# Patient Record
Sex: FEMALE | Race: WHITE | NOT HISPANIC OR LATINO | Employment: OTHER | URBAN - METROPOLITAN AREA
[De-identification: names, ages, dates, MRNs, and addresses within clinical notes are randomized per-mention and may not be internally consistent; named-entity substitution may affect disease eponyms.]

---

## 2017-01-19 ENCOUNTER — HOSPITAL ENCOUNTER (OUTPATIENT)
Dept: RADIOLOGY | Facility: HOSPITAL | Age: 67
Discharge: HOME | End: 2017-01-19
Attending: NURSE PRACTITIONER
Payer: COMMERCIAL

## 2017-01-19 ENCOUNTER — OFFICE VISIT (OUTPATIENT)
Dept: PRIMARY CARE CLINIC | Facility: CLINIC | Age: 67
End: 2017-01-19
Payer: COMMERCIAL

## 2017-01-19 VITALS
WEIGHT: 214.5 LBS | OXYGEN SATURATION: 96 % | TEMPERATURE: 98 F | BODY MASS INDEX: 36.62 KG/M2 | SYSTOLIC BLOOD PRESSURE: 146 MMHG | DIASTOLIC BLOOD PRESSURE: 78 MMHG | HEIGHT: 64 IN | HEART RATE: 47 BPM

## 2017-01-19 DIAGNOSIS — R19.7 DIARRHEA, UNSPECIFIED TYPE: ICD-10-CM

## 2017-01-19 DIAGNOSIS — R06.02 SHORTNESS OF BREATH: ICD-10-CM

## 2017-01-19 DIAGNOSIS — R53.83 FATIGUE, UNSPECIFIED TYPE: ICD-10-CM

## 2017-01-19 DIAGNOSIS — R06.02 SHORTNESS OF BREATH: Primary | ICD-10-CM

## 2017-01-19 PROCEDURE — 99999 PR PBB SHADOW E&M-EST. PATIENT-LVL V: CPT | Mod: PBBFAC,,, | Performed by: NURSE PRACTITIONER

## 2017-01-19 PROCEDURE — 71020 XR CHEST PA AND LATERAL: CPT | Mod: 26,,, | Performed by: RADIOLOGY

## 2017-01-19 PROCEDURE — 99214 OFFICE O/P EST MOD 30 MIN: CPT | Mod: S$GLB,,, | Performed by: NURSE PRACTITIONER

## 2017-01-19 PROCEDURE — 93005 ELECTROCARDIOGRAM TRACING: CPT | Mod: S$GLB,,, | Performed by: NURSE PRACTITIONER

## 2017-01-19 PROCEDURE — 3077F SYST BP >= 140 MM HG: CPT | Mod: S$GLB,,, | Performed by: NURSE PRACTITIONER

## 2017-01-19 PROCEDURE — 93010 ELECTROCARDIOGRAM REPORT: CPT | Mod: S$GLB,,, | Performed by: INTERNAL MEDICINE

## 2017-01-19 PROCEDURE — 3078F DIAST BP <80 MM HG: CPT | Mod: S$GLB,,, | Performed by: NURSE PRACTITIONER

## 2017-01-19 PROCEDURE — 1159F MED LIST DOCD IN RCRD: CPT | Mod: S$GLB,,, | Performed by: NURSE PRACTITIONER

## 2017-01-19 PROCEDURE — 71020 XR CHEST PA AND LATERAL: CPT | Mod: TC,PO

## 2017-01-19 RX ORDER — DIPHENOXYLATE HYDROCHLORIDE AND ATROPINE SULFATE 2.5; .025 MG/1; MG/1
1 TABLET ORAL
Qty: 30 TABLET | Refills: 0 | Status: SHIPPED | OUTPATIENT
Start: 2017-01-19 | End: 2017-01-25

## 2017-01-19 RX ORDER — DIPHENOXYLATE HYDROCHLORIDE AND ATROPINE SULFATE 2.5; .025 MG/1; MG/1
1 TABLET ORAL
Qty: 30 TABLET | Refills: 0 | Status: SHIPPED | OUTPATIENT
Start: 2017-01-19 | End: 2017-01-19 | Stop reason: SDUPTHER

## 2017-01-19 NOTE — PROGRESS NOTES
"Cornelia Delatorre is a 66 y.o. female patient of Armond Cortez MD who presents to the clinic today for   Chief Complaint   Patient presents with    Diarrhea     past 2 weeks    Chest Congestion    Nasal Congestion   .    HPI    Pt, who is known to me, reports with a new problem to me, as follows:  Diarrhea for about 1 mo off and on. Burping a lot.  For about 1 or 2 days per month for 30 yrs she gets this.  Usually has a Rx for lomotil, takes 1 or 2 tabs and then it resolves.  This is the first time she has had it lasting so long.  6-7 stools yesterday. "Pudding like".  But no BM today for the first time.  No cramping, no blood or mucus in it.  Burnign a lot since the diarrhea started.  Did have gastric bypass Dec 2011.  Had a lot of burping after her surgery but then it eased up.  No foul taste to it.  Shortness of breath, coughing, spitting up "stuff", constantly blowing her nose, not feeling well, feeling like she's "a little off" but not really dizzy for about 2 weeks.  Coricidin and Mucinex helping some.  Tried to quit the Mucinex and her sxs worsened.    Onset of dizziness and then, for a week, she had that.  Then the buzzing started.  Right before [place] went to ENT for buzzing in the ear.  Wondering  More about that.    These symptoms began 1 mo ago and status is unchanged.     Pt denies the following symptoms:  Fever, chills, vomiting, sinus pain, ear pain.    Aggravating factors include nothing (no large or fatty meals, no change with activity or lying down .    Relieving factors include Mucinex and Coricidin .    OTC Medications tried are Coricidin and Mucines..    Prescription medications taken for symptoms are Lomotil--down to 2 pills, needs RF..    Pertinent history:  H/o diarrhea 1-2x per month for 30 yrs.    ROS    Constitutional: No fever, + fatigue, decrease change in appetite. Sleeping well, she states, and feels rested in the morning.  But at night when she lies down she does worry about " stopping breathing in her sleep.  Has to take several deep breaths until she feels like she can get a good one.  Sleeping more than usual.    GI:  No stomach ache, no nausea, no vomiting, + diarrhea, + bloating, no constipation, no heartburn with the burping.    Urinary:  No dysuria, no frequency, no urgency, no flank pain.  Has been having trouble starting the urine stream.    HEENT: See above  Heart/Lung:  + difficulty breathing but no CP  MS/Skin:  No symptoms or no changes.      ALLERGIES AND MEDICATIONS: updated and reviewed.  Review of patient's allergies indicates:   Allergen Reactions    Sulfa (sulfonamide antibiotics) Hives    Naproxen Other (See Comments)     Other reaction(s): RT sided numbness       Current Outpatient Prescriptions   Medication Sig Dispense Refill    azelastine (ASTELIN) 137 mcg (0.1 %) nasal spray 1 spray (137 mcg total) by Nasal route 2 (two) times daily. 30 mL 5    b complex vitamins capsule Take 1 capsule by mouth once daily.      benzoyl peroxide-erythromycin (BENZAMYCIN) gel Thin film to chin once daily, increase to bid dosing for spot treatment. (Patient taking differently: as needed. Thin film to chin once daily, increase to bid dosing for spot treatment.) 50 g 3    BIOTIN ORAL Take 10,000 mcg by mouth once daily.       calcium citrate-vitamin D3 (UPCAL D) 500-250 mg-unit/2.5gram Powd Take by mouth 3 (three) times daily.        cranberry 500 mg Cap Take 1 capsule by mouth every evening.      cyanocobalamin (VITAMIN B-12) 500 MCG tablet Take 500 mcg by mouth once daily.        diphenoxylate-atropine 2.5-0.025 mg (LOMOTIL) 2.5-0.025 mg per tablet as needed.       esomeprazole (NEXIUM) 40 MG capsule Take 1 capsule (40 mg total) by mouth before breakfast. 90 capsule 3    fish oil-omega-3 fatty acids 300-1,000 mg capsule Take 2 g by mouth once daily.      fluticasone (FLONASE) 50 mcg/actuation nasal spray 2 sprays by Each Nare route once daily. 16 g 0    gabapentin  "(NEURONTIN) 600 MG tablet Take 1 tablet (600 mg total) by mouth 3 (three) times daily. 270 tablet 3    hydrochlorothiazide (HYDRODIURIL) 25 MG tablet Take 1 tablet (25 mg total) by mouth once daily. 90 tablet 3    hydrocortisone 2.5 % cream aaa twice daily prn 30 g 2    irbesartan (AVAPRO) 75 MG tablet Take 1 tablet (75 mg total) by mouth every evening. 90 tablet 3    levothyroxine (SYNTHROID) 88 MCG tablet Take 1 tablet (88 mcg total) by mouth once daily. 90 tablet 3    metoprolol tartrate (LOPRESSOR) 50 MG tablet Take 1 tablet (50 mg total) by mouth 2 (two) times daily. 180 tablet 3    multivitamin capsule Take 1 capsule by mouth. Take one tablets daily      mupirocin (BACTROBAN) 2 % ointment AAA bid (Patient taking differently: as needed. AAA bid) 30 g 2    pravastatin (PRAVACHOL) 20 MG tablet Take 1 tablet (20 mg total) by mouth once daily. 90 tablet 3    SIMETHICONE (GAS-X ORAL) Take 1 capsule by mouth as needed.      terbinafine HCl (LAMISIL) 250 mg tablet Take 1 tablet (250 mg total) by mouth once daily. 90 tablet 0    diclofenac sodium 1 % Gel Apply 2 g topically 4 (four) times daily. Left  ankle (Patient taking differently: Apply 2 g topically as needed. Left  ankle) 1 Tube 1     No current facility-administered medications for this visit.          PHYSICAL EXAM    Alert, coop 66 y.o. female patient in no acute distress, is not ill-appearing.    Vitals:    01/19/17 1718   BP: (!) 146/78   BP Location: Right arm   Patient Position: Sitting   BP Method: Manual   Pulse: (!) 47   Temp: 98.1 °F (36.7 °C)   TempSrc: Oral   SpO2: 96%   Weight: 97.3 kg (214 lb 8.1 oz)   Height: 5' 4" (1.626 m)    Body mass index is 36.82 kg/(m^2).  VS reviewed.  VS bradycardic pulse.  CC, nursing note, medications, PMH, PSH, FH and Soc hx all reviewed today.    Head:  Normocephalic, atraumatic.    EENT:  Ext nose/ears normal.               Eye lids normal, no discharge present.                       Resp:  Respirations " even, unlabored, sighs intermittently              Lungs CTA bilat.    Heart:  Heart rate normal.  RRR, no MRG on ausculation.    ABD:  Soft, round, NT to palp.  Normal BS in all 4 quadrants.  No rebound or organomegaly.              No peritoneal signs.  No CVAT    MS:  Ambulates normally.      NEURO:  Alert and oriented x 4.  Responds appropriately during interaction.    Skin:  Warm, dry, color good..    Psych:  Responds appropriately throughout the visit.               Relaxed.  Well-groomed.    Shortness of breath  -     EKG 12-lead  -     X-Ray Chest PA And Lateral; Future; Expected date: 1/19/17  -     CBC auto differential; Future; Expected date: 1/19/17  -     Comprehensive metabolic panel; Future; Expected date: 1/19/17    Fatigue, unspecified type  -     EKG 12-lead  -     X-Ray Chest PA And Lateral; Future; Expected date: 1/19/17  -     CBC auto differential; Future; Expected date: 1/19/17  -     Comprehensive metabolic panel; Future; Expected date: 1/19/17    Diarrhea, unspecified type  -     Comprehensive metabolic panel; Future; Expected date: 1/19/17  -     diphenoxylate-atropine 2.5-0.025 mg (LOMOTIL) 2.5-0.025 mg per tablet; Take 1 tablet by mouth as needed.  Dispense: 30 tablet; Refill: 0      Pt today presents with several complaints:  Hard to get a good breath, at night this worries her, nasal drip and cough, feeling a little off balance, unusually persistent bout of diarrhea.    This is a new problem to me and the following work up is planned.    Lab & Radiological Tests Ordered: CXR, CBC, CMP.  The results are pending.  The xray was read by radiology..      Pulmonary or Cardiac Testing ordered: EKG.  The results are new left BBB from 2011 EKG (last one available), no ST-T wave elevations or depressions of concern. (Lot of interference in the baseline).  The EKG was read by me but will be read by cardiology as well..    I have reviewed the following additional tests today: past CMP, Vit D, B  12.      Pt advised to perform comfort measures recommended on patient instruction sheet .    If worse or concerns, the patient is advised to call us or [facility]..  Explained exam findings, diagnosis and treatment plan to patient and her , with her during the visit.  Questions answered and patient states understanding.

## 2017-01-19 NOTE — PATIENT INSTRUCTIONS
We'll start with blood test, EKG and Chest xray.    Lomotil for the diarrhea.    We'll call with the results and next steps.    If you have any questions, please call.  You can reach us at [phone] Monday through Friday (except holidays) 12 nooon to 6 p.m.    Thank you for using the [facility]!    RODOLFO Alvarez, CNP, FNP-BC  [facility]  [facility]

## 2017-01-19 NOTE — Clinical Note
Armond Cortez MD,  I saw your NEW patient today in the [facility].  If you have any questions, please do not hesitate to contact me or Dr. Aburto.  Thank you!  FELIX Alvarez

## 2017-01-23 DIAGNOSIS — R06.00 DYSPNEA, UNSPECIFIED TYPE: Primary | ICD-10-CM

## 2017-01-23 DIAGNOSIS — R94.31 ABNORMAL EKG: ICD-10-CM

## 2017-01-24 ENCOUNTER — OFFICE VISIT (OUTPATIENT)
Dept: CARDIOLOGY | Facility: CLINIC | Age: 67
End: 2017-01-24
Payer: COMMERCIAL

## 2017-01-24 VITALS
DIASTOLIC BLOOD PRESSURE: 66 MMHG | SYSTOLIC BLOOD PRESSURE: 145 MMHG | BODY MASS INDEX: 36.47 KG/M2 | HEART RATE: 46 BPM | WEIGHT: 213.63 LBS | HEIGHT: 64 IN

## 2017-01-24 DIAGNOSIS — E66.3 OVERWEIGHT (BMI 25.0-29.9): ICD-10-CM

## 2017-01-24 DIAGNOSIS — E78.5 HYPERLIPIDEMIA, UNSPECIFIED HYPERLIPIDEMIA TYPE: ICD-10-CM

## 2017-01-24 DIAGNOSIS — R42 DIZZY: ICD-10-CM

## 2017-01-24 DIAGNOSIS — I10 ESSENTIAL HYPERTENSION: Primary | ICD-10-CM

## 2017-01-24 DIAGNOSIS — R06.09 DOE (DYSPNEA ON EXERTION): ICD-10-CM

## 2017-01-24 DIAGNOSIS — R53.82 CHRONIC FATIGUE: ICD-10-CM

## 2017-01-24 PROCEDURE — 99999 PR PBB SHADOW E&M-EST. PATIENT-LVL II: CPT | Mod: PBBFAC,,, | Performed by: INTERNAL MEDICINE

## 2017-01-24 PROCEDURE — 3077F SYST BP >= 140 MM HG: CPT | Mod: S$GLB,,, | Performed by: INTERNAL MEDICINE

## 2017-01-24 PROCEDURE — 3078F DIAST BP <80 MM HG: CPT | Mod: S$GLB,,, | Performed by: INTERNAL MEDICINE

## 2017-01-24 PROCEDURE — 99204 OFFICE O/P NEW MOD 45 MIN: CPT | Mod: S$GLB,,, | Performed by: INTERNAL MEDICINE

## 2017-01-24 PROCEDURE — 1159F MED LIST DOCD IN RCRD: CPT | Mod: S$GLB,,, | Performed by: INTERNAL MEDICINE

## 2017-01-24 RX ORDER — METOPROLOL TARTRATE 50 MG/1
50 TABLET ORAL DAILY
Qty: 180 TABLET | Refills: 3 | Status: SHIPPED | OUTPATIENT
Start: 2017-01-24 | End: 2017-04-17 | Stop reason: SDUPTHER

## 2017-01-24 NOTE — LETTER
January 24, 2017      Yuridia Wilson, VASYL  [address] [address]           [facility]  [address] [facility] [address] [place] [address]  Phone: [phone]          Patient: Cornelia Delatorre   MR Number: [id number]   YOB: 1950   Date of Visit: 1/24/2017       Dear Yuridia Wilson:    Thank you for referring Cornelia Delatorre to me for evaluation. Attached you will find relevant portions of my assessment and plan of care.    If you have questions, please do not hesitate to call me. I look forward to following Cornelia Delatorre along with you.    Sincerely,    Juan Higgins Jr., MD    Enclosure  CC:  No Recipients    If you would like to receive this communication electronically, please contact [email or web address] or [phone] to request more information on [facility] Link access.    For providers and/or their staff who would like to refer a patient to [facility], please contact us through our one-stop-shop provider referral line, Justyna Nevarez, at [phone].    If you feel you have received this communication in error or would no longer like to receive these types of communications, please e-mail [email or web address]

## 2017-01-24 NOTE — PROGRESS NOTES
Subjective:    Patient ID:  Cornelia Delatorre is a 66 y.o. female who presents for evaluation of Shortness of Breath (new pt-ref from Dr. fernandes) and Fatigue      HPI66 yo WF with no known heart disease had a dizzy spell last week and after that for several days felt fatigued and SOB . Also had a funny sensation in her chest with exertion but states seems to be feeling better. No palpitations or swelling.    Review of Systems   Constitution: Negative for decreased appetite, fever, weakness, malaise/fatigue, weight gain and weight loss.   HENT: Negative for headaches, hearing loss and nosebleeds.    Eyes: Negative for visual disturbance.   Cardiovascular: Positive for chest pain and dyspnea on exertion. Negative for claudication, cyanosis, irregular heartbeat, leg swelling, near-syncope, orthopnea, palpitations, paroxysmal nocturnal dyspnea and syncope.   Respiratory: Positive for shortness of breath. Negative for cough, hemoptysis, sleep disturbances due to breathing, snoring and wheezing.    Endocrine: Negative for cold intolerance, heat intolerance, polydipsia and polyuria.   Hematologic/Lymphatic: Negative for adenopathy and bleeding problem. Does not bruise/bleed easily.   Skin: Negative for color change, itching, poor wound healing, rash and suspicious lesions.   Musculoskeletal: Negative for arthritis, back pain, falls, joint pain, joint swelling, muscle cramps, muscle weakness and myalgias.   Gastrointestinal: Negative for bloating, abdominal pain, change in bowel habit, constipation, flatus, heartburn, hematemesis, hematochezia, hemorrhoids, jaundice, melena, nausea and vomiting.   Genitourinary: Negative for bladder incontinence, decreased libido, frequency, hematuria, hesitancy and urgency.   Neurological: Positive for dizziness. Negative for brief paralysis, difficulty with concentration, excessive daytime sleepiness, focal weakness, light-headedness, loss of balance, numbness and vertigo.  "  Psychiatric/Behavioral: Negative for altered mental status, depression and memory loss. The patient does not have insomnia and is not nervous/anxious.    Allergic/Immunologic: Negative for environmental allergies, hives and persistent infections.        Objective:    Physical Exam   Constitutional: She is oriented to person, place, and time. She appears well-developed and well-nourished.   BP (!) 145/66  Pulse (!) 46  Ht 5' 4" (1.626 m)  Wt 96.9 kg (213 lb 10 oz)  BMI 36.67 kg/m2     HENT:   Head: Normocephalic and atraumatic.   Right Ear: External ear normal.   Left Ear: External ear normal.   Nose: Nose normal.   Mouth/Throat: Oropharynx is clear and moist.   Eyes: Conjunctivae, EOM and lids are normal. Pupils are equal, round, and reactive to light. Right eye exhibits no discharge. Left eye exhibits no discharge. Right conjunctiva has no hemorrhage. No scleral icterus.   Neck: Normal range of motion. Neck supple. No JVD present. No tracheal deviation present. No thyromegaly present.   Cardiovascular: Normal rate, regular rhythm, normal heart sounds and intact distal pulses.  Exam reveals no gallop and no friction rub.    No murmur heard.  Pulmonary/Chest: Effort normal and breath sounds normal. No respiratory distress. She has no wheezes. She has no rales. She exhibits no tenderness. Breasts are symmetrical.   Abdominal: Soft. Bowel sounds are normal. She exhibits no distension and no mass. There is no hepatosplenomegaly or hepatomegaly. There is no tenderness. There is no rebound and no guarding.   Musculoskeletal: Normal range of motion. She exhibits no edema or tenderness.   Lymphadenopathy:     She has no cervical adenopathy.   Neurological: She is alert and oriented to person, place, and time. She displays normal reflexes. No cranial nerve deficit. Coordination normal.   Skin: Skin is warm and dry. No rash noted. No erythema. No pallor.   Psychiatric: She has a normal mood and affect. Her behavior is " normal. Judgment and thought content normal.   Nursing note and vitals reviewed.        Assessment:       1. Essential hypertension    2. Hyperlipidemia, unspecified hyperlipidemia type    3. Overweight (BMI 25.0-29.9)    4. KLINE (dyspnea on exertion)    5. Chronic fatigue    6. Dizzy         Plan:     Patient bradycardic. Will decrease metoprolol to once a day. Continue other meds      Orders Placed This Encounter   Procedures    Exercise stress echo     Return if symptoms worsen or fail to improve.

## 2017-01-25 ENCOUNTER — HOSPITAL ENCOUNTER (OUTPATIENT)
Dept: RADIOLOGY | Facility: HOSPITAL | Age: 67
Discharge: HOME | End: 2017-01-25
Attending: NURSE PRACTITIONER
Payer: COMMERCIAL

## 2017-01-25 ENCOUNTER — OFFICE VISIT (OUTPATIENT)
Dept: PRIMARY CARE CLINIC | Facility: CLINIC | Age: 67
End: 2017-01-25
Payer: COMMERCIAL

## 2017-01-25 VITALS
OXYGEN SATURATION: 98 % | DIASTOLIC BLOOD PRESSURE: 78 MMHG | HEART RATE: 78 BPM | BODY MASS INDEX: 36.7 KG/M2 | WEIGHT: 214.94 LBS | TEMPERATURE: 98 F | SYSTOLIC BLOOD PRESSURE: 158 MMHG | HEIGHT: 64 IN

## 2017-01-25 DIAGNOSIS — R05.9 COUGH: ICD-10-CM

## 2017-01-25 DIAGNOSIS — R05.9 COUGH: Primary | ICD-10-CM

## 2017-01-25 PROCEDURE — 71020 XR CHEST PA AND LATERAL: CPT | Mod: TC,PO

## 2017-01-25 PROCEDURE — 99999 PR PBB SHADOW E&M-EST. PATIENT-LVL V: CPT | Mod: PBBFAC,,, | Performed by: NURSE PRACTITIONER

## 2017-01-25 PROCEDURE — 3078F DIAST BP <80 MM HG: CPT | Mod: S$GLB,,, | Performed by: NURSE PRACTITIONER

## 2017-01-25 PROCEDURE — 1159F MED LIST DOCD IN RCRD: CPT | Mod: S$GLB,,, | Performed by: NURSE PRACTITIONER

## 2017-01-25 PROCEDURE — 71020 XR CHEST PA AND LATERAL: CPT | Mod: 26,,, | Performed by: RADIOLOGY

## 2017-01-25 PROCEDURE — 3077F SYST BP >= 140 MM HG: CPT | Mod: S$GLB,,, | Performed by: NURSE PRACTITIONER

## 2017-01-25 PROCEDURE — 99214 OFFICE O/P EST MOD 30 MIN: CPT | Mod: S$GLB,,, | Performed by: NURSE PRACTITIONER

## 2017-01-25 RX ORDER — BENZONATATE 200 MG/1
200 CAPSULE ORAL 3 TIMES DAILY PRN
Qty: 30 CAPSULE | Refills: 0 | Status: SHIPPED | OUTPATIENT
Start: 2017-01-25 | End: 2017-02-04

## 2017-01-25 NOTE — PROGRESS NOTES
Cornelia Delatorre is a 66 y.o. female patient of Armond Cortez MD who presents to the clinic today for   Chief Complaint   Patient presents with    Cough     started last night    Chest Congestion    Nausea    Headache    Nasal Congestion   .    HPI    Pt, who is known to me, reports a new problem to me, as follows:  Coughing.  Started after eating some popcorn.  Thought it was r/t that.  But coughed all night.  Clear nasal d/c, clear sputum.  No known exposure to illness.  Decrease in appetite.  Has bi-temporal headache.  Mild nausea, burning in the chest.    These symptoms began 1 day ago and status is worsening.     Symptoms are + acute.      Pt denies the following symptoms:  RN, ST, earache.      Aggravating factors include nothing .    Relieving factors include benzonatate, robitussin DM .    OTC Medications tried are see above.    Prescription medications taken for symptoms are benzonatate.    Pertinent medical history:  No h/o lung problems    Smoking status:  nonsmoker    ROS    Constitutional:   No  fever, + fatigue, decrease in appetite.      Head:    + bi-temporal headache    EENT:  Ears:    No pain  Eyes:    No discharge, no irritation, no change in vision  Nose:    No sinus pain, no congestion, + clear runny nose.  Quit nose sprays for a while because she didn't think she needed it.  Started back last night.    Throat:  No ST pain.               Heart:  No palpitations, chest pain.  Saw Dr. Higgins yesterday.  He cut her metoprolol in half    Lungs:  Still has occ difficulty breathing--needing to take a deep breath, ++ coughing, occ clear sputum production, + wheezing this morning.              Symptoms are community acquired.    GI/:  Continues with diarrhea.  Called Dr. Duarte and he is working on this.    MS:  No new bone, joint or muscle problems.    Skin:  No rashes, itching.    ALLERGIES AND MEDICATIONS: updated and reviewed.  Review of patient's allergies indicates:   Allergen Reactions     Sulfa (sulfonamide antibiotics) Hives    Naproxen Other (See Comments)     Other reaction(s): RT sided numbness       Current Outpatient Prescriptions   Medication Sig Dispense Refill    azelastine (ASTELIN) 137 mcg (0.1 %) nasal spray 1 spray (137 mcg total) by Nasal route 2 (two) times daily. 30 mL 5    b complex vitamins capsule Take 1 capsule by mouth once daily.      BIOTIN ORAL Take 10,000 mcg by mouth once daily.       calcium citrate-vitamin D3 (UPCAL D) 500-250 mg-unit/2.5gram Powd Take by mouth 3 (three) times daily.        CHOLECALCIFEROL, VITAMIN D3, (CHOLECALCIFEROL, VIT D3,,BULK, MISC) by Misc.(Non-Drug; Combo Route) route.      cranberry 500 mg Cap Take 1 capsule by mouth every evening.      cyanocobalamin (VITAMIN B-12) 500 MCG tablet Take 500 mcg by mouth once daily.        esomeprazole (NEXIUM) 40 MG capsule Take 1 capsule (40 mg total) by mouth before breakfast. 90 capsule 3    fish oil-omega-3 fatty acids 300-1,000 mg capsule Take 2 g by mouth once daily.      fluticasone (FLONASE) 50 mcg/actuation nasal spray 2 sprays by Each Nare route once daily. 16 g 0    gabapentin (NEURONTIN) 600 MG tablet Take 1 tablet (600 mg total) by mouth 3 (three) times daily. 270 tablet 3    hydrochlorothiazide (HYDRODIURIL) 25 MG tablet Take 1 tablet (25 mg total) by mouth once daily. 90 tablet 3    hydrocortisone 2.5 % cream aaa twice daily prn 30 g 2    irbesartan (AVAPRO) 75 MG tablet Take 1 tablet (75 mg total) by mouth every evening. 90 tablet 3    levothyroxine (SYNTHROID) 88 MCG tablet Take 1 tablet (88 mcg total) by mouth once daily. 90 tablet 3    metoprolol tartrate (LOPRESSOR) 50 MG tablet Take 1 tablet (50 mg total) by mouth once daily. 180 tablet 3    multivitamin capsule Take 1 capsule by mouth. Take one tablets daily      mupirocin (BACTROBAN) 2 % ointment AAA bid (Patient taking differently: as needed. AAA bid) 30 g 2    pravastatin (PRAVACHOL) 20 MG tablet Take 1 tablet (20  "mg total) by mouth once daily. 90 tablet 3    SIMETHICONE (GAS-X ORAL) Take 1 capsule by mouth as needed.       No current facility-administered medications for this visit.        PHYSICAL EXAM    Alert, coop 66 y.o. female patient in no acute distress.    Vitals:    01/25/17 1551   BP: (!) 158/78   BP Location: Right arm   Patient Position: Sitting   BP Method: Manual   Pulse: 78   Temp: 98.4 °F (36.9 °C)   TempSrc: Oral   SpO2: 98%   Weight: 97.5 kg (214 lb 15.2 oz)   Height: 5' 4" (1.626 m)     VS reviewed.  VS pulse inc to 78 (up from 47 five days ago), BP mildly elevated.  CC, nursing note, medications, PMH, PSH, FH and Soc Hx all reviewed today.    Head:  Normocephalic, atraumatic.    EENT:  EACs patent.  TMs no erythema, dull LR, no effusions, no TM perforation.               Eye lids normal, no discharge present.       No Sinus tenderness to palp.               Nares--minimal edema, no d/c present.    Pharynx not injected.                Tonsils not erythematous , not enlarged, no exudate present.    No anterior, no posterior cervical lymph nodes palpable.    No submental, submandibular or supraclavicular lymph nodes palp.             Resp:  Respirations even, unlabored.  Very deep cough heard.   Lungs CTA bilat.  No wheezing.  No crackles.  Moves air to bases bilat.    Heart:  RRR, no MRG.    MS:  Ambulates normally.             NEURO:  Alert and oriented x 4.  Responds appropriately during interaction.    Skin:  Warm, dry, color good.    Psych:  Responds appropriately throughout the visit.               Relaxed.  Well-groomed.    Cough  -     X-Ray Chest PA And Lateral; Future; Expected date: 1/25/17  -     benzonatate (TESSALON) 200 MG capsule; Take 1 capsule (200 mg total) by mouth 3 (three) times daily as needed.  Dispense: 30 capsule; Refill: 0      Pt today presents with sudden onset of cough last night with little nasal congestion, no other cold sxs.  Started after eating popcorn..    This is a new " problem to me and the following work up is planned.    Lab & Radiological Tests Ordered: repeat CXR.  The results are pending.  The xray read by radiology..      Pt advised to perform comfort measures recommended on patient instruction sheet .    Next step will be outlined depending on CXR results.  Explained exam findings, diagnosis and treatment plan to patient and her , with her during the visit.  Questions answered and patient states understanding.      Addendum:  CXR still neg.  Continue with benzonatate, comfort measures.   Because of it sudden onset, this may be reflux.  Pt advised to call if not better in 5 days, worse or concerns.

## 2017-01-25 NOTE — PATIENT INSTRUCTIONS
We'll get the chest xray and determine what's needed next.    Refilled benzonatate.  Use the TU[place]in DM in the daytime, benzonatate at night, unless the cough gets bad; then use the benzonatate.  Continue the nose sprays.    If you have any questions, please call.  You can reach us at [phone] Monday through Friday (except holidays) 12 nooon to 6 p.m.    Thank you for using the [facility]!    RODOLFO Alvarez, CNP, FNP-BC  [facility]  [facility]

## 2017-01-25 NOTE — Clinical Note
Armond Cortez MD,  I saw your patient today in the [facility].  If you have any questions, please do not hesitate to contact me.  Thank you!  FELIX Alvarez Dr.:  Mrs. Delatorre has a bad cough now.  She has her stress test set for Tuesday.  She's wondering about the stress test--if she can do it.  Please ask your staff to call with your recommendation. Thank you! Lenka WASHINGTON

## 2017-01-25 NOTE — MR AVS SNAPSHOT
[facility]  [address]  [facility] [address]  Phone: [phone]                  Cornelia Delatorre   2017 4:00 PM   Office Visit    Description:  Female : 1950   Provider:  Yuridia Wilson NP   Department:  [facility]           Reason for Visit     Cough     Chest Congestion     Nausea     Headache     Nasal Congestion           Diagnoses this Visit        Comments    Cough    -  Primary            To Do List           Future Appointments        Provider Department Dept Phone    2017 4:45 PM [facility] XRFL1 [facility] [phone]    2017 10:00 AM ECHO, [facility] [phone]    2017 10:15 AM Kailyn Fletcher MD [facility] [phone]    2017 11:00 AM Farzad Willams DPCELESTE [facility] Pod[facility] [phone]    2/15/2017 3:10 PM Armond Cortez MD [facility] [phone]      Goals (5 Years of Data)     None       These Medications        Disp Refills Start End    benzonatate (TESSALON) 200 MG capsule 30 capsule 0 2017    Take 1 capsule (200 mg total) by mouth 3 (three) times daily as needed. - Oral    Pharmacy: [facility] [place] [address] [address] Ph #: [phone]         [facility]     [facility] -  Assistance  Registered nurses in the [facility] provide clinical advisement, health education, appointment booking, and other advisory services.  Call for this free service at [phone].             Medications           Message regarding Medications     Verify the changes and/or additions to your medication regime listed below are the same as discussed with your clinician today.  If any of these changes or additions are incorrect, please notify your healthcare provider.        START taking these NEW medications        Refills    benzonatate (TESSALON) 200 MG capsule 0    Sig: Take 1 capsule  (200 mg total) by mouth 3 (three) times daily as needed.    Class: Normal    Route: Oral      STOP taking these medications     benzoyl peroxide-erythromycin (BENZAMYCIN) gel Thin film to chin once daily, increase to bid dosing for spot treatment.    diclofenac sodium 1 % Gel Apply 2 g topically 4 (four) times daily. Left  ankle    diphenoxylate-atropine 2.5-0.025 mg (LOMOTIL) 2.5-0.025 mg per tablet Take 1 tablet by mouth as needed.           Verify that the below list of medications is an accurate representation of the medications you are currently taking.  If none reported, the list may be blank. If incorrect, please contact your healthcare provider. Carry this list with you in case of emergency.           Current Medications     azelastine (ASTELIN) 137 mcg (0.1 %) nasal spray 1 spray (137 mcg total) by Nasal route 2 (two) times daily.    b complex vitamins capsule Take 1 capsule by mouth once daily.    BIOTIN ORAL Take 10,000 mcg by mouth once daily.     calcium citrate-vitamin D3 (UPCAL D) 500-250 mg-unit/2.5gram Powd Take by mouth 3 (three) times daily.      CHOLECALCIFEROL, VITAMIN D3, (CHOLECALCIFEROL, VIT D3,,BULK, MISC) by Misc.(Non-Drug; Combo Route) route.    cranberry 500 mg Cap Take 1 capsule by mouth every evening.    cyanocobalamin (VITAMIN B-12) 500 MCG tablet Take 500 mcg by mouth once daily.      esomeprazole (NEXIUM) 40 MG capsule Take 1 capsule (40 mg total) by mouth before breakfast.    fish oil-omega-3 fatty acids 300-1,000 mg capsule Take 2 g by mouth once daily.    fluticasone (FLONASE) 50 mcg/actuation nasal spray 2 sprays by Each Nare route once daily.    gabapentin (NEURONTIN) 600 MG tablet Take 1 tablet (600 mg total) by mouth 3 (three) times daily.    hydrochlorothiazide (HYDRODIURIL) 25 MG tablet Take 1 tablet (25 mg total) by mouth once daily.    hydrocortisone 2.5 % cream aaa twice daily prn    irbesartan (AVAPRO) 75 MG tablet Take 1 tablet (75 mg total) by mouth every evening.     "levothyroxine (SYNTHROID) 88 MCG tablet Take 1 tablet (88 mcg total) by mouth once daily.    metoprolol tartrate (LOPRESSOR) 50 MG tablet Take 1 tablet (50 mg total) by mouth once daily.    multivitamin capsule Take 1 capsule by mouth. Take one tablets daily    mupirocin (BACTROBAN) 2 % ointment AAA bid    pravastatin (PRAVACHOL) 20 MG tablet Take 1 tablet (20 mg total) by mouth once daily.    SIMETHICONE (GAS-X ORAL) Take 1 capsule by mouth as needed.    benzonatate (TESSALON) 200 MG capsule Take 1 capsule (200 mg total) by mouth 3 (three) times daily as needed.           Clinical Reference Information           Vital Signs - Last Recorded  Most recent update: 1/25/2017  3:52 PM by Padmini Thomas LPN    BP Pulse Temp Ht Wt SpO2    (!) 158/78 (BP Location: Right arm, Patient Position: Sitting, BP Method: Manual) 78 98.4 °F (36.9 °C) (Oral) 5' 4" (1.626 m) 97.5 kg (214 lb 15.2 oz) 98%    BMI                36.9 kg/m2          Blood Pressure          Most Recent Value    BP  (!)  158/78      Allergies as of 1/25/2017     Sulfa (Sulfonamide Antibiotics)    Naproxen      Immunizations Administered on Date of Encounter - 1/25/2017     None      Orders Placed During Today's Visit     Future Labs/Procedures Expected by Expires    X-Ray Chest PA And Lateral  1/25/2017 1/25/2018      Instructions    We'll get the chest xray and determine what's needed next.    Refilled benzonatate.  Use the [place] DM in the daytime, benzonatate at night, unless the cough gets bad; then use the benzonatate.  Continue the nose sprays.    If you have any questions, please call.  You can reach us at [phone] Monday through Friday (except holidays) 12 nooon to 6 p.m.    Thank you for using the [facility]!    Lenka Wilson, APRN, CNP, FNP-BC  [facility]  [facility]           "

## 2017-01-25 NOTE — Clinical Note
Armond Cortez MD,  I saw your patient today in the [facility].  If you have any questions, please do not hesitate to contact me.  Thank you!  FELIX Alvarez

## 2017-01-26 ENCOUNTER — TELEPHONE (OUTPATIENT)
Dept: PRIMARY CARE CLINIC | Facility: CLINIC | Age: 67
End: 2017-01-26

## 2017-01-26 DIAGNOSIS — R09.89 CHEST CONGESTION: ICD-10-CM

## 2017-01-26 DIAGNOSIS — R50.9 FEVER, UNSPECIFIED FEVER CAUSE: ICD-10-CM

## 2017-01-26 DIAGNOSIS — R05.9 COUGHING: Primary | ICD-10-CM

## 2017-01-27 RX ORDER — CEFUROXIME AXETIL 500 MG/1
500 TABLET ORAL 2 TIMES DAILY
Qty: 14 TABLET | Refills: 0 | Status: SHIPPED | OUTPATIENT
Start: 2017-01-27 | End: 2017-02-03

## 2017-01-27 NOTE — TELEPHONE ENCOUNTER
I will start her on an antibiotic.  If she's not better in the next 3-4 days or is worse, ask her to call.  She probably should let Dr. Higgins's nurse know she's sick and ask about the stress test on Tuesday.

## 2017-01-27 NOTE — TELEPHONE ENCOUNTER
Returned patient's call. Patient states that she still is not feeling well. She still has nasal congestion and head stuffiness. Patient states that she had a fever last night over 101 took tylenol and it went down to 100. Please advise.

## 2017-01-27 NOTE — TELEPHONE ENCOUNTER
Called and spoke with patient and informed her that and antibiotic had been called into the pharmacy on file. Patient informed to call in the next 3-4 days if not feeling better and to contact Dr. Higgins's office in regard to stress test on Tuesday. Patient verbalized understanding.

## 2017-02-06 ENCOUNTER — OFFICE VISIT (OUTPATIENT)
Dept: FAMILY MEDICINE | Facility: CLINIC | Age: 67
End: 2017-02-06
Payer: COMMERCIAL

## 2017-02-06 VITALS
HEART RATE: 54 BPM | WEIGHT: 209 LBS | BODY MASS INDEX: 35.68 KG/M2 | DIASTOLIC BLOOD PRESSURE: 74 MMHG | OXYGEN SATURATION: 98 % | TEMPERATURE: 98 F | HEIGHT: 64 IN | SYSTOLIC BLOOD PRESSURE: 126 MMHG

## 2017-02-06 DIAGNOSIS — R53.83 FATIGUE, UNSPECIFIED TYPE: ICD-10-CM

## 2017-02-06 DIAGNOSIS — R06.89 ABNORMAL BREATH SOUNDS: ICD-10-CM

## 2017-02-06 DIAGNOSIS — R05.8 RECURRENT COUGH: ICD-10-CM

## 2017-02-06 DIAGNOSIS — R06.02 SHORTNESS OF BREATH AT REST: Primary | ICD-10-CM

## 2017-02-06 DIAGNOSIS — F41.9 ANXIETY: ICD-10-CM

## 2017-02-06 PROCEDURE — 3078F DIAST BP <80 MM HG: CPT | Mod: S$GLB,,, | Performed by: NURSE PRACTITIONER

## 2017-02-06 PROCEDURE — 99214 OFFICE O/P EST MOD 30 MIN: CPT | Mod: S$GLB,,, | Performed by: NURSE PRACTITIONER

## 2017-02-06 PROCEDURE — 99999 PR PBB SHADOW E&M-EST. PATIENT-LVL V: CPT | Mod: PBBFAC,,, | Performed by: NURSE PRACTITIONER

## 2017-02-06 PROCEDURE — 3074F SYST BP LT 130 MM HG: CPT | Mod: S$GLB,,, | Performed by: NURSE PRACTITIONER

## 2017-02-06 RX ORDER — BENZONATATE 100 MG/1
100 CAPSULE ORAL 3 TIMES DAILY PRN
Qty: 30 CAPSULE | Refills: 0 | Status: SHIPPED | OUTPATIENT
Start: 2017-02-06 | End: 2017-02-15

## 2017-02-06 RX ORDER — HYDROXYZINE PAMOATE 50 MG/1
50 CAPSULE ORAL NIGHTLY PRN
Qty: 30 CAPSULE | Refills: 0 | Status: SHIPPED | OUTPATIENT
Start: 2017-02-06 | End: 2017-08-14

## 2017-02-06 NOTE — MR AVS SNAPSHOT
[facility]  [address]  [facility] [address]  Phone: [phone]  Fax: [phone]                  Cornelia Delatorre   2017 1:40 PM   Office Visit    Description:  Female : 1950   Provider:  Kira Peter NP   Department:  [facility]           Reason for Visit     Cough     Shortness of Breath           Diagnoses this Visit        Comments    Shortness of breath at rest    -  Primary     Abnormal breath sounds         Recurrent cough         Anxiety                To Do List           Future Appointments        Provider Department Dept Phone    2017 8:15 AM [facility] CT1 [address] [facility] [phone]    2017 10:15 AM Kailyn Fletcher MD [facility] Dermat[facility] [phone]    2017 11:00 AM Farzad Willams DPM [facility] Podiatry [phone]    2/15/2017 3:10 PM Armond Cortez MD [facility] [phone]    2017 10:00 AM LOVE, [facility] Card[facility] [phone]      Goals (5 Years of Data)     None       These Medications        Disp Refills Start End    benzonatate (TESSALON) 100 MG capsule 30 capsule 0 2017    Take 1 capsule (100 mg total) by mouth 3 (three) times daily as needed. - Oral    [facility] [address] Ph #: [phone]       hydrOXYzine pamoate (VISTARIL) 50 MG Cap 30 capsule 0 2017     Take 1 capsule (50 mg total) by mouth nightly as needed. Take for anxiety, Can cause drowsiness - Oral    Pharmacy: [facility] [place] - [address] Ph #: [phone]         [facility]     [facility] -  Assistance  Registered nurses in the [facility] provide clinical advisement, health education, appointment booking, and other advisory services.  Call for this free service at [phone].             Medications           Message regarding Medications      Verify the changes and/or additions to your medication regime listed below are the same as discussed with your clinician today.  If any of these changes or additions are incorrect, please notify your healthcare provider.        START taking these NEW medications        Refills    benzonatate (TESSALON) 100 MG capsule 0    Sig: Take 1 capsule (100 mg total) by mouth 3 (three) times daily as needed.    Class: Normal    Route: Oral    hydrOXYzine pamoate (VISTARIL) 50 MG Cap 0    Sig: Take 1 capsule (50 mg total) by mouth nightly as needed. Take for anxiety, Can cause drowsiness    Class: Normal    Route: Oral           Verify that the below list of medications is an accurate representation of the medications you are currently taking.  If none reported, the list may be blank. If incorrect, please contact your healthcare provider. Carry this list with you in case of emergency.           Current Medications     azelastine (ASTELIN) 137 mcg (0.1 %) nasal spray 1 spray (137 mcg total) by Nasal route 2 (two) times daily.    b complex vitamins capsule Take 1 capsule by mouth once daily.    benzonatate (TESSALON) 100 MG capsule Take 1 capsule (100 mg total) by mouth 3 (three) times daily as needed.    BIOTIN ORAL Take 10,000 mcg by mouth once daily.     calcium citrate-vitamin D3 (UPCAL D) 500-250 mg-unit/2.5gram Powd Take by mouth 3 (three) times daily.      CHOLECALCIFEROL, VITAMIN D3, (CHOLECALCIFEROL, VIT D3,,BULK, MISC) by Misc.(Non-Drug; Combo Route) route.    cranberry 500 mg Cap Take 1 capsule by mouth every evening.    cyanocobalamin (VITAMIN B-12) 500 MCG tablet Take 500 mcg by mouth once daily.      esomeprazole (NEXIUM) 40 MG capsule Take 1 capsule (40 mg total) by mouth before breakfast.    fish oil-omega-3 fatty acids 300-1,000 mg capsule Take 2 g by mouth once daily.    fluticasone (FLONASE) 50 mcg/actuation nasal spray 2 sprays by Each Nare route once daily.    hydrochlorothiazide (HYDRODIURIL) 25 MG tablet  "Take 1 tablet (25 mg total) by mouth once daily.    hydrocortisone 2.5 % cream aaa twice daily prn    hydrOXYzine pamoate (VISTARIL) 50 MG Cap Take 1 capsule (50 mg total) by mouth nightly as needed. Take for anxiety, Can cause drowsiness    irbesartan (AVAPRO) 75 MG tablet Take 1 tablet (75 mg total) by mouth every evening.    levothyroxine (SYNTHROID) 88 MCG tablet Take 1 tablet (88 mcg total) by mouth once daily.    metoprolol tartrate (LOPRESSOR) 50 MG tablet Take 1 tablet (50 mg total) by mouth once daily.    multivitamin capsule Take 1 capsule by mouth. Take one tablets daily    mupirocin (BACTROBAN) 2 % ointment AAA bid    pravastatin (PRAVACHOL) 20 MG tablet Take 1 tablet (20 mg total) by mouth once daily.    SIMETHICONE (GAS-X ORAL) Take 1 capsule by mouth as needed.    TESTOSTERONE AND ESTRADIOL CYP (ESTRADIOL-TESTOSTERONE IM) Apply topically.           Clinical Reference Information           Your Vitals Were     BP Pulse Temp Height Weight SpO2    126/74 54 98 °F (36.7 °C) (Oral) 5' 4" (1.626 m) 94.8 kg (208 lb 15.9 oz) 98%    BMI                35.87 kg/m2          Blood Pressure          Most Recent Value    BP  126/74      Allergies as of 2/6/2017     Sulfa (Sulfonamide Antibiotics)    Naproxen      Immunizations Administered on Date of Encounter - 2/6/2017     None      Orders Placed During Today's Visit     Future Labs/Procedures Expected by Expires    CT Chest Without Contrast  2/6/2017 2/6/2018      Language Assistance Services     ATTENTION: Language assistance services are available, free of charge. Please call [phone].      ATENCIÓN: Si habla reina, tiene a aleman disposición servicios gratuitos de asistencia lingüística. Llame al [phone].     JANNETH Ý: N?u b?n nói Ti?ng Vi?t, có các d?ch v? h? tr? ngôn ng? mi?n phí dành cho b?n. G?i s? [phone].         [facility] complies with applicable Federal civil rights laws and does not discriminate on the basis of race, " color, national origin, age, disability, or sex.

## 2017-02-06 NOTE — PROGRESS NOTES
"Subjective:       Patient ID: Cornelia Delatorre is a 66 y.o. female.    Chief Complaint: Cough (Has had cough since mid January) and Shortness of Breath  She was last seen by her PCP, ROHAN Alcaraz MD on 12/01/2016. She saw CHELSEY Wilson on 01/25/2017 for the cough. This is her first time seeing me in the clinic.  Cough   The current episode started more than 1 month ago. The problem has been unchanged. The cough is productive of sputum. Associated symptoms include shortness of breath. Pertinent negatives include no fever. Associated symptoms comments: dizziness. The symptoms are aggravated by lying down.   Shortness of Breath   Pertinent negatives include no fever.   States she "gets nervous when feel like can't breath and insides feels like shaking"  She is being worked up by her gastroenterologist and cardiologist  She is scheduled for a stress test but states she "does not feel like she can have it because she is "so short of breath".    Vitals:    02/06/17 1332   BP: 126/74   Pulse: (!) 54   Temp: 98 °F (36.7 °C)   SPO2@98%    Review of Systems   Constitutional: Positive for fatigue. Negative for fever.   Respiratory: Positive for cough and shortness of breath.      Denies Smoking history, denies history of asthma or other pulmonary diagnosis like COPD  Objective:      Physical Exam   Constitutional: She is oriented to person, place, and time. She appears well-developed and well-nourished.   HENT:   Head: Normocephalic and atraumatic.   Right Ear: External ear normal.   Nose: Nose normal.   Mouth/Throat: Oropharynx is clear and moist.   Neck: Normal range of motion. Neck supple.   Cardiovascular: Normal rate and regular rhythm.    Pulmonary/Chest:   Very coarse/harsh rubbing like breath sounds in inspiratory and expiratory phase to left chest wall. The sounds appear to clear after resting for a while.   Musculoskeletal: Normal range of motion.   Lymphadenopathy:        Head (right side): No submental, no " submandibular, no tonsillar, no preauricular, no posterior auricular and no occipital adenopathy present.        Head (left side): No submental, no submandibular, no tonsillar, no preauricular, no posterior auricular and no occipital adenopathy present.   Neurological: She is alert and oriented to person, place, and time.   Skin: Skin is warm, dry and intact.   Psychiatric: She has a normal mood and affect. Her speech is normal and behavior is normal. Judgment and thought content normal. Cognition and memory are normal.   Nursing note and vitals reviewed.      Assessment & Plan:       Shortness of breath at rest  -     CT Chest Without Contrast; Future; Expected date: 2/6/17    Abnormal breath sounds  -     CT Chest Without Contrast; Future; Expected date: 2/6/17    Recurrent cough  -     benzonatate (TESSALON) 100 MG capsule; Take 1 capsule (100 mg total) by mouth 3 (three) times daily as needed.  Dispense: 30 capsule; Refill: 0  -     CT Chest Without Contrast; Future; Expected date: 2/6/17    Anxiety  -     hydrOXYzine pamoate (VISTARIL) 50 MG Cap; Take 1 capsule (50 mg total) by mouth nightly as needed. Take for anxiety, Can cause drowsiness  Dispense: 30 capsule; Refill: 0    Fatigue, unspecified type    Will call her with results of CT and she will also need to follow up with her PCP      No Follow-up on file.

## 2017-02-07 ENCOUNTER — TELEPHONE (OUTPATIENT)
Dept: FAMILY MEDICINE | Facility: CLINIC | Age: 67
End: 2017-02-07

## 2017-02-07 ENCOUNTER — HOSPITAL ENCOUNTER (OUTPATIENT)
Dept: RADIOLOGY | Facility: HOSPITAL | Age: 67
Discharge: HOME | End: 2017-02-07
Attending: NURSE PRACTITIONER
Payer: COMMERCIAL

## 2017-02-07 DIAGNOSIS — R05.3 CHRONIC COUGH: ICD-10-CM

## 2017-02-07 DIAGNOSIS — R06.89 ABNORMAL BREATH SOUNDS: ICD-10-CM

## 2017-02-07 DIAGNOSIS — R06.02 SHORTNESS OF BREATH: Primary | ICD-10-CM

## 2017-02-07 DIAGNOSIS — R91.8 MULTIPLE PULMONARY NODULES: ICD-10-CM

## 2017-02-07 DIAGNOSIS — R05.8 RECURRENT COUGH: ICD-10-CM

## 2017-02-07 DIAGNOSIS — R06.02 SHORTNESS OF BREATH AT REST: ICD-10-CM

## 2017-02-07 DIAGNOSIS — R93.89 ABNORMAL CT OF THE CHEST: ICD-10-CM

## 2017-02-07 PROCEDURE — 71250 CT THORAX DX C-: CPT | Mod: TC,PO

## 2017-02-07 PROCEDURE — 71250 CT THORAX DX C-: CPT | Mod: 26,,, | Performed by: RADIOLOGY

## 2017-02-09 ENCOUNTER — OFFICE VISIT (OUTPATIENT)
Dept: PODIATRY | Facility: CLINIC | Age: 67
End: 2017-02-09
Payer: COMMERCIAL

## 2017-02-09 ENCOUNTER — OFFICE VISIT (OUTPATIENT)
Dept: DERMATOLOGY | Facility: CLINIC | Age: 67
End: 2017-02-09
Payer: COMMERCIAL

## 2017-02-09 ENCOUNTER — TELEPHONE (OUTPATIENT)
Dept: PULMONOLOGY | Facility: CLINIC | Age: 67
End: 2017-02-09

## 2017-02-09 VITALS
WEIGHT: 208 LBS | WEIGHT: 210.56 LBS | HEIGHT: 64 IN | HEIGHT: 64 IN | BODY MASS INDEX: 35.95 KG/M2 | BODY MASS INDEX: 35.51 KG/M2

## 2017-02-09 DIAGNOSIS — B35.1 ONYCHOMYCOSIS DUE TO DERMATOPHYTE: ICD-10-CM

## 2017-02-09 DIAGNOSIS — Z12.83 SKIN CANCER SCREENING: ICD-10-CM

## 2017-02-09 DIAGNOSIS — L81.4 LENTIGINES: ICD-10-CM

## 2017-02-09 DIAGNOSIS — E11.42 DIABETIC POLYNEUROPATHY ASSOCIATED WITH TYPE 2 DIABETES MELLITUS: Primary | ICD-10-CM

## 2017-02-09 DIAGNOSIS — Z85.828 PERSONAL HISTORY OF OTHER MALIGNANT NEOPLASM OF SKIN: ICD-10-CM

## 2017-02-09 DIAGNOSIS — M77.41 METATARSALGIA OF RIGHT FOOT: ICD-10-CM

## 2017-02-09 DIAGNOSIS — M71.9 BURSITIS: ICD-10-CM

## 2017-02-09 DIAGNOSIS — L57.3 POIKILODERMA OF CIVATTE: Primary | ICD-10-CM

## 2017-02-09 PROCEDURE — 99213 OFFICE O/P EST LOW 20 MIN: CPT | Mod: S$GLB,,, | Performed by: DERMATOLOGY

## 2017-02-09 PROCEDURE — 2022F DILAT RTA XM EVC RTNOPTHY: CPT | Mod: S$GLB,,,

## 2017-02-09 PROCEDURE — 4010F ACE/ARB THERAPY RXD/TAKEN: CPT | Mod: S$GLB,,,

## 2017-02-09 PROCEDURE — 99213 OFFICE O/P EST LOW 20 MIN: CPT | Mod: S$GLB,,,

## 2017-02-09 PROCEDURE — 3044F HG A1C LEVEL LT 7.0%: CPT | Mod: S$GLB,,,

## 2017-02-09 PROCEDURE — 99999 PR PBB SHADOW E&M-EST. PATIENT-LVL II: CPT | Mod: PBBFAC,,,

## 2017-02-09 PROCEDURE — 99999 PR PBB SHADOW E&M-EST. PATIENT-LVL II: CPT | Mod: PBBFAC,,, | Performed by: DERMATOLOGY

## 2017-02-09 RX ORDER — CLOTRIMAZOLE 1 G/ML
SOLUTION TOPICAL 2 TIMES DAILY
Qty: 30 ML | Refills: 11 | Status: SHIPPED | OUTPATIENT
Start: 2017-02-09 | End: 2017-05-30 | Stop reason: ALTCHOICE

## 2017-02-09 NOTE — PROGRESS NOTES
Subjective:      Patient ID: Cornelia Delatorre is a 66 y.o. female.    Chief Complaint: Diabetic Foot Exam (HgbA1c: 6.5 11/23/16 PCP: Diego Patiño) 2/6/17)    Patient returns for diabetic foot care.  Her bursitis is doing better, does not want surgery.  She finished 3 months of oral lamisil and noticing improvement.     ROS  ROS:  Constitution: Negative for chills, fever, weakness and malaise/fatigue.   HEENT: Negative for headaches.   Cardiovascular: Negative for chest pain and claudication.   Respiratory: Negative for cough and shortness of breath.   Musculoskeletal: Positive for foot pain.  Negative for muscle cramps and muscle weakness.   Gastrointestinal: Negative for nausea and vomiting.   Neurological: + for numbness and paresthesias.   Dermatological: Negative for wound.          Objective:      Physical Exam  Constitutional:  Patient is oriented to person, place, and time. Vital signs are normal.  Appears well-developed and well-nourished.     Vascular:  Dorsalis pedis pulses are 2+ on the right side, and 2+ on the left side.   Posterior tibial pulses are 2+ on the right side, and 2+ on the left side.   + digital hair growth, capillary fill time to all toes <3 seconds, no swelling    Skin/Dermatological:  Skin is warm and intact.  No cyanosis or clubbing.  No rashes noted.  No open wounds.  All ten toenails yellow discolored, thickened 2-4 mm to base with subungual debris.    Musculoskeletal:      Full unrestricted range of motion of midtarsal, subtalar joints.  Forefoot to rearfoot well aligned.  No restriction of ankle joint dorsiflexion or plantarflexion.  No crepitus. No pain along the right hallux interphalangeal joint.  Right second third toes are slightly dorsiflexed with limited range of motion at the MPJ. She has no tenderness along the neck of the right fifth metatarsal northe plantar aspect of the head of the fifth metatarsal.         Neurological:  + patchy  deficits to sharp/dull, light  touch or vibratory sensation.   Muscle strength to tibialis anterior, extensor hallucis longus, extensor digitorum longus, peroneal muscles, flexor hallucis/digotorum longus, posterior tibial and gastrosoleal complex is 5/5, normal tone without assymmetry   Patellar reflexes are 2+ on the right side and 2+ on the left side.  Achilles reflexes are 2+ on the right side and 2+ on the left side.        Assessment:         Diabetic polyneuropathy associated with type 2 diabetes mellitus    Bursitis    Onychomycosis due to dermatophyte    Metatarsalgia of right foot      Plan:       Cornelia was seen today for diabetic foot exam.    Diagnoses and all orders for this visit:    Diabetic polyneuropathy associated with type 2 diabetes mellitus    Bursitis    Onychomycosis due to dermatophyte    Metatarsalgia of right foot    Other orders  -     clotrimazole (LOTRIMIN) 1 % Soln; Apply topically 2 (two) times daily.    I counseled the patient on her conditions, their implications and medical management.      Recommend a right fifth metatarsal head resection if her bursitis returns. She will consider this. For now she will wear the orthotics with the fifth met head cut out and a metatarsal pad. Rx for clotrimazole to toenails 2-3 times per week. Keep feet clean and dry.  Avoid cotton socks.  Antifungal sprays to the feet.  Lysol to shoes qod.  RTC 4 months

## 2017-02-09 NOTE — PROGRESS NOTES
"  Subjective:       Patient ID:  Cornelia Delatorre is a 66 y.o. female who presents for   Chief Complaint   Patient presents with    Skin Check    Lesion     HPI Comments: Here for skin check. Last seen on 11-3-2016  Lesion on right hand for a few months, asymptomatic, not treated  Lesion on chest, tender to touch, not treated      Recent Pathology - 8-  FINAL PATHOLOGIC DIAGNOSIS    1. Skin, left neck, shave biopsy:    - BENIGN LICHENOID KERATOSIS.    MICROSCOPIC DESCRIPTION: The sections show epidermal hyperkeratosis, acanthosis, and a marked interface    lymphoid inflammatory infiltrate. In addition, there are numerous cytoid bodies and dyskeratotic cells at the    dermal-epidermal junction and upper papillary dermis. Mart 1 immunohistochemical stain fails to reveal a    melanocytic proliferation. Appropriately reactive controls were reviewed.    Diagnosed by: Anahi Streeter M.D.    (Electronically Signed: 2016-08-22 13:01:10)    Gross Description    Number of containers: 1    Container label: ID/AP number: [id number], and labeled "left neck."    Received in formalin and consists of a 0.6 x 0.5 cm white-brown, ovoid, flat skin shave biopsy. The specimen is    bisected and submitted entirely in cassette [id number].    Samara Gardenia      [place] II  No family history of skin cancer.  Phx Acneiform eruption - using Benzamycin- improved  Phx SCC L hand -mohs Dr Jones    History of actinic keratoses on nasal Dorsum in the past hx of efudex use  Uses CeraVe AM cream daily                    Review of Systems   Skin: Negative for itching, rash and tendency to form keloidal scars.        Objective:    Physical Exam   Constitutional: She appears well-developed and well-nourished. No distress.   HENT:   Head:       Mouth/Throat: Lips normal.    Eyes: Lids are normal.  No conjunctival no injection.   Cardiovascular: There is no local extremity swelling and no dependent edema.     Neurological: She is alert and " oriented to person, place, and time. She is not disoriented.   Psychiatric: She has a normal mood and affect.   Skin:   Areas Examined (abnormalities noted in diagram):   Head / Face Inspection Performed  Neck Inspection Performed  Chest / Axilla Inspection Performed  Abdomen Inspection Performed  Back Inspection Performed  RUE Inspected  LUE Inspection Performed  RLE Inspected  LLE Inspection Performed              Diagram Legend     Erythematous scaling macule/papule c/w actinic keratosis       Vascular papule c/w angioma      Pigmented verrucoid papule/plaque c/w seborrheic keratosis      Yellow umbilicated papule c/w sebaceous hyperplasia      Irregularly shaped tan macule c/w lentigo     1-2 mm smooth white papules consistent with Milia      Movable subcutaneous cyst with punctum c/w epidermal inclusion cyst      Subcutaneous movable cyst c/w pilar cyst      Firm pink to brown papule c/w dermatofibroma      Pedunculated fleshy papule(s) c/w skin tag(s)      Evenly pigmented macule c/w junctional nevus     Mildly variegated pigmented, slightly irregular-bordered macule c/w mildly atypical nevus      Flesh colored to evenly pigmented papule c/w intradermal nevus       Pink pearly papule/plaque c/w basal cell carcinoma      Erythematous hyperkeratotic cursted plaque c/w SCC      Surgical scar with no sign of skin cancer recurrence      Open and closed comedones      Inflammatory papules and pustules      Verrucoid papule consistent consistent with wart     Erythematous eczematous patches and plaques     Dystrophic onycholytic nail with subungual debris c/w onychomycosis     Umbilicated papule    Erythematous-base heme-crusted tan verrucoid plaque consistent with inflamed seborrheic keratosis     Erythematous Silvery Scaling Plaque c/w Psoriasis     See annotation      Assessment / Plan:        Poikiloderma of [place], chest  Reassurance  No suspicious lesions on chest  Discussed secondary to chronic actinic  changes.       Lentigines, trunk and extremities  This is a benign hyperpigmented sun induced lesion. Daily sun protection will reduce the number of new lesions. Treatment of these benign lesions are considered cosmetic.      Skin cancer screening  Area(s) of previous NMSC evaluated with no signs of recurrence.    Upper body skin examination performed today including at least 6 points as noted in physical examination. No lesions suspicious for malignancy noted.      Personal history of other malignant neoplasm of skin  Area(s) of previous NMSC evaluated with no signs of recurrence.    Upper body skin examination performed today including at least 6 points as noted in physical examination. No lesions suspicious for malignancy noted.             Return in about 6 months (around 8/9/2017).

## 2017-02-09 NOTE — TELEPHONE ENCOUNTER
Spoke to patient moved up from 4/19 to 03/08/2017 at 1100 am patient stated that she understands and agrees will mail appointment letter

## 2017-02-15 ENCOUNTER — OFFICE VISIT (OUTPATIENT)
Dept: FAMILY MEDICINE | Facility: CLINIC | Age: 67
End: 2017-02-15
Payer: COMMERCIAL

## 2017-02-15 VITALS
DIASTOLIC BLOOD PRESSURE: 78 MMHG | BODY MASS INDEX: 35.9 KG/M2 | RESPIRATION RATE: 18 BRPM | HEIGHT: 64 IN | TEMPERATURE: 98 F | HEART RATE: 54 BPM | OXYGEN SATURATION: 96 % | SYSTOLIC BLOOD PRESSURE: 134 MMHG | WEIGHT: 210.31 LBS

## 2017-02-15 DIAGNOSIS — I10 ESSENTIAL HYPERTENSION: Primary | ICD-10-CM

## 2017-02-15 DIAGNOSIS — R06.02 SOB (SHORTNESS OF BREATH): ICD-10-CM

## 2017-02-15 DIAGNOSIS — R53.1 WEAKNESS: ICD-10-CM

## 2017-02-15 PROCEDURE — 3075F SYST BP GE 130 - 139MM HG: CPT | Mod: S$GLB,,, | Performed by: INTERNAL MEDICINE

## 2017-02-15 PROCEDURE — 3078F DIAST BP <80 MM HG: CPT | Mod: S$GLB,,, | Performed by: INTERNAL MEDICINE

## 2017-02-15 PROCEDURE — 99214 OFFICE O/P EST MOD 30 MIN: CPT | Mod: S$GLB,,, | Performed by: INTERNAL MEDICINE

## 2017-02-15 PROCEDURE — 99999 PR PBB SHADOW E&M-EST. PATIENT-LVL III: CPT | Mod: PBBFAC,,, | Performed by: INTERNAL MEDICINE

## 2017-02-15 RX ORDER — GABAPENTIN 600 MG/1
600 TABLET ORAL 3 TIMES DAILY
COMMUNITY
End: 2017-07-19 | Stop reason: SDUPTHER

## 2017-02-15 RX ORDER — ASPIRIN 81 MG/1
81 TABLET ORAL DAILY
COMMUNITY
End: 2020-01-08

## 2017-02-15 NOTE — MR AVS SNAPSHOT
[facility]  [address]  [place] [address]  Phone: [phone]  Fax: [phone]                  Cornelia Delatorre   2/15/2017 3:10 PM   Office Visit    Description:  Female : 1950   Provider:  Armond Cortez MD   Department:  [facility]           Reason for Visit     Establish Care     Chest Congestion                To Do List           Future Appointments        Provider Department Dept Phone    2017 10:00 AM ECHO, [facility] Cardiology [phone]    3/1/2017 10:00 AM Lawrence García Jr., MD [facility] Neurology [phone]    3/8/2017 10:40 AM Jonathan Nicole MD [facility] - Pulmonary [phone]    2017 8:00 AM LAB, [place] [facility] [phone]    2017 11:10 AM Armond Cortez MD [facility] [phone]      Goals (5 Years of Data)     None      Follow-Up and Disposition     Return in about 8 weeks (around 2017).      [facility]     [facility] -  Assistance  Registered nurses in the [facility] provide clinical advisement, health education, appointment booking, and other advisory services.  Call for this free service at [phone].             Medications           Message regarding Medications     Verify the changes and/or additions to your medication regime listed below are the same as discussed with your clinician today.  If any of these changes or additions are incorrect, please notify your healthcare provider.        STOP taking these medications     benzonatate (TESSALON) 100 MG capsule Take 1 capsule (100 mg total) by mouth 3 (three) times daily as needed.    hydrocortisone 2.5 % cream aaa twice daily prn    mupirocin (BACTROBAN) 2 % ointment AAA bid           Verify that the below list of medications is an accurate representation of the medications you are currently taking.  If none reported, the list may be blank. If  incorrect, please contact your healthcare provider. Carry this list with you in case of emergency.           Current Medications     aspirin (ECOTRIN) 81 MG EC tablet Take 81 mg by mouth once daily.    azelastine (ASTELIN) 137 mcg (0.1 %) nasal spray 1 spray (137 mcg total) by Nasal route 2 (two) times daily.    b complex vitamins capsule Take 1 capsule by mouth once daily.    BIFIDOBACTERIUM INFANTIS (ALIGN ORAL) Take 1 capsule by mouth once daily.    BIOTIN ORAL Take 10,000 mcg by mouth once daily.     calcium citrate-vitamin D3 (UPCAL D) 500-250 mg-unit/2.5gram Powd Take by mouth 3 (three) times daily.      CHOLECALCIFEROL, VITAMIN D3, (CHOLECALCIFEROL, VIT D3,,BULK, MISC) by Misc.(Non-Drug; Combo Route) route.    clotrimazole (LOTRIMIN) 1 % Soln Apply topically 2 (two) times daily.    cranberry 500 mg Cap Take 1 capsule by mouth every evening.    cyanocobalamin (VITAMIN B-12) 500 MCG tablet Take 500 mcg by mouth once daily.      esomeprazole (NEXIUM) 40 MG capsule Take 1 capsule (40 mg total) by mouth before breakfast.    fish oil-omega-3 fatty acids 300-1,000 mg capsule Take 2 g by mouth once daily.    fluticasone (FLONASE) 50 mcg/actuation nasal spray 2 sprays by Each Nare route once daily.    gabapentin (NEURONTIN) 600 MG tablet Take 600 mg by mouth 3 (three) times daily.    hydrochlorothiazide (HYDRODIURIL) 25 MG tablet Take 1 tablet (25 mg total) by mouth once daily.    hydrOXYzine pamoate (VISTARIL) 50 MG Cap Take 1 capsule (50 mg total) by mouth nightly as needed. Take for anxiety, Can cause drowsiness    irbesartan (AVAPRO) 75 MG tablet Take 1 tablet (75 mg total) by mouth every evening.    levothyroxine (SYNTHROID) 88 MCG tablet Take 1 tablet (88 mcg total) by mouth once daily.    methylcellulose oral powder Take 3.4 g by mouth once daily.    metoprolol tartrate (LOPRESSOR) 50 MG tablet Take 1 tablet (50 mg total) by mouth once daily.    multivitamin capsule Take 1 capsule by mouth. Take one tablets  "daily    pravastatin (PRAVACHOL) 20 MG tablet Take 1 tablet (20 mg total) by mouth once daily.    SIMETHICONE (GAS-X ORAL) Take 1 capsule by mouth as needed.    TESTOSTERONE AND ESTRADIOL CYP (ESTRADIOL-TESTOSTERONE IM) Apply topically.           Clinical Reference Information           Your Vitals Were     BP Pulse Temp Resp Height Weight    134/78 (BP Location: Right arm, Patient Position: Sitting, BP Method: Manual) 54 98.2 °F (36.8 °C) (Oral) 18 5' 4" (1.626 m) 95.4 kg (210 lb 5.1 oz)    SpO2 BMI             96% 36.1 kg/m2         Blood Pressure          Most Recent Value    BP  134/78      Allergies as of 2/15/2017     Sulfa (Sulfonamide Antibiotics)    Naproxen      Immunizations Administered on Date of Encounter - 2/15/2017     None      Language Assistance Services     ATTENTION: Language assistance services are available, free of charge. Please call [phone].      ATENCIÓN: Si habla reina, tiene a aleman disposición servicios gratuitos de asistencia lingüística. Llame al [phone].     JANNETH Ý: N?u b?n nói Ti?ng Vi?t, có các d?ch v? h? tr? ngôn ng? mi?n phí shefalih cho b?n. G?i s? [phone].         [facility] complies with applicable Federal civil rights laws and does not discriminate on the basis of race, color, national origin, age, disability, or sex.        "

## 2017-02-15 NOTE — PROGRESS NOTES
Subjective:       Patient ID: Cornelia Delatorre is a 66 y.o. female.    Chief Complaint: Establish Care and Chest Congestion    HPI Comments: Here to establish care with multiple complaints:     Overall she does not feel good - low energy.     Early January, developed dizziness, which has resolved.    She also developed intermittent diarrhea.  She saw Dr Duarte who ran extensive tests (stool and blood)  including lactose intolerance - all negative.  Her symptoms improved after a NP called in Ceftin for an URI, but it has not resolved.  He has since put her on Probiotic and fiber.       SOB with heaviness in her chest and cough.  She has canceled a stress test ordered bc of her symptoms.  She then had a CT of her chest that showed pulmonary nodules vs mucous plugging.  She saw a pulmonologist, Dr Guzmán, and did not feel he explained things well with the CT as well did not address her SOB and cough.  She also has an apt with Dr Jonathan Marroquin in [place], pulmonary.  Dr SMITH stated felt the results were related to asthma or old pneumonia.  His plan based on the size of the nodules that a CT in 1 year would be appropriate.    A stress test was ordered by cardiology, Dr Higgins.  He decreased her metoprolol 2nd to bradycardia.  It is now running in the 50s.  She does not feel she could do a treadmill test 2nd to her SOB and feels a NM stress test would be better.    Her cough is productive of a thick, clear mucous.   SOB is described as not being able to catch her breath occurring a lot at rest - sitting in her chair, etc.  She now has KLINE at about 2 blocks, which she feels is new.  SOB is not related to position and occurs intermittently in all positions.     DM - controlled; sees podiatry for peripheral neuropathy.   HTN - controlled  HLD - controlled      Review of Systems   Constitutional: Positive for unexpected weight change. Negative for activity change, appetite change and fever.   HENT: Negative for hearing  loss, nosebleeds, rhinorrhea and trouble swallowing.    Eyes: Negative for discharge and visual disturbance.   Respiratory: Positive for cough, chest tightness and shortness of breath. Negative for choking and wheezing.    Cardiovascular: Negative for chest pain and palpitations.   Gastrointestinal: Positive for diarrhea. Negative for abdominal pain, blood in stool, constipation, nausea and vomiting.   Endocrine: Negative for polydipsia and polyuria.   Genitourinary: Negative for difficulty urinating, dysuria, hematuria and menstrual problem.   Musculoskeletal: Negative for arthralgias and joint swelling.   Skin: Negative for rash and wound.   Neurological: Negative for dizziness, syncope, weakness and headaches.   Psychiatric/Behavioral: Negative for confusion and dysphoric mood.       Objective:      Vitals:    02/15/17 1444   BP: 134/78   Pulse: (!) 54   Resp: 18   Temp: 98.2 °F (36.8 °C)     Physical Exam   Constitutional: She appears well-nourished.   Eyes: Conjunctivae and EOM are normal.   Neck: Normal range of motion.   Cardiovascular: Normal rate and regular rhythm.    No edema    Pulmonary/Chest: Effort normal and breath sounds normal.   Musculoskeletal:   Normal ROM bilateral    Neurological: No cranial nerve deficit (grossly intact).   Skin: Skin is warm and dry.   Psychiatric: She has a normal mood and affect.   Alert and orientated   Vitals reviewed.        Assessment:       1. Essential hypertension    2. SOB (shortness of breath)    3. Weakness        Plan:       Essential hypertension    SOB (shortness of breath)    Weakness    Continue current plan of care  Defer to specialist;   Advised pt to call cardiology and change to NM stress test as she has put it off for fear of unable to complete treadmill  She already has an apt with a second pulmonary Dr - advise to write questions down to get answers related to CT, etc  rtc    Total time spent with patient was more than 25 minutes with more than half  "the time spent in direct consultation with the patient in regards to our treatment/plan.          Counseled on regular exercise, maintenance of a healthy weight, balanced diet rich in fruits/vegetables and lean protein, and avoidance of unhealthy habits like smoking and excessive alcohol intake.   Also, counseled on importance of being compliant with medication, health appointments, diet and exercise.     Return in about 8 weeks (around 4/12/2017).    "This note will not be shared with the patient."  "

## 2017-02-16 ENCOUNTER — TELEPHONE (OUTPATIENT)
Dept: FAMILY MEDICINE | Facility: CLINIC | Age: 67
End: 2017-02-16

## 2017-02-16 ENCOUNTER — TELEPHONE (OUTPATIENT)
Dept: CARDIOLOGY | Facility: CLINIC | Age: 67
End: 2017-02-16

## 2017-02-16 NOTE — TELEPHONE ENCOUNTER
----- Message from Grabiel Weiner sent at 2/16/2017 11:16 AM CST -----  Contact: pt  Pt is requesting a callback,   Call Back#[phone]  Thanks

## 2017-02-16 NOTE — TELEPHONE ENCOUNTER
----- Message from Juan Higgins Jr., MD sent at 2/16/2017 10:40 AM CST -----  Contact: self  No because the insurance will probably deny it. Dr Cortez can change it if he would like.    * Called pt back to let her know what Dr. Higgins said, and she said she would call Dr. Cortez to see if he can write the order for the stress test to be nuclear, and let us know.   ----- Message -----     From: Yumiko Castillo MA     Sent: 2/16/2017  10:34 AM       To: Juan Higgins Jr., MD    Mrs. Delatorre, is scheduled for a exercise stress test end of Feburary, however, she seen Dr. Armond Cortez yesterday and he agreed that she needs it done but wanted to know if she can get the nuclear stress done instead? If this is okay, can you place new order in computer, so I can schedule it.

## 2017-02-17 NOTE — TELEPHONE ENCOUNTER
Original exercise stress test ordered by Dr Higgins at their apt.  She needs to contact cardiology or see cardiology for change from exercise stress to NM stress test.

## 2017-02-22 ENCOUNTER — TELEPHONE (OUTPATIENT)
Dept: FAMILY MEDICINE | Facility: CLINIC | Age: 67
End: 2017-02-22

## 2017-02-22 NOTE — TELEPHONE ENCOUNTER
----- Message from Yamel Barclay RT sent at 2/22/2017 11:25 AM CST -----  Contact: [place], [phone] EXT [address] [facility], Dr. Gary Doe, [phone] EXT [address] [facility], Dr. Castorena, requesting the pt labs, clinical notes, and any current medications, and x-rays, please call to confirm, thanks.

## 2017-03-08 ENCOUNTER — OFFICE VISIT (OUTPATIENT)
Dept: PULMONOLOGY | Facility: CLINIC | Age: 67
End: 2017-03-08
Payer: COMMERCIAL

## 2017-03-08 VITALS
SYSTOLIC BLOOD PRESSURE: 119 MMHG | WEIGHT: 203.25 LBS | DIASTOLIC BLOOD PRESSURE: 73 MMHG | HEART RATE: 57 BPM | OXYGEN SATURATION: 98 % | HEIGHT: 64 IN | BODY MASS INDEX: 34.7 KG/M2

## 2017-03-08 DIAGNOSIS — R91.8 ABNORMAL CT SCAN, LUNG: ICD-10-CM

## 2017-03-08 DIAGNOSIS — J20.9 ACUTE BRONCHITIS, UNSPECIFIED ORGANISM: Primary | ICD-10-CM

## 2017-03-08 DIAGNOSIS — R06.02 SHORTNESS OF BREATH: ICD-10-CM

## 2017-03-08 PROCEDURE — 1160F RVW MEDS BY RX/DR IN RCRD: CPT | Mod: S$GLB,,, | Performed by: INTERNAL MEDICINE

## 2017-03-08 PROCEDURE — 3074F SYST BP LT 130 MM HG: CPT | Mod: S$GLB,,, | Performed by: INTERNAL MEDICINE

## 2017-03-08 PROCEDURE — 99999 PR PBB SHADOW E&M-EST. PATIENT-LVL IV: CPT | Mod: PBBFAC,,, | Performed by: INTERNAL MEDICINE

## 2017-03-08 PROCEDURE — 3078F DIAST BP <80 MM HG: CPT | Mod: S$GLB,,, | Performed by: INTERNAL MEDICINE

## 2017-03-08 PROCEDURE — 99205 OFFICE O/P NEW HI 60 MIN: CPT | Mod: S$GLB,,, | Performed by: INTERNAL MEDICINE

## 2017-03-08 RX ORDER — ALBUTEROL SULFATE 90 UG/1
AEROSOL, METERED RESPIRATORY (INHALATION)
Qty: 1 INHALER | Refills: 11 | Status: SHIPPED | OUTPATIENT
Start: 2017-03-08 | End: 2017-10-19 | Stop reason: SDUPTHER

## 2017-03-08 RX ORDER — CODEINE PHOSPHATE AND GUAIFENESIN 10; 100 MG/5ML; MG/5ML
5 SOLUTION ORAL EVERY 4 HOURS PRN
Qty: 473 ML | Refills: 0 | Status: SHIPPED | OUTPATIENT
Start: 2017-03-08 | End: 2018-01-15 | Stop reason: SDUPTHER

## 2017-03-08 RX ORDER — PREDNISONE 20 MG/1
TABLET ORAL
Qty: 12 TABLET | Refills: 0 | Status: SHIPPED | OUTPATIENT
Start: 2017-03-08 | End: 2017-03-16 | Stop reason: SDUPTHER

## 2017-03-08 RX ORDER — BUDESONIDE AND FORMOTEROL FUMARATE DIHYDRATE 160; 4.5 UG/1; UG/1
2 AEROSOL RESPIRATORY (INHALATION) EVERY 12 HOURS
COMMUNITY
End: 2017-03-08

## 2017-03-08 RX ORDER — PREDNISONE 20 MG/1
TABLET ORAL
Qty: 12 TABLET | Refills: 0 | Status: SHIPPED | OUTPATIENT
Start: 2017-03-08 | End: 2017-04-24

## 2017-03-08 RX ORDER — BUDESONIDE AND FORMOTEROL FUMARATE DIHYDRATE 160; 4.5 UG/1; UG/1
2 AEROSOL RESPIRATORY (INHALATION) 2 TIMES DAILY
Qty: 1 INHALER | Refills: 5 | Status: SHIPPED | OUTPATIENT
Start: 2017-03-08 | End: 2017-03-08

## 2017-03-08 NOTE — LETTER
March 8, 2017      Kira Peter NP  [address] [facility] [address] [place] [address]           [place] [facility]  [address]  [place] [address]  Phone: [phone]          Patient: Cornelia Delatorre   MR Number: [id number]   YOB: 1950   Date of Visit: 3/8/2017       Dear Kira Peter:    Thank you for referring Cornelia Delatorre to me for evaluation. Attached you will find relevant portions of my assessment and plan of care.    If you have questions, please do not hesitate to call me. I look forward to following Cornelia Delatorre along with you.    Sincerely,    Jonathan Nicole MD    Enclosure  CC:  No Recipients    If you would like to receive this communication electronically, please contact [email or web address] or [phone] to request more information on [facility] access.    For providers and/or their staff who would like to refer a patient to [facility], please contact us through our one-stop-shop provider referral line, Justyna Nevarez, at [phone].    If you feel you have received this communication in error or would no longer like to receive these types of communications, please e-mail [email or web address]

## 2017-03-08 NOTE — PATIENT INSTRUCTIONS
Nodules not a concern, would recheck scan in a year, perhaps 6 months if symptoms dictate.    Bronchial problems could be post virus or asthma or doubt bronchiectasis.      qvar (or symbicort) to prevent 2 twice daily.    Use albuterol for quick relief as needed.    Prednisone if still lingering symptoms for 3 days.    Breathing test and blood screen for blood clot if still short of breath next week.    May use codeine cough medication if needed.

## 2017-03-08 NOTE — MR AVS SNAPSHOT
[facility]  [address]  [facility] [address]  Phone: [phone]                  Cornelia Delatorre   2017 5:20 PM   Office Visit    Description:  Female : 1950   Provider:  Yuridia Wilson NP   Department:  [facility]           Reason for Visit     Diarrhea     Chest Congestion     Nasal Congestion           Diagnoses this Visit        Comments    Shortness of breath    -  Primary     Fatigue, unspecified type         Diarrhea, unspecified type                To Do List           Future Appointments        Provider Department Dept Phone    2017 10:15 AM Kailyn Fletcher MD [facility] Dermatology [phone]    2017 11:00 AM Farzad Willams DPM [facility] Podiatry [phone]    3/1/2017 10:00 AM Lawrence García Jr., MD [facility] Neuorology [phone]    2017 8:00 AM LAB, [place] [facility] [phone]    2017 11:30 AM Armond Cortez MD [facility] Family Medicine [phone]      Goals (5 Years of Data)     None       These Medications        Disp Refills Start End    diphenoxylate-atropine 2.5-0.025 mg (LOMOTIL) 2.5-0.025 mg per tablet 30 tablet 0 2017     Take 1 tablet by mouth as needed. - Oral    Pharmacy: [facility] [place] [address] Ph #: [phone]         [facility] On Call     [facility] Care [facility] -  Assistance  Registered nurses in the [facility] provide clinical advisement, health education, appointment booking, and other advisory services.  Call for this free service at [phone].             Medications           Message regarding Medications     Verify the changes and/or additions to your medication regime listed below are the same as discussed with your clinician today.  If any of these changes or additions are incorrect, please notify your healthcare provider.        CHANGE how you are taking these medications     Start  Spoke with Susana Hernandez at [facility]. Possible drug interaction between Plavix and omeprazole. Ok to change to Protonix? If so please advise of dose and instructions. Taking Instead of    diphenoxylate-atropine 2.5-0.025 mg (LOMOTIL) 2.5-0.025 mg per tablet diphenoxylate-atropine 2.5-0.025 mg (LOMOTIL) 2.5-0.025 mg per tablet    Dosage:  Take 1 tablet by mouth as needed. Dosage:  as needed.     Reason for Change:  Reorder       STOP taking these medications     efinaconazole (JUBLIA) 10 % Primitivo Apply 2 drops topically once daily.           Verify that the below list of medications is an accurate representation of the medications you are currently taking.  If none reported, the list may be blank. If incorrect, please contact your healthcare provider. Carry this list with you in case of emergency.           Current Medications     azelastine (ASTELIN) 137 mcg (0.1 %) nasal spray 1 spray (137 mcg total) by Nasal route 2 (two) times daily.    b complex vitamins capsule Take 1 capsule by mouth once daily.    benzoyl peroxide-erythromycin (BENZAMYCIN) gel Thin film to chin once daily, increase to bid dosing for spot treatment.    BIOTIN ORAL Take 10,000 mcg by mouth once daily.     calcium citrate-vitamin D3 (UPCAL D) 500-250 mg-unit/2.5gram Powd Take by mouth 3 (three) times daily.      cranberry 500 mg Cap Take 1 capsule by mouth every evening.    cyanocobalamin (VITAMIN B-12) 500 MCG tablet Take 500 mcg by mouth once daily.      diphenoxylate-atropine 2.5-0.025 mg (LOMOTIL) 2.5-0.025 mg per tablet Take 1 tablet by mouth as needed.    esomeprazole (NEXIUM) 40 MG capsule Take 1 capsule (40 mg total) by mouth before breakfast.    fish oil-omega-3 fatty acids 300-1,000 mg capsule Take 2 g by mouth once daily.    fluticasone (FLONASE) 50 mcg/actuation nasal spray 2 sprays by Each Nare route once daily.    gabapentin (NEURONTIN) 600 MG tablet Take 1 tablet (600 mg total) by mouth 3 (three) times daily.    hydrochlorothiazide (HYDRODIURIL) 25 MG tablet Take 1 tablet (25 mg total) by mouth once daily.    hydrocortisone 2.5 % cream aaa twice daily prn    irbesartan (AVAPRO) 75 MG tablet Take  "1 tablet (75 mg total) by mouth every evening.    levothyroxine (SYNTHROID) 88 MCG tablet Take 1 tablet (88 mcg total) by mouth once daily.    metoprolol tartrate (LOPRESSOR) 50 MG tablet Take 1 tablet (50 mg total) by mouth 2 (two) times daily.    multivitamin capsule Take 1 capsule by mouth. Take one tablets daily    mupirocin (BACTROBAN) 2 % ointment AAA bid    pravastatin (PRAVACHOL) 20 MG tablet Take 1 tablet (20 mg total) by mouth once daily.    SIMETHICONE (GAS-X ORAL) Take 1 capsule by mouth as needed.    terbinafine HCl (LAMISIL) 250 mg tablet Take 1 tablet (250 mg total) by mouth once daily.    diclofenac sodium 1 % Gel Apply 2 g topically 4 (four) times daily. Left  ankle           Clinical Reference Information           Vital Signs - Last Recorded  Most recent update: 1/19/2017  5:21 PM by Padmini Thomas LPN    BP Pulse Temp Ht Wt SpO2    (!) 146/78 (BP Location: Right arm, Patient Position: Sitting, BP Method: Manual) (!) 47 98.1 °F (36.7 °C) (Oral) 5' 4" (1.626 m) 97.3 kg (214 lb 8.1 oz) 96%    BMI                36.82 kg/m2          Blood Pressure          Most Recent Value    BP  (!)  146/78      Allergies as of 1/19/2017     Sulfa (Sulfonamide Antibiotics)    Naproxen      Immunizations Administered on Date of Encounter - 1/19/2017     None      Orders Placed During Today's Visit      Normal Orders This Visit    EKG 12-lead     Future Labs/Procedures Expected by Expires    CBC auto differential  1/19/2017 1/19/2018    Comprehensive metabolic panel  1/19/2017 1/19/2018    X-Ray Chest PA And Lateral  1/19/2017 1/19/2018      Instructions      We'll start with blood test, EKG and Chest xray.    Lomotil for the diarrhea.    We'll call with the results and next steps.    If you have any questions, please call.  You can reach us at [phone] Monday through Friday (except holidays) 12 nooon to 6 p.m.    Thank you for using the [facility]!    Lenka Wilson, APRN, CNP, FNP-BC  [facility]

## 2017-03-08 NOTE — MR AVS SNAPSHOT
[facility] - Pulmonary  [address]  [place] [address]  Phone: [phone]                  Cornelia Delatorre   3/8/2017 10:40 AM   Office Visit    Description:  Female : 1950   Provider:  Jonathan Nicole MD   Department:  [place] [facility] - Pul[facility]           Reason for Visit     Cough     Shortness of Breath     Abnormal Ct Scan     Sputum Production     Apnea           Diagnoses this Visit        Comments    Acute bronchitis, unspecified organism    -  Primary     Abnormal CT scan, lung         Shortness of breath                To Do List           Future Appointments        Provider Department Dept Phone    2017 8:00 AM LAB, [place] [facility] [phone]    2017 11:10 AM Armond Cortez MD [facility] Family Medicine [phone]    2017 2:00 PM Lawrence García Jr., MD [facility] Neurology [phone]      Goals (5 Years of Data)     None      Follow-Up and Disposition     Return in about 1 year (around 3/8/2018), or if symptoms worsen or fail to improve.    Follow-up and Disposition History       These Medications        Disp Refills Start End    predniSONE (DELTASONE) 20 MG tablet 12 tablet 0 3/8/2017     One daily for 3 days and repeat for flare of lung symptoms as intructed    Pharmacy: [facility] [address] Ph #: [phone]       predniSONE (DELTASONE) 20 MG tablet 12 tablet 0 3/8/2017     Take one daily for 3 days and repeat for asthma.    Pharmacy: [facility] [address] Ph #: [phone]       albuterol 90 mcg/actuation inhaler 1 Inhaler 11 3/8/2017     2 puffs every 4 hours as needed for cough, wheeze, or shortness of breath    Pharmacy: [facility] [address] Ph #: [phone]       guaifenesin-codeine 100-10 mg/5 ml (TUSSI-ORGANIDIN NR)  mg/5 mL syrup 473 mL 0 3/8/2017 3/18/2017    Take 5 mLs by mouth every 4 (four) hours as needed for  Cough. - Oral    Pharmacy: [facility]  Ph #: [phone]       beclomethasone (QVAR) 80 mcg/actuation Aero 1 each 11 3/8/2017 3/8/2018    Inhale 2 puffs into the lungs 2 (two) times daily. Controller - Inhalation    Pharmacy: [facility]  Ph #: [phone]         [facility] On Call     [facility] On Call Nurse [facility] Line -  Assistance  Registered nurses in the [facility] provide clinical advisement, health education, appointment booking, and other advisory services.  Call for this free service at [phone].             Medications           Message regarding Medications     Verify the changes and/or additions to your medication regime listed below are the same as discussed with your clinician today.  If any of these changes or additions are incorrect, please notify your healthcare provider.        START taking these NEW medications        Refills    predniSONE (DELTASONE) 20 MG tablet 0    Sig: One daily for 3 days and repeat for flare of lung symptoms as intructed    Class: Normal    predniSONE (DELTASONE) 20 MG tablet 0    Sig: Take one daily for 3 days and repeat for asthma.    Class: Normal    albuterol 90 mcg/actuation inhaler 11    Si puffs every 4 hours as needed for cough, wheeze, or shortness of breath    Class: Normal    guaifenesin-codeine 100-10 mg/5 ml (TUSSI-ORGANIDIN NR)  mg/5 mL syrup 0    Sig: Take 5 mLs by mouth every 4 (four) hours as needed for Cough.    Class: Normal    Route: Oral    beclomethasone (QVAR) 80 mcg/actuation Aero 11    Sig: Inhale 2 puffs into the lungs 2 (two) times daily. Controller    Class: Normal    Route: Inhalation      STOP taking these medications     budesonide-formoterol 160-4.5 mcg (SYMBICORT) 160-4.5 mcg/actuation HFAA Inhale 2 puffs into the lungs every 12 (twelve) hours. Controller           Verify that the below list of medications is an accurate representation of  the medications you are currently taking.  If none reported, the list may be blank. If incorrect, please contact your healthcare provider. Carry this list with you in case of emergency.           Current Medications     aspirin (ECOTRIN) 81 MG EC tablet Take 81 mg by mouth once daily.    azelastine (ASTELIN) 137 mcg (0.1 %) nasal spray 1 spray (137 mcg total) by Nasal route 2 (two) times daily.    b complex vitamins capsule Take 1 capsule by mouth once daily.    BIFIDOBACTERIUM INFANTIS (ALIGN ORAL) Take 1 capsule by mouth once daily.    BIOTIN ORAL Take 10,000 mcg by mouth once daily.     calcium citrate-vitamin D3 (UPCAL D) 500-250 mg-unit/2.5gram Powd Take by mouth 3 (three) times daily.      CHOLECALCIFEROL, VITAMIN D3, (CHOLECALCIFEROL, VIT D3,,BULK, MISC) by Misc.(Non-Drug; Combo Route) route.    clotrimazole (LOTRIMIN) 1 % Soln Apply topically 2 (two) times daily.    cranberry 500 mg Cap Take 1 capsule by mouth every evening.    cyanocobalamin (VITAMIN B-12) 500 MCG tablet Take 500 mcg by mouth once daily.      esomeprazole (NEXIUM) 40 MG capsule Take 1 capsule (40 mg total) by mouth before breakfast.    fish oil-omega-3 fatty acids 300-1,000 mg capsule Take 2 g by mouth once daily.    fluticasone (FLONASE) 50 mcg/actuation nasal spray 2 sprays by Each Nare route once daily.    gabapentin (NEURONTIN) 600 MG tablet Take 600 mg by mouth 3 (three) times daily.    hydrochlorothiazide (HYDRODIURIL) 25 MG tablet Take 1 tablet (25 mg total) by mouth once daily.    hydrOXYzine pamoate (VISTARIL) 50 MG Cap Take 1 capsule (50 mg total) by mouth nightly as needed. Take for anxiety, Can cause drowsiness    irbesartan (AVAPRO) 75 MG tablet Take 1 tablet (75 mg total) by mouth every evening.    levothyroxine (SYNTHROID) 88 MCG tablet Take 1 tablet (88 mcg total) by mouth once daily.    metoprolol tartrate (LOPRESSOR) 50 MG tablet Take 1 tablet (50 mg total) by mouth once daily.    multivitamin capsule Take 1 capsule by  "mouth. Take one tablets daily    pravastatin (PRAVACHOL) 20 MG tablet Take 1 tablet (20 mg total) by mouth once daily.    TESTOSTERONE AND ESTRADIOL CYP (ESTRADIOL-TESTOSTERONE IM) Apply topically.    albuterol 90 mcg/actuation inhaler 2 puffs every 4 hours as needed for cough, wheeze, or shortness of breath    beclomethasone (QVAR) 80 mcg/actuation Aero Inhale 2 puffs into the lungs 2 (two) times daily. Controller    guaifenesin-codeine 100-10 mg/5 ml (TUSSI-ORGANIDIN NR)  mg/5 mL syrup Take 5 mLs by mouth every 4 (four) hours as needed for Cough.    methylcellulose oral powder Take 3.4 g by mouth once daily.    predniSONE (DELTASONE) 20 MG tablet One daily for 3 days and repeat for flare of lung symptoms as intructed    predniSONE (DELTASONE) 20 MG tablet Take one daily for 3 days and repeat for asthma.    SIMETHICONE (GAS-X ORAL) Take 1 capsule by mouth as needed.           Clinical Reference Information           Your Vitals Were     BP Pulse Height Weight SpO2 BMI    119/73 (BP Location: Left arm, Patient Position: Sitting, BP Method: Automatic) 57 5' 4" (1.626 m) 92.2 kg (203 lb 4.2 oz) 98% 34.89 kg/m2      Blood Pressure          Most Recent Value    BP  119/73      Allergies as of 3/8/2017     Sulfa (Sulfonamide Antibiotics)    Naproxen      Immunizations Administered on Date of Encounter - 3/8/2017     None      Orders Placed During Today's Visit     Future Labs/Procedures Expected by Expires    D-DIMER, QUANTITATIVE  3/8/2017 5/7/2018    Complete PFT with bronchodilator  As directed 3/8/2018    CT Chest Without Contrast  As directed 3/8/2018      Instructions      Nodules not a concern, would recheck scan in a year, perhaps 6 months if symptoms dictate.    Bronchial problems could be post virus or asthma or doubt bronchiectasis.      qvar (or symbicort) to prevent 2 twice daily.    Use albuterol for quick relief as needed.    Prednisone if still lingering symptoms for 3 days.    Breathing test and " blood screen for blood clot if still short of breath next week.    May use codeine cough medication if needed.       Language Assistance Services     ATTENTION: Language assistance services are available, free of charge. Please call [phone].      ATENCIÓN: Si marko huang, tiene a aleman disposición servicios gratuitos de asistencia lingüística. Llame al [phone].     CHÚ Ý: N?u b?n nói Ti?ng Vi?t, có các d?ch v? h? tr? ngôn ng? mi?n phí dành cho b?n. G?i s? [phone].         [facility] - Pulmonary complies with applicable Federal civil rights laws and does not discriminate on the basis of race, color, national origin, age, disability, or sex.

## 2017-03-08 NOTE — PROGRESS NOTES
3/8/2017    Cornelia Delatorre  New Patient Consult    Chief Complaint   Patient presents with    Cough    Shortness of Breath    Abnormal Ct Scan    Sputum Production     no color    Apnea       HPI: had onset cough Hernan illness, got dizzy few days with diarrhea and cough. Cough clear sm amt mucous. Did have 101 temp one day, fatigue, no muscle aches.  Appetite decreased.  Illnesses lingered 2 weeks but cough never remitted- has improved with inhahler use last 15 days.      Had bypass with with continued diarrhea intially resolved. No regurg or reflux or swallow problems.     symbicort nearly  Resolved.      Sinuses ok.  No pets.  Never smoker.      Appetite good, feels well now.      headcolds to chest since childhood, nocturnal ??not recalled.    Had cats in past but got rid in 2003 or so.     The chief compliant  problem is new to me   PFSH:  Past Medical History:   Diagnosis Date    Allergy     Arthritis     Cataract     Chronic fatigue 1/24/2017    Diabetes mellitus     resolved with gastric bypass    Diabetes mellitus, type 2     GERD (gastroesophageal reflux disease)     Headache(784.0)     Hyperlipidemia 7/15/2015    Hypertension     Hypothyroidism     Neuropathy     Obesity     SOHA on CPAP     Postmenopausal HRT (hormone replacement therapy)     Rash     Rosacea     Snoring          Past Surgical History:   Procedure Laterality Date    ADENOIDECTOMY      APPENDECTOMY      BUNIONECTOMY  10/17/14    right, still has discomfort    CATARACT EXTRACTION W/  INTRAOCULAR LENS IMPLANT Bilateral     COLONOSCOPY      ESOPHAGOGASTRODUODENOSCOPY      dilated esophagus    GASTRIC BYPASS      2011    HYSTERECTOMY      interstim bladder  2009    10/14/14 new battery    TONSILLECTOMY      WISDOM TOOTH EXTRACTION       Social History   Substance Use Topics    Smoking status: Never Smoker    Smokeless tobacco: Never Used    Alcohol use No     Family History   Problem Relation Age of  "Onset    Diabetes      Hypertension      Hypothyroidism       Review of patient's allergies indicates:   Allergen Reactions    Sulfa (sulfonamide antibiotics) Hives    Naproxen Other (See Comments)     Other reaction(s): RT sided numbness         Performance Status:The patient's activity level is functions out of house.      Review of Systems:  a review of eleven systems covering constitutional, Eye, HEENT, Psych, Respiratory, Cardiac, GI, , Musculoskeletal, Endocrine, Dermatologic was negative except for pertinent findings as listed ABOVE and below: all good,  Had dm that remitted with bypass 2011.      Exam:Comprehensive exam done. /73 (BP Location: Left arm, Patient Position: Sitting, BP Method: Automatic)  Pulse (!) 57  Ht 5' 4" (1.626 m)  Wt 92.2 kg (203 lb 4.2 oz)  SpO2 98%  BMI 34.89 kg/m2  Exam included Vitals as listed, and patient's appearance and affect and alertness and mood, oral exam for yeast and hygiene and pharynx lesions and Mallapatti (M) score, neck with inspection for jvd and masses and thyroid abnormalities and lymph nodes (supraclavicular and infraclavicular nodes and axillary also examined and noted if abn), chest exam included symmetry and effort and fremitus and percussion and auscultation, cardiac exam included rhythm and gallops and murmur and rubs and jvd and edema, abdominal exam for mass and hepatosplenomegaly and tenderness and hernias and bowel sounds, Musculoskeletal exam with muscle tone and posture and mobility/gait and  strength, and skin for rashes and cyanosis and pallor and turgor, extremity for clubbing.  Findings were normal except for pertinent findings listed below:  M3, good bs, rest good.    Radiographs (ct chest and cxr) reviewed: view by direct vision  i do not see clear bronchiectasis,nodules are noted.      Labs reviewed    PFT will be done and results to be reviewed if not better.    Plan:  Clinical impression is resonably certain and repeated " evaluation prn +/- follow up will be needed as below.    Cornelia was seen today for cough, shortness of breath, abnormal ct scan, sputum production and apnea.    Diagnoses and all orders for this visit:    Acute bronchitis, unspecified organism  -     predniSONE (DELTASONE) 20 MG tablet; One daily for 3 days and repeat for flare of lung symptoms as intructed  -     Discontinue: budesonide-formoterol 160-4.5 mcg (SYMBICORT) 160-4.5 mcg/actuation HFAA; Inhale 2 puffs into the lungs 2 (two) times daily.  -     predniSONE (DELTASONE) 20 MG tablet; Take one daily for 3 days and repeat for asthma.  -     albuterol 90 mcg/actuation inhaler; 2 puffs every 4 hours as needed for cough, wheeze, or shortness of breath  -     Complete PFT with bronchodilator; Future  -     guaifenesin-codeine 100-10 mg/5 ml (TUSSI-ORGANIDIN NR)  mg/5 mL syrup; Take 5 mLs by mouth every 4 (four) hours as needed for Cough.  -     beclomethasone (QVAR) 80 mcg/actuation Aero; Inhale 2 puffs into the lungs 2 (two) times daily. Controller    Abnormal CT scan, lung  -     CT Chest Without Contrast; Future    Shortness of breath  -     D-DIMER, QUANTITATIVE; Future        Return in about 1 year (around 3/8/2018), or if symptoms worsen or fail to improve.    Discussed with patient above for education the following:    Nodules not a concern, would recheck scan in a year, perhaps 6 months if symptoms dictate.    Bronchial problems could be post virus or asthma or doubt bronchiectasis.      qvar (or symbicort) to prevent 2 twice daily.    Use albuterol for quick relief as needed.    Prednisone if still lingering symptoms for 3 days.    Breathing test and blood screen for blood clot if still short of breath next week.    May use codeine cough medication if needed.

## 2017-03-09 ENCOUNTER — LAB VISIT (OUTPATIENT)
Dept: LAB | Facility: HOSPITAL | Age: 67
End: 2017-03-09
Attending: INTERNAL MEDICINE
Payer: COMMERCIAL

## 2017-03-09 DIAGNOSIS — M25.472 LEFT ANKLE SWELLING: ICD-10-CM

## 2017-03-09 DIAGNOSIS — R06.09 DOE (DYSPNEA ON EXERTION): Primary | ICD-10-CM

## 2017-03-09 DIAGNOSIS — R06.02 SHORTNESS OF BREATH: ICD-10-CM

## 2017-03-09 LAB — D DIMER PPP IA.FEU-MCNC: 0.77 MG/L FEU

## 2017-03-09 PROCEDURE — 36415 COLL VENOUS BLD VENIPUNCTURE: CPT | Mod: PO

## 2017-03-09 PROCEDURE — 85379 FIBRIN DEGRADATION QUANT: CPT

## 2017-03-10 ENCOUNTER — TELEPHONE (OUTPATIENT)
Dept: PULMONOLOGY | Facility: CLINIC | Age: 67
End: 2017-03-10

## 2017-03-10 NOTE — PROGRESS NOTES
Notify blood test for clot is up. Given picture would do bmp and cta chest for pulm emboli, orders placed.

## 2017-03-10 NOTE — TELEPHONE ENCOUNTER
----- Message from Jonathan Nicole MD sent at 3/9/2017  6:50 PM CST -----  Notify blood test for clot is up. Given picture would do bmp and cta chest for pulm emboli, orders placed.

## 2017-03-13 ENCOUNTER — TELEPHONE (OUTPATIENT)
Dept: PULMONOLOGY | Facility: CLINIC | Age: 67
End: 2017-03-13

## 2017-03-14 ENCOUNTER — HOSPITAL ENCOUNTER (OUTPATIENT)
Dept: RADIOLOGY | Facility: HOSPITAL | Age: 67
Discharge: HOME | End: 2017-03-14
Attending: INTERNAL MEDICINE
Payer: COMMERCIAL

## 2017-03-14 DIAGNOSIS — R06.09 DOE (DYSPNEA ON EXERTION): ICD-10-CM

## 2017-03-14 DIAGNOSIS — J47.9 BRONCHIECTASIS WITHOUT COMPLICATION: Primary | ICD-10-CM

## 2017-03-14 DIAGNOSIS — M25.472 LEFT ANKLE SWELLING: ICD-10-CM

## 2017-03-14 PROCEDURE — 71275 CT ANGIOGRAPHY CHEST: CPT | Mod: TC,PO

## 2017-03-14 PROCEDURE — 25500020 PHARM REV CODE 255: Mod: PO | Performed by: INTERNAL MEDICINE

## 2017-03-14 PROCEDURE — 71275 CT ANGIOGRAPHY CHEST: CPT | Mod: 26,,, | Performed by: RADIOLOGY

## 2017-03-14 RX ADMIN — IOHEXOL 100 ML: 350 INJECTION, SOLUTION INTRAVENOUS at 10:03

## 2017-03-14 NOTE — PROGRESS NOTES
Ct veiwed and area suggesting bronchectasis seen, area of persistent abn right upper lung, need office fu. Needs immune test ordered

## 2017-03-15 ENCOUNTER — TELEPHONE (OUTPATIENT)
Dept: PULMONOLOGY | Facility: CLINIC | Age: 67
End: 2017-03-15

## 2017-03-15 ENCOUNTER — LAB VISIT (OUTPATIENT)
Dept: LAB | Facility: HOSPITAL | Age: 67
End: 2017-03-15
Attending: INTERNAL MEDICINE
Payer: COMMERCIAL

## 2017-03-15 DIAGNOSIS — J47.9 BRONCHIECTASIS WITHOUT COMPLICATION: ICD-10-CM

## 2017-03-15 LAB
IGA SERPL-MCNC: 191 MG/DL
IGE SERPL-ACNC: <35 IU/ML
IGG SERPL-MCNC: 693 MG/DL
IGM SERPL-MCNC: 22 MG/DL

## 2017-03-15 PROCEDURE — 82784 ASSAY IGA/IGD/IGG/IGM EACH: CPT | Mod: 59

## 2017-03-15 PROCEDURE — 36415 COLL VENOUS BLD VENIPUNCTURE: CPT | Mod: PO

## 2017-03-15 PROCEDURE — 82785 ASSAY OF IGE: CPT

## 2017-03-15 PROCEDURE — 82784 ASSAY IGA/IGD/IGG/IGM EACH: CPT

## 2017-03-15 NOTE — TELEPHONE ENCOUNTER
Spoke with pt and informed he of Bronchiectasis seen on CT.  Let her know there are orders in for additional labs and scheduled a f/u with dr vences on 3/16/17 @1:20pm

## 2017-03-16 ENCOUNTER — LAB VISIT (OUTPATIENT)
Dept: LAB | Facility: HOSPITAL | Age: 67
End: 2017-03-16
Attending: INTERNAL MEDICINE
Payer: COMMERCIAL

## 2017-03-16 ENCOUNTER — OFFICE VISIT (OUTPATIENT)
Dept: PULMONOLOGY | Facility: CLINIC | Age: 67
End: 2017-03-16
Payer: COMMERCIAL

## 2017-03-16 VITALS
DIASTOLIC BLOOD PRESSURE: 68 MMHG | OXYGEN SATURATION: 97 % | WEIGHT: 206.81 LBS | HEART RATE: 65 BPM | HEIGHT: 64 IN | BODY MASS INDEX: 35.31 KG/M2 | SYSTOLIC BLOOD PRESSURE: 115 MMHG

## 2017-03-16 DIAGNOSIS — J47.9 BRONCHIECTASIS WITHOUT COMPLICATION: ICD-10-CM

## 2017-03-16 DIAGNOSIS — R91.1 LUNG NODULE: ICD-10-CM

## 2017-03-16 DIAGNOSIS — R06.09 DOE (DYSPNEA ON EXERTION): ICD-10-CM

## 2017-03-16 DIAGNOSIS — R06.02 SHORTNESS OF BREATH: Primary | ICD-10-CM

## 2017-03-16 DIAGNOSIS — D84.9 IMMUNE DEFICIENCY DISORDER: Primary | ICD-10-CM

## 2017-03-16 PROCEDURE — 87070 CULTURE OTHR SPECIMN AEROBIC: CPT

## 2017-03-16 PROCEDURE — 87116 MYCOBACTERIA CULTURE: CPT | Mod: 59

## 2017-03-16 PROCEDURE — 86480 TB TEST CELL IMMUN MEASURE: CPT

## 2017-03-16 PROCEDURE — 87205 SMEAR GRAM STAIN: CPT

## 2017-03-16 PROCEDURE — 99999 PR PBB SHADOW E&M-EST. PATIENT-LVL V: CPT | Mod: PBBFAC,,, | Performed by: INTERNAL MEDICINE

## 2017-03-16 PROCEDURE — 3074F SYST BP LT 130 MM HG: CPT | Mod: S$GLB,,, | Performed by: INTERNAL MEDICINE

## 2017-03-16 PROCEDURE — 87015 SPECIMEN INFECT AGNT CONCNTJ: CPT

## 2017-03-16 PROCEDURE — 3078F DIAST BP <80 MM HG: CPT | Mod: S$GLB,,, | Performed by: INTERNAL MEDICINE

## 2017-03-16 PROCEDURE — 1160F RVW MEDS BY RX/DR IN RCRD: CPT | Mod: S$GLB,,, | Performed by: INTERNAL MEDICINE

## 2017-03-16 PROCEDURE — 99214 OFFICE O/P EST MOD 30 MIN: CPT | Mod: S$GLB,,, | Performed by: INTERNAL MEDICINE

## 2017-03-16 RX ORDER — PREDNISONE 20 MG/1
TABLET ORAL
Qty: 36 TABLET | Refills: 0 | Status: SHIPPED | OUTPATIENT
Start: 2017-03-16 | End: 2017-05-30 | Stop reason: ALTCHOICE

## 2017-03-16 RX ORDER — LEVOFLOXACIN 500 MG/1
500 TABLET, FILM COATED ORAL DAILY
Qty: 10 TABLET | Refills: 4 | Status: SHIPPED | OUTPATIENT
Start: 2017-03-16 | End: 2017-03-26

## 2017-03-16 NOTE — PATIENT INSTRUCTIONS
March 2016 you got pneumonia vaccine--if titers are low - stronger vaccine and check response.  Check igg subclasses.  Immunology doctor would be recommended.    Bronchiectasis suggested- levaquin reasonable abx if very sick and do culture if able.    Bronchial plug or abnormality could be checked with scope.  May not diagnose with scope?    Would recommend scope, if immune weakness likely yield would be lower.    May have tracheobronchomalacia.    Need follow up ct chest in 6 months sept.

## 2017-03-16 NOTE — PROGRESS NOTES
3/16/2017    Cornelia Delatorre  New Patient Consult    Chief Complaint   Patient presents with    Follow-up     labs    Bronchitis    Apnea     March 16, cough better, but comes and goes,  No great help with steroids.  Submitted 3 sputums.  Had pneumovax march last yr.  Breathing better. Got symbicort.         3/8/2017 HPI: had onset cough Hernan illness, got dizzy few days with diarrhea and cough. Cough clear sm amt mucous. Did have 101 temp one day, fatigue, no muscle aches.  Appetite decreased.  Illnesses lingered 2 weeks but cough never remitted- has improved with inhahler use last 15 days.      Had bypass with with continued diarrhea intially resolved. No regurg or reflux or swallow problems.     symbicort nearly  Resolved.      Sinuses ok.  No pets.  Never smoker.      Appetite good, feels well now.      headcolds to chest since childhood, nocturnal ??not recalled.    Had cats in past but got rid in 2003 or so.     The chief compliant  problem is new to me   PFSH:  Past Medical History:   Diagnosis Date    Allergy     Arthritis     Cataract     Chronic fatigue 1/24/2017    Diabetes mellitus     resolved with gastric bypass    Diabetes mellitus, type 2     GERD (gastroesophageal reflux disease)     Headache(784.0)     Hyperlipidemia 7/15/2015    Hypertension     Hypothyroidism     Neuropathy     Obesity     SOHA on CPAP     Postmenopausal HRT (hormone replacement therapy)     Rash     Rosacea     Snoring          Past Surgical History:   Procedure Laterality Date    ADENOIDECTOMY      APPENDECTOMY      BUNIONECTOMY  10/17/14    right, still has discomfort    CATARACT EXTRACTION W/  INTRAOCULAR LENS IMPLANT Bilateral     COLONOSCOPY      ESOPHAGOGASTRODUODENOSCOPY      dilated esophagus    GASTRIC BYPASS      2011    HYSTERECTOMY      interstim bladder  2009    10/14/14 new battery    TONSILLECTOMY      WISDOM TOOTH EXTRACTION       Social History   Substance Use Topics    Smoking  "status: Never Smoker    Smokeless tobacco: Never Used    Alcohol use No     Family History   Problem Relation Age of Onset    Diabetes      Hypertension      Hypothyroidism       Review of patient's allergies indicates:   Allergen Reactions    Sulfa (sulfonamide antibiotics) Hives    Naproxen Other (See Comments)     Other reaction(s): RT sided numbness         Performance Status:The patient's activity level is functions out of house.      Review of Systems:  a review of eleven systems covering constitutional, Eye, HEENT, Psych, Respiratory, Cardiac, GI, , Musculoskeletal, Endocrine, Dermatologic was negative except for pertinent findings as listed ABOVE and below: all good,  Had dm that remitted with bypass 2011.      Exam:Comprehensive exam done. /68 (BP Location: Right arm, Patient Position: Sitting, BP Method: Automatic)  Pulse 65  Ht 5' 4" (1.626 m)  Wt 93.8 kg (206 lb 12.7 oz)  SpO2 97%  BMI 35.5 kg/m2  Exam included Vitals as listed, and patient's appearance and affect and alertness and mood, oral exam for yeast and hygiene and pharynx lesions and Mallapatti (M) score, neck with inspection for jvd and masses and thyroid abnormalities and lymph nodes (supraclavicular and infraclavicular nodes and axillary also examined and noted if abn), chest exam included symmetry and effort and fremitus and percussion and auscultation, cardiac exam included rhythm and gallops and murmur and rubs and jvd and edema, abdominal exam for mass and hepatosplenomegaly and tenderness and hernias and bowel sounds, Musculoskeletal exam with muscle tone and posture and mobility/gait and  strength, and skin for rashes and cyanosis and pallor and turgor, extremity for clubbing.  Findings were normal except for pertinent findings listed below:  M3, good bs, rest good.    Radiographs (ct chest and cxr) reviewed: view by direct vision  i do see clear bronchiectasis,nodules are noted.  Mucoid type impaction suggested " in rul ant segment.    Labs reviewed igm low, igg lowish, humerol titers na    PFT will be done      Plan:  Clinical impression is resonably certain and repeated evaluation prn +/- follow up will be needed as below.    Cornelia was seen today for follow-up, bronchitis and apnea.    Diagnoses and all orders for this visit:    Immune deficiency disorder  -     levoFLOXacin (LEVAQUIN) 500 MG tablet; Take 1 tablet (500 mg total) by mouth once daily.  -     Culture, Respiratory with Gram Stain; Future    Bronchiectasis without complication  -     levoFLOXacin (LEVAQUIN) 500 MG tablet; Take 1 tablet (500 mg total) by mouth once daily.  -     Culture, Respiratory with Gram Stain; Future  -     predniSONE (DELTASONE) 20 MG tablet; 3 for 3 days then 2 for 3 days then one for 3 days and repeat for breathing problems    Lung nodule  -     Culture, Respiratory with Gram Stain; Future    KLINE (dyspnea on exertion)      Return in about 6 weeks (around 4/27/2017), or if symptoms worsen or fail to improve.    Discussed with patient above for education the following:       March 2016 you got pneumonia vaccine--if titers are low - stronger vaccine and check response.  Check igg subclasses.  Immunology doctor would be recommended.    Bronchiectasis suggested- levaquin reasonable abx if very sick and do culture if able.    Bronchial plug or abnormality could be checked with scope.  May not diagnose with scope?    Would recommend scope, if immune weakness likely yield would be lower.    May have tracheobronchomalacia.    Need follow up ct chest in 6 months sept.

## 2017-03-16 NOTE — MR AVS SNAPSHOT
[facility]  [address]  [place] [address]  Phone: [phone]                  Cornelia Delatorre   3/16/2017 1:20 PM   Office Visit    Description:  Female : 1950   Provider:  Jonathan Nicole MD   Department:  [facility]           Reason for Visit     Follow-up     Bronchitis     Apnea           Diagnoses this Visit        Comments    Immune deficiency disorder    -  Primary     Bronchiectasis without complication         Lung nodule         KLINE (dyspnea on exertion)                To Do List           Future Appointments        Provider Department Dept Phone    3/16/2017 3:05 PM LAB, [place] [facility] [phone]    2017 8:00 AM LAB, [place] [facility] [phone]    2017 11:10 AM Armond Cortez MD [facility] Family Medicine [phone]    2017 11:00 AM MD [facility] [phone]    2017 2:00 PM Lawrence García Jr., MD [facility] Neurology [phone]      Goals (5 Years of Data)     None      Follow-Up and Disposition     Return in about 6 weeks (around 2017), or if symptoms worsen or fail to improve.    Follow-up and Disposition History       These Medications        Disp Refills Start End    levoFLOXacin (LEVAQUIN) 500 MG tablet 10 tablet 4 3/16/2017 3/26/2017    Take 1 tablet (500 mg total) by mouth once daily. - Oral    [facility] [place] [place] [facility]  Ph #: [phone]       predniSONE (DELTASONE) 20 MG tablet 36 tablet 0 3/16/2017     3 for 3 days then 2 for 3 days then one for 3 days and repeat for breathing problems    Pharm[facility]: [facility] [place] [place] [facility]  Ph #: [phone]         [facility] On Call     [facility] On Call Nurse Care Line -  Assistance  Registered nurses in the [facility] On Call Center provide clinical advisement, health education, appointment booking, and other advisory  services.  Call for this free service at [phone].             Medications           Message regarding Medications     Verify the changes and/or additions to your medication regime listed below are the same as discussed with your clinician today.  If any of these changes or additions are incorrect, please notify your healthcare provider.        START taking these NEW medications        Refills    levoFLOXacin (LEVAQUIN) 500 MG tablet 4    Sig: Take 1 tablet (500 mg total) by mouth once daily.    Class: Normal    Route: Oral    predniSONE (DELTASONE) 20 MG tablet 0    Sig: 3 for 3 days then 2 for 3 days then one for 3 days and repeat for breathing problems    Class: Normal      STOP taking these medications     methylcellulose oral powder Take 3.4 g by mouth once daily.           Verify that the below list of medications is an accurate representation of the medications you are currently taking.  If none reported, the list may be blank. If incorrect, please contact your healthcare provider. Carry this list with you in case of emergency.           Current Medications     aspirin (ECOTRIN) 81 MG EC tablet Take 81 mg by mouth once daily.    azelastine (ASTELIN) 137 mcg (0.1 %) nasal spray 1 spray (137 mcg total) by Nasal route 2 (two) times daily.    b complex vitamins capsule Take 1 capsule by mouth once daily.    BIFIDOBACTERIUM INFANTIS (ALIGN ORAL) Take 1 capsule by mouth once daily.    BIOTIN ORAL Take 10,000 mcg by mouth once daily.     calcium citrate-vitamin D3 (UPCAL D) 500-250 mg-unit/2.5gram Powd Take by mouth 3 (three) times daily.      CHOLECALCIFEROL, VITAMIN D3, (CHOLECALCIFEROL, VIT D3,,BULK, MISC) by Misc.(Non-Drug; Combo Route) route.    clotrimazole (LOTRIMIN) 1 % Soln Apply topically 2 (two) times daily.    cranberry 500 mg Cap Take 1 capsule by mouth every evening.    cyanocobalamin (VITAMIN B-12) 500 MCG tablet Take 500 mcg by mouth once daily.      esomeprazole (NEXIUM) 40 MG capsule Take 1  capsule (40 mg total) by mouth before breakfast.    fish oil-omega-3 fatty acids 300-1,000 mg capsule Take 2 g by mouth once daily.    fluticasone (FLONASE) 50 mcg/actuation nasal spray 2 sprays by Each Nare route once daily.    gabapentin (NEURONTIN) 600 MG tablet Take 600 mg by mouth 3 (three) times daily.    hydrochlorothiazide (HYDRODIURIL) 25 MG tablet Take 1 tablet (25 mg total) by mouth once daily.    irbesartan (AVAPRO) 75 MG tablet Take 1 tablet (75 mg total) by mouth every evening.    levothyroxine (SYNTHROID) 88 MCG tablet Take 1 tablet (88 mcg total) by mouth once daily.    metoprolol tartrate (LOPRESSOR) 50 MG tablet Take 1 tablet (50 mg total) by mouth once daily.    multivitamin capsule Take 1 capsule by mouth. Take one tablets daily    pravastatin (PRAVACHOL) 20 MG tablet Take 1 tablet (20 mg total) by mouth once daily.    TESTOSTERONE AND ESTRADIOL CYP (ESTRADIOL-TESTOSTERONE IM) Apply topically.    albuterol 90 mcg/actuation inhaler 2 puffs every 4 hours as needed for cough, wheeze, or shortness of breath    beclomethasone (QVAR) 80 mcg/actuation Aero Inhale 2 puffs into the lungs 2 (two) times daily. Controller    guaifenesin-codeine 100-10 mg/5 ml (TUSSI-ORGANIDIN NR)  mg/5 mL syrup Take 5 mLs by mouth every 4 (four) hours as needed for Cough.    hydrOXYzine pamoate (VISTARIL) 50 MG Cap Take 1 capsule (50 mg total) by mouth nightly as needed. Take for anxiety, Can cause drowsiness    levoFLOXacin (LEVAQUIN) 500 MG tablet Take 1 tablet (500 mg total) by mouth once daily.    predniSONE (DELTASONE) 20 MG tablet Take one daily for 3 days and repeat for asthma.    predniSONE (DELTASONE) 20 MG tablet 3 for 3 days then 2 for 3 days then one for 3 days and repeat for breathing problems    SIMETHICONE (GAS-X ORAL) Take 1 capsule by mouth as needed.           Clinical Reference Information           Your Vitals Were     BP Pulse Height Weight SpO2 BMI    115/68 (BP Location: Right arm, Patient  "Position: Sitting, BP Method: Automatic) 65 5' 4" (1.626 m) 93.8 kg (206 lb 12.7 oz) 97% 35.5 kg/m2      Blood Pressure          Most Recent Value    BP  115/68      Allergies as of 3/16/2017     Sulfa (Sulfonamide Antibiotics)    Naproxen      Immunizations Administered on Date of Encounter - 3/16/2017     None      Orders Placed During Today's Visit     Future Labs/Procedures Expected by Expires    Culture, Respiratory with Gram Stain  3/16/2017 5/15/2018    CT Chest Without Contrast  9/16/2017 3/16/2018      Instructions    March 2016 you got pneumonia vaccine--if titers are low - stronger vaccine and check response.  Check igg subclasses.  Immunology doctor would be recommended.    Bronchiectasis suggested- levaquin reasonable abx if very sick and do culture if able.    Bronchial plug or abnormality could be checked with scope.  May not diagnose with scope?    Would recommend scope, if immune weakness likely yield would be lower.    May have tracheobronchomalacia.    Need follow up ct chest in 6 months sept.       Language Assistance Services     ATTENTION: Language assistance services are available, free of charge. Please call [phone].      ATENCIÓN: Si habla español, tiene a aleman disposición servicios gratuitos de asistencia lingüística. Llame al [phone].     JANNETH Ý: N?u b?n nói Ti?ng Vi?t, có các d?ch v? h? tr? ngôn ng? mi?n phí dành cho b?n. G?i s? [phone].         [facility] complies with applicable Federal civil rights laws and does not discriminate on the basis of race, color, national origin, age, disability, or sex.        "

## 2017-03-18 LAB
BACTERIA SPEC AEROBE CULT: NORMAL
GRAM STN SPEC: NORMAL

## 2017-03-21 ENCOUNTER — TELEPHONE (OUTPATIENT)
Dept: FAMILY MEDICINE | Facility: CLINIC | Age: 67
End: 2017-03-21

## 2017-03-21 ENCOUNTER — TELEPHONE (OUTPATIENT)
Dept: PULMONOLOGY | Facility: CLINIC | Age: 67
End: 2017-03-21

## 2017-03-21 DIAGNOSIS — Z71.85 VACCINE COUNSELING: Primary | ICD-10-CM

## 2017-03-21 DIAGNOSIS — D84.9 IMMUNE DEFICIENCY DISORDER: Primary | ICD-10-CM

## 2017-03-21 LAB
MITOGEN NIL: >10 IU/ML
NIL: 0.03 IU/ML
TB ANTIGEN NIL: 0.36 IU/ML
TB ANTIGEN: 0.39 IU/ML
TB GOLD: POSITIVE

## 2017-03-21 NOTE — TELEPHONE ENCOUNTER
----- Message from Jonathan Nicole MD sent at 3/21/2017 10:35 AM CDT -----  Immune system is about 1/2 effective.  Need prevnar 13 and diptheria booster.  Need to recheck immune system after 4-6 weeks.  Suggest follow up with ct chest over 3-4 months.

## 2017-03-21 NOTE — TELEPHONE ENCOUNTER
----- Message from Lorrie Paul sent at 3/21/2017  1:29 PM CDT -----  Contact: self  Patient called regarding seeing Dr. Nicole, the Pulmonologist, stating had labs ordered by him and advised she need a prevnar 13 and a booster. Need to know if she can walk in to the pharmacy anytime, does she need an order to do so. Or if she would need Dr. Cortez to do so at the office. Please contact [phone]

## 2017-03-21 NOTE — TELEPHONE ENCOUNTER
Informed pt immune system is about 1/2 effective, needs prevnar 13 and diptheria booster. Pt stated she will call PCP for vaccines and get repeat bloodwork in 4-6 weeks.

## 2017-03-21 NOTE — TELEPHONE ENCOUNTER
----- Message from Lorrie Paul sent at 3/21/2017  1:29 PM CDT -----  Contact: self  Patient called regarding seeing Dr. Nicole, the Pulmonologist, stating had labs ordered by him and advised she need a prevnar 13 and a booster. Need to know if she can walk in to the pharmacy anytime, does she need an order to do so. Or if she would need Dr. Cortez to do so at the office. Please contact [phone]

## 2017-03-21 NOTE — TELEPHONE ENCOUNTER
Called patient to let her know we will call after the shot is signed , Dr Nicole said she needs a booster of pneumonia shot  due her medical issues and a diptheria. Please advise Ty

## 2017-03-23 ENCOUNTER — TELEPHONE (OUTPATIENT)
Dept: PULMONOLOGY | Facility: CLINIC | Age: 67
End: 2017-03-23

## 2017-03-23 NOTE — TELEPHONE ENCOUNTER
----- Message from Lynn Lara sent at 3/23/2017 10:26 AM CDT -----  Contact: Patient [phone] cell   Cornelia, patient [phone] cell,  Patient is calling about recommendation on shots needed. Patient's Primary Care Dr Armond Cortez is advising the patient has had the DPT shot 2013. And they are needing clarification on request. Please advise. Thanks.

## 2017-03-24 ENCOUNTER — LAB VISIT (OUTPATIENT)
Dept: LAB | Facility: HOSPITAL | Age: 67
End: 2017-03-24
Attending: INTERNAL MEDICINE
Payer: COMMERCIAL

## 2017-03-24 DIAGNOSIS — R06.02 SHORTNESS OF BREATH: Primary | ICD-10-CM

## 2017-03-24 LAB
BNP SERPL-MCNC: 27 PG/ML
D DIMER PPP IA.FEU-MCNC: 0.63 MG/L FEU

## 2017-03-24 PROCEDURE — 85379 FIBRIN DEGRADATION QUANT: CPT

## 2017-03-24 PROCEDURE — 83880 ASSAY OF NATRIURETIC PEPTIDE: CPT

## 2017-03-24 PROCEDURE — 36415 COLL VENOUS BLD VENIPUNCTURE: CPT | Mod: PO

## 2017-03-27 ENCOUNTER — TELEPHONE (OUTPATIENT)
Dept: PULMONOLOGY | Facility: CLINIC | Age: 67
End: 2017-03-27

## 2017-04-03 ENCOUNTER — PATIENT OUTREACH (OUTPATIENT)
Dept: ADMINISTRATIVE | Facility: HOSPITAL | Age: 67
End: 2017-04-03

## 2017-04-11 ENCOUNTER — LAB VISIT (OUTPATIENT)
Dept: LAB | Facility: HOSPITAL | Age: 67
End: 2017-04-11
Attending: FAMILY MEDICINE
Payer: COMMERCIAL

## 2017-04-11 DIAGNOSIS — I10 ESSENTIAL HYPERTENSION: ICD-10-CM

## 2017-04-11 DIAGNOSIS — E11.8 CONTROLLED TYPE 2 DIABETES MELLITUS WITH COMPLICATION, WITHOUT LONG-TERM CURRENT USE OF INSULIN: ICD-10-CM

## 2017-04-11 DIAGNOSIS — E78.5 HYPERLIPIDEMIA, UNSPECIFIED HYPERLIPIDEMIA TYPE: ICD-10-CM

## 2017-04-11 LAB
ALBUMIN SERPL BCP-MCNC: 3.1 G/DL
ALP SERPL-CCNC: 114 U/L
ALT SERPL W/O P-5'-P-CCNC: 21 U/L
ANION GAP SERPL CALC-SCNC: 8 MMOL/L
AST SERPL-CCNC: 17 U/L
BILIRUB SERPL-MCNC: 0.5 MG/DL
BUN SERPL-MCNC: 12 MG/DL
CALCIUM SERPL-MCNC: 9 MG/DL
CHLORIDE SERPL-SCNC: 103 MMOL/L
CHOLEST/HDLC SERPL: 3.3 {RATIO}
CO2 SERPL-SCNC: 30 MMOL/L
CREAT SERPL-MCNC: 0.9 MG/DL
EST. GFR  (AFRICAN AMERICAN): >60 ML/MIN/1.73 M^2
EST. GFR  (NON AFRICAN AMERICAN): >60 ML/MIN/1.73 M^2
GLUCOSE SERPL-MCNC: 143 MG/DL
HDL/CHOLESTEROL RATIO: 30.2 %
HDLC SERPL-MCNC: 182 MG/DL
HDLC SERPL-MCNC: 55 MG/DL
LDLC SERPL CALC-MCNC: 102.4 MG/DL
NONHDLC SERPL-MCNC: 127 MG/DL
POTASSIUM SERPL-SCNC: 4 MMOL/L
PROT SERPL-MCNC: 6.2 G/DL
SODIUM SERPL-SCNC: 141 MMOL/L
TRIGL SERPL-MCNC: 123 MG/DL

## 2017-04-11 PROCEDURE — 36415 COLL VENOUS BLD VENIPUNCTURE: CPT | Mod: PO

## 2017-04-11 PROCEDURE — 83036 HEMOGLOBIN GLYCOSYLATED A1C: CPT

## 2017-04-11 PROCEDURE — 80053 COMPREHEN METABOLIC PANEL: CPT

## 2017-04-11 PROCEDURE — 80061 LIPID PANEL: CPT

## 2017-04-12 LAB
ESTIMATED AVG GLUCOSE: 166 MG/DL
HBA1C MFR BLD HPLC: 7.4 %

## 2017-04-13 ENCOUNTER — OFFICE VISIT (OUTPATIENT)
Dept: PODIATRY | Facility: CLINIC | Age: 67
End: 2017-04-13
Payer: COMMERCIAL

## 2017-04-13 ENCOUNTER — HOSPITAL ENCOUNTER (OUTPATIENT)
Dept: RADIOLOGY | Facility: HOSPITAL | Age: 67
Discharge: HOME | End: 2017-04-13
Payer: COMMERCIAL

## 2017-04-13 VITALS — BODY MASS INDEX: 35.26 KG/M2 | HEIGHT: 64 IN | WEIGHT: 206.56 LBS

## 2017-04-13 DIAGNOSIS — E11.42 DIABETIC POLYNEUROPATHY ASSOCIATED WITH TYPE 2 DIABETES MELLITUS: Primary | ICD-10-CM

## 2017-04-13 DIAGNOSIS — M79.671 RIGHT FOOT PAIN: Primary | ICD-10-CM

## 2017-04-13 DIAGNOSIS — L97.409: ICD-10-CM

## 2017-04-13 DIAGNOSIS — M77.41 METATARSALGIA OF RIGHT FOOT: ICD-10-CM

## 2017-04-13 DIAGNOSIS — E13.621: ICD-10-CM

## 2017-04-13 DIAGNOSIS — S90.111A CONTUSION OF RIGHT GREAT TOE WITHOUT DAMAGE TO NAIL, INITIAL ENCOUNTER: ICD-10-CM

## 2017-04-13 DIAGNOSIS — B35.1 ONYCHOMYCOSIS DUE TO DERMATOPHYTE: ICD-10-CM

## 2017-04-13 DIAGNOSIS — M79.671 RIGHT FOOT PAIN: ICD-10-CM

## 2017-04-13 PROCEDURE — 3045F PR MOST RECENT HEMOGLOBIN A1C LEVEL 7.0-9.0%: CPT | Mod: S$GLB,,,

## 2017-04-13 PROCEDURE — 1160F RVW MEDS BY RX/DR IN RCRD: CPT | Mod: S$GLB,,,

## 2017-04-13 PROCEDURE — 11056 PARNG/CUTG B9 HYPRKR LES 2-4: CPT | Mod: Q9,S$GLB,,

## 2017-04-13 PROCEDURE — 11042 DBRDMT SUBQ TIS 1ST 20SQCM/<: CPT | Mod: 59,S$GLB,,

## 2017-04-13 PROCEDURE — 73630 X-RAY EXAM OF FOOT: CPT | Mod: TC,PO,RT

## 2017-04-13 PROCEDURE — 99999 PR PBB SHADOW E&M-EST. PATIENT-LVL II: CPT | Mod: PBBFAC,,,

## 2017-04-13 PROCEDURE — 99213 OFFICE O/P EST LOW 20 MIN: CPT | Mod: 25,S$GLB,,

## 2017-04-13 PROCEDURE — 3060F POS MICROALBUMINURIA REV: CPT | Mod: 8P,S$GLB,,

## 2017-04-13 PROCEDURE — 11721 DEBRIDE NAIL 6 OR MORE: CPT | Mod: 59,Q9,S$GLB,

## 2017-04-13 PROCEDURE — 73630 X-RAY EXAM OF FOOT: CPT | Mod: 26,RT,, | Performed by: RADIOLOGY

## 2017-04-13 NOTE — PROGRESS NOTES
Subjective:      Patient ID: Cornelia Delatorre is a 66 y.o. female.    Chief Complaint: Foot Injury (dropped pipe toe )    Patient returns for diabetic foot care.  Her bursitis is doing better, does not want surgery.  She finished 3 months of oral lamisil and noticing improvement.     ROS  ROS:  Constitution: Negative for chills, fever, weakness and malaise/fatigue.   HEENT: Negative for headaches.   Cardiovascular: Negative for chest pain and claudication.   Respiratory: Negative for cough and shortness of breath.   Musculoskeletal: Positive for foot pain.  Negative for muscle cramps and muscle weakness.   Gastrointestinal: Negative for nausea and vomiting.   Neurological: + for numbness and paresthesias.   Dermatological: Negative for wound.          Objective:      Physical Exam  Constitutional:  Patient is oriented to person, place, and time. Vital signs are normal.  Appears well-developed and well-nourished.     Vascular:  Dorsalis pedis pulses are 2+ on the right side, and 2+ on the left side.   Posterior tibial pulses are 2+ on the right side, and 2+ on the left side.   + digital hair growth, capillary fill time to all toes <3 seconds, no swelling    Skin/Dermatological:  Skin is warm and intact.  No cyanosis or clubbing.  No rashes noted.  No open wounds.  All ten toenails yellow discolored, thickened 2-4 mm to base with subungual debris.    Musculoskeletal:      Full unrestricted range of motion of midtarsal, subtalar joints.  Forefoot to rearfoot well aligned.  No restriction of ankle joint dorsiflexion or plantarflexion.  No crepitus. No pain along the right hallux interphalangeal joint.  Right second third toes are slightly dorsiflexed with limited range of motion at the MPJ. She has no tenderness along the neck of the right fifth metatarsal northe plantar aspect of the head of the fifth metatarsal.         Neurological:  + patchy  deficits to sharp/dull, light touch or vibratory sensation.   Muscle  strength to tibialis anterior, extensor hallucis longus, extensor digitorum longus, peroneal muscles, flexor hallucis/digotorum longus, posterior tibial and gastrosoleal complex is 5/5, normal tone without assymmetry   Patellar reflexes are 2+ on the right side and 2+ on the left side.  Achilles reflexes are 2+ on the right side and 2+ on the left side.        Assessment:         Diabetic polyneuropathy associated with type 2 diabetes mellitus    Bursitis    Onychomycosis due to dermatophyte    Metatarsalgia of right foot      Plan:       Cornelia was seen today for diabetic foot exam.    Diagnoses and all orders for this visit:    Diabetic polyneuropathy associated with type 2 diabetes mellitus    Bursitis    Onychomycosis due to dermatophyte    Metatarsalgia of right foot    Other orders  -     clotrimazole (LOTRIMIN) 1 % Soln; Apply topically 2 (two) times daily.    I counseled the patient on her conditions, their implications and medical management.      Recommend a right fifth metatarsal head resection if her bursitis returns. She will consider this. For now she will wear the orthotics with the fifth met head cut out and a metatarsal pad. Rx for clotrimazole to toenails 2-3 times per week. Keep feet clean and dry.  Avoid cotton socks.  Antifungal sprays to the feet.  Lysol to shoes qod.  RTC 4 months

## 2017-04-13 NOTE — MR AVS SNAPSHOT
[facility]  [address] [facility] [address]  [facility] [address]  Phone: [phone]                  Cornelia Delatorre   2017 11:00 AM   Office Visit    Description:  Female : 1950   Provider:  Farzad Willams DPM   Department:  [facility]           Reason for Visit     Foot Injury                To Do List           Future Appointments        Provider Department Dept Phone    2017 11:10 AM Armond Cortez MD [facility] Family Medicine [phone]    2017 11:00 AM Jonathan Nicole MD [facility] Pulmonary [phone]    2017 2:00 PM Lawrence García Jr., MD [facility] Neurology [phone]    2017 11:15 AM Farzad Willams DPM [facility] [phone]      Goals (5 Years of Data)     None      [facility] On Call     [facility] Nurse Care Line -  Assistance  Unless otherwise directed by your provider, please contact [facility], our nurse care line that is available for  assistance.     Registered nurses in the [facility] Center provide: appointment scheduling, clinical advisement, health education, and other advisory services.  Call: [phone] (toll free)               Medications           Message regarding Medications     Verify the changes and/or additions to your medication regime listed below are the same as discussed with your clinician today.  If any of these changes or additions are incorrect, please notify your healthcare provider.             Verify that the below list of medications is an accurate representation of the medications you are currently taking.  If none reported, the list may be blank. If incorrect, please contact your healthcare provider. Carry this list with you in case of emergency.           Current Medications     albuterol 90 mcg/actuation inhaler 2 puffs every 4 hours as needed for cough, wheeze, or shortness of breath    aspirin (ECOTRIN) 81 MG EC tablet Take 81 mg by mouth once daily.     azelastine (ASTELIN) 137 mcg (0.1 %) nasal spray 1 spray (137 mcg total) by Nasal route 2 (two) times daily.    b complex vitamins capsule Take 1 capsule by mouth once daily.    beclomethasone (QVAR) 80 mcg/actuation Aero Inhale 2 puffs into the lungs 2 (two) times daily. Controller    BIFIDOBACTERIUM INFANTIS (ALIGN ORAL) Take 1 capsule by mouth once daily.    BIOTIN ORAL Take 10,000 mcg by mouth once daily.     calcium citrate-vitamin D3 (UPCAL D) 500-250 mg-unit/2.5gram Powd Take by mouth 3 (three) times daily.      CHOLECALCIFEROL, VITAMIN D3, (CHOLECALCIFEROL, VIT D3,,BULK, MISC) by Misc.(Non-Drug; Combo Route) route.    clotrimazole (LOTRIMIN) 1 % Soln Apply topically 2 (two) times daily.    cranberry 500 mg Cap Take 1 capsule by mouth every evening.    cyanocobalamin (VITAMIN B-12) 500 MCG tablet Take 500 mcg by mouth once daily.      esomeprazole (NEXIUM) 40 MG capsule Take 1 capsule (40 mg total) by mouth before breakfast.    fish oil-omega-3 fatty acids 300-1,000 mg capsule Take 2 g by mouth once daily.    fluticasone (FLONASE) 50 mcg/actuation nasal spray 2 sprays by Each Nare route once daily.    gabapentin (NEURONTIN) 600 MG tablet Take 600 mg by mouth 3 (three) times daily.    hydrOXYzine pamoate (VISTARIL) 50 MG Cap Take 1 capsule (50 mg total) by mouth nightly as needed. Take for anxiety, Can cause drowsiness    levothyroxine (SYNTHROID) 88 MCG tablet Take 1 tablet (88 mcg total) by mouth once daily.    metoprolol tartrate (LOPRESSOR) 50 MG tablet Take 1 tablet (50 mg total) by mouth once daily.    multivitamin capsule Take 1 capsule by mouth. Take one tablets daily    predniSONE (DELTASONE) 20 MG tablet Take one daily for 3 days and repeat for asthma.    predniSONE (DELTASONE) 20 MG tablet 3 for 3 days then 2 for 3 days then one for 3 days and repeat for breathing problems    SIMETHICONE (GAS-X ORAL) Take 1 capsule by mouth as needed.    TESTOSTERONE AND ESTRADIOL CYP (ESTRADIOL-TESTOSTERONE IM)  "Apply topically.    hydrochlorothiazide (HYDRODIURIL) 25 MG tablet Take 1 tablet (25 mg total) by mouth once daily.    irbesartan (AVAPRO) 75 MG tablet Take 1 tablet (75 mg total) by mouth every evening.    pravastatin (PRAVACHOL) 20 MG tablet Take 1 tablet (20 mg total) by mouth once daily.           Clinical Reference Information           Your Vitals Were     Height Weight BMI          5' 4" (1.626 m) 93.7 kg (206 lb 9.1 oz) 35.46 kg/m2        Allergies as of 4/13/2017     Sulfa (Sulfonamide Antibiotics)    Naproxen      Immunizations Administered on Date of Encounter - 4/13/2017     None      Language Assistance Services     ATTENTION: Language assistance services are available, free of charge. Please call [phone].      ATENCIÓN: Si marko huang, tiene a aleman disposición servicios gratuitos de asistencia lingüística. Llame al [phone].     CHÚ Ý: N?u b?n nói Ti?ng Vi?t, có các d?ch v? h? tr? ngôn ng? mi?n phí dành cho b?n. G?i s? [phone].         [facility] complies with applicable Federal civil rights laws and does not discriminate on the basis of race, color, national origin, age, disability, or sex.        "

## 2017-04-13 NOTE — PROGRESS NOTES
Subjective:       Patient ID: Cornelia Delatorre is a 66 y.o. female.    Chief Complaint: Foot Injury (dropped pipe toe )    HPI  Cornelia is a 66 y.o. female who presents to the clinic for evaluation and treatment of high risk feet. Cornelia has a past medical history of Allergy; Arthritis; Cataract; Chronic fatigue (1/24/2017); Diabetes mellitus; Diabetes mellitus, type 2; GERD (gastroesophageal reflux disease); Headache; Hyperlipidemia (7/15/2015); Hypertension; Hypothyroidism; Neuropathy; Obesity; SOHA on CPAP; Postmenopausal HRT (hormone replacement therapy); Rash; Rosacea; and Snoring. The patient's chief complaint is long, thick toenails and that she dropped a pipe on her right greatoe, nail not injured, x-rays negative for fractures.  She alos has a lesion left lateral heel. This patient has documented high risk feet requiring routine maintenance secondary to peripheral neuropathy.    PCP: Armond Cortez MD    Date Last Seen by PCP: 2/15/17     Current shoe gear:  Affected Foot: Rx diabetic extra depth shoes and custom accommodative insoles     Unaffected Foot: Rx diabetic extra depth shoes and custom accommodative insoles    Hemoglobin A1C   Date Value Ref Range Status   04/11/2017 7.4 (H) 4.5 - 6.2 % Final     Comment:     According to ADA guidelines, hemoglobin A1C <7.0% represents  optimal control in non-pregnant diabetic patients.  Different  metrics may apply to specific populations.   Standards of Medical Care in Diabetes - 2016.  For the purpose of screening for the presence of diabetes:  <5.7%     Consistent with the absence of diabetes  5.7-6.4%  Consistent with increasing risk for diabetes   (prediabetes)  >or=6.5%  Consistent with diabetes  Currently no consensus exists for use of hemoglobin A1C  for diagnosis of diabetes for children.     11/23/2016 6.5 (H) 4.5 - 6.2 % Final     Comment:     According to ADA guidelines, hemoglobin A1C <7.0% represents  optimal control in non-pregnant  diabetic patients.  Different  metrics may apply to specific populations.   Standards of Medical Care in Diabetes - 2016.  For the purpose of screening for the presence of diabetes:  <5.7%     Consistent with the absence of diabetes  5.7-6.4%  Consistent with increasing risk for diabetes   (prediabetes)  >or=6.5%  Consistent with diabetes  Currently no consensus exists for use of hemoglobin A1C  for diagnosis of diabetes for children.     08/25/2016 6.5 (H) 4.5 - 6.2 % Final     Comment:     According to ADA guidelines, hemoglobin A1C <7.0% represents  optimal control in non-pregnant diabetic patients.  Different  metrics may apply to specific populations.   Standards of Medical Care in Diabetes - 2016.  For the purpose of screening for the presence of diabetes:  <5.7%     Consistent with the absence of diabetes  5.7-6.4%  Consistent with increasing risk for diabetes   (prediabetes)  >or=6.5%  Consistent with diabetes  Currently no consensus exists for use of hemoglobin A1C  for diagnosis of diabetes for children.       Review of Systems  ROS:  Constitution: Negative for chills, fever, weakness and malaise/fatigue.   HEENT: Negative for headaches.   Cardiovascular: Negative for chest pain and claudication.   Respiratory: Negative for cough and shortness of breath.   Musculoskeletal: Positive for foot pain.  Negative for muscle cramps and muscle weakness.   Gastrointestinal: Negative for nausea and vomiting.   Neurological: Positive for numbness and paresthesias.   Dermatological: Positive for wound.        Objective:      Physical Exam  Constitutional:   Patient is oriented to person, place, and time. Vital signs are normal. Appears well-developed and well-nourished.     Vascular:   Dorsalis pedis pulses are 2+ on the right side, and 2+ on the left side.   Posterior tibial pulses are 2+ on the right side, and 2+ on the left side.   - digital hair growth, capillary fill time to all toes <3 seconds, toes are cool to  touch  + swelling feet and ankles    Skin/Dermatological:   Skin is thin, warm, shiny and atrophic. No cyanosis or clubbing. No rashes noted. No open wounds.   All ten toenails yellow discolored, thickened 3 mm, elongated 3 mm with subungual debris and tenderness.  Porokeratosis x2  1x 0.5 cm wound lateral left heel, granular, no drainage or erythema    Musculoskeletal:   Mild bunions, hammertoes and bunionettes observed.  Decreased range of motion of bilateral midtarsal, subtalar joints, ankle joint dorsiflexion is restricted bilaterally. Muscle strength to tibialis anterior, extensor hallucis longus, extensor digitorum longus, peroneal muscles, flexor hallucis/digotorum longus, posterior tibial and gastrosoleal complex is 5/5.    Neurological:   Positive deficits to sharp/dull, light touch or vibratory sensation bilateral feet       X-ray weightbearing right feet dated today:no fracturs or dislocation, implant in place on right 2nd digit      Assessment:       1. Diabetic polyneuropathy associated with type 2 diabetes mellitus    2. Onychomycosis due to dermatophyte    3. Metatarsalgia of right foot    4. Contusion of right great toe without damage to nail, initial encounter    5. Heel ulcer due to secondary DM        Plan:       Diabetic polyneuropathy associated with type 2 diabetes mellitus    Onychomycosis due to dermatophyte    Metatarsalgia of right foot    Contusion of right great toe without damage to nail, initial encounter    Heel ulcer due to secondary DM          Shoe inspection. Diabetic Foot Education. Patient reminded of the importance of good nutrition and blood sugar control to help prevent podiatric complications of diabetes. Patient instructed on proper foot hygeine. We discussed wearing proper shoe gear, daily foot inspections, never walking without protective shoe gear, never putting sharp instruments to feet.  We also discussed padding and shoes with high toe boxes for foot deformities.    -  With patient's permission, all ten toenails were aggressively reduced and debrided  to their soft tissue attachment mechanically with nail nipper, removing all offending nail and debris. The porokeratotic tissue was pared x 2 with a 15 blade.  Patient relates relief following the procedure. Patient will continue to monitor the areas daily, inspect feet, wear protective shoe gear when ambulatory, moisturizer to maintain skin integrity and follow in this office in approximately 3 months, sooner p.r.n.    Procedure: Excisional wound debridement    Indications: Diabetic heel ulcer    Consent was signed and placed in the chart. I reviewed the indications, risk of the procedure which include infection, bleeding, recurrence, pain, swelling, delayed healing and additional surgery alternatives have been explained to the patient, a time out was performed with laterality designated.    Anesthesia: none    Procedure:A sterile #15 blade and curette were used to remove all the nonviable wound elements from the wound periphery and base.  I redefined  the wound borders in the process.  Debridement was carried into and did include the subq layer.  All the nonviable wound elements were discarded and red-bagged according to the policy.  Capillary ooze at the wound periphery and base were easily controlled with direct pressure manually and the wound today was dressed with silvadibe, donut football compression dressing and patient is to minimize ambulation in surgical shoes.  Pre and  postoperative wound dimensions are contained in the attached progress note and the patient was discharged today under self care and will follow up here in 3 months or soonerr p.r.n.    There were no complications. Estimated blood loss was less than 0.    Patient given instructions to ice, elevate extremity.  Patient instructed to change dressing daily in same fashion as clinic. Report any signs or symptoms of complications to [facility] or the emergency room.

## 2017-04-17 ENCOUNTER — OFFICE VISIT (OUTPATIENT)
Dept: FAMILY MEDICINE | Facility: CLINIC | Age: 67
End: 2017-04-17
Payer: COMMERCIAL

## 2017-04-17 VITALS
SYSTOLIC BLOOD PRESSURE: 110 MMHG | HEIGHT: 64 IN | WEIGHT: 212.94 LBS | HEART RATE: 55 BPM | BODY MASS INDEX: 36.35 KG/M2 | DIASTOLIC BLOOD PRESSURE: 60 MMHG | TEMPERATURE: 98 F

## 2017-04-17 DIAGNOSIS — E78.5 DYSLIPIDEMIA: ICD-10-CM

## 2017-04-17 DIAGNOSIS — J32.9 SINUSITIS, UNSPECIFIED CHRONICITY, UNSPECIFIED LOCATION: ICD-10-CM

## 2017-04-17 DIAGNOSIS — I10 ESSENTIAL HYPERTENSION: ICD-10-CM

## 2017-04-17 DIAGNOSIS — E03.4 HYPOTHYROIDISM DUE TO ACQUIRED ATROPHY OF THYROID: ICD-10-CM

## 2017-04-17 DIAGNOSIS — Z00.00 ROUTINE PHYSICAL EXAMINATION: Primary | ICD-10-CM

## 2017-04-17 PROCEDURE — 99396 PREV VISIT EST AGE 40-64: CPT | Mod: S$GLB,,, | Performed by: INTERNAL MEDICINE

## 2017-04-17 PROCEDURE — 99999 PR PBB SHADOW E&M-EST. PATIENT-LVL III: CPT | Mod: PBBFAC,,, | Performed by: INTERNAL MEDICINE

## 2017-04-17 PROCEDURE — 3078F DIAST BP <80 MM HG: CPT | Mod: S$GLB,,, | Performed by: INTERNAL MEDICINE

## 2017-04-17 PROCEDURE — 3074F SYST BP LT 130 MM HG: CPT | Mod: S$GLB,,, | Performed by: INTERNAL MEDICINE

## 2017-04-17 RX ORDER — METFORMIN HYDROCHLORIDE 500 MG/1
500 TABLET, EXTENDED RELEASE ORAL
Qty: 30 TABLET | Refills: 1 | Status: SHIPPED | OUTPATIENT
Start: 2017-04-17 | End: 2017-04-17 | Stop reason: SDUPTHER

## 2017-04-17 RX ORDER — METFORMIN HYDROCHLORIDE 500 MG/1
TABLET, EXTENDED RELEASE ORAL
Qty: 360 TABLET | Refills: 3 | Status: SHIPPED | OUTPATIENT
Start: 2017-04-17 | End: 2017-07-19 | Stop reason: SDUPTHER

## 2017-04-17 RX ORDER — IRBESARTAN 75 MG/1
75 TABLET ORAL NIGHTLY
Qty: 90 TABLET | Refills: 3 | Status: SHIPPED | OUTPATIENT
Start: 2017-04-17 | End: 2017-07-19 | Stop reason: SDUPTHER

## 2017-04-17 RX ORDER — PNEUMOCOCCAL 13-VALENT CONJUGATE VACCINE 2.2; 2.2; 2.2; 2.2; 2.2; 4.4; 2.2; 2.2; 2.2; 2.2; 2.2; 2.2; 2.2 UG/.5ML; UG/.5ML; UG/.5ML; UG/.5ML; UG/.5ML; UG/.5ML; UG/.5ML; UG/.5ML; UG/.5ML; UG/.5ML; UG/.5ML; UG/.5ML; UG/.5ML
INJECTION, SUSPENSION INTRAMUSCULAR
COMMUNITY
Start: 2017-03-22 | End: 2017-06-07

## 2017-04-17 RX ORDER — METOPROLOL TARTRATE 50 MG/1
50 TABLET ORAL DAILY
Qty: 180 TABLET | Refills: 3 | Status: SHIPPED | OUTPATIENT
Start: 2017-04-17 | End: 2017-04-25 | Stop reason: SDUPTHER

## 2017-04-17 RX ORDER — BUDESONIDE AND FORMOTEROL FUMARATE DIHYDRATE 160; 4.5 UG/1; UG/1
AEROSOL RESPIRATORY (INHALATION)
COMMUNITY
Start: 2017-04-07 | End: 2017-06-21 | Stop reason: SDUPTHER

## 2017-04-17 RX ORDER — HYDROCHLOROTHIAZIDE 25 MG/1
25 TABLET ORAL DAILY
Qty: 90 TABLET | Refills: 3 | Status: SHIPPED | OUTPATIENT
Start: 2017-04-17 | End: 2017-07-19 | Stop reason: SDUPTHER

## 2017-04-17 RX ORDER — LEVOTHYROXINE SODIUM 88 UG/1
88 TABLET ORAL DAILY
Qty: 90 TABLET | Refills: 3 | Status: SHIPPED | OUTPATIENT
Start: 2017-04-17 | End: 2017-07-19 | Stop reason: SDUPTHER

## 2017-04-17 RX ORDER — PRAVASTATIN SODIUM 40 MG/1
40 TABLET ORAL DAILY
Qty: 90 TABLET | Refills: 3 | Status: SHIPPED | OUTPATIENT
Start: 2017-04-17 | End: 2017-07-19 | Stop reason: SDUPTHER

## 2017-04-17 RX ORDER — AZELASTINE 1 MG/ML
1 SPRAY, METERED NASAL 2 TIMES DAILY
Qty: 90 ML | Refills: 3 | Status: SHIPPED | OUTPATIENT
Start: 2017-04-17 | End: 2017-07-19 | Stop reason: SDUPTHER

## 2017-04-17 NOTE — PROGRESS NOTES
Subjective:       Patient ID: Cornelia Delatorre is a 66 y.o. female.    Chief Complaint: Annual Exam    HPI Comments: Here for routine health maintenance.      SOB/KLINE- Saw Dr Nicole in [place].  Feels SOB mostly related to asthma.  Her symptoms have improved with asthma tx - Symbicort.  Resolved SOB, cough and chest pain/ heaviness.  Occasional mild dry cough.    Pulmonary nodules - Dr Nicole will repeat CT at 6 mo rahul.     Chest heaviness - patient felt she could not complete an exercise stress test 2nd to KLINE and chest tightness.  When contacting Dr Higgins about this, Dr Higgins refused, so the patient found a different cardiologist, Dr Holder, who agreed she would be better served by a NM stress test.  Per patient, her stress test was a little abnormal and told her one side of her heart was not pumping to full capacity.  He recommended an angiogram in the near future.      HTN - controlled  DM - uncontrolled; was off all meds (5) and insulin after gastric bypass 2009.  Has been worsening.  Sees podiatry for peripheral neuropathy.  HLD - uncontrolled for goal < 100/70      Hypertension   Pertinent negatives include no chest pain, palpitations or shortness of breath.     Review of Systems   Constitutional: Negative for appetite change and fever.   HENT: Negative for nosebleeds and trouble swallowing.    Eyes: Negative for discharge and visual disturbance.   Respiratory: Negative for choking and shortness of breath.    Cardiovascular: Negative for chest pain and palpitations.   Gastrointestinal: Negative for abdominal pain, nausea and vomiting.   Musculoskeletal: Negative for arthralgias and joint swelling.   Skin: Negative for rash and wound.   Neurological: Negative for dizziness and syncope.   Psychiatric/Behavioral: Negative for confusion and dysphoric mood.       Objective:      Vitals:    04/17/17 1039   BP: 110/60   Pulse: (!) 55   Temp: 97.5 °F (36.4 °C)     Physical Exam   Constitutional: She appears  well-nourished.   Eyes: Conjunctivae and EOM are normal.   Neck: Trachea normal and normal range of motion. No thyromegaly present.   Cardiovascular: Normal heart sounds.    Edema negative   Pulmonary/Chest: Effort normal and breath sounds normal.   Abdominal: Soft. There is no hepatomegaly.   Neurological: No cranial nerve deficit.   DTR decreased bilateral   Skin: Skin is warm, dry and intact.   Psychiatric: She has a normal mood and affect.   Alert and Oriented    Vitals reviewed.        Assessment:       1. Routine physical examination    2. Uncontrolled type 2 diabetes mellitus without complication, without long-term current use of insulin    3. Dyslipidemia    4. Essential hypertension    5. Hypothyroidism due to acquired atrophy of thyroid    6. Sinusitis, unspecified chronicity, unspecified location        Plan:       Routine physical examination    Uncontrolled type 2 diabetes mellitus without complication, without long-term current use of insulin  -     Discontinue: metformin (GLUCOPHAGE-XR) 500 MG 24 hr tablet; Take 1 tablet (500 mg total) by mouth daily with breakfast.  Dispense: 30 tablet; Refill: 1  -     metformin (GLUCOPHAGE-XR) 500 MG 24 hr tablet; Take 1 po qhs x 2 weeks, then take 1 bid for 2 weeks, then 2 qhs and 1 po qam x 2 weeks, then 2 bid  Dispense: 360 tablet; Refill: 3  -     Hemoglobin A1c; Future; Expected date: 7/16/17  -     Microalbumin/creatinine urine ratio; Future; Expected date: 7/16/17    Dyslipidemia  -     pravastatin (PRAVACHOL) 40 MG tablet; Take 1 tablet (40 mg total) by mouth once daily.  Dispense: 90 tablet; Refill: 3  -     Lipid panel; Future; Expected date: 7/16/17    Essential hypertension  -     hydrochlorothiazide (HYDRODIURIL) 25 MG tablet; Take 1 tablet (25 mg total) by mouth once daily.  Dispense: 90 tablet; Refill: 3  -     irbesartan (AVAPRO) 75 MG tablet; Take 1 tablet (75 mg total) by mouth every evening.  Dispense: 90 tablet; Refill: 3  -     metoprolol  "tartrate (LOPRESSOR) 50 MG tablet; Take 1 tablet (50 mg total) by mouth once daily.  Dispense: 180 tablet; Refill: 3  -     Comprehensive metabolic panel; Future; Expected date: 7/16/17    Hypothyroidism due to acquired atrophy of thyroid  -     levothyroxine (SYNTHROID) 88 MCG tablet; Take 1 tablet (88 mcg total) by mouth once daily.  Dispense: 90 tablet; Refill: 3  -     TSH; Future; Expected date: 7/16/17    Sinusitis, unspecified chronicity, unspecified location  -     azelastine (ASTELIN) 137 mcg (0.1 %) nasal spray; 1 spray (137 mcg total) by Nasal route 2 (two) times daily.  Dispense: 90 mL; Refill: 3    ok for [facility].         Counseled on regular exercise, maintenance of a healthy weight, balanced diet rich in fruits/vegetables and lean protein, and avoidance of unhealthy habits like smoking and excessive alcohol intake.   Also, counseled on importance of being compliant with medication, health appointments, diet and exercise.     Return in about 3 months (around 7/17/2017). dm, hld, htn    "This note will not be shared with the patient."  "

## 2017-04-17 NOTE — MR AVS SNAPSHOT
[facility]  [address] [facility] [address]  [facility] [address]  Phone: [phone]  Fax: [phone]                  Cornelia Delatorre   2017 11:10 AM   Office Visit    Description:  Female : 1950   Provider:  Armond Cortez MD   Department:  [facility]           Reason for Visit     Annual Exam           Diagnoses this Visit        Comments    Routine physical examination    -  Primary     Uncontrolled type 2 diabetes mellitus without complication, without long-term current use of insulin         Dyslipidemia         Essential hypertension         Hypothyroidism due to acquired atrophy of thyroid         Sinusitis, unspecified chronicity, unspecified location                To Do List           Future Appointments        Provider Department Dept Phone    2017 11:00 AM Jonathan Nicole MD [facility] - Pulmonary [phone]    2017 2:00 PM Lawrence García Jr., MD [facility] Neurology [phone]    2017 10:00 AM LAB, [place] [facility] [phone]    2017 11:15 AM Farzad Willams DPM [facility] Podiatry [phone]    2017 11:10 AM Armond Cortez MD [facility] [phone]      Goals (5 Years of Data)     None      Follow-Up and Disposition     Return in about 3 months (around 2017).    Follow-up and Disposition History       These Medications        Disp Refills Start End    metformin (GLUCOPHAGE-XR) 500 MG 24 hr tablet 360 tablet 3 2017     Take 1 po qhs x 2 weeks, then take 1 bid for 2 weeks, then 2 qhs and 1 po qam x 2 weeks, then 2 bid    Pharmacy: [facility] - [address] Ph #: [phone]       azelastine (ASTELIN) 137 mcg (0.1 %) nasal spray 90 mL 3 2017    1 spray (137 mcg total) by Nasal route 2 (two) times daily. - Nasal    Pharmacy: [facility] - [address] Ph #:  [phone]       hydrochlorothiazide (HYDRODIURIL) 25 MG tablet 90 tablet 3 4/17/2017 4/17/2018    Take 1 tablet (25 mg total) by mouth once daily. - Oral    Pharmacy: [facility] - [address] Ph #: [phone]       irbesartan (AVAPRO) 75 MG tablet 90 tablet 3 4/17/2017 4/17/2018    Take 1 tablet (75 mg total) by mouth every evening. - Oral    Pharmacy: [facility] - [address] Ph #: [phone]       levothyroxine (SYNTHROID) 88 MCG tablet 90 tablet 3 4/17/2017     Take 1 tablet (88 mcg total) by mouth once daily. - Oral    Pharmacy: [facility] - [address] Ph #: [phone]       metoprolol tartrate (LOPRESSOR) 50 MG tablet 180 tablet 3 4/17/2017     Take 1 tablet (50 mg total) by mouth once daily. - Oral    Pharmacy: [facility] - [address] Ph #: [phone]       pravastatin (PRAVACHOL) 40 MG tablet 90 tablet 3 4/17/2017 4/17/2018    Take 1 tablet (40 mg total) by mouth once daily. - Oral    Pharmacy: [facility] - [address] Ph #: [phone]         [facility]     [facility] - 24/7 Assistance  Unless otherwise directed by your provider, please contact [facility], our nurse care line that is available for 24/7 assistance.     Registered nurses in the [facility] provide: appointment scheduling, clinical advisement, health education, and other advisory services.  Call: [phone] (toll free)               Medications           Message regarding Medications     Verify the changes and/or additions to your medication regime listed below are the same as discussed with your clinician today.  If any of these changes or additions are incorrect, please notify your healthcare provider.        START taking these NEW medications        Refills     metformin (GLUCOPHAGE-XR) 500 MG 24 hr tablet 3    Sig: Take 1 po qhs x 2 weeks, then take 1 bid for 2 weeks, then 2 qhs and 1 po qam x 2 weeks, then 2 bid    Class: Normal      CHANGE how you are taking these medications     Start Taking Instead of    pravastatin (PRAVACHOL) 40 MG tablet pravastatin (PRAVACHOL) 20 MG tablet    Dosage:  Take 1 tablet (40 mg total) by mouth once daily. Dosage:  Take 1 tablet (20 mg total) by mouth once daily.    Reason for Change:  Reorder       STOP taking these medications     beclomethasone (QVAR) 80 mcg/actuation Aero Inhale 2 puffs into the lungs 2 (two) times daily. Controller           Verify that the below list of medications is an accurate representation of the medications you are currently taking.  If none reported, the list may be blank. If incorrect, please contact your healthcare provider. Carry this list with you in case of emergency.           Current Medications     albuterol 90 mcg/actuation inhaler 2 puffs every 4 hours as needed for cough, wheeze, or shortness of breath    aspirin (ECOTRIN) 81 MG EC tablet Take 81 mg by mouth once daily.    azelastine (ASTELIN) 137 mcg (0.1 %) nasal spray 1 spray (137 mcg total) by Nasal route 2 (two) times daily.    b complex vitamins capsule Take 1 capsule by mouth once daily.    BIFIDOBACTERIUM INFANTIS (ALIGN ORAL) Take 1 capsule by mouth once daily.    BIOTIN ORAL Take 10,000 mcg by mouth once daily.     calcium citrate-vitamin D3 (UPCAL D) 500-250 mg-unit/2.5gram Powd Take by mouth 3 (three) times daily.      CHOLECALCIFEROL, VITAMIN D3, (CHOLECALCIFEROL, VIT D3,,BULK, MISC) by Misc.(Non-Drug; Combo Route) route.    clotrimazole (LOTRIMIN) 1 % Soln Apply topically 2 (two) times daily.    cranberry 500 mg Cap Take 1 capsule by mouth every evening.    cyanocobalamin (VITAMIN B-12) 500 MCG tablet Take 500 mcg by mouth once daily.      esomeprazole (NEXIUM) 40 MG capsule Take 1 capsule (40 mg total) by mouth before breakfast.     "fish oil-omega-3 fatty acids 300-1,000 mg capsule Take 2 g by mouth once daily.    fluticasone (FLONASE) 50 mcg/actuation nasal spray 2 sprays by Each Nare route once daily.    gabapentin (NEURONTIN) 600 MG tablet Take 600 mg by mouth 3 (three) times daily.    levothyroxine (SYNTHROID) 88 MCG tablet Take 1 tablet (88 mcg total) by mouth once daily.    metoprolol tartrate (LOPRESSOR) 50 MG tablet Take 1 tablet (50 mg total) by mouth once daily.    multivitamin capsule Take 1 capsule by mouth. Take one tablets daily    predniSONE (DELTASONE) 20 MG tablet 3 for 3 days then 2 for 3 days then one for 3 days and repeat for breathing problems    PREVNAR 13, PF, 0.5 mL Syrg     SIMETHICONE (GAS-X ORAL) Take 1 capsule by mouth as needed.    SYMBICORT 160-4.5 mcg/actuation HFAA     TESTOSTERONE AND ESTRADIOL CYP (ESTRADIOL-TESTOSTERONE IM) Apply topically.    hydrochlorothiazide (HYDRODIURIL) 25 MG tablet Take 1 tablet (25 mg total) by mouth once daily.    hydrOXYzine pamoate (VISTARIL) 50 MG Cap Take 1 capsule (50 mg total) by mouth nightly as needed. Take for anxiety, Can cause drowsiness    irbesartan (AVAPRO) 75 MG tablet Take 1 tablet (75 mg total) by mouth every evening.    metformin (GLUCOPHAGE-XR) 500 MG 24 hr tablet Take 1 po qhs x 2 weeks, then take 1 bid for 2 weeks, then 2 qhs and 1 po qam x 2 weeks, then 2 bid    pravastatin (PRAVACHOL) 40 MG tablet Take 1 tablet (40 mg total) by mouth once daily.    predniSONE (DELTASONE) 20 MG tablet Take one daily for 3 days and repeat for asthma.           Clinical Reference Information           Your Vitals Were     BP Pulse Temp Height Weight BMI    110/60 55 97.5 °F (36.4 °C) (Oral) 5' 4" (1.626 m) 96.6 kg (212 lb 15.4 oz) 36.56 kg/m2      Blood Pressure          Most Recent Value    BP  110/60      Allergies as of 4/17/2017     Sulfa (Sulfonamide Antibiotics)    Naproxen      Immunizations Administered on Date of Encounter - 4/17/2017     None      Orders Placed During " Today's Visit     Future Labs/Procedures Expected by Expires    Comprehensive metabolic panel  7/16/2017 4/17/2018    Hemoglobin A1c  7/16/2017 4/17/2018    Lipid panel  7/16/2017 4/17/2018    Microalbumin/creatinine urine ratio  7/16/2017 4/17/2018    TSH  7/16/2017 4/17/2018      Language Assistance Services     ATTENTION: Language assistance services are available, free of charge. Please call [phone].      ATENCIÓN: Si habla español, tiene a aleman disposición servicios gratuitos de asistencia lingüística. Llame al [phone].     CHÚ Ý: N?u b?n nói Ti?ng Vi?t, có các d?ch v? h? tr? ngôn ng? mi?n phí dành cho b?n. G?i s? [phone].         [facility] complies with applicable Federal civil rights laws and does not discriminate on the basis of race, color, national origin, age, disability, or sex.

## 2017-04-24 ENCOUNTER — OFFICE VISIT (OUTPATIENT)
Dept: PULMONOLOGY | Facility: CLINIC | Age: 67
End: 2017-04-24
Payer: COMMERCIAL

## 2017-04-24 VITALS
DIASTOLIC BLOOD PRESSURE: 68 MMHG | HEART RATE: 52 BPM | WEIGHT: 216.25 LBS | HEIGHT: 64 IN | BODY MASS INDEX: 36.92 KG/M2 | SYSTOLIC BLOOD PRESSURE: 121 MMHG | OXYGEN SATURATION: 95 %

## 2017-04-24 DIAGNOSIS — D84.9 IMMUNE DEFICIENCY DISORDER: ICD-10-CM

## 2017-04-24 DIAGNOSIS — J47.9 BRONCHIECTASIS WITHOUT COMPLICATION: ICD-10-CM

## 2017-04-24 DIAGNOSIS — J20.9 ACUTE BRONCHITIS, UNSPECIFIED ORGANISM: ICD-10-CM

## 2017-04-24 DIAGNOSIS — R91.1 LUNG NODULE: Primary | ICD-10-CM

## 2017-04-24 DIAGNOSIS — I10 ESSENTIAL HYPERTENSION: ICD-10-CM

## 2017-04-24 PROCEDURE — 3074F SYST BP LT 130 MM HG: CPT | Mod: S$GLB,,, | Performed by: INTERNAL MEDICINE

## 2017-04-24 PROCEDURE — 99999 PR PBB SHADOW E&M-EST. PATIENT-LVL III: CPT | Mod: PBBFAC,,, | Performed by: INTERNAL MEDICINE

## 2017-04-24 PROCEDURE — 3078F DIAST BP <80 MM HG: CPT | Mod: S$GLB,,, | Performed by: INTERNAL MEDICINE

## 2017-04-24 PROCEDURE — 99214 OFFICE O/P EST MOD 30 MIN: CPT | Mod: S$GLB,,, | Performed by: INTERNAL MEDICINE

## 2017-04-24 PROCEDURE — 1160F RVW MEDS BY RX/DR IN RCRD: CPT | Mod: S$GLB,,, | Performed by: INTERNAL MEDICINE

## 2017-04-24 NOTE — TELEPHONE ENCOUNTER
----- Message from Lorrie Paul sent at 4/24/2017 10:59 AM CDT -----  Contact: [facility] called regarding clarification of directions on a medication received. Please contact [facility] [phone] ref # [id number]    metoprolol tartrate 50 MG tablet

## 2017-04-24 NOTE — PATIENT INSTRUCTIONS
Lungs aren't bad-- could skip symbicort post trip?    Use albuterol as needed.    If lungs act up - prednisone and antibiotic good.  symbicort prevents recurrance.    Recheck pneumonia vaccine response.    If immune weak, and no problems- ? Observe? Immune doc?    Lung pass normal on breathing test.    Follow up ct chest in March 2018

## 2017-04-24 NOTE — TELEPHONE ENCOUNTER
[facility]  There is a discrepancy between quantity and directions for metroprolol tartrate 50mg.   Prescribed QD (patient previously filled BID)  Quantity #180 or #90. Please advise.

## 2017-04-24 NOTE — TELEPHONE ENCOUNTER
Metoprolol tartate should be twice daily.  Please correct with both pharmacy and pt rx in computer.

## 2017-04-24 NOTE — MR AVS SNAPSHOT
[facility]  [address]  [place] [address]  Phone: [phone]                  Cornelia Delatorre   2017 11:00 AM   Office Visit    Description:  Female : 1950   Provider:  Jonathan Nicole MD   Department:  [facility]           Reason for Visit     Follow-up     Bronchitis     Apnea           Diagnoses this Visit        Comments    Lung nodule    -  Primary     Bronchiectasis without complication         Acute bronchitis, unspecified organism         Immune deficiency disorder                To Do List           Future Appointments        Provider Department Dept Phone    2017 2:00 PM Lawrence García Jr., MD [facility] Neurology [phone]    2017 10:00 AM LAB, [place] [facility] [phone]    2017 10:00 AM URINE [facility] [phone]    2017 11:15 AM Farzad Willams DPM [facility] Podiatry [phone]    2017 1:30 PM Armond Cortez MD [facility] Family Medicine [phone]      Goals (5 Years of Data)     None      Follow-Up and Disposition     Return in about 1 year (around 2018).    Follow-up and Disposition History      [facility]     [facility] Care [facility] -  Assistance  Unless otherwise directed by your provider, please contact [facility], our nurse care line that is available for  assistance.     Registered nurses in the [facility] provide: appointment scheduling, clinical advisement, health education, and other advisory services.  Call: [phone] (toll free)               Medications           Message regarding Medications     Verify the changes and/or additions to your medication regime listed below are the same as discussed with your clinician today.  If any of these changes or additions are incorrect, please notify your healthcare provider.             Verify that the below list of medications is an accurate  representation of the medications you are currently taking.  If none reported, the list may be blank. If incorrect, please contact your healthcare provider. Carry this list with you in case of emergency.           Current Medications     aspirin (ECOTRIN) 81 MG EC tablet Take 81 mg by mouth once daily.    azelastine (ASTELIN) 137 mcg (0.1 %) nasal spray 1 spray (137 mcg total) by Nasal route 2 (two) times daily.    b complex vitamins capsule Take 1 capsule by mouth once daily.    BIFIDOBACTERIUM INFANTIS (ALIGN ORAL) Take 1 capsule by mouth once daily.    BIOTIN ORAL Take 10,000 mcg by mouth once daily.     calcium citrate-vitamin D3 (UPCAL D) 500-250 mg-unit/2.5gram Powd Take by mouth 3 (three) times daily.      CHOLECALCIFEROL, VITAMIN D3, (CHOLECALCIFEROL, VIT D3,,BULK, MISC) by Misc.(Non-Drug; Combo Route) route.    clotrimazole (LOTRIMIN) 1 % Soln Apply topically 2 (two) times daily.    cranberry 500 mg Cap Take 1 capsule by mouth every evening.    cyanocobalamin (VITAMIN B-12) 500 MCG tablet Take 500 mcg by mouth once daily.      esomeprazole (NEXIUM) 40 MG capsule Take 1 capsule (40 mg total) by mouth before breakfast.    fish oil-omega-3 fatty acids 300-1,000 mg capsule Take 2 g by mouth once daily.    fluticasone (FLONASE) 50 mcg/actuation nasal spray 2 sprays by Each Nare route once daily.    gabapentin (NEURONTIN) 600 MG tablet Take 600 mg by mouth 3 (three) times daily.    hydrochlorothiazide (HYDRODIURIL) 25 MG tablet Take 1 tablet (25 mg total) by mouth once daily.    irbesartan (AVAPRO) 75 MG tablet Take 1 tablet (75 mg total) by mouth every evening.    levothyroxine (SYNTHROID) 88 MCG tablet Take 1 tablet (88 mcg total) by mouth once daily.    metformin (GLUCOPHAGE-XR) 500 MG 24 hr tablet Take 1 po qhs x 2 weeks, then take 1 bid for 2 weeks, then 2 qhs and 1 po qam x 2 weeks, then 2 bid    metoprolol tartrate (LOPRESSOR) 50 MG tablet Take 1 tablet (50 mg total) by mouth once daily.    multivitamin  "capsule Take 1 capsule by mouth. Take one tablets daily    pravastatin (PRAVACHOL) 40 MG tablet Take 1 tablet (40 mg total) by mouth once daily.    PREVNAR 13, PF, 0.5 mL Syrg     SIMETHICONE (GAS-X ORAL) Take 1 capsule by mouth as needed.    SYMBICORT 160-4.5 mcg/actuation HFAA     TESTOSTERONE AND ESTRADIOL CYP (ESTRADIOL-TESTOSTERONE IM) Apply topically.    albuterol 90 mcg/actuation inhaler 2 puffs every 4 hours as needed for cough, wheeze, or shortness of breath    hydrOXYzine pamoate (VISTARIL) 50 MG Cap Take 1 capsule (50 mg total) by mouth nightly as needed. Take for anxiety, Can cause drowsiness    predniSONE (DELTASONE) 20 MG tablet 3 for 3 days then 2 for 3 days then one for 3 days and repeat for breathing problems           Clinical Reference Information           Your Vitals Were     BP Pulse Height Weight SpO2 BMI    121/68 (BP Location: Left arm, Patient Position: Sitting, BP Method: Automatic) 52 5' 4" (1.626 m) 98.1 kg (216 lb 4.3 oz) 95% 37.12 kg/m2      Blood Pressure          Most Recent Value    BP  121/68      Allergies as of 4/24/2017     Sulfa (Sulfonamide Antibiotics)    Naproxen      Immunizations Administered on Date of Encounter - 4/24/2017     None      Orders Placed During Today's Visit     Future Labs/Procedures Expected by Expires    Humoral Immune Eval (Pneumo 14) with H. flu  7/13/2017 6/23/2018    IgG  7/13/2017 6/23/2018    IgM  7/13/2017 6/23/2018    CT Chest Without Contrast  3/24/2018 4/24/2018      Instructions    Lungs aren't bad-- could skip symbicort post trip?    Use albuterol as needed.    If lungs act up - prednisone and antibiotic good.  symbicort prevents recurrance.    Recheck pneumonia vaccine response.    If immune weak, and no problems- ? Observe? Immune doc?    Lung pass normal on breathing test.    Follow up ct chest in March 2018       Language Assistance Services     ATTENTION: Language assistance services are available, free of charge. Please call " [phone].      ATENCIÓN: Si habla español, tiene a aleman disposición servicios gratuitos de asistencia lingüística. Llame al [phone].     CHÚ Ý: N?u b?n nói Ti?ng Vi?t, có các d?ch v? h? tr? ngôn ng? mi?n phí dành cho b?n. G?i s? [phone].         [facility] complies with applicable Federal civil rights laws and does not discriminate on the basis of race, color, national origin, age, disability, or sex.

## 2017-04-24 NOTE — PROGRESS NOTES
4/24/2017    Cornelia Delatorre  New Patient Consult    Chief Complaint   Patient presents with    Follow-up     4 wks    Bronchitis    Apnea     April 24, feels a lot better with min cough, vague sob going left side down at night.  Going to [place] 2 weeks.  Uses symbicort and good results.      March 16, cough better, but comes and goes,  No great help with steroids.  Submitted 3 sputums.  Had pneumovax march last yr.  Breathing better. Got symbicort.     Had pneumonia vaccine last yr    3/8/2017 HPI: had onset cough Hernan illness, got dizzy few days with diarrhea and cough. Cough clear sm amt mucous. Did have 101 temp one day, fatigue, no muscle aches.  Appetite decreased.  Illnesses lingered 2 weeks but cough never remitted- has improved with inhahler use last 15 days.      Had bypass with with continued diarrhea intially resolved. No regurg or reflux or swallow problems.     symbicort nearly  Resolved.      Sinuses ok.  No pets.  Never smoker.      Appetite good, feels well now.      headcolds to chest since childhood, nocturnal ??not recalled.    Had cats in past but got rid in 2003 or so.     The chief compliant  problem is new to me   PFSH:  Past Medical History:   Diagnosis Date    Allergy     Arthritis     Cataract     Chronic fatigue 1/24/2017    Diabetes mellitus     resolved with gastric bypass    Diabetes mellitus, type 2     GERD (gastroesophageal reflux disease)     Headache     Hyperlipidemia 7/15/2015    Hypertension     Hypothyroidism     Neuropathy     Obesity     SOHA on CPAP     Postmenopausal HRT (hormone replacement therapy)     Rash     Rosacea     Snoring          Past Surgical History:   Procedure Laterality Date    ADENOIDECTOMY      APPENDECTOMY      BUNIONECTOMY  10/17/14    right, still has discomfort    CATARACT EXTRACTION W/  INTRAOCULAR LENS IMPLANT Bilateral     COLONOSCOPY      ESOPHAGOGASTRODUODENOSCOPY      dilated esophagus    GASTRIC BYPASS      2011  "   HYSTERECTOMY      interstim bladder  2009    10/14/14 new battery    TONSILLECTOMY      WISDOM TOOTH EXTRACTION       Social History   Substance Use Topics    Smoking status: Never Smoker    Smokeless tobacco: Never Used    Alcohol use No     Family History   Problem Relation Age of Onset    Diabetes      Hypertension      Hypothyroidism       Review of patient's allergies indicates:   Allergen Reactions    Sulfa (sulfonamide antibiotics) Hives    Naproxen Other (See Comments)     Other reaction(s): RT sided numbness         Performance Status:The patient's activity level is functions out of house.      Review of Systems:  a review of eleven systems covering constitutional, Eye, HEENT, Psych, Respiratory, Cardiac, GI, , Musculoskeletal, Endocrine, Dermatologic was negative except for pertinent findings as listed ABOVE and below: all good,  Had dm that remitted with bypass 2011.      Exam:Comprehensive exam done. /68 (BP Location: Left arm, Patient Position: Sitting, BP Method: Automatic)  Pulse (!) 52  Ht 5' 4" (1.626 m)  Wt 98.1 kg (216 lb 4.3 oz)  SpO2 95%  BMI 37.12 kg/m2  Exam included Vitals as listed, and patient's appearance and affect and alertness and mood, oral exam for yeast and hygiene and pharynx lesions and Mallapatti (M) score, neck with inspection for jvd and masses and thyroid abnormalities and lymph nodes (supraclavicular and infraclavicular nodes and axillary also examined and noted if abn), chest exam included symmetry and effort and fremitus and percussion and auscultation, cardiac exam included rhythm and gallops and murmur and rubs and jvd and edema, abdominal exam for mass and hepatosplenomegaly and tenderness and hernias and bowel sounds, Musculoskeletal exam with muscle tone and posture and mobility/gait and  strength, and skin for rashes and cyanosis and pallor and turgor, extremity for clubbing.  Findings were normal except for pertinent findings listed " below:  M3, good bs, rest good.    Radiographs (ct chest and cxr) reviewed: view by direct vision  i do see clear bronchiectasis,nodules are noted.  Mucoid type impaction suggested in rul ant segment.    Labs reviewed igm low, igg lowish, humerol titers na    Results for CORNELIA STEVENS (MRN [id number]) as of 4/24/2017 11:30   Ref. Range 3/15/2017 11:53   IgG - Serum Latest Ref Range: 650 - 1600 mg/dL 693   IgM Latest Ref Range: 50 - 300 mg/dL 22 (L)   IgA Latest Ref Range: 40 - 350 mg/dL 191   IgE Latest Ref Range: 0 - 100 IU/mL <35   Results for CORNELIA STEVENS (MRN [id number]) as of 4/24/2017 11:30   Ref. Range 3/16/2017 08:33   Diphtheria Toxoid Ab Latest Ref Range: >0.099 IU/mL 0.077 (A)   Tetanus Ab Latest Ref Range: >0.150 IU/mL 3.504   S.pneumoniae Type 1 Latest Units: mcg/mL 27.6   S.pneumoniae Type 3 Latest Units: mcg/mL 0.8   Strep pneumo Type 4 Latest Units: mcg/mL <0.3   S.pneumoniae Type 5 Latest Units: mcg/mL 13.1   S.pneumoniae Type 6B Latest Units: mcg/mL 0.9   S.pneumoniae Type 7F Latest Units: mcg/mL 2.2   S.pneumoniae Type 8 Latest Units: mcg/mL 3.7   S.pneumoniae Type 9N Latest Units: mcg/mL 11.2   S.pneumoniae Type 9V Abs Latest Units: mcg/mL 1.1   S.pneumoniae Type 12F Latest Units: mcg/mL 1.1   Strep pneumo Type 14 Latest Units: mcg/mL 0.7   S.pneumoniae Type 18C Latest Units: mcg/mL 1.6   S.pneumoniae Type 19F Latest Units: mcg/mL 6.5   S.pneumoniae Type 23F Latest Units: mcg/mL 3.1     PFT    Done st tamTroyy with 10% response, otherwise nl    Plan:  Clinical impression is resonably certain and repeated evaluation prn +/- follow up will be needed as below.    Cornelia was seen today for follow-up, bronchitis and apnea.    Diagnoses and all orders for this visit:    Lung nodule  -     CT Chest Without Contrast; Future    Bronchiectasis without complication    Acute bronchitis, unspecified organism    Immune deficiency disorder  -     Humoral Immune Eval (Pneumo 14) with H. flu;  Future  -     IgG; Future  -     IgM; Future      Return in about 1 year (around 4/24/2018).    Discussed with patient above for education the following:     Lungs aren't bad-- could skip symbicort post trip?    Use albuterol as needed.    If lungs act up - prednisone and antibiotic good.  symbicort prevents recurrance.    Recheck pneumonia vaccine response.    If immune weak, and no problems- ? Observe? Immune doc?    Lung pass normal on breathing test.    Follow up ct chest in March 2018

## 2017-04-25 ENCOUNTER — PATIENT MESSAGE (OUTPATIENT)
Dept: FAMILY MEDICINE | Facility: CLINIC | Age: 67
End: 2017-04-25

## 2017-04-25 RX ORDER — METOPROLOL TARTRATE 50 MG/1
50 TABLET ORAL 2 TIMES DAILY
Qty: 180 TABLET | Refills: 3 | Status: SHIPPED | OUTPATIENT
Start: 2017-04-25 | End: 2017-07-19 | Stop reason: SDUPTHER

## 2017-05-18 LAB
ACID FAST MOD KINY STN SPEC: NORMAL
ACID FAST MOD KINY STN SPEC: NORMAL
MYCOBACTERIUM SPEC QL CULT: NORMAL
MYCOBACTERIUM SPEC QL CULT: NORMAL

## 2017-05-30 ENCOUNTER — OFFICE VISIT (OUTPATIENT)
Dept: DERMATOLOGY | Facility: CLINIC | Age: 67
End: 2017-05-30
Payer: COMMERCIAL

## 2017-05-30 VITALS — WEIGHT: 216 LBS | HEIGHT: 64 IN | BODY MASS INDEX: 36.88 KG/M2

## 2017-05-30 DIAGNOSIS — L68.0 HIRSUTISM: ICD-10-CM

## 2017-05-30 DIAGNOSIS — Z85.828 PERSONAL HISTORY OF OTHER MALIGNANT NEOPLASM OF SKIN: Primary | ICD-10-CM

## 2017-05-30 DIAGNOSIS — L70.8 ACNEIFORM DERMATITIS: ICD-10-CM

## 2017-05-30 DIAGNOSIS — L57.0 ACTINIC KERATOSIS: ICD-10-CM

## 2017-05-30 DIAGNOSIS — Z12.83 SKIN CANCER SCREENING: ICD-10-CM

## 2017-05-30 PROCEDURE — 99214 OFFICE O/P EST MOD 30 MIN: CPT | Mod: 25,S$GLB,, | Performed by: DERMATOLOGY

## 2017-05-30 PROCEDURE — 99999 PR PBB SHADOW E&M-EST. PATIENT-LVL II: CPT | Mod: PBBFAC,,, | Performed by: DERMATOLOGY

## 2017-05-30 PROCEDURE — 17000 DESTRUCT PREMALG LESION: CPT | Mod: S$GLB,,, | Performed by: DERMATOLOGY

## 2017-05-30 RX ORDER — ERYTHROMYCIN AND BENZOYL PEROXIDE 30; 50 MG/G; MG/G
GEL TOPICAL
Qty: 50 G | Refills: 3 | Status: SHIPPED | OUTPATIENT
Start: 2017-05-30 | End: 2017-06-07

## 2017-05-30 RX ORDER — LEVOFLOXACIN 500 MG/1
TABLET, FILM COATED ORAL
COMMUNITY
Start: 2017-05-06 | End: 2017-06-07

## 2017-05-30 NOTE — PROGRESS NOTES
Subjective:       Patient ID:  Cornelia Delatorre is a 66 y.o. female who presents for No chief complaint on file.    High risk patient here for skin check, Last seen February 2017. Hx NMSC here for evaluation of new lesions.   Chencho II  No family history of skin cancer.    Phx Acneiform eruption - using Benzamycin- improved. Still with some bumps along chin, associated with ingrown hairs. White in color. Has not tx with benzamycin yet.     Phx SCC L hand -mohs Dr Jones    History of actinic keratoses on nasal Dorsum in the past hx of efudex use  Uses CeraVe AM cream dailyfor routine skin check.     Since last visit has noticed on right hand an area of redness with a white dot in middle. Has scratched the area and not as noticeable but is now tender.     Also has a spot on left chin that gets red and irritated when plucks hairs and then develops an acne like lesion.  No treatment.        Review of Systems   Constitutional: Negative for weight loss, weight gain and fatigue.   Skin: Positive for daily sunscreen use and activity-related sunscreen use. Negative for wears hat.   Hematologic/Lymphatic: Bruises/bleeds easily.        Objective:    Physical Exam   Constitutional: She appears well-developed and well-nourished. No distress.   HENT:   Mouth/Throat: Lips normal.    Eyes: Lids are normal.  No conjunctival no injection.   Neurological: She is alert and oriented to person, place, and time. She is not disoriented.   Psychiatric: She has a normal mood and affect.   Skin:   Areas Examined (abnormalities noted in diagram):   Head / Face Inspection Performed  Neck Inspection Performed  Chest / Axilla Inspection Performed  Abdomen Inspection Performed  Back Inspection Performed  RUE Inspected  LUE Inspection Performed                       Diagram Legend     Erythematous scaling macule/papule c/w actinic keratosis       Vascular papule c/w angioma      Pigmented verrucoid papule/plaque c/w seborrheic keratosis       Yellow umbilicated papule c/w sebaceous hyperplasia      Irregularly shaped tan macule c/w lentigo     1-2 mm smooth white papules consistent with Milia      Movable subcutaneous cyst with punctum c/w epidermal inclusion cyst      Subcutaneous movable cyst c/w pilar cyst      Firm pink to brown papule c/w dermatofibroma      Pedunculated fleshy papule(s) c/w skin tag(s)      Evenly pigmented macule c/w junctional nevus     Mildly variegated pigmented, slightly irregular-bordered macule c/w mildly atypical nevus      Flesh colored to evenly pigmented papule c/w intradermal nevus       Pink pearly papule/plaque c/w basal cell carcinoma      Erythematous hyperkeratotic cursted plaque c/w SCC      Surgical scar with no sign of skin cancer recurrence      Open and closed comedones      Inflammatory papules and pustules      Verrucoid papule consistent consistent with wart     Erythematous eczematous patches and plaques     Dystrophic onycholytic nail with subungual debris c/w onychomycosis     Umbilicated papule    Erythematous-base heme-crusted tan verrucoid plaque consistent with inflamed seborrheic keratosis     Erythematous Silvery Scaling Plaque c/w Psoriasis     See annotation      Assessment / Plan:        Personal history of other malignant neoplasm of skin  Area(s) of previous NMSC evaluated with no signs of recurrence.    Upper body skin examination performed today including at least 6 points as noted in physical examination. No lesions suspicious for malignancy noted.      Skin cancer screening  Area(s) of previous NMSC evaluated with no signs of recurrence.    Upper body skin examination performed today including at least 6 points as noted in physical examination. No lesions suspicious for malignancy noted.      Acneiform dermatitis  -     benzoyl peroxide-erythromycin (BENZAMYCIN) gel; AAA face bid as needed for pimples  Dispense: 50 g; Refill: 3    Hirsutism, chin  Not a candidate laser due to white color  of hairs  Can try vaniqa, declines  Ok to treat chin bumps with benzamycin prn     Actinic Keratosis, right hand  Cryosurgery Procedure Note    Verbal consent from the patient is obtained and the patient is aware of the precancerous quality and need for treatment of these lesions. Liquid nitrogen cryosurgery is applied to the 1 actinic keratoses, as detailed in the physical exam, to produce a freeze injury. The patient is aware that blisters may form and is instructed on wound care with gentle cleansing and use of vaseline ointment to keep moist until healed. The patient is supplied a handout on cryosurgery and is instructed to call if lesions do not completely resolve. Discussed risk postinflammatory pigmentary changes.              Return in about 6 months (around 11/30/2017).

## 2017-06-07 ENCOUNTER — HOSPITAL ENCOUNTER (OUTPATIENT)
Dept: RADIOLOGY | Facility: HOSPITAL | Age: 67
Discharge: HOME | End: 2017-06-07
Attending: PSYCHIATRY & NEUROLOGY
Payer: COMMERCIAL

## 2017-06-07 ENCOUNTER — TELEPHONE (OUTPATIENT)
Dept: NEUROLOGY | Facility: CLINIC | Age: 67
End: 2017-06-07

## 2017-06-07 ENCOUNTER — OFFICE VISIT (OUTPATIENT)
Dept: NEUROLOGY | Facility: CLINIC | Age: 67
End: 2017-06-07
Payer: COMMERCIAL

## 2017-06-07 VITALS
HEART RATE: 63 BPM | RESPIRATION RATE: 18 BRPM | BODY MASS INDEX: 36.13 KG/M2 | DIASTOLIC BLOOD PRESSURE: 63 MMHG | SYSTOLIC BLOOD PRESSURE: 103 MMHG | HEIGHT: 64 IN | WEIGHT: 211.63 LBS

## 2017-06-07 DIAGNOSIS — M47.812 OSTEOARTHRITIS OF CERVICAL SPINE, UNSPECIFIED SPINAL OSTEOARTHRITIS COMPLICATION STATUS: ICD-10-CM

## 2017-06-07 DIAGNOSIS — G56.03 BILATERAL CARPAL TUNNEL SYNDROME: Primary | ICD-10-CM

## 2017-06-07 PROCEDURE — 72125 CT NECK SPINE W/O DYE: CPT | Mod: TC,PO

## 2017-06-07 PROCEDURE — 99999 PR PBB SHADOW E&M-EST. PATIENT-LVL III: CPT | Mod: PBBFAC,,, | Performed by: PSYCHIATRY & NEUROLOGY

## 2017-06-07 PROCEDURE — 99204 OFFICE O/P NEW MOD 45 MIN: CPT | Mod: S$GLB,,, | Performed by: PSYCHIATRY & NEUROLOGY

## 2017-06-07 PROCEDURE — 72125 CT NECK SPINE W/O DYE: CPT | Mod: 26,,, | Performed by: RADIOLOGY

## 2017-06-07 RX ORDER — CHOLECALCIFEROL (VITAMIN D3) 25 MCG
5000 TABLET ORAL DAILY
COMMUNITY
End: 2017-07-31

## 2017-06-07 NOTE — LETTER
June 12, 2017      Tommy Alcaraz MD  [address] [facility] [address] [place] [address]           [facility]  [address] [facility] [address] [place] [address]  Phone: [phone]  Fax: [phone]          Patient: Cornelia Delatorre   MR Number: [id number]   YOB: 1950   Date of Visit: 6/7/2017       Dear Dr. Tommy Alcaraz:    Thank you for referring Cornelia Delatorre to me for evaluation. Attached you will find relevant portions of my assessment and plan of care.    If you have questions, please do not hesitate to call me. I look forward to following Cornelia Delatorre along with you.    Sincerely,    Lawrence García Jr., MD    Enclosure  CC:  No Recipients    If you would like to receive this communication electronically, please contact [email or web address] or [phone] to request more information on [facility] Link access.    For providers and/or their staff who would like to refer a patient to [facility], please contact us through our one-stop-shop provider referral line, [facility], at [phone].    If you feel you have received this communication in error or would no longer like to receive these types of communications, please e-mail [email or web address]

## 2017-06-07 NOTE — PROGRESS NOTES
Date of service:  6/7/2017    Chief complaint:  Paresthesias    History of present illness:  The patient is a 66 y.o. female referred for evaluation of paresthesia. This began about a year ago. The sensation is located in all 5 fingers of both hands.  She does have toe numbness, but this is not new and has not changed significantly recently.  The feeling is characterized as tingling and is moderate in intensity.  She reports no clear exacerbating or relieving factors.  She also noticed associated dropping of objects.  The sensation is present for a few minutes at a time.   The patient has this symptom daily, worst upon awakening.    Past Medical History:   Diagnosis Date    Allergy     Arthritis     Cataract     Chronic fatigue 1/24/2017    Diabetes mellitus     resolved with gastric bypass    Diabetes mellitus, type 2     GERD (gastroesophageal reflux disease)     Headache     Hyperlipidemia 7/15/2015    Hypertension     Hypothyroidism     Neuropathy     Obesity     SOHA on CPAP     Postmenopausal HRT (hormone replacement therapy)     Rash     Rosacea     Snoring        Past Surgical History:   Procedure Laterality Date    ADENOIDECTOMY      APPENDECTOMY      BUNIONECTOMY  10/17/14    right, still has discomfort    CATARACT EXTRACTION W/  INTRAOCULAR LENS IMPLANT Bilateral     COLONOSCOPY      ESOPHAGOGASTRODUODENOSCOPY      dilated esophagus    GASTRIC BYPASS      2011    HYSTERECTOMY      interstim bladder  2009    10/14/14 new battery    SKIN CANCER EXCISION      left hand    TONSILLECTOMY      WISDOM TOOTH EXTRACTION         Family History   Problem Relation Age of Onset    Diabetes      Hypertension      Hypothyroidism         Social History     Social History    Marital status:      Spouse name: N/A    Number of children: N/A    Years of education: N/A     Occupational History    Not on file.     Social History Main Topics    Smoking status: Never Smoker     "Smokeless tobacco: Never Used    Alcohol use No    Drug use: No    Sexual activity: Not Currently     Other Topics Concern    Not on file     Social History Narrative    No narrative on file       Review of patient's allergies indicates:   Allergen Reactions    Sulfa (sulfonamide antibiotics) Hives    Naproxen Other (See Comments)     Other reaction(s): RT sided numbness          Review of Systems   General/Constitutional:  No unintentional weight loss, No change in appetite  Eyes/Vision:  No change in vision, No double vision  ENT:  No frequent nose bleeds, + ringing in the ears  Respiratory:  No cough, No wheezing  Cardiovascular:  No chest pain, No palpitations  Gastrointestinal:  No jaundice, + nausea/vomiting  Genitourinary:  + incontinence, No burning with urination  Hematologic/Lymphatic:  No easy bruising/bleeding, No night sweats  Neurological:  + numbness, No weakness  Endocrine:  No fatigue, No heat/cold intolerance  Allergy/Immunologic:  No fevers, No chills  Musculoskeletal:  No muscle pain, No joint pain   Psychiatric:  No thoughts of harming self/others, No depression  Integumentary:  No rashes, No sores that do not heal    Physical exam:  /63   Pulse 63   Resp 18   Ht 5' 4" (1.626 m)   Wt 96 kg (211 lb 10.3 oz)   BMI 36.33 kg/m²   General: Well developed, well nourished.  No acute distress.  HEENT: Atraumatic, normocephalic.  Neck: Supple, trachea midline.  Cardiovascular: Regular rate and rhythm.  Pulmonary: No increased work of breathing.  Abdomen/GI: No guarding.  Musculoskeletal: No obvious joint deformities, moves all extremities well.    Neurological exam:  Mental status: Awake and alert.  Oriented x4.  Speech fluent and appropriate.  Recent and remote memory appear to be intact.  Fund of knowledge normal.  Cranial nerves: Pupils equal round and reactive to light, extraocular movements intact, facial strength and sensation intact bilaterally, palate and tongue midline, hearing " grossly intact bilaterally.  Motor: 5 out of 5 strength throughout the upper and lower extremities bilaterally. Normal bulk and tone.  Sensation: Intact to light touch and temperature bilaterally. + Tinel's bilaterally.  DTR: 1+ at the knees and biceps bilaterally.  Coordination: Finger-nose-finger testing intact bilaterally.  Gait: Nonfocal gait.      Data base:  Notes of the referring physician were reviewed.  Briefly summarized, these addressed multiple issues including HTN, DM, dyslipidemia, and CTS.    Labs:  The patient's most recent labs from 4/17 included a CMP with a low albumin    MRI C-spine:  NA    EMG:  NA    Assessment and plan:  The patient is a 66 y.o. female referred for evaluation of paresthesia. I suspect that this issue is related to bilateral CTS.  She does, though, have some complaints of neck pain as well. I think a reasonable workup at this point would include CT C-spine (cannot have MRI due to bladder stimulator) and EMG.  We will plan on seeing the patient back in a few weeks.

## 2017-06-09 ENCOUNTER — TELEPHONE (OUTPATIENT)
Dept: NEUROLOGY | Facility: CLINIC | Age: 67
End: 2017-06-09

## 2017-06-09 NOTE — TELEPHONE ENCOUNTER
Spoke with the pt, informed her date of EMG/NCS. Linked orders for Nerve conduction study to the EMG appt as required.

## 2017-06-09 NOTE — TELEPHONE ENCOUNTER
----- Message from Mey Suarez sent at 6/9/2017  4:28 PM CDT -----  Contact: Self  Patient states she is calling  Back to nurse KODI CRUZ. Please call back [phone]. thanks

## 2017-06-12 ENCOUNTER — TELEPHONE (OUTPATIENT)
Dept: NEUROLOGY | Facility: CLINIC | Age: 67
End: 2017-06-12

## 2017-06-12 NOTE — TELEPHONE ENCOUNTER
----- Message from Grabiel Weiner sent at 6/12/2017 11:16 AM CDT -----  Contact: pt  Pt is returning call, call placed to pod no answer  Call Back#[phone]  Thanks

## 2017-06-12 NOTE — TELEPHONE ENCOUNTER
----- Message from Padma Monteiro sent at 6/9/2017  3:00 PM CDT -----  Contact: self [phone]  Call placed to pod. Patient returned your call, please call back.  Thank you!

## 2017-06-12 NOTE — TELEPHONE ENCOUNTER
Spoke with patient. She stated that she wasn't sure if it was our office or Leesa Stone's office that called. Informed her that I did not see any documentation from our office trying to call her. Pt verbalized an understanding.

## 2017-06-13 ENCOUNTER — LAB VISIT (OUTPATIENT)
Dept: LAB | Facility: HOSPITAL | Age: 67
End: 2017-06-13
Attending: NURSE PRACTITIONER
Payer: COMMERCIAL

## 2017-06-13 DIAGNOSIS — R10.13 DYSPEPSIA AND OTHER SPECIFIED DISORDERS OF FUNCTION OF STOMACH: ICD-10-CM

## 2017-06-13 DIAGNOSIS — R12 HEARTBURN: ICD-10-CM

## 2017-06-13 DIAGNOSIS — K22.2 STRICTURE AND STENOSIS OF ESOPHAGUS: ICD-10-CM

## 2017-06-13 DIAGNOSIS — K21.9 ESOPHAGEAL REFLUX: Primary | ICD-10-CM

## 2017-06-13 DIAGNOSIS — K31.89 DYSPEPSIA AND OTHER SPECIFIED DISORDERS OF FUNCTION OF STOMACH: ICD-10-CM

## 2017-06-13 LAB
ALBUMIN SERPL BCP-MCNC: 3.4 G/DL
ALP SERPL-CCNC: 117 U/L
ALT SERPL W/O P-5'-P-CCNC: 13 U/L
AMYLASE SERPL-CCNC: 43 U/L
ANION GAP SERPL CALC-SCNC: 9 MMOL/L
AST SERPL-CCNC: 17 U/L
BASOPHILS # BLD AUTO: 0.01 K/UL
BASOPHILS NFR BLD: 0.2 %
BILIRUB SERPL-MCNC: 0.6 MG/DL
BUN SERPL-MCNC: 13 MG/DL
CALCIUM SERPL-MCNC: 9 MG/DL
CHLORIDE SERPL-SCNC: 102 MMOL/L
CO2 SERPL-SCNC: 31 MMOL/L
CREAT SERPL-MCNC: 0.8 MG/DL
DIFFERENTIAL METHOD: NORMAL
EOSINOPHIL # BLD AUTO: 0.2 K/UL
EOSINOPHIL NFR BLD: 3.8 %
ERYTHROCYTE [DISTWIDTH] IN BLOOD BY AUTOMATED COUNT: 13.4 %
EST. GFR  (AFRICAN AMERICAN): >60 ML/MIN/1.73 M^2
EST. GFR  (NON AFRICAN AMERICAN): >60 ML/MIN/1.73 M^2
GLUCOSE SERPL-MCNC: 108 MG/DL
HCT VFR BLD AUTO: 39.2 %
HGB BLD-MCNC: 12.9 G/DL
LIPASE SERPL-CCNC: 32 U/L
LYMPHOCYTES # BLD AUTO: 1.3 K/UL
LYMPHOCYTES NFR BLD: 28.2 %
MCH RBC QN AUTO: 28.9 PG
MCHC RBC AUTO-ENTMCNC: 32.9 %
MCV RBC AUTO: 88 FL
MONOCYTES # BLD AUTO: 0.3 K/UL
MONOCYTES NFR BLD: 7.2 %
NEUTROPHILS # BLD AUTO: 2.7 K/UL
NEUTROPHILS NFR BLD: 60.6 %
PLATELET # BLD AUTO: 161 K/UL
PMV BLD AUTO: 10.5 FL
POTASSIUM SERPL-SCNC: 4.4 MMOL/L
PROT SERPL-MCNC: 6.4 G/DL
RBC # BLD AUTO: 4.47 M/UL
SODIUM SERPL-SCNC: 142 MMOL/L
WBC # BLD AUTO: 4.44 K/UL

## 2017-06-13 PROCEDURE — 85025 COMPLETE CBC W/AUTO DIFF WBC: CPT

## 2017-06-13 PROCEDURE — 36415 COLL VENOUS BLD VENIPUNCTURE: CPT | Mod: PO

## 2017-06-13 PROCEDURE — 80053 COMPREHEN METABOLIC PANEL: CPT

## 2017-06-13 PROCEDURE — 83690 ASSAY OF LIPASE: CPT

## 2017-06-13 PROCEDURE — 82150 ASSAY OF AMYLASE: CPT

## 2017-06-20 ENCOUNTER — TELEPHONE (OUTPATIENT)
Dept: PULMONOLOGY | Facility: CLINIC | Age: 67
End: 2017-06-20

## 2017-06-20 NOTE — TELEPHONE ENCOUNTER
----- Message from Geeta Esquivel sent at 6/20/2017 11:20 AM CDT -----  Contact: self  Patient would like to speak to a nurse regarding surgery clearance for gall bladder and a refill on Symbicort t 4.5 mg    Please call her at  to advise.     Thanks

## 2017-06-21 ENCOUNTER — OFFICE VISIT (OUTPATIENT)
Dept: PULMONOLOGY | Facility: CLINIC | Age: 67
End: 2017-06-21
Payer: COMMERCIAL

## 2017-06-21 VITALS
HEIGHT: 64 IN | BODY MASS INDEX: 36.2 KG/M2 | HEART RATE: 73 BPM | SYSTOLIC BLOOD PRESSURE: 125 MMHG | OXYGEN SATURATION: 98 % | WEIGHT: 212.06 LBS | DIASTOLIC BLOOD PRESSURE: 63 MMHG

## 2017-06-21 DIAGNOSIS — J47.9 BRONCHIECTASIS WITHOUT COMPLICATION: Primary | ICD-10-CM

## 2017-06-21 DIAGNOSIS — D84.9 IMMUNE DEFICIENCY DISORDER: ICD-10-CM

## 2017-06-21 DIAGNOSIS — R91.1 LUNG NODULE: ICD-10-CM

## 2017-06-21 DIAGNOSIS — J45.20 INTERMITTENT ASTHMA, UNCOMPLICATED: ICD-10-CM

## 2017-06-21 PROCEDURE — 94620 PR PULMONARY STRESS TESTING,SIMPLE: CPT | Mod: S$GLB,,, | Performed by: INTERNAL MEDICINE

## 2017-06-21 PROCEDURE — 99214 OFFICE O/P EST MOD 30 MIN: CPT | Mod: 25,S$GLB,, | Performed by: INTERNAL MEDICINE

## 2017-06-21 PROCEDURE — 99999 PR PBB SHADOW E&M-EST. PATIENT-LVL V: CPT | Mod: PBBFAC,,, | Performed by: INTERNAL MEDICINE

## 2017-06-21 RX ORDER — AZITHROMYCIN 500 MG/1
TABLET, FILM COATED ORAL
Qty: 3 TABLET | Refills: 3 | Status: SHIPPED | OUTPATIENT
Start: 2017-06-21 | End: 2017-07-07 | Stop reason: ALTCHOICE

## 2017-06-21 RX ORDER — BUDESONIDE AND FORMOTEROL FUMARATE DIHYDRATE 160; 4.5 UG/1; UG/1
2 AEROSOL RESPIRATORY (INHALATION) EVERY 12 HOURS
Qty: 1 INHALER | Refills: 11 | Status: SHIPPED | OUTPATIENT
Start: 2017-06-21 | End: 2017-10-19 | Stop reason: SDUPTHER

## 2017-06-21 NOTE — PATIENT INSTRUCTIONS
Immune system was a little weak with 6 of 14 pneumococcal germs not protected, also lungs looked a little sickly with mucous plug ? Right upper and bronchial tubes thickened/somewhat enlarged.    Likely intermittent asthma with mucous plugging.      Need to renew symbicort for next yr.  Do ct chest in october rather than next April.    Check immune response to vaccine in July.    Your are better now than April, and cleared for surgery.

## 2017-06-21 NOTE — PROGRESS NOTES
7/19/2017    Cornelia Delatorre  New Patient Consult    Chief Complaint   Patient presents with    Pulmonary Nodules    Apnea     June 21, 2017- had good [place] trip, tapered symbicort with some increase sensation of lung problems so maintained full dose. No infections.  No chest symptoms on symbicort.         April 24, feels a lot better with min cough, vague sob going left side down at night.  Going to [place] 2 weeks.  Uses symbicort and good results.      March 16, cough better, but comes and goes,  No great help with steroids.  Submitted 3 sputums.  Had pneumovax march last yr.  Breathing better. Got symbicort.     Had pneumonia vaccine last yr    3/8/2017 HPI: had onset cough Hernan illness, got dizzy few days with diarrhea and cough. Cough clear sm amt mucous. Did have 101 temp one day, fatigue, no muscle aches.  Appetite decreased.  Illnesses lingered 2 weeks but cough never remitted- has improved with inhahler use last 15 days.      Had gastric 2011 bypass with with continued diarrhea intially resolved. No regurg or reflux or swallow problems.     symbicort nearly  Resolved.      Sinuses ok.  No pets.  Never smoker.      Appetite good, feels well now.      headcolds to chest since childhood, nocturnal ??not recalled.    Had cats in past but got rid in 2003 or so.     The chief compliant  problem is new to me   PFSH:  Past Medical History:   Diagnosis Date    Allergy     Arthritis     Cataract     Chronic fatigue 1/24/2017    Diabetes mellitus     resolved with gastric bypass    Diabetes mellitus, type 2     GERD (gastroesophageal reflux disease)     Headache(784.0)     Hyperlipidemia 7/15/2015    Hypertension     Hypothyroidism     Neuropathy     Obesity     SOHA on CPAP     Postmenopausal HRT (hormone replacement therapy)     Rash     Rosacea     Snoring          Past Surgical History:   Procedure Laterality Date    ADENOIDECTOMY      APPENDECTOMY      BUNIONECTOMY  10/17/14    right,  "still has discomfort    CATARACT EXTRACTION W/  INTRAOCULAR LENS IMPLANT Bilateral     COLONOSCOPY      ESOPHAGOGASTRODUODENOSCOPY      dilated esophagus    GASTRIC BYPASS      2011    HYSTERECTOMY      interstim bladder  2009    10/14/14 new battery    SKIN CANCER EXCISION      left hand    TONSILLECTOMY      WISDOM TOOTH EXTRACTION       Social History   Substance Use Topics    Smoking status: Never Smoker    Smokeless tobacco: Never Used    Alcohol use No     Family History   Problem Relation Age of Onset    Diabetes      Hypertension      Hypothyroidism       Review of patient's allergies indicates:   Allergen Reactions    Sulfa (sulfonamide antibiotics) Hives    Naproxen Other (See Comments)     Other reaction(s): RT sided numbness         Performance Status:The patient's activity level is functions out of house.      Review of Systems:  a review of eleven systems covering constitutional, Eye, HEENT, Psych, Respiratory, Cardiac, GI, , Musculoskeletal, Endocrine, Dermatologic was negative except for pertinent findings as listed ABOVE and below: all good,  Had dm that remitted with bypass 2011.      Exam:Comprehensive exam done. /63 (BP Location: Right arm, Patient Position: Sitting, BP Method: Automatic)   Pulse 73   Ht 5' 4" (1.626 m)   Wt 96.2 kg (212 lb 1.3 oz)   SpO2 98%   BMI 36.40 kg/m²   Exam included Vitals as listed, and patient's appearance and affect and alertness and mood, oral exam for yeast and hygiene and pharynx lesions and Mallapatti (M) score, neck with inspection for jvd and masses and thyroid abnormalities and lymph nodes (supraclavicular and infraclavicular nodes and axillary also examined and noted if abn), chest exam included symmetry and effort and fremitus and percussion and auscultation, cardiac exam included rhythm and gallops and murmur and rubs and jvd and edema, abdominal exam for mass and hepatosplenomegaly and tenderness and hernias and bowel sounds, " Musculoskeletal exam with muscle tone and posture and mobility/gait and  strength, and skin for rashes and cyanosis and pallor and turgor, extremity for clubbing.  Findings were normal except for pertinent findings listed below:  M3, good bs, rest good.    Radiographs (ct chest and cxr) reviewed: view by direct vision  i do see clear bronchiectasis,nodules are noted.  Mucoid type impaction suggested in rul ant segment.      1. Region of endobronchial plugging unchanged since 2/7/17 of uncertain etiology. This could relate to a process such as allergic bronchopulmonary aspergillosis, a solid mass nodule, mucous plugging, residual from aspiration, endobronchial spread of disease. Further followup or evaluation is necessary    2. No evidence of pulmonary embolism    3. Multiple smaller pulmonary nodules are noted without detrimental changes since prior, the largest is 6 mm. Followup for size stability is suggested    4. Incidental findings as described above including cholelithiasis and gastric bypass        Electronically signed by: Raffi Gottlieb MD  Date: 03/14/17  Time: 10:41         Labs reviewed igm low, igg lowish, humerol titers na    Results for NEIL STEVENS (MRN [id number]) as of 4/24/2017 11:30   Ref. Range 3/15/2017 11:53   IgG - Serum Latest Ref Range: 650 - 1600 mg/dL 693   IgM Latest Ref Range: 50 - 300 mg/dL 22 (L)   IgA Latest Ref Range: 40 - 350 mg/dL 191   IgE Latest Ref Range: 0 - 100 IU/mL <35   Results for NEIL STEVENS (MRN [id number]) as of 4/24/2017 11:30   Ref. Range 3/16/2017 08:33   Diphtheria Toxoid Ab Latest Ref Range: >0.099 IU/mL 0.077 (A)   Tetanus Ab Latest Ref Range: >0.150 IU/mL 3.504   S.pneumoniae Type 1 Latest Units: mcg/mL 27.6   S.pneumoniae Type 3 Latest Units: mcg/mL 0.8   Strep pneumo Type 4 Latest Units: mcg/mL <0.3   S.pneumoniae Type 5 Latest Units: mcg/mL 13.1   S.pneumoniae Type 6B Latest Units: mcg/mL 0.9   S.pneumoniae Type 7F Latest Units: mcg/mL 2.2    S.pneumoniae Type 8 Latest Units: mcg/mL 3.7   S.pneumoniae Type 9N Latest Units: mcg/mL 11.2   S.pneumoniae Type 9V Abs Latest Units: mcg/mL 1.1   S.pneumoniae Type 12F Latest Units: mcg/mL 1.1   Strep pneumo Type 14 Latest Units: mcg/mL 0.7   S.pneumoniae Type 18C Latest Units: mcg/mL 1.6   S.pneumoniae Type 19F Latest Units: mcg/mL 6.5   S.pneumoniae Type 23F Latest Units: mcg/mL 3.1     PFT    Done [facility] with 10% response, otherwise nl    Plan:  Clinical impression is resonably certain and repeated evaluation prn +/- follow up will be needed as below.    Cornelia was seen today for pulmonary nodules and apnea.    Diagnoses and all orders for this visit:    Bronchiectasis without complication    Lung nodule    Intermittent asthma, uncomplicated    Immune deficiency disorder    Other orders  -     SYMBICORT 160-4.5 mcg/actuation HFAA; Inhale 2 puffs into the lungs every 12 (twelve) hours.  -     Discontinue: azithromycin (ZITHROMAX) 500 MG tablet; One daily for yellow mucous, repeat if needed        Return in about 4 months (around 10/13/2017), or if symptoms worsen or fail to improve.    Discussed with patient above for education the following:      Immune system was a little weak with 6 of 14 pneumococcal germs not protected, also lungs looked a little sickly with mucous plug ? Right upper and bronchial tubes thickened/somewhat enlarged.    Likely intermittent asthma with mucous plugging.      Need to renew symbicort for next yr.  Do ct chest in october rather than next April.    Check immune response to vaccine in July.    Your are better now than April, and cleared for surgery.

## 2017-06-21 NOTE — LETTER
[facility] - Pulmonary  [address]  [place] [address]  Phone: [phone]         2017      Dr. Christopher Jimenez,    Re: Cornelia Delatorre,  [birth year]      Cornelia has had intermittent bronchial problems with symptoms compatible with mild intermittent asthma.  She had 3 months of symptoms with coughing 1st of this year.  pft was normal with  10% bd response (12% needed for certain dx of asthma).  Ct chest suggest chronic bronchial disease.    Pt has been asymptomatic with recent normal pfts.  She is in stable and optimal shape for any abd surgery.  Her diagnosis is intermittent mild asthma in remission, and mucous plugging of airways seen on ct in March.    She is cleared for abd surgery.              Thank You,             Jonathan Nicole MD    Pulmonary Diseases

## 2017-07-07 ENCOUNTER — OFFICE VISIT (OUTPATIENT)
Dept: PRIMARY CARE CLINIC | Facility: CLINIC | Age: 67
End: 2017-07-07
Payer: COMMERCIAL

## 2017-07-07 VITALS
BODY MASS INDEX: 36.1 KG/M2 | DIASTOLIC BLOOD PRESSURE: 78 MMHG | HEART RATE: 60 BPM | HEIGHT: 64 IN | TEMPERATURE: 98 F | WEIGHT: 211.44 LBS | OXYGEN SATURATION: 98 % | SYSTOLIC BLOOD PRESSURE: 110 MMHG

## 2017-07-07 DIAGNOSIS — J06.9 URI, ACUTE: Primary | ICD-10-CM

## 2017-07-07 PROCEDURE — 99214 OFFICE O/P EST MOD 30 MIN: CPT | Mod: S$GLB,,, | Performed by: NURSE PRACTITIONER

## 2017-07-07 PROCEDURE — 99999 PR PBB SHADOW E&M-EST. PATIENT-LVL V: CPT | Mod: PBBFAC,,, | Performed by: NURSE PRACTITIONER

## 2017-07-07 RX ORDER — AZITHROMYCIN 250 MG/1
TABLET, FILM COATED ORAL
Qty: 6 TABLET | Refills: 0 | Status: SHIPPED | OUTPATIENT
Start: 2017-07-07 | End: 2017-07-19 | Stop reason: ALTCHOICE

## 2017-07-07 NOTE — Clinical Note
Armond Cortez MD,  I saw your patient today in the [facility].  If you have any questions, please do not hesitate to contact me.  Thank you!  FELIX Alvarez

## 2017-07-07 NOTE — PROGRESS NOTES
"Cornelia Delatorre is a 66 y.o. female patient of Armond Cortez MD who presents to the clinic today for   Chief Complaint   Patient presents with    Otalgia     started this morning.    Headache    Cough    Nasal Congestion    Adenopathy   .    HPI    Pt, who is known to me, reports a new problem to me:  This morning slight headache and earache, coughing up "stuff".  Still feels like a "ball" behind her nose.  Small gland on the right swollen.  Had an angiogram today in prep for gall bladder surgery .    These symptoms began today and status is slightly better than this morning.     Symptoms are + acute.    Pt denies the following symptoms:  fever    Aggravating factors include nothing .    Relieving factors include nothing .    OTC Medications tried are none.    Prescription medications taken for symptoms are none.    Pertinent medical history:  H/o tonsillectomy..    Smoking status:  nonsmoker    ROS    Constitutional:   No  fever, no change in fatigue, no change in appetite.    Head:   Awoke with headache--still present  Ears:   Right ear pain.  Eyes:  No sxs  Nose:   No sinus pain, + congestion--feels "lump behind my nose", no runny nose, + post nasal drip.  Throat:  + ST pain that's better now.    Heart:  No palpitations, chest pain.    Lungs:  No difficulty breathing, not coughing.  Did cough up mucus this morning.    GI/:  No sxs    MS:  No new bone, joint or muscle problems.    Skin:  No rashes, itching.      PAST MEDICAL HISTORY:  Past Medical History:   Diagnosis Date    Allergy     Arthritis     Cataract     Chronic fatigue 1/24/2017    Diabetes mellitus     resolved with gastric bypass    Diabetes mellitus, type 2     GERD (gastroesophageal reflux disease)     Headache     Hyperlipidemia 7/15/2015    Hypertension     Hypothyroidism     Neuropathy     Obesity     SOHA on CPAP     Postmenopausal HRT (hormone replacement therapy)     Rash     Rosacea     Snoring        PAST " SURGICAL HISTORY:  Past Surgical History:   Procedure Laterality Date    ADENOIDECTOMY      APPENDECTOMY      BUNIONECTOMY  10/17/14    right, still has discomfort    CATARACT EXTRACTION W/  INTRAOCULAR LENS IMPLANT Bilateral     COLONOSCOPY      ESOPHAGOGASTRODUODENOSCOPY      dilated esophagus    GASTRIC BYPASS      2011    HYSTERECTOMY      interstim bladder  2009    10/14/14 new battery    SKIN CANCER EXCISION      left hand    TONSILLECTOMY      WISDOM TOOTH EXTRACTION         SOCIAL HISTORY:  Social History     Social History    Marital status:      Spouse name: N/A    Number of children: N/A    Years of education: N/A     Occupational History    Not on file.     Social History Main Topics    Smoking status: Never Smoker    Smokeless tobacco: Never Used    Alcohol use No    Drug use: No    Sexual activity: Not Currently     Other Topics Concern    Not on file     Social History Narrative    No narrative on file       FAMILY HISTORY:  Family History   Problem Relation Age of Onset    Diabetes      Hypertension      Hypothyroidism         ALLERGIES AND MEDICATIONS: updated and reviewed.  Review of patient's allergies indicates:   Allergen Reactions    Sulfa (sulfonamide antibiotics) Hives    Naproxen Other (See Comments)     Other reaction(s): RT sided numbness       Current Outpatient Prescriptions   Medication Sig Dispense Refill    albuterol 90 mcg/actuation inhaler 2 puffs every 4 hours as needed for cough, wheeze, or shortness of breath 1 Inhaler 11    aspirin (ECOTRIN) 81 MG EC tablet Take 81 mg by mouth once daily.      azelastine (ASTELIN) 137 mcg (0.1 %) nasal spray 1 spray (137 mcg total) by Nasal route 2 (two) times daily. 90 mL 3    b complex vitamins capsule Take 1 capsule by mouth once daily.      BIFIDOBACTERIUM INFANTIS (ALIGN ORAL) Take 1 capsule by mouth once daily.      BIOTIN ORAL Take 10,000 mcg by mouth once daily.       calcium citrate-vitamin D3  (UPCAL D) 500-250 mg-unit/2.5gram Powd Take by mouth once daily at 6am.       cranberry 500 mg Cap Take 1 capsule by mouth every evening.      cyanocobalamin (VITAMIN B-12) 500 MCG tablet Take 500 mcg by mouth once daily.        esomeprazole (NEXIUM) 40 MG capsule Take 1 capsule (40 mg total) by mouth before breakfast. 90 capsule 3    fish oil-omega-3 fatty acids 300-1,000 mg capsule Take 2 g by mouth once daily.      fluticasone (FLONASE) 50 mcg/actuation nasal spray 2 sprays by Each Nare route once daily. 16 g 0    gabapentin (NEURONTIN) 600 MG tablet Take 600 mg by mouth 3 (three) times daily.      hydrochlorothiazide (HYDRODIURIL) 25 MG tablet Take 1 tablet (25 mg total) by mouth once daily. 90 tablet 3    hydrOXYzine pamoate (VISTARIL) 50 MG Cap Take 1 capsule (50 mg total) by mouth nightly as needed. Take for anxiety, Can cause drowsiness 30 capsule 0    irbesartan (AVAPRO) 75 MG tablet Take 1 tablet (75 mg total) by mouth every evening. 90 tablet 3    levothyroxine (SYNTHROID) 88 MCG tablet Take 1 tablet (88 mcg total) by mouth once daily. 90 tablet 3    metformin (GLUCOPHAGE-XR) 500 MG 24 hr tablet Take 1 po qhs x 2 weeks, then take 1 bid for 2 weeks, then 2 qhs and 1 po qam x 2 weeks, then 2 bid 360 tablet 3    methylcellulose, laxative, (CITRUCEL) 500 mg Tab Take 1 tablet by mouth every morning.      metoprolol tartrate (LOPRESSOR) 50 MG tablet Take 1 tablet (50 mg total) by mouth 2 (two) times daily. (Patient taking differently: Take 25 mg by mouth once daily at 6am. ) 180 tablet 3    multivitamin capsule Take 1 capsule by mouth. Take one tablets daily      pravastatin (PRAVACHOL) 40 MG tablet Take 1 tablet (40 mg total) by mouth once daily. 90 tablet 3    SIMETHICONE (GAS-X ORAL) Take 1 capsule by mouth as needed.      SYMBICORT 160-4.5 mcg/actuation HFAA Inhale 2 puffs into the lungs every 12 (twelve) hours. 1 Inhaler 11    TESTOSTERONE AND ESTRADIOL CYP (ESTRADIOL-TESTOSTERONE IM)  "Apply topically 3 (three) times a week.       vitamin D 1000 units Tab Take 5,000 Units by mouth once daily.        No current facility-administered medications for this visit.        PHYSICAL EXAM    Alert, coop 66 y.o. female patient in no acute distress.    Vitals:    07/07/17 1515   BP: 110/78   Pulse: 60   Temp: 97.7 °F (36.5 °C)     VS reviewed.  VS stable.  CC, nursing note, medications & PMH all reviewed today.    Head:  Normocephalic, atraumatic.    EENT:  EACs patent.  TMs no erythema, dull LR, no effusions, no TM perforation.                              Eye lids normal, no discharge present.       Sinus tenderness to palp--none.               Nares--left nare with edema, small amount d/c present.    Pharynx not injected.                Tonsils + absent.    No anterior, no posterior cervical lymph nodes palpable.    Bilat submandibular nodes palp but no submental or supraclavicular lymph nodes palp.             Resp:  Respirations even, unlabored   Lungs CTA bilat.  No wheezing.  No crackles.  Moves air to bases bilat.    Heart:  RRR, no MRG.    MS:  Ambulates normally.             NEURO:  Alert and oriented x 4.  Responds appropriately during interaction.    Skin:  Warm, dry, color good.    URI, acute  -     azithromycin (Z-JEANETTE) 250 MG tablet; Take 2 tablets by mouth on day 1; Take 1 tablet by mouth on days 2-5  Dispense: 6 tablet; Refill: 0      Pt today presents with hours of nasal congestion, mucus, ST and headache.  No sick contacts.  Had angiogram done yesterday for cardiac clearance so she can schedule surgery for her "hot gall bladder".  Just want to try to prevent her illness from getting worse.  I did explain this is likely viral but am also understanding about her concern.    This is a new problem to me.  No work up is planned.        Pt advised to perform comfort measures recommended on patient instruction sheet .    If worse or concerns, the patient is advised to call us.  Explained exam " findings, diagnosis and treatment plan to patient and her , with her during the visit.  Questions answered and patient states understanding.

## 2017-07-07 NOTE — PATIENT INSTRUCTIONS
"Your symptoms today are consistent with upper respiratory infection.  This is likely a viral illness that needs to run its course.    Because of your upcoming surgery, Azithromycin antibiotic prescribed for the infection.  Start it today.  If you get stomach upset from antibiotics, try taking probiotic or eating a yogurt with active culture to prevent stomach upset while on the antibiotic.    Comfort measures:  Increase fluids  Get plenty of rest  Vaporizer or frequent showers may be helpful.  Take tylenol of ibuprofen as needed for pain or fever  For cough and thick mucus secretions, you can take over the counter guaifenesin and dextromethorphan (mucinex DM), drink plenty of water while taking this to help loosen mucus.  The following is also prescribed for the cough Robitussin Dm or Mucinex DM  If you have high blood pressure, avoid any over the counter medications with "D" or decongestant.    Salt water gargles.  Saline and/or flonase nasal spray  Wash cloth with warm water placed over the sinus area can lessen discomfort and pressure.  Sleep with head of bed elevated to decrease drainage, cough and congestion.    I recommend frequent handwashing to limit spread of infection to others     If worse or you have concerns or questions, please do not hesitate to call.  You can reach us at [phone] Monday through Friday (except holidays) 12 nooon to 8 p.m.  Or you can follow up with your primary care provider/NP.    Thank you for using the [facility]!    RODOLFO Alvarez, CNP, FNP-BC  [facility]  [facility]    "

## 2017-07-13 ENCOUNTER — LAB VISIT (OUTPATIENT)
Dept: LAB | Facility: HOSPITAL | Age: 67
End: 2017-07-13
Attending: INTERNAL MEDICINE
Payer: COMMERCIAL

## 2017-07-13 DIAGNOSIS — D84.9 IMMUNE DEFICIENCY DISORDER: ICD-10-CM

## 2017-07-13 DIAGNOSIS — E03.4 HYPOTHYROIDISM DUE TO ACQUIRED ATROPHY OF THYROID: ICD-10-CM

## 2017-07-13 DIAGNOSIS — E78.5 DYSLIPIDEMIA: ICD-10-CM

## 2017-07-13 DIAGNOSIS — I10 ESSENTIAL HYPERTENSION: ICD-10-CM

## 2017-07-13 LAB
ALBUMIN SERPL BCP-MCNC: 3.5 G/DL
ALP SERPL-CCNC: 132 U/L
ALT SERPL W/O P-5'-P-CCNC: 14 U/L
ANION GAP SERPL CALC-SCNC: 12 MMOL/L
AST SERPL-CCNC: 16 U/L
BILIRUB SERPL-MCNC: 0.7 MG/DL
BUN SERPL-MCNC: 14 MG/DL
CALCIUM SERPL-MCNC: 9.1 MG/DL
CHLORIDE SERPL-SCNC: 102 MMOL/L
CHOLEST/HDLC SERPL: 3 {RATIO}
CO2 SERPL-SCNC: 29 MMOL/L
CREAT SERPL-MCNC: 0.9 MG/DL
EST. GFR  (AFRICAN AMERICAN): >60 ML/MIN/1.73 M^2
EST. GFR  (NON AFRICAN AMERICAN): >60 ML/MIN/1.73 M^2
ESTIMATED AVG GLUCOSE: 128 MG/DL
GLUCOSE SERPL-MCNC: 104 MG/DL
HBA1C MFR BLD HPLC: 6.1 %
HDL/CHOLESTEROL RATIO: 33.3 %
HDLC SERPL-MCNC: 156 MG/DL
HDLC SERPL-MCNC: 52 MG/DL
LDLC SERPL CALC-MCNC: 76.8 MG/DL
NONHDLC SERPL-MCNC: 104 MG/DL
POTASSIUM SERPL-SCNC: 3.6 MMOL/L
PROT SERPL-MCNC: 7.2 G/DL
SODIUM SERPL-SCNC: 143 MMOL/L
T4 FREE SERPL-MCNC: 1.15 NG/DL
TRIGL SERPL-MCNC: 136 MG/DL
TSH SERPL DL<=0.005 MIU/L-ACNC: 0.06 UIU/ML

## 2017-07-13 PROCEDURE — 86317 IMMUNOASSAY INFECTIOUS AGENT: CPT | Mod: 59

## 2017-07-13 PROCEDURE — 83036 HEMOGLOBIN GLYCOSYLATED A1C: CPT

## 2017-07-13 PROCEDURE — 84439 ASSAY OF FREE THYROXINE: CPT

## 2017-07-13 PROCEDURE — 36415 COLL VENOUS BLD VENIPUNCTURE: CPT | Mod: PO

## 2017-07-13 PROCEDURE — 80061 LIPID PANEL: CPT

## 2017-07-13 PROCEDURE — 84443 ASSAY THYROID STIM HORMONE: CPT

## 2017-07-13 PROCEDURE — 80053 COMPREHEN METABOLIC PANEL: CPT

## 2017-07-19 ENCOUNTER — OFFICE VISIT (OUTPATIENT)
Dept: FAMILY MEDICINE | Facility: CLINIC | Age: 67
End: 2017-07-19
Payer: COMMERCIAL

## 2017-07-19 VITALS
HEIGHT: 64 IN | RESPIRATION RATE: 18 BRPM | DIASTOLIC BLOOD PRESSURE: 82 MMHG | WEIGHT: 210.75 LBS | OXYGEN SATURATION: 97 % | HEART RATE: 63 BPM | BODY MASS INDEX: 35.98 KG/M2 | SYSTOLIC BLOOD PRESSURE: 130 MMHG

## 2017-07-19 DIAGNOSIS — E11.9 CONTROLLED TYPE 2 DIABETES MELLITUS WITHOUT COMPLICATION, WITHOUT LONG-TERM CURRENT USE OF INSULIN: Primary | ICD-10-CM

## 2017-07-19 DIAGNOSIS — F41.1 GAD (GENERALIZED ANXIETY DISORDER): ICD-10-CM

## 2017-07-19 DIAGNOSIS — E78.5 DYSLIPIDEMIA: ICD-10-CM

## 2017-07-19 DIAGNOSIS — J32.9 SINUSITIS, UNSPECIFIED CHRONICITY, UNSPECIFIED LOCATION: ICD-10-CM

## 2017-07-19 DIAGNOSIS — E03.4 HYPOTHYROIDISM DUE TO ACQUIRED ATROPHY OF THYROID: ICD-10-CM

## 2017-07-19 DIAGNOSIS — R11.0 NAUSEA: ICD-10-CM

## 2017-07-19 DIAGNOSIS — I10 ESSENTIAL HYPERTENSION: ICD-10-CM

## 2017-07-19 DIAGNOSIS — J47.9 BRONCHIECTASIS WITHOUT COMPLICATION: ICD-10-CM

## 2017-07-19 LAB
C DIPHTHERIAE AB SER IA-ACNC: 0.19 IU/ML
C TETANI AB SER-ACNC: 3.34 IU/ML
DEPRECATED S PNEUM 1 IGG SER-MCNC: 18 MCG/ML
DEPRECATED S PNEUM12 IGG SER-MCNC: 1 MCG/ML
DEPRECATED S PNEUM14 IGG SER-MCNC: 0.5 MCG/ML
DEPRECATED S PNEUM19 IGG SER-MCNC: 1.8 MCG/ML
DEPRECATED S PNEUM23 IGG SER-MCNC: 3.1 MCG/ML
DEPRECATED S PNEUM3 IGG SER-MCNC: 0.6 MCG/ML
DEPRECATED S PNEUM4 IGG SER-MCNC: <0.3 MCG/ML
DEPRECATED S PNEUM5 IGG SER-MCNC: 8.5 MCG/ML
DEPRECATED S PNEUM8 IGG SER-MCNC: 2.9 MCG/ML
DEPRECATED S PNEUM9 IGG SER-MCNC: 10.9 MCG/ML
HAEM INFLU B IGG SER-MCNC: 0.35 MG/L
S PNEUM DA 18C IGG SER-MCNC: 1.4 MCG/ML
S PNEUM DA 6B IGG SER-MCNC: 0.5 MCG/ML
S PNEUM DA 7F IGG SER-MCNC: 2.6 MCG/ML
S PNEUM DA 9V IGG SER-MCNC: 1.1 MCG/ML

## 2017-07-19 PROCEDURE — 4010F ACE/ARB THERAPY RXD/TAKEN: CPT | Mod: S$GLB,,, | Performed by: INTERNAL MEDICINE

## 2017-07-19 PROCEDURE — 3044F HG A1C LEVEL LT 7.0%: CPT | Mod: S$GLB,,, | Performed by: INTERNAL MEDICINE

## 2017-07-19 PROCEDURE — 99999 PR PBB SHADOW E&M-EST. PATIENT-LVL III: CPT | Mod: PBBFAC,,, | Performed by: INTERNAL MEDICINE

## 2017-07-19 PROCEDURE — 99214 OFFICE O/P EST MOD 30 MIN: CPT | Mod: S$GLB,,, | Performed by: INTERNAL MEDICINE

## 2017-07-19 RX ORDER — PROMETHAZINE HYDROCHLORIDE 25 MG/1
25 TABLET ORAL EVERY 4 HOURS PRN
Qty: 30 TABLET | Refills: 1 | Status: SHIPPED | OUTPATIENT
Start: 2017-07-19 | End: 2017-08-25

## 2017-07-19 RX ORDER — PRAVASTATIN SODIUM 40 MG/1
40 TABLET ORAL DAILY
Qty: 90 TABLET | Refills: 3 | Status: SHIPPED | OUTPATIENT
Start: 2017-07-19 | End: 2018-02-14 | Stop reason: SDUPTHER

## 2017-07-19 RX ORDER — IRBESARTAN 75 MG/1
75 TABLET ORAL NIGHTLY
Qty: 90 TABLET | Refills: 3 | Status: SHIPPED | OUTPATIENT
Start: 2017-07-19 | End: 2018-02-14 | Stop reason: SDUPTHER

## 2017-07-19 RX ORDER — HYDROXYZINE HYDROCHLORIDE 10 MG/1
25 TABLET, FILM COATED ORAL 3 TIMES DAILY PRN
Qty: 90 TABLET | Refills: 6 | Status: SHIPPED | OUTPATIENT
Start: 2017-07-19 | End: 2018-10-04

## 2017-07-19 RX ORDER — METOPROLOL TARTRATE 50 MG/1
25 TABLET ORAL DAILY
Qty: 90 TABLET | Refills: 3 | Status: SHIPPED | OUTPATIENT
Start: 2017-07-19 | End: 2018-02-14 | Stop reason: SDUPTHER

## 2017-07-19 RX ORDER — HYDROCHLOROTHIAZIDE 25 MG/1
25 TABLET ORAL DAILY
Qty: 90 TABLET | Refills: 2 | Status: SHIPPED | OUTPATIENT
Start: 2017-07-19 | End: 2018-02-14 | Stop reason: SDUPTHER

## 2017-07-19 RX ORDER — LEVOTHYROXINE SODIUM 75 UG/1
75 TABLET ORAL DAILY
Qty: 90 TABLET | Refills: 1 | Status: SHIPPED | OUTPATIENT
Start: 2017-07-19 | End: 2017-10-27 | Stop reason: SDUPTHER

## 2017-07-19 RX ORDER — GABAPENTIN 600 MG/1
600 TABLET ORAL 3 TIMES DAILY
Qty: 270 TABLET | Refills: 3 | Status: SHIPPED | OUTPATIENT
Start: 2017-07-19 | End: 2020-03-11 | Stop reason: SDUPTHER

## 2017-07-19 RX ORDER — AZELASTINE 1 MG/ML
1 SPRAY, METERED NASAL DAILY
Qty: 90 ML | Refills: 3 | Status: SHIPPED | OUTPATIENT
Start: 2017-07-19 | End: 2017-10-19 | Stop reason: SDUPTHER

## 2017-07-19 RX ORDER — METFORMIN HYDROCHLORIDE 500 MG/1
1000 TABLET, EXTENDED RELEASE ORAL 2 TIMES DAILY WITH MEALS
Qty: 360 TABLET | Refills: 2 | Status: SHIPPED | OUTPATIENT
Start: 2017-07-19 | End: 2017-08-22 | Stop reason: SDUPTHER

## 2017-07-19 NOTE — PROGRESS NOTES
Immune system is still weak, discuss at f/u, could refer to immunlogy but if doing well may not change treatment.

## 2017-07-19 NOTE — PROGRESS NOTES
Subjective:       Patient ID: Cornelia Delatorre is a 66 y.o. female.    Chief Complaint: Diabetes (f/u) and Medication Refill    Having cholecystectomy on 8/2/17 with Dr Jimenez.  Having nausea from this currently.    SOB/KLINE- Saw Dr Nicole in [place].  Feels SOB mostly related to bronchiectasis.  Her symptoms have improved with asthma tx - Symbicort.  Resolved SOB, cough and chest pain/ heaviness.    Pulmonary nodules - Dr Nicole will repeat CT at 6 mo rahul.  pertussis booster confirmed with 2013 tdap     Recent angiogram was normal - done by Dr Vega; sent to scan    Hypothyroid - supra-theraputic   HTN - controlled  DM - controlled;  Sees podiatry for peripheral neuropathy.  HLD - mildly uncontrolled for goal 70    Occasional anxiety relieved with prn hydroxyzine.   Has EGD scheduled for 7/21 for dysphagia with Dr Krishnan         Review of Systems   Constitutional: Negative for activity change, appetite change, fever and unexpected weight change.   HENT: Negative for hearing loss, nosebleeds, rhinorrhea and trouble swallowing.    Eyes: Negative for discharge and visual disturbance.   Respiratory: Negative for choking, chest tightness, shortness of breath and wheezing.    Cardiovascular: Negative for chest pain and palpitations.   Gastrointestinal: Negative for abdominal pain, blood in stool, constipation, diarrhea, nausea and vomiting.   Endocrine: Negative for polydipsia and polyuria.   Genitourinary: Negative for difficulty urinating, dysuria, hematuria and menstrual problem.   Musculoskeletal: Negative for arthralgias, joint swelling and neck pain.   Skin: Negative for rash and wound.   Neurological: Negative for dizziness, syncope, weakness and headaches.   Psychiatric/Behavioral: Negative for confusion and dysphoric mood.       Objective:      Vitals:    07/19/17 1333   BP: 130/82   Pulse: 63   Resp: 18     Physical Exam   Constitutional: She appears well-nourished.   Eyes: Conjunctivae and EOM are normal.    Neck: Normal range of motion.   Cardiovascular: Normal rate and regular rhythm.    Pulmonary/Chest: Effort normal and breath sounds normal.   Musculoskeletal:   Normal ROM bilateral    Neurological: No cranial nerve deficit (grossly intact).   Skin: Skin is warm and dry.   Psychiatric: She has a normal mood and affect.   Alert and orientated   Vitals reviewed.        Assessment:       1. Controlled type 2 diabetes mellitus without complication, without long-term current use of insulin    2. Hypothyroidism due to acquired atrophy of thyroid    3. Dyslipidemia    4. LORI (generalized anxiety disorder)    5. Nausea    6. Bronchiectasis without complication    7. Essential hypertension    8. Uncontrolled type 2 diabetes mellitus without complication, without long-term current use of insulin    9. Sinusitis, unspecified chronicity, unspecified location        Plan:       Controlled type 2 diabetes mellitus without complication, without long-term current use of insulin  -     Comprehensive metabolic panel; Future; Expected date: 10/17/2017  -     Hemoglobin A1c; Future; Expected date: 10/17/2017    Hypothyroidism due to acquired atrophy of thyroid  -     TSH; Future; Expected date: 10/17/2017  -     levothyroxine (SYNTHROID) 75 MCG tablet; Take 1 tablet (75 mcg total) by mouth once daily.  Dispense: 90 tablet; Refill: 1    Dyslipidemia  -     Lipid panel; Future; Expected date: 10/17/2017  -     Comprehensive metabolic panel; Future; Expected date: 10/17/2017  -     pravastatin (PRAVACHOL) 40 MG tablet; Take 1 tablet (40 mg total) by mouth once daily.  Dispense: 90 tablet; Refill: 3    LORI (generalized anxiety disorder)    Nausea    Bronchiectasis without complication    Essential hypertension  -     hydrochlorothiazide (HYDRODIURIL) 25 MG tablet; Take 1 tablet (25 mg total) by mouth once daily.  Dispense: 90 tablet; Refill: 2  -     metoprolol tartrate (LOPRESSOR) 50 MG tablet; Take 0.5 tablets (25 mg total) by mouth  once daily at 6am.  Dispense: 90 tablet; Refill: 3  -     irbesartan (AVAPRO) 75 MG tablet; Take 1 tablet (75 mg total) by mouth every evening.  Dispense: 90 tablet; Refill: 3    Uncontrolled type 2 diabetes mellitus without complication, without long-term current use of insulin  -     metformin (GLUCOPHAGE-XR) 500 MG 24 hr tablet; Take 2 tablets (1,000 mg total) by mouth 2 (two) times daily with meals. Take 1 po qhs x 2 weeks, then take 1 bid for 2 weeks, then 2 qhs and 1 po qam x 2 weeks, then 2 bid  Dispense: 360 tablet; Refill: 2    Sinusitis, unspecified chronicity, unspecified location  -     azelastine (ASTELIN) 137 mcg (0.1 %) nasal spray; 1 spray (137 mcg total) by Nasal route once daily at 6am.  Dispense: 90 mL; Refill: 3    Other orders  -     promethazine (PHENERGAN) 25 MG tablet; Take 1 tablet (25 mg total) by mouth every 4 (four) hours as needed for Nausea.  Dispense: 30 tablet; Refill: 1  -     hydrOXYzine HCl (ATARAX) 10 MG Tab; Take 3 tablets (30 mg total) by mouth 3 (three) times daily as needed (anxiety).  Dispense: 90 tablet; Refill: 6  -     gabapentin (NEURONTIN) 600 MG tablet; Take 1 tablet (600 mg total) by mouth 3 (three) times daily.  Dispense: 270 tablet; Refill: 3        Medication List with Changes/Refills   New Medications    HYDROXYZINE HCL (ATARAX) 10 MG TAB    Take 3 tablets (30 mg total) by mouth 3 (three) times daily as needed (anxiety).    PROMETHAZINE (PHENERGAN) 25 MG TABLET    Take 1 tablet (25 mg total) by mouth every 4 (four) hours as needed for Nausea.   Current Medications    ALBUTEROL 90 MCG/ACTUATION INHALER    2 puffs every 4 hours as needed for cough, wheeze, or shortness of breath    ASPIRIN (ECOTRIN) 81 MG EC TABLET    Take 81 mg by mouth once daily.    B COMPLEX VITAMINS CAPSULE    Take 1 capsule by mouth once daily.    BIFIDOBACTERIUM INFANTIS (ALIGN ORAL)    Take 1 capsule by mouth once daily.    BIOTIN ORAL    Take 10,000 mcg by mouth once daily.     CALCIUM  CITRATE-VITAMIN D3 (UPCAL D) 500-250 MG-UNIT/2.5GRAM POWD    Take by mouth once daily at 6am.     CRANBERRY 500 MG CAP    Take 1 capsule by mouth every evening.    CYANOCOBALAMIN (VITAMIN B-12) 500 MCG TABLET    Take 500 mcg by mouth once daily.      ESOMEPRAZOLE (NEXIUM) 40 MG CAPSULE    Take 1 capsule (40 mg total) by mouth before breakfast.    FISH OIL-OMEGA-3 FATTY ACIDS 300-1,000 MG CAPSULE    Take 2 g by mouth once daily.    FLUTICASONE (FLONASE) 50 MCG/ACTUATION NASAL SPRAY    2 sprays by Each Nare route once daily.    HYDROXYZINE PAMOATE (VISTARIL) 50 MG CAP    Take 1 capsule (50 mg total) by mouth nightly as needed. Take for anxiety, Can cause drowsiness    METHYLCELLULOSE, LAXATIVE, (CITRUCEL) 500 MG TAB    Take 1 tablet by mouth every morning.    MULTIVITAMIN CAPSULE    Take 1 capsule by mouth. Take one tablets daily    SIMETHICONE (GAS-X ORAL)    Take 1 capsule by mouth as needed.    SYMBICORT 160-4.5 MCG/ACTUATION HFAA    Inhale 2 puffs into the lungs every 12 (twelve) hours.    TESTOSTERONE AND ESTRADIOL CYP (ESTRADIOL-TESTOSTERONE IM)    Apply topically 3 (three) times a week.     VITAMIN D 1000 UNITS TAB    Take 5,000 Units by mouth once daily.    Changed and/or Refilled Medications    Modified Medication Previous Medication    AZELASTINE (ASTELIN) 137 MCG (0.1 %) NASAL SPRAY azelastine (ASTELIN) 137 mcg (0.1 %) nasal spray       1 spray (137 mcg total) by Nasal route once daily at 6am.    1 spray (137 mcg total) by Nasal route 2 (two) times daily.    GABAPENTIN (NEURONTIN) 600 MG TABLET gabapentin (NEURONTIN) 600 MG tablet       Take 1 tablet (600 mg total) by mouth 3 (three) times daily.    Take 600 mg by mouth 3 (three) times daily.    HYDROCHLOROTHIAZIDE (HYDRODIURIL) 25 MG TABLET hydrochlorothiazide (HYDRODIURIL) 25 MG tablet       Take 1 tablet (25 mg total) by mouth once daily.    Take 1 tablet (25 mg total) by mouth once daily.    IRBESARTAN (AVAPRO) 75 MG TABLET irbesartan (AVAPRO) 75 MG  tablet       Take 1 tablet (75 mg total) by mouth every evening.    Take 1 tablet (75 mg total) by mouth every evening.    LEVOTHYROXINE (SYNTHROID) 75 MCG TABLET levothyroxine (SYNTHROID) 88 MCG tablet       Take 1 tablet (75 mcg total) by mouth once daily.    Take 1 tablet (88 mcg total) by mouth once daily.    METFORMIN (GLUCOPHAGE-XR) 500 MG 24 HR TABLET metformin (GLUCOPHAGE-XR) 500 MG 24 hr tablet       Take 2 tablets (1,000 mg total) by mouth 2 (two) times daily with meals. Take 1 po qhs x 2 weeks, then take 1 bid for 2 weeks, then 2 qhs and 1 po qam x 2 weeks, then 2 bid    Take 1 po qhs x 2 weeks, then take 1 bid for 2 weeks, then 2 qhs and 1 po qam x 2 weeks, then 2 bid    METOPROLOL TARTRATE (LOPRESSOR) 50 MG TABLET metoprolol tartrate (LOPRESSOR) 50 MG tablet       Take 0.5 tablets (25 mg total) by mouth once daily at 6am.    Take 1 tablet (50 mg total) by mouth 2 (two) times daily.    PRAVASTATIN (PRAVACHOL) 40 MG TABLET pravastatin (PRAVACHOL) 40 MG tablet       Take 1 tablet (40 mg total) by mouth once daily.    Take 1 tablet (40 mg total) by mouth once daily.   Discontinued Medications    AZITHROMYCIN (Z-JEANETTE) 250 MG TABLET    Take 2 tablets by mouth on day 1; Take 1 tablet by mouth on days 2-5    GABAPENTIN (NEURONTIN) 300 MG CAPSULE    Take 1 capsule (300 mg total) by mouth 3 (three) times daily.    GABAPENTIN (NEURONTIN) 600 MG TABLET    Take 1 tablet (600 mg total) by mouth 3 (three) times daily.           Patient cleared for surgery from a medical standpoint - will defer clearance on lung and heart to cardiology and pulmonology who already sent Dr Jimenez a clearance letter, which I have not seen.            Counseled on regular exercise, maintenance of a healthy weight, balanced diet rich in fruits/vegetables and lean protein, and avoidance of unhealthy habits like smoking and excessive alcohol intake.   Also, counseled on importance of being compliant with medication, health appointments, diet  "and exercise.     Return in about 3 months (around 10/27/2017).    "This note will not be shared with the patient."  "

## 2017-07-21 ENCOUNTER — OFFICE VISIT (OUTPATIENT)
Dept: UROLOGY | Facility: CLINIC | Age: 67
End: 2017-07-21
Payer: COMMERCIAL

## 2017-07-21 VITALS
SYSTOLIC BLOOD PRESSURE: 102 MMHG | HEIGHT: 64 IN | DIASTOLIC BLOOD PRESSURE: 56 MMHG | HEART RATE: 61 BPM | WEIGHT: 212.94 LBS | BODY MASS INDEX: 36.35 KG/M2

## 2017-07-21 DIAGNOSIS — N39.41 URGE INCONTINENCE: ICD-10-CM

## 2017-07-21 DIAGNOSIS — R39.15 URINARY URGENCY: Primary | ICD-10-CM

## 2017-07-21 PROCEDURE — 95971 ALYS SMPL SP/PN NPGT W/PRGRM: CPT | Mod: S$GLB,,, | Performed by: UROLOGY

## 2017-07-21 PROCEDURE — 99999 PR PBB SHADOW E&M-EST. PATIENT-LVL III: CPT | Mod: PBBFAC,,, | Performed by: UROLOGY

## 2017-07-21 PROCEDURE — 99213 OFFICE O/P EST LOW 20 MIN: CPT | Mod: S$GLB,,, | Performed by: UROLOGY

## 2017-07-21 NOTE — PROGRESS NOTES
CHIEF COMPLAINT:    Mrs. Delatorre is a 66 y.o. female presenting for a follow up on InterStim, placed for refractory urgency, frequency symptoms.  .    PRESENTING ILLNESS:    Cornelia Delatorre is a 66 y.o. female who has a history of urgency, frequency, status post InterStim, the IPG was replaced in 2014.  She states she feels like it is not working optimally because she occasionally has nocturnal enuresis or urge incontinence at night.  She has no other symptoms.  Denies any dysuria, malodorous urine.  She states that she was recently diagnosed with asthma and has gotten regulated on her medications.      REVIEW OF SYSTEMS:    Review of Systems   Constitutional: Negative.    HENT: Negative.    Eyes: Negative.    Respiratory: Negative.    Cardiovascular: Negative.    Gastrointestinal: Negative.    Genitourinary: Positive for frequency and urgency.   Musculoskeletal: Negative.    Skin: Negative.    Neurological: Negative.    Endo/Heme/Allergies: Negative.    Psychiatric/Behavioral: Negative.      PATIENT HISTORY:    Past Medical History:   Diagnosis Date    Allergy     Arthritis     Cataract     Chronic fatigue 1/24/2017    Diabetes mellitus     resolved with gastric bypass    Diabetes mellitus, type 2     GERD (gastroesophageal reflux disease)     Headache(784.0)     Hyperlipidemia 7/15/2015    Hypertension     Hypothyroidism     Neuropathy     Obesity     SOHA on CPAP     Postmenopausal HRT (hormone replacement therapy)     Rash     Rosacea     Snoring        Past Surgical History:   Procedure Laterality Date    ADENOIDECTOMY      APPENDECTOMY      BUNIONECTOMY  10/17/14    right, still has discomfort    CATARACT EXTRACTION W/  INTRAOCULAR LENS IMPLANT Bilateral     COLONOSCOPY      ESOPHAGOGASTRODUODENOSCOPY      dilated esophagus    GASTRIC BYPASS      2011    HYSTERECTOMY      interstim bladder  2009    10/14/14 new battery    SKIN CANCER EXCISION      left hand    TONSILLECTOMY       WISDOM TOOTH EXTRACTION         Family History   Problem Relation Age of Onset    Diabetes      Hypertension      Hypothyroidism       Social History    Marital status:      Social History Main Topics    Smoking status: Never Smoker    Smokeless tobacco: Never Used    Alcohol use No    Drug use: No    Sexual activity: Not Currently       Allergies:  Sulfa (sulfonamide antibiotics) and Naproxen    Medications:  Outpatient Encounter Prescriptions as of 7/21/2017   Medication Sig Dispense Refill    albuterol 90 mcg/actuation inhaler 2 puffs every 4 hours as needed for cough, wheeze, or shortness of breath 1 Inhaler 11    aspirin (ECOTRIN) 81 MG EC tablet Take 81 mg by mouth once daily.      azelastine (ASTELIN) 137 mcg (0.1 %) nasal spray 1 spray (137 mcg total) by Nasal route once daily at 6am. 90 mL 3    b complex vitamins capsule Take 1 capsule by mouth once daily.      BIFIDOBACTERIUM INFANTIS (ALIGN ORAL) Take 1 capsule by mouth once daily.      BIOTIN ORAL Take 10,000 mcg by mouth once daily.       calcium citrate-vitamin D3 (UPCAL D) 500-250 mg-unit/2.5gram Powd Take by mouth once daily at 6am.       cranberry 500 mg Cap Take 1 capsule by mouth every evening.      cyanocobalamin (VITAMIN B-12) 500 MCG tablet Take 500 mcg by mouth once daily.        esomeprazole (NEXIUM) 40 MG capsule Take 1 capsule (40 mg total) by mouth before breakfast. 90 capsule 3    fish oil-omega-3 fatty acids 300-1,000 mg capsule Take 2 g by mouth once daily.      fluticasone (FLONASE) 50 mcg/actuation nasal spray 2 sprays by Each Nare route once daily. 16 g 0    gabapentin (NEURONTIN) 600 MG tablet Take 1 tablet (600 mg total) by mouth 3 (three) times daily. 270 tablet 3    hydrochlorothiazide (HYDRODIURIL) 25 MG tablet Take 1 tablet (25 mg total) by mouth once daily. 90 tablet 2    hydrOXYzine HCl (ATARAX) 10 MG Tab Take 3 tablets (30 mg total) by mouth 3 (three) times daily as needed (anxiety). 90  tablet 6    hydrOXYzine pamoate (VISTARIL) 50 MG Cap Take 1 capsule (50 mg total) by mouth nightly as needed. Take for anxiety, Can cause drowsiness 30 capsule 0    irbesartan (AVAPRO) 75 MG tablet Take 1 tablet (75 mg total) by mouth every evening. 90 tablet 3    levothyroxine (SYNTHROID) 75 MCG tablet Take 1 tablet (75 mcg total) by mouth once daily. 90 tablet 1    metformin (GLUCOPHAGE-XR) 500 MG 24 hr tablet Take 2 tablets (1,000 mg total) by mouth 2 (two) times daily with meals. Take 1 po qhs x 2 weeks, then take 1 bid for 2 weeks, then 2 qhs and 1 po qam x 2 weeks, then 2 bid 360 tablet 2    methylcellulose, laxative, (CITRUCEL) 500 mg Tab Take 1 tablet by mouth every morning.      metoprolol tartrate (LOPRESSOR) 50 MG tablet Take 0.5 tablets (25 mg total) by mouth once daily at 6am. 90 tablet 3    multivitamin capsule Take 1 capsule by mouth. Take one tablets daily      pravastatin (PRAVACHOL) 40 MG tablet Take 1 tablet (40 mg total) by mouth once daily. 90 tablet 3    promethazine (PHENERGAN) 25 MG tablet Take 1 tablet (25 mg total) by mouth every 4 (four) hours as needed for Nausea. 30 tablet 1    SIMETHICONE (GAS-X ORAL) Take 1 capsule by mouth as needed.      SYMBICORT 160-4.5 mcg/actuation HFAA Inhale 2 puffs into the lungs every 12 (twelve) hours. 1 Inhaler 11    TESTOSTERONE AND ESTRADIOL CYP (ESTRADIOL-TESTOSTERONE IM) Apply topically 3 (three) times a week.       vitamin D 1000 units Tab Take 5,000 Units by mouth once daily.        No facility-administered encounter medications on file as of 7/21/2017.          PHYSICAL EXAMINATION:    The patient generally appears in good health, is appropriately interactive, and is in no apparent distress.    Skin: No lesions.    Mental: Cooperative with normal affect.    Neuro: Grossly intact.    HEENT: Normal. No evidence of lymphadenopathy.    Chest:  normal inspiratory effort.    Abdomen:  Soft, non-tender. No masses or organomegaly. Bladder is not  palpable. No evidence of flank discomfort. No evidence of inguinal hernia.    Extremities: No clubbing, cyanosis, or edema      LABS:    Lab Results   Component Value Date    BUN 14 2017    CREATININE 0.9 2017       IMPRESSION:    Encounter Diagnoses   Name Primary?    Urinary urgency Yes    Urge incontinence        PLAN:    1.   Interrogation:  Last reprogrammin2016  Hours in use:  4854  Percent use:  89%    All lead pairs were checked at 1.0 A, 210 pulse width and ranged from 499-1415 ohms, < 15 mA    Program % Used Leads      Energy PW Hz    Sensation   Changed  Program 1 1%  0+, 1+, 3-  1.2 A 210 14 tailbone  Program 2   3+, 1-       2.0 A 210 14 tailbone  Program 3   2+, 3+, 0-  2.8 A 240 14 Vaginal       Increased to 3.2 A, increased pulse width to 300   Program 4 99%  C+, 0-       1.4 A 300 14 Vaginal       Increased to 1.9 A, iincreased the pulse width to 360    Battery Life:  Programs checked:  1049 ohms, < 15 mA  % Battery life:  32-58%  Months remainin-35 months    iCon was reprogrammed.  Reviewed on how to use the iCon.    2. Follow up in 6 months.

## 2017-07-24 ENCOUNTER — TELEPHONE (OUTPATIENT)
Dept: PULMONOLOGY | Facility: CLINIC | Age: 67
End: 2017-07-24

## 2017-07-24 NOTE — TELEPHONE ENCOUNTER
----- Message from Lorrie Paul sent at 7/24/2017  8:19 AM CDT -----  Contact: self  Patient called regarding labs were completed. Wanted to know what to do from here. Please contact [phone] ([phone])

## 2017-08-02 PROBLEM — K80.10 CHOLELITHIASIS WITH CHRONIC CHOLECYSTITIS: Status: ACTIVE | Noted: 2017-08-02

## 2017-08-10 DIAGNOSIS — N39.0 URINARY TRACT INFECTION WITHOUT HEMATURIA, SITE UNSPECIFIED: Primary | ICD-10-CM

## 2017-08-11 ENCOUNTER — LAB VISIT (OUTPATIENT)
Dept: LAB | Facility: HOSPITAL | Age: 67
End: 2017-08-11
Attending: UROLOGY
Payer: COMMERCIAL

## 2017-08-11 DIAGNOSIS — N39.0 URINARY TRACT INFECTION WITHOUT HEMATURIA, SITE UNSPECIFIED: ICD-10-CM

## 2017-08-11 PROCEDURE — 87086 URINE CULTURE/COLONY COUNT: CPT

## 2017-08-12 LAB — BACTERIA UR CULT: NO GROWTH

## 2017-08-17 ENCOUNTER — PROCEDURE VISIT (OUTPATIENT)
Dept: PHYSICAL MEDICINE AND REHAB | Facility: CLINIC | Age: 67
End: 2017-08-17
Payer: COMMERCIAL

## 2017-08-17 DIAGNOSIS — G56.03 BILATERAL CARPAL TUNNEL SYNDROME: ICD-10-CM

## 2017-08-17 DIAGNOSIS — G56.03 CARPAL TUNNEL SYNDROME ON BOTH SIDES: Primary | ICD-10-CM

## 2017-08-17 PROCEDURE — 95911 NRV CNDJ TEST 9-10 STUDIES: CPT | Mod: 26,,, | Performed by: PHYSICAL MEDICINE & REHABILITATION

## 2017-08-17 PROCEDURE — 95886 MUSC TEST DONE W/N TEST COMP: CPT | Mod: 26,,, | Performed by: PHYSICAL MEDICINE & REHABILITATION

## 2017-08-17 NOTE — PROCEDURES
[facility]  [address] [facility] [address] [place] [address]             Full Name: NEIL STEVENS Gender: Female  Patient ID: [id number] YOB: 1950  History: Pt reports bilateral hand paresthesias for the last 1 year.      Visit Date: 8/17/2017 08:51  Age: 66 Years 9 Months Old  Examining Physician: MICHELLE  Referring Physician: MARLENE      Sensory NCS      Nerve / Sites Rec. Site Onset Lat Peak Lat NP Amp PP Amp Segments Distance Velocity     ms ms µV µV  cm m/s   L Median - Digit III (Antidromic)      Wrist Dig III 4.38 6.46 6.6 15.3 Wrist - Dig III 14 32      Mid palm Dig III 1.46 2.19 13.6 38.9 Mid palm - Dig III 7 48   R Median - Digit III (Antidromic)      Wrist Dig III 3.70 4.58 25.6 37.0 Wrist - Dig III 14 38      Mid palm Dig III 1.30 2.03 21.2 94.6 Mid palm - Dig III 7 54   L Ulnar - Digit V (Antidromic)      Wrist Dig V 2.50 3.23 27.3 96.1 Wrist - Dig V 14 56   R Ulnar - Digit V (Antidromic)      Wrist Dig V 2.50 3.28 31.1 122.5 Wrist - Dig V 14 56   R Radial - Anatomical snuff box (Forearm)      Forearm Wrist 1.77 2.50 60.0 31.1 Forearm - Wrist 10 56       Motor NCS      Nerve / Sites Muscle Latency Amplitude Amp % Duration Segments Distance Lat Diff Velocity     ms mV % ms  cm ms m/s   L Median - APB      Wrist APB 6.41 7.2 100 7.76 Wrist - APB 8        Elbow APB 10.68 6.4 88.3 8.80 Elbow - Wrist 22 4.27 52   R Median - APB      Wrist APB 5.83 9.9 100 7.86 Wrist - APB 8        Elbow APB 10.16 8.8 88.7 7.92 Elbow - Wrist 21 4.32 49   L Ulnar - ADM      Wrist ADM 2.71 12.3 100 6.61 Wrist - ADM 8        B.Elbow ADM 5.89 10.4 84.2 6.51 B.Elbow - Wrist 16 3.18 50      A.Elbow ADM 7.24 10.7 86.7 6.88 A.Elbow - B.Elbow 10 1.35 74   R Ulnar - ADM      Wrist ADM 2.92 13.8 100 5.68 Wrist - ADM 8        B.Elbow ADM 6.09 11.9 86.4 5.89 B.Elbow - Wrist 17 3.18 54      A.Elbow ADM 8.02 11.7 84.4 5.89 A.Elbow - B.Elbow 10 1.93 52       EMG         EMG Summary Table     Spontaneous MUAP Recruitment    Muscle IA Fib PSW Fasc H.F. Amp Dur. PPP Pattern   R. Deltoid N None None None None N N N N   L. Deltoid N None None None None N N N N   L. Biceps brachii N None None None None N N N N   L. Triceps brachii N None None None None N N N N   L. Pronator teres N None None None None N N N N   L. First dorsal interosseous N None None None None N N N N   L. Abductor pollicis brevis N 1+ 2+ None None 1+ N N N   L. Cervical paraspinals N None None None None       R. Biceps brachii N None None None None N N N N   R. Triceps brachii N None None None None N N N N   R. Pronator teres N None None None None N N N N   R. First dorsal interosseous N None None None None N N N N   R. Abductor pollicis brevis N None None None None N N N N   R. Cervical paraspinals N None 1+ None None           Summary    The motor conduction test was performed on 4 nerve(s). The results were normal in 2 nerve(s): L Ulnar - ADM, R Ulnar - ADM. Results outside the specified normal range were found in 2 nerve(s), as follows:   In the L Median - APB study  o the take off latency result was increased for Wrist stimulation   In the R Median - APB study  o the take off latency result was increased for Wrist stimulation  o the take off velocity result was reduced for Elbow - Wrist segment    The sensory conduction test was performed on 5 nerve(s). The results were normal in 3 nerve(s): R Radial - Anatomical snuff box (Forearm), L Ulnar - Digit V (Antidromic), R Ulnar - Digit V (Antidromic). Results outside the specified normal range were found in 2 nerve(s), as follows:   In the L Median - Digit III (Antidromic) study  o the peak latency result was increased for Wrist stimulation  o the peak amplitude result was reduced for Wrist stimulation   In the R Median - Digit III (Antidromic) study  o the peak latency result was increased for Wrist stimulation    The needle EMG examination was performed in 14 muscles. It was normal in 12 muscle(s): R. Deltoid, L.  Deltoid, L. Biceps brachii, L. Triceps brachii, L. Pronator teres, L. First dorsal interosseous, L. Cervical paraspinals, R. Biceps brachii, R. Triceps brachii, R. Pronator teres, R. First dorsal interosseous, R. Abductor pollicis brevis. The study was abnormal in 2 muscle(s), with the following distribution:   Abnormal spontaneous/insertional activity was found in L. Abductor pollicis brevis, R. Cervical paraspinals.   The MUP waveform abnormality was found in L. Abductor pollicis brevis.      Electrodiagnostic Impression:  1. 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  Findings were moderate to severe on the left, and moderate on the right.  Needle EMG findings of abnormal spontaneous activity and abnormal motor unit potential waveforms in the median nerve innervated abductor pollicis brevis muscle on the left hand indicate active and chronic axonal denervation on the left.  2. Needle EMG findings of abnormal spontaneous activity seen in the right cervical paraspinal musculature were noted, however are insufficient for diagnosis of cervical radiculopathy.  This may indicate cervical radiculitis, history of cervical radiculopathy, or early cervical radiculopathy.  Clinical correlation recommended.  3. There was no electrodiagnostic evidence of peripheral polyneuropathy, myopathy, or brachial plexopathy of the bilateral upper extremitys.    Summary:  1. Chronic moderate to severe left carpal tunnel syndrome, with signs of active axonal denervation.  2. Moderate right carpal tunnel syndrome.  3. Right cervical radiculitis, history of cervical radiculopathy, and or early cervical radiculopathy (needle EMG findings isolated to the paraspinal musculature).    Plan:  With findings of moderate to severe left carpal tunnel syndrome and signs of active axonal denervation on the left, we will refer to orthopedic surgery for consideration of left carpal tunnel release.   Conservative treatment of her right carpal tunnel could be considered initially.  She does have the findings isolated to the right cervical paraspinals along with degenerative disc disease and cervical spondylosis seen on CT.  Consideration of cervical spine interventions could be considered pending clinical correlation.  Todays test results will be sent to her referring physician, Dr. García for further review.    Thank you very much for the referral. Please call if you have any questions regarding this study or the report.       -------------------------------  Jatin Guevara M.D.

## 2017-08-22 NOTE — TELEPHONE ENCOUNTER
----- Message from Tereza Peter sent at 8/22/2017  9:28 AM CDT -----  Contact: self  [phone]  Patient needs a new rx faxed to [facility] for rx metformin 500 mg for 90 day supply.Thanks!

## 2017-08-23 ENCOUNTER — OFFICE VISIT (OUTPATIENT)
Dept: ORTHOPEDICS | Facility: CLINIC | Age: 67
End: 2017-08-23
Payer: COMMERCIAL

## 2017-08-23 ENCOUNTER — LAB VISIT (OUTPATIENT)
Dept: LAB | Facility: HOSPITAL | Age: 67
End: 2017-08-23
Attending: INTERNAL MEDICINE
Payer: COMMERCIAL

## 2017-08-23 VITALS
DIASTOLIC BLOOD PRESSURE: 66 MMHG | BODY MASS INDEX: 36.19 KG/M2 | WEIGHT: 212 LBS | HEART RATE: 59 BPM | SYSTOLIC BLOOD PRESSURE: 116 MMHG | HEIGHT: 64 IN

## 2017-08-23 DIAGNOSIS — Z01.818 PRE-OP TESTING: ICD-10-CM

## 2017-08-23 DIAGNOSIS — Z01.818 PRE-OP TESTING: Primary | ICD-10-CM

## 2017-08-23 DIAGNOSIS — G56.02 CARPAL TUNNEL SYNDROME ON LEFT: Primary | ICD-10-CM

## 2017-08-23 LAB
ANION GAP SERPL CALC-SCNC: 9 MMOL/L
BASOPHILS # BLD AUTO: 0.01 K/UL
BASOPHILS NFR BLD: 0.2 %
BUN SERPL-MCNC: 8 MG/DL
CALCIUM SERPL-MCNC: 9.4 MG/DL
CHLORIDE SERPL-SCNC: 103 MMOL/L
CO2 SERPL-SCNC: 30 MMOL/L
CREAT SERPL-MCNC: 0.9 MG/DL
DIFFERENTIAL METHOD: NORMAL
EOSINOPHIL # BLD AUTO: 0.4 K/UL
EOSINOPHIL NFR BLD: 7.8 %
ERYTHROCYTE [DISTWIDTH] IN BLOOD BY AUTOMATED COUNT: 14.2 %
EST. GFR  (AFRICAN AMERICAN): >60 ML/MIN/1.73 M^2
EST. GFR  (NON AFRICAN AMERICAN): >60 ML/MIN/1.73 M^2
GLUCOSE SERPL-MCNC: 143 MG/DL
HCT VFR BLD AUTO: 38 %
HGB BLD-MCNC: 12.3 G/DL
LYMPHOCYTES # BLD AUTO: 1.6 K/UL
LYMPHOCYTES NFR BLD: 29.7 %
MCH RBC QN AUTO: 28.3 PG
MCHC RBC AUTO-ENTMCNC: 32.4 G/DL
MCV RBC AUTO: 88 FL
MONOCYTES # BLD AUTO: 0.4 K/UL
MONOCYTES NFR BLD: 6.5 %
NEUTROPHILS # BLD AUTO: 3.1 K/UL
NEUTROPHILS NFR BLD: 55.4 %
PLATELET # BLD AUTO: 177 K/UL
PMV BLD AUTO: 10.3 FL
POTASSIUM SERPL-SCNC: 4.9 MMOL/L
RBC # BLD AUTO: 4.34 M/UL
SODIUM SERPL-SCNC: 142 MMOL/L
WBC # BLD AUTO: 5.52 K/UL

## 2017-08-23 PROCEDURE — 36415 COLL VENOUS BLD VENIPUNCTURE: CPT | Mod: PO

## 2017-08-23 PROCEDURE — 85025 COMPLETE CBC W/AUTO DIFF WBC: CPT

## 2017-08-23 PROCEDURE — 99244 OFF/OP CNSLTJ NEW/EST MOD 40: CPT | Mod: 57,S$GLB,, | Performed by: ORTHOPAEDIC SURGERY

## 2017-08-23 PROCEDURE — 80048 BASIC METABOLIC PNL TOTAL CA: CPT

## 2017-08-23 PROCEDURE — 99999 PR PBB SHADOW E&M-EST. PATIENT-LVL III: CPT | Mod: PBBFAC,,, | Performed by: ORTHOPAEDIC SURGERY

## 2017-08-23 NOTE — LETTER
August 23, 2017      Jatin Guevara MD  [address] [facility] [address] [place] [address]           [facility]  [address] [facility] [address] [place] [address]  Phone: [phone]          Patient: Cornelia Delatorre   MR Number: [id number]   YOB: 1950   Date of Visit: 8/23/2017       Dear Dr. Jatin Guevara:    Thank you for referring Cornelia Delatorre to me for evaluation. Attached you will find relevant portions of my assessment and plan of care.    If you have questions, please do not hesitate to call me. I look forward to following Cornelia Delatorre along with you.    Sincerely,    Robbin Edmond MD    Enclosure  CC:  No Recipients    If you would like to receive this communication electronically, please contact [email or web address] or [phone] to request more information on [facility] access.    For providers and/or their staff who would like to refer a patient to [facility], please contact us through our one-stop-shop provider referral line, [facility], at [phone].    If you feel you have received this communication in error or would no longer like to receive these types of communications, please e-mail [email or web address]

## 2017-08-23 NOTE — PROGRESS NOTES
Past Medical History:   Diagnosis Date    Allergy     Arthritis     Cataract     Chronic fatigue 1/24/2017    Diabetes mellitus     resolved with gastric bypass    Diabetes mellitus, type 2     GERD (gastroesophageal reflux disease)     Headache(784.0)     History of lumpectomy of both breasts     1992 negative    Hyperlipidemia 7/15/2015    Hypertension     Hypothyroidism     Neuropathy     Obesity     SOHA on CPAP     Postmenopausal HRT (hormone replacement therapy)     Rash     Rosacea     Snoring        Past Surgical History:   Procedure Laterality Date    ADENOIDECTOMY      APPENDECTOMY      BLADDER SUSPENSION      BUNIONECTOMY  10/17/14    right, still has discomfort    CATARACT EXTRACTION W/  INTRAOCULAR LENS IMPLANT Bilateral     CHOLECYSTECTOMY  08/02/2017    COLONOSCOPY      ESOPHAGOGASTRODUODENOSCOPY      dilated esophagus    GASTRIC BYPASS      2011    HYSTERECTOMY      interstim bladder  2009    10/14/14 new battery    SKIN CANCER EXCISION      left hand    TONSILLECTOMY      WISDOM TOOTH EXTRACTION         Current Outpatient Prescriptions   Medication Sig    albuterol 90 mcg/actuation inhaler 2 puffs every 4 hours as needed for cough, wheeze, or shortness of breath    aspirin (ECOTRIN) 81 MG EC tablet Take 81 mg by mouth once daily.    azelastine (ASTELIN) 137 mcg (0.1 %) nasal spray 1 spray (137 mcg total) by Nasal route once daily at 6am.    BIFIDOBACTERIUM INFANTIS (ALIGN ORAL) Take 1 capsule by mouth once daily.    BIOTIN ORAL Take 10,000 mcg by mouth once daily.     cranberry 500 mg Cap Take 1 capsule by mouth every evening.    esomeprazole (NEXIUM) 40 MG capsule Take 1 capsule (40 mg total) by mouth before breakfast.    fish oil-omega-3 fatty acids 300-1,000 mg capsule Take 2 g by mouth once daily.    fluticasone (FLONASE) 50 mcg/actuation nasal spray 2 sprays by Each Nare route once daily.    gabapentin (NEURONTIN) 600 MG tablet Take 1 tablet (600 mg  total) by mouth 3 (three) times daily.    hydrochlorothiazide (HYDRODIURIL) 25 MG tablet Take 1 tablet (25 mg total) by mouth once daily.    hydrocodone-acetaminophen 5-325mg (NORCO) 5-325 mg per tablet Take 1 tablet by mouth every 6 (six) hours as needed for Pain.    hydrOXYzine HCl (ATARAX) 10 MG Tab Take 3 tablets (30 mg total) by mouth 3 (three) times daily as needed (anxiety).    irbesartan (AVAPRO) 75 MG tablet Take 1 tablet (75 mg total) by mouth every evening.    levothyroxine (SYNTHROID) 75 MCG tablet Take 1 tablet (75 mcg total) by mouth once daily.    metformin (GLUCOPHAGE-XR) 500 MG 24 hr tablet Take 2 tablets (1,000 mg total) by mouth 2 (two) times daily with meals. Take 1 po qhs x 2 weeks, then take 1 bid for 2 weeks, then 2 qhs and 1 po qam x 2 weeks, then 2 bid    methylcellulose, laxative, (CITRUCEL) 500 mg Tab Take 1 tablet by mouth every morning.    metoprolol tartrate (LOPRESSOR) 50 MG tablet Take 0.5 tablets (25 mg total) by mouth once daily at 6am.    multivitamin capsule Take 1 capsule by mouth. Take one tablets daily    pravastatin (PRAVACHOL) 40 MG tablet Take 1 tablet (40 mg total) by mouth once daily. (Patient taking differently: Take 40 mg by mouth nightly. )    promethazine (PHENERGAN) 25 MG tablet Take 1 tablet (25 mg total) by mouth every 4 (four) hours as needed for Nausea.    SIMETHICONE (GAS-X ORAL) Take 1 capsule by mouth as needed.    SYMBICORT 160-4.5 mcg/actuation HFAA Inhale 2 puffs into the lungs every 12 (twelve) hours.    TESTOSTERONE AND ESTRADIOL CYP (ESTRADIOL-TESTOSTERONE IM) Apply topically 3 (three) times a week.      No current facility-administered medications for this visit.        Review of patient's allergies indicates:   Allergen Reactions    Sulfa (sulfonamide antibiotics) Hives    Naproxen Other (See Comments)     Other reaction(s): RT sided numbness         Family History   Problem Relation Age of Onset    Diabetes      Hypertension       Hypothyroidism         Social History     Social History    Marital status:      Spouse name: N/A    Number of children: N/A    Years of education: N/A     Occupational History    Not on file.     Social History Main Topics    Smoking status: Never Smoker    Smokeless tobacco: Never Used    Alcohol use No    Drug use: No    Sexual activity: Not Currently     Other Topics Concern    Not on file     Social History Narrative    No narrative on file       Chief Complaint:   Chief Complaint   Patient presents with    Wrist Pain     bilateral carpal tunnel syndrome       History of present illness: This is a 66-year-old right-hand-dominant female seen in consultation for Dr. Guevara.  Patient was diagnosed with bilateral carpal tunnel syndrome.  The nerve conduction velocity showed moderate to severe findings on the left and moderate findings on the right.  Pain is been present since October.  Numbness and tingling have worsened.  She has tried braces without much help.  Feels weak.  Trouble driving.  Pain is up to a 6 out of 10 at times.    Answers for HPI/ROS submitted by the patient on 8/21/2017   Hand injury  unexpected weight change: No  appetite change : No  sleep disturbance: No  IMMUNOCOMPROMISED: No  nervous/ anxious: No  dysphoric mood: No  rash: No  visual disturbance: No  eye redness: No  eye pain: No  ear pain: No  tinnitus: Yes  hearing loss: No  sinus pressure : No  nosebleeds: No  enviro allergies: Yes  food allergies: No  cough: No  shortness of breath: Yes  sweating: No  dysuria: No  frequency: No  difficulty urinating: No  hematuria: No  painful intercourse: No  chest pain: No  palpitations: No  nausea: No  vomiting: No  diarrhea: No  blood in stool: No  constipation: No  headaches: No  dizziness: No  numbness: Yes  seizures: No  joint swelling: No  myalgia: No  weakness: No  back pain: No  Pain Chronicity: chronic  History of trauma: No  Onset: more than 1 year ago  Frequency:  daily  Progression since onset: unchanged  injury location: at home  pain- numeric: 7/10  pain location: left hand, right hand  pain quality: numbing, tingling, numb  Radiating Pain: No  If your pain is radiating, to what part of the body?: left hand, right hand  Aggravating factors: lying down  fever: No  inability to bear weight: No  itching: No  joint locking: No  limited range of motion: No  stiffness: No  tingling: Yes  Treatments tried: brace/corset  physical therapy: not tried  Improvement on treatment: no relief      Physical Examination:    Vital Signs:    Vitals:    08/23/17 1334   BP: 116/66   Pulse: (!) 59       Body mass index is 36.39 kg/m².    This a well-developed, well nourished patient in no acute distress.  They are alert and oriented and cooperative to examination.  Pt. walks without an antalgic gait.      Examination of bilateral hands and wrist shows no signs of rashes or erythema. Patient has no masses ecchymosis or effusions. Patient has full range of motion of the wrist in flexion and extension as well as ulnar and radial deviation. The patient also has full range of motion of all joints in the hand. There are 2+ radial pulse and intact light touch sensation in all 5 digits. Nontender over the anatomic snuffbox.  Positive median Tinel's. Negative Finkelstein's test.     Heart is regular rate and rhythm without abnormal murmurs rubs or gallops  Lungs are clear to auscultation bilaterally  Abdomen is soft nontender nondistended      X-rays: None     Assessment:: Left carpal tunnel syndrome    Plan:  I reviewed the EMG findings with her today.  We are long discussion about carpal tunnel syndrome.  We discussed carpal tunnel release versus injections.  She would like to go ahead and have the left done.  She might follow closely behind with the right if she does well.Risks, benefits, and alternatives to the procedure were explained to the patient including but not limited to damage to nerves,  arteries, blood vessels, bones, tendons, ligaments, stiffness, instability, infection, DVT, PE, as well as general anesthetic complications including seizure, stroke, heart attack and even death. The patient understood these risks and wished to proceed and signed the informed consent.       This note was created using Dragon voice recognition software that occasionally misinterpreted phrases or words.    Consult note is delivered via Epic messaging service.

## 2017-08-24 RX ORDER — METFORMIN HYDROCHLORIDE 500 MG/1
1000 TABLET, EXTENDED RELEASE ORAL 2 TIMES DAILY WITH MEALS
Qty: 160 TABLET | Refills: 1 | Status: SHIPPED | OUTPATIENT
Start: 2017-08-24 | End: 2017-10-27 | Stop reason: CLARIF

## 2017-08-25 ENCOUNTER — TELEPHONE (OUTPATIENT)
Dept: ORTHOPEDICS | Facility: CLINIC | Age: 67
End: 2017-08-25

## 2017-08-25 ENCOUNTER — TELEPHONE (OUTPATIENT)
Dept: FAMILY MEDICINE | Facility: CLINIC | Age: 67
End: 2017-08-25

## 2017-08-25 NOTE — TELEPHONE ENCOUNTER
----- Message from Mercy Wilson sent at 8/25/2017 10:34 AM CDT -----  Contact: [phone]  Patient requesting a refill on metformin 500 4x daily, 90 day supply.  Running low on medication.    Patient will be using [facility].    Please call patient at [phone]. Thanks!

## 2017-08-25 NOTE — TELEPHONE ENCOUNTER
----- Message from Melanie Juarez sent at 8/25/2017 10:25 AM CDT -----  Contact: Self  Patient left a voice mail message this morning at 9:30 wanting to know if she needs to remove her gel nail polish for surgery next Tuesday.  Please advise.

## 2017-08-25 NOTE — TELEPHONE ENCOUNTER
Called pt and advised that we do need her to remove her nail polish for her surgery on Tuesday. Pt verbalized understanding.

## 2017-08-28 ENCOUNTER — ANESTHESIA EVENT (OUTPATIENT)
Dept: SURGERY | Facility: HOSPITAL | Age: 67
End: 2017-08-28
Payer: COMMERCIAL

## 2017-08-29 ENCOUNTER — HOSPITAL ENCOUNTER (OUTPATIENT)
Facility: HOSPITAL | Age: 67
Discharge: HOME | End: 2017-08-29
Attending: ORTHOPAEDIC SURGERY | Admitting: ORTHOPAEDIC SURGERY
Payer: COMMERCIAL

## 2017-08-29 ENCOUNTER — ANESTHESIA (OUTPATIENT)
Dept: SURGERY | Facility: HOSPITAL | Age: 67
End: 2017-08-29
Payer: COMMERCIAL

## 2017-08-29 ENCOUNTER — SURGERY (OUTPATIENT)
Age: 67
End: 2017-08-29

## 2017-08-29 DIAGNOSIS — G56.02 CARPAL TUNNEL SYNDROME ON LEFT: ICD-10-CM

## 2017-08-29 PROCEDURE — 36000707: Performed by: ORTHOPAEDIC SURGERY

## 2017-08-29 PROCEDURE — 37000009 HC ANESTHESIA EA ADD 15 MINS: Performed by: ORTHOPAEDIC SURGERY

## 2017-08-29 PROCEDURE — 36000706: Performed by: ORTHOPAEDIC SURGERY

## 2017-08-29 PROCEDURE — 64721 CARPAL TUNNEL SURGERY: CPT | Mod: LT,,, | Performed by: ORTHOPAEDIC SURGERY

## 2017-08-29 PROCEDURE — 99900104 DSU ONLY-NO CHARGE-EA ADD'L HR (STAT): Performed by: ORTHOPAEDIC SURGERY

## 2017-08-29 PROCEDURE — 63600175 PHARM REV CODE 636 W HCPCS: Performed by: NURSE ANESTHETIST, CERTIFIED REGISTERED

## 2017-08-29 PROCEDURE — 25000003 PHARM REV CODE 250: Performed by: ANESTHESIOLOGY

## 2017-08-29 PROCEDURE — S0020 INJECTION, BUPIVICAINE HYDRO: HCPCS | Performed by: ORTHOPAEDIC SURGERY

## 2017-08-29 PROCEDURE — D9220A PRA ANESTHESIA: Mod: CRNA,,, | Performed by: NURSE ANESTHETIST, CERTIFIED REGISTERED

## 2017-08-29 PROCEDURE — 71000039 HC RECOVERY, EACH ADD'L HOUR: Performed by: ORTHOPAEDIC SURGERY

## 2017-08-29 PROCEDURE — 27200651 HC AIRWAY, LMA: Performed by: NURSE ANESTHETIST, CERTIFIED REGISTERED

## 2017-08-29 PROCEDURE — 37000008 HC ANESTHESIA 1ST 15 MINUTES: Performed by: ORTHOPAEDIC SURGERY

## 2017-08-29 PROCEDURE — 71000016 HC POSTOP RECOV ADDL HR: Performed by: ORTHOPAEDIC SURGERY

## 2017-08-29 PROCEDURE — 71000015 HC POSTOP RECOV 1ST HR: Performed by: ORTHOPAEDIC SURGERY

## 2017-08-29 PROCEDURE — 63600175 PHARM REV CODE 636 W HCPCS: Performed by: ORTHOPAEDIC SURGERY

## 2017-08-29 PROCEDURE — 25000003 PHARM REV CODE 250: Performed by: NURSE ANESTHETIST, CERTIFIED REGISTERED

## 2017-08-29 PROCEDURE — 71000033 HC RECOVERY, INTIAL HOUR: Performed by: ORTHOPAEDIC SURGERY

## 2017-08-29 PROCEDURE — 99900103 DSU ONLY-NO CHARGE-INITIAL HR (STAT): Performed by: ORTHOPAEDIC SURGERY

## 2017-08-29 PROCEDURE — 25000003 PHARM REV CODE 250: Performed by: ORTHOPAEDIC SURGERY

## 2017-08-29 PROCEDURE — D9220A PRA ANESTHESIA: Mod: ANES,,, | Performed by: ANESTHESIOLOGY

## 2017-08-29 RX ORDER — LIDOCAINE HCL/PF 100 MG/5ML
SYRINGE (ML) INTRAVENOUS
Status: DISCONTINUED | OUTPATIENT
Start: 2017-08-29 | End: 2017-08-29

## 2017-08-29 RX ORDER — BUPIVACAINE HYDROCHLORIDE 5 MG/ML
INJECTION, SOLUTION EPIDURAL; INTRACAUDAL
Status: DISCONTINUED | OUTPATIENT
Start: 2017-08-29 | End: 2017-08-29 | Stop reason: HOSPADM

## 2017-08-29 RX ORDER — MIDAZOLAM HYDROCHLORIDE 1 MG/ML
INJECTION, SOLUTION INTRAMUSCULAR; INTRAVENOUS
Status: DISCONTINUED | OUTPATIENT
Start: 2017-08-29 | End: 2017-08-29

## 2017-08-29 RX ORDER — DEXAMETHASONE SODIUM PHOSPHATE 4 MG/ML
INJECTION, SOLUTION INTRA-ARTICULAR; INTRALESIONAL; INTRAMUSCULAR; INTRAVENOUS; SOFT TISSUE
Status: DISCONTINUED | OUTPATIENT
Start: 2017-08-29 | End: 2017-08-29

## 2017-08-29 RX ORDER — OXYCODONE HYDROCHLORIDE 5 MG/1
5 TABLET ORAL ONCE AS NEEDED
Status: COMPLETED | OUTPATIENT
Start: 2017-08-29 | End: 2017-08-29

## 2017-08-29 RX ORDER — FENTANYL CITRATE 50 UG/ML
25 INJECTION, SOLUTION INTRAMUSCULAR; INTRAVENOUS EVERY 5 MIN PRN
Status: DISCONTINUED | OUTPATIENT
Start: 2017-08-29 | End: 2017-08-29 | Stop reason: HOSPADM

## 2017-08-29 RX ORDER — PROPOFOL 10 MG/ML
VIAL (ML) INTRAVENOUS
Status: DISCONTINUED | OUTPATIENT
Start: 2017-08-29 | End: 2017-08-29

## 2017-08-29 RX ORDER — ONDANSETRON 2 MG/ML
4 INJECTION INTRAMUSCULAR; INTRAVENOUS DAILY PRN
Status: DISCONTINUED | OUTPATIENT
Start: 2017-08-29 | End: 2017-08-29 | Stop reason: HOSPADM

## 2017-08-29 RX ORDER — LIDOCAINE HYDROCHLORIDE 10 MG/ML
1 INJECTION, SOLUTION EPIDURAL; INFILTRATION; INTRACAUDAL; PERINEURAL ONCE
Status: COMPLETED | OUTPATIENT
Start: 2017-08-29 | End: 2017-08-29

## 2017-08-29 RX ORDER — CEFAZOLIN SODIUM 2 G/50ML
2 SOLUTION INTRAVENOUS
Status: COMPLETED | OUTPATIENT
Start: 2017-08-29 | End: 2017-08-29

## 2017-08-29 RX ORDER — SODIUM CHLORIDE 0.9 % (FLUSH) 0.9 %
3 SYRINGE (ML) INJECTION
Status: DISCONTINUED | OUTPATIENT
Start: 2017-08-29 | End: 2017-08-29 | Stop reason: HOSPADM

## 2017-08-29 RX ORDER — SODIUM CHLORIDE, SODIUM LACTATE, POTASSIUM CHLORIDE, CALCIUM CHLORIDE 600; 310; 30; 20 MG/100ML; MG/100ML; MG/100ML; MG/100ML
125 INJECTION, SOLUTION INTRAVENOUS CONTINUOUS
Status: DISCONTINUED | OUTPATIENT
Start: 2017-08-29 | End: 2017-08-29 | Stop reason: HOSPADM

## 2017-08-29 RX ORDER — TRAMADOL HYDROCHLORIDE 50 MG/1
50 TABLET ORAL EVERY 6 HOURS PRN
Qty: 30 TABLET | Refills: 0 | Status: SHIPPED | OUTPATIENT
Start: 2017-08-29 | End: 2017-09-08

## 2017-08-29 RX ORDER — FENTANYL CITRATE 50 UG/ML
INJECTION, SOLUTION INTRAMUSCULAR; INTRAVENOUS
Status: DISCONTINUED | OUTPATIENT
Start: 2017-08-29 | End: 2017-08-29

## 2017-08-29 RX ORDER — ONDANSETRON 2 MG/ML
INJECTION INTRAMUSCULAR; INTRAVENOUS
Status: DISCONTINUED | OUTPATIENT
Start: 2017-08-29 | End: 2017-08-29

## 2017-08-29 RX ORDER — ACETAMINOPHEN 10 MG/ML
INJECTION, SOLUTION INTRAVENOUS
Status: DISCONTINUED | OUTPATIENT
Start: 2017-08-29 | End: 2017-08-29

## 2017-08-29 RX ORDER — SODIUM CHLORIDE, SODIUM LACTATE, POTASSIUM CHLORIDE, CALCIUM CHLORIDE 600; 310; 30; 20 MG/100ML; MG/100ML; MG/100ML; MG/100ML
INJECTION, SOLUTION INTRAVENOUS CONTINUOUS
Status: DISCONTINUED | OUTPATIENT
Start: 2017-08-29 | End: 2017-08-29 | Stop reason: HOSPADM

## 2017-08-29 RX ORDER — OXYCODONE AND ACETAMINOPHEN 5; 325 MG/1; MG/1
1 TABLET ORAL
Status: DISCONTINUED | OUTPATIENT
Start: 2017-08-29 | End: 2017-08-29 | Stop reason: HOSPADM

## 2017-08-29 RX ORDER — GLYCOPYRROLATE 0.2 MG/ML
INJECTION INTRAMUSCULAR; INTRAVENOUS
Status: DISCONTINUED | OUTPATIENT
Start: 2017-08-29 | End: 2017-08-29

## 2017-08-29 RX ADMIN — MIDAZOLAM 2 MG: 1 INJECTION INTRAMUSCULAR; INTRAVENOUS at 08:08

## 2017-08-29 RX ADMIN — FENTANYL CITRATE 25 MCG: 50 INJECTION INTRAMUSCULAR; INTRAVENOUS at 09:08

## 2017-08-29 RX ADMIN — LIDOCAINE HYDROCHLORIDE 10 MG: 10 INJECTION, SOLUTION EPIDURAL; INFILTRATION; INTRACAUDAL; PERINEURAL at 07:08

## 2017-08-29 RX ADMIN — BUPIVACAINE HYDROCHLORIDE 30 ML: 5 INJECTION, SOLUTION EPIDURAL; INTRACAUDAL; PERINEURAL at 09:08

## 2017-08-29 RX ADMIN — DEXAMETHASONE SODIUM PHOSPHATE 8 MG: 4 INJECTION, SOLUTION INTRAMUSCULAR; INTRAVENOUS at 09:08

## 2017-08-29 RX ADMIN — GLYCOPYRROLATE 0.2 MG: 0.2 INJECTION, SOLUTION INTRAMUSCULAR; INTRAVENOUS at 09:08

## 2017-08-29 RX ADMIN — ONDANSETRON 4 MG: 2 INJECTION, SOLUTION INTRAMUSCULAR; INTRAVENOUS at 09:08

## 2017-08-29 RX ADMIN — FENTANYL CITRATE 50 MCG: 50 INJECTION INTRAMUSCULAR; INTRAVENOUS at 09:08

## 2017-08-29 RX ADMIN — LIDOCAINE HYDROCHLORIDE 100 MG: 20 INJECTION, SOLUTION INTRAVENOUS at 09:08

## 2017-08-29 RX ADMIN — SODIUM CHLORIDE, SODIUM LACTATE, POTASSIUM CHLORIDE, AND CALCIUM CHLORIDE: .6; .31; .03; .02 INJECTION, SOLUTION INTRAVENOUS at 07:08

## 2017-08-29 RX ADMIN — CEFAZOLIN SODIUM 2 G: 2 SOLUTION INTRAVENOUS at 08:08

## 2017-08-29 RX ADMIN — PROPOFOL 150 MG: 10 INJECTION, EMULSION INTRAVENOUS at 09:08

## 2017-08-29 RX ADMIN — OXYCODONE HYDROCHLORIDE 5 MG: 5 TABLET ORAL at 09:08

## 2017-08-29 RX ADMIN — GLYCOPYRROLATE 0.1 MG: 0.2 INJECTION, SOLUTION INTRAMUSCULAR; INTRAVENOUS at 09:08

## 2017-08-29 RX ADMIN — ACETAMINOPHEN 1000 MG: 10 INJECTION, SOLUTION INTRAVENOUS at 09:08

## 2017-08-29 NOTE — ANESTHESIA PREPROCEDURE EVALUATION
08/29/2017  Cornelia Delatorre is a 66 y.o., female.    Pre-op Assessment    I have reviewed the Patient Summary Reports.     I have reviewed the Nursing Notes.   I have reviewed the Medications.     Review of Systems  Anesthesia Hx:  No problems with previous Anesthesia  Personal Hx of Anesthesia complications, Post-Operative Nausea/Vomiting   Social:  Non-Smoker, No Alcohol Use    Hematology/Oncology:  Hematology Normal        Cardiovascular:   Hypertension Denies CAD.    Denies CABG/stent.   Denies Angina. hyperlipidemia ECG has been reviewed.    Pulmonary:   Asthma asymptomatic and mild Sleep Apnea, CPAP    Renal/:  Renal/ Normal     Hepatic/GI:   GERD Hx gastric bypass   Musculoskeletal:   Arthritis     Neurological:   Headaches   Chronic Pain Syndrome  Peripheral Neuropathy    Endocrine:   Diabetes, well controlled, type 2 Hypothyroidism        Physical Exam  General:  Obesity    Airway/Jaw/Neck:  Airway Findings: Mouth Opening: Normal General Airway Assessment: Adult  Mallampati: III  Improves to II with phonation.  TM Distance: 4 - 6 cm  Jaw/Neck Findings:  Neck ROM: Extension Decreased, Mild       Chest/Lungs:  Chest/Lungs Findings: Clear to auscultation, Normal Respiratory Rate     Heart/Vascular:  Heart Findings: Rate: Normal  Rhythm: Regular Rhythm  Sounds: Normal  Heart murmur: negative Vascular Findings: Normal (No carotid bruits.)       Mental Status:  Mental Status Findings:  Cooperative, Alert and Oriented         Anesthesia Plan  Type of Anesthesia, risks & benefits discussed:  Anesthesia Type:  general  Patient's Preference:   Intra-op Monitoring Plan: standard ASA monitors  Intra-op Monitoring Plan Comments:   Post Op Pain Control Plan: multimodal analgesia  Post Op Pain Control Plan Comments:   Induction:   IV  Beta Blocker:         Informed Consent: Patient understands risks and  agrees with Anesthesia plan.  Questions answered. Anesthesia consent signed with patient.  ASA Score: 3     Day of Surgery Review of History & Physical: I have interviewed and examined the patient. I have reviewed the patient's H&P dated:  There are no significant changes.  H&P update referred to the surgeon.     Anesthesia Plan Notes: gen venegas lma        Ready For Surgery From Anesthesia Perspective.

## 2017-08-29 NOTE — TRANSFER OF CARE
"Anesthesia Transfer of Care Note    Patient: Cornelia Delatorre    Procedure(s) Performed: Procedure(s) (LRB):  RELEASE-CARPAL TUNNEL (Left)    Patient location: PACU    Anesthesia Type: general    Transport from OR: Transported from OR on 2-3 L/min O2 by NC with adequate spontaneous ventilation    Post pain: adequate analgesia    Post assessment: no apparent anesthetic complications    Post vital signs: stable    Level of consciousness: sedated    Complications: none    Transfer of care protocol was followed      Last vitals:   Visit Vitals  /60 (BP Location: Right arm, Patient Position: Lying)   Pulse 62   Temp 36.7 °C (98.1 °F) (Temporal)   Resp 14   Ht 5' 4" (1.626 m)   Wt 96.2 kg (212 lb)   Breastfeeding? No   BMI 36.39 kg/m²     "

## 2017-08-29 NOTE — ANESTHESIA POSTPROCEDURE EVALUATION
"Anesthesia Post Evaluation    Patient: Cornelia Delatorre    Procedure(s) Performed: Procedure(s) (LRB):  RELEASE-CARPAL TUNNEL (Left)    Final Anesthesia Type: general  Patient location during evaluation: PACU  Patient participation: Yes- Able to Participate  Level of consciousness: awake and alert and oriented  Post-procedure vital signs: reviewed and stable  Pain management: adequate  Airway patency: patent  PONV status at discharge: No PONV  Anesthetic complications: no      Cardiovascular status: hemodynamically stable  Respiratory status: unassisted, spontaneous ventilation and room air  Hydration status: euvolemic  Follow-up not needed.        Visit Vitals  /65 (BP Location: Right arm, Patient Position: Lying)   Pulse 66   Temp 36.6 °C (97.9 °F) (Temporal)   Resp 18   Ht 5' 4" (1.626 m)   Wt 96.2 kg (212 lb)   SpO2 (!) 94%   Breastfeeding? No   BMI 36.39 kg/m²       Pain/Arlet Score: Pain Assessment Performed: Yes (8/29/2017  9:35 AM)  Presence of Pain: complains of pain/discomfort (8/29/2017 10:53 AM)  Pain Rating Prior to Med Admin: 4 (8/29/2017  9:43 AM)  Arlet Score: 10 (8/29/2017 10:30 AM)      "

## 2017-08-29 NOTE — DISCHARGE SUMMARY
[facility]  Discharge Note  Short Stay    Admit Date: 8/29/2017    Discharge Date and Time: 8/29/2017    Attending Physician: Robbin Edmond MD     Discharge Provider: Robbin Edmond    Diagnoses:  Active Hospital Problems    Diagnosis  POA    *Carpal tunnel syndrome on left [G56.02]  Yes      Resolved Hospital Problems    Diagnosis Date Resolved POA   No resolved problems to display.       Discharged Condition: good    Hospital Course: Patient was admitted for an outpatient procedure and tolerated the procedure well with no complications.    Final Diagnoses: Same as principal problem.    Disposition: Home or Self Care    Follow up/Patient Instructions:    Medications:  Reconciled Home Medications:   Current Discharge Medication List      START taking these medications    Details   tramadol (ULTRAM) 50 mg tablet Take 1 tablet (50 mg total) by mouth every 6 (six) hours as needed for Pain.  Qty: 30 tablet, Refills: 0         CONTINUE these medications which have NOT CHANGED    Details   albuterol 90 mcg/actuation inhaler 2 puffs every 4 hours as needed for cough, wheeze, or shortness of breath  Qty: 1 Inhaler, Refills: 11    Associated Diagnoses: Acute bronchitis, unspecified organism      aspirin (ECOTRIN) 81 MG EC tablet Take 81 mg by mouth once daily.      azelastine (ASTELIN) 137 mcg (0.1 %) nasal spray 1 spray (137 mcg total) by Nasal route once daily at 6am.  Qty: 90 mL, Refills: 3    Associated Diagnoses: Sinusitis, unspecified chronicity, unspecified location      BIFIDOBACTERIUM INFANTIS (ALIGN ORAL) Take 1 capsule by mouth once daily.      BIOTIN ORAL Take 10,000 mcg by mouth once daily.       cranberry 500 mg Cap Take 1 capsule by mouth every evening.      esomeprazole (NEXIUM) 40 MG capsule Take 1 capsule (40 mg total) by mouth before breakfast.  Qty: 90 capsule, Refills: 3      fish oil-omega-3 fatty acids 300-1,000 mg capsule Take 2 g by mouth once daily.      fluticasone  (FLONASE) 50 mcg/actuation nasal spray 2 sprays by Each Nare route once daily.  Qty: 16 g, Refills: 0    Associated Diagnoses: Other sinusitis, unspecified chronicity      gabapentin (NEURONTIN) 600 MG tablet Take 1 tablet (600 mg total) by mouth 3 (three) times daily.  Qty: 270 tablet, Refills: 3      hydrochlorothiazide (HYDRODIURIL) 25 MG tablet Take 1 tablet (25 mg total) by mouth once daily.  Qty: 90 tablet, Refills: 2    Associated Diagnoses: Essential hypertension      irbesartan (AVAPRO) 75 MG tablet Take 1 tablet (75 mg total) by mouth every evening.  Qty: 90 tablet, Refills: 3    Associated Diagnoses: Essential hypertension      levothyroxine (SYNTHROID) 75 MCG tablet Take 1 tablet (75 mcg total) by mouth once daily.  Qty: 90 tablet, Refills: 1    Associated Diagnoses: Hypothyroidism due to acquired atrophy of thyroid      metformin (GLUCOPHAGE-XR) 500 MG 24 hr tablet Take 2 tablets (1,000 mg total) by mouth 2 (two) times daily with meals.  Qty: 160 tablet, Refills: 1    Associated Diagnoses: Uncontrolled type 2 diabetes mellitus without complication, without long-term current use of insulin      methylcellulose, laxative, (CITRUCEL) 500 mg Tab Take 1 tablet by mouth every morning.      metoprolol tartrate (LOPRESSOR) 50 MG tablet Take 0.5 tablets (25 mg total) by mouth once daily at 6am.  Qty: 90 tablet, Refills: 3    Associated Diagnoses: Essential hypertension      multivitamin capsule Take 1 capsule by mouth. Take one tablets daily      pravastatin (PRAVACHOL) 40 MG tablet Take 1 tablet (40 mg total) by mouth once daily.  Qty: 90 tablet, Refills: 3    Associated Diagnoses: Dyslipidemia      SYMBICORT 160-4.5 mcg/actuation HFAA Inhale 2 puffs into the lungs every 12 (twelve) hours.  Qty: 1 Inhaler, Refills: 11      TESTOSTERONE AND ESTRADIOL CYP (ESTRADIOL-TESTOSTERONE IM) Apply topically 3 (three) times a week.       hydrocodone-acetaminophen 5-325mg (NORCO) 5-325 mg per tablet Take 1 tablet by mouth  every 6 (six) hours as needed for Pain.  Qty: 20 tablet, Refills: 0      hydrOXYzine HCl (ATARAX) 10 MG Tab Take 3 tablets (30 mg total) by mouth 3 (three) times daily as needed (anxiety).  Qty: 90 tablet, Refills: 6      SIMETHICONE (GAS-X ORAL) Take 1 capsule by mouth as needed.             Discharge Procedure Orders  Diet general     Keep surgical extremity elevated     Ice to affected area     Lifting restrictions     Call MD for:  temperature >100.4     Call MD for:  severe uncontrolled pain     Call MD for:  redness, tenderness, or signs of infection (pain, swelling, redness, odor or green/yellow discharge around incision site)     Remove dressing in 48 hours       Follow-up Information     Robbin Edmond MD In 2 weeks.    Specialties:  Sports Medicine, Orthopedic Surgery  Contact information:  [address] [place] [address] DR Bertrand W[place] [id number] [phone]                   Discharge Procedure Orders (must include Diet, Follow-up, Activity):    Discharge Procedure Orders (must include Diet, Follow-up, Activity)  Diet general     Keep surgical extremity elevated     Ice to affected area     Lifting restrictions     Call MD for:  temperature >100.4     Call MD for:  severe uncontrolled pain     Call MD for:  redness, tenderness, or signs of infection (pain, swelling, redness, odor or green/yellow discharge around incision site)     Remove dressing in 48 hours

## 2017-08-29 NOTE — OP NOTE
[facility]  Orthopedic Surgery  Operative Note    SUMMARY     Date of Procedure: 8/29/2017     Procedure: Procedure(s) (LRB):  RELEASE-CARPAL TUNNEL (Left)       Surgeon(s) and Role:     * Robbin Edmond MD - Primary    Assistant: James Pearson    Pre-Operative Diagnosis: Carpal tunnel syndrome on left [G56.02]    Post-Operative Diagnosis: Post-Op Diagnosis Codes:     * Carpal tunnel syndrome on left [G56.02]    Anesthesia: General    Complications: No    Estimated Blood Loss (EBL): 6mL    Tourniquet time: 2min at 250mmHg           Implants: * No implants in log *    Specimens:   Specimen (12h ago through future)    None                  Condition: Good    Disposition: PACU - hemodynamically stable.    Attestation: I was present and scrubbed for the entire procedure.    Indications for the procedure: This is a 66 -year-old female with a history of carpal tunnel syndrome.  Patient had an EMG and nerve conduction velocity that confirmed the diagnosis.  Patient has failed conservative measures and wished to proceed with surgery.    Procedure IN DETAIL: The risks, benefits and alternatives of the procedure were   explained to the patient including, but not limited to damage to nerves, arteries   and blood vessels. I also explained risk of infection, pillar pain, possible   continued pain due to permanent injury to the nerve as well as recurrence and   general anesthetic complications including seizure, stroke, heart attack and   death. Pt understood this and signed informed consent. The patient's  Left  Hand was marked prior to coming to the operating room. Once there, a formal   time out was done in which the correct patient, procedure, and op site were all   correctly identified and confirmed by the entire operating team,2g of   Ancef was given prior to surgical incision. General anesthesia was induced. The Left  upper extremity was prepped and draped in normal sterile fashion. Arm was  exsanguinated and tourniquet inflated up to 250 mmHg.   Standard carpal tunnel incision was made just ulnar to the central palmar   crease. This was taken down through skin and soft tissue until the transverse   carpal ligament was identified. Under loupe magnification the transverse carpal   ligament was incised proximally, once a little gap was made in the carpal   tunnel, a hemostat was used to slip underneath the ligament and under direct   visualization, the ligament was sharply incised directly over the median nerve.   Blunt dissection was then used to release the tissue distally. Once it was   completely free we then dorsiflexed the wrist and under direct visualization   released the rest of the proximal portion of the transverse carpal ligament.   The nerve was then inspected and seemed to be intact. There were no masses in the carpal tunnel under the nerve or flexor tendons. Hemostat was used to   spread both proximally and distally and there were no residual adhesions. We   then proceeded with closing. The wound was copiously irrigated using normal   saline with  irrigant. Tourniquet was released. Meticulous hemostasis was   obtained with the bipolar cautery. We then closed the skin using a 3-0 nylon in   a simple interrupted fashion. A sterile dressing was applied including   Adaptic, 4 x 4s, Kerlex as well as an Coban. The patient was   awakened, transferred from the operating room to the recovery room in stable   condition. Postop course will be for a carpal tunnel release.

## 2017-08-29 NOTE — DISCHARGE INSTRUCTIONS
Discharge Instructions for Carpal Tunnel Release  You had a carpal tunnel release procedure to help relieve the symptoms of carpal tunnel syndrome. In carpal tunnel syndrome, a nerve in the wrist is compressed and irritated. This causes numbness and pain in the fingers and hand. Carpal tunnel release relieves the compression of the nerve. Here are instructions that will help you care for your arm and wrist when you are at home.  Home care  · Avoid gripping objects tightly or lifting with your affected arm.  · Wear your bandage, splint, or cast as directed by your doctor.  · Always keep the dressing, splint, or cast dry and clean.  · When showering, cover your hand and  wrist with plastic and use tape or rubberbands to keep the dressing, splint, or cast dry. Shower as necessary.    · Use an ice pack or bag of frozen peas -- or something similar -- wrapped in a thin towel on your wrist to reduce swelling for the first 48 hours. Leave the ice pack on for 20 minutes; then take it off for 20 minutes. Repeat as needed.  · Keep your arm elevated above your heart for 24 to 48 hours after surgery.  · Do the exercises you learned in the hospital, or as instructed by your doctor.  · Take pain medicine as directed.  · Dont drive until your doctor says its OK. Never drive while you are taking opioid pain medicine.  · Ask your doctor when you can return to work. If your job requires heavy lifting, you may not be able to begin working again for several weeks.  Follow-up care  Make a follow-up appointment as directed by your doctor.     When to seek medical care  Call 911 right away if you have any of the following:  · Chest pain  · Shortness of breath  Otherwise, call your doctor immediately if you have any of the following:  · A splint, cast, or dressing that has gotten wet  · Increased bleeding or drainage from the incision (cut)  · Opening of the incision  · Fever above 100.4°F (38.9°C) taken by mouth, or shaking  "chills  · Any new numbness in the fingers or thumb  · Blue hand or fingers  · Increased pain with or without activity  · Increased redness, tenderness, or swelling of the incision   Date Last Reviewed: 11/15/2015  © 5325-8709 [employer]. [address] [place] [address]. All rights reserved. This information is not intended as a substitute for professional medical care. Always follow your healthcare professional's instructions.      Discharge Instructions: After Your Surgery/Procedure  Youve just had surgery. During surgery you were given medicine called anesthesia to keep you relaxed and free of pain. After surgery you may have some pain or nausea. This is common. Here are some tips for feeling better and getting well after surgery.     Stay on schedule with your medication.   Going home  Your doctor or nurse will show you how to take care of yourself when you go home. He or she will also answer your questions. Have an adult family member or friend drive you home.      For your safety we recommend these precaution for the first 24 hours after your procedure:  · Do not drive or use heavy equipment.  · Do not make important decisions or sign legal papers.  · Do not drink alcohol.  · Have someone stay with you, if needed. He or she can watch for problems and help keep you safe.  · Your concentration, balance, coordination, and judgement may be impaired for many hours after anesthesia.  Use caution when ambulating or standing up.     · You may feel weak and "washed out" after anesthesia and surgery.      Subtle residual effects of general anesthesia or sedation with regional / local anesthesia can last more than 24 hours.  Rest for the remainder of the day or longer if your Doctor/Surgeon has advised you to do so.  Although you may feel normal within the first 24 hours, your reflexes and mental ability may be impaired without you realizing it.  You may feel dizzy, lightheaded or sleepy for 24 " hours or longer.      Be sure to go to all follow-up visits with your doctor. And rest after your surgery for as long as your doctor tells you to.  Coping with pain  If you have pain after surgery, pain medicine will help you feel better. Take it as told, before pain becomes severe. Also, ask your doctor or pharmacist about other ways to control pain. This might be with heat, ice, or relaxation. And follow any other instructions your surgeon or nurse gives you.  Tips for taking pain medicine  To get the best relief possible, remember these points:  · Pain medicines can upset your stomach. Taking them with a little food may help.  · Most pain relievers taken by mouth need at least 20 to 30 minutes to start to work.  · Taking medicine on a schedule can help you remember to take it. Try to time your medicine so that you can take it before starting an activity. This might be before you get dressed, go for a walk, or sit down for dinner.  · Constipation is a common side effect of pain medicines. Call your doctor before taking any medicines such as laxatives or stool softeners to help ease constipation. Also ask if you should skip any foods. Drinking lots of fluids and eating foods such as fruits and vegetables that are high in fiber can also help. Remember, do not take laxatives unless your surgeon has prescribed them.  · Drinking alcohol and taking pain medicine can cause dizziness and slow your breathing. It can even be deadly. Do not drink alcohol while taking pain medicine.  · Pain medicine can make you react more slowly to things. Do not drive or run machinery while taking pain medicine.  Your health care provider may tell you to take acetaminophen to help ease your pain. Ask him or her how much you are supposed to take each day. Acetaminophen or other pain relievers may interact with your prescription medicines or other over-the-counter (OTC) drugs. Some prescription medicines have acetaminophen and other  ingredients. Using both prescription and OTC acetaminophen for pain can cause you to overdose. Read the labels on your OTC medicines with care. This will help you to clearly know the list of ingredients, how much to take, and any warnings. It may also help you not take too much acetaminophen. If you have questions or do not understand the information, ask your pharmacist or health care provider to explain it to you before you take the OTC medicine.  Managing nausea  Some people have an upset stomach after surgery. This is often because of anesthesia, pain, or pain medicine, or the stress of surgery. These tips will help you handle nausea and eat healthy foods as you get better. If you were on a special food plan before surgery, ask your doctor if you should follow it while you get better. These tips may help:  · Do not push yourself to eat. Your body will tell you when to eat and how much.  · Start off with clear liquids and soup. They are easier to digest.  · Next try semi-solid foods, such as mashed potatoes, applesauce, and gelatin, as you feel ready.  · Slowly move to solid foods. Dont eat fatty, rich, or spicy foods at first.  · Do not force yourself to have 3 large meals a day. Instead eat smaller amounts more often.  · Take pain medicines with a small amount of solid food, such as crackers or toast, to avoid nausea.     Call your surgeon if  · You still have pain an hour after taking medicine. The medicine may not be strong enough.  · You feel too sleepy, dizzy, or groggy. The medicine may be too strong.  · You have side effects like nausea, vomiting, or skin changes, such as rash, itching, or hives.       If you have obstructive sleep apnea  You were given anesthesia medicine during surgery to keep you comfortable and free of pain. After surgery, you may have more apnea spells because of this medicine and other medicines you were given. The spells may last longer than usual.   At home:  · Keep using the  continuous positive airway pressure (CPAP) device when you sleep. Unless your health care provider tells you not to, use it when you sleep, day or night. CPAP is a common device used to treat obstructive sleep apnea.  · Talk with your provider before taking any pain medicine, muscle relaxants, or sedatives. Your provider will tell you about the possible dangers of taking these medicines.  © 6381-9061 [employer]. [address] [place] [address], [place] [address]. All rights reserved. This information is not intended as a substitute for professional medical care. Always follow your healthcare professional's instructions.    General Information:    1.  Do not drink alcoholic beverages including beer for 24 hours or as long as you are on pain medication..  2.  Do not drive a motor vehicle, operate machinery or power tools, or signs legal papers for 24 hours or as long as you are on pain medication.   3.  You may experience light-headedness, dizziness, and sleepiness following surgery. Please do not stay alone. A responsible adult should be with you for this 24 hour period.  4.  Go home and rest.    5. Progress slowly to a normal diet unless instructed.  Otherwise, begin with liquids such as soft drinks, then soup and crackers working up to solid foods. Drink plenty of nonalcoholic fluids.  6.  Certain anesthetics and pain medications produce nausea and vomiting in certain       individuals. If nausea becomes a problem at home, call you doctor.    7. A nurse will be calling you sometime after surgery. Do not be alarmed. This is our way of finding out how you are doing.    8. Several times every hour while you are awake, take 2-3 deep breaths and cough. If you had stomach surgery hold a pillow or rolled towel firmly against your stomach before you cough. This will help with any pain the cough might cause.  9. Several times every hour while you are awake, pump and flex your feet 5-6 times and do foot circles. This  will help prevent blood clots.    10.Call your doctor for severe pain, bleeding, fever, or signs or symptoms of infection (pain, swelling, redness, foul odor, drainage).    Using Opioids for Pain Management     Your doctor has given instructions for you to take an opioid.  This is a drug for bad pain.  It helps control pain without causing bleeding and kidney problems.  Common opioid names are morphine, hydromorphone, oxycodone, and methadone. These drugs are called narcotics.    There are several safety concerns you need to know.     · It is against the law to give or sell this drug to another person.  You must keep this medicine safely locked.    · You may have side effects from taking this medication.  These include nausea, itching, sweating, sleepiness, a change in your ability to breathe, and depression.  · Do not take alcohol or sleeping pills opioids.    · Long-term opoid use may no longer giver you relief from pain.  It can cause you stomach pain, mental anxiety, and headaches.  Long-term opoid use can potentially lead to unlawful street drug abuse and reduce your ability to stay employed.    · Your body may become opioid tolerant if you need to take more to get relief.    · You must stop taking opioids if you begin having more pain as a result of the medicine.    · Opioid withdrawal occurs when you have to stop taking the drug.  It can cause you to have nausea, vomiting, diarrhea, stomach pain, anxiety, and dilated pupils in your eyes. This condition means you are opioid dependent.    · Addiction is a drug induced brain disease. It means there are changes in how your brain is working.  Children, teens, and young adults under 25 years old are more likely to get addicted to opioids.      · Addiction can happen with repeated opioid use.  It does not happen with short-term use of two weeks or less.       For more information, please speak with your doctor or pharmacist.  Using Opioids for Pain Management      Your doctor has given instructions for you to take an opioid.  This is a drug for bad pain.  It helps control pain without causing bleeding and kidney problems.  Common opioid names are morphine, hydromorphone, oxycodone, and methadone. These drugs are called narcotics.    There are several safety concerns you need to know.     · It is against the law to give or sell this drug to another person.  You must keep this medicine safely locked.    · You may have side effects from taking this medication.  These include nausea, itching, sweating, sleepiness, a change in your ability to breathe, and depression.  · Do not take alcohol or sleeping pills opioids.    · Long-term opoid use may no longer giver you relief from pain.  It can cause you stomach pain, mental anxiety, and headaches.  Long-term opoid use can potentially lead to unlawful street drug abuse and reduce your ability to stay employed.    · Your body may become opioid tolerant if you need to take more to get relief.    · You must stop taking opioids if you begin having more pain as a result of the medicine.    · Opioid withdrawal occurs when you have to stop taking the drug.  It can cause you to have nausea, vomiting, diarrhea, stomach pain, anxiety, and dilated pupils in your eyes. This condition means you are opioid dependent.    · Addiction is a drug induced brain disease. It means there are changes in how your brain is working.  Children, teens, and young adults under 25 years old are more likely to get addicted to opioids.      · Addiction can happen with repeated opioid use.  It does not happen with short-term use of two weeks or less.       For more information, please speak with your doctor or pharmacist.    We hope your stay was comfortable as you heal now, mend and rest.    For we have enjoyed taking care of you by giving your our best.    And as you get better, by regaining your health and strength;   We count it as a privilege to have served you  and hope your time at [facility] was well spent.      Thank  You!!!        POST-OPERATIVE DISCHARGE INSTRUCTIONS  CARPAL TUNNEL RELEASE  Overview:  Although night time symptoms are often relieved immediately, other symptoms, such as constant numbness, weakness or clumsiness are due to nerve damage and may not be completely relieved. These resolve very gradually, and recovery may be incomplete. Maximum improvement may take 6 to 12 months. Occasionally, the numbness may be more obvious after surgery because the pain and tingling are improved.   Gentle exercise and light use of the hand are encouraged beginning the day after surgery. Tenderness around the scar usually lasts for 8-12 weeks after surgery. This may take 6 months to resolve completely.  You may not be able to return to all activities at home or at work immediately after surgery and activity modification and work modification may be necessary. A cast or splint may be helpful if strenuous activity is unavoidable.   Range of motion of the fingers is important after surgery and encouraged. It helps the nerve to glide so adhesions dont form. However, simultaneous wrist and finger flexion should be avoided for at least 3 weeks after surgery.       As the wound heals the scar tissue shrinks and matures. This results in adhesions that pull on the median nerve and often result in brief shooting or electrical pains with motion. This commonly happens when you stretch your hand out to reach an item at arm's length. Sudden shooting or electrical shock pains may also occur spontaneously while you are doing nothing. Both of these are normal and improve with time.   Scar formation results in a lump at the base of the palm. This is noticeable when you lean on the hand or push off. This is normal and improves with time and massage. Edema (swelling) control, scar massage and desensitization are initiated when the incision is healed. Scar massage can be started approximately 2  weeks after surgery if the wound has healed. Use a non-perfumed lotion (Vitamin E lotion is OK). Gentle but firm pressure is applied to the wound and massaged in a circular fashion. This can be performed for a few minutes 5 times a day at first and over time less frequently as the tenderness improves.  strength is usually weak for 2 to 3 months following surgery. Full recovery is expected but may take 6 months.   You can resume light activities within your own tolerance, including driving, as soon as you feel comfortable enough to do so. Please use common sense and avoid activities that hurt. Sutures are removed approximately 2 weeks after surgery.

## 2017-08-30 ENCOUNTER — TELEPHONE (OUTPATIENT)
Dept: ORTHOPEDICS | Facility: CLINIC | Age: 67
End: 2017-08-30

## 2017-08-30 VITALS
DIASTOLIC BLOOD PRESSURE: 65 MMHG | BODY MASS INDEX: 36.19 KG/M2 | OXYGEN SATURATION: 94 % | TEMPERATURE: 98 F | WEIGHT: 212 LBS | SYSTOLIC BLOOD PRESSURE: 133 MMHG | HEIGHT: 64 IN | RESPIRATION RATE: 18 BRPM | HEART RATE: 66 BPM

## 2017-08-30 NOTE — TELEPHONE ENCOUNTER
----- Message from Melanie Juarez sent at 8/30/2017  2:13 PM CDT -----  Contact: Self  Patient left a voice mail message today at 12:30 for the nurse to call.  Had surgery with Dr. Edmond yesterday.

## 2017-09-11 ENCOUNTER — TELEPHONE (OUTPATIENT)
Dept: FAMILY MEDICINE | Facility: CLINIC | Age: 67
End: 2017-09-11

## 2017-09-11 NOTE — TELEPHONE ENCOUNTER
----- Message from Nava Hastings sent at 9/11/2017  9:48 AM CDT -----  Contact: self  Patient [phone] (please call this cell phone number) to discuss a prescription

## 2017-09-13 ENCOUNTER — OFFICE VISIT (OUTPATIENT)
Dept: ORTHOPEDICS | Facility: CLINIC | Age: 67
End: 2017-09-13
Payer: COMMERCIAL

## 2017-09-13 VITALS
WEIGHT: 212 LBS | HEART RATE: 63 BPM | DIASTOLIC BLOOD PRESSURE: 60 MMHG | HEIGHT: 64 IN | SYSTOLIC BLOOD PRESSURE: 112 MMHG | BODY MASS INDEX: 36.19 KG/M2

## 2017-09-13 DIAGNOSIS — G56.02 CARPAL TUNNEL SYNDROME ON LEFT: Primary | ICD-10-CM

## 2017-09-13 PROCEDURE — 99999 PR PBB SHADOW E&M-EST. PATIENT-LVL III: CPT | Mod: PBBFAC,,, | Performed by: ORTHOPAEDIC SURGERY

## 2017-09-13 PROCEDURE — 99024 POSTOP FOLLOW-UP VISIT: CPT | Mod: S$GLB,,, | Performed by: ORTHOPAEDIC SURGERY

## 2017-09-13 NOTE — PROGRESS NOTES
This note was created using Dragon dictation software.  It occasionally misinterpreted phrases or words.      Date of surgery: August 29, 2017    Chief complaint: Left hand pain    History of present illness: 66-year-old female underwent left carpal tunnel release about 2 weeks ago.  Patient is doing pretty well.  Has a little gapping of her wound.  Middle finger and index finger already improved.  Still having some numbness in the thumb.    Physical exam: Examination left hand shows a slight gapping in the wound.  No redness or drainage.  Neurovascularly intact.    X-rays: None    Assessment: Status post left carpal tunnel release    Plan: I reviewed the findings with her today.  Removed the stitches and placed some Steri-Strips over the wound.  Placed her in a Gelfoam brace.  Follow-up in 2 weeks for wound check.  Talked about possibly injecting her right carpal tunnel at that visit as well.

## 2017-09-14 ENCOUNTER — OFFICE VISIT (OUTPATIENT)
Dept: NEUROLOGY | Facility: CLINIC | Age: 67
End: 2017-09-14
Payer: COMMERCIAL

## 2017-09-14 ENCOUNTER — OFFICE VISIT (OUTPATIENT)
Dept: ORTHOPEDICS | Facility: CLINIC | Age: 67
End: 2017-09-14
Payer: COMMERCIAL

## 2017-09-14 VITALS
WEIGHT: 209.75 LBS | SYSTOLIC BLOOD PRESSURE: 105 MMHG | BODY MASS INDEX: 35.81 KG/M2 | HEIGHT: 64 IN | DIASTOLIC BLOOD PRESSURE: 62 MMHG | HEART RATE: 64 BPM

## 2017-09-14 VITALS — HEIGHT: 64 IN | WEIGHT: 212 LBS | BODY MASS INDEX: 36.19 KG/M2

## 2017-09-14 DIAGNOSIS — G56.03 BILATERAL CARPAL TUNNEL SYNDROME: Primary | ICD-10-CM

## 2017-09-14 DIAGNOSIS — G56.02 CARPAL TUNNEL SYNDROME ON LEFT: Primary | ICD-10-CM

## 2017-09-14 PROCEDURE — 99024 POSTOP FOLLOW-UP VISIT: CPT | Mod: S$GLB,,, | Performed by: ORTHOPAEDIC SURGERY

## 2017-09-14 PROCEDURE — 99999 PR PBB SHADOW E&M-EST. PATIENT-LVL II: CPT | Mod: PBBFAC,,, | Performed by: ORTHOPAEDIC SURGERY

## 2017-09-14 PROCEDURE — 3074F SYST BP LT 130 MM HG: CPT | Mod: S$GLB,,, | Performed by: PSYCHIATRY & NEUROLOGY

## 2017-09-14 PROCEDURE — 99999 PR PBB SHADOW E&M-EST. PATIENT-LVL II: CPT | Mod: PBBFAC,,, | Performed by: PSYCHIATRY & NEUROLOGY

## 2017-09-14 PROCEDURE — 3078F DIAST BP <80 MM HG: CPT | Mod: S$GLB,,, | Performed by: PSYCHIATRY & NEUROLOGY

## 2017-09-14 PROCEDURE — 3008F BODY MASS INDEX DOCD: CPT | Mod: S$GLB,,, | Performed by: PSYCHIATRY & NEUROLOGY

## 2017-09-14 PROCEDURE — 99213 OFFICE O/P EST LOW 20 MIN: CPT | Mod: S$GLB,,, | Performed by: PSYCHIATRY & NEUROLOGY

## 2017-09-14 NOTE — PROGRESS NOTES
Date of service:  9/14/2017    Chief complaint:  Paresthesias    Interval history:  The patient is a 66 y.o. female seen previously for paresthesias.  Since she was last here, she reports that she has now had surgery on her left wrist.  This was done about 2 weeks ago.  She is considering having the right wrist done.  She is presently using a wrist splint at night for the right hand.    History of present illness:  The patient is a 66 y.o. female referred for evaluation of paresthesia. This began about a year ago. The sensation is located in all 5 fingers of both hands.  She does have toe numbness, but this is not new and has not changed significantly recently.  The feeling is characterized as tingling and is moderate in intensity.  She reports no clear exacerbating or relieving factors.  She also noticed associated dropping of objects.  The sensation is present for a few minutes at a time.   The patient has this symptom daily, worst upon awakening.    Past Medical History:   Diagnosis Date    Allergy     Arthritis     Asthma     Cataract     Chronic fatigue 1/24/2017    Diabetes mellitus     resolved with gastric bypass    Diabetes mellitus, type 2     Encounter for blood transfusion     GERD (gastroesophageal reflux disease)     Headache(784.0)     History of lumpectomy of both breasts     1992 negative    Hyperlipidemia 7/15/2015    Hypertension     Hypothyroidism     Neuropathy     Obesity     SOHA on CPAP     Postmenopausal HRT (hormone replacement therapy)     Rash     Rosacea     Snoring     Wears glasses        Past Surgical History:   Procedure Laterality Date    ADENOIDECTOMY      APPENDECTOMY      BLADDER SUSPENSION      BUNIONECTOMY  10/17/14    right, still has discomfort    CATARACT EXTRACTION W/  INTRAOCULAR LENS IMPLANT Bilateral     CHOLECYSTECTOMY  08/02/2017    COLONOSCOPY      ESOPHAGOGASTRODUODENOSCOPY      dilated esophagus    GASTRIC BYPASS      2011     "HYSTERECTOMY      interstim bladder  2009    10/14/14 new battery    SKIN CANCER EXCISION      left hand    TONSILLECTOMY      WISDOM TOOTH EXTRACTION         Family History   Problem Relation Age of Onset    Diabetes      Hypertension      Hypothyroidism         Social History     Social History    Marital status:      Spouse name: N/A    Number of children: N/A    Years of education: N/A     Occupational History    Not on file.     Social History Main Topics    Smoking status: Never Smoker    Smokeless tobacco: Never Used    Alcohol use No    Drug use: No    Sexual activity: Not Currently     Other Topics Concern    Not on file     Social History Narrative    No narrative on file       Review of patient's allergies indicates:   Allergen Reactions    Sulfa (sulfonamide antibiotics) Hives    Naproxen Other (See Comments)     Other reaction(s): RT sided numbness          Review of Systems   General/Constitutional:  No unintentional weight loss, No change in appetite  Eyes/Vision:  No change in vision, No double vision  ENT:  No frequent nose bleeds, + ringing in the ears  Respiratory:  No cough, No wheezing  Cardiovascular:  No chest pain, No palpitations  Gastrointestinal:  No jaundice, + nausea/vomiting  Genitourinary:  + incontinence, No burning with urination  Hematologic/Lymphatic:  No easy bruising/bleeding, No night sweats  Neurological:  + numbness, No weakness  Endocrine:  No fatigue, No heat/cold intolerance  Allergy/Immunologic:  No fevers, No chills  Musculoskeletal:  No muscle pain, No joint pain   Psychiatric:  No thoughts of harming self/others, No depression  Integumentary:  No rashes, No sores that do not heal    Physical exam:  /62 (BP Location: Left arm, Patient Position: Sitting, BP Method: Medium (Automatic))   Pulse 64   Ht 5' 4" (1.626 m)   Wt 95.1 kg (209 lb 12.3 oz)   BMI 36.01 kg/m²   General: Well developed, well nourished.  No acute distress.  HEENT: " "Atraumatic, normocephalic.  Neck: Supple, trachea midline.  Cardiovascular: Regular rate and rhythm.  Pulmonary: No increased work of breathing.  Abdomen/GI: No guarding.  Musculoskeletal: No obvious joint deformities, moves all extremities well.    Neurological exam:  Mental status: Awake and alert.  Oriented x4.  Speech fluent and appropriate.  Recent and remote memory appear to be intact.  Fund of knowledge normal.  Cranial nerves: Pupils equal round and reactive to light, extraocular movements intact, facial strength and sensation intact bilaterally, palate and tongue midline, hearing grossly intact bilaterally.  Motor: 5 out of 5 strength throughout the upper and lower extremities bilaterally. Normal bulk and tone.  Sensation: Intact to light touch and temperature bilaterally. + Tinel's bilaterally.  DTR: 1+ at the knees and biceps bilaterally.  Coordination: Finger-nose-finger testing intact bilaterally.  Gait: Nonfocal gait.      Data base:  Notes of the referring physician were reviewed.  Briefly summarized, these addressed multiple issues including HTN, DM, dyslipidemia, and CTS.    Labs:  The patient's most recent labs from 8/17 included a BMP with a mild hyperglycemia and CBC that was unremarkable.    CT C-spine (6/17):  "1.  C2-3 and C3-4 mild disc bulges.  2.  C4-5 broad-based disc extrusion with spinal cord impingement and moderate bilateral foraminal stenosis.  3.  C5-6 and C6-7 minimal disc bulges.  4.  C4-5 right degenerative facet arthrosis."  I independently visualized and interpreted this study.     EMG (8/17):  "1. Chronic moderate to severe left carpal tunnel syndrome, with signs of active axonal denervation.  2. Moderate right carpal tunnel syndrome.  3. Right cervical radiculitis, history of cervical radiculopathy, and or early cervical radiculopathy (needle EMG findings isolated to the paraspinal musculature)."    Assessment and plan:  The patient is a 66 y.o. female with bilateral CTS.  She " is working with orthopedics on this.  We will see her back on a prn basis.

## 2017-09-18 ENCOUNTER — OFFICE VISIT (OUTPATIENT)
Dept: ORTHOPEDICS | Facility: CLINIC | Age: 67
End: 2017-09-18
Payer: COMMERCIAL

## 2017-09-18 ENCOUNTER — TELEPHONE (OUTPATIENT)
Dept: ORTHOPEDICS | Facility: CLINIC | Age: 67
End: 2017-09-18

## 2017-09-18 VITALS
HEIGHT: 64 IN | HEART RATE: 56 BPM | DIASTOLIC BLOOD PRESSURE: 62 MMHG | SYSTOLIC BLOOD PRESSURE: 143 MMHG | BODY MASS INDEX: 35.68 KG/M2 | WEIGHT: 209 LBS

## 2017-09-18 DIAGNOSIS — G56.02 CARPAL TUNNEL SYNDROME ON LEFT: Primary | ICD-10-CM

## 2017-09-18 PROCEDURE — 99999 PR PBB SHADOW E&M-EST. PATIENT-LVL III: CPT | Mod: PBBFAC,,, | Performed by: ORTHOPAEDIC SURGERY

## 2017-09-18 PROCEDURE — 99024 POSTOP FOLLOW-UP VISIT: CPT | Mod: S$GLB,,, | Performed by: ORTHOPAEDIC SURGERY

## 2017-09-18 NOTE — TELEPHONE ENCOUNTER
----- Message from Kayla Garcia sent at 9/18/2017  8:32 AM CDT -----  Contact: call  [phone]    Calling  To  Get  Fitted in  Today   [facility]   Coming apart  On   Surgery  site

## 2017-09-18 NOTE — PROGRESS NOTES
This note was created using Dragon dictation software.  It occasionally misinterpreted phrases or words.      Date of surgery: August 29, 2017    Chief complaint: Left hand pain    History of present illness: 66-year-old female underwent left carpal tunnel release about 2 weeks ago. We Dermabonded her wound last week but it reopened.    Physical exam: Examination left hand shows a moderate amount of gapping in the wound.  No redness or drainage.  Neurovascularly intact.    X-rays: None    Assessment: Status post left carpal tunnel release    Plan: I went ahead stitched her wound back up.  Used 3-0 nylon. Patient will follow-up in 2 weeks for wound check.

## 2017-09-27 ENCOUNTER — OFFICE VISIT (OUTPATIENT)
Dept: ORTHOPEDICS | Facility: CLINIC | Age: 67
End: 2017-09-27
Payer: COMMERCIAL

## 2017-09-27 VITALS
WEIGHT: 209 LBS | DIASTOLIC BLOOD PRESSURE: 64 MMHG | SYSTOLIC BLOOD PRESSURE: 110 MMHG | HEART RATE: 62 BPM | HEIGHT: 64 IN | BODY MASS INDEX: 35.68 KG/M2

## 2017-09-27 DIAGNOSIS — G56.02 CARPAL TUNNEL SYNDROME ON LEFT: Primary | ICD-10-CM

## 2017-09-27 DIAGNOSIS — G56.01 CARPAL TUNNEL SYNDROME OF RIGHT WRIST: ICD-10-CM

## 2017-09-27 PROCEDURE — 99999 PR PBB SHADOW E&M-EST. PATIENT-LVL III: CPT | Mod: PBBFAC,,, | Performed by: ORTHOPAEDIC SURGERY

## 2017-09-27 PROCEDURE — 20526 THER INJECTION CARP TUNNEL: CPT | Mod: 79,RT,S$GLB, | Performed by: ORTHOPAEDIC SURGERY

## 2017-09-27 PROCEDURE — 99024 POSTOP FOLLOW-UP VISIT: CPT | Mod: S$GLB,,, | Performed by: ORTHOPAEDIC SURGERY

## 2017-09-27 RX ORDER — TRIAMCINOLONE ACETONIDE 40 MG/ML
40 INJECTION, SUSPENSION INTRA-ARTICULAR; INTRAMUSCULAR
Status: DISCONTINUED | OUTPATIENT
Start: 2017-09-27 | End: 2017-09-27 | Stop reason: HOSPADM

## 2017-09-27 RX ADMIN — TRIAMCINOLONE ACETONIDE 40 MG: 40 INJECTION, SUSPENSION INTRA-ARTICULAR; INTRAMUSCULAR at 11:09

## 2017-09-27 NOTE — PROCEDURES
Carpal Tunnel  Date/Time: 9/27/2017 11:07 AM  Performed by: BROOKS RIBEIRO  Authorized by: BROOKS RIBEIRO     Consent Done?: Yes (Verbal)  Indications: Pain  Site marked: The procedure site was marked    Timeout: Prior to procedure the correct patient, procedure, and site was verified      Location: R carpal tunnel.  Prep: Patient was prepped and draped in usual sterile fashion    Needle size:  21 G  Approach:  Volar  Medications:  40 mg triamcinolone acetonide 40 mg/mL  Patient tolerance:  Patient tolerated the procedure well with no immediate complications

## 2017-09-27 NOTE — PROGRESS NOTES
This note was created using Dragon dictation software.  It occasionally misinterpreted phrases or words.      Date of surgery: August 29, 2017    Chief complaint: Left hand pain    History of present illness: 66-year-old female underwent left carpal tunnel release about 3 weeks ago.  Wound seems to be healing well.  Having little more pain from right carpal tunnel syndrome.    Physical exam: Examination left hand shows a minimal amount of gapping in the wound.  No redness or drainage.  Neurovascularly intact.  Healing well    X-rays: None    Assessment: Status post left carpal tunnel release    Plan: Wound is healing well.  I will see her back in 2 weeks.  I did agreed to inject her right carpal tunnel today.

## 2017-10-02 ENCOUNTER — IMMUNIZATION (OUTPATIENT)
Dept: FAMILY MEDICINE | Facility: CLINIC | Age: 67
End: 2017-10-02
Payer: COMMERCIAL

## 2017-10-02 ENCOUNTER — HOSPITAL ENCOUNTER (OUTPATIENT)
Dept: RADIOLOGY | Facility: HOSPITAL | Age: 67
Discharge: HOME | End: 2017-10-02
Attending: INTERNAL MEDICINE
Payer: COMMERCIAL

## 2017-10-02 ENCOUNTER — TELEPHONE (OUTPATIENT)
Dept: FAMILY MEDICINE | Facility: CLINIC | Age: 67
End: 2017-10-02

## 2017-10-02 DIAGNOSIS — R91.1 LUNG NODULE: ICD-10-CM

## 2017-10-02 DIAGNOSIS — I10 ESSENTIAL HYPERTENSION: ICD-10-CM

## 2017-10-02 PROCEDURE — 71250 CT THORAX DX C-: CPT | Mod: TC,PO

## 2017-10-02 PROCEDURE — 71250 CT THORAX DX C-: CPT | Mod: 26,,, | Performed by: RADIOLOGY

## 2017-10-02 PROCEDURE — 90471 IMMUNIZATION ADMIN: CPT | Mod: S$GLB,,, | Performed by: INTERNAL MEDICINE

## 2017-10-02 PROCEDURE — 90686 IIV4 VACC NO PRSV 0.5 ML IM: CPT | Mod: S$GLB,,, | Performed by: INTERNAL MEDICINE

## 2017-10-02 NOTE — PROGRESS NOTES
The area of abnormality In airway is still present.  Should likely have scope test to inspect??  Discuss at follow up.

## 2017-10-04 ENCOUNTER — OFFICE VISIT (OUTPATIENT)
Dept: DERMATOLOGY | Facility: CLINIC | Age: 67
End: 2017-10-04
Payer: COMMERCIAL

## 2017-10-04 VITALS — RESPIRATION RATE: 16 BRPM | BODY MASS INDEX: 35.68 KG/M2 | HEIGHT: 64 IN | WEIGHT: 209 LBS

## 2017-10-04 DIAGNOSIS — Z85.828 PERSONAL HISTORY OF MALIGNANT NEOPLASM OF SKIN: ICD-10-CM

## 2017-10-04 DIAGNOSIS — B07.8 OTHER VIRAL WARTS: ICD-10-CM

## 2017-10-04 DIAGNOSIS — L82.1 SEBORRHEIC KERATOSIS: ICD-10-CM

## 2017-10-04 DIAGNOSIS — D17.22 LIPOMA OF LEFT UPPER EXTREMITY: ICD-10-CM

## 2017-10-04 DIAGNOSIS — Z12.83 SKIN CANCER SCREENING: ICD-10-CM

## 2017-10-04 DIAGNOSIS — L57.0 ACTINIC KERATOSES: Primary | ICD-10-CM

## 2017-10-04 PROCEDURE — 17000 DESTRUCT PREMALG LESION: CPT | Mod: 59,S$GLB,, | Performed by: DERMATOLOGY

## 2017-10-04 PROCEDURE — 99999 PR PBB SHADOW E&M-EST. PATIENT-LVL II: CPT | Mod: PBBFAC,,, | Performed by: DERMATOLOGY

## 2017-10-04 PROCEDURE — 17110 DESTRUCTION B9 LES UP TO 14: CPT | Mod: S$GLB,,, | Performed by: DERMATOLOGY

## 2017-10-04 PROCEDURE — 99214 OFFICE O/P EST MOD 30 MIN: CPT | Mod: 25,S$GLB,, | Performed by: DERMATOLOGY

## 2017-10-04 PROCEDURE — 17003 DESTRUCT PREMALG LES 2-14: CPT | Mod: S$GLB,,, | Performed by: DERMATOLOGY

## 2017-10-04 RX ORDER — NITROFURANTOIN 25; 75 MG/1; MG/1
CAPSULE ORAL
COMMUNITY
Start: 2017-09-14 | End: 2017-10-19

## 2017-10-04 RX ORDER — AZITHROMYCIN 500 MG/1
TABLET, FILM COATED ORAL
COMMUNITY
Start: 2017-10-01 | End: 2017-10-19 | Stop reason: SDUPTHER

## 2017-10-04 RX ORDER — AZELASTINE HYDROCHLORIDE 0.5 MG/ML
1 SOLUTION/ DROPS OPHTHALMIC DAILY PRN
COMMUNITY
Start: 2017-09-08 | End: 2020-12-29

## 2017-10-04 NOTE — PROGRESS NOTES
Subjective:       Patient ID:  Cornelia Delatorre is a 66 y.o. female who presents for   Chief Complaint   Patient presents with    Follow-up     Last seen 5-30-17   Lesion L forearm - few slightly raised & pink - never treated  Lesion L thumb - few months - raised - picks at & lesion returns   Lesion L lower lip - scales at times - pick at - never treated  Bump L upper fore arm noticed months ago - never treated  Lesion L calf - few months -dark in color - never treated  Lesion R upper arm - dry , scaly patch - never treated       Acneiform dermatitis-   benzoyl peroxide-erythromycin (BENZAMYCIN) gel; AAA face bid as needed for pimples   Hirsutism, chin treating bumps with benzamycin prn      S/P Actinic Keratosis, right hand- Cryosurgery [place]  Phx SPECIMEN  1) Lesion right nasal septum.-Right nasal septum :PEDUNCULATED SURFACE WITH SQUAMOUS LINING WITH HYPERKERATOSIS AND HYPERGRANULOSIS -Dr Loja excised   Phx skin ca R hand - Dr Parra - 2014   Phx SCC L hand -mohs Dr Jones  -2015   History of actinic keratoses on nasal Dorsum in the past hx of efudex use  Uses CeraVe AM cream daily for routine         Review of Systems   Skin: Positive for daily sunscreen use and activity-related sunscreen use.   Hematologic/Lymphatic: Bruises/bleeds easily.        Objective:    Physical Exam   Constitutional: She appears well-developed and well-nourished. No distress.   HENT:   Mouth/Throat: Lips normal.    Eyes: Lids are normal.  No conjunctival no injection.   Cardiovascular: There is no dependent edema.     Neurological: She is alert and oriented to person, place, and time. She is not disoriented.   Psychiatric: She has a normal mood and affect.   Skin:   Areas Examined (abnormalities noted in diagram):   Head / Face Inspection Performed  Neck Inspection Performed  Chest / Axilla Inspection Performed  Abdomen Inspection Performed  Back Inspection Performed  RUE Inspected  LUE Inspection Performed  RLE  Inspected  LLE Inspection Performed                       Diagram Legend     Erythematous scaling macule/papule c/w actinic keratosis       Vascular papule c/w angioma      Pigmented verrucoid papule/plaque c/w seborrheic keratosis      Yellow umbilicated papule c/w sebaceous hyperplasia      Irregularly shaped tan macule c/w lentigo     1-2 mm smooth white papules consistent with Milia      Movable subcutaneous cyst with punctum c/w epidermal inclusion cyst      Subcutaneous movable cyst c/w pilar cyst      Firm pink to brown papule c/w dermatofibroma      Pedunculated fleshy papule(s) c/w skin tag(s)      Evenly pigmented macule c/w junctional nevus     Mildly variegated pigmented, slightly irregular-bordered macule c/w mildly atypical nevus      Flesh colored to evenly pigmented papule c/w intradermal nevus       Pink pearly papule/plaque c/w basal cell carcinoma      Erythematous hyperkeratotic cursted plaque c/w SCC      Surgical scar with no sign of skin cancer recurrence      Open and closed comedones      Inflammatory papules and pustules      Verrucoid papule consistent consistent with wart     Erythematous eczematous patches and plaques     Dystrophic onycholytic nail with subungual debris c/w onychomycosis     Umbilicated papule    Erythematous-base heme-crusted tan verrucoid plaque consistent with inflamed seborrheic keratosis     Erythematous Silvery Scaling Plaque c/w Psoriasis     See annotation      Assessment / Plan:        Actinic keratoses  Cryosurgery Procedure Note    Verbal consent from the patient is obtained and the patient is aware of the precancerous quality and need for treatment of these lesions. Liquid nitrogen cryosurgery is applied to the 2 actinic keratoses, as detailed in the physical exam, to produce a freeze injury. The patient is aware that blisters may form and is instructed on wound care with gentle cleansing and use of vaseline ointment to keep moist until healed. The patient  is supplied a handout on cryosurgery and is instructed to call if lesions do not completely resolve. Discussed risk postinflammatory pigmentary changes.       Other viral warts, thumb  Cryosurgery procedure note:    Verbal consent from the patient is obtained. Liquid nitrogen cryosurgery is applied to 1 lesions to produce a freeze injury. The patient is aware that blisters may form and is instructed on wound care with gentle cleansing and use of vaseline ointment to keep moist until healed. The patient is supplied a handout on cryosurgery and is instructed to call if lesions do not completely resolve. Risk of dyspigmentation discussed.       Personal history of malignant neoplasm of skin  Area(s) of previous NMSC evaluated with no signs of recurrence.    Upper body skin examination performed today including at least 6 points as noted in physical examination. No lesions suspicious for malignancy noted.      Skin cancer screening  Upper body skin examination performed today including at least 6 points as noted in physical examination. No lesions suspicious for malignancy noted.        Lipoma of left upper extremity, left arm  Reassurance given to patient. No treatment is necessary.     Seborrheic keratosis, left arm  These are benign inherited growths without a malignant potential. Reassurance given to patient. No treatment is necessary.                Return in about 6 months (around 4/4/2018).

## 2017-10-11 ENCOUNTER — OFFICE VISIT (OUTPATIENT)
Dept: ORTHOPEDICS | Facility: CLINIC | Age: 67
End: 2017-10-11
Payer: COMMERCIAL

## 2017-10-11 VITALS
HEART RATE: 56 BPM | DIASTOLIC BLOOD PRESSURE: 66 MMHG | BODY MASS INDEX: 35.68 KG/M2 | WEIGHT: 209 LBS | SYSTOLIC BLOOD PRESSURE: 108 MMHG | HEIGHT: 64 IN

## 2017-10-11 DIAGNOSIS — G56.02 CARPAL TUNNEL SYNDROME ON LEFT: Primary | ICD-10-CM

## 2017-10-11 PROCEDURE — 99999 PR PBB SHADOW E&M-EST. PATIENT-LVL III: CPT | Mod: PBBFAC,,, | Performed by: ORTHOPAEDIC SURGERY

## 2017-10-11 PROCEDURE — 99024 POSTOP FOLLOW-UP VISIT: CPT | Mod: S$GLB,,, | Performed by: ORTHOPAEDIC SURGERY

## 2017-10-11 NOTE — PROGRESS NOTES
This note was created using Dragon dictation software.  It occasionally misinterpreted phrases or words.      Date of surgery: August 29, 2017    Chief complaint: Left hand pain    History of present illness: 66-year-old female underwent left carpal tunnel release about 6 weeks ago.  Wound seems to be healing well.  Carpal Tunnel injection on the right hand has helped a lot.    Physical exam: Examination left hand shows a minimal amount of gapping in the wound.  No redness or drainage.  Neurovascularly intact.  Healing well    X-rays: None    Assessment: Status post left carpal tunnel release    Plan: Wound is healing well.  She will follow-up as needed.  Talked about periodically injecting her carpal tunnels as needed.

## 2017-10-13 ENCOUNTER — PATIENT OUTREACH (OUTPATIENT)
Dept: ADMINISTRATIVE | Facility: HOSPITAL | Age: 67
End: 2017-10-13

## 2017-10-19 ENCOUNTER — OFFICE VISIT (OUTPATIENT)
Dept: PULMONOLOGY | Facility: CLINIC | Age: 67
End: 2017-10-19
Payer: COMMERCIAL

## 2017-10-19 VITALS
OXYGEN SATURATION: 97 % | DIASTOLIC BLOOD PRESSURE: 65 MMHG | HEART RATE: 52 BPM | BODY MASS INDEX: 35.23 KG/M2 | SYSTOLIC BLOOD PRESSURE: 140 MMHG | HEIGHT: 64 IN | WEIGHT: 206.38 LBS

## 2017-10-19 DIAGNOSIS — D84.9 IMMUNE DEFICIENCY DISORDER: ICD-10-CM

## 2017-10-19 DIAGNOSIS — J45.20 MILD INTERMITTENT ASTHMA WITHOUT COMPLICATION: ICD-10-CM

## 2017-10-19 DIAGNOSIS — R91.1 LUNG NODULE: ICD-10-CM

## 2017-10-19 DIAGNOSIS — R91.8 ABNORMAL CT SCAN, LUNG: Primary | ICD-10-CM

## 2017-10-19 DIAGNOSIS — J20.9 ACUTE BRONCHITIS, UNSPECIFIED ORGANISM: ICD-10-CM

## 2017-10-19 DIAGNOSIS — J47.9 BRONCHIECTASIS WITHOUT COMPLICATION: ICD-10-CM

## 2017-10-19 DIAGNOSIS — J32.9 OTHER SINUSITIS, UNSPECIFIED CHRONICITY: ICD-10-CM

## 2017-10-19 DIAGNOSIS — J32.9 SINUSITIS, UNSPECIFIED CHRONICITY, UNSPECIFIED LOCATION: ICD-10-CM

## 2017-10-19 DIAGNOSIS — Z20.1 EXPOSURE TO TB: ICD-10-CM

## 2017-10-19 PROCEDURE — 99214 OFFICE O/P EST MOD 30 MIN: CPT | Mod: S$GLB,,, | Performed by: INTERNAL MEDICINE

## 2017-10-19 PROCEDURE — 99999 PR PBB SHADOW E&M-EST. PATIENT-LVL IV: CPT | Mod: PBBFAC,,, | Performed by: INTERNAL MEDICINE

## 2017-10-19 RX ORDER — AZITHROMYCIN 500 MG/1
500 TABLET, FILM COATED ORAL DAILY
Qty: 3 TABLET | Refills: 3 | Status: SHIPPED | OUTPATIENT
Start: 2017-10-19 | End: 2017-10-27 | Stop reason: ALTCHOICE

## 2017-10-19 RX ORDER — MINERAL OIL
180 ENEMA (ML) RECTAL DAILY
COMMUNITY

## 2017-10-19 RX ORDER — FLUTICASONE PROPIONATE 50 MCG
2 SPRAY, SUSPENSION (ML) NASAL DAILY
Qty: 3 BOTTLE | Refills: 3 | Status: SHIPPED | OUTPATIENT
Start: 2017-10-19 | End: 2018-05-14 | Stop reason: SDUPTHER

## 2017-10-19 RX ORDER — BUDESONIDE AND FORMOTEROL FUMARATE DIHYDRATE 160; 4.5 UG/1; UG/1
2 AEROSOL RESPIRATORY (INHALATION) EVERY 12 HOURS
Qty: 3 INHALER | Refills: 3 | Status: SHIPPED | OUTPATIENT
Start: 2017-10-19 | End: 2017-11-10 | Stop reason: SDUPTHER

## 2017-10-19 RX ORDER — MONTELUKAST SODIUM 10 MG/1
10 TABLET ORAL NIGHTLY
Qty: 90 TABLET | Refills: 3 | Status: SHIPPED | OUTPATIENT
Start: 2017-10-19 | End: 2018-02-14 | Stop reason: SDUPTHER

## 2017-10-19 RX ORDER — BUDESONIDE AND FORMOTEROL FUMARATE DIHYDRATE 160; 4.5 UG/1; UG/1
2 AEROSOL RESPIRATORY (INHALATION) EVERY 12 HOURS
Qty: 1 INHALER | Refills: 11 | Status: SHIPPED | OUTPATIENT
Start: 2017-10-19 | End: 2017-10-19 | Stop reason: SDUPTHER

## 2017-10-19 RX ORDER — ALBUTEROL SULFATE 90 UG/1
2 AEROSOL, METERED RESPIRATORY (INHALATION) EVERY 4 HOURS PRN
Qty: 3 INHALER | Refills: 3 | Status: SHIPPED | OUTPATIENT
Start: 2017-10-19 | End: 2018-05-14 | Stop reason: SDUPTHER

## 2017-10-19 RX ORDER — ALBUTEROL SULFATE 90 UG/1
2 AEROSOL, METERED RESPIRATORY (INHALATION) EVERY 4 HOURS PRN
Qty: 1 INHALER | Refills: 11 | Status: SHIPPED | OUTPATIENT
Start: 2017-10-19 | End: 2017-10-19 | Stop reason: SDUPTHER

## 2017-10-19 RX ORDER — MONTELUKAST SODIUM 10 MG/1
10 TABLET ORAL NIGHTLY
Qty: 30 TABLET | Refills: 11 | Status: SHIPPED | OUTPATIENT
Start: 2017-10-19 | End: 2017-10-19 | Stop reason: SDUPTHER

## 2017-10-19 RX ORDER — CICLOPIROX 80 MG/ML
SOLUTION TOPICAL
COMMUNITY
Start: 2017-10-05 | End: 2019-06-25

## 2017-10-19 RX ORDER — AZELASTINE 1 MG/ML
1 SPRAY, METERED NASAL 2 TIMES DAILY
Qty: 90 ML | Refills: 3 | Status: SHIPPED | OUTPATIENT
Start: 2017-10-19 | End: 2018-05-14 | Stop reason: SDUPTHER

## 2017-10-19 NOTE — PROGRESS NOTES
10/19/2017    Cornelia Delatorre  Office f/u    Chief Complaint   Patient presents with    Follow-up     4 month     Pulmonary Nodules    Bronchiectasis    Cough   oct 19, 2017- has scratchy throat, some sinus drip, has nightly sensation throat issues, post beryl and carpel tunnel surg.  No sinus lung infections.  Breathing and cough good.  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 Had pos tb gold.      June 21, 2017- had good [place] trip, tapered symbicort with some increase sensation of lung problems so maintained full dose. No infections.  No chest symptoms on symbicort.         April 24, feels a lot better with min cough, vague sob going left side down at night.  Going to [place] 2 weeks.  Uses symbicort and good results.      March 16, cough better, but comes and goes,  No great help with steroids.  Submitted 3 sputums.  Had pneumovax march last yr.  Breathing better. Got symbicort.     Had pneumonia vaccine last yr    3/8/2017 HPI: had onset cough Hernan illness, got dizzy few days with diarrhea and cough. Cough clear sm amt mucous. Did have 101 temp one day, fatigue, no muscle aches.  Appetite decreased.  Illnesses lingered 2 weeks but cough never remitted- has improved with inhahler use last 15 days.      Had gastric 2011 bypass with with continued diarrhea intially resolved. No regurg or reflux or swallow problems.     symbicort nearly  Resolved.      Sinuses ok.  No pets.  Never smoker.      Appetite good, feels well now.      headcolds to chest since childhood, nocturnal ??not recalled.    Had cats in past but got rid in 2003 or so.     The chief compliant  problem is new to me   PFS:  Past Medical History:   Diagnosis Date    Allergy     Arthritis     Asthma     Cataract     Chronic fatigue 1/24/2017    Diabetes mellitus     resolved with gastric bypass    Diabetes mellitus, type 2     Encounter for blood transfusion     GERD (gastroesophageal reflux  "disease)     Headache(784.0)     History of lumpectomy of both breasts     1992 negative    Hyperlipidemia 7/15/2015    Hypertension     Hypothyroidism     Neuropathy     Obesity     SOHA on CPAP     Postmenopausal HRT (hormone replacement therapy)     Rash     Rosacea     Snoring     Wears glasses          Past Surgical History:   Procedure Laterality Date    ADENOIDECTOMY      APPENDECTOMY      BLADDER SUSPENSION      BUNIONECTOMY  10/17/14    right, still has discomfort    CATARACT EXTRACTION W/  INTRAOCULAR LENS IMPLANT Bilateral     CHOLECYSTECTOMY  08/02/2017    COLONOSCOPY      ESOPHAGOGASTRODUODENOSCOPY      dilated esophagus    GASTRIC BYPASS      2011    HYSTERECTOMY      interstim bladder  2009    10/14/14 new battery    SKIN CANCER EXCISION      left hand    TONSILLECTOMY      WISDOM TOOTH EXTRACTION       Social History   Substance Use Topics    Smoking status: Never Smoker    Smokeless tobacco: Never Used    Alcohol use No     Family History   Problem Relation Age of Onset    Diabetes      Hypertension      Hypothyroidism       Review of patient's allergies indicates:   Allergen Reactions    Sulfa (sulfonamide antibiotics) Hives    Naproxen Other (See Comments)     Other reaction(s): RT sided numbness         Performance Status:The patient's activity level is functions out of house.      Review of Systems:  a review of eleven systems covering constitutional, Eye, HEENT, Psych, Respiratory, Cardiac, GI, , Musculoskeletal, Endocrine, Dermatologic was negative except for pertinent findings as listed ABOVE and below: all good,  Had dm that remitted with bypass 2011.      Exam:Comprehensive exam done. BP (!) 140/65 (BP Location: Right arm, Patient Position: Sitting)   Pulse (!) 52   Ht 5' 4" (1.626 m)   Wt 93.6 kg (206 lb 5.6 oz)   SpO2 97%   BMI 35.42 kg/m²   Exam included Vitals as listed, and patient's appearance and affect and alertness and mood, oral exam for " yeast and hygiene and pharynx lesions and Mallapatti (M) score, neck with inspection for jvd and masses and thyroid abnormalities and lymph nodes (supraclavicular and infraclavicular nodes and axillary also examined and noted if abn), chest exam included symmetry and effort and fremitus and percussion and auscultation, cardiac exam included rhythm and gallops and murmur and rubs and jvd and edema, abdominal exam for mass and hepatosplenomegaly and tenderness and hernias and bowel sounds, Musculoskeletal exam with muscle tone and posture and mobility/gait and  strength, and skin for rashes and cyanosis and pallor and turgor, extremity for clubbing.  Findings were normal except for pertinent findings listed below:  M3, good bs, rest good.    Radiographs (ct chest and cxr) reviewed: view by direct vision  i do see clear bronchiectasis,nodules are noted.  Mucoid type impaction suggested in rul ant segment.      1. Region of endobronchial plugging unchanged since 2/7/17 of uncertain etiology. This could relate to a process such as allergic bronchopulmonary aspergillosis, a solid mass nodule, mucous plugging, residual from aspiration, endobronchial spread of disease. Further followup or evaluation is necessary    2. No evidence of pulmonary embolism    3. Multiple smaller pulmonary nodules are noted without detrimental changes since prior, the largest is 6 mm. Followup for size stability is suggested    4. Incidental findings as described above including cholelithiasis and gastric bypass        Electronically signed by: Raffi Gottlieb MD  Date: 03/14/17  Time: 10:41         Labs reviewed igm low, igg lowish, humerol titers na    Results for NEIL STEVENS (MRN [id number]) as of 4/24/2017 11:30   Ref. Range 3/15/2017 11:53   IgG - Serum Latest Ref Range: 650 - 1600 mg/dL 693   IgM Latest Ref Range: 50 - 300 mg/dL 22 (L)   IgA Latest Ref Range: 40 - 350 mg/dL 191   IgE Latest Ref Range: 0 - 100 IU/mL <35   Results for  CORNELIA STEVENS (MRN [id number]) as of 4/24/2017 11:30   Ref. Range 3/16/2017 08:33   Diphtheria Toxoid Ab Latest Ref Range: >0.099 IU/mL 0.077 (A)   Tetanus Ab Latest Ref Range: >0.150 IU/mL 3.504   S.pneumoniae Type 1 Latest Units: mcg/mL 27.6   S.pneumoniae Type 3 Latest Units: mcg/mL 0.8   Strep pneumo Type 4 Latest Units: mcg/mL <0.3   S.pneumoniae Type 5 Latest Units: mcg/mL 13.1   S.pneumoniae Type 6B Latest Units: mcg/mL 0.9   S.pneumoniae Type 7F Latest Units: mcg/mL 2.2   S.pneumoniae Type 8 Latest Units: mcg/mL 3.7   S.pneumoniae Type 9N Latest Units: mcg/mL 11.2   S.pneumoniae Type 9V Abs Latest Units: mcg/mL 1.1   S.pneumoniae Type 12F Latest Units: mcg/mL 1.1   Strep pneumo Type 14 Latest Units: mcg/mL 0.7   S.pneumoniae Type 18C Latest Units: mcg/mL 1.6   S.pneumoniae Type 19F Latest Units: mcg/mL 6.5   S.pneumoniae Type 23F Latest Units: mcg/mL 3.1     PFT    Done [facility] with 10% response, otherwise nl    Plan:  Clinical impression is resonably certain and repeated evaluation prn +/- follow up will be needed as below.    Cornelia was seen today for follow-up, pulmonary nodules, bronchiectasis and cough.    Diagnoses and all orders for this visit:    Abnormal CT scan, lung    Acute bronchitis, unspecified organism    Mild intermittent asthma without complication  -     Discontinue: SYMBICORT 160-4.5 mcg/actuation HFAA; Inhale 2 puffs into the lungs every 12 (twelve) hours.  -     Discontinue: albuterol 90 mcg/actuation inhaler; Inhale 2 puffs into the lungs every 4 (four) hours as needed. 2 puffs every 4 hours as needed for cough, wheeze, or shortness of breath  -     Discontinue: montelukast (SINGULAIR) 10 mg tablet; Take 1 tablet (10 mg total) by mouth every evening.  -     SYMBICORT 160-4.5 mcg/actuation HFAA; Inhale 2 puffs into the lungs every 12 (twelve) hours.  -     montelukast (SINGULAIR) 10 mg tablet; Take 1 tablet (10 mg total) by mouth every evening.  -     albuterol 90  mcg/actuation inhaler; Inhale 2 puffs into the lungs every 4 (four) hours as needed. 2 puffs every 4 hours as needed for cough, wheeze, or shortness of breath    Lung nodule  -     AFB Culture & Smear; Future  -     AFB Culture & Smear; Future  -     AFB Culture & Smear; Future  -     CT Chest Without Contrast; Future    Bronchiectasis without complication    Immune deficiency disorder  -     azithromycin (ZITHROMAX) 500 MG tablet; Take 1 tablet (500 mg total) by mouth once daily.    Exposure to TB  -     AFB Culture & Smear; Future  -     AFB Culture & Smear; Future  -     AFB Culture & Smear; Future    Other sinusitis, unspecified chronicity  -     fluticasone (FLONASE) 50 mcg/actuation nasal spray; 2 sprays by Each Nare route once daily.    Sinusitis, unspecified chronicity, unspecified location  -     azelastine (ASTELIN) 137 mcg (0.1 %) nasal spray; 1 spray (137 mcg total) by Nasal route 2 (two) times daily.        No Follow-up on file.    Discussed with patient above for education the following:      your response to vaccine was no significant, immune weakness but nothing else to do as long as all well.  Take azithromycin for infection upper respiratory.    Continue symbicort and as needed albuterol.    Tb exposed, collect sputums for afb x3, consider preventive therapy.    Abnormal airway in right upper lung - 3% cancer risk by Leroy model?, consider bronch, navigation bronch, or xray f/u.      Ct follow up June 2018- would scope if needed for problem.    Try singulair for sinus/throat.

## 2017-10-19 NOTE — PATIENT INSTRUCTIONS
your response to vaccine was no significant, immune weakness but nothing else to do as long as all well.  Take azithromycin for infection upper respiratory.    Continue symbicort and as needed albuterol.    Tb exposed, collect sputums for afb x3, consider preventive therapy.    Abnormal airway in right upper lung - 3% cancer risk by Leroy model?, consider bronch, navigation bronch, or xray f/u.      Ct follow up June 2018- would scope if needed for problem.    Try singulair for sinus/throat.

## 2017-10-20 ENCOUNTER — LAB VISIT (OUTPATIENT)
Dept: LAB | Facility: HOSPITAL | Age: 67
End: 2017-10-20
Attending: INTERNAL MEDICINE
Payer: COMMERCIAL

## 2017-10-20 DIAGNOSIS — Z20.1 EXPOSURE TO TB: ICD-10-CM

## 2017-10-20 DIAGNOSIS — R91.1 LUNG NODULE: ICD-10-CM

## 2017-10-20 PROCEDURE — 87015 SPECIMEN INFECT AGNT CONCNTJ: CPT

## 2017-10-20 PROCEDURE — 87116 MYCOBACTERIA CULTURE: CPT

## 2017-10-23 ENCOUNTER — LAB VISIT (OUTPATIENT)
Dept: LAB | Facility: HOSPITAL | Age: 67
End: 2017-10-23
Attending: INTERNAL MEDICINE
Payer: COMMERCIAL

## 2017-10-23 DIAGNOSIS — E03.4 HYPOTHYROIDISM DUE TO ACQUIRED ATROPHY OF THYROID: ICD-10-CM

## 2017-10-23 DIAGNOSIS — E11.9 CONTROLLED TYPE 2 DIABETES MELLITUS WITHOUT COMPLICATION, WITHOUT LONG-TERM CURRENT USE OF INSULIN: ICD-10-CM

## 2017-10-23 DIAGNOSIS — R91.1 LUNG NODULE: ICD-10-CM

## 2017-10-23 DIAGNOSIS — Z20.1 EXPOSURE TO TB: ICD-10-CM

## 2017-10-23 DIAGNOSIS — E78.5 DYSLIPIDEMIA: ICD-10-CM

## 2017-10-23 LAB
ALBUMIN SERPL BCP-MCNC: 3.3 G/DL
ALP SERPL-CCNC: 131 U/L
ALT SERPL W/O P-5'-P-CCNC: 18 U/L
ANION GAP SERPL CALC-SCNC: 10 MMOL/L
AST SERPL-CCNC: 20 U/L
BILIRUB SERPL-MCNC: 0.7 MG/DL
BUN SERPL-MCNC: 10 MG/DL
CALCIUM SERPL-MCNC: 9 MG/DL
CHLORIDE SERPL-SCNC: 105 MMOL/L
CHOLEST SERPL-MCNC: 126 MG/DL
CHOLEST/HDLC SERPL: 2.4 {RATIO}
CO2 SERPL-SCNC: 28 MMOL/L
CREAT SERPL-MCNC: 0.9 MG/DL
EST. GFR  (AFRICAN AMERICAN): >60 ML/MIN/1.73 M^2
EST. GFR  (NON AFRICAN AMERICAN): >60 ML/MIN/1.73 M^2
GLUCOSE SERPL-MCNC: 102 MG/DL
HDLC SERPL-MCNC: 52 MG/DL
HDLC SERPL: 41.3 %
LDLC SERPL CALC-MCNC: 55.6 MG/DL
NONHDLC SERPL-MCNC: 74 MG/DL
POTASSIUM SERPL-SCNC: 4.8 MMOL/L
PROT SERPL-MCNC: 6.5 G/DL
SODIUM SERPL-SCNC: 143 MMOL/L
T4 FREE SERPL-MCNC: 0.97 NG/DL
TRIGL SERPL-MCNC: 92 MG/DL
TSH SERPL DL<=0.005 MIU/L-ACNC: 0.36 UIU/ML

## 2017-10-23 PROCEDURE — 84443 ASSAY THYROID STIM HORMONE: CPT

## 2017-10-23 PROCEDURE — 80061 LIPID PANEL: CPT

## 2017-10-23 PROCEDURE — 84439 ASSAY OF FREE THYROXINE: CPT

## 2017-10-23 PROCEDURE — 87015 SPECIMEN INFECT AGNT CONCNTJ: CPT

## 2017-10-23 PROCEDURE — 87116 MYCOBACTERIA CULTURE: CPT

## 2017-10-23 PROCEDURE — 80053 COMPREHEN METABOLIC PANEL: CPT

## 2017-10-23 PROCEDURE — 83036 HEMOGLOBIN GLYCOSYLATED A1C: CPT

## 2017-10-23 PROCEDURE — 36415 COLL VENOUS BLD VENIPUNCTURE: CPT | Mod: PO

## 2017-10-24 LAB
ESTIMATED AVG GLUCOSE: 117 MG/DL
HBA1C MFR BLD HPLC: 5.7 %

## 2017-10-27 ENCOUNTER — OFFICE VISIT (OUTPATIENT)
Dept: FAMILY MEDICINE | Facility: CLINIC | Age: 67
End: 2017-10-27
Payer: COMMERCIAL

## 2017-10-27 ENCOUNTER — LAB VISIT (OUTPATIENT)
Dept: LAB | Facility: HOSPITAL | Age: 67
End: 2017-10-27
Attending: INTERNAL MEDICINE
Payer: COMMERCIAL

## 2017-10-27 VITALS
HEIGHT: 64 IN | WEIGHT: 205.69 LBS | OXYGEN SATURATION: 98 % | HEART RATE: 62 BPM | BODY MASS INDEX: 35.12 KG/M2 | RESPIRATION RATE: 18 BRPM | SYSTOLIC BLOOD PRESSURE: 124 MMHG | DIASTOLIC BLOOD PRESSURE: 78 MMHG

## 2017-10-27 DIAGNOSIS — Z20.1 EXPOSURE TO TB: ICD-10-CM

## 2017-10-27 DIAGNOSIS — R25.2 CRAMP IN MUSCLE: ICD-10-CM

## 2017-10-27 DIAGNOSIS — R41.3 MEMORY CHANGE: ICD-10-CM

## 2017-10-27 DIAGNOSIS — I10 ESSENTIAL HYPERTENSION: ICD-10-CM

## 2017-10-27 DIAGNOSIS — E11.9 CONTROLLED TYPE 2 DIABETES MELLITUS WITHOUT COMPLICATION, WITHOUT LONG-TERM CURRENT USE OF INSULIN: ICD-10-CM

## 2017-10-27 DIAGNOSIS — E03.4 HYPOTHYROIDISM DUE TO ACQUIRED ATROPHY OF THYROID: ICD-10-CM

## 2017-10-27 DIAGNOSIS — J47.9 BRONCHIECTASIS WITHOUT COMPLICATION: ICD-10-CM

## 2017-10-27 DIAGNOSIS — E78.5 DYSLIPIDEMIA: ICD-10-CM

## 2017-10-27 DIAGNOSIS — R91.1 LUNG NODULE: ICD-10-CM

## 2017-10-27 DIAGNOSIS — Z00.00 ROUTINE PHYSICAL EXAMINATION: Primary | ICD-10-CM

## 2017-10-27 DIAGNOSIS — D84.9 IMMUNE DEFICIENCY DISORDER: ICD-10-CM

## 2017-10-27 PROCEDURE — 99999 PR PBB SHADOW E&M-EST. PATIENT-LVL III: CPT | Mod: PBBFAC,,, | Performed by: INTERNAL MEDICINE

## 2017-10-27 PROCEDURE — 87116 MYCOBACTERIA CULTURE: CPT

## 2017-10-27 PROCEDURE — 99396 PREV VISIT EST AGE 40-64: CPT | Mod: S$GLB,,, | Performed by: INTERNAL MEDICINE

## 2017-10-27 PROCEDURE — 87205 SMEAR GRAM STAIN: CPT

## 2017-10-27 PROCEDURE — 87015 SPECIMEN INFECT AGNT CONCNTJ: CPT

## 2017-10-27 PROCEDURE — 87070 CULTURE OTHR SPECIMN AEROBIC: CPT

## 2017-10-27 RX ORDER — METFORMIN HYDROCHLORIDE 500 MG/1
1000 TABLET ORAL 2 TIMES DAILY WITH MEALS
COMMUNITY
End: 2018-02-14 | Stop reason: SDUPTHER

## 2017-10-27 RX ORDER — LEVOTHYROXINE SODIUM 50 UG/1
50 TABLET ORAL DAILY
Qty: 30 TABLET | Refills: 3 | Status: SHIPPED | OUTPATIENT
Start: 2017-10-27 | End: 2018-02-14 | Stop reason: SDUPTHER

## 2017-10-27 RX ORDER — LEVOTHYROXINE SODIUM 75 UG/1
75 TABLET ORAL DAILY
Qty: 90 TABLET | Refills: 3 | Status: SHIPPED | OUTPATIENT
Start: 2017-10-27 | End: 2018-02-14 | Stop reason: SDUPTHER

## 2017-10-27 RX ORDER — POTASSIUM CHLORIDE 20 MEQ/1
20 TABLET, EXTENDED RELEASE ORAL DAILY
Qty: 90 TABLET | Refills: 2 | Status: SHIPPED | OUTPATIENT
Start: 2017-10-27 | End: 2018-02-14 | Stop reason: SDUPTHER

## 2017-10-27 NOTE — PROGRESS NOTES
Subjective:       Patient ID: Cornelia Delatorre is a 66 y.o. female.    Chief Complaint: Annual Exam    Here for routine health maintenance.      Complains of muscle/leg cramps when sleeping.    Feels like short term memory is getting worse.     SOB/KLINE- Saw Dr Nicole in [place].  Feels SOB mostly related to bronchiectasis.  Her symptoms have improved with asthma tx - Symbicort.  Resolved SOB, cough and chest pain/ heaviness.    Pulmonary nodules - Dr Nicole will repeat CT at 6 mo rahul.    Recent angiogram was normal - done by Dr Vega; sent to scan    Hypothyroid - supra-theraputic   HTN - controlled  DM - controlled;  Sees podiatry for peripheral neuropathy.  HLD - controlled for goal 70    Occasional anxiety relieved with prn hydroxyzine.   Has EGD scheduled for 7/21 for dysphagia with Dr Krishnan       Diabetes   She has type 2 diabetes mellitus. No MedicAlert identification noted. The initial diagnosis of diabetes was made 10 years ago. Pertinent negatives for hypoglycemia include no confusion, dizziness, headaches, hunger, mood changes, nervousness/anxiousness, pallor, seizures, sleepiness, speech difficulty, sweats or tremors. Pertinent negatives for diabetes include no blurred vision, no foot paresthesias and no polyphagia. Pertinent negatives for hypoglycemia complications include no blackouts, no hospitalization, no nocturnal hypoglycemia, no required assistance and no required glucagon injection. Diabetic complications include peripheral neuropathy. Pertinent negatives for diabetic complications include no autonomic neuropathy, CVA, heart disease, impotence, nephropathy, PVD or retinopathy. There are no known risk factors for coronary artery disease. Current diabetic treatment includes oral agent (monotherapy). Her weight is stable. She is following a generally healthy diet. When asked about meal planning, she reported none. She has not had a previous visit with a dietitian. She rarely participates in  exercise. She monitors blood glucose at home 1-2 x per week. Blood glucose monitoring compliance is fair. There is no change in her home blood glucose trend. She sees a podiatrist.Eye exam is current.     Review of Systems   Eyes: Negative for blurred vision.   Endocrine: Negative for polyphagia.   Genitourinary: Negative for impotence.   Skin: Negative for pallor.   Neurological: Negative for dizziness, tremors, seizures, speech difficulty and headaches.   Psychiatric/Behavioral: Negative for confusion. The patient is not nervous/anxious.        Objective:      Vitals:    10/27/17 0751   BP: 124/78   Pulse: 62   Resp: 18     Physical Exam   Constitutional: She appears well-nourished.   Eyes: Conjunctivae and EOM are normal.   Neck: Trachea normal and normal range of motion. No thyromegaly present.   Cardiovascular: Normal heart sounds.    Edema negative   Pulmonary/Chest: Effort normal and breath sounds normal.   Abdominal: Soft. There is no hepatomegaly.   Neurological: No cranial nerve deficit.   DTR decreased bilateral   Skin: Skin is warm, dry and intact.   Psychiatric: She has a normal mood and affect.   Alert and Oriented    Vitals reviewed.        Assessment:       1. Routine physical examination    2. Hypothyroidism due to acquired atrophy of thyroid    3. Controlled type 2 diabetes mellitus without complication, without long-term current use of insulin    4. Essential hypertension    5. Dyslipidemia    6. Memory change    7. Cramp in muscle        Plan:       Routine physical examination    Hypothyroidism due to acquired atrophy of thyroid  -     levothyroxine (SYNTHROID) 75 MCG tablet; Take 1 tablet (75 mcg total) by mouth once daily. On T, W, Th, Sat, Sun  Dispense: 90 tablet; Refill: 3  -     levothyroxine (SYNTHROID) 50 MCG tablet; Take 1 tablet (50 mcg total) by mouth once daily. On M and F only  Dispense: 30 tablet; Refill: 3  -     TSH; Future; Expected date: 04/25/2018    Controlled type 2 diabetes  mellitus without complication, without long-term current use of insulin  -     Hemoglobin A1c; Future; Expected date: 04/25/2018  -     Microalbumin/creatinine urine ratio; Future; Expected date: 04/25/2018    Essential hypertension  -     Comprehensive metabolic panel; Future; Expected date: 04/25/2018    Dyslipidemia  -     Lipid panel; Future; Expected date: 04/25/2018    Memory change  -     Magnesium; Future; Expected date: 04/25/2018  -     Neuropsychological testing; Future    Cramp in muscle  -     potassium chloride SA (K-DUR,KLOR-CON) 20 MEQ tablet; Take 1 tablet (20 mEq total) by mouth once daily.  Dispense: 90 tablet; Refill: 2    wellness reviewed    Medication List with Changes/Refills   New Medications    LEVOTHYROXINE (SYNTHROID) 50 MCG TABLET    Take 1 tablet (50 mcg total) by mouth once daily. On M and F only    POTASSIUM CHLORIDE SA (K-DUR,KLOR-CON) 20 MEQ TABLET    Take 1 tablet (20 mEq total) by mouth once daily.   Current Medications    ALBUTEROL 90 MCG/ACTUATION INHALER    Inhale 2 puffs into the lungs every 4 (four) hours as needed. 2 puffs every 4 hours as needed for cough, wheeze, or shortness of breath    ASPIRIN (ECOTRIN) 81 MG EC TABLET    Take 81 mg by mouth once daily.    AZELASTINE (ASTELIN) 137 MCG (0.1 %) NASAL SPRAY    1 spray (137 mcg total) by Nasal route 2 (two) times daily.    AZELASTINE (OPTIVAR) 0.05 % OPHTHALMIC SOLUTION    Place 1 drop into both eyes daily as needed.     BIFIDOBACTERIUM INFANTIS (ALIGN ORAL)    Take 1 capsule by mouth once daily.    BIOTIN ORAL    Take 10,000 mcg by mouth once daily.     CICLOPIROX (PENLAC) 8 % SOLN        CRANBERRY 500 MG CAP    Take 1 capsule by mouth every evening.    ESOMEPRAZOLE (NEXIUM) 40 MG CAPSULE    Take 1 capsule (40 mg total) by mouth before breakfast.    FEXOFENADINE (ALLEGRA) 60 MG TABLET    Take 60 mg by mouth once daily.    FISH OIL-OMEGA-3 FATTY ACIDS 300-1,000 MG CAPSULE    Take 2 g by mouth once daily.    FLUTICASONE  (FLONASE) 50 MCG/ACTUATION NASAL SPRAY    2 sprays by Each Nare route once daily.    GABAPENTIN (NEURONTIN) 600 MG TABLET    Take 1 tablet (600 mg total) by mouth 3 (three) times daily.    HYDROCHLOROTHIAZIDE (HYDRODIURIL) 25 MG TABLET    Take 1 tablet (25 mg total) by mouth once daily.    HYDROXYZINE HCL (ATARAX) 10 MG TAB    Take 3 tablets (30 mg total) by mouth 3 (three) times daily as needed (anxiety).    IRBESARTAN (AVAPRO) 75 MG TABLET    Take 1 tablet (75 mg total) by mouth every evening.    METFORMIN (GLUCOPHAGE) 500 MG TABLET    Take 1,000 mg by mouth 2 (two) times daily with meals.    METHYLCELLULOSE, LAXATIVE, (CITRUCEL) 500 MG TAB    Take 1 tablet by mouth every morning.    METOPROLOL TARTRATE (LOPRESSOR) 50 MG TABLET    Take 0.5 tablets (25 mg total) by mouth once daily at 6am.    MONTELUKAST (SINGULAIR) 10 MG TABLET    Take 1 tablet (10 mg total) by mouth every evening.    MULTIVITAMIN CAPSULE    Take 1 capsule by mouth. Take one tablets daily    PRAVASTATIN (PRAVACHOL) 40 MG TABLET    Take 1 tablet (40 mg total) by mouth once daily.    SIMETHICONE (GAS-X ORAL)    Take 1 capsule by mouth as needed.    SYMBICORT 160-4.5 MCG/ACTUATION HFAA    Inhale 2 puffs into the lungs every 12 (twelve) hours.    TESTOSTERONE AND ESTRADIOL CYP (ESTRADIOL-TESTOSTERONE IM)    Apply topically 3 (three) times a week.    Changed and/or Refilled Medications    Modified Medication Previous Medication    LEVOTHYROXINE (SYNTHROID) 75 MCG TABLET levothyroxine (SYNTHROID) 75 MCG tablet       Take 1 tablet (75 mcg total) by mouth once daily. On T, W, Th, Sat, Sun    Take 1 tablet (75 mcg total) by mouth once daily.   Discontinued Medications    AZITHROMYCIN (ZITHROMAX) 500 MG TABLET    Take 1 tablet (500 mg total) by mouth once daily.    HYDROCODONE-ACETAMINOPHEN 5-325MG (NORCO) 5-325 MG PER TABLET    Take 1 tablet by mouth every 6 (six) hours as needed for Pain.    METFORMIN (GLUCOPHAGE-XR) 500 MG 24 HR TABLET    Take 2 tablets  "(1,000 mg total) by mouth 2 (two) times daily with meals.             Counseled on regular exercise, maintenance of a healthy weight, balanced diet rich in fruits/vegetables and lean protein, and avoidance of unhealthy habits like smoking and excessive alcohol intake.   Also, counseled on importance of being compliant with medication, health appointments, diet and exercise.     Return in about 6 months (around 4/27/2018).    "This note will not be shared with the patient."  "

## 2017-10-30 ENCOUNTER — TELEPHONE (OUTPATIENT)
Dept: ADMINISTRATIVE | Facility: HOSPITAL | Age: 67
End: 2017-10-30

## 2017-10-30 LAB
BACTERIA SPEC AEROBE CULT: NORMAL
GRAM STN SPEC: NORMAL

## 2017-10-30 NOTE — LETTER
October 30, 2017    Genaro Sanderson MD              [facility]  [address]  [facility] [address]  Phone: [phone] October 30, 2017     Patient: Cornelia Delatorre    YOB: 1950   Date of Visit: 10/30/2017       To Whom It May Concern:      [facility]    We are seeing Cornelia Delatorre in the clinic today at [facility].  Armond Cortez MD is their PCP.  She/He has an outstanding lab/procedure at this time when reviewing their chart.  To help with our Health Maintenance records will you please supply the following:                                                      [x]  Eye Exam                                                Please Fax to [facility] at [phone]    Thank you for your help, Minnie Harrison LPN-[facility].  If I can be of any assistance you can call at [phone]        If you have any questions or concerns, please don't hesitate to call.    Sincerely,    Armond Cortez MD

## 2017-11-01 ENCOUNTER — TELEPHONE (OUTPATIENT)
Dept: PULMONOLOGY | Facility: CLINIC | Age: 67
End: 2017-11-01

## 2017-11-01 NOTE — TELEPHONE ENCOUNTER
Spoke to pt and discussed different medication options. Pt stated she is out of town currently and will call back on Friday to let us know what she decides.     ----- Message from Jossie Noguera sent at 10/31/2017  3:28 PM CDT -----  Contact: self  Patient is going on medicare has of tomorrow and is no longer eligible for the program with SYMBICORT 160-4.5 mcg. Patient will need something that is less expensive. Please call patient at [phone]. Thanks!

## 2017-11-10 DIAGNOSIS — J45.20 MILD INTERMITTENT ASTHMA WITHOUT COMPLICATION: ICD-10-CM

## 2017-11-10 RX ORDER — BUDESONIDE AND FORMOTEROL FUMARATE DIHYDRATE 160; 4.5 UG/1; UG/1
2 AEROSOL RESPIRATORY (INHALATION) EVERY 12 HOURS
Qty: 3 INHALER | Refills: 3 | Status: SHIPPED | OUTPATIENT
Start: 2017-11-10 | End: 2018-02-14 | Stop reason: SDUPTHER

## 2017-11-10 NOTE — TELEPHONE ENCOUNTER
----- Message from Irma Laguerre sent at 11/10/2017 10:23 AM CST -----  Contact: Patient  Patient would like to have Kiersten with doctors office give her a call.  Patient spoke with you last week and patient had to check with insurance company about some medication and you told her to give you a call back regarding it.  Call Back#[phone]  Thanks

## 2017-12-22 LAB
ACID FAST MOD KINY STN SPEC: NORMAL
MYCOBACTERIUM SPEC QL CULT: NORMAL

## 2017-12-25 LAB
ACID FAST MOD KINY STN SPEC: NORMAL
MYCOBACTERIUM SPEC QL CULT: NORMAL

## 2017-12-29 LAB
ACID FAST MOD KINY STN SPEC: NORMAL
MYCOBACTERIUM SPEC QL CULT: NORMAL

## 2018-01-05 ENCOUNTER — PES CALL (OUTPATIENT)
Dept: ADMINISTRATIVE | Facility: CLINIC | Age: 68
End: 2018-01-05

## 2018-01-08 ENCOUNTER — TELEPHONE (OUTPATIENT)
Dept: PULMONOLOGY | Facility: CLINIC | Age: 68
End: 2018-01-08

## 2018-01-08 RX ORDER — PREDNISONE 20 MG/1
TABLET ORAL
Qty: 12 TABLET | Refills: 0 | Status: SHIPPED | OUTPATIENT
Start: 2018-01-08 | End: 2018-01-15 | Stop reason: SDUPTHER

## 2018-01-08 NOTE — TELEPHONE ENCOUNTER
Pt advised to use her albuterol and prednisone to control her cough. Pt verbalized understanding.     ----- Message from Carie De Anda sent at 1/8/2018 10:07 AM CST -----  Contact: self  Patient states she started taking Azithromycin 500 Mg and cough syrup last night.  Patient has questions about which or her other medication should she stop taking or what she needs to do.  Patient is asking should she take any over the counter medication to ease  symptoms?  Please call at [phone].  Thanks!

## 2018-01-15 ENCOUNTER — OFFICE VISIT (OUTPATIENT)
Dept: PULMONOLOGY | Facility: CLINIC | Age: 68
End: 2018-01-15
Payer: MEDICARE

## 2018-01-15 ENCOUNTER — TELEPHONE (OUTPATIENT)
Dept: PULMONOLOGY | Facility: CLINIC | Age: 68
End: 2018-01-15

## 2018-01-15 VITALS
HEART RATE: 62 BPM | HEIGHT: 64 IN | BODY MASS INDEX: 33.38 KG/M2 | DIASTOLIC BLOOD PRESSURE: 66 MMHG | WEIGHT: 195.56 LBS | SYSTOLIC BLOOD PRESSURE: 116 MMHG | OXYGEN SATURATION: 94 %

## 2018-01-15 DIAGNOSIS — J45.41 MODERATE PERSISTENT ASTHMA WITH EXACERBATION: ICD-10-CM

## 2018-01-15 PROCEDURE — 96372 THER/PROPH/DIAG INJ SC/IM: CPT | Mod: S$GLB,,, | Performed by: INTERNAL MEDICINE

## 2018-01-15 PROCEDURE — 99214 OFFICE O/P EST MOD 30 MIN: CPT | Mod: 25,S$GLB,, | Performed by: INTERNAL MEDICINE

## 2018-01-15 PROCEDURE — 99999 PR PBB SHADOW E&M-EST. PATIENT-LVL III: CPT | Mod: PBBFAC,,, | Performed by: INTERNAL MEDICINE

## 2018-01-15 RX ORDER — BETAMETHASONE SODIUM PHOSPHATE AND BETAMETHASONE ACETATE 3; 3 MG/ML; MG/ML
12 INJECTION, SUSPENSION INTRA-ARTICULAR; INTRALESIONAL; INTRAMUSCULAR; SOFT TISSUE ONCE
Status: COMPLETED | OUTPATIENT
Start: 2018-01-15 | End: 2018-01-15

## 2018-01-15 RX ORDER — CODEINE PHOSPHATE AND GUAIFENESIN 10; 100 MG/5ML; MG/5ML
5 SOLUTION ORAL EVERY 4 HOURS PRN
Qty: 473 ML | Refills: 0 | Status: SHIPPED | OUTPATIENT
Start: 2018-01-15 | End: 2018-01-25

## 2018-01-15 RX ORDER — OSELTAMIVIR PHOSPHATE 75 MG/1
75 CAPSULE ORAL 2 TIMES DAILY
Qty: 10 CAPSULE | Refills: 0 | Status: SHIPPED | OUTPATIENT
Start: 2018-01-15 | End: 2018-01-20

## 2018-01-15 RX ORDER — AZITHROMYCIN 500 MG/1
TABLET, FILM COATED ORAL
COMMUNITY
Start: 2018-01-10 | End: 2018-01-22 | Stop reason: ALTCHOICE

## 2018-01-15 RX ORDER — PREDNISONE 20 MG/1
TABLET ORAL
Qty: 36 TABLET | Refills: 0 | Status: SHIPPED | OUTPATIENT
Start: 2018-01-15 | End: 2018-01-22 | Stop reason: ALTCHOICE

## 2018-01-15 RX ADMIN — BETAMETHASONE SODIUM PHOSPHATE AND BETAMETHASONE ACETATE 12 MG: 3; 3 INJECTION, SUSPENSION INTRA-ARTICULAR; INTRALESIONAL; INTRAMUSCULAR; SOFT TISSUE at 03:01

## 2018-01-15 NOTE — PATIENT INSTRUCTIONS
tamiflu if flu.    Need to use albuterol for any lung symptoms.  Should get cough  Better controlled.       Prednisone 20 mg 3 for 3 , taper may be needed.  Give shot today, 1 daily for 3 may be enough with albuterol.    You had high eosinophils-- if asthma becomes more active - special therapy may help??      prevnar 13 would be good - should be off prednisone.  Immune system is sl weak  Protection only to 7.14 pneumonia germs.

## 2018-01-15 NOTE — PROGRESS NOTES
1/15/2018    Cornelia Delatorre  Office f/u    Chief Complaint   Patient presents with    Shortness of Breath    Cough    Sputum Production     light yellow    Wheezing    Bronchiectasis   darlin 15, 2018--1 st illness in yr or so with cough and rattles, no flu.  Appetite ok.  Ill x wk, cough and wheeze and sob and noct worse, resp rx helps a bit.  Hasn't been using  Albuterol     oct 19, 2017- has scratchy throat, some sinus drip, has nightly sensation throat issues, post beryl and carpel tunnel surg.  No sinus lung infections.  Breathing and cough good.  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 Had pos tb gold.      June 21, 2017- had good [place] trip, tapered symbicort with some increase sensation of lung problems so maintained full dose. No infections.  No chest symptoms on symbicort.         April 24, feels a lot better with min cough, vague sob going left side down at night.  Going to [place] 2 weeks.  Uses symbicort and good results.      March 16, cough better, but comes and goes,  No great help with steroids.  Submitted 3 sputums.  Had pneumovax march last yr.  Breathing better. Got symbicort.     Had pneumonia vaccine last yr    3/8/2017 HPI: had onset cough Jan illness, got dizzy few days with diarrhea and cough. Cough clear sm amt mucous. Did have 101 temp one day, fatigue, no muscle aches.  Appetite decreased.  Illnesses lingered 2 weeks but cough never remitted- has improved with inhahler use last 15 days.      Had gastric 2011 bypass with with continued diarrhea intially resolved. No regurg or reflux or swallow problems.     symbicort nearly  Resolved.      Sinuses ok.  No pets.  Never smoker.      Appetite good, feels well now.      headcolds to chest since childhood, nocturnal ??not recalled.    Had cats in past but got rid in 2003 or so.     The chief compliant  problem is new to me   PFS:  Past Medical History:   Diagnosis Date    Allergy     Arthritis      Asthma     Cataract     Chronic fatigue 1/24/2017    Diabetes mellitus     resolved with gastric bypass    Diabetes mellitus, type 2     Encounter for blood transfusion     GERD (gastroesophageal reflux disease)     Headache(784.0)     History of lumpectomy of both breasts     1992 negative    Hyperlipidemia 7/15/2015    Hypertension     Hypothyroidism     Neuropathy     Obesity     SOHA on CPAP     Postmenopausal HRT (hormone replacement therapy)     Rash     Rosacea     Snoring     Wears glasses          Past Surgical History:   Procedure Laterality Date    ADENOIDECTOMY      APPENDECTOMY      BLADDER SUSPENSION      BUNIONECTOMY  10/17/14    right, still has discomfort    CATARACT EXTRACTION W/  INTRAOCULAR LENS IMPLANT Bilateral     CHOLECYSTECTOMY  08/02/2017    COLONOSCOPY      ESOPHAGOGASTRODUODENOSCOPY      dilated esophagus    GASTRIC BYPASS      2011    HYSTERECTOMY      interstim bladder  2009    10/14/14 new battery    SKIN CANCER EXCISION      left hand    TONSILLECTOMY      WISDOM TOOTH EXTRACTION       Social History   Substance Use Topics    Smoking status: Never Smoker    Smokeless tobacco: Never Used    Alcohol use No     Family History   Problem Relation Age of Onset    Diabetes      Hypertension      Hypothyroidism       Review of patient's allergies indicates:   Allergen Reactions    Sulfa (sulfonamide antibiotics) Hives    Naproxen Other (See Comments)     Other reaction(s): RT sided numbness         Performance Status:The patient's activity level is functions out of house.      Review of Systems:  a review of eleven systems covering constitutional, Eye, HEENT, Psych, Respiratory, Cardiac, GI, , Musculoskeletal, Endocrine, Dermatologic was negative except for pertinent findings as listed ABOVE and below: all good,  Had dm that remitted with bypass 2011.      Exam:Comprehensive exam done. /66 (BP Location: Right arm, Patient Position: Sitting)   " Pulse 62   Ht 5' 4" (1.626 m)   Wt 88.7 kg (195 lb 8.8 oz)   SpO2 (!) 94%   BMI 33.57 kg/m²   Exam included Vitals as listed, and patient's appearance and affect and alertness and mood, oral exam for yeast and hygiene and pharynx lesions and Mallapatti (M) score, neck with inspection for jvd and masses and thyroid abnormalities and lymph nodes (supraclavicular and infraclavicular nodes and axillary also examined and noted if abn), chest exam included symmetry and effort and fremitus and percussion and auscultation, cardiac exam included rhythm and gallops and murmur and rubs and jvd and edema, abdominal exam for mass and hepatosplenomegaly and tenderness and hernias and bowel sounds, Musculoskeletal exam with muscle tone and posture and mobility/gait and  strength, and skin for rashes and cyanosis and pallor and turgor, extremity for clubbing.  Findings were normal except for pertinent findings listed below:  M3, good bs, rest good.    Radiographs (ct chest and cxr) reviewed: view by direct vision  i do see clear bronchiectasis,nodules are noted.  Mucoid type impaction suggested in rul ant segment.      1. Region of endobronchial plugging unchanged since 2/7/17 of uncertain etiology. This could relate to a process such as allergic bronchopulmonary aspergillosis, a solid mass nodule, mucous plugging, residual from aspiration, endobronchial spread of disease. Further followup or evaluation is necessary    2. No evidence of pulmonary embolism    3. Multiple smaller pulmonary nodules are noted without detrimental changes since prior, the largest is 6 mm. Followup for size stability is suggested    4. Incidental findings as described above including cholelithiasis and gastric bypass        Electronically signed by: Raffi Gottlieb MD  Date: 03/14/17  Time: 10:41         Labs reviewed igm low, igg lowish, humerol titers na    Results for JOAQUIN STEVENSEB GUY (MRN [id number]) as of 4/24/2017 11:30   Ref. Range " 3/15/2017 11:53   IgG - Serum Latest Ref Range: 650 - 1600 mg/dL 693   IgM Latest Ref Range: 50 - 300 mg/dL 22 (L)   IgA Latest Ref Range: 40 - 350 mg/dL 191   IgE Latest Ref Range: 0 - 100 IU/mL <35   Results for CORNELIA STEVENS (MRN [id number]) as of 4/24/2017 11:30   Ref. Range 3/16/2017 08:33   Diphtheria Toxoid Ab Latest Ref Range: >0.099 IU/mL 0.077 (A)   Tetanus Ab Latest Ref Range: >0.150 IU/mL 3.504   S.pneumoniae Type 1 Latest Units: mcg/mL 27.6   S.pneumoniae Type 3 Latest Units: mcg/mL 0.8   Strep pneumo Type 4 Latest Units: mcg/mL <0.3   S.pneumoniae Type 5 Latest Units: mcg/mL 13.1   S.pneumoniae Type 6B Latest Units: mcg/mL 0.9   S.pneumoniae Type 7F Latest Units: mcg/mL 2.2   S.pneumoniae Type 8 Latest Units: mcg/mL 3.7   S.pneumoniae Type 9N Latest Units: mcg/mL 11.2   S.pneumoniae Type 9V Abs Latest Units: mcg/mL 1.1   S.pneumoniae Type 12F Latest Units: mcg/mL 1.1   Strep pneumo Type 14 Latest Units: mcg/mL 0.7   S.pneumoniae Type 18C Latest Units: mcg/mL 1.6   S.pneumoniae Type 19F Latest Units: mcg/mL 6.5   S.pneumoniae Type 23F Latest Units: mcg/mL 3.1     PFT    Done [facility] with 10% response, otherwise nl    Plan:  Clinical impression is resonably certain and repeated evaluation prn +/- follow up will be needed as below.    Cornelia was seen today for shortness of breath, cough, sputum production, wheezing and bronchiectasis.    Diagnoses and all orders for this visit:    Moderate persistent asthma with exacerbation  -     guaifenesin-codeine 100-10 mg/5 ml (TUSSI-ORGANIDIN NR)  mg/5 mL syrup; Take 5 mLs by mouth every 4 (four) hours as needed for Cough.  -     oseltamivir (TAMIFLU) 75 MG capsule; Take 1 capsule (75 mg total) by mouth 2 (two) times daily.  -     predniSONE (DELTASONE) 20 MG tablet; Take 3 daily for 3 days, then 2 daily for 3 days, then One daily for 3 days and repeat for flare of lung symptoms as intructed  -     betamethasone acetate-betamethasone sodium  phosphate injection 12 mg; Inject 2 mLs (12 mg total) into the muscle once.        Follow-up in about 6 months (around 7/15/2018).    Discussed with patient above for education the following:       tamiflu if flu.    Need to use albuterol for any lung symptoms.  Should get cough  Better controlled.       Prednisone 20 mg 3 for 3 , taper may be needed.  Give shot today, 1 daily for 3 may be enough with albuterol.    You had high eosinophils-- if asthma becomes more active - special therapy may help??      prevnar 13 would be good - should be off prednisone.  Immune system is sl weak  Protection only to 7.14 pneumonia germs.

## 2018-01-15 NOTE — TELEPHONE ENCOUNTER
Scheduled pt for same day access    ----- Message from Rubina Duarte sent at 1/15/2018  9:37 AM CST -----  Contact: patient  Patient Gilberto Delatorre [phone] is calling .  Patient was prescribed something last week, but it is not helping - still coughing, spitting up mucus, heavy chest.  Patient asked if Kiersten would call her back to discuss.  Please advise.  Thanks!

## 2018-01-22 ENCOUNTER — TELEPHONE (OUTPATIENT)
Dept: FAMILY MEDICINE | Facility: CLINIC | Age: 68
End: 2018-01-22

## 2018-01-22 ENCOUNTER — OFFICE VISIT (OUTPATIENT)
Dept: FAMILY MEDICINE | Facility: CLINIC | Age: 68
End: 2018-01-22
Payer: MEDICARE

## 2018-01-22 VITALS
HEART RATE: 75 BPM | BODY MASS INDEX: 33.57 KG/M2 | HEIGHT: 64 IN | SYSTOLIC BLOOD PRESSURE: 122 MMHG | WEIGHT: 196.63 LBS | DIASTOLIC BLOOD PRESSURE: 67 MMHG

## 2018-01-22 DIAGNOSIS — E03.9 ACQUIRED HYPOTHYROIDISM: ICD-10-CM

## 2018-01-22 DIAGNOSIS — G60.9 IDIOPATHIC PERIPHERAL NEUROPATHY: ICD-10-CM

## 2018-01-22 DIAGNOSIS — E78.5 HYPERLIPIDEMIA, UNSPECIFIED HYPERLIPIDEMIA TYPE: ICD-10-CM

## 2018-01-22 DIAGNOSIS — R39.15 URINARY URGENCY: ICD-10-CM

## 2018-01-22 DIAGNOSIS — E04.1 THYROID CYST: ICD-10-CM

## 2018-01-22 DIAGNOSIS — I77.1 TORTUOUS AORTA: ICD-10-CM

## 2018-01-22 DIAGNOSIS — E11.42 DIABETIC POLYNEUROPATHY ASSOCIATED WITH TYPE 2 DIABETES MELLITUS: ICD-10-CM

## 2018-01-22 DIAGNOSIS — I10 ESSENTIAL HYPERTENSION: ICD-10-CM

## 2018-01-22 DIAGNOSIS — E11.9 DIABETES MELLITUS, STABLE: ICD-10-CM

## 2018-01-22 DIAGNOSIS — Z00.00 ENCOUNTER FOR PREVENTIVE HEALTH EXAMINATION: Primary | ICD-10-CM

## 2018-01-22 DIAGNOSIS — J47.9 BRONCHIECTASIS WITHOUT COMPLICATION: ICD-10-CM

## 2018-01-22 DIAGNOSIS — R91.8 ABNORMAL CT SCAN, LUNG: ICD-10-CM

## 2018-01-22 DIAGNOSIS — B35.1 ONYCHOMYCOSIS DUE TO DERMATOPHYTE: ICD-10-CM

## 2018-01-22 DIAGNOSIS — R91.1 LUNG NODULE: ICD-10-CM

## 2018-01-22 DIAGNOSIS — J45.20 MILD INTERMITTENT ASTHMA WITHOUT COMPLICATION: ICD-10-CM

## 2018-01-22 DIAGNOSIS — I70.0 ATHEROSCLEROSIS OF ABDOMINAL AORTA: ICD-10-CM

## 2018-01-22 DIAGNOSIS — E11.8 CONTROLLED TYPE 2 DIABETES MELLITUS WITH COMPLICATION, WITHOUT LONG-TERM CURRENT USE OF INSULIN: ICD-10-CM

## 2018-01-22 DIAGNOSIS — G47.33 OSA (OBSTRUCTIVE SLEEP APNEA): ICD-10-CM

## 2018-01-22 DIAGNOSIS — D84.9 IMMUNE DEFICIENCY DISORDER: ICD-10-CM

## 2018-01-22 PROBLEM — G56.02 CARPAL TUNNEL SYNDROME ON LEFT: Status: RESOLVED | Noted: 2017-08-23 | Resolved: 2018-01-22

## 2018-01-22 PROBLEM — J20.9 ACUTE BRONCHITIS: Status: RESOLVED | Noted: 2017-03-08 | Resolved: 2018-01-22

## 2018-01-22 PROBLEM — R06.02 SHORTNESS OF BREATH: Status: RESOLVED | Noted: 2017-03-08 | Resolved: 2018-01-22

## 2018-01-22 PROBLEM — R53.82 CHRONIC FATIGUE: Status: RESOLVED | Noted: 2017-01-24 | Resolved: 2018-01-22

## 2018-01-22 PROBLEM — R42 DIZZY: Status: RESOLVED | Noted: 2017-01-24 | Resolved: 2018-01-22

## 2018-01-22 PROBLEM — K80.10 CHOLELITHIASIS WITH CHRONIC CHOLECYSTITIS: Status: RESOLVED | Noted: 2017-08-02 | Resolved: 2018-01-22

## 2018-01-22 PROCEDURE — G0402 INITIAL PREVENTIVE EXAM: HCPCS | Mod: S$GLB,,, | Performed by: NURSE PRACTITIONER

## 2018-01-22 PROCEDURE — 99999 PR PBB SHADOW E&M-EST. PATIENT-LVL V: CPT | Mod: PBBFAC,,, | Performed by: NURSE PRACTITIONER

## 2018-01-22 NOTE — PATIENT INSTRUCTIONS
Counseling and Referral of Other Preventative  (Italic type indicates deductible and co-insurance are waived)    Patient Name: Cornelia Delatorre  Today's Date: 1/22/2018    Health Maintenance       Date Due Completion Date    DEXA SCAN 11/03/1992 ---    Hemoglobin A1c 04/23/2018 10/23/2017    Foot Exam 07/19/2018 7/19/2017    Override on 9/1/2016: Done (date approximately, seeing Dr. Willams)    Override on 1/4/2016: Done (with Dr. Eng)    Override on 10/19/2015: Done (Dr. Eng)    Override on 10/22/2014: Done (Dr Eng)    Eye Exam 08/31/2018 8/31/2017 (Done)    Override on 8/31/2017: Done (Dr. Genaro Sanderson)    Override on 8/29/2016: Done (Genaro Sanderson)    Override on 8/20/2015: Done    Override on 8/19/2014: Done (Dr Sanderson)    Lipid Panel 10/23/2018 10/23/2017    Mammogram 11/15/2018 11/15/2016    Override on 10/9/2014: Done (by gyn)    Pneumococcal (65+) (2 of 2 - PPSV23) 03/16/2021 3/23/2017    TETANUS VACCINE 11/08/2023 11/8/2013    Colonoscopy 12/17/2024 12/17/2014 (Done)    Override on 12/17/2014: Done (Dr Duarte)        No orders of the defined types were placed in this encounter.    The following information is provided to all patients.  This information is to help you find resources for any of the problems found today that may be affecting your health:                Living healthy guide: www.[facility].[place].gov      Understanding Diabetes: www.diabetes.org      Eating healthy: www.cdc.gov/healthyweight      CDC home safety checklist: www.cdc.gov/steadi/patient.html      Agency on Aging: www.goea.[place].gov      Alcoholics anonymous (AA): www.aa.org      Physical Activity: www.richard.nih.gov/te6kgrv      Tobacco use: www.quitwithusla.org

## 2018-01-22 NOTE — PROGRESS NOTES
"Cornelia Delatorre presented for a  Medicare AWV and comprehensive Health Risk Assessment today. The following components were reviewed and updated:    · Medical history  · Family History  · Social history  · Allergies and Current Medications  · Health Risk Assessment  · Health Maintenance  · Care Team     Review of Systems   Constitutional: Negative for chills, fever, malaise/fatigue and weight loss.   Respiratory: Positive for cough and shortness of breath. Negative for wheezing.         Followed by pulmonary- Dr. Nicole   Cardiovascular: Negative for chest pain.   Gastrointestinal: Negative for abdominal pain, blood in stool, constipation, diarrhea, nausea and vomiting.   Skin: Negative for rash.   Neurological: Negative for dizziness, weakness and headaches.     ** See Completed Assessments for Annual Wellness Visit within the encounter summary.**     The following assessments were completed:  · Living Situation  · CAGE  · Depression Screening  · Timed Get Up and Go  · Whisper Test  · Cognitive Function Screening      · Nutrition Screening  · ADL Screening  · PAQ Screening    Vitals:    01/22/18 0847   BP: 122/67   BP Location: Left arm   Patient Position: Sitting   BP Method: Medium (Automatic)   Pulse: 75   Weight: 89.2 kg (196 lb 10.4 oz)   Height: 5' 4" (1.626 m)     Body mass index is 33.76 kg/m².  Physical Exam   Constitutional: She is oriented to person, place, and time. She appears well-nourished.   Cardiovascular: Normal rate, regular rhythm, normal heart sounds and intact distal pulses.    Pulmonary/Chest: Effort normal and breath sounds normal. She has no wheezes. She has no rales.   Neurological: She is alert and oriented to person, place, and time.   Skin: Skin is warm and dry. No rash noted.   Vitals reviewed.        Diagnoses and health risks identified today and associated recommendations/orders:    1. Encounter for preventive health examination  Reviewed and discussed health maintenance.    DEXA " done at [facility] (2016???)    2. Mild intermittent asthma without complication  Stable- continue current treatment and follow up routinely with PCP and pulmonary (Dr. Nicole)    3. Lung nodule  Stable- continue current treatment and follow up routinely with PCP and pulmonary (Dr. Nicole)  Ct chest 10/2017    4. Bronchiectasis without complication  Stable- continue current treatment and follow up routinely with PCP and pulmonary (Dr. Nicole)  Ct chest 10/2017    5. Abnormal CT scan, lung  Stable- continue current treatment and follow up routinely with PCP and pulmonary (Dr. Nicole)  Ct chest 10/2017    6. Hyperlipidemia, unspecified hyperlipidemia type  Stable- continue current treatment and follow up routinely with PCP     7. Essential hypertension  Stable- continue current treatment and follow up routinely with PCP     8. Immune deficiency disorder  Stable- continue current treatment and follow up routinely with PCP and pulmonary (Dr. Nicole)    9. Urinary urgency  Stable- continue current treatment and follow up routinely with PCP and urology ()    10. Thyroid cyst  Stable- continue current treatment and follow up routinely with PCP     11. Controlled type 2 diabetes mellitus with complication, without long-term current use of insulin  Stable- continue current treatment and follow up routinely with PCP   Last A1c=5.7    12. Acquired hypothyroidism  Continue current treatment and routine follow ups with PCP    13. Idiopathic peripheral neuropathy  Stable- continue current treatment and follow up routinely with PCP and podiatry (Dr. Eng)    14. Diabetic polyneuropathy associated with type 2 diabetes mellitus  Stable- continue current treatment and follow up routinely with PCP and podiatry (Dr. Eng)    15. Diabetes mellitus, stable  Stable- continue current treatment and follow up routinely with PCP     16. Onychomycosis due to dermatophyte  Stable- continue current treatment and follow up routinely with PCP and  podiatry (Dr. Eng)    17. BMI 33.0-33.9,adult  Encouraged healthy eating, weight loss and routine exercise     18. SOHA (obstructive sleep apnea), Auto BIPAP Mod OSAS since Dec 2013, therapeutic and compliant 100%, ESS 6/24 Feb 2014  Wears CPAP nightly an benefits from use    19. Atherosclerosis of abdominal aorta  Stable- continue current treatment and follow up routinely with PCP   Ultrasound 9/2015    20. Tortuous aorta  Stable- continue current treatment and follow up routinely with PCP   Ultrasound 9/2015    I offered to discuss end of life issues, including information on how to make advance directives that the patient could use to name someone who would make medical decisions on their behalf if they became too ill to make themselves.    _X_Patient declined. Done and copy on chart  ___Patient is interested, I provided paper work and offered to discuss.    Provided Cornelia with a 5-10 year written screening schedule and personal prevention plan. Recommendations were developed using the USPSTF age appropriate recommendations. Education, counseling, and referrals were provided as needed. After Visit Summary printed and given to patient which includes a list of additional screenings\tests needed.    Sera Monroy NP

## 2018-01-22 NOTE — TELEPHONE ENCOUNTER
Placed call to pt to notify of number to call and schedule neuropsychological testing.  No answer on mobile number. Left voicemail to check her [employer] messages or call clinic with call back number provided.  Spoke with spouse at home number asked him to tell pt to call clinic.

## 2018-01-25 ENCOUNTER — TELEPHONE (OUTPATIENT)
Dept: FAMILY MEDICINE | Facility: CLINIC | Age: 68
End: 2018-01-25

## 2018-01-25 NOTE — TELEPHONE ENCOUNTER
----- Message from Marylin Girard sent at 1/25/2018  8:30 AM CST -----  Contact: self  Patient needs to speak to Mary about an e-mail she is having trouble with regarding some information about alzheimer's  Please call [phone]

## 2018-01-29 ENCOUNTER — OFFICE VISIT (OUTPATIENT)
Dept: UROLOGY | Facility: CLINIC | Age: 68
End: 2018-01-29
Payer: MEDICARE

## 2018-01-29 VITALS
BODY MASS INDEX: 33.88 KG/M2 | WEIGHT: 198.44 LBS | SYSTOLIC BLOOD PRESSURE: 113 MMHG | HEIGHT: 64 IN | HEART RATE: 67 BPM | DIASTOLIC BLOOD PRESSURE: 64 MMHG

## 2018-01-29 DIAGNOSIS — R39.15 URINARY URGENCY: Primary | ICD-10-CM

## 2018-01-29 DIAGNOSIS — R35.0 INCREASED FREQUENCY OF URINATION: ICD-10-CM

## 2018-01-29 PROCEDURE — 99999 PR PBB SHADOW E&M-EST. PATIENT-LVL IV: CPT | Mod: PBBFAC,,, | Performed by: UROLOGY

## 2018-01-29 PROCEDURE — 95971 ALYS SMPL SP/PN NPGT W/PRGRM: CPT | Mod: S$GLB,,, | Performed by: UROLOGY

## 2018-01-29 PROCEDURE — 99213 OFFICE O/P EST LOW 20 MIN: CPT | Mod: S$GLB,,, | Performed by: UROLOGY

## 2018-01-29 RX ORDER — PREDNISONE 20 MG/1
20 TABLET ORAL DAILY
COMMUNITY
End: 2018-04-04 | Stop reason: ALTCHOICE

## 2018-01-29 RX ORDER — CODEINE PHOSPHATE AND GUAIFENESIN 10; 100 MG/5ML; MG/5ML
5 SOLUTION ORAL 3 TIMES DAILY PRN
COMMUNITY
End: 2018-04-27 | Stop reason: ALTCHOICE

## 2018-02-12 ENCOUNTER — TELEPHONE (OUTPATIENT)
Dept: NEUROLOGY | Facility: CLINIC | Age: 68
End: 2018-02-12

## 2018-02-12 NOTE — TELEPHONE ENCOUNTER
Spoke to Pt she verbalzied her new appt date and time.       ---- Message from Taylor Mosley sent at 2/9/2018  4:09 PM CST -----  Contact: Self @ [phone]      ----- Message -----  From: Yoel Mathis  Sent: 2/9/2018   8:38 AM  To: Brenda Limon Staff    Pt is asking to reschedule appt on 04/10 to a different day. Pls call.

## 2018-02-14 DIAGNOSIS — J45.20 MILD INTERMITTENT ASTHMA WITHOUT COMPLICATION: ICD-10-CM

## 2018-02-14 DIAGNOSIS — R25.2 CRAMP IN MUSCLE: ICD-10-CM

## 2018-02-14 DIAGNOSIS — E03.4 HYPOTHYROIDISM DUE TO ACQUIRED ATROPHY OF THYROID: ICD-10-CM

## 2018-02-14 DIAGNOSIS — I10 ESSENTIAL HYPERTENSION: ICD-10-CM

## 2018-02-14 DIAGNOSIS — E78.5 DYSLIPIDEMIA: ICD-10-CM

## 2018-02-14 RX ORDER — BUDESONIDE AND FORMOTEROL FUMARATE DIHYDRATE 160; 4.5 UG/1; UG/1
2 AEROSOL RESPIRATORY (INHALATION) EVERY 12 HOURS
Qty: 6 INHALER | Refills: 1 | Status: SHIPPED | OUTPATIENT
Start: 2018-02-14 | End: 2018-04-17 | Stop reason: SDUPTHER

## 2018-02-14 RX ORDER — MONTELUKAST SODIUM 10 MG/1
10 TABLET ORAL NIGHTLY
Qty: 90 TABLET | Refills: 1 | Status: SHIPPED | OUTPATIENT
Start: 2018-02-14 | End: 2018-04-17 | Stop reason: SDUPTHER

## 2018-02-14 RX ORDER — POTASSIUM CHLORIDE 20 MEQ/1
20 TABLET, EXTENDED RELEASE ORAL DAILY
Qty: 90 TABLET | Refills: 1 | Status: SHIPPED | OUTPATIENT
Start: 2018-02-14 | End: 2018-04-27 | Stop reason: SDUPTHER

## 2018-02-14 RX ORDER — PRAVASTATIN SODIUM 40 MG/1
40 TABLET ORAL DAILY
Qty: 90 TABLET | Refills: 1 | Status: SHIPPED | OUTPATIENT
Start: 2018-02-14 | End: 2018-04-27 | Stop reason: SDUPTHER

## 2018-02-14 RX ORDER — IRBESARTAN 75 MG/1
75 TABLET ORAL NIGHTLY
Qty: 90 TABLET | Refills: 1 | Status: SHIPPED | OUTPATIENT
Start: 2018-02-14 | End: 2019-04-09 | Stop reason: ALTCHOICE

## 2018-02-14 RX ORDER — LEVOTHYROXINE SODIUM 50 UG/1
50 TABLET ORAL DAILY
Qty: 30 TABLET | Refills: 1 | Status: SHIPPED | OUTPATIENT
Start: 2018-02-14 | End: 2018-04-27 | Stop reason: SDUPTHER

## 2018-02-14 RX ORDER — METFORMIN HYDROCHLORIDE 500 MG/1
1000 TABLET ORAL 2 TIMES DAILY WITH MEALS
Qty: 360 TABLET | Refills: 1 | Status: SHIPPED | OUTPATIENT
Start: 2018-02-14 | End: 2018-04-27 | Stop reason: SDUPTHER

## 2018-02-14 RX ORDER — LEVOTHYROXINE SODIUM 75 UG/1
75 TABLET ORAL DAILY
Qty: 90 TABLET | Refills: 1 | Status: SHIPPED | OUTPATIENT
Start: 2018-02-14 | End: 2018-04-27 | Stop reason: SDUPTHER

## 2018-02-14 RX ORDER — HYDROCHLOROTHIAZIDE 25 MG/1
25 TABLET ORAL DAILY
Qty: 90 TABLET | Refills: 1 | Status: SHIPPED | OUTPATIENT
Start: 2018-02-14 | End: 2019-06-25

## 2018-02-14 RX ORDER — METOPROLOL TARTRATE 50 MG/1
25 TABLET ORAL DAILY
Qty: 90 TABLET | Refills: 1 | Status: SHIPPED | OUTPATIENT
Start: 2018-02-14 | End: 2018-04-27 | Stop reason: RX

## 2018-02-14 NOTE — TELEPHONE ENCOUNTER
----- Message from Christina Lopez sent at 2/14/2018  9:30 AM CST -----  Contact: Patient  Cornelia, patient [phone] or [phone] needs the following prescriptions sent into [facility] as a 90 day supply:    SYMBICORT 160-4.5 mcg/actuation HFAA  levothyroxine (SYNTHROID) 50 MCG tablet  levothyroxine (SYNTHROID) 75 MCG tablet  potassium chloride SA (K-DUR,KLOR-CON) 20 MEQ tablet  montelukast (SINGULAIR) 10 mg tablet  irbesartan (AVAPRO) 75 MG tablet  hydrochlorothiazide (HYDRODIURIL) 25 MG tablet  pravastatin (PRAVACHOL) 40 MG tablet  metFORMIN (GLUCOPHAGE) 500 MG tablet  metoprolol tartrate (LOPRESSOR) 25 MG tablet    [facility]  phone [phone]

## 2018-04-02 ENCOUNTER — INITIAL CONSULT (OUTPATIENT)
Dept: NEUROLOGY | Facility: CLINIC | Age: 68
End: 2018-04-02
Payer: MEDICARE

## 2018-04-02 DIAGNOSIS — G31.84 MILD COGNITIVE IMPAIRMENT: ICD-10-CM

## 2018-04-02 PROCEDURE — 99499 UNLISTED E&M SERVICE: CPT | Mod: S$GLB,,, | Performed by: PSYCHIATRY & NEUROLOGY

## 2018-04-02 PROCEDURE — 90791 PSYCH DIAGNOSTIC EVALUATION: CPT | Mod: S$GLB,,, | Performed by: PSYCHIATRY & NEUROLOGY

## 2018-04-02 PROCEDURE — 96118 PR NEUROPSYCH TESTING BY PSYCH/PHYS: CPT | Mod: S$GLB,,, | Performed by: PSYCHIATRY & NEUROLOGY

## 2018-04-03 NOTE — PROGRESS NOTES
NEUROPSYCHOLOGICAL EVALUATION - CONFIDENTIAL    REFERRAL SOURCE: Armond Cortez MD  MEDICAL NECESSITY:  Evaluate cognitive functioning in the setting of self-reported cognitive decline.   DATE CONDUCTED: 4/2/2018    SOURCES OF INFORMATION:  The following was gathered from a clinical interview with Ms. Cornelia Delatorre and review of the available medical records. Ms. Delatorre expressed an understanding of the purpose of the evaluation and consented to all procedures. Total licensed billing psychologists professional time including clinical interview, test administration and interpretation of tests administered by the billing psychologist, integration of test results and other clinical data, preparing the final report, and personally reporting results to the patient   15881 - 1 hour  12701 - 3 hours    HISTORY OF PRESENT ILLNESS: Ms. Cornelia Delatorre is a 67-year-old, right-handed,  female with 12 years of education who was referred for a neuropsychological evaluation in the setting of self-reported short-term memory and word finding complaints over the course of the past several months. Her complaints are not functionally limiting, but they are frustrating. She also wants to ensure that she is proactive if there are early signs of a dementia process.    Ms. Delatorre is especially frustrated by word finding difficulties. Speech is fluent, but there are instances when she cant think of a word or term. She provided the recent example of being unable to recall womans [employer]. She also described mild short-term memory loss. For example, it can take her 10 minutes to remember 1 of the 2 items she was going to  from the grocery store. She now tries to keep notes/lists. She denied changes in motor or sensory abilities.      IADLS/DAILY FUNCTIONING: Living with an intimate partner for the past 4 years. Remains functionally independent and involved in several social clubs.   Medication  Compliance: She independently fills a pillbox with no reported problems. She demonstrated a fair knowledge of her medication regimen during the interview. She reported strong medication compliance.   Appointment Management: Uses a calendar, keeps it updated. Puts them in her phone and on a paper calendar. No problems with missing appointments.   Financial Management: Independent. Sometimes gets close to payment due dates, but she pays them on time. She pays all the bills on one day per month on her computer.   Cooking: No problems with cooking.   Driving:  She denied problems with driving. Infrequently night drives.     MEDICAL HISTORY: DMII resolved following gastric bypass surgery in 2012, discontinued treatment following surgery. Asthma flare with extended prednisone treatment in 2017 that resulted in 30 pound weight gain and elevated blood sugar, warranting re-initiating of metformin. History also remarkable for SOHA, HTN, HLD, bilateral cataract surgery, tinnitus. Occasional difficulties with sleep initiation due to feeling winded at night secondary to asthma. She takes 5mg hydroxyzine several times per week when she is feeling wound up prior to bedtime. No problems with sleep maintenance. No naps during the day. 9 hours per night. Strong CPAP compliance. Limited physical activity. Denied a history of tobacco use. Very infrequently drinks alcohol with no history of substance abuse.     No neuroimaging on file.     CURRENT MEDICATIONS:     albuterol 90 mcg/actuation inhaler     aspirin (ECOTRIN) 81 MG EC tablet    azelastine (ASTELIN) 137 mcg (0.1 %) nasal spray    azelastine (OPTIVAR) 0.05 % ophthalmic solution    BIFIDOBACTERIUM INFANTIS (ALIGN ORAL)    ciclopirox (PENLAC) 8 % Soln    cranberry 500 mg Cap    esomeprazole (NEXIUM) 40 MG capsule    fexofenadine (ALLEGRA) 60 MG tablet    fish oil-omega-3 fatty acids 300-1,000 mg capsule    fluticasone (FLONASE) 50 mcg/actuation nasal spray    gabapentin  (NEURONTIN) 600 MG tablet    guaifenesin-codeine 100-10 mg/5 ml (GUAIFENESIN AC)  mg/5 mL syrup    hydroCHLOROthiazide (HYDRODIURIL) 25 MG tablet    hydrOXYzine HCl (ATARAX) 10 MG Tab    irbesartan (AVAPRO) 75 MG tablet    levothyroxine (SYNTHROID) 50 MCG tablet    levothyroxine (SYNTHROID) 75 MCG tablet    metFORMIN (GLUCOPHAGE) 500 MG tablet    methylcellulose, laxative, (CITRUCEL) 500 mg Tab    metoprolol tartrate (LOPRESSOR) 50 MG tablet    montelukast (SINGULAIR) 10 mg tablet    multivitamin capsule    potassium chloride SA (K-DUR,KLOR-CON) 20 MEQ tablet    pravastatin (PRAVACHOL) 40 MG tablet    SIMETHICONE (GAS-X ORAL)    SYMBICORT 160-4.5 mcg/actuation HFAA     PSYCHIATRIC HISTORY: Unremarkable pre-morbid history. Anxiety when her  was ill with cancer many years ago. She feels that anxiety has improved since that time. No history of depression. She denied active suicidal ideation, plan, or intent. Denied hallucinations. She believes that she was briefly prescribed a psychiatric medication following her s death. Otherwise, she has not received any form of mental health treatment. She denied current symptoms of depression, with occasional instances of feeling wound up.     FAMILY HISTORY: No known family history of dementia. Father  at 80 years old, possibly some cognitive/behavioral changes prior to his death. Brother with epilepsy.     PSYCHOSOCIAL HISTORY: Never a great test taker, but described herself as a fair, B/C student. High school graduate.  right out of high school. Cared for mother and raised children when she was younger. 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 A few part-time jobs since then, until  became ill in . Cared for him for 2 years prior to his death. In an intimate relationship since . 3 children.     BEHAVIORAL OBSERVATIONS: Ms. Delatorre arrived on time for the evaluation and was unaccompanied.  She was appropriately dressed and groomed. She wore glasses to correct for vision. No motor or sensory problems were noted. Speech was of normal rate, volume, and prosody. Expressive and receptive language were grossly intact. Mood was euthymic. She appeared to be a reliable historian with intact episodic memory for recent events. She had no difficulty understanding or retaining test instructions.     TEST RESULTS: The test scores are also included in an appendix to this report.      Mental Status: Ms. Delatorre was fully oriented to time and place. She scored 25/30 on the MoCA. She encoded 5/5 words after 2 trials, freely recalling 3/5 following a brief delay. She recalled 1/2 words with categorical cueing and she correctly identified the remaining word with multiple choice cueing. She accurately jason the face of a clock and correctly set the clock hands at the designated time, although the clock hands were joined slightly higher than the center. She provided 5/5 consecutive correct responses on a serial 7 subtraction task. Letter verbal fluency was mildly below expectation.     Pre-morbid/Baseline: Estimated to be in the average range.     Language: Speech was fluent. Expressive and receptive language were grossly intact. Confrontation naming was high average. Semantic verbal fluency was low average. Performance on a letter fluency test was extremely low.     Visuospatial: Ms. Rivas initial copy of a complex figure was below expectation, seemingly secondary to poor organization/planning that resulted in the misplacement of several details. She was asked to draw the figure again at the end of the evaluation and was provided an organization cue. Her second copy was WNL, further evidence that her initial copy was due to poor planning/organization rather than visuospatial dysfunction.     Learning/Memory: Ms. Rivas overall encoding of 2 short stories was high average/superior. Following a long delay, she  demonstrated perfect retention of the first story and lost a few details from the second story. Regardless, her overall recall was average/high average. Her responses to yes/no questions pertaining to the stories was also above average. Ms. Rivas overall encoding of a supraspan word list was average. Learning was inconsistent as she did not seem to employ a specific encoding strategy, resulting in mild across trial inconsistency. She then encoded 6/16 words from a second, distracter list (average). Retention was below expectation following exposure to the distracter list as she freely recalled 7/16 words (low average). Recall significantly improved with categorical cueing (13/16 words, average). Free recall following a long delay was also low average (8/16 words). Her performance again improved with categorical cueing (12/16 words, average). Recognition was average.     Executive Functioning: Working memory was low average/average. Processing speed was average. Performance on a test of divided attention/set shifting was high average. Qualitatively, Ms. Delatorre approached most tasks in a disorganized/inefficient fashion with limited planning.      Motor: Fine motor abilities were low average for both her dominant right hand and non-dominant left hand.     Mood: Ms. Rivas responses on a self-report measure was not suggestive of clinically significant depression. Her responses on another self-report inventory were suggestive of a mild degree of anxiety.     SUMMARY AND IMPRESSION: Ms. Cornelia Delatorre is a 67-year-old female who was referred for a neuropsychological evaluation in the setting of self-reported short-term memory and word finding complaints over the course of the past several months. She remains functionally independent with no reported problems.     Ms. Rivas test results revealed reduced verbal fluency and mild difficulties with cognitive organization/retrieval, all of which was consistent  with her self-report. She demonstrated intact functioning in all other areas of cognition, speech is fluent, and she remains independent in complex activities of daily living with no reported problems. She appears to be the diagnostic threshold between normal aging and non-amnestic mild cognitive impairment (MCI). The pattern of her symptoms and performance are not clearly indicative of a neurodegenerative condition at the present time. Rather, a history of vascular risk factors in addition to polypharmacy are the suspected primary contributing factors. She has not undergone a neurological work-up or neuroimaging, so it is difficult to rule out other causes of cognitive decline. Interval assessment is warranted.     DIAGNOSIS:  1. Normal aging vs. MCI    RECOMMENDATIONS:    1. Optimize Brain Health - Prioritize physical and social activity. Maintain a heart healthy diet and 100% treatment compliance.     2. Compensatory Mechanisms - Ms. Delatorre will be provided a book chapter on memory strategies. It will also be important to continue writing down important information, keeping an updated calendar, and utilizing lists/notes.     3. Referral to Neurology - For neurological work-up, including imaging and labs.     4.  Neuropsychology Follow-up - 12-18 months to assess for interval change.      I will provide Ms. Delatorre the results of the evaluation on 4/16/18.     Thank you for allowing me to participate in Ms. Rivas care.  If you have any questions, please contact me at [phone].    Braxton Gutierrez Psy.D., ABPP  Board Certified in Clinical Neuropsychology  [facility] - Department of Neurology    APPENDIX  TESTS ADMINISTERED:  Clinical Interview and Review of Records, [place] Cognitive Assessment (MoCA), Test of Premorbid Functioning (TOPF), selected subtests from the Wechsler Adult Intelligence Scale - 4th Edition (WAIS-IV), Logical Memory subtest form the Wechsler Memory Scale - 4th Edition  (WMS-IV), [place] Verbal Learning Test - Second edition (CVLT-II), [place] Complex Figure (copy trial only), Trailmaking Test Part A and B, Controlled Oral Word Association Test (COWAT), Semantic Verbal Fluency (Animals), Naming subtest from the NAB, Grooved Pegboard Test, Cerna Anxiety Inventory, and the Cerna Depression Inventory - second edition (BDI-2).     TOPF    Standard Score  (+ simple demographics) 92 (predicted FSIQ = 97)     MoCA    25/30 (24+1)    WAIS-IV   Index Percentile  Working Memory Index 95    37%  Processing Speed Index 105    63%    Individual subtests  Scaled Score   Blocks    9  Digit Span   11   LDF   7(raw)   LDB   5(raw)   LDS   6(raw)  RDS   9  Arithmetic   7  Symbol Search  12   Coding    10    WMS-IV   Index Score  Auditory Memory Index 105  Auditory Immediate  111  Auditory Delayed  100      Scaled Score  Logical Memory I  14  Logical Memory II  12  CVLT II (t1-5)   10  CVLT II (LD)   8  Logical Memory II Recog. 22/23 (raw) >75% (base rate)    CVLT-II   Z/T scores  T1-5     52  T1     0.5  T2     0  T3     0.5  T4    -0.5  T5    -0.5  List B     0  SDFR    -1.0  SDCR     0.5  LDFR    -1.0  LDCR     0  Total Recog. Disc  -1.0 (14/16 hits, 0 false positives)  FC     16/16    Eitan Complex Figure  Percentile  Copy    2-5% (28/36)  Time    >16% (334 seconds)                             [facility] Norms   T-Score  Trails A   48  Trails B   59 (0 errors)  FAS    28  Animal fluency   39  Pegboard D   41  Pegboard ND   42    NAB    T-Score  Naming   58 (31/31)    BDI-2    2  CHARLY    10

## 2018-04-04 ENCOUNTER — OFFICE VISIT (OUTPATIENT)
Dept: DERMATOLOGY | Facility: CLINIC | Age: 68
End: 2018-04-04
Payer: MEDICARE

## 2018-04-04 VITALS — WEIGHT: 198 LBS | HEIGHT: 64 IN | BODY MASS INDEX: 33.8 KG/M2

## 2018-04-04 DIAGNOSIS — Z12.83 SKIN CANCER SCREENING: ICD-10-CM

## 2018-04-04 DIAGNOSIS — D48.5 NEOPLASM OF UNCERTAIN BEHAVIOR OF SKIN: ICD-10-CM

## 2018-04-04 DIAGNOSIS — Z85.828 PERSONAL HISTORY OF OTHER MALIGNANT NEOPLASM OF SKIN: Primary | ICD-10-CM

## 2018-04-04 DIAGNOSIS — L85.3 XEROSIS CUTIS: ICD-10-CM

## 2018-04-04 PROCEDURE — 99214 OFFICE O/P EST MOD 30 MIN: CPT | Mod: 25,S$GLB,, | Performed by: DERMATOLOGY

## 2018-04-04 PROCEDURE — 88305 TISSUE EXAM BY PATHOLOGIST: CPT | Performed by: PATHOLOGY

## 2018-04-04 PROCEDURE — 11100 PR BIOPSY OF SKIN LESION: CPT | Mod: S$GLB,,, | Performed by: DERMATOLOGY

## 2018-04-04 PROCEDURE — 99999 PR PBB SHADOW E&M-EST. PATIENT-LVL III: CPT | Mod: PBBFAC,,, | Performed by: DERMATOLOGY

## 2018-04-04 NOTE — PROGRESS NOTES
Subjective:       Patient ID:  Cornelia Delatorre is a 67 y.o. female who presents for   Chief Complaint   Patient presents with    Skin Check     6 mo follow up     High risk patient returns today for 6 month follow.  Has few spots on both arms that have become itchy.  Has been using Amlactin on arms and legs. Suggested by Podiatrist for feet and elected to try on arms.  Feels helps.  Also gets red bumps and dryness around mouth from time to time. History cryo to right arm in past. Pink, asx. Unsure if healed.     Acneiform dermatitis-   benzoyl peroxide-erythromycin (BENZAMYCIN) gel; AAA face bid as needed for pimples   Hirsutism, chin treating bumps with benzamycin prn   Phx skin ca R hand - Dr Parra - 2014   Phx SCC L hand -mohs Dr Jones  -2015   History of actinic keratoses on nasal Dorsum in the past hx of efudex use  Uses CeraVe AM cream daily for routine         Review of Systems   Constitutional: Negative for chills, weight loss, weight gain and fatigue.   Skin: Positive for daily sunscreen use and activity-related sunscreen use. Negative for wears hat.   Hematologic/Lymphatic: Does not bruise/bleed easily.        Objective:    Physical Exam   Constitutional: She appears well-developed and well-nourished. No distress.   HENT:   Mouth/Throat: Lips normal.    Eyes: Lids are normal.  No conjunctival no injection.   Cardiovascular: There is no local extremity swelling and no dependent edema.     Neurological: She is alert and oriented to person, place, and time. She is not disoriented.   Psychiatric: She has a normal mood and affect.   Skin:   Areas Examined (abnormalities noted in diagram):   Head / Face Inspection Performed  Neck Inspection Performed  Chest / Axilla Inspection Performed  Abdomen Inspection Performed  Back Inspection Performed  RUE Inspected  LUE Inspection Performed  RLE Inspected  LLE Inspection Performed              Diagram Legend     Erythematous scaling macule/papule c/w  actinic keratosis       Vascular papule c/w angioma      Pigmented verrucoid papule/plaque c/w seborrheic keratosis      Yellow umbilicated papule c/w sebaceous hyperplasia      Irregularly shaped tan macule c/w lentigo     1-2 mm smooth white papules consistent with Milia      Movable subcutaneous cyst with punctum c/w epidermal inclusion cyst      Subcutaneous movable cyst c/w pilar cyst      Firm pink to brown papule c/w dermatofibroma      Pedunculated fleshy papule(s) c/w skin tag(s)      Evenly pigmented macule c/w junctional nevus     Mildly variegated pigmented, slightly irregular-bordered macule c/w mildly atypical nevus      Flesh colored to evenly pigmented papule c/w intradermal nevus       Pink pearly papule/plaque c/w basal cell carcinoma      Erythematous hyperkeratotic cursted plaque c/w SCC      Surgical scar with no sign of skin cancer recurrence      Open and closed comedones      Inflammatory papules and pustules      Verrucoid papule consistent consistent with wart     Erythematous eczematous patches and plaques     Dystrophic onycholytic nail with subungual debris c/w onychomycosis     Umbilicated papule    Erythematous-base heme-crusted tan verrucoid plaque consistent with inflamed seborrheic keratosis     Erythematous Silvery Scaling Plaque c/w Psoriasis     See annotation      Assessment / Plan:      Pathology Orders:     Normal Orders This Visit    Tissue Specimen To Pathology, Dermatology     Questions:    Directional Terms:  Other(comment)    Clinical information:  scar vs bcc vs other    Specific Site:  right arm        Personal history of other malignant neoplasm of skin  Area(s) of previous NMSC evaluated with no signs of recurrence.    Upper body skin examination performed today including at least 6 points as noted in physical examination. Suspicious lesions noted.      Neoplasm of uncertain behavior of skin  -     Tissue Specimen To Pathology, Dermatology    Shave biopsy procedure  note:    Shave biopsy performed after verbal consent including risk of infection, scar, recurrence, need for additional treatment of site. Area prepped with alcohol, anesthetized with approximately 1.0cc of 1% lidocaine with epinephrine. Lesional tissue shaved with razor blade. Hemostasis achieved with application of aluminum chloride followed by hyfrecation. No complications. Dressing applied. Wound care explained.        Xerosis cutis  Apply Am Lactin lotion or cream to arms and hands nightly. Available over-the counter.      Skin cancer screening  Area(s) of previous NMSC evaluated with no signs of recurrence.    Upper body skin examination performed today including at least 6 points as noted in physical examination. Suspicious lesions noted.               Follow-up in about 6 months (around 10/4/2018).

## 2018-04-12 ENCOUNTER — TELEPHONE (OUTPATIENT)
Dept: PULMONOLOGY | Facility: CLINIC | Age: 68
End: 2018-04-12

## 2018-04-12 NOTE — TELEPHONE ENCOUNTER
Advised the patient that her medications were sent to [facility] on 2/14/2018 and that she needed to call them for her refills.    ----- Message from Mehreen Valdivia sent at 4/12/2018 11:57 AM CDT -----  Contact: Patient  Patient called with questions regarding medications/test call back to advise at [phone]  SYMBICORT 160-4.5 mcg/actuation HFAA,and montelukast (SINGULAIR) 10 mg tablet

## 2018-04-16 ENCOUNTER — OFFICE VISIT (OUTPATIENT)
Dept: NEUROLOGY | Facility: CLINIC | Age: 68
End: 2018-04-16
Payer: MEDICARE

## 2018-04-16 DIAGNOSIS — G31.84 MILD COGNITIVE IMPAIRMENT: Primary | ICD-10-CM

## 2018-04-16 PROCEDURE — 99499 UNLISTED E&M SERVICE: CPT | Mod: S$GLB,,, | Performed by: PSYCHIATRY & NEUROLOGY

## 2018-04-16 NOTE — PROGRESS NOTES
NEUROPSYCHOLOGICAL EVALUATION - CONFIDENTIAL  FEEDBACK NOTE    On 4/15/18, I provided Ms. Cornelia Delatorre and her  the results of her neuropsychological evaluation. Please see her full report for a comprehensive overview of the findings. She was provided a copy of the report and invited to call with additional questions.      Braxton Gutierrez Psy.D., ABPP  Board Certified in Clinical Neuropsychology  [facility] - Department of Neurology

## 2018-04-17 DIAGNOSIS — J45.20 MILD INTERMITTENT ASTHMA WITHOUT COMPLICATION: ICD-10-CM

## 2018-04-17 RX ORDER — MONTELUKAST SODIUM 10 MG/1
10 TABLET ORAL NIGHTLY
Qty: 90 TABLET | Refills: 1 | Status: SHIPPED | OUTPATIENT
Start: 2018-04-17 | End: 2018-05-14 | Stop reason: SDUPTHER

## 2018-04-17 RX ORDER — BUDESONIDE AND FORMOTEROL FUMARATE DIHYDRATE 160; 4.5 UG/1; UG/1
2 AEROSOL RESPIRATORY (INHALATION) EVERY 12 HOURS
Qty: 3 INHALER | Refills: 3 | Status: SHIPPED | OUTPATIENT
Start: 2018-04-17 | End: 2018-05-14 | Stop reason: SDUPTHER

## 2018-04-17 NOTE — TELEPHONE ENCOUNTER
----- Message from Marylin Girard sent at 4/17/2018 12:11 PM CDT -----  Contact: Self  Patient needs to speak to the nurse about her medication     montelukast (SINGULAIR) 10 mg tablet    SYMBICORT 160-4.5 mcg/actuation HFAA    Patient changed insurance and she needs a 90 day supply of medication     Please call back [phone]

## 2018-04-19 ENCOUNTER — HOSPITAL ENCOUNTER (OUTPATIENT)
Dept: RADIOLOGY | Facility: HOSPITAL | Age: 68
Discharge: HOME | End: 2018-04-19
Attending: INTERNAL MEDICINE
Payer: MEDICARE

## 2018-04-19 DIAGNOSIS — R91.1 LUNG NODULE: ICD-10-CM

## 2018-04-19 PROCEDURE — 71250 CT THORAX DX C-: CPT | Mod: 26,,, | Performed by: RADIOLOGY

## 2018-04-19 PROCEDURE — 71250 CT THORAX DX C-: CPT | Mod: TC,PO

## 2018-04-27 ENCOUNTER — HOSPITAL ENCOUNTER (OUTPATIENT)
Dept: RADIOLOGY | Facility: HOSPITAL | Age: 68
Discharge: HOME | End: 2018-04-27
Attending: INTERNAL MEDICINE
Payer: MEDICARE

## 2018-04-27 ENCOUNTER — OFFICE VISIT (OUTPATIENT)
Dept: FAMILY MEDICINE | Facility: CLINIC | Age: 68
End: 2018-04-27
Payer: MEDICARE

## 2018-04-27 VITALS
RESPIRATION RATE: 18 BRPM | HEIGHT: 64 IN | BODY MASS INDEX: 33.31 KG/M2 | HEART RATE: 72 BPM | DIASTOLIC BLOOD PRESSURE: 72 MMHG | SYSTOLIC BLOOD PRESSURE: 118 MMHG | WEIGHT: 195.13 LBS | OXYGEN SATURATION: 97 %

## 2018-04-27 DIAGNOSIS — Z78.0 POST-MENOPAUSAL: ICD-10-CM

## 2018-04-27 DIAGNOSIS — R25.2 CRAMP IN MUSCLE: ICD-10-CM

## 2018-04-27 DIAGNOSIS — I10 ESSENTIAL HYPERTENSION: Primary | ICD-10-CM

## 2018-04-27 DIAGNOSIS — E11.9 CONTROLLED TYPE 2 DIABETES MELLITUS WITHOUT COMPLICATION, WITHOUT LONG-TERM CURRENT USE OF INSULIN: ICD-10-CM

## 2018-04-27 DIAGNOSIS — E78.5 DYSLIPIDEMIA: ICD-10-CM

## 2018-04-27 DIAGNOSIS — E03.4 HYPOTHYROIDISM DUE TO ACQUIRED ATROPHY OF THYROID: ICD-10-CM

## 2018-04-27 PROCEDURE — 77080 DXA BONE DENSITY AXIAL: CPT | Mod: 26,,, | Performed by: RADIOLOGY

## 2018-04-27 PROCEDURE — 77080 DXA BONE DENSITY AXIAL: CPT | Mod: TC,PO

## 2018-04-27 PROCEDURE — 99999 PR PBB SHADOW E&M-EST. PATIENT-LVL III: CPT | Mod: PBBFAC,,, | Performed by: INTERNAL MEDICINE

## 2018-04-27 PROCEDURE — 3044F HG A1C LEVEL LT 7.0%: CPT | Mod: CPTII,S$GLB,, | Performed by: INTERNAL MEDICINE

## 2018-04-27 PROCEDURE — 99214 OFFICE O/P EST MOD 30 MIN: CPT | Mod: S$GLB,,, | Performed by: INTERNAL MEDICINE

## 2018-04-27 PROCEDURE — 3078F DIAST BP <80 MM HG: CPT | Mod: CPTII,S$GLB,, | Performed by: INTERNAL MEDICINE

## 2018-04-27 PROCEDURE — 3074F SYST BP LT 130 MM HG: CPT | Mod: CPTII,S$GLB,, | Performed by: INTERNAL MEDICINE

## 2018-04-27 RX ORDER — LEVOTHYROXINE SODIUM 50 UG/1
TABLET ORAL
Qty: 45 TABLET | Refills: 3 | Status: SHIPPED | OUTPATIENT
Start: 2018-04-27 | End: 2018-07-02

## 2018-04-27 RX ORDER — METOPROLOL TARTRATE 25 MG/1
1 TABLET, FILM COATED ORAL DAILY
COMMUNITY
End: 2018-11-02 | Stop reason: ALTCHOICE

## 2018-04-27 RX ORDER — POTASSIUM CHLORIDE 20 MEQ/1
20 TABLET, EXTENDED RELEASE ORAL DAILY
Qty: 90 TABLET | Refills: 3 | Status: SHIPPED | OUTPATIENT
Start: 2018-04-27 | End: 2019-08-12 | Stop reason: SDUPTHER

## 2018-04-27 RX ORDER — PRAVASTATIN SODIUM 40 MG/1
40 TABLET ORAL DAILY
Qty: 90 TABLET | Refills: 1 | Status: SHIPPED | OUTPATIENT
Start: 2018-04-27 | End: 2018-11-02 | Stop reason: SDUPTHER

## 2018-04-27 RX ORDER — METFORMIN HYDROCHLORIDE 500 MG/1
1000 TABLET ORAL 2 TIMES DAILY WITH MEALS
Qty: 360 TABLET | Refills: 3 | Status: SHIPPED | OUTPATIENT
Start: 2018-04-27 | End: 2018-11-02

## 2018-04-27 RX ORDER — LEVOTHYROXINE SODIUM 75 UG/1
TABLET ORAL
Qty: 45 TABLET | Refills: 3 | Status: SHIPPED | OUTPATIENT
Start: 2018-04-27 | End: 2018-07-02

## 2018-04-27 NOTE — PROGRESS NOTES
Subjective:       Patient ID: Cornelia Delatorre is a 67 y.o. female.    Chief Complaint: Diabetes; Voice change; Medication Management; Immunization discussion; and leg pain (burning)    Complains of muscle/leg cramps when sleeping + a burning pain.  Kcl helped at first.  Mg is low-normal    Dx w [facility] likely related to some meds per testing neurology    SOB/KLINE- Saw Dr Nicole in [place].  Feels SOB mostly related to bronchiectasis.  Her symptoms have improved with asthma tx - Symbicort.  Resolved SOB, cough and chest pain/ heaviness.    Pulmonary nodules - Dr Nicole will repeat CT at 6 mo rahul.  Incomplete response to pneumovax - recommends repeat Prevnar 13.  Eosinophilia and pn 7.14 level at 50%    Recent angiogram was normal - done by Dr Vega; sent to scan    Hypothyroid - supra-theraputic   HTN - controlled  DM - controlled;  Sees podiatry for peripheral neuropathy.  HLD - controlled for goal 70    Occasional anxiety relieved with prn hydroxyzine.   Has EGD scheduled for 7/21 for dysphagia with Dr Krishnan       Diabetes   She has type 2 diabetes mellitus. No MedicAlert identification noted. The initial diagnosis of diabetes was made 10 years ago. Pertinent negatives for hypoglycemia include no confusion, dizziness, headaches, hunger, mood changes, nervousness/anxiousness, pallor, seizures, sleepiness, speech difficulty, sweats or tremors. Pertinent negatives for diabetes include no blurred vision, no chest pain, no foot paresthesias and no polyphagia. Pertinent negatives for hypoglycemia complications include no blackouts, no hospitalization, no nocturnal hypoglycemia, no required assistance and no required glucagon injection. Diabetic complications include peripheral neuropathy. Pertinent negatives for diabetic complications include no autonomic neuropathy, CVA, heart disease, impotence, nephropathy, PVD or retinopathy. There are no known risk factors for coronary artery disease. Current diabetic treatment  includes oral agent (monotherapy). Her weight is stable. She is following a generally healthy diet. When asked about meal planning, she reported none. She has not had a previous visit with a dietitian. She rarely participates in exercise. She monitors blood glucose at home 1-2 x per week. Blood glucose monitoring compliance is fair. There is no change in her home blood glucose trend. She sees a podiatrist.Eye exam is current.     Review of Systems   Constitutional: Negative for appetite change and fever.   HENT: Positive for postnasal drip. Negative for nosebleeds and trouble swallowing.    Eyes: Negative for blurred vision, discharge and visual disturbance.   Respiratory: Negative for choking and shortness of breath.    Cardiovascular: Negative for chest pain and palpitations.   Gastrointestinal: Negative for abdominal pain, nausea and vomiting.   Endocrine: Negative for polyphagia.   Genitourinary: Negative for impotence.   Musculoskeletal: Positive for myalgias. Negative for arthralgias and joint swelling.   Skin: Negative for pallor, rash and wound.   Neurological: Negative for dizziness, tremors, seizures, syncope, speech difficulty and headaches.   Psychiatric/Behavioral: Negative for confusion and dysphoric mood. The patient is not nervous/anxious.        Objective:      Vitals:    04/27/18 1058   BP: 118/72   Pulse: 72   Resp: 18     Physical Exam   Constitutional: She appears well-nourished.   Eyes: Conjunctivae and EOM are normal.   Neck: Trachea normal and normal range of motion. No thyromegaly present.   Cardiovascular: Normal heart sounds.    Edema negative   Pulmonary/Chest: Effort normal and breath sounds normal.   Abdominal: Soft. There is no hepatomegaly.   Neurological: No cranial nerve deficit.   DTR decreased bilateral   Skin: Skin is warm, dry and intact.   Psychiatric: She has a normal mood and affect.   Alert and Oriented    Vitals reviewed.        Assessment:       1. Essential hypertension     2. Dyslipidemia    3. Controlled type 2 diabetes mellitus without complication, without long-term current use of insulin    4. Hypothyroidism due to acquired atrophy of thyroid    5. Post-menopausal    6. Cramp in muscle        Plan:       Essential hypertension  -     Comprehensive metabolic panel; Future; Expected date: 10/24/2018    Dyslipidemia  -     pravastatin (PRAVACHOL) 40 MG tablet; Take 1 tablet (40 mg total) by mouth once daily.  Dispense: 90 tablet; Refill: 1    Controlled type 2 diabetes mellitus without complication, without long-term current use of insulin  -     Hemoglobin A1c; Future; Expected date: 10/24/2018    Hypothyroidism due to acquired atrophy of thyroid  -     TSH; Future; Expected date: 06/26/2018  -     TSH; Future; Expected date: 10/24/2018  -     levothyroxine (SYNTHROID) 75 MCG tablet; Every other day opposite 50 mcg dose  Dispense: 45 tablet; Refill: 3  -     levothyroxine (SYNTHROID) 50 MCG tablet; Every other day opposite 75 mcg dose  Dispense: 45 tablet; Refill: 3    Post-menopausal  -     DXA Bone Density Spine And Hip; Future; Expected date: 04/27/2018    Cramp in muscle  -     potassium chloride SA (K-DUR,KLOR-CON) 20 MEQ tablet; Take 1 tablet (20 mEq total) by mouth once daily.  Dispense: 90 tablet; Refill: 3    Other orders  -     metFORMIN (GLUCOPHAGE) 500 MG tablet; Take 2 tablets (1,000 mg total) by mouth 2 (two) times daily with meals.  Dispense: 360 tablet; Refill: 3            Medication List with Changes/Refills   Current Medications    ALBUTEROL 90 MCG/ACTUATION INHALER    Inhale 2 puffs into the lungs every 4 (four) hours as needed. 2 puffs every 4 hours as needed for cough, wheeze, or shortness of breath    ASPIRIN (ECOTRIN) 81 MG EC TABLET    Take 81 mg by mouth once daily.    AZELASTINE (ASTELIN) 137 MCG (0.1 %) NASAL SPRAY    1 spray (137 mcg total) by Nasal route 2 (two) times daily.    AZELASTINE (OPTIVAR) 0.05 % OPHTHALMIC SOLUTION    Place 1 drop into both  eyes daily as needed.     BIFIDOBACTERIUM INFANTIS (ALIGN ORAL)    Take by mouth once daily.    CICLOPIROX (PENLAC) 8 % SOLN        CRANBERRY 500 MG CAP    Take 1 capsule by mouth every evening.    ESOMEPRAZOLE (NEXIUM) 40 MG CAPSULE    Take 1 capsule (40 mg total) by mouth before breakfast.    FEXOFENADINE (ALLEGRA) 60 MG TABLET    Take 60 mg by mouth once daily.    FISH OIL-OMEGA-3 FATTY ACIDS 300-1,000 MG CAPSULE    Take 2 g by mouth once daily.    FLUTICASONE (FLONASE) 50 MCG/ACTUATION NASAL SPRAY    2 sprays by Each Nare route once daily.    GABAPENTIN (NEURONTIN) 600 MG TABLET    Take 1 tablet (600 mg total) by mouth 3 (three) times daily.    HYDROCHLOROTHIAZIDE (HYDRODIURIL) 25 MG TABLET    Take 1 tablet (25 mg total) by mouth once daily.    HYDROXYZINE HCL (ATARAX) 10 MG TAB    Take 3 tablets (30 mg total) by mouth 3 (three) times daily as needed (anxiety).    IRBESARTAN (AVAPRO) 75 MG TABLET    Take 1 tablet (75 mg total) by mouth every evening.    METHYLCELLULOSE, LAXATIVE, (CITRUCEL) 500 MG TAB    Take 1 tablet by mouth every morning.    METOPROLOL TARTRATE (LOPRESSOR) 25 MG TABLET    Take 1 tablet by mouth once daily.    MONTELUKAST (SINGULAIR) 10 MG TABLET    Take 1 tablet (10 mg total) by mouth every evening.    MULTIVITAMIN CAPSULE    Take 1 capsule by mouth. Take one tablets daily    SIMETHICONE (GAS-X ORAL)    Take 1 capsule by mouth as needed.    SYMBICORT 160-4.5 MCG/ACTUATION HFAA    Inhale 2 puffs into the lungs every 12 (twelve) hours.   Changed and/or Refilled Medications    Modified Medication Previous Medication    LEVOTHYROXINE (SYNTHROID) 50 MCG TABLET levothyroxine (SYNTHROID) 50 MCG tablet       Every other day opposite 75 mcg dose    Take 1 tablet (50 mcg total) by mouth once daily. On M and F only    LEVOTHYROXINE (SYNTHROID) 75 MCG TABLET levothyroxine (SYNTHROID) 75 MCG tablet       Every other day opposite 50 mcg dose    Take 1 tablet (75 mcg total) by mouth once daily. On T, W, Th,  "Sat, Sun    METFORMIN (GLUCOPHAGE) 500 MG TABLET metFORMIN (GLUCOPHAGE) 500 MG tablet       Take 2 tablets (1,000 mg total) by mouth 2 (two) times daily with meals.    Take 2 tablets (1,000 mg total) by mouth 2 (two) times daily with meals.    POTASSIUM CHLORIDE SA (K-DUR,KLOR-CON) 20 MEQ TABLET potassium chloride SA (K-DUR,KLOR-CON) 20 MEQ tablet       Take 1 tablet (20 mEq total) by mouth once daily.    Take 1 tablet (20 mEq total) by mouth once daily.    PRAVASTATIN (PRAVACHOL) 40 MG TABLET pravastatin (PRAVACHOL) 40 MG tablet       Take 1 tablet (40 mg total) by mouth once daily.    Take 1 tablet (40 mg total) by mouth once daily.   Discontinued Medications    GUAIFENESIN-CODEINE 100-10 MG/5 ML (GUAIFENESIN AC)  MG/5 ML SYRUP    Take 5 mLs by mouth 3 (three) times daily as needed for Cough.    METOPROLOL TARTRATE (LOPRESSOR) 50 MG TABLET    Take 0.5 tablets (25 mg total) by mouth once daily.       Continue current management    Counseled on regular exercise, maintenance of a healthy weight, balanced diet rich in fruits/vegetables and lean protein, and avoidance of unhealthy habits like smoking and excessive alcohol intake.   Also, counseled on importance of being compliant with medication, health appointments, diet and exercise.     Follow-up in about 6 months (around 10/27/2018).    "This note will not be shared with the patient."  "

## 2018-04-30 ENCOUNTER — TELEPHONE (OUTPATIENT)
Dept: UROLOGY | Facility: CLINIC | Age: 68
End: 2018-04-30

## 2018-04-30 ENCOUNTER — LAB VISIT (OUTPATIENT)
Dept: LAB | Facility: HOSPITAL | Age: 68
End: 2018-04-30
Attending: UROLOGY
Payer: MEDICARE

## 2018-04-30 DIAGNOSIS — N39.0 URINARY TRACT INFECTION WITHOUT HEMATURIA, SITE UNSPECIFIED: ICD-10-CM

## 2018-04-30 DIAGNOSIS — N39.0 URINARY TRACT INFECTION WITHOUT HEMATURIA, SITE UNSPECIFIED: Primary | ICD-10-CM

## 2018-04-30 PROCEDURE — 87077 CULTURE AEROBIC IDENTIFY: CPT

## 2018-04-30 PROCEDURE — 87088 URINE BACTERIA CULTURE: CPT

## 2018-04-30 PROCEDURE — 87186 SC STD MICRODIL/AGAR DIL: CPT

## 2018-04-30 PROCEDURE — 87086 URINE CULTURE/COLONY COUNT: CPT

## 2018-04-30 NOTE — TELEPHONE ENCOUNTER
----- Message from Erika Ny MA sent at 4/30/2018  8:20 AM CDT -----  Contact: self  [phone]  States she is calling for an urine order due to an uti.  States she lives in [place] and just wants an order to do an urine.    Call when done.

## 2018-05-02 ENCOUNTER — PATIENT MESSAGE (OUTPATIENT)
Dept: UROLOGY | Facility: CLINIC | Age: 68
End: 2018-05-02

## 2018-05-02 ENCOUNTER — TELEPHONE (OUTPATIENT)
Dept: PULMONOLOGY | Facility: CLINIC | Age: 68
End: 2018-05-02

## 2018-05-02 NOTE — TELEPHONE ENCOUNTER
----- Message from Jonathan Nicole MD sent at 4/20/2018 10:53 AM CDT -----  Notify ct stable/good    Pt notified

## 2018-05-02 NOTE — TELEPHONE ENCOUNTER
----- Message from Sabiha Valerio MA sent at 5/2/2018 10:35 AM CDT -----  Contact: please reply via my chart  Test Results    Who Called: Self  Name of Test (Lab/Mammo/Etc): Urine Culture  Date of Test: 4/30/18  Ordering Provider: CHOCO Jarvis  Where the test was performed: [facility]  Communication Preference (MyChart response to Pt. (or) Call Back # and timeframe):  My chart  Additional Information: pt would like to know if medication is being called in based on her results.

## 2018-05-03 LAB — BACTERIA UR CULT: NORMAL

## 2018-05-04 ENCOUNTER — PATIENT MESSAGE (OUTPATIENT)
Dept: UROLOGY | Facility: CLINIC | Age: 68
End: 2018-05-04

## 2018-05-04 ENCOUNTER — TELEPHONE (OUTPATIENT)
Dept: UROLOGY | Facility: CLINIC | Age: 68
End: 2018-05-04

## 2018-05-04 DIAGNOSIS — R82.71 BACTERIA IN URINE: Primary | ICD-10-CM

## 2018-05-04 RX ORDER — CEPHALEXIN 500 MG/1
500 CAPSULE ORAL EVERY 12 HOURS
Qty: 14 CAPSULE | Refills: 0 | Status: SHIPPED | OUTPATIENT
Start: 2018-05-04 | End: 2018-05-11

## 2018-05-04 NOTE — TELEPHONE ENCOUNTER
----- Message from Maria Elena Palmer LPN sent at 5/4/2018  1:07 PM CDT -----  Contact: Donny holland is +  ----- Message -----  From: Ree Fletcher  Sent: 5/4/2018   1:06 PM  To: [facility] Staff    Pt is calling for lab results and can be reached at [phone].    Thank you

## 2018-05-04 NOTE — TELEPHONE ENCOUNTER
----- Message from Kiara Sanabria NP sent at 5/4/2018  1:27 PM CDT -----  Please let her know I sent antibiotic to [place] [facility].  Thank you.

## 2018-05-04 NOTE — TELEPHONE ENCOUNTER
Patient called reporting LUTS.  Urine Culture, Routine PANTOEA AGGLOMERANS GROUP   >100,000 cfu/ml         Pan sensitive  Rx keflex sent to pharmacy;

## 2018-05-07 ENCOUNTER — TELEPHONE (OUTPATIENT)
Dept: FAMILY MEDICINE | Facility: CLINIC | Age: 68
End: 2018-05-07

## 2018-05-07 NOTE — TELEPHONE ENCOUNTER
Spoke to [facility] gave directions & dosage on Synthroid 50 & 75 MCG

## 2018-05-07 NOTE — TELEPHONE ENCOUNTER
----- Message from Marcela Zapien sent at 5/7/2018  8:04 AM CDT -----  Contact: Self  Patient states that the medicine that he ordered levothyroxine (SYNTHROID) 50 MCG tablet but the description how she is to take the medicine each day is not clear so they will not fill it until you send it over and make clear.  She takes a 75 mg one day and then the next day she takes the 50mg.  They have filled the 75 mg but needs a clearer dosage description for the 50 mg.  Call back at [phone] (home) or [phone] if any questions.    [facility] - [place], [place] - [address] [facility]  [address]  [facility] [address]  Phone: [phone] Fax: [phone]

## 2018-05-14 ENCOUNTER — OFFICE VISIT (OUTPATIENT)
Dept: PULMONOLOGY | Facility: CLINIC | Age: 68
End: 2018-05-14
Payer: MEDICARE

## 2018-05-14 VITALS
HEIGHT: 64 IN | DIASTOLIC BLOOD PRESSURE: 65 MMHG | BODY MASS INDEX: 33.2 KG/M2 | WEIGHT: 194.44 LBS | OXYGEN SATURATION: 97 % | HEART RATE: 71 BPM | SYSTOLIC BLOOD PRESSURE: 129 MMHG

## 2018-05-14 DIAGNOSIS — J32.9 OTHER SINUSITIS, UNSPECIFIED CHRONICITY: ICD-10-CM

## 2018-05-14 DIAGNOSIS — R91.1 LUNG NODULE: ICD-10-CM

## 2018-05-14 DIAGNOSIS — J45.20 MILD INTERMITTENT ASTHMA WITHOUT COMPLICATION: ICD-10-CM

## 2018-05-14 DIAGNOSIS — R91.8 ABNORMAL CT SCAN, LUNG: ICD-10-CM

## 2018-05-14 DIAGNOSIS — J32.9 SINUSITIS, UNSPECIFIED CHRONICITY, UNSPECIFIED LOCATION: ICD-10-CM

## 2018-05-14 DIAGNOSIS — D84.9 IMMUNE DEFICIENCY DISORDER: ICD-10-CM

## 2018-05-14 DIAGNOSIS — G47.33 OSA (OBSTRUCTIVE SLEEP APNEA): Primary | ICD-10-CM

## 2018-05-14 PROCEDURE — 99214 OFFICE O/P EST MOD 30 MIN: CPT | Mod: S$GLB,,, | Performed by: INTERNAL MEDICINE

## 2018-05-14 PROCEDURE — 3008F BODY MASS INDEX DOCD: CPT | Mod: CPTII,S$GLB,, | Performed by: INTERNAL MEDICINE

## 2018-05-14 PROCEDURE — 3074F SYST BP LT 130 MM HG: CPT | Mod: CPTII,S$GLB,, | Performed by: INTERNAL MEDICINE

## 2018-05-14 PROCEDURE — 3078F DIAST BP <80 MM HG: CPT | Mod: CPTII,S$GLB,, | Performed by: INTERNAL MEDICINE

## 2018-05-14 PROCEDURE — 99999 PR PBB SHADOW E&M-EST. PATIENT-LVL V: CPT | Mod: PBBFAC,,, | Performed by: INTERNAL MEDICINE

## 2018-05-14 RX ORDER — ALBUTEROL SULFATE 90 UG/1
2 AEROSOL, METERED RESPIRATORY (INHALATION) EVERY 4 HOURS PRN
Qty: 3 INHALER | Refills: 3 | Status: SHIPPED | OUTPATIENT
Start: 2018-05-14 | End: 2019-11-04 | Stop reason: SDUPTHER

## 2018-05-14 RX ORDER — PNEUMOCOCCAL VACCINE POLYVALENT 25; 25; 25; 25; 25; 25; 25; 25; 25; 25; 25; 25; 25; 25; 25; 25; 25; 25; 25; 25; 25; 25; 25 UG/.5ML; UG/.5ML; UG/.5ML; UG/.5ML; UG/.5ML; UG/.5ML; UG/.5ML; UG/.5ML; UG/.5ML; UG/.5ML; UG/.5ML; UG/.5ML; UG/.5ML; UG/.5ML; UG/.5ML; UG/.5ML; UG/.5ML; UG/.5ML; UG/.5ML; UG/.5ML; UG/.5ML; UG/.5ML; UG/.5ML
INJECTION, SOLUTION INTRAMUSCULAR; SUBCUTANEOUS
COMMUNITY
Start: 2018-04-27 | End: 2018-11-02 | Stop reason: ALTCHOICE

## 2018-05-14 RX ORDER — AZELASTINE 1 MG/ML
1 SPRAY, METERED NASAL 2 TIMES DAILY
Qty: 90 ML | Refills: 3 | Status: SHIPPED | OUTPATIENT
Start: 2018-05-14 | End: 2020-01-03 | Stop reason: SDUPTHER

## 2018-05-14 RX ORDER — FLUTICASONE PROPIONATE 50 MCG
2 SPRAY, SUSPENSION (ML) NASAL DAILY
Qty: 3 BOTTLE | Refills: 3 | Status: SHIPPED | OUTPATIENT
Start: 2018-05-14 | End: 2020-05-04 | Stop reason: SDUPTHER

## 2018-05-14 RX ORDER — MONTELUKAST SODIUM 10 MG/1
10 TABLET ORAL NIGHTLY
Qty: 90 TABLET | Refills: 3 | Status: SHIPPED | OUTPATIENT
Start: 2018-05-14 | End: 2019-05-06 | Stop reason: SDUPTHER

## 2018-05-14 RX ORDER — AZELASTINE 1 MG/ML
1 SPRAY, METERED NASAL 2 TIMES DAILY
Qty: 90 ML | Refills: 3 | Status: SHIPPED | OUTPATIENT
Start: 2018-05-14 | End: 2018-05-14 | Stop reason: SDUPTHER

## 2018-05-14 RX ORDER — BUDESONIDE AND FORMOTEROL FUMARATE DIHYDRATE 160; 4.5 UG/1; UG/1
2 AEROSOL RESPIRATORY (INHALATION) EVERY 12 HOURS
Qty: 3 INHALER | Refills: 3 | Status: SHIPPED | OUTPATIENT
Start: 2018-05-14 | End: 2019-05-06 | Stop reason: SDUPTHER

## 2018-05-14 NOTE — PROGRESS NOTES
5/14/2018    Cornelia Delatorre  Office f/u    Chief Complaint   Patient presents with    Follow-up     May 14, 2018- had spell /exacerbation with one round prednisone. Breathing good.  darlin 15, 2018--1 st illness in yr or so with cough and rattles, no flu.  Appetite ok.  Ill x wk, cough and wheeze and sob and noct worse, resp rx helps a bit.  Hasn't been using  Albuterol     oct 19, 2017- has scratchy throat, some sinus drip, has nightly sensation throat issues, post beryl and carpel tunnel surg.  No sinus lung infections.  Breathing and cough good.  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 Had pos tb gold.      June 21, 2017- had good [place] trip, tapered symbicort with some increase sensation of lung problems so maintained full dose. No infections.  No chest symptoms on symbicort.         April 24, feels a lot better with min cough, vague sob going left side down at night.  Going to [place] 2 weeks.  Uses symbicort and good results.      March 16, cough better, but comes and goes,  No great help with steroids.  Submitted 3 sputums.  Had pneumovax march last yr.  Breathing better. Got symbicort.     Had pneumonia vaccine last yr    3/8/2017 HPI: had onset cough Jan illness, got dizzy few days with diarrhea and cough. Cough clear sm amt mucous. Did have 101 temp one day, fatigue, no muscle aches.  Appetite decreased.  Illnesses lingered 2 weeks but cough never remitted- has improved with inhahler use last 15 days.      Had gastric 2011 bypass with with continued diarrhea intially resolved. No regurg or reflux or swallow problems.     symbicort nearly  Resolved.      Sinuses ok.  No pets.  Never smoker.      Appetite good, feels well now.      headcolds to chest since childhood, nocturnal ??not recalled.    Had cats in past but got rid in 2003 or so.     The chief compliant  problem is new to me   PFSH:  Past Medical History:   Diagnosis Date    Allergy     Arthritis      Asthma     Cataract     Chronic fatigue 1/24/2017    Diabetes mellitus     resolved with gastric bypass    Diabetes mellitus, type 2     Encounter for blood transfusion     GERD (gastroesophageal reflux disease)     Headache(784.0)     History of lumpectomy of both breasts     1992 negative    Hyperlipidemia 7/15/2015    Hypertension     Hypothyroidism     Neuropathy     Obesity     SOHA on CPAP     Postmenopausal HRT (hormone replacement therapy)     Rash     Rosacea     Snoring     Wears glasses          Past Surgical History:   Procedure Laterality Date    ADENOIDECTOMY      APPENDECTOMY      BLADDER SUSPENSION      BREAST BIOPSY Bilateral 1992    bilateral benign excisional biopsies    BUNIONECTOMY  10/17/14    right, still has discomfort    CATARACT EXTRACTION W/  INTRAOCULAR LENS IMPLANT Bilateral     CHOLECYSTECTOMY  08/02/2017    COLONOSCOPY      ESOPHAGOGASTRODUODENOSCOPY      dilated esophagus    GASTRIC BYPASS      2011    HYSTERECTOMY  1980    interstim bladder  2009    10/14/14 new battery    OOPHORECTOMY  1980    SKIN CANCER EXCISION      left hand    TONSILLECTOMY      WISDOM TOOTH EXTRACTION       Social History   Substance Use Topics    Smoking status: Never Smoker    Smokeless tobacco: Never Used    Alcohol use No     Family History   Problem Relation Age of Onset    Breast cancer Mother 50    Diabetes Unknown     Hypertension Unknown     Hypothyroidism Unknown     Breast cancer Paternal Aunt         40's     Review of patient's allergies indicates:   Allergen Reactions    Sulfa (sulfonamide antibiotics) Hives    Naproxen Other (See Comments)     Other reaction(s): RT sided numbness         Performance Status:The patient's activity level is functions out of house.      Review of Systems:  a review of eleven systems covering constitutional, Eye, HEENT, Psych, Respiratory, Cardiac, GI, , Musculoskeletal, Endocrine, Dermatologic was negative except for  "pertinent findings as listed ABOVE and below: all good,  Had dm that remitted with bypass 2011.      Exam:Comprehensive exam done. /65 (BP Location: Right arm, Patient Position: Sitting)   Pulse 71   Ht 5' 4" (1.626 m)   Wt 88.2 kg (194 lb 7.1 oz)   SpO2 97%   BMI 33.38 kg/m²   Exam included Vitals as listed, and patient's appearance and affect and alertness and mood, oral exam for yeast and hygiene and pharynx lesions and Mallapatti (M) score, neck with inspection for jvd and masses and thyroid abnormalities and lymph nodes (supraclavicular and infraclavicular nodes and axillary also examined and noted if abn), chest exam included symmetry and effort and fremitus and percussion and auscultation, cardiac exam included rhythm and gallops and murmur and rubs and jvd and edema, abdominal exam for mass and hepatosplenomegaly and tenderness and hernias and bowel sounds, Musculoskeletal exam with muscle tone and posture and mobility/gait and  strength, and skin for rashes and cyanosis and pallor and turgor, extremity for clubbing.  Findings were normal except for pertinent findings listed below:  M3, good bs, rest good.    Radiographs (ct chest and cxr) reviewed: view by direct vision  i do see clear bronchiectasis,nodules are noted.  Mucoid type impaction suggested in rul ant segment.      1. Region of endobronchial plugging unchanged since 2/7/17 of uncertain etiology. This could relate to a process such as allergic bronchopulmonary aspergillosis, a solid mass nodule, mucous plugging, residual from aspiration, endobronchial spread of disease. Further followup or evaluation is necessary    2. No evidence of pulmonary embolism    3. Multiple smaller pulmonary nodules are noted without detrimental changes since prior, the largest is 6 mm. Followup for size stability is suggested    4. Incidental findings as described above including cholelithiasis and gastric bypass        Electronically signed by: Raffi " Bull ESTES  Date: 03/14/17  Time: 10:41         Labs reviewed igm low, igg lowish, humerol titers na    Results for CORNELIA STEVENS (MRN [id number]) as of 4/24/2017 11:30   Ref. Range 3/15/2017 11:53   IgG - Serum Latest Ref Range: 650 - 1600 mg/dL 693   IgM Latest Ref Range: 50 - 300 mg/dL 22 (L)   IgA Latest Ref Range: 40 - 350 mg/dL 191   IgE Latest Ref Range: 0 - 100 IU/mL <35   Results for CORNELIA STEVENS (MRN [id number]) as of 4/24/2017 11:30   Ref. Range 3/16/2017 08:33   Diphtheria Toxoid Ab Latest Ref Range: >0.099 IU/mL 0.077 (A)   Tetanus Ab Latest Ref Range: >0.150 IU/mL 3.504   S.pneumoniae Type 1 Latest Units: mcg/mL 27.6   S.pneumoniae Type 3 Latest Units: mcg/mL 0.8   Strep pneumo Type 4 Latest Units: mcg/mL <0.3   S.pneumoniae Type 5 Latest Units: mcg/mL 13.1   S.pneumoniae Type 6B Latest Units: mcg/mL 0.9   S.pneumoniae Type 7F Latest Units: mcg/mL 2.2   S.pneumoniae Type 8 Latest Units: mcg/mL 3.7   S.pneumoniae Type 9N Latest Units: mcg/mL 11.2   S.pneumoniae Type 9V Abs Latest Units: mcg/mL 1.1   S.pneumoniae Type 12F Latest Units: mcg/mL 1.1   Strep pneumo Type 14 Latest Units: mcg/mL 0.7   S.pneumoniae Type 18C Latest Units: mcg/mL 1.6   S.pneumoniae Type 19F Latest Units: mcg/mL 6.5   S.pneumoniae Type 23F Latest Units: mcg/mL 3.1     PFT    Done [facility] with 10% response, otherwise nl    Plan:  Clinical impression is resonably certain and repeated evaluation prn +/- follow up will be needed as below.    Cornelia was seen today for follow-up.    Diagnoses and all orders for this visit:    SOHA (obstructive sleep apnea), Auto BIPAP Mod OSAS since Dec 2013, therapeutic and compliant 100%, ESS 6/24 Feb 2014    Lung nodule  -     CT Chest Without Contrast; Future    Abnormal CT scan, lung  -     CT Chest Without Contrast; Future    Mild intermittent asthma without complication  -     albuterol 90 mcg/actuation inhaler; Inhale 2 puffs into the lungs every 4 (four) hours as needed. 2 puffs  every 4 hours as needed for cough, wheeze, or shortness of breath  -     montelukast (SINGULAIR) 10 mg tablet; Take 1 tablet (10 mg total) by mouth every evening.  -     SYMBICORT 160-4.5 mcg/actuation HFAA; Inhale 2 puffs into the lungs every 12 (twelve) hours.  -     CBC auto differential; Future    Immune deficiency disorder  -     Humoral Immune Eval (Pneumo 14) with H. flu; Future  -     CBC auto differential; Future    Sinusitis, unspecified chronicity, unspecified location  -     azelastine (ASTELIN) 137 mcg (0.1 %) nasal spray; 1 spray (137 mcg total) by Nasal route 2 (two) times daily.  -     montelukast (SINGULAIR) 10 mg tablet; Take 1 tablet (10 mg total) by mouth every evening.  -     fluticasone (FLONASE) 50 mcg/actuation nasal spray; 2 sprays (100 mcg total) by Each Nare route once daily.  -     CBC auto differential; Future    Other sinusitis, unspecified chronicity  -     fluticasone (FLONASE) 50 mcg/actuation nasal spray; 2 sprays (100 mcg total) by Each Nare route once daily.  -     CBC auto differential; Future        Follow-up in about 1 year (around 5/14/2019), or if symptoms worsen or fail to improve.    Discussed with patient above for education the following:      Check blood count and pneumonia vaccine response after last vaccine 4/27/2018    Use azithromycin for any lung/sinus infection- immune weakness likely    Asthma stable- use prednisone and singulair.    Use allergra and singulair and astelin/flonase    Lung nodule in lung still - Navigational bronchoscopy might find?  - will at least re check in a year.    Call if needed, re check next yr.

## 2018-05-14 NOTE — PATIENT INSTRUCTIONS
Check blood count and pneumonia vaccine response after last vaccine 4/27/2018    Use azithromycin for any lung/sinus infection- immune weakness likely    Asthma stable- use prednisone and singulair.    Use allergra and singulair and astelin/flonase    Lung nodule in lung still - Navigational bronchoscopy might find?  - will at least re check in a year.    Call if needed, re check next yr.

## 2018-06-06 ENCOUNTER — TELEPHONE (OUTPATIENT)
Dept: DERMATOLOGY | Facility: CLINIC | Age: 68
End: 2018-06-06

## 2018-06-06 NOTE — TELEPHONE ENCOUNTER
Pt contacted ,  booked w/ 1st available & placed on cancellation list .     ----- Message from Remy Bishop sent at 6/6/2018 12:27 PM CDT -----  Contact: Patient  Type:  Sooner Apoointment Request    Caller is requesting a sooner appointment.  Caller declined first available appointment listed below.  Caller will not accept being placed on the waitlist and is requesting a message be sent to doctor.    Name of Caller:  Cornelia  When is the first available appointment?  8/28  Symptoms:  blister like bump on right wrist and sore to the touch  Best Call Back Number:  [phone]  Additional Information:  Had skin cancer before so she just wanted to have this checked to be safe.

## 2018-06-28 ENCOUNTER — LAB VISIT (OUTPATIENT)
Dept: LAB | Facility: HOSPITAL | Age: 68
End: 2018-06-28
Attending: INTERNAL MEDICINE
Payer: MEDICARE

## 2018-06-28 DIAGNOSIS — D84.9 IMMUNE DEFICIENCY DISORDER: ICD-10-CM

## 2018-06-28 DIAGNOSIS — J45.20 MILD INTERMITTENT ASTHMA WITHOUT COMPLICATION: ICD-10-CM

## 2018-06-28 DIAGNOSIS — J32.9 SINUSITIS, UNSPECIFIED CHRONICITY, UNSPECIFIED LOCATION: ICD-10-CM

## 2018-06-28 DIAGNOSIS — J32.9 OTHER SINUSITIS, UNSPECIFIED CHRONICITY: ICD-10-CM

## 2018-06-28 DIAGNOSIS — E03.4 HYPOTHYROIDISM DUE TO ACQUIRED ATROPHY OF THYROID: ICD-10-CM

## 2018-06-28 LAB
BASOPHILS # BLD AUTO: 0.02 K/UL
BASOPHILS NFR BLD: 0.4 %
DIFFERENTIAL METHOD: ABNORMAL
EOSINOPHIL # BLD AUTO: 0.1 K/UL
EOSINOPHIL NFR BLD: 2.2 %
ERYTHROCYTE [DISTWIDTH] IN BLOOD BY AUTOMATED COUNT: 13.8 %
HCT VFR BLD AUTO: 41.8 %
HGB BLD-MCNC: 13.2 G/DL
IMM GRANULOCYTES # BLD AUTO: 0.01 K/UL
IMM GRANULOCYTES NFR BLD AUTO: 0.2 %
LYMPHOCYTES # BLD AUTO: 1.7 K/UL
LYMPHOCYTES NFR BLD: 33 %
MCH RBC QN AUTO: 28.2 PG
MCHC RBC AUTO-ENTMCNC: 31.6 G/DL
MCV RBC AUTO: 89 FL
MONOCYTES # BLD AUTO: 0.3 K/UL
MONOCYTES NFR BLD: 6.7 %
NEUTROPHILS # BLD AUTO: 2.9 K/UL
NEUTROPHILS NFR BLD: 57.5 %
NRBC BLD-RTO: 0 /100 WBC
PLATELET # BLD AUTO: 215 K/UL
PMV BLD AUTO: 10.6 FL
RBC # BLD AUTO: 4.68 M/UL
T4 FREE SERPL-MCNC: 1.08 NG/DL
TSH SERPL DL<=0.005 MIU/L-ACNC: 0.27 UIU/ML
WBC # BLD AUTO: 5.06 K/UL

## 2018-06-28 PROCEDURE — 85025 COMPLETE CBC W/AUTO DIFF WBC: CPT

## 2018-06-28 PROCEDURE — 84439 ASSAY OF FREE THYROXINE: CPT

## 2018-06-28 PROCEDURE — 86774 TETANUS ANTIBODY: CPT

## 2018-06-28 PROCEDURE — 84443 ASSAY THYROID STIM HORMONE: CPT

## 2018-06-28 PROCEDURE — 36415 COLL VENOUS BLD VENIPUNCTURE: CPT | Mod: PO

## 2018-07-02 RX ORDER — LEVOTHYROXINE SODIUM 50 UG/1
50 TABLET ORAL DAILY
Qty: 30 TABLET | Refills: 2 | Status: SHIPPED | OUTPATIENT
Start: 2018-07-02 | End: 2018-07-03 | Stop reason: SDUPTHER

## 2018-07-03 RX ORDER — LEVOTHYROXINE SODIUM 50 UG/1
50 TABLET ORAL DAILY
Qty: 90 TABLET | Refills: 0 | Status: SHIPPED | OUTPATIENT
Start: 2018-07-03 | End: 2019-01-15

## 2018-07-03 NOTE — TELEPHONE ENCOUNTER
[facility] Since dosage was changed to 50 daily.    Spoke w/ pt. Informed pt about results and recommendations per provider. pt verbalized understanding.

## 2018-07-03 NOTE — TELEPHONE ENCOUNTER
Please notify patient that this is from Dr. Austin who is on-call for [facility]. Please notify patient that based on her recent thyroid function test, she needs to reduce her levothyroxine to 50 µg daily instead of alternating 50 µg with 75 µg daily.  50 µg tablets of levothyroxine been sent in to her pharmacist.  She is to follow-up with Dr. Cortez in 2 months with labs done a few days prior for assessment of the thyroid function tests; labs have been ordered as thyroid function test for [facility] labs to be performed a few days before her follow-up visit with Dr. Cortez

## 2018-07-05 LAB
C DIPHTHERIAE AB SER IA-ACNC: 0.07 IU/ML
C TETANI AB SER-ACNC: 3.62 IU/ML
DEPRECATED S PNEUM 1 IGG SER-MCNC: 12.7 MCG/ML
DEPRECATED S PNEUM12 IGG SER-MCNC: 0.9 MCG/ML
DEPRECATED S PNEUM14 IGG SER-MCNC: 0.7 MCG/ML
DEPRECATED S PNEUM19 IGG SER-MCNC: 2.3 MCG/ML
DEPRECATED S PNEUM23 IGG SER-MCNC: 2.5 MCG/ML
DEPRECATED S PNEUM3 IGG SER-MCNC: 0.7 MCG/ML
DEPRECATED S PNEUM4 IGG SER-MCNC: <0.3 MCG/ML
DEPRECATED S PNEUM5 IGG SER-MCNC: 12.8 MCG/ML
DEPRECATED S PNEUM8 IGG SER-MCNC: 2.7 MCG/ML
DEPRECATED S PNEUM9 IGG SER-MCNC: 10.5 MCG/ML
HAEM INFLU B IGG SER-MCNC: 0.44 MG/L
S PNEUM DA 18C IGG SER-MCNC: 1.5 MCG/ML
S PNEUM DA 6B IGG SER-MCNC: 0.5 MCG/ML
S PNEUM DA 7F IGG SER-MCNC: 2.5 MCG/ML
S PNEUM DA 9V IGG SER-MCNC: 0.8 MCG/ML

## 2018-07-30 ENCOUNTER — OFFICE VISIT (OUTPATIENT)
Dept: UROLOGY | Facility: CLINIC | Age: 68
End: 2018-07-30
Payer: MEDICARE

## 2018-07-30 VITALS
BODY MASS INDEX: 31.69 KG/M2 | WEIGHT: 185.63 LBS | HEIGHT: 64 IN | HEART RATE: 76 BPM | DIASTOLIC BLOOD PRESSURE: 56 MMHG | SYSTOLIC BLOOD PRESSURE: 125 MMHG

## 2018-07-30 DIAGNOSIS — R35.0 FREQUENCY OF MICTURITION: ICD-10-CM

## 2018-07-30 DIAGNOSIS — R39.15 URINARY URGENCY: Primary | ICD-10-CM

## 2018-07-30 PROCEDURE — 99999 PR PBB SHADOW E&M-EST. PATIENT-LVL IV: CPT | Mod: PBBFAC,,, | Performed by: UROLOGY

## 2018-07-30 PROCEDURE — 99213 OFFICE O/P EST LOW 20 MIN: CPT | Mod: 25,S$GLB,, | Performed by: UROLOGY

## 2018-07-30 PROCEDURE — 3078F DIAST BP <80 MM HG: CPT | Mod: CPTII,S$GLB,, | Performed by: UROLOGY

## 2018-07-30 PROCEDURE — 3074F SYST BP LT 130 MM HG: CPT | Mod: CPTII,S$GLB,, | Performed by: UROLOGY

## 2018-07-30 PROCEDURE — 95971 ALYS SMPL SP/PN NPGT W/PRGRM: CPT | Mod: S$GLB,,, | Performed by: UROLOGY

## 2018-07-30 PROCEDURE — 3008F BODY MASS INDEX DOCD: CPT | Mod: CPTII,S$GLB,, | Performed by: UROLOGY

## 2018-07-30 NOTE — PROGRESS NOTES
CHIEF COMPLAINT:    Mrs. Delatorre is a 67 y.o. female presenting for a follow up for an InterStim placed for refractory frequency/urgency    PRESENTING ILLNESS:    Cornelia Delatorre is a 67 y.o. female who returns today stating that she stgetting agettgerted getting a sharp pain particularly in the rectum and she tried to adjust the InterStim and finally, turned off the unit in May.  At her previous appointment she had discomfort and the pulse width was increased to decrease the sharp sensation.      REVIEW OF SYSTEMS:     Review of Systems   Constitutional: Negative.    HENT: Negative.    Eyes: Negative.    Respiratory: Negative.    Cardiovascular: Negative.    Genitourinary: Positive for frequency and urgency.   Musculoskeletal: Positive for back pain and joint pain.   Skin: Negative.    Neurological: Negative.    Endo/Heme/Allergies: Negative.    Psychiatric/Behavioral: Negative.      PATIENT HISTORY:    Past Medical History:   Diagnosis Date    Allergy     Arthritis     Asthma     Cataract     Chronic fatigue 1/24/2017    Diabetes mellitus     resolved with gastric bypass    Diabetes mellitus, type 2     Encounter for blood transfusion     GERD (gastroesophageal reflux disease)     Headache(784.0)     History of lumpectomy of both breasts     1992 negative    Hyperlipidemia 7/15/2015    Hypertension     Hypothyroidism     Neuropathy     Obesity     SOHA on CPAP     Postmenopausal HRT (hormone replacement therapy)     Rash     Rosacea     Snoring     Wears glasses        Past Surgical History:   Procedure Laterality Date    ADENOIDECTOMY      APPENDECTOMY      BLADDER SUSPENSION      BREAST BIOPSY Bilateral 1992    bilateral benign excisional biopsies    BUNIONECTOMY  10/17/14    right, still has discomfort    CATARACT EXTRACTION W/  INTRAOCULAR LENS IMPLANT Bilateral     CHOLECYSTECTOMY  08/02/2017    COLONOSCOPY      ESOPHAGOGASTRODUODENOSCOPY      dilated esophagus    GASTRIC  BYPASS      2011    HYSTERECTOMY  1980    interstim bladder  2009    10/14/14 new battery    OOPHORECTOMY  1980    SKIN CANCER EXCISION      left hand    TONSILLECTOMY      WISDOM TOOTH EXTRACTION         Family History   Problem Relation Age of Onset    Breast cancer Mother 50    Diabetes Unknown     Hypertension Unknown     Hypothyroidism Unknown     Breast cancer Paternal Aunt         40's     Social History    Marital status:      Social History Main Topics    Smoking status: Never Smoker    Smokeless tobacco: Never Used    Alcohol use No    Drug use: No    Sexual activity: Not Currently       Allergies:  Sulfa (sulfonamide antibiotics) and Naproxen    Medications:  Outpatient Encounter Prescriptions as of 7/30/2018   Medication Sig Dispense Refill    albuterol 90 mcg/actuation inhaler Inhale 2 puffs into the lungs every 4 (four) hours as needed. 2 puffs every 4 hours as needed for cough, wheeze, or shortness of breath 3 Inhaler 3    aspirin (ECOTRIN) 81 MG EC tablet Take 81 mg by mouth once daily.      azelastine (ASTELIN) 137 mcg (0.1 %) nasal spray 1 spray (137 mcg total) by Nasal route 2 (two) times daily. 90 mL 3    azelastine (OPTIVAR) 0.05 % ophthalmic solution Place 1 drop into both eyes daily as needed.       BIFIDOBACTERIUM INFANTIS (ALIGN ORAL) Take by mouth once daily.      ciclopirox (PENLAC) 8 % Soln       cranberry 500 mg Cap Take 1 capsule by mouth every evening.      esomeprazole (NEXIUM) 40 MG capsule Take 1 capsule (40 mg total) by mouth before breakfast. 90 capsule 3    fexofenadine (ALLEGRA) 60 MG tablet Take 60 mg by mouth once daily.      fish oil-omega-3 fatty acids 300-1,000 mg capsule Take 2 g by mouth once daily.      fluticasone (FLONASE) 50 mcg/actuation nasal spray 2 sprays (100 mcg total) by Each Nare route once daily. 3 Bottle 3    gabapentin (NEURONTIN) 600 MG tablet Take 1 tablet (600 mg total) by mouth 3 (three) times daily. 270 tablet 3     hydroCHLOROthiazide (HYDRODIURIL) 25 MG tablet Take 1 tablet (25 mg total) by mouth once daily. 90 tablet 1    hydrOXYzine HCl (ATARAX) 10 MG Tab Take 3 tablets (30 mg total) by mouth 3 (three) times daily as needed (anxiety). 90 tablet 6    irbesartan (AVAPRO) 75 MG tablet Take 1 tablet (75 mg total) by mouth every evening. 90 tablet 1    levothyroxine (SYNTHROID) 50 MCG tablet Take 1 tablet (50 mcg total) by mouth once daily. 90 tablet 0    metFORMIN (GLUCOPHAGE) 500 MG tablet Take 2 tablets (1,000 mg total) by mouth 2 (two) times daily with meals. 360 tablet 3    methylcellulose, laxative, (CITRUCEL) 500 mg Tab Take 1 tablet by mouth every morning.      metoprolol tartrate (LOPRESSOR) 25 MG tablet Take 1 tablet by mouth once daily.      montelukast (SINGULAIR) 10 mg tablet Take 1 tablet (10 mg total) by mouth every evening. 90 tablet 3    multivitamin capsule Take 1 capsule by mouth. Take one tablets daily      PNEUMOVAX 23 25 mcg/0.5 mL       potassium chloride SA (K-DUR,KLOR-CON) 20 MEQ tablet Take 1 tablet (20 mEq total) by mouth once daily. 90 tablet 3    pravastatin (PRAVACHOL) 40 MG tablet Take 1 tablet (40 mg total) by mouth once daily. 90 tablet 1    SIMETHICONE (GAS-X ORAL) Take 1 capsule by mouth as needed.      SYMBICORT 160-4.5 mcg/actuation HFAA Inhale 2 puffs into the lungs every 12 (twelve) hours. 3 Inhaler 3     No facility-administered encounter medications on file as of 7/30/2018.          PHYSICAL EXAMINATION:    The patient generally appears in good health, is appropriately interactive, and is in no apparent distress.    Skin: No lesions.    Mental: Cooperative with normal affect.    Neuro: Grossly intact.    HEENT: Normal. No evidence of lymphadenopathy.    Chest:  normal inspiratory effort.    Abdomen:  Soft, non-tender. No masses or organomegaly. Bladder is not palpable. No evidence of flank discomfort. No evidence of inguinal hernia.    Extremities: No clubbing, cyanosis, or  edema      LABS:    Lab Results   Component Value Date    BUN 12 2018    CREATININE 0.8 2018     UA 1.015, pH 5, 250 glucose, otherwise, negative.      IMPRESSION:    Encounter Diagnoses   Name Primary?    Urinary urgency Yes    Frequency of micturition        PLAN:    1. Interrogation:  Last reprogrammin2018  Hours in use: 2720  Percent use:   62%  Was turned off in May  Program in use was 4     All lead pairs were checked at 1.0 A, 210 pulse width and ranged from 499-1710 ohms, < 15 mA     Program         % Used           Leads       Energy       PW      Hz    Sensation         Changed  Program 1                               0+, 1+, 3-   0.8 A          300      14        above the anus           Changed to 1+, 0-, located perineal closer to the front.   Program 2                               3+, 1-         1.3 A          300      14        Perineal close to the anus     Changed to 0+, 1-, at 1.4 perineal closer to the back.    Program 3                               2+, 3+, 0-   2.3 A          300      14        Perineal            1-, 2-, 0+, perineal, 2.0  Program 4       100%               C+, 0-        1.9 A          360      14        Rectal            Pair turned off     Battery Life:  Programs checked:  1049 ohms, < 15 mS  % Battery life:  28-47 %  Months remainin-52 months     iCon was reprogrammed.  Reviewed on how to use the iCon.  Handout was given to the patient.      2. Follow up in 3 months.

## 2018-08-03 ENCOUNTER — TELEPHONE (OUTPATIENT)
Dept: FAMILY MEDICINE | Facility: CLINIC | Age: 68
End: 2018-08-03

## 2018-08-03 NOTE — TELEPHONE ENCOUNTER
Please notify patient that she is past due for follow-up appointment with me addendum please obtain her labs a few days before the appointment please schedule appointment if 1 has not been scheduled yet

## 2018-08-07 ENCOUNTER — TELEPHONE (OUTPATIENT)
Dept: PULMONOLOGY | Facility: CLINIC | Age: 68
End: 2018-08-07

## 2018-08-07 DIAGNOSIS — J47.9 BRONCHIECTASIS WITHOUT COMPLICATION: ICD-10-CM

## 2018-08-07 DIAGNOSIS — D84.9 IMMUNE DEFICIENCY DISORDER: Primary | ICD-10-CM

## 2018-08-07 NOTE — TELEPHONE ENCOUNTER
Pt states she started azithromycin 8/1/18, and mucous turned clear, but amount was still the same. Pt reports still coughing.  Advised pt to use action plan of prednisone 20mg/day x3  And call us back if she does not improve.     ----- Message from Kayla Garcia sent at 8/7/2018 11:23 AM CDT -----    Patient  Is  Calling  To  Speak to the   Nurse about  Her asthma ,  Has  Questions  About  meds // please call [phone]

## 2018-08-08 RX ORDER — PREDNISONE 20 MG/1
20 TABLET ORAL DAILY
Qty: 10 TABLET | Refills: 0 | Status: SHIPPED | OUTPATIENT
Start: 2018-08-08 | End: 2018-08-18

## 2018-08-08 RX ORDER — AZITHROMYCIN 500 MG/1
500 TABLET, FILM COATED ORAL DAILY
Qty: 3 TABLET | Refills: 1 | Status: SHIPPED | OUTPATIENT
Start: 2018-08-08 | End: 2018-11-02 | Stop reason: ALTCHOICE

## 2018-08-28 ENCOUNTER — OFFICE VISIT (OUTPATIENT)
Dept: DERMATOLOGY | Facility: CLINIC | Age: 68
End: 2018-08-28
Payer: MEDICARE

## 2018-08-28 DIAGNOSIS — L57.0 MULTIPLE ACTINIC KERATOSES: Primary | ICD-10-CM

## 2018-08-28 DIAGNOSIS — L82.1 SEBORRHEIC KERATOSIS: ICD-10-CM

## 2018-08-28 DIAGNOSIS — M79.641 RIGHT HAND PAIN: Primary | ICD-10-CM

## 2018-08-28 DIAGNOSIS — Z12.83 SKIN CANCER SCREENING: ICD-10-CM

## 2018-08-28 PROCEDURE — 99214 OFFICE O/P EST MOD 30 MIN: CPT | Mod: S$GLB,,, | Performed by: DERMATOLOGY

## 2018-08-28 PROCEDURE — 99999 PR PBB SHADOW E&M-EST. PATIENT-LVL II: CPT | Mod: PBBFAC,,, | Performed by: DERMATOLOGY

## 2018-08-28 NOTE — PROGRESS NOTES
Subjective:       Patient ID:  Cornelia Delatorre is a 67 y.o. female who presents for   Chief Complaint   Patient presents with    Skin Check    Lesion     Had pimple like lesion appear on right wrist around May. Had white head to it and scaly. Treated with Mupirocin ointment. Seems to have resolved  Hard lesion that is dry and scaly to left eye lid. Sensitive to touch. Has been present for about 6 months. Not currently treating.   Hard raised lesion to left forearm that gets scaly. Has been present for about 6 months.  Full body skin check.     Phx skin ca R hand and nose - Dr Parra - 2014   Phx SCC L hand -mohs Dr Jones  -2015   History of actinic keratoses on nasal Dorsum in the past hx of efudex use  Uses CeraVe AM cream daily for routine         Review of Systems   Skin: Positive for rash, dry skin and daily sunscreen use. Negative for itching.        Objective:    Physical Exam   Constitutional: She appears well-developed and well-nourished. No distress.   HENT:   Mouth/Throat: Lips normal.    Eyes: Lids are normal.  No conjunctival no injection.   Cardiovascular: There is no local extremity swelling and no dependent edema.     Neurological: She is alert and oriented to person, place, and time. She is not disoriented.   Psychiatric: She has a normal mood and affect.   Skin:   Areas Examined (abnormalities noted in diagram):   Head / Face Inspection Performed  Neck Inspection Performed  Chest / Axilla Inspection Performed  Abdomen Inspection Performed  Back Inspection Performed  RUE Inspected  LUE Inspection Performed                   Diagram Legend     Erythematous scaling macule/papule c/w actinic keratosis       Vascular papule c/w angioma      Pigmented verrucoid papule/plaque c/w seborrheic keratosis      Yellow umbilicated papule c/w sebaceous hyperplasia      Irregularly shaped tan macule c/w lentigo     1-2 mm smooth white papules consistent with Milia      Movable subcutaneous cyst  with punctum c/w epidermal inclusion cyst      Subcutaneous movable cyst c/w pilar cyst      Firm pink to brown papule c/w dermatofibroma      Pedunculated fleshy papule(s) c/w skin tag(s)      Evenly pigmented macule c/w junctional nevus     Mildly variegated pigmented, slightly irregular-bordered macule c/w mildly atypical nevus      Flesh colored to evenly pigmented papule c/w intradermal nevus       Pink pearly papule/plaque c/w basal cell carcinoma      Erythematous hyperkeratotic cursted plaque c/w SCC      Surgical scar with no sign of skin cancer recurrence      Open and closed comedones      Inflammatory papules and pustules      Verrucoid papule consistent consistent with wart     Erythematous eczematous patches and plaques     Dystrophic onycholytic nail with subungual debris c/w onychomycosis     Umbilicated papule    Erythematous-base heme-crusted tan verrucoid plaque consistent with inflamed seborrheic keratosis     Erythematous Silvery Scaling Plaque c/w Psoriasis     See annotation      Assessment / Plan:        Multiple actinic keratoses  Declines cryo today  Has efudex at home, recommend bid use to areas- treat one area at a time. X 2 weeks. Stop if irritation severe.     Skin cancer screening  Upper body skin examination performed today including at least 6 points as noted in physical examination. No lesions suspicious for malignancy noted.        Seborrheic keratosis, left arm  These are benign inherited growths without a malignant potential. Reassurance given to patient. No treatment is necessary.                Follow-up in about 3 months (around 11/28/2018).

## 2018-08-29 ENCOUNTER — HOSPITAL ENCOUNTER (OUTPATIENT)
Dept: RADIOLOGY | Facility: HOSPITAL | Age: 68
Discharge: HOME | End: 2018-08-29
Attending: ORTHOPAEDIC SURGERY
Payer: MEDICARE

## 2018-08-29 ENCOUNTER — OFFICE VISIT (OUTPATIENT)
Dept: ORTHOPEDICS | Facility: CLINIC | Age: 68
End: 2018-08-29
Payer: MEDICARE

## 2018-08-29 VITALS
HEIGHT: 64 IN | DIASTOLIC BLOOD PRESSURE: 61 MMHG | HEART RATE: 65 BPM | BODY MASS INDEX: 31.58 KG/M2 | WEIGHT: 185 LBS | SYSTOLIC BLOOD PRESSURE: 133 MMHG

## 2018-08-29 DIAGNOSIS — M79.641 RIGHT HAND PAIN: ICD-10-CM

## 2018-08-29 DIAGNOSIS — M19.049 HAND ARTHRITIS: Primary | ICD-10-CM

## 2018-08-29 PROCEDURE — 73130 X-RAY EXAM OF HAND: CPT | Mod: 26,RT,, | Performed by: RADIOLOGY

## 2018-08-29 PROCEDURE — 73130 X-RAY EXAM OF HAND: CPT | Mod: TC,PO,RT

## 2018-08-29 PROCEDURE — 99213 OFFICE O/P EST LOW 20 MIN: CPT | Mod: S$GLB,,, | Performed by: ORTHOPAEDIC SURGERY

## 2018-08-29 PROCEDURE — 3008F BODY MASS INDEX DOCD: CPT | Mod: CPTII,S$GLB,, | Performed by: ORTHOPAEDIC SURGERY

## 2018-08-29 PROCEDURE — 3075F SYST BP GE 130 - 139MM HG: CPT | Mod: CPTII,S$GLB,, | Performed by: ORTHOPAEDIC SURGERY

## 2018-08-29 PROCEDURE — 99999 PR PBB SHADOW E&M-EST. PATIENT-LVL III: CPT | Mod: PBBFAC,,, | Performed by: ORTHOPAEDIC SURGERY

## 2018-08-29 PROCEDURE — 3078F DIAST BP <80 MM HG: CPT | Mod: CPTII,S$GLB,, | Performed by: ORTHOPAEDIC SURGERY

## 2018-08-29 NOTE — PROGRESS NOTES
Past Medical History:   Diagnosis Date    Allergy     Arthritis     Asthma     Cataract     Chronic fatigue 1/24/2017    Diabetes mellitus     resolved with gastric bypass    Diabetes mellitus, type 2     Encounter for blood transfusion     GERD (gastroesophageal reflux disease)     Headache(784.0)     History of lumpectomy of both breasts     1992 negative    Hyperlipidemia 7/15/2015    Hypertension     Hypothyroidism     Neuropathy     Obesity     SOHA on CPAP     Postmenopausal HRT (hormone replacement therapy)     Rash     Rosacea     Snoring     Wears glasses        Past Surgical History:   Procedure Laterality Date    ADENOIDECTOMY      APPENDECTOMY      BLADDER SUSPENSION      BREAST BIOPSY Bilateral 1992    bilateral benign excisional biopsies    BUNIONECTOMY  10/17/14    right, still has discomfort    CATARACT EXTRACTION W/  INTRAOCULAR LENS IMPLANT Bilateral     CHOLECYSTECTOMY  08/02/2017    COLONOSCOPY      ESOPHAGOGASTRODUODENOSCOPY      dilated esophagus    GASTRIC BYPASS      2011    HYSTERECTOMY  1980    interstim bladder  2009    10/14/14 new battery    OOPHORECTOMY  1980    SKIN CANCER EXCISION      left hand    TONSILLECTOMY      WISDOM TOOTH EXTRACTION         Current Outpatient Medications   Medication Sig    albuterol 90 mcg/actuation inhaler Inhale 2 puffs into the lungs every 4 (four) hours as needed. 2 puffs every 4 hours as needed for cough, wheeze, or shortness of breath    aspirin (ECOTRIN) 81 MG EC tablet Take 81 mg by mouth once daily.    azelastine (ASTELIN) 137 mcg (0.1 %) nasal spray 1 spray (137 mcg total) by Nasal route 2 (two) times daily.    azelastine (OPTIVAR) 0.05 % ophthalmic solution Place 1 drop into both eyes daily as needed.     azithromycin (ZITHROMAX) 500 MG tablet Take 1 tablet (500 mg total) by mouth once daily.    BIFIDOBACTERIUM INFANTIS (ALIGN ORAL) Take by mouth once daily.    ciclopirox (PENLAC) 8 % Soln     cranberry  500 mg Cap Take 1 capsule by mouth every evening.    esomeprazole (NEXIUM) 40 MG capsule Take 1 capsule (40 mg total) by mouth before breakfast.    fexofenadine (ALLEGRA) 60 MG tablet Take 60 mg by mouth once daily.    fish oil-omega-3 fatty acids 300-1,000 mg capsule Take 2 g by mouth once daily.    fluticasone (FLONASE) 50 mcg/actuation nasal spray 2 sprays (100 mcg total) by Each Nare route once daily.    gabapentin (NEURONTIN) 600 MG tablet Take 1 tablet (600 mg total) by mouth 3 (three) times daily.    hydroCHLOROthiazide (HYDRODIURIL) 25 MG tablet Take 1 tablet (25 mg total) by mouth once daily.    hydrOXYzine HCl (ATARAX) 10 MG Tab Take 3 tablets (30 mg total) by mouth 3 (three) times daily as needed (anxiety).    irbesartan (AVAPRO) 75 MG tablet Take 1 tablet (75 mg total) by mouth every evening.    levothyroxine (SYNTHROID) 50 MCG tablet Take 1 tablet (50 mcg total) by mouth once daily.    metFORMIN (GLUCOPHAGE) 500 MG tablet Take 2 tablets (1,000 mg total) by mouth 2 (two) times daily with meals.    methylcellulose, laxative, (CITRUCEL) 500 mg Tab Take 1 tablet by mouth every morning.    metoprolol tartrate (LOPRESSOR) 25 MG tablet Take 1 tablet by mouth once daily.    montelukast (SINGULAIR) 10 mg tablet Take 1 tablet (10 mg total) by mouth every evening.    multivitamin capsule Take 1 capsule by mouth. Take one tablets daily    PNEUMOVAX 23 25 mcg/0.5 mL     potassium chloride SA (K-DUR,KLOR-CON) 20 MEQ tablet Take 1 tablet (20 mEq total) by mouth once daily.    pravastatin (PRAVACHOL) 40 MG tablet Take 1 tablet (40 mg total) by mouth once daily.    SIMETHICONE (GAS-X ORAL) Take 1 capsule by mouth as needed.    SYMBICORT 160-4.5 mcg/actuation HFAA Inhale 2 puffs into the lungs every 12 (twelve) hours.     No current facility-administered medications for this visit.        Review of patient's allergies indicates:   Allergen Reactions    Sulfa (sulfonamide antibiotics) Hives     Naproxen Other (See Comments)     Other reaction(s): RT sided numbness         Family History   Problem Relation Age of Onset    Breast cancer Mother 50    Diabetes Unknown     Hypertension Unknown     Hypothyroidism Unknown     Breast cancer Paternal Aunt         40's       Social History     Socioeconomic History    Marital status:      Spouse name: Not on file    Number of children: Not on file    Years of education: Not on file    Highest education level: Not on file   Social Needs    Financial resource strain: Not on file    Food insecurity - worry: Not on file    Food insecurity - inability: Not on file    Transportation needs - medical: Not on file    Transportation needs - non-medical: Not on file   Occupational History    Not on file   Tobacco Use    Smoking status: Never Smoker    Smokeless tobacco: Never Used   Substance and Sexual Activity    Alcohol use: No    Drug use: No    Sexual activity: Not Currently   Other Topics Concern    Are you pregnant or think you may be? Not Asked    Breast-feeding Not Asked   Social History Narrative    Not on file       Chief Complaint:   Chief Complaint   Patient presents with    Right Hand - Pain, Swelling       History of present illness:  67-year-old female comes in with a new complaint of some right hand pain and swelling. Always has a little swelling over the 3rd MCP joint dorsally but it got worse the other day.  Had trouble holding anything or making a fist.  It has gotten better.  Pain is down to 3/10.  Has been using some topicals and taking Tylenol.    Answers for HPI/ROS submitted by the patient on 8/25/2018   Hand injury  activity change: No  unexpected weight change: No  neck pain: No  hearing loss: No  rhinorrhea: No  trouble swallowing: No  eye discharge: No  visual disturbance: No  chest tightness: No  wheezing: No  chest pain: No  palpitations: No  blood in stool: No  constipation: No  vomiting: No  diarrhea:  No  polydipsia: No  polyuria: No  difficulty urinating: No  hematuria: No  menstrual problem: No  dysuria: No  joint swelling: Yes  arthralgias: Yes  headaches: No  weakness: No  confusion: No  dysphoric mood: No  appetite change : No  sleep disturbance: No  IMMUNOCOMPROMISED: No  nervous/ anxious: No  rash: No  eye redness: No  eye pain: No  ear pain: No  tinnitus: Yes  sinus pressure : No  nosebleeds: No  enviro allergies: No  food allergies: No  cough: No  shortness of breath: No  sweating: No  frequency: No  painful intercourse: No  nausea: No  dizziness: No  numbness: No  seizures: No  myalgia: Yes  back pain: No  Pain Chronicity: new  History of trauma: No  Onset: in the past 7 days  Frequency: constantly  Progression since onset: unchanged  Injury mechanism: lifting  injury location: at home  pain- numeric: 7/10  pain location: right hand  pain quality: aching  Radiating Pain: No  Aggravating factors: touching  fever: No  inability to bear weight: Yes  itching: No  joint locking: No  limited range of motion: Yes  stiffness: Yes  tingling: No  Treatments tried: OTC ointments  physical therapy: not tried  Improvement on treatment: mild      Physical Examination:    Vital Signs:    Vitals:    08/29/18 1015   BP: 133/61   Pulse: 65       Body mass index is 31.76 kg/m².    This a well-developed, well nourished patient in no acute distress.  They are alert and oriented and cooperative to examination.  Pt. walks without an antalgic gait.      Examination of the right hand and wrist shows no signs of rashes or erythema. Patient has some swelling near the index and middle MCP joints dorsally. Patient has full range of motion of the wrist in flexion and extension as well as ulnar and radial deviation. The patient also has full range of motion of all joints in the hand. There are 2+ radial pulse and intact light touch sensation in all 5 digits.       X-rays:  Three views of the right hand are ordered and reviewed which  show some mild arthritic changes of the IP joints     Assessment::  Right hand arthritis with swelling    Plan:  I reviewed the findings with her today.  It seems like it is getting better.  Continue her current regimen.  Follow-up in 1 week for cortisone injection of the 3rd MCP joint if still fairly symptomatic.    This note was created using Come[employer] voice recognition software that occasionally misinterpreted phrases or words.    Consult note is delivered via Epic messaging service.

## 2018-09-17 ENCOUNTER — IMMUNIZATION (OUTPATIENT)
Dept: FAMILY MEDICINE | Facility: CLINIC | Age: 68
End: 2018-09-17
Payer: MEDICARE

## 2018-09-17 PROCEDURE — 90662 IIV NO PRSV INCREASED AG IM: CPT | Mod: PBBFAC,PO

## 2018-10-01 ENCOUNTER — TELEPHONE (OUTPATIENT)
Dept: PULMONOLOGY | Facility: CLINIC | Age: 68
End: 2018-10-01

## 2018-10-01 NOTE — TELEPHONE ENCOUNTER
Pt wanted to advise us that she is starting her action plan, using prednisone 20mg x3.  Pt states that if she is not better by this Thursday, she will call back to be seen by the NP.   ----- Message from Lorrie Paul sent at 10/1/2018  8:47 AM CDT -----  Type: Needs Medical Advice    Who Called:  Patient    Best Call Back Number:[phone] (home)     Additional Information: Has some questions regarding her medications, would like to speak to Kiersten regarding

## 2018-10-04 ENCOUNTER — TELEPHONE (OUTPATIENT)
Dept: PULMONOLOGY | Facility: CLINIC | Age: 68
End: 2018-10-04

## 2018-10-04 ENCOUNTER — OFFICE VISIT (OUTPATIENT)
Dept: PULMONOLOGY | Facility: CLINIC | Age: 68
End: 2018-10-04
Payer: MEDICARE

## 2018-10-04 VITALS
WEIGHT: 179 LBS | BODY MASS INDEX: 30.56 KG/M2 | TEMPERATURE: 98 F | DIASTOLIC BLOOD PRESSURE: 64 MMHG | SYSTOLIC BLOOD PRESSURE: 107 MMHG | HEART RATE: 68 BPM | HEIGHT: 64 IN | OXYGEN SATURATION: 95 %

## 2018-10-04 DIAGNOSIS — E11.8 CONTROLLED TYPE 2 DIABETES MELLITUS WITH COMPLICATION, WITHOUT LONG-TERM CURRENT USE OF INSULIN: ICD-10-CM

## 2018-10-04 DIAGNOSIS — J45.21 MILD INTERMITTENT ASTHMA WITH ACUTE EXACERBATION: Primary | ICD-10-CM

## 2018-10-04 PROCEDURE — 3044F HG A1C LEVEL LT 7.0%: CPT | Mod: CPTII,,, | Performed by: NURSE PRACTITIONER

## 2018-10-04 PROCEDURE — 99213 OFFICE O/P EST LOW 20 MIN: CPT | Mod: S$PBB,,, | Performed by: NURSE PRACTITIONER

## 2018-10-04 PROCEDURE — 3008F BODY MASS INDEX DOCD: CPT | Mod: CPTII,,, | Performed by: NURSE PRACTITIONER

## 2018-10-04 PROCEDURE — 3074F SYST BP LT 130 MM HG: CPT | Mod: CPTII,,, | Performed by: NURSE PRACTITIONER

## 2018-10-04 PROCEDURE — 96372 THER/PROPH/DIAG INJ SC/IM: CPT | Mod: PBBFAC,PO

## 2018-10-04 PROCEDURE — 99999 PR PBB SHADOW E&M-EST. PATIENT-LVL III: CPT | Mod: PBBFAC,,, | Performed by: NURSE PRACTITIONER

## 2018-10-04 PROCEDURE — 1101F PT FALLS ASSESS-DOCD LE1/YR: CPT | Mod: CPTII,,, | Performed by: NURSE PRACTITIONER

## 2018-10-04 PROCEDURE — 99213 OFFICE O/P EST LOW 20 MIN: CPT | Mod: PBBFAC,PO,25 | Performed by: NURSE PRACTITIONER

## 2018-10-04 PROCEDURE — 3078F DIAST BP <80 MM HG: CPT | Mod: CPTII,,, | Performed by: NURSE PRACTITIONER

## 2018-10-04 RX ORDER — ALBUTEROL SULFATE 1.25 MG/3ML
2.5 SOLUTION RESPIRATORY (INHALATION) EVERY 6 HOURS PRN
Qty: 1 BOX | Refills: 3 | Status: SHIPPED | OUTPATIENT
Start: 2018-10-04 | End: 2018-10-12 | Stop reason: ALTCHOICE

## 2018-10-04 RX ORDER — BETAMETHASONE SODIUM PHOSPHATE AND BETAMETHASONE ACETATE 3; 3 MG/ML; MG/ML
12 INJECTION, SUSPENSION INTRA-ARTICULAR; INTRALESIONAL; INTRAMUSCULAR; SOFT TISSUE ONCE
Status: COMPLETED | OUTPATIENT
Start: 2018-10-04 | End: 2018-10-04

## 2018-10-04 RX ADMIN — BETAMETHASONE SODIUM PHOSPHATE AND BETAMETHASONE ACETATE 12 MG: 3; 3 INJECTION, SUSPENSION INTRA-ARTICULAR; INTRALESIONAL; INTRAMUSCULAR at 11:10

## 2018-10-04 NOTE — TELEPHONE ENCOUNTER
The patient's al[facility]erol was cancelled [facility]    ----- Message from Leah Doll sent at 10/4/2018  3:19 PM CDT -----  Contact: [facility] - [place]   States she's calling rg pt's albutterall 1.25mg needs to check the instructions and can be reached at [phone]//thanks/dbw

## 2018-10-04 NOTE — PATIENT INSTRUCTIONS
Asthma Exacerbation  Clinic Celestone injection  Prednisone 2 20 mg pills for three days, then 1 pill for three days    Diabetes:   Check blood sugar levels while on prednisone    Hydration, Rest is best treatment for virus infections    Albuterol nebulizer machine,   Use every 6 hours as needed for SOB and wheezing.    Use peak flow meter, you want the blood movable tab to be above the yellow line and even better if above green line.  Contact clinic if below red line. Always do peak flow meter twice for best number.    Use azithromycin as previously prescribed at 1 500 mg tablet for three days

## 2018-10-04 NOTE — PROGRESS NOTES
10/4/2018    Cornelia Delatorre  Office Note    Chief Complaint   Patient presents with    Cough    Fever    Chest Congestion       HPI: Started feeling bad 4 days ago, ears felt muffled, body aches, no fever, SOB, cough. Started Prednisone 20 mg for three days. States Fever of 101 F in am, improved with tylenol  States feels something rolling around in upper chest. Cough productive occasionally yellow to white sputum, worse at night, using rescue inhaler 1-2 times a day for 4 days a week.      The chief compliant  problem is new to me  PFSH:  Past Medical History:   Diagnosis Date    Allergy     Arthritis     Asthma     Cataract     Chronic fatigue 1/24/2017    Diabetes mellitus     resolved with gastric bypass    Diabetes mellitus, type 2     Encounter for blood transfusion     GERD (gastroesophageal reflux disease)     Headache(784.0)     History of lumpectomy of both breasts     1992 negative    Hyperlipidemia 7/15/2015    Hypertension     Hypothyroidism     Neuropathy     Obesity     SOHA on CPAP     Postmenopausal HRT (hormone replacement therapy)     Rash     Rosacea     Snoring     Wears glasses          Past Surgical History:   Procedure Laterality Date    ADENOIDECTOMY      APPENDECTOMY      BIOPSY-LESION nasal septum - INTRANASAL MUCOSAL LESION Right 12/1/2014    [facility]    BLADDER SUSPENSION      BREAST BIOPSY Bilateral 1992    bilateral benign excisional biopsies    BUNIONECTOMY  10/17/14    right, still has discomfort    BUNIONECTOMY-TAILOR Right 10/17/2014    [facility]zad [facility] at [facility] OR    CATARACT EXTRACTION W/  INTRAOCULAR LENS IMPLANT Bilateral     CHOLECYSTECTOMY  08/02/2017    CHOLECYSTECTOMY-LAPAROSCOPIC N/A 8/2/2017    [facility] OR    COLONOSCOPY      ESOPHAGOGASTRODUODENOSCOPY      dilated esophagus    GASTRIC BYPASS      2011    HYSTERECTOMY  1980    interstim bladder   2009    10/14/14 new battery    OOPHORECTOMY  1980    RELEASE-CARPAL TUNNEL Left 8/29/2017    [facility] OR    REPAIR-HAMMER TOE- 2nd toe 2nd Procedure: HTC 3rd toe with T&C- 70820. Hammer toe correction, tendon release to 4th right toe. Right 10/17/2014    [facility] OR    LBVYXTJA-AXWPXOLUP-MKTWCUW IPG OF INTERSTIM Right 10/14/2014    Performed [facility] at [facility] OR 2ND FLR    SKIN CANCER EXCISION      left hand    TONSILLECTOMY      WISDOM TOOTH EXTRACTION       Social History     Tobacco Use    Smoking status: Never Smoker    Smokeless tobacco: Never Used   Substance Use Topics    Alcohol use: No    Drug use: No     Family History   Problem Relation Age of Onset    Breast cancer Mother 50    Diabetes Unknown     Hypertension Unknown     Hypothyroidism Unknown     Breast cancer Paternal Aunt         40's     Review of patient's allergies indicates:   Allergen Reactions    Sulfa (sulfonamide antibiotics) Hives    Naproxen Other (See Comments)     Other reaction(s): RT sided numbness       I have reviewed past medical, family, and social history. I have reviewed previous nurse notes.      Performance Status:The patient's activity level is functions out of house.      Review of Systems   Constitutional: Negative for activity change, appetite change and unexpected weight change. Positive chills, diaphoresis, fatigue, fever  HENT: Negative for dental problem,  rhinorrhea, sinus pressure, sinus pain, sneezing, trouble swallowing and voice change.  Positive forpostnasal drip, sore throat  Respiratory: Negative for apnea, chest tightness, and stridor.  Positive Cough, productive with yellow to clear sputum, shortness of breath, wheezing   Cardiovascular: Negative for chest pain, palpitations and leg swelling.   Gastrointestinal: Negative for abdominal distention, abdominal pain, constipation and nausea.   Musculoskeletal:  "Negative for gait problem, myalgias and neck pain. Positive for bilateral myalgias  Skin: Negative for color change and pallor.   Allergic/Immunologic: Negative for environmental allergies and food allergies.   Neurological: Negative for dizziness, speech difficulty, weakness, numbness. Positive for Headache, light-headedness  Hematological: Negative for adenopathy. Does not bruise/bleed easily.   Psychiatric/Behavioral: Negative for dysphoric mood and sleep disturbance. The patient is not nervous/anxious.           Exam:Comprehensive exam done. /64 (BP Location: Left arm, Patient Position: Sitting)   Pulse 68   Temp 98 °F (36.7 °C)   Ht 5' 4" (1.626 m)   Wt 81.2 kg (179 lb 0.2 oz)   SpO2 95% Comment: on room air  BMI 30.73 kg/m²   Exam included Vitals as listed  Constitutional: He is oriented to person, place, and time. He appears well-developed. No distress.   Nose: Nose normal.   Mouth/Throat: Uvula is midline, oropharynx is clear and moist and mucous membranes are normal. No dental caries. No oropharyngeal exudate, posterior oropharyngeal edema, posterior oropharyngeal erythema or tonsillar abscesses.  Mallapatti (M) score II  Eyes: Pupils are equal, round, and reactive to light.   Neck: No JVD present. No thyromegaly present.   Cardiovascular: Normal rate, regular rhythm and normal heart sounds. Exam reveals no gallop and no friction rub.   No murmur heard.  Pulmonary/Chest: Effort normal and breath sounds abnormal Bilateral Wheezes. No accessory muscle usage or stridor. No apnea and no tachypnea. No respiratory distress, decreased breath sounds, rales, or tenderness.   Abdominal: Soft. He exhibits no mass. There is no tenderness. No hepatosplenomegaly, hernias and normoactive bowel sounds  Musculoskeletal: Normal range of motion. exhibits no edema.   Lymphadenopathy:      has tender cervical adenopathy.      has no axillary adenopathy.   Neurological:  alert and oriented to person, place, and time. " not disoriented.   Skin: Skin is warm and dry. Capillary refill takes less 2 sec. No cyanosis or erythema. No pallor. Nails show no clubbing.   Psychiatric: normal mood and affect. behavior is normal. Judgment and thought content normal.     Peak flow meter twice results 190 LPM  Radiographs (ct chest and cxr) reviewed: was not done not available      Labs reviewed   Lab Results   Component Value Date    WBC 5.06 06/28/2018    RBC 4.68 06/28/2018    HGB 13.2 06/28/2018    HCT 41.8 06/28/2018    MCV 89 06/28/2018    MCH 28.2 06/28/2018    MCHC 31.6 (L) 06/28/2018    RDW 13.8 06/28/2018     06/28/2018    MPV 10.6 06/28/2018    GRAN 2.9 06/28/2018    GRAN 57.5 06/28/2018    LYMPH 1.7 06/28/2018    LYMPH 33.0 06/28/2018    MONO 0.3 06/28/2018    MONO 6.7 06/28/2018    EOS 0.1 06/28/2018    BASO 0.02 06/28/2018    EOSINOPHIL 2.2 06/28/2018    BASOPHIL 0.4 06/28/2018       PFT results reviewed  Pulmonary Functions Testing Results:    PULMONARY FUNCTION TEST REPORT         DATE OF PROCEDURE:  03/24/2017     1.  Spirometry reveals an FVC of 3.1 L, which is 107% predicted.  FEV1 is 2.3 L,   which is 100% predicted.  The ratio, there is preserved.  There is a positive,   but not significant bronchodilator response to both the FVC and FEV1.  Loop   contours appear with adequate effort as well.  2.  Lung volumes.  The total lung capacity is 4.4 L, which is 92% predicted.    There are no signs of gas trapping.  3.  Diffusing capacity is mild-to-moderate reduced at 68%, does improve to 96%   with alveolar volumes.     OVERALL IMPRESSION:  A normal spirometry with a robust yet statistically   insignificant bronchodilator response.  Normal lung volumes and a moderate   reduction in diffusion capacity.        CV/HN  dd: 03/25/2017 16:57:01 (CDT)  td: 03/25/2017 21:53:20 (CDT)  Doc ID   #[id number]  Job ID #[id number]     CC: Jonathan Nicole M.D.               Plan:  Clinical impression is apparently straight forward and impression with  management as below.    Cornelia was seen today for cough, fever and chest congestion.    Diagnoses and all orders for this visit:    Mild intermittent asthma with acute exacerbation  -     betamethasone acetate-betamethasone sodium phosphate injection 12 mg; Inject 2 mLs (12 mg total) into the muscle once.  -     NEBULIZER FOR HOME USE  -     albuterol (ACCUNEB) 1.25 mg/3 mL Nebu; Take 6 mLs (2.5 mg total) by nebulization every 6 (six) hours as needed. Rescue  - Azithromycin 500 mg for three days.  - Prednisone taper 40 mg for three days, then 20 mg for three days.  - Continue current long term Asthma medications    Controlled type 2 diabetes mellitus with complication, without long-term current use of insulin     -Monitor Blood sugar levels while on prednisone    Follow-up in about 4 weeks (around 11/1/2018), or if symptoms worsen or fail to improve.    Discussed with patient above for education the following:      Patient Instructions   Asthma Exacerbation  Clinic Celestone injection  Prednisone 2 20 mg pills for three days, then 1 pill for three days    Diabetes:   Check blood sugar levels while on prednisone    Hydration, Rest is best treatment for virus infections    Albuterol nebulizer machine,   Use every 6 hours as needed for SOB and wheezing.    Use peak flow meter, you want the blood movable tab to be above the yellow line and even better if above green line.  Contact clinic if below red line. Always do peak flow meter twice for best number.    Use azithromycin as previously prescribed at 1 500 mg tablet for three days

## 2018-10-07 ENCOUNTER — HOSPITAL ENCOUNTER (EMERGENCY)
Facility: HOSPITAL | Age: 68
Discharge: HOME | End: 2018-10-07
Attending: EMERGENCY MEDICINE
Payer: MEDICARE

## 2018-10-07 VITALS
OXYGEN SATURATION: 94 % | HEIGHT: 64 IN | RESPIRATION RATE: 16 BRPM | DIASTOLIC BLOOD PRESSURE: 70 MMHG | WEIGHT: 175 LBS | SYSTOLIC BLOOD PRESSURE: 133 MMHG | TEMPERATURE: 98 F | HEART RATE: 59 BPM | BODY MASS INDEX: 29.88 KG/M2

## 2018-10-07 DIAGNOSIS — R06.02 SHORTNESS OF BREATH: Primary | ICD-10-CM

## 2018-10-07 LAB
ALBUMIN SERPL BCP-MCNC: 3.6 G/DL
ALP SERPL-CCNC: 94 U/L
ALT SERPL W/O P-5'-P-CCNC: 23 U/L
ANION GAP SERPL CALC-SCNC: 15 MMOL/L
AST SERPL-CCNC: 18 U/L
BASOPHILS # BLD AUTO: 0 K/UL
BASOPHILS NFR BLD: 0.1 %
BILIRUB SERPL-MCNC: 0.6 MG/DL
BNP SERPL-MCNC: 114 PG/ML
BUN SERPL-MCNC: 13 MG/DL
CALCIUM SERPL-MCNC: 9.4 MG/DL
CHLORIDE SERPL-SCNC: 93 MMOL/L
CO2 SERPL-SCNC: 24 MMOL/L
CREAT SERPL-MCNC: 0.9 MG/DL
DIFFERENTIAL METHOD: ABNORMAL
EOSINOPHIL # BLD AUTO: 0 K/UL
EOSINOPHIL NFR BLD: 0.1 %
ERYTHROCYTE [DISTWIDTH] IN BLOOD BY AUTOMATED COUNT: 14.8 %
EST. GFR  (AFRICAN AMERICAN): >60 ML/MIN/1.73 M^2
EST. GFR  (NON AFRICAN AMERICAN): >60 ML/MIN/1.73 M^2
FLUAV AG SPEC QL IA: NEGATIVE
FLUBV AG SPEC QL IA: NEGATIVE
GLUCOSE SERPL-MCNC: 178 MG/DL
HCT VFR BLD AUTO: 37.4 %
HGB BLD-MCNC: 12.6 G/DL
INR PPP: 1
LYMPHOCYTES # BLD AUTO: 0.8 K/UL
LYMPHOCYTES NFR BLD: 13.8 %
MCH RBC QN AUTO: 28.7 PG
MCHC RBC AUTO-ENTMCNC: 33.6 G/DL
MCV RBC AUTO: 86 FL
MONOCYTES # BLD AUTO: 0.1 K/UL
MONOCYTES NFR BLD: 2.2 %
NEUTROPHILS # BLD AUTO: 4.7 K/UL
NEUTROPHILS NFR BLD: 83.8 %
PLATELET # BLD AUTO: 204 K/UL
PMV BLD AUTO: 8.6 FL
POTASSIUM SERPL-SCNC: 4.2 MMOL/L
PROT SERPL-MCNC: 6.8 G/DL
PROTHROMBIN TIME: 10.5 SEC
RBC # BLD AUTO: 4.37 M/UL
SODIUM SERPL-SCNC: 132 MMOL/L
SPECIMEN SOURCE: NORMAL
TROPONIN I SERPL DL<=0.01 NG/ML-MCNC: 0.01 NG/ML
WBC # BLD AUTO: 5.6 K/UL

## 2018-10-07 PROCEDURE — 84484 ASSAY OF TROPONIN QUANT: CPT

## 2018-10-07 PROCEDURE — 36415 COLL VENOUS BLD VENIPUNCTURE: CPT

## 2018-10-07 PROCEDURE — 99284 EMERGENCY DEPT VISIT MOD MDM: CPT

## 2018-10-07 PROCEDURE — 93005 ELECTROCARDIOGRAM TRACING: CPT

## 2018-10-07 PROCEDURE — 85025 COMPLETE CBC W/AUTO DIFF WBC: CPT

## 2018-10-07 PROCEDURE — 85610 PROTHROMBIN TIME: CPT

## 2018-10-07 PROCEDURE — 80053 COMPREHEN METABOLIC PANEL: CPT

## 2018-10-07 PROCEDURE — 83880 ASSAY OF NATRIURETIC PEPTIDE: CPT

## 2018-10-07 PROCEDURE — 87400 INFLUENZA A/B EACH AG IA: CPT | Mod: 59

## 2018-10-07 PROCEDURE — 93010 ELECTROCARDIOGRAM REPORT: CPT | Mod: ,,, | Performed by: INTERNAL MEDICINE

## 2018-10-07 NOTE — ED NOTES
Pt awake alert in no acute distress, pt reports coughing for the past week, reports seen by NP and given antibiotics and steroids with breathing treatments on Thursday, pt family at bedside

## 2018-10-07 NOTE — ED PROVIDER NOTES
"Encounter Date: 10/7/2018    SCRIBE #1 NOTE: I, Tiffany Caldera, am scribing for, and in the presence of, Dr. Naresh Jones.       History     Chief Complaint   Patient presents with    Shortness of Breath     started steroids / antibiotics / Dr Nicole     Wheezing     " coughed all night "       Time seen by provider: 3:18 PM on 10/07/2018    Cornelia Delatorre is a 67 y.o. female with HTN, DM type II, HLD, hypothyroidism, and GERD who presents to the ED with an onset of shortness of breath that began 7 days ago and worsened 4 days ago. She also presented with a 101F fever 4 days ago, it has resolved. She went to her PCP, Dr. Armond Cortez, whom thought he was having an asthma flare up. Dr cortez prescribe Prednizone, Azithromycin, a nebulizer, and a steroid shot. Her symptoms have not gotten better since taking the prescriptions. Pt endorses nausea, diarrhea, chest tightness, chest pain, coughing, fatigue, and dark stools with an "interesting" odor. The patient denies vomiting or any other symptoms at this time. No pertinent SHx noted. Pt is allergic to Sulfa and Naproxen.       The history is provided by the patient.     Review of patient's allergies indicates:   Allergen Reactions    Sulfa (sulfonamide antibiotics) Hives    Naproxen Other (See Comments)     Other reaction(s): RT sided numbness       Past Medical History:   Diagnosis Date    Allergy     Arthritis     Asthma     Cataract     Chronic fatigue 1/24/2017    Diabetes mellitus     resolved with gastric bypass    Diabetes mellitus, type 2     Encounter for blood transfusion     GERD (gastroesophageal reflux disease)     Headache(784.0)     History of lumpectomy of both breasts     1992 negative    Hyperlipidemia 7/15/2015    Hypertension     Hypothyroidism     Neuropathy     Obesity     SOHA on CPAP     Postmenopausal HRT (hormone replacement therapy)     Rash     Rosacea     Snoring     Wears glasses      Past Surgical History: "   Procedure Laterality Date    ADENOIDECTOMY      APPENDECTOMY      BIOPSY-LESION nasal septum - INTRANASAL MUCOSAL LESION Right 12/1/2014    [facility]    BLADDER SUSPENSION      BREAST BIOPSY Bilateral 1992    bilateral benign excisional biopsies    BUNIONECTOMY  10/17/14    right, still has discomfort    BUNIONECTOMY-TAILOR Right 10/17/2014    [facility]ad Eng [facility]    CATARACT EXTRACTION W/  INTRAOCULAR LENS IMPLANT Bilateral     CHOLECYSTECTOMY  08/02/2017    CHOLECYSTECTOMY-LAPAROSCOPIC N/A 8/2/2017    [facility] OR    COLONOSCOPY      ESOPHAGOGASTRODUODENOSCOPY      dilated esophagus    GASTRIC BYPASS      2011    HYSTERECTOMY  1980    interstim bladder  2009    10/14/14 new battery    OOPHORECTOMY  1980    RELEASE-CARPAL TUNNEL Left 8/29/2017    [facility]    REPAIR-HAMMER TOE- 2nd toe 2nd Procedure: HTC 3rd toe with T&C- 73855. Hammer toe correction, tendon release to 4th right toe. Right 10/17/2014    [facility] at [facility] [place]    OEOSCSAP-[id number] IPG OF INTERSTIM Right 10/14/2014    Per[facility] at [facility] OR 2ND FLR    SKIN CANCER EXCISION      left hand    TONSILLECTOMY      WISDOM TOOTH EXTRACTION       Family History   Problem Relation Age of Onset    Breast cancer Mother 50    Diabetes Unknown     Hypertension Unknown     Hypothyroidism Unknown     Breast cancer Paternal Aunt         40's     Social History     Tobacco Use    Smoking status: Never Smoker    Smokeless tobacco: Never Used   Substance Use Topics    Alcohol use: No    Drug use: No     Review of Systems   Constitutional: Positive for fatigue and fever (101F Resolved).   HENT: Negative for sore throat.    Respiratory: Positive for cough, chest tightness and shortness of breath.    Cardiovascular: Positive for chest pain.   Gastrointestinal:  "Positive for diarrhea and nausea. Negative for vomiting.        Positive for dark stools with an "interesting" odor.   Genitourinary: Negative for dysuria.   Musculoskeletal: Negative for back pain.   Skin: Negative for rash.   Neurological: Negative for weakness.   Hematological: Does not bruise/bleed easily.       Physical Exam     Initial Vitals [10/07/18 1457]   BP Pulse Resp Temp SpO2   (!) 180/72 63 16 98.1 °F (36.7 °C) 98 %      MAP       --         Physical Exam    Nursing note and vitals reviewed.  Constitutional: She appears well-developed and well-nourished. She is not diaphoretic. No distress.   HENT:   Head: Normocephalic and atraumatic.   Mild flushed cheeks.   Eyes: EOM are normal. Pupils are equal, round, and reactive to light.   Neck: Normal range of motion. Neck supple.   Cardiovascular: Normal rate, regular rhythm, normal heart sounds and intact distal pulses. Exam reveals no gallop and no friction rub.    No murmur heard.  No leg swelling.   Pulmonary/Chest: Breath sounds normal. No respiratory distress. She has no wheezes. She has no rhonchi. She has no rales.   Abdominal: Soft. Bowel sounds are normal. There is no tenderness. There is no rebound and no guarding.   Musculoskeletal: Normal range of motion.   Neurological: She is alert and oriented to person, place, and time.   Skin: Skin is warm and dry.   Psychiatric: She has a normal mood and affect. Her behavior is normal. Judgment and thought content normal.         ED Course   Procedures  Labs Reviewed   CBC W/ AUTO DIFFERENTIAL - Abnormal; Notable for the following components:       Result Value    RDW 14.8 (*)     MPV 8.6 (*)     Lymph # 0.8 (*)     Mono # 0.1 (*)     Gran% 83.8 (*)     Lymph% 13.8 (*)     Mono% 2.2 (*)     All other components within normal limits   COMPREHENSIVE METABOLIC PANEL - Abnormal; Notable for the following components:    Sodium 132 (*)     Chloride 93 (*)     Glucose 178 (*)     All other components within " normal limits   B-TYPE NATRIURETIC PEPTIDE - Abnormal; Notable for the following components:     (*)     All other components within normal limits   INFLUENZA A AND B ANTIGEN   TROPONIN I   PROTIME-INR     EKG Readings: (Independently Interpreted)   Initial Reading: No STEMI.   Sinus bradycardia. Rate of 57 bpm. Normal axis. No acute ST segment changes. Right bundle branch block.        Imaging Results          X-Ray Chest AP Portable (Final result)  Result time 10/07/18 16:34:43    Final result by Artie Regan MD (10/07/18 16:34:43)                 Narrative:    EXAMINATION:  XR CHEST AP PORTABLE    CLINICAL HISTORY:  CHF;    TECHNIQUE:  Single frontal view of the chest was performed.    COMPARISON:  None    FINDINGS:  Lungs are clear.Normal cardiomediastinal silhouette.Normal pulmonary vascular distribution.No pleural effusion or pneumothorax.No acute osseous abnormality.      Electronically signed by: Artie Regan  Date:    10/07/2018  Time:    16:34                               Medical Decision Making:   History:   Old Medical Records: I decided to obtain old medical records.  Initial Assessment:   67-year-old female presents with a chief complaint of shortness of breath.  Differential Diagnosis:   My differential diagnosis includes atypical MI, PNA, heart failure, and viral illness.  Clinical Tests:   Lab Tests: Ordered and Reviewed  Radiological Study: Ordered and Reviewed  Medical Tests: Ordered and Reviewed  ED Management:  The patient was emergently evaluated in the emergency department, her evaluation was significant for an elderly female with a normal lung exam.  The patient's EKG showed no acute abnormalities per my independent interpretation.  The patient's x-ray shows no acute abnormalities per my independent interpretation.  The patient's labs showed no acute processes, including a negative troponin and negative BNP.  The patient likely has a viral illness causing her symptoms.  She  is stable for discharge to.  She is to continue her home medications as needed.  She is to follow up with her pulmonary doctor for further care.            Scribe Attestation:   Scribe #1: I performed the above scribed service and the documentation accurately describes the services I performed. I attest to the accuracy of the note.    I, Dr. Naresh Jones, personally performed the services described in this documentation. All medical record entries made by the scribe were at my direction and in my presence.  I have reviewed the chart and agree that the record reflects my personal performance and is accurate and complete. Naresh Jones MD.  5:35 PM 10/07/2018             Clinical Impression:   The encounter diagnosis was Shortness of breath.      Disposition:   Disposition: Discharged  Condition: Stable                        Naresh Jones MD  10/07/18 1585

## 2018-10-08 ENCOUNTER — TELEPHONE (OUTPATIENT)
Dept: PULMONOLOGY | Facility: CLINIC | Age: 68
End: 2018-10-08

## 2018-10-08 NOTE — TELEPHONE ENCOUNTER
Rescheduled the patient for 10/30/2018 at 11:00am with Daysi Llanos.       PATIENT WILL FINISH HER PREDNISONE IN 3 DAYS, NEBULIZER MEDICATION MADE HER ITCH. SHE WILL TRY IT AGAIN.      ----- Message from Che Powell sent at 10/8/2018 10:11 AM CDT -----  Contact: pt  Pt calling states that she would like to get a message to nurse Cline regarding been in the Dr care all week,went to the ER yesterday and the ER Dr felt the medication the pt is on is not doing any good but he wanted to speak to the office before taking the medication. Wants to know what she needs to do,was supposed to take medication this morning the prednisone..[phone] or [phone]

## 2018-10-12 ENCOUNTER — OFFICE VISIT (OUTPATIENT)
Dept: PULMONOLOGY | Facility: CLINIC | Age: 68
End: 2018-10-12
Payer: MEDICARE

## 2018-10-12 ENCOUNTER — TELEPHONE (OUTPATIENT)
Dept: PULMONOLOGY | Facility: CLINIC | Age: 68
End: 2018-10-12

## 2018-10-12 VITALS
HEART RATE: 61 BPM | WEIGHT: 173.31 LBS | SYSTOLIC BLOOD PRESSURE: 119 MMHG | DIASTOLIC BLOOD PRESSURE: 59 MMHG | HEIGHT: 64 IN | BODY MASS INDEX: 29.59 KG/M2 | OXYGEN SATURATION: 97 %

## 2018-10-12 DIAGNOSIS — E11.9 TYPE 2 DIABETES MELLITUS WITHOUT COMPLICATION, UNSPECIFIED WHETHER LONG TERM INSULIN USE: ICD-10-CM

## 2018-10-12 DIAGNOSIS — J47.1 BRONCHIECTASIS WITH ACUTE EXACERBATION: Primary | ICD-10-CM

## 2018-10-12 DIAGNOSIS — B37.0 CANDIDA INFECTION OF MOUTH: ICD-10-CM

## 2018-10-12 DIAGNOSIS — R05.9 COUGH: ICD-10-CM

## 2018-10-12 PROCEDURE — 3074F SYST BP LT 130 MM HG: CPT | Mod: CPTII,,, | Performed by: NURSE PRACTITIONER

## 2018-10-12 PROCEDURE — 1101F PT FALLS ASSESS-DOCD LE1/YR: CPT | Mod: CPTII,,, | Performed by: NURSE PRACTITIONER

## 2018-10-12 PROCEDURE — 3078F DIAST BP <80 MM HG: CPT | Mod: CPTII,,, | Performed by: NURSE PRACTITIONER

## 2018-10-12 PROCEDURE — 99213 OFFICE O/P EST LOW 20 MIN: CPT | Mod: S$PBB,,, | Performed by: NURSE PRACTITIONER

## 2018-10-12 PROCEDURE — 99999 PR PBB SHADOW E&M-EST. PATIENT-LVL III: CPT | Mod: PBBFAC,,, | Performed by: NURSE PRACTITIONER

## 2018-10-12 PROCEDURE — 99213 OFFICE O/P EST LOW 20 MIN: CPT | Mod: PBBFAC,PO | Performed by: NURSE PRACTITIONER

## 2018-10-12 PROCEDURE — 3008F BODY MASS INDEX DOCD: CPT | Mod: CPTII,,, | Performed by: NURSE PRACTITIONER

## 2018-10-12 RX ORDER — AMOXICILLIN AND CLAVULANATE POTASSIUM 875; 125 MG/1; MG/1
1 TABLET, FILM COATED ORAL 2 TIMES DAILY
Qty: 10 TABLET | Refills: 0 | Status: SHIPPED | OUTPATIENT
Start: 2018-10-12 | End: 2018-10-17

## 2018-10-12 RX ORDER — GUAIFENESIN 600 MG/1
1200 TABLET, EXTENDED RELEASE ORAL 2 TIMES DAILY
COMMUNITY
Start: 2018-10-12 | End: 2021-06-11 | Stop reason: CLARIF

## 2018-10-12 RX ORDER — LEVALBUTEROL INHALATION SOLUTION 0.63 MG/3ML
1 SOLUTION RESPIRATORY (INHALATION) EVERY 4 HOURS PRN
Qty: 1 BOX | Refills: 0 | Status: SHIPPED | OUTPATIENT
Start: 2018-10-12 | End: 2018-10-12 | Stop reason: SDUPTHER

## 2018-10-12 RX ORDER — LEVALBUTEROL INHALATION SOLUTION 1.25 MG/3ML
1 SOLUTION RESPIRATORY (INHALATION) EVERY 4 HOURS PRN
Qty: 1 BOX | Refills: 11 | Status: SHIPPED | OUTPATIENT
Start: 2018-10-12 | End: 2020-01-03 | Stop reason: SDUPTHER

## 2018-10-12 RX ORDER — NYSTATIN 100000 [USP'U]/ML
6 SUSPENSION ORAL 4 TIMES DAILY
Qty: 240 ML | Refills: 0 | Status: SHIPPED | OUTPATIENT
Start: 2018-10-12 | End: 2018-10-22

## 2018-10-12 NOTE — TELEPHONE ENCOUNTER
--Faxed the rx to [facility].      -- Message from Melchor Sánchez sent at 10/12/2018  4:14 PM CDT -----  Contact: Daysi Pearson want to inform office they can't refill xopenex any questions please call back at [phone]

## 2018-10-12 NOTE — TELEPHONE ENCOUNTER
Spoke to pt on phone states feels worse, stopped using albuterol nebulizer due to itching and shakes.  Will bring in today for appointment.    ----- Message from Elke Crain sent at 10/12/2018  9:26 AM CDT -----  Contact: Patient  Type: Needs Medical Advice    Who Called:  Patient  Best Call Back Number: [phone]     Additional Information: Patient is calling to speak with Marlyn. She states that she is still sick and would like to discuss next steps. Please call to advise. Thank you!

## 2018-10-12 NOTE — PATIENT INSTRUCTIONS
We discussed Pulmonary Toilet exercises  Lay on bed with someone in house to assist sitting up  Head must be lower than chest with trash can under head  Allow gravity to pull mucous from lungs down mouth and nose into trash can.    Use Xopenex nebulizer medication instead of Albuterol due to heart palpitations    Mucinex to thin secretions and allow easier to clear  Augmentin 875 mg take twice a day for 5 days.    Continue Current respiratory medications    Nystatin mouth rinse for thrush for 10 days    We discussed vest therapy.     Codeine-Guaifen cough syrup for cough-refill is needed. Dr. Nicole will prescribe.

## 2018-10-12 NOTE — PROGRESS NOTES
10/12/2018    Cornelia Delatorre  Office Note    Chief Complaint   Patient presents with    Wheezing    Sputum Production     sometimes clear and sometimes yellow       HPI: 10/12/18- pt went to ER for cough, SOB, weakness, discharged, chest x-ray clear. Pt states only slightly feeling better, head feels cloudy, albuterol nebulizer caused itching, palpitations. Stopped. Proair does not cause similar symptoms. SOB and cough continuous, cough relieved by codeine syrup, SOB relieved with rest but worsened with activity.    10/4/18 - HPI: Started feeling bad 4 days ago, ears felt muffled, body aches, no fever, SOB, cough. Started Prednisone 20 mg for three days. States Fever of 101 F in am, improved with tylenol  States feels something rolling around in upper chest. Cough productive occasionally yellow to white sputum, worse at night, using rescue inhaler 1-2 times a day for 4 days a week.       The chief compliant  problem is varies with instablilty at time  PFSH:  Past Medical History:   Diagnosis Date    Allergy     Arthritis     Asthma     Cataract     Chronic fatigue 1/24/2017    Diabetes mellitus     resolved with gastric bypass    Diabetes mellitus, type 2     Encounter for blood transfusion     GERD (gastroesophageal reflux disease)     Headache(784.0)     History of lumpectomy of both breasts     1992 negative    Hyperlipidemia 7/15/2015    Hypertension     Hypothyroidism     Neuropathy     Obesity     SOHA on CPAP     Postmenopausal HRT (hormone replacement therapy)     Rash     Rosacea     Snoring     Wears glasses          Past Surgical History:   Procedure Laterality Date    ADENOIDECTOMY      APPENDECTOMY      BIOPSY-LESION nasal septum - INTRANASAL MUCOSAL LESION Right 12/1/2014    [facility] OR    BLADDER SUSPENSION      BREAST BIOPSY Bilateral 1992    bilateral benign excisional biopsies    BUNIONECTOMY  10/17/14    right, still has discomfort     BUNIONECTOMY-TAILOR Right 10/17/2014    [facility] OR    CATARACT EXTRACTION W/  INTRAOCULAR LENS IMPLANT Bilateral     CHOLECYSTECTOMY  08/02/2017    CHOLECYSTECTOMY-LAPAROSCOPIC N/A 8/2/2017    [facility]    COLONOSCOPY      ESOPHAGOGASTRODUODENOSCOPY      dilated esophagus    GASTRIC BYPASS      2011    HYSTERECTOMY  1980    interstim bladder  2009    10/14/14 new battery    OOPHORECTOMY  1980    RELEASE-CARPAL TUNNEL Left 8/29/2017    [facility] OR    REPAIR-HAMMER TOE- 2nd toe 2nd Procedure: HTC 3rd toe with T&C- 12866. Hammer toe correction, tendon release to 4th right toe. Right 10/17/2014    Per[facility] by [facility] Eng D[facility] at [facility] OR    DZFSOFXO-[id number]-[id number] IPG OF INTERSTIM Right 10/14/2014    Performed by [facility] at [facility] OR 2ND FLR    SKIN CANCER EXCISION      left hand    TONSILLECTOMY      WISDOM TOOTH EXTRACTION       Social History     Tobacco Use    Smoking status: Never Smoker    Smokeless tobacco: Never Used   Substance Use Topics    Alcohol use: No    Drug use: No     Family History   Problem Relation Age of Onset    Breast cancer Mother 50    Diabetes Unknown     Hypertension Unknown     Hypothyroidism Unknown     Breast cancer Paternal Aunt         40's     Review of patient's allergies indicates:   Allergen Reactions    Sulfa (sulfonamide antibiotics) Hives    Naproxen Other (See Comments)     Other reaction(s): RT sided numbness       I have reviewed past medical, family, and social history. I have reviewed previous nurse notes.    Performance Status:The patient's activity level is functions out of house.       Review of Systems   Constitutional: Negative for activity change, appetite change, chills, diaphoresis, fatigue, fever and unexpected weight change.   HENT: Negative for dental problem, postnasal drip, rhinorrhea, sinus pressure, sinus pain,  "sneezing, sore throat, trouble swallowing and voice change.    Respiratory: Negative for apnea, chest tightness, wheezing and stridor.  Positive for cough, SOB, wheezes  Cardiovascular: Negative for chest pain and leg swelling. Positive for palpitations after using Albuterol nebulizer  Gastrointestinal: Negative for abdominal distention, abdominal pain, constipation and nausea.   Musculoskeletal: Negative for gait problem, myalgias and neck pain.   Skin: Negative for color change and pallor.   Allergic/Immunologic: Negative for environmental allergies and food allergies.   Neurological: Negative for dizziness, speech difficulty, weakness, light-headedness, numbness. Positive for headache  Hematological: Negative for adenopathy. Does not bruise/bleed easily.   Psychiatric/Behavioral: Negative for dysphoric mood and sleep disturbance. The patient is not nervous/anxious.           Exam:Comprehensive exam done. BP (!) 119/59 (BP Location: Left arm, Patient Position: Sitting)   Pulse 61   Ht 5' 4" (1.626 m)   Wt 78.6 kg (173 lb 4.5 oz)   SpO2 97% Comment: on room air  BMI 29.74 kg/m²   Exam included Vitals as listed  Constitutional: He is oriented to person, place, and time. He appears well-developed. No distress.   Nose: Nose normal.   Mouth/Throat: Uvula is midline, oropharynx is clear and moist and mucous membranes are normal. No dental caries. Oral Candida present. No oropharyngeal exudate, posterior oropharyngeal edema, posterior oropharyngeal erythema or tonsillar abscesses.  Mallapatti (M) score II  Eyes: Pupils are equal, round, and reactive to light.   Neck: No JVD present. No thyromegaly present.   Cardiovascular: Normal rate, regular rhythm and normal heart sounds. Exam reveals no gallop and no friction rub.   No murmur heard.  Pulmonary/Chest: Effort normal and breath sounds abnormal: Wheeze right lower lung field. All other clear.  No accessory muscle usage or stridor. No apnea and no tachypnea. No " respiratory distress, decreased breath sounds, rhonchi, rales, or tenderness.   Abdominal: Soft. exhibits no mass. There is no tenderness. No hepatosplenomegaly, hernias and normoactive bowel sounds  Musculoskeletal: Normal range of motion. exhibits no edema.   Lymphadenopathy:     He has no cervical adenopathy.     He has no axillary adenopathy.   Neurological:  alert and oriented to person, place, and time. not disoriented.   Skin: Skin is warm and dry. Capillary refill takes less 2 sec. No cyanosis or erythema. No pallor. Nails show no clubbing.   Psychiatric: normal mood and affect. behavior is normal. Judgment and thought content normal.       Radiographs (ct chest and cxr) reviewed: results reviewed   EXAMINATION:  XR CHEST AP PORTABLE  cLINICAL HISTORY:  CHF;  TECHNIQUE:  Single frontal view of the chest was performed.  COMPARISON:  None  FINDINGS:  Lungs are clear.Normal cardiomediastinal silhouette.Normal pulmonary vascular distribution.No pleural effusion or pneumothorax.No acute osseous abnormality.  Electronically signed by: Artie Regan  Date: 10/07/2018  Time: 16:34    Labs reviewed    Lab Results   Component Value Date    WBC 5.60 10/07/2018    RBC 4.37 10/07/2018    HGB 12.6 10/07/2018    HCT 37.4 10/07/2018    MCV 86 10/07/2018    MCH 28.7 10/07/2018    MCHC 33.6 10/07/2018    RDW 14.8 (H) 10/07/2018     10/07/2018    MPV 8.6 (L) 10/07/2018    GRAN 4.7 10/07/2018    GRAN 83.8 (H) 10/07/2018    LYMPH 0.8 (L) 10/07/2018    LYMPH 13.8 (L) 10/07/2018    MONO 0.1 (L) 10/07/2018    MONO 2.2 (L) 10/07/2018    EOS 0.0 10/07/2018    BASO 0.00 10/07/2018    EOSINOPHIL 0.1 10/07/2018    BASOPHIL 0.1 10/07/2018       PFT results reviewed  Pulmonary Functions Testing Results:        Plan:  Clinical impression is apparently straight forward and impression with management as below.    Cornelia was seen today for wheezing and sputum production.    Diagnoses and all orders for this  visit:    Bronchiectasis with acute exacerbation  -     guaiFENesin (MUCINEX) 600 mg 12 hr tablet; Take 2 tablets (1,200 mg total) by mouth 2 (two) times daily.  -     Culture, Respiratory with Gram Stain  -     amoxicillin-clavulanate 875-125mg (AUGMENTIN) 875-125 mg per tablet; Take 1 tablet by mouth 2 (two) times daily. for 5 days  -     levalbuterol (XOPENEX) 1.25 mg/3 mL nebulizer solution; Take 3 mLs (1.25 mg total) by nebulization every 4 (four) hours as needed for Wheezing or Shortness of Breath. Rescue    Cough  -     guaiFENesin (MUCINEX) 600 mg 12 hr tablet; Take 2 tablets (1,200 mg total) by mouth 2 (two) times daily.  -     Culture, Respiratory with Gram Stain  - Codeine syrup,     Candida infection of mouth  -     nystatin (MYCOSTATIN) 100,000 unit/mL suspension; Take 6 mLs (600,000 Units total) by mouth 4 (four) times daily. for 10 days        Follow-up if symptoms worsen or fail to improve.    Discussed with patient above for education the following:      Patient Instructions   We discussed Pulmonary Toilet exercises  Lay on bed with someone in house to assist sitting up  Head must be lower than chest with trash can under head  Allow gravity to pull mucous from lungs down mouth and nose into trash can.    Use Xopenex nebulizer medication instead of Albuterol due to heart palpitations    Mucinex to thin secretions and allow easier to clear  Augmentin 875 mg take twice a day for 5 days.    Continue Current respiratory medications    Nystatin mouth rinse for thrush for 10 days    We discussed vest therapy.     Codeine-Guaifen cough syrup for cough-refill is needed. Dr. Nicole will prescribe.

## 2018-10-13 ENCOUNTER — LAB VISIT (OUTPATIENT)
Dept: LAB | Facility: HOSPITAL | Age: 68
End: 2018-10-13
Attending: NURSE PRACTITIONER
Payer: MEDICARE

## 2018-10-13 DIAGNOSIS — R05.9 COUGH: ICD-10-CM

## 2018-10-13 DIAGNOSIS — J47.1 BRONCHIECTASIS WITH ACUTE EXACERBATION: ICD-10-CM

## 2018-10-13 PROCEDURE — 87070 CULTURE OTHR SPECIMN AEROBIC: CPT

## 2018-10-13 PROCEDURE — 87205 SMEAR GRAM STAIN: CPT

## 2018-10-15 ENCOUNTER — TELEPHONE (OUTPATIENT)
Dept: PULMONOLOGY | Facility: CLINIC | Age: 68
End: 2018-10-15

## 2018-10-15 LAB
BACTERIA SPEC AEROBE CULT: NORMAL
GRAM STN SPEC: NORMAL

## 2018-10-15 NOTE — TELEPHONE ENCOUNTER
Spoke to pt and advised that [facility] could not fill xopenex.  Rx sent to [facility]     ----- Message from Kayla Kapadia sent at 10/15/2018 11:33 AM CDT -----  Contact: Self  She was in on Friday and you were supposed to order a new nebulizer with [facility]. Please check on this and advise the pt.

## 2018-10-15 NOTE — TELEPHONE ENCOUNTER
Clarified rx with pharmacy and notified pt     ----- Message from Che Powell sent at 10/15/2018 11:59 AM CDT -----  Contact: pt  Pt calling would like Nurse Cline she called the pharmacy and they had a problem filling cause it came back with 2 strength so they need the office to call them for clarification. Got 2 orders unsure which one to fill...[phone]        .  [facility] [address] [address] [facility] [address]  Phone: [phone] Fax: [phone]        [facility] - [place], [place] - [address]  [address]  [facility] [address]  Phone: [phone] Fax: [phone]

## 2018-10-16 RX ORDER — CODEINE PHOSPHATE AND GUAIFENESIN 10; 100 MG/5ML; MG/5ML
5 SOLUTION ORAL 3 TIMES DAILY PRN
Qty: 473 ML | Refills: 0 | Status: SHIPPED | OUTPATIENT
Start: 2018-10-16 | End: 2019-03-07 | Stop reason: SDUPTHER

## 2018-10-19 ENCOUNTER — PATIENT OUTREACH (OUTPATIENT)
Dept: ADMINISTRATIVE | Facility: HOSPITAL | Age: 68
End: 2018-10-19

## 2018-10-23 ENCOUNTER — TELEPHONE (OUTPATIENT)
Dept: ADMINISTRATIVE | Facility: HOSPITAL | Age: 68
End: 2018-10-23

## 2018-10-25 ENCOUNTER — LAB VISIT (OUTPATIENT)
Dept: LAB | Facility: HOSPITAL | Age: 68
End: 2018-10-25
Attending: INTERNAL MEDICINE
Payer: MEDICARE

## 2018-10-25 DIAGNOSIS — E03.4 HYPOTHYROIDISM DUE TO ACQUIRED ATROPHY OF THYROID: ICD-10-CM

## 2018-10-25 DIAGNOSIS — I10 ESSENTIAL HYPERTENSION: ICD-10-CM

## 2018-10-25 DIAGNOSIS — E11.9 CONTROLLED TYPE 2 DIABETES MELLITUS WITHOUT COMPLICATION, WITHOUT LONG-TERM CURRENT USE OF INSULIN: ICD-10-CM

## 2018-10-25 LAB
ALBUMIN SERPL BCP-MCNC: 3.4 G/DL
ALP SERPL-CCNC: 80 U/L
ALT SERPL W/O P-5'-P-CCNC: 16 U/L
ANION GAP SERPL CALC-SCNC: 8 MMOL/L
AST SERPL-CCNC: 16 U/L
BILIRUB SERPL-MCNC: 0.5 MG/DL
BUN SERPL-MCNC: 11 MG/DL
CALCIUM SERPL-MCNC: 9 MG/DL
CHLORIDE SERPL-SCNC: 99 MMOL/L
CO2 SERPL-SCNC: 30 MMOL/L
CREAT SERPL-MCNC: 0.7 MG/DL
EST. GFR  (AFRICAN AMERICAN): >60 ML/MIN/1.73 M^2
EST. GFR  (NON AFRICAN AMERICAN): >60 ML/MIN/1.73 M^2
ESTIMATED AVG GLUCOSE: 111 MG/DL
GLUCOSE SERPL-MCNC: 100 MG/DL
HBA1C MFR BLD HPLC: 5.5 %
POTASSIUM SERPL-SCNC: 4.1 MMOL/L
PROT SERPL-MCNC: 6.3 G/DL
SODIUM SERPL-SCNC: 137 MMOL/L
T4 FREE SERPL-MCNC: 0.94 NG/DL
TSH SERPL DL<=0.005 MIU/L-ACNC: 0.21 UIU/ML

## 2018-10-25 PROCEDURE — 36415 COLL VENOUS BLD VENIPUNCTURE: CPT | Mod: PO

## 2018-10-25 PROCEDURE — 83036 HEMOGLOBIN GLYCOSYLATED A1C: CPT

## 2018-10-25 PROCEDURE — 80053 COMPREHEN METABOLIC PANEL: CPT

## 2018-10-25 PROCEDURE — 84439 ASSAY OF FREE THYROXINE: CPT

## 2018-10-25 PROCEDURE — 84443 ASSAY THYROID STIM HORMONE: CPT

## 2018-10-30 ENCOUNTER — OFFICE VISIT (OUTPATIENT)
Dept: PULMONOLOGY | Facility: CLINIC | Age: 68
End: 2018-10-30
Payer: MEDICARE

## 2018-10-30 VITALS
WEIGHT: 177.94 LBS | DIASTOLIC BLOOD PRESSURE: 64 MMHG | BODY MASS INDEX: 30.38 KG/M2 | SYSTOLIC BLOOD PRESSURE: 120 MMHG | HEART RATE: 59 BPM | OXYGEN SATURATION: 100 % | HEIGHT: 64 IN

## 2018-10-30 DIAGNOSIS — R91.8 ABNORMAL CT SCAN, LUNG: ICD-10-CM

## 2018-10-30 DIAGNOSIS — J47.1 BRONCHIECTASIS WITH ACUTE EXACERBATION: Primary | ICD-10-CM

## 2018-10-30 DIAGNOSIS — R00.2 PALPITATION: ICD-10-CM

## 2018-10-30 DIAGNOSIS — J45.20 MILD INTERMITTENT ASTHMA WITHOUT COMPLICATION: ICD-10-CM

## 2018-10-30 DIAGNOSIS — G47.33 OSA (OBSTRUCTIVE SLEEP APNEA): ICD-10-CM

## 2018-10-30 DIAGNOSIS — D84.9 IMMUNE DEFICIENCY DISORDER: ICD-10-CM

## 2018-10-30 PROCEDURE — 3078F DIAST BP <80 MM HG: CPT | Mod: CPTII,,, | Performed by: NURSE PRACTITIONER

## 2018-10-30 PROCEDURE — 3074F SYST BP LT 130 MM HG: CPT | Mod: CPTII,,, | Performed by: NURSE PRACTITIONER

## 2018-10-30 PROCEDURE — 99214 OFFICE O/P EST MOD 30 MIN: CPT | Mod: S$PBB,,, | Performed by: NURSE PRACTITIONER

## 2018-10-30 PROCEDURE — 99213 OFFICE O/P EST LOW 20 MIN: CPT | Mod: PBBFAC,PO | Performed by: NURSE PRACTITIONER

## 2018-10-30 PROCEDURE — 99999 PR PBB SHADOW E&M-EST. PATIENT-LVL III: CPT | Mod: PBBFAC,,, | Performed by: NURSE PRACTITIONER

## 2018-10-30 PROCEDURE — 1101F PT FALLS ASSESS-DOCD LE1/YR: CPT | Mod: CPTII,,, | Performed by: NURSE PRACTITIONER

## 2018-10-30 RX ORDER — PREDNISONE 20 MG/1
TABLET ORAL
Qty: 36 TABLET | Refills: 0 | Status: SHIPPED | OUTPATIENT
Start: 2018-10-30 | End: 2019-05-06 | Stop reason: SDUPTHER

## 2018-10-30 RX ORDER — METOPROLOL SUCCINATE 25 MG/1
25 TABLET, EXTENDED RELEASE ORAL DAILY
Qty: 90 TABLET | Refills: 3 | Status: SHIPPED | OUTPATIENT
Start: 2018-10-30 | End: 2019-06-25

## 2018-10-30 RX ORDER — AMOXICILLIN AND CLAVULANATE POTASSIUM 875; 125 MG/1; MG/1
1 TABLET, FILM COATED ORAL 2 TIMES DAILY
Qty: 28 TABLET | Refills: 2 | Status: SHIPPED | OUTPATIENT
Start: 2018-10-30 | End: 2019-01-15

## 2018-10-30 NOTE — PATIENT INSTRUCTIONS
Nodule needs recheck  April 2019    Lungs flared with  Some suggestion of  Infection- pneumonia typical for weakness/wt loss but cxr was good.    Continue symbicort    Use augmentin if  yellow or fever    Use prednisone starting at 60 (3) - taper as quick as able.    Use toprol daily in place of lopressor    Continue sleep apnea rx.

## 2018-11-02 ENCOUNTER — OFFICE VISIT (OUTPATIENT)
Dept: FAMILY MEDICINE | Facility: CLINIC | Age: 68
End: 2018-11-02
Payer: MEDICARE

## 2018-11-02 ENCOUNTER — CLINICAL SUPPORT (OUTPATIENT)
Dept: FAMILY MEDICINE | Facility: CLINIC | Age: 68
End: 2018-11-02
Attending: INTERNAL MEDICINE
Payer: MEDICARE

## 2018-11-02 VITALS
RESPIRATION RATE: 18 BRPM | SYSTOLIC BLOOD PRESSURE: 128 MMHG | WEIGHT: 175.94 LBS | OXYGEN SATURATION: 97 % | DIASTOLIC BLOOD PRESSURE: 78 MMHG | HEIGHT: 64 IN | HEART RATE: 75 BPM | BODY MASS INDEX: 30.04 KG/M2

## 2018-11-02 DIAGNOSIS — E11.9 TYPE 2 DIABETES MELLITUS WITHOUT COMPLICATION, UNSPECIFIED WHETHER LONG TERM INSULIN USE: ICD-10-CM

## 2018-11-02 DIAGNOSIS — E78.5 DYSLIPIDEMIA: ICD-10-CM

## 2018-11-02 DIAGNOSIS — Z00.00 ROUTINE PHYSICAL EXAMINATION: Primary | ICD-10-CM

## 2018-11-02 DIAGNOSIS — E03.4 HYPOTHYROIDISM DUE TO ACQUIRED ATROPHY OF THYROID: ICD-10-CM

## 2018-11-02 DIAGNOSIS — I10 ESSENTIAL HYPERTENSION: ICD-10-CM

## 2018-11-02 DIAGNOSIS — E11.9 CONTROLLED TYPE 2 DIABETES MELLITUS WITHOUT COMPLICATION, WITHOUT LONG-TERM CURRENT USE OF INSULIN: ICD-10-CM

## 2018-11-02 PROCEDURE — 99999 PR PBB SHADOW E&M-EST. PATIENT-LVL III: CPT | Mod: PBBFAC,,, | Performed by: INTERNAL MEDICINE

## 2018-11-02 PROCEDURE — 3074F SYST BP LT 130 MM HG: CPT | Mod: CPTII,S$GLB,, | Performed by: INTERNAL MEDICINE

## 2018-11-02 PROCEDURE — 99397 PER PM REEVAL EST PAT 65+ YR: CPT | Mod: S$GLB,,, | Performed by: INTERNAL MEDICINE

## 2018-11-02 PROCEDURE — 99213 OFFICE O/P EST LOW 20 MIN: CPT | Mod: PBBFAC,PO | Performed by: INTERNAL MEDICINE

## 2018-11-02 PROCEDURE — 3044F HG A1C LEVEL LT 7.0%: CPT | Mod: CPTII,S$GLB,, | Performed by: INTERNAL MEDICINE

## 2018-11-02 PROCEDURE — 99999 PR PBB SHADOW E&M-EST. PATIENT-LVL I: CPT | Mod: PBBFAC,HCWC,,

## 2018-11-02 PROCEDURE — 3078F DIAST BP <80 MM HG: CPT | Mod: CPTII,S$GLB,, | Performed by: INTERNAL MEDICINE

## 2018-11-02 RX ORDER — METFORMIN HYDROCHLORIDE 500 MG/1
1000 TABLET, EXTENDED RELEASE ORAL
Qty: 180 TABLET | Refills: 3 | Status: SHIPPED | OUTPATIENT
Start: 2018-11-02 | End: 2020-01-02 | Stop reason: SDUPTHER

## 2018-11-02 RX ORDER — PRAVASTATIN SODIUM 40 MG/1
40 TABLET ORAL DAILY
Qty: 90 TABLET | Refills: 1 | Status: SHIPPED | OUTPATIENT
Start: 2018-11-02 | End: 2019-08-12 | Stop reason: SDUPTHER

## 2018-11-02 NOTE — PROGRESS NOTES
Subjective:       Patient ID: Cornelia Delatorre is a 67 y.o. female.    Chief Complaint: Annual Exam    Here for routine health maintenance.      Dx w [facility] likely related to some meds per testing neurology    SOB/KLINE- Saw Dr Nicole in [place].  Feels SOB mostly related to bronchiectasis.  Her symptoms have improved with asthma tx - Symbicort.  Resolved SOB, cough and chest pain/ heaviness.    Pulmonary nodules - Dr Nicole will repeat CT at 6 mo rahul?.  Incomplete response to pneumovax - recommends repeat Prevnar 13.  Eosinophilia and pn 7.14 level at 50%    Recent angiogram was normal - done by Dr Vega; sent to scan    Hypothyroid - supra-theraputic on 50 mcg qd for 3mo   HTN - controlled  DM - controlled;  Sees podiatry for peripheral neuropathy.  HLD - controlled for goal 70    Occasional anxiety relieved with prn hydroxyzine.   Had EGD- dysphagia with Dr Krishnan       Diabetes   She has type 2 diabetes mellitus. No MedicAlert identification noted. The initial diagnosis of diabetes was made 10 years ago. Pertinent negatives for hypoglycemia include no confusion, dizziness, headaches, hunger, mood changes, nervousness/anxiousness, pallor, seizures, sleepiness, speech difficulty, sweats or tremors. Pertinent negatives for diabetes include no blurred vision, no chest pain, no foot paresthesias, no polydipsia, no polyphagia, no polyuria and no weakness. Pertinent negatives for hypoglycemia complications include no blackouts, no hospitalization, no nocturnal hypoglycemia, no required assistance and no required glucagon injection. Diabetic complications include peripheral neuropathy. Pertinent negatives for diabetic complications include no autonomic neuropathy, CVA, heart disease, impotence, nephropathy, PVD or retinopathy. There are no known risk factors for coronary artery disease. Current diabetic treatment includes oral agent (monotherapy). Her weight is stable. She is following a generally healthy diet. When asked  about meal planning, she reported none. She has not had a previous visit with a dietitian. She rarely participates in exercise. She monitors blood glucose at home 1-2 x per week. Blood glucose monitoring compliance is fair. There is no change in her home blood glucose trend. She sees a podiatrist.Eye exam is current.     Review of Systems   Constitutional: Negative for activity change, appetite change, fever and unexpected weight change.   HENT: Negative for hearing loss, nosebleeds, rhinorrhea and trouble swallowing.    Eyes: Negative for blurred vision, discharge and visual disturbance.   Respiratory: Negative for choking, chest tightness, shortness of breath and wheezing.    Cardiovascular: Negative for chest pain and palpitations.   Gastrointestinal: Negative for abdominal pain, blood in stool, constipation, diarrhea, nausea and vomiting.   Endocrine: Negative for polydipsia, polyphagia and polyuria.   Genitourinary: Negative for difficulty urinating, dysuria, hematuria, impotence and menstrual problem.   Musculoskeletal: Negative for arthralgias, joint swelling and neck pain.   Skin: Negative for pallor, rash and wound.   Neurological: Negative for dizziness, tremors, seizures, syncope, speech difficulty, weakness and headaches.   Psychiatric/Behavioral: Negative for confusion and dysphoric mood. The patient is not nervous/anxious.        Objective:      Vitals:    11/02/18 0826   BP: 128/78   Pulse: 75   Resp: 18     Physical Exam   Constitutional: She appears well-nourished.   Eyes: Conjunctivae and EOM are normal.   Neck: Trachea normal and normal range of motion. No thyromegaly present.   Cardiovascular: Normal heart sounds.   Edema negative   Pulmonary/Chest: Effort normal and breath sounds normal.   Abdominal: Soft. There is no hepatomegaly.   Neurological: No cranial nerve deficit.   DTR decreased bilateral   Skin: Skin is warm, dry and intact.   Psychiatric: She has a normal mood and affect.   Alert and  Oriented    Vitals reviewed.        Assessment:       1. Routine physical examination    2. Dyslipidemia    3. Essential hypertension    4. Controlled type 2 diabetes mellitus without complication, without long-term current use of insulin    5. Hypothyroidism due to acquired atrophy of thyroid        Plan:       Routine physical examination    Dyslipidemia  -     pravastatin (PRAVACHOL) 40 MG tablet; Take 1 tablet (40 mg total) by mouth once daily.  Dispense: 90 tablet; Refill: 1  -     Lipid panel; Future; Expected date: 05/01/2019    Essential hypertension  -     Comprehensive metabolic panel; Future; Expected date: 05/01/2019    Controlled type 2 diabetes mellitus without complication, without long-term current use of insulin  -     metFORMIN (GLUCOPHAGE-XR) 500 MG 24 hr tablet; Take 2 tablets (1,000 mg total) by mouth daily with breakfast.  Dispense: 180 tablet; Refill: 3  -     Diabetic Eye Screening Photo; Future  -     Hemoglobin A1c; Future; Expected date: 05/01/2019  -     Microalbumin/creatinine urine ratio; Future; Expected date: 05/01/2019    Hypothyroidism due to acquired atrophy of thyroid  -     TSH; Future; Expected date: 12/16/2018  -     TSH; Future; Expected date: 05/01/2019               Medication List           Accurate as of 11/2/18  8:49 AM. If you have any questions, ask your nurse or doctor.               START taking these medications    metFORMIN 500 MG 24 hr tablet  Commonly known as:  GLUCOPHAGE-XR  Take 2 tablets (1,000 mg total) by mouth daily with breakfast.  Replaces:  metFORMIN 500 MG tablet  Started by:  Armond Cortez MD        CONTINUE taking these medications    albuterol 90 mcg/actuation inhaler  Commonly known as:  PROVENTIL/VENTOLIN HFA  Inhale 2 puffs into the lungs every 4 (four) hours as needed. 2 puffs every 4 hours as needed for cough, wheeze, or shortness of breath     ALIGN ORAL     amoxicillin-clavulanate 875-125mg 875-125 mg per tablet  Commonly known as:   AUGMENTIN  Take 1 tablet by mouth 2 (two) times daily.     aspirin 81 MG EC tablet  Commonly known as:  ECOTRIN     * azelastine 0.05 % ophthalmic solution  Commonly known as:  OPTIVAR     * azelastine 137 mcg (0.1 %) nasal spray  Commonly known as:  ASTELIN  1 spray (137 mcg total) by Nasal route 2 (two) times daily.     ciclopirox 8 % Soln  Commonly known as:  PENLAC     CITRUCEL 500 mg Tab  Generic drug:  methylcellulose (laxative)     cranberry 500 mg Cap     esomeprazole 40 MG capsule  Commonly known as:  NexIUM  Take 1 capsule (40 mg total) by mouth before breakfast.     fexofenadine 60 MG tablet  Commonly known as:  ALLEGRA     fish oil-omega-3 fatty acids 300-1,000 mg capsule     fluticasone 50 mcg/actuation nasal spray  Commonly known as:  FLONASE  2 sprays (100 mcg total) by Each Nare route once daily.     gabapentin 600 MG tablet  Commonly known as:  NEURONTIN  Take 1 tablet (600 mg total) by mouth 3 (three) times daily.     GAS-X ORAL     guaiFENesin 600 mg 12 hr tablet  Commonly known as:  MUCINEX  Take 2 tablets (1,200 mg total) by mouth 2 (two) times daily.     guaifenesin-codeine 100-10 mg/5 ml  mg/5 mL syrup  Commonly known as:  guaiFENesin AC  Take 5 mLs by mouth 3 (three) times daily as needed for Cough.     hydroCHLOROthiazide 25 MG tablet  Commonly known as:  HYDRODIURIL  Take 1 tablet (25 mg total) by mouth once daily.     irbesartan 75 MG tablet  Commonly known as:  AVAPRO  Take 1 tablet (75 mg total) by mouth every evening.     levalbuterol 1.25 mg/3 mL nebulizer solution  Commonly known as:  XOPENEX  Take 3 mLs (1.25 mg total) by nebulization every 4 (four) hours as needed for Wheezing or Shortness of Breath. Rescue     levothyroxine 50 MCG tablet  Commonly known as:  SYNTHROID  Take 1 tablet (50 mcg total) by mouth once daily.     metoprolol succinate 25 MG 24 hr tablet  Commonly known as:  TOPROL-XL  Take 1 tablet (25 mg total) by mouth once daily.     montelukast 10 mg  tablet  Commonly known as:  SINGULAIR  Take 1 tablet (10 mg total) by mouth every evening.     multivitamin capsule     potassium chloride SA 20 MEQ tablet  Commonly known as:  K-DUR,KLOR-CON  Take 1 tablet (20 mEq total) by mouth once daily.     pravastatin 40 MG tablet  Commonly known as:  PRAVACHOL  Take 1 tablet (40 mg total) by mouth once daily.     predniSONE 20 MG tablet  Commonly known as:  DELTASONE  3 for 3 days then 2 for 3 days then one for 3 days and repeat for breathing problems     SYMBICORT 160-4.5 mcg/actuation Hfaa  Generic drug:  budesonide-formoterol 160-4.5 mcg  Inhale 2 puffs into the lungs every 12 (twelve) hours.     varicella-zoster gE-AS01B (PF) 50 mcg/0.5 mL injection  Commonly known as:  SHINGRIX (PF)  Inject 0.5 mLs into the muscle.         * This list has 2 medication(s) that are the same as other medications prescribed for you. Read the directions carefully, and ask your doctor or other care provider to review them with you.            STOP taking these medications    azithromycin 500 MG tablet  Commonly known as:  ZITHROMAX  Stopped by:  Armond Cortez MD     metFORMIN 500 MG tablet  Commonly known as:  GLUCOPHAGE  Replaced by:  metFORMIN 500 MG 24 hr tablet  Stopped by:  Armond Cortez MD     metoprolol tartrate 25 MG tablet  Commonly known as:  LOPRESSOR  Stopped by:  Armond Cortez MD     PNEUMOVAX 23 25 mcg/0.5 mL  Generic drug:  pneumococcal 23-demetra ps vaccine  Stopped by:  Armond Cortez MD           Where to Get Your Medications      These medications were sent to [facility] - [facility] [address] [facility]  [address], [facility] [address]    Phone:  [phone]   · metFORMIN 500 MG 24 hr tablet  · pravastatin 40 MG tablet       Wellness reviewed  Hold synthroid for 6 weeks and recheck; if wnl, can stay off, if needs some, will try 25 mcg qd  Continue current management    Counseled on regular exercise, maintenance of a healthy weight, balanced diet  "rich in fruits/vegetables and lean protein, and avoidance of unhealthy habits like smoking and excessive alcohol intake.   Also, counseled on importance of being compliant with medication, health appointments, diet and exercise.     Follow-up in about 6 months (around 5/2/2019).    "This note will not be shared with the patient."  "

## 2018-11-21 ENCOUNTER — LAB VISIT (OUTPATIENT)
Dept: LAB | Facility: HOSPITAL | Age: 68
End: 2018-11-21
Attending: ALLERGY & IMMUNOLOGY
Payer: MEDICARE

## 2018-11-21 DIAGNOSIS — D84.89 PRIMARY IMMUNE DEFICIENCY DISORDER: Primary | ICD-10-CM

## 2018-11-21 LAB
IGA SERPL-MCNC: 132 MG/DL
IGE SERPL-ACNC: <35 IU/ML
IGG SERPL-MCNC: 516 MG/DL
IGM SERPL-MCNC: 36 MG/DL

## 2018-11-21 PROCEDURE — 82787 IGG 1 2 3 OR 4 EACH: CPT | Mod: 59,HCWC

## 2018-11-21 PROCEDURE — 82785 ASSAY OF IGE: CPT | Mod: HCWC

## 2018-11-21 PROCEDURE — 36415 COLL VENOUS BLD VENIPUNCTURE: CPT | Mod: HCWC,PO

## 2018-11-21 PROCEDURE — 82784 ASSAY IGA/IGD/IGG/IGM EACH: CPT | Mod: 59,HCWC

## 2018-11-24 LAB
IGG1 SER-MCNC: 289 MG/DL
IGG2 SER-MCNC: 234 MG/DL
IGG3 SER-MCNC: 20 MG/DL
IGG4 SER-MCNC: <1 MG/DL

## 2018-12-04 ENCOUNTER — OFFICE VISIT (OUTPATIENT)
Dept: DERMATOLOGY | Facility: CLINIC | Age: 68
End: 2018-12-04
Payer: MEDICARE

## 2018-12-04 DIAGNOSIS — L57.0 MULTIPLE ACTINIC KERATOSES: ICD-10-CM

## 2018-12-04 DIAGNOSIS — D48.5 NEOPLASM OF UNCERTAIN BEHAVIOR OF SKIN: Primary | ICD-10-CM

## 2018-12-04 DIAGNOSIS — L81.4 LENTIGINES: ICD-10-CM

## 2018-12-04 DIAGNOSIS — Z12.83 SKIN CANCER SCREENING: ICD-10-CM

## 2018-12-04 DIAGNOSIS — T14.8XXA OPEN WOUND: ICD-10-CM

## 2018-12-04 PROCEDURE — 99999 PR PBB SHADOW E&M-EST. PATIENT-LVL II: CPT | Mod: PBBFAC,HCWC,, | Performed by: DERMATOLOGY

## 2018-12-04 PROCEDURE — 1101F PT FALLS ASSESS-DOCD LE1/YR: CPT | Mod: CPTII,HCWC,S$GLB, | Performed by: DERMATOLOGY

## 2018-12-04 PROCEDURE — 88305 TISSUE EXAM BY PATHOLOGIST: CPT | Mod: HCWC | Performed by: PATHOLOGY

## 2018-12-04 PROCEDURE — 99214 OFFICE O/P EST MOD 30 MIN: CPT | Mod: 25,HCWC,S$GLB, | Performed by: DERMATOLOGY

## 2018-12-04 PROCEDURE — 17000 DESTRUCT PREMALG LESION: CPT | Mod: HCWC,S$GLB,, | Performed by: DERMATOLOGY

## 2018-12-04 PROCEDURE — 17003 DESTRUCT PREMALG LES 2-14: CPT | Mod: HCWC,S$GLB,, | Performed by: DERMATOLOGY

## 2018-12-04 PROCEDURE — 11100 PR BIOPSY OF SKIN LESION: CPT | Mod: 59,HCWC,S$GLB, | Performed by: DERMATOLOGY

## 2018-12-04 RX ORDER — MUPIROCIN 20 MG/G
OINTMENT TOPICAL
Qty: 30 G | Refills: 2 | Status: SHIPPED | OUTPATIENT
Start: 2018-12-04 | End: 2021-06-11 | Stop reason: CLARIF

## 2018-12-04 NOTE — PROGRESS NOTES
Subjective:       Patient ID:  Cornelia Delatorre is a 68 y.o. female who presents for   Chief Complaint   Patient presents with    Skin Check    Lesion     Last seen on 8/28/2018- multiple Aks on face treated with cryo - resolved  Patient here today for skin check. She c/o a lesion on right ring finger, for several months, painful and forms a scab that peels, not treated  Lesion on right posterior leg few months, rough, not treated  Lesion on right arm, rough, few months, not treated  Lesion left eyelid, few months, rough, not treated  Lesion right cheek, few months, rough, not treated  Lesion left bottom lip,few months, rough, not treated  Lesion left cheek few months, rough, not treated  Requests prescription for mupirocin ointment to use on open wounds when they occur     Phx skin ca R hand and nose - Dr Parra - 2014   Phx SCC L hand -mohs Dr Jones  -2015   History of actinic keratoses on nasal Dorsum in the past hx of efudex use  Uses CeraVe AM cream daily for routine            Review of Systems   Skin: Positive for dry skin and daily sunscreen use. Negative for itching and rash.   Hematologic/Lymphatic: Bruises/bleeds easily.        Objective:    Physical Exam   Constitutional: She appears well-developed and well-nourished. No distress.   HENT:   Mouth/Throat: Lips normal.    Eyes: Lids are normal.  No conjunctival no injection.   Cardiovascular: There is no local extremity swelling and no dependent edema.     Neurological: She is alert and oriented to person, place, and time. She is not disoriented.   Psychiatric: She has a normal mood and affect.   Skin:   Areas Examined (abnormalities noted in diagram):   Head / Face Inspection Performed  Neck Inspection Performed  Chest / Axilla Inspection Performed  Abdomen Inspection Performed  Back Inspection Performed  RUE Inspected  LUE Inspection Performed                       Diagram Legend     Erythematous scaling macule/papule c/w actinic  keratosis       Vascular papule c/w angioma      Pigmented verrucoid papule/plaque c/w seborrheic keratosis      Yellow umbilicated papule c/w sebaceous hyperplasia      Irregularly shaped tan macule c/w lentigo     1-2 mm smooth white papules consistent with Milia      Movable subcutaneous cyst with punctum c/w epidermal inclusion cyst      Subcutaneous movable cyst c/w pilar cyst      Firm pink to brown papule c/w dermatofibroma      Pedunculated fleshy papule(s) c/w skin tag(s)      Evenly pigmented macule c/w junctional nevus     Mildly variegated pigmented, slightly irregular-bordered macule c/w mildly atypical nevus      Flesh colored to evenly pigmented papule c/w intradermal nevus       Pink pearly papule/plaque c/w basal cell carcinoma      Erythematous hyperkeratotic cursted plaque c/w SCC      Surgical scar with no sign of skin cancer recurrence      Open and closed comedones      Inflammatory papules and pustules      Verrucoid papule consistent consistent with wart     Erythematous eczematous patches and plaques     Dystrophic onycholytic nail with subungual debris c/w onychomycosis     Umbilicated papule    Erythematous-base heme-crusted tan verrucoid plaque consistent with inflamed seborrheic keratosis     Erythematous Silvery Scaling Plaque c/w Psoriasis     See annotation          Assessment / Plan:      Pathology Orders:     Normal Orders This Visit    Tissue Specimen To Pathology, Dermatology     Questions:    Directional Terms:  Other(comment)    Clinical information:  verruca vs other    Specific Site:  right 4th digit        Neoplasm of uncertain behavior of skin  -     Tissue Specimen To Pathology, Dermatology    Shave biopsy procedure note:    Shave biopsy performed after verbal consent including risk of infection, scar, recurrence, need for additional treatment of site. Area prepped with alcohol, anesthetized with approximately 1.0cc of 1% lidocaine with epinephrine. Lesional tissue shaved  with razor blade. Hemostasis achieved with application of aluminum chloride followed by hyfrecation. No complications. Dressing applied. Wound care explained.        Multiple actinic keratoses  Cryosurgery Procedure Note    Verbal consent from the patient is obtained and the patient is aware of the precancerous quality and need for treatment of these lesions. Liquid nitrogen cryosurgery is applied to the 4 actinic keratoses, as detailed in the physical exam, to produce a freeze injury. The patient is aware that blisters may form and is instructed on wound care with gentle cleansing and use of vaseline ointment to keep moist until healed. The patient is supplied a handout on cryosurgery and is instructed to call if lesions do not completely resolve. Discussed risk postinflammatory pigmentary changes.     Pt has efudex at home. Recommend aaa bid x 2 weeks to areas that did not resolve. Recommend treat one area at a time.     Skin cancer screening  Area(s) of previous NMSC evaluated with no signs of recurrence.    Upper body skin examination performed today including at least 6 points as noted in physical examination. No lesions suspicious for malignancy noted.      Lentigines  trunk and extremities  This is a benign hyperpigmented sun induced lesion. Daily sun protection will reduce the number of new lesions. Treatment of these benign lesions are considered cosmetic.    Open wound  No open wounds today. History daily asa use and thinning skin. Use only prn wounds   -     mupirocin (BACTROBAN) 2 % ointment; AAA bid  Dispense: 30 g; Refill: 2             Follow-up in about 4 months (around 4/4/2019).

## 2018-12-14 ENCOUNTER — LAB VISIT (OUTPATIENT)
Dept: LAB | Facility: HOSPITAL | Age: 68
End: 2018-12-14
Attending: INTERNAL MEDICINE
Payer: MEDICARE

## 2018-12-14 DIAGNOSIS — E03.4 HYPOTHYROIDISM DUE TO ACQUIRED ATROPHY OF THYROID: ICD-10-CM

## 2018-12-14 LAB — TSH SERPL DL<=0.005 MIU/L-ACNC: 0.72 UIU/ML

## 2018-12-14 PROCEDURE — 36415 COLL VENOUS BLD VENIPUNCTURE: CPT | Mod: PO

## 2018-12-14 PROCEDURE — 84443 ASSAY THYROID STIM HORMONE: CPT

## 2018-12-20 ENCOUNTER — TELEPHONE (OUTPATIENT)
Dept: FAMILY MEDICINE | Facility: CLINIC | Age: 68
End: 2018-12-20

## 2018-12-21 ENCOUNTER — TELEPHONE (OUTPATIENT)
Dept: UROLOGY | Facility: CLINIC | Age: 68
End: 2018-12-21

## 2018-12-21 DIAGNOSIS — R30.0 DYSURIA: Primary | ICD-10-CM

## 2018-12-21 NOTE — TELEPHONE ENCOUNTER
----- Message from Josee Cantu sent at 12/21/2018  9:16 AM CST -----  Contact: pt#[phone]  Needs Advice    Reason for call:pt states that she's getting a uti due to frequent urination,pressure and also cloudy urine. Pt wants a Rx for it   Communication Preference:call    Additional Information:[facility] [phone]

## 2018-12-22 ENCOUNTER — LAB VISIT (OUTPATIENT)
Dept: LAB | Facility: HOSPITAL | Age: 68
End: 2018-12-22
Attending: UROLOGY
Payer: MEDICARE

## 2018-12-22 DIAGNOSIS — N30.90 BLADDER INFECTION: Primary | ICD-10-CM

## 2018-12-22 DIAGNOSIS — R30.0 DYSURIA: ICD-10-CM

## 2018-12-22 PROCEDURE — 87086 URINE CULTURE/COLONY COUNT: CPT

## 2018-12-22 PROCEDURE — 87186 SC STD MICRODIL/AGAR DIL: CPT

## 2018-12-22 PROCEDURE — 87088 URINE BACTERIA CULTURE: CPT

## 2018-12-22 PROCEDURE — 87077 CULTURE AEROBIC IDENTIFY: CPT

## 2018-12-22 NOTE — Clinical Note
Please let the patient know that I sent in a prescription for doxycycline to [facility] they are open until 2 pm today

## 2018-12-24 ENCOUNTER — TELEPHONE (OUTPATIENT)
Dept: UROLOGY | Facility: CLINIC | Age: 68
End: 2018-12-24

## 2018-12-24 ENCOUNTER — TELEPHONE (OUTPATIENT)
Dept: FAMILY MEDICINE | Facility: CLINIC | Age: 68
End: 2018-12-24

## 2018-12-24 LAB — BACTERIA UR CULT: NORMAL

## 2018-12-24 RX ORDER — DOXYCYCLINE 100 MG/1
100 CAPSULE ORAL 2 TIMES DAILY
Qty: 14 CAPSULE | Refills: 0 | Status: SHIPPED | OUTPATIENT
Start: 2018-12-24 | End: 2018-12-31

## 2018-12-24 NOTE — TELEPHONE ENCOUNTER
----- Message from Mary Jane Jarvis MD sent at 12/24/2018 11:19 AM CST -----  Please let the patient know that I sent in a prescription for doxycycline to [facility] they are open until 2 pm today

## 2018-12-24 NOTE — TELEPHONE ENCOUNTER
Spoke to the patient. Per Dr. Jarvis she called in a Rx  For Doxcyline  to [facility]. Patient verbally understood.    Coral

## 2018-12-24 NOTE — TELEPHONE ENCOUNTER
Ms. Delatorre returned my call from earlier in the week~ I attempted to call her back ~ no answer~ left msg to contact me at .    Reason: AWV scheduling.

## 2019-01-04 ENCOUNTER — TELEPHONE (OUTPATIENT)
Dept: PULMONOLOGY | Facility: CLINIC | Age: 69
End: 2019-01-04

## 2019-01-04 NOTE — TELEPHONE ENCOUNTER
Advised pt to try using only albuterol treatments today, and is she was still feeling poorly in the morning, to start with a low dose round of prednisone.   ----- Message from Gladys Grullon sent at 1/4/2019 11:30 AM CST -----  Contact: Pt   Pt is requesting a call back from Kiersten in regards to heaviness in her chest. Pt would like to know what rx she should be taking.       Pt can be contacted at [phone]

## 2019-01-15 ENCOUNTER — OFFICE VISIT (OUTPATIENT)
Dept: FAMILY MEDICINE | Facility: CLINIC | Age: 69
End: 2019-01-15
Payer: MEDICARE

## 2019-01-15 VITALS
SYSTOLIC BLOOD PRESSURE: 135 MMHG | HEART RATE: 54 BPM | BODY MASS INDEX: 30.71 KG/M2 | WEIGHT: 179.88 LBS | HEIGHT: 64 IN | OXYGEN SATURATION: 98 % | DIASTOLIC BLOOD PRESSURE: 67 MMHG

## 2019-01-15 DIAGNOSIS — I77.1 TORTUOUS AORTA: ICD-10-CM

## 2019-01-15 DIAGNOSIS — E78.5 HYPERLIPIDEMIA, UNSPECIFIED HYPERLIPIDEMIA TYPE: ICD-10-CM

## 2019-01-15 DIAGNOSIS — I70.0 ATHEROSCLEROSIS OF ABDOMINAL AORTA: ICD-10-CM

## 2019-01-15 DIAGNOSIS — E11.42 DIABETIC POLYNEUROPATHY ASSOCIATED WITH TYPE 2 DIABETES MELLITUS: ICD-10-CM

## 2019-01-15 DIAGNOSIS — R91.1 LUNG NODULE: ICD-10-CM

## 2019-01-15 DIAGNOSIS — I10 ESSENTIAL HYPERTENSION: ICD-10-CM

## 2019-01-15 DIAGNOSIS — E11.8 CONTROLLED TYPE 2 DIABETES MELLITUS WITH COMPLICATION, WITHOUT LONG-TERM CURRENT USE OF INSULIN: ICD-10-CM

## 2019-01-15 DIAGNOSIS — J47.9 BRONCHIECTASIS WITHOUT COMPLICATION: ICD-10-CM

## 2019-01-15 DIAGNOSIS — J45.20 MILD INTERMITTENT ASTHMA WITHOUT COMPLICATION: ICD-10-CM

## 2019-01-15 DIAGNOSIS — D84.9 IMMUNE DEFICIENCY DISORDER: ICD-10-CM

## 2019-01-15 DIAGNOSIS — Z00.00 ENCOUNTER FOR PREVENTIVE HEALTH EXAMINATION: Primary | ICD-10-CM

## 2019-01-15 PROCEDURE — 3075F PR MOST RECENT SYSTOLIC BLOOD PRESS GE 130-139MM HG: ICD-10-PCS | Mod: CPTII,S$GLB,, | Performed by: NURSE PRACTITIONER

## 2019-01-15 PROCEDURE — G0439 PR MEDICARE ANNUAL WELLNESS SUBSEQUENT VISIT: ICD-10-PCS | Mod: S$GLB,,, | Performed by: NURSE PRACTITIONER

## 2019-01-15 PROCEDURE — G0439 PPPS, SUBSEQ VISIT: HCPCS | Mod: S$GLB,,, | Performed by: NURSE PRACTITIONER

## 2019-01-15 PROCEDURE — 3078F PR MOST RECENT DIASTOLIC BLOOD PRESSURE < 80 MM HG: ICD-10-PCS | Mod: CPTII,S$GLB,, | Performed by: NURSE PRACTITIONER

## 2019-01-15 PROCEDURE — 3044F PR MOST RECENT HEMOGLOBIN A1C LEVEL <7.0%: ICD-10-PCS | Mod: CPTII,S$GLB,, | Performed by: NURSE PRACTITIONER

## 2019-01-15 PROCEDURE — 3075F SYST BP GE 130 - 139MM HG: CPT | Mod: CPTII,S$GLB,, | Performed by: NURSE PRACTITIONER

## 2019-01-15 PROCEDURE — 99999 PR PBB SHADOW E&M-EST. PATIENT-LVL IV: ICD-10-PCS | Mod: PBBFAC,,, | Performed by: NURSE PRACTITIONER

## 2019-01-15 PROCEDURE — 3078F DIAST BP <80 MM HG: CPT | Mod: CPTII,S$GLB,, | Performed by: NURSE PRACTITIONER

## 2019-01-15 PROCEDURE — 3044F HG A1C LEVEL LT 7.0%: CPT | Mod: CPTII,S$GLB,, | Performed by: NURSE PRACTITIONER

## 2019-01-15 PROCEDURE — 99999 PR PBB SHADOW E&M-EST. PATIENT-LVL IV: CPT | Mod: PBBFAC,,, | Performed by: NURSE PRACTITIONER

## 2019-01-15 NOTE — PATIENT INSTRUCTIONS
Counseling and Referral of Other Preventative  (Italic type indicates deductible and co-insurance are waived)    Patient Name: Cornelia Delatorre  Today's Date: 1/15/2019    Health Maintenance       Date Due Completion Date    Lipid Panel 04/19/2019 4/19/2018    Hemoglobin A1c 04/25/2019 10/25/2018    Foot Exam 04/27/2019 4/27/2018 (Done)    Override on 4/27/2018: Done (Diego)    Override on 9/1/2016: Done (date approximately, seeing Dr. Willams)    Override on 1/4/2016: Done (with Dr. Eng)    Override on 10/19/2015: Done (Dr. Eng)    Override on 10/22/2014: Done (Dr Eng)    Eye Exam 09/05/2019 9/5/2018    Override on 8/31/2017: Done (Dr. Genaro Sanderson)    Override on 8/29/2016: Done (Genaro Sanderson)    Override on 8/20/2015: Done    Override on 8/19/2014: Done (Dr Sanderson)    High Dose Statin 11/02/2019 11/2/2018    Mammogram 02/09/2020 2/9/2018    DEXA SCAN 04/27/2021 4/27/2018    TETANUS VACCINE 11/08/2023 11/8/2013    Colonoscopy 12/17/2024 12/17/2014 (Done)    Override on 12/17/2014: Done (Dr Duarte)        No orders of the defined types were placed in this encounter.    The following information is provided to all patients.  This information is to help you find resources for any of the problems found today that may be affecting your health:                Living healthy guide: www.[facility].[place].gov      Understanding Diabetes: www.diabetes.org      Eating healthy: www.cdc.gov/healthyweight      CDC home safety checklist: www.cdc.gov/steadi/patient.html      Agency on Aging: www.goea.[place].gov      Alcoholics anonymous (AA): www.aa.org      Physical Activity: www.richard.nih.gov/du8nduf      Tobacco use: www.quitwithusla.org

## 2019-01-15 NOTE — PROGRESS NOTES
"Cornelia Delatorre presented for a  Medicare AWV and comprehensive Health Risk Assessment today. The following components were reviewed and updated:    · Medical history  · Family History  · Social history  · Allergies and Current Medications  · Health Risk Assessment  · Health Maintenance  · Care Team     ** See Completed Assessments for Annual Wellness Visit within the encounter summary.**     The following assessments were completed:  · Living Situation  · CAGE  · Depression Screening  · Timed Get Up and Go  · Whisper Test  · Cognitive Function Screening      · Nutrition Screening  · ADL Screening  · PAQ Screening    Vitals:    01/15/19 0810   BP: 135/67   BP Location: Left arm   Patient Position: Sitting   BP Method: Medium (Automatic)   Pulse: (!) 54   SpO2: 98%   Weight: 81.6 kg (179 lb 14.3 oz)   Height: 5' 4" (1.626 m)     Body mass index is 30.88 kg/m².  Physical Exam   Constitutional: She is oriented to person, place, and time. She appears well-nourished.   Cardiovascular: Normal rate, regular rhythm, normal heart sounds and intact distal pulses.   Pulmonary/Chest: Effort normal and breath sounds normal.   Neurological: She is oriented to person, place, and time.   Skin: Skin is warm and dry.   Vitals reviewed.        Diagnoses and health risks identified today and associated recommendations/orders:    1. Encounter for preventive health examination  Reviewed and discussed health maintenance.      2. Diabetic polyneuropathy associated with type 2 diabetes mellitus  Stable- continue current treatment and follow up routinely with PCP     3. Lung nodule  Stable- continue current treatment and follow up routinely with PCP and pulmonary ()    4. Mild intermittent asthma without complication  Stable- continue current treatment and follow up routinely with PCP and pulmonary ()    5. Hyperlipidemia, unspecified hyperlipidemia type  Stable- continue current treatment and follow up routinely with PCP "     6. Tortuous aorta  Stable- continue current treatment and follow up routinely with PCP     7. Atherosclerosis of abdominal aorta  Stable- continue current treatment and follow up routinely with PCP     8. Essential hypertension  Stable- continue current treatment and follow up routinely with PCP     9. Controlled type 2 diabetes mellitus with complication, without long-term current use of insulin  Stable- continue current treatment and follow up routinely with PCP     10. Immune deficiency disorder  Stable- continue current treatment and follow up routinely with PCP and immunology ()    11. Bronchiectasis without complication  Stable- continue current treatment and follow up routinely with PCP and pulmonary ()    Provided Cornelia with a 5-10 year written screening schedule and personal prevention plan. Recommendations were developed using the USPSTF age appropriate recommendations. Education, counseling, and referrals were provided as needed. After Visit Summary printed and given to patient which includes a list of additional screenings\tests needed.    Sera Monroy NP

## 2019-01-16 ENCOUNTER — TELEPHONE (OUTPATIENT)
Dept: PULMONOLOGY | Facility: CLINIC | Age: 69
End: 2019-01-16

## 2019-01-16 NOTE — TELEPHONE ENCOUNTER
Pt states she took prednisone 20mg prednisone x3 and mucinex, now has yellow mucous production. Advised to start augmentin and use nebulizer Q4 prn. Pt verbalized understanding.

## 2019-01-16 NOTE — TELEPHONE ENCOUNTER
No answer/lvm to return my call     ----- Message from Jossie Noguera sent at 1/16/2019  8:02 AM CST -----  Contact: self  Type: Needs Medical Advice    Who Called:  self  Symptoms (please be specific):  Chest congestion  How long has patient had these symptoms:  Olayinka night  Best Call Back Number: [phone]  Additional Information: Patient would like to go over the medication with Kiersten that she is taking. Please call patient. Thanks!

## 2019-01-28 ENCOUNTER — HOSPITAL ENCOUNTER (OUTPATIENT)
Dept: RADIOLOGY | Facility: HOSPITAL | Age: 69
Discharge: HOME | End: 2019-01-28
Attending: NURSE PRACTITIONER
Payer: MEDICARE

## 2019-01-28 ENCOUNTER — OFFICE VISIT (OUTPATIENT)
Dept: PULMONOLOGY | Facility: CLINIC | Age: 69
End: 2019-01-28
Payer: MEDICARE

## 2019-01-28 VITALS
BODY MASS INDEX: 30.04 KG/M2 | DIASTOLIC BLOOD PRESSURE: 69 MMHG | HEART RATE: 57 BPM | OXYGEN SATURATION: 97 % | HEIGHT: 64 IN | WEIGHT: 175.94 LBS | SYSTOLIC BLOOD PRESSURE: 109 MMHG

## 2019-01-28 DIAGNOSIS — R91.8 ABNORMAL CT SCAN, LUNG: ICD-10-CM

## 2019-01-28 DIAGNOSIS — J47.0 BRONCHIECTASIS WITH ACUTE LOWER RESPIRATORY INFECTION: ICD-10-CM

## 2019-01-28 DIAGNOSIS — J47.0 BRONCHIECTASIS WITH ACUTE LOWER RESPIRATORY INFECTION: Primary | ICD-10-CM

## 2019-01-28 DIAGNOSIS — B37.0 ORAL CANDIDA: ICD-10-CM

## 2019-01-28 PROCEDURE — 71046 X-RAY EXAM CHEST 2 VIEWS: CPT | Mod: TC,HCNC,FY,PO

## 2019-01-28 PROCEDURE — 99214 OFFICE O/P EST MOD 30 MIN: CPT | Mod: HCNC,S$GLB,, | Performed by: NURSE PRACTITIONER

## 2019-01-28 PROCEDURE — 71046 X-RAY EXAM CHEST 2 VIEWS: CPT | Mod: 26,HCNC,, | Performed by: RADIOLOGY

## 2019-01-28 PROCEDURE — 3078F PR MOST RECENT DIASTOLIC BLOOD PRESSURE < 80 MM HG: ICD-10-PCS | Mod: HCNC,CPTII,S$GLB, | Performed by: NURSE PRACTITIONER

## 2019-01-28 PROCEDURE — 99214 PR OFFICE/OUTPT VISIT, EST, LEVL IV, 30-39 MIN: ICD-10-PCS | Mod: HCNC,S$GLB,, | Performed by: NURSE PRACTITIONER

## 2019-01-28 PROCEDURE — 1101F PR PT FALLS ASSESS DOC 0-1 FALLS W/OUT INJ PAST YR: ICD-10-PCS | Mod: HCNC,CPTII,S$GLB, | Performed by: NURSE PRACTITIONER

## 2019-01-28 PROCEDURE — 99999 PR PBB SHADOW E&M-EST. PATIENT-LVL III: CPT | Mod: PBBFAC,HCNC,, | Performed by: NURSE PRACTITIONER

## 2019-01-28 PROCEDURE — 3078F DIAST BP <80 MM HG: CPT | Mod: HCNC,CPTII,S$GLB, | Performed by: NURSE PRACTITIONER

## 2019-01-28 PROCEDURE — 1101F PT FALLS ASSESS-DOCD LE1/YR: CPT | Mod: HCNC,CPTII,S$GLB, | Performed by: NURSE PRACTITIONER

## 2019-01-28 PROCEDURE — 99999 PR PBB SHADOW E&M-EST. PATIENT-LVL III: ICD-10-PCS | Mod: PBBFAC,HCNC,, | Performed by: NURSE PRACTITIONER

## 2019-01-28 PROCEDURE — 71046 XR CHEST PA AND LATERAL: ICD-10-PCS | Mod: 26,HCNC,, | Performed by: RADIOLOGY

## 2019-01-28 PROCEDURE — 3074F SYST BP LT 130 MM HG: CPT | Mod: HCNC,CPTII,S$GLB, | Performed by: NURSE PRACTITIONER

## 2019-01-28 PROCEDURE — 3074F PR MOST RECENT SYSTOLIC BLOOD PRESSURE < 130 MM HG: ICD-10-PCS | Mod: HCNC,CPTII,S$GLB, | Performed by: NURSE PRACTITIONER

## 2019-01-28 RX ORDER — FLUCONAZOLE 150 MG/1
150 TABLET ORAL DAILY
Qty: 7 TABLET | Refills: 0 | Status: SHIPPED | OUTPATIENT
Start: 2019-01-28 | End: 2019-03-07 | Stop reason: SDUPTHER

## 2019-01-28 NOTE — PATIENT INSTRUCTIONS
CT chest in April to monitor lung nodules and assess bronchiectasis, need diagnosis for insurance to cover vest therapy  Have  continue to percuss back with hand.     Recommend purchasing Acepella device to help clear lungs of excess mucous, attaches to nebulizer device  Continue Nebulizer treatments 4x daily as needed.    Chest x-ray now to evaluate for pneumonia    Blood work at hospital     Will yeison to see results of sputum culture and x-ray before repeating antibiotic  Need to return sputum to clinic with in 4 hours of collecting    Fluconazole pills for oral thrush, this is a recurrent problem related to your weak immune system and use of inhaled steroids. Continue to rinse mouth out after using symbicort but problem will improve after starting immune replacement therapy.

## 2019-01-28 NOTE — PROGRESS NOTES
1/28/2019    Cornelia Delatorre  Office f/u    Chief Complaint   Patient presents with    Follow-up     3 months, fighting a nasty crud. has been through round of antibiotics and two rounds of steroids    Sputum Production     differs in color, most recently brownish    Wheezing       Jan 28/2019- Feeling bad onset 2 weeks, took prednisone taper x 6 days and antibiotic Augmentin week prior, States no improvement. Cough- worse at night, no nocturnal arousals, takes cough suppressant syrup before bed. Productive yellow/brown color.   Nebulized albuterol 4x daily. States no fever.  Has started allergy injections waiting for insurance clearance for IGG therapy.       oct 30,   2018-- had one day fever followed by cough/wheeze illness that lingered a wk. Pt seen by NP   [place] 2x, [place].  [place] like dying- only one day fever.  Violent cough.  Nebulizer ppt itch and palpitations- ok  On xopenex now.  Prednisone 40x3, 20 x 3 ,  augmentin cleared.  Now back to normal.      May 14, 2018- had spell /exacerbation with one round prednisone. Breathing good.  darlin 15, 2018--1 st illness in yr or so with cough and rattles, no flu.  Appetite ok.  Ill x wk, cough and wheeze and sob and noct worse, resp rx helps a bit.  Hasn't been using  Albuterol     oct 19, 2017- has scratchy throat, some sinus drip, has nightly sensation throat issues, post beryl and carpel tunnel surg.  No sinus lung infections.  Breathing and cough good.  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 Had pos tb gold.      June 21, 2017- had good [place] trip, tapered symbicort with some increase sensation of lung problems so maintained full dose. No infections.  No chest symptoms on symbicort.     April 24, feels a lot better with min cough, vague sob going left side down at night.  Going to [place] 2 weeks.  Uses symbicort and good results.      March 16, cough better, but comes and goes,  No great help with steroids.   Submitted 3 sputums.  Had pneumovax march last yr.  Breathing better. Got symbicort.     Had pneumonia vaccine last yr    3/8/2017 HPI: had onset cough Hernan illness, got dizzy few days with diarrhea and cough. Cough clear sm amt mucous. Did have 101 temp one day, fatigue, no muscle aches.  Appetite decreased.  Illnesses lingered 2 weeks but cough never remitted- has improved with inhahler use last 15 days.      Had gastric 2011 bypass with with continued diarrhea intially resolved. No regurg or reflux or swallow problems.     symbicort nearly  Resolved.      Sinuses ok.  No pets.  Never smoker.      Appetite good, feels well now.      headcolds to chest since childhood, nocturnal ??not recalled.    Had cats in past but got rid in 2003 or so.     The chief compliant  problem varies with instablilty at time    PFSH:  Past Medical History:   Diagnosis Date    Allergy     Arthritis     Asthma     Cataract     Chronic fatigue 1/24/2017    Diabetes mellitus     resolved with gastric bypass    Diabetes mellitus, type 2     Encounter for blood transfusion     GERD (gastroesophageal reflux disease)     Headache(784.0)     History of lumpectomy of both breasts     1992 negative    Hyperlipidemia 7/15/2015    Hypertension     Hypothyroidism     Neuropathy     Obesity     SOHA on CPAP     Postmenopausal HRT (hormone replacement therapy)     Rash     Rosacea     Snoring     Wears glasses          Past Surgical History:   Procedure Laterality Date    ADENOIDECTOMY      APPENDECTOMY      BIOPSY-LESION nasal septum - INTRANASAL MUCOSAL LESION Right 12/1/2014    [facility]formed [facility] at [facility] OR    BLADDER SUSPENSION      BREAST BIOPSY Bilateral 1992    bilateral benign excisional biopsies    BUNIONECTOMY  10/17/14    right, still has discomfort    BUNIONECTOMY-TAILOR Right 10/17/2014    Performed by Farzad Eng DPM at [facility] OR    CATARACT EXTRACTION W/  INTRAOCULAR LENS IMPLANT Bilateral   "   CHOLECYSTECTOMY  08/02/2017    CHOLECYSTECTOMY-LAPAROSCOPIC N/A 8/2/2017    [facility]    COLONOSCOPY      ESOPHAGOGASTRODUODENOSCOPY      dilated esophagus    GASTRIC BYPASS      2011    HYSTERECTOMY  1980    interstim bladder  2009    10/14/14 new battery    OOPHORECTOMY  1980    RELEASE-CARPAL TUNNEL Left 8/29/2017    [facility] OR    REPAIR-HAMMER TOE- 2nd toe 2nd Procedure: HTC 3rd toe with T&C- 25812. Hammer toe correction, tendon release to 4th right toe. Right 10/17/2014    [facility] at [facility] OR    [id number] IPG OF INTERSTIM Right 10/14/2014    Performed by [facility] Jane [facility]vis [facility] at [facility] OR 2ND FLR    SKIN CANCER EXCISION      left hand    TONSILLECTOMY      WISDOM TOOTH EXTRACTION       Social History     Tobacco Use    Smoking status: Never Smoker    Smokeless tobacco: Never Used   Substance Use Topics    Alcohol use: No    Drug use: No     Family History   Problem Relation Age of Onset    Breast cancer Mother 50    Diabetes Unknown     Hypertension Unknown     Hypothyroidism Unknown     Breast cancer Paternal Aunt         40's     Review of patient's allergies indicates:   Allergen Reactions    Sulfa (sulfonamide antibiotics) Hives    Naproxen Other (See Comments)     Other reaction(s): RT sided numbness         Performance Status:The patient's activity level is functions out of house.      Review of Systems:  a review of eleven systems covering constitutional, Eye, HEENT, Psych, Respiratory, Cardiac, GI, , Musculoskeletal, Endocrine, Dermatologic was negative except for pertinent findings as listed ABOVE and below: all good,  Had dm that remitted with bypass 2011.  Positive for SOB, cough severe.    Exam:Comprehensive exam done. /69 (BP Location: Left arm, Patient Position: Sitting)   Pulse (!) 57   Ht 5' 4" (1.626 m)   Wt 79.8 kg (175 lb 14.8 oz)   SpO2 97% " Comment: on room air  BMI 30.20 kg/m²   Exam included Vitals as listed, and patient's appearance and affect and alertness and mood, oral exam for yeast and hygiene and pharynx lesions and Mallapatti (M) score, neck with inspection for jvd and masses and thyroid abnormalities and lymph nodes (supraclavicular and infraclavicular nodes and axillary also examined and noted if abn), chest exam included symmetry and effort and fremitus and percussion and auscultation, cardiac exam included rhythm and gallops and murmur and rubs and jvd and edema, abdominal exam for mass and hepatosplenomegaly and tenderness and hernias and bowel sounds, Musculoskeletal exam with muscle tone and posture and mobility/gait and  strength, and skin for rashes and cyanosis and pallor and turgor, extremity for clubbing.  Findings were normal except for pertinent findings listed below:  M3, good bs, rest good. Lungs clear    Radiographs (ct chest and cxr) reviewed: view by direct vision  i do see clear bronchiectasis,nodules are noted.  Mucoid type impaction suggested in rul ant segment.  CT Chest:  1. Region of endobronchial plugging unchanged since 2/7/17 of uncertain etiology. This could relate to a process such as allergic bronchopulmonary aspergillosis, a solid mass nodule, mucous plugging, residual from aspiration, endobronchial spread of disease. Further followup or evaluation is necessary  Electronically signed by: Raffi Gottlieb MD  Date: 03/14/17    10/17/19 Chest X-ray clear      Labs reviewed igm low, igg lowish, humerol titers na    CBC reviewed October 10/17/18    PFT  Done [facility] with 10% response, otherwise nl  PULMONARY FUNCTION TEST REPORT      DATE OF PROCEDURE:  03/24/2017     1.  Spirometry reveals an FVC of 3.1 L, which is 107% predicted.  FEV1 is 2.3 L,   which is 100% predicted.  The ratio, there is preserved.  There is a positive,   but not significant bronchodilator response to both the FVC and FEV1.  Loop    contours appear with adequate effort as well.  2.  Lung volumes.  The total lung capacity is 4.4 L, which is 92% predicted.    There are no signs of gas trapping.  3.  Diffusing capacity is mild-to-moderate reduced at 68%, does improve to 96%   with alveolar volumes.     OVERALL IMPRESSION:  A normal spirometry with a robust yet statistically   insignificant bronchodilator response.  Normal lung volumes and a moderate   reduction in diffusion capacity.    CC: Jonathan Nicole M.D.            Plan:  Clinical impression is resonably certain and repeated evaluation prn +/- follow up will be needed as below.    Cornelia was seen today for follow-up, sputum production and wheezing.    Diagnoses and all orders for this visit:    Bronchiectasis with acute lower respiratory infection  -     Culture, Respiratory with Gram Stain; Future  -     CBC auto differential; Future  -     Procalcitonin; Future  -     mucus clearing device Cecy; 1 Device by Misc.(Non-Drug; Combo Route) route 2 (two) times daily.  -     X-Ray Chest PA And Lateral; Future    Oral candida  -     fluconazole (DIFLUCAN) 150 MG Tab; Take 1 tablet (150 mg total) by mouth once daily. for 7 days  -     Culture, Respiratory with Gram Stain; Future    Abnormal CT scan, lung        Follow-up in about 3 months (around 4/28/2019), or if symptoms worsen or fail to improve.    Discussed with patient above for education the following:      CT chest for February to monitor and assess for bronchiectasis, need diagnosis for insurance to cover vest therapy  Have  continue to percuss back with hand.     Recommend purchasing Acepella device to help clear lungs of excess mucous, attaches to nebulizer device  Continue Nebulizer treatments 4x daily as needed.    Chest x-ray now to evaluate for pneumonia    Blood work at hospital     Will yeison to see results of sputum culture and x-ray before repeating antibiotic  Need to return sputum to clinic with in 4 hours of  collecting    Fluconazole pills for oral thrush, this is a recurrent problem related to your weak immune system and use of inhaled steroids. Continue to rinse mouth out after using symbicort but problem will improve after starting immune replacement therapy.

## 2019-01-29 ENCOUNTER — TELEPHONE (OUTPATIENT)
Dept: PULMONOLOGY | Facility: CLINIC | Age: 69
End: 2019-01-29

## 2019-01-29 NOTE — TELEPHONE ENCOUNTER
Patient notified.      ----- Message from Daysi Llanos NP sent at 1/28/2019  4:32 PM CST -----  Chest x-ray clear.

## 2019-01-30 ENCOUNTER — TELEPHONE (OUTPATIENT)
Dept: PULMONOLOGY | Facility: CLINIC | Age: 69
End: 2019-01-30

## 2019-01-30 NOTE — TELEPHONE ENCOUNTER
Notified patient, verbalized understanding. Asked if sputum results would take longer to be finished, I told her that's correct. No further action is needed at this time.----- Message from Daysi Llanos NP sent at 1/29/2019  4:54 PM CST -----  Blood work came back good. No sign of current infection.

## 2019-02-01 ENCOUNTER — TELEPHONE (OUTPATIENT)
Dept: PULMONOLOGY | Facility: CLINIC | Age: 69
End: 2019-02-01

## 2019-02-01 NOTE — TELEPHONE ENCOUNTER
Per Daysi Llanos NP patient was notified.      ----- Message from Daysi Llanos NP sent at 1/31/2019  4:13 PM CST -----  Sputum culture does not show any bad bacteria.

## 2019-02-04 ENCOUNTER — TELEPHONE (OUTPATIENT)
Dept: PULMONOLOGY | Facility: CLINIC | Age: 69
End: 2019-02-04

## 2019-02-04 DIAGNOSIS — J47.0 BRONCHIECTASIS WITH ACUTE LOWER RESPIRATORY INFECTION: Primary | ICD-10-CM

## 2019-02-04 NOTE — TELEPHONE ENCOUNTER
Notified pt I faxed over orders to [facility]  ----- Message from Marina Khalil sent at 2/4/2019  9:25 AM CST -----  Contact: self   Patient need to speak with a nurse regarding a order she needs sent to [facility] for a Acapella Duet Nebulizer attachment. Please call back at [phone]

## 2019-03-06 ENCOUNTER — HOSPITAL ENCOUNTER (OUTPATIENT)
Dept: RADIOLOGY | Facility: HOSPITAL | Age: 69
Discharge: HOME | End: 2019-03-06
Attending: INTERNAL MEDICINE
Payer: MEDICARE

## 2019-03-06 ENCOUNTER — TELEPHONE (OUTPATIENT)
Dept: PULMONOLOGY | Facility: CLINIC | Age: 69
End: 2019-03-06

## 2019-03-06 DIAGNOSIS — J45.20 MILD INTERMITTENT ASTHMA WITHOUT COMPLICATION: ICD-10-CM

## 2019-03-06 DIAGNOSIS — J45.20 MILD INTERMITTENT ASTHMA WITHOUT COMPLICATION: Primary | ICD-10-CM

## 2019-03-06 PROCEDURE — 71046 X-RAY EXAM CHEST 2 VIEWS: CPT | Mod: TC,HCNC,FY,PO

## 2019-03-06 PROCEDURE — 71046 X-RAY EXAM CHEST 2 VIEWS: CPT | Mod: 26,HCNC,, | Performed by: RADIOLOGY

## 2019-03-06 PROCEDURE — 71046 XR CHEST PA AND LATERAL: ICD-10-PCS | Mod: 26,HCNC,, | Performed by: RADIOLOGY

## 2019-03-06 NOTE — TELEPHONE ENCOUNTER
-Gave patient appointment for 3/7/2019 at 8:00 am with a cxr.      ---- Message from Irma Laguerre sent at 3/6/2019 11:54 AM CST -----  Contact: Patient  Patient has been sick for the past few days and she has taken a round of Prednisone and is still not feeling better and is wondering what she should do next.  She is also on the antibiotics.  Call Back#[phone]  Thanks

## 2019-03-07 ENCOUNTER — OFFICE VISIT (OUTPATIENT)
Dept: PULMONOLOGY | Facility: CLINIC | Age: 69
End: 2019-03-07
Payer: MEDICARE

## 2019-03-07 VITALS
DIASTOLIC BLOOD PRESSURE: 60 MMHG | HEIGHT: 64 IN | SYSTOLIC BLOOD PRESSURE: 119 MMHG | BODY MASS INDEX: 31.24 KG/M2 | HEART RATE: 66 BPM | WEIGHT: 183 LBS | OXYGEN SATURATION: 97 %

## 2019-03-07 DIAGNOSIS — R05.9 COUGH: ICD-10-CM

## 2019-03-07 DIAGNOSIS — D83.9 CVID (COMMON VARIABLE IMMUNODEFICIENCY): ICD-10-CM

## 2019-03-07 DIAGNOSIS — J45.20 MILD INTERMITTENT ASTHMA WITHOUT COMPLICATION: ICD-10-CM

## 2019-03-07 DIAGNOSIS — B37.9 YEAST INFECTION: ICD-10-CM

## 2019-03-07 DIAGNOSIS — J47.1 BRONCHIECTASIS WITH ACUTE EXACERBATION: ICD-10-CM

## 2019-03-07 DIAGNOSIS — R91.8 ABNORMAL CT SCAN, LUNG: Primary | ICD-10-CM

## 2019-03-07 DIAGNOSIS — J47.0 BRONCHIECTASIS WITH ACUTE LOWER RESPIRATORY INFECTION: ICD-10-CM

## 2019-03-07 DIAGNOSIS — R91.1 LUNG NODULE: ICD-10-CM

## 2019-03-07 DIAGNOSIS — B37.0 ORAL CANDIDA: ICD-10-CM

## 2019-03-07 DIAGNOSIS — J39.8 TRACHEOBRONCHOMALACIA: ICD-10-CM

## 2019-03-07 PROCEDURE — 3078F DIAST BP <80 MM HG: CPT | Mod: HCNC,CPTII,S$GLB, | Performed by: INTERNAL MEDICINE

## 2019-03-07 PROCEDURE — 99999 PR PBB SHADOW E&M-EST. PATIENT-LVL III: CPT | Mod: PBBFAC,HCNC,, | Performed by: INTERNAL MEDICINE

## 2019-03-07 PROCEDURE — 99214 OFFICE O/P EST MOD 30 MIN: CPT | Mod: HCNC,S$GLB,, | Performed by: INTERNAL MEDICINE

## 2019-03-07 PROCEDURE — 99999 PR PBB SHADOW E&M-EST. PATIENT-LVL III: ICD-10-PCS | Mod: PBBFAC,HCNC,, | Performed by: INTERNAL MEDICINE

## 2019-03-07 PROCEDURE — 1101F PT FALLS ASSESS-DOCD LE1/YR: CPT | Mod: HCNC,CPTII,S$GLB, | Performed by: INTERNAL MEDICINE

## 2019-03-07 PROCEDURE — 3074F SYST BP LT 130 MM HG: CPT | Mod: HCNC,CPTII,S$GLB, | Performed by: INTERNAL MEDICINE

## 2019-03-07 PROCEDURE — 99214 PR OFFICE/OUTPT VISIT, EST, LEVL IV, 30-39 MIN: ICD-10-PCS | Mod: HCNC,S$GLB,, | Performed by: INTERNAL MEDICINE

## 2019-03-07 PROCEDURE — 3074F PR MOST RECENT SYSTOLIC BLOOD PRESSURE < 130 MM HG: ICD-10-PCS | Mod: HCNC,CPTII,S$GLB, | Performed by: INTERNAL MEDICINE

## 2019-03-07 PROCEDURE — 1101F PR PT FALLS ASSESS DOC 0-1 FALLS W/OUT INJ PAST YR: ICD-10-PCS | Mod: HCNC,CPTII,S$GLB, | Performed by: INTERNAL MEDICINE

## 2019-03-07 PROCEDURE — 3078F PR MOST RECENT DIASTOLIC BLOOD PRESSURE < 80 MM HG: ICD-10-PCS | Mod: HCNC,CPTII,S$GLB, | Performed by: INTERNAL MEDICINE

## 2019-03-07 RX ORDER — AZITHROMYCIN 500 MG/1
TABLET, FILM COATED ORAL
Qty: 30 TABLET | Refills: 3 | Status: SHIPPED | OUTPATIENT
Start: 2019-03-07 | End: 2019-07-22

## 2019-03-07 RX ORDER — FLUCONAZOLE 200 MG/1
200 TABLET ORAL DAILY
Qty: 7 TABLET | Refills: 0 | Status: SHIPPED | OUTPATIENT
Start: 2019-03-07 | End: 2019-03-14

## 2019-03-07 RX ORDER — AMOXICILLIN AND CLAVULANATE POTASSIUM 875; 125 MG/1; MG/1
1 TABLET, FILM COATED ORAL 2 TIMES DAILY
COMMUNITY
End: 2019-03-07 | Stop reason: SDUPTHER

## 2019-03-07 RX ORDER — CODEINE PHOSPHATE AND GUAIFENESIN 10; 100 MG/5ML; MG/5ML
5 SOLUTION ORAL 3 TIMES DAILY PRN
Qty: 473 ML | Refills: 2 | Status: SHIPPED | OUTPATIENT
Start: 2019-03-07

## 2019-03-07 RX ORDER — AMOXICILLIN AND CLAVULANATE POTASSIUM 875; 125 MG/1; MG/1
1 TABLET, FILM COATED ORAL 2 TIMES DAILY
Qty: 28 TABLET | Refills: 5 | Status: SHIPPED | OUTPATIENT
Start: 2019-03-07 | End: 2019-03-11

## 2019-03-07 NOTE — PATIENT INSTRUCTIONS
Gastric by pass may cause malabsorption, protein levels have been too low and may affect ig levels-- somewhat.   Need to get follow up with dietician to assure adequate protein intake/levels.    Antibiotic may stabilize infections with common variable immune deficiency- azithromycin daily once cultures submitted.   Use augmentin if infection worsens.    Acapella/chest clapping help clear mucous, vest likewise if bronchiectasis.  Will not treat cause.    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  Use codeine to suppress mild coughing.    Need good immune system and no airway/asthma problems and good hygiene of bronchial tubes (no chronic infections) to prevent illness.        Lungs measured near normal with good response to bronchial medications 2017    Need ct to follow up and cultures to assure infection controlled.

## 2019-03-07 NOTE — PROGRESS NOTES
3/7/2019    Cornelia Delatorre  Office f/u    Chief Complaint   Patient presents with    Wheezing    Sputum Production     sometimes yellow to grey    Shortness of Breath    Cough       March 7, 19-exacerbated in late Jan- cleared in feb (vague).  Now ill again yellow and gray mucous (culture last Jan was nl yvonne).  Sl intermittent wheezes since last wk.  Sees Dr Herrera in [place]- gets allergy rx but declined to get now.  Pt has cvid by  igg and igm levels low and pneumococcal antibodies to 8/14 pneumococcal titers. Uses symbicort and singulair routinely.  Took prednisone completed 4 days ago- uses prednisone monthly- was able to skip December.      Jan 28/2019- Feeling bad onset 2 weeks, took prednisone taper x 6 days and antibiotic Augmentin week prior, States no improvement. Cough- worse at night, no nocturnal arousals, takes cough suppressant syrup before bed. Productive yellow/brown color.   Nebulized albuterol 4x daily. States no fever.  Has started allergy injections waiting for insurance clearance for IGG therapy.   Discussed with patient above for education the following:    CT chest for February to monitor and assess for bronchiectasis, need diagnosis for insurance to cover vest therapy  Have  continue to percuss back with hand.   Recommend purchasing Acepella device to help clear lungs of excess mucous, attaches to nebulizer device  Continue Nebulizer treatments 4x daily as needed.  Chest x-ray now to evaluate for pneumonia  Blood work at hospital   Will yeison to see results of sputum culture and x-ray before repeating antibiotic  Need to return sputum to clinic with in 4 hours of collecting  Fluconazole pills for oral thrush, this is a recurrent problem related to your weak immune system and use of inhaled steroids. Continue to rinse mouth out after using symbicort but problem will improve after starting immune replacement therapy.    oct 30,   2018-- had one day fever followed by  cough/wheeze illness that lingered a wk. Pt seen by NP   [place] 2x, [place]  [place] like dying- only one day fever.  Violent cough.  Nebulizer ppt itch and palpitations- ok  On xopenex now.  Prednisone 40x3, 20 x 3 ,  augmentin cleared.  Now back to normal.  May 14, 2018- had spell /exacerbation with one round prednisone. Breathing good.  Follow-up in about 1 year (around 5/14/2019), or if symptoms worsen or fail to improve.   Discussed with patient above for education the following:     Check blood count and pneumonia vaccine response after last vaccine 4/27/2018   Use azithromycin for any lung/sinus infection- immune weakness likely   Asthma stable- use prednisone and singulair.   Use allergra and singulair and astelin/flonase   Lung nodule in lung still - Navigational bronchoscopy might find?  - will at least re check in a year.     Call if needed, re check next yr.  darlin 15, 2018--1 st illness in yr or so with cough and rattles, no flu.  Appetite ok.  Ill x wk, cough and wheeze and sob and noct worse, resp rx helps a bit.  Hasn't been using  Albuterol   Follow-up in about 6 months (around 7/15/2018).   Discussed with patient above for education the following:      tamiflu if flu.   Need to use albuterol for any lung symptoms.  Should get cough  Better controlled.      Prednisone 20 mg 3 for 3 , taper may be needed.  Give shot today, 1 daily for 3 may be enough with albuterol.   You had high eosinophils-- if asthma becomes more active - special therapy may help??        prevnar 13 would be good - should be off prednisone.  Immune system is sl weak  Protection only to 7.14 pneumonia germs.  oct 19, 2017- has scratchy throat, some sinus drip, has nightly sensation throat issues, post beryl and carpel tunnel surg.  No sinus lung infections.  Breathing and cough good.  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 Had pos tb gold.  June 21, 2017- had good [place] trip, tapered  symbicort with some increase sensation of lung problems so maintained full dose. No infections.  No chest symptoms on symbicort.   April 24, feels a lot better with min cough, vague sob going left side down at night.  Going to [place] 2 weeks.  Uses symbicort and good results.  March 16, cough better, but comes and goes,  No great help with steroids.  Submitted 3 sputums.  Had pneumovax march last yr.  Breathing better. Got symbicort.   Had pneumonia vaccine last yr  3/8/2017 HPI: had onset cough Hernan illness, got dizzy few days with diarrhea and cough. Cough clear sm amt mucous. Did have 101 temp one day, fatigue, no muscle aches.  Appetite decreased.  Illnesses lingered 2 weeks but cough never remitted- has improved with inhahler use last 15 days.    Had gastric 2011 bypass with with continued diarrhea intially resolved. No regurg or reflux or swallow problems.   symbicort nearly  Resolved.    Sinuses ok.  No pets.  Never smoker.    Appetite good, feels well now.    headcolds to chest since childhood, nocturnal ??not recalled.  Had cats in past but got rid in 2003 or so.   The chief compliant  problem varies with instablilty at time    PFSH:  Past Medical History:   Diagnosis Date    Allergy     Arthritis     Asthma     Cataract     Chronic fatigue 1/24/2017    Diabetes mellitus     resolved with gastric bypass    Diabetes mellitus, type 2     Encounter for blood transfusion     GERD (gastroesophageal reflux disease)     Headache(784.0)     History of lumpectomy of both breasts     1992 negative    Hyperlipidemia 7/15/2015    Hypertension     Hypothyroidism     Neuropathy     Obesity     SOHA on CPAP     Postmenopausal HRT (hormone replacement therapy)     Rash     Rosacea     Snoring     Wears glasses          Past Surgical History:   Procedure Laterality Date    ADENOIDECTOMY      APPENDECTOMY      BIOPSY-LESION nasal septum - INTRANASAL MUCOSAL LESION Right 12/1/2014    Performed by Lizzie  MAYLIN Loja [facility] at [facility] OR    BLADDER SUSPENSION      BREAST BIOPSY Bilateral 1992    bilateral benign excisional biopsies    BUNIONECTOMY  10/17/14    right, still has discomfort    BUNIONECTOMY-TAILOR Right 10/17/2014    Performed by Farzad Eng DPM at [facility] OR    CATARACT EXTRACTION W/  INTRAOCULAR LENS IMPLANT Bilateral     CHOLECYSTECTOMY  08/02/2017    CHOLECYSTECTOMY-LAPAROSCOPIC N/A 8/2/2017    [facility]    COLONOSCOPY      ESOPHAGOGASTRODUODENOSCOPY      dilated esophagus    GASTRIC BYPASS      2011    HYSTERECTOMY  1980    interstim bladder  2009    10/14/14 new battery    OOPHORECTOMY  1980    RELEASE-CARPAL TUNNEL Left 8/29/2017    [facility] at [facility] OR    REPAIR-HAMMER TOE- 2nd toe 2nd Procedure: HTC 3rd toe with T&C- 02804. Hammer toe correction, tendon release to 4th right toe. Right 10/17/2014    Performed by Farzad Eng DPM at [facility] OR    AYXONDYR-TKTLTCPLL-HIQGLOL IPG OF INTERSTIM Right 10/14/2014    Performed by [facility] Jane Jarvis [facility] at [facility] OR 2ND FLR    SKIN CANCER EXCISION      left hand    TONSILLECTOMY      WISDOM TOOTH EXTRACTION       Social History     Tobacco Use    Smoking status: Never Smoker    Smokeless tobacco: Never Used   Substance Use Topics    Alcohol use: No    Drug use: No     Family History   Problem Relation Age of Onset    Breast cancer Mother 50    Diabetes Unknown     Hypertension Unknown     Hypothyroidism Unknown     Breast cancer Paternal Aunt         40's     Review of patient's allergies indicates:   Allergen Reactions    Sulfa (sulfonamide antibiotics) Hives    Naproxen Other (See Comments)     Other reaction(s): RT sided numbness         Performance Status:The patient's activity level is functions out of house.      Review of Systems:  a review of eleven systems covering constitutional, Eye, HEENT, Psych, Respiratory, Cardiac, GI, , Musculoskeletal, Endocrine, Dermatologic  "was negative except for pertinent findings as listed ABOVE and below: all good,  Had dm that remitted with bypass 2011.  Positive for SOB, cough severe.    Exam:Comprehensive exam done. /60 (BP Location: Right arm, Patient Position: Sitting)   Pulse 66   Ht 5' 4" (1.626 m)   Wt 83 kg (182 lb 15.7 oz)   SpO2 97% Comment: on room air  BMI 31.41 kg/m²   Exam included Vitals as listed, and patient's appearance and affect and alertness and mood, oral exam for yeast and hygiene and pharynx lesions and Mallapatti (M) score, neck with inspection for jvd and masses and thyroid abnormalities and lymph nodes (supraclavicular and infraclavicular nodes and axillary also examined and noted if abn), chest exam included symmetry and effort and fremitus and percussion and auscultation, cardiac exam included rhythm and gallops and murmur and rubs and jvd and edema, abdominal exam for mass and hepatosplenomegaly and tenderness and hernias and bowel sounds, Musculoskeletal exam with muscle tone and posture and mobility/gait and  strength, and skin for rashes and cyanosis and pallor and turgor, extremity for clubbing.  Findings were normal except for pertinent findings listed below:  M3, good bs, rest good. Lungs clear- wheezes and coughs with laughing    Radiographs (ct chest and cxr) reviewed: view by direct vision  i do see clear bronchiectasis,nodules are noted.  Mucoid type impaction suggested in rul ant segment.      April 19, 2018 ct suggest tracheobronchomalacia on high res spot films- seen 3/7/19  CT Chest:  1. Region of endobronchial plugging unchanged since 2/7/17 of uncertain etiology. This could relate to a process such as allergic bronchopulmonary aspergillosis, a solid mass nodule, mucous plugging, residual from aspiration, endobronchial spread of disease. Further followup or evaluation is necessary  Electronically signed by: Raffi Gottlieb MD  Date: 03/14/17    10/17/19 Chest X-ray clear      Labs reviewed " igm low, igg lowish, humerol titers na    CBC reviewed October 10/17/18    PFT  Done  [facility] with 10% response, otherwise nl  PULMONARY FUNCTION TEST REPORT      DATE OF PROCEDURE:  03/24/2017     1.  Spirometry reveals an FVC of 3.1 L, which is 107% predicted.  FEV1 is 2.3 L,   which is 100% predicted.  The ratio, there is preserved.  There is a positive,   but not significant bronchodilator response to both the FVC and FEV1.  Loop   contours appear with adequate effort as well.  2.  Lung volumes.  The total lung capacity is 4.4 L, which is 92% predicted.    There are no signs of gas trapping.  3.  Diffusing capacity is mild-to-moderate reduced at 68%, does improve to 96%   with alveolar volumes.     OVERALL IMPRESSION:  A normal spirometry with a robust yet statistically   insignificant bronchodilator response.  Normal lung volumes and a moderate   reduction in diffusion capacity.    CC: Jonathan Nicole M.D.            Plan:  Clinical impression is resonably certain and repeated evaluation prn +/- follow up will be needed as below.    Cornelia was seen today for wheezing, sputum production, shortness of breath and cough.    Diagnoses and all orders for this visit:    Abnormal CT scan, lung  -     AFB Culture & Smear; Standing    Bronchiectasis with acute lower respiratory infection  -     amoxicillin-clavulanate 875-125mg (AUGMENTIN) 875-125 mg per tablet; Take 1 tablet by mouth 2 (two) times daily.  -     azithromycin (ZITHROMAX) 500 MG tablet; One daily for yellow mucous, repeat if needed  -     Culture, Respiratory with Gram Stain; Future    Lung nodule  -     AFB Culture & Smear; Standing    Mild intermittent asthma without complication    CVID (common variable immunodeficiency)  -     AFB Culture & Smear; Standing  -     azithromycin (ZITHROMAX) 500 MG tablet; One daily for yellow mucous, repeat if needed  -     Culture, Respiratory with Gram Stain; Future    Tracheobronchomalacia  -      amoxicillin-clavulanate 875-125mg (AUGMENTIN) 875-125 mg per tablet; Take 1 tablet by mouth 2 (two) times daily.  -     guaifenesin-codeine 100-10 mg/5 ml (GUAIFENESIN AC)  mg/5 mL syrup; Take 5 mLs by mouth 3 (three) times daily as needed for Cough.    Bronchiectasis with acute exacerbation  -     AFB Culture & Smear; Standing  -     guaifenesin-codeine 100-10 mg/5 ml (GUAIFENESIN AC)  mg/5 mL syrup; Take 5 mLs by mouth 3 (three) times daily as needed for Cough.  -     Culture, Respiratory with Gram Stain; Future    Cough  -     AFB Culture & Smear; Standing  -     amoxicillin-clavulanate 875-125mg (AUGMENTIN) 875-125 mg per tablet; Take 1 tablet by mouth 2 (two) times daily.  -     guaifenesin-codeine 100-10 mg/5 ml (GUAIFENESIN AC)  mg/5 mL syrup; Take 5 mLs by mouth 3 (three) times daily as needed for Cough.    Oral candida  -     fluconazole (DIFLUCAN) 200 MG Tab; Take 1 tablet (200 mg total) by mouth once daily. for 7 days    Yeast infection  -     fluconazole (DIFLUCAN) 200 MG Tab; Take 1 tablet (200 mg total) by mouth once daily. for 7 days        Follow-up in about 8 weeks (around 5/2/2019).    Discussed with patient above for education the following:       Gastric by pass may cause malabsorption, protein levels have been too low and may affect ig levels-- somewhat.   Need to get follow up with dietician to assure adequate protein intake/levels.    Antibiotic may stabilize infections with common variable immune deficiency- azithromycin daily once cultures submitted.   Use augmentin if infection worsens.    Acapella/chest clapping help clear mucous, vest likewise if bronchiectasis.  Will not treat cause.    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  Use codeine to suppress mild coughing.    Need good immune system and no airway/asthma problems and good hygiene of bronchial tubes (no chronic infections) to prevent  illness.        Lungs measured near normal with good response to bronchial medications 2017    Need ct to follow up and cultures to assure infection controlled.

## 2019-03-08 ENCOUNTER — APPOINTMENT (OUTPATIENT)
Dept: LAB | Facility: HOSPITAL | Age: 69
End: 2019-03-08
Attending: INTERNAL MEDICINE
Payer: MEDICARE

## 2019-03-09 ENCOUNTER — LAB VISIT (OUTPATIENT)
Dept: LAB | Facility: HOSPITAL | Age: 69
End: 2019-03-09
Attending: INTERNAL MEDICINE
Payer: MEDICARE

## 2019-03-09 DIAGNOSIS — R91.8 ABNORMAL CT SCAN, LUNG: ICD-10-CM

## 2019-03-09 DIAGNOSIS — J47.1 BRONCHIECTASIS WITH ACUTE EXACERBATION: ICD-10-CM

## 2019-03-09 DIAGNOSIS — R05.9 COUGH: ICD-10-CM

## 2019-03-09 DIAGNOSIS — D83.9 CVID (COMMON VARIABLE IMMUNODEFICIENCY): ICD-10-CM

## 2019-03-09 DIAGNOSIS — R91.1 LUNG NODULE: ICD-10-CM

## 2019-03-09 PROCEDURE — 87116 MYCOBACTERIA CULTURE: CPT | Mod: HCNC

## 2019-03-09 PROCEDURE — 87206 SMEAR FLUORESCENT/ACID STAI: CPT | Mod: HCNC

## 2019-03-09 PROCEDURE — 87015 SPECIMEN INFECT AGNT CONCNTJ: CPT | Mod: HCNC

## 2019-03-11 ENCOUNTER — OFFICE VISIT (OUTPATIENT)
Dept: UROLOGY | Facility: CLINIC | Age: 69
End: 2019-03-11
Payer: MEDICARE

## 2019-03-11 VITALS
DIASTOLIC BLOOD PRESSURE: 66 MMHG | HEART RATE: 61 BPM | BODY MASS INDEX: 31.62 KG/M2 | HEIGHT: 64 IN | SYSTOLIC BLOOD PRESSURE: 105 MMHG | WEIGHT: 185.19 LBS

## 2019-03-11 DIAGNOSIS — R35.0 INCREASED FREQUENCY OF URINATION: ICD-10-CM

## 2019-03-11 DIAGNOSIS — R39.15 URINARY URGENCY: Primary | ICD-10-CM

## 2019-03-11 PROCEDURE — 99999 PR PBB SHADOW E&M-EST. PATIENT-LVL IV: ICD-10-PCS | Mod: PBBFAC,HCNC,, | Performed by: UROLOGY

## 2019-03-11 PROCEDURE — 1101F PT FALLS ASSESS-DOCD LE1/YR: CPT | Mod: HCNC,CPTII,S$GLB, | Performed by: UROLOGY

## 2019-03-11 PROCEDURE — 3074F SYST BP LT 130 MM HG: CPT | Mod: HCNC,CPTII,S$GLB, | Performed by: UROLOGY

## 2019-03-11 PROCEDURE — 99213 PR OFFICE/OUTPT VISIT, EST, LEVL III, 20-29 MIN: ICD-10-PCS | Mod: HCNC,25,S$GLB, | Performed by: UROLOGY

## 2019-03-11 PROCEDURE — 3078F DIAST BP <80 MM HG: CPT | Mod: HCNC,CPTII,S$GLB, | Performed by: UROLOGY

## 2019-03-11 PROCEDURE — 95971 ALYS SMPL SP/PN NPGT W/PRGRM: CPT | Mod: HCNC,59,S$GLB, | Performed by: UROLOGY

## 2019-03-11 PROCEDURE — 95971 PR ANALYZE NEUROSTIM,SIMPLE/PROG: ICD-10-PCS | Mod: HCNC,59,S$GLB, | Performed by: UROLOGY

## 2019-03-11 PROCEDURE — 99213 OFFICE O/P EST LOW 20 MIN: CPT | Mod: HCNC,25,S$GLB, | Performed by: UROLOGY

## 2019-03-11 PROCEDURE — 3074F PR MOST RECENT SYSTOLIC BLOOD PRESSURE < 130 MM HG: ICD-10-PCS | Mod: HCNC,CPTII,S$GLB, | Performed by: UROLOGY

## 2019-03-11 PROCEDURE — 99999 PR PBB SHADOW E&M-EST. PATIENT-LVL IV: CPT | Mod: PBBFAC,HCNC,, | Performed by: UROLOGY

## 2019-03-11 PROCEDURE — 3078F PR MOST RECENT DIASTOLIC BLOOD PRESSURE < 80 MM HG: ICD-10-PCS | Mod: HCNC,CPTII,S$GLB, | Performed by: UROLOGY

## 2019-03-11 PROCEDURE — 1101F PR PT FALLS ASSESS DOC 0-1 FALLS W/OUT INJ PAST YR: ICD-10-PCS | Mod: HCNC,CPTII,S$GLB, | Performed by: UROLOGY

## 2019-03-11 RX ORDER — EPINEPHRINE 0.3 MG/.3ML
1 INJECTION SUBCUTANEOUS
Refills: 3 | COMMUNITY
Start: 2019-03-07

## 2019-04-09 ENCOUNTER — IMMUNIZATION (OUTPATIENT)
Dept: PHARMACY | Facility: CLINIC | Age: 69
End: 2019-04-09
Payer: MEDICARE

## 2019-04-09 ENCOUNTER — OFFICE VISIT (OUTPATIENT)
Dept: DERMATOLOGY | Facility: CLINIC | Age: 69
End: 2019-04-09
Payer: MEDICARE

## 2019-04-09 DIAGNOSIS — Z85.828 PERSONAL HISTORY OF OTHER MALIGNANT NEOPLASM OF SKIN: Primary | ICD-10-CM

## 2019-04-09 DIAGNOSIS — L57.0 ACTINIC KERATOSES: ICD-10-CM

## 2019-04-09 DIAGNOSIS — Z12.83 SKIN CANCER SCREENING: ICD-10-CM

## 2019-04-09 PROCEDURE — 17000 DESTRUCT PREMALG LESION: CPT | Mod: HCNC,S$GLB,, | Performed by: DERMATOLOGY

## 2019-04-09 PROCEDURE — 17000 PR DESTRUCTION(LASER SURGERY,CRYOSURGERY,CHEMOSURGERY),PREMALIGNANT LESIONS,FIRST LESION: ICD-10-PCS | Mod: HCNC,S$GLB,, | Performed by: DERMATOLOGY

## 2019-04-09 PROCEDURE — 99213 OFFICE O/P EST LOW 20 MIN: CPT | Mod: 25,HCNC,S$GLB, | Performed by: DERMATOLOGY

## 2019-04-09 PROCEDURE — 17003 DESTRUCT PREMALG LES 2-14: CPT | Mod: HCNC,S$GLB,, | Performed by: DERMATOLOGY

## 2019-04-09 PROCEDURE — 1101F PR PT FALLS ASSESS DOC 0-1 FALLS W/OUT INJ PAST YR: ICD-10-PCS | Mod: HCNC,CPTII,S$GLB, | Performed by: DERMATOLOGY

## 2019-04-09 PROCEDURE — 99999 PR PBB SHADOW E&M-EST. PATIENT-LVL I: CPT | Mod: PBBFAC,HCNC,, | Performed by: DERMATOLOGY

## 2019-04-09 PROCEDURE — 1101F PT FALLS ASSESS-DOCD LE1/YR: CPT | Mod: HCNC,CPTII,S$GLB, | Performed by: DERMATOLOGY

## 2019-04-09 PROCEDURE — 17003 DESTRUCTION, PREMALIGNANT LESIONS; SECOND THROUGH 14 LESIONS: ICD-10-PCS | Mod: HCNC,S$GLB,, | Performed by: DERMATOLOGY

## 2019-04-09 PROCEDURE — 99213 PR OFFICE/OUTPT VISIT, EST, LEVL III, 20-29 MIN: ICD-10-PCS | Mod: 25,HCNC,S$GLB, | Performed by: DERMATOLOGY

## 2019-04-09 PROCEDURE — 99999 PR PBB SHADOW E&M-EST. PATIENT-LVL I: ICD-10-PCS | Mod: PBBFAC,HCNC,, | Performed by: DERMATOLOGY

## 2019-04-09 RX ORDER — LISINOPRIL 10 MG/1
10 TABLET ORAL NIGHTLY
COMMUNITY
Start: 2019-03-26 | End: 2019-06-10 | Stop reason: ALTCHOICE

## 2019-04-09 NOTE — PROGRESS NOTES
Subjective:       Patient ID:  Cornelia Delatorre is a 68 y.o. female who presents for   Chief Complaint   Patient presents with    Skin Check     Last seen on 8/28/2018- multiple Aks on face treated with cryo - resolved  Patient here today for skin check last seen , the patient denies any lesions at present that are changing in color, increasing in size, painful, itching, or bleeding.     Phx skin ca R hand and nose - Dr Parra - 2014   Phx SCC L hand -mohs Dr Jones  -2015   History of actinic keratoses on nasal Dorsum in the past hx of efudex use  Uses CeraVe AM cream daily for routine      history bronchial asthma and bypass associated immunodeficiency , potentially will be starting IVIg    Review of Systems   Skin: Positive for daily sunscreen use. Negative for itching, rash and dry skin.        Objective:    Physical Exam   Constitutional: She appears well-developed and well-nourished. No distress.   HENT:   Mouth/Throat: Lips normal.    Eyes: Lids are normal.  No conjunctival no injection.   Cardiovascular: There is no local extremity swelling and no dependent edema.     Neurological: She is alert and oriented to person, place, and time. She is not disoriented.   Psychiatric: She has a normal mood and affect.   Skin:   Areas Examined (abnormalities noted in diagram):   Head / Face Inspection Performed  Neck Inspection Performed  Chest / Axilla Inspection Performed  Abdomen Inspection Performed  Back Inspection Performed  RUE Inspected  LUE Inspection Performed                   Diagram Legend     Erythematous scaling macule/papule c/w actinic keratosis       Vascular papule c/w angioma      Pigmented verrucoid papule/plaque c/w seborrheic keratosis      Yellow umbilicated papule c/w sebaceous hyperplasia      Irregularly shaped tan macule c/w lentigo     1-2 mm smooth white papules consistent with Milia      Movable subcutaneous cyst with punctum c/w epidermal inclusion cyst       Subcutaneous movable cyst c/w pilar cyst      Firm pink to brown papule c/w dermatofibroma      Pedunculated fleshy papule(s) c/w skin tag(s)      Evenly pigmented macule c/w junctional nevus     Mildly variegated pigmented, slightly irregular-bordered macule c/w mildly atypical nevus      Flesh colored to evenly pigmented papule c/w intradermal nevus       Pink pearly papule/plaque c/w basal cell carcinoma      Erythematous hyperkeratotic cursted plaque c/w SCC      Surgical scar with no sign of skin cancer recurrence      Open and closed comedones      Inflammatory papules and pustules      Verrucoid papule consistent consistent with wart     Erythematous eczematous patches and plaques     Dystrophic onycholytic nail with subungual debris c/w onychomycosis     Umbilicated papule    Erythematous-base heme-crusted tan verrucoid plaque consistent with inflamed seborrheic keratosis     Erythematous Silvery Scaling Plaque c/w Psoriasis     See annotation      Assessment / Plan:        Personal history of other malignant neoplasm of skin  Area(s) of previous NMSC evaluated with no signs of recurrence.    Upper body skin examination performed today including at least 6 points as noted in physical examination. No lesions suspicious for malignancy noted.      Skin cancer screening  Area(s) of previous NMSC evaluated with no signs of recurrence.    Upper body skin examination performed today including at least 6 points as noted in physical examination. No lesions suspicious for malignancy noted.    Actinic keratoses  Cryosurgery Procedure Note    Verbal consent from the patient is obtained and the patient is aware of the precancerous quality and need for treatment of these lesions. Liquid nitrogen cryosurgery is applied to the 4 actinic keratoses, as detailed in the physical exam, to produce a freeze injury. The patient is aware that blisters may form and is instructed on wound care with gentle cleansing and use of vaseline  ointment to keep moist until healed. The patient is supplied a handout on cryosurgery and is instructed to call if lesions do not completely resolve. Discussed risk postinflammatory pigmentary changes.     Efudex bid to anything not resolved with cryo         Follow up in about 6 months (around 10/9/2019).

## 2019-04-11 ENCOUNTER — OFFICE VISIT (OUTPATIENT)
Dept: ALLERGY | Facility: CLINIC | Age: 69
End: 2019-04-11
Payer: MEDICARE

## 2019-04-11 VITALS
SYSTOLIC BLOOD PRESSURE: 160 MMHG | OXYGEN SATURATION: 100 % | HEIGHT: 64 IN | HEART RATE: 67 BPM | DIASTOLIC BLOOD PRESSURE: 90 MMHG | WEIGHT: 189.63 LBS | BODY MASS INDEX: 32.37 KG/M2

## 2019-04-11 DIAGNOSIS — J31.0 CHRONIC RHINITIS: ICD-10-CM

## 2019-04-11 DIAGNOSIS — D83.9 CVID (COMMON VARIABLE IMMUNODEFICIENCY): Primary | ICD-10-CM

## 2019-04-11 DIAGNOSIS — J47.0 BRONCHIECTASIS WITH ACUTE LOWER RESPIRATORY INFECTION: ICD-10-CM

## 2019-04-11 DIAGNOSIS — J45.20 MILD INTERMITTENT ASTHMA WITHOUT COMPLICATION: ICD-10-CM

## 2019-04-11 PROCEDURE — 3077F SYST BP >= 140 MM HG: CPT | Mod: HCNC,CPTII,S$GLB, | Performed by: ALLERGY & IMMUNOLOGY

## 2019-04-11 PROCEDURE — 99204 OFFICE O/P NEW MOD 45 MIN: CPT | Mod: HCNC,S$GLB,, | Performed by: ALLERGY & IMMUNOLOGY

## 2019-04-11 PROCEDURE — 1101F PT FALLS ASSESS-DOCD LE1/YR: CPT | Mod: HCNC,CPTII,S$GLB, | Performed by: ALLERGY & IMMUNOLOGY

## 2019-04-11 PROCEDURE — 99999 PR PBB SHADOW E&M-EST. PATIENT-LVL III: ICD-10-PCS | Mod: PBBFAC,HCNC,, | Performed by: ALLERGY & IMMUNOLOGY

## 2019-04-11 PROCEDURE — 3080F PR MOST RECENT DIASTOLIC BLOOD PRESSURE >= 90 MM HG: ICD-10-PCS | Mod: HCNC,CPTII,S$GLB, | Performed by: ALLERGY & IMMUNOLOGY

## 2019-04-11 PROCEDURE — 3080F DIAST BP >= 90 MM HG: CPT | Mod: HCNC,CPTII,S$GLB, | Performed by: ALLERGY & IMMUNOLOGY

## 2019-04-11 PROCEDURE — 3077F PR MOST RECENT SYSTOLIC BLOOD PRESSURE >= 140 MM HG: ICD-10-PCS | Mod: HCNC,CPTII,S$GLB, | Performed by: ALLERGY & IMMUNOLOGY

## 2019-04-11 PROCEDURE — 99204 PR OFFICE/OUTPT VISIT, NEW, LEVL IV, 45-59 MIN: ICD-10-PCS | Mod: HCNC,S$GLB,, | Performed by: ALLERGY & IMMUNOLOGY

## 2019-04-11 PROCEDURE — 1101F PR PT FALLS ASSESS DOC 0-1 FALLS W/OUT INJ PAST YR: ICD-10-PCS | Mod: HCNC,CPTII,S$GLB, | Performed by: ALLERGY & IMMUNOLOGY

## 2019-04-11 PROCEDURE — 99999 PR PBB SHADOW E&M-EST. PATIENT-LVL III: CPT | Mod: PBBFAC,HCNC,, | Performed by: ALLERGY & IMMUNOLOGY

## 2019-04-11 NOTE — LETTER
April 11, 2019      Jonathan Nicole MD  [address]  [address]  [place] [address]           [place] [facility]  [address]  [place] [address]  Phone: [phone]          Patient: Cornelia Delatorre   MR Number: [id number]   YOB: 1950   Date of Visit: 4/11/2019       Dear Dr. Jonathan Nicole:    Thank you for referring Cornelia Delatorre to me for evaluation. Attached you will find relevant portions of my assessment and plan of care.    If you have questions, please do not hesitate to call me. I look forward to following Cornelia Delatorre along with you.    Sincerely,    Jessica Mcmullen MD    Enclosure  CC:  No Recipients    If you would like to receive this communication electronically, please contact [email or web address] or [phone] to request more information on [employer] Link access.    For providers and/or their staff who would like to refer a patient to [facility], please contact us through our one-stop-shop provider referral line, [facility], at [phone].    If you feel you have received this communication in error or would no longer like to receive these types of communications, please e-mail [email or web address]

## 2019-04-11 NOTE — PROGRESS NOTES
Subjective:       Patient ID: Cornelia Delatorre is a 68 y.o. female.    Chief Complaint:  Other (asthma, IgG def, allergies)      67 yo woman presents for consult from Dr Jonathan Nicole for CVID. She was diagnosed with asthma 2 years ago. She has had up and down with lots of mucus causing cough. She has SOB can only walk short amount before KLINE. She is currently on chronic Zithromax. She does have bronchiectasis. She hash ad pneumonia 2 times in past but none in last 5 years. She does not get frequent sinus infections more chest. She was ion hospital for exacerbation in 10/2018. She has some sneeze and runny nose but not much. She saw Dr Herrera and is on allergy shots since January but off last month due to asthma. She was prescribed Hizentra but cost was high for her. She is friends with a pt here who has same insurance and gets Hizentra from [facility] and pays nothing so she would like to transfer. She has labs 3/2017 with IgG 693, IgA 191, IgM 22 and IgE <35 with humoral panel protected to 8/14 strep titers. She had pneumovax 3/2016 and Prevnar 3/2017 prior to these labs. She had repeat humoral panel 7/2017 and protected to 8/14. She had another pneumovax 4/27/18 and labs 6/28 still only protected to 8/14. Labs 11/2018 now shoe IgG low at 516 all 4 subclasses low, IgA 132, IgM low at 36. She has normal PFT but CT shows bronchiectasis. She has no eczema. No known food, insect or latex allergy. She has HTN and DM. She did have gastric bypass in past. She never smoked.      Environmental History: see history section for home environment  Review of Systems   Constitutional: Negative for appetite change, chills, fatigue and fever.   HENT: Positive for rhinorrhea and sneezing. Negative for congestion, ear discharge, ear pain, facial swelling, nosebleeds, postnasal drip, sinus pressure, sore throat, trouble swallowing and voice change.    Eyes: Negative for discharge, redness, itching and visual disturbance.   Respiratory:  Positive for cough, choking, chest tightness, shortness of breath and wheezing.    Cardiovascular: Negative for chest pain, palpitations and leg swelling.   Gastrointestinal: Negative for abdominal distention, abdominal pain, constipation, diarrhea, nausea and vomiting.   Genitourinary: Negative for difficulty urinating.   Musculoskeletal: Positive for arthralgias. Negative for gait problem, joint swelling and myalgias.   Skin: Negative for color change and rash.   Neurological: Negative for dizziness, syncope, weakness, light-headedness and headaches.   Hematological: Negative for adenopathy. Does not bruise/bleed easily.   Psychiatric/Behavioral: Negative for agitation, behavioral problems, confusion and sleep disturbance. The patient is not nervous/anxious.         Objective:      Physical Exam   Constitutional: She is oriented to person, place, and time. She appears well-developed and well-nourished. No distress.   HENT:   Head: Normocephalic and atraumatic.   Right Ear: Hearing, tympanic membrane, external ear and ear canal normal.   Left Ear: Hearing, tympanic membrane, external ear and ear canal normal.   Nose: No mucosal edema, rhinorrhea, sinus tenderness or septal deviation. No epistaxis. Right sinus exhibits no maxillary sinus tenderness and no frontal sinus tenderness. Left sinus exhibits no maxillary sinus tenderness and no frontal sinus tenderness.   Mouth/Throat: Uvula is midline, oropharynx is clear and moist and mucous membranes are normal. No uvula swelling.   Eyes: Conjunctivae are normal. Right eye exhibits no discharge. Left eye exhibits no discharge.   Neck: Normal range of motion. No thyromegaly present.   Cardiovascular: Normal rate, regular rhythm and normal heart sounds.   No murmur heard.  Pulmonary/Chest: Effort normal and breath sounds normal. No respiratory distress. She has no wheezes.   Abdominal: Soft. She exhibits no distension. There is no tenderness.   Musculoskeletal: Normal  range of motion. She exhibits no edema or tenderness.   Lymphadenopathy:     She has no cervical adenopathy.   Neurological: She is alert and oriented to person, place, and time.   Skin: Skin is warm and dry. No rash noted. No erythema.   Psychiatric: She has a normal mood and affect. Her behavior is normal. Judgment and thought content normal.   Nursing note and vitals reviewed.      Laboratory:   none performed   Assessment:       1. CVID (common variable immunodeficiency)    2. Chronic rhinitis    3. Bronchiectasis with acute lower respiratory infection    4. Mild intermittent asthma without complication         Plan:       1. Pt with low IgG, IgM and no response to pneumococcal vaccines so has CVID. Needs to start IgG replacement - Hizentra 11g weekly  2. Once starts repeat trough labs 3 months in and f/u  3. continue current meds per Dr Nicole

## 2019-04-12 ENCOUNTER — TELEPHONE (OUTPATIENT)
Dept: ALLERGY | Facility: CLINIC | Age: 69
End: 2019-04-12

## 2019-04-12 NOTE — TELEPHONE ENCOUNTER
Spoke to pt, explained that [facility] and that her co pay is 20%. WE have sent in a request for sample program and will keep her informed along the process.

## 2019-04-15 ENCOUNTER — TELEPHONE (OUTPATIENT)
Dept: PULMONOLOGY | Facility: CLINIC | Age: 69
End: 2019-04-15

## 2019-04-15 NOTE — TELEPHONE ENCOUNTER
Patient states the her mucus production is less now and clear in color advised the patient to stop her antibiotic for now.    ----- Message from Tracy Cooney sent at 4/15/2019 11:09 AM CDT -----  Contact: Nessa  Type: Needs Medical Advice    Who Called:  patient  Best Call Back Number: [phone]  Additional Information: would like to speak with Kiersten regarding antibiotics she is currently taking--patient would like to know if she needs to be on medication until her next appt or if it is okay for her to discontinue as the mucus is now clear--please advise--thank you

## 2019-04-22 ENCOUNTER — TELEPHONE (OUTPATIENT)
Dept: ALLERGY | Facility: CLINIC | Age: 69
End: 2019-04-22

## 2019-04-22 ENCOUNTER — HOSPITAL ENCOUNTER (OUTPATIENT)
Dept: RADIOLOGY | Facility: HOSPITAL | Age: 69
Discharge: HOME | End: 2019-04-22
Attending: INTERNAL MEDICINE
Payer: MEDICARE

## 2019-04-22 DIAGNOSIS — R91.8 ABNORMAL CT SCAN, LUNG: ICD-10-CM

## 2019-04-22 DIAGNOSIS — J47.1 BRONCHIECTASIS WITH ACUTE EXACERBATION: ICD-10-CM

## 2019-04-22 PROCEDURE — 71250 CT THORAX DX C-: CPT | Mod: TC,HCNC,PO

## 2019-04-22 PROCEDURE — 71250 CT THORAX DX C-: CPT | Mod: 26,HCNC,, | Performed by: RADIOLOGY

## 2019-04-22 PROCEDURE — 71250 CT CHEST WITHOUT CONTRAST: ICD-10-PCS | Mod: 26,HCNC,, | Performed by: RADIOLOGY

## 2019-04-22 NOTE — TELEPHONE ENCOUNTER
Spoke to Latisha at [facility], gave her a fax number to send forms that need signature,     Called pt back to tell her where we are in the process.       ----- Message from Megan Sánchez sent at 4/22/2019  3:38 PM CDT -----  Contact: [facility] resource center Days[facility] [phone]  Daysi is calling in regards to missing infomation for sample request form.    Please advise, thanks

## 2019-04-23 ENCOUNTER — TELEPHONE (OUTPATIENT)
Dept: PULMONOLOGY | Facility: CLINIC | Age: 69
End: 2019-04-23

## 2019-04-23 NOTE — TELEPHONE ENCOUNTER
Pt notified  ----- Message from Jonathan Nicole MD sent at 4/22/2019  3:46 PM CDT -----  Notify CT chest reports no change in nodule since 2017, good report.  No change in plan

## 2019-04-24 ENCOUNTER — TELEPHONE (OUTPATIENT)
Dept: ALLERGY | Facility: CLINIC | Age: 69
End: 2019-04-24

## 2019-04-24 NOTE — TELEPHONE ENCOUNTER
----- Message from Miranda Linares LPN sent at 4/23/2019  4:57 PM CDT -----  Contact: [phone] or [phone]      ----- Message -----  From: Anca Grullon LPN  Sent: 4/23/2019   4:46 PM  To: Miranda Linares LPN    Hi,  Didn't know if you had started working on this patient or not.  Thanks,  Anca    ----- Message -----  From: Megan Sánchez  Sent: 4/23/2019   3:44 PM  To: [facility] Staff    Type: Rx Prior Authorization    Medication:immun glob G,IgG,-pro-IgA 0-50 (HIZENTRA) 1 gram/5 mL (20 %) Soln    Pharmacy number:[facility] - [facility] [address]   [phone] (Phone)  [phone] (Fax)    Comments:please advise, thanks

## 2019-04-26 ENCOUNTER — PATIENT MESSAGE (OUTPATIENT)
Dept: ALLERGY | Facility: CLINIC | Age: 69
End: 2019-04-26

## 2019-04-29 ENCOUNTER — PATIENT OUTREACH (OUTPATIENT)
Dept: ADMINISTRATIVE | Facility: HOSPITAL | Age: 69
End: 2019-04-29

## 2019-04-29 NOTE — PROGRESS NOTES
Health Maintenance Due   Topic Date Due    Lipid Panel  04/19/2019    Hemoglobin A1c  04/25/2019     [facility] Outreach.

## 2019-05-01 ENCOUNTER — LAB VISIT (OUTPATIENT)
Dept: LAB | Facility: HOSPITAL | Age: 69
End: 2019-05-01
Attending: INTERNAL MEDICINE
Payer: MEDICARE

## 2019-05-01 DIAGNOSIS — I10 ESSENTIAL HYPERTENSION: ICD-10-CM

## 2019-05-01 DIAGNOSIS — E78.5 DYSLIPIDEMIA: ICD-10-CM

## 2019-05-01 DIAGNOSIS — E03.4 HYPOTHYROIDISM DUE TO ACQUIRED ATROPHY OF THYROID: ICD-10-CM

## 2019-05-01 DIAGNOSIS — E11.9 CONTROLLED TYPE 2 DIABETES MELLITUS WITHOUT COMPLICATION, WITHOUT LONG-TERM CURRENT USE OF INSULIN: ICD-10-CM

## 2019-05-01 LAB
ALBUMIN SERPL BCP-MCNC: 3.5 G/DL (ref 3.5–5.2)
ALP SERPL-CCNC: 110 U/L (ref 55–135)
ALT SERPL W/O P-5'-P-CCNC: 37 U/L (ref 10–44)
ANION GAP SERPL CALC-SCNC: 7 MMOL/L (ref 8–16)
AST SERPL-CCNC: 30 U/L (ref 10–40)
BILIRUB SERPL-MCNC: 0.7 MG/DL (ref 0.1–1)
BUN SERPL-MCNC: 12 MG/DL (ref 8–23)
CALCIUM SERPL-MCNC: 9.6 MG/DL (ref 8.7–10.5)
CHLORIDE SERPL-SCNC: 100 MMOL/L (ref 95–110)
CHOLEST SERPL-MCNC: 162 MG/DL (ref 120–199)
CHOLEST/HDLC SERPL: 2.6 {RATIO} (ref 2–5)
CO2 SERPL-SCNC: 31 MMOL/L (ref 23–29)
CREAT SERPL-MCNC: 0.8 MG/DL (ref 0.5–1.4)
EST. GFR  (AFRICAN AMERICAN): >60 ML/MIN/1.73 M^2
EST. GFR  (NON AFRICAN AMERICAN): >60 ML/MIN/1.73 M^2
ESTIMATED AVG GLUCOSE: 114 MG/DL (ref 68–131)
GLUCOSE SERPL-MCNC: 92 MG/DL (ref 70–110)
HBA1C MFR BLD HPLC: 5.6 % (ref 4–5.6)
HDLC SERPL-MCNC: 62 MG/DL (ref 40–75)
HDLC SERPL: 38.3 % (ref 20–50)
LDLC SERPL CALC-MCNC: 78 MG/DL (ref 63–159)
NONHDLC SERPL-MCNC: 100 MG/DL
POTASSIUM SERPL-SCNC: 4.5 MMOL/L (ref 3.5–5.1)
PROT SERPL-MCNC: 6.7 G/DL (ref 6–8.4)
SODIUM SERPL-SCNC: 138 MMOL/L (ref 136–145)
T4 FREE SERPL-MCNC: 0.96 NG/DL (ref 0.71–1.51)
TRIGL SERPL-MCNC: 110 MG/DL (ref 30–150)
TSH SERPL DL<=0.005 MIU/L-ACNC: 0.36 UIU/ML (ref 0.4–4)

## 2019-05-01 PROCEDURE — 80053 COMPREHEN METABOLIC PANEL: CPT | Mod: HCNC

## 2019-05-01 PROCEDURE — 84439 ASSAY OF FREE THYROXINE: CPT | Mod: HCNC

## 2019-05-01 PROCEDURE — 83036 HEMOGLOBIN GLYCOSYLATED A1C: CPT | Mod: HCNC

## 2019-05-01 PROCEDURE — 84443 ASSAY THYROID STIM HORMONE: CPT | Mod: HCNC

## 2019-05-01 PROCEDURE — 36415 COLL VENOUS BLD VENIPUNCTURE: CPT | Mod: HCNC,PO

## 2019-05-01 PROCEDURE — 80061 LIPID PANEL: CPT | Mod: HCNC

## 2019-05-02 NOTE — LETTER
[place] [facility]  [address]  [place] [address]  Phone: [phone]  Fax: [phone]       CHRISTOPHER Shanks  [facility]      Re: Cornelia Delatorre   1950    To whom it may concern,    Mrs. Delatorre is currently under the care of a Pulmonologist for Asthma Exacerbation. She is currently taking several high risk medications and is not permitted to travel until medications are completed and fully recovered.         Sincerely,           Daysi Llanos, NP    Consent: Written consent obtained, risks reviewed including but not limited to crusting, scabbing, blistering, scarring, darker or lighter pigmentary change, bruising, and/or incomplete response.

## 2019-05-06 ENCOUNTER — OFFICE VISIT (OUTPATIENT)
Dept: PULMONOLOGY | Facility: CLINIC | Age: 69
End: 2019-05-06
Payer: MEDICARE

## 2019-05-06 VITALS
HEIGHT: 64 IN | BODY MASS INDEX: 32.48 KG/M2 | DIASTOLIC BLOOD PRESSURE: 75 MMHG | WEIGHT: 190.25 LBS | HEART RATE: 56 BPM | SYSTOLIC BLOOD PRESSURE: 165 MMHG | OXYGEN SATURATION: 99 %

## 2019-05-06 DIAGNOSIS — J45.20 MILD INTERMITTENT ASTHMA WITHOUT COMPLICATION: ICD-10-CM

## 2019-05-06 DIAGNOSIS — J32.9 SINUSITIS, UNSPECIFIED CHRONICITY, UNSPECIFIED LOCATION: ICD-10-CM

## 2019-05-06 DIAGNOSIS — D83.9 CVID (COMMON VARIABLE IMMUNODEFICIENCY): Primary | ICD-10-CM

## 2019-05-06 PROCEDURE — 99999 PR PBB SHADOW E&M-EST. PATIENT-LVL V: ICD-10-PCS | Mod: PBBFAC,HCNC,, | Performed by: INTERNAL MEDICINE

## 2019-05-06 PROCEDURE — 99214 OFFICE O/P EST MOD 30 MIN: CPT | Mod: HCNC,S$GLB,, | Performed by: INTERNAL MEDICINE

## 2019-05-06 PROCEDURE — 99999 PR PBB SHADOW E&M-EST. PATIENT-LVL V: CPT | Mod: PBBFAC,HCNC,, | Performed by: INTERNAL MEDICINE

## 2019-05-06 PROCEDURE — 3078F DIAST BP <80 MM HG: CPT | Mod: HCNC,CPTII,S$GLB, | Performed by: INTERNAL MEDICINE

## 2019-05-06 PROCEDURE — 99214 PR OFFICE/OUTPT VISIT, EST, LEVL IV, 30-39 MIN: ICD-10-PCS | Mod: HCNC,S$GLB,, | Performed by: INTERNAL MEDICINE

## 2019-05-06 PROCEDURE — 3077F PR MOST RECENT SYSTOLIC BLOOD PRESSURE >= 140 MM HG: ICD-10-PCS | Mod: HCNC,CPTII,S$GLB, | Performed by: INTERNAL MEDICINE

## 2019-05-06 PROCEDURE — 3077F SYST BP >= 140 MM HG: CPT | Mod: HCNC,CPTII,S$GLB, | Performed by: INTERNAL MEDICINE

## 2019-05-06 PROCEDURE — 1101F PR PT FALLS ASSESS DOC 0-1 FALLS W/OUT INJ PAST YR: ICD-10-PCS | Mod: HCNC,CPTII,S$GLB, | Performed by: INTERNAL MEDICINE

## 2019-05-06 PROCEDURE — 3078F PR MOST RECENT DIASTOLIC BLOOD PRESSURE < 80 MM HG: ICD-10-PCS | Mod: HCNC,CPTII,S$GLB, | Performed by: INTERNAL MEDICINE

## 2019-05-06 PROCEDURE — 1101F PT FALLS ASSESS-DOCD LE1/YR: CPT | Mod: HCNC,CPTII,S$GLB, | Performed by: INTERNAL MEDICINE

## 2019-05-06 RX ORDER — PREDNISONE 20 MG/1
TABLET ORAL
Qty: 36 TABLET | Refills: 0 | Status: SHIPPED | OUTPATIENT
Start: 2019-05-06 | End: 2020-05-04 | Stop reason: SDUPTHER

## 2019-05-06 RX ORDER — PREDNISONE 20 MG/1
TABLET ORAL
Qty: 36 TABLET | Refills: 0 | Status: SHIPPED | OUTPATIENT
Start: 2019-05-06 | End: 2019-05-06 | Stop reason: SDUPTHER

## 2019-05-06 RX ORDER — BUDESONIDE AND FORMOTEROL FUMARATE DIHYDRATE 160; 4.5 UG/1; UG/1
2 AEROSOL RESPIRATORY (INHALATION) EVERY 12 HOURS
Qty: 3 INHALER | Refills: 3 | Status: SHIPPED | OUTPATIENT
Start: 2019-05-06 | End: 2020-05-04 | Stop reason: SDUPTHER

## 2019-05-06 RX ORDER — MONTELUKAST SODIUM 10 MG/1
10 TABLET ORAL NIGHTLY
Qty: 90 TABLET | Refills: 3 | Status: SHIPPED | OUTPATIENT
Start: 2019-05-06 | End: 2020-05-04 | Stop reason: SDUPTHER

## 2019-05-06 NOTE — PATIENT INSTRUCTIONS
Use antibiotic daily til immune therapy done a wk - then as needed like every one else    Immune therapy may keep you stable - better than antibiotics.     Use symbicort regular.  Lungs may stabilize.    Use prednisone if needed.      Lung  Nodules are stable for 2 yrs and no follow up needed.    Call if problems- hope all will be better yet.

## 2019-05-06 NOTE — PROGRESS NOTES
5/6/2019    Cornelia Delatorre  Office f/u    Chief Complaint   Patient presents with    Follow-up     6 week    Hoarse   May 6,2019-due to get immune infusion next 2 days.  No prednisone, needs monlukast. abx stopped wks ago- mucous cleared.      March 7, 19-exacerbated in late Jan- cleared in feb (vague).  Now ill again yellow and gray mucous (culture last Jan was nl yvonne).  Sl intermittent wheezes since last wk.  Sees Dr Herrera in [place]- gets allergy rx but declined to get now.  Pt has cvid by  igg and igm levels low and pneumococcal antibodies to 8/14 pneumococcal titers. Uses symbicort and singulair routinely.  Took prednisone completed 4 days ago- uses prednisone monthly- was able to skip December  .  Discussed with patient above for education the following:      Gastric by pass may cause malabsorption, protein levels have been too low and may affect ig levels-- somewhat.   Need to get follow up with dietician to assure adequate protein intake/levels.   Antibiotic may stabilize infections with common variable immune deficiency- azithromycin daily once cultures submitted.   Use augmentin if infection worsens.   Acapella/chest clapping help clear mucous, vest likewise if bronchiectasis.  Will not treat cause.   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  Use codeine to suppress mild coughing.   Need good immune system and no airway/asthma problems and good hygiene of bronchial tubes (no chronic infections) to prevent illness.     Lungs measured near normal with good response to bronchial medications 2017   Need ct to follow up and cultures to assure infection controlled.      Jan 28/2019- Feeling bad onset 2 weeks, took prednisone taper x 6 days and antibiotic Augmentin week prior, States no improvement. Cough- worse at night, no nocturnal arousals, takes cough suppressant syrup before bed. Productive yellow/brown color.   Nebulized  albuterol 4x daily. States no fever.  Has started allergy injections waiting for insurance clearance for IGG therapy.   Discussed with patient above for education the following:    CT chest for February to monitor and assess for bronchiectasis, need diagnosis for insurance to cover vest therapy  Have  continue to percuss back with hand.   Recommend purchasing Acepella device to help clear lungs of excess mucous, attaches to nebulizer device  Continue Nebulizer treatments 4x daily as needed.  Chest x-ray now to evaluate for pneumonia  Blood work at hospital   Will yeison to see results of sputum culture and x-ray before repeating antibiotic  Need to return sputum to clinic with in 4 hours of collecting  Fluconazole pills for oral thrush, this is a recurrent problem related to your weak immune system and use of inhaled steroids. Continue to rinse mouth out after using symbicort but problem will improve after starting immune replacement therapy.    oct 30,   2018-- had one day fever followed by cough/wheeze illness that lingered a wk. Pt seen by NP   [place] 2x, [place].  [place] like dying- only one day fever.  Violent cough.  Nebulizer ppt itch and palpitations- ok  On xopenex now.  Prednisone 40x3, 20 x 3 ,  augmentin cleared.  Now back to normal.  May 14, 2018- had spell /exacerbation with one round prednisone. Breathing good.  Follow-up in about 1 year (around 5/14/2019), or if symptoms worsen or fail to improve.   Discussed with patient above for education the following:     Check blood count and pneumonia vaccine response after last vaccine 4/27/2018   Use azithromycin for any lung/sinus infection- immune weakness likely   Asthma stable- use prednisone and singulair.   Use allergra and singulair and astelin/flonase   Lung nodule in lung still - Navigational bronchoscopy might find?  - will at least re check in a year.     Call if needed, re check next yr.  darlin 15, 2018--1 st illness in yr or so with  cough and rattles, no flu.  Appetite ok.  Ill x wk, cough and wheeze and sob and noct worse, resp rx helps a bit.  Hasn't been using  Albuterol   Follow-up in about 6 months (around 7/15/2018).   Discussed with patient above for education the following:      tamiflu if flu.   Need to use albuterol for any lung symptoms.  Should get cough  Better controlled.      Prednisone 20 mg 3 for 3 , taper may be needed.  Give shot today, 1 daily for 3 may be enough with albuterol.   You had high eosinophils-- if asthma becomes more active - special therapy may help??        prevnar 13 would be good - should be off prednisone.  Immune system is sl weak  Protection only to 7.14 pneumonia germs.  oct 19, 2017- has scratchy throat, some sinus drip, has nightly sensation throat issues, post beryl and carpel tunnel surg.  No sinus lung infections.  Breathing and cough good.  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 Had pos tb gold.  June 21, 2017- had good [place] trip, tapered symbicort with some increase sensation of lung problems so maintained full dose. No infections.  No chest symptoms on symbicort.   April 24, feels a lot better with min cough, vague sob going left side down at night.  Going to [place] 2 weeks.  Uses symbicort and good results.  March 16, cough better, but comes and goes,  No great help with steroids.  Submitted 3 sputums.  Had pneumovax march last yr.  Breathing better. Got symbicort.   Had pneumonia vaccine last yr  3/8/2017 HPI: had onset cough Hernan illness, got dizzy few days with diarrhea and cough. Cough clear sm amt mucous. Did have 101 temp one day, fatigue, no muscle aches.  Appetite decreased.  Illnesses lingered 2 weeks but cough never remitted- has improved with inhahler use last 15 days.    Had gastric 2011 bypass with with continued diarrhea intially resolved. No regurg or reflux or swallow problems.   symbicort nearly  Resolved.    Sinuses ok.  No pets.  Never  smoker.    Appetite good, feels well now.    headcolds to chest since childhood, nocturnal ??not recalled.  Had cats in past but got rid in 2003 or so.   The chief compliant  problem varies with instablilty at time    PFSH:  Past Medical History:   Diagnosis Date    Allergy     Arthritis     Asthma     Cataract     Chronic fatigue 1/24/2017    Diabetes mellitus     resolved with gastric bypass    Diabetes mellitus, type 2     Encounter for blood transfusion     GERD (gastroesophageal reflux disease)     Headache(784.0)     History of lumpectomy of both breasts     1992 negative    Hyperlipidemia 7/15/2015    Hypertension     Hypothyroidism     Neuropathy     Obesity     SOHA on CPAP     Postmenopausal HRT (hormone replacement therapy)     Rash     Rosacea     Snoring     Wears glasses          Past Surgical History:   Procedure Laterality Date    ADENOIDECTOMY      APPENDECTOMY      BIOPSY-LESION nasal septum - INTRANASAL MUCOSAL LESION Right 12/1/2014    [facility] OR    BLADDER SUSPENSION      BREAST BIOPSY Bilateral 1992    bilateral benign excisional biopsies    BUNIONECTOMY  10/17/14    right, still has discomfort    BUNIONECTOMY-TAILOR Right 10/17/2014    [facility] by [facility]zad Eng D[facility] at [facility] OR    CATARACT EXTRACTION W/  INTRAOCULAR LENS IMPLANT Bilateral     CHOLECYSTECTOMY  08/02/2017    CHOLECYSTECTOMY-LAPAROSCOPIC N/A 8/2/2017    [facility]    COLONOSCOPY      ESOPHAGOGASTRODUODENOSCOPY      dilated esophagus    GASTRIC BYPASS      2011    HYSTERECTOMY  1980    interstim bladder  2009    10/14/14 new battery    OOPHORECTOMY  1980    RELEASE-CARPAL TUNNEL Left 8/29/2017    [facility]mond [facility] OR    REPAIR-HAMMER TOE- 2nd toe 2nd Procedure: [employer] 3rd toe with T&C- 47999. Hammer toe correction, tendon release to 4th right toe. Right 10/17/2014    Performed by Farzad Eng DPM at  "[facility] OR    FAZMGTZE-YQUAHMTGB-BEKXMWW IPG OF INTERSTIM Right 10/14/2014    [facility] at [facility] OR 2ND FLR    SKIN CANCER EXCISION      left hand    TONSILLECTOMY      WISDOM TOOTH EXTRACTION       Social History     Tobacco Use    Smoking status: Never Smoker    Smokeless tobacco: Never Used   Substance Use Topics    Alcohol use: No     Frequency: Never     Binge frequency: Never    Drug use: No     Family History   Problem Relation Age of Onset    Breast cancer Mother 50    Diabetes Unknown     Hypertension Unknown     Hypothyroidism Unknown     Breast cancer Paternal Aunt         40's    Allergic rhinitis Neg Hx     Allergies Neg Hx     Angioedema Neg Hx     Asthma Neg Hx     Atopy Neg Hx     Eczema Neg Hx     Immunodeficiency Neg Hx     Rhinitis Neg Hx     Urticaria Neg Hx      Review of patient's allergies indicates:   Allergen Reactions    Sulfa (sulfonamide antibiotics) Hives    Naproxen Other (See Comments)     Other reaction(s): RT sided numbness         Performance Status:The patient's activity level is functions out of house.      Review of Systems:  a review of eleven systems covering constitutional, Eye, HEENT, Psych, Respiratory, Cardiac, GI, , Musculoskeletal, Endocrine, Dermatologic was negative except for pertinent findings as listed ABOVE and below: all good,  Had dm that remitted with bypass 2011.  Positive for SOB, cough severe.    Exam:Comprehensive exam done. BP (!) 165/75 (BP Location: Left arm, Patient Position: Sitting)   Pulse (!) 56   Ht 5' 4" (1.626 m)   Wt 86.3 kg (190 lb 4.1 oz)   SpO2 99% Comment: on room air  BMI 32.66 kg/m²   Exam included Vitals as listed, and patient's appearance and affect and alertness and mood, oral exam for yeast and hygiene and pharynx lesions and Mallapatti (M) score, neck with inspection for jvd and masses and thyroid abnormalities and lymph nodes (supraclavicular and infraclavicular nodes and axillary also " examined and noted if abn), chest exam included symmetry and effort and fremitus and percussion and auscultation, cardiac exam included rhythm and gallops and murmur and rubs and jvd and edema, abdominal exam for mass and hepatosplenomegaly and tenderness and hernias and bowel sounds, Musculoskeletal exam with muscle tone and posture and mobility/gait and  strength, and skin for rashes and cyanosis and pallor and turgor, extremity for clubbing.  Findings were normal except for pertinent findings listed below:  M3, good bs, rest good. Lungs clear- wheezes and coughs with laughing    Radiographs (ct chest and cxr) reviewed: view by direct vision  i do see clear bronchiectasis,nodules are noted.  Mucoid type impaction suggested in rul ant segment.      April 19, 2018 ct suggest tracheobronchomalacia on high res spot films- seen 3/7/19  CT Chest:  1. Region of endobronchial plugging unchanged since 2/7/17 of uncertain etiology. This could relate to a process such as allergic bronchopulmonary aspergillosis, a solid mass nodule, mucous plugging, residual from aspiration, endobronchial spread of disease. Further followup or evaluation is necessary  Electronically signed by: Raffi Gottlieb MD  Date: 03/14/17    10/17/19 Chest X-ray clear      Labs reviewed igm low, igg lowish, humerol titers na    CBC reviewed October 10/17/18    PFT  Done [facility] with 10% response, otherwise nl  PULMONARY FUNCTION TEST REPORT      DATE OF PROCEDURE:  03/24/2017     1.  Spirometry reveals an FVC of 3.1 L, which is 107% predicted.  FEV1 is 2.3 L,   which is 100% predicted.  The ratio, there is preserved.  There is a positive,   but not significant bronchodilator response to both the FVC and FEV1.  Loop   contours appear with adequate effort as well.  2.  Lung volumes.  The total lung capacity is 4.4 L, which is 92% predicted.    There are no signs of gas trapping.  3.  Diffusing capacity is mild-to-moderate reduced at 68%, does improve  to 96%   with alveolar volumes.     OVERALL IMPRESSION:  A normal spirometry with a robust yet statistically   insignificant bronchodilator response.  Normal lung volumes and a moderate   reduction in diffusion capacity.    CC: Jonathan Nicole M.D.            Plan:  Clinical impression is resonably certain and repeated evaluation prn +/- follow up will be needed as below.    Cornelia was seen today for follow-up and hoarse.    Diagnoses and all orders for this visit:    CVID (common variable immunodeficiency)    Mild intermittent asthma without complication  -     SYMBICORT 160-4.5 mcg/actuation HFAA; Inhale 2 puffs into the lungs every 12 (twelve) hours.  -     predniSONE (DELTASONE) 20 MG tablet; 3 for 3 days then 2 for 3 days then one for 3 days and repeat for breathing problems  -     montelukast (SINGULAIR) 10 mg tablet; Take 1 tablet (10 mg total) by mouth every evening.    Sinusitis, unspecified chronicity, unspecified location  -     montelukast (SINGULAIR) 10 mg tablet; Take 1 tablet (10 mg total) by mouth every evening.        Follow up in about 6 months (around 11/6/2019), or if symptoms worsen or fail to improve.          Discussed with patient above for education the following:      Patient Instructions   Use antibiotic daily til immune therapy done a wk - then as needed like every one else    Immune therapy may keep you stable - better than antibiotics.     Use symbicort regular.  Lungs may stabilize.    Use prednisone if needed.      Lung  Nodules are stable for 2 yrs and no follow up needed.    Call if problems- hope all will be better yet.

## 2019-05-10 ENCOUNTER — HOSPITAL ENCOUNTER (OUTPATIENT)
Dept: RADIOLOGY | Facility: HOSPITAL | Age: 69
Discharge: HOME | End: 2019-05-10
Attending: INTERNAL MEDICINE
Payer: MEDICARE

## 2019-05-10 ENCOUNTER — OFFICE VISIT (OUTPATIENT)
Dept: FAMILY MEDICINE | Facility: CLINIC | Age: 69
End: 2019-05-10
Payer: MEDICARE

## 2019-05-10 VITALS
OXYGEN SATURATION: 98 % | BODY MASS INDEX: 32.67 KG/M2 | HEIGHT: 64 IN | DIASTOLIC BLOOD PRESSURE: 80 MMHG | SYSTOLIC BLOOD PRESSURE: 139 MMHG | WEIGHT: 191.38 LBS | HEART RATE: 67 BPM

## 2019-05-10 DIAGNOSIS — E05.90 HYPERTHYROIDISM: ICD-10-CM

## 2019-05-10 DIAGNOSIS — J47.9 BRONCHIECTASIS WITHOUT COMPLICATION: ICD-10-CM

## 2019-05-10 DIAGNOSIS — L97.409: ICD-10-CM

## 2019-05-10 DIAGNOSIS — M62.9 HAMSTRING TIGHTNESS OF BOTH LOWER EXTREMITIES: ICD-10-CM

## 2019-05-10 DIAGNOSIS — E11.9 CONTROLLED TYPE 2 DIABETES MELLITUS WITHOUT COMPLICATION, WITHOUT LONG-TERM CURRENT USE OF INSULIN: Primary | ICD-10-CM

## 2019-05-10 DIAGNOSIS — E13.621: ICD-10-CM

## 2019-05-10 DIAGNOSIS — I10 ESSENTIAL HYPERTENSION: ICD-10-CM

## 2019-05-10 DIAGNOSIS — D83.9 CVID (COMMON VARIABLE IMMUNODEFICIENCY): ICD-10-CM

## 2019-05-10 DIAGNOSIS — E78.5 DYSLIPIDEMIA: ICD-10-CM

## 2019-05-10 PROCEDURE — 3075F SYST BP GE 130 - 139MM HG: CPT | Mod: HCNC,CPTII,S$GLB, | Performed by: INTERNAL MEDICINE

## 2019-05-10 PROCEDURE — 3075F PR MOST RECENT SYSTOLIC BLOOD PRESS GE 130-139MM HG: ICD-10-PCS | Mod: HCNC,CPTII,S$GLB, | Performed by: INTERNAL MEDICINE

## 2019-05-10 PROCEDURE — 3079F DIAST BP 80-89 MM HG: CPT | Mod: HCNC,CPTII,S$GLB, | Performed by: INTERNAL MEDICINE

## 2019-05-10 PROCEDURE — 99215 PR OFFICE/OUTPT VISIT, EST, LEVL V, 40-54 MIN: ICD-10-PCS | Mod: HCNC,S$GLB,, | Performed by: INTERNAL MEDICINE

## 2019-05-10 PROCEDURE — 76536 US EXAM OF HEAD AND NECK: CPT | Mod: TC,HCNC,PO

## 2019-05-10 PROCEDURE — 76536 US SOFT TISSUE HEAD NECK THYROID: ICD-10-PCS | Mod: 26,HCNC,, | Performed by: RADIOLOGY

## 2019-05-10 PROCEDURE — 3044F HG A1C LEVEL LT 7.0%: CPT | Mod: HCNC,CPTII,S$GLB, | Performed by: INTERNAL MEDICINE

## 2019-05-10 PROCEDURE — 3044F PR MOST RECENT HEMOGLOBIN A1C LEVEL <7.0%: ICD-10-PCS | Mod: HCNC,CPTII,S$GLB, | Performed by: INTERNAL MEDICINE

## 2019-05-10 PROCEDURE — 1101F PT FALLS ASSESS-DOCD LE1/YR: CPT | Mod: HCNC,CPTII,S$GLB, | Performed by: INTERNAL MEDICINE

## 2019-05-10 PROCEDURE — 76536 US EXAM OF HEAD AND NECK: CPT | Mod: 26,HCNC,, | Performed by: RADIOLOGY

## 2019-05-10 PROCEDURE — 99215 OFFICE O/P EST HI 40 MIN: CPT | Mod: HCNC,S$GLB,, | Performed by: INTERNAL MEDICINE

## 2019-05-10 PROCEDURE — 1101F PR PT FALLS ASSESS DOC 0-1 FALLS W/OUT INJ PAST YR: ICD-10-PCS | Mod: HCNC,CPTII,S$GLB, | Performed by: INTERNAL MEDICINE

## 2019-05-10 PROCEDURE — 99999 PR PBB SHADOW E&M-EST. PATIENT-LVL IV: ICD-10-PCS | Mod: PBBFAC,HCNC,, | Performed by: INTERNAL MEDICINE

## 2019-05-10 PROCEDURE — 3079F PR MOST RECENT DIASTOLIC BLOOD PRESSURE 80-89 MM HG: ICD-10-PCS | Mod: HCNC,CPTII,S$GLB, | Performed by: INTERNAL MEDICINE

## 2019-05-10 PROCEDURE — 99999 PR PBB SHADOW E&M-EST. PATIENT-LVL IV: CPT | Mod: PBBFAC,HCNC,, | Performed by: INTERNAL MEDICINE

## 2019-05-10 RX ORDER — TIZANIDINE 2 MG/1
TABLET ORAL
Qty: 60 TABLET | Refills: 1 | Status: SHIPPED | OUTPATIENT
Start: 2019-05-10 | End: 2019-05-10 | Stop reason: SDUPTHER

## 2019-05-10 RX ORDER — TIZANIDINE 2 MG/1
TABLET ORAL
Qty: 60 TABLET | Refills: 1 | Status: SHIPPED | OUTPATIENT
Start: 2019-05-10 | End: 2019-06-25

## 2019-05-10 NOTE — PROGRESS NOTES
Subjective:       Patient ID: Cornelia Delatorre is a 68 y.o. female.    Chief Complaint: Hypertension    Patient here today with a lot of issues:     Complains of severe b/l leg pain behind her thighs for 3 weeks.  Worse when 1st gets up and stays as walks (not worse).  Massage and tylenol helps.  Had mild low back pain before this started.  No new meds.    Has metal implant for bladder.    SOB/KLINE- KLINE < 1 block.  Limiting exercise.  On daily azithromycin until IV Ig established.   Dr Nicole in [place].  Feels SOB mostly related to bronchiectasis.  Her symptoms have improved with asthma tx - Symbicort.    Pulmonary nodules - Dr Nicole evaluating.  Incomplete response to pneumovax - recommends repeat Prevnar 13.  Eosinophilia and pn 7.14 level at 50%  Recent angiogram was normal - done by Dr Vega; sent to scan    Dx w CVID - just started IV Ig 1 wk ago with allergy.      Hyperthyroid - supra-theraputic now off Synthroid for 6 mo.  Was hypothyroid all her life.   HTN - controlled but varies.  11/55 - 165 sbp lasting couple hours.   DM - controlled;  Sees podiatry for peripheral neuropathy.  HLD - controlled for goal 70    Occasional anxiety relieved with prn hydroxyzine.   Had EGD- dysphagia with Dr Krishnan     Dx w MCI likely related to some meds per testing neurology; stable     Diabetes   She has type 2 diabetes mellitus. No MedicAlert identification noted. The initial diagnosis of diabetes was made 10 years ago. Pertinent negatives for hypoglycemia include no confusion, dizziness, headaches, hunger, mood changes, nervousness/anxiousness, pallor, seizures, sleepiness, speech difficulty, sweats or tremors. Pertinent negatives for diabetes include no blurred vision, no chest pain, no foot paresthesias, no polydipsia, no polyphagia, no polyuria and no weakness. Pertinent negatives for hypoglycemia complications include no blackouts, no hospitalization, no nocturnal hypoglycemia, no required assistance and no required  glucagon injection. Diabetic complications include peripheral neuropathy. Pertinent negatives for diabetic complications include no autonomic neuropathy, CVA, heart disease, impotence, nephropathy, PVD or retinopathy. There are no known risk factors for coronary artery disease. Current diabetic treatment includes oral agent (monotherapy). Her weight is stable. She is following a generally healthy diet. When asked about meal planning, she reported none. She has not had a previous visit with a dietitian. She rarely participates in exercise. She monitors blood glucose at home 1-2 x per week. Blood glucose monitoring compliance is fair. There is no change in her home blood glucose trend. She sees a podiatrist.Eye exam is current.     Review of Systems   Constitutional: Negative for activity change, appetite change, fever and unexpected weight change.   HENT: Negative for hearing loss, nosebleeds, rhinorrhea and trouble swallowing.    Eyes: Negative for blurred vision, discharge and visual disturbance.   Respiratory: Positive for shortness of breath. Negative for choking, chest tightness and wheezing.    Cardiovascular: Negative for chest pain and palpitations.   Gastrointestinal: Negative for abdominal pain, blood in stool, constipation, diarrhea, nausea and vomiting.   Endocrine: Negative for polydipsia, polyphagia and polyuria.   Genitourinary: Negative for difficulty urinating, dysuria, hematuria, impotence and menstrual problem.   Musculoskeletal: Positive for myalgias. Negative for arthralgias, joint swelling and neck pain.   Skin: Negative for pallor, rash and wound.   Neurological: Negative for dizziness, tremors, seizures, syncope, speech difficulty, weakness and headaches.   Psychiatric/Behavioral: Negative for confusion and dysphoric mood. The patient is not nervous/anxious.        Objective:      Vitals:    05/10/19 0824   BP: 139/80   Pulse: 67     Physical Exam   Constitutional: She appears well-nourished.    Eyes: Conjunctivae and EOM are normal.   Neck: Normal range of motion.   Cardiovascular: Normal rate and regular rhythm.   Pulmonary/Chest: Effort normal and breath sounds normal.   Musculoskeletal:   Normal ROM bilateral    Neurological: No cranial nerve deficit (grossly intact).   Skin: Skin is warm and dry.   Psychiatric: She has a normal mood and affect.   Alert and orientated   Vitals reviewed.        Assessment:       1. Controlled type 2 diabetes mellitus without complication, without long-term current use of insulin    2. Essential hypertension    3. Dyslipidemia    4. Hamstring tightness of both lower extremities    5. Bronchiectasis without complication    6. Hyperthyroidism    7. CVID (common variable immunodeficiency)    8. Heel ulcer due to secondary DM        Plan:       Controlled type 2 diabetes mellitus without complication, without long-term current use of insulin  -     Hemoglobin A1c; Future; Expected date: 09/07/2019    Essential hypertension  -     Comprehensive metabolic panel; Future; Expected date: 09/07/2019  -     Hypertension Digital Medicine ([facility]) Enrollment Order  -     Hypertension Digital Medicine ([facility]): Assign Onboarding Questionnaires    Dyslipidemia  -     Comprehensive metabolic panel; Future; Expected date: 09/07/2019    Hamstring tightness of both lower extremities  -     tiZANidine (ZANAFLEX) 2 MG tablet; 1 po tid prn leg muscle tension  Dispense: 60 tablet; Refill: 1  -     Ambulatory consult to Physical Therapy    Bronchiectasis without complication    Hyperthyroidism  -     US Soft Tissue Head Neck Thyroid; Future; Expected date: 05/10/2019  -     Ambulatory consult to Endocrinology    CVID (common variable immunodeficiency)    Heel ulcer due to secondary DM            Medication List with Changes/Refills   New Medications    TIZANIDINE (ZANAFLEX) 2 MG TABLET    1 po tid prn leg muscle tension   Current Medications    ALBUTEROL 90 MCG/ACTUATION INHALER    Inhale 2 puffs  into the lungs every 4 (four) hours as needed. 2 puffs every 4 hours as needed for cough, wheeze, or shortness of breath    ASPIRIN (ECOTRIN) 81 MG EC TABLET    Take 81 mg by mouth once daily.    AZELASTINE (ASTELIN) 137 MCG (0.1 %) NASAL SPRAY    1 spray (137 mcg total) by Nasal route 2 (two) times daily.    AZELASTINE (OPTIVAR) 0.05 % OPHTHALMIC SOLUTION    Place 1 drop into both eyes daily as needed.     AZITHROMYCIN (ZITHROMAX) 500 MG TABLET    One daily for yellow mucous, repeat if needed    BIFIDOBACTERIUM INFANTIS (ALIGN ORAL)    Take by mouth once daily.    CALCIUM 600 WITH VITAMIN D3 600 MG(1,500MG) -400 UNIT CAP        CICLOPIROX (PENLAC) 8 % SOLN        CRANBERRY 500 MG CAP    Take 1 capsule by mouth every evening.    EPINEPHRINE (EPIPEN) 0.3 MG/0.3 ML ATIN        ESOMEPRAZOLE (NEXIUM) 40 MG CAPSULE    Take 1 capsule (40 mg total) by mouth before breakfast.    FEXOFENADINE (ALLEGRA) 60 MG TABLET    Take 60 mg by mouth once daily.    FISH OIL-OMEGA-3 FATTY ACIDS 300-1,000 MG CAPSULE    Take 2 g by mouth once daily.    FLUTICASONE (FLONASE) 50 MCG/ACTUATION NASAL SPRAY    2 sprays (100 mcg total) by Each Nare route once daily.    GABAPENTIN (NEURONTIN) 600 MG TABLET    Take 1 tablet (600 mg total) by mouth 3 (three) times daily.    GUAIFENESIN (MUCINEX) 600 MG 12 HR TABLET    Take 2 tablets (1,200 mg total) by mouth 2 (two) times daily.    GUAIFENESIN-CODEINE 100-10 MG/5 ML (GUAIFENESIN AC)  MG/5 ML SYRUP    Take 5 mLs by mouth 3 (three) times daily as needed for Cough.    HYDROCHLOROTHIAZIDE (HYDRODIURIL) 25 MG TABLET    Take 1 tablet (25 mg total) by mouth once daily.    IMMUN GLOB G,IGG,-PRO-IGA 0-50 (HIZENTRA) 1 GRAM/5 ML (20 %) SOLN    Inject 11 g into the skin once a week.    LEVALBUTEROL (XOPENEX) 1.25 MG/3 ML NEBULIZER SOLUTION    Take 3 mLs (1.25 mg total) by nebulization every 4 (four) hours as needed for Wheezing or Shortness of Breath. Rescue    LISINOPRIL 10 MG TABLET    Take 10 mg by  mouth every evening.    METFORMIN (GLUCOPHAGE-XR) 500 MG 24 HR TABLET    Take 2 tablets (1,000 mg total) by mouth daily with breakfast.    METHYLCELLULOSE, LAXATIVE, (CITRUCEL) 500 MG TAB    Take 1 tablet by mouth every morning.    METOPROLOL SUCCINATE (TOPROL-XL) 25 MG 24 HR TABLET    Take 1 tablet (25 mg total) by mouth once daily.    MONTELUKAST (SINGULAIR) 10 MG TABLET    Take 1 tablet (10 mg total) by mouth every evening.    MUCUS CLEARING DEVICE KAREN    1 Device by Misc.(Non-Drug; Combo Route) route 2 (two) times daily.    MULTIVITAMIN CAPSULE    Take 1 capsule by mouth. Take one tablets daily    MUPIROCIN (BACTROBAN) 2 % OINTMENT    AAA bid    POTASSIUM CHLORIDE SA (K-DUR,KLOR-CON) 20 MEQ TABLET    Take 1 tablet (20 mEq total) by mouth once daily.    PRAVASTATIN (PRAVACHOL) 40 MG TABLET    Take 1 tablet (40 mg total) by mouth once daily.    PREDNISONE (DELTASONE) 20 MG TABLET    3 for 3 days then 2 for 3 days then one for 3 days and repeat for breathing problems    SIMETHICONE (GAS-X ORAL)    Take 1 capsule by mouth as needed.    SYMBICORT 160-4.5 MCG/ACTUATION HFAA    Inhale 2 puffs into the lungs every 12 (twelve) hours.    VARICELLA-ZOSTER GE-AS01B, PF, (SHINGRIX, PF,) 50 MCG/0.5 ML INJECTION    Inject 0.5 mLs into the muscle.     PT for hamstring.  If still severe and PT nw, consider xray and CT lumbar spine (MRI CI with implant); may send message.   Evaluation and form filled for handicap tag.   Continue current management and monitor.    Counseled on regular exercise, maintenance of a healthy weight, balanced diet rich in fruits/vegetables and lean protein, and avoidance of unhealthy habits like smoking and excessive alcohol intake.   Also, counseled on importance of being compliant with medication, health appointments, diet and exercise.     Total time spent with patient was more than 45 minutes with more than half the time spent in direct consultation with the patient in regards to our  treatment/plan.    Follow up in about 4 months (around 9/10/2019).

## 2019-05-11 LAB
ACID FAST MOD KINY STN SPEC: NORMAL
MYCOBACTERIUM SPEC QL CULT: NORMAL

## 2019-05-16 ENCOUNTER — CLINICAL SUPPORT (OUTPATIENT)
Dept: REHABILITATION | Facility: HOSPITAL | Age: 69
End: 2019-05-16
Attending: INTERNAL MEDICINE
Payer: MEDICARE

## 2019-05-16 DIAGNOSIS — R26.9 GAIT ABNORMALITY: ICD-10-CM

## 2019-05-16 DIAGNOSIS — M79.606 LOWER EXTREMITY PAIN, POSTERIOR, UNSPECIFIED LATERALITY: ICD-10-CM

## 2019-05-16 PROCEDURE — 97162 PT EVAL MOD COMPLEX 30 MIN: CPT | Mod: HCNC,PO

## 2019-05-16 PROCEDURE — 97110 THERAPEUTIC EXERCISES: CPT | Mod: HCNC,PO

## 2019-05-16 NOTE — PLAN OF CARE
"[facility]  Physical Therapy Initial Evaluation    Name: Cornelia Delatorre  Clinic Number: [id number]    Therapy Diagnosis:   Encounter Diagnoses   Name Primary?    Lower extremity pain, posterior, unspecified laterality     Gait abnormality      Physician: Armond Cortez MD    Physician Orders: PT Eval and Treat   Medical Diagnosis from Referral: Hamstring tightness of both lower extremities   Evaluation Date: 5/16/2019  Authorization Period Expiration: 12/31/2019   Plan of Care Expiration: 07/05/2019  Visit # / Visits authorized: 1/ 20    Time In: 1101  Time Out: 1200  Total Billable Time: 59 minutes    Precautions: Standard    Subjective   Date of onset: 3 to 4 weeks ago   History of current condition - Cornelia reports: She reports she began having pain in both hamstrings about 3 weeks ago. She describes the pain as a burning/grabbing sensation, which is worst when she changes positions. She reports the pain is worse in her left hamstring when compared to her (R). She reports no history of falls or traumas. "When I get up at night to go to the bathroom, I have to walked hunched over because of the pain." Cornelia reports she has been taking tylenol every morning to help with pain. She reports she stepped on a step stool to clean her mirror and had some minor back pain about 6 weeks ago. She states her pain runs from just below her glutes to just above the knee.      Medical History:   Past Medical History:   Diagnosis Date    Allergy     Arthritis     Asthma     Cataract     Chronic fatigue 1/24/2017    Diabetes mellitus     resolved with gastric bypass    Diabetes mellitus, type 2     Encounter for blood transfusion     GERD (gastroesophageal reflux disease)     Headache(784.0)     History of lumpectomy of both breasts     1992 negative    Hyperlipidemia 7/15/2015    Hypertension     Hypothyroidism     Neuropathy     Obesity     SOHA on CPAP     " Postmenopausal HRT (hormone replacement therapy)     Rash     Rosacea     Snoring     Wears glasses        Surgical History:   Cornelia Delatorre  has a past surgical history that includes Gastric bypass; Tonsillectomy; Adenoidectomy; Appendectomy; Colonoscopy; interstim bladder (2009); Esophagogastroduodenoscopy; Bunionectomy (10/17/14); Cataract extraction w/  intraocular lens implant (Bilateral); [place] tooth extraction; Skin cancer excision; Bladder suspension; Cholecystectomy (08/02/2017); Hysterectomy (1980); Oophorectomy (1980); and Breast biopsy (Bilateral, 1992).    Medications:   Cornelia has a current medication list which includes the following prescription(s): albuterol, aspirin, azelastine, azithromycin, bifidobacterium infantis, calcium 600 with vitamin d3, ciclopirox, cranberry, epinephrine, esomeprazole, fexofenadine, fish oil-omega-3 fatty acids, fluticasone propionate, gabapentin, guaifenesin, guaifenesin-codeine 100-10 mg/5 ml, hydrochlorothiazide, immun glob g(igg)-pro-iga 0-50, levalbuterol, lisinopril, metformin, methylcellulose (laxative), metoprolol succinate, montelukast, mucus clearing device, multivitamin, mupirocin, potassium chloride sa, pravastatin, prednisone, simethicone, symbicort, tizanidine, and varicella-zoster ge-as01b (pf).    Allergies:   Review of patient's allergies indicates:   Allergen Reactions    Sulfa (sulfonamide antibiotics) Hives    Naproxen Other (See Comments)     Other reaction(s): RT sided numbness      Albuterol Itching and Palpitations     Nebulizer only. Can use inhaler        Imaging, none    Prior Therapy: None  Social History: Lives with significant other  Occupation: Retired  Prior Level of Function: Independent with all ADLs and hobbies  Current Level of Function: Pain with all walking    Pain:  Current 2/10, worst 10/10, best 0/10   Location: bilateral hamstrings   Description: Burning and Grabbing  Aggravating Factors: Standing,  Coughing/Sneezing and Getting out of bed/chair  Easing Factors: pain medication and rest    Pts goals: To stop hurting    Red Flag Screening:   Cough  Sneeze  Strain: (+)  Bladder/ bowel: (--)  Falls: (--)  Night pain: (--)  Unexplained weight loss: (--)  General health: Asthma, diabetes, HTN, hyperlipidemia    Objective     Observation: Patient in no observable distress, takes increased time for transfers      Posture:  Slumped posture, rounded shoulders, increased thoracic kyphosis    Lumbar Range of Motion:    % Limitation Pain   Flexion 10%   Mild hamstring stretch        Extension 10%   No pain        Left rotation   10% No pain         Right Rotation   10% No pain           Knee ROM: WFL grossly (B)     Lower Extremity Strength  Right LE  Left LE    Knee extension: 5/5 Knee extension: 5/5   Knee flexion: 4+/5 Knee flexion: 4/5   Hip flexion: 4+/5 Hip flexion: 4/5   Hip extension:  4/5 Hip extension: 4/5   Hip abduction: 4+/5 Hip abduction: 4/5   Hip adduction: 4/5 Hip adduction 4/5   Ankle dorsiflexion: 5/5 Ankle dorsiflexion: 5/5   Ankle plantarflexion: 5/5 Ankle plantarflexion: 5/5     *Pain not reproduced with resisted hamstring testing     Special Tests:  - CHANTE: Negative  - FADIR: Negative  - SCOUR: Negative    - SLS (R): 2s  - SLS (L): 1s     DTR:   Right Left Comment   Patellar (L3-4) 1+ 1+    Achilles (S1) 1+ 1+        Neuro Dynamic Testing:    Sciatic nerve:      SLR: R = + for pain in leg      L = + for pain in leg      Slump: R = Negative     L = Negative        Femoral Nerve:    Femoral nerve test: Negative     Joint Mobility: Mildly hypomobile at lumbar spine     Palpation: Increased guarding noted at (B) hamstrings L > R, Mod TTP at L glute med, Mod TTP at L4 - L5, S1-S2, (L) PSIS      Sensation: Diminished in (B) feet, intact to LT at L2 - S2 dermatomes     Flexibility: Limited at hips grossly   Ely's test: R = Moderately limited ; L = Moderately limited   Edward's test: R = Mildly limited, no  pain ; L = Mildly limited no pain   Hamstring: R = Moderately limited; L = Moderately limited       CMS Impairment/Limitation/Restriction for FOTO Upper Leg Survey    Therapist reviewed FOTO scores for Cornelia Delatorre on 5/16/2019.   FOTO documents entered into EPIC - see Media section.    Limitation Score: 57%  Category: Mobility         TREATMENT   Treatment Time In: 1140  Treatment Time Out: 1200  Total Treatment time separate from Evaluation: 20 minutes    Cornelia received therapeutic exercises to develop strength and ROM for 20 minutes including:  Straight leg raises x 10 each  Bridges x 10   Hamstring stretch supine performed by PT x 2 min each  Standing gastroc stretch x 3 minutes   Seated sciatic glides with LAQ and Ankle Pump x 10 each    Home Exercises and Patient Education Provided    Education provided:   - Role of PT, PT POC, HEP    Written Home Exercises Provided: yes.  Exercises were reviewed and Cornelia was able to demonstrate them prior to the end of the session.  Cornelia demonstrated good  understanding of the education provided.     See EMR under Media for exercises provided 5/16/2019.    Assessment   Cornelia is a 68 y.o. female referred to outpatient Physical Therapy with a medical diagnosis of Hamstring tightness of both lower extremities. Physical exam is consistent medical diagnosis with neural tension and hamstring muscle guarding causing pain with positional changes. Only able to reproduce Cornelia's hamstring pain with transfers and change in position. Testing and mechanism of injury indicates the pain is not consistent with hamstring strain. Primary impairments include postural dysfunction, decreased core and LE strength, decreased lumbar and LE flexibility and mobility, impaired neuromuscular control of core and hip musculature, impaired balance and pain with functional activities. This pt is an good candidate for skilled PT tx and stands to benefit from a combination  of manual therapy including joint mobilizations with trigger point/myofacscial release, therapeutic exercise to establish core/joint stability, neuromuscular re-education, and modalities Prn. The pt has been educated on their dx/POC and consents to further PT tx.     Pt prognosis is Good.   Pt will benefit from skilled outpatient Physical Therapy to address the deficits stated above and in the chart below, provide pt/family education, and to maximize pt's level of independence.     Plan of care discussed with patient: Yes  Pt's spiritual, cultural and educational needs considered and patient is agreeable to the plan of care and goals as stated below:     Anticipated Barriers for therapy: None    Medical Necessity is demonstrated by the following  History  Co-morbidities and personal factors that may impact the plan of care Co-morbidities:   diabetes, HTN and Asthma, hyperlipidemia, peripheral neuropathy, urinary urgency    Personal Factors:   lifestyle     moderate   Examination  Body Structures and Functions, activity limitations and participation restrictions that may impact the plan of care Body Regions:   back  lower extremities    Body Systems:    ROM  strength  balance  gait  transfers    Participation Restrictions:   Difficulty with walking, standing up straight, attending social functions with her friends     Activity limitations:   Learning and applying knowledge  no deficits    General Tasks and Commands  no deficits    Communication  no deficits    Mobility  walking    Self care  dressing    Domestic Life  cooking  doing house work (cleaning house, washing dishes, laundry)    Interactions/Relationships  no deficits    Life Areas  no deficits    Community and Social Life  recreation and leisure         moderate   Clinical Presentation evolving clinical presentation with changing clinical characteristics moderate   Decision Making/ Complexity Score: moderate     Goals:   Short Term Goals (3 Weeks):   1. Pt  to report compliance with HEP 3x/week and demonstrate proper exercise technique to PT to show competence with self management of condition.   2. Pt to perform transfers from sit to stand and ambulate community distances with pain < 5/10 in (B) hamstrings.     Long Term Goals (6 Weeks):   1. Pt to achieve <45% limitation as measured by the FOTO to demonstrate decreased disability with her hamstrings.  2. Pt to increase strength to at least 4+/5 of muscles test to allow for improvement in functional activities such as performing chores and ADLs.  3. Pt to perform transfers from sit to stand and ambulate community distances with pain < 2/10 in (B) hamstrings.    4. Pt to report compliance with HEP 3x/week and demonstrate proper exercise technique to PT to show independence with self management of condition.      Plan   Plan of care Certification: 5/16/2019 to  07/05/2019.    Outpatient Physical Therapy 2 times weekly for 6 weeks to include the following interventions: Dry needling, Electrical Stimulation prn, Gait Training, Manual Therapy, Moist Heat/ Ice, Neuromuscular Re-ed, Patient Education and Therapeutic Exercise.     Porfirio Shahid, PT

## 2019-05-20 ENCOUNTER — CLINICAL SUPPORT (OUTPATIENT)
Dept: REHABILITATION | Facility: HOSPITAL | Age: 69
End: 2019-05-20
Attending: INTERNAL MEDICINE
Payer: MEDICARE

## 2019-05-20 DIAGNOSIS — M79.606 LOWER EXTREMITY PAIN, POSTERIOR, UNSPECIFIED LATERALITY: ICD-10-CM

## 2019-05-20 DIAGNOSIS — R26.9 GAIT ABNORMALITY: ICD-10-CM

## 2019-05-20 PROCEDURE — 97110 THERAPEUTIC EXERCISES: CPT | Mod: HCNC,PO

## 2019-05-20 PROCEDURE — 97140 MANUAL THERAPY 1/> REGIONS: CPT | Mod: HCNC,PO

## 2019-05-20 NOTE — PROGRESS NOTES
Physical Therapy Daily Treatment Note     Name: Cornelia Delatorre  Clinic Number: [id number]    Therapy Diagnosis:   Encounter Diagnoses   Name Primary?    Lower extremity pain, posterior, unspecified laterality     Gait abnormality      Physician: Armond Cortez MD    Visit Date: 5/20/2019  Physician Orders: PT Eval and Treat   Medical Diagnosis from Referral: Hamstring tightness of both lower extremities   Evaluation Date: 5/16/2019  Authorization Period Expiration: 12/31/2019   Plan of Care Expiration: 07/05/2019  Visit # / Visits authorized: 2/ 20    Time In: 1702  Time Out: 1743  Total Billable Time: 30 minutes    Precautions: Standard    Subjective     Pt reports: She has been doing her exercises everyday and her hamstring symptoms have not changed.   She was compliant with home exercise program.  Response to previous treatment: No change  Functional change: Too soon to well    Pain: 2/10  Location: bilateral hamstring       Objective     Cornelia received therapeutic exercises to develop strength, posture and core stabilization for 20 minutes including:  Recumbent bike x 5 minutes   Straight leg raises x 10 each -NP   Bridges x 10 - NP  Hamstring stretch at stairs 2 x 1 min each  Standing gastroc stretch x 3 minutes   Seated sciatic glides with LAQ and Ankle Pump 2 x 10 each  HS curls GTB 2 x 10  Clamshells GTB 2 x 10       Cornelia received the following manual therapy techniques: Soft tissue Mobilization were applied to the: B hamstrings for 15 minutes, including:  STM to (B) hamstrings    Home Exercises Provided and Patient Education Provided     Education provided:   - Continuing HEP    Written Home Exercises Provided: yes.  Exercises were reviewed and Cornelia was able to demonstrate them prior to the end of the session.  Cornelia demonstrated good  understanding of the education provided.     See EMR under Media for exercises provided 5/20/2019.    Assessment     Cornelia tolerated  treatment very well. Increased guarding noted at (B) lateral hamstrings, mildly improved after manual therapy. Will continue to progress her exercises as tolerated.     Cornelia is progressing well towards her goals.   Pt prognosis is Good.     Pt will continue to benefit from skilled outpatient physical therapy to address the deficits listed in the problem list box on initial evaluation, provide pt/family education and to maximize pt's level of independence in the home and community environment.     Pt's spiritual, cultural and educational needs considered and pt agreeable to plan of care and goals.    Anticipated barriers to physical therapy: None    Goals:   Short Term Goals (3 Weeks):   1. Pt to report compliance with HEP 3x/week and demonstrate proper exercise technique to PT to show competence with self management of condition.   2. Pt to perform transfers from sit to stand and ambulate community distances with pain < 5/10 in (B) hamstrings.      Long Term Goals (6 Weeks):   1. Pt to achieve <45% limitation as measured by the FOTO to demonstrate decreased disability with her hamstrings.  2. Pt to increase strength to at least 4+/5 of muscles test to allow for improvement in functional activities such as performing chores and ADLs.  3. Pt to perform transfers from sit to stand and ambulate community distances with pain < 2/10 in (B) hamstrings.    4. Pt to report compliance with HEP 3x/week and demonstrate proper exercise technique to PT to show independence with self management of condition.    Plan     Continue POC     Porfirio Shahid, PT

## 2019-05-21 NOTE — PROGRESS NOTES
Physical Therapy Daily Treatment Note     Name: Cornelia Delatorre  Clinic Number: [id number]    Therapy Diagnosis:   No diagnosis found.  Physician: Armond Cortez MD    Visit Date: 5/24/2019  Physician Orders: PT Eval and Treat   Medical Diagnosis from Referral: Hamstring tightness of both lower extremities   Evaluation Date: 5/16/2019  Authorization Period Expiration: 12/31/2019   Plan of Care Expiration: 07/05/2019  Visit # / Visits authorized: 3/ 20    Time In: ***  Time Out: ***  Total Billable Time: 30 minutes    Precautions: Standard    Subjective     Pt reports: ***  She was compliant with home exercise program.  Response to previous treatment: No change  Functional change: Too soon to well    Pain: 2/10  Location: bilateral hamstring       Objective     Cornelia received therapeutic exercises to develop strength, posture and core stabilization for 20 minutes including:  Recumbent bike x 5 minutes   Straight leg raises x 10 each -NP   Bridges x 10 - NP  Hamstring stretch at stairs 2 x 1 min each  Standing gastroc stretch x 3 minutes   Seated sciatic glides with LAQ and Ankle Pump 2 x 10 each  HS curls GTB 2 x 10  Clamshells GTB 2 x 10       Cornelia received the following manual therapy techniques: Soft tissue Mobilization were applied to the: B hamstrings for 15 minutes, including:  STM to (B) hamstrings    Home Exercises Provided and Patient Education Provided     Education provided:   - Continuing HEP    Written Home Exercises Provided: yes.  Exercises were reviewed and Cornelia was able to demonstrate them prior to the end of the session.  Cornelia demonstrated good  understanding of the education provided.     See EMR under Media for exercises provided 5/20/2019.    Assessment     Cornelia tolerated treatment very well. Increased guarding noted at (B) lateral hamstrings, mildly improved after manual therapy. Will continue to progress her exercises as tolerated.     Cornelia is progressing  well towards her goals.   Pt prognosis is Good.     Pt will continue to benefit from skilled outpatient physical therapy to address the deficits listed in the problem list box on initial evaluation, provide pt/family education and to maximize pt's level of independence in the home and community environment.     Pt's spiritual, cultural and educational needs considered and pt agreeable to plan of care and goals.    Anticipated barriers to physical therapy: None    Goals:   Short Term Goals (3 Weeks):   1. Pt to report compliance with HEP 3x/week and demonstrate proper exercise technique to PT to show competence with self management of condition.   2. Pt to perform transfers from sit to stand and ambulate community distances with pain < 5/10 in (B) hamstrings.      Long Term Goals (6 Weeks):   1. Pt to achieve <45% limitation as measured by the FOTO to demonstrate decreased disability with her hamstrings.  2. Pt to increase strength to at least 4+/5 of muscles test to allow for improvement in functional activities such as performing chores and ADLs.  3. Pt to perform transfers from sit to stand and ambulate community distances with pain < 2/10 in (B) hamstrings.    4. Pt to report compliance with HEP 3x/week and demonstrate proper exercise technique to PT to show independence with self management of condition.    Plan     Continue POC     Porfirio Shahid, PT

## 2019-05-24 ENCOUNTER — CLINICAL SUPPORT (OUTPATIENT)
Dept: REHABILITATION | Facility: HOSPITAL | Age: 69
End: 2019-05-24
Attending: INTERNAL MEDICINE
Payer: MEDICARE

## 2019-05-24 ENCOUNTER — PATIENT MESSAGE (OUTPATIENT)
Dept: ADMINISTRATIVE | Facility: OTHER | Age: 69
End: 2019-05-24

## 2019-05-24 DIAGNOSIS — M79.606 LOWER EXTREMITY PAIN, POSTERIOR, UNSPECIFIED LATERALITY: ICD-10-CM

## 2019-05-24 DIAGNOSIS — R26.9 GAIT ABNORMALITY: ICD-10-CM

## 2019-05-24 PROCEDURE — 97110 THERAPEUTIC EXERCISES: CPT | Mod: HCNC,PO | Performed by: PHYSICAL THERAPIST

## 2019-05-24 PROCEDURE — 97140 MANUAL THERAPY 1/> REGIONS: CPT | Mod: HCNC,PO | Performed by: PHYSICAL THERAPIST

## 2019-05-24 NOTE — PROGRESS NOTES
Physical Therapy Daily Treatment Note     [facility]  [facility] Number: [id number]    Therapy Diagnosis:   Encounter Diagnoses   Name Primary?    Lower extremity pain, posterior, unspecified laterality     Gait abnormality      Physician: Armond Cortez MD    Visit Date: 5/24/2019  Physician Orders: PT Eval and Treat   Medical Diagnosis from Referral: Hamstring tightness of both lower extremities   Evaluation Date: 5/16/2019  Authorization Period Expiration: 12/31/2019   Plan of Care Expiration: 07/05/2019  Visit # / Visits authorized: 3/ 20    Time In: 900  Time Out: 1002  Total Billable Time: 45 minutes    Precautions: Standard    Subjective     Pt reports: her HS pain is better but not near resolution. Pain continues with positional changes.  She was compliant with home exercise program.  Response to previous treatment: No change  Functional change: Too soon to tell    Pain: 2/10 pre and post tx 3/10  Location: bilateral hamstring       Objective     Cornelia received therapeutic exercises to develop strength, posture and core stabilization for 25 minutes including:  Recumbent bike x 5 minutes   Straight leg raises 2 x 10 each    Bridges x 10   Hamstring stretch at stairs 2 x 1 min each  Standing gastroc stretch x 3 minutes   Seated sciatic glides with LAQ and Ankle Pump 2 x 10 each  HS curls GTB 2 x 10  Clamshells GTB 2 x 10  -np    Cornelia received the following manual therapy techniques: Soft tissue Mobilization were applied to the: B hamstrings for 20 minutes, including:  STM to (B) hamstrings, m. Bending  ttp sustained pressure release B HS    Home Exercises Provided and Patient Education Provided     Education provided:   - Continuing HEP  - Possibly try dry needling if manual therapy does not relieve HS pain with positional t/fs  - expected post tx soreness    Written Home Exercises Provided: Patient instructed to cont prior HEP.  Exercises were reviewed and Cornelia was able to  "demonstrate them prior to the end of the session.  Cornelia demonstrated good  understanding of the education provided.     See EMR under Media for exercises provided 5/20/2019.    Assessment     Pt reported feeling "good" during manual therapy. Increased guarding noted at (B) lateral hamstrings, mildly improved after manual therapy. She was able to tolerate increased sets of SLR. No complaints with neural flossing supine with knee ext/ankle DF. Discussed she is possibly a dry needling candidate if current therapy interventions are not successful at eliminating HS pain. Will continue to progress her exercises as tolerated.     Cornelia is progressing well towards her goals.   Pt prognosis is Good.     Pt will continue to benefit from skilled outpatient physical therapy to address the deficits listed in the problem list box on initial evaluation, provide pt/family education and to maximize pt's level of independence in the home and community environment.     Pt's spiritual, cultural and educational needs considered and pt agreeable to plan of care and goals.    Anticipated barriers to physical therapy: None    Goals:   Short Term Goals (3 Weeks): progressing to all  1. Pt to report compliance with HEP 3x/week and demonstrate proper exercise technique to PT to show competence with self management of condition.   2. Pt to perform transfers from sit to stand and ambulate community distances with pain < 5/10 in (B) hamstrings.      Long Term Goals (6 Weeks):  Progressing to all  1. Pt to achieve <45% limitation as measured by the FOTO to demonstrate decreased disability with her hamstrings.  2. Pt to increase strength to at least 4+/5 of muscles test to allow for improvement in functional activities such as performing chores and ADLs.  3. Pt to perform transfers from sit to stand and ambulate community distances with pain < 2/10 in (B) hamstrings.    4. Pt to report compliance with HEP 3x/week and demonstrate proper " exercise technique to PT to show independence with self management of condition.    Plan     Continue POC     Gloria Way, PT

## 2019-05-24 NOTE — PROGRESS NOTES
"   Physical Therapy Daily Treatment Note     Name: Cornelia Delatorre  Clinic Number: [id number]    Therapy Diagnosis:   Encounter Diagnoses   Name Primary?    Lower extremity pain, posterior, unspecified laterality     Gait abnormality      Physician: Armond Cortez MD    Visit Date: 5/27/2019  Physician Orders: PT Eval and Treat   Medical Diagnosis from Referral: Hamstring tightness of both lower extremities   Evaluation Date: 5/16/2019  Authorization Period Expiration: 12/31/2019   Plan of Care Expiration: 07/05/2019  Visit # / Visits authorized: 4/ 20    Time In: 1102  Time Out: 1200  Total Billable Time: 55 minutes    Precautions: Standard    Subjective     Pt reports: "My pain was so bad on Saturday morning that I could hardly walk. I have to walk bent over and holding my hamstrings."   She was compliant with home exercise program.  Response to previous treatment: Increased pain  Functional change: Too soon to tell    Pain: 5/10  Location: bilateral hamstring       Objective     Cornelia received therapeutic exercises to develop strength, posture and core stabilization for 30 minutes including:  Recumbent bike x 8 minutes   Straight leg raises 2 x 10 each    Bridges 2 x 10   Hamstring stretch at stairs 2 x 1 min each  Standing gastroc stretch x 3 minutes   Seated sciatic glides with LAQ and Ankle Pump 2 x 10 each - NP  Double knee to chest on orange swiss ball x 20 (5 sec hold)   Piriformis stretch x 1 min each   HS curls GTB 2 x 10  Clamshells GTB 2 x 10     Cornelia received the following manual therapy techniques: Soft tissue Mobilization were applied to the: B hamstrings for 25 minutes, including:  STM to (B) hamstrings with focus on lateral hamstrings     Home Exercises Provided and Patient Education Provided     Education provided:   - Continuing HEP  - Possibly try dry needling if manual therapy does not relieve HS pain with positional t/fs  - expected post tx soreness    Written Home Exercises " Provided: Patient instructed to cont prior HEP.  Exercises were reviewed and Cornelia was able to demonstrate them prior to the end of the session.  Cornelia demonstrated good  understanding of the education provided.     See EMR under Media for exercises provided 5/20/2019.    Assessment     Cornelia had increased soreness following manual therapy ast treatment session, which limited her functionally the following day. She responded well to treatment today with reported resolution of her hamstring pain after riding the recumbent bicycle. Increased muscle guarding noted at lateral hamstrings (B) today, which improved after STM. Will focus on lumbar flexion exercises and hamstring mobilization / strengthening to see if that helps improve her pain/function.     Cornelia is progressing well towards her goals.   Pt prognosis is Good.     Pt will continue to benefit from skilled outpatient physical therapy to address the deficits listed in the problem list box on initial evaluation, provide pt/family education and to maximize pt's level of independence in the home and community environment.     Pt's spiritual, cultural and educational needs considered and pt agreeable to plan of care and goals.    Anticipated barriers to physical therapy: None    Goals:   Short Term Goals (3 Weeks): progressing to all  1. Pt to report compliance with HEP 3x/week and demonstrate proper exercise technique to PT to show competence with self management of condition.   2. Pt to perform transfers from sit to stand and ambulate community distances with pain < 5/10 in (B) hamstrings.      Long Term Goals (6 Weeks):  Progressing to all  1. Pt to achieve <45% limitation as measured by the FOTO to demonstrate decreased disability with her hamstrings.  2. Pt to increase strength to at least 4+/5 of muscles test to allow for improvement in functional activities such as performing chores and ADLs.  3. Pt to perform transfers from sit to stand  and ambulate community distances with pain < 2/10 in (B) hamstrings.    4. Pt to report compliance with HEP 3x/week and demonstrate proper exercise technique to PT to show independence with self management of condition.    Plan     Add lumbar flexion exercises and focus on decreasing hamstring muscle guarding    Porfirio Shahid, PT

## 2019-05-25 ENCOUNTER — PATIENT MESSAGE (OUTPATIENT)
Dept: ADMINISTRATIVE | Facility: OTHER | Age: 69
End: 2019-05-25

## 2019-05-26 ENCOUNTER — PATIENT MESSAGE (OUTPATIENT)
Dept: ADMINISTRATIVE | Facility: OTHER | Age: 69
End: 2019-05-26

## 2019-05-27 ENCOUNTER — OFFICE VISIT (OUTPATIENT)
Dept: ENDOCRINOLOGY | Facility: CLINIC | Age: 69
End: 2019-05-27
Payer: MEDICARE

## 2019-05-27 ENCOUNTER — CLINICAL SUPPORT (OUTPATIENT)
Dept: REHABILITATION | Facility: HOSPITAL | Age: 69
End: 2019-05-27
Attending: INTERNAL MEDICINE
Payer: MEDICARE

## 2019-05-27 VITALS
HEART RATE: 80 BPM | BODY MASS INDEX: 33.09 KG/M2 | SYSTOLIC BLOOD PRESSURE: 132 MMHG | HEIGHT: 64 IN | DIASTOLIC BLOOD PRESSURE: 84 MMHG | WEIGHT: 193.81 LBS | RESPIRATION RATE: 18 BRPM

## 2019-05-27 DIAGNOSIS — E04.2 MULTINODULAR GOITER: ICD-10-CM

## 2019-05-27 DIAGNOSIS — E05.90 SUBCLINICAL HYPERTHYROIDISM: Primary | ICD-10-CM

## 2019-05-27 DIAGNOSIS — M79.606 LOWER EXTREMITY PAIN, POSTERIOR, UNSPECIFIED LATERALITY: ICD-10-CM

## 2019-05-27 DIAGNOSIS — R26.9 GAIT ABNORMALITY: ICD-10-CM

## 2019-05-27 PROCEDURE — 97140 MANUAL THERAPY 1/> REGIONS: CPT | Mod: HCNC,PO

## 2019-05-27 PROCEDURE — 99204 PR OFFICE/OUTPT VISIT, NEW, LEVL IV, 45-59 MIN: ICD-10-PCS | Mod: HCNC,S$GLB,, | Performed by: INTERNAL MEDICINE

## 2019-05-27 PROCEDURE — 1101F PR PT FALLS ASSESS DOC 0-1 FALLS W/OUT INJ PAST YR: ICD-10-PCS | Mod: HCNC,CPTII,S$GLB, | Performed by: INTERNAL MEDICINE

## 2019-05-27 PROCEDURE — 3075F SYST BP GE 130 - 139MM HG: CPT | Mod: HCNC,CPTII,S$GLB, | Performed by: INTERNAL MEDICINE

## 2019-05-27 PROCEDURE — 1101F PT FALLS ASSESS-DOCD LE1/YR: CPT | Mod: HCNC,CPTII,S$GLB, | Performed by: INTERNAL MEDICINE

## 2019-05-27 PROCEDURE — 99999 PR PBB SHADOW E&M-EST. PATIENT-LVL III: ICD-10-PCS | Mod: PBBFAC,HCNC,, | Performed by: INTERNAL MEDICINE

## 2019-05-27 PROCEDURE — 3075F PR MOST RECENT SYSTOLIC BLOOD PRESS GE 130-139MM HG: ICD-10-PCS | Mod: HCNC,CPTII,S$GLB, | Performed by: INTERNAL MEDICINE

## 2019-05-27 PROCEDURE — 3079F PR MOST RECENT DIASTOLIC BLOOD PRESSURE 80-89 MM HG: ICD-10-PCS | Mod: HCNC,CPTII,S$GLB, | Performed by: INTERNAL MEDICINE

## 2019-05-27 PROCEDURE — 99999 PR PBB SHADOW E&M-EST. PATIENT-LVL III: CPT | Mod: PBBFAC,HCNC,, | Performed by: INTERNAL MEDICINE

## 2019-05-27 PROCEDURE — 3079F DIAST BP 80-89 MM HG: CPT | Mod: HCNC,CPTII,S$GLB, | Performed by: INTERNAL MEDICINE

## 2019-05-27 PROCEDURE — 99204 OFFICE O/P NEW MOD 45 MIN: CPT | Mod: HCNC,S$GLB,, | Performed by: INTERNAL MEDICINE

## 2019-05-27 PROCEDURE — 97110 THERAPEUTIC EXERCISES: CPT | Mod: HCNC,PO

## 2019-05-27 NOTE — LETTER
May 30, 2019      Armond Cortez MD  [address] [facility] [address] [place] [address]           [place] [facility]  [address] [facility] [address] [place] [address]  Phone: [phone]  Fax: [phone]          Patient: Cornelia Delatorre   MR Number: [id number]   YOB: 1950   Date of Visit: 5/27/2019       Dear Dr. Armond Cortez:    Thank you for referring Cornelia Delatorre to me for evaluation. Attached you will find relevant portions of my assessment and plan of care.    If you have questions, please do not hesitate to call me. I look forward to following Cornelia Delatorre along with you.    Sincerely,    Sole Mckenzie  CC:  No Recipients    If you would like to receive this communication electronically, please contact [email or web address] or [phone] to request more information on [facility] access.    For providers and/or their staff who would like to refer a patient to [facility], please contact us through our one-stop-shop provider referral line, [facility], at [phone].    If you feel you have received this communication in error or would no longer like to receive these types of communications, please e-mail [email or web address]

## 2019-05-27 NOTE — PROGRESS NOTES
CHIEF COMPLAINT: Suppressed TSH  68 year old being seen as a new patient. Had been on synthroid in the past. Off since March 2019. No palpitations. No tremors. No anxiety. On prednisone periodically for asthma. Seeing pulmonary. No difficulty swallowing. No Biotin. Had diabetes prior to gastric bypass in 2011. She does have neuropathy from history of DM. On gabapentin.       PAST MEDICAL HISTORY/PAST SURGICAL HISTORY:  Reviewed in EPIC    SOCIAL HISTORY: No T/A    FAMILY HISTORY:  + Hypothyroidism. + Dm 2.     MEDICATIONS/ALLERGIES: The patient's MedCard has been updated and reviewed.      ROS:   Constitutional: No recent significant weight change  Eyes: No recent visual changes  ENT: No dysphagia  Cardiovascular: Denies current anginal symptoms  Respiratory: Denies current respiratory difficulty  Gastrointestinal: Denies recent bowel disturbances  GenitoUrinary - No dysuria  Skin: No new skin rash  Neurologic: No focal neurologic complaints  Remainder ROS negative        PE:    GENERAL: Well developed, well nourished.  PSYCH:  appropriate mood and affect  EYES:  PERRL, EOM intact.  ENT: Nares patent, oropharynx clear, mucosa pink,   NECK: Supple, trachea midline, No palpable thyroid nodules.   CHEST: Resp even and unlabored, CTA bilateral.  CARDIAC: RRR, S1, S2 heard, no murmurs, rubs, S3, or S4  ABDOMEN: Soft, non-tender, non-distended;  No organomegaly  VASCULAR:  DP pulses +2/4 bilaterally, no edema  NEURO: Gait steady, CN II-VII grossly intact  SKIN: No areas of breakdown, no acanthosis nigracans.    LABS   Results for NEIL STEVENS (MRN [id number]) as of 5/27/2019 14:15   Ref. Range 6/28/2018 10:47 10/25/2018 09:33 12/14/2018 10:24 1/28/2019 16:17 5/1/2019 10:28   TSH Latest Ref Range: 0.400 - 4.000 uIU/mL 0.271 (L) 0.206 (L) 0.717  0.359 (L)   Free T4 Latest Ref Range: 0.71 - 1.51 ng/dL 1.08 0.94   0.96   Procalcitonin Latest Ref Range: <0.25 ng/mL    0.04      US SOFT TISSUE HEAD NECK  THYROID    CLINICAL HISTORY:  Thyrotoxicosis, unspecified without thyrotoxic crisis or storm    TECHNIQUE:  Ultrasound of the thyroid and cervical lymph nodes was performed.    COMPARISON:  Thyroid ultrasound-10/08/2012    FINDINGS:  The right thyroid lobe measures 4.6 x 1.8 x 1.8 cm.  The left thyroid lobe measures 4.9 x 1.7 x 2.2 cm.  The total thyroid weight is 17.7 g.  There is a 10 x 7 x 10 mm complex, primarily cystic nodule within the anteromedial aspect of the left thyroid midpole.  No solid thyroid nodules or microcalcifications which meet the criteria for FNA/core biopsy.  No pathologically enlarged or morphologically abnormal lymph nodes in the left or right neck.      Impression       Normal sized multinodular thyroid gland its with solitary 10 mm cystic nodule noted in the anteromedial aspect of the left thyroid midpole.  No nodules or microcalcifications which meet the criteria for FNA/core biopsy.         ASSESSMENT/PLAN:  1. Suppressed TSH- TSH mildly suppressed. Relatively asymptomatic.Since TSH mildly decreased can hold off on Tx.  Will continue to monitor for now.    2. MNG- Do not meet criteria for FNA. Can repeat US in 1 yr      FOLLOWUP  F/U 6 months TSH, Ft4, T3, TRAB, TPO

## 2019-05-27 NOTE — PROGRESS NOTES
"   Physical Therapy Daily Treatment Note     Name: Cornelia Delatorre  Clinic Number: [id number]    Therapy Diagnosis:   Encounter Diagnoses   Name Primary?    Lower extremity pain, posterior, unspecified laterality     Gait abnormality      Physician: Armond Cortez MD    Visit Date: 5/29/2019  Physician Orders: PT Eval and Treat   Medical Diagnosis from Referral: Hamstring tightness of both lower extremities   Evaluation Date: 5/16/2019  Authorization Period Expiration: 12/31/2019   Plan of Care Expiration: 07/05/2019  Visit # / Visits authorized: 5/ 20    Time In: 1100  Time Out: 1155  Total Billable Time: 55 minutes    Precautions: Standard    Subjective     Pt reports: "I am definitely walking better once I get moving. The pain has been less since Monday."  She was compliant with home exercise program.  Response to previous treatment: Increased pain  Functional change: Too soon to tell    Pain: 3/10, 6/10 at worst when she changes positions   Location: bilateral hamstring       Objective     Cornelia received therapeutic exercises to develop strength, posture and core stabilization for 37 minutes including:  Recumbent bike x 8 minutes   Straight leg raises 2 x 10 each 1#  Bridges 2 x 10   Hamstring stretch at stairs 2 x 1 min each  Standing gastroc stretch x 3 minutes   Seated sciatic glides with LAQ and Ankle Pump 2 x 10 each - NP  Double knee to chest on orange swiss ball x 20 (5 sec hold)   Piriformis stretch x 90s each   HS curls GTB 2 x 10  Clamshells GTB 2 x 10   Prone hip extensions 2 x 10    Cornelia received the following manual therapy techniques: Soft tissue Mobilization were applied to the: B hamstrings for 18 minutes, including:  STM to (B) hamstrings with focus on lateral hamstrings     Home Exercises Provided and Patient Education Provided     Education provided:   - Continuing HEP  - Possibly try dry needling if manual therapy does not relieve HS pain with positional t/fs  - expected post " tx soreness    Written Home Exercises Provided: Patient instructed to cont prior HEP.  Exercises were reviewed and Cornelia was able to demonstrate them prior to the end of the session.  Cornelia demonstrated good  understanding of the education provided.     See EMR under Media for exercises provided 5/20/2019.    Assessment     Cornelia reported improvements with her ambulation and her pain levels following treatment on Monday with gentle STM to the hamstrings and lumbar flexion exercises. Increased muscle guarding noted at lateral hamstrings bilaterally, which mildly improves after manual therapy. Instructed patient to perform seated hamstring stretch at home, patient verbalized understanding.     Cornelia is progressing well towards her goals.   Pt prognosis is Good.     Pt will continue to benefit from skilled outpatient physical therapy to address the deficits listed in the problem list box on initial evaluation, provide pt/family education and to maximize pt's level of independence in the home and community environment.     Pt's spiritual, cultural and educational needs considered and pt agreeable to plan of care and goals.    Anticipated barriers to physical therapy: None    Goals:   Short Term Goals (3 Weeks): progressing to all  1. Pt to report compliance with HEP 3x/week and demonstrate proper exercise technique to PT to show competence with self management of condition.   2. Pt to perform transfers from sit to stand and ambulate community distances with pain < 5/10 in (B) hamstrings.      Long Term Goals (6 Weeks):  Progressing to all  1. Pt to achieve <45% limitation as measured by the FOTO to demonstrate decreased disability with her hamstrings.  2. Pt to increase strength to at least 4+/5 of muscles test to allow for improvement in functional activities such as performing chores and ADLs.  3. Pt to perform transfers from sit to stand and ambulate community distances with pain < 2/10 in (B)  hamstrings.    4. Pt to report compliance with HEP 3x/week and demonstrate proper exercise technique to PT to show independence with self management of condition.    Plan     Add lumbar flexion exercises and focus on decreasing hamstring muscle guarding    Porfirio Shahid, PT

## 2019-05-28 ENCOUNTER — PATIENT OUTREACH (OUTPATIENT)
Dept: OTHER | Facility: OTHER | Age: 69
End: 2019-05-28

## 2019-05-28 NOTE — PROGRESS NOTES
1st attempt to complete enrollment call. No answer, left voicemail.       Last 5 Patient Entered Readings                                      Current 30 Day Average: 141/75     Recent Readings 5/26/2019 5/25/2019 5/25/2019 5/24/2019    SBP (mmHg) 161 130 138 133    DBP (mmHg) 85 70 76 69    Pulse 61 68 65 64

## 2019-05-28 NOTE — LETTER
June 4, 2019     Cornelia Delatorre  [address] [place] [address]       Dear Cornelia,    Welcome to [facility]! Our goal is to make care effective, proactive and convenient by using data you send us from home to better treat your chronic conditions.          My name is Michelle Oseguera, and I am your dedicated Digital Medicine clinician. As an expert in medication management, I will help ensure that the medications you are taking continue to provide the intended benefits and help you reach your goals. You can reach me directly at [phone] or by sending me a message directly through your [facility] account.      I am Sarah Joseph and I will be your health . My job is to help you identify lifestyle changes to improve your disease control. We will talk about nutrition, exercise, and other ways you may be able to adjust your current habits to better your health. Additionally, we will help ensure you are completing the tests and screenings that are necessary to help manage your conditions. You can reach me directly at [phone] or by sending me a message directly through your [facility] account.    Most importantly, YOU are at the center of this team. Together, we will work to improve your overall health and encourage you to meet your goals for a healthier lifestyle.     What we expect from YOU:  · Please take frequent home blood pressure measurements. We ask that you take at least 1 blood pressure reading per week, but more information will better help us get you know you. Be sure you rest for a few minutes before taking the reading in a quiet, comfortable place.     Be available to receive phone calls or [facility] messages, when appropriate, from your care team. Please let us know if there are any specific days or times that work best for us to reach you via phone.     Complete routine tests and screenings. Dont worry, we will help keep you on track!           What you should  expect from your Digital Medicine Care Team:   We will work with you to create a personalized plan of care and provide you with encouragement and education, including regarding lifestyle changes, that could help you manage your disease states.     We will adjust your current medications, if needed, and continue to monitor your long-term progress.     We will provide you and your physician with monthly progress reports after you have been in the program for more than 30 days.     We will send you reminders through [facility] and text messages to help ensure you do not miss any testing deadlines to help manage your disease states.    You will be able to reach us by phone or through your [facility] account by clicking our names under Care Team on the right side of the home screen.    I look forward to working with you to achieve your blood pressure goals!    We look forward to working with you to help manage your health,    Sincerely,    Your Digital Medicine Team    Please visit our websites to learn more:   · Hypertension: [email or web address]      Remember, we are not available for emergencies. If you have an emergency, please contact your doctors office directly or call [facility] ([phone] or [phone]) or [phone].

## 2019-05-29 ENCOUNTER — CLINICAL SUPPORT (OUTPATIENT)
Dept: REHABILITATION | Facility: HOSPITAL | Age: 69
End: 2019-05-29
Attending: INTERNAL MEDICINE
Payer: MEDICARE

## 2019-05-29 DIAGNOSIS — R26.9 GAIT ABNORMALITY: ICD-10-CM

## 2019-05-29 DIAGNOSIS — M79.606 LOWER EXTREMITY PAIN, POSTERIOR, UNSPECIFIED LATERALITY: ICD-10-CM

## 2019-05-29 PROCEDURE — 97110 THERAPEUTIC EXERCISES: CPT | Mod: HCNC,PO

## 2019-05-29 PROCEDURE — 97140 MANUAL THERAPY 1/> REGIONS: CPT | Mod: HCNC,PO

## 2019-05-31 NOTE — PROGRESS NOTES
"   Physical Therapy Daily Treatment Note     Name: Cornelia Delatorre  Clinic Number: [id number]    Therapy Diagnosis:   Encounter Diagnoses   Name Primary?    Lower extremity pain, posterior, unspecified laterality     Gait abnormality      Physician: Armond Cortez MD    Visit Date: 6/3/2019  Physician Orders: PT Eval and Treat   Medical Diagnosis from Referral: Hamstring tightness of both lower extremities   Evaluation Date: 5/16/2019  Authorization Period Expiration: 12/31/2019   Plan of Care Expiration: 07/05/2019  Visit # / Visits authorized: 6/ 20    Time In: 1055  Time Out: 1150  Total Billable Time: 55 minutes    Precautions: Standard    Subjective     Pt reports: "I have a lot of tylenol in me today. I could hardly walk last Thursday and Friday due to the pain." She reports the pain is still worst in the morning and relief by bending forward.  She was compliant with home exercise program.  Response to previous treatment: Increased pain  Functional change: Too soon to tell    Pain: 7/10  Location: bilateral hamstring       Objective     Cornelia received therapeutic exercises to develop strength, posture and core stabilization for 35 minutes including:  Upright bike x 5 minutes   Straight leg raises 2 x 10 each 1#  Bridges 2 x 10 - NP   Hamstring stretch at stairs 2 x 1 min each  Standing gastroc stretch x 3 minutes   Double knee to chest on orange swiss ball x 20 (5 sec hold)   Piriformis stretch x 90s each   HS curls GTB 2 x 10  Clamshells GTB 2 x 10   Posterior pelvic tilts 2 x 10 (3 second hold)    Cornelia received the following manual therapy techniques: Soft tissue Mobilization were applied to the: B hamstrings for 18 minutes, including:  STM to (B) hamstrings with focus on lateral hamstrings     Home Exercises Provided and Patient Education Provided     Education provided:   - Continuing HEP  - Possibly try dry needling if manual therapy does not relieve HS pain with positional t/fs  - " expected post tx soreness    Written Home Exercises Provided: Patient instructed to cont prior HEP.  Exercises were reviewed and Cornelia was able to demonstrate them prior to the end of the session.  Cornelia demonstrated good  understanding of the education provided.     See EMR under Media for exercises provided 5/20/2019.    Assessment     Cornelia reported a significant increase in her pain in the two days following her last PT treatment. She reports her improvements with pain have been very cyclical, with some good days and bad days. She did not report any relief in her symptoms following treatment today. Continues to have increased muscle guarding at (L) hamstrings. Instructed patient to begin performing double knee to chest, piriformis stretch, and PPT for her HEP.     Cornelia is progressing well towards her goals.   Pt prognosis is Good.     Pt will continue to benefit from skilled outpatient physical therapy to address the deficits listed in the problem list box on initial evaluation, provide pt/family education and to maximize pt's level of independence in the home and community environment.     Pt's spiritual, cultural and educational needs considered and pt agreeable to plan of care and goals.    Anticipated barriers to physical therapy: None    Goals:   Short Term Goals (3 Weeks): progressing to all  1. Pt to report compliance with HEP 3x/week and demonstrate proper exercise technique to PT to show competence with self management of condition.   2. Pt to perform transfers from sit to stand and ambulate community distances with pain < 5/10 in (B) hamstrings.      Long Term Goals (6 Weeks):  Progressing to all  1. Pt to achieve <45% limitation as measured by the FOTO to demonstrate decreased disability with her hamstrings.  2. Pt to increase strength to at least 4+/5 of muscles test to allow for improvement in functional activities such as performing chores and ADLs.  3. Pt to perform transfers  from sit to stand and ambulate community distances with pain < 2/10 in (B) hamstrings.    4. Pt to report compliance with HEP 3x/week and demonstrate proper exercise technique to PT to show independence with self management of condition.    Plan     Add lumbar flexion exercises and focus on decreasing hamstring muscle guarding    Porfirio Shahid, PT

## 2019-06-03 ENCOUNTER — CLINICAL SUPPORT (OUTPATIENT)
Dept: REHABILITATION | Facility: HOSPITAL | Age: 69
End: 2019-06-03
Attending: INTERNAL MEDICINE
Payer: MEDICARE

## 2019-06-03 DIAGNOSIS — M79.606 LOWER EXTREMITY PAIN, POSTERIOR, UNSPECIFIED LATERALITY: ICD-10-CM

## 2019-06-03 DIAGNOSIS — R26.9 GAIT ABNORMALITY: ICD-10-CM

## 2019-06-03 PROCEDURE — 97110 THERAPEUTIC EXERCISES: CPT | Mod: HCNC,PO

## 2019-06-03 PROCEDURE — 97140 MANUAL THERAPY 1/> REGIONS: CPT | Mod: HCNC,PO

## 2019-06-03 NOTE — PROGRESS NOTES
"   Physical Therapy Daily Treatment Note     [facility]  [facility] [id number]    Therapy Diagnosis:   Encounter Diagnoses   Name Primary?    Lower extremity pain, posterior, unspecified laterality     Gait abnormality      Physician: Armond Cortez MD    Visit Date: 6/5/2019  Physician Orders: PT Eval and Treat   Medical Diagnosis from Referral: Hamstring tightness of both lower extremities   Evaluation Date: 5/16/2019  Authorization Period Expiration: 12/31/2019   Plan of Care Expiration: 07/05/2019  Visit # / Visits authorized: 7/ 20    Time In: 1100  Time Out: 1158  Total Billable Time: 58 minutes    Precautions: Standard    Subjective     Pt reports: "I felt good Monday night (2/10 pain) and the pain was less Tuesday morning until I went to [place] and did a lot of walking. I have already taken Tylenol twice today."  She was compliant with home exercise program.  Response to previous treatment: Increased pain  Functional change: Too soon to tell    Pain: 3/10  Location: bilateral hamstring       Objective     Cornelia received therapeutic exercises to develop strength, posture and core stabilization for 45 minutes including:  Recumbent bike x 6 minutes   Straight leg raises 2 x 10 each 1#  Bridges 2 x 10 - NP   Hamstring stretch at stairs 2 x 1 min each  Standing gastroc stretch x 3 minutes   Double knee to chest on orange swiss ball x 2 minutes (5 sec hold)   Piriformis stretch x 90s each   HS curls GTB 2 x 10  Clamshells GTB 2 x 10   Posterior pelvic tilts 2 x 10 (3 second hold)  PPT with marching x 2 min  PPT with ball squeeze x 20 (3 second hold)  Supine hamstring stretch x 2 min each (PT assist)  Prone press ups x 10 - no increase in low back pain    Cornelia received the following manual therapy techniques: Soft tissue Mobilization were applied to the: B hamstrings for 13 minutes, including:  STM to (B) hamstrings with focus on lateral hamstrings     Home Exercises Provided and " Patient Education Provided     Education provided:   - Continuing HEP  - Possibly try dry needling if manual therapy does not relieve HS pain with positional t/fs  - expected post tx soreness    Written Home Exercises Provided: Patient instructed to cont prior HEP.  Exercises were reviewed and Cornelia was able to demonstrate them prior to the end of the session.  Cornelia demonstrated good  understanding of the education provided.     See EMR under Media for exercises provided 5/20/2019.    Assessment     Cornelia reported pain relief following focus of flexion based exercises last week. She reported walking around [place] exacerbated her pain and extending her low back from a flexed position causes an increase in her hamstring pain. She did not have any increase or change in her pain with prone press ups. Progressed with increased lumbar flexion exercises today and discussed potential referral to pain management if PT does not alleviate pain.    Cornelia is progressing well towards her goals.   Pt prognosis is Good.     Pt will continue to benefit from skilled outpatient physical therapy to address the deficits listed in the problem list box on initial evaluation, provide pt/family education and to maximize pt's level of independence in the home and community environment.     Pt's spiritual, cultural and educational needs considered and pt agreeable to plan of care and goals.    Anticipated barriers to physical therapy: None    Goals:   Short Term Goals (3 Weeks): progressing to all  1. Pt to report compliance with HEP 3x/week and demonstrate proper exercise technique to PT to show competence with self management of condition.   2. Pt to perform transfers from sit to stand and ambulate community distances with pain < 5/10 in (B) hamstrings.      Long Term Goals (6 Weeks):  Progressing to all  1. Pt to achieve <45% limitation as measured by the FOTO to demonstrate decreased disability with her hamstrings.  2.  Pt to increase strength to at least 4+/5 of muscles test to allow for improvement in functional activities such as performing chores and ADLs.  3. Pt to perform transfers from sit to stand and ambulate community distances with pain < 2/10 in (B) hamstrings.    4. Pt to report compliance with HEP 3x/week and demonstrate proper exercise technique to PT to show independence with self management of condition.    Plan     Add lumbar flexion exercises and focus on decreasing hamstring muscle guarding    Porfirio Shahid, PT

## 2019-06-04 NOTE — PROGRESS NOTES
Digital Medicine Enrollment Call    Introduced Mrs. Cornelia Delatorre to [facility].     Discussed program expectations and requirements.    Introduced digital medicine care team.     Reviewed the importance of self-monitoring for digital medicine participation.     Reviewed that the Digital Medicine team is not available for emergencies and instructed the patient to call 911 or [facility] ([phone] or [phone]) if one arises.    Pt states that she has concerns regarding her BP since she began taking Lisinopril. She is not confident that the Lisinopril is effective due to BP being higher than 130, which is her normal range.        Last 5 Patient Entered Readings                                      Current 30 Day Average: 149/77     Recent Readings 6/3/2019 6/2/2019 6/1/2019 6/1/2019 5/31/2019    SBP (mmHg) 159 155 152 150 146    DBP (mmHg) 81 78 71 83 78    Pulse 62 65 73 57 58

## 2019-06-05 ENCOUNTER — CLINICAL SUPPORT (OUTPATIENT)
Dept: REHABILITATION | Facility: HOSPITAL | Age: 69
End: 2019-06-05
Attending: INTERNAL MEDICINE
Payer: MEDICARE

## 2019-06-05 DIAGNOSIS — R26.9 GAIT ABNORMALITY: ICD-10-CM

## 2019-06-05 DIAGNOSIS — M79.606 LOWER EXTREMITY PAIN, POSTERIOR, UNSPECIFIED LATERALITY: ICD-10-CM

## 2019-06-05 PROCEDURE — 97140 MANUAL THERAPY 1/> REGIONS: CPT | Mod: HCNC,PO

## 2019-06-05 PROCEDURE — 97110 THERAPEUTIC EXERCISES: CPT | Mod: HCNC,PO

## 2019-06-07 NOTE — PROGRESS NOTES
"   Physical Therapy Daily Treatment Note     [facility]  Cl[facility] Number: [id number]    Therapy Diagnosis:   Encounter Diagnoses   Name Primary?    Lower extremity pain, posterior, unspecified laterality     Gait abnormality      Physician: Armond Cortez MD    Visit Date: 6/10/2019  Physician Orders: PT Eval and Treat   Medical Diagnosis from Referral: Hamstring tightness of both lower extremities   Evaluation Date: 5/16/2019  Authorization Period Expiration: 12/31/2019   Plan of Care Expiration: 07/05/2019  Visit # / Visits authorized: 8/ 20    Time In: 1105  Time Out: 1200  Total Billable Time: 55 minutes    Precautions: Standard    Subjective     Pt reports: She reports she is having less pain in her legs when changing positions. "The pain is the worst when I bend over and first thing in the morning. Once I get going in the morning, the days have been a lot better."  She was compliant with home exercise program.  Response to previous treatment: Increased pain  Functional change: Too soon to tell    Pain: 2/10  Location: bilateral hamstring       Objective     Cornelia received therapeutic exercises to develop strength, posture and core stabilization for 45 minutes including:  Recumbent bike x 6 minutes   Straight leg raises 2 x 10 each 1#   Hamstring stretch at stairs 2 x 1 min each - NP  Standing gastroc stretch x 3 minutes   Double knee to chest on orange swiss ball x 2 minutes (5 sec hold)   Piriformis stretch x 90s each   HS curls GTB 2 x 10  Clamshells GTB 2 x 10   Posterior pelvic tilts 2 x 10 (3 second hold)  PPT with marching x 2 min  PPT with ball squeeze x 20 (3 second hold)  Supine hamstring stretch x 2 min each (PT assist)  Prone press ups x 10 - no increase in low back pain - NP    Cornelia received the following manual therapy techniques: Soft tissue Mobilization were applied to the: B hamstrings for 13 minutes, including:  STM to (B) hamstrings with focus on lateral hamstrings "     Home Exercises Provided and Patient Education Provided     Education provided:   - Continuing HEP  - Possibly try dry needling if manual therapy does not relieve HS pain with positional t/fs  - expected post tx soreness    Written Home Exercises Provided: Patient instructed to cont prior HEP.  Exercises were reviewed and Cornelia was able to demonstrate them prior to the end of the session.  Cornelia demonstrated good  understanding of the education provided.     See EMR under Media for exercises provided 5/20/2019.    Assessment     Cornelia continues to report relief following additional of flexion based exercise. She continues to have increased tone noted at her (B) lateral hamstrings, which mildly improves following manual therapy. Will reassess next visit.     Cornelia is progressing well towards her goals.   Pt prognosis is Good.     Pt will continue to benefit from skilled outpatient physical therapy to address the deficits listed in the problem list box on initial evaluation, provide pt/family education and to maximize pt's level of independence in the home and community environment.     Pt's spiritual, cultural and educational needs considered and pt agreeable to plan of care and goals.    Anticipated barriers to physical therapy: None    Goals:   Short Term Goals (3 Weeks): progressing to all  1. Pt to report compliance with HEP 3x/week and demonstrate proper exercise technique to PT to show competence with self management of condition.   2. Pt to perform transfers from sit to stand and ambulate community distances with pain < 5/10 in (B) hamstrings.      Long Term Goals (6 Weeks):  Progressing to all  1. Pt to achieve <45% limitation as measured by the FOTO to demonstrate decreased disability with her hamstrings.  2. Pt to increase strength to at least 4+/5 of muscles test to allow for improvement in functional activities such as performing chores and ADLs.  3. Pt to perform transfers from sit  to stand and ambulate community distances with pain < 2/10 in (B) hamstrings.    4. Pt to report compliance with HEP 3x/week and demonstrate proper exercise technique to PT to show independence with self management of condition.    Plan     Add lumbar flexion exercises and focus on decreasing hamstring muscle guarding    Porfirio Shahid, PT

## 2019-06-10 ENCOUNTER — CLINICAL SUPPORT (OUTPATIENT)
Dept: REHABILITATION | Facility: HOSPITAL | Age: 69
End: 2019-06-10
Attending: INTERNAL MEDICINE
Payer: MEDICARE

## 2019-06-10 ENCOUNTER — PATIENT OUTREACH (OUTPATIENT)
Dept: OTHER | Facility: OTHER | Age: 69
End: 2019-06-10

## 2019-06-10 DIAGNOSIS — I10 ESSENTIAL HYPERTENSION: Primary | ICD-10-CM

## 2019-06-10 DIAGNOSIS — M79.606 LOWER EXTREMITY PAIN, POSTERIOR, UNSPECIFIED LATERALITY: ICD-10-CM

## 2019-06-10 DIAGNOSIS — R26.9 GAIT ABNORMALITY: ICD-10-CM

## 2019-06-10 DIAGNOSIS — E11.8 CONTROLLED TYPE 2 DIABETES MELLITUS WITH COMPLICATION, WITHOUT LONG-TERM CURRENT USE OF INSULIN: ICD-10-CM

## 2019-06-10 PROCEDURE — 97140 MANUAL THERAPY 1/> REGIONS: CPT | Mod: HCNC,PO

## 2019-06-10 PROCEDURE — 97110 THERAPEUTIC EXERCISES: CPT | Mod: HCNC,PO

## 2019-06-10 RX ORDER — B-COMPLEX WITH VITAMIN C
1 TABLET ORAL DAILY
COMMUNITY

## 2019-06-10 RX ORDER — ACETAMINOPHEN 500 MG
1000 TABLET ORAL EVERY 6 HOURS PRN
COMMUNITY
End: 2020-01-14

## 2019-06-10 RX ORDER — VALSARTAN 160 MG/1
160 TABLET ORAL DAILY
Qty: 90 TABLET | Refills: 1 | Status: SHIPPED | OUTPATIENT
Start: 2019-06-10 | End: 2019-06-25

## 2019-06-10 NOTE — PROGRESS NOTES
"   Physical Therapy Daily Treatment Note     Name: Cornelia Delatorre  Clinic Number: [id number]    Therapy Diagnosis:   Encounter Diagnoses   Name Primary?    Lower extremity pain, posterior, unspecified laterality     Gait abnormality      Physician: Armond Cortez MD    Visit Date: 6/12/2019  Physician Orders: PT Eval and Treat   Medical Diagnosis from Referral: Hamstring tightness of both lower extremities   Evaluation Date: 5/16/2019  Authorization Period Expiration: 12/31/2019   Plan of Care Expiration: 07/05/2019  Visit # / Visits authorized: 9/ 20    Time In: 1100  Time Out: 1200  Total Billable Time: 60 minutes    Precautions: Standard    Subjective     Pt reports: "My pain has stayed between a 3/10 to a 5/10. It is still worst when I get out of bed in the morning. I had to leave my granddaughter's volleyball game because my legs were hurting after sitting with no back support."  She was compliant with home exercise program.  Response to previous treatment: Slightly increased pain when compared to Monday  Functional change: Decreased pain in the mornings, able to walk farther without pain,     Pain: 3/10  Location: bilateral hamstring       Objective     Corenlia received therapeutic exercises to develop strength, posture and core stabilization for 48 minutes including:  Recumbent bike x 5 minutes   Straight leg raises 2 x 10 each 1#   Hamstring stretch at stairs 2 x 1 min each  Standing gastroc stretch x 3 minutes   Double knee to chest on orange swiss ball x 2 minutes (5 sec hold)   Piriformis stretch x 90s each   HS curls GTB 2 x 10 - NP  Clamshells GTB 2 x 10   Posterior pelvic tilts 2 x 10 (3 second hold)  PPT with marching x 2 min  PPT with ball squeeze x 20 (3 second hold)  Supine hamstring stretch x 2 min each (PT assist)  Prone press ups 2 x 10 - no increase in low back pain  Shuttle Squats (DL) 2 x 10 50#    Cornelia received the following manual therapy techniques: Soft tissue Mobilization " were applied to the: B hamstrings for 12 minutes, including:  STM to (B) hamstrings with focus on lateral hamstrings     Home Exercises Provided and Patient Education Provided     Education provided:   - Continuing HEP  - Possibly try dry needling if manual therapy does not relieve HS pain with positional t/fs  - expected post tx soreness    Written Home Exercises Provided: Patient instructed to cont prior HEP.  Exercises were reviewed and Cornelia was able to demonstrate them prior to the end of the session.  Cornelia demonstrated good  understanding of the education provided.     See EMR under Media for exercises provided 5/20/2019.    Assessment     Cornelia reports she had a slight increase in her pain from Monday to today without any changes in her levels of activity. She has made improvements with her pain levels since beginning PT, but remains limited functionally with walking and sleeping. Will extend plan of care for another 6 visits with 2 visits per week.     Cornelia is progressing well towards her goals.   Pt prognosis is Good.     Pt will continue to benefit from skilled outpatient physical therapy to address the deficits listed in the problem list box on initial evaluation, provide pt/family education and to maximize pt's level of independence in the home and community environment.     Pt's spiritual, cultural and educational needs considered and pt agreeable to plan of care and goals.    Anticipated barriers to physical therapy: None    Goals:   Short Term Goals (3 Weeks):   1. Pt to report compliance with HEP 3x/week and demonstrate proper exercise technique to PT to show competence with self management of condition. - (Met)  2. Pt to perform transfers from sit to stand and ambulate community distances with pain < 5/10 in (B) hamstrings. (Met)    Long Term Goals (6 Weeks):   1. Pt to achieve <45% limitation as measured by the FOTO to demonstrate decreased disability with her hamstrings.  (Progressing, not met)  2. Pt to increase strength to at least 4+/5 of muscles test to allow for improvement in functional activities such as performing chores and ADLs. (Progressing, not met)  3. Pt to perform transfers from sit to stand and ambulate community distances with pain < 2/10 in (B) hamstrings.  (Progressing, not met)  4. Pt to report compliance with HEP 3x/week and demonstrate proper exercise technique to PT to show independence with self management of condition. (Met)     Plan     Plan of care Certification: 6/12/2019 to  07/05/2019.     Outpatient Physical Therapy 2 times weekly for 6 visits to include the following interventions: Dry needling, Electrical Stimulation prn, Gait Training, Manual Therapy, Moist Heat/ Ice, Neuromuscular Re-ed, Patient Education and Therapeutic Exercise.     Porfirio Shahid, PT

## 2019-06-10 NOTE — PROGRESS NOTES
Ms. Locke is newly enrolled in the Hypertension Digital Medicine Program. She called in to request changesx to her BP medicaiton regimen.     Recently started SQ Hizentra QW.     Verified drug allergies.    Verified current medication list and reconciled.     Reviewed program goals and monitoring frequency. Explained that we expect patient to obtain several blood pressures per week at random times of the day. Confirmed appropriate BP monitoring technique. Reviewed signs and symptoms of hypertension (HA, CP, SOB, etc.) and hypotension (dizziness, weakness, fatigue, etc.).     Patient's BP average is 147/77 mmHg which is above goal of <130/80 per 2017 ACC/AHA guidelines. Patient attributes high readings to: change in BP medication and starting Ig treatment.States her BP was better controlled in the past on an ARB, but due to recalls another provided changed her lisinopril.     Last 5 Patient Entered Readings                                      Current 30 Day Average: 147/77     Recent Readings 6/10/2019 6/8/2019 6/7/2019 6/7/2019 6/7/2019    SBP (mmHg) 146 151 136 117 155    DBP (mmHg) 78 80 66 69 83    Pulse 60 58 65 67 59          Hypertension Medications             hydroCHLOROthiazide (HYDRODIURIL) 25 MG tablet Take 1 tablet (25 mg total) by mouth once daily.    lisinopril 10 MG tablet Take 10 mg by mouth every evening.    metoprolol succinate (TOPROL-XL) 25 MG 24 hr tablet Take 1 tablet (25 mg total) by mouth once daily.            Plan:  Stop lisinopril 10 mg QD.  Start valsartan 160 mg QD.   Will continue to monitor and will call patient back in 4-5 weeks, will call sooner if BP trend shows a more immediate need.

## 2019-06-12 ENCOUNTER — CLINICAL SUPPORT (OUTPATIENT)
Dept: REHABILITATION | Facility: HOSPITAL | Age: 69
End: 2019-06-12
Attending: INTERNAL MEDICINE
Payer: MEDICARE

## 2019-06-12 DIAGNOSIS — M79.606 LOWER EXTREMITY PAIN, POSTERIOR, UNSPECIFIED LATERALITY: ICD-10-CM

## 2019-06-12 DIAGNOSIS — R26.9 GAIT ABNORMALITY: ICD-10-CM

## 2019-06-12 PROCEDURE — 97140 MANUAL THERAPY 1/> REGIONS: CPT | Mod: HCNC,PO

## 2019-06-12 PROCEDURE — 97110 THERAPEUTIC EXERCISES: CPT | Mod: HCNC,PO

## 2019-06-12 NOTE — PLAN OF CARE
[facility]  Physical Therapy Reassessment and Plan of Care update    Name: Cornelia Delatorre  Clinic Number: [id number]    Therapy Diagnosis:   Encounter Diagnoses   Name Primary?    Lower extremity pain, posterior, unspecified laterality     Gait abnormality      Physician: Armond Cortez MD    Visit Date: 6/12/2019  Physician Orders: PT Eval and Treat   Medical Diagnosis from Referral: Hamstring tightness of both lower extremities   Evaluation Date: 5/16/2019  Authorization Period Expiration: 12/31/2019   Plan of Care Expiration: 07/05/2019  Visit # / Visits authorized: 9/ 20      Precautions: Standard    Subjective   Date of onset: 3 to 4 weeks ago   History of current condition - Cornelia reports: She reports is at least 45 to 50% better since beginning PT. She states her pain is much better than when she started, but she still has significant pain first thing in the morning and with changing positions.     Medical History:   Past Medical History:   Diagnosis Date    Allergy     Arthritis     Asthma     Cataract     Chronic fatigue 1/24/2017    Diabetes mellitus     resolved with gastric bypass    Encounter for blood transfusion     GERD (gastroesophageal reflux disease)     Headache(784.0)     History of lumpectomy of both breasts     1992 negative    Hyperlipidemia 7/15/2015    Hypertension     Hypothyroidism     Neuropathy     Obesity     SOHA on CPAP     Postmenopausal HRT (hormone replacement therapy)     Rash     Rosacea     Snoring     Wears glasses        Surgical History:   Cornelia Delatorre  has a past surgical history that includes Gastric bypass; Tonsillectomy; Adenoidectomy; Appendectomy; Colonoscopy; interstim bladder (2009); Esophagogastroduodenoscopy; Bunionectomy (10/17/14); Cataract extraction w/  intraocular lens implant (Bilateral); [place] tooth extraction; Skin cancer excision; Bladder suspension; Cholecystectomy (08/02/2017);  Hysterectomy (1980); Oophorectomy (1980); and Breast biopsy (Bilateral, 1992).    Medications:   Cornelia has a current medication list which includes the following prescription(s): acetaminophen, albuterol, aspirin, azelastine, azithromycin, b-complex with vitamin c, bifidobacterium infantis, calcium 600 with vitamin d3, ciclopirox, cranberry, epinephrine, esomeprazole, fexofenadine, fish oil-omega-3 fatty acids, fluticasone propionate, gabapentin, guaifenesin, guaifenesin-codeine 100-10 mg/5 ml, hydrochlorothiazide, immun glob g(igg)-pro-iga 0-50, levalbuterol, metformin, methylcellulose (laxative), metoprolol succinate, montelukast, mucus clearing device, multivitamin, mupirocin, potassium chloride sa, pravastatin, prednisone, simethicone, symbicort, tizanidine, valsartan, and varicella-zoster [id number] (pf).    Allergies:   Review of patient's allergies indicates:   Allergen Reactions    Sulfa (sulfonamide antibiotics) Hives    Naproxen Other (See Comments)     Other reaction(s): RT sided numbness      Albuterol Itching and Palpitations     Nebulizer only. Can use inhaler        Imaging, none    Prior Therapy: None  Social History: Lives with significant other  Occupation: Retired  Prior Level of Function: Independent with all ADLs and hobbies  Current Level of Function: Pain with all walking    Pain:  Current 2/10, worst 10/10, best 0/10   Location: bilateral hamstrings   Description: Burning and Grabbing  Aggravating Factors: Standing, Coughing/Sneezing and Getting out of bed/chair  Easing Factors: pain medication and rest    Pts goals: To stop hurting    Red Flag Screening:   Cough  Sneeze  Strain: (+)  Bladder/ bowel: (--)  Falls: (--)  Night pain: (--)  Unexplained weight loss: (--)  General health: Asthma, diabetes, HTN, hyperlipidemia    Objective     Observation: Patient in no observable distress, takes increased time for transfers      Posture:  Slumped posture, rounded shoulders, increased  thoracic kyphosis    Lumbar Range of Motion:    % Limitation Pain   Flexion 10%   Mild hamstring stretch        Extension 10%   No pain        Left rotation   10% No pain         Right Rotation   10% No pain           Knee ROM: WFL grossly (B)     Lower Extremity Strength  Right LE  Left LE    Knee extension: 5/5 Knee extension: 5/5   Knee flexion: 5/5 Knee flexion: 4+/5   Hip flexion: 5/5  Hip flexion: 4/5   Hip extension:  4+/5 Hip extension: 4+/5   Hip abduction: 4+/5 Hip abduction: 4+/5   Hip adduction: 4/5 Hip adduction 4/5   Ankle dorsiflexion: 5/5 Ankle dorsiflexion: 5/5   Ankle plantarflexion: 5/5 Ankle plantarflexion: 5/5     *Pain not reproduced with resisted hamstring testing     Special Tests:  - CHANTE: Negative  - FADIR: Negative  - SCOUR: Negative    - SLS (R): 2s  - SLS (L): 1s     DTR:   Right Left Comment   Patellar (L3-4) 1+ 1+    Achilles (S1) 1+ 1+        Neuro Dynamic Testing:    Sciatic nerve:      SLR: R = + for pain in leg      L = + for pain in leg      Slump: R = Negative     L = Negative        Femoral Nerve:    Femoral nerve test: Negative     Joint Mobility: Mildly hypomobile at lumbar spine     Palpation: Increased guarding noted at (B) hamstrings L > R, Mod TTP at L glute med, Mod TTP at L4 - L5, S1-S2, (L) PSIS      Sensation: Diminished in (B) feet, intact to LT at L2 - S2 dermatomes     Flexibility: Limited at hips grossly   Ely's test: R = Mildly limited ; L = Mildly limited   Edward's test: R = Mildly limited, no pain ; L = Mildly limited no pain   Hamstring: R = Mildly limited; L = Mildly limited       CMS Impairment/Limitation/Restriction for FOTO Upper Leg Survey    Therapist reviewed FOTO scores for Cornelia Delatorre on 6/12/2019.   FOTO documents entered into EP[employer] - see Media section.    Limitation Score: 57%  Category: Mobility         TREATMENT     See Daily Note    Assessment   Cornelia is a 68 y.o. female referred to outpatient Physical Therapy with a medical diagnosis  of Hamstring tightness of both lower extremities. Physical exam is consistent medical diagnosis with neural tension and hamstring muscle guarding causing pain with positional changes. Since beginning PT, Cornelia has been seen 9 times since initial evaluation on 05/16/2019. Overall, Cornelia  has made good, steady progress with her PT treatments and has worked hard towards all of her PT goals as evidenced by subjective and objective improvements especially with the intensity of her pain. Despite these improvements, she remains with deficits with her pain levels, which is significantly impairing her functional mobility. Cornelia will continue to benefit from skilled PT consisting of  Dry needling, Electrical Stimulation prn, Gait Training, Manual Therapy, Moist Heat/ Ice, Neuromuscular Re-ed, Patient Education and Therapeutic Exercise to address remaining limitations and increase functional mobility. Goals updated to reflect progress, extending plan of care for another 6 visits with 2 visits per week.     Pt prognosis is Good.   Pt will benefit from skilled outpatient Physical Therapy to address the deficits stated above and in the chart below, provide pt/family education, and to maximize pt's level of independence.     Plan of care discussed with patient: Yes  Pt's spiritual, cultural and educational needs considered and patient is agreeable to the plan of care and goals as stated below:     Anticipated Barriers for therapy: None    Medical Necessity is demonstrated by the following  History  Co-morbidities and personal factors that may impact the plan of care Co-morbidities:   diabetes, HTN and Asthma, hyperlipidemia, peripheral neuropathy, urinary urgency    Personal Factors:   lifestyle     moderate   Examination  Body Structures and Functions, activity limitations and participation restrictions that may impact the plan of care Body Regions:   back  lower extremities    Body Systems:     ROM  strength  balance  gait  transfers    Participation Restrictions:   Difficulty with walking, standing up straight, attending social functions with her friends     Activity limitations:   Learning and applying knowledge  no deficits    General Tasks and Commands  no deficits    Communication  no deficits    Mobility  walking    Self care  dressing    Domestic Life  cooking  doing house work (cleaning house, washing dishes, laundry)    Interactions/Relationships  no deficits    Life Areas  no deficits    Community and Social Life  recreation and leisure         moderate   Clinical Presentation evolving clinical presentation with changing clinical characteristics moderate   Decision Making/ Complexity Score: moderate     Goals:   Short Term Goals (3 Weeks):   1. Pt to report compliance with HEP 3x/week and demonstrate proper exercise technique to PT to show competence with self management of condition. - (Met)  2. Pt to perform transfers from sit to stand and ambulate community distances with pain < 5/10 in (B) hamstrings. (Met)    Long Term Goals (6 Weeks):   1. Pt to achieve <45% limitation as measured by the FOTO to demonstrate decreased disability with her hamstrings. (Progressing, not met)  2. Pt to increase strength to at least 4+/5 of muscles test to allow for improvement in functional activities such as performing chores and ADLs. (Progressing, not met)  3. Pt to perform transfers from sit to stand and ambulate community distances with pain < 2/10 in (B) hamstrings.  (Progressing, not met)  4. Pt to report compliance with HEP 3x/week and demonstrate proper exercise technique to PT to show independence with self management of condition. (Met)       Plan   Plan of care Certification: 6/12/2019 to  07/05/2019.    Outpatient Physical Therapy 2 times weekly for 6 visits to include the following interventions: Dry needling, Electrical Stimulation prn, Gait Training, Manual Therapy, Moist Heat/ Ice,  Neuromuscular Re-ed, Patient Education and Therapeutic Exercise.     Porfirio Shahid, PT

## 2019-06-13 ENCOUNTER — PATIENT OUTREACH (OUTPATIENT)
Dept: OTHER | Facility: OTHER | Age: 69
End: 2019-06-13

## 2019-06-13 NOTE — PROGRESS NOTES
Last 5 Patient Entered Readings                                      Current 30 Day Average: 146/76     Recent Readings 6/13/2019 6/12/2019 6/11/2019 6/11/2019 6/10/2019    SBP (mmHg) 148 144 135 98 146    DBP (mmHg) 80 74 70 57 78    Pulse 58 58 57 58 60          Patient was not available to talk at this time. She requested a call back on Monday afternoon. Will call back then

## 2019-06-14 ENCOUNTER — IMMUNIZATION (OUTPATIENT)
Dept: PHARMACY | Facility: CLINIC | Age: 69
End: 2019-06-14
Payer: MEDICARE

## 2019-06-14 NOTE — PROGRESS NOTES
"   Physical Therapy Daily Treatment Note and Discharge Summary     Name: Cornelia Delatorre  Clinic Number: [id number]    Therapy Diagnosis:   Encounter Diagnoses   Name Primary?    Lower extremity pain, posterior, unspecified laterality     Gait abnormality      Physician: Armond Cortez MD    Visit Date: 6/17/2019  Physician Orders: PT Eval and Treat   Medical Diagnosis from Referral: Hamstring tightness of both lower extremities   Evaluation Date: 5/16/2019  Authorization Period Expiration: 12/31/2019   Plan of Care Expiration: 07/05/2019  Visit # / Visits authorized: 9/ 20    Time In: 1100  Time Out: 1105  Total Billable Time: 05 minutes    Precautions: Standard    Subjective     Pt reports: "When I left here last Wednesday, my pain was so bad on Wednesday night and through Friday. I could hardly walk."  She was compliant with home exercise program.  Response to previous treatment: Slightly increased pain when compared to Monday  Functional change: Decreased pain in the mornings, able to walk farther without pain    Pain: 3/10  Location: bilateral hamstring       Objective     No exercises performed today    Assessment     Discussed plan of care with Cornelia and she reports she has not had any lasting improvements with physical therapy as her pain is consistently exacerbated by her ADLs and she wants to follow up with alternative treatment methods. Messaged her PCP for recommendations for referral to appropriate health care provider.  She is discharged from PT.     Cornelia is progressing well towards her goals.   Pt prognosis is Good.     Pt will continue to benefit from skilled outpatient physical therapy to address the deficits listed in the problem list box on initial evaluation, provide pt/family education and to maximize pt's level of independence in the home and community environment.     Pt's spiritual, cultural and educational needs considered and pt agreeable to plan of care and " goals.    Anticipated barriers to physical therapy: None    Goals:   Short Term Goals (3 Weeks):   1. Pt to report compliance with HEP 3x/week and demonstrate proper exercise technique to PT to show competence with self management of condition. - (Met)  2. Pt to perform transfers from sit to stand and ambulate community distances with pain < 5/10 in (B) hamstrings. (Met)    Long Term Goals (6 Weeks):   1. Pt to achieve <45% limitation as measured by the FOTO to demonstrate decreased disability with her hamstrings. (Progressing, not met)  2. Pt to increase strength to at least 4+/5 of muscles test to allow for improvement in functional activities such as performing chores and ADLs. (Progressing, not met)  3. Pt to perform transfers from sit to stand and ambulate community distances with pain < 2/10 in (B) hamstrings.  (Progressing, not met)  4. Pt to report compliance with HEP 3x/week and demonstrate proper exercise technique to PT to show independence with self management of condition. (Met)     Plan     Discharge from PT     Porfirio Shahid, PT

## 2019-06-17 ENCOUNTER — CLINICAL SUPPORT (OUTPATIENT)
Dept: REHABILITATION | Facility: HOSPITAL | Age: 69
End: 2019-06-17
Attending: INTERNAL MEDICINE
Payer: MEDICARE

## 2019-06-17 ENCOUNTER — OFFICE VISIT (OUTPATIENT)
Dept: OTOLARYNGOLOGY | Facility: CLINIC | Age: 69
End: 2019-06-17
Payer: MEDICARE

## 2019-06-17 ENCOUNTER — TELEPHONE (OUTPATIENT)
Dept: FAMILY MEDICINE | Facility: CLINIC | Age: 69
End: 2019-06-17

## 2019-06-17 VITALS — WEIGHT: 192.25 LBS | HEIGHT: 64 IN | BODY MASS INDEX: 32.82 KG/M2

## 2019-06-17 DIAGNOSIS — M79.606 LOWER EXTREMITY PAIN, POSTERIOR, UNSPECIFIED LATERALITY: ICD-10-CM

## 2019-06-17 DIAGNOSIS — J04.0 ACUTE LARYNGITIS: Primary | ICD-10-CM

## 2019-06-17 DIAGNOSIS — R26.9 GAIT ABNORMALITY: ICD-10-CM

## 2019-06-17 DIAGNOSIS — R49.0 DYSPHONIA: ICD-10-CM

## 2019-06-17 PROCEDURE — 99214 PR OFFICE/OUTPT VISIT, EST, LEVL IV, 30-39 MIN: ICD-10-PCS | Mod: 25,HCNC,S$GLB, | Performed by: OTOLARYNGOLOGY

## 2019-06-17 PROCEDURE — 99999 PR PBB SHADOW E&M-EST. PATIENT-LVL III: CPT | Mod: PBBFAC,HCNC,, | Performed by: OTOLARYNGOLOGY

## 2019-06-17 PROCEDURE — 31575 PR LARYNGOSCOPY, FLEXIBLE; DIAGNOSTIC: ICD-10-PCS | Mod: HCNC,S$GLB,, | Performed by: OTOLARYNGOLOGY

## 2019-06-17 PROCEDURE — 99999 PR PBB SHADOW E&M-EST. PATIENT-LVL III: ICD-10-PCS | Mod: PBBFAC,HCNC,, | Performed by: OTOLARYNGOLOGY

## 2019-06-17 PROCEDURE — 31575 DIAGNOSTIC LARYNGOSCOPY: CPT | Mod: HCNC,S$GLB,, | Performed by: OTOLARYNGOLOGY

## 2019-06-17 PROCEDURE — 1101F PT FALLS ASSESS-DOCD LE1/YR: CPT | Mod: HCNC,CPTII,S$GLB, | Performed by: OTOLARYNGOLOGY

## 2019-06-17 PROCEDURE — 99214 OFFICE O/P EST MOD 30 MIN: CPT | Mod: 25,HCNC,S$GLB, | Performed by: OTOLARYNGOLOGY

## 2019-06-17 PROCEDURE — 1101F PR PT FALLS ASSESS DOC 0-1 FALLS W/OUT INJ PAST YR: ICD-10-PCS | Mod: HCNC,CPTII,S$GLB, | Performed by: OTOLARYNGOLOGY

## 2019-06-17 RX ORDER — FLUCONAZOLE 100 MG/1
150 TABLET ORAL DAILY
Qty: 21 TABLET | Refills: 0 | Status: SHIPPED | OUTPATIENT
Start: 2019-06-17 | End: 2019-07-01

## 2019-06-17 NOTE — PROGRESS NOTES
Last 5 Patient Entered Readings                                      Current 30 Day Average: 146/75     Recent Readings 6/16/2019 6/15/2019 6/14/2019 6/13/2019 6/12/2019    SBP (mmHg) 136 142 153 148 144    DBP (mmHg) 66 73 75 80 74    Pulse 72 60 57 58 58          Digital Medicine: Health  Introduction    Introduced Ms. Cornelia Delatorre to Digital Medicine. Discussed health  role and recommended lifestyle modifications.    Lifestyle Assessment:  Current Dietary Habits(i.e. low sodium, food labels, dining out):deffered  Exercise:deferred  Alcohol/Tobacco:  Medication Adherence: has not been compliant with the medication regiment due to the following:  she has not received the newly prescribed medication. Patient is still taking the lisinopril normally until she received the valsartan    Reviewed the importance of self-monitoring, medication adherence, and that the health  can be used as a resource for lifestyle modifications to help reduce or maintain a healthy lifestyle.  Reviewed that the Digital Medicine team is not available for emergencies and instructed the patient to call 911 or [facility] ([phone] or [phone]) if one arises.  Reviewed proper BP technique and patient demonstrated understanding.

## 2019-06-17 NOTE — PROGRESS NOTES
Subjective:       Patient ID: Cornelia Delatorre is a 68 y.o. female.    Chief Complaint: Hoarse    Cornelia is here for hoarseness.   Length of symptoms: 6 weeks, has been increasing over this time. Initially symptoms were fluctuating in quality, but now has become more persistent and bothersome. She describes a strained, raspy quality to voice, at times she is aphonic. No pain, no hemoptysis. No dysphagia. She did have an asthma attack a few weeks prior to the episode, where she was put on steroids. She is on control inhalers.   Recently started immunotherapy (~ 4 months) but stopped in mid April   VHI-10 = 21     Pertinent meds: Reviewed meds.  Pertinent medical issues: Asthma, allergic rhinitis, DM II, SOHA  She is on subcutaneous immunoglobulins, bronchiectasis.   Previous surgery: Tonsillectomy    Social History     Tobacco Use   Smoking Status Never Smoker   Smokeless Tobacco Never Used     Social History     Substance and Sexual Activity   Alcohol Use No    Frequency: Never    Binge frequency: Never          Review of Systems   Constitutional: Negative for activity change and appetite change.   Eyes: Negative for discharge.   Respiratory: Negative for difficulty breathing and wheezing   Cardiovascular: Negative for chest pain.   Gastrointestinal: Negative for abdominal distention and abdominal pain.   Endocrine: Negative for cold intolerance and heat intolerance.   Genitourinary: Negative for dysuria.   Musculoskeletal: Negative for gait problem and joint swelling.   Skin: Negative for color change and pallor.   Neurological: Negative for syncope and weakness.   Psychiatric/Behavioral: Negative for agitation and confusion.     Objective:        Constitutional:   She is oriented to person, place, and time. She appears well-developed and well-nourished. She appears alert. She is active.   Mild to moderate strained, raspy quality to voice Normal speech.      Head:  Normocephalic and atraumatic. Head is  without TMJ tenderness. No scars. Salivary glands normal.  Facial strength is normal.      Ears:    Right Ear: No drainage or swelling. No middle ear effusion.   Left Ear: No drainage or swelling.  No middle ear effusion.     Nose:  Septal deviation present. No mucosal edema, rhinorrhea or sinus tenderness. No turbinate hypertrophy.      Mouth/Throat  Oropharynx clear and moist without lesions or asymmetry, normal uvula midline and mirror exam normal. Normal dentition. No uvula swelling, lacerations or trismus. No oropharyngeal exudate. Tonsillar erythema, tonsillar exudate.    Unable to perform mirror 2/2 gag      Neck:  Full range of motion with neck supple and no adenopathy. Thyroid tenderness is present. No tracheal deviation, no edema, no erythema, normal range of motion, no stridor, no crepitus and no neck rigidity present. No thyroid mass present.     Cardiovascular:   Intact distal pulses and normal pulses.      Pulmonary/Chest:   Effort normal and breath sounds normal. No stridor.     Psychiatric:   Her speech is normal and behavior is normal. Her mood appears not anxious. Her affect is not labile.     Neurological:   She is alert and oriented to person, place, and time. No sensory deficit.     Skin:   No abrasions, lacerations, lesions, or rashes. No abrasion and no bruising noted.         Tests / Results:  Pre-procedure diagnosis: The primary encounter diagnosis was Acute laryngitis. A diagnosis of Dysphonia was also pertinent to this visit.     Post-procedure diagnosis: same    Procedure: Flexible fiberoptic laryngoscopy    Surgeon: Fly Johnson MD    Anesthesia: 2% Lidocaine with Phenylephrine topical    Risks, benefits, and alternatives of the procedure were discussed with the patient, and the patient consented to the fiberoptic examination.  We applied a topical nasal decongestant and analgesic.  After adequate anesthesia was obtained, the flexible fiberoptic scope was passed through the nasal  cavity. The entire pharynx (nasopharynx to hypopharynx) and the larynx were visualized. At the end of the examination, the scope was removed. The patient tolerated the procedure well with no complications.     Findings:  -     Laryngeal mucosa is normal  -     Post-cricoid region: mild-mod PCE  -     Lingual tonsils have no hypertrophy  -     Adenoids have no  hypertrophy  -     Right vocal fold: normal mobility     mass/lesion: medial and superior edge cord erythema mid cord  -     Left vocal fold: normal mobility     mass/lesion: medial and superior edge cord erythema mid cord  -     Other findings: cord findings symmetric, reduced pliability of vocal fold wave      Assessment:       1. Acute laryngitis    2. Dysphonia          Plan:         Diflucan x 14 days  Follow up 1 month for re-examination  Hydration / mucous management

## 2019-06-17 NOTE — TELEPHONE ENCOUNTER
----- Message from Patricia Valerio NP sent at 6/17/2019  1:09 PM CDT -----  Schedule appt with me for further evaluation--advise patient I will order xray of her back but need to see her first as Dr Cortez is out of town     ----- Message -----  From: Porfirio Shahid, PT  Sent: 6/17/2019  11:24 AM  To: MD Dr. Diego Garcia,    I have been treating Ms. Delatorre for hamstring tightness since 05/16/2019 and have made inconsistent, short duration improvements with her pain levels with ADLs in her hamstrings. Her symptoms and subjective reports are not consistent with mechanical hamstring pathology and may be coming from the spine. Please advise for alternative treatment options for Ms. Delatorre to help with her pain levels.     Thank you,     Porfirio Shahid, PT, DPT

## 2019-06-19 ENCOUNTER — OFFICE VISIT (OUTPATIENT)
Dept: FAMILY MEDICINE | Facility: CLINIC | Age: 69
End: 2019-06-19
Payer: MEDICARE

## 2019-06-19 ENCOUNTER — HOSPITAL ENCOUNTER (OUTPATIENT)
Dept: RADIOLOGY | Facility: HOSPITAL | Age: 69
Discharge: HOME | End: 2019-06-19
Attending: NURSE PRACTITIONER
Payer: MEDICARE

## 2019-06-19 VITALS
DIASTOLIC BLOOD PRESSURE: 86 MMHG | OXYGEN SATURATION: 97 % | HEIGHT: 64 IN | SYSTOLIC BLOOD PRESSURE: 156 MMHG | WEIGHT: 194 LBS | HEART RATE: 60 BPM | RESPIRATION RATE: 16 BRPM | BODY MASS INDEX: 33.12 KG/M2 | TEMPERATURE: 98 F

## 2019-06-19 DIAGNOSIS — M79.604 PAIN IN BOTH LOWER EXTREMITIES: Primary | ICD-10-CM

## 2019-06-19 DIAGNOSIS — I10 ESSENTIAL HYPERTENSION: ICD-10-CM

## 2019-06-19 DIAGNOSIS — M79.605 PAIN IN BOTH LOWER EXTREMITIES: Primary | ICD-10-CM

## 2019-06-19 DIAGNOSIS — M54.16 LUMBAR RADICULOPATHY: ICD-10-CM

## 2019-06-19 PROCEDURE — 1101F PT FALLS ASSESS-DOCD LE1/YR: CPT | Mod: HCNC,CPTII,S$GLB, | Performed by: NURSE PRACTITIONER

## 2019-06-19 PROCEDURE — 72110 X-RAY EXAM L-2 SPINE 4/>VWS: CPT | Mod: TC,HCNC,FY,PO

## 2019-06-19 PROCEDURE — 72110 X-RAY EXAM L-2 SPINE 4/>VWS: CPT | Mod: 26,HCNC,, | Performed by: RADIOLOGY

## 2019-06-19 PROCEDURE — 3077F SYST BP >= 140 MM HG: CPT | Mod: HCNC,CPTII,S$GLB, | Performed by: NURSE PRACTITIONER

## 2019-06-19 PROCEDURE — 3077F PR MOST RECENT SYSTOLIC BLOOD PRESSURE >= 140 MM HG: ICD-10-PCS | Mod: HCNC,CPTII,S$GLB, | Performed by: NURSE PRACTITIONER

## 2019-06-19 PROCEDURE — 99214 PR OFFICE/OUTPT VISIT, EST, LEVL IV, 30-39 MIN: ICD-10-PCS | Mod: HCNC,S$GLB,, | Performed by: NURSE PRACTITIONER

## 2019-06-19 PROCEDURE — 99999 PR PBB SHADOW E&M-EST. PATIENT-LVL IV: CPT | Mod: PBBFAC,HCNC,, | Performed by: NURSE PRACTITIONER

## 2019-06-19 PROCEDURE — 3079F PR MOST RECENT DIASTOLIC BLOOD PRESSURE 80-89 MM HG: ICD-10-PCS | Mod: HCNC,CPTII,S$GLB, | Performed by: NURSE PRACTITIONER

## 2019-06-19 PROCEDURE — 72110 XR LUMBAR SPINE COMPLETE 5 VIEW: ICD-10-PCS | Mod: 26,HCNC,, | Performed by: RADIOLOGY

## 2019-06-19 PROCEDURE — 1101F PR PT FALLS ASSESS DOC 0-1 FALLS W/OUT INJ PAST YR: ICD-10-PCS | Mod: HCNC,CPTII,S$GLB, | Performed by: NURSE PRACTITIONER

## 2019-06-19 PROCEDURE — 3079F DIAST BP 80-89 MM HG: CPT | Mod: HCNC,CPTII,S$GLB, | Performed by: NURSE PRACTITIONER

## 2019-06-19 PROCEDURE — 99214 OFFICE O/P EST MOD 30 MIN: CPT | Mod: HCNC,S$GLB,, | Performed by: NURSE PRACTITIONER

## 2019-06-19 PROCEDURE — 99999 PR PBB SHADOW E&M-EST. PATIENT-LVL IV: ICD-10-PCS | Mod: PBBFAC,HCNC,, | Performed by: NURSE PRACTITIONER

## 2019-06-19 NOTE — PROGRESS NOTES
Subjective:       Patient ID: Cornelia Delatorre is a 68 y.o. female.    Chief Complaint: Back Pain    Ms. Delatorre is a new patient to me. She presents today for back pain    HPI   Did PT in May for hamstring tightness--PT believes pain originating from spine. She reports she stopped PT last week and pain has been gradually improving. She suffers from chronic diabetic neuropathy--denies any new numbness/tingling   Vitals:    06/19/19 1238   BP: (!) 156/86   Pulse: 60   Resp: 16   Temp: 97.6 °F (36.4 °C)     Review of Systems   Constitutional: Negative for activity change and unexpected weight change.   HENT: Negative for hearing loss, rhinorrhea and trouble swallowing.    Eyes: Negative for discharge and visual disturbance.   Respiratory: Negative for chest tightness and wheezing.    Cardiovascular: Negative for chest pain and palpitations.   Gastrointestinal: Negative for blood in stool, constipation, diarrhea and vomiting.   Endocrine: Negative for polydipsia and polyuria.   Genitourinary: Negative for difficulty urinating, dysuria, hematuria and menstrual problem.   Musculoskeletal: Negative for arthralgias, joint swelling and neck pain.   Skin: Negative for pallor and rash.   Neurological: Negative for weakness and headaches.   Psychiatric/Behavioral: Negative for confusion and dysphoric mood.       Past Medical History:   Diagnosis Date    Allergy     Arthritis     Asthma     Cataract     Chronic fatigue 1/24/2017    Diabetes mellitus     resolved with gastric bypass    Encounter for blood transfusion     GERD (gastroesophageal reflux disease)     Headache(784.0)     History of lumpectomy of both breasts     1992 negative    Hyperlipidemia 7/15/2015    Hypertension     Hypothyroidism     Neuropathy     Obesity     SOHA on CPAP     Postmenopausal HRT (hormone replacement therapy)     Rash     Rosacea     Snoring     Wears glasses      Objective:      Physical Exam   Constitutional: She is  oriented to person, place, and time. She does not have a sickly appearance. No distress.   HENT:   Head: Normocephalic.   Right Ear: Hearing normal.   Left Ear: Hearing normal.   Nose: Nose normal.   Eyes: Conjunctivae and lids are normal.   Neck: No JVD present. No tracheal deviation present.   Cardiovascular: Normal rate and normal heart sounds.   Pulmonary/Chest: Effort normal and breath sounds normal.   Abdominal: Normal appearance. She exhibits no distension.   Musculoskeletal: She exhibits no deformity.        Lumbar back: She exhibits no swelling, no edema and no deformity.   Neurological: She is alert and oriented to person, place, and time.   Skin: She is not diaphoretic. No pallor.   Psychiatric: She has a normal mood and affect. Her speech is normal and behavior is normal. Judgment and thought content normal. Cognition and memory are normal.   Nursing note and vitals reviewed.      Assessment:       1. Pain in both lower extremities        Plan:       Pain in both lower extremities  -     X-Ray Lumbar Spine Complete 5 View; Future; Expected date: 06/19/2019    HTN: managed by digital HTN program, recently adjusted   Follow up for further evaluation if s/s worsen, fail to improve, or new symptoms arise.    Medication List with Changes/Refills   Current Medications    ACETAMINOPHEN (TYLENOL) 500 MG TABLET    Take 1,000 mg by mouth every 6 (six) hours as needed. Patient is taking 1000 ml TID for pain    ALBUTEROL 90 MCG/ACTUATION INHALER    Inhale 2 puffs into the lungs every 4 (four) hours as needed. 2 puffs every 4 hours as needed for cough, wheeze, or shortness of breath    ASPIRIN (ECOTRIN) 81 MG EC TABLET    Take 81 mg by mouth once daily.    AZELASTINE (OPTIVAR) 0.05 % OPHTHALMIC SOLUTION    Place 1 drop into both eyes daily as needed.     AZITHROMYCIN (ZITHROMAX) 500 MG TABLET    One daily for yellow mucous, repeat if needed    B-COMPLEX WITH VITAMIN C (Z-BEC OR EQUIV) TABLET    Take 1 tablet by mouth  once daily.    BIFIDOBACTERIUM INFANTIS (ALIGN ORAL)    Take by mouth once daily.    CALCIUM 600 WITH VITAMIN D3 600 MG(1,500MG) -400 UNIT CAP        CICLOPIROX (PENLAC) 8 % SOLN        CRANBERRY 500 MG CAP    Take 1 capsule by mouth every evening.    EPINEPHRINE (EPIPEN) 0.3 MG/0.3 ML ATIN        ESOMEPRAZOLE (NEXIUM) 40 MG CAPSULE    Take 1 capsule (40 mg total) by mouth before breakfast.    FEXOFENADINE (ALLEGRA) 60 MG TABLET    Take 60 mg by mouth once daily.    FISH OIL-OMEGA-3 FATTY ACIDS 300-1,000 MG CAPSULE    Take 2 g by mouth once daily.    FLUCONAZOLE (DIFLUCAN) 100 MG TABLET    Take 1.5 tablets (150 mg total) by mouth once daily. for 14 days    FLUTICASONE (FLONASE) 50 MCG/ACTUATION NASAL SPRAY    2 sprays (100 mcg total) by Each Nare route once daily.    GABAPENTIN (NEURONTIN) 600 MG TABLET    Take 1 tablet (600 mg total) by mouth 3 (three) times daily.    GUAIFENESIN (MUCINEX) 600 MG 12 HR TABLET    Take 2 tablets (1,200 mg total) by mouth 2 (two) times daily.    GUAIFENESIN-CODEINE 100-10 MG/5 ML (GUAIFENESIN AC)  MG/5 ML SYRUP    Take 5 mLs by mouth 3 (three) times daily as needed for Cough.    HYDROCHLOROTHIAZIDE (HYDRODIURIL) 25 MG TABLET    Take 1 tablet (25 mg total) by mouth once daily.    IMMUN GLOB G,IGG,-PRO-IGA 0-50 (HIZENTRA) 1 GRAM/5 ML (20 %) SOLN    Inject 11 g into the skin once a week.    LEVALBUTEROL (XOPENEX) 1.25 MG/3 ML NEBULIZER SOLUTION    Take 3 mLs (1.25 mg total) by nebulization every 4 (four) hours as needed for Wheezing or Shortness of Breath. Rescue    METFORMIN (GLUCOPHAGE-XR) 500 MG 24 HR TABLET    Take 2 tablets (1,000 mg total) by mouth daily with breakfast.    METHYLCELLULOSE, LAXATIVE, (CITRUCEL) 500 MG TAB    Take 1 tablet by mouth every morning.    METOPROLOL SUCCINATE (TOPROL-XL) 25 MG 24 HR TABLET    Take 1 tablet (25 mg total) by mouth once daily.    MONTELUKAST (SINGULAIR) 10 MG TABLET    Take 1 tablet (10 mg total) by mouth every evening.    MUCUS  CLEARING DEVICE KAREN    1 Device by Misc.(Non-Drug; Combo Route) route 2 (two) times daily.    MULTIVITAMIN CAPSULE    Take 1 capsule by mouth. Take one tablets daily    MUPIROCIN (BACTROBAN) 2 % OINTMENT    AAA bid    POTASSIUM CHLORIDE SA (K-DUR,KLOR-CON) 20 MEQ TABLET    Take 1 tablet (20 mEq total) by mouth once daily.    PRAVASTATIN (PRAVACHOL) 40 MG TABLET    Take 1 tablet (40 mg total) by mouth once daily.    PREDNISONE (DELTASONE) 20 MG TABLET    3 for 3 days then 2 for 3 days then one for 3 days and repeat for breathing problems    SIMETHICONE (GAS-X ORAL)    Take 1 capsule by mouth as needed.    SYMBICORT 160-4.5 MCG/ACTUATION HFAA    Inhale 2 puffs into the lungs every 12 (twelve) hours.    TIZANIDINE (ZANAFLEX) 2 MG TABLET    1 po tid prn leg muscle tension    VALSARTAN (DIOVAN) 160 MG TABLET    Take 1 tablet (160 mg total) by mouth once daily.    VARICELLA-ZOSTER GE-AS01B, PF, (SHINGRIX, PF,) 50 MCG/0.5 ML INJECTION    Inject 0.5 mLs into the muscle.

## 2019-06-20 ENCOUNTER — PATIENT MESSAGE (OUTPATIENT)
Dept: FAMILY MEDICINE | Facility: CLINIC | Age: 69
End: 2019-06-20

## 2019-06-25 ENCOUNTER — OFFICE VISIT (OUTPATIENT)
Dept: CARDIOLOGY | Facility: CLINIC | Age: 69
End: 2019-06-25
Payer: MEDICARE

## 2019-06-25 VITALS
WEIGHT: 194.25 LBS | BODY MASS INDEX: 33.16 KG/M2 | HEIGHT: 64 IN | HEART RATE: 57 BPM | SYSTOLIC BLOOD PRESSURE: 150 MMHG | DIASTOLIC BLOOD PRESSURE: 73 MMHG

## 2019-06-25 DIAGNOSIS — G47.33 OSA (OBSTRUCTIVE SLEEP APNEA): ICD-10-CM

## 2019-06-25 DIAGNOSIS — I10 ESSENTIAL HYPERTENSION: Primary | ICD-10-CM

## 2019-06-25 DIAGNOSIS — E78.5 DYSLIPIDEMIA: ICD-10-CM

## 2019-06-25 DIAGNOSIS — I70.0 ATHEROSCLEROSIS OF ABDOMINAL AORTA: ICD-10-CM

## 2019-06-25 DIAGNOSIS — I77.1 STRICTURE OF ARTERY: ICD-10-CM

## 2019-06-25 PROCEDURE — 1101F PR PT FALLS ASSESS DOC 0-1 FALLS W/OUT INJ PAST YR: ICD-10-PCS | Mod: HCNC,CPTII,S$GLB, | Performed by: INTERNAL MEDICINE

## 2019-06-25 PROCEDURE — 99999 PR PBB SHADOW E&M-EST. PATIENT-LVL III: CPT | Mod: PBBFAC,HCNC,, | Performed by: INTERNAL MEDICINE

## 2019-06-25 PROCEDURE — 99204 PR OFFICE/OUTPT VISIT, NEW, LEVL IV, 45-59 MIN: ICD-10-PCS | Mod: HCNC,S$GLB,, | Performed by: INTERNAL MEDICINE

## 2019-06-25 PROCEDURE — 3077F PR MOST RECENT SYSTOLIC BLOOD PRESSURE >= 140 MM HG: ICD-10-PCS | Mod: HCNC,CPTII,S$GLB, | Performed by: INTERNAL MEDICINE

## 2019-06-25 PROCEDURE — 3078F DIAST BP <80 MM HG: CPT | Mod: HCNC,CPTII,S$GLB, | Performed by: INTERNAL MEDICINE

## 2019-06-25 PROCEDURE — 99999 PR PBB SHADOW E&M-EST. PATIENT-LVL III: ICD-10-PCS | Mod: PBBFAC,HCNC,, | Performed by: INTERNAL MEDICINE

## 2019-06-25 PROCEDURE — 1101F PT FALLS ASSESS-DOCD LE1/YR: CPT | Mod: HCNC,CPTII,S$GLB, | Performed by: INTERNAL MEDICINE

## 2019-06-25 PROCEDURE — 3078F PR MOST RECENT DIASTOLIC BLOOD PRESSURE < 80 MM HG: ICD-10-PCS | Mod: HCNC,CPTII,S$GLB, | Performed by: INTERNAL MEDICINE

## 2019-06-25 PROCEDURE — 99204 OFFICE O/P NEW MOD 45 MIN: CPT | Mod: HCNC,S$GLB,, | Performed by: INTERNAL MEDICINE

## 2019-06-25 PROCEDURE — 3077F SYST BP >= 140 MM HG: CPT | Mod: HCNC,CPTII,S$GLB, | Performed by: INTERNAL MEDICINE

## 2019-06-25 RX ORDER — VALSARTAN AND HYDROCHLOROTHIAZIDE 160; 12.5 MG/1; MG/1
1 TABLET, FILM COATED ORAL DAILY
Qty: 90 TABLET | Refills: 3 | Status: SHIPPED | OUTPATIENT
Start: 2019-06-25 | End: 2020-03-11 | Stop reason: SDUPTHER

## 2019-06-25 RX ORDER — DILTIAZEM HYDROCHLORIDE 120 MG/1
120 CAPSULE, COATED, EXTENDED RELEASE ORAL NIGHTLY
Qty: 90 CAPSULE | Refills: 4 | Status: SHIPPED | OUTPATIENT
Start: 2019-06-25 | End: 2020-03-11 | Stop reason: SDUPTHER

## 2019-06-25 RX ORDER — CLONIDINE HYDROCHLORIDE 0.1 MG/1
0.1 TABLET ORAL 3 TIMES DAILY PRN
Qty: 90 TABLET | Refills: 6 | Status: SHIPPED | OUTPATIENT
Start: 2019-06-25 | End: 2020-11-04 | Stop reason: SDUPTHER

## 2019-06-25 NOTE — PROGRESS NOTES
Subjective:    Patient ID:  Cornelia Delatorre is a 68 y.o. female who presents for evaluation of Consult      HPI  Here to establish/transfer care from Dr. Vega concerning CAD/HLP. Stable KLINE. No angina. Rare palpitations lasting sec   BP chart very erratic BP        Review of Systems   Constitution: Negative for malaise/fatigue.   Eyes: Negative for blurred vision.   Cardiovascular: Negative for chest pain, claudication, cyanosis, dyspnea on exertion, irregular heartbeat, leg swelling, near-syncope, orthopnea, palpitations, paroxysmal nocturnal dyspnea and syncope.   Respiratory: Negative for cough and shortness of breath.    Hematologic/Lymphatic: Does not bruise/bleed easily.   Musculoskeletal: Negative for back pain, falls, joint pain, muscle cramps, muscle weakness and myalgias.   Gastrointestinal: Negative for abdominal pain, change in bowel habit, nausea and vomiting.   Genitourinary: Negative for urgency.   Neurological: Negative for dizziness, focal weakness and light-headedness.       Past Medical History:   Diagnosis Date    Allergy     Arthritis     Asthma     Cataract     Chronic fatigue 1/24/2017    Diabetes mellitus     resolved with gastric bypass    Encounter for blood transfusion     GERD (gastroesophageal reflux disease)     Headache(784.0)     History of lumpectomy of both breasts     1992 negative    Hyperlipidemia 7/15/2015    Hypertension     Hypothyroidism     Neuropathy     Obesity     SOHA on CPAP     Postmenopausal HRT (hormone replacement therapy)     Rash     Rosacea     Snoring     Wears glasses           Objective:    Physical Exam   Constitutional: She is oriented to person, place, and time. She appears well-developed and well-nourished.   HENT:   Head: Normocephalic.   Eyes: Conjunctivae are normal.   Neck: Normal range of motion. Neck supple. No JVD present.   Cardiovascular: Normal rate, regular rhythm, normal heart sounds and intact distal pulses.    Pulses:       Carotid pulses are 2+ on the right side, and 2+ on the left side.       Radial pulses are 2+ on the right side, and 2+ on the left side.        Dorsalis pedis pulses are 2+ on the right side, and 2+ on the left side.        Posterior tibial pulses are 2+ on the right side, and 2+ on the left side.   Pulmonary/Chest: Effort normal and breath sounds normal.   Abdominal: Soft. Bowel sounds are normal.   Musculoskeletal: She exhibits no edema or tenderness.   Neurological: She is alert and oriented to person, place, and time. Gait normal.   Skin: Skin is warm, dry and intact. No cyanosis. Nails show no clubbing.   Psychiatric: She has a normal mood and affect. Her speech is normal and behavior is normal. Thought content normal.   Nursing note and vitals reviewed.              ..    Chemistry        Component Value Date/Time     05/01/2019 1028    K 4.5 05/01/2019 1028     05/01/2019 1028    CO2 31 (H) 05/01/2019 1028    BUN 12 05/01/2019 1028    CREATININE 0.8 05/01/2019 1028    GLU 92 05/01/2019 1028        Component Value Date/Time    CALCIUM 9.6 05/01/2019 1028    ALKPHOS 110 05/01/2019 1028    AST 30 05/01/2019 1028    ALT 37 05/01/2019 1028    BILITOT 0.7 05/01/2019 1028    ESTGFRAFRICA >60.0 05/01/2019 1028    EGFRNONAA >60.0 05/01/2019 1028            ..  Lab Results   Component Value Date    CHOL 162 05/01/2019    CHOL 129 04/19/2018    CHOL 126 10/23/2017     Lab Results   Component Value Date    HDL 62 05/01/2019    HDL 53 04/19/2018    HDL 52 10/23/2017     Lab Results   Component Value Date    LDLCALC 78.0 05/01/2019    LDLCALC 59.8 (L) 04/19/2018    LDLCALC 55.6 (L) 10/23/2017     Lab Results   Component Value Date    TRIG 110 05/01/2019    TRIG 81 04/19/2018    TRIG 92 10/23/2017     Lab Results   Component Value Date    CHOLHDL 38.3 05/01/2019    CHOLHDL 41.1 04/19/2018    CHOLHDL 41.3 10/23/2017     ..  Lab Results   Component Value Date    WBC 5.80 01/28/2019    HGB 12.0  01/28/2019    HCT 38.5 01/28/2019    MCV 91 01/28/2019     01/28/2019       Test(s) Reviewed  I have reviewed the following in detail:  [] Stress test   [] Angiography   [] Echocardiogram   [x] Labs   [] Other:       Assessment:         ICD-10-CM ICD-9-CM   1. Essential hypertension I10 401.9   2. Dyslipidemia E78.5 272.4   3. Atherosclerosis of abdominal aorta I70.0 440.0   4. SOHA (obstructive sleep apnea), Auto BIPAP Mod OSAS since Dec 2013, therapeutic and compliant 100%, ESS 6/24 Feb 2014 G47.33 327.23     Problem List Items Addressed This Visit     SOHA (obstructive sleep apnea), Auto BIPAP Mod OSAS since Dec 2013, therapeutic and compliant 100%, ESS 6/24 Feb 2014    Essential hypertension - Primary    Dyslipidemia    Atherosclerosis of abdominal aorta           Plan:           Return to clinic 3 months   Low level/low impact aerobic exercise 5x's/wk. Heart healthy diet and risk factor modification.    See labs and med orders.  BP better since back on ARB continue it  Blood pressure was taken with large size cuff.  Clonidine prn  ERIC- us  holter due to bradycardia  Get [facility] records    Portions of this note may have been created with voice recognition software.  Grammatical, syntax and spelling errors may be inevitable.

## 2019-07-01 ENCOUNTER — PATIENT OUTREACH (OUTPATIENT)
Dept: OTHER | Facility: OTHER | Age: 69
End: 2019-07-01

## 2019-07-01 NOTE — PROGRESS NOTES
"Last 5 Patient Entered Readings                                      Current 30 Day Average: 148/78     Recent Readings 7/1/2019 7/1/2019 6/30/2019 6/29/2019 6/27/2019    SBP (mmHg) 147 154 119 122 161    DBP (mmHg) 78 77 70 63 85    Pulse 65 64 72 66 65          Digital Medicine: Health  Follow Up    Lifestyle Modifications:    1.Dietary Modifications (Sodium intake <2,000mg/day, food labels, dining out): deferred    2.Physical Activity: deferred    3.Medication Therapy: Patient has been compliant with the medication regimen. Patient reports that she went to see her cardiologist (Dr. Noel) last week and he "changed around some medications". She reports that she was seeing some really good readings this weekend and she think's it's from the medication changes.    4.Patient has the following medication side effects/concerns: none  (Frequency/Alleviating factors/Precipitating factors, etc.)     Follow up with Ms. Cornelia Delatorre completed. Ms. Delatorre reports doing well today. She said she did notice the higher readings today, and she reports not doing anything differently. She did say that she was having more pain this morning in her back and legs. She had done PT for her pain about a month ago, but that increased the symptoms. Patient is looking to go back to the doctor to see if pain management is the right choice for her pain. Patient also has orders for the DDMP and is thinking of going to the [facility] to get her glucometer next week. No further questions or concerns. Will continue to follow up to achieve health goals.    "

## 2019-07-05 ENCOUNTER — PATIENT MESSAGE (OUTPATIENT)
Dept: ADMINISTRATIVE | Facility: OTHER | Age: 69
End: 2019-07-05

## 2019-07-08 DIAGNOSIS — E11.9 TYPE 2 DIABETES MELLITUS: ICD-10-CM

## 2019-07-09 ENCOUNTER — PATIENT MESSAGE (OUTPATIENT)
Dept: ALLERGY | Facility: CLINIC | Age: 69
End: 2019-07-09

## 2019-07-09 ENCOUNTER — PATIENT MESSAGE (OUTPATIENT)
Dept: FAMILY MEDICINE | Facility: CLINIC | Age: 69
End: 2019-07-09

## 2019-07-10 ENCOUNTER — TELEPHONE (OUTPATIENT)
Dept: FAMILY MEDICINE | Facility: CLINIC | Age: 69
End: 2019-07-10

## 2019-07-10 ENCOUNTER — PATIENT OUTREACH (OUTPATIENT)
Dept: OTHER | Facility: OTHER | Age: 69
End: 2019-07-10

## 2019-07-10 DIAGNOSIS — M43.10 ANTEROLISTHESIS: ICD-10-CM

## 2019-07-10 DIAGNOSIS — R20.2 LEFT LEG PARESTHESIAS: ICD-10-CM

## 2019-07-10 DIAGNOSIS — R20.2 BILATERAL LEG PARESTHESIA: Primary | ICD-10-CM

## 2019-07-10 NOTE — PROGRESS NOTES
HPI:  Called patient to follow up. NA, LVM requesting a call back.     Assessment:  Reviewed recent readings. Per 2017 ACC/ AHA HTN guidelines (goal of BP < 130/80), current 30-day average is above .     Last 5 Patient Entered Readings                                      Current 30 Day Average: 145/78     Recent Readings 7/9/2019 7/3/2019 7/3/2019 7/2/2019 7/1/2019    SBP (mmHg) 118 134 148 141 128    DBP (mmHg) 64 68 80 86 61    Pulse 83 76 66 71 69        Current medication regimen:  Hypertension Medications             cloNIDine (CATAPRES) 0.1 MG tablet Take 1 tablet (0.1 mg total) by mouth 3 (three) times daily as needed (PRN SBP > 165 mmHg).    diltiaZEM (CARDIZEM CD) 120 MG Cp24 Take 1 capsule (120 mg total) by mouth every evening.    valsartan-hydrochlorothiazide (DIOVAN-HCT) 160-12.5 mg per tablet Take 1 tablet by mouth once daily.          Plan:  Continue current medication regimen.   Recent medication changes made by cardiology, about 2 weeks ago - need 4 weeks for ss to be achieved. Will continue to monitor and will  Schedule next outreach in 4-6 weeks.

## 2019-07-22 ENCOUNTER — OFFICE VISIT (OUTPATIENT)
Dept: OTOLARYNGOLOGY | Facility: CLINIC | Age: 69
End: 2019-07-22
Payer: MEDICARE

## 2019-07-22 VITALS — WEIGHT: 196.19 LBS | HEIGHT: 64 IN | BODY MASS INDEX: 33.49 KG/M2

## 2019-07-22 DIAGNOSIS — J04.0 ACUTE LARYNGITIS: Primary | ICD-10-CM

## 2019-07-22 DIAGNOSIS — R49.0 DYSPHONIA: ICD-10-CM

## 2019-07-22 PROCEDURE — 99213 PR OFFICE/OUTPT VISIT, EST, LEVL III, 20-29 MIN: ICD-10-PCS | Mod: 25,HCNC,S$GLB, | Performed by: OTOLARYNGOLOGY

## 2019-07-22 PROCEDURE — 31575 PR LARYNGOSCOPY, FLEXIBLE; DIAGNOSTIC: ICD-10-PCS | Mod: HCNC,S$GLB,, | Performed by: OTOLARYNGOLOGY

## 2019-07-22 PROCEDURE — 99999 PR PBB SHADOW E&M-EST. PATIENT-LVL II: ICD-10-PCS | Mod: PBBFAC,HCNC,, | Performed by: OTOLARYNGOLOGY

## 2019-07-22 PROCEDURE — 1101F PT FALLS ASSESS-DOCD LE1/YR: CPT | Mod: HCNC,CPTII,S$GLB, | Performed by: OTOLARYNGOLOGY

## 2019-07-22 PROCEDURE — 1101F PR PT FALLS ASSESS DOC 0-1 FALLS W/OUT INJ PAST YR: ICD-10-PCS | Mod: HCNC,CPTII,S$GLB, | Performed by: OTOLARYNGOLOGY

## 2019-07-22 PROCEDURE — 99999 PR PBB SHADOW E&M-EST. PATIENT-LVL II: CPT | Mod: PBBFAC,HCNC,, | Performed by: OTOLARYNGOLOGY

## 2019-07-22 PROCEDURE — 99213 OFFICE O/P EST LOW 20 MIN: CPT | Mod: 25,HCNC,S$GLB, | Performed by: OTOLARYNGOLOGY

## 2019-07-22 PROCEDURE — 31575 DIAGNOSTIC LARYNGOSCOPY: CPT | Mod: HCNC,S$GLB,, | Performed by: OTOLARYNGOLOGY

## 2019-07-22 RX ORDER — FLUCONAZOLE 100 MG/1
100 TABLET ORAL DAILY
Qty: 10 TABLET | Refills: 0 | Status: SHIPPED | OUTPATIENT
Start: 2019-07-22 | End: 2019-08-01

## 2019-07-22 NOTE — PROGRESS NOTES
Subjective:       Patient ID: Cornelia Delatorre is a 68 y.o. female.    Chief Complaint: Follow-up and Hoarse    Cornelia is here for follow-up of dysphonia. She had complete improvement with Diflucan and was doing well for a few weeks, but noted a few days ago has had increase in raspy quality to voice. No fevers, no dysphagia, no otalgia. She does feel a sinus drip and feels that her allergies have increased recently.   She takes Singulair, Allegra, Flonase, Astelin  She gets Ig infusions.    She is using Symbicort inhaler and has been on this for 2.5 - 3 years.   She does have DM and reports good control.     Review of Systems   Constitutional: Negative for activity change and appetite change.   Respiratory: Negative for difficulty breathing and wheezing   Cardiovascular: Negative for chest pain.      Objective:        Constitutional:   Vital signs are normal. She appears well-developed and well-nourished.   Mild raspy quality to voice     Head:  Normocephalic and atraumatic.     Ears:  Hearing normal to normal and whispered voice; external ear normal without scars, lesions, or masses; ear canal, tympanic membrane, and middle ear normal..     Nose:  Nose normal including turbinates, nasal mucosa, sinuses and nasal septum.     Mouth/Throat  Oropharynx clear and moist without lesions or asymmetry.     Neck:  Neck normal without thyromegaly masses, asymmetry, normal tracheal structure, crepitus, and tenderness.         Tests / Results:  Pre-procedure diagnosis: The primary encounter diagnosis was Acute laryngitis. A diagnosis of Dysphonia was also pertinent to this visit.     Post-procedure diagnosis: same    Procedure: Flexible fiberoptic laryngoscopy    Surgeon: Fly Johnson MD    Anesthesia: 2% Lidocaine with Phenylephrine topical    Risks, benefits, and alternatives of the procedure were discussed with the patient, and the patient consented to the fiberoptic examination.  We applied a topical nasal  decongestant and analgesic.  After adequate anesthesia was obtained, the flexible fiberoptic scope was passed through the nasal cavity. The entire pharynx (nasopharynx to hypopharynx) and the larynx were visualized. At the end of the examination, the scope was removed. The patient tolerated the procedure well with no complications.     Findings:  -     Laryngeal mucosa is normal  -     Post-cricoid region: normal with exudate in the piriform sinuses and post cricoid region  -     Lingual tonsils have no hypertrophy  -     Adenoids have no  hypertrophy  -     Right vocal fold: normal mobility     mass/lesion: Medial cord erythema and mild edema, improved from previous exam  -     Left vocal fold: normal mobility     mass/lesion: Medial cord erythema and mild edema, improved from previous exam  -     Other findings: Reduced pliability of vocal fold wave        Assessment:       1. Acute laryngitis    2. Dysphonia          Plan:       Had improvement but then recurrence over the past few days.  She does still have evidence of fungal laryngitis with some exudates in the hypopharynx as well. She is on Symbicort which I think is predisposing her to this.  She does report history of thrush as well in the past.  I am going to check and see if we can get a spacer for her to improve delivery.  Diflucan 10 days  Stop oral AH for now d/t drying effects. Can restart if needed

## 2019-07-22 NOTE — Clinical Note
Ms. Delatorre is having some issues with thrush and fungal laryngitis. She takes Symbicort and I think it may be contributing. I am not familiar with the delivery device of Symbicort, but are you aware if a spacer can be attached to this? I'd like to see if I can get her a better way to get lung delivery with less side effects. Thank you!

## 2019-07-23 ENCOUNTER — PATIENT MESSAGE (OUTPATIENT)
Dept: OTOLARYNGOLOGY | Facility: CLINIC | Age: 69
End: 2019-07-23

## 2019-07-23 DIAGNOSIS — M79.642 BILATERAL HAND PAIN: Primary | ICD-10-CM

## 2019-07-23 DIAGNOSIS — M79.641 BILATERAL HAND PAIN: Primary | ICD-10-CM

## 2019-07-24 ENCOUNTER — OFFICE VISIT (OUTPATIENT)
Dept: ORTHOPEDICS | Facility: CLINIC | Age: 69
End: 2019-07-24
Payer: MEDICARE

## 2019-07-24 ENCOUNTER — HOSPITAL ENCOUNTER (OUTPATIENT)
Dept: RADIOLOGY | Facility: HOSPITAL | Age: 69
Discharge: HOME | End: 2019-07-24
Attending: ORTHOPAEDIC SURGERY
Payer: MEDICARE

## 2019-07-24 ENCOUNTER — PATIENT MESSAGE (OUTPATIENT)
Dept: ALLERGY | Facility: CLINIC | Age: 69
End: 2019-07-24

## 2019-07-24 VITALS
BODY MASS INDEX: 33.46 KG/M2 | DIASTOLIC BLOOD PRESSURE: 65 MMHG | SYSTOLIC BLOOD PRESSURE: 146 MMHG | HEIGHT: 64 IN | WEIGHT: 196 LBS | HEART RATE: 65 BPM

## 2019-07-24 DIAGNOSIS — M65.312 TRIGGER THUMB, LEFT THUMB: Primary | ICD-10-CM

## 2019-07-24 DIAGNOSIS — M79.642 BILATERAL HAND PAIN: ICD-10-CM

## 2019-07-24 DIAGNOSIS — M79.641 BILATERAL HAND PAIN: ICD-10-CM

## 2019-07-24 PROCEDURE — 20550 NJX 1 TENDON SHEATH/LIGAMENT: CPT | Mod: FA,HCNC,S$GLB, | Performed by: ORTHOPAEDIC SURGERY

## 2019-07-24 PROCEDURE — 3077F SYST BP >= 140 MM HG: CPT | Mod: HCNC,CPTII,S$GLB, | Performed by: ORTHOPAEDIC SURGERY

## 2019-07-24 PROCEDURE — 3078F PR MOST RECENT DIASTOLIC BLOOD PRESSURE < 80 MM HG: ICD-10-PCS | Mod: HCNC,CPTII,S$GLB, | Performed by: ORTHOPAEDIC SURGERY

## 2019-07-24 PROCEDURE — 1101F PR PT FALLS ASSESS DOC 0-1 FALLS W/OUT INJ PAST YR: ICD-10-PCS | Mod: HCNC,CPTII,S$GLB, | Performed by: ORTHOPAEDIC SURGERY

## 2019-07-24 PROCEDURE — 20550 TENDON SHEATH: L THUMB MCP: ICD-10-PCS | Mod: FA,HCNC,S$GLB, | Performed by: ORTHOPAEDIC SURGERY

## 2019-07-24 PROCEDURE — 1101F PT FALLS ASSESS-DOCD LE1/YR: CPT | Mod: HCNC,CPTII,S$GLB, | Performed by: ORTHOPAEDIC SURGERY

## 2019-07-24 PROCEDURE — 3078F DIAST BP <80 MM HG: CPT | Mod: HCNC,CPTII,S$GLB, | Performed by: ORTHOPAEDIC SURGERY

## 2019-07-24 PROCEDURE — 99999 PR PBB SHADOW E&M-EST. PATIENT-LVL III: CPT | Mod: PBBFAC,HCNC,, | Performed by: ORTHOPAEDIC SURGERY

## 2019-07-24 PROCEDURE — 73130 XR HAND COMPLETE 3 VIEWS BILATERAL: ICD-10-PCS | Mod: 26,50,HCNC, | Performed by: RADIOLOGY

## 2019-07-24 PROCEDURE — 3077F PR MOST RECENT SYSTOLIC BLOOD PRESSURE >= 140 MM HG: ICD-10-PCS | Mod: HCNC,CPTII,S$GLB, | Performed by: ORTHOPAEDIC SURGERY

## 2019-07-24 PROCEDURE — 73130 X-RAY EXAM OF HAND: CPT | Mod: 26,50,HCNC, | Performed by: RADIOLOGY

## 2019-07-24 PROCEDURE — 73130 X-RAY EXAM OF HAND: CPT | Mod: 50,TC,HCNC,PO

## 2019-07-24 PROCEDURE — 99213 PR OFFICE/OUTPT VISIT, EST, LEVL III, 20-29 MIN: ICD-10-PCS | Mod: 25,HCNC,S$GLB, | Performed by: ORTHOPAEDIC SURGERY

## 2019-07-24 PROCEDURE — 99999 PR PBB SHADOW E&M-EST. PATIENT-LVL III: ICD-10-PCS | Mod: PBBFAC,HCNC,, | Performed by: ORTHOPAEDIC SURGERY

## 2019-07-24 PROCEDURE — 99213 OFFICE O/P EST LOW 20 MIN: CPT | Mod: 25,HCNC,S$GLB, | Performed by: ORTHOPAEDIC SURGERY

## 2019-07-24 RX ORDER — TRIAMCINOLONE ACETONIDE 40 MG/ML
40 INJECTION, SUSPENSION INTRA-ARTICULAR; INTRAMUSCULAR
Status: DISCONTINUED | OUTPATIENT
Start: 2019-07-24 | End: 2019-07-24 | Stop reason: HOSPADM

## 2019-07-24 RX ADMIN — TRIAMCINOLONE ACETONIDE 40 MG: 40 INJECTION, SUSPENSION INTRA-ARTICULAR; INTRAMUSCULAR at 02:07

## 2019-07-24 NOTE — PROGRESS NOTES
Past Medical History:   Diagnosis Date    Allergy     Arthritis     Asthma     Cataract     Chronic fatigue 1/24/2017    Diabetes mellitus     resolved with gastric bypass    Encounter for blood transfusion     GERD (gastroesophageal reflux disease)     Headache(784.0)     History of lumpectomy of both breasts     1992 negative    Hyperlipidemia 7/15/2015    Hypertension     Hypothyroidism     Neuropathy     Obesity     SOHA on CPAP     Postmenopausal HRT (hormone replacement therapy)     Rash     Rosacea     Snoring     Wears glasses        Past Surgical History:   Procedure Laterality Date    ADENOIDECTOMY      APPENDECTOMY      BIOPSY-LESION nasal septum - INTRANASAL MUCOSAL LESION Right 12/1/2014    Performed by [facility]ie Loja [facility] at [facility] OR    BLADDER SUSPENSION      BREAST BIOPSY Bilateral 1992    bilateral benign excisional biopsies    BUNIONECTOMY  10/17/14    right, still has discomfort    BUNIONECTOMY-TAILOR Right 10/17/2014    Performed by Farzad Eng DPM at [facility] OR    CATARACT EXTRACTION W/  INTRAOCULAR LENS IMPLANT Bilateral     CHOLECYSTECTOMY  08/02/2017    CHOLECYSTECTOMY-LAPAROSCOPIC N/A 8/2/2017    [facility] OR    COLONOSCOPY      ESOPHAGOGASTRODUODENOSCOPY      dilated esophagus    GASTRIC BYPASS      2011    HYSTERECTOMY  1980    interstim bladder  2009    10/14/14 new battery    OOPHORECTOMY  1980    RELEASE-CARPAL TUNNEL Left 8/29/2017    [facility] OR    REPAIR-HAMMER TOE- 2nd toe 2nd Procedure: HTC 3rd toe with T&C- 58329. Hammer toe correction, tendon release to 4th right toe. Right 10/17/2014    Performed by Farzad Eng DPM at [facility] OR    JXIZZORG-XNIWQGFWD-HGGBRTD IPG OF INTERSTIM Right 10/14/2014    Performed by Mary Jane Jarvis [facility] at [facility] OR 2ND FLR    SKIN CANCER EXCISION      left hand    TONSILLECTOMY      WISDOM TOOTH EXTRACTION         Current Outpatient Medications    Medication Sig    acetaminophen (TYLENOL) 500 MG tablet Take 1,000 mg by mouth every 6 (six) hours as needed. Patient is taking 1000 ml TID for pain    albuterol 90 mcg/actuation inhaler Inhale 2 puffs into the lungs every 4 (four) hours as needed. 2 puffs every 4 hours as needed for cough, wheeze, or shortness of breath    aspirin (ECOTRIN) 81 MG EC tablet Take 81 mg by mouth once daily.    azelastine (OPTIVAR) 0.05 % ophthalmic solution Place 1 drop into both eyes daily as needed.     B-complex with vitamin C (Z-BEC OR EQUIV) tablet Take 1 tablet by mouth once daily.    BIFIDOBACTERIUM INFANTIS (ALIGN ORAL) Take by mouth once daily.    CALCIUM 600 WITH VITAMIN D3 600 mg(1,500mg) -400 unit Cap     cloNIDine (CATAPRES) 0.1 MG tablet Take 1 tablet (0.1 mg total) by mouth 3 (three) times daily as needed (PRN SBP > 165 mmHg).    cranberry 500 mg Cap Take 1 capsule by mouth every evening.    diltiaZEM (CARDIZEM CD) 120 MG Cp24 Take 1 capsule (120 mg total) by mouth every evening.    EPINEPHrine (EPIPEN) 0.3 mg/0.3 mL AtIn     esomeprazole (NEXIUM) 40 MG capsule Take 1 capsule (40 mg total) by mouth before breakfast.    fexofenadine (ALLEGRA) 60 MG tablet Take 60 mg by mouth once daily.    fish oil-omega-3 fatty acids 300-1,000 mg capsule Take 2 g by mouth once daily.    fluconazole (DIFLUCAN) 100 MG tablet Take 1 tablet (100 mg total) by mouth once daily. for 10 days    fluticasone (FLONASE) 50 mcg/actuation nasal spray 2 sprays (100 mcg total) by Each Nare route once daily.    gabapentin (NEURONTIN) 600 MG tablet Take 1 tablet (600 mg total) by mouth 3 (three) times daily.    guaiFENesin (MUCINEX) 600 mg 12 hr tablet Take 2 tablets (1,200 mg total) by mouth 2 (two) times daily.    guaifenesin-codeine 100-10 mg/5 ml (GUAIFENESIN AC)  mg/5 mL syrup Take 5 mLs by mouth 3 (three) times daily as needed for Cough.    immun glob G,IgG,-pro-IgA 0-50 (HIZENTRA) 1 gram/5 mL (20 %) Soln Inject 11 g  into the skin once a week.    inhalation spacing device Use as directed for inhalation.    levalbuterol (XOPENEX) 1.25 mg/3 mL nebulizer solution Take 3 mLs (1.25 mg total) by nebulization every 4 (four) hours as needed for Wheezing or Shortness of Breath. Rescue    metFORMIN (GLUCOPHAGE-XR) 500 MG 24 hr tablet Take 2 tablets (1,000 mg total) by mouth daily with breakfast.    methylcellulose, laxative, (CITRUCEL) 500 mg Tab Take 1 tablet by mouth every morning.    montelukast (SINGULAIR) 10 mg tablet Take 1 tablet (10 mg total) by mouth every evening.    mucus clearing device Cecy 1 Device by Misc.(Non-Drug; Combo Route) route 2 (two) times daily.    multivitamin capsule Take 1 capsule by mouth. Take one tablets daily    mupirocin (BACTROBAN) 2 % ointment AAA bid    potassium chloride SA (K-DUR,KLOR-CON) 20 MEQ tablet Take 1 tablet (20 mEq total) by mouth once daily.    pravastatin (PRAVACHOL) 40 MG tablet Take 1 tablet (40 mg total) by mouth once daily.    predniSONE (DELTASONE) 20 MG tablet 3 for 3 days then 2 for 3 days then one for 3 days and repeat for breathing problems    SIMETHICONE (GAS-X ORAL) Take 1 capsule by mouth as needed.    SYMBICORT 160-4.5 mcg/actuation HFAA Inhale 2 puffs into the lungs every 12 (twelve) hours.    valsartan-hydrochlorothiazide (DIOVAN-HCT) 160-12.5 mg per tablet Take 1 tablet by mouth once daily.    varicella-zoster gE-AS01B, PF, (SHINGRIX, PF,) 50 mcg/0.5 mL injection Inject 0.5 mLs into the muscle.     No current facility-administered medications for this visit.        Review of patient's allergies indicates:   Allergen Reactions    Sulfa (sulfonamide antibiotics) Hives    Naproxen Other (See Comments)     Other reaction(s): RT sided numbness         Family History   Problem Relation Age of Onset    Breast cancer Mother 50    Diabetes Unknown     Hypertension Unknown     Hypothyroidism Unknown     Breast cancer Paternal Aunt         40's    Allergic  rhinitis Neg Hx     Allergies Neg Hx     Angioedema Neg Hx     Asthma Neg Hx     Atopy Neg Hx     Eczema Neg Hx     Immunodeficiency Neg Hx     Rhinitis Neg Hx     Urticaria Neg Hx        Social History     Socioeconomic History    Marital status:      Spouse name: Not on file    Number of children: Not on file    Years of education: Not on file    Highest education level: Not on file   Occupational History    Not on file   Social Needs    Financial resource strain: Somewhat hard    Food insecurity:     Worry: Never true     Inability: Never true    Transportation needs:     Medical: No     Non-medical: No   Tobacco Use    Smoking status: Never Smoker    Smokeless tobacco: Never Used   Substance and Sexual Activity    Alcohol use: No     Frequency: Never     Binge frequency: Never    Drug use: No    Sexual activity: Not Currently   Lifestyle    Physical activity:     Days per week: 0 days     Minutes per session: 0 min    Stress: Only a little   Relationships    Social connections:     Talks on phone: More than three times a week     Gets together: Once a week     Attends Hinduism service: Not on file     Active member of club or organization: Yes     Attends meetings of clubs or organizations: More than 4 times per year     Relationship status:    Other Topics Concern    Are you pregnant or think you may be? Not Asked    Breast-feeding Not Asked   Social History Narrative    Not on file       Chief Complaint:   Chief Complaint   Patient presents with    Hand Pain     bilateral        History of present illness:  68-year-old female comes in with bilateral hand pain. She complains that she has swelling of the index and thumb on the right.  Pain grabbing things.  This is a been ongoing now for about 6 months.  She also has a trigger thumb that is been going on on the left hand for about a month.  Pain is a 5/10.    Answers for HPI/ROS submitted by the patient on 7/23/2019    Hand injury  activity change: No  unexpected weight change: No  neck pain: No  hearing loss: No  rhinorrhea: No  trouble swallowing: No  eye discharge: No  visual disturbance: No  chest tightness: No  wheezing: No  chest pain: No  palpitations: No  blood in stool: No  constipation: No  vomiting: No  diarrhea: No  polydipsia: No  polyuria: No  difficulty urinating: No  hematuria: No  menstrual problem: No  dysuria: No  joint swelling: Yes  arthralgias: Yes  headaches: No  weakness: No  confusion: No  dysphoric mood: No  appetite change : No  sleep disturbance: No  IMMUNOCOMPROMISED: Yes  nervous/ anxious: No  rash: No  eye redness: No  eye pain: No  ear pain: No  tinnitus: Yes  sinus pressure : No  nosebleeds: No  enviro allergies: Yes  food allergies: No  cough: No  shortness of breath: Yes  sweating: No  frequency: No  painful intercourse: No  nausea: No  dizziness: No  numbness: No  seizures: No  myalgia: Yes  back pain: Yes  Pain Chronicity: recurrent  History of trauma: No  Onset: 1 to 4 weeks ago  Frequency: constantly  Progression since onset: gradually worsening  Injury mechanism: lifting  injury location: at home  pain- numeric: 8/10  pain location: right hand  pain quality: aching, tightness  Radiating Pain: Yes  If your pain is radiating, to what part of the body?: right hand  Aggravating factors: bearing weight  fever: No  inability to bear weight: Yes  itching: No  joint locking: No  limited range of motion: Yes  stiffness: Yes  tingling: No  Treatments tried: brace/corset, OTC ointments  physical therapy: not tried  Improvement on treatment: no relief      Physical Examination:    Vital Signs:    Vitals:    07/24/19 1334   BP: (!) 146/65   Pulse: 65       Body mass index is 33.64 kg/m².    This a well-developed, well nourished patient in no acute distress.  They are alert and oriented and cooperative to examination.  Pt. walks without an antalgic gait.      Examination of the right hand and wrist  shows no signs of rashes or erythema. Patient has some swelling near the index and middle MCP joints dorsally. Patient has full range of motion of the wrist in flexion and extension as well as ulnar and radial deviation. The patient also has full range of motion of all joints in the hand. There are 2+ radial pulse and intact light touch sensation in all 5 digits.     Examination of the left hand and wrist shows no signs of rashes or erythema. Patient has no masses ecchymosis or effusions. Patient has full range of motion of the wrist in flexion and extension as well as ulnar and radial deviation. The patient also has full range of motion of all joints in the hand. There are 2+ radial pulse and intact light touch sensation in all 5 digits.  Pain and palpable triggering over the A1 pulley of the thumb        X-rays:  Three views of the right hand are ordered and reviewed which show some mild arthritic changes of the IP joints  X-rays of the left hand are ordered and reviewed which show mild arthritic     Assessment::  Right hand arthritis with swelling changes  Left trigger thumb    Plan:  I reviewed the findings with her today.  Offered her a trigger thumb injection for the left hand.  Recommended consultation with Dr. Mckeon for her right hand arthritis.    This note was created using [employer] voice recognition software that occasionally misinterpreted phrases or words.    Consult note is delivered via Epic messaging service.

## 2019-07-24 NOTE — PROCEDURES
Tendon Sheath: L thumb MCP  Date/Time: 7/24/2019 2:20 PM  Performed by: Robbin Edmond MD  Authorized by: Robbin Edmond MD     Consent Done?: Yes (Verbal)  Timeout: prior to procedure the correct patient, procedure, and site was verified   Indications:  Pain  Site marked: the procedure site was marked    Timeout: prior to procedure the correct patient, procedure, and site was verified    Location:  Thumb  Site:  L thumb MCP  Prep: patient was prepped and draped in usual sterile fashion    Ultrasonic guidance for needle placement?: No    Needle size:  22 G  Approach:  Volar  Medications:  40 mg triamcinolone acetonide 40 mg/mL  Patient tolerance:  Patient tolerated the procedure well with no immediate complications

## 2019-07-26 ENCOUNTER — PATIENT MESSAGE (OUTPATIENT)
Dept: ALLERGY | Facility: CLINIC | Age: 69
End: 2019-07-26

## 2019-07-26 ENCOUNTER — TELEPHONE (OUTPATIENT)
Dept: ALLERGY | Facility: CLINIC | Age: 69
End: 2019-07-26

## 2019-07-26 NOTE — TELEPHONE ENCOUNTER
I did receive her skin test from Dr herrera but unsure of some of results, would prefer to re test if wants to consider allergy shots    Dr Herrera recorded 11mm for histamine and only 7 for live oak, 7 red top grass, 13 fusarium, 9 helminthosporium, 13 hormodendrum, 9 penicillium, 7 red maple and rest negative

## 2019-07-30 ENCOUNTER — OFFICE VISIT (OUTPATIENT)
Dept: PAIN MEDICINE | Facility: CLINIC | Age: 69
End: 2019-07-30
Payer: MEDICARE

## 2019-07-30 ENCOUNTER — CLINICAL SUPPORT (OUTPATIENT)
Dept: CARDIOLOGY | Facility: CLINIC | Age: 69
End: 2019-07-30
Attending: INTERNAL MEDICINE
Payer: MEDICARE

## 2019-07-30 ENCOUNTER — PATIENT MESSAGE (OUTPATIENT)
Dept: ALLERGY | Facility: CLINIC | Age: 69
End: 2019-07-30

## 2019-07-30 ENCOUNTER — PATIENT OUTREACH (OUTPATIENT)
Dept: OTHER | Facility: OTHER | Age: 69
End: 2019-07-30

## 2019-07-30 VITALS
HEART RATE: 70 BPM | WEIGHT: 197 LBS | SYSTOLIC BLOOD PRESSURE: 135 MMHG | HEIGHT: 64 IN | BODY MASS INDEX: 33.63 KG/M2 | DIASTOLIC BLOOD PRESSURE: 79 MMHG

## 2019-07-30 DIAGNOSIS — I70.0 ATHEROSCLEROSIS OF ABDOMINAL AORTA: ICD-10-CM

## 2019-07-30 DIAGNOSIS — M54.42 CHRONIC BILATERAL LOW BACK PAIN WITH BILATERAL SCIATICA: ICD-10-CM

## 2019-07-30 DIAGNOSIS — G47.33 OSA (OBSTRUCTIVE SLEEP APNEA): ICD-10-CM

## 2019-07-30 DIAGNOSIS — M54.41 CHRONIC BILATERAL LOW BACK PAIN WITH BILATERAL SCIATICA: ICD-10-CM

## 2019-07-30 DIAGNOSIS — M54.16 LUMBAR RADICULOPATHY: Primary | ICD-10-CM

## 2019-07-30 DIAGNOSIS — E78.5 DYSLIPIDEMIA: ICD-10-CM

## 2019-07-30 DIAGNOSIS — I10 ESSENTIAL HYPERTENSION: ICD-10-CM

## 2019-07-30 DIAGNOSIS — G89.29 CHRONIC BILATERAL LOW BACK PAIN WITH BILATERAL SCIATICA: ICD-10-CM

## 2019-07-30 PROCEDURE — 3075F SYST BP GE 130 - 139MM HG: CPT | Mod: HCNC,CPTII,S$GLB, | Performed by: ANESTHESIOLOGY

## 2019-07-30 PROCEDURE — 99999 PR PBB SHADOW E&M-EST. PATIENT-LVL I: ICD-10-PCS | Mod: PBBFAC,HCNC,,

## 2019-07-30 PROCEDURE — 1101F PR PT FALLS ASSESS DOC 0-1 FALLS W/OUT INJ PAST YR: ICD-10-PCS | Mod: HCNC,CPTII,S$GLB, | Performed by: ANESTHESIOLOGY

## 2019-07-30 PROCEDURE — 99204 PR OFFICE/OUTPT VISIT, NEW, LEVL IV, 45-59 MIN: ICD-10-PCS | Mod: HCNC,S$GLB,, | Performed by: ANESTHESIOLOGY

## 2019-07-30 PROCEDURE — 99999 PR PBB SHADOW E&M-EST. PATIENT-LVL I: CPT | Mod: PBBFAC,HCNC,,

## 2019-07-30 PROCEDURE — 93224 HOLTER MONITOR - 48 HOUR (CUPID ONLY): ICD-10-PCS | Mod: HCNC,S$GLB,, | Performed by: INTERNAL MEDICINE

## 2019-07-30 PROCEDURE — 93224 XTRNL ECG REC UP TO 48 HRS: CPT | Mod: HCNC,S$GLB,, | Performed by: INTERNAL MEDICINE

## 2019-07-30 PROCEDURE — 3078F PR MOST RECENT DIASTOLIC BLOOD PRESSURE < 80 MM HG: ICD-10-PCS | Mod: HCNC,CPTII,S$GLB, | Performed by: ANESTHESIOLOGY

## 2019-07-30 PROCEDURE — 99204 OFFICE O/P NEW MOD 45 MIN: CPT | Mod: HCNC,S$GLB,, | Performed by: ANESTHESIOLOGY

## 2019-07-30 PROCEDURE — 1101F PT FALLS ASSESS-DOCD LE1/YR: CPT | Mod: HCNC,CPTII,S$GLB, | Performed by: ANESTHESIOLOGY

## 2019-07-30 PROCEDURE — 3075F PR MOST RECENT SYSTOLIC BLOOD PRESS GE 130-139MM HG: ICD-10-PCS | Mod: HCNC,CPTII,S$GLB, | Performed by: ANESTHESIOLOGY

## 2019-07-30 PROCEDURE — 99999 PR PBB SHADOW E&M-EST. PATIENT-LVL III: CPT | Mod: PBBFAC,HCNC,, | Performed by: ANESTHESIOLOGY

## 2019-07-30 PROCEDURE — 3078F DIAST BP <80 MM HG: CPT | Mod: HCNC,CPTII,S$GLB, | Performed by: ANESTHESIOLOGY

## 2019-07-30 PROCEDURE — 99999 PR PBB SHADOW E&M-EST. PATIENT-LVL III: ICD-10-PCS | Mod: PBBFAC,HCNC,, | Performed by: ANESTHESIOLOGY

## 2019-07-30 RX ORDER — CHOLECALCIFEROL (VITAMIN D3) 25 MCG
TABLET ORAL
COMMUNITY
End: 2019-07-30 | Stop reason: SDUPTHER

## 2019-07-30 NOTE — LETTER
July 30, 2019      Patricia Valerio NP  [address] [facility] [address] [place] [address]           [facility]  [address] [facility] [address] [place] [address]  Phone: [phone]  Fax: [phone]          Patient: Cornelia Delatorre   MR Number: [id number]   YOB: 1950   Date of Visit: 7/30/2019       Dear Patricia Valerio:    Thank you for referring Cornelia Delatorre to me for evaluation. Attached you will find relevant portions of my assessment and plan of care.    If you have questions, please do not hesitate to call me. I look forward to following Cornelia Delatorre along with you.    Sincerely,    Fredi Rojas MD    Enclosure  CC:  No Recipients    If you would like to receive this communication electronically, please contact [email or web address] or [phone] to request more information on [employer] access.    For providers and/or their staff who would like to refer a patient to [facility], please contact us through our one-stop-shop provider referral line, [facility], at [phone].    If you feel you have received this communication in error or would no longer like to receive these types of communications, please e-mail [email or web address]

## 2019-07-30 NOTE — PROGRESS NOTES
[facility] New Patient Evaluation    Referred by: Patricia Valerio NP  Reason for referral: back pain    CC:   Chief Complaint   Patient presents with    Low-back Pain    Leg Pain     posterior thigh monica      Last 3 PDI Scores 7/30/2019   Pain Disability Index (PDI) 25       HPI:   Cornelia Delatorre is a 68 y.o. female who complains of back pain    Onset: about 3.5 months  Inciting Event: none  Progression: since onset, pain is gradually worsening  Typical Range: 3-10/10  Timing: intermittent  Quality: aching, burning, grabbing, sharp, shooting  Radiation: yes, down the back of both legs left > right  Associated numbness or weakness: yes numbness on the left leg, no weakness  Exacerbated by: standing, coughing/sneezing, walking  Allievated by: rest, heat, tylenol  Is Pain Level Acceptable?: No    Previous Therapies:  PT/OT: yes, felt like it got worse  HEP:   Interventions:   Surgery:  Medications: tylenol  - NSAIDS: avoids 2/2 h/o gastric bypass  - MSK Relaxants:   - TCAs:   - SNRIs:   - Topicals:   - Anticonvulsants: gabapentin 600mg TID  - Opioids:       History:    Current Outpatient Medications:     acetaminophen (TYLENOL) 500 MG tablet, Take 1,000 mg by mouth every 6 (six) hours as needed. Patient is taking 1000 ml TID for pain, Disp: , Rfl:     albuterol 90 mcg/actuation inhaler, Inhale 2 puffs into the lungs every 4 (four) hours as needed. 2 puffs every 4 hours as needed for cough, wheeze, or shortness of breath, Disp: 3 Inhaler, Rfl: 3    aspirin (ECOTRIN) 81 MG EC tablet, Take 81 mg by mouth once daily., Disp: , Rfl:     azelastine (OPTIVAR) 0.05 % ophthalmic solution, Place 1 drop into both eyes daily as needed. , Disp: , Rfl:     B-complex with vitamin C (Z-BEC OR EQUIV) tablet, Take 1 tablet by mouth once daily., Disp: , Rfl:     BIFIDOBACTERIUM INFANTIS (ALIGN ORAL), Take by mouth once daily., Disp: , Rfl:     CALCIUM 600 WITH VITAMIN D3 600 mg(1,500mg) -400 unit Cap, , Disp:  , Rfl:     cloNIDine (CATAPRES) 0.1 MG tablet, Take 1 tablet (0.1 mg total) by mouth 3 (three) times daily as needed (PRN SBP > 165 mmHg)., Disp: 90 tablet, Rfl: 6    cranberry 500 mg Cap, Take 1 capsule by mouth every evening., Disp: , Rfl:     diltiaZEM (CARDIZEM CD) 120 MG Cp24, Take 1 capsule (120 mg total) by mouth every evening., Disp: 90 capsule, Rfl: 4    EPINEPHrine (EPIPEN) 0.3 mg/0.3 mL AtIn, , Disp: , Rfl: 3    esomeprazole (NEXIUM) 40 MG capsule, Take 1 capsule (40 mg total) by mouth before breakfast., Disp: 90 capsule, Rfl: 3    fexofenadine (ALLEGRA) 60 MG tablet, Take 60 mg by mouth once daily., Disp: , Rfl:     fish oil-omega-3 fatty acids 300-1,000 mg capsule, Take 2 g by mouth once daily., Disp: , Rfl:     fluconazole (DIFLUCAN) 100 MG tablet, Take 1 tablet (100 mg total) by mouth once daily. for 10 days, Disp: 10 tablet, Rfl: 0    fluticasone (FLONASE) 50 mcg/actuation nasal spray, 2 sprays (100 mcg total) by Each Nare route once daily., Disp: 3 Bottle, Rfl: 3    gabapentin (NEURONTIN) 600 MG tablet, Take 1 tablet (600 mg total) by mouth 3 (three) times daily., Disp: 270 tablet, Rfl: 3    guaiFENesin (MUCINEX) 600 mg 12 hr tablet, Take 2 tablets (1,200 mg total) by mouth 2 (two) times daily., Disp: , Rfl:     guaifenesin-codeine 100-10 mg/5 ml (GUAIFENESIN AC)  mg/5 mL syrup, Take 5 mLs by mouth 3 (three) times daily as needed for Cough., Disp: 473 mL, Rfl: 2    immun glob G,IgG,-pro-IgA 0-50 (HIZENTRA) 1 gram/5 mL (20 %) Soln, Inject 11 g into the skin once a week., Disp: 220 mL, Rfl: 12    inhalation spacing device, Use as directed for inhalation., Disp: 1 each, Rfl: 0    levalbuterol (XOPENEX) 1.25 mg/3 mL nebulizer solution, Take 3 mLs (1.25 mg total) by nebulization every 4 (four) hours as needed for Wheezing or Shortness of Breath. Rescue, Disp: 1 Box, Rfl: 11    metFORMIN (GLUCOPHAGE-XR) 500 MG 24 hr tablet, Take 2 tablets (1,000 mg total) by mouth daily with  breakfast., Disp: 180 tablet, Rfl: 3    methylcellulose, laxative, (CITRUCEL) 500 mg Tab, Take 1 tablet by mouth every morning., Disp: , Rfl:     montelukast (SINGULAIR) 10 mg tablet, Take 1 tablet (10 mg total) by mouth every evening., Disp: 90 tablet, Rfl: 3    mucus clearing device Cecy, 1 Device by Misc.(Non-Drug; Combo Route) route 2 (two) times daily., Disp: 1 Device, Rfl: 0    multivitamin capsule, Take 1 capsule by mouth. Take one tablets daily, Disp: , Rfl:     mupirocin (BACTROBAN) 2 % ointment, AAA bid, Disp: 30 g, Rfl: 2    potassium chloride SA (K-DUR,KLOR-CON) 20 MEQ tablet, Take 1 tablet (20 mEq total) by mouth once daily., Disp: 90 tablet, Rfl: 3    pravastatin (PRAVACHOL) 40 MG tablet, Take 1 tablet (40 mg total) by mouth once daily., Disp: 90 tablet, Rfl: 1    predniSONE (DELTASONE) 20 MG tablet, 3 for 3 days then 2 for 3 days then one for 3 days and repeat for breathing problems, Disp: 36 tablet, Rfl: 0    SIMETHICONE (GAS-X ORAL), Take 1 capsule by mouth as needed., Disp: , Rfl:     SYMBICORT 160-4.5 mcg/actuation HFAA, Inhale 2 puffs into the lungs every 12 (twelve) hours., Disp: 3 Inhaler, Rfl: 3    valsartan-hydrochlorothiazide (DIOVAN-HCT) 160-12.5 mg per tablet, Take 1 tablet by mouth once daily., Disp: 90 tablet, Rfl: 3    varicella-zoster gE-AS01B, PF, (SHINGRIX, PF,) 50 mcg/0.5 mL injection, Inject 0.5 mLs into the muscle., Disp: 0.5 mL, Rfl: 1    Past Medical History:   Diagnosis Date    Allergy     Arthritis     Asthma     Cataract     Chronic fatigue 1/24/2017    Diabetes mellitus     resolved with gastric bypass    Encounter for blood transfusion     GERD (gastroesophageal reflux disease)     Headache(784.0)     History of lumpectomy of both breasts     1992 negative    Hyperlipidemia 7/15/2015    Hypertension     Hypothyroidism     Neuropathy     Obesity     SOHA on CPAP     Postmenopausal HRT (hormone replacement therapy)     Rash     Rosacea      Snoring     Wears glasses        Past Surgical History:   Procedure Laterality Date    ADENOIDECTOMY      APPENDECTOMY      BIOPSY-LESION nasal septum - INTRANASAL MUCOSAL LESION Right 12/1/2014    [facility]    BLADDER SUSPENSION      BREAST BIOPSY Bilateral 1992    bilateral benign excisional biopsies    BUNIONECTOMY  10/17/14    right, still has discomfort    BUNIONECTOMY-TAILOR Right 10/17/2014    [facility] Farzad Eng D[facility] at [facility] OR    CATARACT EXTRACTION W/  INTRAOCULAR LENS IMPLANT Bilateral     CHOLECYSTECTOMY  08/02/2017    CHOLECYSTECTOMY-LAPAROSCOPIC N/A 8/2/2017    [facility]    COLONOSCOPY      ESOPHAGOGASTRODUODENOSCOPY      dilated esophagus    GASTRIC BYPASS      2011    HYSTERECTOMY  1980    interstim bladder  2009    10/14/14 new battery    OOPHORECTOMY  1980    RELEASE-CARPAL TUNNEL Left 8/29/2017    [facility]    REPAIR-HAMMER TOE- 2nd toe 2nd Procedure: HTC 3rd toe with T&C- 32991. Hammer toe correction, tendon release to 4th right toe. Right 10/17/2014    [facility]ad Eng [facility] at [facility] OR    [id number] IPG OF INTERSTIM Right 10/14/2014    [facility] 2ND FLR    SKIN CANCER EXCISION      left hand    TONSILLECTOMY      WISDOM TOOTH EXTRACTION         Family History   Problem Relation Age of Onset    Breast cancer Mother 50    Diabetes Unknown     Hypertension Unknown     Hypothyroidism Unknown     Breast cancer Paternal Aunt         40's    Allergic rhinitis Neg Hx     Allergies Neg Hx     Angioedema Neg Hx     Asthma Neg Hx     Atopy Neg Hx     Eczema Neg Hx     Immunodeficiency Neg Hx     Rhinitis Neg Hx     Urticaria Neg Hx        Social History     Socioeconomic History    Marital status:      Spouse name: Not on file    Number of children: Not on file    Years of education: Not  on file    Highest education level: Not on file   Occupational History    Not on file   Social Needs    Financial resource strain: Somewhat hard    Food insecurity:     Worry: Never true     Inability: Never true    Transportation needs:     Medical: No     Non-medical: No   Tobacco Use    Smoking status: Never Smoker    Smokeless tobacco: Never Used   Substance and Sexual Activity    Alcohol use: No     Frequency: Never     Binge frequency: Never    Drug use: No    Sexual activity: Not Currently   Lifestyle    Physical activity:     Days per week: 0 days     Minutes per session: 0 min    Stress: Only a little   Relationships    Social connections:     Talks on phone: More than three times a week     Gets together: Once a week     Attends Jew service: Not on file     Active member of club or organization: Yes     Attends meetings of clubs or organizations: More than 4 times per year     Relationship status:    Other Topics Concern    Are you pregnant or think you may be? Not Asked    Breast-feeding Not Asked   Social History Narrative    Not on file       Review of patient's allergies indicates:   Allergen Reactions    Sulfa (sulfonamide antibiotics) Hives    Naproxen Other (See Comments)     Other reaction(s): RT sided numbness      Albuterol Itching and Palpitations     Nebulizer only. Can use inhaler       Review of Systems:  General ROS: negative for - fever  Psychological ROS: negative for - hostility  Hematological and Lymphatic ROS: positive for - bruising  negative for - bleeding problems  Endocrine ROS: negative for - unexpected weight changes  Respiratory ROS: positive for - cough and shortness of breath  Cardiovascular ROS: no chest pain or dyspnea on exertion  Gastrointestinal ROS: no abdominal pain, change in bowel habits, or black or bloody stools  Musculoskeletal ROS: negative for - muscular weakness  Neurological ROS: positive for - numbness/tingling  negative for -  "bowel and bladder control changes  Dermatological ROS: negative for rash    Physical Exam:  Vitals:    07/30/19 1502   BP: 135/79   Pulse: 70   Weight: 89.4 kg (196 lb 15.7 oz)   Height: 5' 4" (1.626 m)   PainSc:   3   PainLoc: Back     Body mass index is 33.81 kg/m².     Gen: NAD  Gait: gait intact  Psych:  Mood appropriate for given condition  HEENT: eyes anicteric   GI: Abd soft  CV: RRR  Lungs: breathing unlabored   ROM: limited AROM of the L spine in all planes, full ROM at ankles, knees and hips  Lumbar flexion 90 degrees, extension 50 degrees, side bending 30 degrees.    Sensation: intact to light touch in all dermatomes tested from L2-S1 bilaterally  Reflexes: 2+ b/l patella and 0/0 b/l Achilles  Palpation: Diffusely tender over lumbar paraspinals  -TTP over the b/l greater trochanters and bilateral SI joint  Tone: normal in the b/l knees and hips   Skin: intact  Extremities: No edema in b/l ankles or hands  Provacative tests: - SLR, mildly + b/l axial facet loading       Right Left   L2/3 Iliacus Hip flexion  5  5   L3/4 Qudratus Femoris Knee Extension  5  5   L4/5 Tib Anterior Ankle Dorsiflexion   5  5   L5/S1 Extensor Hallicus Longus Great toe extension  5  5   L4/5 Tib Anterior/Posterior Inversion  5  5   L5/S1 Extensor Digitorum Longus, Peronues Eversion  5  5   S1/S2 Gastroc/Soleus Plantar Flexion  5  5       Imaging:  Xray lumbar spine 6/19/19  FINDINGS:  There is mild exaggeration of the normal lumbar lordosis.  There is 4 mm anterolisthesis of L4 on L5.  The vertebral bodies maintain normal height.  There is no fracture.  There is multilevel endplate osteophyte formation and multilevel facet joint arthropathy.  There is a sacral stimulator in place.    Labs:  BMP  Lab Results   Component Value Date     05/01/2019    K 4.5 05/01/2019     05/01/2019    CO2 31 (H) 05/01/2019    BUN 12 05/01/2019    CREATININE 0.8 05/01/2019    CALCIUM 9.6 05/01/2019    ANIONGAP 7 (L) 05/01/2019    " ESTGFRAFRICA >60.0 05/01/2019    EGFRNONAA >60.0 05/01/2019     Lab Results   Component Value Date    ALT 37 05/01/2019    AST 30 05/01/2019    ALKPHOS 110 05/01/2019    BILITOT 0.7 05/01/2019       Assessment:  Problem List Items Addressed This Visit        Neuro    Lumbar radiculopathy - Primary    Relevant Orders    CT Lumbar Spine Without Contrast       Orthopedic    Chronic bilateral low back pain with bilateral sciatica          Treatment Plan:  68 y.o. year old female with PMH DM II, asthma, HTN, common variable immunodeficiency, GERD, h/o gastric bypass, SOHA presents to the office with lower back pain.  Her pain typically ranges from 3//10, constant, aching, burning, grabbing, shooting, radiating down her left > right posterior leg.  She also reports numbness of the left leg in non dermatomal distribution, no weakness, no bowel/bladder dysfunction (h/o bladder stimulator), no saddle anesthesia.  She has tried tylenol without relief.  Avoids NSAIDs 2/2 gastric bypass, continues gabapentin 600mg po tid.  She has done PT but feels like her pain got worse with it.  On exam 5/5 strength b/l LE's, 2+ b/l patellar, 0/0 b/l achilles, sensation to light touch intact b/l L2-S1, mildly + b/l axial facet loading, mildly + b/l TTP over SI joints.  I think some of her pain is related to lumbar facet arthropathy, however I think majority of her pain is related to herniated disc/nerve root irritation.  Will get CT lumbar spine to assess anatomy as she can't get MRI due to her sacral stimulator.  Follow up once CT complete.      Procedures: none at this time, likely ROM once CT reviewed  PT/OT/HEP: continue HEP   Medications: continue current medications as prescribed  Labs: Reviewed and medications are appropriately dosed for current hepatorenal function.  Imaging: CT lumbar spine    : Not applicable    Fredi Rojas M.D.  Interventional Pain Medicine / Anesthesiology

## 2019-07-30 NOTE — PROGRESS NOTES
Last 5 Patient Entered Readings                                      Current 30 Day Average: 135/72     Recent Readings 7/30/2019 7/26/2019 7/25/2019 7/19/2019 7/14/2019    SBP (mmHg) 128 148 148 151 130    DBP (mmHg) 72 78 72 77 58    Pulse 65 68 70 61 86        Digital Medicine: Health  Follow Up    Lifestyle Modifications:    1.Dietary Modifications (Sodium intake <2,000mg/day, food labels, dining out): deferred    2.Physical Activity: Patient reports that she's unable to exercise due to back pain.     3.Medication Therapy: Patient has been compliant with the medication regimen.    4.Patient has the following medication side effects/concerns: none  (Frequency/Alleviating factors/Precipitating factors, etc.)     Follow up with Ms. Cornelia Delatorre completed. Ms. Delatorre is doing ok today. She reports that she hasn't been feeling great lately. She went to the doctor this morning and they prescribed her antibiotics. She will also be going to a pain management doctor today to see about her back pain. Patient reports that she can't do much without causing her back to hurt, and then she's normally out the next few days. She's hoping to get some answers today at the doctor.  No further questions or concerns. Will continue to follow up to achieve health goals.

## 2019-08-02 ENCOUNTER — CLINICAL SUPPORT (OUTPATIENT)
Dept: CARDIOLOGY | Facility: CLINIC | Age: 69
End: 2019-08-02
Attending: INTERNAL MEDICINE
Payer: MEDICARE

## 2019-08-02 ENCOUNTER — HOSPITAL ENCOUNTER (OUTPATIENT)
Dept: RADIOLOGY | Facility: HOSPITAL | Age: 69
Discharge: HOME | End: 2019-08-02
Attending: ANESTHESIOLOGY
Payer: MEDICARE

## 2019-08-02 VITALS
DIASTOLIC BLOOD PRESSURE: 60 MMHG | SYSTOLIC BLOOD PRESSURE: 110 MMHG | WEIGHT: 196 LBS | HEIGHT: 64 IN | HEART RATE: 60 BPM | BODY MASS INDEX: 33.46 KG/M2

## 2019-08-02 DIAGNOSIS — G47.33 OSA (OBSTRUCTIVE SLEEP APNEA): ICD-10-CM

## 2019-08-02 DIAGNOSIS — I10 ESSENTIAL HYPERTENSION: ICD-10-CM

## 2019-08-02 DIAGNOSIS — E78.5 DYSLIPIDEMIA: ICD-10-CM

## 2019-08-02 DIAGNOSIS — I70.0 ATHEROSCLEROSIS OF ABDOMINAL AORTA: ICD-10-CM

## 2019-08-02 DIAGNOSIS — I77.1 STRICTURE OF ARTERY: ICD-10-CM

## 2019-08-02 DIAGNOSIS — M54.16 LUMBAR RADICULOPATHY: ICD-10-CM

## 2019-08-02 LAB
ABDOMINAL AORTA PROX EDV: 22 CM/S
ABDOMINAL AORTA PROX PSV: 99 CM/S
ASCENDING AORTA: 2.79 CM
AV INDEX (PROSTH): 0.88
AV MEAN GRADIENT: 5 MMHG
AV PEAK GRADIENT: 8 MMHG
AV VALVE AREA: 3.32 CM2
AV VELOCITY RATIO: 0.83
BSA FOR ECHO PROCEDURE: 2 M2
CV ECHO LV RWT: 0.4 CM
DOP CALC AO PEAK VEL: 1.43 M/S
DOP CALC AO VTI: 35.51 CM
DOP CALC LVOT AREA: 3.8 CM2
DOP CALC LVOT DIAMETER: 2.19 CM
DOP CALC LVOT PEAK VEL: 1.18 M/S
DOP CALC LVOT STROKE VOLUME: 117.88 CM3
DOP CALCLVOT PEAK VEL VTI: 31.31 CM
E WAVE DECELERATION TIME: 225.04 MSEC
E/A RATIO: 0.94
E/E' RATIO: 8.3 M/S
ECHO LV POSTERIOR WALL: 1.03 CM (ref 0.6–1.1)
FRACTIONAL SHORTENING: 46 % (ref 28–44)
INTERVENTRICULAR SEPTUM: 1.15 CM (ref 0.6–1.1)
IVRT: 0.1 MSEC
LA MAJOR: 4.7 CM
LA MINOR: 4.94 CM
LA WIDTH: 3.54 CM
LEFT ATRIUM SIZE: 3.77 CM
LEFT ATRIUM VOLUME INDEX: 28.2 ML/M2
LEFT ATRIUM VOLUME: 54.64 CM3
LEFT CBA DIAS: 30 CM/S
LEFT CBA SYS: 97 CM/S
LEFT CCA DIST DIAS: 28 CM/S
LEFT CCA DIST SYS: 96 CM/S
LEFT CCA MID DIAS: 32 CM/S
LEFT CCA MID SYS: 109 CM/S
LEFT CCA PROX DIAS: 43 CM/S
LEFT CCA PROX SYS: 142 CM/S
LEFT ECA DIAS: 4 CM/S
LEFT ECA SYS: 65 CM/S
LEFT ICA DIST DIAS: 37 CM/S
LEFT ICA DIST SYS: 104 CM/S
LEFT ICA MID DIAS: 29 CM/S
LEFT ICA MID SYS: 98 CM/S
LEFT ICA PROX DIAS: 39 CM/S
LEFT ICA PROX SYS: 157 CM/S
LEFT INTERNAL DIMENSION IN SYSTOLE: 2.76 CM (ref 2.1–4)
LEFT RENAL DIST DIAS: 13 CM/S
LEFT RENAL DIST SYS: 58 CM/S
LEFT RENAL MID DIAS: 25 CM/S
LEFT RENAL MID SYS: 126 CM/S
LEFT RENAL ORIGIN DIAS: 23 CM/S
LEFT RENAL ORIGIN SYS: 170 CM/S
LEFT RENAL PROX DIAS: 42 CM/S
LEFT RENAL PROX RAR: 2.08
LEFT RENAL PROX SYS: 206 CM/S
LEFT RENAL ULTRASOUND ACCELERATION TIME MEASUREMENT 1: 66 MS
LEFT RENAL ULTRASOUND ACCELERATION TIME MEASUREMENT 2: 45 MS
LEFT RENAL ULTRASOUND ACCELERATION TIME MEASUREMENT 3: 52 MS
LEFT RENAL ULTRASOUND ACCELERATION TIME MEASUREMENT AVERAGE: 66 MS
LEFT RENAL ULTRASOUND KIDNEY SIZE MEASUREMENT 1: 10.3 CM
LEFT RENAL ULTRASOUND KIDNEY SIZE MEASUREMENT 2: 10.2 CM
LEFT RENAL ULTRASOUND KIDNEY SIZE MEASUREMENT 3: 10.3 CM
LEFT RENAL ULTRASOUND KIDNEY SIZE MEASUREMENT AVERAGE: 10.3 CM
LEFT RENAL ULTRASOUND RESISTIVE INDEX MEASUREMENT 1: 0.67
LEFT RENAL ULTRASOUND RESISTIVE INDEX MEASUREMENT 2: 0.73
LEFT RENAL ULTRASOUND RESISTIVE INDEX MEASUREMENT 3: 0.67
LEFT RENAL ULTRASOUND RESISTIVE INDEX MEASUREMENT AVERAGE: 0.73
LEFT VENTRICLE DIASTOLIC VOLUME INDEX: 64.2 ML/M2
LEFT VENTRICLE DIASTOLIC VOLUME: 124.5 ML
LEFT VENTRICLE MASS INDEX: 109 G/M2
LEFT VENTRICLE SYSTOLIC VOLUME INDEX: 14.7 ML/M2
LEFT VENTRICLE SYSTOLIC VOLUME: 28.54 ML
LEFT VENTRICULAR INTERNAL DIMENSION IN DIASTOLE: 5.11 CM (ref 3.5–6)
LEFT VENTRICULAR MASS: 211.92 G
LEFT VERTEBRAL DIAS: 5 CM/S
LEFT VERTEBRAL SYS: 11 CM/S
LV LATERAL E/E' RATIO: 7.55 M/S
LV SEPTAL E/E' RATIO: 9.22 M/S
MV PEAK A VEL: 0.88 M/S
MV PEAK E VEL: 0.83 M/S
OHS CV CAROTID RIGHT ICA EDV HIGHEST: 61
OHS CV CAROTID ULTRASOUND LEFT ICA/CCA RATIO: 1.11
OHS CV CAROTID ULTRASOUND RIGHT ICA/CCA RATIO: 1.32
OHS CV LEFT RENAL RAR: 2.08
OHS CV PV CAROTID LEFT HIGHEST CCA: 142
OHS CV PV CAROTID LEFT HIGHEST ICA: 157
OHS CV PV CAROTID RIGHT HIGHEST CCA: 115
OHS CV PV CAROTID RIGHT HIGHEST ICA: 152
OHS CV RIGHT RENAL RAR: 1.77
OHS CV US CAROTID LEFT HIGHEST EDV: 39
OHS CV US LEFT RENAL HIGHEST EDV: 42
OHS CV US LEFT RENAL HIGHEST PSV: 206
OHS CV US RIGHT RENAL HIGHEST EDV: 29
OHS CV US RIGHT RENAL HIGHEST PSV: 175
PISA TR MAX VEL: 2.59 M/S
PULM VEIN S/D RATIO: 1.48
PV PEAK D VEL: 0.42 M/S
PV PEAK S VEL: 0.62 M/S
RA MAJOR: 5.25 CM
RA PRESSURE: 3 MMHG
RA WIDTH: 3.76 CM
RIGHT ARM DIASTOLIC BLOOD PRESSURE: 60 MMHG
RIGHT ARM SYSTOLIC BLOOD PRESSURE: 110 MMHG
RIGHT CBA DIAS: 37 CM/S
RIGHT CBA SYS: 98 CM/S
RIGHT CCA DIST DIAS: 31 CM/S
RIGHT CCA DIST SYS: 94 CM/S
RIGHT CCA MID DIAS: 36 CM/S
RIGHT CCA MID SYS: 97 CM/S
RIGHT CCA PROX DIAS: 34 CM/S
RIGHT CCA PROX SYS: 115 CM/S
RIGHT ECA DIAS: 6 CM/S
RIGHT ECA SYS: 89 CM/S
RIGHT ICA DIST DIAS: 35 CM/S
RIGHT ICA DIST SYS: 120 CM/S
RIGHT ICA MID DIAS: 47 CM/S
RIGHT ICA MID SYS: 118 CM/S
RIGHT ICA PROX DIAS: 61 CM/S
RIGHT ICA PROX SYS: 152 CM/S
RIGHT RENAL DIST DIAS: 13 CM/S
RIGHT RENAL DIST SYS: 68 CM/S
RIGHT RENAL MID DIAS: 20 CM/S
RIGHT RENAL MID SYS: 134 CM/S
RIGHT RENAL ORIGIN DIAS: 15 CM/S
RIGHT RENAL ORIGIN SYS: 118 CM/S
RIGHT RENAL PROX DIAS: 29 CM/S
RIGHT RENAL PROX RAR: 1.77
RIGHT RENAL PROX SYS: 175 CM/S
RIGHT RENAL ULTRASOUND ACCELERATION TIME MEASUREMENT 1: 48 MS
RIGHT RENAL ULTRASOUND ACCELERATION TIME MEASUREMENT 2: 55 MS
RIGHT RENAL ULTRASOUND ACCELERATION TIME MEASUREMENT 3: 42 MS
RIGHT RENAL ULTRASOUND ACCELERATION TIME MEASUREMENT AVERAGE: 55 MS
RIGHT RENAL ULTRASOUND KIDNEY SIZE MEASUREMENT 1: 8.5 CM
RIGHT RENAL ULTRASOUND KIDNEY SIZE MEASUREMENT 2: 8.3 CM
RIGHT RENAL ULTRASOUND KIDNEY SIZE MEASUREMENT 3: 8.3 CM
RIGHT RENAL ULTRASOUND KIDNEY SIZE MEASUREMENT AVERAGE: 8.5 CM
RIGHT RENAL ULTRASOUND RESISTIVE INDEX MEASUREMENT 1: 0.7
RIGHT RENAL ULTRASOUND RESISTIVE INDEX MEASUREMENT 2: 0.73
RIGHT RENAL ULTRASOUND RESISTIVE INDEX MEASUREMENT 3: 0.72
RIGHT RENAL ULTRASOUND RESISTIVE INDEX MEASUREMENT AVERAGE: 0.73
RIGHT VENTRICULAR END-DIASTOLIC DIMENSION: 4.19 CM
RIGHT VERTEBRAL DIAS: 30 CM/S
RIGHT VERTEBRAL SYS: 75 CM/S
SINUS: 2.37 CM
STJ: 2.19 CM
TDI LATERAL: 0.11 M/S
TDI SEPTAL: 0.09 M/S
TDI: 0.1 M/S
TR MAX PG: 27 MMHG
TRICUSPID ANNULAR PLANE SYSTOLIC EXCURSION: 2.82 CM
TV REST PULMONARY ARTERY PRESSURE: 30 MMHG

## 2019-08-02 PROCEDURE — 93880 EXTRACRANIAL BILAT STUDY: CPT | Mod: HCNC,S$GLB,, | Performed by: INTERNAL MEDICINE

## 2019-08-02 PROCEDURE — 93880 CV US DOPPLER CAROTID (CUPID ONLY): ICD-10-PCS | Mod: HCNC,S$GLB,, | Performed by: INTERNAL MEDICINE

## 2019-08-02 PROCEDURE — 72131 CT LUMBAR SPINE W/O DYE: CPT | Mod: 26,HCNC,, | Performed by: RADIOLOGY

## 2019-08-02 PROCEDURE — 93975 VASCULAR STUDY: CPT | Mod: HCNC,S$GLB,, | Performed by: INTERNAL MEDICINE

## 2019-08-02 PROCEDURE — 72131 CT LUMBAR SPINE WITHOUT CONTRAST: ICD-10-PCS | Mod: 26,HCNC,, | Performed by: RADIOLOGY

## 2019-08-02 PROCEDURE — 93306 TRANSTHORACIC ECHO (TTE) COMPLETE (CUPID ONLY): ICD-10-PCS | Mod: HCNC,S$GLB,, | Performed by: INTERNAL MEDICINE

## 2019-08-02 PROCEDURE — 93306 TTE W/DOPPLER COMPLETE: CPT | Mod: HCNC,S$GLB,, | Performed by: INTERNAL MEDICINE

## 2019-08-02 PROCEDURE — 72131 CT LUMBAR SPINE W/O DYE: CPT | Mod: TC,HCNC,PO

## 2019-08-02 PROCEDURE — 93975 CV US RENAL ARTERY STENOSIS HYPERTENSION COMPLETE (CUPID ONLY): ICD-10-PCS | Mod: HCNC,S$GLB,, | Performed by: INTERNAL MEDICINE

## 2019-08-02 PROCEDURE — 99999 PR PBB SHADOW E&M-EST. PATIENT-LVL II: CPT | Mod: PBBFAC,HCNC,,

## 2019-08-02 PROCEDURE — 99999 PR PBB SHADOW E&M-EST. PATIENT-LVL II: ICD-10-PCS | Mod: PBBFAC,HCNC,,

## 2019-08-05 ENCOUNTER — OFFICE VISIT (OUTPATIENT)
Dept: PAIN MEDICINE | Facility: CLINIC | Age: 69
End: 2019-08-05
Payer: MEDICARE

## 2019-08-05 ENCOUNTER — TELEPHONE (OUTPATIENT)
Dept: PAIN MEDICINE | Facility: CLINIC | Age: 69
End: 2019-08-05

## 2019-08-05 VITALS
BODY MASS INDEX: 33.55 KG/M2 | WEIGHT: 195.44 LBS | DIASTOLIC BLOOD PRESSURE: 58 MMHG | TEMPERATURE: 96 F | SYSTOLIC BLOOD PRESSURE: 110 MMHG | HEART RATE: 71 BPM | RESPIRATION RATE: 18 BRPM | OXYGEN SATURATION: 99 %

## 2019-08-05 DIAGNOSIS — M54.16 LUMBAR RADICULOPATHY: Primary | ICD-10-CM

## 2019-08-05 PROCEDURE — 99214 OFFICE O/P EST MOD 30 MIN: CPT | Mod: HCNC,S$GLB,, | Performed by: ANESTHESIOLOGY

## 2019-08-05 PROCEDURE — 3078F DIAST BP <80 MM HG: CPT | Mod: HCNC,CPTII,S$GLB, | Performed by: ANESTHESIOLOGY

## 2019-08-05 PROCEDURE — 99214 PR OFFICE/OUTPT VISIT, EST, LEVL IV, 30-39 MIN: ICD-10-PCS | Mod: HCNC,S$GLB,, | Performed by: ANESTHESIOLOGY

## 2019-08-05 PROCEDURE — 99999 PR PBB SHADOW E&M-EST. PATIENT-LVL IV: CPT | Mod: PBBFAC,HCNC,, | Performed by: ANESTHESIOLOGY

## 2019-08-05 PROCEDURE — 3078F PR MOST RECENT DIASTOLIC BLOOD PRESSURE < 80 MM HG: ICD-10-PCS | Mod: HCNC,CPTII,S$GLB, | Performed by: ANESTHESIOLOGY

## 2019-08-05 PROCEDURE — 3074F SYST BP LT 130 MM HG: CPT | Mod: HCNC,CPTII,S$GLB, | Performed by: ANESTHESIOLOGY

## 2019-08-05 PROCEDURE — 99999 PR PBB SHADOW E&M-EST. PATIENT-LVL IV: ICD-10-PCS | Mod: PBBFAC,HCNC,, | Performed by: ANESTHESIOLOGY

## 2019-08-05 PROCEDURE — 1101F PR PT FALLS ASSESS DOC 0-1 FALLS W/OUT INJ PAST YR: ICD-10-PCS | Mod: HCNC,CPTII,S$GLB, | Performed by: ANESTHESIOLOGY

## 2019-08-05 PROCEDURE — 3074F PR MOST RECENT SYSTOLIC BLOOD PRESSURE < 130 MM HG: ICD-10-PCS | Mod: HCNC,CPTII,S$GLB, | Performed by: ANESTHESIOLOGY

## 2019-08-05 PROCEDURE — 1101F PT FALLS ASSESS-DOCD LE1/YR: CPT | Mod: HCNC,CPTII,S$GLB, | Performed by: ANESTHESIOLOGY

## 2019-08-05 RX ORDER — SODIUM CHLORIDE, SODIUM LACTATE, POTASSIUM CHLORIDE, CALCIUM CHLORIDE 600; 310; 30; 20 MG/100ML; MG/100ML; MG/100ML; MG/100ML
INJECTION, SOLUTION INTRAVENOUS CONTINUOUS
Status: CANCELLED | OUTPATIENT
Start: 2019-08-14

## 2019-08-05 NOTE — PROGRESS NOTES
[facility] Follow Up Evaluation    Referred by: Patricia Valerio NP  Reason for referral: back pain    CC:   Chief Complaint   Patient presents with    Results     CT scan      Last 3 PDI Scores 8/5/2019 7/30/2019   Pain Disability Index (PDI) 0 25     Interval HPI 8/5/19: Ms. Delatorre returns to the office for CT review.    HPI:   Cornelia Delatorre is a 68 y.o. female who complains of back pain    Onset: about 3.5 months  Inciting Event: none  Progression: since onset, pain is gradually worsening  Typical Range: 3-10/10  Timing: intermittent  Quality: aching, burning, grabbing, sharp, shooting  Radiation: yes, down the back of both legs left > right  Associated numbness or weakness: yes numbness on the left leg, no weakness  Exacerbated by: standing, coughing/sneezing, walking  Allievated by: rest, heat, tylenol  Is Pain Level Acceptable?: No    Previous Therapies:  PT/OT: yes, felt like it got worse  HEP:   Interventions:   Surgery:  Medications: tylenol  - NSAIDS: avoids 2/2 h/o gastric bypass  - MSK Relaxants:   - TCAs:   - SNRIs:   - Topicals:   - Anticonvulsants: gabapentin 600mg TID  - Opioids:       History:    Current Outpatient Medications:     acetaminophen (TYLENOL) 500 MG tablet, Take 1,000 mg by mouth every 6 (six) hours as needed. Patient is taking 1000 ml TID for pain, Disp: , Rfl:     albuterol 90 mcg/actuation inhaler, Inhale 2 puffs into the lungs every 4 (four) hours as needed. 2 puffs every 4 hours as needed for cough, wheeze, or shortness of breath, Disp: 3 Inhaler, Rfl: 3    aspirin (ECOTRIN) 81 MG EC tablet, Take 81 mg by mouth once daily., Disp: , Rfl:     azelastine (OPTIVAR) 0.05 % ophthalmic solution, Place 1 drop into both eyes daily as needed. , Disp: , Rfl:     B-complex with vitamin C (Z-BEC OR EQUIV) tablet, Take 1 tablet by mouth once daily., Disp: , Rfl:     BIFIDOBACTERIUM INFANTIS (ALIGN ORAL), Take by mouth once daily., Disp: , Rfl:     CALCIUM 600 WITH  VITAMIN D3 600 mg(1,500mg) -400 unit Cap, , Disp: , Rfl:     cloNIDine (CATAPRES) 0.1 MG tablet, Take 1 tablet (0.1 mg total) by mouth 3 (three) times daily as needed (PRN SBP > 165 mmHg)., Disp: 90 tablet, Rfl: 6    cranberry 500 mg Cap, Take 1 capsule by mouth every evening., Disp: , Rfl:     diltiaZEM (CARDIZEM CD) 120 MG Cp24, Take 1 capsule (120 mg total) by mouth every evening., Disp: 90 capsule, Rfl: 4    EPINEPHrine (EPIPEN) 0.3 mg/0.3 mL AtIn, , Disp: , Rfl: 3    esomeprazole (NEXIUM) 40 MG capsule, Take 1 capsule (40 mg total) by mouth before breakfast., Disp: 90 capsule, Rfl: 3    fexofenadine (ALLEGRA) 60 MG tablet, Take 60 mg by mouth once daily., Disp: , Rfl:     fish oil-omega-3 fatty acids 300-1,000 mg capsule, Take 2 g by mouth once daily., Disp: , Rfl:     fluticasone (FLONASE) 50 mcg/actuation nasal spray, 2 sprays (100 mcg total) by Each Nare route once daily., Disp: 3 Bottle, Rfl: 3    gabapentin (NEURONTIN) 600 MG tablet, Take 1 tablet (600 mg total) by mouth 3 (three) times daily., Disp: 270 tablet, Rfl: 3    guaiFENesin (MUCINEX) 600 mg 12 hr tablet, Take 2 tablets (1,200 mg total) by mouth 2 (two) times daily., Disp: , Rfl:     guaifenesin-codeine 100-10 mg/5 ml (GUAIFENESIN AC)  mg/5 mL syrup, Take 5 mLs by mouth 3 (three) times daily as needed for Cough., Disp: 473 mL, Rfl: 2    immun glob G,IgG,-pro-IgA 0-50 (HIZENTRA) 1 gram/5 mL (20 %) Soln, Inject 11 g into the skin once a week., Disp: 220 mL, Rfl: 12    inhalation spacing device, Use as directed for inhalation., Disp: 1 each, Rfl: 0    levalbuterol (XOPENEX) 1.25 mg/3 mL nebulizer solution, Take 3 mLs (1.25 mg total) by nebulization every 4 (four) hours as needed for Wheezing or Shortness of Breath. Rescue, Disp: 1 Box, Rfl: 11    metFORMIN (GLUCOPHAGE-XR) 500 MG 24 hr tablet, Take 2 tablets (1,000 mg total) by mouth daily with breakfast., Disp: 180 tablet, Rfl: 3    methylcellulose, laxative, (CITRUCEL) 500 mg  Tab, Take 1 tablet by mouth every morning., Disp: , Rfl:     montelukast (SINGULAIR) 10 mg tablet, Take 1 tablet (10 mg total) by mouth every evening., Disp: 90 tablet, Rfl: 3    mucus clearing device Cecy, 1 Device by Misc.(Non-Drug; Combo Route) route 2 (two) times daily., Disp: 1 Device, Rfl: 0    multivitamin capsule, Take 1 capsule by mouth. Take one tablets daily, Disp: , Rfl:     mupirocin (BACTROBAN) 2 % ointment, AAA bid, Disp: 30 g, Rfl: 2    potassium chloride SA (K-DUR,KLOR-CON) 20 MEQ tablet, Take 1 tablet (20 mEq total) by mouth once daily., Disp: 90 tablet, Rfl: 3    pravastatin (PRAVACHOL) 40 MG tablet, Take 1 tablet (40 mg total) by mouth once daily., Disp: 90 tablet, Rfl: 1    predniSONE (DELTASONE) 20 MG tablet, 3 for 3 days then 2 for 3 days then one for 3 days and repeat for breathing problems, Disp: 36 tablet, Rfl: 0    SIMETHICONE (GAS-X ORAL), Take 1 capsule by mouth as needed., Disp: , Rfl:     SYMBICORT 160-4.5 mcg/actuation HFAA, Inhale 2 puffs into the lungs every 12 (twelve) hours., Disp: 3 Inhaler, Rfl: 3    valsartan-hydrochlorothiazide (DIOVAN-HCT) 160-12.5 mg per tablet, Take 1 tablet by mouth once daily., Disp: 90 tablet, Rfl: 3    varicella-zoster gE-AS01B, PF, (SHINGRIX, PF,) 50 mcg/0.5 mL injection, Inject 0.5 mLs into the muscle., Disp: 0.5 mL, Rfl: 1    Past Medical History:   Diagnosis Date    Allergy     Arthritis     Asthma     Cataract     Chronic fatigue 1/24/2017    Diabetes mellitus     resolved with gastric bypass    Encounter for blood transfusion     GERD (gastroesophageal reflux disease)     Headache(784.0)     History of lumpectomy of both breasts     1992 negative    Hyperlipidemia 7/15/2015    Hypertension     Hypothyroidism     Neuropathy     Obesity     SOHA on CPAP     Postmenopausal HRT (hormone replacement therapy)     Rash     Rosacea     Snoring     Wears glasses        Past Surgical History:   Procedure Laterality Date     ADENOIDECTOMY      APPENDECTOMY      BIOPSY-LESION nasal septum - INTRANASAL MUCOSAL LESION Right 12/1/2014    [facility]    BLADDER SUSPENSION      BREAST BIOPSY Bilateral 1992    bilateral benign excisional biopsies    BUNIONECTOMY  10/17/14    right, still has discomfort    BUNIONECTOMY-TAILOR Right 10/17/2014    [facility] Farzad Eng D[facility] at [facility]    CATARACT EXTRACTION W/  INTRAOCULAR LENS IMPLANT Bilateral     CHOLECYSTECTOMY  08/02/2017    CHOLECYSTECTOMY-LAPAROSCOPIC N/A 8/2/2017    [facility] OR    COLONOSCOPY      ESOPHAGOGASTRODUODENOSCOPY      dilated esophagus    GASTRIC BYPASS      2011    HYSTERECTOMY  1980    interstim bladder  2009    10/14/14 new battery    OOPHORECTOMY  1980    RELEASE-CARPAL TUNNEL Left 8/29/2017    Performed [facility]mond [facility] at [facility] OR    REPAIR-HAMMER TOE- 2nd toe 2nd Procedure: HTC 3rd toe with T&C- 47376. Hammer toe correction, tendon release to 4th right toe. Right 10/17/2014    Per[facility]ad Eng D[facility] at [facility] OR    [id number] IPG OF INTERSTIM Right 10/14/2014    Performed by [facility]vis [facility] at [facility] OR 2ND FLR    SKIN CANCER EXCISION      left hand    TONSILLECTOMY      WISDOM TOOTH EXTRACTION         Family History   Problem Relation Age of Onset    Breast cancer Mother 50    Diabetes Unknown     Hypertension Unknown     Hypothyroidism Unknown     Breast cancer Paternal Aunt         40's    Allergic rhinitis Neg Hx     Allergies Neg Hx     Angioedema Neg Hx     Asthma Neg Hx     Atopy Neg Hx     Eczema Neg Hx     Immunodeficiency Neg Hx     Rhinitis Neg Hx     Urticaria Neg Hx        Social History     Socioeconomic History    Marital status:      Spouse name: Not on file    Number of children: Not on file    Years of education: Not on file    Highest education level: Not on file   Occupational History    Not on file    Social Needs    Financial resource strain: Somewhat hard    Food insecurity:     Worry: Never true     Inability: Never true    Transportation needs:     Medical: No     Non-medical: No   Tobacco Use    Smoking status: Never Smoker    Smokeless tobacco: Never Used   Substance and Sexual Activity    Alcohol use: No     Frequency: Never     Binge frequency: Never    Drug use: No    Sexual activity: Not Currently   Lifestyle    Physical activity:     Days per week: 0 days     Minutes per session: 0 min    Stress: Only a little   Relationships    Social connections:     Talks on phone: More than three times a week     Gets together: Once a week     Attends Sikhism service: Not on file     Active member of club or organization: Yes     Attends meetings of clubs or organizations: More than 4 times per year     Relationship status:    Other Topics Concern    Are you pregnant or think you may be? Not Asked    Breast-feeding Not Asked   Social History Narrative    Not on file       Review of patient's allergies indicates:   Allergen Reactions    Sulfa (sulfonamide antibiotics) Hives    Naproxen Other (See Comments)     Other reaction(s): RT sided numbness      Albuterol Itching and Palpitations     Nebulizer only. Can use inhaler       Review of Systems:  General ROS: negative for - fever  Psychological ROS: negative for - hostility  Hematological and Lymphatic ROS: positive for - bruising  negative for - bleeding problems  Endocrine ROS: negative for - unexpected weight changes  Respiratory ROS: positive for - cough and shortness of breath  Cardiovascular ROS: no chest pain or dyspnea on exertion  Gastrointestinal ROS: no abdominal pain, change in bowel habits, or black or bloody stools  Musculoskeletal ROS: negative for - muscular weakness  Neurological ROS: positive for - numbness/tingling  negative for - bowel and bladder control changes  Dermatological ROS: negative for rash    Physical  Exam:  Vitals:    08/05/19 0941   BP: (!) 110/58   Pulse: 71   Resp: 18   Temp: 96.3 °F (35.7 °C)   TempSrc: Oral   SpO2: 99%   Weight: 88.6 kg (195 lb 7 oz)   PainSc:   4   PainLoc: Back     Body mass index is 33.55 kg/m².     Gen: NAD  Gait: gait intact  Psych:  Mood appropriate for given condition  HEENT: eyes anicteric   GI: Abd soft  CV: RRR  Lungs: breathing unlabored   ROM: limited AROM of the L spine in all planes, full ROM at ankles, knees and hips  Lumbar flexion 90 degrees, extension 50 degrees, side bending 30 degrees.    Sensation: intact to light touch in all dermatomes tested from L2-S1 bilaterally  Reflexes: 2+ b/l patella and 0/0 b/l Achilles  Palpation: Diffusely tender over lumbar paraspinals  -TTP over the b/l greater trochanters and bilateral SI joint  Tone: normal in the b/l knees and hips   Skin: intact  Extremities: No edema in b/l ankles or hands  Provacative tests: - SLR, mildly + b/l axial facet loading       Right Left   L2/3 Iliacus Hip flexion  5  5   L3/4 Qudratus Femoris Knee Extension  5  5   L4/5 Tib Anterior Ankle Dorsiflexion   5  5   L5/S1 Extensor Hallicus Longus Great toe extension  5  5   L4/5 Tib Anterior/Posterior Inversion  5  5   L5/S1 Extensor Digitorum Longus, Peronues Eversion  5  5   S1/S2 Gastroc/Soleus Plantar Flexion  5  5       Imaging:  Xray lumbar spine 6/19/19  FINDINGS:  There is mild exaggeration of the normal lumbar lordosis.  There is 4 mm anterolisthesis of L4 on L5.  The vertebral bodies maintain normal height.  There is no fracture.  There is multilevel endplate osteophyte formation and multilevel facet joint arthropathy.  There is a sacral stimulator in place.    CT lumbar spine 8/6/19  FINDINGS:  Grade 1 anterolisthesis of L4 on L5.  Minimal retrolisthesis of L2 on L3.The vertebral body heights are well-maintained.No fractures are identified. Sacral stimulator partially visualized, which appears to enter the right S3-4 neural foramen.    Mild multilevel  degenerative disc disease with anterior osteophytosis, vacuum disc phenomenon at T10-11 and L1-2 and disc space narrowing, most pronounced and moderate at L1-2.    T12-L1: Small right central posterior disc osteophyte complex.  Mild bilateral facet arthrosis.  No significant spinal canal or neural foraminal stenosis.  L1-2: Circumferential disc bulge.  Mild bilateral facet arthrosis.  No significant spinal canal or neural foraminal stenosis.  L2-3: Minimal retrolisthesis.  Circumferential disc bulge.  Moderate bilateral facet arthrosis.  Mild bilateral neural foraminal stenosis.  Mild spinal canal stenosis.  L3-4: Circumferential disc bulge.  Ligamentum flavum infolding.  Moderate bilateral facet arthrosis.  Mild bilateral neural foraminal stenosis.  Mild spinal canal stenosis  L4-5: Grade 1 anterolisthesis.  Circumferential disc bulge, eccentric to the right.  Ligamentum flavum infolding.  Severe bilateral facet arthrosis.  Mild left and moderate right neural foraminal stenosis.  Severe spinal canal stenosis.  L5-S1: Mild diffuse disc bulge.  Moderate left and mild right facet arthrosis.  Moderate left and mild right neural foraminal stenosis.  No significant spinal canal stenosis.    Labs:  BMP  Lab Results   Component Value Date     05/01/2019    K 4.5 05/01/2019     05/01/2019    CO2 31 (H) 05/01/2019    BUN 12 05/01/2019    CREATININE 0.8 05/01/2019    CALCIUM 9.6 05/01/2019    ANIONGAP 7 (L) 05/01/2019    ESTGFRAFRICA >60.0 05/01/2019    EGFRNONAA >60.0 05/01/2019     Lab Results   Component Value Date    ALT 37 05/01/2019    AST 30 05/01/2019    ALKPHOS 110 05/01/2019    BILITOT 0.7 05/01/2019       Assessment:  Problem List Items Addressed This Visit        Neuro    Lumbar radiculopathy - Primary          Treatment Plan:  68 y.o. year old female with PMH DM II, asthma, HTN, common variable immunodeficiency, GERD, h/o gastric bypass, SOHA presents to the office with lower back pain.  Her pain  typically ranges from 3//10, constant, aching, burning, grabbing, shooting, radiating down her left > right posterior leg.  She also reports numbness of the left leg in non dermatomal distribution, no weakness, no bowel/bladder dysfunction (h/o bladder stimulator), no saddle anesthesia.  She has tried tylenol without relief.  Avoids NSAIDs 2/2 gastric bypass, continues gabapentin 600mg po tid.  She has done PT but feels like her pain got worse with it.  On exam 5/5 strength b/l LE's, 2+ b/l patellar, 0/0 b/l achilles, sensation to light touch intact b/l L2-S1, mildly + b/l axial facet loading, mildly + b/l TTP over SI joints.  I think some of her pain is related to lumbar facet arthropathy, however I think majority of her pain is related to herniated disc/nerve root irritation.  Will get CT lumbar spine to assess anatomy as she can't get MRI due to her sacral stimulator.  Follow up once CT complete.      8/5/19 - Ms. Delatorre returns to the office for CT review.  She continues to have pain radiating down her right > left leg, numbness, no weakness.  CT lumbar spine reviewed today in the office. She has severe canal stenosis at L4-5, multilevel b/l NFS and multilevel b/l facet hypertrophy.  Her pain is limiting her mobility and interfering with her ADL's.  Will schedule for L5/S1 ROM.  Follow up 2 weeks post injection    Procedures: L5/S1 ROM.  Risks, benefits, alternatives explained to patient who verbalized understanding, including increased risk of infection, bleeding, need for additional procedures or surgery, and nerve damage.  Questions regarding the procedure, risks, expected outcome, and possible side effects were solicited and answered to the patient's satisfaction.  Cornelia wishes to proceed with the injection.  Verbal and written consent were obtained in clinic today.  PT/OT/HEP: continue HEP   Medications: continue current medications as prescribed  Labs: Reviewed and medications are appropriately dosed  for current hepatorenal function.  Imaging: no additional at this time    : Not applicable    Fredi Rojas M.D.  Interventional Pain Medicine / Anesthesiology

## 2019-08-05 NOTE — H&P (VIEW-ONLY)
[facility] Follow Up Evaluation    Referred by: Patricia Valerio NP  Reason for referral: back pain    CC:   Chief Complaint   Patient presents with    Results     CT scan      Last 3 PDI Scores 8/5/2019 7/30/2019   Pain Disability Index (PDI) 0 25     Interval HPI 8/5/19: Ms. Delatorre returns to the office for CT review.    HPI:   Cornelia Delatorre is a 68 y.o. female who complains of back pain    Onset: about 3.5 months  Inciting Event: none  Progression: since onset, pain is gradually worsening  Typical Range: 3-10/10  Timing: intermittent  Quality: aching, burning, grabbing, sharp, shooting  Radiation: yes, down the back of both legs left > right  Associated numbness or weakness: yes numbness on the left leg, no weakness  Exacerbated by: standing, coughing/sneezing, walking  Allievated by: rest, heat, tylenol  Is Pain Level Acceptable?: No    Previous Therapies:  PT/OT: yes, felt like it got worse  HEP:   Interventions:   Surgery:  Medications: tylenol  - NSAIDS: avoids 2/2 h/o gastric bypass  - MSK Relaxants:   - TCAs:   - SNRIs:   - Topicals:   - Anticonvulsants: gabapentin 600mg TID  - Opioids:       History:    Current Outpatient Medications:     acetaminophen (TYLENOL) 500 MG tablet, Take 1,000 mg by mouth every 6 (six) hours as needed. Patient is taking 1000 ml TID for pain, Disp: , Rfl:     albuterol 90 mcg/actuation inhaler, Inhale 2 puffs into the lungs every 4 (four) hours as needed. 2 puffs every 4 hours as needed for cough, wheeze, or shortness of breath, Disp: 3 Inhaler, Rfl: 3    aspirin (ECOTRIN) 81 MG EC tablet, Take 81 mg by mouth once daily., Disp: , Rfl:     azelastine (OPTIVAR) 0.05 % ophthalmic solution, Place 1 drop into both eyes daily as needed. , Disp: , Rfl:     B-complex with vitamin C (Z-BEC OR EQUIV) tablet, Take 1 tablet by mouth once daily., Disp: , Rfl:     BIFIDOBACTERIUM INFANTIS (ALIGN ORAL), Take by mouth once daily., Disp: , Rfl:     CALCIUM 600 WITH  VITAMIN D3 600 mg(1,500mg) -400 unit Cap, , Disp: , Rfl:     cloNIDine (CATAPRES) 0.1 MG tablet, Take 1 tablet (0.1 mg total) by mouth 3 (three) times daily as needed (PRN SBP > 165 mmHg)., Disp: 90 tablet, Rfl: 6    cranberry 500 mg Cap, Take 1 capsule by mouth every evening., Disp: , Rfl:     diltiaZEM (CARDIZEM CD) 120 MG Cp24, Take 1 capsule (120 mg total) by mouth every evening., Disp: 90 capsule, Rfl: 4    EPINEPHrine (EPIPEN) 0.3 mg/0.3 mL AtIn, , Disp: , Rfl: 3    esomeprazole (NEXIUM) 40 MG capsule, Take 1 capsule (40 mg total) by mouth before breakfast., Disp: 90 capsule, Rfl: 3    fexofenadine (ALLEGRA) 60 MG tablet, Take 60 mg by mouth once daily., Disp: , Rfl:     fish oil-omega-3 fatty acids 300-1,000 mg capsule, Take 2 g by mouth once daily., Disp: , Rfl:     fluticasone (FLONASE) 50 mcg/actuation nasal spray, 2 sprays (100 mcg total) by Each Nare route once daily., Disp: 3 Bottle, Rfl: 3    gabapentin (NEURONTIN) 600 MG tablet, Take 1 tablet (600 mg total) by mouth 3 (three) times daily., Disp: 270 tablet, Rfl: 3    guaiFENesin (MUCINEX) 600 mg 12 hr tablet, Take 2 tablets (1,200 mg total) by mouth 2 (two) times daily., Disp: , Rfl:     guaifenesin-codeine 100-10 mg/5 ml (GUAIFENESIN AC)  mg/5 mL syrup, Take 5 mLs by mouth 3 (three) times daily as needed for Cough., Disp: 473 mL, Rfl: 2    immun glob G,IgG,-pro-IgA 0-50 (HIZENTRA) 1 gram/5 mL (20 %) Soln, Inject 11 g into the skin once a week., Disp: 220 mL, Rfl: 12    inhalation spacing device, Use as directed for inhalation., Disp: 1 each, Rfl: 0    levalbuterol (XOPENEX) 1.25 mg/3 mL nebulizer solution, Take 3 mLs (1.25 mg total) by nebulization every 4 (four) hours as needed for Wheezing or Shortness of Breath. Rescue, Disp: 1 Box, Rfl: 11    metFORMIN (GLUCOPHAGE-XR) 500 MG 24 hr tablet, Take 2 tablets (1,000 mg total) by mouth daily with breakfast., Disp: 180 tablet, Rfl: 3    methylcellulose, laxative, (CITRUCEL) 500 mg  Tab, Take 1 tablet by mouth every morning., Disp: , Rfl:     montelukast (SINGULAIR) 10 mg tablet, Take 1 tablet (10 mg total) by mouth every evening., Disp: 90 tablet, Rfl: 3    mucus clearing device Cecy, 1 Device by Misc.(Non-Drug; Combo Route) route 2 (two) times daily., Disp: 1 Device, Rfl: 0    multivitamin capsule, Take 1 capsule by mouth. Take one tablets daily, Disp: , Rfl:     mupirocin (BACTROBAN) 2 % ointment, AAA bid, Disp: 30 g, Rfl: 2    potassium chloride SA (K-DUR,KLOR-CON) 20 MEQ tablet, Take 1 tablet (20 mEq total) by mouth once daily., Disp: 90 tablet, Rfl: 3    pravastatin (PRAVACHOL) 40 MG tablet, Take 1 tablet (40 mg total) by mouth once daily., Disp: 90 tablet, Rfl: 1    predniSONE (DELTASONE) 20 MG tablet, 3 for 3 days then 2 for 3 days then one for 3 days and repeat for breathing problems, Disp: 36 tablet, Rfl: 0    SIMETHICONE (GAS-X ORAL), Take 1 capsule by mouth as needed., Disp: , Rfl:     SYMBICORT 160-4.5 mcg/actuation HFAA, Inhale 2 puffs into the lungs every 12 (twelve) hours., Disp: 3 Inhaler, Rfl: 3    valsartan-hydrochlorothiazide (DIOVAN-HCT) 160-12.5 mg per tablet, Take 1 tablet by mouth once daily., Disp: 90 tablet, Rfl: 3    varicella-zoster gE-AS01B, PF, (SHINGRIX, PF,) 50 mcg/0.5 mL injection, Inject 0.5 mLs into the muscle., Disp: 0.5 mL, Rfl: 1    Past Medical History:   Diagnosis Date    Allergy     Arthritis     Asthma     Cataract     Chronic fatigue 1/24/2017    Diabetes mellitus     resolved with gastric bypass    Encounter for blood transfusion     GERD (gastroesophageal reflux disease)     Headache(784.0)     History of lumpectomy of both breasts     1992 negative    Hyperlipidemia 7/15/2015    Hypertension     Hypothyroidism     Neuropathy     Obesity     SOHA on CPAP     Postmenopausal HRT (hormone replacement therapy)     Rash     Rosacea     Snoring     Wears glasses        Past Surgical History:   Procedure Laterality Date     ADENOIDECTOMY      APPENDECTOMY      BIOPSY-LESION nasal septum - INTRANASAL MUCOSAL LESION Right 12/1/2014    [facility]    BLADDER SUSPENSION      BREAST BIOPSY Bilateral 1992    bilateral benign excisional biopsies    BUNIONECTOMY  10/17/14    right, still has discomfort    BUNIONECTOMY-TAILOR Right 10/17/2014    [facility] Farzad Eng DPM at [facility] OR    CATARACT EXTRACTION W/  INTRAOCULAR LENS IMPLANT Bilateral     CHOLECYSTECTOMY  08/02/2017    CHOLECYSTECTOMY-LAPAROSCOPIC N/A 8/2/2017    [facility]    COLONOSCOPY      ESOPHAGOGASTRODUODENOSCOPY      dilated esophagus    GASTRIC BYPASS      2011    HYSTERECTOMY  1980    interstim bladder  2009    10/14/14 new battery    OOPHORECTOMY  1980    RELEASE-CARPAL TUNNEL Left 8/29/2017    [facility]    REPAIR-HAMMER TOE- 2nd toe 2nd Procedure: HTC 3rd toe with T&C- 46607. Hammer toe correction, tendon release to 4th right toe. Right 10/17/2014    [facility]ad Eng [facility] at [facility] OR    TJRZHSQO-ISQTHIQSY-FNSOYAZ IPG OF INTERSTIM Right 10/14/2014    [facility] OR 2ND FLR    SKIN CANCER EXCISION      left hand    TONSILLECTOMY      WISDOM TOOTH EXTRACTION         Family History   Problem Relation Age of Onset    Breast cancer Mother 50    Diabetes Unknown     Hypertension Unknown     Hypothyroidism Unknown     Breast cancer Paternal Aunt         40's    Allergic rhinitis Neg Hx     Allergies Neg Hx     Angioedema Neg Hx     Asthma Neg Hx     Atopy Neg Hx     Eczema Neg Hx     Immunodeficiency Neg Hx     Rhinitis Neg Hx     Urticaria Neg Hx        Social History     Socioeconomic History    Marital status:      Spouse name: Not on file    Number of children: Not on file    Years of education: Not on file    Highest education level: Not on file   Occupational History    Not on file    Social Needs    Financial resource strain: Somewhat hard    Food insecurity:     Worry: Never true     Inability: Never true    Transportation needs:     Medical: No     Non-medical: No   Tobacco Use    Smoking status: Never Smoker    Smokeless tobacco: Never Used   Substance and Sexual Activity    Alcohol use: No     Frequency: Never     Binge frequency: Never    Drug use: No    Sexual activity: Not Currently   Lifestyle    Physical activity:     Days per week: 0 days     Minutes per session: 0 min    Stress: Only a little   Relationships    Social connections:     Talks on phone: More than three times a week     Gets together: Once a week     Attends Nondenominational service: Not on file     Active member of club or organization: Yes     Attends meetings of clubs or organizations: More than 4 times per year     Relationship status:    Other Topics Concern    Are you pregnant or think you may be? Not Asked    Breast-feeding Not Asked   Social History Narrative    Not on file       Review of patient's allergies indicates:   Allergen Reactions    Sulfa (sulfonamide antibiotics) Hives    Naproxen Other (See Comments)     Other reaction(s): RT sided numbness      Albuterol Itching and Palpitations     Nebulizer only. Can use inhaler       Review of Systems:  General ROS: negative for - fever  Psychological ROS: negative for - hostility  Hematological and Lymphatic ROS: positive for - bruising  negative for - bleeding problems  Endocrine ROS: negative for - unexpected weight changes  Respiratory ROS: positive for - cough and shortness of breath  Cardiovascular ROS: no chest pain or dyspnea on exertion  Gastrointestinal ROS: no abdominal pain, change in bowel habits, or black or bloody stools  Musculoskeletal ROS: negative for - muscular weakness  Neurological ROS: positive for - numbness/tingling  negative for - bowel and bladder control changes  Dermatological ROS: negative for rash    Physical  Exam:  Vitals:    08/05/19 0941   BP: (!) 110/58   Pulse: 71   Resp: 18   Temp: 96.3 °F (35.7 °C)   TempSrc: Oral   SpO2: 99%   Weight: 88.6 kg (195 lb 7 oz)   PainSc:   4   PainLoc: Back     Body mass index is 33.55 kg/m².     Gen: NAD  Gait: gait intact  Psych:  Mood appropriate for given condition  HEENT: eyes anicteric   GI: Abd soft  CV: RRR  Lungs: breathing unlabored   ROM: limited AROM of the L spine in all planes, full ROM at ankles, knees and hips  Lumbar flexion 90 degrees, extension 50 degrees, side bending 30 degrees.    Sensation: intact to light touch in all dermatomes tested from L2-S1 bilaterally  Reflexes: 2+ b/l patella and 0/0 b/l Achilles  Palpation: Diffusely tender over lumbar paraspinals  -TTP over the b/l greater trochanters and bilateral SI joint  Tone: normal in the b/l knees and hips   Skin: intact  Extremities: No edema in b/l ankles or hands  Provacative tests: - SLR, mildly + b/l axial facet loading       Right Left   L2/3 Iliacus Hip flexion  5  5   L3/4 Qudratus Femoris Knee Extension  5  5   L4/5 Tib Anterior Ankle Dorsiflexion   5  5   L5/S1 Extensor Hallicus Longus Great toe extension  5  5   L4/5 Tib Anterior/Posterior Inversion  5  5   L5/S1 Extensor Digitorum Longus, Peronues Eversion  5  5   S1/S2 Gastroc/Soleus Plantar Flexion  5  5       Imaging:  Xray lumbar spine 6/19/19  FINDINGS:  There is mild exaggeration of the normal lumbar lordosis.  There is 4 mm anterolisthesis of L4 on L5.  The vertebral bodies maintain normal height.  There is no fracture.  There is multilevel endplate osteophyte formation and multilevel facet joint arthropathy.  There is a sacral stimulator in place.    CT lumbar spine 8/6/19  FINDINGS:  Grade 1 anterolisthesis of L4 on L5.  Minimal retrolisthesis of L2 on L3.The vertebral body heights are well-maintained.No fractures are identified. Sacral stimulator partially visualized, which appears to enter the right S3-4 neural foramen.    Mild multilevel  degenerative disc disease with anterior osteophytosis, vacuum disc phenomenon at T10-11 and L1-2 and disc space narrowing, most pronounced and moderate at L1-2.    T12-L1: Small right central posterior disc osteophyte complex.  Mild bilateral facet arthrosis.  No significant spinal canal or neural foraminal stenosis.  L1-2: Circumferential disc bulge.  Mild bilateral facet arthrosis.  No significant spinal canal or neural foraminal stenosis.  L2-3: Minimal retrolisthesis.  Circumferential disc bulge.  Moderate bilateral facet arthrosis.  Mild bilateral neural foraminal stenosis.  Mild spinal canal stenosis.  L3-4: Circumferential disc bulge.  Ligamentum flavum infolding.  Moderate bilateral facet arthrosis.  Mild bilateral neural foraminal stenosis.  Mild spinal canal stenosis  L4-5: Grade 1 anterolisthesis.  Circumferential disc bulge, eccentric to the right.  Ligamentum flavum infolding.  Severe bilateral facet arthrosis.  Mild left and moderate right neural foraminal stenosis.  Severe spinal canal stenosis.  L5-S1: Mild diffuse disc bulge.  Moderate left and mild right facet arthrosis.  Moderate left and mild right neural foraminal stenosis.  No significant spinal canal stenosis.    Labs:  BMP  Lab Results   Component Value Date     05/01/2019    K 4.5 05/01/2019     05/01/2019    CO2 31 (H) 05/01/2019    BUN 12 05/01/2019    CREATININE 0.8 05/01/2019    CALCIUM 9.6 05/01/2019    ANIONGAP 7 (L) 05/01/2019    ESTGFRAFRICA >60.0 05/01/2019    EGFRNONAA >60.0 05/01/2019     Lab Results   Component Value Date    ALT 37 05/01/2019    AST 30 05/01/2019    ALKPHOS 110 05/01/2019    BILITOT 0.7 05/01/2019       Assessment:  Problem List Items Addressed This Visit        Neuro    Lumbar radiculopathy - Primary          Treatment Plan:  68 y.o. year old female with PMH DM II, asthma, HTN, common variable immunodeficiency, GERD, h/o gastric bypass, SOHA presents to the office with lower back pain.  Her pain  typically ranges from 3//10, constant, aching, burning, grabbing, shooting, radiating down her left > right posterior leg.  She also reports numbness of the left leg in non dermatomal distribution, no weakness, no bowel/bladder dysfunction (h/o bladder stimulator), no saddle anesthesia.  She has tried tylenol without relief.  Avoids NSAIDs 2/2 gastric bypass, continues gabapentin 600mg po tid.  She has done PT but feels like her pain got worse with it.  On exam 5/5 strength b/l LE's, 2+ b/l patellar, 0/0 b/l achilles, sensation to light touch intact b/l L2-S1, mildly + b/l axial facet loading, mildly + b/l TTP over SI joints.  I think some of her pain is related to lumbar facet arthropathy, however I think majority of her pain is related to herniated disc/nerve root irritation.  Will get CT lumbar spine to assess anatomy as she can't get MRI due to her sacral stimulator.  Follow up once CT complete.      8/5/19 - Ms. Delatorre returns to the office for CT review.  She continues to have pain radiating down her right > left leg, numbness, no weakness.  CT lumbar spine reviewed today in the office. She has severe canal stenosis at L4-5, multilevel b/l NFS and multilevel b/l facet hypertrophy.  Her pain is limiting her mobility and interfering with her ADL's.  Will schedule for L5/S1 ROM.  Follow up 2 weeks post injection    Procedures: L5/S1 ROM.  Risks, benefits, alternatives explained to patient who verbalized understanding, including increased risk of infection, bleeding, need for additional procedures or surgery, and nerve damage.  Questions regarding the procedure, risks, expected outcome, and possible side effects were solicited and answered to the patient's satisfaction.  Cornelia wishes to proceed with the injection.  Verbal and written consent were obtained in clinic today.  PT/OT/HEP: continue HEP   Medications: continue current medications as prescribed  Labs: Reviewed and medications are appropriately dosed  for current hepatorenal function.  Imaging: no additional at this time    : Not applicable    Fredi Rojas M.D.  Interventional Pain Medicine / Anesthesiology

## 2019-08-05 NOTE — TELEPHONE ENCOUNTER
This patient is scheduled to have a lumbar epidural steroid injection on 8/14 and she would need to stop her aspirin for 7 days prior. Please advise

## 2019-08-07 NOTE — PROGRESS NOTES
Last 5 Patient Entered Readings                                      Current 30 Day Average: 134/68     Recent Readings 8/7/2019 8/5/2019 8/4/2019 8/1/2019 8/1/2019    SBP (mmHg) 136 132 147 111 108    DBP (mmHg) 78 73 67 62 64    Pulse 66 75 76 78 83        Per 30 day average, 134/68 mmHg, patient's BP is not at goal. DBP consistently at goal. Discussed factors that may elevate SBP. Patient acknowledges pain and not resting prior to taking her resting BP at home. Patient is having back pain - having epidural in spine next week.     Patient denies s/s of hypotension (lightheadedness, dizziness, nausea, fatigue) associated with low readings. Instructed patient to inform me if this occurs, patient confirms understanding.      Patient denies s/s of hypertension (SOB, CP, severe headaches, changes in vision) associated with high readings. Instructed patient to go to the ED if BP > 180/110 and accompanied by hypertensive s/s, patient confirms understanding.    Current HTN regimen:  Hypertension Medications             cloNIDine (CATAPRES) 0.1 MG tablet Take 1 tablet (0.1 mg total) by mouth 3 (three) times daily as needed (PRN SBP > 165 mmHg).    diltiaZEM (CARDIZEM CD) 120 MG Cp24 Take 1 capsule (120 mg total) by mouth every evening.    valsartan-hydrochlorothiazide (DIOVAN-HCT) 160-12.5 mg per tablet Take 1 tablet by mouth once daily.        Will continue to monitor regularly. Will follow up in 3-4 months, sooner if BP begins to trend upward or downward.    Asked patient to call or message with questions or concerns.

## 2019-08-09 LAB
OHS CV EVENT MONITOR DAY: 0
OHS CV HOLTER LENGTH DECIMAL HOURS: 47.98
OHS CV HOLTER LENGTH HOURS: 47
OHS CV HOLTER LENGTH MINUTES: 59

## 2019-08-12 ENCOUNTER — OFFICE VISIT (OUTPATIENT)
Dept: ORTHOPEDICS | Facility: CLINIC | Age: 69
End: 2019-08-12
Payer: MEDICARE

## 2019-08-12 VITALS
HEIGHT: 64 IN | HEART RATE: 55 BPM | BODY MASS INDEX: 33.34 KG/M2 | SYSTOLIC BLOOD PRESSURE: 129 MMHG | WEIGHT: 195.31 LBS | DIASTOLIC BLOOD PRESSURE: 75 MMHG

## 2019-08-12 DIAGNOSIS — E78.5 DYSLIPIDEMIA: ICD-10-CM

## 2019-08-12 DIAGNOSIS — M65.9 SYNOVITIS OF RIGHT HAND: Primary | ICD-10-CM

## 2019-08-12 DIAGNOSIS — R25.2 CRAMP IN MUSCLE: ICD-10-CM

## 2019-08-12 PROCEDURE — 99203 PR OFFICE/OUTPT VISIT, NEW, LEVL III, 30-44 MIN: ICD-10-PCS | Mod: HCNC,S$GLB,, | Performed by: ORTHOPAEDIC SURGERY

## 2019-08-12 PROCEDURE — 3074F SYST BP LT 130 MM HG: CPT | Mod: HCNC,CPTII,S$GLB, | Performed by: ORTHOPAEDIC SURGERY

## 2019-08-12 PROCEDURE — 1101F PR PT FALLS ASSESS DOC 0-1 FALLS W/OUT INJ PAST YR: ICD-10-PCS | Mod: HCNC,CPTII,S$GLB, | Performed by: ORTHOPAEDIC SURGERY

## 2019-08-12 PROCEDURE — 3074F PR MOST RECENT SYSTOLIC BLOOD PRESSURE < 130 MM HG: ICD-10-PCS | Mod: HCNC,CPTII,S$GLB, | Performed by: ORTHOPAEDIC SURGERY

## 2019-08-12 PROCEDURE — 99999 PR PBB SHADOW E&M-EST. PATIENT-LVL III: CPT | Mod: PBBFAC,HCNC,, | Performed by: ORTHOPAEDIC SURGERY

## 2019-08-12 PROCEDURE — 99203 OFFICE O/P NEW LOW 30 MIN: CPT | Mod: HCNC,S$GLB,, | Performed by: ORTHOPAEDIC SURGERY

## 2019-08-12 PROCEDURE — 99999 PR PBB SHADOW E&M-EST. PATIENT-LVL III: ICD-10-PCS | Mod: PBBFAC,HCNC,, | Performed by: ORTHOPAEDIC SURGERY

## 2019-08-12 PROCEDURE — 3078F PR MOST RECENT DIASTOLIC BLOOD PRESSURE < 80 MM HG: ICD-10-PCS | Mod: HCNC,CPTII,S$GLB, | Performed by: ORTHOPAEDIC SURGERY

## 2019-08-12 PROCEDURE — 1101F PT FALLS ASSESS-DOCD LE1/YR: CPT | Mod: HCNC,CPTII,S$GLB, | Performed by: ORTHOPAEDIC SURGERY

## 2019-08-12 PROCEDURE — 3078F DIAST BP <80 MM HG: CPT | Mod: HCNC,CPTII,S$GLB, | Performed by: ORTHOPAEDIC SURGERY

## 2019-08-12 RX ORDER — PRAVASTATIN SODIUM 40 MG/1
40 TABLET ORAL DAILY
Qty: 90 TABLET | Refills: 1 | Status: SHIPPED | OUTPATIENT
Start: 2019-08-12 | End: 2020-01-10 | Stop reason: SDUPTHER

## 2019-08-12 RX ORDER — POTASSIUM CHLORIDE 20 MEQ/1
TABLET, EXTENDED RELEASE ORAL
Qty: 90 TABLET | Refills: 3 | Status: SHIPPED | OUTPATIENT
Start: 2019-08-12 | End: 2020-06-18 | Stop reason: SDUPTHER

## 2019-08-12 NOTE — LETTER
August 12, 2019      Robbin Edmond MD  [address] [place] [address] [address]  [place] [address]           [facility]  [address] [facility] [address] [place] [address]  Phone: [phone]          Patient: Cornelia Delatorre   MR Number: [id number]   YOB: 1950   Date of Visit: 8/12/2019       Dear Dr. Robbin Edmond:    Thank you for referring Cornelia Delatorre to me for evaluation. Attached you will find relevant portions of my assessment and plan of care.    If you have questions, please do not hesitate to call me. I look forward to following Cornelia Delatorre along with you.    Sincerely,    Xavi Mckeon MD    Enclosure  CC:  No Recipients    If you would like to receive this communication electronically, please contact [email or web address] or [phone] to request more information on [employer] access.    For providers and/or their staff who would like to refer a patient to [facility], please contact us through our one-stop-shop provider referral line, [facility], at [phone].    If you feel you have received this communication in error or would no longer like to receive these types of communications, please e-mail [email or web address]

## 2019-08-12 NOTE — PROGRESS NOTES
8/12/2019    Chief Complaint:  Chief Complaint   Patient presents with    Right Hand Pain     onset 1 month ago       HPI:  Cornelia Delatorre is a 68 y.o. female, who presents to clinic today she has a history of bilateral hand pain.  She was noted have triggering of her left thumb.  She was injected by Dr. Flynn  approximately 2-3 weeks ago.  She states that the thumb triggering has done well but that she still has pain and swelling to her right hand.  She states that the pain is located over the index finger and over the thumb.  States that activities such as grasping and lifting does reproduce her pain. She has no other complaints.    PMHX:  Past Medical History:   Diagnosis Date    Allergy     Arthritis     Asthma     Cataract     Chronic fatigue 1/24/2017    Diabetes mellitus     resolved with gastric bypass    Encounter for blood transfusion     GERD (gastroesophageal reflux disease)     Headache(784.0)     History of lumpectomy of both breasts     1992 negative    Hyperlipidemia 7/15/2015    Hypertension     Hypothyroidism     Neuropathy     Obesity     SOHA on CPAP     Postmenopausal HRT (hormone replacement therapy)     Rash     Rosacea     Snoring     Wears glasses        PSHX:  Past Surgical History:   Procedure Laterality Date    ADENOIDECTOMY      APPENDECTOMY      BIOPSY-LESION nasal septum - INTRANASAL MUCOSAL LESION Right 12/1/2014    Per[facility] at [facility] OR    BLADDER SUSPENSION      BREAST BIOPSY Bilateral 1992    bilateral benign excisional biopsies    BUNIONECTOMY  10/17/14    right, still has discomfort    BUNIONECTOMY-TAILOR Right 10/17/2014    Performed by Farzad Eng DPM at [facility] OR    CATARACT EXTRACTION W/  INTRAOCULAR LENS IMPLANT Bilateral     CHOLECYSTECTOMY  08/02/2017    CHOLECYSTECTOMY-LAPAROSCOPIC N/A 8/2/2017    [facility] OR    COLONOSCOPY      ESOPHAGOGASTRODUODENOSCOPY      dilated esophagus     GASTRIC BYPASS      2011    HYSTERECTOMY  1980    interstim bladder  2009    10/14/14 new battery    OOPHORECTOMY  1980    RELEASE-CARPAL TUNNEL Left 8/29/2017    Performed by [facility]bin [facility]mond [facility] at [facility] OR    REPAIR-HAMMER TOE- 2nd toe 2nd Procedure: HTC 3rd toe with T&C- 63348. Hammer toe correction, tendon release to 4th right toe. Right 10/17/2014    Per[facility] by [facility] OR    ROIPLDMS-GJMOZQEQN-MRVPACO IPG OF INTERSTIM Right 10/14/2014    Performed by Mary Jane Jarvis [facility] at [facility] OR 2ND FLR    SKIN CANCER EXCISION      left hand    TONSILLECTOMY      WISDOM TOOTH EXTRACTION         FMHX:  Family History   Problem Relation Age of Onset    Breast cancer Mother 50    Diabetes Unknown     Hypertension Unknown     Hypothyroidism Unknown     Breast cancer Paternal Aunt         40's    Allergic rhinitis Neg Hx     Allergies Neg Hx     Angioedema Neg Hx     Asthma Neg Hx     Atopy Neg Hx     Eczema Neg Hx     Immunodeficiency Neg Hx     Rhinitis Neg Hx     Urticaria Neg Hx        SOCHX:  Social History     Tobacco Use    Smoking status: Never Smoker    Smokeless tobacco: Never Used   Substance Use Topics    Alcohol use: No     Frequency: Never     Binge frequency: Never       ALLERGIES:  Sulfa (sulfonamide antibiotics); Naproxen; and Albuterol    CURRENT MEDICATIONS:  Current Outpatient Medications on File Prior to Visit   Medication Sig Dispense Refill    acetaminophen (TYLENOL) 500 MG tablet Take 1,000 mg by mouth every 6 (six) hours as needed. Patient is taking 1000 ml TID for pain      albuterol 90 mcg/actuation inhaler Inhale 2 puffs into the lungs every 4 (four) hours as needed. 2 puffs every 4 hours as needed for cough, wheeze, or shortness of breath 3 Inhaler 3    aspirin (ECOTRIN) 81 MG EC tablet Take 81 mg by mouth once daily.      azelastine (OPTIVAR) 0.05 % ophthalmic solution Place 1 drop into both eyes daily as needed.       B-complex with  vitamin C (Z-BEC OR EQUIV) tablet Take 1 tablet by mouth once daily.      BIFIDOBACTERIUM INFANTIS (ALIGN ORAL) Take by mouth once daily.      CALCIUM 600 WITH VITAMIN D3 600 mg(1,500mg) -400 unit Cap       cloNIDine (CATAPRES) 0.1 MG tablet Take 1 tablet (0.1 mg total) by mouth 3 (three) times daily as needed (PRN SBP > 165 mmHg). 90 tablet 6    cranberry 500 mg Cap Take 1 capsule by mouth every evening.      diltiaZEM (CARDIZEM CD) 120 MG Cp24 Take 1 capsule (120 mg total) by mouth every evening. 90 capsule 4    EPINEPHrine (EPIPEN) 0.3 mg/0.3 mL AtIn   3    esomeprazole (NEXIUM) 40 MG capsule Take 1 capsule (40 mg total) by mouth before breakfast. 90 capsule 3    fexofenadine (ALLEGRA) 60 MG tablet Take 60 mg by mouth once daily.      fish oil-omega-3 fatty acids 300-1,000 mg capsule Take 2 g by mouth once daily.      fluticasone (FLONASE) 50 mcg/actuation nasal spray 2 sprays (100 mcg total) by Each Nare route once daily. 3 Bottle 3    gabapentin (NEURONTIN) 600 MG tablet Take 1 tablet (600 mg total) by mouth 3 (three) times daily. 270 tablet 3    guaiFENesin (MUCINEX) 600 mg 12 hr tablet Take 2 tablets (1,200 mg total) by mouth 2 (two) times daily.      guaifenesin-codeine 100-10 mg/5 ml (GUAIFENESIN AC)  mg/5 mL syrup Take 5 mLs by mouth 3 (three) times daily as needed for Cough. 473 mL 2    immun glob G,IgG,-pro-IgA 0-50 (HIZENTRA) 1 gram/5 mL (20 %) Soln Inject 11 g into the skin once a week. 220 mL 12    inhalation spacing device Use as directed for inhalation. 1 each 0    levalbuterol (XOPENEX) 1.25 mg/3 mL nebulizer solution Take 3 mLs (1.25 mg total) by nebulization every 4 (four) hours as needed for Wheezing or Shortness of Breath. Rescue 1 Box 11    metFORMIN (GLUCOPHAGE-XR) 500 MG 24 hr tablet Take 2 tablets (1,000 mg total) by mouth daily with breakfast. 180 tablet 3    methylcellulose, laxative, (CITRUCEL) 500 mg Tab Take 1 tablet by mouth every morning.      montelukast  (SINGULAIR) 10 mg tablet Take 1 tablet (10 mg total) by mouth every evening. 90 tablet 3    mucus clearing device Cecy 1 Device by Misc.(Non-Drug; Combo Route) route 2 (two) times daily. 1 Device 0    multivitamin capsule Take 1 capsule by mouth. Take one tablets daily      mupirocin (BACTROBAN) 2 % ointment AAA bid 30 g 2    potassium chloride SA (K-DUR,KLOR-CON) 20 MEQ tablet Take 1 tablet (20 mEq total) by mouth once daily. 90 tablet 3    pravastatin (PRAVACHOL) 40 MG tablet Take 1 tablet (40 mg total) by mouth once daily. 90 tablet 1    predniSONE (DELTASONE) 20 MG tablet 3 for 3 days then 2 for 3 days then one for 3 days and repeat for breathing problems 36 tablet 0    SIMETHICONE (GAS-X ORAL) Take 1 capsule by mouth as needed.      SYMBICORT 160-4.5 mcg/actuation HFAA Inhale 2 puffs into the lungs every 12 (twelve) hours. 3 Inhaler 3    valsartan-hydrochlorothiazide (DIOVAN-HCT) 160-12.5 mg per tablet Take 1 tablet by mouth once daily. 90 tablet 3    varicella-zoster gE-AS01B, PF, (SHINGRIX, PF,) 50 mcg/0.5 mL injection Inject 0.5 mLs into the muscle. 0.5 mL 1     No current facility-administered medications on file prior to visit.        REVIEW OF SYSTEMS:  Review of Systems   Constitutional: Negative for diaphoresis and fever.   HENT: Positive for tinnitus. Negative for ear pain, hearing loss and nosebleeds.    Eyes: Negative for pain and redness.   Respiratory: Positive for shortness of breath. Negative for cough.    Cardiovascular: Negative for chest pain and palpitations.   Gastrointestinal: Negative for blood in stool, constipation, diarrhea, nausea and vomiting.   Genitourinary: Negative for dysuria, frequency and hematuria.   Musculoskeletal: Positive for back pain. Negative for myalgias.   Skin: Negative for itching and rash.   Neurological: Negative for dizziness, tingling, seizures, weakness and headaches.   Endo/Heme/Allergies: Positive for environmental allergies.  "  Psychiatric/Behavioral: The patient is not nervous/anxious.        GENERAL PHYSICAL EXAM:   /75   Pulse (!) 55   Ht 5' 4" (1.626 m)   Wt 88.6 kg (195 lb 5.2 oz)   BMI 33.53 kg/m²    GEN: well developed, well nourished, no acute distress   HENT: Normocephalic, atraumatic   EYES: No discharge, conjunctiva normal   NECK: Supple, non-tender   PULM: No wheezing, no respiratory distress   CV: RRR   ABD: Soft, non-tender    ORTHO EXAM:   Examination the right hand and wrist reveals that there is minimal to no edema.  There are no major skin changes.  Palpation does produce tenderness over the flexor tendon sheath of the thumb and the index finger.  Flexion and extension of these digits does not reveal active triggering but there is pain with motion.  She does have a 2+ radial pulse.  Sensation is grossly intact in the median radial and ulnar distributions.    RADIOLOGY:   X-rays of both the right and left hands from 07/24/2019 have been reviewed.  The right hand is noted to have mild degenerative changes without fracture dislocation or other significant pathology.    ASSESSMENT:   Right thumb and index finger pain    PLAN:  1.  The patient is currently on oral steroid and therefore do not think the addition of any anti-inflammatory will be of significant improvement.    2.  She can continue range of motion of the hand and fingers    3.  She will follow up with me in 4 weeks for repeat evaluation    Answers for HPI/ROS submitted by the patient on 8/6/2019   Hand injury  unexpected weight change: No  appetite change : No  sleep disturbance: No  IMMUNOCOMPROMISED: Yes  dysphoric mood: No  visual disturbance: No  sinus pressure : No  food allergies: No  difficulty urinating: No  painful intercourse: No  joint swelling: No  Pain Chronicity: new  History of trauma: No  Onset: 1 to 4 weeks ago  Frequency: constantly  Progression since onset: gradually worsening  injury location: at home  pain- numeric: 7/10  pain " location: right fingers  pain quality: sharp, throbbing  Radiating Pain: Yes  If your pain is radiating, to what part of the body?: right hand  Aggravating factors: bending, bearing weight, touching  inability to bear weight: Yes  joint locking: No  limited range of motion: Yes  stiffness: Yes  Treatments tried: brace/corset, OTC ointments  physical therapy: not tried  Improvement on treatment: no relief

## 2019-08-13 ENCOUNTER — PATIENT MESSAGE (OUTPATIENT)
Dept: OTHER | Facility: OTHER | Age: 69
End: 2019-08-13

## 2019-08-13 ENCOUNTER — PATIENT OUTREACH (OUTPATIENT)
Dept: OTHER | Facility: OTHER | Age: 69
End: 2019-08-13

## 2019-08-13 DIAGNOSIS — M51.36 DDD (DEGENERATIVE DISC DISEASE), LUMBAR: Primary | ICD-10-CM

## 2019-08-13 NOTE — PROGRESS NOTES
Last 5 Patient Entered Readings                                      Current 30 Day Average: 136/71     Recent Readings 8/13/2019 8/13/2019 8/12/2019 8/12/2019 8/11/2019    SBP (mmHg) 120 105 134 131 131    DBP (mmHg) 64 61 63 64 73    Pulse 68 72 70 64 69        Patient called today concerned about inconsistent readings. She said that she had a reading in the 170s/90s last Friday and then today had a reading in the 100s/60s. She reports that she's almost out of her medications (about 2 weeks left), but she didn't want to refill until she confirmed with clinician that the medication regimen was working. Will reach out to Michelle, her clinician.

## 2019-08-14 ENCOUNTER — HOSPITAL ENCOUNTER (OUTPATIENT)
Dept: RADIOLOGY | Facility: HOSPITAL | Age: 69
Discharge: HOME | End: 2019-08-14
Attending: ANESTHESIOLOGY
Payer: MEDICARE

## 2019-08-14 ENCOUNTER — HOSPITAL ENCOUNTER (OUTPATIENT)
Facility: HOSPITAL | Age: 69
Discharge: HOME | End: 2019-08-14
Attending: ANESTHESIOLOGY | Admitting: ANESTHESIOLOGY
Payer: MEDICARE

## 2019-08-14 DIAGNOSIS — M54.16 LUMBAR RADICULOPATHY: Primary | ICD-10-CM

## 2019-08-14 DIAGNOSIS — M51.36 DDD (DEGENERATIVE DISC DISEASE), LUMBAR: ICD-10-CM

## 2019-08-14 LAB — GLUCOSE SERPL-MCNC: 90 MG/DL (ref 70–110)

## 2019-08-14 PROCEDURE — 82962 GLUCOSE BLOOD TEST: CPT | Mod: HCNC,PO | Performed by: ANESTHESIOLOGY

## 2019-08-14 PROCEDURE — 99152 PR MOD CONSCIOUS SEDATION, SAME PHYS, 5+ YRS, FIRST 15 MIN: ICD-10-PCS | Mod: HCNC,,, | Performed by: ANESTHESIOLOGY

## 2019-08-14 PROCEDURE — 62323 NJX INTERLAMINAR LMBR/SAC: CPT | Mod: HCNC,PO | Performed by: ANESTHESIOLOGY

## 2019-08-14 PROCEDURE — 63600175 PHARM REV CODE 636 W HCPCS: Mod: HCNC,PO | Performed by: ANESTHESIOLOGY

## 2019-08-14 PROCEDURE — 25000003 PHARM REV CODE 250: Mod: HCNC,PO | Performed by: ANESTHESIOLOGY

## 2019-08-14 PROCEDURE — 62323 PR INJ LUMBAR/SACRAL, W/IMAGING GUIDANCE: ICD-10-PCS | Mod: HCNC,,, | Performed by: ANESTHESIOLOGY

## 2019-08-14 PROCEDURE — 99152 MOD SED SAME PHYS/QHP 5/>YRS: CPT | Mod: HCNC,,, | Performed by: ANESTHESIOLOGY

## 2019-08-14 PROCEDURE — 62323 NJX INTERLAMINAR LMBR/SAC: CPT | Mod: HCNC,,, | Performed by: ANESTHESIOLOGY

## 2019-08-14 PROCEDURE — 76000 FLUOROSCOPY <1 HR PHYS/QHP: CPT | Mod: TC,HCNC,PO

## 2019-08-14 PROCEDURE — 25500020 PHARM REV CODE 255: Mod: HCNC,PO | Performed by: ANESTHESIOLOGY

## 2019-08-14 RX ORDER — SODIUM BICARBONATE 42 MG/ML
INJECTION, SOLUTION INTRAVENOUS
Status: DISCONTINUED | OUTPATIENT
Start: 2019-08-14 | End: 2019-08-14 | Stop reason: HOSPADM

## 2019-08-14 RX ORDER — SODIUM CHLORIDE, SODIUM LACTATE, POTASSIUM CHLORIDE, CALCIUM CHLORIDE 600; 310; 30; 20 MG/100ML; MG/100ML; MG/100ML; MG/100ML
INJECTION, SOLUTION INTRAVENOUS CONTINUOUS
Status: DISCONTINUED | OUTPATIENT
Start: 2019-08-14 | End: 2019-08-14 | Stop reason: HOSPADM

## 2019-08-14 RX ORDER — LIDOCAINE HYDROCHLORIDE 10 MG/ML
INJECTION, SOLUTION EPIDURAL; INFILTRATION; INTRACAUDAL; PERINEURAL
Status: DISCONTINUED | OUTPATIENT
Start: 2019-08-14 | End: 2019-08-14 | Stop reason: HOSPADM

## 2019-08-14 RX ORDER — MIDAZOLAM HYDROCHLORIDE 2 MG/2ML
INJECTION, SOLUTION INTRAMUSCULAR; INTRAVENOUS
Status: DISCONTINUED | OUTPATIENT
Start: 2019-08-14 | End: 2019-08-14 | Stop reason: HOSPADM

## 2019-08-14 RX ORDER — TRIAMCINOLONE ACETONIDE 40 MG/ML
INJECTION, SUSPENSION INTRA-ARTICULAR; INTRAMUSCULAR
Status: DISCONTINUED | OUTPATIENT
Start: 2019-08-14 | End: 2019-08-14 | Stop reason: HOSPADM

## 2019-08-14 RX ADMIN — SODIUM CHLORIDE, SODIUM LACTATE, POTASSIUM CHLORIDE, AND CALCIUM CHLORIDE: .6; .31; .03; .02 INJECTION, SOLUTION INTRAVENOUS at 01:08

## 2019-08-14 NOTE — INTERVAL H&P NOTE
The patient has been examined and the H&P has been reviewed:    I concur with the findings and no changes have occurred since H&P was written.  MP III, ASA 3    Anesthesia/Surgery risks, benefits and alternative options discussed and understood by patient/family.          Active Hospital Problems    Diagnosis  POA    Lumbar radiculopathy [M54.16]  Yes      Resolved Hospital Problems   No resolved problems to display.

## 2019-08-14 NOTE — DISCHARGE SUMMARY
[facility]  Discharge Note  Short Stay    Admit Date: 8/14/2019    Discharge Date: 8/14/2019    Attending Physician: Fredi Rojas     Discharge Provider: Fredi Rojas    Diagnoses:  Active Hospital Problems    Diagnosis  POA    Lumbar radiculopathy [M54.16]  Yes      Resolved Hospital Problems   No resolved problems to display.       Discharged Condition: Good    Final Diagnoses: Lumbar radiculopathy [M54.16]    Disposition: Home or Self Care    Hospital Course: No complications, uneventful    Outcome of Hospitalization, Treatment, Procedure, or Surgery:  Patient was admitted for outpatient interventional pain management procedure. The patient tolerated the procedure well with no complications.    Follow up/Patient Instructions:  Follow up as scheduled in Pain Management office in 3-4 weeks.  Patient has received instructions and follow up date and time.    Medications:  Continue previous medications    Discharge Procedure Orders   Notify your health care provider if you experience any of the following:  temperature >100.4     Notify your health care provider if you experience any of the following:  persistent nausea and vomiting or diarrhea     Notify your health care provider if you experience any of the following:  severe uncontrolled pain     Notify your health care provider if you experience any of the following:  redness, tenderness, or signs of infection (pain, swelling, redness, odor or green/yellow discharge around incision site)     Notify your health care provider if you experience any of the following:  difficulty breathing or increased cough     Notify your health care provider if you experience any of the following:  severe persistent headache     Notify your health care provider if you experience any of the following:  worsening rash     Notify your health care provider if you experience any of the following:  persistent dizziness, light-headedness, or visual disturbances     Notify your  health care provider if you experience any of the following:  increased confusion or weakness     Activity as tolerated         Discharge Procedure Orders (must include Diet, Follow-up, Activity):   Discharge Procedure Orders (must include Diet, Follow-up, Activity)   Notify your health care provider if you experience any of the following:  temperature >100.4     Notify your health care provider if you experience any of the following:  persistent nausea and vomiting or diarrhea     Notify your health care provider if you experience any of the following:  severe uncontrolled pain     Notify your health care provider if you experience any of the following:  redness, tenderness, or signs of infection (pain, swelling, redness, odor or green/yellow discharge around incision site)     Notify your health care provider if you experience any of the following:  difficulty breathing or increased cough     Notify your health care provider if you experience any of the following:  severe persistent headache     Notify your health care provider if you experience any of the following:  worsening rash     Notify your health care provider if you experience any of the following:  persistent dizziness, light-headedness, or visual disturbances     Notify your health care provider if you experience any of the following:  increased confusion or weakness     Activity as tolerated

## 2019-08-14 NOTE — DISCHARGE INSTRUCTIONS
Home care instructions   Apply ice pack to the injection site for 20 minutes periods for the first 24 hrs for soreness/discomfort at injection site DO NOT USE HEAT FOR 24 HOURS  Keep site clean and dry for 24 hours, remove bandaid when desired  Do not drive until tomorrow  Take care when walking after a lumbar injection  STEROIDS or RADIOFREQUENCY   May take 10-14 days for full affects.  Avoid strenuous exercises for 2 days  Resume Aspirin, Plavix, or Coumadin the day after the procedure unless otherwise instructed.    SEE IMMEDIATE MEDICAL HELP FOR:  Severe increase in your usual pain or appearance of new pain  Prolonged or increasing weakness or numbness in the legs or arms  Drainage, redness, active bleeding, or increased swelling at the injection site  Temperature over 100.0 degrees F.  Headache that increases when your head is upright and decreases when you lie flat    CALL 911 OR GO DIRECTLY TO EMERGENCY DEPARTMENT FOR:  Shortness of breath, chest pain, or problems breathing

## 2019-08-14 NOTE — OP NOTE
"Procedure Note    Procedure Date: 8/14/2019    Procedure Performed:  L5/S1 lumbar interlaminar epidural steroid injection under fluoroscopy.    Indications: Patient has failed conservative therapy.      Pre-op diagnosis: Lumbar Radiculopathy    Post-op diagnosis: same    Physician: Fredi Rojas MD    Sedation medications: versed 2mg    Medications injected: depomedrol 80mg, 1% Lidocaine 1ml, 3.5 mL sterile, preservative-free normal saline.    Local anesthetic used: 1% Lidocaine, 1 ml, 8.4% sodium bicarbonate 0.25ml    Estimated Blood Loss: none    Complications:  none    Technique:  The patient was interviewed in the holding area and Risks/Benefits were discussed, including, but not limited to, the possibility of new or different pain, bleeding or infection.   All questions were answered.  The patient understood and accepted risks.  Consent was verfied.  A time-out was taken to identify patient and procedure prior to starting the procedure. The patient was placed in the prone position on the fluoroscopy table. The area of the lumbar spine was prepped with Chloraprep and draped in a sterile manner. The L5/S1 interspace was identified and marked under AP fluoroscopy. The skin and subcutaneous tissues overlying the targeted interspace were anesthetized with 3-5 mL of 1% lidocaine using a 25G, 1.5" needle.  A 17G, 3.5" Tuohy epidural needle was directed toward the interspace under fluoroscopic guidance until the ligamentum flavum was engaged. From this point, a loss of resistance technique with a glass syringe and saline was used to identify entrance of the needle into the epidural space. Once loss of resistance was observed 1 mL of contrast solution was injected. An appropriate epidurogram was noted.  A 6 mL mixture consisting of saline, 1 mL 1% Lidocaine and 80 mg of depomedrol was injected slowly and without resistance.  The needle was flushed with normal saline and removed. The contrast was seen to be displaced " after injection. Patient was awake/responsive during all injections.  The patient tolerated the procedure well and was transferred to the .AC.. in stable condition.  The patient was monitored after the procedure and was given post-procedure and discharge instructions to follow at home. The patient was discharged in a stable condition.

## 2019-08-15 VITALS
OXYGEN SATURATION: 94 % | TEMPERATURE: 97 F | SYSTOLIC BLOOD PRESSURE: 143 MMHG | HEIGHT: 64 IN | BODY MASS INDEX: 33.29 KG/M2 | WEIGHT: 195 LBS | DIASTOLIC BLOOD PRESSURE: 64 MMHG | HEART RATE: 63 BPM | RESPIRATION RATE: 16 BRPM

## 2019-08-21 ENCOUNTER — OFFICE VISIT (OUTPATIENT)
Dept: ALLERGY | Facility: CLINIC | Age: 69
End: 2019-08-21
Payer: MEDICARE

## 2019-08-21 VITALS — WEIGHT: 196.19 LBS | HEART RATE: 91 BPM | OXYGEN SATURATION: 98 % | BODY MASS INDEX: 33.49 KG/M2 | HEIGHT: 64 IN

## 2019-08-21 DIAGNOSIS — J45.30 MILD PERSISTENT ASTHMA WITHOUT COMPLICATION: ICD-10-CM

## 2019-08-21 DIAGNOSIS — D83.9 CVID (COMMON VARIABLE IMMUNODEFICIENCY): Primary | ICD-10-CM

## 2019-08-21 DIAGNOSIS — J31.0 CHRONIC RHINITIS: ICD-10-CM

## 2019-08-21 DIAGNOSIS — J47.0 BRONCHIECTASIS WITH ACUTE LOWER RESPIRATORY INFECTION: ICD-10-CM

## 2019-08-21 PROCEDURE — 99214 PR OFFICE/OUTPT VISIT, EST, LEVL IV, 30-39 MIN: ICD-10-PCS | Mod: HCNC,25,S$GLB, | Performed by: ALLERGY & IMMUNOLOGY

## 2019-08-21 PROCEDURE — 99214 OFFICE O/P EST MOD 30 MIN: CPT | Mod: HCNC,25,S$GLB, | Performed by: ALLERGY & IMMUNOLOGY

## 2019-08-21 PROCEDURE — 95004 PR ALLERGY SKIN TESTS,ALLERGENS: ICD-10-PCS | Mod: HCNC,S$GLB,, | Performed by: ALLERGY & IMMUNOLOGY

## 2019-08-21 PROCEDURE — 1101F PT FALLS ASSESS-DOCD LE1/YR: CPT | Mod: HCNC,CPTII,S$GLB, | Performed by: ALLERGY & IMMUNOLOGY

## 2019-08-21 PROCEDURE — 1101F PR PT FALLS ASSESS DOC 0-1 FALLS W/OUT INJ PAST YR: ICD-10-PCS | Mod: HCNC,CPTII,S$GLB, | Performed by: ALLERGY & IMMUNOLOGY

## 2019-08-21 PROCEDURE — 95004 PERQ TESTS W/ALRGNC XTRCS: CPT | Mod: HCNC,S$GLB,, | Performed by: ALLERGY & IMMUNOLOGY

## 2019-08-21 PROCEDURE — 99999 PR PBB SHADOW E&M-EST. PATIENT-LVL III: CPT | Mod: PBBFAC,HCNC,, | Performed by: ALLERGY & IMMUNOLOGY

## 2019-08-21 PROCEDURE — 99999 PR PBB SHADOW E&M-EST. PATIENT-LVL III: ICD-10-PCS | Mod: PBBFAC,HCNC,, | Performed by: ALLERGY & IMMUNOLOGY

## 2019-08-21 NOTE — PROGRESS NOTES
Subjective:       Patient ID: Cornelia Delatorre is a 68 y.o. female.    Chief Complaint:  No chief complaint on file.      67 yo woman presents for continued evaluation of CVID, chronic rhinitis, bronchiectasis and asthma. She was last seen 4/11/19,. She started Hizentra 11 g weekly. She is doing well on this, less infections. She had a pharyngitis that took 2 courses antibiotics to clear but fine now. No sinusitis or pneumonia.She does have H/o nasal allergies and was on shots in past and would like to test to see if still has allergies. She has runny some, congestion, PND and watery eyes. She takes allegra and Singulair daily and this helps bu has balance of helping and not causing to be too dry. Has felt worse off of them for skin test.     Prior History taken 4/11/19: consult from Dr Jonathan Nicole for CVID. She was diagnosed with asthma 2 years ago. She has had up and down with lots of mucus causing cough. She has SOB can only walk short amount before KLINE. She is currently on chronic Zithromax. She does have bronchiectasis. She hash ad pneumonia 2 times in past but none in last 5 years. She does not get frequent sinus infections more chest. She was ion hospital for exacerbation in 10/2018. She has some sneeze and runny nose but not much. She saw Dr Herrera and is on allergy shots since January but off last month due to asthma. She was prescribed Hizentra but cost was high for her. She is friends with a pt here who has same insurance and gets Hizentra from [facility] and pays nothing so she would like to transfer. She has labs 3/2017 with IgG 693, IgA 191, IgM 22 and IgE <35 with humoral panel protected to 8/14 strep titers. She had pneumovax 3/2016 and Prevnar 3/2017 prior to these labs. She had repeat humoral panel 7/2017 and protected to 8/14. She had another pneumovax 4/27/18 and labs 6/28 still only protected to 8/14. Labs 11/2018 now shoe IgG low at 516 all 4 subclasses low, IgA 132, IgM low at 36. She has normal  PFT but CT shows bronchiectasis. She has no eczema. No known food, insect or latex allergy. She has HTN and DM. She did have gastric bypass in past. She never smoked.      Environmental History: see history section for home environment  Review of Systems   Constitutional: Negative for appetite change, chills, fatigue and fever.   HENT: Positive for rhinorrhea and sneezing. Negative for congestion, ear discharge, ear pain, facial swelling, nosebleeds, postnasal drip, sinus pressure, sore throat, trouble swallowing and voice change.    Eyes: Negative for discharge, redness, itching and visual disturbance.   Respiratory: Positive for cough, choking, chest tightness, shortness of breath and wheezing.    Cardiovascular: Negative for chest pain, palpitations and leg swelling.   Gastrointestinal: Negative for abdominal distention, abdominal pain, constipation, diarrhea, nausea and vomiting.   Genitourinary: Negative for difficulty urinating.   Musculoskeletal: Positive for arthralgias. Negative for gait problem, joint swelling and myalgias.   Skin: Negative for color change and rash.   Neurological: Negative for dizziness, syncope, weakness, light-headedness and headaches.   Hematological: Negative for adenopathy. Does not bruise/bleed easily.   Psychiatric/Behavioral: Negative for agitation, behavioral problems, confusion and sleep disturbance. The patient is not nervous/anxious.         Objective:      Physical Exam   Constitutional: She is oriented to person, place, and time. She appears well-developed and well-nourished. No distress.   HENT:   Head: Normocephalic and atraumatic.   Right Ear: Hearing, tympanic membrane, external ear and ear canal normal.   Left Ear: Hearing, tympanic membrane, external ear and ear canal normal.   Nose: No mucosal edema, rhinorrhea, sinus tenderness or septal deviation. No epistaxis. Right sinus exhibits no maxillary sinus tenderness and no frontal sinus tenderness. Left sinus exhibits  no maxillary sinus tenderness and no frontal sinus tenderness.   Mouth/Throat: Uvula is midline, oropharynx is clear and moist and mucous membranes are normal. No uvula swelling.   Eyes: Conjunctivae are normal. Right eye exhibits no discharge. Left eye exhibits no discharge.   Neck: Normal range of motion. No thyromegaly present.   Cardiovascular: Normal rate, regular rhythm and normal heart sounds.   No murmur heard.  Pulmonary/Chest: Effort normal and breath sounds normal. No respiratory distress. She has no wheezes.   Abdominal: Soft. She exhibits no distension. There is no tenderness.   Musculoskeletal: Normal range of motion. She exhibits no edema or tenderness.   Lymphadenopathy:     She has no cervical adenopathy.   Neurological: She is alert and oriented to person, place, and time.   Skin: Skin is warm and dry. No rash noted. No erythema.   Psychiatric: She has a normal mood and affect. Her behavior is normal. Judgment and thought content normal.   Nursing note and vitals reviewed.      Laboratory:   Percutaneous Skin Testing: prick skin test inhalants #60, 8/21/19: 1+ alli/dock weed, 3+ histamine control, remainder tested negative, see flow sheet  Assessment:       1. CVID (common variable immunodeficiency)    2. Chronic rhinitis    3. Bronchiectasis with acute lower respiratory infection    4. Mild persistent asthma without complication         Plan:       1. Pt with low IgG, IgM and no response to pneumococcal vaccines so has CVID. Continue IgG replacement - Hizentra 11g weekly- check trough levels with next infusion  2. No evidence of IgE mediated allergy, baker end immunocaps to confirm  3. Resume azelastine 2 SEN BID and allegra daily and continue Flonase 2 SEN daily  4. phone review

## 2019-08-26 ENCOUNTER — LAB VISIT (OUTPATIENT)
Dept: LAB | Facility: HOSPITAL | Age: 69
End: 2019-08-26
Attending: INTERNAL MEDICINE
Payer: MEDICARE

## 2019-08-26 DIAGNOSIS — J31.0 CHRONIC RHINITIS: ICD-10-CM

## 2019-08-26 DIAGNOSIS — D83.9 CVID (COMMON VARIABLE IMMUNODEFICIENCY): ICD-10-CM

## 2019-08-26 LAB
ALBUMIN SERPL BCP-MCNC: 3.6 G/DL (ref 3.5–5.2)
ALP SERPL-CCNC: 130 U/L (ref 55–135)
ALT SERPL W/O P-5'-P-CCNC: 22 U/L (ref 10–44)
ANION GAP SERPL CALC-SCNC: 11 MMOL/L (ref 8–16)
AST SERPL-CCNC: 22 U/L (ref 10–40)
BASOPHILS # BLD AUTO: 0.01 K/UL (ref 0–0.2)
BASOPHILS NFR BLD: 0.2 % (ref 0–1.9)
BILIRUB SERPL-MCNC: 0.5 MG/DL (ref 0.1–1)
BUN SERPL-MCNC: 15 MG/DL (ref 8–23)
CALCIUM SERPL-MCNC: 9.3 MG/DL (ref 8.7–10.5)
CHLORIDE SERPL-SCNC: 98 MMOL/L (ref 95–110)
CO2 SERPL-SCNC: 28 MMOL/L (ref 23–29)
CREAT SERPL-MCNC: 0.9 MG/DL (ref 0.5–1.4)
DIFFERENTIAL METHOD: ABNORMAL
EOSINOPHIL # BLD AUTO: 0.1 K/UL (ref 0–0.5)
EOSINOPHIL NFR BLD: 1 % (ref 0–8)
ERYTHROCYTE [DISTWIDTH] IN BLOOD BY AUTOMATED COUNT: 12.8 % (ref 11.5–14.5)
EST. GFR  (AFRICAN AMERICAN): >60 ML/MIN/1.73 M^2
EST. GFR  (NON AFRICAN AMERICAN): >60 ML/MIN/1.73 M^2
GLUCOSE SERPL-MCNC: 108 MG/DL (ref 70–110)
HCT VFR BLD AUTO: 40.5 % (ref 37–48.5)
HGB BLD-MCNC: 12.4 G/DL (ref 12–16)
IGA SERPL-MCNC: 183 MG/DL (ref 40–350)
IGG SERPL-MCNC: 865 MG/DL (ref 650–1600)
IGM SERPL-MCNC: 36 MG/DL (ref 50–300)
IMM GRANULOCYTES # BLD AUTO: 0.09 K/UL (ref 0–0.04)
IMM GRANULOCYTES NFR BLD AUTO: 1.6 % (ref 0–0.5)
LYMPHOCYTES # BLD AUTO: 1.6 K/UL (ref 1–4.8)
LYMPHOCYTES NFR BLD: 27.7 % (ref 18–48)
MCH RBC QN AUTO: 29.9 PG (ref 27–31)
MCHC RBC AUTO-ENTMCNC: 30.6 G/DL (ref 32–36)
MCV RBC AUTO: 98 FL (ref 82–98)
MONOCYTES # BLD AUTO: 0.5 K/UL (ref 0.3–1)
MONOCYTES NFR BLD: 8 % (ref 4–15)
NEUTROPHILS # BLD AUTO: 3.5 K/UL (ref 1.8–7.7)
NEUTROPHILS NFR BLD: 61.5 % (ref 38–73)
NRBC BLD-RTO: 0 /100 WBC
PLATELET # BLD AUTO: 188 K/UL (ref 150–350)
PMV BLD AUTO: 10.9 FL (ref 9.2–12.9)
POTASSIUM SERPL-SCNC: 4.5 MMOL/L (ref 3.5–5.1)
PROT SERPL-MCNC: 7.4 G/DL (ref 6–8.4)
RBC # BLD AUTO: 4.15 M/UL (ref 4–5.4)
SODIUM SERPL-SCNC: 137 MMOL/L (ref 136–145)
WBC # BLD AUTO: 5.74 K/UL (ref 3.9–12.7)

## 2019-08-26 PROCEDURE — 82787 IGG 1 2 3 OR 4 EACH: CPT | Mod: HCNC

## 2019-08-26 PROCEDURE — 86003 ALLG SPEC IGE CRUDE XTRC EA: CPT | Mod: 59,HCNC

## 2019-08-26 PROCEDURE — 82784 ASSAY IGA/IGD/IGG/IGM EACH: CPT | Mod: 59,HCNC

## 2019-08-26 PROCEDURE — 85025 COMPLETE CBC W/AUTO DIFF WBC: CPT | Mod: HCNC

## 2019-08-26 PROCEDURE — 80053 COMPREHEN METABOLIC PANEL: CPT | Mod: HCNC

## 2019-08-26 PROCEDURE — 82784 ASSAY IGA/IGD/IGG/IGM EACH: CPT | Mod: HCNC

## 2019-08-26 PROCEDURE — 86003 ALLG SPEC IGE CRUDE XTRC EA: CPT | Mod: HCNC

## 2019-08-28 ENCOUNTER — PATIENT OUTREACH (OUTPATIENT)
Dept: ADMINISTRATIVE | Facility: HOSPITAL | Age: 69
End: 2019-08-28

## 2019-08-28 LAB
A ALTERNATA IGE QN: <0.35 KU/L
A FUMIGATUS IGE QN: <0.35 KU/L
ALLERGEN CHAETOMIUM GLOBOSUM IGE: <0.35 KU/L
ALLERGEN WALNUT TREE IGE: <0.35 KU/L
ALLERGEN WHITE PINE TREE IGE: <0.35 KU/L
BAHIA GRASS IGE QN: <0.35 KU/L
BALD CYPRESS IGE QN: <0.35 KU/L
BERMUDA GRASS IGE QN: <0.35 KU/L
C HERBARUM IGE QN: <0.35 KU/L
C LUNATA IGE QN: <0.35 KU/L
CAT DANDER IGE QN: <0.35 KU/L
CHAETOMIUM GLOB. CLASS: NORMAL
COMMON RAGWEED IGE QN: <0.35 KU/L
COTTONWOOD IGE QN: <0.35 KU/L
D FARINAE IGE QN: <0.35 KU/L
D PTERONYSS IGE QN: <0.35 KU/L
DEPRECATED A ALTERNATA IGE RAST QL: NORMAL
DEPRECATED A FUMIGATUS IGE RAST QL: NORMAL
DEPRECATED BAHIA GRASS IGE RAST QL: NORMAL
DEPRECATED BALD CYPRESS IGE RAST QL: NORMAL
DEPRECATED BERMUDA GRASS IGE RAST QL: NORMAL
DEPRECATED C HERBARUM IGE RAST QL: NORMAL
DEPRECATED C LUNATA IGE RAST QL: NORMAL
DEPRECATED CAT DANDER IGE RAST QL: NORMAL
DEPRECATED COMMON RAGWEED IGE RAST QL: NORMAL
DEPRECATED COTTONWOOD IGE RAST QL: NORMAL
DEPRECATED D FARINAE IGE RAST QL: NORMAL
DEPRECATED D PTERONYSS IGE RAST QL: NORMAL
DEPRECATED DOG DANDER IGE RAST QL: NORMAL
DEPRECATED ELDER IGE RAST QL: NORMAL
DEPRECATED ENGL PLANTAIN IGE RAST QL: NORMAL
DEPRECATED JOHNSON GRASS IGE RAST QL: NORMAL
DEPRECATED LONDON PLANE IGE RAST QL: NORMAL
DEPRECATED MUGWORT IGE RAST QL: NORMAL
DEPRECATED P NOTATUM IGE RAST QL: NORMAL
DEPRECATED PECAN/HICK TREE IGE RAST QL: NORMAL
DEPRECATED ROACH IGE RAST QL: NORMAL
DEPRECATED S ROSTRATA IGE RAST QL: NORMAL
DEPRECATED SALTWORT IGE RAST QL: NORMAL
DEPRECATED SILVER BIRCH IGE RAST QL: NORMAL
DEPRECATED TIMOTHY IGE RAST QL: NORMAL
DEPRECATED WHITE OAK IGE RAST QL: NORMAL
DEPRECATED WILLOW IGE RAST QL: NORMAL
DOG DANDER IGE QN: <0.35 KU/L
ELDER IGE QN: <0.35 KU/L
ENGL PLANTAIN IGE QN: <0.35 KU/L
IGG1 SER-MCNC: 444 MG/DL (ref 382–929)
IGG2 SER-MCNC: 346 MG/DL (ref 242–700)
IGG3 SER-MCNC: 23 MG/DL (ref 22–176)
IGG4 SER-MCNC: 4 MG/DL (ref 4–86)
JOHNSON GRASS IGE QN: <0.35 KU/L
LONDON PLANE IGE QN: <0.35 KU/L
MUGWORT IGE QN: <0.35 KU/L
P NOTATUM IGE QN: <0.35 KU/L
PECAN/HICK TREE IGE QN: <0.35 KU/L
ROACH IGE QN: <0.35 KU/L
S ROSTRATA IGE QN: <0.35 KU/L
SALTWORT IGE QN: <0.35 KU/L
SILVER BIRCH IGE QN: <0.35 KU/L
TIMOTHY IGE QN: <0.35 KU/L
WALNUT TREE CLASS: NORMAL
WHITE OAK IGE QN: <0.35 KU/L
WHITE PINE CLASS: NORMAL
WILLOW IGE QN: <0.35 KU/L

## 2019-08-28 NOTE — PROGRESS NOTES
Health Maintenance Due   Topic Date Due    Eye Exam  09/05/2019       Chart review completed 08/28/2019

## 2019-08-30 ENCOUNTER — OFFICE VISIT (OUTPATIENT)
Dept: PAIN MEDICINE | Facility: CLINIC | Age: 69
End: 2019-08-30
Payer: MEDICARE

## 2019-08-30 VITALS
DIASTOLIC BLOOD PRESSURE: 71 MMHG | HEART RATE: 70 BPM | SYSTOLIC BLOOD PRESSURE: 124 MMHG | HEIGHT: 64 IN | WEIGHT: 195.56 LBS | BODY MASS INDEX: 33.38 KG/M2

## 2019-08-30 DIAGNOSIS — G89.29 CHRONIC BILATERAL LOW BACK PAIN WITH BILATERAL SCIATICA: Primary | ICD-10-CM

## 2019-08-30 DIAGNOSIS — M54.42 CHRONIC BILATERAL LOW BACK PAIN WITH BILATERAL SCIATICA: Primary | ICD-10-CM

## 2019-08-30 DIAGNOSIS — M54.41 CHRONIC BILATERAL LOW BACK PAIN WITH BILATERAL SCIATICA: Primary | ICD-10-CM

## 2019-08-30 PROCEDURE — 3078F DIAST BP <80 MM HG: CPT | Mod: HCNC,CPTII,S$GLB, | Performed by: ANESTHESIOLOGY

## 2019-08-30 PROCEDURE — 99212 PR OFFICE/OUTPT VISIT, EST, LEVL II, 10-19 MIN: ICD-10-PCS | Mod: HCNC,S$GLB,, | Performed by: ANESTHESIOLOGY

## 2019-08-30 PROCEDURE — 99999 PR PBB SHADOW E&M-EST. PATIENT-LVL III: ICD-10-PCS | Mod: PBBFAC,HCNC,, | Performed by: ANESTHESIOLOGY

## 2019-08-30 PROCEDURE — 99212 OFFICE O/P EST SF 10 MIN: CPT | Mod: HCNC,S$GLB,, | Performed by: ANESTHESIOLOGY

## 2019-08-30 PROCEDURE — 1101F PR PT FALLS ASSESS DOC 0-1 FALLS W/OUT INJ PAST YR: ICD-10-PCS | Mod: HCNC,CPTII,S$GLB, | Performed by: ANESTHESIOLOGY

## 2019-08-30 PROCEDURE — 3074F PR MOST RECENT SYSTOLIC BLOOD PRESSURE < 130 MM HG: ICD-10-PCS | Mod: HCNC,CPTII,S$GLB, | Performed by: ANESTHESIOLOGY

## 2019-08-30 PROCEDURE — 99999 PR PBB SHADOW E&M-EST. PATIENT-LVL III: CPT | Mod: PBBFAC,HCNC,, | Performed by: ANESTHESIOLOGY

## 2019-08-30 PROCEDURE — 3078F PR MOST RECENT DIASTOLIC BLOOD PRESSURE < 80 MM HG: ICD-10-PCS | Mod: HCNC,CPTII,S$GLB, | Performed by: ANESTHESIOLOGY

## 2019-08-30 PROCEDURE — 1101F PT FALLS ASSESS-DOCD LE1/YR: CPT | Mod: HCNC,CPTII,S$GLB, | Performed by: ANESTHESIOLOGY

## 2019-08-30 PROCEDURE — 3074F SYST BP LT 130 MM HG: CPT | Mod: HCNC,CPTII,S$GLB, | Performed by: ANESTHESIOLOGY

## 2019-08-30 NOTE — PROGRESS NOTES
"[facility] Follow Up Evaluation    Referred by: Patricia Valerio NP  Reason for referral: back pain    CC:   Chief Complaint   Patient presents with    2 week f/u     pt states "The shot did wonders fast."      Last 3 PDI Scores 8/5/2019 7/30/2019   Pain Disability Index (PDI) 0 25     Interval HPI 8/30/19: Ms. Delatorre returns to the office for follow up.  She is s/p L5/S1 ROM on 8/14/19 with close to 100% relief of her back and leg pain. She reports improved mobility and ability to do ADL's.     Interval HPI 8/5/19: Ms. Delatorre returns to the office for CT review.    HPI:   Cornelia Delatorre is a 68 y.o. female who complains of back pain    Onset: about 3.5 months  Inciting Event: none  Progression: since onset, pain is gradually worsening  Typical Range: 3-10/10  Timing: intermittent  Quality: aching, burning, grabbing, sharp, shooting  Radiation: yes, down the back of both legs left > right  Associated numbness or weakness: yes numbness on the left leg, no weakness  Exacerbated by: standing, coughing/sneezing, walking  Allievated by: rest, heat, tylenol  Is Pain Level Acceptable?: No    Previous Therapies:  PT/OT: yes, felt like it got worse  HEP:   Interventions:   Surgery:  Medications: tylenol  - NSAIDS: avoids 2/2 h/o gastric bypass  - MSK Relaxants:   - TCAs:   - SNRIs:   - Topicals:   - Anticonvulsants: gabapentin 600mg TID  - Opioids:       History:    Current Outpatient Medications:     acetaminophen (TYLENOL) 500 MG tablet, Take 1,000 mg by mouth every 6 (six) hours as needed. Patient is taking 1000 ml TID for pain, Disp: , Rfl:     albuterol 90 mcg/actuation inhaler, Inhale 2 puffs into the lungs every 4 (four) hours as needed. 2 puffs every 4 hours as needed for cough, wheeze, or shortness of breath, Disp: 3 Inhaler, Rfl: 3    aspirin (ECOTRIN) 81 MG EC tablet, Take 81 mg by mouth once daily., Disp: , Rfl:     azelastine (OPTIVAR) 0.05 % ophthalmic solution, Place 1 drop into both " eyes daily as needed. , Disp: , Rfl:     B-complex with vitamin C (Z-BEC OR EQUIV) tablet, Take 1 tablet by mouth once daily., Disp: , Rfl:     BIFIDOBACTERIUM INFANTIS (ALIGN ORAL), Take by mouth once daily., Disp: , Rfl:     CALCIUM 600 WITH VITAMIN D3 600 mg(1,500mg) -400 unit Cap, , Disp: , Rfl:     cloNIDine (CATAPRES) 0.1 MG tablet, Take 1 tablet (0.1 mg total) by mouth 3 (three) times daily as needed (PRN SBP > 165 mmHg)., Disp: 90 tablet, Rfl: 6    cranberry 500 mg Cap, Take 1 capsule by mouth every evening., Disp: , Rfl:     diltiaZEM (CARDIZEM CD) 120 MG Cp24, Take 1 capsule (120 mg total) by mouth every evening., Disp: 90 capsule, Rfl: 4    EPINEPHrine (EPIPEN) 0.3 mg/0.3 mL AtIn, , Disp: , Rfl: 3    esomeprazole (NEXIUM) 40 MG capsule, Take 1 capsule (40 mg total) by mouth before breakfast., Disp: 90 capsule, Rfl: 3    fexofenadine (ALLEGRA) 60 MG tablet, Take 60 mg by mouth once daily., Disp: , Rfl:     fish oil-omega-3 fatty acids 300-1,000 mg capsule, Take 2 g by mouth once daily., Disp: , Rfl:     fluticasone (FLONASE) 50 mcg/actuation nasal spray, 2 sprays (100 mcg total) by Each Nare route once daily., Disp: 3 Bottle, Rfl: 3    gabapentin (NEURONTIN) 600 MG tablet, Take 1 tablet (600 mg total) by mouth 3 (three) times daily., Disp: 270 tablet, Rfl: 3    guaiFENesin (MUCINEX) 600 mg 12 hr tablet, Take 2 tablets (1,200 mg total) by mouth 2 (two) times daily., Disp: , Rfl:     guaifenesin-codeine 100-10 mg/5 ml (GUAIFENESIN AC)  mg/5 mL syrup, Take 5 mLs by mouth 3 (three) times daily as needed for Cough., Disp: 473 mL, Rfl: 2    immun glob G,IgG,-pro-IgA 0-50 (HIZENTRA) 1 gram/5 mL (20 %) Soln, Inject 11 g into the skin once a week., Disp: 220 mL, Rfl: 12    inhalation spacing device, Use as directed for inhalation., Disp: 1 each, Rfl: 0    levalbuterol (XOPENEX) 1.25 mg/3 mL nebulizer solution, Take 3 mLs (1.25 mg total) by nebulization every 4 (four) hours as needed for  Wheezing or Shortness of Breath. Rescue, Disp: 1 Box, Rfl: 11    metFORMIN (GLUCOPHAGE-XR) 500 MG 24 hr tablet, Take 2 tablets (1,000 mg total) by mouth daily with breakfast., Disp: 180 tablet, Rfl: 3    methylcellulose, laxative, (CITRUCEL) 500 mg Tab, Take 1 tablet by mouth every morning., Disp: , Rfl:     montelukast (SINGULAIR) 10 mg tablet, Take 1 tablet (10 mg total) by mouth every evening., Disp: 90 tablet, Rfl: 3    mucus clearing device Cecy, 1 Device by Misc.(Non-Drug; Combo Route) route 2 (two) times daily., Disp: 1 Device, Rfl: 0    multivitamin capsule, Take 1 capsule by mouth. Take one tablets daily, Disp: , Rfl:     mupirocin (BACTROBAN) 2 % ointment, AAA bid, Disp: 30 g, Rfl: 2    potassium chloride SA (K-DUR,KLOR-CON) 20 MEQ tablet, TAKE 1 TABLET ONE TIME DAILY, Disp: 90 tablet, Rfl: 3    pravastatin (PRAVACHOL) 40 MG tablet, TAKE 1 TABLET (40 MG TOTAL) BY MOUTH ONCE DAILY., Disp: 90 tablet, Rfl: 1    predniSONE (DELTASONE) 20 MG tablet, 3 for 3 days then 2 for 3 days then one for 3 days and repeat for breathing problems, Disp: 36 tablet, Rfl: 0    SIMETHICONE (GAS-X ORAL), Take 1 capsule by mouth as needed., Disp: , Rfl:     SYMBICORT 160-4.5 mcg/actuation HFAA, Inhale 2 puffs into the lungs every 12 (twelve) hours., Disp: 3 Inhaler, Rfl: 3    valsartan-hydrochlorothiazide (DIOVAN-HCT) 160-12.5 mg per tablet, Take 1 tablet by mouth once daily., Disp: 90 tablet, Rfl: 3    varicella-zoster gE-AS01B, PF, (SHINGRIX, PF,) 50 mcg/0.5 mL injection, Inject 0.5 mLs into the muscle., Disp: 0.5 mL, Rfl: 1    Past Medical History:   Diagnosis Date    Allergy     Arthritis     Asthma     Cancer     skin cancer to right hand    Cataract     Chronic fatigue 1/24/2017    Diabetes mellitus     resolved with gastric bypass    Encounter for blood transfusion     GERD (gastroesophageal reflux disease)     Headache(784.0)     History of lumpectomy of both breasts     1992 negative     Hyperlipidemia 7/15/2015    Hypertension     Hypothyroidism     Neuropathy     Obesity     SOHA on CPAP     Postmenopausal HRT (hormone replacement therapy)     Rash     Rosacea     Snoring     Wears glasses        Past Surgical History:   Procedure Laterality Date    ADENOIDECTOMY      APPENDECTOMY      BIOPSY-LESION nasal septum - INTRANASAL MUCOSAL LESION Right 12/1/2014    [facility] OR    BLADDER SUSPENSION      BREAST BIOPSY Bilateral 1992    bilateral benign excisional biopsies    BUNIONECTOMY  10/17/14    right, still has discomfort    BUNIONECTOMY-TAILOR Right 10/17/2014    Performed by Farzad Eng DPM at [facility] OR    CATARACT EXTRACTION W/  INTRAOCULAR LENS IMPLANT Bilateral     CHOLECYSTECTOMY  08/02/2017    CHOLECYSTECTOMY-LAPAROSCOPIC N/A 8/2/2017    [facility]    COLONOSCOPY      ESOPHAGOGASTRODUODENOSCOPY      dilated esophagus    GASTRIC BYPASS      2011    HYSTERECTOMY  1980    Injection-steroid-epidural-lumbar L5/S1 N/A 8/14/2019    Per[facility] by Fredi Rojas [facility] at [facility] OR    interstim bladder  2009    10/14/14 new battery    OOPHORECTOMY  1980    RELEASE-CARPAL TUNNEL Left 8/29/2017    [facility]    REPAIR-HAMMER TOE- 2nd toe 2nd Procedure: HTC 3rd toe with T&C- 02261. Hammer toe correction, tendon release to 4th right toe. Right 10/17/2014    Per[facility] by Farzad Eng D[facility] at [facility] OR    [id number] IPG OF INTERSTIM Right 10/14/2014    Performed by [facility] at [facility] OR 2ND FLR    SKIN CANCER EXCISION      left hand    TONSILLECTOMY      WISDOM TOOTH EXTRACTION         Family History   Problem Relation Age of Onset    Breast cancer Mother 50    Diabetes Unknown     Hypertension Unknown     Hypothyroidism Unknown     Breast cancer Paternal Aunt         40's    Allergic rhinitis Neg Hx     Allergies Neg Hx     Angioedema Neg Hx      Asthma Neg Hx     Atopy Neg Hx     Eczema Neg Hx     Immunodeficiency Neg Hx     Rhinitis Neg Hx     Urticaria Neg Hx        Social History     Socioeconomic History    Marital status:      Spouse name: Not on file    Number of children: Not on file    Years of education: Not on file    Highest education level: Not on file   Occupational History    Not on file   Social Needs    Financial resource strain: Somewhat hard    Food insecurity:     Worry: Never true     Inability: Never true    Transportation needs:     Medical: No     Non-medical: No   Tobacco Use    Smoking status: Never Smoker    Smokeless tobacco: Never Used   Substance and Sexual Activity    Alcohol use: No     Frequency: Never     Binge frequency: Never    Drug use: No    Sexual activity: Not Currently   Lifestyle    Physical activity:     Days per week: 0 days     Minutes per session: 0 min    Stress: Only a little   Relationships    Social connections:     Talks on phone: More than three times a week     Gets together: Once a week     Attends Mu-ism service: Not on file     Active member of club or organization: Yes     Attends meetings of clubs or organizations: More than 4 times per year     Relationship status:    Other Topics Concern    Are you pregnant or think you may be? Not Asked    Breast-feeding Not Asked   Social History Narrative    Not on file       Review of patient's allergies indicates:   Allergen Reactions    Sulfa (sulfonamide antibiotics) Hives    Naproxen Other (See Comments)     Other reaction(s): RT sided numbness      Albuterol Itching and Palpitations     Nebulizer only. Can use inhaler       Review of Systems:  General ROS: negative for - fever  Psychological ROS: negative for - hostility  Hematological and Lymphatic ROS: positive for - bruising  negative for - bleeding problems  Endocrine ROS: negative for - unexpected weight changes  Respiratory ROS: positive for - cough and  "shortness of breath  Cardiovascular ROS: no chest pain or dyspnea on exertion  Gastrointestinal ROS: no abdominal pain, change in bowel habits, or black or bloody stools  Musculoskeletal ROS: negative for - muscular weakness  Neurological ROS: negative for - numbness/tingling  negative for - bowel and bladder control changes  Dermatological ROS: negative for rash    Physical Exam:  Vitals:    08/30/19 1054   BP: 124/71   Pulse: 70   Weight: 88.7 kg (195 lb 8.8 oz)   Height: 5' 4" (1.626 m)   PainSc:   1   PainLoc: Back     Body mass index is 33.57 kg/m².     Gen: NAD  Gait: gait intact  Psych:  Mood appropriate for given condition  HEENT: eyes anicteric   GI: Abd soft  CV: RRR  Lungs: breathing unlabored   ROM: limited AROM of the L spine in all planes, full ROM at ankles, knees and hips  Lumbar flexion 90 degrees, extension 50 degrees, side bending 30 degrees.    Sensation: intact to light touch in all dermatomes tested from L2-S1 bilaterally  Reflexes: 2+ b/l patella and 0/0 b/l Achilles  Palpation: Diffusely tender over lumbar paraspinals  -TTP over the b/l greater trochanters and bilateral SI joint  Tone: normal in the b/l knees and hips   Skin: intact  Extremities: No edema in b/l ankles or hands  Provacative tests: - SLR, mildly + b/l axial facet loading       Right Left   L2/3 Iliacus Hip flexion  5  5   L3/4 Qudratus Femoris Knee Extension  5  5   L4/5 Tib Anterior Ankle Dorsiflexion   5  5   L5/S1 Extensor Hallicus Longus Great toe extension  5  5   L4/5 Tib Anterior/Posterior Inversion  5  5   L5/S1 Extensor Digitorum Longus, Peronues Eversion  5  5   S1/S2 Gastroc/Soleus Plantar Flexion  5  5       Imaging:  Xray lumbar spine 6/19/19  FINDINGS:  There is mild exaggeration of the normal lumbar lordosis.  There is 4 mm anterolisthesis of L4 on L5.  The vertebral bodies maintain normal height.  There is no fracture.  There is multilevel endplate osteophyte formation and multilevel facet joint arthropathy. "  There is a sacral stimulator in place.    CT lumbar spine 8/6/19  FINDINGS:  Grade 1 anterolisthesis of L4 on L5.  Minimal retrolisthesis of L2 on L3.The vertebral body heights are well-maintained.No fractures are identified. Sacral stimulator partially visualized, which appears to enter the right S3-4 neural foramen.    Mild multilevel degenerative disc disease with anterior osteophytosis, vacuum disc phenomenon at T10-11 and L1-2 and disc space narrowing, most pronounced and moderate at L1-2.    T12-L1: Small right central posterior disc osteophyte complex.  Mild bilateral facet arthrosis.  No significant spinal canal or neural foraminal stenosis.  L1-2: Circumferential disc bulge.  Mild bilateral facet arthrosis.  No significant spinal canal or neural foraminal stenosis.  L2-3: Minimal retrolisthesis.  Circumferential disc bulge.  Moderate bilateral facet arthrosis.  Mild bilateral neural foraminal stenosis.  Mild spinal canal stenosis.  L3-4: Circumferential disc bulge.  Ligamentum flavum infolding.  Moderate bilateral facet arthrosis.  Mild bilateral neural foraminal stenosis.  Mild spinal canal stenosis  L4-5: Grade 1 anterolisthesis.  Circumferential disc bulge, eccentric to the right.  Ligamentum flavum infolding.  Severe bilateral facet arthrosis.  Mild left and moderate right neural foraminal stenosis.  Severe spinal canal stenosis.  L5-S1: Mild diffuse disc bulge.  Moderate left and mild right facet arthrosis.  Moderate left and mild right neural foraminal stenosis.  No significant spinal canal stenosis.    Labs:  BMP  Lab Results   Component Value Date     08/26/2019    K 4.5 08/26/2019    CL 98 08/26/2019    CO2 28 08/26/2019    BUN 15 08/26/2019    CREATININE 0.9 08/26/2019    CALCIUM 9.3 08/26/2019    ANIONGAP 11 08/26/2019    ESTGFRAFRICA >60.0 08/26/2019    EGFRNONAA >60.0 08/26/2019     Lab Results   Component Value Date    ALT 22 08/26/2019    AST 22 08/26/2019    ALKPHOS 130 08/26/2019     BILITOT 0.5 08/26/2019       Assessment:  Problem List Items Addressed This Visit        Orthopedic    Chronic bilateral low back pain with bilateral sciatica - Primary          Treatment Plan:  68 y.o. year old female with PMH DM II, asthma, HTN, common variable immunodeficiency, GERD, h/o gastric bypass, SOHA presents to the office with lower back pain.  Her pain typically ranges from 3//10, constant, aching, burning, grabbing, shooting, radiating down her left > right posterior leg.  She also reports numbness of the left leg in non dermatomal distribution, no weakness, no bowel/bladder dysfunction (h/o bladder stimulator), no saddle anesthesia.  She has tried tylenol without relief.  Avoids NSAIDs 2/2 gastric bypass, continues gabapentin 600mg po tid.  She has done PT but feels like her pain got worse with it.  On exam 5/5 strength b/l LE's, 2+ b/l patellar, 0/0 b/l achilles, sensation to light touch intact b/l L2-S1, mildly + b/l axial facet loading, mildly + b/l TTP over SI joints.  I think some of her pain is related to lumbar facet arthropathy, however I think majority of her pain is related to herniated disc/nerve root irritation.  Will get CT lumbar spine to assess anatomy as she can't get MRI due to her sacral stimulator.  Follow up once CT complete.      8/5/19 - Ms. Delatorre returns to the office for CT review.  She continues to have pain radiating down her right > left leg, numbness, no weakness.  CT lumbar spine reviewed today in the office. She has severe canal stenosis at L4-5, multilevel b/l NFS and multilevel b/l facet hypertrophy.  Her pain is limiting her mobility and interfering with her ADL's.  Will schedule for L5/S1 ROM.  Follow up 2 weeks post injection    8/30/19 - Ms. Delatorre returns to the office for follow up.  She is s/p L5/S1 ROM on 8/14/19 with close to 100% relief of her back and leg pain. She reports improved mobility and ability to do ADL's.  She will follow up as needed if pain  should return.  She knows to go to ED if she develops any saddles anesthesia or bowel/bladder dysfunction.     Procedures: none  PT/OT/HEP: continue HEP   Medications: continue current medications as prescribed  Labs: Reviewed and medications are appropriately dosed for current hepatorenal function.  Imaging: no additional at this time    : Not applicable    Fredi Rojas M.D.  Interventional Pain Medicine / Anesthesiology

## 2019-09-03 ENCOUNTER — PATIENT MESSAGE (OUTPATIENT)
Dept: ALLERGY | Facility: CLINIC | Age: 69
End: 2019-09-03

## 2019-09-04 ENCOUNTER — LAB VISIT (OUTPATIENT)
Dept: LAB | Facility: HOSPITAL | Age: 69
End: 2019-09-04
Attending: INTERNAL MEDICINE
Payer: MEDICARE

## 2019-09-04 DIAGNOSIS — I10 ESSENTIAL HYPERTENSION: ICD-10-CM

## 2019-09-04 DIAGNOSIS — E78.5 DYSLIPIDEMIA: ICD-10-CM

## 2019-09-04 DIAGNOSIS — E11.9 CONTROLLED TYPE 2 DIABETES MELLITUS WITHOUT COMPLICATION, WITHOUT LONG-TERM CURRENT USE OF INSULIN: ICD-10-CM

## 2019-09-04 LAB
ALBUMIN SERPL BCP-MCNC: 3.4 G/DL (ref 3.5–5.2)
ALP SERPL-CCNC: 122 U/L (ref 55–135)
ALT SERPL W/O P-5'-P-CCNC: 24 U/L (ref 10–44)
ANION GAP SERPL CALC-SCNC: 9 MMOL/L (ref 8–16)
AST SERPL-CCNC: 28 U/L (ref 10–40)
BILIRUB SERPL-MCNC: 0.5 MG/DL (ref 0.1–1)
BUN SERPL-MCNC: 16 MG/DL (ref 8–23)
CALCIUM SERPL-MCNC: 9.3 MG/DL (ref 8.7–10.5)
CHLORIDE SERPL-SCNC: 100 MMOL/L (ref 95–110)
CO2 SERPL-SCNC: 27 MMOL/L (ref 23–29)
CREAT SERPL-MCNC: 0.9 MG/DL (ref 0.5–1.4)
EST. GFR  (AFRICAN AMERICAN): >60 ML/MIN/1.73 M^2
EST. GFR  (NON AFRICAN AMERICAN): >60 ML/MIN/1.73 M^2
ESTIMATED AVG GLUCOSE: 114 MG/DL (ref 68–131)
GLUCOSE SERPL-MCNC: 91 MG/DL (ref 70–110)
HBA1C MFR BLD HPLC: 5.6 % (ref 4–5.6)
POTASSIUM SERPL-SCNC: 4.3 MMOL/L (ref 3.5–5.1)
PROT SERPL-MCNC: 6.9 G/DL (ref 6–8.4)
SODIUM SERPL-SCNC: 136 MMOL/L (ref 136–145)

## 2019-09-04 PROCEDURE — 83036 HEMOGLOBIN GLYCOSYLATED A1C: CPT | Mod: HCNC

## 2019-09-04 PROCEDURE — 80053 COMPREHEN METABOLIC PANEL: CPT | Mod: HCNC

## 2019-09-04 PROCEDURE — 36415 COLL VENOUS BLD VENIPUNCTURE: CPT | Mod: HCNC,PO

## 2019-09-11 ENCOUNTER — OFFICE VISIT (OUTPATIENT)
Dept: FAMILY MEDICINE | Facility: CLINIC | Age: 69
End: 2019-09-11
Payer: MEDICARE

## 2019-09-11 ENCOUNTER — OFFICE VISIT (OUTPATIENT)
Dept: ORTHOPEDICS | Facility: CLINIC | Age: 69
End: 2019-09-11
Payer: MEDICARE

## 2019-09-11 ENCOUNTER — IMMUNIZATION (OUTPATIENT)
Dept: PHARMACY | Facility: CLINIC | Age: 69
End: 2019-09-11
Payer: MEDICARE

## 2019-09-11 ENCOUNTER — PATIENT OUTREACH (OUTPATIENT)
Dept: OTHER | Facility: OTHER | Age: 69
End: 2019-09-11

## 2019-09-11 VITALS
DIASTOLIC BLOOD PRESSURE: 72 MMHG | HEART RATE: 71 BPM | OXYGEN SATURATION: 95 % | BODY MASS INDEX: 33.69 KG/M2 | HEIGHT: 64 IN | SYSTOLIC BLOOD PRESSURE: 126 MMHG | WEIGHT: 197.31 LBS

## 2019-09-11 VITALS
DIASTOLIC BLOOD PRESSURE: 76 MMHG | WEIGHT: 197 LBS | BODY MASS INDEX: 33.63 KG/M2 | HEART RATE: 61 BPM | HEIGHT: 64 IN | SYSTOLIC BLOOD PRESSURE: 122 MMHG

## 2019-09-11 DIAGNOSIS — E78.5 DYSLIPIDEMIA: ICD-10-CM

## 2019-09-11 DIAGNOSIS — I10 ESSENTIAL HYPERTENSION: ICD-10-CM

## 2019-09-11 DIAGNOSIS — M19.041 ARTHRITIS OF FINGER OF RIGHT HAND: Primary | ICD-10-CM

## 2019-09-11 DIAGNOSIS — E11.9 CONTROLLED TYPE 2 DIABETES MELLITUS WITHOUT COMPLICATION, WITHOUT LONG-TERM CURRENT USE OF INSULIN: Primary | ICD-10-CM

## 2019-09-11 PROCEDURE — 99999 PR PBB SHADOW E&M-EST. PATIENT-LVL III: CPT | Mod: PBBFAC,HCNC,, | Performed by: ORTHOPAEDIC SURGERY

## 2019-09-11 PROCEDURE — 3044F PR MOST RECENT HEMOGLOBIN A1C LEVEL <7.0%: ICD-10-PCS | Mod: HCNC,CPTII,S$GLB, | Performed by: INTERNAL MEDICINE

## 2019-09-11 PROCEDURE — 1101F PR PT FALLS ASSESS DOC 0-1 FALLS W/OUT INJ PAST YR: ICD-10-PCS | Mod: HCNC,CPTII,S$GLB, | Performed by: INTERNAL MEDICINE

## 2019-09-11 PROCEDURE — 20600 SMALL JOINT ASPIRATION/INJECTION: R INDEX MCP: ICD-10-PCS | Mod: HCNC,RT,S$GLB, | Performed by: ORTHOPAEDIC SURGERY

## 2019-09-11 PROCEDURE — 1101F PT FALLS ASSESS-DOCD LE1/YR: CPT | Mod: HCNC,CPTII,S$GLB, | Performed by: ORTHOPAEDIC SURGERY

## 2019-09-11 PROCEDURE — 99499 UNLISTED E&M SERVICE: CPT | Mod: HCNC,S$GLB,, | Performed by: INTERNAL MEDICINE

## 2019-09-11 PROCEDURE — 3074F PR MOST RECENT SYSTOLIC BLOOD PRESSURE < 130 MM HG: ICD-10-PCS | Mod: HCNC,CPTII,S$GLB, | Performed by: INTERNAL MEDICINE

## 2019-09-11 PROCEDURE — 99213 OFFICE O/P EST LOW 20 MIN: CPT | Mod: HCNC,25,S$GLB, | Performed by: ORTHOPAEDIC SURGERY

## 2019-09-11 PROCEDURE — 99999 PR PBB SHADOW E&M-EST. PATIENT-LVL III: CPT | Mod: PBBFAC,HCNC,, | Performed by: INTERNAL MEDICINE

## 2019-09-11 PROCEDURE — 99214 OFFICE O/P EST MOD 30 MIN: CPT | Mod: HCNC,S$GLB,, | Performed by: INTERNAL MEDICINE

## 2019-09-11 PROCEDURE — 3074F SYST BP LT 130 MM HG: CPT | Mod: HCNC,CPTII,S$GLB, | Performed by: ORTHOPAEDIC SURGERY

## 2019-09-11 PROCEDURE — 20600 DRAIN/INJ JOINT/BURSA W/O US: CPT | Mod: HCNC,RT,S$GLB, | Performed by: ORTHOPAEDIC SURGERY

## 2019-09-11 PROCEDURE — 3074F SYST BP LT 130 MM HG: CPT | Mod: HCNC,CPTII,S$GLB, | Performed by: INTERNAL MEDICINE

## 2019-09-11 PROCEDURE — 99999 PR PBB SHADOW E&M-EST. PATIENT-LVL III: ICD-10-PCS | Mod: PBBFAC,HCNC,, | Performed by: ORTHOPAEDIC SURGERY

## 2019-09-11 PROCEDURE — 99213 PR OFFICE/OUTPT VISIT, EST, LEVL III, 20-29 MIN: ICD-10-PCS | Mod: HCNC,25,S$GLB, | Performed by: ORTHOPAEDIC SURGERY

## 2019-09-11 PROCEDURE — 3074F PR MOST RECENT SYSTOLIC BLOOD PRESSURE < 130 MM HG: ICD-10-PCS | Mod: HCNC,CPTII,S$GLB, | Performed by: ORTHOPAEDIC SURGERY

## 2019-09-11 PROCEDURE — 99999 PR PBB SHADOW E&M-EST. PATIENT-LVL III: ICD-10-PCS | Mod: PBBFAC,HCNC,, | Performed by: INTERNAL MEDICINE

## 2019-09-11 PROCEDURE — 3078F PR MOST RECENT DIASTOLIC BLOOD PRESSURE < 80 MM HG: ICD-10-PCS | Mod: HCNC,CPTII,S$GLB, | Performed by: ORTHOPAEDIC SURGERY

## 2019-09-11 PROCEDURE — 3078F PR MOST RECENT DIASTOLIC BLOOD PRESSURE < 80 MM HG: ICD-10-PCS | Mod: HCNC,CPTII,S$GLB, | Performed by: INTERNAL MEDICINE

## 2019-09-11 PROCEDURE — 3078F DIAST BP <80 MM HG: CPT | Mod: HCNC,CPTII,S$GLB, | Performed by: ORTHOPAEDIC SURGERY

## 2019-09-11 PROCEDURE — 3044F HG A1C LEVEL LT 7.0%: CPT | Mod: HCNC,CPTII,S$GLB, | Performed by: INTERNAL MEDICINE

## 2019-09-11 PROCEDURE — 99214 PR OFFICE/OUTPT VISIT, EST, LEVL IV, 30-39 MIN: ICD-10-PCS | Mod: HCNC,S$GLB,, | Performed by: INTERNAL MEDICINE

## 2019-09-11 PROCEDURE — 3078F DIAST BP <80 MM HG: CPT | Mod: HCNC,CPTII,S$GLB, | Performed by: INTERNAL MEDICINE

## 2019-09-11 PROCEDURE — 1101F PT FALLS ASSESS-DOCD LE1/YR: CPT | Mod: HCNC,CPTII,S$GLB, | Performed by: INTERNAL MEDICINE

## 2019-09-11 PROCEDURE — 1101F PR PT FALLS ASSESS DOC 0-1 FALLS W/OUT INJ PAST YR: ICD-10-PCS | Mod: HCNC,CPTII,S$GLB, | Performed by: ORTHOPAEDIC SURGERY

## 2019-09-11 PROCEDURE — 99499 RISK ADDL DX/OHS AUDIT: ICD-10-PCS | Mod: HCNC,S$GLB,, | Performed by: INTERNAL MEDICINE

## 2019-09-11 RX ORDER — TRIAMCINOLONE ACETONIDE 40 MG/ML
40 INJECTION, SUSPENSION INTRA-ARTICULAR; INTRAMUSCULAR
Status: DISCONTINUED | OUTPATIENT
Start: 2019-09-11 | End: 2019-09-11 | Stop reason: HOSPADM

## 2019-09-11 RX ADMIN — TRIAMCINOLONE ACETONIDE 40 MG: 40 INJECTION, SUSPENSION INTRA-ARTICULAR; INTRAMUSCULAR at 10:09

## 2019-09-11 NOTE — PROGRESS NOTES
Subjective:       Patient ID: Cornelia Delatorre is a 68 y.o. female.    Chief Complaint: Diabetes    Has metal implant for bladder.     SOB/KLINE- KLINE < 1 block.  Stable. Limiting exercise.  On IV Ig   Recalll:   Dr Nicole in [place].  Feels SOB mostly related to bronchiectasis.  Her symptoms have improved with asthma tx - Symbicort.    Pulmonary nodules - Dr Nicole evaluating.  Incomplete response to pneumovax - recommends repeat Prevnar 13.  Eosinophilia and pn 7.14 level at 50%  Recent angiogram was normal     Dx w CVID - just started IV Ig 1 wk ago with allergy.       Hyperthyroid - supra-theraputic now off Synthroid for 6 mo.  Was hypothyroid all her life.  Dr Ponce   HTN - controlled   DM - controlled;  Sees podiatry for peripheral neuropathy in past  HLD - controlled for goal 70     Occasional anxiety relieved with prn hydroxyzine.   Had EGD- dysphagia with Dr Krishnan      Dx w MCI likely related to some meds per testing neurology; stable     Diabetes   She has type 2 diabetes mellitus. No MedicAlert identification noted. The initial diagnosis of diabetes was made 10 years ago. Pertinent negatives for hypoglycemia include no confusion, dizziness, headaches, hunger, mood changes, nervousness/anxiousness, pallor, seizures, sleepiness, speech difficulty, sweats or tremors. Pertinent negatives for diabetes include no blurred vision, no chest pain, no foot paresthesias, no polydipsia, no polyphagia, no polyuria and no weakness. Pertinent negatives for hypoglycemia complications include no blackouts, no hospitalization, no nocturnal hypoglycemia, no required assistance and no required glucagon injection. Diabetic complications include peripheral neuropathy. Pertinent negatives for diabetic complications include no autonomic neuropathy, CVA, heart disease, impotence, nephropathy, PVD or retinopathy. There are no known risk factors for coronary artery disease. Current diabetic treatment includes oral agent (monotherapy).  Her weight is stable. She is following a generally healthy diet. When asked about meal planning, she reported none. She has not had a previous visit with a dietitian. She rarely participates in exercise. She monitors blood glucose at home 1-2 x per week. Blood glucose monitoring compliance is fair. There is no change in her home blood glucose trend. She sees a podiatrist.Eye exam is current.     Review of Systems   Constitutional: Negative for activity change, appetite change, fever and unexpected weight change.   HENT: Negative for hearing loss, nosebleeds, rhinorrhea and trouble swallowing.    Eyes: Negative for blurred vision, discharge and visual disturbance.   Respiratory: Negative for choking, chest tightness, shortness of breath and wheezing.    Cardiovascular: Negative for chest pain and palpitations.   Gastrointestinal: Negative for abdominal pain, blood in stool, constipation, diarrhea, nausea and vomiting.   Endocrine: Negative for polydipsia, polyphagia and polyuria.   Genitourinary: Negative for difficulty urinating, dysuria, hematuria, impotence and menstrual problem.   Musculoskeletal: Negative for arthralgias, joint swelling and neck pain.   Skin: Negative for pallor, rash and wound.   Neurological: Negative for dizziness, tremors, seizures, syncope, speech difficulty, weakness and headaches.   Psychiatric/Behavioral: Negative for confusion and dysphoric mood. The patient is not nervous/anxious.        Objective:      Vitals:    09/11/19 0800   BP: 126/72   Pulse: 71     Physical Exam   Constitutional: She appears well-nourished.   Eyes: Conjunctivae and EOM are normal.   Neck: Normal range of motion.   Cardiovascular: Normal rate and regular rhythm.   Pulses:       Dorsalis pedis pulses are 2+ on the right side, and 2+ on the left side.   Pulmonary/Chest: Effort normal and breath sounds normal.   Musculoskeletal:   Normal ROM bilateral    Feet:   Right Foot:   Protective Sensation: 4 sites tested. 4  sites sensed.   Left Foot:   Protective Sensation: 4 sites tested. 4 sites sensed.   Neurological: No cranial nerve deficit (grossly intact).   Skin: Skin is warm and dry.   Psychiatric: She has a normal mood and affect.   Alert and orientated   Vitals reviewed.        Assessment:       No diagnosis found.    Plan:       There are no diagnoses linked to this encounter.        Medication List with Changes/Refills   Current Medications    ACETAMINOPHEN (TYLENOL) 500 MG TABLET    Take 1,000 mg by mouth every 6 (six) hours as needed. Patient is taking 1000 ml TID for pain    ALBUTEROL 90 MCG/ACTUATION INHALER    Inhale 2 puffs into the lungs every 4 (four) hours as needed. 2 puffs every 4 hours as needed for cough, wheeze, or shortness of breath    ASPIRIN (ECOTRIN) 81 MG EC TABLET    Take 81 mg by mouth once daily.    AZELASTINE (OPTIVAR) 0.05 % OPHTHALMIC SOLUTION    Place 1 drop into both eyes daily as needed.     B-COMPLEX WITH VITAMIN C (Z-BEC OR EQUIV) TABLET    Take 1 tablet by mouth once daily.    BIFIDOBACTERIUM INFANTIS (ALIGN ORAL)    Take by mouth once daily.    CALCIUM 600 WITH VITAMIN D3 600 MG(1,500MG) -400 UNIT CAP        CLONIDINE (CATAPRES) 0.1 MG TABLET    Take 1 tablet (0.1 mg total) by mouth 3 (three) times daily as needed (PRN SBP > 165 mmHg).    CRANBERRY 500 MG CAP    Take 1 capsule by mouth every evening.    DILTIAZEM (CARDIZEM CD) 120 MG CP24    Take 1 capsule (120 mg total) by mouth every evening.    EPINEPHRINE (EPIPEN) 0.3 MG/0.3 ML ATIN        ESOMEPRAZOLE (NEXIUM) 40 MG CAPSULE    Take 1 capsule (40 mg total) by mouth before breakfast.    FEXOFENADINE (ALLEGRA) 60 MG TABLET    Take 60 mg by mouth once daily.    FISH OIL-OMEGA-3 FATTY ACIDS 300-1,000 MG CAPSULE    Take 2 g by mouth once daily.    FLUTICASONE (FLONASE) 50 MCG/ACTUATION NASAL SPRAY    2 sprays (100 mcg total) by Each Nare route once daily.    GABAPENTIN (NEURONTIN) 600 MG TABLET    Take 1 tablet (600 mg total) by mouth 3  (three) times daily.    GUAIFENESIN (MUCINEX) 600 MG 12 HR TABLET    Take 2 tablets (1,200 mg total) by mouth 2 (two) times daily.    GUAIFENESIN-CODEINE 100-10 MG/5 ML (GUAIFENESIN AC)  MG/5 ML SYRUP    Take 5 mLs by mouth 3 (three) times daily as needed for Cough.    IMMUN GLOB G,IGG,-PRO-IGA 0-50 (HIZENTRA) 1 GRAM/5 ML (20 %) SOLN    Inject 11 g into the skin once a week.    INHALATION SPACING DEVICE    Use as directed for inhalation.    LEVALBUTEROL (XOPENEX) 1.25 MG/3 ML NEBULIZER SOLUTION    Take 3 mLs (1.25 mg total) by nebulization every 4 (four) hours as needed for Wheezing or Shortness of Breath. Rescue    METFORMIN (GLUCOPHAGE-XR) 500 MG 24 HR TABLET    Take 2 tablets (1,000 mg total) by mouth daily with breakfast.    METHYLCELLULOSE, LAXATIVE, (CITRUCEL) 500 MG TAB    Take 1 tablet by mouth every morning.    MONTELUKAST (SINGULAIR) 10 MG TABLET    Take 1 tablet (10 mg total) by mouth every evening.    MUCUS CLEARING DEVICE KAREN    1 Device by Misc.(Non-Drug; Combo Route) route 2 (two) times daily.    MULTIVITAMIN CAPSULE    Take 1 capsule by mouth. Take one tablets daily    MUPIROCIN (BACTROBAN) 2 % OINTMENT    AAA bid    POTASSIUM CHLORIDE SA (K-DUR,KLOR-CON) 20 MEQ TABLET    TAKE 1 TABLET ONE TIME DAILY    PRAVASTATIN (PRAVACHOL) 40 MG TABLET    TAKE 1 TABLET (40 MG TOTAL) BY MOUTH ONCE DAILY.    PREDNISONE (DELTASONE) 20 MG TABLET    3 for 3 days then 2 for 3 days then one for 3 days and repeat for breathing problems    SIMETHICONE (GAS-X ORAL)    Take 1 capsule by mouth as needed.    SYMBICORT 160-4.5 MCG/ACTUATION HFAA    Inhale 2 puffs into the lungs every 12 (twelve) hours.    VALSARTAN-HYDROCHLOROTHIAZIDE (DIOVAN-HCT) 160-12.5 MG PER TABLET    Take 1 tablet by mouth once daily.    VARICELLA-ZOSTER GE-AS01B, PF, (SHINGRIX, PF,) 50 MCG/0.5 ML INJECTION    Inject 0.5 mLs into the muscle.       Continue current management and monitor.    Counseled on regular exercise, maintenance of a healthy  weight, balanced diet rich in fruits/vegetables and lean protein, and avoidance of unhealthy habits like smoking and excessive alcohol intake.   Also, counseled on importance of being compliant with medication, health appointments, diet and exercise.     Follow up in about 6 months (around 3/11/2020).

## 2019-09-11 NOTE — PROGRESS NOTES
Ms Delatorre returns to clinic today.  She has a history of right index synovitis and right thumb pain.  She states that the right thumb is no longer causing her any pain but she is still having pain about the MCP joint of her index finger.  She states that she is no longer on any steroid medication but she did have a steroid injection into her spine several weeks ago which did improve her symptoms    Physical exam:  Examination the right hand reveals that there is no edema.  She does have synovitis overlying the MCP joint mainly of the index finger.  There is no erythema or increased warmth.  Palpation over the index finger MCP produces mild tenderness.  There is no subluxation or dislocation.  She is able make a full composite fist and extend the finger.  She has no tenderness about the thumb.  Sensation is grossly intact in the median radial ulnar distribution.  Capillary refill is less than 2 sec in all the digits    Assessment:  Right index finger MCP synovitis versus arthritis    Plan:    1.  After informed consent was obtained a steroid injection was placed to the index finger MCP joint.  The patient tolerated that well    2.  She will follow up with me on a p.r.n. basis

## 2019-09-11 NOTE — PROCEDURES
Small Joint Aspiration/Injection: R index MCP  Date/Time: 9/11/2019 10:32 AM  Performed by: Xavi Mckeon MD  Authorized by: Xavi Mckeon MD     Consent Done?:  Yes (Verbal)  Indications:  Pain  Site marked: The procedure site was marked    Timeout: Prior to procedure the correct patient, procedure, and site was verified      Location:  Index finger  Site:  R index MCP  Prep: Patient was prepped and draped in usual sterile fashion    Needle size:  25 G  Medications:  40 mg triamcinolone acetonide 40 mg/mL (20 mg injected)  Patient tolerance:  Patient tolerated the procedure well with no immediate complications

## 2019-09-13 NOTE — PROGRESS NOTES
Digital Medicine: Health  Follow-Up    The history is provided by the patient.     Follow Up  Follow-up reason(s): reading review      Readings are trending down due to running her grandchildren around for different activites and staying busy .          Medication Adherence:       Patient reports that she is taking her medications, however, she's not taking the valsartan-HTZ combo. She still had valsartan and HTZ left over and was using that up before she got a new prescription.     INTERVENTION(S)  encouragement/support    PLAN  patient verbalizes understanding    Patient reports that 2 doctors visits yesterday had Bps in the 120s/70s, but this morning her BP was 148/76. She did mention that she didn't sleep well last night. Will continue to look into doctor's visit readings and offer technique advise if it seems fit.           Topic    Eye Exam        Last 5 Patient Entered Readings                                      Current 30 Day Average: 131/71     Recent Readings 9/13/2019 9/5/2019 8/30/2019 8/27/2019 8/26/2019    SBP (mmHg) 148 108 136 131 150    DBP (mmHg) 76 62 78 72 79    Pulse 57 73 69 59 63

## 2019-09-16 ENCOUNTER — OFFICE VISIT (OUTPATIENT)
Dept: UROLOGY | Facility: CLINIC | Age: 69
End: 2019-09-16
Payer: MEDICARE

## 2019-09-16 VITALS
WEIGHT: 197.06 LBS | HEIGHT: 64 IN | SYSTOLIC BLOOD PRESSURE: 109 MMHG | HEART RATE: 73 BPM | BODY MASS INDEX: 33.64 KG/M2 | DIASTOLIC BLOOD PRESSURE: 70 MMHG

## 2019-09-16 DIAGNOSIS — R39.15 URINARY URGENCY: Primary | ICD-10-CM

## 2019-09-16 DIAGNOSIS — R35.0 FREQUENCY OF MICTURITION: ICD-10-CM

## 2019-09-16 PROCEDURE — 3074F SYST BP LT 130 MM HG: CPT | Mod: HCNC,CPTII,S$GLB, | Performed by: UROLOGY

## 2019-09-16 PROCEDURE — 99999 PR PBB SHADOW E&M-EST. PATIENT-LVL V: ICD-10-PCS | Mod: PBBFAC,HCNC,, | Performed by: UROLOGY

## 2019-09-16 PROCEDURE — 95971 ALYS SMPL SP/PN NPGT W/PRGRM: CPT | Mod: HCNC,S$GLB,, | Performed by: UROLOGY

## 2019-09-16 PROCEDURE — 3074F PR MOST RECENT SYSTOLIC BLOOD PRESSURE < 130 MM HG: ICD-10-PCS | Mod: HCNC,CPTII,S$GLB, | Performed by: UROLOGY

## 2019-09-16 PROCEDURE — 3078F PR MOST RECENT DIASTOLIC BLOOD PRESSURE < 80 MM HG: ICD-10-PCS | Mod: HCNC,CPTII,S$GLB, | Performed by: UROLOGY

## 2019-09-16 PROCEDURE — 99213 PR OFFICE/OUTPT VISIT, EST, LEVL III, 20-29 MIN: ICD-10-PCS | Mod: HCNC,25,S$GLB, | Performed by: UROLOGY

## 2019-09-16 PROCEDURE — 3078F DIAST BP <80 MM HG: CPT | Mod: HCNC,CPTII,S$GLB, | Performed by: UROLOGY

## 2019-09-16 PROCEDURE — 1101F PT FALLS ASSESS-DOCD LE1/YR: CPT | Mod: HCNC,CPTII,S$GLB, | Performed by: UROLOGY

## 2019-09-16 PROCEDURE — 99999 PR PBB SHADOW E&M-EST. PATIENT-LVL V: CPT | Mod: PBBFAC,HCNC,, | Performed by: UROLOGY

## 2019-09-16 PROCEDURE — 1101F PR PT FALLS ASSESS DOC 0-1 FALLS W/OUT INJ PAST YR: ICD-10-PCS | Mod: HCNC,CPTII,S$GLB, | Performed by: UROLOGY

## 2019-09-16 PROCEDURE — 99213 OFFICE O/P EST LOW 20 MIN: CPT | Mod: HCNC,25,S$GLB, | Performed by: UROLOGY

## 2019-09-16 PROCEDURE — 95971 PR ANALYZE NEUROSTIM,SIMPLE/PROG: ICD-10-PCS | Mod: HCNC,S$GLB,, | Performed by: UROLOGY

## 2019-09-16 NOTE — PROGRESS NOTES
CHIEF COMPLAINT:    Mrs. Delatorre is a 68 y.o. female presenting for a follow up on medication refractory urgency, frequency, status post InterStim about 10 years ago, IPG replaced 10/14/2014.      PRESENTING ILLNESS:    Cornelia Delatorre is a 68 y.o. female who returns stating that she discontinued her cranberry product several weeks ago after reading that a person should not be on it chronically.  She has had worsening of the urgency, frequency.  She has changed the programs due to discomfort.      REVIEW OF SYSTEMS:    Review of Systems   Constitutional: Negative.    HENT: Negative.    Eyes: Negative.    Respiratory: Negative.    Cardiovascular: Negative.    Gastrointestinal: Negative.    Genitourinary: Positive for frequency and urgency.        Urge incontinence   Musculoskeletal: Negative.    Skin: Negative.    Neurological: Negative.    Endo/Heme/Allergies: Negative.    Psychiatric/Behavioral: Negative.        PATIENT HISTORY:    Past Medical History:   Diagnosis Date    Allergy     Arthritis     Asthma     Cancer     skin cancer to right hand    Cataract     Chronic fatigue 1/24/2017    Diabetes mellitus     resolved with gastric bypass    Encounter for blood transfusion     GERD (gastroesophageal reflux disease)     Headache(784.0)     History of lumpectomy of both breasts     1992 negative    Hyperlipidemia 7/15/2015    Hypertension     Hypothyroidism     Neuropathy     Obesity     SOHA on CPAP     Postmenopausal HRT (hormone replacement therapy)     Rash     Rosacea     Snoring     Wears glasses        Past Surgical History:   Procedure Laterality Date    ADENOIDECTOMY      APPENDECTOMY      BIOPSY-LESION nasal septum - INTRANASAL MUCOSAL LESION Right 12/1/2014    [facility]    BLADDER SUSPENSION      BREAST BIOPSY Bilateral 1992    bilateral benign excisional biopsies    BUNIONECTOMY  10/17/14    right, still has discomfort     BUNIONECTOMY-TAILOR Right 10/17/2014    [facility] Eng [facility] OR    CATARACT EXTRACTION W/  INTRAOCULAR LENS IMPLANT Bilateral     CHOLECYSTECTOMY  08/02/2017    CHOLECYSTECTOMY-LAPAROSCOPIC N/A 8/2/2017    [facility]    COLONOSCOPY      ESOPHAGOGASTRODUODENOSCOPY      dilated esophagus    GASTRIC BYPASS      2011    HYSTERECTOMY  1980    Injection-steroid-epidural-lumbar L5/S1 N/A 8/14/2019    [facility] Ferdi Rojas [facility] at [facility] OR    interstim bladder  2009    10/14/14 new battery    OOPHORECTOMY  1980    RELEASE-CARPAL TUNNEL Left 8/29/2017    [facility]    REPAIR-HAMMER TOE- 2nd toe 2nd Procedure: HTC 3rd toe with T&C- 43085. Hammer toe correction, tendon release to 4th right toe. Right 10/17/2014    [facility]ad Eng D[facility] at [facility] OR    FBOZVTAL-MNCQJSKNB-DCCXOSO IPG OF INTERSTIM Right 10/14/2014    [facility] OR 2ND [facility]    SKIN CANCER EXCISION      left hand    TONSILLECTOMY      WISDOM TOOTH EXTRACTION         Family History   Problem Relation Age of Onset    Breast cancer Mother 50    Diabetes Unknown     Hypertension Unknown     Hypothyroidism Unknown     Breast cancer Paternal Aunt         40's     Socioeconomic History    Marital status:    Social Needs    Financial resource strain: Somewhat hard    Food insecurity:     Worry: Never true     Inability: Never true    Transportation needs:     Medical: No     Non-medical: No   Tobacco Use    Smoking status: Never Smoker    Smokeless tobacco: Never Used   Substance and Sexual Activity    Alcohol use: No     Frequency: Never     Binge frequency: Never    Drug use: No    Sexual activity: Not Currently   Lifestyle    Physical activity:     Days per week: 0 days     Minutes per session: 0 min    Stress: Only a little   Relationships    Social connections:     Talks on phone: More than three  times a week     Gets together: Once a week     Attends Mu-ism service: Not on file     Active member of club or organization: Yes     Attends meetings of clubs or organizations: More than 4 times per year     Relationship status:        Allergies:  Sulfa (sulfonamide antibiotics); Naproxen; and Albuterol    Medications:  Outpatient Encounter Medications as of 9/16/2019   Medication Sig Dispense Refill    aspirin (ECOTRIN) 81 MG EC tablet Take 81 mg by mouth once daily.      B-complex with vitamin C (Z-BEC OR EQUIV) tablet Take 1 tablet by mouth once daily.      BIFIDOBACTERIUM INFANTIS (ALIGN ORAL) Take by mouth once daily.      CALCIUM 600 WITH VITAMIN D3 600 mg(1,500mg) -400 unit Cap       diltiaZEM (CARDIZEM CD) 120 MG Cp24 Take 1 capsule (120 mg total) by mouth every evening. 90 capsule 4    esomeprazole (NEXIUM) 40 MG capsule Take 1 capsule (40 mg total) by mouth before breakfast. 90 capsule 3    fexofenadine (ALLEGRA) 60 MG tablet Take 60 mg by mouth once daily.      fish oil-omega-3 fatty acids 300-1,000 mg capsule Take 2 g by mouth once daily.      fluticasone (FLONASE) 50 mcg/actuation nasal spray 2 sprays (100 mcg total) by Each Nare route once daily. 3 Bottle 3    gabapentin (NEURONTIN) 600 MG tablet Take 1 tablet (600 mg total) by mouth 3 (three) times daily. 270 tablet 3    immun glob G,IgG,-pro-IgA 0-50 (HIZENTRA) 1 gram/5 mL (20 %) Soln Inject 11 g into the skin once a week. 220 mL 12    metFORMIN (GLUCOPHAGE-XR) 500 MG 24 hr tablet Take 2 tablets (1,000 mg total) by mouth daily with breakfast. 180 tablet 3    methylcellulose, laxative, (CITRUCEL) 500 mg Tab Take 1 tablet by mouth every morning.      montelukast (SINGULAIR) 10 mg tablet Take 1 tablet (10 mg total) by mouth every evening. 90 tablet 3    multivitamin capsule Take 1 capsule by mouth. Take one tablets daily      potassium chloride SA (K-DUR,KLOR-CON) 20 MEQ tablet TAKE 1 TABLET ONE TIME DAILY 90 tablet 3     pravastatin (PRAVACHOL) 40 MG tablet TAKE 1 TABLET (40 MG TOTAL) BY MOUTH ONCE DAILY. 90 tablet 1    SYMBICORT 160-4.5 mcg/actuation HFAA Inhale 2 puffs into the lungs every 12 (twelve) hours. 3 Inhaler 3    valsartan-hydrochlorothiazide (DIOVAN-HCT) 160-12.5 mg per tablet Take 1 tablet by mouth once daily. 90 tablet 3    acetaminophen (TYLENOL) 500 MG tablet Take 1,000 mg by mouth every 6 (six) hours as needed. Patient is taking 1000 ml TID for pain      albuterol 90 mcg/actuation inhaler Inhale 2 puffs into the lungs every 4 (four) hours as needed. 2 puffs every 4 hours as needed for cough, wheeze, or shortness of breath 3 Inhaler 3    azelastine (OPTIVAR) 0.05 % ophthalmic solution Place 1 drop into both eyes daily as needed.       cloNIDine (CATAPRES) 0.1 MG tablet Take 1 tablet (0.1 mg total) by mouth 3 (three) times daily as needed (PRN SBP > 165 mmHg). 90 tablet 6    cranberry 500 mg Cap Take 1 capsule by mouth every evening.      EPINEPHrine (EPIPEN) 0.3 mg/0.3 mL AtIn   3    guaiFENesin (MUCINEX) 600 mg 12 hr tablet Take 2 tablets (1,200 mg total) by mouth 2 (two) times daily.      guaifenesin-codeine 100-10 mg/5 ml (GUAIFENESIN AC)  mg/5 mL syrup Take 5 mLs by mouth 3 (three) times daily as needed for Cough. 473 mL 2    inhalation spacing device Use as directed for inhalation. 1 each 0    levalbuterol (XOPENEX) 1.25 mg/3 mL nebulizer solution Take 3 mLs (1.25 mg total) by nebulization every 4 (four) hours as needed for Wheezing or Shortness of Breath. Rescue 1 Box 11    mucus clearing device Cecy 1 Device by Misc.(Non-Drug; Combo Route) route 2 (two) times daily. 1 Device 0    mupirocin (BACTROBAN) 2 % ointment AAA bid 30 g 2    predniSONE (DELTASONE) 20 MG tablet 3 for 3 days then 2 for 3 days then one for 3 days and repeat for breathing problems 36 tablet 0    SIMETHICONE (GAS-X ORAL) Take 1 capsule by mouth as needed.      varicella-zoster gE-AS01B, PF, (SHINGRIX, PF,) 50 mcg/0.5  mL injection Inject 0.5 mLs into the muscle. 0.5 mL 1     No facility-administered encounter medications on file as of 2019.          PHYSICAL EXAMINATION:    The patient generally appears in good health, is appropriately interactive, and is in no apparent distress.    Skin: No lesions.    Mental: Cooperative with normal affect.    Neuro: Grossly intact.    HEENT: Normal. No evidence of lymphadenopathy.    Chest:  normal inspiratory effort.    Abdomen:  Soft, non-tender. No masses or organomegaly. Bladder is not palpable. No evidence of flank discomfort. No evidence of inguinal hernia.    Extremities: No clubbing, cyanosis, or edema      LABS:    Lab Results   Component Value Date    BUN 16 2019    CREATININE 0.9 2019     UA 1.015, pH 5, otherwise, negative    IMPRESSION:    Encounter Diagnoses   Name Primary?    Urinary urgency Yes    Frequency of micturition        PLAN:    1.  Interrogation:  Last reprogramming:  3/11/2019  Hours in use: 4511  Percent use:   99%  Program in use was 1     All lead pairs were checked at 1.5 A, 210 pulse width and ranged from 506-800 ohms, < 15 mA, except 0-3 < 50 and 81 mA     Program         % Used           Leads       Energy       PW      Hz    Sensation                                Changed  Program 1       61%                 0-, 1+,        1.8 A          300      14        perineal                                                      Program 2       38%                 1-, 0+         2.1 A          300      14        anus                                   Program 3                               1-, 2-, 0+    2.4 A          210      14        buttocks                                   Program 4                               3+, 0-, 1-    0.9 A          360       14        Rectal                                     C+, 2-, 3-, 2.1     Battery Life:  Programs checked:  761 ohms, < 15 mS  % Battery life:  9-24 %  Months remainin-13 months  left on program  1     iCon was reprogrammed.       2.  Follow up in 3 months, to recheck the battery and to see if [employer] has come out with the rechargeable IPG.

## 2019-09-23 ENCOUNTER — TELEPHONE (OUTPATIENT)
Dept: UROLOGY | Facility: CLINIC | Age: 69
End: 2019-09-23

## 2019-09-23 ENCOUNTER — LAB VISIT (OUTPATIENT)
Dept: LAB | Facility: HOSPITAL | Age: 69
End: 2019-09-23
Attending: UROLOGY
Payer: MEDICARE

## 2019-09-23 DIAGNOSIS — R30.0 DYSURIA: Primary | ICD-10-CM

## 2019-09-23 DIAGNOSIS — R30.0 DYSURIA: ICD-10-CM

## 2019-09-23 LAB
LEFT EYE DM RETINOPATHY: NEGATIVE
RIGHT EYE DM RETINOPATHY: NEGATIVE

## 2019-09-23 PROCEDURE — 87077 CULTURE AEROBIC IDENTIFY: CPT | Mod: HCNC

## 2019-09-23 PROCEDURE — 87086 URINE CULTURE/COLONY COUNT: CPT | Mod: HCNC

## 2019-09-23 PROCEDURE — 87186 SC STD MICRODIL/AGAR DIL: CPT | Mod: HCNC

## 2019-09-23 PROCEDURE — 87088 URINE BACTERIA CULTURE: CPT | Mod: HCNC

## 2019-09-23 RX ORDER — CEFPODOXIME PROXETIL 200 MG/1
200 TABLET, FILM COATED ORAL 2 TIMES DAILY
Qty: 14 TABLET | Refills: 0 | Status: SHIPPED | OUTPATIENT
Start: 2019-09-23 | End: 2019-10-07

## 2019-09-23 NOTE — TELEPHONE ENCOUNTER
Patient dropped off a urine specimen.  Vantin was sent to her preferred pharmacy as she had citrobacter koseri 9 months ago intermediate sensitivity to macrobid and resistant to cefazolin but sensitive to all other cephalosporins.

## 2019-09-23 NOTE — TELEPHONE ENCOUNTER
----- Message from Leesa Fulton sent at 9/23/2019  8:42 AM CDT -----  Contact: Patient   Needs Medical Advice    Symptoms (please be specific): Patient states that she has pressure when urinating, itching, and urine has an odor. Patient states that she leaves tonight for a cruise to [place]. Patient would like if something could be called in for her. Please contact patient to advise.     How long has patient had these symptoms: 1 week    Pharmacy name and phone #: [facility] [place] - [address]  [address] [place] [address]  Phone: [phone] Fax: [phone]    Best Call Back Number: [phone]

## 2019-09-25 LAB — BACTERIA UR CULT: ABNORMAL

## 2019-10-07 RX ORDER — CEFDINIR 300 MG/1
300 CAPSULE ORAL 2 TIMES DAILY
Qty: 14 CAPSULE | Refills: 0 | Status: SHIPPED | OUTPATIENT
Start: 2019-10-07 | End: 2019-10-14

## 2019-10-08 ENCOUNTER — TELEPHONE (OUTPATIENT)
Dept: ADMINISTRATIVE | Facility: HOSPITAL | Age: 69
End: 2019-10-08

## 2019-10-14 ENCOUNTER — PATIENT OUTREACH (OUTPATIENT)
Dept: OTHER | Facility: OTHER | Age: 69
End: 2019-10-14

## 2019-10-14 NOTE — PROGRESS NOTES
Digital Medicine: Health  Follow-Up    Ms. Rhodes reports that she's doing well.     The history is provided by the patient.     Follow Up  Follow-up reason(s): reading review and routine education      Readings are trending down   Routine Education Topics: physical activity  Patient is happy with her lower average, but said that she does notice that her BP with jump up every one and a while, and she's unsure what may cause this to happen. Patient would love to see her BP consistently with the SBP in the lower 120s. Encouraged her to be curious about what she's doing that might effect her BP and give her a higher reading vs a lower reading.     Instructed patient to retest if she thinks her BP is higher than normal.        Physical Activity:   When asked if exercising, patient responded: no    She identified the following barriers to physical activity: lack of time    Patient reports that she is constantly running around doing errands. We discussed trying to get in PA on days where she doesn't have as may errands to run. We discussed getting in a 10 min walk on those days.     INTERVENTION(S)  recommend physical activity and encouragement/support    PLAN  patient verbalizes understanding      There are no preventive care reminders to display for this patient.    Last 5 Patient Entered Readings                                      Current 30 Day Average: 129/68     Recent Readings 10/14/2019 10/13/2019 10/12/2019 10/7/2019 10/5/2019    SBP (mmHg) 133 146 129 108 134    DBP (mmHg) 66 76 67 63 61    Pulse 61 65 63 77 80

## 2019-10-15 ENCOUNTER — OFFICE VISIT (OUTPATIENT)
Dept: DERMATOLOGY | Facility: CLINIC | Age: 69
End: 2019-10-15
Payer: MEDICARE

## 2019-10-15 VITALS — HEIGHT: 64 IN | BODY MASS INDEX: 33.63 KG/M2 | WEIGHT: 197 LBS

## 2019-10-15 DIAGNOSIS — D69.2 SENILE PURPURA: ICD-10-CM

## 2019-10-15 DIAGNOSIS — B07.8 PERIUNGUAL WART: ICD-10-CM

## 2019-10-15 DIAGNOSIS — Z85.828 PERSONAL HISTORY OF OTHER MALIGNANT NEOPLASM OF SKIN: Primary | ICD-10-CM

## 2019-10-15 DIAGNOSIS — L57.0 ACTINIC KERATOSES: ICD-10-CM

## 2019-10-15 DIAGNOSIS — Z12.83 SKIN CANCER SCREENING: ICD-10-CM

## 2019-10-15 DIAGNOSIS — D48.5 NEOPLASM OF UNCERTAIN BEHAVIOR OF ADNEXA OF SKIN: ICD-10-CM

## 2019-10-15 PROCEDURE — 99213 OFFICE O/P EST LOW 20 MIN: CPT | Mod: 25,HCNC,S$GLB, | Performed by: DERMATOLOGY

## 2019-10-15 PROCEDURE — 17003 DESTRUCT PREMALG LES 2-14: CPT | Mod: 59,HCNC,S$GLB, | Performed by: DERMATOLOGY

## 2019-10-15 PROCEDURE — 17110 PR DESTRUCTION BENIGN LESIONS UP TO 14: ICD-10-PCS | Mod: HCNC,S$GLB,, | Performed by: DERMATOLOGY

## 2019-10-15 PROCEDURE — 88305 TISSUE SPECIMEN TO PATHOLOGY, DERMATOLOGY: ICD-10-PCS | Mod: 26,HCNC,, | Performed by: PATHOLOGY

## 2019-10-15 PROCEDURE — 17000 PR DESTRUCTION(LASER SURGERY,CRYOSURGERY,CHEMOSURGERY),PREMALIGNANT LESIONS,FIRST LESION: ICD-10-PCS | Mod: 59,HCNC,S$GLB, | Performed by: DERMATOLOGY

## 2019-10-15 PROCEDURE — 1101F PT FALLS ASSESS-DOCD LE1/YR: CPT | Mod: HCNC,CPTII,S$GLB, | Performed by: DERMATOLOGY

## 2019-10-15 PROCEDURE — 99213 PR OFFICE/OUTPT VISIT, EST, LEVL III, 20-29 MIN: ICD-10-PCS | Mod: 25,HCNC,S$GLB, | Performed by: DERMATOLOGY

## 2019-10-15 PROCEDURE — 11102 PR TANGENTIAL BIOPSY, SKIN, SINGLE LESION: ICD-10-PCS | Mod: HCNC,59,S$GLB, | Performed by: DERMATOLOGY

## 2019-10-15 PROCEDURE — 17110 DESTRUCTION B9 LES UP TO 14: CPT | Mod: HCNC,S$GLB,, | Performed by: DERMATOLOGY

## 2019-10-15 PROCEDURE — 88305 TISSUE EXAM BY PATHOLOGIST: CPT | Mod: HCNC | Performed by: PATHOLOGY

## 2019-10-15 PROCEDURE — 99999 PR PBB SHADOW E&M-EST. PATIENT-LVL II: CPT | Mod: PBBFAC,HCNC,, | Performed by: DERMATOLOGY

## 2019-10-15 PROCEDURE — 11102 TANGNTL BX SKIN SINGLE LES: CPT | Mod: HCNC,59,S$GLB, | Performed by: DERMATOLOGY

## 2019-10-15 PROCEDURE — 17003 DESTRUCTION, PREMALIGNANT LESIONS; SECOND THROUGH 14 LESIONS: ICD-10-PCS | Mod: 59,HCNC,S$GLB, | Performed by: DERMATOLOGY

## 2019-10-15 PROCEDURE — 1101F PR PT FALLS ASSESS DOC 0-1 FALLS W/OUT INJ PAST YR: ICD-10-PCS | Mod: HCNC,CPTII,S$GLB, | Performed by: DERMATOLOGY

## 2019-10-15 PROCEDURE — 99999 PR PBB SHADOW E&M-EST. PATIENT-LVL II: ICD-10-PCS | Mod: PBBFAC,HCNC,, | Performed by: DERMATOLOGY

## 2019-10-15 PROCEDURE — 17000 DESTRUCT PREMALG LESION: CPT | Mod: 59,HCNC,S$GLB, | Performed by: DERMATOLOGY

## 2019-10-15 PROCEDURE — 88305 TISSUE EXAM BY PATHOLOGIST: CPT | Mod: 26,HCNC,, | Performed by: PATHOLOGY

## 2019-10-15 NOTE — PROGRESS NOTES
Subjective:       Patient ID:  Cornelia Delatorre is a 68 y.o. female who presents for   Chief Complaint   Patient presents with    Skin Check     Last seen 04/09/2019.  Patient present for skin check, lesion on both arm, x 6 months, red, raised, blistery, patient states then goes away, leave red marks on skin , denies itchy, increase in size or shape. OTC Cerave daily  Easy bruising forearms, years, worse, no tx.   Rough area, wart, right 4th digit.   Phx skin ca R hand and nose - Dr Parra - 2014   Phx SCC L hand -mohs Dr Jones  -2015   History of actinic keratoses on nasal Dorsum in the past hx of efudex use  Uses CeraVe AM cream daily for routine     Past Medical History:  No date: Allergy  No date: Arthritis  No date: Asthma  No date: Cancer      Comment:  skin cancer to right hand  No date: Cataract  1/24/2017: Chronic fatigue  No date: Diabetes mellitus      Comment:  resolved with gastric bypass  No date: Encounter for blood transfusion  No date: GERD (gastroesophageal reflux disease)  No date: Headache(784.0)  No date: History of lumpectomy of both breasts      Comment:  1992 negative  7/15/2015: Hyperlipidemia  No date: Hypertension  No date: Hypothyroidism  No date: Neuropathy  No date: Obesity  No date: SOHA on CPAP  No date: Postmenopausal HRT (hormone replacement therapy)  No date: Rash  No date: Rosacea  No date: Snoring  No date: Wears glasses        Review of Systems   Skin: Positive for daily sunscreen use. Negative for itching, rash and dry skin.   Hematologic/Lymphatic: Bruises/bleeds easily (forearms, takes asa and prednisone for asthma).        Objective:    Physical Exam   Constitutional: She appears well-developed and well-nourished. No distress.   HENT:   Mouth/Throat: Lips normal.    Eyes: Lids are normal.  No conjunctival no injection.   Cardiovascular: There is no local extremity swelling and no dependent edema.     Neurological: She is alert and oriented to person, place, and  time. She is not disoriented.   Psychiatric: She has a normal mood and affect.   Skin:   Areas Examined (abnormalities noted in diagram):   Head / Face Inspection Performed  Neck Inspection Performed  Chest / Axilla Inspection Performed  Abdomen Inspection Performed  Back Inspection Performed  RUE Inspected  LUE Inspection Performed                  Diagram Legend     Erythematous scaling macule/papule c/w actinic keratosis       Vascular papule c/w angioma      Pigmented verrucoid papule/plaque c/w seborrheic keratosis      Yellow umbilicated papule c/w sebaceous hyperplasia      Irregularly shaped tan macule c/w lentigo     1-2 mm smooth white papules consistent with Milia      Movable subcutaneous cyst with punctum c/w epidermal inclusion cyst      Subcutaneous movable cyst c/w pilar cyst      Firm pink to brown papule c/w dermatofibroma      Pedunculated fleshy papule(s) c/w skin tag(s)      Evenly pigmented macule c/w junctional nevus     Mildly variegated pigmented, slightly irregular-bordered macule c/w mildly atypical nevus      Flesh colored to evenly pigmented papule c/w intradermal nevus       Pink pearly papule/plaque c/w basal cell carcinoma      Erythematous hyperkeratotic cursted plaque c/w SCC      Surgical scar with no sign of skin cancer recurrence      Open and closed comedones      Inflammatory papules and pustules      Verrucoid papule consistent consistent with wart     Erythematous eczematous patches and plaques     Dystrophic onycholytic nail with subungual debris c/w onychomycosis     Umbilicated papule    Erythematous-base heme-crusted tan verrucoid plaque consistent with inflamed seborrheic keratosis     Erythematous Silvery Scaling Plaque c/w Psoriasis     See annotation      Assessment / Plan:      Pathology Orders:     Normal Orders This Visit    Tissue Specimen To Pathology, Dermatology     Questions:    Directional Terms:  Other(comment)    Clinical Information:  bcc vs scc vs other     Specific Site:  left forearm        Personal history of other malignant neoplasm of skin  Area(s) of previous NMSC evaluated with no signs of recurrence.    Upper body skin examination performed today including at least 6 points as noted in physical examination. Suspicious lesions noted.    Skin cancer screening  Area(s) of previous NMSC evaluated with no signs of recurrence.    Upper body skin examination performed today including at least 6 points as noted in physical examination. Suspicious lesions noted.      Senile purpura  Reassurance, benign clinical entity resulting from sun-induced damage to the connective tissue of the dermis. No treatment necessary  Likely exacerbated by asa, prednisone      Periungual wart  Cryosurgery procedure note:    Verbal consent from the patient is obtained. Liquid nitrogen cryosurgery is applied to 1 verruca with prior paring, as detailed in the physical exam, to produce a freeze injury. 2 consecutive freeze thaw cycles are applied to each verruca. The patient is aware that blisters (possibly blood blisters) may form.    Discussed risk scarring and hypopigmentation and possible need for repeat treatments  Procedure note for Candida Antigen injection:    Discussed risks of procedure including local redness, swelling, and lymph node enlargement. Verbal consent obtained. Area cleaned with alcohol. 0.3cc of Candida antigen injected intradermally at the base of the verruca. Patient tolerated well.   This was patient's 1st cycle of Candida Antigen.         Neoplasm of uncertain behavior of adnexa of skin  -     Tissue Specimen To Pathology, Dermatology    Shave biopsy procedure note:    Shave biopsy performed after verbal consent including risk of infection, scar, recurrence, need for additional treatment of site. Area prepped with alcohol, anesthetized with approximately 1.0cc of 1% lidocaine with epinephrine. Lesional tissue shaved with razor blade. Hemostasis achieved with  application of aluminum chloride followed by hyfrecation. No complications. Dressing applied. Wound care explained.      Actinic keratoses  Cryosurgery Procedure Note    Verbal consent from the patient is obtained and the patient is aware of the precancerous quality and need for treatment of these lesions. Liquid nitrogen cryosurgery is applied to the 3 actinic keratoses, as detailed in the physical exam, to produce a freeze injury. The patient is aware that blisters may form and is instructed on wound care with gentle cleansing and use of vaseline ointment to keep moist until healed. The patient is supplied a handout on cryosurgery and is instructed to call if lesions do not completely resolve. Discussed risk postinflammatory pigmentary changes.              Follow up in about 3 months (around 1/15/2020).

## 2019-11-04 ENCOUNTER — OFFICE VISIT (OUTPATIENT)
Dept: CARDIOLOGY | Facility: CLINIC | Age: 69
End: 2019-11-04
Payer: MEDICARE

## 2019-11-04 ENCOUNTER — OFFICE VISIT (OUTPATIENT)
Dept: PULMONOLOGY | Facility: CLINIC | Age: 69
End: 2019-11-04
Payer: MEDICARE

## 2019-11-04 VITALS
OXYGEN SATURATION: 100 % | DIASTOLIC BLOOD PRESSURE: 73 MMHG | HEIGHT: 64 IN | WEIGHT: 199.31 LBS | RESPIRATION RATE: 16 BRPM | HEART RATE: 73 BPM | BODY MASS INDEX: 34.03 KG/M2 | SYSTOLIC BLOOD PRESSURE: 148 MMHG

## 2019-11-04 VITALS
HEART RATE: 74 BPM | SYSTOLIC BLOOD PRESSURE: 114 MMHG | HEIGHT: 64 IN | WEIGHT: 200.63 LBS | DIASTOLIC BLOOD PRESSURE: 70 MMHG | BODY MASS INDEX: 34.25 KG/M2

## 2019-11-04 DIAGNOSIS — G47.33 OSA (OBSTRUCTIVE SLEEP APNEA): ICD-10-CM

## 2019-11-04 DIAGNOSIS — J47.9 BRONCHIECTASIS WITHOUT COMPLICATION: Primary | ICD-10-CM

## 2019-11-04 DIAGNOSIS — I10 ESSENTIAL HYPERTENSION: Primary | ICD-10-CM

## 2019-11-04 DIAGNOSIS — R06.09 DOE (DYSPNEA ON EXERTION): ICD-10-CM

## 2019-11-04 DIAGNOSIS — E78.5 DYSLIPIDEMIA: ICD-10-CM

## 2019-11-04 DIAGNOSIS — D83.9 CVID (COMMON VARIABLE IMMUNODEFICIENCY): ICD-10-CM

## 2019-11-04 DIAGNOSIS — J45.20 MILD INTERMITTENT ASTHMA WITHOUT COMPLICATION: ICD-10-CM

## 2019-11-04 PROCEDURE — 1101F PT FALLS ASSESS-DOCD LE1/YR: CPT | Mod: HCNC,CPTII,S$GLB, | Performed by: INTERNAL MEDICINE

## 2019-11-04 PROCEDURE — 1101F PR PT FALLS ASSESS DOC 0-1 FALLS W/OUT INJ PAST YR: ICD-10-PCS | Mod: HCNC,CPTII,S$GLB, | Performed by: INTERNAL MEDICINE

## 2019-11-04 PROCEDURE — 99999 PR PBB SHADOW E&M-EST. PATIENT-LVL V: ICD-10-PCS | Mod: PBBFAC,HCNC,, | Performed by: INTERNAL MEDICINE

## 2019-11-04 PROCEDURE — 99213 OFFICE O/P EST LOW 20 MIN: CPT | Mod: HCNC,S$GLB,, | Performed by: INTERNAL MEDICINE

## 2019-11-04 PROCEDURE — 3078F DIAST BP <80 MM HG: CPT | Mod: HCNC,CPTII,S$GLB, | Performed by: INTERNAL MEDICINE

## 2019-11-04 PROCEDURE — 99213 PR OFFICE/OUTPT VISIT, EST, LEVL III, 20-29 MIN: ICD-10-PCS | Mod: HCNC,S$GLB,, | Performed by: INTERNAL MEDICINE

## 2019-11-04 PROCEDURE — 3074F SYST BP LT 130 MM HG: CPT | Mod: HCNC,CPTII,S$GLB, | Performed by: INTERNAL MEDICINE

## 2019-11-04 PROCEDURE — 3078F PR MOST RECENT DIASTOLIC BLOOD PRESSURE < 80 MM HG: ICD-10-PCS | Mod: HCNC,CPTII,S$GLB, | Performed by: INTERNAL MEDICINE

## 2019-11-04 PROCEDURE — 99999 PR PBB SHADOW E&M-EST. PATIENT-LVL V: CPT | Mod: PBBFAC,HCNC,, | Performed by: INTERNAL MEDICINE

## 2019-11-04 PROCEDURE — 3074F PR MOST RECENT SYSTOLIC BLOOD PRESSURE < 130 MM HG: ICD-10-PCS | Mod: HCNC,CPTII,S$GLB, | Performed by: INTERNAL MEDICINE

## 2019-11-04 PROCEDURE — 3077F PR MOST RECENT SYSTOLIC BLOOD PRESSURE >= 140 MM HG: ICD-10-PCS | Mod: HCNC,CPTII,S$GLB, | Performed by: INTERNAL MEDICINE

## 2019-11-04 PROCEDURE — 3077F SYST BP >= 140 MM HG: CPT | Mod: HCNC,CPTII,S$GLB, | Performed by: INTERNAL MEDICINE

## 2019-11-04 RX ORDER — ALBUTEROL SULFATE 90 UG/1
2 AEROSOL, METERED RESPIRATORY (INHALATION) EVERY 4 HOURS PRN
Qty: 3 INHALER | Refills: 3 | Status: SHIPPED | OUTPATIENT
Start: 2019-11-04 | End: 2020-01-14 | Stop reason: SDUPTHER

## 2019-11-04 RX ORDER — AMOXICILLIN AND CLAVULANATE POTASSIUM 875; 125 MG/1; MG/1
1 TABLET, FILM COATED ORAL EVERY 12 HOURS
Qty: 20 TABLET | Refills: 2 | Status: SHIPPED | OUTPATIENT
Start: 2019-11-04 | End: 2020-05-04 | Stop reason: SDUPTHER

## 2019-11-04 NOTE — PATIENT INSTRUCTIONS
Breathing off and on short breath - need to use more albuterol to make clear where breathing problems come from    Mucous production may decrease if airways controlled- albuterol should help clearance.    Rescue inhaler may resolve any breathing problems- should use intermittently if any symptoms.    May use augmentin for sinus/lung problems- not optimal for all uti's

## 2019-11-04 NOTE — PROGRESS NOTES
Subjective:    Patient ID:  Cornelia Delatorre is a 69 y.o. female who presents for follow-up of No chief complaint on file.      HPI  Here for follow up of concerning CP normal cath/HLP/bradycardia.Patients states is doing well no chest pain, SOB or change in exertional tolerence.   Patient does not exercise but remains very active with out change in exertional tolerance or chest pain.   Patient denies palpitations, syncope, presyncope, lightheadedness or dizziness.      Review of Systems   Constitution: Negative for malaise/fatigue.   Eyes: Negative for blurred vision.   Cardiovascular: Negative for chest pain, claudication, cyanosis, dyspnea on exertion, irregular heartbeat, leg swelling, near-syncope, orthopnea, palpitations, paroxysmal nocturnal dyspnea and syncope.   Respiratory: Negative for cough and shortness of breath.    Hematologic/Lymphatic: Does not bruise/bleed easily.   Musculoskeletal: Negative for back pain, falls, joint pain, muscle cramps, muscle weakness and myalgias.   Gastrointestinal: Negative for abdominal pain, change in bowel habit, nausea and vomiting.   Genitourinary: Negative for urgency.   Neurological: Negative for dizziness, focal weakness and light-headedness.       Past Medical History:   Diagnosis Date    Allergy     Arthritis     Asthma     Cancer     skin cancer to right hand    Cataract     Chronic fatigue 1/24/2017    CVID (common variable immunodeficiency) 3/7/2019    Diabetes mellitus     resolved with gastric bypass    KLINE (dyspnea on exertion) 1/24/2017    Dyslipidemia 6/25/2019    Encounter for blood transfusion     Essential hypertension 12/29/2011    GERD (gastroesophageal reflux disease)     Headache(784.0)     History of lumpectomy of both breasts     1992 negative    Hyperlipidemia 7/15/2015    Hypertension     Hypothyroidism     Neuropathy     Obesity     SOHA (obstructive sleep apnea), Auto BIPAP Mod OSAS since Dec 2013, therapeutic and  compliant 100%, ESS 6/24 Feb 2014 12/26/2013    SOHA on CPAP     Postmenopausal HRT (hormone replacement therapy)     Rash     Rosacea     Snoring     Wears glasses           Objective:     There were no vitals filed for this visit.     Physical Exam   Constitutional: She is oriented to person, place, and time. She appears well-developed and well-nourished.   HENT:   Head: Normocephalic.   Eyes: Conjunctivae are normal.   Neck: Normal range of motion. Neck supple. No JVD present.   Cardiovascular: Normal rate, regular rhythm, normal heart sounds and intact distal pulses.   Pulses:       Carotid pulses are 2+ on the right side, and 2+ on the left side.       Radial pulses are 2+ on the right side, and 2+ on the left side.        Dorsalis pedis pulses are 2+ on the right side, and 2+ on the left side.        Posterior tibial pulses are 2+ on the right side, and 2+ on the left side.   Pulmonary/Chest: Effort normal and breath sounds normal.   Abdominal: Soft. Bowel sounds are normal.   Musculoskeletal: She exhibits no edema or tenderness.   Neurological: She is alert and oriented to person, place, and time. Gait normal.   Skin: Skin is warm, dry and intact. No cyanosis. Nails show no clubbing.   Psychiatric: She has a normal mood and affect. Her speech is normal and behavior is normal. Thought content normal.   Nursing note and vitals reviewed.            ..    Chemistry        Component Value Date/Time     09/04/2019 0821    K 4.3 09/04/2019 0821     09/04/2019 0821    CO2 27 09/04/2019 0821    BUN 16 09/04/2019 0821    CREATININE 0.9 09/04/2019 0821    GLU 91 09/04/2019 0821        Component Value Date/Time    CALCIUM 9.3 09/04/2019 0821    ALKPHOS 122 09/04/2019 0821    AST 28 09/04/2019 0821    ALT 24 09/04/2019 0821    BILITOT 0.5 09/04/2019 0821    ESTGFRAFRICA >60.0 09/04/2019 0821    EGFRNONAA >60.0 09/04/2019 0821            ..  Lab Results   Component Value Date    CHOL 162 05/01/2019    CHOL  129 04/19/2018    CHOL 126 10/23/2017     Lab Results   Component Value Date    HDL 62 05/01/2019    HDL 53 04/19/2018    HDL 52 10/23/2017     Lab Results   Component Value Date    LDLCALC 78.0 05/01/2019    LDLCALC 59.8 (L) 04/19/2018    LDLCALC 55.6 (L) 10/23/2017     Lab Results   Component Value Date    TRIG 110 05/01/2019    TRIG 81 04/19/2018    TRIG 92 10/23/2017     Lab Results   Component Value Date    CHOLHDL 38.3 05/01/2019    CHOLHDL 41.1 04/19/2018    CHOLHDL 41.3 10/23/2017     ..  Lab Results   Component Value Date    WBC 5.74 08/26/2019    HGB 12.4 08/26/2019    HCT 40.5 08/26/2019    MCV 98 08/26/2019     08/26/2019       Test(s) Reviewed  I have reviewed the following in detail:  [] Stress test   [x] Angiography   [x] Echocardiogram   [x] Labs   [x] Other:       Assessment:         ICD-10-CM ICD-9-CM   1. Essential hypertension I10 401.9   2. SOHA (obstructive sleep apnea), Auto BIPAP Mod OSAS since Dec 2013, therapeutic and compliant 100%, ESS 6/24 Feb 2014 G47.33 327.23   3. KLINE (dyspnea on exertion) R06.09 786.09   4. Dyslipidemia E78.5 272.4     Problem List Items Addressed This Visit     SOHA (obstructive sleep apnea), Auto BIPAP Mod OSAS since Dec 2013, therapeutic and compliant 100%, ESS 6/24 Feb 2014    Essential hypertension - Primary    Dyslipidemia    KLINE (dyspnea on exertion)           Plan:           Return to clinic 1 year   Low level/low impact aerobic exercise 5x's/wk. Heart healthy diet and risk factor modification.    See labs and med orders.      Portions of this note may have been created with voice recognition software.  Grammatical, syntax and spelling errors may be inevitable.

## 2019-11-04 NOTE — LETTER
[facility]  [address]  [place] [address]  Phone: [phone]  Fax: [phone]       2019      To whom it may concern:     Re: Cornelia Juan Ramon,  1950    Due to patient's medical conditions, she should not travel.     Thank You,      Jonathan Nicole MD  Pulmonary Diseases

## 2019-11-04 NOTE — PROGRESS NOTES
11/4/2019    Cornelia Delatorre  Office f/u    Chief Complaint   Patient presents with    Follow-up       Nov 4, 2019- having mucous sensation, notes some sob relaxing - maybe worse night.  No nasal stuffy.  Uses cpap.  Uses symbicort bid, no rescue.  Mucous is clear.  No abx for sinus or lungs.  Getting immune infusions weekly - pt senses doing better since starting in May.        May 6,2019-due to get immune infusion next 2 days.  No prednisone, needs monlukast. abx stopped wks ago- mucous cleared.    Patient Instructions   Use antibiotic daily til immune therapy done a wk - then as needed like every one else  Immune therapy may keep you stable - better than antibiotics.   Use symbicort regular.  Lungs may stabilize.  Use prednisone if needed.  Lung  Nodules are stable for 2 yrs and no follow up needed.  Call if problems- hope all will be better yet.  March 7, 19-exacerbated in late Jan- cleared in feb (vague).  Now ill again yellow and gray mucous (culture last Jan was nl yvonne).  Sl intermittent wheezes since last wk.  Sees Dr Herrera in [place]- gets allergy rx but declined to get now.  Pt has cvid by  igg and igm levels low and pneumococcal antibodies to 8/14 pneumococcal titers. Uses symbicort and singulair routinely.  Took prednisone completed 4 days ago- uses prednisone monthly- was able to skip December  .  Discussed with patient above for education the following:      Gastric by pass may cause malabsorption, protein levels have been too low and may affect ig levels-- somewhat.   Need to get follow up with dietician to assure adequate protein intake/levels.   Antibiotic may stabilize infections with common variable immune deficiency- azithromycin daily once cultures submitted.   Use augmentin if infection worsens.   Acapella/chest clapping help clear mucous, vest likewise if bronchiectasis.  Will not treat cause.   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  Use codeine to suppress mild coughing.   Need good immune system and no airway/asthma problems and good hygiene of bronchial tubes (no chronic infections) to prevent illness.     Lungs measured near normal with good response to bronchial medications 2017   Need ct to follow up and cultures to assure infection controlled.      Jan 28/2019- Feeling bad onset 2 weeks, took prednisone taper x 6 days and antibiotic Augmentin week prior, States no improvement. Cough- worse at night, no nocturnal arousals, takes cough suppressant syrup before bed. Productive yellow/brown color.   Nebulized albuterol 4x daily. States no fever.  Has started allergy injections waiting for insurance clearance for IGG therapy.   Discussed with patient above for education the following:    CT chest for February to monitor and assess for bronchiectasis, need diagnosis for insurance to cover vest therapy  Have  continue to percuss back with hand.   Recommend purchasing Acepella device to help clear lungs of excess mucous, attaches to nebulizer device  Continue Nebulizer treatments 4x daily as needed.  Chest x-ray now to evaluate for pneumonia  Blood work at hospital   Will yeison to see results of sputum culture and x-ray before repeating antibiotic  Need to return sputum to clinic with in 4 hours of collecting  Fluconazole pills for oral thrush, this is a recurrent problem related to your weak immune system and use of inhaled steroids. Continue to rinse mouth out after using symbicort but problem will improve after starting immune replacement therapy.    oct 30,   2018-- had one day fever followed by cough/wheeze illness that lingered a wk. Pt seen by NP   [place]  [place] like dying- only one day fever.  Violent cough.  Nebulizer ppt itch and palpitations- ok  On xopenex now.  Prednisone 40x3, 20 x 3 ,  augmentin cleared.  Now back to normal.  May 14, 2018- had spell /exacerbation with one  round prednisone. Breathing good.  Follow-up in about 1 year (around 5/14/2019), or if symptoms worsen or fail to improve.   Discussed with patient above for education the following:     Check blood count and pneumonia vaccine response after last vaccine 4/27/2018   Use azithromycin for any lung/sinus infection- immune weakness likely   Asthma stable- use prednisone and singulair.   Use allergra and singulair and astelin/flonase   Lung nodule in lung still - Navigational bronchoscopy might find?  - will at least re check in a year.     Call if needed, re check next yr.  darlin 15, 2018--1 st illness in yr or so with cough and rattles, no flu.  Appetite ok.  Ill x wk, cough and wheeze and sob and noct worse, resp rx helps a bit.  Hasn't been using  Albuterol   Follow-up in about 6 months (around 7/15/2018).   Discussed with patient above for education the following:      tamiflu if flu.   Need to use albuterol for any lung symptoms.  Should get cough  Better controlled.      Prednisone 20 mg 3 for 3 , taper may be needed.  Give shot today, 1 daily for 3 may be enough with albuterol.   You had high eosinophils-- if asthma becomes more active - special therapy may help??        prevnar 13 would be good - should be off prednisone.  Immune system is sl weak  Protection only to 7.14 pneumonia germs.  oct 19, 2017- has scratchy throat, some sinus drip, has nightly sensation throat issues, post beryl and carpel tunnel surg.  No sinus lung infections.  Breathing and cough good.  No tb exposures- did dance in [place] and rolled bandages at [employer] when 12 yo. Had pos tb gold.  June 21, 2017- had good [place] trip, tapered symbicort with some increase sensation of lung problems so maintained full dose. No infections.  No chest symptoms on symbicort.   April 24, feels a lot better with min cough, vague sob going left side down at night.  Going to [place] 2 weeks.  Uses symbicort and good results.  March 16, cough better, but comes and  goes,  No great help with steroids.  Submitted 3 sputums.  Had pneumovax march last yr.  Breathing better. Got symbicort.   Had pneumonia vaccine last yr  3/8/2017 HPI: had onset cough Hernan illness, got dizzy few days with diarrhea and cough. Cough clear sm amt mucous. Did have 101 temp one day, fatigue, no muscle aches.  Appetite decreased.  Illnesses lingered 2 weeks but cough never remitted- has improved with inhahler use last 15 days.    Had gastric 2011 bypass with with continued diarrhea intially resolved. No regurg or reflux or swallow problems.   symbicort nearly  Resolved.    Sinuses ok.  No pets.  Never smoker.    Appetite good, feels well now.    headcolds to chest since childhood, nocturnal ??not recalled.  Had cats in past but got rid in 2003 or so.   The chief compliant  problem varies with instablilty at time    PFSH:  Past Medical History:   Diagnosis Date    Allergy     Arthritis     Asthma     Cancer     skin cancer to right hand    Cataract     Chronic fatigue 1/24/2017    CVID (common variable immunodeficiency) 3/7/2019    Diabetes mellitus     resolved with gastric bypass    KLINE (dyspnea on exertion) 1/24/2017    Dyslipidemia 6/25/2019    Encounter for blood transfusion     Essential hypertension 12/29/2011    GERD (gastroesophageal reflux disease)     Headache(784.0)     History of lumpectomy of both breasts     1992 negative    Hyperlipidemia 7/15/2015    Hypertension     Hypothyroidism     Neuropathy     Obesity     SOHA (obstructive sleep apnea), Auto BIPAP Mod OSAS since Dec 2013, therapeutic and compliant 100%, ESS 6/24 Feb 2014 12/26/2013    SOHA on CPAP     Postmenopausal HRT (hormone replacement therapy)     Rash     Rosacea     Snoring     Wears glasses          Past Surgical History:   Procedure Laterality Date    ADENOIDECTOMY      APPENDECTOMY      BLADDER SUSPENSION      BREAST BIOPSY Bilateral 1992    bilateral benign excisional biopsies     "BUNIONECTOMY  10/17/14    right, still has discomfort    CATARACT EXTRACTION W/  INTRAOCULAR LENS IMPLANT Bilateral     CHOLECYSTECTOMY  08/02/2017    COLONOSCOPY      EPIDURAL STEROID INJECTION INTO LUMBAR SPINE N/A 8/14/2019    Procedure: Injection-steroid-epidural-lumbar L5/S1;  Surgeon: Fredi Rojas MD;  Location: [facility] OR;  Service: Pain Management;  Laterality: N/A;    ESOPHAGOGASTRODUODENOSCOPY      dilated esophagus    GASTRIC BYPASS      2011    HYSTERECTOMY  1980    interstim bladder  2009    10/14/14 new battery    OOPHORECTOMY  1980    SKIN CANCER EXCISION      left hand    TONSILLECTOMY      WISDOM TOOTH EXTRACTION       Social History     Tobacco Use    Smoking status: Never Smoker    Smokeless tobacco: Never Used   Substance Use Topics    Alcohol use: No     Frequency: Never     Binge frequency: Never    Drug use: No     Family History   Problem Relation Age of Onset    Breast cancer Mother 50    Diabetes Unknown     Hypertension Unknown     Hypothyroidism Unknown     Breast cancer Paternal Aunt         40's    Allergic rhinitis Neg Hx     Allergies Neg Hx     Angioedema Neg Hx     Asthma Neg Hx     Atopy Neg Hx     Eczema Neg Hx     Immunodeficiency Neg Hx     Rhinitis Neg Hx     Urticaria Neg Hx      Review of patient's allergies indicates:   Allergen Reactions    Sulfa (sulfonamide antibiotics) Hives    Naproxen Other (See Comments)     Other reaction(s): RT sided numbness         Performance Status:The patient's activity level is functions out of house.      Review of Systems:  a review of eleven systems covering constitutional, Eye, HEENT, Psych, Respiratory, Cardiac, GI, , Musculoskeletal, Endocrine, Dermatologic was negative except for pertinent findings as listed ABOVE and below: all good,  Had dm that remitted with bypass 2011.  Positive for SOB, cough severe.    Exam:Comprehensive exam done. BP (!) 148/73   Pulse 73   Resp 16   Ht 5' 4" (1.626 m)   Wt " 90.4 kg (199 lb 4.7 oz)   SpO2 100%   BMI 34.21 kg/m²   Exam included Vitals as listed, and patient's appearance and affect and alertness and mood, oral exam for yeast and hygiene and pharynx lesions and Mallapatti (M) score, neck with inspection for jvd and masses and thyroid abnormalities and lymph nodes (supraclavicular and infraclavicular nodes and axillary also examined and noted if abn), chest exam included symmetry and effort and fremitus and percussion and auscultation, cardiac exam included rhythm and gallops and murmur and rubs and jvd and edema, abdominal exam for mass and hepatosplenomegaly and tenderness and hernias and bowel sounds, Musculoskeletal exam with muscle tone and posture and mobility/gait and  strength, and skin for rashes and cyanosis and pallor and turgor, extremity for clubbing.  Findings were normal except for pertinent findings listed below:  M3, good bs, rest good. Lungs clear-  chest is symmetric, no distress, normal percussion, normal fremitus and good normal breath sounds      Radiographs (ct chest and cxr) reviewed: view by direct vision  i do see clear bronchiectasis,nodules are noted.  Mucoid type impaction suggested in rul ant segment.      April 19, 2018 ct suggest tracheobronchomalacia on high res spot films- seen 3/7/19  CT Chest:  1. Region of endobronchial plugging unchanged since 2/7/17 of uncertain etiology. This could relate to a process such as allergic bronchopulmonary aspergillosis, a solid mass nodule, mucous plugging, residual from aspiration, endobronchial spread of disease. Further followup or evaluation is necessary  Electronically signed by: Raffi Gottlieb MD  Date: 03/14/17    10/17/19 Chest X-ray clear      Labs reviewed igm low, igg lowish, humerol titers na    CBC reviewed October 10/17/18    PFT  Done [facility] with 10% response, otherwise nl  PULMONARY FUNCTION TEST REPORT      DATE OF PROCEDURE:  03/24/2017     1.  Spirometry reveals an FVC of 3.1 L,  which is 107% predicted.  FEV1 is 2.3 L,   which is 100% predicted.  The ratio, there is preserved.  There is a positive,   but not significant bronchodilator response to both the FVC and FEV1.  Loop   contours appear with adequate effort as well.  2.  Lung volumes.  The total lung capacity is 4.4 L, which is 92% predicted.    There are no signs of gas trapping.  3.  Diffusing capacity is mild-to-moderate reduced at 68%, does improve to 96%   with alveolar volumes.     OVERALL IMPRESSION:  A normal spirometry with a robust yet statistically   insignificant bronchodilator response.  Normal lung volumes and a moderate   reduction in diffusion capacity.    CC: Jonathan Nicole M.D.            Plan:  Clinical impression is resonably certain and repeated evaluation prn +/- follow up will be needed as below.    Cornelia was seen today for follow-up.    Diagnoses and all orders for this visit:    Bronchiectasis without complication  -     amoxicillin-clavulanate 875-125mg (AUGMENTIN) 875-125 mg per tablet; Take 1 tablet by mouth every 12 (twelve) hours.    Mild intermittent asthma without complication  -     albuterol (PROVENTIL/VENTOLIN HFA) 90 mcg/actuation inhaler; Inhale 2 puffs into the lungs every 4 (four) hours as needed. 2 puffs every 4 hours as needed for cough, wheeze, or shortness of breath  -     amoxicillin-clavulanate 875-125mg (AUGMENTIN) 875-125 mg per tablet; Take 1 tablet by mouth every 12 (twelve) hours.    CVID (common variable immunodeficiency)  -     amoxicillin-clavulanate 875-125mg (AUGMENTIN) 875-125 mg per tablet; Take 1 tablet by mouth every 12 (twelve) hours.        Follow up in about 6 months (around 5/4/2020), or if symptoms worsen or fail to improve.          Discussed with patient above for education the following:      Patient Instructions   Breathing off and on short breath - need to use more albuterol to make clear where breathing problems come from    Mucous production may decrease if  airways controlled- albuterol should help clearance.    Rescue inhaler may resolve any breathing problems- should use intermittently if any symptoms.    May use augmentin for sinus/lung problems- not optimal for all uti's

## 2019-11-15 ENCOUNTER — LAB VISIT (OUTPATIENT)
Dept: LAB | Facility: HOSPITAL | Age: 69
End: 2019-11-15
Attending: INTERNAL MEDICINE
Payer: MEDICARE

## 2019-11-15 DIAGNOSIS — E05.90 SUBCLINICAL HYPERTHYROIDISM: ICD-10-CM

## 2019-11-15 PROCEDURE — 36415 COLL VENOUS BLD VENIPUNCTURE: CPT | Mod: HCNC,PO

## 2019-11-15 PROCEDURE — 86376 MICROSOMAL ANTIBODY EACH: CPT | Mod: HCNC

## 2019-11-15 PROCEDURE — 83520 IMMUNOASSAY QUANT NOS NONAB: CPT | Mod: HCNC

## 2019-11-15 PROCEDURE — 84443 ASSAY THYROID STIM HORMONE: CPT | Mod: HCNC

## 2019-11-15 PROCEDURE — 84481 FREE ASSAY (FT-3): CPT | Mod: HCNC

## 2019-11-15 PROCEDURE — 84439 ASSAY OF FREE THYROXINE: CPT | Mod: HCNC

## 2019-11-16 LAB
T3FREE SERPL-MCNC: 2.4 PG/ML (ref 2.3–4.2)
T4 FREE SERPL-MCNC: 0.95 NG/DL (ref 0.71–1.51)
THYROPEROXIDASE IGG SERPL-ACNC: 10.9 IU/ML
TSH SERPL DL<=0.005 MIU/L-ACNC: 0.67 UIU/ML (ref 0.4–4)

## 2019-11-18 ENCOUNTER — PATIENT MESSAGE (OUTPATIENT)
Dept: ORTHOPEDICS | Facility: CLINIC | Age: 69
End: 2019-11-18

## 2019-11-18 LAB — TSH RECEP AB SER-ACNC: <1 IU/L (ref 0–1.75)

## 2019-12-09 ENCOUNTER — PATIENT MESSAGE (OUTPATIENT)
Dept: ADMINISTRATIVE | Facility: OTHER | Age: 69
End: 2019-12-09

## 2019-12-09 ENCOUNTER — PATIENT OUTREACH (OUTPATIENT)
Dept: OTHER | Facility: OTHER | Age: 69
End: 2019-12-09

## 2019-12-11 NOTE — PROGRESS NOTES
10/30/2018    Cornelia Delatorre  Office f/u    Chief Complaint   Patient presents with    Follow-up     1 month, asthma exacerbation. feeling much better    Paperwork     missed a trip and needs papers filled out   oct 30,   2018-- had one day fever followed by cough/wheeze illness that lingered a wk. Pt seen by VASYL Llanos 2x, [place].  [place] like dying- only one day fever.  Violent cough.  Nebulizer ppt itch and palpitations- ok  On xopenex now.  Prednisone 40x3, 20 x 3 ,  augmentin cleared.  Now back to normal.      May 14, 2018- had spell /exacerbation with one round prednisone. Breathing good.  hernan 15, 2018--1 st illness in yr or so with cough and rattles, no flu.  Appetite ok.  Ill x wk, cough and wheeze and sob and noct worse, resp rx helps a bit.  Hasn't been using  Albuterol     oct 19, 2017- has scratchy throat, some sinus drip, has nightly sensation throat issues, post beryl and carpel tunnel surg.  No sinus lung infections.  Breathing and cough good.  No tb exposures- did dance in [employer] and rolled bandages at [employer] when 12 yo. Had pos tb gold.      June 21, 2017- had good [place] trip, tapered symbicort with some increase sensation of lung problems so maintained full dose. No infections.  No chest symptoms on symbicort.         April 24, feels a lot better with min cough, vague sob going left side down at night.  Going to [place] 2 weeks.  Uses symbicort and good results.      March 16, cough better, but comes and goes,  No great help with steroids.  Submitted 3 sputums.  Had pneumovax march last yr.  Breathing better. Got symbicort.     Had pneumonia vaccine last yr    3/8/2017 HPI: had onset cough Hernan illness, got dizzy few days with diarrhea and cough. Cough clear sm amt mucous. Did have 101 temp one day, fatigue, no muscle aches.  Appetite decreased.  Illnesses lingered 2 weeks but cough never remitted- has improved with inhahler use last 15 days.      Had gastric 2011 bypass with with  continued diarrhea intially resolved. No regurg or reflux or swallow problems.     symbicort nearly  Resolved.      Sinuses ok.  No pets.  Never smoker.      Appetite good, feels well now.      headcolds to chest since childhood, nocturnal ??not recalled.    Had cats in past but got rid in 2003 or so.     The chief compliant  problem is new to me   PFSH:  Past Medical History:   Diagnosis Date    Allergy     Arthritis     Asthma     Cataract     Chronic fatigue 1/24/2017    Diabetes mellitus     resolved with gastric bypass    Diabetes mellitus, type 2     Encounter for blood transfusion     GERD (gastroesophageal reflux disease)     Headache(784.0)     History of lumpectomy of both breasts     1992 negative    Hyperlipidemia 7/15/2015    Hypertension     Hypothyroidism     Neuropathy     Obesity     SOHA on CPAP     Postmenopausal HRT (hormone replacement therapy)     Rash     Rosacea     Snoring     Wears glasses          Past Surgical History:   Procedure Laterality Date    ADENOIDECTOMY      APPENDECTOMY      BIOPSY-LESION nasal septum - INTRANASAL MUCOSAL LESION Right 12/1/2014    [facility]    BLADDER SUSPENSION      BREAST BIOPSY Bilateral 1992    bilateral benign excisional biopsies    BUNIONECTOMY  10/17/14    right, still has discomfort    BUNIONECTOMY-TAILOR Right 10/17/2014    [facility] Farzad Eng D[facility] OR    CATARACT EXTRACTION W/  INTRAOCULAR LENS IMPLANT Bilateral     CHOLECYSTECTOMY  08/02/2017    CHOLECYSTECTOMY-LAPAROSCOPIC N/A 8/2/2017    [facility]    COLONOSCOPY      ESOPHAGOGASTRODUODENOSCOPY      dilated esophagus    GASTRIC BYPASS      2011    HYSTERECTOMY  1980    interstim bladder  2009    10/14/14 new battery    OOPHORECTOMY  1980    RELEASE-CARPAL TUNNEL Left 8/29/2017    Performed by [facility]bin Edmond [facility] at [facility] OR    REPAIR-HAMMER TOE- 2nd toe 2nd Procedure: [employer] 3rd  "toe with T&C- 03685. Hammer toe correction, tendon release to 4th right toe. Right 10/17/2014    [facility] by Farzad Eng D[facility] at [facility] OR    LXXSLOBG-INKMXOOQI-GEUODPY IPG OF INTERSTIM Right 10/14/2014    [facility] 2ND F[facility]    SKIN CANCER EXCISION      left hand    TONSILLECTOMY      WISDOM TOOTH EXTRACTION       Social History     Tobacco Use    Smoking status: Never Smoker    Smokeless tobacco: Never Used   Substance Use Topics    Alcohol use: No    Drug use: No     Family History   Problem Relation Age of Onset    Breast cancer Mother 50    Diabetes Unknown     Hypertension Unknown     Hypothyroidism Unknown     Breast cancer Paternal Aunt         40's     Review of patient's allergies indicates:   Allergen Reactions    Sulfa (sulfonamide antibiotics) Hives    Naproxen Other (See Comments)     Other reaction(s): RT sided numbness         Performance Status:The patient's activity level is functions out of house.      Review of Systems:  a review of eleven systems covering constitutional, Eye, HEENT, Psych, Respiratory, Cardiac, GI, , Musculoskeletal, Endocrine, Dermatologic was negative except for pertinent findings as listed ABOVE and below: all good,  Had dm that remitted with bypass 2011.      Exam:Comprehensive exam done. /64 (BP Location: Right arm, Patient Position: Sitting)   Pulse (!) 59   Ht 5' 4" (1.626 m)   Wt 80.7 kg (177 lb 14.6 oz)   SpO2 100% Comment: on room air  BMI 30.54 kg/m²   Exam included Vitals as listed, and patient's appearance and affect and alertness and mood, oral exam for yeast and hygiene and pharynx lesions and Mallapatti (M) score, neck with inspection for jvd and masses and thyroid abnormalities and lymph nodes (supraclavicular and infraclavicular nodes and axillary also examined and noted if abn), chest exam included symmetry and effort and fremitus and percussion and auscultation, cardiac exam included rhythm and " gallops and murmur and rubs and jvd and edema, abdominal exam for mass and hepatosplenomegaly and tenderness and hernias and bowel sounds, Musculoskeletal exam with muscle tone and posture and mobility/gait and  strength, and skin for rashes and cyanosis and pallor and turgor, extremity for clubbing.  Findings were normal except for pertinent findings listed below:  M3, good bs, rest good.    Radiographs (ct chest and cxr) reviewed: view by direct vision  i do see clear bronchiectasis,nodules are noted.  Mucoid type impaction suggested in rul ant segment.      1. Region of endobronchial plugging unchanged since 2/7/17 of uncertain etiology. This could relate to a process such as allergic bronchopulmonary aspergillosis, a solid mass nodule, mucous plugging, residual from aspiration, endobronchial spread of disease. Further followup or evaluation is necessary    2. No evidence of pulmonary embolism    3. Multiple smaller pulmonary nodules are noted without detrimental changes since prior, the largest is 6 mm. Followup for size stability is suggested    4. Incidental findings as described above including cholelithiasis and gastric bypass        Electronically signed by: Raffi Gottlieb MD  Date: 03/14/17  Time: 10:41         Labs reviewed igm low, igg lowish, humerol titers na    Results for NEIL STEVENS (MRN [id number]) as of 4/24/2017 11:30   Ref. Range 3/15/2017 11:53   IgG - Serum Latest Ref Range: 650 - 1600 mg/dL 693   IgM Latest Ref Range: 50 - 300 mg/dL 22 (L)   IgA Latest Ref Range: 40 - 350 mg/dL 191   IgE Latest Ref Range: 0 - 100 IU/mL <35   Results for NEIL STEVENS (MRN [id number]) as of 4/24/2017 11:30   Ref. Range 3/16/2017 08:33   Diphtheria Toxoid Ab Latest Ref Range: >0.099 IU/mL 0.077 (A)   Tetanus Ab Latest Ref Range: >0.150 IU/mL 3.504   S.pneumoniae Type 1 Latest Units: mcg/mL 27.6   S.pneumoniae Type 3 Latest Units: mcg/mL 0.8   Strep pneumo Type 4 Latest Units: mcg/mL <0.3    S.pneumoniae Type 5 Latest Units: mcg/mL 13.1   S.pneumoniae Type 6B Latest Units: mcg/mL 0.9   S.pneumoniae Type 7F Latest Units: mcg/mL 2.2   S.pneumoniae Type 8 Latest Units: mcg/mL 3.7   S.pneumoniae Type 9N Latest Units: mcg/mL 11.2   S.pneumoniae Type 9V Abs Latest Units: mcg/mL 1.1   S.pneumoniae Type 12F Latest Units: mcg/mL 1.1   Strep pneumo Type 14 Latest Units: mcg/mL 0.7   S.pneumoniae Type 18C Latest Units: mcg/mL 1.6   S.pneumoniae Type 19F Latest Units: mcg/mL 6.5   S.pneumoniae Type 23F Latest Units: mcg/mL 3.1     PFT    Done [facility] with 10% response, otherwise nl    Plan:  Clinical impression is resonably certain and repeated evaluation prn +/- follow up will be needed as below.    Cornelia was seen today for follow-up and paperwork.    Diagnoses and all orders for this visit:    Bronchiectasis with acute exacerbation  -     CT Chest Without Contrast; Future    Mild intermittent asthma without complication  -     predniSONE (DELTASONE) 20 MG tablet; 3 for 3 days then 2 for 3 days then one for 3 days and repeat for breathing problems    Abnormal CT scan, lung  -     CT Chest Without Contrast; Future    Immune deficiency disorder  -     amoxicillin-clavulanate 875-125mg (AUGMENTIN) 875-125 mg per tablet; Take 1 tablet by mouth 2 (two) times daily.    Palpitation  -     metoprolol succinate (TOPROL-XL) 25 MG 24 hr tablet; Take 1 tablet (25 mg total) by mouth once daily.    SOHA (obstructive sleep apnea), Auto BIPAP Mod OSAS since Dec 2013, therapeutic and compliant 100%, ESS 6/24 Feb 2014        Follow-up in about 3 months (around 1/30/2019).    Discussed with patient above for education the following:       Nodule needs recheck  April 2019    Lungs flared with  Some suggestion of  Infection- pneumonia typical for weakness/wt loss but cxr was good.    Continue symbicort    Use augmentin if  yellow or fever    Use prednisone starting at 60 (3) - taper as quick as able.    Use toprol daily in  place of lopressor    Continue sleep apnea rx.   DISPLAY PLAN FREE TEXT

## 2019-12-19 ENCOUNTER — PATIENT MESSAGE (OUTPATIENT)
Dept: ADMINISTRATIVE | Facility: OTHER | Age: 69
End: 2019-12-19

## 2019-12-21 ENCOUNTER — PATIENT MESSAGE (OUTPATIENT)
Dept: FAMILY MEDICINE | Facility: CLINIC | Age: 69
End: 2019-12-21

## 2019-12-26 ENCOUNTER — OFFICE VISIT (OUTPATIENT)
Dept: ENDOCRINOLOGY | Facility: CLINIC | Age: 69
End: 2019-12-26
Payer: MEDICARE

## 2019-12-26 VITALS
WEIGHT: 199.5 LBS | BODY MASS INDEX: 34.06 KG/M2 | OXYGEN SATURATION: 99 % | DIASTOLIC BLOOD PRESSURE: 64 MMHG | HEIGHT: 64 IN | SYSTOLIC BLOOD PRESSURE: 118 MMHG | HEART RATE: 77 BPM

## 2019-12-26 DIAGNOSIS — E04.2 MULTINODULAR GOITER: ICD-10-CM

## 2019-12-26 DIAGNOSIS — R94.6 ABNORMAL THYROID FUNCTION TEST: Primary | ICD-10-CM

## 2019-12-26 PROCEDURE — 1126F PR PAIN SEVERITY QUANTIFIED, NO PAIN PRESENT: ICD-10-PCS | Mod: HCNC,S$GLB,, | Performed by: INTERNAL MEDICINE

## 2019-12-26 PROCEDURE — 99213 OFFICE O/P EST LOW 20 MIN: CPT | Mod: HCNC,S$GLB,, | Performed by: INTERNAL MEDICINE

## 2019-12-26 PROCEDURE — 3074F PR MOST RECENT SYSTOLIC BLOOD PRESSURE < 130 MM HG: ICD-10-PCS | Mod: HCNC,CPTII,S$GLB, | Performed by: INTERNAL MEDICINE

## 2019-12-26 PROCEDURE — 1101F PT FALLS ASSESS-DOCD LE1/YR: CPT | Mod: HCNC,CPTII,S$GLB, | Performed by: INTERNAL MEDICINE

## 2019-12-26 PROCEDURE — 1101F PR PT FALLS ASSESS DOC 0-1 FALLS W/OUT INJ PAST YR: ICD-10-PCS | Mod: HCNC,CPTII,S$GLB, | Performed by: INTERNAL MEDICINE

## 2019-12-26 PROCEDURE — 1126F AMNT PAIN NOTED NONE PRSNT: CPT | Mod: HCNC,S$GLB,, | Performed by: INTERNAL MEDICINE

## 2019-12-26 PROCEDURE — 1159F PR MEDICATION LIST DOCUMENTED IN MEDICAL RECORD: ICD-10-PCS | Mod: HCNC,S$GLB,, | Performed by: INTERNAL MEDICINE

## 2019-12-26 PROCEDURE — 99213 PR OFFICE/OUTPT VISIT, EST, LEVL III, 20-29 MIN: ICD-10-PCS | Mod: HCNC,S$GLB,, | Performed by: INTERNAL MEDICINE

## 2019-12-26 PROCEDURE — 99999 PR PBB SHADOW E&M-EST. PATIENT-LVL III: CPT | Mod: PBBFAC,HCNC,, | Performed by: INTERNAL MEDICINE

## 2019-12-26 PROCEDURE — 3074F SYST BP LT 130 MM HG: CPT | Mod: HCNC,CPTII,S$GLB, | Performed by: INTERNAL MEDICINE

## 2019-12-26 PROCEDURE — 3078F PR MOST RECENT DIASTOLIC BLOOD PRESSURE < 80 MM HG: ICD-10-PCS | Mod: HCNC,CPTII,S$GLB, | Performed by: INTERNAL MEDICINE

## 2019-12-26 PROCEDURE — 99999 PR PBB SHADOW E&M-EST. PATIENT-LVL III: ICD-10-PCS | Mod: PBBFAC,HCNC,, | Performed by: INTERNAL MEDICINE

## 2019-12-26 PROCEDURE — 1159F MED LIST DOCD IN RCRD: CPT | Mod: HCNC,S$GLB,, | Performed by: INTERNAL MEDICINE

## 2019-12-26 PROCEDURE — 3078F DIAST BP <80 MM HG: CPT | Mod: HCNC,CPTII,S$GLB, | Performed by: INTERNAL MEDICINE

## 2019-12-26 NOTE — PROGRESS NOTES
CHIEF COMPLAINT: Suppressed TSH  69 year old being seen as a f/u. Had been on synthroid in the past. Off since March 2019. No palpitations. No tremors. No biotin.         PAST MEDICAL HISTORY/PAST SURGICAL HISTORY:  Reviewed in [facility]    SOCIAL HISTORY: No T/A    FAMILY HISTORY:  + Hypothyroidism. + Dm 2.     MEDICATIONS/ALLERGIES: The patient's MedCard has been updated and reviewed.      ROS:   Constitutional: weight stable.   Eyes: No recent visual changes  ENT: No dysphagia  Cardiovascular: No CP  Respiratory: no increased SOB  Gastrointestinal: Denies recent bowel disturbances  GenitoUrinary - No dysuria  Skin: No new skin rash  Neurologic: No focal neurologic complaints  Remainder ROS negative        PE:    GENERAL: Well developed, well nourished.  NECK: Supple, trachea midline, No palpable thyroid nodules.   CHEST: Resp even and unlabored, CTA bilateral.  CARDIAC: RRR, S1, S2 heard, no murmurs, rubs, S3, or S4    Results for BOBBYYAMILKA NEIL GUY (MRN [id number]) as of 12/26/2019 13:21   Ref. Range 11/15/2019 09:53   TSH Latest Ref Range: 0.400 - 4.000 uIU/mL 0.671   T3, Free Latest Ref Range: 2.3 - 4.2 pg/mL 2.4   Free T4 Latest Ref Range: 0.71 - 1.51 ng/dL 0.95   Thyrotropin Receptor Ab Latest Ref Range: 0.00 - 1.75 IU/L <1.00   Thyroperoxidase Antibodies Latest Ref Range: <6.0 IU/mL 10.9 (H)             ASSESSMENT/PLAN:  1. Suppressed TSH- TSH now WNL. TPO +. Discussed symptoms of hyper and hypothyroidism.     2. MNG- Do not meet criteria for FNA. Will repeat US at f/u    FOLLOWUP  F/U 1 yr with TSH, Thyroid US

## 2020-01-02 ENCOUNTER — PATIENT MESSAGE (OUTPATIENT)
Dept: PULMONOLOGY | Facility: CLINIC | Age: 70
End: 2020-01-02

## 2020-01-02 ENCOUNTER — PATIENT MESSAGE (OUTPATIENT)
Dept: ALLERGY | Facility: CLINIC | Age: 70
End: 2020-01-02

## 2020-01-02 DIAGNOSIS — E11.9 CONTROLLED TYPE 2 DIABETES MELLITUS WITHOUT COMPLICATION, WITHOUT LONG-TERM CURRENT USE OF INSULIN: ICD-10-CM

## 2020-01-02 RX ORDER — METFORMIN HYDROCHLORIDE 500 MG/1
1000 TABLET, EXTENDED RELEASE ORAL
Qty: 180 TABLET | Refills: 0 | Status: SHIPPED | OUTPATIENT
Start: 2020-01-02 | End: 2020-03-11 | Stop reason: SDUPTHER

## 2020-01-02 NOTE — TELEPHONE ENCOUNTER
Refill Authorization Note     is requesting a refill authorization.    Brief assessment and rationale for refill: APPROVE: prr                                         Comments:   Requested Prescriptions   Signed Prescriptions Disp Refills    metFORMIN (GLUCOPHAGE-XR) 500 MG 24 hr tablet 180 tablet 0     Sig: Take 2 tablets (1,000 mg total) by mouth daily with breakfast.       Endocrinology:  Diabetes - Biguanides Passed - 1/2/2020 10:59 AM        Passed - Patient is at least 18 years old        Passed - Office visit in past 12 months or future 90 days     Recent Outpatient Visits            1 week ago Abnormal thyroid function test    [place] - Endocrinology Ayaz Ponce DO    1 month ago Essential hypertension    [facility] Cardiology Juan Alberto Noel MD    1 month ago Bronchiectasis without complication    [facility] - Pulmonary Jonathan Nicole MD    2 months ago Personal history of other malignant neoplasm of skin    [facility] Dermatology Kailyn Fletcher MD    3 months ago Urinary urgency    [facility] - Urology 4th Floor Mary Jane Jarvis MD          Future Appointments              In 6 days [facility] [place] [facility]    In 1 week [facility]yn Fletcher [facility] [place] [facility]    In 1 month MD [facility] - Urology 4th Floor, [facility]    In 1 month LAB, [place] [facility] [place]    In 2 months [facility] [place] [facility]    In 4 months MD [facility] - Pulmonary, [facility]    In 10 months [facility] Noel [facility]                Passed - Cr is 1.4 or below and within 360 days     Creatinine   Date Value Ref Range Status   09/04/2019 0.9 0.5 - 1.4 mg/dL Final   08/26/2019 0.9 0.5 - 1.4 mg/dL Final   05/01/2019 0.8 0.5 - 1.4 mg/dL Final              Passed - HBA1C is 7.9 or below and within 180 days     Hemoglobin A1C   Date Value Ref Range  Status   09/04/2019 5.6 4.0 - 5.6 % Final     Comment:     ADA Screening Guidelines:  5.7-6.4%  Consistent with prediabetes  >or=6.5%  Consistent with diabetes  High levels of fetal hemoglobin interfere with the HbA1C  assay. Heterozygous hemoglobin variants (HbS, HgC, etc)do  not significantly interfere with this assay.   However, presence of multiple variants may affect accuracy.     05/01/2019 5.6 4.0 - 5.6 % Final     Comment:     ADA Screening Guidelines:  5.7-6.4%  Consistent with prediabetes  >or=6.5%  Consistent with diabetes  High levels of fetal hemoglobin interfere with the HbA1C  assay. Heterozygous hemoglobin variants (HbS, HgC, etc)do  not significantly interfere with this assay.   However, presence of multiple variants may affect accuracy.     10/25/2018 5.5 4.0 - 5.6 % Final     Comment:     ADA Screening Guidelines:  5.7-6.4%  Consistent with prediabetes  >or=6.5%  Consistent with diabetes  High levels of fetal hemoglobin interfere with the HbA1C  assay. Heterozygous hemoglobin variants (HbS, HgC, etc)do  not significantly interfere with this assay.   However, presence of multiple variants may affect accuracy.                Passed - eGFR is 30 or above and within 360 days     eGFR if non    Date Value Ref Range Status   09/04/2019 >60.0 >60 mL/min/1.73 m^2 Final     Comment:     Calculation used to obtain the estimated glomerular filtration  rate (eGFR) is the CKD-EPI equation.      08/26/2019 >60.0 >60 mL/min/1.73 m^2 Final     Comment:     Calculation used to obtain the estimated glomerular filtration  rate (eGFR) is the CKD-EPI equation.      05/01/2019 >60.0 >60 mL/min/1.73 m^2 Final     Comment:     Calculation used to obtain the estimated glomerular filtration  rate (eGFR) is the CKD-EPI equation.        eGFR if    Date Value Ref Range Status   09/04/2019 >60.0 >60 mL/min/1.73 m^2 Final   08/26/2019 >60.0 >60 mL/min/1.73 m^2 Final   05/01/2019 >60.0 >60  mL/min/1.73 m^2 Final              Appointments  past 12m or future 3m with PCP    Date Provider   Last Visit   9/11/2019 Armond Cortez MD   Next Visit   3/11/2020 Armond Cortez MD

## 2020-01-03 DIAGNOSIS — J47.1 BRONCHIECTASIS WITH ACUTE EXACERBATION: ICD-10-CM

## 2020-01-03 DIAGNOSIS — J32.9 SINUSITIS, UNSPECIFIED CHRONICITY, UNSPECIFIED LOCATION: ICD-10-CM

## 2020-01-03 DIAGNOSIS — J45.20 MILD INTERMITTENT ASTHMA WITHOUT COMPLICATION: ICD-10-CM

## 2020-01-03 RX ORDER — LEVALBUTEROL INHALATION SOLUTION 1.25 MG/3ML
1 SOLUTION RESPIRATORY (INHALATION) EVERY 4 HOURS PRN
Qty: 1 BOX | Refills: 11 | Status: SHIPPED | OUTPATIENT
Start: 2020-01-03 | End: 2021-04-12 | Stop reason: SDUPTHER

## 2020-01-03 RX ORDER — AZELASTINE 1 MG/ML
1 SPRAY, METERED NASAL 2 TIMES DAILY
Qty: 90 ML | Refills: 3 | Status: CANCELLED | OUTPATIENT
Start: 2020-01-03 | End: 2021-01-02

## 2020-01-03 RX ORDER — AZELASTINE 1 MG/ML
1 SPRAY, METERED NASAL 2 TIMES DAILY
Qty: 90 ML | Refills: 3 | Status: SHIPPED | OUTPATIENT
Start: 2020-01-03 | End: 2021-06-11 | Stop reason: CLARIF

## 2020-01-03 NOTE — TELEPHONE ENCOUNTER
Sent the rx for nebulizer to patient's local pharmacy. Sent a request to Dr. Nicole for asteline nasal spray to be sent to [facility] pharmacy.      ----- Message from Melchor Sánchez sent at 1/3/2020 11:08 AM CST -----  Contact: self  Patient miss call from your office please call back at [phone] (home)     Case number [id number]

## 2020-01-08 ENCOUNTER — OFFICE VISIT (OUTPATIENT)
Dept: ORTHOPEDICS | Facility: CLINIC | Age: 70
End: 2020-01-08
Payer: MEDICARE

## 2020-01-08 VITALS
BODY MASS INDEX: 34.06 KG/M2 | SYSTOLIC BLOOD PRESSURE: 119 MMHG | HEART RATE: 62 BPM | DIASTOLIC BLOOD PRESSURE: 69 MMHG | WEIGHT: 199.5 LBS | HEIGHT: 64 IN

## 2020-01-08 DIAGNOSIS — M65.9 SYNOVITIS OF RIGHT HAND: Primary | ICD-10-CM

## 2020-01-08 DIAGNOSIS — M19.041 ARTHRITIS OF FINGER OF RIGHT HAND: ICD-10-CM

## 2020-01-08 DIAGNOSIS — M19.049 HAND ARTHRITIS: ICD-10-CM

## 2020-01-08 PROCEDURE — 20600 DRAIN/INJ JOINT/BURSA W/O US: CPT | Mod: HCNC,RT,S$GLB, | Performed by: ORTHOPAEDIC SURGERY

## 2020-01-08 PROCEDURE — 1125F AMNT PAIN NOTED PAIN PRSNT: CPT | Mod: HCNC,S$GLB,, | Performed by: ORTHOPAEDIC SURGERY

## 2020-01-08 PROCEDURE — 3074F PR MOST RECENT SYSTOLIC BLOOD PRESSURE < 130 MM HG: ICD-10-PCS | Mod: HCNC,CPTII,S$GLB, | Performed by: ORTHOPAEDIC SURGERY

## 2020-01-08 PROCEDURE — 99213 PR OFFICE/OUTPT VISIT, EST, LEVL III, 20-29 MIN: ICD-10-PCS | Mod: HCNC,25,S$GLB, | Performed by: ORTHOPAEDIC SURGERY

## 2020-01-08 PROCEDURE — 3074F SYST BP LT 130 MM HG: CPT | Mod: HCNC,CPTII,S$GLB, | Performed by: ORTHOPAEDIC SURGERY

## 2020-01-08 PROCEDURE — 3078F PR MOST RECENT DIASTOLIC BLOOD PRESSURE < 80 MM HG: ICD-10-PCS | Mod: HCNC,CPTII,S$GLB, | Performed by: ORTHOPAEDIC SURGERY

## 2020-01-08 PROCEDURE — 1101F PT FALLS ASSESS-DOCD LE1/YR: CPT | Mod: HCNC,CPTII,S$GLB, | Performed by: ORTHOPAEDIC SURGERY

## 2020-01-08 PROCEDURE — 3078F DIAST BP <80 MM HG: CPT | Mod: HCNC,CPTII,S$GLB, | Performed by: ORTHOPAEDIC SURGERY

## 2020-01-08 PROCEDURE — 1159F PR MEDICATION LIST DOCUMENTED IN MEDICAL RECORD: ICD-10-PCS | Mod: HCNC,S$GLB,, | Performed by: ORTHOPAEDIC SURGERY

## 2020-01-08 PROCEDURE — 99213 OFFICE O/P EST LOW 20 MIN: CPT | Mod: HCNC,25,S$GLB, | Performed by: ORTHOPAEDIC SURGERY

## 2020-01-08 PROCEDURE — 1125F PR PAIN SEVERITY QUANTIFIED, PAIN PRESENT: ICD-10-PCS | Mod: HCNC,S$GLB,, | Performed by: ORTHOPAEDIC SURGERY

## 2020-01-08 PROCEDURE — 1159F MED LIST DOCD IN RCRD: CPT | Mod: HCNC,S$GLB,, | Performed by: ORTHOPAEDIC SURGERY

## 2020-01-08 PROCEDURE — 1101F PR PT FALLS ASSESS DOC 0-1 FALLS W/OUT INJ PAST YR: ICD-10-PCS | Mod: HCNC,CPTII,S$GLB, | Performed by: ORTHOPAEDIC SURGERY

## 2020-01-08 PROCEDURE — 20600 SMALL JOINT ASPIRATION/INJECTION: R INDEX MCP: ICD-10-PCS | Mod: HCNC,RT,S$GLB, | Performed by: ORTHOPAEDIC SURGERY

## 2020-01-08 PROCEDURE — 99999 PR PBB SHADOW E&M-EST. PATIENT-LVL III: ICD-10-PCS | Mod: PBBFAC,HCNC,, | Performed by: ORTHOPAEDIC SURGERY

## 2020-01-08 PROCEDURE — 99999 PR PBB SHADOW E&M-EST. PATIENT-LVL III: CPT | Mod: PBBFAC,HCNC,, | Performed by: ORTHOPAEDIC SURGERY

## 2020-01-08 RX ORDER — AZITHROMYCIN 500 MG/1
TABLET, FILM COATED ORAL
COMMUNITY
Start: 2020-01-02 | End: 2020-05-04 | Stop reason: SDUPTHER

## 2020-01-08 RX ORDER — DICLOFENAC SODIUM 10 MG/G
2 GEL TOPICAL DAILY
Qty: 1 TUBE | Refills: 6 | Status: SHIPPED | OUTPATIENT
Start: 2020-01-08 | End: 2024-01-04

## 2020-01-08 RX ADMIN — TRIAMCINOLONE ACETONIDE 40 MG: 40 INJECTION, SUSPENSION INTRA-ARTICULAR; INTRAMUSCULAR at 09:01

## 2020-01-08 NOTE — PROGRESS NOTES
Ms Delatorre returns to clinic today.  She has a history of right index synovitis and right thumb pain.  She states that the right thumb is no longer causing her any pain but she is still having pain about the MCP joint of her index finger.  She states that she is no longer on any steroid medication but she did have a steroid injection into her spine several weeks ago which did improve her symptoms    Physical exam:  Examination the right hand reveals that there is no edema.  She does have synovitis overlying the MCP joint mainly of the index finger.  There is no erythema or increased warmth.  Palpation over the index finger MCP produces mild tenderness.  There is no subluxation or dislocation.  She is able make a full composite fist and extend the finger.  She has no tenderness about the thumb.  Sensation is grossly intact in the median radial ulnar distribution.  Capillary refill is less than 2 sec in all the digits    Assessment:  Right index finger MCP synovitis versus arthritis    Plan:    1.  After informed consent was obtained a steroid injection was placed to the index finger MCP joint.  The patient tolerated that well    2.  She will follow up with me on a p.r.n. Basis      Answers for HPI/ROS submitted by the patient on 1/1/2020   Hand injury  activity change: No  unexpected weight change: No  neck pain: No  hearing loss: No  rhinorrhea: No  trouble swallowing: No  eye discharge: No  visual disturbance: No  chest tightness: No  wheezing: No  chest pain: No  palpitations: No  blood in stool: No  constipation: No  vomiting: No  diarrhea: No  polydipsia: No  polyuria: No  difficulty urinating: No  hematuria: No  menstrual problem: No  dysuria: No  joint swelling: Yes  arthralgias: Yes  headaches: No  weakness: No  confusion: No  dysphoric mood: No  appetite change : No  sleep disturbance: No  IMMUNOCOMPROMISED: Yes  nervous/ anxious: No  rash: No  eye redness: No  eye pain: No  ear pain: No  tinnitus:  Yes  sinus pressure : No  nosebleeds: No  enviro allergies: No  food allergies: No  cough: No  shortness of breath: No  sweating: No  frequency: No  painful intercourse: No  nausea: No  dizziness: No  numbness: No  seizures: No  myalgia: Yes  back pain: No  Pain Chronicity: recurrent  History of trauma: No  Onset: more than 1 month ago  Frequency: constantly  Progression since onset: gradually worsening  injury location: at home  pain- numeric: 7/10  pain location: right hand, right fingers  pain quality: aching, tightness, burning  Radiating Pain: No  Aggravating factors: activity, bearing weight  fever: No  inability to bear weight: Yes  itching: No  joint locking: Yes  limited range of motion: Yes  stiffness: Yes  tingling: No  Treatments tried: injection treatment, OTC ointments, OTC pain meds  physical therapy: not tried  Improvement on treatment: moderate

## 2020-01-09 RX ORDER — TRIAMCINOLONE ACETONIDE 40 MG/ML
40 INJECTION, SUSPENSION INTRA-ARTICULAR; INTRAMUSCULAR
Status: DISCONTINUED | OUTPATIENT
Start: 2020-01-08 | End: 2020-01-09 | Stop reason: HOSPADM

## 2020-01-10 DIAGNOSIS — E78.5 DYSLIPIDEMIA: ICD-10-CM

## 2020-01-10 RX ORDER — PRAVASTATIN SODIUM 40 MG/1
40 TABLET ORAL DAILY
Qty: 90 TABLET | Refills: 1 | Status: SHIPPED | OUTPATIENT
Start: 2020-01-10 | End: 2020-06-18 | Stop reason: SDUPTHER

## 2020-01-10 NOTE — PROGRESS NOTES
Digital Medicine: Health  Follow-Up    Ms. Rhodes reports that she's doing well. She's happy with how her BP is looking.     The history is provided by the patient.     Follow Up  Follow-up reason(s): reading review and routine education      Routine Education Topics: weight management, eating patterns and physical activity      Patient has had a lot going on. Her friend passed away in early Decemeber, and the stress from that caused her BP to increase. She has also been struggling with her asthma, and this has been causing some stress.     Patient reports that she's gained about 25lbs since about March. She thinks this is due to medications from her asthma like prednisone. Patient reports that she hasn't done anything different with her diet and finds it hard to exercise.       INTERVENTION(S)  recommended diet modifications and encouragement/support    PLAN  patient verbalizes understanding      There are no preventive care reminders to display for this patient.    Last 5 Patient Entered Readings                                      Current 30 Day Average: 132/68     Recent Readings 1/1/2020 12/31/2019 12/30/2019 12/23/2019 12/19/2019    SBP (mmHg) 118 143 125 117 144    DBP (mmHg) 66 68 72 72 75    Pulse 65 70 63 69 66            Diet Screening   Patient reports eating or drinking the following: packaged/processed foodsShe skips meals regularly.  Doesn't like to eat breakfast. Feels like she is constantly eating all day if she does eat breakfast.      Barriers to a Healthy Diet: unwilling to change/not ready    We discussed what her normal eating habits were: she has a bigger meal at lunch and then has small snacks in the evening instead of a dinner. For example, she had jambalaya yesterday with some bread and as a snack she was eating some gold fish or cheese its. Patient has gastric bypass and doesn't eat big meals overall.     I offered some changes like staying off high fat, processed foods like the  crackers. Patient likes the crunch from the crackers so I suggested having fruit or veggies to take there place. I also suggested increasing protein and veggies for meals and decreasing carbs.     Will continue to assess and offer hand outs if patient is interested.     Physical Activity Screening   When asked if exercising, patient responded: no    She identified the following barriers to physical activity: asthma      SDOH

## 2020-01-10 NOTE — TELEPHONE ENCOUNTER
----- Message from Barbra Barrett sent at 1/10/2020  9:06 AM CST -----  Contact: Magui ALDRICH/[facility]  Type:  RX Refill Request    Who Called: Magui    Refill or New Rx:  refill  RX Name and Strength:  pravastatin (PRAVACHOL) 40 MG tablet  How is the patient currently taking it? (ex. 1XDay):  As Directed  Is this a 30 day or 90 day RX:  as Directed  Preferred Pharmacy with phone number:    [facility] - [place], [place] - [address]  [address] [address]  Phone: [phone] Fax: [phone]  Local or Mail Order:  mail  Ordering Provider:  michael Okeefe Call Back Number:  [phone]  Additional Information:  Please Advise ---Thank you

## 2020-01-14 ENCOUNTER — OFFICE VISIT (OUTPATIENT)
Dept: DERMATOLOGY | Facility: CLINIC | Age: 70
End: 2020-01-14
Payer: MEDICARE

## 2020-01-14 ENCOUNTER — PATIENT MESSAGE (OUTPATIENT)
Dept: PULMONOLOGY | Facility: CLINIC | Age: 70
End: 2020-01-14

## 2020-01-14 VITALS — RESPIRATION RATE: 18 BRPM | BODY MASS INDEX: 34.13 KG/M2 | WEIGHT: 199.94 LBS | HEIGHT: 64 IN

## 2020-01-14 DIAGNOSIS — B07.8 COMMON WART: ICD-10-CM

## 2020-01-14 DIAGNOSIS — L85.3 XEROSIS CUTIS: ICD-10-CM

## 2020-01-14 DIAGNOSIS — L90.5 SCAR: ICD-10-CM

## 2020-01-14 DIAGNOSIS — J45.20 MILD INTERMITTENT ASTHMA WITHOUT COMPLICATION: ICD-10-CM

## 2020-01-14 DIAGNOSIS — Z85.828 PERSONAL HISTORY OF OTHER MALIGNANT NEOPLASM OF SKIN: ICD-10-CM

## 2020-01-14 DIAGNOSIS — B07.8 PERIUNGUAL WART: Primary | ICD-10-CM

## 2020-01-14 DIAGNOSIS — D48.5 NEOPLASM OF UNCERTAIN BEHAVIOR OF SKIN: ICD-10-CM

## 2020-01-14 PROCEDURE — 11103 PR TANGENTIAL BIOPSY, SKIN, EA ADDTL LESION: ICD-10-PCS | Mod: HCNC,S$GLB,, | Performed by: DERMATOLOGY

## 2020-01-14 PROCEDURE — 99999 PR PBB SHADOW E&M-EST. PATIENT-LVL III: CPT | Mod: PBBFAC,HCNC,, | Performed by: DERMATOLOGY

## 2020-01-14 PROCEDURE — 11102 TANGNTL BX SKIN SINGLE LES: CPT | Mod: HCNC,59,S$GLB, | Performed by: DERMATOLOGY

## 2020-01-14 PROCEDURE — 1159F MED LIST DOCD IN RCRD: CPT | Mod: HCNC,S$GLB,, | Performed by: DERMATOLOGY

## 2020-01-14 PROCEDURE — 1101F PR PT FALLS ASSESS DOC 0-1 FALLS W/OUT INJ PAST YR: ICD-10-PCS | Mod: HCNC,CPTII,S$GLB, | Performed by: DERMATOLOGY

## 2020-01-14 PROCEDURE — 11102 PR TANGENTIAL BIOPSY, SKIN, SINGLE LESION: ICD-10-PCS | Mod: HCNC,59,S$GLB, | Performed by: DERMATOLOGY

## 2020-01-14 PROCEDURE — 1159F PR MEDICATION LIST DOCUMENTED IN MEDICAL RECORD: ICD-10-PCS | Mod: HCNC,S$GLB,, | Performed by: DERMATOLOGY

## 2020-01-14 PROCEDURE — 88305 TISSUE EXAM BY PATHOLOGIST: ICD-10-PCS | Mod: 26,HCNC,, | Performed by: PATHOLOGY

## 2020-01-14 PROCEDURE — 99213 OFFICE O/P EST LOW 20 MIN: CPT | Mod: 25,HCNC,S$GLB, | Performed by: DERMATOLOGY

## 2020-01-14 PROCEDURE — 17110 PR DESTRUCTION BENIGN LESIONS UP TO 14: ICD-10-PCS | Mod: HCNC,S$GLB,, | Performed by: DERMATOLOGY

## 2020-01-14 PROCEDURE — 99999 PR PBB SHADOW E&M-EST. PATIENT-LVL III: ICD-10-PCS | Mod: PBBFAC,HCNC,, | Performed by: DERMATOLOGY

## 2020-01-14 PROCEDURE — 88305 TISSUE EXAM BY PATHOLOGIST: CPT | Mod: 26,HCNC,, | Performed by: PATHOLOGY

## 2020-01-14 PROCEDURE — 1101F PT FALLS ASSESS-DOCD LE1/YR: CPT | Mod: HCNC,CPTII,S$GLB, | Performed by: DERMATOLOGY

## 2020-01-14 PROCEDURE — 11103 TANGNTL BX SKIN EA SEP/ADDL: CPT | Mod: HCNC,S$GLB,, | Performed by: DERMATOLOGY

## 2020-01-14 PROCEDURE — 88305 TISSUE EXAM BY PATHOLOGIST: CPT | Mod: HCNC | Performed by: PATHOLOGY

## 2020-01-14 PROCEDURE — 99213 PR OFFICE/OUTPT VISIT, EST, LEVL III, 20-29 MIN: ICD-10-PCS | Mod: 25,HCNC,S$GLB, | Performed by: DERMATOLOGY

## 2020-01-14 PROCEDURE — 17110 DESTRUCTION B9 LES UP TO 14: CPT | Mod: HCNC,S$GLB,, | Performed by: DERMATOLOGY

## 2020-01-14 RX ORDER — ALBUTEROL SULFATE 90 UG/1
2 AEROSOL, METERED RESPIRATORY (INHALATION) EVERY 4 HOURS PRN
Qty: 3 INHALER | Refills: 3 | Status: SHIPPED | OUTPATIENT
Start: 2020-01-14 | End: 2020-01-17 | Stop reason: SDUPTHER

## 2020-01-14 NOTE — PROGRESS NOTES
Subjective:       Patient ID:  Cornelia Delatorre is a 69 y.o. female who presents for   Chief Complaint   Patient presents with    Follow-up     Patient present for follow up, last seen on 10/15/19.     C/o lesions to L forearm x months. The patient denies any change, including change in color, increase in size, or spontaneous bleeding, associated with this lesion. Not treating.    C/o lesion to L wrist x month. The patient denies any change, including change in color, increase in size, or spontaneous bleeding, associated with this lesion.  Not treating.    C/o periungual verruca to R 4th digit.  S/p cryo at last visit and single round candida Ag    C/o dry skin to face. tx w/ cerave, no improvement noted.    Recent pathology:  Skin, left forearm, shave biopsy:  -SUPERFICIALLY INVASIVE SQUAMOUS CELL CARCINOMA, EXCISED IN THE PLANES OF SECTION  EXAMINED  Diagnosed by: Juan Alberto Rolon  (Electronically Signed: 2019-10-22 13:54:04)    yes Phx of NMSC.  + unsure skin ca R hand and nose - Dr Parra - 2014   + SCC L hand -mohs Dr Jones  -2015   + SCC L forearm Bx on 10/15/19 (removed w/ BX)  no Fhx of melanoma.    Past Medical History:  No date: Allergy  No date: Arthritis  No date: Asthma  No date: Cancer      Comment:  skin cancer to right hand  No date: Cataract  1/24/2017: Chronic fatigue  3/7/2019: CVID (common variable immunodeficiency)  No date: Diabetes mellitus      Comment:  resolved with gastric bypass  1/24/2017: KLINE (dyspnea on exertion)  6/25/2019: Dyslipidemia  No date: Encounter for blood transfusion  12/29/2011: Essential hypertension  No date: GERD (gastroesophageal reflux disease)  No date: Headache(784.0)  No date: History of lumpectomy of both breasts      Comment:  1992 negative  7/15/2015: Hyperlipidemia  No date: Hypertension  No date: Hypothyroidism  No date: Neuropathy  No date: Obesity  12/26/2013: SOHA (obstructive sleep apnea), Auto BIPAP Mod OSAS since   Dec 2013, therapeutic and  compliant 100%, ESS 6/24 Feb 2014  No date: SOHA on CPAP  No date: Postmenopausal HRT (hormone replacement therapy)  No date: Rash  No date: Rosacea  No date: Snoring  No date: Squamous cell carcinoma of skin  No date: Wears glasses      Review of Systems   Skin: Positive for daily sunscreen use. Negative for itching, rash and dry skin.   Hematologic/Lymphatic: Bruises/bleeds easily (forearms, takes asa and prednisone for asthma).        Objective:    Physical Exam   Constitutional: She appears well-developed and well-nourished. No distress.   HENT:   Mouth/Throat: Lips normal.    Eyes: Lids are normal.  No conjunctival no injection.   Cardiovascular: There is no local extremity swelling and no dependent edema.     Neurological: She is alert and oriented to person, place, and time. She is not disoriented.   Psychiatric: She has a normal mood and affect.   Skin:   Areas Examined (abnormalities noted in diagram):   Head / Face Inspection Performed  Neck Inspection Performed  Chest / Axilla Inspection Performed  RUE Inspected  LUE Inspection Performed                  Diagram Legend     Erythematous scaling macule/papule c/w actinic keratosis       Vascular papule c/w angioma      Pigmented verrucoid papule/plaque c/w seborrheic keratosis      Yellow umbilicated papule c/w sebaceous hyperplasia      Irregularly shaped tan macule c/w lentigo     1-2 mm smooth white papules consistent with Milia      Movable subcutaneous cyst with punctum c/w epidermal inclusion cyst      Subcutaneous movable cyst c/w pilar cyst      Firm pink to brown papule c/w dermatofibroma      Pedunculated fleshy papule(s) c/w skin tag(s)      Evenly pigmented macule c/w junctional nevus     Mildly variegated pigmented, slightly irregular-bordered macule c/w mildly atypical nevus      Flesh colored to evenly pigmented papule c/w intradermal nevus       Pink pearly papule/plaque c/w basal cell carcinoma      Erythematous hyperkeratotic cursted plaque  c/w SCC      Surgical scar with no sign of skin cancer recurrence      Open and closed comedones      Inflammatory papules and pustules      Verrucoid papule consistent consistent with wart     Erythematous eczematous patches and plaques     Dystrophic onycholytic nail with subungual debris c/w onychomycosis     Umbilicated papule    Erythematous-base heme-crusted tan verrucoid plaque consistent with inflamed seborrheic keratosis     Erythematous Silvery Scaling Plaque c/w Psoriasis     See annotation      Assessment / Plan:      Pathology Orders:     Normal Orders This Visit    Specimen to Pathology, Dermatology     Comments:    Number of Specimens:->2  ------------------------->-------------------------  Spec 1 Procedure:->Biopsy  Spec 1 Clinical Impression:->scc vs other  Spec 1 Source:->left proximal forearm  ------------------------->-------------------------  Spec 2 Procedure:->Biopsy  Spec 2 Clinical Impression:->scc vs other  Spec 2 Source:->left distal forearm    Questions:    Procedure Type:  Dermatology and skin neoplasms    Number of Specimens:  2    ------------------------:  -------------------------    Spec 1 Procedure:  Biopsy    Spec 1 Clinical Impression:  scc vs other    Spec 1 Source:  left proximal forearm    ------------------------:  -------------------------    Spec 2 Procedure:  Biopsy    Spec 2 Clinical Impression:  scc vs other    Spec 2 Source:  left distal forearm        Periungual wart  -     candida albicans skin test skin test 0.1 mL    Procedure note for Candida Antigen injection:    Discussed risks of procedure including local redness, swelling, and lymph node enlargement. Verbal consent obtained. Area cleaned with alcohol. 0.3cc of Candida antigen injected intradermally at the base of the verruca. Patient tolerated well.   This was patient's 2nd cycle of Candida Antigen.     Cryosurgery procedure note:    Verbal consent from the patient is obtained. Liquid nitrogen cryosurgery is  applied to 1 verruca with prior paring, as detailed in the physical exam, to produce a freeze injury. 2 consecutive freeze thaw cycles are applied to each verruca. The patient is aware that blisters (possibly blood blisters) may form.    Discussed risk scarring and hypopigmentation and possible need for repeat treatments        Personal history of other malignant neoplasm of skin  NER  Reassurance, suspicious lesions noted.     Neoplasm of uncertain behavior of skin  -     Specimen to Pathology, Dermatology    Shave biopsy procedure note:    Shave biopsy performed after verbal consent including risk of infection, scar, recurrence, need for additional treatment of site. Area prepped with alcohol, anesthetized with approximately 1.0cc of 1% lidocaine with epinephrine. Lesional tissue shaved with razor blade. Hemostasis achieved with application of aluminum chloride followed by hyfrecation. No complications. Dressing applied. Wound care explained.        Common wart  Cryosurgery procedure note:    Verbal consent from the patient is obtained. Liquid nitrogen cryosurgery is applied to 1 verruca with prior paring, as detailed in the physical exam, to produce a freeze injury. 2 consecutive freeze thaw cycles are applied to each verruca. The patient is aware that blisters (possibly blood blisters) may form.    Discussed risk scarring and hypopigmentation and possible need for repeat treatments      Scar, left forearm  Biopsy proven SCC at last visit  Reassurance  NER    Xerosis cutis  Continue cerave  Add neutrogena hydroboost cleansing lotion           Follow up in about 4 months (around 5/14/2020).

## 2020-01-17 ENCOUNTER — PATIENT MESSAGE (OUTPATIENT)
Dept: PULMONOLOGY | Facility: CLINIC | Age: 70
End: 2020-01-17

## 2020-01-17 DIAGNOSIS — J45.20 MILD INTERMITTENT ASTHMA WITHOUT COMPLICATION: ICD-10-CM

## 2020-01-17 RX ORDER — ALBUTEROL SULFATE 90 UG/1
2 AEROSOL, METERED RESPIRATORY (INHALATION) EVERY 4 HOURS PRN
Qty: 3 INHALER | Refills: 3 | Status: SHIPPED | OUTPATIENT
Start: 2020-01-17 | End: 2020-05-04 | Stop reason: SDUPTHER

## 2020-01-20 LAB
FINAL PATHOLOGIC DIAGNOSIS: NORMAL
GROSS: NORMAL
MICROSCOPIC EXAM: NORMAL

## 2020-01-28 ENCOUNTER — OFFICE VISIT (OUTPATIENT)
Dept: FAMILY MEDICINE | Facility: CLINIC | Age: 70
End: 2020-01-28
Payer: MEDICARE

## 2020-01-28 VITALS
DIASTOLIC BLOOD PRESSURE: 78 MMHG | SYSTOLIC BLOOD PRESSURE: 128 MMHG | HEART RATE: 76 BPM | BODY MASS INDEX: 34.59 KG/M2 | OXYGEN SATURATION: 98 % | WEIGHT: 202.63 LBS | HEIGHT: 64 IN

## 2020-01-28 DIAGNOSIS — J47.9 BRONCHIECTASIS WITHOUT COMPLICATION: ICD-10-CM

## 2020-01-28 DIAGNOSIS — I70.0 ATHEROSCLEROSIS OF ABDOMINAL AORTA: ICD-10-CM

## 2020-01-28 DIAGNOSIS — I10 ESSENTIAL HYPERTENSION: ICD-10-CM

## 2020-01-28 DIAGNOSIS — R26.9 ABNORMALITY OF GAIT AND MOBILITY: ICD-10-CM

## 2020-01-28 DIAGNOSIS — E11.8 CONTROLLED TYPE 2 DIABETES MELLITUS WITH COMPLICATION, WITHOUT LONG-TERM CURRENT USE OF INSULIN: ICD-10-CM

## 2020-01-28 DIAGNOSIS — E78.5 DYSLIPIDEMIA: ICD-10-CM

## 2020-01-28 DIAGNOSIS — E03.9 ACQUIRED HYPOTHYROIDISM: ICD-10-CM

## 2020-01-28 DIAGNOSIS — D84.9 IMMUNE DEFICIENCY DISORDER: ICD-10-CM

## 2020-01-28 DIAGNOSIS — E11.42 DIABETIC POLYNEUROPATHY ASSOCIATED WITH TYPE 2 DIABETES MELLITUS: ICD-10-CM

## 2020-01-28 DIAGNOSIS — D83.9 CVID (COMMON VARIABLE IMMUNODEFICIENCY): ICD-10-CM

## 2020-01-28 DIAGNOSIS — Z00.00 ENCOUNTER FOR PREVENTIVE HEALTH EXAMINATION: Primary | ICD-10-CM

## 2020-01-28 DIAGNOSIS — I77.1 TORTUOUS AORTA: ICD-10-CM

## 2020-01-28 DIAGNOSIS — J45.20 MILD INTERMITTENT ASTHMA WITHOUT COMPLICATION: ICD-10-CM

## 2020-01-28 PROCEDURE — 99499 RISK ADDL DX/OHS AUDIT: ICD-10-PCS | Mod: HCNC,S$GLB,, | Performed by: NURSE PRACTITIONER

## 2020-01-28 PROCEDURE — 99999 PR PBB SHADOW E&M-EST. PATIENT-LVL V: ICD-10-PCS | Mod: PBBFAC,HCNC,, | Performed by: NURSE PRACTITIONER

## 2020-01-28 PROCEDURE — 99999 PR PBB SHADOW E&M-EST. PATIENT-LVL V: CPT | Mod: PBBFAC,HCNC,, | Performed by: NURSE PRACTITIONER

## 2020-01-28 PROCEDURE — 3078F PR MOST RECENT DIASTOLIC BLOOD PRESSURE < 80 MM HG: ICD-10-PCS | Mod: HCNC,CPTII,S$GLB, | Performed by: NURSE PRACTITIONER

## 2020-01-28 PROCEDURE — 3044F PR MOST RECENT HEMOGLOBIN A1C LEVEL <7.0%: ICD-10-PCS | Mod: HCNC,CPTII,S$GLB, | Performed by: NURSE PRACTITIONER

## 2020-01-28 PROCEDURE — 3044F HG A1C LEVEL LT 7.0%: CPT | Mod: HCNC,CPTII,S$GLB, | Performed by: NURSE PRACTITIONER

## 2020-01-28 PROCEDURE — 3074F PR MOST RECENT SYSTOLIC BLOOD PRESSURE < 130 MM HG: ICD-10-PCS | Mod: HCNC,CPTII,S$GLB, | Performed by: NURSE PRACTITIONER

## 2020-01-28 PROCEDURE — 3078F DIAST BP <80 MM HG: CPT | Mod: HCNC,CPTII,S$GLB, | Performed by: NURSE PRACTITIONER

## 2020-01-28 PROCEDURE — G0439 PR MEDICARE ANNUAL WELLNESS SUBSEQUENT VISIT: ICD-10-PCS | Mod: HCNC,S$GLB,, | Performed by: NURSE PRACTITIONER

## 2020-01-28 PROCEDURE — G0439 PPPS, SUBSEQ VISIT: HCPCS | Mod: HCNC,S$GLB,, | Performed by: NURSE PRACTITIONER

## 2020-01-28 PROCEDURE — 99499 UNLISTED E&M SERVICE: CPT | Mod: HCNC,S$GLB,, | Performed by: NURSE PRACTITIONER

## 2020-01-28 PROCEDURE — 3074F SYST BP LT 130 MM HG: CPT | Mod: HCNC,CPTII,S$GLB, | Performed by: NURSE PRACTITIONER

## 2020-01-28 NOTE — PROGRESS NOTES
"Cornelia Delatorre presented for a  Medicare AWV and comprehensive Health Risk Assessment today. The following components were reviewed and updated:    · Medical history  · Family History  · Social history  · Allergies and Current Medications  · Health Risk Assessment  · Health Maintenance  · Care Team     ** See Completed Assessments for Annual Wellness Visit within the encounter summary.**     The following assessments were completed:  · Living Situation  · CAGE  · Depression Screening  · Timed Get Up and Go  · Whisper Test  · Cognitive Function Screening      · Nutrition Screening  · ADL Screening  · PAQ Screening    Vitals:    01/28/20 0902   BP: 128/78   BP Location: Left arm   Patient Position: Sitting   BP Method: Medium (Manual)   Pulse: 76   SpO2: 98%   Weight: 91.9 kg (202 lb 9.6 oz)   Height: 5' 4" (1.626 m)     Body mass index is 34.78 kg/m².  Physical Exam   Constitutional: She is oriented to person, place, and time. She appears well-nourished.   Cardiovascular: Normal rate, regular rhythm, normal heart sounds and intact distal pulses.   Pulmonary/Chest: Effort normal and breath sounds normal.   Neurological: She is alert and oriented to person, place, and time.   Skin: Skin is warm and dry.   Vitals reviewed.      Diagnoses and health risks identified today and associated recommendations/orders:    1. Encounter for preventive health examination  Reviewed and discussed health maintenance.    2. Abnormality of gait and mobility  Safety issues discussed and reviewed     3. Essential hypertension  Stable- continue current treatment and follow up routinely with PCP     4. Dyslipidemia  Stable- continue current treatment and follow up routinely with PCP     5. Tortuous aorta  Stable- continue current treatment and follow up routinely with PCP     6. Atherosclerosis of abdominal aorta  Stable- continue current treatment and follow up routinely with PCP     7. CVID (common variable immunodeficiency)  Stable- " continue current treatment and follow up routinely with PCP     8. Immune deficiency disorder  Stable- continue current treatment and follow up routinely with PCP     9. Controlled type 2 diabetes mellitus with complication, without long-term current use of insulin  Stable- continue current treatment and follow up routinely with PCP     10. Acquired hypothyroidism  Stable- continue current treatment and follow up routinely with PCP     11. Bronchiectasis without complication  Stable- continue current treatment and follow up routinely with PCP and pulmonary ()    12. Mild intermittent asthma without complication  Stable- continue current treatment and follow up routinely with PCP and pulmonary ()    13. Diabetic polyneuropathy associated with type 2 diabetes mellitus  Stable- continue current treatment and follow up routinely with PCP     Provided Cornelia with a 5-10 year written screening schedule and personal prevention plan. Recommendations were developed using the USPSTF age appropriate recommendations. Education, counseling, and referrals were provided as needed. After Visit Summary printed and given to patient which includes a list of additional screenings\tests needed.    Sera Monroy NP

## 2020-01-28 NOTE — PATIENT INSTRUCTIONS
Counseling and Referral of Other Preventative  (Italic type indicates deductible and co-insurance are waived)    Patient Name: Cornelia Delatorre  Today's Date: 1/28/2020    Health Maintenance       Date Due Completion Date    Mammogram 02/11/2020 2/11/2019    Hemoglobin A1c 03/04/2020 9/4/2019    Lipid Panel 05/01/2020 5/1/2019    Foot Exam 05/20/2020 5/20/2019 (Done)    Override on 5/20/2019: Done (Dr. Eng)    Override on 11/2/2018: Done (Diego)    Override on 4/27/2018: Done (Diego)    Override on 9/1/2016: Done (date approximately, seeing Dr. Willams)    Override on 1/4/2016: Done (with Dr. Eng)    Override on 10/19/2015: Done (Dr. Eng)    Override on 10/22/2014: Done (Dr Eng)    Eye Exam 09/23/2020 9/23/2019    Override on 8/31/2017: Done (Dr. Genaro Sanderson)    Override on 8/29/2016: Done (Genaro Sanderson)    Override on 8/20/2015: Done    Override on 8/19/2014: Done (Dr Sanderson)    High Dose Statin 01/28/2021 1/28/2020    DEXA SCAN 04/27/2021 4/27/2018    TETANUS VACCINE 11/08/2023 11/8/2013    Colonoscopy 12/01/2024 12/1/2014        No orders of the defined types were placed in this encounter.    The following information is provided to all patients.  This information is to help you find resources for any of the problems found today that may be affecting your health:                Living healthy guide: www.[facility].[place].gov      Understanding Diabetes: www.diabetes.org      Eating healthy: www.cdc.gov/healthyweight      CDC home safety checklist: www.cdc.gov/steadi/patient.html      Agency on Aging: www.goea.[place].gov      Alcoholics anonymous (AA): www.aa.org      Physical Activity: www.richard.nih.gov/ir4ietj      Tobacco use: www.quitwithusla.org

## 2020-02-03 ENCOUNTER — OFFICE VISIT (OUTPATIENT)
Dept: UROLOGY | Facility: CLINIC | Age: 70
End: 2020-02-03
Payer: MEDICARE

## 2020-02-03 VITALS
HEART RATE: 69 BPM | BODY MASS INDEX: 34.28 KG/M2 | SYSTOLIC BLOOD PRESSURE: 114 MMHG | WEIGHT: 200.81 LBS | DIASTOLIC BLOOD PRESSURE: 64 MMHG | HEIGHT: 64 IN

## 2020-02-03 DIAGNOSIS — R39.15 URINARY URGENCY: Primary | ICD-10-CM

## 2020-02-03 PROCEDURE — 1159F PR MEDICATION LIST DOCUMENTED IN MEDICAL RECORD: ICD-10-PCS | Mod: HCNC,S$GLB,, | Performed by: UROLOGY

## 2020-02-03 PROCEDURE — 99999 PR PBB SHADOW E&M-EST. PATIENT-LVL V: CPT | Mod: PBBFAC,HCNC,, | Performed by: UROLOGY

## 2020-02-03 PROCEDURE — 1159F MED LIST DOCD IN RCRD: CPT | Mod: HCNC,S$GLB,, | Performed by: UROLOGY

## 2020-02-03 PROCEDURE — 3078F DIAST BP <80 MM HG: CPT | Mod: HCNC,CPTII,S$GLB, | Performed by: UROLOGY

## 2020-02-03 PROCEDURE — 95970 ALYS NPGT W/O PRGRMG: CPT | Mod: HCNC,59,S$GLB, | Performed by: UROLOGY

## 2020-02-03 PROCEDURE — 3078F PR MOST RECENT DIASTOLIC BLOOD PRESSURE < 80 MM HG: ICD-10-PCS | Mod: HCNC,CPTII,S$GLB, | Performed by: UROLOGY

## 2020-02-03 PROCEDURE — 99999 PR PBB SHADOW E&M-EST. PATIENT-LVL V: ICD-10-PCS | Mod: PBBFAC,HCNC,, | Performed by: UROLOGY

## 2020-02-03 PROCEDURE — 1126F PR PAIN SEVERITY QUANTIFIED, NO PAIN PRESENT: ICD-10-PCS | Mod: HCNC,S$GLB,, | Performed by: UROLOGY

## 2020-02-03 PROCEDURE — 1101F PR PT FALLS ASSESS DOC 0-1 FALLS W/OUT INJ PAST YR: ICD-10-PCS | Mod: HCNC,CPTII,S$GLB, | Performed by: UROLOGY

## 2020-02-03 PROCEDURE — 95970 PR ANALYZE NEUROSTIM,NO REPROG: ICD-10-PCS | Mod: HCNC,59,S$GLB, | Performed by: UROLOGY

## 2020-02-03 PROCEDURE — 99213 PR OFFICE/OUTPT VISIT, EST, LEVL III, 20-29 MIN: ICD-10-PCS | Mod: HCNC,25,S$GLB, | Performed by: UROLOGY

## 2020-02-03 PROCEDURE — 3074F PR MOST RECENT SYSTOLIC BLOOD PRESSURE < 130 MM HG: ICD-10-PCS | Mod: HCNC,CPTII,S$GLB, | Performed by: UROLOGY

## 2020-02-03 PROCEDURE — 1126F AMNT PAIN NOTED NONE PRSNT: CPT | Mod: HCNC,S$GLB,, | Performed by: UROLOGY

## 2020-02-03 PROCEDURE — 3074F SYST BP LT 130 MM HG: CPT | Mod: HCNC,CPTII,S$GLB, | Performed by: UROLOGY

## 2020-02-03 PROCEDURE — 99213 OFFICE O/P EST LOW 20 MIN: CPT | Mod: HCNC,25,S$GLB, | Performed by: UROLOGY

## 2020-02-03 PROCEDURE — 1101F PT FALLS ASSESS-DOCD LE1/YR: CPT | Mod: HCNC,CPTII,S$GLB, | Performed by: UROLOGY

## 2020-02-03 RX ORDER — LANOLIN ALCOHOL/MO/W.PET/CERES
100 CREAM (GRAM) TOPICAL DAILY
COMMUNITY
End: 2021-02-23 | Stop reason: ALTCHOICE

## 2020-02-03 NOTE — PROGRESS NOTES
CHIEF COMPLAINT:    Mrs. Delatorre is a 69 y.o. female presenting for a follow up on medication refractory urgency, frequency, status post InterStim about 10 years ago, IPG replaced 10/14/2014.  .    PRESENTING ILLNESS:    Cornelia Delatorre is a 69 y.o. female who returns for follow up.  She states it took a bit to find the program that is comfortable and effective.  However, she found it and does not wish to be reprogrammed today, just to check the battery life.  She feels the stimulation in the perineum.  It is not too sharp.      REVIEW OF SYSTEMS:    Review of Systems   Constitutional: Negative.    HENT: Negative.    Eyes: Negative.    Respiratory: Negative.    Cardiovascular: Negative.    Gastrointestinal: Negative.    Genitourinary: Positive for frequency and urgency.        Urge incontinence   Musculoskeletal: Negative.    Skin: Negative.    Neurological: Negative.    Endo/Heme/Allergies: Negative.    Psychiatric/Behavioral: Negative.        PATIENT HISTORY:    Past Medical History:   Diagnosis Date    Allergy     Arthritis     Asthma     Cancer     skin cancer to right hand    Cataract     Chronic fatigue 1/24/2017    CVID (common variable immunodeficiency) 3/7/2019    Diabetes mellitus     resolved with gastric bypass    KLINE (dyspnea on exertion) 1/24/2017    Dyslipidemia 6/25/2019    Encounter for blood transfusion     Essential hypertension 12/29/2011    GERD (gastroesophageal reflux disease)     Headache(784.0)     History of lumpectomy of both breasts     1992 negative    Hyperlipidemia 7/15/2015    Hypertension     Hypothyroidism     Neuropathy     Obesity     SOHA (obstructive sleep apnea), Auto BIPAP Mod OSAS since Dec 2013, therapeutic and compliant 100%, ESS 6/24 Feb 2014 12/26/2013    SOHA on CPAP     Postmenopausal HRT (hormone replacement therapy)     Rash     Rosacea     Snoring     Squamous cell carcinoma of skin     Wears glasses        Past Surgical History:    Procedure Laterality Date    ADENOIDECTOMY      APPENDECTOMY      BLADDER SUSPENSION      BREAST BIOPSY Bilateral 1992    bilateral benign excisional biopsies    BUNIONECTOMY  10/17/14    right, still has discomfort    CATARACT EXTRACTION W/  INTRAOCULAR LENS IMPLANT Bilateral     CHOLECYSTECTOMY  08/02/2017    COLONOSCOPY      EPIDURAL STEROID INJECTION INTO LUMBAR SPINE N/A 8/14/2019    Procedure: Injection-steroid-epidural-lumbar L5/S1;  Surgeon: Fredi Rojas MD;  Location: [facility];  Service: Pain Management;  Laterality: N/A;    ESOPHAGOGASTRODUODENOSCOPY      dilated esophagus    GASTRIC BYPASS      2011    HYSTERECTOMY  1980    interstim bladder  2009    10/14/14 new battery    OOPHORECTOMY  1980    SKIN CANCER EXCISION      left hand    TONSILLECTOMY      WISDOM TOOTH EXTRACTION         Family History   Problem Relation Age of Onset    Breast cancer Mother 50    Diabetes Unknown     Hypertension Unknown     Hypothyroidism Unknown     Breast cancer Paternal Aunt         40's     Socioeconomic History    Marital status:    Social Needs    Financial resource strain: Somewhat hard    Food insecurity:     Worry: Never true     Inability: Never true    Transportation needs:     Medical: No     Non-medical: No   Tobacco Use    Smoking status: Never Smoker    Smokeless tobacco: Never Used   Substance and Sexual Activity    Alcohol use: No     Frequency: Never     Binge frequency: Never    Drug use: No    Sexual activity: Not Currently   Lifestyle    Physical activity:     Days per week: 0 days     Minutes per session: 0 min    Stress: Only a little   Relationships    Social connections:     Talks on phone: More than three times a week     Gets together: Once a week     Attends Restorationist service: Not on file     Active member of club or organization: Yes     Attends meetings of clubs or organizations: More than 4 times per year     Relationship status:         Allergies:  Sulfa (sulfonamide antibiotics); Naproxen; and Albuterol    Medications:  Outpatient Encounter Medications as of 2/3/2020   Medication Sig Dispense Refill    albuterol (PROVENTIL/VENTOLIN HFA) 90 mcg/actuation inhaler Inhale 2 puffs into the lungs every 4 (four) hours as needed. 2 puffs every 4 hours as needed for cough, wheeze, or shortness of breath 3 Inhaler 3    azelastine (ASTELIN) 137 mcg (0.1 %) nasal spray 1 spray (137 mcg total) by Nasal route 2 (two) times daily. 90 mL 3    azelastine (OPTIVAR) 0.05 % ophthalmic solution Place 1 drop into both eyes daily as needed.       B-complex with vitamin C (Z-BEC OR EQUIV) tablet Take 1 tablet by mouth once daily.      BIFIDOBACTERIUM INFANTIS (ALIGN ORAL) Take by mouth once daily.      cranberry 500 mg Cap Take 1 capsule by mouth every evening.      cyanocobalamin (VITAMIN B-12) 1000 MCG tablet Take 100 mcg by mouth once daily.      diclofenac sodium (VOLTAREN) 1 % Gel Apply 2 grams topically once daily. 1 Tube 6    diltiaZEM (CARDIZEM CD) 120 MG Cp24 Take 1 capsule (120 mg total) by mouth every evening. 90 capsule 4    esomeprazole (NEXIUM) 40 MG capsule Take 1 capsule (40 mg total) by mouth before breakfast. 90 capsule 3    fexofenadine (ALLEGRA) 60 MG tablet Take 60 mg by mouth once daily.      fish oil-omega-3 fatty acids 300-1,000 mg capsule Take 2 g by mouth once daily.      fluticasone (FLONASE) 50 mcg/actuation nasal spray 2 sprays (100 mcg total) by Each Nare route once daily. 3 Bottle 3    gabapentin (NEURONTIN) 600 MG tablet Take 1 tablet (600 mg total) by mouth 3 (three) times daily. 270 tablet 3    immun glob G,IgG,-pro-IgA 0-50 (HIZENTRA) 1 gram/5 mL (20 %) Soln Inject 11 g into the skin once a week. 220 mL 12    metFORMIN (GLUCOPHAGE-XR) 500 MG 24 hr tablet Take 2 tablets (1,000 mg total) by mouth daily with breakfast. 180 tablet 0    methylcellulose, laxative, (CITRUCEL) 500 mg Tab Take 1 tablet by mouth every  morning.      montelukast (SINGULAIR) 10 mg tablet Take 1 tablet (10 mg total) by mouth every evening. 90 tablet 3    multivitamin capsule Take 1 capsule by mouth. Take one tablets daily      potassium chloride SA (K-DUR,KLOR-CON) 20 MEQ tablet TAKE 1 TABLET ONE TIME DAILY 90 tablet 3    pravastatin (PRAVACHOL) 40 MG tablet Take 1 tablet (40 mg total) by mouth once daily. 90 tablet 1    SYMBICORT 160-4.5 mcg/actuation HFAA Inhale 2 puffs into the lungs every 12 (twelve) hours. 3 Inhaler 3    valsartan-hydrochlorothiazide (DIOVAN-HCT) 160-12.5 mg per tablet Take 1 tablet by mouth once daily. 90 tablet 3    vitamin D3-folic acid 5,000 unit- 1 mg Tab Take by mouth.      amoxicillin-clavulanate 875-125mg (AUGMENTIN) 875-125 mg per tablet Take 1 tablet by mouth every 12 (twelve) hours. (Patient not taking: Reported on 1/28/2020) 20 tablet 2    azithromycin (ZITHROMAX) 500 MG tablet       cloNIDine (CATAPRES) 0.1 MG tablet Take 1 tablet (0.1 mg total) by mouth 3 (three) times daily as needed (PRN SBP > 165 mmHg). (Patient not taking: Reported on 2/3/2020) 90 tablet 6    EPINEPHrine (EPIPEN) 0.3 mg/0.3 mL AtIn   3    guaiFENesin (MUCINEX) 600 mg 12 hr tablet Take 2 tablets (1,200 mg total) by mouth 2 (two) times daily. (Patient not taking: Reported on 1/28/2020)      guaifenesin-codeine 100-10 mg/5 ml (GUAIFENESIN AC)  mg/5 mL syrup Take 5 mLs by mouth 3 (three) times daily as needed for Cough. (Patient not taking: Reported on 2/3/2020) 473 mL 2    inhalation spacing device Use as directed for inhalation. 1 each 0    levalbuterol (XOPENEX) 1.25 mg/3 mL nebulizer solution Take 3 mLs (1.25 mg total) by nebulization every 4 (four) hours as needed for Wheezing or Shortness of Breath. Rescue (Patient not taking: Reported on 2/3/2020) 1 Box 11    mucus clearing device Cecy 1 Device by Misc.(Non-Drug; Combo Route) route 2 (two) times daily. 1 Device 0    mupirocin (BACTROBAN) 2 % ointment AAA bid (Patient  not taking: Reported on 2/3/2020) 30 g 2    ondansetron (ZOFRAN) 4 MG tablet Take 1 tablet (4 mg total) by mouth every 8 (eight) hours as needed for Nausea. (Patient not taking: Reported on 2/3/2020) 15 tablet 0    predniSONE (DELTASONE) 20 MG tablet 3 for 3 days then 2 for 3 days then one for 3 days and repeat for breathing problems (Patient not taking: Reported on 2020) 36 tablet 0    SIMETHICONE (GAS-X ORAL) Take 1 capsule by mouth as needed.      varicella-zoster gE-AS01B, PF, (SHINGRIX, PF,) 50 mcg/0.5 mL injection Inject 0.5 mLs into the muscle. (Patient not taking: Reported on 2/3/2020) 0.5 mL 1     Facility-Administered Encounter Medications as of 2/3/2020   Medication Dose Route Frequency Provider Last Rate Last Dose    candida albicans skin test skin test 0.1 mL  0.1 mL Intradermal 1 time in Clinic/HOD Kailyn Fletcher MD        ana albicans skin test skin test 0.3 mL  0.3 mL Intradermal 1 time in Clinic/HOD Kailyn Fletcher MD             PHYSICAL EXAMINATION:    The patient generally appears in good health, is appropriately interactive, and is in no apparent distress.    Skin: No lesions.    Mental: Cooperative with normal affect.    Neuro: Grossly intact.    HEENT: Normal. No evidence of lymphadenopathy.    Chest:  normal inspiratory effort.    Abdomen:  Soft, non-tender. No masses or organomegaly. Bladder is not palpable. No evidence of flank discomfort. No evidence of inguinal hernia.    Extremities: No clubbing, cyanosis, or edema      LABS:    Lab Results   Component Value Date    BUN 14 2020    CREATININE 0.70 2020     UA 1.010, pH 5, otherwise, negative.       IMPRESSION:    Encounter Diagnoses   Name Primary?    Urinary urgency Yes       PLAN:    1.  Interrogation:  Last reprogrammin2019  Hours in use: 3336  Percent use:   99%  Program in use was 1     All lead pairs were checked at 1.5 A, 210 pulse width and ranged from 500-800 ohms, < 15 mA, except 0-3  < 50 and 75 mA     Program         % Used           Leads       Energy       PW      Hz    Sensation Changed  Program 1       100%               0-, 1+,        2.3 A          300      14        perineal                                                      Program 2                               1-, 0+         2.1 A          300      14        anus                                   Program 3                               1-, 2-, 0+    2.4 A          210      14        buttocks                                   Program 4                               C+, 2-, 3-   1.6 A          360       14        Rectal                                          Battery Life:  Programs checked:  761 ohms, < 15 mS  % Battery life:  0-1 %  Months remainin months  left on program 1     iCon was reprogrammed.      2.  Because the battery was so low, will replace the IPG.  Consent done.  She likes the lead she has and this went into her deciding that she would not like a lead revision if possible.  Plan to use the Plasma blader.

## 2020-02-03 NOTE — H&P (VIEW-ONLY)
CHIEF COMPLAINT:    Mrs. Delatorre is a 69 y.o. female presenting for a follow up on medication refractory urgency, frequency, status post InterStim about 10 years ago, IPG replaced 10/14/2014.  .    PRESENTING ILLNESS:    Cornelia Delatorre is a 69 y.o. female who returns for follow up.  She states it took a bit to find the program that is comfortable and effective.  However, she found it and does not wish to be reprogrammed today, just to check the battery life.  She feels the stimulation in the perineum.  It is not too sharp.      REVIEW OF SYSTEMS:    Review of Systems   Constitutional: Negative.    HENT: Negative.    Eyes: Negative.    Respiratory: Negative.    Cardiovascular: Negative.    Gastrointestinal: Negative.    Genitourinary: Positive for frequency and urgency.        Urge incontinence   Musculoskeletal: Negative.    Skin: Negative.    Neurological: Negative.    Endo/Heme/Allergies: Negative.    Psychiatric/Behavioral: Negative.        PATIENT HISTORY:    Past Medical History:   Diagnosis Date    Allergy     Arthritis     Asthma     Cancer     skin cancer to right hand    Cataract     Chronic fatigue 1/24/2017    CVID (common variable immunodeficiency) 3/7/2019    Diabetes mellitus     resolved with gastric bypass    KLINE (dyspnea on exertion) 1/24/2017    Dyslipidemia 6/25/2019    Encounter for blood transfusion     Essential hypertension 12/29/2011    GERD (gastroesophageal reflux disease)     Headache(784.0)     History of lumpectomy of both breasts     1992 negative    Hyperlipidemia 7/15/2015    Hypertension     Hypothyroidism     Neuropathy     Obesity     SOHA (obstructive sleep apnea), Auto BIPAP Mod OSAS since Dec 2013, therapeutic and compliant 100%, ESS 6/24 Feb 2014 12/26/2013    SOHA on CPAP     Postmenopausal HRT (hormone replacement therapy)     Rash     Rosacea     Snoring     Squamous cell carcinoma of skin     Wears glasses        Past Surgical History:    Procedure Laterality Date    ADENOIDECTOMY      APPENDECTOMY      BLADDER SUSPENSION      BREAST BIOPSY Bilateral 1992    bilateral benign excisional biopsies    BUNIONECTOMY  10/17/14    right, still has discomfort    CATARACT EXTRACTION W/  INTRAOCULAR LENS IMPLANT Bilateral     CHOLECYSTECTOMY  08/02/2017    COLONOSCOPY      EPIDURAL STEROID INJECTION INTO LUMBAR SPINE N/A 8/14/2019    Procedure: Injection-steroid-epidural-lumbar L5/S1;  Surgeon: Fredi Rojas MD;  Location: [facility] OR;  Service: Pain Management;  Laterality: N/A;    ESOPHAGOGASTRODUODENOSCOPY      dilated esophagus    GASTRIC BYPASS      2011    HYSTERECTOMY  1980    interstim bladder  2009    10/14/14 new battery    OOPHORECTOMY  1980    SKIN CANCER EXCISION      left hand    TONSILLECTOMY      WISDOM TOOTH EXTRACTION         Family History   Problem Relation Age of Onset    Breast cancer Mother 50    Diabetes Unknown     Hypertension Unknown     Hypothyroidism Unknown     Breast cancer Paternal Aunt         40's     Socioeconomic History    Marital status:    Social Needs    Financial resource strain: Somewhat hard    Food insecurity:     Worry: Never true     Inability: Never true    Transportation needs:     Medical: No     Non-medical: No   Tobacco Use    Smoking status: Never Smoker    Smokeless tobacco: Never Used   Substance and Sexual Activity    Alcohol use: No     Frequency: Never     Binge frequency: Never    Drug use: No    Sexual activity: Not Currently   Lifestyle    Physical activity:     Days per week: 0 days     Minutes per session: 0 min    Stress: Only a little   Relationships    Social connections:     Talks on phone: More than three times a week     Gets together: Once a week     Attends Zoroastrianism service: Not on file     Active member of club or organization: Yes     Attends meetings of clubs or organizations: More than 4 times per year     Relationship status:         Allergies:  Sulfa (sulfonamide antibiotics); Naproxen; and Albuterol    Medications:  Outpatient Encounter Medications as of 2/3/2020   Medication Sig Dispense Refill    albuterol (PROVENTIL/VENTOLIN HFA) 90 mcg/actuation inhaler Inhale 2 puffs into the lungs every 4 (four) hours as needed. 2 puffs every 4 hours as needed for cough, wheeze, or shortness of breath 3 Inhaler 3    azelastine (ASTELIN) 137 mcg (0.1 %) nasal spray 1 spray (137 mcg total) by Nasal route 2 (two) times daily. 90 mL 3    azelastine (OPTIVAR) 0.05 % ophthalmic solution Place 1 drop into both eyes daily as needed.       B-complex with vitamin C (Z-BEC OR EQUIV) tablet Take 1 tablet by mouth once daily.      BIFIDOBACTERIUM INFANTIS (ALIGN ORAL) Take by mouth once daily.      cranberry 500 mg Cap Take 1 capsule by mouth every evening.      cyanocobalamin (VITAMIN B-12) 1000 MCG tablet Take 100 mcg by mouth once daily.      diclofenac sodium (VOLTAREN) 1 % Gel Apply 2 grams topically once daily. 1 Tube 6    diltiaZEM (CARDIZEM CD) 120 MG Cp24 Take 1 capsule (120 mg total) by mouth every evening. 90 capsule 4    esomeprazole (NEXIUM) 40 MG capsule Take 1 capsule (40 mg total) by mouth before breakfast. 90 capsule 3    fexofenadine (ALLEGRA) 60 MG tablet Take 60 mg by mouth once daily.      fish oil-omega-3 fatty acids 300-1,000 mg capsule Take 2 g by mouth once daily.      fluticasone (FLONASE) 50 mcg/actuation nasal spray 2 sprays (100 mcg total) by Each Nare route once daily. 3 Bottle 3    gabapentin (NEURONTIN) 600 MG tablet Take 1 tablet (600 mg total) by mouth 3 (three) times daily. 270 tablet 3    immun glob G,IgG,-pro-IgA 0-50 (HIZENTRA) 1 gram/5 mL (20 %) Soln Inject 11 g into the skin once a week. 220 mL 12    metFORMIN (GLUCOPHAGE-XR) 500 MG 24 hr tablet Take 2 tablets (1,000 mg total) by mouth daily with breakfast. 180 tablet 0    methylcellulose, laxative, (CITRUCEL) 500 mg Tab Take 1 tablet by mouth every  morning.      montelukast (SINGULAIR) 10 mg tablet Take 1 tablet (10 mg total) by mouth every evening. 90 tablet 3    multivitamin capsule Take 1 capsule by mouth. Take one tablets daily      potassium chloride SA (K-DUR,KLOR-CON) 20 MEQ tablet TAKE 1 TABLET ONE TIME DAILY 90 tablet 3    pravastatin (PRAVACHOL) 40 MG tablet Take 1 tablet (40 mg total) by mouth once daily. 90 tablet 1    SYMBICORT 160-4.5 mcg/actuation HFAA Inhale 2 puffs into the lungs every 12 (twelve) hours. 3 Inhaler 3    valsartan-hydrochlorothiazide (DIOVAN-HCT) 160-12.5 mg per tablet Take 1 tablet by mouth once daily. 90 tablet 3    vitamin D3-folic acid 5,000 unit- 1 mg Tab Take by mouth.      amoxicillin-clavulanate 875-125mg (AUGMENTIN) 875-125 mg per tablet Take 1 tablet by mouth every 12 (twelve) hours. (Patient not taking: Reported on 1/28/2020) 20 tablet 2    azithromycin (ZITHROMAX) 500 MG tablet       cloNIDine (CATAPRES) 0.1 MG tablet Take 1 tablet (0.1 mg total) by mouth 3 (three) times daily as needed (PRN SBP > 165 mmHg). (Patient not taking: Reported on 2/3/2020) 90 tablet 6    EPINEPHrine (EPIPEN) 0.3 mg/0.3 mL AtIn   3    guaiFENesin (MUCINEX) 600 mg 12 hr tablet Take 2 tablets (1,200 mg total) by mouth 2 (two) times daily. (Patient not taking: Reported on 1/28/2020)      guaifenesin-codeine 100-10 mg/5 ml (GUAIFENESIN AC)  mg/5 mL syrup Take 5 mLs by mouth 3 (three) times daily as needed for Cough. (Patient not taking: Reported on 2/3/2020) 473 mL 2    inhalation spacing device Use as directed for inhalation. 1 each 0    levalbuterol (XOPENEX) 1.25 mg/3 mL nebulizer solution Take 3 mLs (1.25 mg total) by nebulization every 4 (four) hours as needed for Wheezing or Shortness of Breath. Rescue (Patient not taking: Reported on 2/3/2020) 1 Box 11    mucus clearing device Cecy 1 Device by Misc.(Non-Drug; Combo Route) route 2 (two) times daily. 1 Device 0    mupirocin (BACTROBAN) 2 % ointment AAA bid (Patient  not taking: Reported on 2/3/2020) 30 g 2    ondansetron (ZOFRAN) 4 MG tablet Take 1 tablet (4 mg total) by mouth every 8 (eight) hours as needed for Nausea. (Patient not taking: Reported on 2/3/2020) 15 tablet 0    predniSONE (DELTASONE) 20 MG tablet 3 for 3 days then 2 for 3 days then one for 3 days and repeat for breathing problems (Patient not taking: Reported on 2020) 36 tablet 0    SIMETHICONE (GAS-X ORAL) Take 1 capsule by mouth as needed.      varicella-zoster gE-AS01B, PF, (SHINGRIX, PF,) 50 mcg/0.5 mL injection Inject 0.5 mLs into the muscle. (Patient not taking: Reported on 2/3/2020) 0.5 mL 1     Facility-Administered Encounter Medications as of 2/3/2020   Medication Dose Route Frequency Provider Last Rate Last Dose    candida albicans skin test skin test 0.1 mL  0.1 mL Intradermal 1 time in Clinic/HOD Kailyn Fletcher MD        ana albicans skin test skin test 0.3 mL  0.3 mL Intradermal 1 time in Clinic/HOD Kailyn Fletcher MD             PHYSICAL EXAMINATION:    The patient generally appears in good health, is appropriately interactive, and is in no apparent distress.    Skin: No lesions.    Mental: Cooperative with normal affect.    Neuro: Grossly intact.    HEENT: Normal. No evidence of lymphadenopathy.    Chest:  normal inspiratory effort.    Abdomen:  Soft, non-tender. No masses or organomegaly. Bladder is not palpable. No evidence of flank discomfort. No evidence of inguinal hernia.    Extremities: No clubbing, cyanosis, or edema      LABS:    Lab Results   Component Value Date    BUN 14 2020    CREATININE 0.70 2020     UA 1.010, pH 5, otherwise, negative.       IMPRESSION:    Encounter Diagnoses   Name Primary?    Urinary urgency Yes       PLAN:    1.  Interrogation:  Last reprogrammin2019  Hours in use: 3336  Percent use:   99%  Program in use was 1     All lead pairs were checked at 1.5 A, 210 pulse width and ranged from 500-800 ohms, < 15 mA, except 0-3  < 50 and 75 mA     Program         % Used           Leads       Energy       PW      Hz    Sensation Changed  Program 1       100%               0-, 1+,        2.3 A          300      14        perineal                                                      Program 2                               1-, 0+         2.1 A          300      14        anus                                   Program 3                               1-, 2-, 0+    2.4 A          210      14        buttocks                                   Program 4                               C+, 2-, 3-   1.6 A          360       14        Rectal                                          Battery Life:  Programs checked:  761 ohms, < 15 mS  % Battery life:  0-1 %  Months remainin months  left on program 1     iCon was reprogrammed.      2.  Because the battery was so low, will replace the IPG.  Consent done.  She likes the lead she has and this went into her deciding that she would not like a lead revision if possible.  Plan to use the Plasma blader.

## 2020-02-05 ENCOUNTER — TELEPHONE (OUTPATIENT)
Dept: UROLOGY | Facility: CLINIC | Age: 70
End: 2020-02-05

## 2020-02-05 DIAGNOSIS — R39.15 URINARY URGENCY: Primary | ICD-10-CM

## 2020-02-10 ENCOUNTER — PATIENT OUTREACH (OUTPATIENT)
Dept: OTHER | Facility: OTHER | Age: 70
End: 2020-02-10

## 2020-02-10 NOTE — PROGRESS NOTES
Digital Medicine: Health  Follow-Up    Ms. Rhodes reports that she's doing well.     The history is provided by the patient.     Follow Up  Follow-up reason(s): reading review and routine education      Routine Education Topics: weight management, eating patterns and physical activity        INTERVENTION(S)  recommend physical activity and encouragement/support    PLAN  patient verbalizes understanding      There are no preventive care reminders to display for this patient.    Last 5 Patient Entered Readings                                      Current 30 Day Average: 131/70     Recent Readings 2/10/2020 2/4/2020 1/27/2020 1/26/2020 1/23/2020    SBP (mmHg) 124 140 130 150 116    DBP (mmHg) 65 74 69 85 62    Pulse 70 72 70 70 68            Diet Screening   No change to diet.      Physical Activity Screening   When asked if exercising, patient responded: no    She identified the following barriers to physical activity: lack of time and motivation    Patient reports that she keeps very busy. She's constantly running around with errands and keeping up with her grandchildren.     Patient mentioned that she'd like to get to the gym since [employer] pays for it, but she finds it hard to fit in her schedule since the gym is a little while away from her house. Patient would ideally love to go for weight loss probably 2x/week. Will continue to check in on motivation. Asked patient to reflect on her days and see which days she'd have the most time to devote to going to the gym.

## 2020-02-17 NOTE — PRE-PROCEDURE INSTRUCTIONS
PREOP INSTRUCTIONS:No solid food ,milk or milk products for 8 hours prior to procedure.Clear liquids are allowed up to 2 hours before procedure.Clear liquids are:water,apple juice,gatorade & powerade.Shower instructions as well as directions to the [facility] were given.Patient encouraged to wear loose fitting,comfortable clothing.Medication instructions for pm prior to and am of procedure reviewed.Instructed patient to avoid taking vitamins,supplements,aspirin and ibuprofen the morning of surgery. Patient stated an understanding.    Patient denies any side effects or issues with anesthesia or sedation.    Patient does not know arrival time.Explained that this information comes from the surgeon's office and if they haven't heard from them by 2 or 3 pm to call the office.Patient stated an understanding.

## 2020-02-18 ENCOUNTER — HOSPITAL ENCOUNTER (OUTPATIENT)
Facility: HOSPITAL | Age: 70
Discharge: HOME | End: 2020-02-18
Attending: UROLOGY | Admitting: UROLOGY
Payer: MEDICARE

## 2020-02-18 ENCOUNTER — ANESTHESIA EVENT (OUTPATIENT)
Dept: SURGERY | Facility: HOSPITAL | Age: 70
End: 2020-02-18
Payer: MEDICARE

## 2020-02-18 ENCOUNTER — ANESTHESIA (OUTPATIENT)
Dept: SURGERY | Facility: HOSPITAL | Age: 70
End: 2020-02-18
Payer: MEDICARE

## 2020-02-18 VITALS
WEIGHT: 200 LBS | BODY MASS INDEX: 34.15 KG/M2 | HEIGHT: 64 IN | RESPIRATION RATE: 16 BRPM | SYSTOLIC BLOOD PRESSURE: 158 MMHG | OXYGEN SATURATION: 99 % | TEMPERATURE: 98 F | DIASTOLIC BLOOD PRESSURE: 70 MMHG | HEART RATE: 72 BPM

## 2020-02-18 DIAGNOSIS — N32.81 OVERACTIVE BLADDER: ICD-10-CM

## 2020-02-18 LAB
POCT GLUCOSE: 101 MG/DL (ref 70–110)
POCT GLUCOSE: 113 MG/DL (ref 70–110)

## 2020-02-18 PROCEDURE — 37000008 HC ANESTHESIA 1ST 15 MINUTES: Mod: HCNC | Performed by: UROLOGY

## 2020-02-18 PROCEDURE — C1787 PATIENT PROGR, NEUROSTIM: HCPCS | Mod: HCNC | Performed by: UROLOGY

## 2020-02-18 PROCEDURE — 63600175 PHARM REV CODE 636 W HCPCS: Mod: HCNC | Performed by: STUDENT IN AN ORGANIZED HEALTH CARE EDUCATION/TRAINING PROGRAM

## 2020-02-18 PROCEDURE — 88300 SURGICAL PATH GROSS: CPT | Mod: 26,HCNC,, | Performed by: PATHOLOGY

## 2020-02-18 PROCEDURE — 82962 GLUCOSE BLOOD TEST: CPT | Mod: HCNC | Performed by: UROLOGY

## 2020-02-18 PROCEDURE — 25000003 PHARM REV CODE 250: Mod: HCNC | Performed by: UROLOGY

## 2020-02-18 PROCEDURE — 71000016 HC POSTOP RECOV ADDL HR: Mod: HCNC | Performed by: UROLOGY

## 2020-02-18 PROCEDURE — 95971 PR ANALYZE NEUROSTIM,SIMPLE/PROG: ICD-10-PCS | Mod: 59,HCNC,, | Performed by: UROLOGY

## 2020-02-18 PROCEDURE — 25000003 PHARM REV CODE 250: Mod: HCNC | Performed by: ANESTHESIOLOGY

## 2020-02-18 PROCEDURE — 36000709 HC OR TIME LEV III EA ADD 15 MIN: Mod: HCNC | Performed by: UROLOGY

## 2020-02-18 PROCEDURE — 64590 INS/RPL PRPH SAC/GSTR NPG/R: CPT | Mod: HCNC,,, | Performed by: UROLOGY

## 2020-02-18 PROCEDURE — 37000009 HC ANESTHESIA EA ADD 15 MINS: Mod: HCNC | Performed by: UROLOGY

## 2020-02-18 PROCEDURE — D9220A PRA ANESTHESIA: ICD-10-PCS | Mod: HCNC,,, | Performed by: ANESTHESIOLOGY

## 2020-02-18 PROCEDURE — 88300 PR  SURG PATH,GROSS,LEVEL I: ICD-10-PCS | Mod: 26,HCNC,, | Performed by: PATHOLOGY

## 2020-02-18 PROCEDURE — 71000015 HC POSTOP RECOV 1ST HR: Mod: HCNC | Performed by: UROLOGY

## 2020-02-18 PROCEDURE — 27201423 OPTIME MED/SURG SUP & DEVICES STERILE SUPPLY: Mod: HCNC | Performed by: UROLOGY

## 2020-02-18 PROCEDURE — D9220A PRA ANESTHESIA: Mod: HCNC,,, | Performed by: ANESTHESIOLOGY

## 2020-02-18 PROCEDURE — 71000044 HC DOSC ROUTINE RECOVERY FIRST HOUR: Mod: HCNC | Performed by: UROLOGY

## 2020-02-18 PROCEDURE — 95971 ALYS SMPL SP/PN NPGT W/PRGRM: CPT | Mod: 59,HCNC,, | Performed by: UROLOGY

## 2020-02-18 PROCEDURE — 63600175 PHARM REV CODE 636 W HCPCS: Mod: HCNC | Performed by: ANESTHESIOLOGY

## 2020-02-18 PROCEDURE — 36000708 HC OR TIME LEV III 1ST 15 MIN: Mod: HCNC | Performed by: UROLOGY

## 2020-02-18 PROCEDURE — 64590 PR IMPLANT PERIPH/GASTRIC NEUROSTIM/RECEIVER: ICD-10-PCS | Mod: HCNC,,, | Performed by: UROLOGY

## 2020-02-18 PROCEDURE — C1767 GENERATOR, NEURO NON-RECHARG: HCPCS | Mod: HCNC | Performed by: UROLOGY

## 2020-02-18 PROCEDURE — 88300 SURGICAL PATH GROSS: CPT | Mod: HCNC | Performed by: PATHOLOGY

## 2020-02-18 DEVICE — SYS INTERSTIM X RECHARGE FREE: Type: IMPLANTABLE DEVICE | Site: BACK | Status: FUNCTIONAL

## 2020-02-18 RX ORDER — CEFAZOLIN SODIUM 1 G/3ML
2 INJECTION, POWDER, FOR SOLUTION INTRAMUSCULAR; INTRAVENOUS
Status: COMPLETED | OUTPATIENT
Start: 2020-02-18 | End: 2020-02-18

## 2020-02-18 RX ORDER — ONDANSETRON 2 MG/ML
4 INJECTION INTRAMUSCULAR; INTRAVENOUS DAILY PRN
Status: DISCONTINUED | OUTPATIENT
Start: 2020-02-18 | End: 2020-02-18 | Stop reason: HOSPADM

## 2020-02-18 RX ORDER — MEPERIDINE HYDROCHLORIDE 50 MG/ML
12.5 INJECTION INTRAMUSCULAR; INTRAVENOUS; SUBCUTANEOUS ONCE AS NEEDED
Status: DISCONTINUED | OUTPATIENT
Start: 2020-02-18 | End: 2020-02-18 | Stop reason: HOSPADM

## 2020-02-18 RX ORDER — MIDAZOLAM HYDROCHLORIDE 1 MG/ML
INJECTION, SOLUTION INTRAMUSCULAR; INTRAVENOUS
Status: DISCONTINUED | OUTPATIENT
Start: 2020-02-18 | End: 2020-02-19

## 2020-02-18 RX ORDER — TRAMADOL HYDROCHLORIDE 50 MG/1
50 TABLET ORAL EVERY 6 HOURS PRN
Qty: 5 TABLET | Refills: 0 | Status: SHIPPED | OUTPATIENT
Start: 2020-02-18 | End: 2020-06-30

## 2020-02-18 RX ORDER — SODIUM CHLORIDE 9 MG/ML
INJECTION, SOLUTION INTRAVENOUS CONTINUOUS
Status: DISCONTINUED | OUTPATIENT
Start: 2020-02-18 | End: 2020-02-18 | Stop reason: HOSPADM

## 2020-02-18 RX ORDER — VITAMIN E 268 MG
400 CAPSULE ORAL DAILY
COMMUNITY
End: 2020-12-29

## 2020-02-18 RX ORDER — LIDOCAINE HYDROCHLORIDE 20 MG/ML
INJECTION, SOLUTION EPIDURAL; INFILTRATION; INTRACAUDAL; PERINEURAL
Status: DISCONTINUED | OUTPATIENT
Start: 2020-02-18 | End: 2020-02-19

## 2020-02-18 RX ORDER — FENTANYL CITRATE 50 UG/ML
INJECTION, SOLUTION INTRAMUSCULAR; INTRAVENOUS
Status: DISCONTINUED | OUTPATIENT
Start: 2020-02-18 | End: 2020-02-19

## 2020-02-18 RX ORDER — LIDOCAINE HYDROCHLORIDE AND EPINEPHRINE 10; 10 MG/ML; UG/ML
INJECTION, SOLUTION INFILTRATION; PERINEURAL
Status: DISCONTINUED | OUTPATIENT
Start: 2020-02-18 | End: 2020-02-18 | Stop reason: HOSPADM

## 2020-02-18 RX ORDER — PROPOFOL 10 MG/ML
VIAL (ML) INTRAVENOUS CONTINUOUS PRN
Status: DISCONTINUED | OUTPATIENT
Start: 2020-02-18 | End: 2020-02-19

## 2020-02-18 RX ORDER — SODIUM CHLORIDE 0.9 % (FLUSH) 0.9 %
3 SYRINGE (ML) INJECTION
Status: DISCONTINUED | OUTPATIENT
Start: 2020-02-18 | End: 2020-02-18 | Stop reason: HOSPADM

## 2020-02-18 RX ORDER — HYDROMORPHONE HYDROCHLORIDE 1 MG/ML
0.2 INJECTION, SOLUTION INTRAMUSCULAR; INTRAVENOUS; SUBCUTANEOUS EVERY 5 MIN PRN
Status: DISCONTINUED | OUTPATIENT
Start: 2020-02-18 | End: 2020-02-18 | Stop reason: HOSPADM

## 2020-02-18 RX ADMIN — LIDOCAINE HYDROCHLORIDE 100 MG: 20 INJECTION, SOLUTION EPIDURAL; INFILTRATION; INTRACAUDAL at 12:02

## 2020-02-18 RX ADMIN — FENTANYL CITRATE 25 MCG: 50 INJECTION INTRAMUSCULAR; INTRAVENOUS at 01:02

## 2020-02-18 RX ADMIN — FENTANYL CITRATE 50 MCG: 50 INJECTION INTRAMUSCULAR; INTRAVENOUS at 01:02

## 2020-02-18 RX ADMIN — HYDROMORPHONE HYDROCHLORIDE 0.2 MG: 1 INJECTION, SOLUTION INTRAMUSCULAR; INTRAVENOUS; SUBCUTANEOUS at 02:02

## 2020-02-18 RX ADMIN — PROPOFOL 75 MCG/KG/MIN: 10 INJECTION, EMULSION INTRAVENOUS at 12:02

## 2020-02-18 RX ADMIN — CEFAZOLIN 2 G: 330 INJECTION, POWDER, FOR SOLUTION INTRAMUSCULAR; INTRAVENOUS at 12:02

## 2020-02-18 RX ADMIN — SODIUM CHLORIDE: 0.9 INJECTION, SOLUTION INTRAVENOUS at 11:02

## 2020-02-18 RX ADMIN — SODIUM CHLORIDE: 0.9 INJECTION, SOLUTION INTRAVENOUS at 12:02

## 2020-02-18 RX ADMIN — MIDAZOLAM HYDROCHLORIDE 2 MG: 1 INJECTION, SOLUTION INTRAMUSCULAR; INTRAVENOUS at 12:02

## 2020-02-18 RX ADMIN — ONDANSETRON 4 MG: 2 INJECTION INTRAMUSCULAR; INTRAVENOUS at 04:02

## 2020-02-18 NOTE — INTERVAL H&P NOTE
The patient has been examined and the H&P has been reviewed:    I concur with the findings and no changes have occurred since H&P was written.     Feeling well today.     Anesthesia/Surgery risks, benefits and alternative options discussed and understood by patient/family.          Active Hospital Problems    Diagnosis  POA    Overactive bladder [N32.81]  Yes      Resolved Hospital Problems   No resolved problems to display.       Patient seen in holding.  No changes in clinical condition.  Proceed with planned procedure.

## 2020-02-18 NOTE — DISCHARGE SUMMARY
[facility]  Discharge Note  Short Stay    Admit Date: 2/18/2020    Discharge Date and Time: 02/18/2020 2:04 PM      Attending Physician: Mary Jane Jarvis MD     Discharge Provider: Kamila Dinero    Diagnoses:  Active Hospital Problems    Diagnosis  POA    *Overactive bladder [N32.81]  Yes      Resolved Hospital Problems   No resolved problems to display.       Discharged Condition: good    Hospital Course: Patient was admitted for replacement of IPG and tolerated the procedure well with no complications. The patient was discharged home in good condition on the same day.       Final Diagnoses: Same as principal problem.    Disposition: Home or Self Care    Follow up/Patient Instructions:    Medications:  Reconciled Home Medications:   Current Discharge Medication List      START taking these medications    Details   traMADol (ULTRAM) 50 mg tablet Take 1 tablet (50 mg total) by mouth every 6 (six) hours as needed for Pain.  Qty: 5 tablet, Refills: 0    Comments: n/a          CONTINUE these medications which have NOT CHANGED    Details   azelastine (ASTELIN) 137 mcg (0.1 %) nasal spray 1 spray (137 mcg total) by Nasal route 2 (two) times daily.  Qty: 90 mL, Refills: 3    Associated Diagnoses: Sinusitis, unspecified chronicity, unspecified location      B-complex with vitamin C (Z-BEC OR EQUIV) tablet Take 1 tablet by mouth once daily.      BIFIDOBACTERIUM INFANTIS (ALIGN ORAL) Take by mouth once daily.      cranberry 500 mg Cap Take 1 capsule by mouth every evening.      cyanocobalamin (VITAMIN B-12) 1000 MCG tablet Take 100 mcg by mouth once daily.      diclofenac sodium (VOLTAREN) 1 % Gel Apply 2 grams topically once daily.  Qty: 1 Tube, Refills: 6    Associated Diagnoses: Synovitis of right hand; Arthritis of finger of right hand; Hand arthritis      diltiaZEM (CARDIZEM CD) 120 MG Cp24 Take 1 capsule (120 mg total) by mouth every evening.  Qty: 90 capsule, Refills: 4      esomeprazole (NEXIUM) 40  MG capsule Take 1 capsule (40 mg total) by mouth before breakfast.  Qty: 90 capsule, Refills: 3      fexofenadine (ALLEGRA) 60 MG tablet Take 60 mg by mouth once daily.      fish oil-omega-3 fatty acids 300-1,000 mg capsule Take 2 g by mouth once daily.      fluticasone (FLONASE) 50 mcg/actuation nasal spray 2 sprays (100 mcg total) by Each Nare route once daily.  Qty: 3 Bottle, Refills: 3    Associated Diagnoses: Other sinusitis, unspecified chronicity; Sinusitis, unspecified chronicity, unspecified location      gabapentin (NEURONTIN) 600 MG tablet Take 1 tablet (600 mg total) by mouth 3 (three) times daily.  Qty: 270 tablet, Refills: 3      immun glob G,IgG,-pro-IgA 0-50 (HIZENTRA) 1 gram/5 mL (20 %) Soln Inject 11 g into the skin once a week.  Qty: 220 mL, Refills: 12      metFORMIN (GLUCOPHAGE-XR) 500 MG 24 hr tablet Take 2 tablets (1,000 mg total) by mouth daily with breakfast.  Qty: 180 tablet, Refills: 0    Associated Diagnoses: Controlled type 2 diabetes mellitus without complication, without long-term current use of insulin      methylcellulose, laxative, (CITRUCEL) 500 mg Tab Take 1 tablet by mouth every morning.      montelukast (SINGULAIR) 10 mg tablet Take 1 tablet (10 mg total) by mouth every evening.  Qty: 90 tablet, Refills: 3    Associated Diagnoses: Mild intermittent asthma without complication; Sinusitis, unspecified chronicity, unspecified location      multivitamin capsule Take 1 capsule by mouth. Take one tablets daily      ondansetron (ZOFRAN) 4 MG tablet Take 1 tablet (4 mg total) by mouth every 8 (eight) hours as needed for Nausea.  Qty: 15 tablet, Refills: 0      potassium chloride SA (K-DUR,KLOR-CON) 20 MEQ tablet TAKE 1 TABLET ONE TIME DAILY  Qty: 90 tablet, Refills: 3    Associated Diagnoses: Cramp in muscle      pravastatin (PRAVACHOL) 40 MG tablet Take 1 tablet (40 mg total) by mouth once daily.  Qty: 90 tablet, Refills: 1    Comments: Please delete all prior scripts with same name  and strength including on holds.  Associated Diagnoses: Dyslipidemia      SIMETHICONE (GAS-X ORAL) Take 1 capsule by mouth as needed.      SYMBICORT 160-4.5 mcg/actuation HFAA Inhale 2 puffs into the lungs every 12 (twelve) hours.  Qty: 3 Inhaler, Refills: 3    Associated Diagnoses: Mild intermittent asthma without complication      valsartan-hydrochlorothiazide (DIOVAN-HCT) 160-12.5 mg per tablet Take 1 tablet by mouth once daily.  Qty: 90 tablet, Refills: 3      vitamin D3-folic acid 5,000 unit- 1 mg Tab Take by mouth.      vitamin E 400 UNIT capsule Take 400 Units by mouth once daily.      albuterol (PROVENTIL/VENTOLIN HFA) 90 mcg/actuation inhaler Inhale 2 puffs into the lungs every 4 (four) hours as needed. 2 puffs every 4 hours as needed for cough, wheeze, or shortness of breath  Qty: 3 Inhaler, Refills: 3    Associated Diagnoses: Mild intermittent asthma without complication      amoxicillin-clavulanate 875-125mg (AUGMENTIN) 875-125 mg per tablet Take 1 tablet by mouth every 12 (twelve) hours.  Qty: 20 tablet, Refills: 2    Associated Diagnoses: Mild intermittent asthma without complication; Bronchiectasis without complication; CVID (common variable immunodeficiency)      azelastine (OPTIVAR) 0.05 % ophthalmic solution Place 1 drop into both eyes daily as needed.       azithromycin (ZITHROMAX) 500 MG tablet       cloNIDine (CATAPRES) 0.1 MG tablet Take 1 tablet (0.1 mg total) by mouth 3 (three) times daily as needed (PRN SBP > 165 mmHg).  Qty: 90 tablet, Refills: 6      EPINEPHrine (EPIPEN) 0.3 mg/0.3 mL AtIn Refills: 3      guaiFENesin (MUCINEX) 600 mg 12 hr tablet Take 2 tablets (1,200 mg total) by mouth 2 (two) times daily.    Associated Diagnoses: Bronchiectasis with acute exacerbation; Cough      guaifenesin-codeine 100-10 mg/5 ml (GUAIFENESIN AC)  mg/5 mL syrup Take 5 mLs by mouth 3 (three) times daily as needed for Cough.  Qty: 473 mL, Refills: 2    Associated Diagnoses: Bronchiectasis with acute  exacerbation; Cough; Tracheobronchomalacia      inhalation spacing device Use as directed for inhalation.  Qty: 1 each, Refills: 0    Comments: Please notify MD office if this spacer is NOT reusable. I will then order more. [phone]      levalbuterol (XOPENEX) 1.25 mg/3 mL nebulizer solution Take 3 mLs (1.25 mg total) by nebulization every 4 (four) hours as needed for Wheezing or Shortness of Breath. Rescue  Qty: 1 Box, Refills: 11    Associated Diagnoses: Bronchiectasis with acute exacerbation      mucus clearing device Cecy 1 Device by Misc.(Non-Drug; Combo Route) route 2 (two) times daily.  Qty: 1 Device, Refills: 0    Associated Diagnoses: Bronchiectasis with acute lower respiratory infection      mupirocin (BACTROBAN) 2 % ointment AAA bid  Qty: 30 g, Refills: 2    Associated Diagnoses: Open wound      predniSONE (DELTASONE) 20 MG tablet 3 for 3 days then 2 for 3 days then one for 3 days and repeat for breathing problems  Qty: 36 tablet, Refills: 0    Associated Diagnoses: Mild intermittent asthma without complication      varicella-zoster [id number], PF, (SHINGRIX, PF,) 50 mcg/0.5 mL injection Inject 0.5 mLs into the muscle.  Qty: 0.5 mL, Refills: 1           Discharge Procedure Orders   Diet Adult Regular     Notify your health care provider if you experience any of the following:  temperature >100.4     Notify your health care provider if you experience any of the following:  persistent nausea and vomiting or diarrhea     Notify your health care provider if you experience any of the following:  severe uncontrolled pain     Notify your health care provider if you experience any of the following:  redness, tenderness, or signs of infection (pain, swelling, redness, odor or green/yellow discharge around incision site)     No dressing needed     Activity as tolerated     Follow-up Information     Mary Jane Jarvis MD In 2 weeks.    Specialty:  Urology  Why:  post op  Contact information:  [address]  [facility] [address]  [phone]                   Discharge Procedure Orders (must include Diet, Follow-up, Activity):   Discharge Procedure Orders (must include Diet, Follow-up, Activity)   Diet Adult Regular     Notify your health care provider if you experience any of the following:  temperature >100.4     Notify your health care provider if you experience any of the following:  persistent nausea and vomiting or diarrhea     Notify your health care provider if you experience any of the following:  severe uncontrolled pain     Notify your health care provider if you experience any of the following:  redness, tenderness, or signs of infection (pain, swelling, redness, odor or green/yellow discharge around incision site)     No dressing needed     Activity as tolerated      As above.

## 2020-02-18 NOTE — PLAN OF CARE
Awake and alert. VSS.Pain and nausea tolerable Tolerating liquids well. Voiding well. DC instructions given to patient and family and they verbalize understanding.

## 2020-02-18 NOTE — OP NOTE
[facility]  Operative Note    Date: 02/18/2020    Pre-Op Diagnosis: overactive bladder    Patient Active Problem List    Diagnosis Date Noted    Overactive bladder 02/18/2020    Arthritis of finger of right hand 09/11/2019    Lumbar radiculopathy 07/30/2019    Chronic bilateral low back pain with bilateral sciatica 07/30/2019    Dyslipidemia 06/25/2019    Leg pain, posterior 05/16/2019    Gait abnormality 05/16/2019    CVID (common variable immunodeficiency) 03/07/2019    Tracheobronchomalacia 03/07/2019    Atherosclerosis of abdominal aorta 01/22/2018    Tortuous aorta 01/22/2018    Exposure to TB 10/19/2017    Intermittent asthma 06/21/2017    Immune deficiency disorder 03/16/2017    Bronchiectasis without complication 03/16/2017    Lung nodule 03/16/2017    Abnormal CT scan, lung 03/08/2017    KLINE (dyspnea on exertion) 01/24/2017    Frequency of micturition 12/05/2016    Acquired hypothyroidism 02/18/2016    Arthritis of left ankle 11/10/2015    Status post right foot surgery 10/19/2015    Diabetic polyneuropathy associated with type 2 diabetes mellitus 10/19/2015    Onychomycosis due to dermatophyte 10/19/2015    Gastroesophageal reflux 07/15/2015    Lesion of nasal septum 12/01/2014    Urinary urgency 10/14/2014    Diabetes type 2, controlled 10/13/2014    Peripheral neuropathy 09/08/2014    Bunionette 08/19/2014    SOHA (obstructive sleep apnea), Auto BIPAP Mod OSAS since Dec 2013, therapeutic and compliant 100%, ESS 6/24 Feb 2014 12/26/2013    History of bariatric surgery 12/09/2013    Neuropathy in diabetes 06/17/2013    Hammertoe 06/17/2013    Porokeratosis 06/17/2013    Thyroid cyst 10/15/2012    BMI 33.0-33.9,adult 12/29/2011    Essential hypertension 12/29/2011    Osteoarthrosis, unspecified whether generalized or localized, lower leg 09/14/2011         Post-Op Diagnosis: same    Procedure(s) Performed:   1.  Explantation of InterStim generator  2.   Replacement of InterStim generator   3.  Testing for impedances     Specimen(s): none    Staff Surgeon: Mary Jane Jarvis MD    Assistant Surgeon: Kamila Dinero MD    Anesthesia: General MAC anesthesia    Indications: Cornelia Delatorre is a 69 y.o. female with overactive bladder. She is pleased with the function of her lead, however at her last visit, the generator battery was found to be at 1%. She is here to have the generator exchanged.    Findings: InterStim removed without difficulty    Estimated Blood Loss: min    Drains: none     Procedure in detail:  After the risks and benefits of the procedure were explained, informed consent was obtained. The patient was taken to the operating suite and placed in the prone position.  Anesthesia was administered.      The generator site was located on the patient's right buttock.  An incision was then made sharply with a 10 blade.  Bovie electrocautery was then used to dissect down to the capsule. A 10 blade was used to open up the capsule which was closely adherent to the generator. The generator was delivered out of the incision.     The lead was identified and protected. Hemostasis was achieved using bovie electrocautery. The small hex wrench was then used to loosen the screw to detach the lead from the generator. The generator was removed and passed off the field.    The same lead was inserted into the new generator until the white tip of the lead was seen. The lead was secured in place with a small hex wrench. Bovie electrocautery was used to perforate the capsule. The generator was then placed into the perforated capsule. Hemostasis was achieved usang Bovie electrocautery.    Impedances were checked and there were none.    The capsule was closed with 3-0 Vicryl in running fashion. The dermis was closed using 3-0 vicryl in a simple interrupted fashion. The overlying skin incisions were closed using 4-0 monocryl in a running subcuticular fashion.  Sertistrips,  telfa and a tegaderm were applied as dressings.      The patient tolerated the prcedure well and was transferred tot he recovery room in stable condition.      Disposition:  The patient will be discharged home today.  The patient was given prescriptions for tramadol and will follow up with Dr. Jarvis in 2 weeks.      Kamila Dinero MD    I was present for the entire case and agree with the above note.

## 2020-02-18 NOTE — ANESTHESIA PREPROCEDURE EVALUATION
02/18/2020  Cornelia Delatorre is a 69 y.o., female.    Anesthesia Evaluation    I have reviewed the Patient Summary Reports.    I have reviewed the Nursing Notes.   I have reviewed the Medications.     Review of Systems  Anesthesia Hx:  No problems with previous Anesthesia  Denies Family Hx of Anesthesia complications.   Denies Personal Hx of Anesthesia complications.   Cardiovascular:   8/19    · Mild right atrial enlargement.  · Mild mitral regurgitation.  · Mild tricuspid regurgitation.  · Normal central venous pressure (3 mm Hg).  · The estimated PA systolic pressure is 30 mm Hg  · Normal left ventricular systolic function. The estimated ejection fraction is 55%  · Normal LV diastolic function.     Patient Active Problem List   Diagnosis    BMI 33.0-33.9,adult    Essential hypertension    Thyroid cyst    Osteoarthrosis, unspecified whether generalized or localized, lower leg    Neuropathy in diabetes    Hammertoe    Porokeratosis    History of bariatric surgery    SOHA (obstructive sleep apnea), Auto BIPAP Mod OSAS since Dec 2013, therapeutic and compliant 100%, ESS 6/24 Feb 2014    Bunionette    Peripheral neuropathy    Diabetes type 2, controlled    Urinary urgency    Lesion of nasal septum    Gastroesophageal reflux    Status post right foot surgery    Diabetic polyneuropathy associated with type 2 diabetes mellitus    Onychomycosis due to dermatophyte    Arthritis of left ankle    Acquired hypothyroidism    Frequency of micturition    KLINE (dyspnea on exertion)    Abnormal CT scan, lung    Immune deficiency disorder    Bronchiectasis without complication    Lung nodule    Intermittent asthma    Exposure to TB    Atherosclerosis of abdominal aorta    Tortuous aorta    CVID (common variable immunodeficiency)    Tracheobronchomalacia    Leg pain, posterior    Gait  abnormality    Dyslipidemia    Lumbar radiculopathy    Chronic bilateral low back pain with bilateral sciatica    Arthritis of finger of right hand    Overactive bladder         Physical Exam  General:  Obesity    Airway/Jaw/Neck:  Airway Findings: Mouth Opening: Normal Tongue: Normal  Mallampati: II  TM Distance: Normal, at least 6 cm  Jaw/Neck Findings:  Neck ROM: Normal ROM      Dental:  Dental Findings: In tact+crowns   Chest/Lungs:  Chest/Lungs Findings: Clear to auscultation     Heart/Vascular:  Heart Findings: Rate: Normal             Anesthesia Plan  Type of Anesthesia, risks & benefits discussed:  Anesthesia Type:  general  Patient's Preference: General  Intra-op Monitoring Plan:   Intra-op Monitoring Plan Comments:   Post Op Pain Control Plan: per primary service following discharge from PACU and multimodal analgesia  Post Op Pain Control Plan Comments: Per primary service  Induction:   IV  Beta Blocker:  Patient is not currently on a Beta-Blocker (No further documentation required).       Informed Consent: Patient understands risks and agrees with Anesthesia plan.  Questions answered. Anesthesia consent signed with patient.  ASA Score: 3     Day of Surgery Review of History & Physical:    H&P update referred to the surgeon.         Ready For Surgery From Anesthesia Perspective.

## 2020-02-19 NOTE — ANESTHESIA POSTPROCEDURE EVALUATION
Anesthesia Post Evaluation    Patient: Cornelia Delatorre    Procedure(s) Performed: Procedure(s) (LRB):  REVISION, NEUROSTIMULATOR, SACRAL/ battery replacement (Right)  REPLACEMENT, NEUROSTIMULATOR, SACRAL    Final Anesthesia Type: general    Patient location during evaluation: PACU  Patient participation: Yes- Able to Participate  Level of consciousness: awake and alert  Post-procedure vital signs: reviewed and stable  Pain management: adequate  Airway patency: patent    PONV status at discharge: No PONV  Anesthetic complications: no      Cardiovascular status: blood pressure returned to baseline and hemodynamically stable  Respiratory status: unassisted, spontaneous ventilation and room air  Hydration status: euvolemic  Follow-up not needed.          Vitals Value Taken Time   /70 2/18/2020  4:01 PM   Temp 36.5 °C (97.7 °F) 2/18/2020  1:57 PM   Pulse 72 2/18/2020  4:00 PM   Resp 16 2/18/2020  3:31 PM   SpO2 98 % 2/18/2020  3:34 PM   Vitals shown include unvalidated device data.      No case tracking events are documented in the log.      Pain/Arlet Score: Pain Rating Prior to Med Admin: 5 (2/18/2020  3:15 PM)  Arlet Score: 10 (2/18/2020  2:15 PM)

## 2020-02-21 ENCOUNTER — LAB VISIT (OUTPATIENT)
Dept: LAB | Facility: HOSPITAL | Age: 70
End: 2020-02-21
Attending: INTERNAL MEDICINE
Payer: MEDICARE

## 2020-02-21 DIAGNOSIS — I10 ESSENTIAL HYPERTENSION: ICD-10-CM

## 2020-02-21 DIAGNOSIS — E78.5 DYSLIPIDEMIA: ICD-10-CM

## 2020-02-21 DIAGNOSIS — E11.9 CONTROLLED TYPE 2 DIABETES MELLITUS WITHOUT COMPLICATION, WITHOUT LONG-TERM CURRENT USE OF INSULIN: ICD-10-CM

## 2020-02-21 LAB
ALBUMIN SERPL BCP-MCNC: 3.5 G/DL (ref 3.5–5.2)
ALP SERPL-CCNC: 133 U/L (ref 55–135)
ALT SERPL W/O P-5'-P-CCNC: 18 U/L (ref 10–44)
ANION GAP SERPL CALC-SCNC: 8 MMOL/L (ref 8–16)
AST SERPL-CCNC: 25 U/L (ref 10–40)
BILIRUB SERPL-MCNC: 0.6 MG/DL (ref 0.1–1)
BUN SERPL-MCNC: 10 MG/DL (ref 8–23)
CALCIUM SERPL-MCNC: 9.2 MG/DL (ref 8.7–10.5)
CHLORIDE SERPL-SCNC: 101 MMOL/L (ref 95–110)
CHOLEST SERPL-MCNC: 167 MG/DL (ref 120–199)
CHOLEST/HDLC SERPL: 2.7 {RATIO} (ref 2–5)
CO2 SERPL-SCNC: 32 MMOL/L (ref 23–29)
CREAT SERPL-MCNC: 0.9 MG/DL (ref 0.5–1.4)
EST. GFR  (AFRICAN AMERICAN): >60 ML/MIN/1.73 M^2
EST. GFR  (NON AFRICAN AMERICAN): >60 ML/MIN/1.73 M^2
ESTIMATED AVG GLUCOSE: 123 MG/DL (ref 68–131)
GLUCOSE SERPL-MCNC: 111 MG/DL (ref 70–110)
HBA1C MFR BLD HPLC: 5.9 % (ref 4–5.6)
HDLC SERPL-MCNC: 62 MG/DL (ref 40–75)
HDLC SERPL: 37.1 % (ref 20–50)
LDLC SERPL CALC-MCNC: 87.4 MG/DL (ref 63–159)
NONHDLC SERPL-MCNC: 105 MG/DL
POTASSIUM SERPL-SCNC: 4.2 MMOL/L (ref 3.5–5.1)
PROT SERPL-MCNC: 7.3 G/DL (ref 6–8.4)
SODIUM SERPL-SCNC: 141 MMOL/L (ref 136–145)
TRIGL SERPL-MCNC: 88 MG/DL (ref 30–150)

## 2020-02-21 PROCEDURE — 80053 COMPREHEN METABOLIC PANEL: CPT | Mod: HCNC

## 2020-02-21 PROCEDURE — 36415 COLL VENOUS BLD VENIPUNCTURE: CPT | Mod: HCNC,PO

## 2020-02-21 PROCEDURE — 83036 HEMOGLOBIN GLYCOSYLATED A1C: CPT | Mod: HCNC

## 2020-02-21 PROCEDURE — 80061 LIPID PANEL: CPT | Mod: HCNC

## 2020-03-03 LAB
FINAL PATHOLOGIC DIAGNOSIS: NORMAL
GROSS: NORMAL

## 2020-03-04 NOTE — PROGRESS NOTES
CHIEF COMPLAINT:    Mrs. Delatorre is a 69 y.o. female presenting for a follow up after IPG replacement on 2/18/2020.    PRESENTING ILLNESS:    Cornelia Delatorre is a 69 y.o. female who returns for follow up.  She feels like the control with the new IPG is better because the modulation of the stimulation is more comfortable.      REVIEW OF SYSTEMS:    Review of Systems   Constitutional: Negative.    HENT: Negative.    Eyes: Negative.    Respiratory: Negative.    Cardiovascular: Negative.    Gastrointestinal: Negative.    Genitourinary: Positive for frequency and urgency.   Musculoskeletal: Negative.    Skin: Negative.    Neurological: Negative.    Endo/Heme/Allergies: Negative.    Psychiatric/Behavioral: Negative.        PATIENT HISTORY:    Past Medical History:   Diagnosis Date    Allergy     Arthritis     Asthma     Cancer     skin cancer to right hand    Cataract     Chronic fatigue 1/24/2017    CVID (common variable immunodeficiency) 3/7/2019    Diabetes mellitus     resolved with gastric bypass    KLINE (dyspnea on exertion) 1/24/2017    Dyslipidemia 6/25/2019    Encounter for blood transfusion     Essential hypertension 12/29/2011    GERD (gastroesophageal reflux disease)     Headache(784.0)     History of lumpectomy of both breasts     1992 negative    Hyperlipidemia 7/15/2015    Hypertension     Hypothyroidism     Neuropathy     Obesity     SOHA (obstructive sleep apnea), Auto BIPAP Mod OSAS since Dec 2013, therapeutic and compliant 100%, ESS 6/24 Feb 2014 12/26/2013    SOHA on CPAP     Postmenopausal HRT (hormone replacement therapy)     Rash     Rosacea     Snoring     Squamous cell carcinoma of skin     Wears glasses        Past Surgical History:   Procedure Laterality Date    ADENOIDECTOMY      APPENDECTOMY      BLADDER SUSPENSION      BREAST BIOPSY Bilateral 1992    bilateral benign excisional biopsies    BUNIONECTOMY  10/17/14    right, still has discomfort    CATARACT  EXTRACTION W/  INTRAOCULAR LENS IMPLANT Bilateral     CHOLECYSTECTOMY  08/02/2017    COLONOSCOPY      EPIDURAL STEROID INJECTION INTO LUMBAR SPINE N/A 8/14/2019    Procedure: Injection-steroid-epidural-lumbar L5/S1;  Surgeon: Fredi Rojas MD;  Location: [facility];  Service: Pain Management;  Laterality: N/A;    ESOPHAGOGASTRODUODENOSCOPY      dilated esophagus    GASTRIC BYPASS      2011    HYSTERECTOMY  1980    interstim bladder  2009    10/14/14 new battery    OOPHORECTOMY  1980    REPLACEMENT OF SACRAL NERVE STIMULATOR  2/18/2020    Procedure: REPLACEMENT, NEUROSTIMULATOR, SACRAL;  Surgeon: Mary Jane Jarvis MD;  Location: [facility] OR [address] [place] [address];  Service: Urology;;    REVISION OF PROCEDURE INVOLVING SACRAL NEUROSTIMULATOR DEVICE Right 2/18/2020    Procedure: REVISION, NEUROSTIMULATOR, SACRAL/ battery replacement;  Surgeon: Mary Jane Jarvis MD;  Location: [facility] OR [address] [place] [address];  Service: Urology;  Laterality: Right;  1hr/ rep contacted/ (CONNIE)    SKIN CANCER EXCISION      left hand    TONSILLECTOMY      WISDOM TOOTH EXTRACTION         Family History   Problem Relation Age of Onset    Breast cancer Mother 50    Diabetes Unknown     Hypertension Unknown     Hypothyroidism Unknown     Breast cancer Paternal Aunt         40's    Ovarian cancer Paternal Grandmother      Socioeconomic History    Marital status:    Social Needs    Financial resource strain: Somewhat hard    Food insecurity:     Worry: Never true     Inability: Never true    Transportation needs:     Medical: No     Non-medical: No   Tobacco Use    Smoking status: Never Smoker    Smokeless tobacco: Never Used   Substance and Sexual Activity    Alcohol use: No     Frequency: Never     Binge frequency: Never    Drug use: No    Sexual activity: Not Currently   Lifestyle    Physical activity:     Days per week: 0 days     Minutes per session: 0 min    Stress: Only a little   Relationships    Social connections:     Talks on phone:  More than three times a week     Gets together: Once a week     Attends [employer] service: Not on file     Active member of club or organization: Yes     Attends meetings of clubs or organizations: More than 4 times per year     Relationship status:        Allergies:  Sulfa (sulfonamide antibiotics); Naproxen; and Albuterol    Medications:  Outpatient Encounter Medications as of 3/6/2020   Medication Sig Dispense Refill    albuterol (PROVENTIL/VENTOLIN HFA) 90 mcg/actuation inhaler Inhale 2 puffs into the lungs every 4 (four) hours as needed. 2 puffs every 4 hours as needed for cough, wheeze, or shortness of breath 3 Inhaler 3    amoxicillin-clavulanate 875-125mg (AUGMENTIN) 875-125 mg per tablet Take 1 tablet by mouth every 12 (twelve) hours. 20 tablet 2    azelastine (ASTELIN) 137 mcg (0.1 %) nasal spray 1 spray (137 mcg total) by Nasal route 2 (two) times daily. 90 mL 3    azelastine (OPTIVAR) 0.05 % ophthalmic solution Place 1 drop into both eyes daily as needed.       azithromycin (ZITHROMAX) 500 MG tablet       B-complex with vitamin C (Z-BEC OR EQUIV) tablet Take 1 tablet by mouth once daily.      BIFIDOBACTERIUM INFANTIS (ALIGN ORAL) Take by mouth once daily.      cloNIDine (CATAPRES) 0.1 MG tablet Take 1 tablet (0.1 mg total) by mouth 3 (three) times daily as needed (PRN SBP > 165 mmHg). 90 tablet 6    cranberry 500 mg Cap Take 1 capsule by mouth every evening.      cyanocobalamin (VITAMIN B-12) 1000 MCG tablet Take 100 mcg by mouth once daily.      diclofenac sodium (VOLTAREN) 1 % Gel Apply 2 grams topically once daily. 1 Tube 6    diltiaZEM (CARDIZEM CD) 120 MG Cp24 Take 1 capsule (120 mg total) by mouth every evening. 90 capsule 4    EPINEPHrine (EPIPEN) 0.3 mg/0.3 mL AtIn   3    esomeprazole (NEXIUM) 40 MG capsule Take 1 capsule (40 mg total) by mouth before breakfast. 90 capsule 3    fexofenadine (ALLEGRA) 60 MG tablet Take 60 mg by mouth once daily.      fish oil-omega-3 fatty  acids 300-1,000 mg capsule Take 2 g by mouth once daily.      fluticasone (FLONASE) 50 mcg/actuation nasal spray 2 sprays (100 mcg total) by Each Nare route once daily. 3 Bottle 3    gabapentin (NEURONTIN) 600 MG tablet Take 1 tablet (600 mg total) by mouth 3 (three) times daily. 270 tablet 3    guaiFENesin (MUCINEX) 600 mg 12 hr tablet Take 2 tablets (1,200 mg total) by mouth 2 (two) times daily.      guaifenesin-codeine 100-10 mg/5 ml (GUAIFENESIN AC)  mg/5 mL syrup Take 5 mLs by mouth 3 (three) times daily as needed for Cough. 473 mL 2    immun glob G,IgG,-pro-IgA 0-50 (HIZENTRA) 1 gram/5 mL (20 %) Soln Inject 11 g into the skin once a week. 220 mL 12    inhalation spacing device Use as directed for inhalation. 1 each 0    levalbuterol (XOPENEX) 1.25 mg/3 mL nebulizer solution Take 3 mLs (1.25 mg total) by nebulization every 4 (four) hours as needed for Wheezing or Shortness of Breath. Rescue 1 Box 11    metFORMIN (GLUCOPHAGE-XR) 500 MG 24 hr tablet Take 2 tablets (1,000 mg total) by mouth daily with breakfast. (Patient taking differently: Take 500 mg by mouth 2 (two) times daily with meals. ) 180 tablet 0    methylcellulose, laxative, (CITRUCEL) 500 mg Tab Take 1 tablet by mouth every morning.      montelukast (SINGULAIR) 10 mg tablet Take 1 tablet (10 mg total) by mouth every evening. 90 tablet 3    mucus clearing device Cecy 1 Device by Misc.(Non-Drug; Combo Route) route 2 (two) times daily. 1 Device 0    multivitamin capsule Take 1 capsule by mouth. Take one tablets daily      mupirocin (BACTROBAN) 2 % ointment AAA bid 30 g 2    ondansetron (ZOFRAN) 4 MG tablet Take 1 tablet (4 mg total) by mouth every 8 (eight) hours as needed for Nausea. 15 tablet 0    potassium chloride SA (K-DUR,KLOR-CON) 20 MEQ tablet TAKE 1 TABLET ONE TIME DAILY 90 tablet 3    pravastatin (PRAVACHOL) 40 MG tablet Take 1 tablet (40 mg total) by mouth once daily. (Patient taking differently: Take 40 mg by mouth every  evening. ) 90 tablet 1    predniSONE (DELTASONE) 20 MG tablet 3 for 3 days then 2 for 3 days then one for 3 days and repeat for breathing problems 36 tablet 0    SIMETHICONE (GAS-X ORAL) Take 1 capsule by mouth as needed.      SYMBICORT 160-4.5 mcg/actuation HFAA Inhale 2 puffs into the lungs every 12 (twelve) hours. 3 Inhaler 3    traMADol (ULTRAM) 50 mg tablet Take 1 tablet (50 mg total) by mouth every 6 (six) hours as needed for Pain. 5 tablet 0    valsartan-hydrochlorothiazide (DIOVAN-HCT) 160-12.5 mg per tablet Take 1 tablet by mouth once daily. 90 tablet 3    varicella-zoster gE-AS01B, PF, (SHINGRIX, PF,) 50 mcg/0.5 mL injection Inject 0.5 mLs into the muscle. 0.5 mL 1    vitamin D3-folic acid 5,000 unit- 1 mg Tab Take by mouth.      vitamin E 400 UNIT capsule Take 400 Units by mouth once daily.       Facility-Administered Encounter Medications as of 3/6/2020   Medication Dose Route Frequency Provider Last Rate Last Dose    candida albicans skin test skin test 0.1 mL  0.1 mL Intradermal 1 time in Clinic/HOD Kailyn Fletcher MD        ana albicans skin test skin test 0.3 mL  0.3 mL Intradermal 1 time in Clinic/HOD Kailyn Fletcher MD             PHYSICAL EXAMINATION:    The patient generally appears in good health, is appropriately interactive, and is in no apparent distress.    Skin: No lesions.    Mental: Cooperative with normal affect.    Neuro: Grossly intact.    HEENT: Normal. No evidence of lymphadenopathy.    Chest:  normal inspiratory effort.    Abdomen:  Soft, non-tender. No masses or organomegaly. Bladder is not palpable. No evidence of flank discomfort. No evidence of inguinal hernia.    Extremities: No clubbing, cyanosis, or edema    Back:  Incision site is CDI.  Well healed.  No exudate, erythema.  The steri strips were still in place and were removed.       LABS:    Lab Results   Component Value Date    BUN 10 02/21/2020    CREATININE 0.9 02/21/2020     UA 1.015, pH 5, + leuk,  tr blood, otherwise, negative    IMPRESSION:    Encounter Diagnoses   Name Primary?    Urinary urgency Yes    Increased frequency of urination     Post-operative state        PLAN:    Interrogation:  Last reprogrammin2020    All lead pairs were checked at 1.0 A, 210 pulse width and ranged from  495-1537    Program % Used Leads  Energy PW Hz Sensation   Changed  Program 1 100    Battery Life:  Programs checked:  Program 1  % Battery life:    Months remainin.8-83.8 months  Left on program:      2.  Follow up in 6 months, sooner if needed.

## 2020-03-06 ENCOUNTER — OFFICE VISIT (OUTPATIENT)
Dept: UROLOGY | Facility: CLINIC | Age: 70
End: 2020-03-06
Payer: MEDICARE

## 2020-03-06 VITALS
BODY MASS INDEX: 34.55 KG/M2 | HEIGHT: 64 IN | HEART RATE: 74 BPM | DIASTOLIC BLOOD PRESSURE: 63 MMHG | WEIGHT: 202.38 LBS | SYSTOLIC BLOOD PRESSURE: 112 MMHG

## 2020-03-06 DIAGNOSIS — R35.0 INCREASED FREQUENCY OF URINATION: ICD-10-CM

## 2020-03-06 DIAGNOSIS — Z98.890 POST-OPERATIVE STATE: ICD-10-CM

## 2020-03-06 DIAGNOSIS — R39.15 URINARY URGENCY: Primary | ICD-10-CM

## 2020-03-06 PROCEDURE — 99999 PR PBB SHADOW E&M-EST. PATIENT-LVL V: ICD-10-PCS | Mod: PBBFAC,HCNC,, | Performed by: UROLOGY

## 2020-03-06 PROCEDURE — 81002 URINALYSIS NONAUTO W/O SCOPE: CPT | Mod: HCNC,S$GLB,, | Performed by: UROLOGY

## 2020-03-06 PROCEDURE — 99024 PR POST-OP FOLLOW-UP VISIT: ICD-10-PCS | Mod: HCNC,S$GLB,, | Performed by: UROLOGY

## 2020-03-06 PROCEDURE — 81002 PR URINALYSIS NONAUTO W/O SCOPE: ICD-10-PCS | Mod: HCNC,S$GLB,, | Performed by: UROLOGY

## 2020-03-06 PROCEDURE — 99999 PR PBB SHADOW E&M-EST. PATIENT-LVL V: CPT | Mod: PBBFAC,HCNC,, | Performed by: UROLOGY

## 2020-03-06 PROCEDURE — 99024 POSTOP FOLLOW-UP VISIT: CPT | Mod: HCNC,S$GLB,, | Performed by: UROLOGY

## 2020-03-11 ENCOUNTER — OFFICE VISIT (OUTPATIENT)
Dept: FAMILY MEDICINE | Facility: CLINIC | Age: 70
End: 2020-03-11
Payer: MEDICARE

## 2020-03-11 VITALS
HEIGHT: 64 IN | TEMPERATURE: 98 F | OXYGEN SATURATION: 99 % | WEIGHT: 201.94 LBS | BODY MASS INDEX: 34.48 KG/M2 | HEART RATE: 70 BPM | SYSTOLIC BLOOD PRESSURE: 122 MMHG | DIASTOLIC BLOOD PRESSURE: 78 MMHG

## 2020-03-11 DIAGNOSIS — I10 ESSENTIAL HYPERTENSION: ICD-10-CM

## 2020-03-11 DIAGNOSIS — E11.9 CONTROLLED TYPE 2 DIABETES MELLITUS WITHOUT COMPLICATION, WITHOUT LONG-TERM CURRENT USE OF INSULIN: ICD-10-CM

## 2020-03-11 DIAGNOSIS — E78.5 DYSLIPIDEMIA: ICD-10-CM

## 2020-03-11 DIAGNOSIS — J47.9 BRONCHIECTASIS WITHOUT COMPLICATION: ICD-10-CM

## 2020-03-11 DIAGNOSIS — E11.42 DM TYPE 2 WITH DIABETIC PERIPHERAL NEUROPATHY: ICD-10-CM

## 2020-03-11 DIAGNOSIS — Z00.00 ROUTINE PHYSICAL EXAMINATION: Primary | ICD-10-CM

## 2020-03-11 PROCEDURE — 99999 PR PBB SHADOW E&M-EST. PATIENT-LVL III: CPT | Mod: PBBFAC,HCNC,, | Performed by: INTERNAL MEDICINE

## 2020-03-11 PROCEDURE — 99397 PR PREVENTIVE VISIT,EST,65 & OVER: ICD-10-PCS | Mod: HCNC,S$GLB,, | Performed by: INTERNAL MEDICINE

## 2020-03-11 PROCEDURE — 3074F SYST BP LT 130 MM HG: CPT | Mod: HCNC,CPTII,S$GLB, | Performed by: INTERNAL MEDICINE

## 2020-03-11 PROCEDURE — 3078F DIAST BP <80 MM HG: CPT | Mod: HCNC,CPTII,S$GLB, | Performed by: INTERNAL MEDICINE

## 2020-03-11 PROCEDURE — 3078F PR MOST RECENT DIASTOLIC BLOOD PRESSURE < 80 MM HG: ICD-10-PCS | Mod: HCNC,CPTII,S$GLB, | Performed by: INTERNAL MEDICINE

## 2020-03-11 PROCEDURE — 99397 PER PM REEVAL EST PAT 65+ YR: CPT | Mod: HCNC,S$GLB,, | Performed by: INTERNAL MEDICINE

## 2020-03-11 PROCEDURE — 3044F HG A1C LEVEL LT 7.0%: CPT | Mod: HCNC,CPTII,S$GLB, | Performed by: INTERNAL MEDICINE

## 2020-03-11 PROCEDURE — 99999 PR PBB SHADOW E&M-EST. PATIENT-LVL III: ICD-10-PCS | Mod: PBBFAC,HCNC,, | Performed by: INTERNAL MEDICINE

## 2020-03-11 PROCEDURE — 3074F PR MOST RECENT SYSTOLIC BLOOD PRESSURE < 130 MM HG: ICD-10-PCS | Mod: HCNC,CPTII,S$GLB, | Performed by: INTERNAL MEDICINE

## 2020-03-11 PROCEDURE — 3044F PR MOST RECENT HEMOGLOBIN A1C LEVEL <7.0%: ICD-10-PCS | Mod: HCNC,CPTII,S$GLB, | Performed by: INTERNAL MEDICINE

## 2020-03-11 RX ORDER — ACETAMINOPHEN AND PHENYLEPHRINE HCL 325; 5 MG/1; MG/1
1 TABLET ORAL NIGHTLY
COMMUNITY

## 2020-03-11 RX ORDER — IBUPROFEN 100 MG
TABLET ORAL
COMMUNITY
End: 2021-06-11 | Stop reason: CLARIF

## 2020-03-11 RX ORDER — METFORMIN HYDROCHLORIDE 500 MG/1
500 TABLET, EXTENDED RELEASE ORAL 2 TIMES DAILY WITH MEALS
Qty: 180 TABLET | Refills: 3 | Status: SHIPPED | OUTPATIENT
Start: 2020-03-11 | End: 2021-03-11 | Stop reason: SDUPTHER

## 2020-03-11 RX ORDER — VALSARTAN AND HYDROCHLOROTHIAZIDE 160; 12.5 MG/1; MG/1
1 TABLET, FILM COATED ORAL DAILY
Qty: 90 TABLET | Refills: 3 | Status: SHIPPED | OUTPATIENT
Start: 2020-03-11 | End: 2020-11-04 | Stop reason: SDUPTHER

## 2020-03-11 RX ORDER — GABAPENTIN 600 MG/1
600 TABLET ORAL 3 TIMES DAILY
Qty: 540 TABLET | Refills: 3 | Status: SHIPPED | OUTPATIENT
Start: 2020-03-11 | End: 2022-10-28 | Stop reason: SDUPTHER

## 2020-03-11 RX ORDER — DILTIAZEM HYDROCHLORIDE 120 MG/1
120 CAPSULE, COATED, EXTENDED RELEASE ORAL NIGHTLY
Qty: 90 CAPSULE | Refills: 4 | Status: SHIPPED | OUTPATIENT
Start: 2020-03-11 | End: 2020-11-04 | Stop reason: SDUPTHER

## 2020-03-11 NOTE — PROGRESS NOTES
Subjective:       Patient ID: Cornelia Delatorre is a 69 y.o. female.    Chief Complaint: Annual Exam    Here for routine health maintenance.      Has metal implant for bladder.      Complains of moderate to severe sharp burning pain episodes in her right lower lateral buttocks radiating down her right lateral thigh.  Mostly with getting up and it only lasts a second.  Started shortly after having her bladder stimulator batter replaced, which is right next to the source of the pain episodes.  1.5 weeks.  Has not been as bad as initial episode.  No weakness or numbness.     Had episode of b/l PIP knuckles turned blue.  Right hand extended to the left medial hand.  No pain.  Improved over 2 days.  Gone now.      SOB/KLINE- KLINE 20 ft.  Stable with intermittent worsening. Limiting exercise.  On IV Ig; would benefit from handicap.   Recalll:   Dr Nicole in [place].  Feels SOB mostly related to bronchiectasis.  Her symptoms have improved with asthma tx - Symbicort.    Pulmonary nodules - Dr Nicole evaluating.  Incomplete response to pneumovax - recommends repeat Prevnar 13.  Eosinophilia and pn 7.14 level at 50%  Recent angiogram was normal     Dx w CVID - just started IV Ig 1 wk ago with allergy.       Hyperthyroid - supra-theraputic now off Synthroid for 6 mo.  Was hypothyroid all her life.  Dr Ponce   HTN - controlled   DM - controlled;  Sees podiatry for peripheral neuropathy in past  Diabetic peripheral neuropathy - controlled with 600 mg gabapentin tid.   HLD - controlled for goal 70     Occasional anxiety relieved with prn hydroxyzine.   Had EGD- dysphagia with Dr Krishnan      Dx w MCI likely related to some meds per testing neurology; stable       Review of Systems   Constitutional: Negative for activity change, appetite change, fever and unexpected weight change.   HENT: Negative for hearing loss, nosebleeds, rhinorrhea and trouble swallowing.    Eyes: Negative for discharge and visual disturbance.   Respiratory:  Positive for shortness of breath. Negative for choking, chest tightness and wheezing.    Cardiovascular: Negative for chest pain and palpitations.   Gastrointestinal: Negative for abdominal pain, blood in stool, constipation, diarrhea, nausea and vomiting.   Endocrine: Negative for polydipsia and polyuria.   Genitourinary: Negative for difficulty urinating, dysuria, hematuria and menstrual problem.   Musculoskeletal: Positive for arthralgias and joint swelling. Negative for neck pain.   Skin: Negative for rash and wound.   Neurological: Negative for dizziness, syncope, weakness and headaches.   Psychiatric/Behavioral: Negative for confusion and dysphoric mood.       Objective:      Vitals:    03/11/20 0835   BP: 122/78   Pulse: 70   Temp: 97.5 °F (36.4 °C)     Physical Exam   Constitutional: She appears well-nourished.   Eyes: Conjunctivae and EOM are normal.   Neck: Trachea normal and normal range of motion. No thyromegaly present.   Cardiovascular: Normal heart sounds.   Edema negative   Pulmonary/Chest: Effort normal and breath sounds normal.   Abdominal: Soft. There is no hepatomegaly.   Musculoskeletal:   Right lower lateral buttocks + TTP   Neurological: No cranial nerve deficit.   DTR decreased bilateral   Skin: Skin is warm, dry and intact.   Psychiatric: She has a normal mood and affect.   Alert and Oriented    Vitals reviewed.        Assessment:       1. Routine physical examination    2. Controlled type 2 diabetes mellitus without complication, without long-term current use of insulin    3. Essential hypertension    4. DM type 2 with diabetic peripheral neuropathy    5. Dyslipidemia    6. Bronchiectasis without complication        Plan:       Routine physical examination    Controlled type 2 diabetes mellitus without complication, without long-term current use of insulin  -     metFORMIN (GLUCOPHAGE-XR) 500 MG XR 24hr tablet; Take 1 tablet (500 mg total) by mouth 2 (two) times daily with meals.  Dispense:  180 tablet; Refill: 3  -     Hemoglobin A1c; Future; Expected date: 09/07/2020  -     Microalbumin/creatinine urine ratio; Future; Expected date: 09/07/2020    Essential hypertension  -     valsartan-hydrochlorothiazide (DIOVAN-HCT) 160-12.5 mg per tablet; Take 1 tablet by mouth once daily.  Dispense: 90 tablet; Refill: 3  -     diltiaZEM (CARDIZEM CD) 120 MG Cp24; Take 1 capsule (120 mg total) by mouth every evening.  Dispense: 90 capsule; Refill: 4  -     Comprehensive metabolic panel; Future; Expected date: 09/07/2020    DM type 2 with diabetic peripheral neuropathy  -     gabapentin (NEURONTIN) 600 MG tablet; Take 1 tablet (600 mg total) by mouth 3 (three) times daily.  Dispense: 540 tablet; Refill: 3    Dyslipidemia  -     Lipid panel; Future; Expected date: 09/07/2020    Bronchiectasis without complication            Medication List with Changes/Refills   Current Medications    ALBUTEROL (PROVENTIL/VENTOLIN HFA) 90 MCG/ACTUATION INHALER    Inhale 2 puffs into the lungs every 4 (four) hours as needed. 2 puffs every 4 hours as needed for cough, wheeze, or shortness of breath    AMOXICILLIN-CLAVULANATE 875-125MG (AUGMENTIN) 875-125 MG PER TABLET    Take 1 tablet by mouth every 12 (twelve) hours.    AZELASTINE (ASTELIN) 137 MCG (0.1 %) NASAL SPRAY    1 spray (137 mcg total) by Nasal route 2 (two) times daily.    AZELASTINE (OPTIVAR) 0.05 % OPHTHALMIC SOLUTION    Place 1 drop into both eyes daily as needed.     AZITHROMYCIN (ZITHROMAX) 500 MG TABLET        B-COMPLEX WITH VITAMIN C (Z-BEC OR EQUIV) TABLET    Take 1 tablet by mouth once daily.    BIFIDOBACTERIUM INFANTIS (ALIGN ORAL)    Take by mouth once daily.    BIOTIN 10,000 MCG CAP    Take by mouth.    CALCIUM CARBONATE (CALCIUM ANTACID) 300 MG (750 MG) CHEW    Take by mouth.    CLONIDINE (CATAPRES) 0.1 MG TABLET    Take 1 tablet (0.1 mg total) by mouth 3 (three) times daily as needed (PRN SBP > 165 mmHg).    CRANBERRY 500 MG CAP    Take 1 capsule by mouth every  evening.    CYANOCOBALAMIN (VITAMIN B-12) 1000 MCG TABLET    Take 100 mcg by mouth once daily.    DICLOFENAC SODIUM (VOLTAREN) 1 % GEL    Apply 2 grams topically once daily.    EPINEPHRINE (EPIPEN) 0.3 MG/0.3 ML ATIN        ESOMEPRAZOLE (NEXIUM) 40 MG CAPSULE    Take 1 capsule (40 mg total) by mouth before breakfast.    FEXOFENADINE (ALLEGRA) 60 MG TABLET    Take 60 mg by mouth once daily.    FISH OIL-OMEGA-3 FATTY ACIDS 300-1,000 MG CAPSULE    Take 2 g by mouth once daily.    FLUTICASONE (FLONASE) 50 MCG/ACTUATION NASAL SPRAY    2 sprays (100 mcg total) by Each Nare route once daily.    GUAIFENESIN (MUCINEX) 600 MG 12 HR TABLET    Take 2 tablets (1,200 mg total) by mouth 2 (two) times daily.    GUAIFENESIN-CODEINE 100-10 MG/5 ML (GUAIFENESIN AC)  MG/5 ML SYRUP    Take 5 mLs by mouth 3 (three) times daily as needed for Cough.    IMMUN GLOB G,IGG,-PRO-IGA 0-50 (HIZENTRA) 1 GRAM/5 ML (20 %) SOLN    Inject 11 g into the skin once a week.    INHALATION SPACING DEVICE    Use as directed for inhalation.    LEVALBUTEROL (XOPENEX) 1.25 MG/3 ML NEBULIZER SOLUTION    Take 3 mLs (1.25 mg total) by nebulization every 4 (four) hours as needed for Wheezing or Shortness of Breath. Rescue    METHYLCELLULOSE, LAXATIVE, (CITRUCEL) 500 MG TAB    Take 1 tablet by mouth every morning.    MONTELUKAST (SINGULAIR) 10 MG TABLET    Take 1 tablet (10 mg total) by mouth every evening.    MUCUS CLEARING DEVICE KAREN    1 Device by Misc.(Non-Drug; Combo Route) route 2 (two) times daily.    MULTIVITAMIN CAPSULE    Take 1 capsule by mouth. Take one tablets daily    MUPIROCIN (BACTROBAN) 2 % OINTMENT    AAA bid    ONDANSETRON (ZOFRAN) 4 MG TABLET    Take 1 tablet (4 mg total) by mouth every 8 (eight) hours as needed for Nausea.    POTASSIUM CHLORIDE SA (K-DUR,KLOR-CON) 20 MEQ TABLET    TAKE 1 TABLET ONE TIME DAILY    PRAVASTATIN (PRAVACHOL) 40 MG TABLET    Take 1 tablet (40 mg total) by mouth once daily.    PREDNISONE (DELTASONE) 20 MG TABLET     3 for 3 days then 2 for 3 days then one for 3 days and repeat for breathing problems    SIMETHICONE (GAS-X ORAL)    Take 1 capsule by mouth as needed.    SYMBICORT 160-4.5 MCG/ACTUATION HFAA    Inhale 2 puffs into the lungs every 12 (twelve) hours.    TRAMADOL (ULTRAM) 50 MG TABLET    Take 1 tablet (50 mg total) by mouth every 6 (six) hours as needed for Pain.    VARICELLA-ZOSTER GE-AS01B, PF, (SHINGRIX, PF,) 50 MCG/0.5 ML INJECTION    Inject 0.5 mLs into the muscle.    VITAMIN D3-FOLIC ACID 5,000 UNIT- 1 MG TAB    Take by mouth.    VITAMIN E 400 UNIT CAPSULE    Take 400 Units by mouth once daily.   Changed and/or Refilled Medications    Modified Medication Previous Medication    DILTIAZEM (CARDIZEM CD) 120 MG CP24 diltiaZEM (CARDIZEM CD) 120 MG Cp24       Take 1 capsule (120 mg total) by mouth every evening.    Take 1 capsule (120 mg total) by mouth every evening.    GABAPENTIN (NEURONTIN) 600 MG TABLET gabapentin (NEURONTIN) 600 MG tablet       Take 1 tablet (600 mg total) by mouth 3 (three) times daily.    Take 1 tablet (600 mg total) by mouth 3 (three) times daily.    METFORMIN (GLUCOPHAGE-XR) 500 MG XR 24HR TABLET metFORMIN (GLUCOPHAGE-XR) 500 MG 24 hr tablet       Take 1 tablet (500 mg total) by mouth 2 (two) times daily with meals.    Take 2 tablets (1,000 mg total) by mouth daily with breakfast.    VALSARTAN-HYDROCHLOROTHIAZIDE (DIOVAN-HCT) 160-12.5 MG PER TABLET valsartan-hydrochlorothiazide (DIOVAN-HCT) 160-12.5 mg per tablet       Take 1 tablet by mouth once daily.    Take 1 tablet by mouth once daily.     Wellness reviewed   Continue current management and monitor.    Counseled on regular exercise, maintenance of a healthy weight, balanced diet rich in fruits/vegetables and lean protein, and avoidance of unhealthy habits like smoking and excessive alcohol intake.   Also, counseled on importance of being compliant with medication, health appointments, diet and exercise.     Follow up in about 6 months  (around 9/11/2020).

## 2020-03-24 ENCOUNTER — PATIENT OUTREACH (OUTPATIENT)
Dept: OTHER | Facility: OTHER | Age: 70
End: 2020-03-24

## 2020-03-24 NOTE — PROGRESS NOTES
[facility]: Health  Follow-Up    Ms. Rhodes reports that she's doing ok. She's been a little stressed with COVID-19 since she is at higher risk with diabetes, immunodeficiency, and with asthma.     The history is provided by the patient.     Follow Up  Follow-up reason(s): reading review and routine education      Readings are trending up due to stress with COVID-19.    Routine Education Topics: physical activity      Patient has been taking precautions like social isolation, having her daughter drive by to drop off groceries, wearing a mask, and not watching her grandchildren.       INTERVENTION(S)  recommend physical activity and encouragement/support    PLAN  patient verbalizes understanding      There are no preventive care reminders to display for this patient.    Last 5 Patient Entered Readings                                      Current 30 Day Average: 136/76     Recent Readings 3/23/2020 3/20/2020 3/17/2020 3/16/2020 3/15/2020    SBP (mmHg) 126 146 141 152 151    DBP (mmHg) 71 74 67 78 77    Pulse 71 65 71 63 65            Physical Activity Screening   When asked if exercising, patient responded: yes    Patient participates in the following activities: yard/housework    She identified the following barriers to physical activity: asthma    Patient reports that she's had a lot of time to devote to cleaning her house lately. She's been trying to stay active at home and says that she rarely sits and does nothing. Patient reports that she would potentially like to use walking as an exercise, but after a few minutes she's limited by her asthma. Encouraged patient to continue staying active in her house throughout the day, and encouraged her to take small walks outside if able (to the mailbox and back).

## 2020-03-29 ENCOUNTER — PATIENT MESSAGE (OUTPATIENT)
Dept: ALLERGY | Facility: CLINIC | Age: 70
End: 2020-03-29

## 2020-03-31 ENCOUNTER — PATIENT MESSAGE (OUTPATIENT)
Dept: ALLERGY | Facility: CLINIC | Age: 70
End: 2020-03-31

## 2020-03-31 DIAGNOSIS — D80.1 HYPOGAMMAGLOBULINEMIA: Primary | ICD-10-CM

## 2020-04-01 ENCOUNTER — PATIENT MESSAGE (OUTPATIENT)
Dept: PULMONOLOGY | Facility: CLINIC | Age: 70
End: 2020-04-01

## 2020-04-01 ENCOUNTER — LAB VISIT (OUTPATIENT)
Dept: LAB | Facility: HOSPITAL | Age: 70
End: 2020-04-01
Attending: ALLERGY & IMMUNOLOGY
Payer: MEDICARE

## 2020-04-01 DIAGNOSIS — D80.1 HYPOGAMMAGLOBULINEMIA: ICD-10-CM

## 2020-04-01 LAB
ALBUMIN SERPL BCP-MCNC: 3.5 G/DL (ref 3.5–5.2)
ALP SERPL-CCNC: 128 U/L (ref 55–135)
ALT SERPL W/O P-5'-P-CCNC: 25 U/L (ref 10–44)
ANION GAP SERPL CALC-SCNC: 11 MMOL/L (ref 8–16)
AST SERPL-CCNC: 33 U/L (ref 10–40)
BASOPHILS # BLD AUTO: 0.01 K/UL (ref 0–0.2)
BASOPHILS NFR BLD: 0.3 % (ref 0–1.9)
BILIRUB SERPL-MCNC: 0.4 MG/DL (ref 0.1–1)
BUN SERPL-MCNC: 8 MG/DL (ref 8–23)
CALCIUM SERPL-MCNC: 9.4 MG/DL (ref 8.7–10.5)
CHLORIDE SERPL-SCNC: 102 MMOL/L (ref 95–110)
CO2 SERPL-SCNC: 28 MMOL/L (ref 23–29)
CREAT SERPL-MCNC: 0.8 MG/DL (ref 0.5–1.4)
DIFFERENTIAL METHOD: ABNORMAL
EOSINOPHIL # BLD AUTO: 0 K/UL (ref 0–0.5)
EOSINOPHIL NFR BLD: 1 % (ref 0–8)
ERYTHROCYTE [DISTWIDTH] IN BLOOD BY AUTOMATED COUNT: 14 % (ref 11.5–14.5)
EST. GFR  (AFRICAN AMERICAN): >60 ML/MIN/1.73 M^2
EST. GFR  (NON AFRICAN AMERICAN): >60 ML/MIN/1.73 M^2
GLUCOSE SERPL-MCNC: 113 MG/DL (ref 70–110)
HCT VFR BLD AUTO: 40.1 % (ref 37–48.5)
HGB BLD-MCNC: 11.7 G/DL (ref 12–16)
IGA SERPL-MCNC: 165 MG/DL (ref 40–350)
IGG SERPL-MCNC: 909 MG/DL (ref 650–1600)
IGM SERPL-MCNC: 33 MG/DL (ref 50–300)
IMM GRANULOCYTES # BLD AUTO: 0.08 K/UL (ref 0–0.04)
IMM GRANULOCYTES NFR BLD AUTO: 2 % (ref 0–0.5)
LYMPHOCYTES # BLD AUTO: 1.3 K/UL (ref 1–4.8)
LYMPHOCYTES NFR BLD: 33 % (ref 18–48)
MCH RBC QN AUTO: 27.5 PG (ref 27–31)
MCHC RBC AUTO-ENTMCNC: 29.2 G/DL (ref 32–36)
MCV RBC AUTO: 94 FL (ref 82–98)
MONOCYTES # BLD AUTO: 0.3 K/UL (ref 0.3–1)
MONOCYTES NFR BLD: 6.8 % (ref 4–15)
NEUTROPHILS # BLD AUTO: 2.3 K/UL (ref 1.8–7.7)
NEUTROPHILS NFR BLD: 56.9 % (ref 38–73)
NRBC BLD-RTO: 0 /100 WBC
PLATELET # BLD AUTO: 158 K/UL (ref 150–350)
PMV BLD AUTO: 11 FL (ref 9.2–12.9)
POTASSIUM SERPL-SCNC: 4.3 MMOL/L (ref 3.5–5.1)
PROT SERPL-MCNC: 7.2 G/DL (ref 6–8.4)
RBC # BLD AUTO: 4.25 M/UL (ref 4–5.4)
SODIUM SERPL-SCNC: 141 MMOL/L (ref 136–145)
WBC # BLD AUTO: 3.97 K/UL (ref 3.9–12.7)

## 2020-04-01 PROCEDURE — 82787 IGG 1 2 3 OR 4 EACH: CPT | Mod: HCNC

## 2020-04-01 PROCEDURE — 82784 ASSAY IGA/IGD/IGG/IGM EACH: CPT | Mod: HCNC

## 2020-04-01 PROCEDURE — 80053 COMPREHEN METABOLIC PANEL: CPT | Mod: HCNC

## 2020-04-01 PROCEDURE — 85025 COMPLETE CBC W/AUTO DIFF WBC: CPT | Mod: HCNC

## 2020-04-01 PROCEDURE — 36415 COLL VENOUS BLD VENIPUNCTURE: CPT | Mod: HCNC,PO

## 2020-04-01 PROCEDURE — 82784 ASSAY IGA/IGD/IGG/IGM EACH: CPT | Mod: 59,HCNC

## 2020-04-02 ENCOUNTER — PATIENT MESSAGE (OUTPATIENT)
Dept: ALLERGY | Facility: CLINIC | Age: 70
End: 2020-04-02

## 2020-04-07 LAB
IGG1 SER-MCNC: 471 MG/DL (ref 382–929)
IGG2 SER-MCNC: 349 MG/DL (ref 242–700)
IGG3 SER-MCNC: 27 MG/DL (ref 22–176)
IGG4 SER-MCNC: 6 MG/DL (ref 4–86)

## 2020-04-09 ENCOUNTER — OFFICE VISIT (OUTPATIENT)
Dept: ALLERGY | Facility: CLINIC | Age: 70
End: 2020-04-09
Payer: MEDICARE

## 2020-04-09 DIAGNOSIS — J31.0 CHRONIC RHINITIS: ICD-10-CM

## 2020-04-09 DIAGNOSIS — J47.9 BRONCHIECTASIS WITHOUT COMPLICATION: ICD-10-CM

## 2020-04-09 DIAGNOSIS — D83.9 CVID (COMMON VARIABLE IMMUNODEFICIENCY): Primary | ICD-10-CM

## 2020-04-09 DIAGNOSIS — J45.30 MILD PERSISTENT ASTHMA WITHOUT COMPLICATION: ICD-10-CM

## 2020-04-09 PROCEDURE — 1101F PR PT FALLS ASSESS DOC 0-1 FALLS W/OUT INJ PAST YR: ICD-10-PCS | Mod: HCNC,CPTII,95, | Performed by: ALLERGY & IMMUNOLOGY

## 2020-04-09 PROCEDURE — 99213 OFFICE O/P EST LOW 20 MIN: CPT | Mod: HCNC,95,, | Performed by: ALLERGY & IMMUNOLOGY

## 2020-04-09 PROCEDURE — 99499 UNLISTED E&M SERVICE: CPT | Mod: HCNC,95,, | Performed by: ALLERGY & IMMUNOLOGY

## 2020-04-09 PROCEDURE — 99213 PR OFFICE/OUTPT VISIT, EST, LEVL III, 20-29 MIN: ICD-10-PCS | Mod: HCNC,95,, | Performed by: ALLERGY & IMMUNOLOGY

## 2020-04-09 PROCEDURE — 99499 RISK ADDL DX/OHS AUDIT: ICD-10-PCS | Mod: HCNC,95,, | Performed by: ALLERGY & IMMUNOLOGY

## 2020-04-09 PROCEDURE — 1159F PR MEDICATION LIST DOCUMENTED IN MEDICAL RECORD: ICD-10-PCS | Mod: HCNC,95,, | Performed by: ALLERGY & IMMUNOLOGY

## 2020-04-09 PROCEDURE — 1101F PT FALLS ASSESS-DOCD LE1/YR: CPT | Mod: HCNC,CPTII,95, | Performed by: ALLERGY & IMMUNOLOGY

## 2020-04-09 PROCEDURE — 1159F MED LIST DOCD IN RCRD: CPT | Mod: HCNC,95,, | Performed by: ALLERGY & IMMUNOLOGY

## 2020-04-09 NOTE — PROGRESS NOTES
Subjective:       Patient ID: Cornelia Delatorre is a 69 y.o. female.    Chief Complaint:  Annual Exam (CVID)      The patient location is: patient home   The chief complaint leading to consultation is: f/u CVID  Visit type: Virtual visit with synchronous audio and video  Total time spent with patient: 20 minutes  Each patient to whom he or she provides medical services by telemedicine is:  (1) informed of the relationship between the physician and patient and the respective role of any other health care provider with respect to management of the patient; and (2) notified that he or she may decline to receive medical services by telemedicine and may withdraw from such care at any time.    Notes:       70 yo woman presents for continued evaluation of CVID, chronic rhinitis, bronchiectasis and asthma. She was last seen /1/19,. She is on Hizentra 11 g weekly. She is doing well on this, less infections.  No sinusitis or pneumonia. She also has skin test with 1+ to one weed, rest negative. She is on allegra daily, montelukast daily, azelastine 2 SEN BID and Flonase 2 SEN daily. She had labs 8/26/19 with IgG normal 865, subclasses normal,. IgA 183 and IgM 36 as well as CBC and CMP normal. She had labs again 4/1/2020 with IgG 909, al subclasses normal, IgA 165, IgM 33, CMP normal and CBC normal except Hgb 11.7.   She has runny some, congestion, PND and watery eyes.  She has not had any infections requiring antibiotics in last 6 months. No reactions to infusion. She does prefer to say at weekly as opposed to every other week.    Prior History taken 4/11/19: consult from Dr Jonathan Nicole for CVID. She was diagnosed with asthma 2 years ago. She has had up and down with lots of mucus causing cough. She has SOB can only walk short amount before KLINE. She is currently on chronic Zithromax. She does have bronchiectasis. She hash ad pneumonia 2 times in past but none in last 5 years. She does not get frequent sinus infections more  chest. She was ion hospital for exacerbation in 10/2018. She has some sneeze and runny nose but not much. She saw Dr Herrera and is on allergy shots since January but off last month due to asthma. She was prescribed Hizentra but cost was high for her. She is friends with a pt here who has same insurance and gets Hizentra from [facility] and pays nothing so she would like to transfer. She has labs 3/2017 with IgG 693, IgA 191, IgM 22 and IgE <35 with humoral panel protected to 8/14 strep titers. She had pneumovax 3/2016 and Prevnar 3/2017 prior to these labs. She had repeat humoral panel 7/2017 and protected to 8/14. She had another pneumovax 4/27/18 and labs 6/28 still only protected to 8/14. Labs 11/2018 now shoe IgG low at 516 all 4 subclasses low, IgA 132, IgM low at 36. She has normal PFT but CT shows bronchiectasis. She has no eczema. No known food, insect or latex allergy. She has HTN and DM. She did have gastric bypass in past. She never smoked.      Environmental History: see history section for home environment  Review of Systems   Constitutional: Negative for appetite change, chills, fatigue and fever.   HENT: Positive for rhinorrhea and sneezing. Negative for congestion, ear discharge, ear pain, facial swelling, nosebleeds, postnasal drip, sinus pressure, sore throat, trouble swallowing and voice change.    Eyes: Negative for discharge, redness, itching and visual disturbance.   Respiratory: Positive for cough, choking, chest tightness, shortness of breath and wheezing.    Cardiovascular: Negative for chest pain, palpitations and leg swelling.   Gastrointestinal: Negative for abdominal distention, abdominal pain, constipation, diarrhea, nausea and vomiting.   Genitourinary: Negative for difficulty urinating.   Musculoskeletal: Positive for arthralgias. Negative for gait problem, joint swelling and myalgias.   Skin: Negative for color change and rash.   Neurological: Negative for dizziness, syncope, weakness,  light-headedness and headaches.   Hematological: Negative for adenopathy. Does not bruise/bleed easily.   Psychiatric/Behavioral: Negative for agitation, behavioral problems, confusion and sleep disturbance. The patient is not nervous/anxious.         Objective:      Physical Exam   Constitutional: She is oriented to person, place, and time. She appears well-developed and well-nourished. No distress.   HENT:   Head: Normocephalic and atraumatic.   Right Ear: Hearing, tympanic membrane, external ear and ear canal normal.   Left Ear: Hearing, tympanic membrane, external ear and ear canal normal.   Nose: No mucosal edema, rhinorrhea, sinus tenderness or septal deviation. No epistaxis. Right sinus exhibits no maxillary sinus tenderness and no frontal sinus tenderness. Left sinus exhibits no maxillary sinus tenderness and no frontal sinus tenderness.   Mouth/Throat: Uvula is midline, oropharynx is clear and moist and mucous membranes are normal. No uvula swelling.   Eyes: Conjunctivae are normal. Right eye exhibits no discharge. Left eye exhibits no discharge.   Neck: Normal range of motion. No thyromegaly present.   Cardiovascular: Normal rate, regular rhythm and normal heart sounds.   No murmur heard.  Pulmonary/Chest: Effort normal and breath sounds normal. No respiratory distress. She has no wheezes.   Abdominal: Soft. She exhibits no distension. There is no tenderness.   Musculoskeletal: Normal range of motion. She exhibits no edema or tenderness.   Lymphadenopathy:     She has no cervical adenopathy.   Neurological: She is alert and oriented to person, place, and time.   Skin: Skin is warm and dry. No rash noted. No erythema.   Psychiatric: She has a normal mood and affect. Her behavior is normal. Judgment and thought content normal.   Nursing note and vitals reviewed.      Laboratory:   Percutaneous Skin Testing: prick skin test inhalants #60, 8/21/19: 1+ alli/dock weed, 3+ histamine control, remainder tested  negative, see flow sheet  Assessment:       1. CVID (common variable immunodeficiency)    2. Chronic rhinitis    3. Bronchiectasis without complication    4. Mild persistent asthma without complication         Plan:       1. Pt with low IgG, IgM and no response to pneumococcal vaccines so has CVID. Continue IgG replacement - Hizentra 11g weekly- check trough levels 4/2021  2. continue azelastine 2 SEN BID, montelukast 10 mg daily, allegra daily and Flonase 2 SEN daily  3. RTC annually or sooner if needed

## 2020-04-21 ENCOUNTER — PATIENT OUTREACH (OUTPATIENT)
Dept: OTHER | Facility: OTHER | Age: 70
End: 2020-04-21

## 2020-04-21 NOTE — PROGRESS NOTES
BP is trending up, called patient to review.     NA, LVM.     Last 5 Patient Entered Readings                                      Current 30 Day Average: 131/71     Recent Readings 4/19/2020 4/17/2020 4/14/2020 4/13/2020 4/11/2020    SBP (mmHg) 139 132 128 137 140    DBP (mmHg) 71 71 73 67 70    Pulse 70 68 59 65 70        Hypertension Medications             cloNIDine (CATAPRES) 0.1 MG tablet Take 1 tablet (0.1 mg total) by mouth 3 (three) times daily as needed (PRN SBP > 165 mmHg).    diltiaZEM (CARDIZEM CD) 120 MG Cp24 Take 1 capsule (120 mg total) by mouth every evening.    valsartan-hydrochlorothiazide (DIOVAN-HCT) 160-12.5 mg per tablet Take 1 tablet by mouth once daily.

## 2020-04-22 NOTE — PROGRESS NOTES
"[facility]: Clinician Follow-Up    Called patient to follow up.       The history is provided by the patient.     Follow Up  Follow-up reason(s): reading review and routine education      Readings are trending up due to stress surrounding COVID pandemic .    Routine Education Topics: physical activity      Patient is unsure why her BP is trending up recently. Denies dietary changes. We discussed being "homebound" during the COVID pandemic. She does endorse some added stress and anxiety surrounding the current state, and also states she "sits around more" Discussed ways to stay active at home.     Patient endorses adherence to medication regimen. Patient denies hypotensive s/sx (lightheadedness, dizziness, nausea, fatigue); patient denies hypertensive s/sx (SOB, CP, severe headaches, changes in vision). Instructed patient to seek medical care if BP > 160/100 and is accompanied by hypertensive s/sx associated, patient confirms understanding.     BP remains well controlled, she has not used clonidine PRN in months.     Reviewed recent readings. Per 2017 ACC/ AHA HTN guidelines (goal of BP < 130/80), current 30-day average is well controlled.         INTERVENTION(S)  reviewed appropriate dose schedule, recommended physical activity and encouragement/support    PLAN  patient verbalizes understanding, Health  follow up and continue monitoring      Continue current medication regimen. I will continue to monitor regularly and will follow-up in 4 to 6 months.     Patient denies having questions or concerns. Patient has my contact information and knows to call with any concerns or clinical changes.      There are no preventive care reminders to display for this patient.    Last 5 Patient Entered Readings                                      Current 30 Day Average: 131/71     Recent Readings 4/19/2020 4/17/2020 4/14/2020 4/13/2020 4/11/2020    SBP (mmHg) 139 132 128 137 140    DBP (mmHg) 71 71 73 67 70    Pulse 70 " 68 59 65 70             Hypertension Medications             cloNIDine (CATAPRES) 0.1 MG tablet Take 1 tablet (0.1 mg total) by mouth 3 (three) times daily as needed (PRN SBP > 165 mmHg).    diltiaZEM (CARDIZEM CD) 120 MG Cp24 Take 1 capsule (120 mg total) by mouth every evening.    valsartan-hydrochlorothiazide (DIOVAN-HCT) 160-12.5 mg per tablet Take 1 tablet by mouth once daily.                     Sleep Apnea Screening  Patient previously diagnosed with SOHA     She reports she is currently using CPAP     Medication Affordability Screening  Patient is currently having problems affording medications     Medication Adherence Screening   She did not miss a dose this month.

## 2020-05-04 ENCOUNTER — PATIENT MESSAGE (OUTPATIENT)
Dept: PULMONOLOGY | Facility: CLINIC | Age: 70
End: 2020-05-04

## 2020-05-04 ENCOUNTER — OFFICE VISIT (OUTPATIENT)
Dept: PULMONOLOGY | Facility: CLINIC | Age: 70
End: 2020-05-04
Payer: MEDICARE

## 2020-05-04 VITALS
HEART RATE: 64 BPM | BODY MASS INDEX: 34.4 KG/M2 | OXYGEN SATURATION: 99 % | SYSTOLIC BLOOD PRESSURE: 129 MMHG | DIASTOLIC BLOOD PRESSURE: 62 MMHG | WEIGHT: 200.38 LBS

## 2020-05-04 DIAGNOSIS — J45.20 MILD INTERMITTENT ASTHMA WITHOUT COMPLICATION: ICD-10-CM

## 2020-05-04 DIAGNOSIS — R39.15 URINARY URGENCY: ICD-10-CM

## 2020-05-04 DIAGNOSIS — D84.9 IMMUNE DEFICIENCY DISORDER: ICD-10-CM

## 2020-05-04 DIAGNOSIS — J39.8 TRACHEOBRONCHOMALACIA: ICD-10-CM

## 2020-05-04 DIAGNOSIS — D83.9 CVID (COMMON VARIABLE IMMUNODEFICIENCY): ICD-10-CM

## 2020-05-04 DIAGNOSIS — J47.9 BRONCHIECTASIS WITHOUT COMPLICATION: Primary | ICD-10-CM

## 2020-05-04 DIAGNOSIS — N39.0 URINARY TRACT INFECTION WITHOUT HEMATURIA, SITE UNSPECIFIED: ICD-10-CM

## 2020-05-04 DIAGNOSIS — J32.9 OTHER SINUSITIS, UNSPECIFIED CHRONICITY: ICD-10-CM

## 2020-05-04 DIAGNOSIS — J32.9 SINUSITIS, UNSPECIFIED CHRONICITY, UNSPECIFIED LOCATION: ICD-10-CM

## 2020-05-04 PROCEDURE — 1159F MED LIST DOCD IN RCRD: CPT | Mod: HCNC,S$GLB,, | Performed by: INTERNAL MEDICINE

## 2020-05-04 PROCEDURE — 1126F AMNT PAIN NOTED NONE PRSNT: CPT | Mod: HCNC,S$GLB,, | Performed by: INTERNAL MEDICINE

## 2020-05-04 PROCEDURE — 3078F PR MOST RECENT DIASTOLIC BLOOD PRESSURE < 80 MM HG: ICD-10-PCS | Mod: HCNC,CPTII,S$GLB, | Performed by: INTERNAL MEDICINE

## 2020-05-04 PROCEDURE — 99999 PR PBB SHADOW E&M-EST. PATIENT-LVL V: ICD-10-PCS | Mod: PBBFAC,HCNC,, | Performed by: INTERNAL MEDICINE

## 2020-05-04 PROCEDURE — 1101F PT FALLS ASSESS-DOCD LE1/YR: CPT | Mod: HCNC,CPTII,S$GLB, | Performed by: INTERNAL MEDICINE

## 2020-05-04 PROCEDURE — 1126F PR PAIN SEVERITY QUANTIFIED, NO PAIN PRESENT: ICD-10-PCS | Mod: HCNC,S$GLB,, | Performed by: INTERNAL MEDICINE

## 2020-05-04 PROCEDURE — 3074F PR MOST RECENT SYSTOLIC BLOOD PRESSURE < 130 MM HG: ICD-10-PCS | Mod: HCNC,CPTII,S$GLB, | Performed by: INTERNAL MEDICINE

## 2020-05-04 PROCEDURE — 1101F PR PT FALLS ASSESS DOC 0-1 FALLS W/OUT INJ PAST YR: ICD-10-PCS | Mod: HCNC,CPTII,S$GLB, | Performed by: INTERNAL MEDICINE

## 2020-05-04 PROCEDURE — 99214 OFFICE O/P EST MOD 30 MIN: CPT | Mod: HCNC,S$GLB,, | Performed by: INTERNAL MEDICINE

## 2020-05-04 PROCEDURE — 99214 PR OFFICE/OUTPT VISIT, EST, LEVL IV, 30-39 MIN: ICD-10-PCS | Mod: HCNC,S$GLB,, | Performed by: INTERNAL MEDICINE

## 2020-05-04 PROCEDURE — 1159F PR MEDICATION LIST DOCUMENTED IN MEDICAL RECORD: ICD-10-PCS | Mod: HCNC,S$GLB,, | Performed by: INTERNAL MEDICINE

## 2020-05-04 PROCEDURE — 99999 PR PBB SHADOW E&M-EST. PATIENT-LVL V: CPT | Mod: PBBFAC,HCNC,, | Performed by: INTERNAL MEDICINE

## 2020-05-04 PROCEDURE — 3074F SYST BP LT 130 MM HG: CPT | Mod: HCNC,CPTII,S$GLB, | Performed by: INTERNAL MEDICINE

## 2020-05-04 PROCEDURE — 3078F DIAST BP <80 MM HG: CPT | Mod: HCNC,CPTII,S$GLB, | Performed by: INTERNAL MEDICINE

## 2020-05-04 RX ORDER — BUDESONIDE AND FORMOTEROL FUMARATE DIHYDRATE 160; 4.5 UG/1; UG/1
2 AEROSOL RESPIRATORY (INHALATION) EVERY 12 HOURS
Qty: 3 INHALER | Refills: 3 | Status: SHIPPED | OUTPATIENT
Start: 2020-05-04 | End: 2020-10-13 | Stop reason: SINTOL

## 2020-05-04 RX ORDER — AZITHROMYCIN 500 MG/1
500 TABLET, FILM COATED ORAL DAILY
Qty: 3 TABLET | Refills: 5 | Status: SHIPPED | OUTPATIENT
Start: 2020-05-04 | End: 2021-06-11 | Stop reason: CLARIF

## 2020-05-04 RX ORDER — ALBUTEROL SULFATE 90 UG/1
2 AEROSOL, METERED RESPIRATORY (INHALATION) EVERY 4 HOURS PRN
Qty: 54 G | Refills: 3 | Status: SHIPPED | OUTPATIENT
Start: 2020-05-04 | End: 2023-09-08

## 2020-05-04 RX ORDER — PREDNISONE 20 MG/1
TABLET ORAL
Qty: 36 TABLET | Refills: 0 | Status: SHIPPED | OUTPATIENT
Start: 2020-05-04 | End: 2020-10-13 | Stop reason: SDUPTHER

## 2020-05-04 RX ORDER — BUDESONIDE AND FORMOTEROL FUMARATE DIHYDRATE 160; 4.5 UG/1; UG/1
2 AEROSOL RESPIRATORY (INHALATION) EVERY 12 HOURS
Qty: 3 INHALER | Refills: 3 | Status: SHIPPED | OUTPATIENT
Start: 2020-05-04 | End: 2020-05-04 | Stop reason: SDUPTHER

## 2020-05-04 RX ORDER — ALBUTEROL SULFATE 90 UG/1
2 AEROSOL, METERED RESPIRATORY (INHALATION) EVERY 4 HOURS PRN
Qty: 54 G | Refills: 3 | Status: SHIPPED | OUTPATIENT
Start: 2020-05-04 | End: 2020-05-04 | Stop reason: SDUPTHER

## 2020-05-04 RX ORDER — AMOXICILLIN AND CLAVULANATE POTASSIUM 875; 125 MG/1; MG/1
1 TABLET, FILM COATED ORAL EVERY 12 HOURS
Qty: 20 TABLET | Refills: 5 | Status: SHIPPED | OUTPATIENT
Start: 2020-05-04 | End: 2020-12-29

## 2020-05-04 RX ORDER — MONTELUKAST SODIUM 10 MG/1
10 TABLET ORAL NIGHTLY
Qty: 90 TABLET | Refills: 3 | Status: SHIPPED | OUTPATIENT
Start: 2020-05-04 | End: 2021-04-12 | Stop reason: SDUPTHER

## 2020-05-04 RX ORDER — FLUTICASONE PROPIONATE 50 MCG
2 SPRAY, SUSPENSION (ML) NASAL DAILY
Qty: 16 G | Refills: 11 | Status: SHIPPED | OUTPATIENT
Start: 2020-05-04

## 2020-05-04 NOTE — PATIENT INSTRUCTIONS
You need to be cautious re covid risk.  Hirzentra may offer some immunity later-- but will not clearly be protective?      Use prednisone as needed.      Check u/a and culture if augmentin not clearing bladder symptoms.    Use afrin as needed up to only once daily then flonase daily routinely.

## 2020-05-04 NOTE — PROGRESS NOTES
5/4/2020    Cornelia Delatorre  Office f/u    Chief Complaint   Patient presents with    Follow-up     6 months     5/4/2020- uses symbicort daily, uses rescue 2/wk - some anxiety, getting igg rx. Having more sinus problems with head colds- 3 in last 4 wks.  No prednisone- hizentra working well.        Nov 4, 2019- having mucous sensation, notes some sob relaxing - maybe worse night.  No nasal stuffy.  Uses cpap.  Uses symbicort bid, no rescue.  Mucous is clear.  No abx for sinus or lungs.  Getting immune infusions weekly - pt senses doing better since starting in May.  Patient Instructions   Breathing off and on short breath - need to use more albuterol to make clear where breathing problems come from    Mucous production may decrease if airways controlled- albuterol should help clearance.    Rescue inhaler may resolve any breathing problems- should use intermittently if any symptoms.    May use augmentin for sinus/lung problems- not optimal for all uti's       May 6,2019-due to get immune infusion next 2 days.  No prednisone, needs monlukast. abx stopped wks ago- mucous cleared.    Patient Instructions   Use antibiotic daily til immune therapy done a wk - then as needed like every one else  Immune therapy may keep you stable - better than antibiotics.   Use symbicort regular.  Lungs may stabilize.  Use prednisone if needed.  Lung  Nodules are stable for 2 yrs and no follow up needed.  Call if problems- hope all will be better yet.  March 7, 19-exacerbated in late Jan- cleared in feb (vague).  Now ill again yellow and gray mucous (culture last Jan was nl yvonne).  Sl intermittent wheezes since last wk.  Sees Dr Herrera in [place]- gets allergy rx but declined to get now.  Pt has cvid by  igg and igm levels low and pneumococcal antibodies to 8/14 pneumococcal titers. Uses symbicort and singulair routinely.  Took prednisone completed 4 days ago- uses prednisone monthly- was able to skip December  .  Discussed  with patient above for education the following:      Gastric by pass may cause malabsorption, protein levels have been too low and may affect ig levels-- somewhat.   Need to get follow up with dietician to assure adequate protein intake/levels.   Antibiotic may stabilize infections with common variable immune deficiency- azithromycin daily once cultures submitted.   Use augmentin if infection worsens.   Acapella/chest clapping help clear mucous, vest likewise if bronchiectasis.  Will not treat cause.   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  Use codeine to suppress mild coughing.   Need good immune system and no airway/asthma problems and good hygiene of bronchial tubes (no chronic infections) to prevent illness.     Lungs measured near normal with good response to bronchial medications 2017   Need ct to follow up and cultures to assure infection controlled.      Jan 28/2019- Feeling bad onset 2 weeks, took prednisone taper x 6 days and antibiotic Augmentin week prior, States no improvement. Cough- worse at night, no nocturnal arousals, takes cough suppressant syrup before bed. Productive yellow/brown color.   Nebulized albuterol 4x daily. States no fever.  Has started allergy injections waiting for insurance clearance for IGG therapy.   Discussed with patient above for education the following:    CT chest for February to monitor and assess for bronchiectasis, need diagnosis for insurance to cover vest therapy  Have  continue to percuss back with hand.   Recommend purchasing Acepella device to help clear lungs of excess mucous, attaches to nebulizer device  Continue Nebulizer treatments 4x daily as needed.  Chest x-ray now to evaluate for pneumonia  Blood work at hospital   Will yeison to see results of sputum culture and x-ray before repeating antibiotic  Need to return sputum to clinic with in 4 hours of collecting  Fluconazole pills  for oral thrush, this is a recurrent problem related to your weak immune system and use of inhaled steroids. Continue to rinse mouth out after using symbicort but problem will improve after starting immune replacement therapy.    oct 30,   2018-- had one day fever followed by cough/wheeze illness that lingered a wk. Pt seen by NP   [place] 2x, [place]  [place] like dying- only one day fever.  Violent cough.  Nebulizer ppt itch and palpitations- ok  On xopenex now.  Prednisone 40x3, 20 x 3 ,  augmentin cleared.  Now back to normal.  May 14, 2018- had spell /exacerbation with one round prednisone. Breathing good.  Follow-up in about 1 year (around 5/14/2019), or if symptoms worsen or fail to improve.   Discussed with patient above for education the following:     Check blood count and pneumonia vaccine response after last vaccine 4/27/2018   Use azithromycin for any lung/sinus infection- immune weakness likely   Asthma stable- use prednisone and singulair.   Use allergra and singulair and astelin/flonase   Lung nodule in lung still - Navigational bronchoscopy might find?  - will at least re check in a year.     Call if needed, re check next yr.  darlin 15, 2018--1 st illness in yr or so with cough and rattles, no flu.  Appetite ok.  Ill x wk, cough and wheeze and sob and noct worse, resp rx helps a bit.  Hasn't been using  Albuterol   Follow-up in about 6 months (around 7/15/2018).   Discussed with patient above for education the following:      tamiflu if flu.   Need to use albuterol for any lung symptoms.  Should get cough  Better controlled.      Prednisone 20 mg 3 for 3 , taper may be needed.  Give shot today, 1 daily for 3 may be enough with albuterol.   You had high eosinophils-- if asthma becomes more active - special therapy may help??        prevnar 13 would be good - should be off prednisone.  Immune system is sl weak  Protection only to 7.14 pneumonia germs.  oct 19, 2017- has scratchy throat, some sinus  drip, has nightly sensation throat issues, post beryl and carpel tunnel surg.  No sinus lung infections.  Breathing and cough good.  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 Had pos tb gold.  June 21, 2017- had good [place] trip, tapered symbicort with some increase sensation of lung problems so maintained full dose. No infections.  No chest symptoms on symbicort.   April 24, feels a lot better with min cough, vague sob going left side down at night.  Going to [place] 2 weeks.  Uses symbicort and good results.  March 16, cough better, but comes and goes,  No great help with steroids.  Submitted 3 sputums.  Had pneumovax march last yr.  Breathing better. Got symbicort.   Had pneumonia vaccine last yr  3/8/2017 HPI: had onset cough [place] illness, got dizzy few days with diarrhea and cough. Cough clear sm amt mucous. Did have 101 temp one day, fatigue, no muscle aches.  Appetite decreased.  Illnesses lingered 2 weeks but cough never remitted- has improved with inhahler use last 15 days.    Had gastric 2011 bypass with with continued diarrhea intially resolved. No regurg or reflux or swallow problems.   symbicort nearly  Resolved.    Sinuses ok.  No pets.  Never smoker.    Appetite good, feels well now.    headcolds to chest since childhood, nocturnal ??not recalled.  Had cats in past but got rid in 2003 or so.   The chief compliant  problem varies with instablilty at time    PFSH:  Past Medical History:   Diagnosis Date    Allergy     Arthritis     Asthma     Cancer     skin cancer to right hand    Cataract     Chronic fatigue 1/24/2017    CVID (common variable immunodeficiency) 3/7/2019    Diabetes mellitus     resolved with gastric bypass    KLINE (dyspnea on exertion) 1/24/2017    Dyslipidemia 6/25/2019    Encounter for blood transfusion     Essential hypertension 12/29/2011    GERD (gastroesophageal reflux disease)     Headache(784.0)     History of lumpectomy of both  breasts     1992 negative    Hyperlipidemia 7/15/2015    Hypertension     Hypothyroidism     Neuropathy     Obesity     SOHA (obstructive sleep apnea), Auto BIPAP Mod OSAS since Dec 2013, therapeutic and compliant 100%, ESS 6/24 Feb 2014 12/26/2013    SOHA on CPAP     Postmenopausal HRT (hormone replacement therapy)     Rash     Rosacea     Snoring     Squamous cell carcinoma of skin     Wears glasses          Past Surgical History:   Procedure Laterality Date    ADENOIDECTOMY      APPENDECTOMY      BLADDER SUSPENSION      BREAST BIOPSY Bilateral 1992    bilateral benign excisional biopsies    BUNIONECTOMY  10/17/14    right, still has discomfort    CATARACT EXTRACTION W/  INTRAOCULAR LENS IMPLANT Bilateral     CHOLECYSTECTOMY  08/02/2017    COLONOSCOPY      EPIDURAL STEROID INJECTION INTO LUMBAR SPINE N/A 8/14/2019    Procedure: Injection-steroid-epidural-lumbar L5/S1;  Surgeon: Fredi Rojas MD;  Location: [facility] OR;  Service: Pain Management;  Laterality: N/A;    ESOPHAGOGASTRODUODENOSCOPY      dilated esophagus    GASTRIC BYPASS      2011    HYSTERECTOMY  1980    interstim bladder  2009    10/14/14 new battery    OOPHORECTOMY  1980    REPLACEMENT OF SACRAL NERVE STIMULATOR  2/18/2020    Procedure: REPLACEMENT, NEUROSTIMULATOR, SACRAL;  Surgeon: Mary Jane Jarvis MD;  Location: [facility] OR [address] [place] [address];  Service: Urology;;    REVISION OF PROCEDURE INVOLVING SACRAL NEUROSTIMULATOR DEVICE Right 2/18/2020    Procedure: REVISION, NEUROSTIMULATOR, SACRAL/ battery replacement;  Surgeon: Mary Jane Jarvis MD;  Location: [facility] OR [address] [place] [address];  Service: Urology;  Laterality: Right;  1hr/ rep contacted/ (CONNIE)    SKIN CANCER EXCISION      left hand    TONSILLECTOMY      WISDOM TOOTH EXTRACTION       Social History     Tobacco Use    Smoking status: Never Smoker    Smokeless tobacco: Never Used   Substance Use Topics    Alcohol use: No     Frequency: Never     Binge frequency: Never    Drug use: No      Family History   Problem Relation Age of Onset    Breast cancer Mother 50    Diabetes Unknown     Hypertension Unknown     Hypothyroidism Unknown     Breast cancer Paternal Aunt         40's    Ovarian cancer Paternal Grandmother     Allergic rhinitis Neg Hx     Allergies Neg Hx     Angioedema Neg Hx     Asthma Neg Hx     Atopy Neg Hx     Eczema Neg Hx     Immunodeficiency Neg Hx     Rhinitis Neg Hx     Urticaria Neg Hx      Review of patient's allergies indicates:   Allergen Reactions    Sulfa (sulfonamide antibiotics) Hives    Naproxen Other (See Comments)     Other reaction(s): RT sided numbness         Performance Status:The patient's activity level is functions out of house.      Review of Systems:  a review of eleven systems covering constitutional, Eye, HEENT, Psych, Respiratory, Cardiac, GI, , Musculoskeletal, Endocrine, Dermatologic was negative except for pertinent findings as listed ABOVE and below: all good,  Had dm that remitted with bypass 2011.  Positive for SOB, cough severe.    Exam:Comprehensive exam done. /62 (BP Location: Left arm, Patient Position: Sitting)   Pulse 64   Wt 90.9 kg (200 lb 6.4 oz)   SpO2 99% Comment: on room air at rest  BMI 34.40 kg/m²   Exam included Vitals as listed, and patient's appearance and affect and alertness and mood, oral exam for yeast and hygiene and pharynx lesions and Mallapatti (M) score, neck with inspection for jvd and masses and thyroid abnormalities and lymph nodes (supraclavicular and infraclavicular nodes and axillary also examined and noted if abn), chest exam included symmetry and effort and fremitus and percussion and auscultation, cardiac exam included rhythm and gallops and murmur and rubs and jvd and edema, abdominal exam for mass and hepatosplenomegaly and tenderness and hernias and bowel sounds, Musculoskeletal exam with muscle tone and posture and mobility/gait and  strength, and skin for rashes and cyanosis and  pallor and turgor, extremity for clubbing.  Findings were normal except for pertinent findings listed below:  M3, good bs, rest good. Lungs clear-  chest is symmetric, no distress, normal percussion, normal fremitus and good normal breath sounds      Radiographs (ct chest and cxr) reviewed: view by direct vision  i do see clear bronchiectasis,nodules are noted.  Mucoid type impaction suggested in rul ant segment.      April 19, 2018 ct suggest tracheobronchomalacia on high res spot films- seen 3/7/19  CT Chest:  1. Region of endobronchial plugging unchanged since 2/7/17 of uncertain etiology. This could relate to a process such as allergic bronchopulmonary aspergillosis, a solid mass nodule, mucous plugging, residual from aspiration, endobronchial spread of disease. Further followup or evaluation is necessary  Electronically signed by: Raffi Gottlieb MD  Date: 03/14/17    10/17/19 Chest X-ray clear      Labs reviewed igm low, igg lowish, humerol titers na    CBC reviewed October 10/17/18    PFT  [facility] with 10% response, otherwise nl  PULMONARY FUNCTION TEST REPORT      DATE OF PROCEDURE:  03/24/2017     1.  Spirometry reveals an FVC of 3.1 L, which is 107% predicted.  FEV1 is 2.3 L,   which is 100% predicted.  The ratio, there is preserved.  There is a positive,   but not significant bronchodilator response to both the FVC and FEV1.  Loop   contours appear with adequate effort as well.  2.  Lung volumes.  The total lung capacity is 4.4 L, which is 92% predicted.    There are no signs of gas trapping.  3.  Diffusing capacity is mild-to-moderate reduced at 68%, does improve to 96%   with alveolar volumes.     OVERALL IMPRESSION:  A normal spirometry with a robust yet statistically   insignificant bronchodilator response.  Normal lung volumes and a moderate   reduction in diffusion capacity.    CC: Jonathan Nicole M.D.            Plan:  Clinical impression is resonably certain and repeated evaluation prn +/- follow  up will be needed as below.    Cornelia was seen today for follow-up.    Diagnoses and all orders for this visit:    Bronchiectasis without complication  -     amoxicillin-clavulanate 875-125mg (AUGMENTIN) 875-125 mg per tablet; Take 1 tablet by mouth every 12 (twelve) hours.    Mild intermittent asthma without complication  -     albuterol (PROVENTIL/VENTOLIN HFA) 90 mcg/actuation inhaler; Inhale 2 puffs into the lungs every 4 (four) hours as needed. 2 puffs every 4 hours as needed for cough, wheeze, or shortness of breath  -     amoxicillin-clavulanate 875-125mg (AUGMENTIN) 875-125 mg per tablet; Take 1 tablet by mouth every 12 (twelve) hours.  -     predniSONE (DELTASONE) 20 MG tablet; 3 for 3 days then 2 for 3 days then one for 3 days and repeat for breathing problems  -     SYMBICORT 160-4.5 mcg/actuation HFAA; Inhale 2 puffs into the lungs every 12 (twelve) hours.    Tracheobronchomalacia    Immune deficiency disorder    CVID (common variable immunodeficiency)  -     amoxicillin-clavulanate 875-125mg (AUGMENTIN) 875-125 mg per tablet; Take 1 tablet by mouth every 12 (twelve) hours.    Other sinusitis, unspecified chronicity  -     fluticasone propionate (FLONASE) 50 mcg/actuation nasal spray; 2 sprays (100 mcg total) by Each Nostril route once daily.    Sinusitis, unspecified chronicity, unspecified location  -     fluticasone propionate (FLONASE) 50 mcg/actuation nasal spray; 2 sprays (100 mcg total) by Each Nostril route once daily.    Urinary urgency  -     Urinalysis, Reflex to Urine Culture Urine, Clean Catch    Urinary tract infection without hematuria, site unspecified  -     Urinalysis, Reflex to Urine Culture Urine, Clean Catch    Other orders  -     azithromycin (ZITHROMAX) 500 MG tablet; Take 1 tablet (500 mg total) by mouth once daily.        Follow up in about 9 months (around 2/4/2021), or if symptoms worsen or fail to improve.          Discussed with patient above for education the following:       Patient Instructions   You need to be cautious re covid risk.  Hirzentra may offer some immunity later-- but will not clearly be protective?      Use prednisone as needed.      Check u/a and culture if augmentin not clearing bladder symptoms.    Use afrin as needed up to only once daily then flonase daily routinely.

## 2020-05-05 ENCOUNTER — PATIENT MESSAGE (OUTPATIENT)
Dept: ADMINISTRATIVE | Facility: HOSPITAL | Age: 70
End: 2020-05-05

## 2020-05-20 ENCOUNTER — PATIENT OUTREACH (OUTPATIENT)
Dept: OTHER | Facility: OTHER | Age: 70
End: 2020-05-20

## 2020-05-20 ENCOUNTER — TELEPHONE (OUTPATIENT)
Dept: UROLOGY | Facility: CLINIC | Age: 70
End: 2020-05-20

## 2020-05-20 DIAGNOSIS — R30.0 DYSURIA: Primary | ICD-10-CM

## 2020-05-21 ENCOUNTER — LAB VISIT (OUTPATIENT)
Dept: LAB | Facility: HOSPITAL | Age: 70
End: 2020-05-21
Attending: UROLOGY
Payer: MEDICARE

## 2020-05-21 DIAGNOSIS — R30.0 DYSURIA: ICD-10-CM

## 2020-05-21 PROCEDURE — 87088 URINE BACTERIA CULTURE: CPT | Mod: HCNC

## 2020-05-21 PROCEDURE — 87186 SC STD MICRODIL/AGAR DIL: CPT | Mod: HCNC

## 2020-05-21 PROCEDURE — 87077 CULTURE AEROBIC IDENTIFY: CPT | Mod: HCNC

## 2020-05-21 PROCEDURE — 87086 URINE CULTURE/COLONY COUNT: CPT | Mod: HCNC

## 2020-05-24 LAB — BACTERIA UR CULT: ABNORMAL

## 2020-05-25 ENCOUNTER — TELEPHONE (OUTPATIENT)
Dept: ENDOCRINOLOGY | Facility: CLINIC | Age: 70
End: 2020-05-25

## 2020-05-25 ENCOUNTER — TELEPHONE (OUTPATIENT)
Dept: UROLOGY | Facility: CLINIC | Age: 70
End: 2020-05-25

## 2020-05-25 DIAGNOSIS — N30.90 BLADDER INFECTION: Primary | ICD-10-CM

## 2020-05-25 RX ORDER — DOXYCYCLINE 100 MG/1
100 CAPSULE ORAL 2 TIMES DAILY
Qty: 14 CAPSULE | Refills: 0 | Status: SHIPPED | OUTPATIENT
Start: 2020-05-25 | End: 2020-06-01

## 2020-05-25 NOTE — TELEPHONE ENCOUNTER
Doxycycline sent to preferred pharmacy as she had a pan sensitive E coli.  Last abx was augmentin. Allergies, meds  Reviewed and creatinine were normal.

## 2020-05-25 NOTE — TELEPHONE ENCOUNTER
S/w pt. Advised Dr. Ponce's Dec schedule does not open until June 1st. Pt verbalized understanding and will call back to schedule.

## 2020-05-25 NOTE — TELEPHONE ENCOUNTER
----- Message from Mehreen Valdivia sent at 5/25/2020  9:42 AM CDT -----  Contact: patient  Type: Needs Medical Advice  Who Called:  Patient  Symptoms (please be specific):    How long has patient had these symptoms:    Pharmacy name and phone #:    Best Call Back Number: [phone]  Additional Information: requesting a call back to schedule annual recall letter in [facility].wouldn't allow me to schedule in epic

## 2020-05-25 NOTE — TELEPHONE ENCOUNTER
----- Message from Sabiha Valerio MA sent at 5/25/2020  8:36 AM CDT -----  Contact:  [phone] (M)  Pt said that she received a results in the portal that her urine was positive for infection.  She would like to know what medication is being called and to please call it in as early as possible because her pharmacy closes at 1:00 today.  Pt said that Cefdinir 300 mg 2 a day for 7 days is what was called infor her before, It works and it covered by her insurance    [facility] [address] [facility] [address] [phone] (Phone)  [phone] (Fax)        [phone] (M)

## 2020-05-25 NOTE — TELEPHONE ENCOUNTER
----- Message from Mehreen Valdivia sent at 5/25/2020  9:42 AM CDT -----  Contact: patient  Type: Needs Medical Advice  Who Called:  Patient  Symptoms (please be specific):    How long has patient had these symptoms:    Pharmacy name and phone #:    Best Call Back Number: [phone]  Additional Information: requesting a call back to schedule annual recall letter in [facility].wouldn't allow me to schedule in epic

## 2020-06-10 ENCOUNTER — TELEPHONE (OUTPATIENT)
Dept: OTOLARYNGOLOGY | Facility: CLINIC | Age: 70
End: 2020-06-10

## 2020-06-10 NOTE — TELEPHONE ENCOUNTER
----- Message from Lily Hodge sent at 6/10/2020 11:08 AM CDT -----  Contact: Pt  Type: Sooner appointment request    Caller is requesting a sooner appointment.    Who Called:  Pt  When is the first available appointment:    Symptoms: hoarseness  Best call back number:  [phone]  Additional Information:  Pt requesting a call back to schedule an appt

## 2020-06-12 ENCOUNTER — OFFICE VISIT (OUTPATIENT)
Dept: OTOLARYNGOLOGY | Facility: CLINIC | Age: 70
End: 2020-06-12
Payer: MEDICARE

## 2020-06-12 VITALS — BODY MASS INDEX: 34.44 KG/M2 | HEIGHT: 64 IN | WEIGHT: 201.75 LBS

## 2020-06-12 DIAGNOSIS — J04.0 ACUTE LARYNGITIS: ICD-10-CM

## 2020-06-12 DIAGNOSIS — R49.0 DYSPHONIA: Primary | ICD-10-CM

## 2020-06-12 PROCEDURE — 99999 PR PBB SHADOW E&M-EST. PATIENT-LVL IV: ICD-10-PCS | Mod: PBBFAC,HCNC,, | Performed by: OTOLARYNGOLOGY

## 2020-06-12 PROCEDURE — 1101F PR PT FALLS ASSESS DOC 0-1 FALLS W/OUT INJ PAST YR: ICD-10-PCS | Mod: HCNC,CPTII,S$GLB, | Performed by: OTOLARYNGOLOGY

## 2020-06-12 PROCEDURE — 31575 PR LARYNGOSCOPY, FLEXIBLE; DIAGNOSTIC: ICD-10-PCS | Mod: HCNC,S$GLB,, | Performed by: OTOLARYNGOLOGY

## 2020-06-12 PROCEDURE — 99999 PR PBB SHADOW E&M-EST. PATIENT-LVL IV: CPT | Mod: PBBFAC,HCNC,, | Performed by: OTOLARYNGOLOGY

## 2020-06-12 PROCEDURE — 1126F PR PAIN SEVERITY QUANTIFIED, NO PAIN PRESENT: ICD-10-PCS | Mod: HCNC,S$GLB,, | Performed by: OTOLARYNGOLOGY

## 2020-06-12 PROCEDURE — 1101F PT FALLS ASSESS-DOCD LE1/YR: CPT | Mod: HCNC,CPTII,S$GLB, | Performed by: OTOLARYNGOLOGY

## 2020-06-12 PROCEDURE — 1159F PR MEDICATION LIST DOCUMENTED IN MEDICAL RECORD: ICD-10-PCS | Mod: HCNC,S$GLB,, | Performed by: OTOLARYNGOLOGY

## 2020-06-12 PROCEDURE — 99214 PR OFFICE/OUTPT VISIT, EST, LEVL IV, 30-39 MIN: ICD-10-PCS | Mod: 25,HCNC,S$GLB, | Performed by: OTOLARYNGOLOGY

## 2020-06-12 PROCEDURE — 99214 OFFICE O/P EST MOD 30 MIN: CPT | Mod: 25,HCNC,S$GLB, | Performed by: OTOLARYNGOLOGY

## 2020-06-12 PROCEDURE — 1126F AMNT PAIN NOTED NONE PRSNT: CPT | Mod: HCNC,S$GLB,, | Performed by: OTOLARYNGOLOGY

## 2020-06-12 PROCEDURE — 1159F MED LIST DOCD IN RCRD: CPT | Mod: HCNC,S$GLB,, | Performed by: OTOLARYNGOLOGY

## 2020-06-12 PROCEDURE — 31575 DIAGNOSTIC LARYNGOSCOPY: CPT | Mod: HCNC,S$GLB,, | Performed by: OTOLARYNGOLOGY

## 2020-06-12 RX ORDER — FLUCONAZOLE 100 MG/1
100 TABLET ORAL DAILY
Qty: 10 TABLET | Refills: 0 | Status: SHIPPED | OUTPATIENT
Start: 2020-06-12 | End: 2020-06-22 | Stop reason: SDUPTHER

## 2020-06-12 NOTE — PROGRESS NOTES
Subjective:       Patient ID: Cornelia Delatorre is a 69 y.o. female.    Chief Complaint: No chief complaint on file.    Cornelia is here for follow-up of chronic laryngitis.   Began having increased hoarseness, strained voice, and pain x 3 days.     She has responded to anti-fungals in the past.     She does use Symbicort    Review of Systems   Constitutional: Negative for activity change and appetite change.   Respiratory: Negative for difficulty breathing and wheezing   Cardiovascular: Negative for chest pain.      Objective:        Constitutional:   Vital signs are normal. She appears well-developed and well-nourished.     Head:  Normocephalic and atraumatic.     Ears:  Hearing normal to normal and whispered voice; external ear normal without scars, lesions, or masses; ear canal, tympanic membrane, and middle ear normal..     Nose:  Nose normal including turbinates, nasal mucosa, sinuses and nasal septum.     Mouth/Throat  Oropharynx clear and moist without lesions or asymmetry.   Mild to mod int raspy quality to voice  Strong gag      Neck:  Neck normal without thyromegaly masses, asymmetry, normal tracheal structure, crepitus, and tenderness.         Tests / Res.ults:  Pre-procedure diagnosis: There were no encounter diagnoses.     Post-procedure diagnosis: same    Procedure: Flexible fiberoptic laryngoscopy    Surgeon: Fly Johnson MD    Anesthesia: 2% Lidocaine with Phenylephrine topical    Risks, benefits, and alternatives of the procedure were discussed with the patient, and the patient consented to the fiberoptic examination.  We applied a topical nasal decongestant and analgesic.  After adequate anesthesia was obtained, the flexible fiberoptic scope was passed through the nasal cavity. The entire pharynx (nasopharynx to hypopharynx) and the larynx were visualized. At the end of the examination, the scope was removed. The patient tolerated the procedure well with no complications.     Findings:  -      Laryngeal mucosa is normal  -     Post-cricoid region: normal  -     Lingual tonsils have mild hypertrophy  -     Adenoids have no  hypertrophy  -     Right vocal fold: normal mobility     mass/lesion: medial edge thickening and erythema  -     Left vocal fold: normal mobility     mass/lesion: medial edge thickening and erythema  -     Other findings: not as prominent as previous      Assessment:       No diagnosis found.      Plan:       Responded generally well to medical therapy. New spacer ordered.  Diflucan x 10 days  Voice rest  FU if persistent

## 2020-06-15 ENCOUNTER — PATIENT OUTREACH (OUTPATIENT)
Dept: ADMINISTRATIVE | Facility: OTHER | Age: 70
End: 2020-06-15

## 2020-06-16 ENCOUNTER — OFFICE VISIT (OUTPATIENT)
Dept: DERMATOLOGY | Facility: CLINIC | Age: 70
End: 2020-06-16
Payer: MEDICARE

## 2020-06-16 VITALS — RESPIRATION RATE: 18 BRPM | WEIGHT: 198.44 LBS | BODY MASS INDEX: 33.88 KG/M2 | HEIGHT: 64 IN

## 2020-06-16 DIAGNOSIS — L57.0 ACTINIC KERATOSES: ICD-10-CM

## 2020-06-16 DIAGNOSIS — B07.8 COMMON WART: ICD-10-CM

## 2020-06-16 DIAGNOSIS — Z85.828 PERSONAL HISTORY OF OTHER MALIGNANT NEOPLASM OF SKIN: Primary | ICD-10-CM

## 2020-06-16 DIAGNOSIS — Z12.83 SKIN CANCER SCREENING: ICD-10-CM

## 2020-06-16 DIAGNOSIS — L90.5 SCAR: ICD-10-CM

## 2020-06-16 PROCEDURE — 17110 DESTRUCTION B9 LES UP TO 14: CPT | Mod: HCNC,S$GLB,, | Performed by: DERMATOLOGY

## 2020-06-16 PROCEDURE — 99999 PR PBB SHADOW E&M-EST. PATIENT-LVL II: CPT | Mod: PBBFAC,HCNC,, | Performed by: DERMATOLOGY

## 2020-06-16 PROCEDURE — 1159F PR MEDICATION LIST DOCUMENTED IN MEDICAL RECORD: ICD-10-PCS | Mod: HCNC,S$GLB,, | Performed by: DERMATOLOGY

## 2020-06-16 PROCEDURE — 1101F PT FALLS ASSESS-DOCD LE1/YR: CPT | Mod: HCNC,CPTII,S$GLB, | Performed by: DERMATOLOGY

## 2020-06-16 PROCEDURE — 99999 PR PBB SHADOW E&M-EST. PATIENT-LVL II: ICD-10-PCS | Mod: PBBFAC,HCNC,, | Performed by: DERMATOLOGY

## 2020-06-16 PROCEDURE — 99214 PR OFFICE/OUTPT VISIT, EST, LEVL IV, 30-39 MIN: ICD-10-PCS | Mod: 25,HCNC,S$GLB, | Performed by: DERMATOLOGY

## 2020-06-16 PROCEDURE — 99214 OFFICE O/P EST MOD 30 MIN: CPT | Mod: 25,HCNC,S$GLB, | Performed by: DERMATOLOGY

## 2020-06-16 PROCEDURE — 1126F PR PAIN SEVERITY QUANTIFIED, NO PAIN PRESENT: ICD-10-PCS | Mod: HCNC,S$GLB,, | Performed by: DERMATOLOGY

## 2020-06-16 PROCEDURE — 1159F MED LIST DOCD IN RCRD: CPT | Mod: HCNC,S$GLB,, | Performed by: DERMATOLOGY

## 2020-06-16 PROCEDURE — 17110 PR DESTRUCTION BENIGN LESIONS UP TO 14: ICD-10-PCS | Mod: HCNC,S$GLB,, | Performed by: DERMATOLOGY

## 2020-06-16 PROCEDURE — 1101F PR PT FALLS ASSESS DOC 0-1 FALLS W/OUT INJ PAST YR: ICD-10-PCS | Mod: HCNC,CPTII,S$GLB, | Performed by: DERMATOLOGY

## 2020-06-16 PROCEDURE — 1126F AMNT PAIN NOTED NONE PRSNT: CPT | Mod: HCNC,S$GLB,, | Performed by: DERMATOLOGY

## 2020-06-16 NOTE — PROGRESS NOTES
Subjective:       Patient ID:  Cornelia Delatorre is a 69 y.o. female who presents for   Chief Complaint   Patient presents with    Skin Check    Lesion     bi-lat arms     70 y/o female last seen 1-14-20   Presents today for skin check   Wart to right 3rd digit approx 6 wks   Lesions to bi-lat arms - months   Dry skin around lips         Phx skin ca R hand and nose - Dr Parra - 2014   Phx SCC L hand -mohs Dr Jones  -2015   History of actinic keratoses on nasal Dorsum in the past hx of efudex use  Uses CeraVe AM cream daily for routine     Past Medical History:  No date: Allergy  No date: Arthritis  No date: Asthma  No date: Cancer      Comment:  skin cancer to right hand  No date: Cataract  1/24/2017: Chronic fatigue  3/7/2019: CVID (common variable immunodeficiency)  No date: Diabetes mellitus      Comment:  resolved with gastric bypass  1/24/2017: KLINE (dyspnea on exertion)  6/25/2019: Dyslipidemia  No date: Encounter for blood transfusion  12/29/2011: Essential hypertension  No date: GERD (gastroesophageal reflux disease)  No date: Headache(784.0)  No date: History of lumpectomy of both breasts      Comment:  1992 negative  7/15/2015: Hyperlipidemia  No date: Hypertension  No date: Hypothyroidism  No date: Neuropathy  No date: Obesity  12/26/2013: SOHA (obstructive sleep apnea), Auto BIPAP Mod OSAS since   Dec 2013, therapeutic and compliant 100%, ESS 6/24 Feb 2014  No date: SOHA on CPAP  No date: Postmenopausal HRT (hormone replacement therapy)  No date: Rash  No date: Rosacea  No date: Snoring  No date: Squamous cell carcinoma of skin      Comment:  left forearm   No date: Wears glasses        Review of Systems   Skin: Positive for daily sunscreen use. Negative for itching, rash, dry skin, sensitivity to antibiotic ointment and sensitivity to bandage adhesive.   Hematologic/Lymphatic: Bruises/bleeds easily (forearms, takes asa and prednisone for asthma).        Objective:    Physical Exam    Constitutional: She appears well-developed and well-nourished. No distress.   HENT:   Mouth/Throat: Lips normal.    Eyes: Lids are normal.  No conjunctival no injection.   Cardiovascular: There is no local extremity swelling and no dependent edema.     Neurological: She is alert and oriented to person, place, and time. She is not disoriented.   Psychiatric: She has a normal mood and affect.   Skin:   Areas Examined (abnormalities noted in diagram):   Head / Face Inspection Performed  Neck Inspection Performed  Chest / Axilla Inspection Performed  RUE Inspected  LUE Inspection Performed                       Diagram Legend     Erythematous scaling macule/papule c/w actinic keratosis       Vascular papule c/w angioma      Pigmented verrucoid papule/plaque c/w seborrheic keratosis      Yellow umbilicated papule c/w sebaceous hyperplasia      Irregularly shaped tan macule c/w lentigo     1-2 mm smooth white papules consistent with Milia      Movable subcutaneous cyst with punctum c/w epidermal inclusion cyst      Subcutaneous movable cyst c/w pilar cyst      Firm pink to brown papule c/w dermatofibroma      Pedunculated fleshy papule(s) c/w skin tag(s)      Evenly pigmented macule c/w junctional nevus     Mildly variegated pigmented, slightly irregular-bordered macule c/w mildly atypical nevus      Flesh colored to evenly pigmented papule c/w intradermal nevus       Pink pearly papule/plaque c/w basal cell carcinoma      Erythematous hyperkeratotic cursted plaque c/w SCC      Surgical scar with no sign of skin cancer recurrence      Open and closed comedones      Inflammatory papules and pustules      Verrucoid papule consistent consistent with wart     Erythematous eczematous patches and plaques     Dystrophic onycholytic nail with subungual debris c/w onychomycosis     Umbilicated papule    Erythematous-base heme-crusted tan verrucoid plaque consistent with inflamed seborrheic keratosis     Erythematous Silvery  Scaling Plaque c/w Psoriasis     See annotation      Assessment / Plan:        Personal history of other malignant neoplasm of skin  Area(s) of previous NMSC evaluated with no signs of recurrence.    Upper body skin examination performed today including at least 6 points as noted in physical examination. No lesions suspicious for malignancy noted.      Skin cancer screening  Area(s) of previous NMSC evaluated with no signs of recurrence.    Upper body skin examination performed today including at least 6 points as noted in physical examination. No lesions suspicious for malignancy noted.    Common wart  Cryosurgery procedure note:    Verbal consent from the patient is obtained. Liquid nitrogen cryosurgery is applied to 1 lesions to produce a freeze injury. The patient is aware that blisters may form and is instructed on wound care with gentle cleansing and use of vaseline ointment to keep moist until healed. The patient is supplied a handout on cryosurgery and is instructed to call if lesions do not completely resolve. Risk of dyspigmentation discussed.     Actinic keratoses, cutaneous lip  Recommend efudex bid x 1 week to each area, has Rx at home. One area at a time.     Scar, left forearm  At site of biopsy proven scar, offered ILK , declines         No follow-ups on file.

## 2020-06-18 DIAGNOSIS — R25.2 CRAMP IN MUSCLE: ICD-10-CM

## 2020-06-18 DIAGNOSIS — E78.5 DYSLIPIDEMIA: ICD-10-CM

## 2020-06-18 RX ORDER — PRAVASTATIN SODIUM 40 MG/1
40 TABLET ORAL DAILY
Qty: 90 TABLET | Refills: 2 | Status: SHIPPED | OUTPATIENT
Start: 2020-06-18 | End: 2020-11-04 | Stop reason: SDUPTHER

## 2020-06-18 RX ORDER — POTASSIUM CHLORIDE 20 MEQ/1
20 TABLET, EXTENDED RELEASE ORAL ONCE
Qty: 90 TABLET | Refills: 1 | Status: SHIPPED | OUTPATIENT
Start: 2020-06-18 | End: 2020-06-18

## 2020-06-18 NOTE — PROGRESS NOTES
Refill Authorization Note    is requesting a refill authorization.    Brief assessment and rationale for refill: APPROVE: prr          Medication Therapy Plan: approve 9 more    Medication reconciliation completed: No                         Comments:      Requested Prescriptions   Signed Prescriptions Disp Refills    pravastatin (PRAVACHOL) 40 MG tablet 90 tablet 2     Sig: Take 1 tablet (40 mg total) by mouth once daily.       Cardiovascular:  Antilipid - Statins Passed - 6/18/2020  3:19 PM        Passed - Patient is at least 18 years old        Passed - Office visit in past 12 months or future 90 days.     Recent Outpatient Visits            2 days ago Personal history of other malignant neoplasm of skin    [place] - Dermatology Kailyn Fletcher MD    6 days ago Dysphonia    [place] - ENT Fly Johnson MD    1 month ago Bronchiectasis without complication    [facility] - Pulmonary Jonathan Nicole MD    2 months ago CVID (common variable immunodeficiency)    [place] - Allergy Jessica Mcmullen MD    3 months ago Routine physical examination    [facility] Medicine Armond Cortez MD          Future Appointments              In 2 months URINE [facility] [place]    In 2 months LAB, [place] [facility] [place]    In 2 months Armond Cortez MD [facility]    In 2 months [facility] 4th [facility], [place] rogelio    In 4 months [facility]ailyn Fletcher MD [facility]    In 4 months Juan Alberto Noel MD [facility]    In 5 months LAB, [place] [facility] [place]    In 5 months [facility]    In 6 months Ayaz Ponce DO [facility]    In 7 months Jonathan Nicole MD [facility] - Pulmonary, [facility]                Passed - Lipid Panel completed in last 360 days     Lab Results    Component Value Date    CHOL 167 02/21/2020    HDL 62 02/21/2020    LDLCALC 87.4 02/21/2020    TRIG 88 02/21/2020             Passed - ALT is 94 or below and within 360 days     ALT   Date Value Ref Range Status   04/01/2020 25 10 - 44 U/L Final   02/21/2020 18 10 - 44 U/L Final   01/13/2020 27 10 - 44 U/L Final              Passed - AST is 54 or below and within 360 days     AST   Date Value Ref Range Status   04/01/2020 33 10 - 40 U/L Final   02/21/2020 25 10 - 40 U/L Final   01/13/2020 30 14 - 36 U/L Final                  Appointments  past 12m or future 3m with PCP    Date Provider   Last Visit   3/11/2020 Armond Cortez MD   Next Visit   6/18/2020 Armond Cortez MD   ED visits in past 90 days: 0     Note composed:3:35 PM 06/18/2020

## 2020-06-19 ENCOUNTER — PATIENT MESSAGE (OUTPATIENT)
Dept: FAMILY MEDICINE | Facility: CLINIC | Age: 70
End: 2020-06-19

## 2020-06-19 ENCOUNTER — PATIENT MESSAGE (OUTPATIENT)
Dept: OTOLARYNGOLOGY | Facility: CLINIC | Age: 70
End: 2020-06-19

## 2020-06-22 ENCOUNTER — TELEPHONE (OUTPATIENT)
Dept: OTOLARYNGOLOGY | Facility: CLINIC | Age: 70
End: 2020-06-22

## 2020-06-22 RX ORDER — FLUCONAZOLE 100 MG/1
200 TABLET ORAL DAILY
Qty: 10 TABLET | Refills: 0 | Status: SHIPPED | OUTPATIENT
Start: 2020-06-22 | End: 2020-07-02

## 2020-06-22 NOTE — TELEPHONE ENCOUNTER
----- Message from [employer] sent at 6/22/2020  2:54 PM CDT -----  Type:  Pharmacy Calling to Clarify an RX    Name of Caller:  sterling  Pharmacy Name:  [facility]  Prescription Name:  fluconazole (DIFLUCAN) 100 MG tablet  What do they need to clarify?:  quantity and directions  Best Call Back Number:  [phone]  Additional Information:

## 2020-06-30 ENCOUNTER — OFFICE VISIT (OUTPATIENT)
Dept: OTOLARYNGOLOGY | Facility: CLINIC | Age: 70
End: 2020-06-30
Payer: MEDICARE

## 2020-06-30 VITALS — BODY MASS INDEX: 34.06 KG/M2 | WEIGHT: 199.5 LBS | HEIGHT: 64 IN

## 2020-06-30 DIAGNOSIS — J37.0 CHRONIC LARYNGITIS: ICD-10-CM

## 2020-06-30 DIAGNOSIS — R49.0 DYSPHONIA: Primary | ICD-10-CM

## 2020-06-30 PROCEDURE — 1159F PR MEDICATION LIST DOCUMENTED IN MEDICAL RECORD: ICD-10-PCS | Mod: HCNC,S$GLB,, | Performed by: OTOLARYNGOLOGY

## 2020-06-30 PROCEDURE — 99214 OFFICE O/P EST MOD 30 MIN: CPT | Mod: HCNC,S$GLB,, | Performed by: OTOLARYNGOLOGY

## 2020-06-30 PROCEDURE — 1126F PR PAIN SEVERITY QUANTIFIED, NO PAIN PRESENT: ICD-10-PCS | Mod: HCNC,S$GLB,, | Performed by: OTOLARYNGOLOGY

## 2020-06-30 PROCEDURE — 1126F AMNT PAIN NOTED NONE PRSNT: CPT | Mod: HCNC,S$GLB,, | Performed by: OTOLARYNGOLOGY

## 2020-06-30 PROCEDURE — 99999 PR PBB SHADOW E&M-EST. PATIENT-LVL V: CPT | Mod: PBBFAC,HCNC,, | Performed by: OTOLARYNGOLOGY

## 2020-06-30 PROCEDURE — 3008F PR BODY MASS INDEX (BMI) DOCUMENTED: ICD-10-PCS | Mod: HCNC,CPTII,S$GLB, | Performed by: OTOLARYNGOLOGY

## 2020-06-30 PROCEDURE — 1101F PT FALLS ASSESS-DOCD LE1/YR: CPT | Mod: HCNC,CPTII,S$GLB, | Performed by: OTOLARYNGOLOGY

## 2020-06-30 PROCEDURE — 1159F MED LIST DOCD IN RCRD: CPT | Mod: HCNC,S$GLB,, | Performed by: OTOLARYNGOLOGY

## 2020-06-30 PROCEDURE — 99214 PR OFFICE/OUTPT VISIT, EST, LEVL IV, 30-39 MIN: ICD-10-PCS | Mod: HCNC,S$GLB,, | Performed by: OTOLARYNGOLOGY

## 2020-06-30 PROCEDURE — 1101F PR PT FALLS ASSESS DOC 0-1 FALLS W/OUT INJ PAST YR: ICD-10-PCS | Mod: HCNC,CPTII,S$GLB, | Performed by: OTOLARYNGOLOGY

## 2020-06-30 PROCEDURE — 99999 PR PBB SHADOW E&M-EST. PATIENT-LVL V: ICD-10-PCS | Mod: PBBFAC,HCNC,, | Performed by: OTOLARYNGOLOGY

## 2020-06-30 PROCEDURE — 3008F BODY MASS INDEX DOCD: CPT | Mod: HCNC,CPTII,S$GLB, | Performed by: OTOLARYNGOLOGY

## 2020-06-30 RX ORDER — DOXYCYCLINE HYCLATE 100 MG
100 TABLET ORAL DAILY
Qty: 10 TABLET | Refills: 0 | Status: SHIPPED | OUTPATIENT
Start: 2020-06-30 | End: 2020-07-10

## 2020-06-30 RX ORDER — POTASSIUM CHLORIDE 20 MEQ/1
TABLET, EXTENDED RELEASE ORAL
COMMUNITY
Start: 2020-06-19 | End: 2020-09-11 | Stop reason: SDUPTHER

## 2020-06-30 NOTE — PROGRESS NOTES
Subjective:       Patient ID: Cornelia Delatorre is a 69 y.o. female.    Chief Complaint: Follow-up (voice)      Cornelia is here for follow-up of chronic laryngitis.  She was having an increase in symptoms 2 weeks ago and she has been on Diflucan for 2 weeks including an increased dose without improvement. Voice is worse.    Review of Systems   Constitutional: Negative for activity change and appetite change.   Respiratory: Negative for difficulty breathing and wheezing   Cardiovascular: Negative for chest pain.      Objective:        Constitutional:   Vital signs are normal. She appears well-developed and well-nourished.     Head:  Normocephalic and atraumatic.     Ears:  Hearing normal to normal and whispered voice; external ear normal without scars, lesions, or masses; ear canal, tympanic membrane, and middle ear normal..     Nose:  Nose normal including turbinates, nasal mucosa, sinuses and nasal septum.     Mouth/Throat  Oropharynx clear and moist without lesions or asymmetry.   Mod-sev raspy quality  Persistent medial edge erythema increased since last visit.       Neck:  Neck normal without thyromegaly masses, asymmetry, normal tracheal structure, crepitus, and tenderness.         Tests / Results:  None    Assessment:       1. Dysphonia    2. Chronic laryngitis          Plan:         Doxy x 10 days + take home Pred x 5 days  Voice rest completely for 1 week  May need to cut back inhaler if persistent

## 2020-06-30 NOTE — PATIENT INSTRUCTIONS
Continue hydration  Complete voice rest for 7-10 days. Try not to use voice at all if possible.   Take the steroid for 5 days and antibiotics  It can take a few weeks of delay to begin seeing noticeable improvement.  We need to look at your inhalers if this issue persists.

## 2020-07-10 ENCOUNTER — TELEPHONE (OUTPATIENT)
Dept: PULMONOLOGY | Facility: CLINIC | Age: 70
End: 2020-07-10

## 2020-07-10 NOTE — TELEPHONE ENCOUNTER
Gave patient appt with Daysi Llanos NP for 7/13/2020 at 2:00.      ----- Message from Brigida Morton sent at 7/10/2020  8:04 AM CDT -----  Type:  Same Day Appointment Request    Caller is requesting a same day appointment.      Name of Caller:  Cornelia Delatorre  When is the first available appointment? Tuesday, July 14th  Symptoms:  Asthma Issues  Best Call Back Number:  [phone]  Additional Information:   Needs advice

## 2020-07-13 ENCOUNTER — LAB VISIT (OUTPATIENT)
Dept: PRIMARY CARE CLINIC | Facility: CLINIC | Age: 70
End: 2020-07-13
Payer: MEDICARE

## 2020-07-13 ENCOUNTER — TELEPHONE (OUTPATIENT)
Dept: PULMONOLOGY | Facility: CLINIC | Age: 70
End: 2020-07-13

## 2020-07-13 ENCOUNTER — OFFICE VISIT (OUTPATIENT)
Dept: PULMONOLOGY | Facility: CLINIC | Age: 70
End: 2020-07-13
Payer: MEDICARE

## 2020-07-13 VITALS
HEART RATE: 71 BPM | DIASTOLIC BLOOD PRESSURE: 61 MMHG | BODY MASS INDEX: 34.85 KG/M2 | SYSTOLIC BLOOD PRESSURE: 124 MMHG | OXYGEN SATURATION: 99 % | WEIGHT: 203.06 LBS | TEMPERATURE: 97 F

## 2020-07-13 VITALS — TEMPERATURE: 98 F

## 2020-07-13 DIAGNOSIS — J45.50 SEVERE PERSISTENT ASTHMA WITHOUT COMPLICATION: ICD-10-CM

## 2020-07-13 DIAGNOSIS — J45.50 SEVERE PERSISTENT ASTHMA WITHOUT COMPLICATION: Primary | ICD-10-CM

## 2020-07-13 DIAGNOSIS — J47.9 BRONCHIECTASIS WITHOUT COMPLICATION: ICD-10-CM

## 2020-07-13 DIAGNOSIS — Z20.822 CLOSE EXPOSURE TO COVID-19 VIRUS: ICD-10-CM

## 2020-07-13 DIAGNOSIS — G47.33 OSA (OBSTRUCTIVE SLEEP APNEA): ICD-10-CM

## 2020-07-13 PROCEDURE — 99999 PR PBB SHADOW E&M-EST. PATIENT-LVL V: CPT | Mod: PBBFAC,HCNC,, | Performed by: NURSE PRACTITIONER

## 2020-07-13 PROCEDURE — 1159F MED LIST DOCD IN RCRD: CPT | Mod: HCNC,S$GLB,, | Performed by: NURSE PRACTITIONER

## 2020-07-13 PROCEDURE — 99999 PR PBB SHADOW E&M-EST. PATIENT-LVL V: ICD-10-PCS | Mod: PBBFAC,HCNC,, | Performed by: NURSE PRACTITIONER

## 2020-07-13 PROCEDURE — 99213 PR OFFICE/OUTPT VISIT, EST, LEVL III, 20-29 MIN: ICD-10-PCS | Mod: HCNC,S$GLB,, | Performed by: NURSE PRACTITIONER

## 2020-07-13 PROCEDURE — 1101F PR PT FALLS ASSESS DOC 0-1 FALLS W/OUT INJ PAST YR: ICD-10-PCS | Mod: HCNC,CPTII,S$GLB, | Performed by: NURSE PRACTITIONER

## 2020-07-13 PROCEDURE — 3074F PR MOST RECENT SYSTOLIC BLOOD PRESSURE < 130 MM HG: ICD-10-PCS | Mod: HCNC,CPTII,S$GLB, | Performed by: NURSE PRACTITIONER

## 2020-07-13 PROCEDURE — 3078F DIAST BP <80 MM HG: CPT | Mod: HCNC,CPTII,S$GLB, | Performed by: NURSE PRACTITIONER

## 2020-07-13 PROCEDURE — 3074F SYST BP LT 130 MM HG: CPT | Mod: HCNC,CPTII,S$GLB, | Performed by: NURSE PRACTITIONER

## 2020-07-13 PROCEDURE — 1126F PR PAIN SEVERITY QUANTIFIED, NO PAIN PRESENT: ICD-10-PCS | Mod: HCNC,S$GLB,, | Performed by: NURSE PRACTITIONER

## 2020-07-13 PROCEDURE — 1126F AMNT PAIN NOTED NONE PRSNT: CPT | Mod: HCNC,S$GLB,, | Performed by: NURSE PRACTITIONER

## 2020-07-13 PROCEDURE — 3008F PR BODY MASS INDEX (BMI) DOCUMENTED: ICD-10-PCS | Mod: HCNC,CPTII,S$GLB, | Performed by: NURSE PRACTITIONER

## 2020-07-13 PROCEDURE — 1159F PR MEDICATION LIST DOCUMENTED IN MEDICAL RECORD: ICD-10-PCS | Mod: HCNC,S$GLB,, | Performed by: NURSE PRACTITIONER

## 2020-07-13 PROCEDURE — 3008F BODY MASS INDEX DOCD: CPT | Mod: HCNC,CPTII,S$GLB, | Performed by: NURSE PRACTITIONER

## 2020-07-13 PROCEDURE — U0003 INFECTIOUS AGENT DETECTION BY NUCLEIC ACID (DNA OR RNA); SEVERE ACUTE RESPIRATORY SYNDROME CORONAVIRUS 2 (SARS-COV-2) (CORONAVIRUS DISEASE [COVID-19]), AMPLIFIED PROBE TECHNIQUE, MAKING USE OF HIGH THROUGHPUT TECHNOLOGIES AS DESCRIBED BY CMS-2020-01-R: HCPCS | Mod: HCNC

## 2020-07-13 PROCEDURE — 99213 OFFICE O/P EST LOW 20 MIN: CPT | Mod: HCNC,S$GLB,, | Performed by: NURSE PRACTITIONER

## 2020-07-13 PROCEDURE — 3078F PR MOST RECENT DIASTOLIC BLOOD PRESSURE < 80 MM HG: ICD-10-PCS | Mod: HCNC,CPTII,S$GLB, | Performed by: NURSE PRACTITIONER

## 2020-07-13 PROCEDURE — 1101F PT FALLS ASSESS-DOCD LE1/YR: CPT | Mod: HCNC,CPTII,S$GLB, | Performed by: NURSE PRACTITIONER

## 2020-07-13 RX ORDER — BUDESONIDE 0.5 MG/2ML
0.5 INHALANT ORAL 2 TIMES DAILY
Qty: 120 ML | Refills: 5 | Status: SHIPPED | OUTPATIENT
Start: 2020-07-13 | End: 2020-11-04

## 2020-07-13 NOTE — PROGRESS NOTES
7/13/2020    Cornelia Delatorre  Office f/u    Chief Complaint   Patient presents with    Cough    Hoarse     HPI:  7/13/2020- Hoarse voice, loss of voice, productive cough, lost voice July 2019 tx by ENT who treated for acute laryngitis with diflucan; Recurrent problem. Hoarse voice return 4 weeks prior, ENT doctor recommends changing Asthma medications tx her with diflucan, doxycycline and prednisone for 5 days, states has improved.   SOB- worse when wearing face mask, currently on hizentra therapy,     5/4/2020- uses symbicort daily, uses rescue 2/wk - some anxiety, getting igg rx. Having more sinus problems with head colds- 3 in last 4 wks.  No prednisone- hizentra working well.        Nov 4, 2019- having mucous sensation, notes some sob relaxing - maybe worse night.  No nasal stuffy.  Uses cpap.  Uses symbicort bid, no rescue.  Mucous is clear.  No abx for sinus or lungs.  Getting immune infusions weekly - pt senses doing better since starting in May.  Patient Instructions   Breathing off and on short breath - need to use more albuterol to make clear where breathing problems come from    Mucous production may decrease if airways controlled- albuterol should help clearance.    Rescue inhaler may resolve any breathing problems- should use intermittently if any symptoms.    May use augmentin for sinus/lung problems- not optimal for all uti's       May 6,2019-due to get immune infusion next 2 days.  No prednisone, needs monlukast. abx stopped wks ago- mucous cleared.    Patient Instructions   Use antibiotic daily til immune therapy done a wk - then as needed like every one else  Immune therapy may keep you stable - better than antibiotics.   Use symbicort regular.  Lungs may stabilize.  Use prednisone if needed.  Lung  Nodules are stable for 2 yrs and no follow up needed.  Call if problems- hope all will be better yet.  March 7, 19-exacerbated in late Jan- cleared in feb (vague).  Now ill again yellow and gray  mucous (culture last Jan was nl yvonne).  Sl intermittent wheezes since last wk.  Sees Dr Herrera in [place]- gets allergy rx but declined to get now.  Pt has cvid by  igg and igm levels low and pneumococcal antibodies to 8/14 pneumococcal titers. Uses symbicort and singulair routinely.  Took prednisone completed 4 days ago- uses prednisone monthly- was able to skip December  .  Discussed with patient above for education the following:      Gastric by pass may cause malabsorption, protein levels have been too low and may affect ig levels-- somewhat.   Need to get follow up with dietician to assure adequate protein intake/levels.   Antibiotic may stabilize infections with common variable immune deficiency- azithromycin daily once cultures submitted.   Use augmentin if infection worsens.   Acapella/chest clapping help clear mucous, vest likewise if bronchiectasis.  Will not treat cause.   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  Use codeine to suppress mild coughing.   Need good immune system and no airway/asthma problems and good hygiene of bronchial tubes (no chronic infections) to prevent illness.     Lungs measured near normal with good response to bronchial medications 2017   Need ct to follow up and cultures to assure infection controlled.      Jan 28/2019- Feeling bad onset 2 weeks, took prednisone taper x 6 days and antibiotic Augmentin week prior, States no improvement. Cough- worse at night, no nocturnal arousals, takes cough suppressant syrup before bed. Productive yellow/brown color.   Nebulized albuterol 4x daily. States no fever.  Has started allergy injections waiting for insurance clearance for IGG therapy.   Discussed with patient above for education the following:    CT chest for February to monitor and assess for bronchiectasis, need diagnosis for insurance to cover vest therapy  Have  continue to percuss back with  hand.   Recommend purchasing Acepella device to help clear lungs of excess mucous, attaches to nebulizer device  Continue Nebulizer treatments 4x daily as needed.  Chest x-ray now to evaluate for pneumonia  Blood work at hospital   Will yeison to see results of sputum culture and x-ray before repeating antibiotic  Need to return sputum to clinic with in 4 hours of collecting  Fluconazole pills for oral thrush, this is a recurrent problem related to your weak immune system and use of inhaled steroids. Continue to rinse mouth out after using symbicort but problem will improve after starting immune replacement therapy.    oct 30,   2018-- had one day fever followed by cough/wheeze illness that lingered a wk. Pt seen by NP   [place] 2x, [place].  [facility] like dying- only one day fever.  Violent cough.  Nebulizer ppt itch and palpitations- ok  On xopenex now.  Prednisone 40x3, 20 x 3 ,  augmentin cleared.  Now back to normal.  May 14, 2018- had spell /exacerbation with one round prednisone. Breathing good.  Follow-up in about 1 year (around 5/14/2019), or if symptoms worsen or fail to improve.   Discussed with patient above for education the following:     Check blood count and pneumonia vaccine response after last vaccine 4/27/2018   Use azithromycin for any lung/sinus infection- immune weakness likely   Asthma stable- use prednisone and singulair.   Use allergra and singulair and astelin/flonase   Lung nodule in lung still - Navigational bronchoscopy might find?  - will at least re check in a year.     Call if needed, re check next yr.  darlin 15, 2018--1 st illness in yr or so with cough and rattles, no flu.  Appetite ok.  Ill x wk, cough and wheeze and sob and noct worse, resp rx helps a bit.  Hasn't been using  Albuterol   Follow-up in about 6 months (around 7/15/2018).   Discussed with patient above for education the following:      tamiflu if flu.   Need to use albuterol for any lung symptoms.  Should get cough   Better controlled.      Prednisone 20 mg 3 for 3 , taper may be needed.  Give shot today, 1 daily for 3 may be enough with albuterol.   You had high eosinophils-- if asthma becomes more active - special therapy may help??        prevnar 13 would be good - should be off prednisone.  Immune system is sl weak  Protection only to 7.14 pneumonia germs.  oct 19, 2017- has scratchy throat, some sinus drip, has nightly sensation throat issues, post beryl and carpel tunnel surg.  No sinus lung infections.  Breathing and cough good.  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 Had pos tb gold.  June 21, 2017- had good [place] trip, tapered symbicort with some increase sensation of lung problems so maintained full dose. No infections.  No chest symptoms on symbicort.   April 24, feels a lot better with min cough, vague sob going left side down at night.  Going to [place] 2 weeks.  Uses symbicort and good results.  March 16, cough better, but comes and goes,  No great help with steroids.  Submitted 3 sputums.  Had pneumovax march last yr.  Breathing better. Got symbicort.   Had pneumonia vaccine last yr  3/8/2017 HPI: had onset cough [place]an illness, got dizzy few days with diarrhea and cough. Cough clear sm amt mucous. Did have 101 temp one day, fatigue, no muscle aches.  Appetite decreased.  Illnesses lingered 2 weeks but cough never remitted- has improved with inhahler use last 15 days.    Had gastric 2011 bypass with with continued diarrhea intially resolved. No regurg or reflux or swallow problems.   symbicort nearly  Resolved.    Sinuses ok.  No pets.  Never smoker.    Appetite good, feels well now.    headcolds to chest since childhood, nocturnal ??not recalled.  Had cats in past but got rid in 2003 or so.   The chief compliant  problem varies with instablilty at time    PFSH:  Past Medical History:   Diagnosis Date    Allergy     Arthritis     Asthma     Cancer     skin cancer to right  hand    Cataract     Chronic fatigue 1/24/2017    CVID (common variable immunodeficiency) 3/7/2019    Diabetes mellitus     resolved with gastric bypass    KLINE (dyspnea on exertion) 1/24/2017    Dyslipidemia 6/25/2019    Encounter for blood transfusion     Essential hypertension 12/29/2011    GERD (gastroesophageal reflux disease)     Headache(784.0)     History of lumpectomy of both breasts     1992 negative    Hyperlipidemia 7/15/2015    Hypertension     Hypothyroidism     Neuropathy     Obesity     SOHA (obstructive sleep apnea), Auto BIPAP Mod OSAS since Dec 2013, therapeutic and compliant 100%, ESS 6/24 Feb 2014 12/26/2013    SOHA on CPAP     Postmenopausal HRT (hormone replacement therapy)     Rash     Rosacea     Snoring     Squamous cell carcinoma of skin     left forearm     Wears glasses          Past Surgical History:   Procedure Laterality Date    ADENOIDECTOMY      APPENDECTOMY      BLADDER SUSPENSION      BREAST BIOPSY Bilateral 1992    bilateral benign excisional biopsies    BUNIONECTOMY  10/17/14    right, still has discomfort    CATARACT EXTRACTION W/  INTRAOCULAR LENS IMPLANT Bilateral     CHOLECYSTECTOMY  08/02/2017    COLONOSCOPY      EPIDURAL STEROID INJECTION INTO LUMBAR SPINE N/A 8/14/2019    Procedure: Injection-steroid-epidural-lumbar L5/S1;  Surgeon: Fredi Rojas MD;  Location: [facility];  Service: Pain Management;  Laterality: N/A;    ESOPHAGOGASTRODUODENOSCOPY      dilated esophagus    GASTRIC BYPASS      2011    HYSTERECTOMY  1980    interstim bladder  2009    10/14/14 new battery    OOPHORECTOMY  1980    REPLACEMENT OF SACRAL NERVE STIMULATOR  2/18/2020    Procedure: REPLACEMENT, NEUROSTIMULATOR, SACRAL;  Surgeon: Mary Jane Jarvis MD;  Location: [facility] OR [address] [place] [address];  Service: Urology;;    REVISION OF PROCEDURE INVOLVING SACRAL NEUROSTIMULATOR DEVICE Right 2/18/2020    Procedure: REVISION, NEUROSTIMULATOR, SACRAL/ battery replacement;  Surgeon: Mary Jane  CRISTAL Jarvis MD;  Location: [facility] OR [address] [place] [address];  Service: Urology;  Laterality: Right;  1hr/ rep contacted/ (CONNIE)    SKIN CANCER EXCISION      left hand    TONSILLECTOMY      WISDOM TOOTH EXTRACTION       Social History     Tobacco Use    Smoking status: Never Smoker    Smokeless tobacco: Never Used   Substance Use Topics    Alcohol use: No     Frequency: Never     Binge frequency: Never    Drug use: No     Family History   Problem Relation Age of Onset    Breast cancer Mother 50    Diabetes Unknown     Hypertension Unknown     Hypothyroidism Unknown     Breast cancer Paternal Aunt         40's    Ovarian cancer Paternal Grandmother     Allergic rhinitis Neg Hx     Allergies Neg Hx     Angioedema Neg Hx     Asthma Neg Hx     Atopy Neg Hx     Eczema Neg Hx     Immunodeficiency Neg Hx     Rhinitis Neg Hx     Urticaria Neg Hx      Review of patient's allergies indicates:   Allergen Reactions    Sulfa (sulfonamide antibiotics) Hives    Naproxen Other (See Comments)     Other reaction(s): RT sided numbness         Performance Status:The patient's activity level is functions out of house.      Review of Systems:  a review of eleven systems covering constitutional, Eye, HEENT, Psych, Respiratory, Cardiac, GI, , Musculoskeletal, Endocrine, Dermatologic was negative except for pertinent findings as listed ABOVE and below: all good,  Had dm that remitted with bypass 2011.  Positive for SOB, cough severe.    Exam:Comprehensive exam done. /61 (BP Location: Left arm, Patient Position: Sitting)   Pulse 71   Temp 97 °F (36.1 °C)   Wt 92.1 kg (203 lb 0.7 oz)   SpO2 99% Comment: on room air at rest  BMI 34.85 kg/m²   Exam included Vitals as listed, and patient's appearance and affect and alertness and mood, oral exam for yeast and hygiene and pharynx lesions and Mallapatti (M) score, neck with inspection for jvd and masses and thyroid abnormalities and lymph nodes (supraclavicular and infraclavicular  nodes and axillary also examined and noted if abn), chest exam included symmetry and effort and fremitus and percussion and auscultation, cardiac exam included rhythm and gallops and murmur and rubs and jvd and edema, abdominal exam for mass and hepatosplenomegaly and tenderness and hernias and bowel sounds, Musculoskeletal exam with muscle tone and posture and mobility/gait and  strength, and skin for rashes and cyanosis and pallor and turgor, extremity for clubbing.  Findings were normal except for pertinent findings listed below:  M3, good bs, rest good. Lungs clear-  chest is symmetric, no distress, normal percussion, normal fremitus and good normal breath sounds      Radiographs (ct chest and cxr) reviewed: view by direct vision  i do see clear bronchiectasis,nodules are noted.  Mucoid type impaction suggested in rul ant segment.      April 19, 2018 ct suggest tracheobronchomalacia on high res spot films- seen 3/7/19  CT Chest:  1. Region of endobronchial plugging unchanged since 2/7/17 of uncertain etiology. This could relate to a process such as allergic bronchopulmonary aspergillosis, a solid mass nodule, mucous plugging, residual from aspiration, endobronchial spread of disease. Further followup or evaluation is necessary  Electronically signed by: Raffi Gottlieb MD  Date: 03/14/17    10/17/19 Chest X-ray clear      Labs reviewed igm low, igg lowish, humerol titers na    CBC reviewed October 10/17/18    PFT  Done [facility] with 10% response, otherwise nl  PULMONARY FUNCTION TEST REPORT      DATE OF PROCEDURE:  03/24/2017     1.  Spirometry reveals an FVC of 3.1 L, which is 107% predicted.  FEV1 is 2.3 L,   which is 100% predicted.  The ratio, there is preserved.  There is a positive,   but not significant bronchodilator response to both the FVC and FEV1.  Loop   contours appear with adequate effort as well.  2.  Lung volumes.  The total lung capacity is 4.4 L, which is 92% predicted.    There are no  signs of gas trapping.  3.  Diffusing capacity is mild-to-moderate reduced at 68%, does improve to 96%   with alveolar volumes.     OVERALL IMPRESSION:  A normal spirometry with a robust yet statistically   insignificant bronchodilator response.  Normal lung volumes and a moderate   reduction in diffusion capacity.    CC: Jonathan Nicole M.D.            Plan:  Clinical impression is resonably certain and repeated evaluation prn +/- follow up will be needed as below.    Cornelia was seen today for cough and hoarse.    Diagnoses and all orders for this visit:    Severe persistent asthma without complication  -     budesonide (PULMICORT) 0.5 mg/2 mL nebulizer solution; Take 2 mLs (0.5 mg total) by nebulization 2 (two) times daily. Controller    Bronchiectasis without complication    SOHA (obstructive sleep apnea), Auto BIPAP Mod OSAS since Dec 2013, therapeutic and compliant 100%, ESS 6/24 Feb 2014    Close Exposure to Covid-19 Virus  -     COVID-19 Routine Screening        Follow up in about 3 months (around 10/13/2020), or if symptoms worsen or fail to improve.         Discussed with patient above for education the following:      Patient Instructions   Changing asthma medications for one month to let your vocal cords recover from laryngitis    Pulmicort nebulizer 1 treatment 2 x a day 12 hours apart for 4 weeks, then return to Symbicort 2 puffs twice a day through spacer device.      Bronchiectasis therapy  Use Xopenex at least once a day three days a week every week, use every 4 hours if cough, chest tightness, wheeze, or sob returns.    Continue BiPAP nightly    Testing for covid 19 due to your daughter being positive

## 2020-07-13 NOTE — PATIENT INSTRUCTIONS
Changing asthma medications for one month to let your vocal cords recover from laryngitis    Pulmicort nebulizer 1 treatment 2 x a day 12 hours apart for 4 weeks, then return to Symbicort 2 puffs twice a day through spacer device.      Bronchiectasis therapy  Use Xopenex at least once a day three days a week every week, use every 4 hours if cough, chest tightness, wheeze, or sob returns.    Continue BiPAP nightly    Testing for covid 19 due to your daughter being positive

## 2020-07-15 LAB — SARS-COV-2 RNA RESP QL NAA+PROBE: NOT DETECTED

## 2020-07-27 NOTE — PROGRESS NOTES
Digital Medicine: Health  Follow-Up    The history is provided by the patient.             Reason for review: Blood pressure not at goal        Topics Covered on Call: asthma flare up    Additional Follow-up details: Ms. Rhodes reports that she's doing ok. She was very horse over the phone ans had some SOB. Patient reports that she's been having some asthma flares and her asthma medications were recently changed to help address the symptoms. We discussed patient's upward trend in BP. Patient reports that she noticed the increase with her change in asthma medications. Will make clinician aware. Patient reports that she's charged her device recently.        Diet-Not assessed      Additional diet details:    Physical Activity-Not assessed    Medication Adherence-Medication Adherence not addressed.      Substance, Sleep, Stress-Not assessed    PLAN  Instructed to charge device: Reminded patient to charge her device if battery is <25%. Patient reports charging it this weekend. No new readings since the charge.    Patient verbalizes understanding. Patient did not express questions or concerns and patient has contact information if needed.        There are no preventive care reminders to display for this patient.    Last 5 Patient Entered Readings                                      Current 30 Day Average: 140/75     Recent Readings 7/23/2020 7/22/2020 7/21/2020 7/21/2020 7/17/2020    SBP (mmHg) 134 143 143 142 148    DBP (mmHg) 76 77 81 78 78    Pulse 68 68 65 68 66

## 2020-08-25 ENCOUNTER — PATIENT OUTREACH (OUTPATIENT)
Dept: OTHER | Facility: OTHER | Age: 70
End: 2020-08-25

## 2020-08-25 NOTE — PROGRESS NOTES
Digital Medicine: Health  Follow-Up    The history is provided by the patient.             Reason for review: Blood pressure not at goal        Topics Covered on Call: illness    Additional Follow-up details: Ms. Rhodes reports that she's not doing well today. She reports that she has the sniffles and is feeling run down and tired today. Didn't keep her long. We reviewed her average which is 1 point away from goal today.     Patient admits that she's been seeing a lot of fluctuation in her readings, but she thinks it's mainly from a lot fo medication changes with her asthma.         Diet-Not assessed          Physical Activity-Not assessed    Medication Adherence-Medication Adherence not addressed.      Substance, Sleep, Stress-Not assessed         Addressed any questions or concerns and patient has my contact information if needed prior to next outreach. Patient verbalizes understanding.              Topic    Hemoglobin A1C        Last 5 Patient Entered Readings                                      Current 30 Day Average: 131/77     Recent Readings 8/24/2020 8/22/2020 8/19/2020 8/18/2020 8/15/2020    SBP (mmHg) 142 127 144 135 123    DBP (mmHg) 73 74 79 75 67    Pulse 57 66 61 65 79

## 2020-09-02 ENCOUNTER — LAB VISIT (OUTPATIENT)
Dept: LAB | Facility: HOSPITAL | Age: 70
End: 2020-09-02
Attending: INTERNAL MEDICINE
Payer: MEDICARE

## 2020-09-02 DIAGNOSIS — E11.9 CONTROLLED TYPE 2 DIABETES MELLITUS WITHOUT COMPLICATION, WITHOUT LONG-TERM CURRENT USE OF INSULIN: ICD-10-CM

## 2020-09-02 LAB
ALBUMIN/CREAT UR: 16.1 UG/MG (ref 0–30)
CREAT UR-MCNC: 31 MG/DL (ref 15–325)
MICROALBUMIN UR DL<=1MG/L-MCNC: 5 UG/ML

## 2020-09-02 PROCEDURE — 82043 UR ALBUMIN QUANTITATIVE: CPT | Mod: HCNC

## 2020-09-11 ENCOUNTER — OFFICE VISIT (OUTPATIENT)
Dept: FAMILY MEDICINE | Facility: CLINIC | Age: 70
End: 2020-09-11
Payer: MEDICARE

## 2020-09-11 ENCOUNTER — HOSPITAL ENCOUNTER (OUTPATIENT)
Dept: RADIOLOGY | Facility: HOSPITAL | Age: 70
Discharge: HOME | End: 2020-09-11
Attending: INTERNAL MEDICINE
Payer: MEDICARE

## 2020-09-11 VITALS
HEART RATE: 73 BPM | BODY MASS INDEX: 34.82 KG/M2 | SYSTOLIC BLOOD PRESSURE: 136 MMHG | DIASTOLIC BLOOD PRESSURE: 84 MMHG | WEIGHT: 203.94 LBS | OXYGEN SATURATION: 97 % | HEIGHT: 64 IN

## 2020-09-11 DIAGNOSIS — R25.2 CRAMPS OF LEFT LOWER EXTREMITY: ICD-10-CM
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
## 2020-09-11 DIAGNOSIS — E11.9 CONTROLLED TYPE 2 DIABETES MELLITUS WITHOUT COMPLICATION, WITHOUT LONG-TERM CURRENT USE OF INSULIN: Primary | ICD-10-CM

## 2020-09-11 DIAGNOSIS — Z79.899 ENCOUNTER FOR LONG-TERM (CURRENT) USE OF MEDICATIONS: ICD-10-CM

## 2020-09-11 DIAGNOSIS — E78.5 DYSLIPIDEMIA: ICD-10-CM

## 2020-09-11 DIAGNOSIS — Z00.00 ROUTINE PHYSICAL EXAMINATION: ICD-10-CM

## 2020-09-11 DIAGNOSIS — I10 ESSENTIAL HYPERTENSION: ICD-10-CM

## 2020-09-11 DIAGNOSIS — M79.602 PAIN OF LEFT UPPER EXTREMITY: ICD-10-CM

## 2020-09-11 DIAGNOSIS — S91.319A CUT OF FOOT: ICD-10-CM

## 2020-09-11 PROCEDURE — 3075F PR MOST RECENT SYSTOLIC BLOOD PRESS GE 130-139MM HG: ICD-10-PCS | Mod: HCNC,CPTII,S$GLB, | Performed by: INTERNAL MEDICINE

## 2020-09-11 PROCEDURE — 3079F PR MOST RECENT DIASTOLIC BLOOD PRESSURE 80-89 MM HG: ICD-10-PCS | Mod: HCNC,CPTII,S$GLB, | Performed by: INTERNAL MEDICINE

## 2020-09-11 PROCEDURE — 99499 UNLISTED E&M SERVICE: CPT | Mod: HCNC,S$GLB,, | Performed by: INTERNAL MEDICINE

## 2020-09-11 PROCEDURE — 99214 OFFICE O/P EST MOD 30 MIN: CPT | Mod: HCNC,25,S$GLB, | Performed by: INTERNAL MEDICINE

## 2020-09-11 PROCEDURE — 3044F PR MOST RECENT HEMOGLOBIN A1C LEVEL <7.0%: ICD-10-PCS | Mod: HCNC,CPTII,S$GLB, | Performed by: INTERNAL MEDICINE

## 2020-09-11 PROCEDURE — 1101F PR PT FALLS ASSESS DOC 0-1 FALLS W/OUT INJ PAST YR: ICD-10-PCS | Mod: HCNC,CPTII,S$GLB, | Performed by: INTERNAL MEDICINE

## 2020-09-11 PROCEDURE — 1159F MED LIST DOCD IN RCRD: CPT | Mod: HCNC,S$GLB,, | Performed by: INTERNAL MEDICINE

## 2020-09-11 PROCEDURE — 3075F SYST BP GE 130 - 139MM HG: CPT | Mod: HCNC,CPTII,S$GLB, | Performed by: INTERNAL MEDICINE

## 2020-09-11 PROCEDURE — 73060 XR HUMERUS 2 VIEW LEFT: ICD-10-PCS | Mod: 26,HCNC,LT, | Performed by: RADIOLOGY

## 2020-09-11 PROCEDURE — 73090 XR FOREARM LEFT: ICD-10-PCS | Mod: 26,HCNC,LT, | Performed by: RADIOLOGY

## 2020-09-11 PROCEDURE — 90694 VACC AIIV4 NO PRSRV 0.5ML IM: CPT | Mod: HCNC,S$GLB,, | Performed by: INTERNAL MEDICINE

## 2020-09-11 PROCEDURE — 1101F PT FALLS ASSESS-DOCD LE1/YR: CPT | Mod: HCNC,CPTII,S$GLB, | Performed by: INTERNAL MEDICINE

## 2020-09-11 PROCEDURE — 73060 X-RAY EXAM OF HUMERUS: CPT | Mod: TC,HCNC,FY,PO,LT

## 2020-09-11 PROCEDURE — 3079F DIAST BP 80-89 MM HG: CPT | Mod: HCNC,CPTII,S$GLB, | Performed by: INTERNAL MEDICINE

## 2020-09-11 PROCEDURE — 99214 PR OFFICE/OUTPT VISIT, EST, LEVL IV, 30-39 MIN: ICD-10-PCS | Mod: HCNC,25,S$GLB, | Performed by: INTERNAL MEDICINE

## 2020-09-11 PROCEDURE — 99499 RISK ADDL DX/OHS AUDIT: ICD-10-PCS | Mod: HCNC,S$GLB,, | Performed by: INTERNAL MEDICINE

## 2020-09-11 PROCEDURE — 99999 PR PBB SHADOW E&M-EST. PATIENT-LVL III: ICD-10-PCS | Mod: PBBFAC,HCNC,, | Performed by: INTERNAL MEDICINE

## 2020-09-11 PROCEDURE — 73060 X-RAY EXAM OF HUMERUS: CPT | Mod: 26,HCNC,LT, | Performed by: RADIOLOGY

## 2020-09-11 PROCEDURE — 73090 X-RAY EXAM OF FOREARM: CPT | Mod: TC,HCNC,FY,PO,LT

## 2020-09-11 PROCEDURE — 73090 X-RAY EXAM OF FOREARM: CPT | Mod: 26,HCNC,LT, | Performed by: RADIOLOGY

## 2020-09-11 PROCEDURE — G0008 PR ADMIN INFLUENZA VIRUS VAC: ICD-10-PCS | Mod: HCNC,S$GLB,, | Performed by: INTERNAL MEDICINE

## 2020-09-11 PROCEDURE — 1159F PR MEDICATION LIST DOCUMENTED IN MEDICAL RECORD: ICD-10-PCS | Mod: HCNC,S$GLB,, | Performed by: INTERNAL MEDICINE

## 2020-09-11 PROCEDURE — 3008F BODY MASS INDEX DOCD: CPT | Mod: HCNC,CPTII,S$GLB, | Performed by: INTERNAL MEDICINE

## 2020-09-11 PROCEDURE — 3008F PR BODY MASS INDEX (BMI) DOCUMENTED: ICD-10-PCS | Mod: HCNC,CPTII,S$GLB, | Performed by: INTERNAL MEDICINE

## 2020-09-11 PROCEDURE — G0008 ADMIN INFLUENZA VIRUS VAC: HCPCS | Mod: HCNC,S$GLB,, | Performed by: INTERNAL MEDICINE

## 2020-09-11 PROCEDURE — 90694 FLU VACCINE - QUADRIVALENT - ADJUVANTED: ICD-10-PCS | Mod: HCNC,S$GLB,, | Performed by: INTERNAL MEDICINE

## 2020-09-11 PROCEDURE — 3044F HG A1C LEVEL LT 7.0%: CPT | Mod: HCNC,CPTII,S$GLB, | Performed by: INTERNAL MEDICINE

## 2020-09-11 PROCEDURE — 99999 PR PBB SHADOW E&M-EST. PATIENT-LVL III: CPT | Mod: PBBFAC,HCNC,, | Performed by: INTERNAL MEDICINE

## 2020-09-11 RX ORDER — TRAMADOL HYDROCHLORIDE 50 MG/1
50 TABLET ORAL EVERY 6 HOURS PRN
Qty: 60 TABLET | Refills: 0 | Status: SHIPPED | OUTPATIENT
Start: 2020-09-11 | End: 2020-12-29

## 2020-09-11 RX ORDER — MUPIROCIN 20 MG/G
OINTMENT TOPICAL 3 TIMES DAILY
Qty: 30 G | Refills: 0 | Status: SHIPPED | OUTPATIENT
Start: 2020-09-11 | End: 2020-09-21

## 2020-09-11 RX ORDER — TRAMADOL HYDROCHLORIDE 50 MG/1
50 TABLET ORAL EVERY 6 HOURS PRN
Qty: 60 TABLET | Refills: 0 | Status: SHIPPED | OUTPATIENT
Start: 2020-09-11 | End: 2020-09-11 | Stop reason: SDUPTHER

## 2020-09-11 RX ORDER — POTASSIUM CHLORIDE 20 MEQ/1
20 TABLET, EXTENDED RELEASE ORAL DAILY
Qty: 90 TABLET | Refills: 3 | Status: SHIPPED | OUTPATIENT
Start: 2020-09-11 | End: 2020-11-04 | Stop reason: SDUPTHER

## 2020-09-11 RX ORDER — IBUPROFEN 100 MG/5ML
1000 SUSPENSION, ORAL (FINAL DOSE FORM) ORAL 2 TIMES DAILY
COMMUNITY

## 2020-09-11 NOTE — PROGRESS NOTES
Patient, Cornelia Delatorre (MRN #[id number]), presented with a recorded BMI of 35 kg/m^2 and a documented comorbidity(s):  - Diabetes Mellitus Type 2  - Hypertension  - Hyperlipidemia  to which the severe obesity is a contributing factor. This is consistent with the definition of severe obesity (BMI 35.0-39.9) with comorbidity (ICD-10 E66.01, Z68.35). The patient's severe obesity was monitored, evaluated, addressed and/or treated. This addendum to the medical record is made on 09/11/2020.

## 2020-09-11 NOTE — PROGRESS NOTES
Subjective:       Patient ID: Cornelia Delatorre is a 69 y.o. female.    Chief Complaint: Diabetes    Has metal implant for bladder.      Fell after tripping and hurt her left arm.  Has trouble using her arm and extending her elbow.  She can't even pull her seat belt over with it.  + bruising and swelling.    She has a few lacerations from the fall.      SOB/KLINE- KLINE 20 ft.  Stable with intermittent worsening. Limiting exercise.  On IV Ig; would benefit from handicap.   Recalll:   Dr Nicole in [place].  Feels SOB mostly related to bronchiectasis.  Her symptoms have improved with asthma tx - Symbicort.    Pulmonary nodules - Dr Nicole evaluating.  Incomplete response to pneumovax - recommends repeat Prevnar 13.  Eosinophilia and pn 7.14 level at 50%  Recent angiogram was normal     Dx w CVID - just started IV Ig 1 wk ago with allergy.       Hyperthyroid - supra-theraputic now off Synthroid for 6 mo.  Was hypothyroid all her life.  Dr Ponce   HTN - controlled   DM - controlled;  Sees podiatry for peripheral neuropathy in past  Diabetic peripheral neuropathy - controlled with 600 mg gabapentin tid.  Outside foot doctor.   HLD - uncontrolled for goal 70; high triglycerides.      Occasional anxiety relieved with prn hydroxyzine.   Had EGD- dysphagia with Dr Krishnan      Dx w MCI likely related to some meds per testing neurology; stable       Review of Systems   Constitutional: Negative for activity change and unexpected weight change.   HENT: Negative for hearing loss, rhinorrhea and trouble swallowing.    Eyes: Negative for discharge and visual disturbance.   Respiratory: Negative for chest tightness and wheezing.    Cardiovascular: Negative for chest pain and palpitations.   Gastrointestinal: Negative for blood in stool, constipation, diarrhea and vomiting.   Endocrine: Negative for polydipsia and polyuria.   Genitourinary: Negative for difficulty urinating, dysuria, hematuria and menstrual problem.   Musculoskeletal:  Positive for arthralgias. Negative for joint swelling and neck pain.   Neurological: Negative for weakness and headaches.   Psychiatric/Behavioral: Negative for confusion and dysphoric mood.       Objective:      Vitals:    09/11/20 1059   BP: 136/84   Pulse: 73     Physical Exam  Vitals signs reviewed.   Eyes:      Conjunctiva/sclera: Conjunctivae normal.   Neck:      Musculoskeletal: Normal range of motion.   Cardiovascular:      Rate and Rhythm: Normal rate and regular rhythm.   Pulmonary:      Effort: Pulmonary effort is normal.      Breath sounds: Normal breath sounds.   Musculoskeletal:      Comments: Decreased ROM left shoulder and elbow.  + ecchymosis under elbow.  + swelling   Skin:     General: Skin is warm and dry.   Neurological:      Cranial Nerves: No cranial nerve deficit (grossly intact).   Psychiatric:      Comments: Alert and orientated           Assessment:       1. Controlled type 2 diabetes mellitus without complication, without long-term current use of insulin    2. Cut of foot    3. Cramps of left lower extremity    4. Fall, initial encounter    5. Pain of left upper extremity    6. Essential hypertension    7. Dyslipidemia    8. Routine physical examination    9. Encounter for long-term (current) use of medications        Plan:       Controlled type 2 diabetes mellitus without complication, without long-term current use of insulin  -     Hemoglobin A1C; Future; Expected date: 03/10/2021    Cut of foot  -     mupirocin (BACTROBAN) 2 % ointment; Apply topically 3 (three) times daily. for 10 days  Dispense: 30 g; Refill: 0    Cramps of left lower extremity  -     potassium chloride SA (K-DUR,KLOR-CON) 20 MEQ tablet; Take 1 tablet (20 mEq total) by mouth once daily.  Dispense: 90 tablet; Refill: 3    Fall, initial encounter  -     X-Ray Humerus 2 View Left; Future; Expected date: 09/11/2020  -     X-Ray Forearm Left; Future; Expected date: 09/11/2020  -     Ambulatory referral/consult to  Orthopedics; Future; Expected date: 09/18/2020  -     traMADoL (ULTRAM) 50 mg tablet; Take 1 tablet (50 mg total) by mouth every 6 (six) hours as needed for Pain.  Dispense: 60 tablet; Refill: 0    Pain of left upper extremity  -     X-Ray Humerus 2 View Left; Future; Expected date: 09/11/2020  -     X-Ray Forearm Left; Future; Expected date: 09/11/2020  -     Ambulatory referral/consult to Orthopedics; Future; Expected date: 09/18/2020  -     traMADoL (ULTRAM) 50 mg tablet; Take 1 tablet (50 mg total) by mouth every 6 (six) hours as needed for Pain.  Dispense: 60 tablet; Refill: 0    Essential hypertension  -     Comprehensive metabolic panel; Future; Expected date: 03/10/2021    Dyslipidemia  -     Lipid Panel; Future; Expected date: 03/10/2021    Routine physical examination  -     Hemoglobin A1C; Future; Expected date: 03/10/2021  -     Comprehensive metabolic panel; Future; Expected date: 03/10/2021  -     Lipid Panel; Future; Expected date: 03/10/2021  -     CBC auto differential; Future; Expected date: 03/10/2021    Encounter for long-term (current) use of medications  -     CBC auto differential; Future; Expected date: 03/10/2021            Medication List with Changes/Refills   New Medications    MUPIROCIN (BACTROBAN) 2 % OINTMENT    Apply topically 3 (three) times daily. for 10 days    TRAMADOL (ULTRAM) 50 MG TABLET    Take 1 tablet (50 mg total) by mouth every 6 (six) hours as needed for Pain.   Current Medications    ALBUTEROL (PROVENTIL/VENTOLIN HFA) 90 MCG/ACTUATION INHALER    Inhale 2 puffs into the lungs every 4 (four) hours as needed. 2 puffs every 4 hours as needed for cough, wheeze, or shortness of breath    AMOXICILLIN-CLAVULANATE 875-125MG (AUGMENTIN) 875-125 MG PER TABLET    Take 1 tablet by mouth every 12 (twelve) hours.    ASCORBIC ACID, VITAMIN C, (VITAMIN C) 1000 MG TABLET        AZELASTINE (ASTELIN) 137 MCG (0.1 %) NASAL SPRAY    1 spray (137 mcg total) by Nasal route 2 (two) times daily.     AZELASTINE (OPTIVAR) 0.05 % OPHTHALMIC SOLUTION    Place 1 drop into both eyes daily as needed.     AZITHROMYCIN (ZITHROMAX) 500 MG TABLET    Take 1 tablet (500 mg total) by mouth once daily.    B-COMPLEX WITH VITAMIN C (Z-BEC OR EQUIV) TABLET    Take 1 tablet by mouth once daily.    BIFIDOBACTERIUM INFANTIS (ALIGN ORAL)    Take by mouth once daily.    BIOTIN 10,000 MCG CAP    Take by mouth.    BUDESONIDE (PULMICORT) 0.5 MG/2 ML NEBULIZER SOLUTION    Take 2 mLs (0.5 mg total) by nebulization 2 (two) times daily. Controller    CALCIUM CARBONATE (CALCIUM ANTACID) 300 MG (750 MG) CHEW    Take by mouth.    CLONIDINE (CATAPRES) 0.1 MG TABLET    Take 1 tablet (0.1 mg total) by mouth 3 (three) times daily as needed (PRN SBP > 165 mmHg).    CRANBERRY 500 MG CAP    Take 1 capsule by mouth every evening.    CYANOCOBALAMIN (VITAMIN B-12) 1000 MCG TABLET    Take 100 mcg by mouth once daily.    DICLOFENAC SODIUM (VOLTAREN) 1 % GEL    Apply 2 grams topically once daily.    DILTIAZEM (CARDIZEM CD) 120 MG CP24    Take 1 capsule (120 mg total) by mouth every evening.    EPINEPHRINE (EPIPEN) 0.3 MG/0.3 ML ATIN        ESOMEPRAZOLE (NEXIUM) 40 MG CAPSULE    Take 1 capsule (40 mg total) by mouth before breakfast.    FEXOFENADINE (ALLEGRA) 60 MG TABLET    Take 60 mg by mouth once daily.    FISH OIL-OMEGA-3 FATTY ACIDS 300-1,000 MG CAPSULE    Take 2 g by mouth once daily.    FLUTICASONE PROPIONATE (FLONASE) 50 MCG/ACTUATION NASAL SPRAY    2 sprays (100 mcg total) by Each Nostril route once daily.    GABAPENTIN (NEURONTIN) 600 MG TABLET    Take 1 tablet (600 mg total) by mouth 3 (three) times daily.    GUAIFENESIN (MUCINEX) 600 MG 12 HR TABLET    Take 2 tablets (1,200 mg total) by mouth 2 (two) times daily.    GUAIFENESIN-CODEINE 100-10 MG/5 ML (GUAIFENESIN AC)  MG/5 ML SYRUP    Take 5 mLs by mouth 3 (three) times daily as needed for Cough.    IMMUN GLOB G,IGG,-PRO-IGA 0-50 (HIZENTRA) 1 GRAM/5 ML (20 %) SOLN    Inject 11 g into  the skin once a week.    INHALATION SPACING DEVICE    Use as directed for inhalation.    LEVALBUTEROL (XOPENEX) 1.25 MG/3 ML NEBULIZER SOLUTION    Take 3 mLs (1.25 mg total) by nebulization every 4 (four) hours as needed for Wheezing or Shortness of Breath. Rescue    METFORMIN (GLUCOPHAGE-XR) 500 MG XR 24HR TABLET    Take 1 tablet (500 mg total) by mouth 2 (two) times daily with meals.    METHYLCELLULOSE, LAXATIVE, (CITRUCEL) 500 MG TAB    Take 1 tablet by mouth every morning.    MONTELUKAST (SINGULAIR) 10 MG TABLET    Take 1 tablet (10 mg total) by mouth every evening.    MUCUS CLEARING DEVICE KAREN    1 Device by Misc.(Non-Drug; Combo Route) route 2 (two) times daily.    MULTIVITAMIN CAPSULE    Take 1 capsule by mouth. Take one tablets daily    MUPIROCIN (BACTROBAN) 2 % OINTMENT    AAA bid    ONDANSETRON (ZOFRAN) 4 MG TABLET    Take 1 tablet (4 mg total) by mouth every 8 (eight) hours as needed for Nausea.    PRAVASTATIN (PRAVACHOL) 40 MG TABLET    Take 1 tablet (40 mg total) by mouth once daily.    PREDNISONE (DELTASONE) 20 MG TABLET    3 for 3 days then 2 for 3 days then one for 3 days and repeat for breathing problems    SIMETHICONE (GAS-X ORAL)    Take 1 capsule by mouth as needed.    SYMBICORT 160-4.5 MCG/ACTUATION HFAA    Inhale 2 puffs into the lungs every 12 (twelve) hours.    VALSARTAN-HYDROCHLOROTHIAZIDE (DIOVAN-HCT) 160-12.5 MG PER TABLET    Take 1 tablet by mouth once daily.    VARICELLA-ZOSTER GE-AS01B, PF, (SHINGRIX, PF,) 50 MCG/0.5 ML INJECTION    Inject 0.5 mLs into the muscle.    VIT B7-AZ-GJEOBWGS-ME-B12-ALA (NUFOLA) 25-3.75-1-300 MG CAP    Nufola 25 mg-3,500 mcg [id number] capsule   Take 1 capsule every day by oral route for 90 days.    VITAMIN D3-FOLIC ACID 5,000 UNIT- 1 MG TAB    Take by mouth.    VITAMIN E 400 UNIT CAPSULE    Take 400 Units by mouth once daily.   Changed and/or Refilled Medications    Modified Medication Previous Medication    POTASSIUM CHLORIDE SA (K-DUR,KLOR-CON) 20 MEQ  TABLET potassium chloride SA (K-DUR,KLOR-CON) 20 MEQ tablet       Take 1 tablet (20 mEq total) by mouth once daily.           Continue current management and monitor.    Counseled on regular exercise, maintenance of a healthy weight, balanced diet rich in fruits/vegetables and lean protein, and avoidance of unhealthy habits like smoking and excessive alcohol intake.   Also, counseled on importance of being compliant with medication, health appointments, diet and exercise.     Follow up in about 6 months (around 3/11/2021).

## 2020-09-17 ENCOUNTER — OFFICE VISIT (OUTPATIENT)
Dept: UROLOGY | Facility: CLINIC | Age: 70
End: 2020-09-17
Payer: MEDICARE

## 2020-09-17 ENCOUNTER — PATIENT MESSAGE (OUTPATIENT)
Dept: UROLOGY | Facility: CLINIC | Age: 70
End: 2020-09-17

## 2020-09-17 VITALS — HEIGHT: 64 IN | BODY MASS INDEX: 34.59 KG/M2 | WEIGHT: 202.63 LBS

## 2020-09-17 DIAGNOSIS — N81.6 RECTOCELE: ICD-10-CM

## 2020-09-17 DIAGNOSIS — N30.90 BLADDER INFECTION: Primary | ICD-10-CM

## 2020-09-17 DIAGNOSIS — R33.9 INCOMPLETE EMPTYING OF BLADDER: ICD-10-CM

## 2020-09-17 PROCEDURE — 81002 URINALYSIS NONAUTO W/O SCOPE: CPT | Mod: HCNC,S$GLB,, | Performed by: UROLOGY

## 2020-09-17 PROCEDURE — 99215 OFFICE O/P EST HI 40 MIN: CPT | Mod: HCNC,25,S$GLB, | Performed by: UROLOGY

## 2020-09-17 PROCEDURE — 95971 ALYS SMPL SP/PN NPGT W/PRGRM: CPT | Mod: HCNC,S$GLB,, | Performed by: UROLOGY

## 2020-09-17 PROCEDURE — 1126F AMNT PAIN NOTED NONE PRSNT: CPT | Mod: HCNC,S$GLB,, | Performed by: UROLOGY

## 2020-09-17 PROCEDURE — 99215 PR OFFICE/OUTPT VISIT, EST, LEVL V, 40-54 MIN: ICD-10-PCS | Mod: HCNC,25,S$GLB, | Performed by: UROLOGY

## 2020-09-17 PROCEDURE — 95971 PR ANALYZE NEUROSTIM,SIMPLE/PROG: ICD-10-PCS | Mod: HCNC,S$GLB,, | Performed by: UROLOGY

## 2020-09-17 PROCEDURE — 3008F PR BODY MASS INDEX (BMI) DOCUMENTED: ICD-10-PCS | Mod: HCNC,CPTII,S$GLB, | Performed by: UROLOGY

## 2020-09-17 PROCEDURE — 87086 URINE CULTURE/COLONY COUNT: CPT | Mod: HCNC

## 2020-09-17 PROCEDURE — 51701 INSERT BLADDER CATHETER: CPT | Mod: HCNC,S$GLB,, | Performed by: UROLOGY

## 2020-09-17 PROCEDURE — 1159F MED LIST DOCD IN RCRD: CPT | Mod: HCNC,S$GLB,, | Performed by: UROLOGY

## 2020-09-17 PROCEDURE — 87186 SC STD MICRODIL/AGAR DIL: CPT | Mod: HCNC

## 2020-09-17 PROCEDURE — 87088 URINE BACTERIA CULTURE: CPT | Mod: HCNC

## 2020-09-17 PROCEDURE — 51701 PR INSERTION OF NON-INDWELLING BLADDER CATHETERIZATION FOR RESIDUAL UR: ICD-10-PCS | Mod: HCNC,S$GLB,, | Performed by: UROLOGY

## 2020-09-17 PROCEDURE — 1101F PR PT FALLS ASSESS DOC 0-1 FALLS W/OUT INJ PAST YR: ICD-10-PCS | Mod: HCNC,CPTII,S$GLB, | Performed by: UROLOGY

## 2020-09-17 PROCEDURE — 3008F BODY MASS INDEX DOCD: CPT | Mod: HCNC,CPTII,S$GLB, | Performed by: UROLOGY

## 2020-09-17 PROCEDURE — 99999 PR PBB SHADOW E&M-EST. PATIENT-LVL V: CPT | Mod: PBBFAC,HCNC,, | Performed by: UROLOGY

## 2020-09-17 PROCEDURE — 81002 PR URINALYSIS NONAUTO W/O SCOPE: ICD-10-PCS | Mod: HCNC,S$GLB,, | Performed by: UROLOGY

## 2020-09-17 PROCEDURE — 1159F PR MEDICATION LIST DOCUMENTED IN MEDICAL RECORD: ICD-10-PCS | Mod: HCNC,S$GLB,, | Performed by: UROLOGY

## 2020-09-17 PROCEDURE — 87077 CULTURE AEROBIC IDENTIFY: CPT | Mod: HCNC

## 2020-09-17 PROCEDURE — 99999 PR PBB SHADOW E&M-EST. PATIENT-LVL V: ICD-10-PCS | Mod: PBBFAC,HCNC,, | Performed by: UROLOGY

## 2020-09-17 PROCEDURE — 99499 RISK ADDL DX/OHS AUDIT: ICD-10-PCS | Mod: HCNC,S$GLB,, | Performed by: UROLOGY

## 2020-09-17 PROCEDURE — 1101F PT FALLS ASSESS-DOCD LE1/YR: CPT | Mod: HCNC,CPTII,S$GLB, | Performed by: UROLOGY

## 2020-09-17 PROCEDURE — 1126F PR PAIN SEVERITY QUANTIFIED, NO PAIN PRESENT: ICD-10-PCS | Mod: HCNC,S$GLB,, | Performed by: UROLOGY

## 2020-09-17 PROCEDURE — 99499 UNLISTED E&M SERVICE: CPT | Mod: HCNC,S$GLB,, | Performed by: UROLOGY

## 2020-09-17 RX ORDER — CEFDINIR 300 MG/1
300 CAPSULE ORAL 2 TIMES DAILY
Qty: 14 CAPSULE | Refills: 0 | Status: SHIPPED | OUTPATIENT
Start: 2020-09-17 | End: 2020-09-24

## 2020-09-17 NOTE — PROGRESS NOTES
"CHIEF COMPLAINT:    Mrs. Delatorre is a 69 y.o. female presenting for worsening of urgency, frequency and urge incontinence, concern for UTI    PRESENTING ILLNESS:    Cornelia Delatorre is a 69 y.o. female who has a history of medication refractory urgency, frequency and urge incontinence, status post InterStim.  She states that symptoms have deteriorated.  She is going through 3-4 pull ups a day.  She has significant urge incontinence.  She is on program 1 but if she increases it, has a stinging shocking sensation.  She has some dysuria, worsening of the urgency and urge incontinence.  No fevers, chills or flank tenderness.  She wondered if her bladder has "dropped."    REVIEW OF SYSTEMS:    Review of Systems   Constitutional: Negative.    HENT: Negative.    Eyes: Negative.    Respiratory: Negative.    Cardiovascular: Negative.    Gastrointestinal: Negative.    Genitourinary: Positive for frequency and urgency.   Musculoskeletal: Positive for back pain and joint pain.   Skin: Negative.    Neurological: Negative.    Endo/Heme/Allergies:        Diabetic, obese   Psychiatric/Behavioral: Negative.        PATIENT HISTORY:    Past Medical History:   Diagnosis Date    Allergy     Arthritis     Asthma     Cancer     skin cancer to right hand    Cataract     Chronic fatigue 1/24/2017    CVID (common variable immunodeficiency) 3/7/2019    Diabetes mellitus     resolved with gastric bypass    KLINE (dyspnea on exertion) 1/24/2017    Dyslipidemia 6/25/2019    Encounter for blood transfusion     Essential hypertension 12/29/2011    GERD (gastroesophageal reflux disease)     Headache(784.0)     History of lumpectomy of both breasts     1992 negative    Hyperlipidemia 7/15/2015    Hypertension     Hypothyroidism     Neuropathy     Obesity     SOHA (obstructive sleep apnea), Auto BIPAP Mod OSAS since Dec 2013, therapeutic and compliant 100%, ESS 6/24 Feb 2014 12/26/2013    SOHA on CPAP     Postmenopausal HRT " (hormone replacement therapy)     Rash     Rosacea     Snoring     Squamous cell carcinoma of skin     left forearm     Wears glasses        Past Surgical History:   Procedure Laterality Date    ADENOIDECTOMY      APPENDECTOMY      BLADDER SUSPENSION      BREAST BIOPSY Bilateral 1992    bilateral benign excisional biopsies    BUNIONECTOMY  10/17/14    right, still has discomfort    CATARACT EXTRACTION W/  INTRAOCULAR LENS IMPLANT Bilateral     CHOLECYSTECTOMY  08/02/2017    COLONOSCOPY      EPIDURAL STEROID INJECTION INTO LUMBAR SPINE N/A 8/14/2019    Procedure: Injection-steroid-epidural-lumbar L5/S1;  Surgeon: Fredi Rojas MD;  Location: [facility] OR;  Service: Pain Management;  Laterality: N/A;    ESOPHAGOGASTRODUODENOSCOPY      dilated esophagus    GASTRIC BYPASS      2011    HYSTERECTOMY  1980    interstim bladder  2009    10/14/14 new battery    OOPHORECTOMY  1980    REPLACEMENT OF SACRAL NERVE STIMULATOR  2/18/2020    Procedure: REPLACEMENT, NEUROSTIMULATOR, SACRAL;  Surgeon: Mary Jane Jarvis MD;  Location: [facility] OR [address] [place] [address];  Service: Urology;;    REVISION OF PROCEDURE INVOLVING SACRAL NEUROSTIMULATOR DEVICE Right 2/18/2020    Procedure: REVISION, NEUROSTIMULATOR, SACRAL/ battery replacement;  Surgeon: Mary Jane Jarvis MD;  Location: [facility] OR [address] [place] [address];  Service: Urology;  Laterality: Right;  1hr/ rep contacted/ (CONNIE)    SKIN CANCER EXCISION      left hand    TONSILLECTOMY      WISDOM TOOTH EXTRACTION         Family History   Problem Relation Age of Onset    Breast cancer Mother 50    Diabetes Unknown     Hypertension Unknown     Hypothyroidism Unknown     Breast cancer Paternal Aunt         40's    Ovarian cancer Paternal Grandmother        Socioeconomic History    Marital status:    Social Needs    Financial resource strain: Somewhat hard    Food insecurity     Worry: Never true     Inability: Never true    Transportation needs     Medical: No     Non-medical: No    Tobacco Use    Smoking status: Never Smoker    Smokeless tobacco: Never Used   Substance and Sexual Activity    Alcohol use: No     Frequency: Never     Binge frequency: Never    Drug use: No    Sexual activity: Not Currently   Lifestyle    Physical activity     Days per week: 0 days     Minutes per session: 0 min    Stress: Only a little   Relationships    Social connections     Talks on phone: More than three times a week     Gets together: Once a week     Attends Sikhism service: Not on file     Active member of club or organization: Yes     Attends meetings of clubs or organizations: More than 4 times per year     Relationship status:        Allergies:  Sulfa (sulfonamide antibiotics), Naproxen, and Albuterol    Medications:  Outpatient Encounter Medications as of 9/17/2020   Medication Sig Dispense Refill    albuterol (PROVENTIL/VENTOLIN HFA) 90 mcg/actuation inhaler Inhale 2 puffs into the lungs every 4 (four) hours as needed. 2 puffs every 4 hours as needed for cough, wheeze, or shortness of breath 54 g 3    amoxicillin-clavulanate 875-125mg (AUGMENTIN) 875-125 mg per tablet Take 1 tablet by mouth every 12 (twelve) hours. 20 tablet 5    ascorbic acid, vitamin C, (VITAMIN C) 1000 MG tablet       azelastine (ASTELIN) 137 mcg (0.1 %) nasal spray 1 spray (137 mcg total) by Nasal route 2 (two) times daily. 90 mL 3    azelastine (OPTIVAR) 0.05 % ophthalmic solution Place 1 drop into both eyes daily as needed.       azithromycin (ZITHROMAX) 500 MG tablet Take 1 tablet (500 mg total) by mouth once daily. 3 tablet 5    B-complex with vitamin C (Z-BEC OR EQUIV) tablet Take 1 tablet by mouth once daily.      BIFIDOBACTERIUM INFANTIS (ALIGN ORAL) Take by mouth once daily.      biotin 10,000 mcg Cap Take by mouth.      budesonide (PULMICORT) 0.5 mg/2 mL nebulizer solution Take 2 mLs (0.5 mg total) by nebulization 2 (two) times daily. Controller 120 mL 5    calcium carbonate (CALCIUM ANTACID)  300 mg (750 mg) Chew Take by mouth.      cranberry 500 mg Cap Take 1 capsule by mouth every evening.      cyanocobalamin (VITAMIN B-12) 1000 MCG tablet Take 100 mcg by mouth once daily.      diclofenac sodium (VOLTAREN) 1 % Gel Apply 2 grams topically once daily. 1 Tube 6    diltiaZEM (CARDIZEM CD) 120 MG Cp24 Take 1 capsule (120 mg total) by mouth every evening. 90 capsule 4    EPINEPHrine (EPIPEN) 0.3 mg/0.3 mL AtIn   3    esomeprazole (NEXIUM) 40 MG capsule Take 1 capsule (40 mg total) by mouth before breakfast. 90 capsule 3    fexofenadine (ALLEGRA) 60 MG tablet Take 60 mg by mouth once daily.      fish oil-omega-3 fatty acids 300-1,000 mg capsule Take 2 g by mouth once daily.      fluticasone propionate (FLONASE) 50 mcg/actuation nasal spray 2 sprays (100 mcg total) by Each Nostril route once daily. 16 g 11    gabapentin (NEURONTIN) 600 MG tablet Take 1 tablet (600 mg total) by mouth 3 (three) times daily. 540 tablet 3    guaiFENesin (MUCINEX) 600 mg 12 hr tablet Take 2 tablets (1,200 mg total) by mouth 2 (two) times daily.      guaifenesin-codeine 100-10 mg/5 ml (GUAIFENESIN AC)  mg/5 mL syrup Take 5 mLs by mouth 3 (three) times daily as needed for Cough. 473 mL 2    immun glob G,IgG,-pro-IgA 0-50 (HIZENTRA) 1 gram/5 mL (20 %) Soln Inject 11 g into the skin once a week. 220 mL 12    inhalation spacing device Use as directed for inhalation. 1 each 0    levalbuterol (XOPENEX) 1.25 mg/3 mL nebulizer solution Take 3 mLs (1.25 mg total) by nebulization every 4 (four) hours as needed for Wheezing or Shortness of Breath. Rescue 1 Box 11    metFORMIN (GLUCOPHAGE-XR) 500 MG XR 24hr tablet Take 1 tablet (500 mg total) by mouth 2 (two) times daily with meals. 180 tablet 3    methylcellulose, laxative, (CITRUCEL) 500 mg Tab Take 1 tablet by mouth every morning.      montelukast (SINGULAIR) 10 mg tablet Take 1 tablet (10 mg total) by mouth every evening. 90 tablet 3    mucus clearing device Cecy 1  Device by Misc.(Non-Drug; Combo Route) route 2 (two) times daily. 1 Device 0    multivitamin capsule Take 1 capsule by mouth. Take one tablets daily      mupirocin (BACTROBAN) 2 % ointment AAA bid 30 g 2    mupirocin (BACTROBAN) 2 % ointment Apply topically 3 (three) times daily. for 10 days 30 g 0    ondansetron (ZOFRAN) 4 MG tablet Take 1 tablet (4 mg total) by mouth every 8 (eight) hours as needed for Nausea. 15 tablet 0    potassium chloride SA (K-DUR,KLOR-CON) 20 MEQ tablet Take 1 tablet (20 mEq total) by mouth once daily. 90 tablet 3    pravastatin (PRAVACHOL) 40 MG tablet Take 1 tablet (40 mg total) by mouth once daily. 90 tablet 2    predniSONE (DELTASONE) 20 MG tablet 3 for 3 days then 2 for 3 days then one for 3 days and repeat for breathing problems 36 tablet 0    SIMETHICONE (GAS-X ORAL) Take 1 capsule by mouth as needed.      SYMBICORT 160-4.5 mcg/actuation HFAA Inhale 2 puffs into the lungs every 12 (twelve) hours. 3 Inhaler 3    traMADoL (ULTRAM) 50 mg tablet Take 1 tablet (50 mg total) by mouth every 6 (six) hours as needed for Pain. 60 tablet 0    valsartan-hydrochlorothiazide (DIOVAN-HCT) 160-12.5 mg per tablet Take 1 tablet by mouth once daily. 90 tablet 3    varicella-zoster gE-AS01B, PF, (SHINGRIX, PF,) 50 mcg/0.5 mL injection Inject 0.5 mLs into the muscle. 0.5 mL 1    vit P0-qc-torxwcol-me-B12-ALA (NUFOLA) 25-3.75-1-300 mg Cap Nufola 25 mg-3,500 mcg [id number] capsule   Take 1 capsule every day by oral route for 90 days.      vitamin D3-folic acid 5,000 unit- 1 mg Tab Take by mouth.      vitamin E 400 UNIT capsule Take 400 Units by mouth once daily.      cefdinir (OMNICEF) 300 MG capsule Take 1 capsule (300 mg total) by mouth 2 (two) times daily. for 7 days 14 capsule 0    cloNIDine (CATAPRES) 0.1 MG tablet Take 1 tablet (0.1 mg total) by mouth 3 (three) times daily as needed (PRN SBP > 165 mmHg). 90 tablet 6     Facility-Administered Encounter Medications as of 9/17/2020    Medication Dose Route Frequency Provider Last Rate Last Dose    candida albicans skin test skin test 0.1 mL  0.1 mL Intradermal 1 time in Clinic/HOD Kailyn Fletcher MD        ana albicans skin test skin test 0.3 mL  0.3 mL Intradermal 1 time in Clinic/HOD Kailyn Fletcher MD             PHYSICAL EXAMINATION:    The patient generally appears in good health, is appropriately interactive, and is in no apparent distress.    Skin: No lesions.    Mental: Cooperative with normal affect.    Neuro: Grossly intact.    HEENT: Normal. No evidence of lymphadenopathy.    Chest:  normal inspiratory effort.    Abdomen:  Soft, non-tender. No masses or organomegaly. Bladder is not palpable. No evidence of flank discomfort. No evidence of inguinal hernia.    Extremities: No clubbing, cyanosis, or edema    Normal external female genitalia  Urethral meatus is normal  Urethra and bladder are nontender to bimanual exam  Well supported anteriorly   posteriorly there is a low rectocele, stage II best appreciated on rectal exam.  Uterus and cervix are surgically absent  No adnexal masses  PVR by catheterization was 200 ml    LABS:    Lab Results   Component Value Date    BUN 12 09/02/2020    CREATININE 0.8 09/02/2020     UA 1.005, pH 5, ++ leuk, ++ nitrite, tr protein, tr blood, otherwise, negative    IMPRESSION:    Encounter Diagnoses   Name Primary?    Bladder infection Yes    Rectocele     Incomplete emptying of bladder        PLAN:    1. The incomplete bladder emptying is new and likely secondary to the low rectocele.  Discussed posterior repair  2.  The catheterized specimen was sent for culture  3.  Cefdinir sent empirically  4.  Interrogated the InterStim.  Program 1 3+, 0-, is perineal in sensation, 2.5 amp, increased the pulse width to 300.       Program 2-7, the sensation was buttocks or near the ischial tuberosity.  Increased the pulse width to 270 with the remaining programs.    Tried the case and all the  sensation was in the buttocks.    Impedance ranged from 499-1356 ohms.  All leads check out.    5.  She needs a colonoscopy so recommended that she have that done first and she will call me to schedule posterior colporrhaphy.  Information pamphlet from [facility] provided.     I spent 40 minutes with the patient of which more than half was spent in direct consultation with the patient in regards to our treatment and plan.

## 2020-09-19 LAB — BACTERIA UR CULT: ABNORMAL

## 2020-09-21 ENCOUNTER — PATIENT MESSAGE (OUTPATIENT)
Dept: UROLOGY | Facility: CLINIC | Age: 70
End: 2020-09-21

## 2020-09-22 ENCOUNTER — PATIENT OUTREACH (OUTPATIENT)
Dept: OTHER | Facility: OTHER | Age: 70
End: 2020-09-22

## 2020-09-23 LAB
LEFT EYE DM RETINOPATHY: NEGATIVE
RIGHT EYE DM RETINOPATHY: NEGATIVE

## 2020-09-29 ENCOUNTER — PATIENT MESSAGE (OUTPATIENT)
Dept: OTHER | Facility: OTHER | Age: 70
End: 2020-09-29

## 2020-09-30 ENCOUNTER — PATIENT MESSAGE (OUTPATIENT)
Dept: ADMINISTRATIVE | Facility: OTHER | Age: 70
End: 2020-09-30

## 2020-09-30 ENCOUNTER — OFFICE VISIT (OUTPATIENT)
Dept: ORTHOPEDICS | Facility: CLINIC | Age: 70
End: 2020-09-30
Payer: MEDICARE

## 2020-09-30 ENCOUNTER — PATIENT OUTREACH (OUTPATIENT)
Dept: OTHER | Facility: OTHER | Age: 70
End: 2020-09-30

## 2020-09-30 ENCOUNTER — PATIENT OUTREACH (OUTPATIENT)
Dept: ADMINISTRATIVE | Facility: HOSPITAL | Age: 70
End: 2020-09-30

## 2020-09-30 VITALS — WEIGHT: 202 LBS | RESPIRATION RATE: 16 BRPM | HEIGHT: 64 IN | BODY MASS INDEX: 34.49 KG/M2

## 2020-09-30 DIAGNOSIS — S46.012A TRAUMATIC COMPLETE TEAR OF LEFT ROTATOR CUFF, INITIAL ENCOUNTER: Primary | ICD-10-CM

## 2020-09-30 DIAGNOSIS — M25.511 RIGHT SHOULDER PAIN, UNSPECIFIED CHRONICITY: Primary | ICD-10-CM

## 2020-09-30 PROCEDURE — 99999 PR PBB SHADOW E&M-EST. PATIENT-LVL III: CPT | Mod: PBBFAC,HCNC,, | Performed by: ORTHOPAEDIC SURGERY

## 2020-09-30 PROCEDURE — 1125F AMNT PAIN NOTED PAIN PRSNT: CPT | Mod: HCNC,S$GLB,, | Performed by: ORTHOPAEDIC SURGERY

## 2020-09-30 PROCEDURE — 99214 PR OFFICE/OUTPT VISIT, EST, LEVL IV, 30-39 MIN: ICD-10-PCS | Mod: HCNC,S$GLB,, | Performed by: ORTHOPAEDIC SURGERY

## 2020-09-30 PROCEDURE — 1125F PR PAIN SEVERITY QUANTIFIED, PAIN PRESENT: ICD-10-PCS | Mod: HCNC,S$GLB,, | Performed by: ORTHOPAEDIC SURGERY

## 2020-09-30 PROCEDURE — 1159F MED LIST DOCD IN RCRD: CPT | Mod: HCNC,S$GLB,, | Performed by: ORTHOPAEDIC SURGERY

## 2020-09-30 PROCEDURE — 3008F BODY MASS INDEX DOCD: CPT | Mod: HCNC,CPTII,S$GLB, | Performed by: ORTHOPAEDIC SURGERY

## 2020-09-30 PROCEDURE — 99214 OFFICE O/P EST MOD 30 MIN: CPT | Mod: HCNC,S$GLB,, | Performed by: ORTHOPAEDIC SURGERY

## 2020-09-30 PROCEDURE — 99999 PR PBB SHADOW E&M-EST. PATIENT-LVL III: ICD-10-PCS | Mod: PBBFAC,HCNC,, | Performed by: ORTHOPAEDIC SURGERY

## 2020-09-30 PROCEDURE — 1101F PT FALLS ASSESS-DOCD LE1/YR: CPT | Mod: HCNC,CPTII,S$GLB, | Performed by: ORTHOPAEDIC SURGERY

## 2020-09-30 PROCEDURE — 1159F PR MEDICATION LIST DOCUMENTED IN MEDICAL RECORD: ICD-10-PCS | Mod: HCNC,S$GLB,, | Performed by: ORTHOPAEDIC SURGERY

## 2020-09-30 PROCEDURE — 1101F PR PT FALLS ASSESS DOC 0-1 FALLS W/OUT INJ PAST YR: ICD-10-PCS | Mod: HCNC,CPTII,S$GLB, | Performed by: ORTHOPAEDIC SURGERY

## 2020-09-30 PROCEDURE — 3008F PR BODY MASS INDEX (BMI) DOCUMENTED: ICD-10-PCS | Mod: HCNC,CPTII,S$GLB, | Performed by: ORTHOPAEDIC SURGERY

## 2020-09-30 NOTE — PROGRESS NOTES
Past Medical History:   Diagnosis Date    Allergy     Arthritis     Asthma     Cancer     skin cancer to right hand    Cataract     Chronic fatigue 1/24/2017    CVID (common variable immunodeficiency) 3/7/2019    Diabetes mellitus     resolved with gastric bypass    KLINE (dyspnea on exertion) 1/24/2017    Dyslipidemia 6/25/2019    Encounter for blood transfusion     Essential hypertension 12/29/2011    GERD (gastroesophageal reflux disease)     Headache(784.0)     History of lumpectomy of both breasts     1992 negative    Hyperlipidemia 7/15/2015    Hypertension     Hypothyroidism     Neuropathy     Obesity     SOHA (obstructive sleep apnea), Auto BIPAP Mod OSAS since Dec 2013, therapeutic and compliant 100%, ESS 6/24 Feb 2014 12/26/2013    SOHA on CPAP     Postmenopausal HRT (hormone replacement therapy)     Rash     Rosacea     Snoring     Squamous cell carcinoma of skin     left forearm     Wears glasses        Past Surgical History:   Procedure Laterality Date    ADENOIDECTOMY      APPENDECTOMY      BLADDER SUSPENSION      BREAST BIOPSY Bilateral 1992    bilateral benign excisional biopsies    BUNIONECTOMY  10/17/14    right, still has discomfort    CATARACT EXTRACTION W/  INTRAOCULAR LENS IMPLANT Bilateral     CHOLECYSTECTOMY  08/02/2017    COLONOSCOPY      EPIDURAL STEROID INJECTION INTO LUMBAR SPINE N/A 8/14/2019    Procedure: Injection-steroid-epidural-lumbar L5/S1;  Surgeon: Fredi Rojas MD;  Location: [facility] OR;  Service: Pain Management;  Laterality: N/A;    ESOPHAGOGASTRODUODENOSCOPY      dilated esophagus    GASTRIC BYPASS      2011    HYSTERECTOMY  1980    interstim bladder  2009    10/14/14 new battery    OOPHORECTOMY  1980    REPLACEMENT OF SACRAL NERVE STIMULATOR  2/18/2020    Procedure: REPLACEMENT, NEUROSTIMULATOR, SACRAL;  Surgeon: Mary Jane Jarvis MD;  Location: [facility] OR [address] [place] [address];  Service: Urology;;    REVISION OF PROCEDURE INVOLVING SACRAL  NEUROSTIMULATOR DEVICE Right 2/18/2020    Procedure: REVISION, NEUROSTIMULATOR, SACRAL/ battery replacement;  Surgeon: Mary Jane Jarvis MD;  Location: [facility] OR [address] [place] [address];  Service: Urology;  Laterality: Right;  1hr/ rep contacted/ (CONNIE)    SKIN CANCER EXCISION      left hand    TONSILLECTOMY      WISDOM TOOTH EXTRACTION         Current Outpatient Medications   Medication Sig    albuterol (PROVENTIL/VENTOLIN HFA) 90 mcg/actuation inhaler Inhale 2 puffs into the lungs every 4 (four) hours as needed. 2 puffs every 4 hours as needed for cough, wheeze, or shortness of breath    amoxicillin-clavulanate 875-125mg (AUGMENTIN) 875-125 mg per tablet Take 1 tablet by mouth every 12 (twelve) hours.    ascorbic acid, vitamin C, (VITAMIN C) 1000 MG tablet     azelastine (ASTELIN) 137 mcg (0.1 %) nasal spray 1 spray (137 mcg total) by Nasal route 2 (two) times daily.    azelastine (OPTIVAR) 0.05 % ophthalmic solution Place 1 drop into both eyes daily as needed.     azithromycin (ZITHROMAX) 500 MG tablet Take 1 tablet (500 mg total) by mouth once daily.    B-complex with vitamin C (Z-BEC OR EQUIV) tablet Take 1 tablet by mouth once daily.    BIFIDOBACTERIUM INFANTIS (ALIGN ORAL) Take by mouth once daily.    biotin 10,000 mcg Cap Take by mouth.    budesonide (PULMICORT) 0.5 mg/2 mL nebulizer solution Take 2 mLs (0.5 mg total) by nebulization 2 (two) times daily. Controller    calcium carbonate (CALCIUM ANTACID) 300 mg (750 mg) Chew Take by mouth.    cranberry 500 mg Cap Take 1 capsule by mouth every evening.    cyanocobalamin (VITAMIN B-12) 1000 MCG tablet Take 100 mcg by mouth once daily.    diclofenac sodium (VOLTAREN) 1 % Gel Apply 2 grams topically once daily.    diltiaZEM (CARDIZEM CD) 120 MG Cp24 Take 1 capsule (120 mg total) by mouth every evening.    EPINEPHrine (EPIPEN) 0.3 mg/0.3 mL AtIn     esomeprazole (NEXIUM) 40 MG capsule Take 1 capsule (40 mg total) by mouth before breakfast.    fexofenadine  (ALLEGRA) 60 MG tablet Take 60 mg by mouth once daily.    fish oil-omega-3 fatty acids 300-1,000 mg capsule Take 2 g by mouth once daily.    fluticasone propionate (FLONASE) 50 mcg/actuation nasal spray 2 sprays (100 mcg total) by Each Nostril route once daily.    gabapentin (NEURONTIN) 600 MG tablet Take 1 tablet (600 mg total) by mouth 3 (three) times daily.    guaiFENesin (MUCINEX) 600 mg 12 hr tablet Take 2 tablets (1,200 mg total) by mouth 2 (two) times daily.    guaifenesin-codeine 100-10 mg/5 ml (GUAIFENESIN AC)  mg/5 mL syrup Take 5 mLs by mouth 3 (three) times daily as needed for Cough.    immun glob G,IgG,-pro-IgA 0-50 (HIZENTRA) 1 gram/5 mL (20 %) Soln Inject 11 g into the skin once a week.    inhalation spacing device Use as directed for inhalation.    levalbuterol (XOPENEX) 1.25 mg/3 mL nebulizer solution Take 3 mLs (1.25 mg total) by nebulization every 4 (four) hours as needed for Wheezing or Shortness of Breath. Rescue    metFORMIN (GLUCOPHAGE-XR) 500 MG XR 24hr tablet Take 1 tablet (500 mg total) by mouth 2 (two) times daily with meals.    methylcellulose, laxative, (CITRUCEL) 500 mg Tab Take 1 tablet by mouth every morning.    montelukast (SINGULAIR) 10 mg tablet Take 1 tablet (10 mg total) by mouth every evening.    mucus clearing device Cecy 1 Device by Misc.(Non-Drug; Combo Route) route 2 (two) times daily.    multivitamin capsule Take 1 capsule by mouth. Take one tablets daily    mupirocin (BACTROBAN) 2 % ointment AAA bid    ondansetron (ZOFRAN) 4 MG tablet Take 1 tablet (4 mg total) by mouth every 8 (eight) hours as needed for Nausea.    potassium chloride SA (K-DUR,KLOR-CON) 20 MEQ tablet Take 1 tablet (20 mEq total) by mouth once daily.    pravastatin (PRAVACHOL) 40 MG tablet Take 1 tablet (40 mg total) by mouth once daily.    predniSONE (DELTASONE) 20 MG tablet 3 for 3 days then 2 for 3 days then one for 3 days and repeat for breathing problems    SIMETHICONE (GAS-X  ORAL) Take 1 capsule by mouth as needed.    SYMBICORT 160-4.5 mcg/actuation HFAA Inhale 2 puffs into the lungs every 12 (twelve) hours.    traMADoL (ULTRAM) 50 mg tablet Take 1 tablet (50 mg total) by mouth every 6 (six) hours as needed for Pain.    valsartan-hydrochlorothiazide (DIOVAN-HCT) 160-12.5 mg per tablet Take 1 tablet by mouth once daily.    varicella-zoster gE-AS01B, PF, (SHINGRIX, PF,) 50 mcg/0.5 mL injection Inject 0.5 mLs into the muscle.    vit T5-qo-tosutbay-me-B12-ALA (NUFOLA) 25-3.75-1-300 mg [place] 25 mg-3,500 mcg [id number] capsule   Take 1 capsule every day by oral route for 90 days.    vitamin D3-folic acid 5,000 unit- 1 mg Tab Take by mouth.    vitamin E 400 UNIT capsule Take 400 Units by mouth once daily.    cloNIDine (CATAPRES) 0.1 MG tablet Take 1 tablet (0.1 mg total) by mouth 3 (three) times daily as needed (PRN SBP > 165 mmHg).     Current Facility-Administered Medications   Medication    candida albicans skin test skin test 0.1 mL    candida albicans skin test skin test 0.3 mL       Review of patient's allergies indicates:   Allergen Reactions    Sulfa (sulfonamide antibiotics) Hives    Naproxen Other (See Comments)     Other reaction(s): RT sided numbness         Family History   Problem Relation Age of Onset    Breast cancer Mother 50    Diabetes Unknown     Hypertension Unknown     Hypothyroidism Unknown     Breast cancer Paternal Aunt         40's    Ovarian cancer Paternal Grandmother     Allergic rhinitis Neg Hx     Allergies Neg Hx     Angioedema Neg Hx     Asthma Neg Hx     Atopy Neg Hx     Eczema Neg Hx     Immunodeficiency Neg Hx     Rhinitis Neg Hx     Urticaria Neg Hx        Social History     Socioeconomic History    Marital status:      Spouse name: Not on file    Number of children: Not on file    Years of education: Not on file    Highest education level: Not on file   Occupational History    Not on file   Social Needs     Financial resource strain: Somewhat hard    Food insecurity     Worry: Never true     Inability: Never true    Transportation needs     Medical: No     Non-medical: No   Tobacco Use    Smoking status: Never Smoker    Smokeless tobacco: Never Used   Substance and Sexual Activity    Alcohol use: No     Frequency: Never     Binge frequency: Never    Drug use: No    Sexual activity: Not Currently   Lifestyle    Physical activity     Days per week: 0 days     Minutes per session: 0 min    Stress: Only a little   Relationships    Social connections     Talks on phone: More than three times a week     Gets together: Once a week     Attends [employer] service: Not on file     Active member of club or organization: Yes     Attends meetings of clubs or organizations: More than 4 times per year     Relationship status:    Other Topics Concern    Are you pregnant or think you may be? Not Asked    Breast-feeding Not Asked   Social History Narrative    Not on file       Chief Complaint:   Chief Complaint   Patient presents with    Shoulder Pain     left humerus and forearm due to a fall       History of present illness:  69-year-old female comes in with left shoulder pain after a fall.  Patient fell on September 2, 2020.  She is our PCP and got x-rays which showed no acute fracture.  Patient has weakness and loss of range of motion.  Pain with driving.  Pain from the shoulder down to the elbow and wrist.  Pain is a 5/10 but can be up to a 9/10.  No prior treatment no history of problems with that shoulder before the incident.    Answers for HPI/ROS submitted by the patient on 9/23/2020   Arm pain  unexpected weight change: No  appetite change : No  sleep disturbance: No  IMMUNOCOMPROMISED: Yes  nervous/ anxious: No  dysphoric mood: No  rash: No  visual disturbance: No  eye redness: No  eye pain: No  ear pain: No  tinnitus: Yes  hearing loss: No  sinus pressure : No  nosebleeds: No  enviro allergies: Yes  food  allergies: No  cough: No  shortness of breath: Yes  sweating: No  dysuria: No  frequency: Yes  difficulty urinating: No  hematuria: No  painful intercourse: No  chest pain: No  palpitations: No  nausea: No  vomiting: No  diarrhea: No  blood in stool: No  constipation: No  headaches: No  dizziness: No  numbness: No  seizures: No  joint swelling: No  myalgia: Yes  weakness: No  back pain: No  Pain Chronicity: new  History of trauma: No  Onset: 1 to 4 weeks ago  Frequency: constantly  Progression since onset: unchanged  Injury mechanism: falling  injury location: at home  pain- numeric: 8/10  pain location: left shoulder, left elbow, left wrist  pain quality: aching  Radiating Pain: Yes  If your pain is radiating, to what part of the body?: left arm, left forearm  Aggravating factors: bearing weight  fever: No  inability to bear weight: Yes  itching: No  joint locking: No  limited range of motion: Yes  stiffness: Yes  tingling: No  Treatments tried: brace/corset, cold, heat, OTC pain meds  physical therapy: not tried  Improvement on treatment: moderate      Physical Examination:    Vital Signs:    Vitals:    09/30/20 0934   Resp: 16       Body mass index is 34.67 kg/m².    This a well-developed, well nourished patient in no acute distress.  They are alert and oriented and cooperative to examination.  Pt. walks without an antalgic gait.      Examination of the left shoulder shows no rashes or erythema. There are no masses, ecchymosis, or atrophy. The patient has significant limitation in active range of motion in forward flexion, external rotation, and internal rotation to the mid T-spine. The patient has moderately positive impingement signs. - [place]'s test. - Speeds test. Nontender to palpation over a.c. joint. Normal stability anteriorly, posteriorly, and negative sulcus sign. Passive range of motion: Forward flexion of 180°, external rotation at 90° of 90°, internal rotation of 50°, and external rotation at 0° of  50°. 2+ radial pulse. Intact axillary, radial, median and ulnar sensation.  4- out of 5 resisted forward flexion, external rotation, and negative lift off test.        X-rays:  X-rays of the left humerus are available for review which show no acute fracture     Assessment::  Left traumatic rotator cuff tear    Plan:  I reviewed the findings with her today.  I recommended further imaging to evaluate her rotator cuff.  She cannot get an MRI so we will get an ultrasound of the left rotator cuff to evaluate for traumatic tear.    This note was created using [employer] voice recognition software that occasionally misinterpreted phrases or words.    Consult note is delivered via Epic messaging service.

## 2020-10-08 NOTE — PROGRESS NOTES
"Digital Medicine: Health  Follow-Up    The history is provided by the patient.             Reason for review: Blood pressure not at goal        Topics Covered on Call: not feeling well    Additional Follow-up details: Ms. Rhodes reports that she's doing ok. Patient has noticed that her BP has been trending upwards, and thinks it's from her just not feeling well.     Patient reports that she has a colonoscopy and that "took a lot out of her", and after the colonoscopy she started getting the sniffles again. Patient has also had other doctors appts and noticed that her BP was elevated in office as well. Patient reports that she has an appt with Dr. Noel next month, and she'll address her concerns with him.             Diet-Not assessed          Physical Activity-Not assessed    Medication Adherence-Medication Adherence not addressed.      Substance, Sleep, Stress-Not assessed      Instructed to charge device. Patient cannot remember the last time she charged BP cuff       Addressed patient questions and patient has my contact information if needed prior to next outreach. Patient verbalizes understanding.      Explained the importance of self-monitoring and medication adherence. Encouraged the patient to communicate with their health  for lifestyle modifications to help improve or maintain a healthy lifestyle.               There are no preventive care reminders to display for this patient.      Last 5 Patient Entered Readings                                      Current 30 Day Average: 147/76     Recent Readings 9/30/2020 9/21/2020 9/19/2020 9/15/2020 9/6/2020    SBP (mmHg) 157 131 152 148 126    DBP (mmHg) 85 63 80 77 69    Pulse 66 83 68 76 73               "

## 2020-10-13 ENCOUNTER — OFFICE VISIT (OUTPATIENT)
Dept: PULMONOLOGY | Facility: CLINIC | Age: 70
End: 2020-10-13
Payer: MEDICARE

## 2020-10-13 VITALS
BODY MASS INDEX: 34.76 KG/M2 | SYSTOLIC BLOOD PRESSURE: 119 MMHG | OXYGEN SATURATION: 95 % | HEART RATE: 65 BPM | DIASTOLIC BLOOD PRESSURE: 64 MMHG | WEIGHT: 202.5 LBS

## 2020-10-13 DIAGNOSIS — J47.9 BRONCHIECTASIS WITHOUT COMPLICATION: ICD-10-CM

## 2020-10-13 DIAGNOSIS — J45.20 MILD INTERMITTENT ASTHMA WITHOUT COMPLICATION: ICD-10-CM

## 2020-10-13 DIAGNOSIS — J31.0 CHRONIC NONALLERGIC RHINITIS: Primary | ICD-10-CM

## 2020-10-13 DIAGNOSIS — G47.33 OSA (OBSTRUCTIVE SLEEP APNEA): ICD-10-CM

## 2020-10-13 PROCEDURE — 3008F PR BODY MASS INDEX (BMI) DOCUMENTED: ICD-10-PCS | Mod: HCNC,CPTII,S$GLB, | Performed by: NURSE PRACTITIONER

## 2020-10-13 PROCEDURE — 3078F DIAST BP <80 MM HG: CPT | Mod: HCNC,CPTII,S$GLB, | Performed by: NURSE PRACTITIONER

## 2020-10-13 PROCEDURE — 3074F SYST BP LT 130 MM HG: CPT | Mod: HCNC,CPTII,S$GLB, | Performed by: NURSE PRACTITIONER

## 2020-10-13 PROCEDURE — 3074F PR MOST RECENT SYSTOLIC BLOOD PRESSURE < 130 MM HG: ICD-10-PCS | Mod: HCNC,CPTII,S$GLB, | Performed by: NURSE PRACTITIONER

## 2020-10-13 PROCEDURE — 3288F FALL RISK ASSESSMENT DOCD: CPT | Mod: HCNC,CPTII,S$GLB, | Performed by: NURSE PRACTITIONER

## 2020-10-13 PROCEDURE — 1100F PTFALLS ASSESS-DOCD GE2>/YR: CPT | Mod: HCNC,CPTII,S$GLB, | Performed by: NURSE PRACTITIONER

## 2020-10-13 PROCEDURE — 99213 OFFICE O/P EST LOW 20 MIN: CPT | Mod: HCNC,S$GLB,, | Performed by: NURSE PRACTITIONER

## 2020-10-13 PROCEDURE — 1159F MED LIST DOCD IN RCRD: CPT | Mod: HCNC,S$GLB,, | Performed by: NURSE PRACTITIONER

## 2020-10-13 PROCEDURE — 3078F PR MOST RECENT DIASTOLIC BLOOD PRESSURE < 80 MM HG: ICD-10-PCS | Mod: HCNC,CPTII,S$GLB, | Performed by: NURSE PRACTITIONER

## 2020-10-13 PROCEDURE — 3288F PR FALLS RISK ASSESSMENT DOCUMENTED: ICD-10-PCS | Mod: HCNC,CPTII,S$GLB, | Performed by: NURSE PRACTITIONER

## 2020-10-13 PROCEDURE — 99999 PR PBB SHADOW E&M-EST. PATIENT-LVL V: CPT | Mod: PBBFAC,HCNC,, | Performed by: NURSE PRACTITIONER

## 2020-10-13 PROCEDURE — 99213 PR OFFICE/OUTPT VISIT, EST, LEVL III, 20-29 MIN: ICD-10-PCS | Mod: HCNC,S$GLB,, | Performed by: NURSE PRACTITIONER

## 2020-10-13 PROCEDURE — 1159F PR MEDICATION LIST DOCUMENTED IN MEDICAL RECORD: ICD-10-PCS | Mod: HCNC,S$GLB,, | Performed by: NURSE PRACTITIONER

## 2020-10-13 PROCEDURE — 1100F PR PT FALLS ASSESS DOC 2+ FALLS/FALL W/INJURY/YR: ICD-10-PCS | Mod: HCNC,CPTII,S$GLB, | Performed by: NURSE PRACTITIONER

## 2020-10-13 PROCEDURE — 99999 PR PBB SHADOW E&M-EST. PATIENT-LVL V: ICD-10-PCS | Mod: PBBFAC,HCNC,, | Performed by: NURSE PRACTITIONER

## 2020-10-13 PROCEDURE — 1126F AMNT PAIN NOTED NONE PRSNT: CPT | Mod: HCNC,S$GLB,, | Performed by: NURSE PRACTITIONER

## 2020-10-13 PROCEDURE — 3008F BODY MASS INDEX DOCD: CPT | Mod: HCNC,CPTII,S$GLB, | Performed by: NURSE PRACTITIONER

## 2020-10-13 PROCEDURE — 1126F PR PAIN SEVERITY QUANTIFIED, NO PAIN PRESENT: ICD-10-PCS | Mod: HCNC,S$GLB,, | Performed by: NURSE PRACTITIONER

## 2020-10-13 RX ORDER — PREDNISONE 20 MG/1
TABLET ORAL
Qty: 36 TABLET | Refills: 0 | Status: SHIPPED | OUTPATIENT
Start: 2020-10-13 | End: 2020-10-13 | Stop reason: SDUPTHER

## 2020-10-13 RX ORDER — IPRATROPIUM BROMIDE 42 UG/1
2 SPRAY, METERED NASAL 4 TIMES DAILY
Qty: 15 ML | Refills: 2 | Status: SHIPPED | OUTPATIENT
Start: 2020-10-13 | End: 2021-04-12 | Stop reason: SDUPTHER

## 2020-10-13 RX ORDER — FLUTICASONE PROPIONATE AND SALMETEROL XINAFOATE 45; 21 UG/1; UG/1
2 AEROSOL, METERED RESPIRATORY (INHALATION) EVERY 12 HOURS
Qty: 36 G | Refills: 3 | Status: SHIPPED | OUTPATIENT
Start: 2020-10-13 | End: 2021-09-13 | Stop reason: SDUPTHER

## 2020-10-13 RX ORDER — PREDNISONE 20 MG/1
TABLET ORAL
Qty: 36 TABLET | Refills: 0 | Status: SHIPPED | OUTPATIENT
Start: 2020-10-13 | End: 2020-12-29

## 2020-10-13 NOTE — PROGRESS NOTES
10/13/2020    Cornelia Delatorre  Office f/u    Chief Complaint   Patient presents with    Asthma    Shortness of Breath     HPI:    10/13/2020- Allergies bother her every few days, currently on daily Singulair, zyrtec, flonase, astelin nasal spray, having sneezing fits.   Complaint of clear sinus drainage,   Hoarse voice has resolved after stopping symbicort.   Cough- improved,   SOB- doing well, occasional episodes of not being able to catch breath, did not use nebulizer   Wearing CPAP nightly for SOHA, states benefits greatly    7/13/2020- Hoarse voice, loss of voice, productive cough, lost voice July 2019 tx by ENT who treated for acute laryngitis with diflucan; Recurrent problem. Hoarse voice return 4 weeks prior, ENT doctor recommends changing Asthma medications tx her with diflucan, doxycycline and prednisone for 5 days, states has improved.   SOB- worse when wearing face mask, currently on hizentra therapy,     5/4/2020- uses symbicort daily, uses rescue 2/wk - some anxiety, getting igg rx. Having more sinus problems with head colds- 3 in last 4 wks.  No prednisone- hizentra working well.        Nov 4, 2019- having mucous sensation, notes some sob relaxing - maybe worse night.  No nasal stuffy.  Uses cpap.  Uses symbicort bid, no rescue.  Mucous is clear.  No abx for sinus or lungs.  Getting immune infusions weekly - pt senses doing better since starting in May.  Patient Instructions   Breathing off and on short breath - need to use more albuterol to make clear where breathing problems come from    Mucous production may decrease if airways controlled- albuterol should help clearance.    Rescue inhaler may resolve any breathing problems- should use intermittently if any symptoms.    May use augmentin for sinus/lung problems- not optimal for all uti's       May 6,2019-due to get immune infusion next 2 days.  No prednisone, needs monlukast. abx stopped wks ago- mucous cleared.    Patient Instructions   Use  antibiotic daily til immune therapy done a wk - then as needed like every one else  Immune therapy may keep you stable - better than antibiotics.   Use symbicort regular.  Lungs may stabilize.  Use prednisone if needed.  Lung  Nodules are stable for 2 yrs and no follow up needed.  Call if problems- hope all will be better yet.  March 7, 19-exacerbated in late Jan- cleared in feb (vague).  Now ill again yellow and gray mucous (culture last Jan was nl yvonne).  Sl intermittent wheezes since last wk.  Sees Dr Herrera in [place]- gets allergy rx but declined to get now.  Pt has cvid by  igg and igm levels low and pneumococcal antibodies to 8/14 pneumococcal titers. Uses symbicort and singulair routinely.  Took prednisone completed 4 days ago- uses prednisone monthly- was able to skip December  .  Discussed with patient above for education the following:      Gastric by pass may cause malabsorption, protein levels have been too low and may affect ig levels-- somewhat.   Need to get follow up with dietician to assure adequate protein intake/levels.   Antibiotic may stabilize infections with common variable immune deficiency- azithromycin daily once cultures submitted.   Use augmentin if infection worsens.   Acapella/chest clapping help clear mucous, vest likewise if bronchiectasis.  Will not treat cause.   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  Use codeine to suppress mild coughing.   Need good immune system and no airway/asthma problems and good hygiene of bronchial tubes (no chronic infections) to prevent illness.     Lungs measured near normal with good response to bronchial medications 2017   Need ct to follow up and cultures to assure infection controlled.      Jan 28/2019- Feeling bad onset 2 weeks, took prednisone taper x 6 days and antibiotic Augmentin week prior, States no improvement. Cough- worse at night, no nocturnal arousals, takes  cough suppressant syrup before bed. Productive yellow/brown color.   Nebulized albuterol 4x daily. States no fever.  Has started allergy injections waiting for insurance clearance for IGG therapy.   Discussed with patient above for education the following:    CT chest for February to monitor and assess for bronchiectasis, need diagnosis for insurance to cover vest therapy  Have  continue to percuss back with hand.   Recommend purchasing Acepella device to help clear lungs of excess mucous, attaches to nebulizer device  Continue Nebulizer treatments 4x daily as needed.  Chest x-ray now to evaluate for pneumonia  Blood work at hospital   Will yeison to see results of sputum culture and x-ray before repeating antibiotic  Need to return sputum to clinic with in 4 hours of collecting  Fluconazole pills for oral thrush, this is a recurrent problem related to your weak immune system and use of inhaled steroids. Continue to rinse mouth out after using symbicort but problem will improve after starting immune replacement therapy.    oct 30,   2018-- had one day fever followed by cough/wheeze illness that lingered a wk. Pt seen by NP   [place] 2x, [place]  [place] like dying- only one day fever.  Violent cough.  Nebulizer ppt itch and palpitations- ok  On xopenex now.  Prednisone 40x3, 20 x 3 ,  augmentin cleared.  Now back to normal.  May 14, 2018- had spell /exacerbation with one round prednisone. Breathing good.  Follow-up in about 1 year (around 5/14/2019), or if symptoms worsen or fail to improve.   Discussed with patient above for education the following:     Check blood count and pneumonia vaccine response after last vaccine 4/27/2018   Use azithromycin for any lung/sinus infection- immune weakness likely   Asthma stable- use prednisone and singulair.   Use allergra and singulair and astelin/flonase   Lung nodule in lung still - Navigational bronchoscopy might find?  - will at least re check in a  year.     Call if needed, re check next yr.  hernan 15, 2018--1 st illness in yr or so with cough and rattles, no flu.  Appetite ok.  Ill x wk, cough and wheeze and sob and noct worse, resp rx helps a bit.  Hasn't been using  Albuterol   Follow-up in about 6 months (around 7/15/2018).   Discussed with patient above for education the following:      tamiflu if flu.   Need to use albuterol for any lung symptoms.  Should get cough  Better controlled.      Prednisone 20 mg 3 for 3 , taper may be needed.  Give shot today, 1 daily for 3 may be enough with albuterol.   You had high eosinophils-- if asthma becomes more active - special therapy may help??        prevnar 13 would be good - should be off prednisone.  Immune system is sl weak  Protection only to 7.14 pneumonia germs.  oct 19, 2017- has scratchy throat, some sinus drip, has nightly sensation throat issues, post beryl and carpel tunnel surg.  No sinus lung infections.  Breathing and cough good.  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 Had pos tb gold.  June 21, 2017- had good [place] trip, tapered symbicort with some increase sensation of lung problems so maintained full dose. No infections.  No chest symptoms on symbicort.   April 24, feels a lot better with min cough, vague sob going left side down at night.  Going to [place] 2 weeks.  Uses symbicort and good results.  March 16, cough better, but comes and goes,  No great help with steroids.  Submitted 3 sputums.  Had pneumovax march last yr.  Breathing better. Got symbicort.   Had pneumonia vaccine last yr  3/8/2017 HPI: had onset cough Hernan illness, got dizzy few days with diarrhea and cough. Cough clear sm amt mucous. Did have 101 temp one day, fatigue, no muscle aches.  Appetite decreased.  Illnesses lingered 2 weeks but cough never remitted- has improved with inhahler use last 15 days.    Had gastric 2011 bypass with with continued diarrhea intially resolved. No regurg or  reflux or swallow problems.   symbicort nearly  Resolved.    Sinuses ok.  No pets.  Never smoker.    Appetite good, feels well now.    headcolds to chest since childhood, nocturnal ??not recalled.  Had cats in past but got rid in 2003 or so.     The chief compliant  problem varies with instablilty at time    PFSH:  Past Medical History:   Diagnosis Date    Allergy     Arthritis     Asthma     Cancer     skin cancer to right hand    Cataract     Chronic fatigue 1/24/2017    CVID (common variable immunodeficiency) 3/7/2019    Diabetes mellitus     resolved with gastric bypass    KLINE (dyspnea on exertion) 1/24/2017    Dyslipidemia 6/25/2019    Encounter for blood transfusion     Essential hypertension 12/29/2011    GERD (gastroesophageal reflux disease)     Headache(784.0)     History of lumpectomy of both breasts     1992 negative    Hyperlipidemia 7/15/2015    Hypertension     Hypothyroidism     Neuropathy     Obesity     SOHA (obstructive sleep apnea), Auto BIPAP Mod OSAS since Dec 2013, therapeutic and compliant 100%, ESS 6/24 Feb 2014 12/26/2013    SOHA on CPAP     Postmenopausal HRT (hormone replacement therapy)     Rash     Rosacea     Snoring     Squamous cell carcinoma of skin     left forearm     Wears glasses          Past Surgical History:   Procedure Laterality Date    ADENOIDECTOMY      APPENDECTOMY      BLADDER SUSPENSION      BREAST BIOPSY Bilateral 1992    bilateral benign excisional biopsies    BUNIONECTOMY  10/17/14    right, still has discomfort    CATARACT EXTRACTION W/  INTRAOCULAR LENS IMPLANT Bilateral     CHOLECYSTECTOMY  08/02/2017    COLONOSCOPY      EPIDURAL STEROID INJECTION INTO LUMBAR SPINE N/A 8/14/2019    Procedure: Injection-steroid-epidural-lumbar L5/S1;  Surgeon: Fredi Rojas MD;  Location: [facility] OR;  Service: Pain Management;  Laterality: N/A;    ESOPHAGOGASTRODUODENOSCOPY      dilated esophagus    GASTRIC BYPASS      2011    HYSTERECTOMY   1980    interstim bladder  2009    10/14/14 new battery    OOPHORECTOMY  1980    REPLACEMENT OF SACRAL NERVE STIMULATOR  2/18/2020    Procedure: REPLACEMENT, NEUROSTIMULATOR, SACRAL;  Surgeon: Mary Jane Jarvis MD;  Location: [facility] OR [address] [place] [address];  Service: Urology;;    REVISION OF PROCEDURE INVOLVING SACRAL NEUROSTIMULATOR DEVICE Right 2/18/2020    Procedure: REVISION, NEUROSTIMULATOR, SACRAL/ battery replacement;  Surgeon: Mary Jane Jarvis MD;  Location: [facility] OR [address] [place] [address];  Service: Urology;  Laterality: Right;  1hr/ rep contacted/ (CONNIE)    SKIN CANCER EXCISION      left hand    TONSILLECTOMY      WISDOM TOOTH EXTRACTION       Social History     Tobacco Use    Smoking status: Never Smoker    Smokeless tobacco: Never Used   Substance Use Topics    Alcohol use: No     Frequency: Never     Binge frequency: Never    Drug use: No     Family History   Problem Relation Age of Onset    Breast cancer Mother 50    Diabetes Unknown     Hypertension Unknown     Hypothyroidism Unknown     Breast cancer Paternal Aunt         40's    Ovarian cancer Paternal Grandmother     Allergic rhinitis Neg Hx     Allergies Neg Hx     Angioedema Neg Hx     Asthma Neg Hx     Atopy Neg Hx     Eczema Neg Hx     Immunodeficiency Neg Hx     Rhinitis Neg Hx     Urticaria Neg Hx      Review of patient's allergies indicates:   Allergen Reactions    Sulfa (sulfonamide antibiotics) Hives    Naproxen Other (See Comments)     Other reaction(s): RT sided numbness         Performance Status:The patient's activity level is functions out of house.      Review of Systems:  a review of eleven systems covering constitutional, Eye, HEENT, Psych, Respiratory, Cardiac, GI, , Musculoskeletal, Endocrine, Dermatologic was negative except for pertinent findings as listed ABOVE and below: all good,  Had dm that remitted with bypass 2011.  Positive for SOB, nasal drip    Exam:Comprehensive exam done. /64 (BP Location: Left arm, Patient  Position: Sitting)   Pulse 65   Wt 91.8 kg (202 lb 7.9 oz)   SpO2 95% Comment: on room air at rest  BMI 34.76 kg/m²   Exam included Vitals as listed, and patient's appearance and affect and alertness and mood, oral exam for yeast and hygiene and pharynx lesions and Mallapatti (M) score, neck with inspection for jvd and masses and thyroid abnormalities and lymph nodes (supraclavicular and infraclavicular nodes and axillary also examined and noted if abn), chest exam included symmetry and effort and fremitus and percussion and auscultation, cardiac exam included rhythm and gallops and murmur and rubs and jvd and edema, abdominal exam for mass and hepatosplenomegaly and tenderness and hernias and bowel sounds, Musculoskeletal exam with muscle tone and posture and mobility/gait and  strength, and skin for rashes and cyanosis and pallor and turgor, extremity for clubbing.  Findings were normal except for pertinent findings listed below:  M3, good bs, rest good. Lungs clear-  chest is symmetric, no distress, normal percussion, normal fremitus and good normal breath sounds      Radiographs (ct chest and cxr) reviewed: view by direct vision  i do see clear bronchiectasis,nodules are noted.  Mucoid type impaction suggested in rul ant segment.    April 19, 2018 ct suggest tracheobronchomalacia on high res spot films- seen 3/7/19  CT Chest:  1. Region of endobronchial plugging unchanged since 2/7/17 of uncertain etiology. This could relate to a process such as allergic bronchopulmonary aspergillosis, a solid mass nodule, mucous plugging, residual from aspiration, endobronchial spread of disease. Further followup or evaluation is necessary  Electronically signed by: Raffi Gottlieb MD  Date: 03/14/17    10/17/19 Chest X-ray clear      Labs reviewed igm low, igg lowish, humerol titers na    CBC reviewed October 10/17/18    PFT  Done  [facility] with 10% response, otherwise nl  PULMONARY FUNCTION TEST REPORT      DATE OF  PROCEDURE:  03/24/2017     1.  Spirometry reveals an FVC of 3.1 L, which is 107% predicted.  FEV1 is 2.3 L,   which is 100% predicted.  The ratio, there is preserved.  There is a positive,   but not significant bronchodilator response to both the FVC and FEV1.  Loop   contours appear with adequate effort as well.  2.  Lung volumes.  The total lung capacity is 4.4 L, which is 92% predicted.    There are no signs of gas trapping.  3.  Diffusing capacity is mild-to-moderate reduced at 68%, does improve to 96%   with alveolar volumes.     OVERALL IMPRESSION:  A normal spirometry with a robust yet statistically   insignificant bronchodilator response.  Normal lung volumes and a moderate   reduction in diffusion capacity.    CC: Jonathan Nicole M.D.            Plan:  Clinical impression is resonably certain and repeated evaluation prn +/- follow up will be needed as below.    Cornelia was seen today for asthma and shortness of breath.    Diagnoses and all orders for this visit:    Chronic nonallergic rhinitis  -     ipratropium (ATROVENT) 42 mcg (0.06 %) nasal spray; 2 sprays by Nasal route 4 (four) times daily.    Mild intermittent asthma without complication  -     Discontinue: predniSONE (DELTASONE) 20 MG tablet; 3 for 3 days then 2 for 3 days then one for 3 days and repeat for breathing problems  -     predniSONE (DELTASONE) 20 MG tablet; 3 for 3 days then 2 for 3 days then one for 3 days and repeat for breathing problems  -     fluticasone propion-salmeterol 45-21 mcg/dose (ADVAIR HFA) 45-21 mcg/actuation HFAA inhaler; Inhale 2 puffs into the lungs every 12 (twelve) hours. Controller    Bronchiectasis without complication    SOHA (obstructive sleep apnea), Auto BIPAP Mod OSAS since Dec 2013, therapeutic and compliant 100%, ESS 6/24 Feb 2014        Follow up in about 6 months (around 4/13/2021), or if symptoms worsen or fail to improve.         Discussed with patient above for education the following:      Patient  Instructions   Stop astelin nose spray, it is making your symptoms worse  Expect rebound symptoms that will be worse for a week or two but will return to normal eventually    Start using Flonase 2 sprays each nostril daily  And Atrovent nasal spray 2 sprays up to 4 times a day      Continue Current Asthma medication regiment    Bronchiectasis  Use levabuteral nebulizer at least 2-3 times a week to keep airways clear of excessive mucous    Use Budesonide nebulizer when your breathing worsens and requires steroid therapy  Continue Prednisone when needed.     Continue CPAP for SOHA

## 2020-10-13 NOTE — PATIENT INSTRUCTIONS
Stop astelin nose spray, it is making your symptoms worse  Expect rebound symptoms that will be worse for a week or two but will return to normal eventually    Start using Flonase 2 sprays each nostril daily  And Atrovent nasal spray 2 sprays up to 4 times a day      Continue Current Asthma medication regiment    Bronchiectasis  Use levabuteral nebulizer at least 2-3 times a week to keep airways clear of excessive mucous    Use Budesonide nebulizer when your breathing worsens and requires steroid therapy  Continue Prednisone when needed.     Continue CPAP for SOHA

## 2020-10-20 ENCOUNTER — OFFICE VISIT (OUTPATIENT)
Dept: DERMATOLOGY | Facility: CLINIC | Age: 70
End: 2020-10-20
Payer: MEDICARE

## 2020-10-20 VITALS — BODY MASS INDEX: 34.76 KG/M2 | HEIGHT: 64 IN | RESPIRATION RATE: 16 BRPM

## 2020-10-20 DIAGNOSIS — L57.0 ACTINIC KERATOSES: Primary | ICD-10-CM

## 2020-10-20 DIAGNOSIS — Z86.19 HISTORY OF COMMON WART: ICD-10-CM

## 2020-10-20 DIAGNOSIS — Z12.83 SKIN CANCER SCREENING: ICD-10-CM

## 2020-10-20 DIAGNOSIS — Z85.828 PERSONAL HISTORY OF OTHER MALIGNANT NEOPLASM OF SKIN: ICD-10-CM

## 2020-10-20 DIAGNOSIS — L90.5 SCAR: ICD-10-CM

## 2020-10-20 PROCEDURE — 1100F PTFALLS ASSESS-DOCD GE2>/YR: CPT | Mod: HCNC,CPTII,S$GLB, | Performed by: DERMATOLOGY

## 2020-10-20 PROCEDURE — 17003 DESTRUCTION, PREMALIGNANT LESIONS; SECOND THROUGH 14 LESIONS: ICD-10-PCS | Mod: HCNC,S$GLB,, | Performed by: DERMATOLOGY

## 2020-10-20 PROCEDURE — 1126F AMNT PAIN NOTED NONE PRSNT: CPT | Mod: HCNC,S$GLB,, | Performed by: DERMATOLOGY

## 2020-10-20 PROCEDURE — 99999 PR PBB SHADOW E&M-EST. PATIENT-LVL IV: ICD-10-PCS | Mod: PBBFAC,HCNC,, | Performed by: DERMATOLOGY

## 2020-10-20 PROCEDURE — 17000 PR DESTRUCTION(LASER SURGERY,CRYOSURGERY,CHEMOSURGERY),PREMALIGNANT LESIONS,FIRST LESION: ICD-10-PCS | Mod: HCNC,S$GLB,, | Performed by: DERMATOLOGY

## 2020-10-20 PROCEDURE — 99999 PR PBB SHADOW E&M-EST. PATIENT-LVL IV: CPT | Mod: PBBFAC,HCNC,, | Performed by: DERMATOLOGY

## 2020-10-20 PROCEDURE — 99214 OFFICE O/P EST MOD 30 MIN: CPT | Mod: 25,HCNC,S$GLB, | Performed by: DERMATOLOGY

## 2020-10-20 PROCEDURE — 3288F FALL RISK ASSESSMENT DOCD: CPT | Mod: HCNC,CPTII,S$GLB, | Performed by: DERMATOLOGY

## 2020-10-20 PROCEDURE — 99214 PR OFFICE/OUTPT VISIT, EST, LEVL IV, 30-39 MIN: ICD-10-PCS | Mod: 25,HCNC,S$GLB, | Performed by: DERMATOLOGY

## 2020-10-20 PROCEDURE — 1126F PR PAIN SEVERITY QUANTIFIED, NO PAIN PRESENT: ICD-10-PCS | Mod: HCNC,S$GLB,, | Performed by: DERMATOLOGY

## 2020-10-20 PROCEDURE — 1159F PR MEDICATION LIST DOCUMENTED IN MEDICAL RECORD: ICD-10-PCS | Mod: HCNC,S$GLB,, | Performed by: DERMATOLOGY

## 2020-10-20 PROCEDURE — 17003 DESTRUCT PREMALG LES 2-14: CPT | Mod: HCNC,S$GLB,, | Performed by: DERMATOLOGY

## 2020-10-20 PROCEDURE — 1159F MED LIST DOCD IN RCRD: CPT | Mod: HCNC,S$GLB,, | Performed by: DERMATOLOGY

## 2020-10-20 PROCEDURE — 3008F BODY MASS INDEX DOCD: CPT | Mod: HCNC,CPTII,S$GLB, | Performed by: DERMATOLOGY

## 2020-10-20 PROCEDURE — 1100F PR PT FALLS ASSESS DOC 2+ FALLS/FALL W/INJURY/YR: ICD-10-PCS | Mod: HCNC,CPTII,S$GLB, | Performed by: DERMATOLOGY

## 2020-10-20 PROCEDURE — 3008F PR BODY MASS INDEX (BMI) DOCUMENTED: ICD-10-PCS | Mod: HCNC,CPTII,S$GLB, | Performed by: DERMATOLOGY

## 2020-10-20 PROCEDURE — 17000 DESTRUCT PREMALG LESION: CPT | Mod: HCNC,S$GLB,, | Performed by: DERMATOLOGY

## 2020-10-20 PROCEDURE — 3288F PR FALLS RISK ASSESSMENT DOCUMENTED: ICD-10-PCS | Mod: HCNC,CPTII,S$GLB, | Performed by: DERMATOLOGY

## 2020-10-20 NOTE — PROGRESS NOTES
Subjective:       Patient ID:  Cornelia Delatorre is a 69 y.o. female who presents for   Chief Complaint   Patient presents with    Skin Check     Patient present for routine skin check. Last O/V with provider 6/16/2020.     Pt requesting advice on her skin healing at her left forearm/elbow from a fall injury 9/2/20.  history wart s/p cryo at last visit. Seems to have resolved. No issues    Phx skin ca R hand and nose - Dr Parra - 2014   Phx SCC L hand -mohs Dr Jones  -2015   History of actinic keratoses on nasal Dorsum in the past hx of efudex use  Uses CeraVe AM and PM cream daily for routine        Review of Systems   Constitutional: Negative for fever, chills and fatigue.   HENT: Negative for congestion, sore throat and mouth sores.    Respiratory: Negative for cough.    Gastrointestinal: Negative for nausea, vomiting and diarrhea.   Skin: Positive for daily sunscreen use. Negative for itching, rash, dry skin, sensitivity to antibiotic ointment, sensitivity to bandage adhesive and wears hat.   Hematologic/Lymphatic: Bruises/bleeds easily (forearms, takes asa and prednisone for asthma).        Objective:    Physical Exam   Constitutional: She appears well-developed and well-nourished. No distress.   HENT:   Mouth/Throat: Lips normal.    Eyes: Lids are normal.  No conjunctival no injection.   Cardiovascular: There is no local extremity swelling and no dependent edema.     Neurological: She is alert and oriented to person, place, and time. She is not disoriented.   Psychiatric: She has a normal mood and affect.   Skin:   Areas Examined (abnormalities noted in diagram):   Scalp / Hair Palpated and Inspected  Head / Face Inspection Performed  Neck Inspection Performed  Chest / Axilla Inspection Performed  Abdomen Inspection Performed  Genitals / Buttocks / Groin Inspection Performed  Back Inspection Performed  RUE Inspected  LUE Inspection Performed  RLE Inspected  LLE Inspection Performed  Nails and  Digits Inspection Performed                       Diagram Legend     Erythematous scaling macule/papule c/w actinic keratosis       Vascular papule c/w angioma      Pigmented verrucoid papule/plaque c/w seborrheic keratosis      Yellow umbilicated papule c/w sebaceous hyperplasia      Irregularly shaped tan macule c/w lentigo     1-2 mm smooth white papules consistent with Milia      Movable subcutaneous cyst with punctum c/w epidermal inclusion cyst      Subcutaneous movable cyst c/w pilar cyst      Firm pink to brown papule c/w dermatofibroma      Pedunculated fleshy papule(s) c/w skin tag(s)      Evenly pigmented macule c/w junctional nevus     Mildly variegated pigmented, slightly irregular-bordered macule c/w mildly atypical nevus      Flesh colored to evenly pigmented papule c/w intradermal nevus       Pink pearly papule/plaque c/w basal cell carcinoma      Erythematous hyperkeratotic cursted plaque c/w SCC      Surgical scar with no sign of skin cancer recurrence      Open and closed comedones      Inflammatory papules and pustules      Verrucoid papule consistent consistent with wart     Erythematous eczematous patches and plaques     Dystrophic onycholytic nail with subungual debris c/w onychomycosis     Umbilicated papule    Erythematous-base heme-crusted tan verrucoid plaque consistent with inflamed seborrheic keratosis     Erythematous Silvery Scaling Plaque c/w Psoriasis     See annotation      Assessment / Plan:        Actinic keratoses  Left upper lip improved s/p efudex  Cryosurgery Procedure Note    Verbal consent from the patient is obtained and the patient is aware of the precancerous quality and need for treatment of these lesions. Liquid nitrogen cryosurgery is applied to the 1 actinic keratoses, as detailed in the physical exam, to produce a freeze injury. The patient is aware that blisters may form and is instructed on wound care with gentle cleansing and use of vaseline ointment to keep moist  until healed. The patient is supplied a handout on cryosurgery and is instructed to call if lesions do not completely resolve. Discussed risk postinflammatory pigmentary changes.       Personal history of other malignant neoplasm of skin  Area(s) of previous NMSC evaluated with no signs of recurrence.    Upper body skin examination performed today including at least 6 points as noted in physical examination. No lesions suspicious for malignancy noted.    Skin cancer screening  Area(s) of previous NMSC evaluated with no signs of recurrence.    Upper body skin examination performed today including at least 6 points as noted in physical examination. No lesions suspicious for malignancy noted.      Scar  Left arm  S/p fall  Well healed  vaseline   Sun protection    History of common wart  Resolved s/p cryo  Reassurance          Follow up in about 4 months (around 2/20/2021).

## 2020-11-04 ENCOUNTER — OFFICE VISIT (OUTPATIENT)
Dept: CARDIOLOGY | Facility: CLINIC | Age: 70
End: 2020-11-04
Payer: MEDICARE

## 2020-11-04 VITALS
HEART RATE: 79 BPM | HEIGHT: 64 IN | SYSTOLIC BLOOD PRESSURE: 122 MMHG | WEIGHT: 203.5 LBS | DIASTOLIC BLOOD PRESSURE: 75 MMHG | BODY MASS INDEX: 34.74 KG/M2

## 2020-11-04 DIAGNOSIS — E11.8 CONTROLLED TYPE 2 DIABETES MELLITUS WITH COMPLICATION, WITHOUT LONG-TERM CURRENT USE OF INSULIN: ICD-10-CM

## 2020-11-04 DIAGNOSIS — R06.09 DOE (DYSPNEA ON EXERTION): ICD-10-CM

## 2020-11-04 DIAGNOSIS — I70.0 ATHEROSCLEROSIS OF ABDOMINAL AORTA: ICD-10-CM

## 2020-11-04 DIAGNOSIS — R25.2 CRAMPS OF LEFT LOWER EXTREMITY: ICD-10-CM

## 2020-11-04 DIAGNOSIS — D83.9 CVID (COMMON VARIABLE IMMUNODEFICIENCY): ICD-10-CM

## 2020-11-04 DIAGNOSIS — I10 ESSENTIAL HYPERTENSION: Primary | ICD-10-CM

## 2020-11-04 DIAGNOSIS — E11.42 DIABETIC POLYNEUROPATHY ASSOCIATED WITH TYPE 2 DIABETES MELLITUS: ICD-10-CM

## 2020-11-04 DIAGNOSIS — E78.5 DYSLIPIDEMIA: ICD-10-CM

## 2020-11-04 DIAGNOSIS — G47.33 OSA (OBSTRUCTIVE SLEEP APNEA): ICD-10-CM

## 2020-11-04 PROCEDURE — 3008F PR BODY MASS INDEX (BMI) DOCUMENTED: ICD-10-PCS | Mod: HCNC,CPTII,S$GLB, | Performed by: INTERNAL MEDICINE

## 2020-11-04 PROCEDURE — 3044F PR MOST RECENT HEMOGLOBIN A1C LEVEL <7.0%: ICD-10-PCS | Mod: HCNC,CPTII,S$GLB, | Performed by: INTERNAL MEDICINE

## 2020-11-04 PROCEDURE — 99214 OFFICE O/P EST MOD 30 MIN: CPT | Mod: HCNC,S$GLB,, | Performed by: INTERNAL MEDICINE

## 2020-11-04 PROCEDURE — 3044F HG A1C LEVEL LT 7.0%: CPT | Mod: HCNC,CPTII,S$GLB, | Performed by: INTERNAL MEDICINE

## 2020-11-04 PROCEDURE — 1126F AMNT PAIN NOTED NONE PRSNT: CPT | Mod: HCNC,S$GLB,, | Performed by: INTERNAL MEDICINE

## 2020-11-04 PROCEDURE — 3074F SYST BP LT 130 MM HG: CPT | Mod: HCNC,CPTII,S$GLB, | Performed by: INTERNAL MEDICINE

## 2020-11-04 PROCEDURE — 3074F PR MOST RECENT SYSTOLIC BLOOD PRESSURE < 130 MM HG: ICD-10-PCS | Mod: HCNC,CPTII,S$GLB, | Performed by: INTERNAL MEDICINE

## 2020-11-04 PROCEDURE — 3008F BODY MASS INDEX DOCD: CPT | Mod: HCNC,CPTII,S$GLB, | Performed by: INTERNAL MEDICINE

## 2020-11-04 PROCEDURE — 1159F MED LIST DOCD IN RCRD: CPT | Mod: HCNC,S$GLB,, | Performed by: INTERNAL MEDICINE

## 2020-11-04 PROCEDURE — 3078F PR MOST RECENT DIASTOLIC BLOOD PRESSURE < 80 MM HG: ICD-10-PCS | Mod: HCNC,CPTII,S$GLB, | Performed by: INTERNAL MEDICINE

## 2020-11-04 PROCEDURE — 1126F PR PAIN SEVERITY QUANTIFIED, NO PAIN PRESENT: ICD-10-PCS | Mod: HCNC,S$GLB,, | Performed by: INTERNAL MEDICINE

## 2020-11-04 PROCEDURE — 3078F DIAST BP <80 MM HG: CPT | Mod: HCNC,CPTII,S$GLB, | Performed by: INTERNAL MEDICINE

## 2020-11-04 PROCEDURE — 99214 PR OFFICE/OUTPT VISIT, EST, LEVL IV, 30-39 MIN: ICD-10-PCS | Mod: HCNC,S$GLB,, | Performed by: INTERNAL MEDICINE

## 2020-11-04 PROCEDURE — 99999 PR PBB SHADOW E&M-EST. PATIENT-LVL V: CPT | Mod: PBBFAC,HCNC,, | Performed by: INTERNAL MEDICINE

## 2020-11-04 PROCEDURE — 1101F PT FALLS ASSESS-DOCD LE1/YR: CPT | Mod: HCNC,CPTII,S$GLB, | Performed by: INTERNAL MEDICINE

## 2020-11-04 PROCEDURE — 99999 PR PBB SHADOW E&M-EST. PATIENT-LVL V: ICD-10-PCS | Mod: PBBFAC,HCNC,, | Performed by: INTERNAL MEDICINE

## 2020-11-04 PROCEDURE — 1159F PR MEDICATION LIST DOCUMENTED IN MEDICAL RECORD: ICD-10-PCS | Mod: HCNC,S$GLB,, | Performed by: INTERNAL MEDICINE

## 2020-11-04 PROCEDURE — 1101F PR PT FALLS ASSESS DOC 0-1 FALLS W/OUT INJ PAST YR: ICD-10-PCS | Mod: HCNC,CPTII,S$GLB, | Performed by: INTERNAL MEDICINE

## 2020-11-04 RX ORDER — POTASSIUM CHLORIDE 20 MEQ/1
20 TABLET, EXTENDED RELEASE ORAL DAILY
Qty: 90 TABLET | Refills: 3 | Status: SHIPPED | OUTPATIENT
Start: 2020-11-04 | End: 2022-01-03

## 2020-11-04 RX ORDER — VALSARTAN AND HYDROCHLOROTHIAZIDE 160; 12.5 MG/1; MG/1
1 TABLET, FILM COATED ORAL DAILY
Qty: 90 TABLET | Refills: 3 | Status: SHIPPED | OUTPATIENT
Start: 2020-11-04 | End: 2021-11-11 | Stop reason: SDUPTHER

## 2020-11-04 RX ORDER — CLONIDINE HYDROCHLORIDE 0.1 MG/1
0.1 TABLET ORAL 3 TIMES DAILY PRN
Qty: 90 TABLET | Refills: 6 | Status: SHIPPED | OUTPATIENT
Start: 2020-11-04 | End: 2021-11-11 | Stop reason: SDUPTHER

## 2020-11-04 RX ORDER — PRAVASTATIN SODIUM 40 MG/1
40 TABLET ORAL DAILY
Qty: 90 TABLET | Refills: 4 | Status: SHIPPED | OUTPATIENT
Start: 2020-11-04 | End: 2021-03-11

## 2020-11-04 RX ORDER — DILTIAZEM HYDROCHLORIDE 120 MG/1
120 CAPSULE, COATED, EXTENDED RELEASE ORAL NIGHTLY
Qty: 90 CAPSULE | Refills: 4 | Status: SHIPPED | OUTPATIENT
Start: 2020-11-04 | End: 2021-11-11 | Stop reason: SDUPTHER

## 2020-11-04 NOTE — PROGRESS NOTES
Subjective:    Patient ID:  Cornelia Delatorre is a 70 y.o. female who presents for follow-up of No chief complaint on file.      HPI  Here for follow up of concerning CP (normal cath)/HLP/bradycardia. No angina. Does all ADL's. Patient denies palpitations, syncope, presyncope, lightheadedness or dizziness.      Review of Systems   Constitution: Negative for malaise/fatigue.   Eyes: Negative for blurred vision.   Cardiovascular: Negative for chest pain, claudication, cyanosis, dyspnea on exertion, irregular heartbeat, leg swelling, near-syncope, orthopnea, palpitations, paroxysmal nocturnal dyspnea and syncope.   Respiratory: Negative for cough and shortness of breath.    Hematologic/Lymphatic: Does not bruise/bleed easily.   Musculoskeletal: Negative for back pain, falls, joint pain, muscle cramps, muscle weakness and myalgias.   Gastrointestinal: Negative for abdominal pain, change in bowel habit, nausea and vomiting.   Genitourinary: Negative for urgency.   Neurological: Negative for dizziness, focal weakness and light-headedness.       Past Medical History:   Diagnosis Date    Allergy     Arthritis     Asthma     Cancer     skin cancer to right hand    Cataract     Chronic fatigue 1/24/2017    CVID (common variable immunodeficiency) 3/7/2019    Diabetes mellitus     resolved with gastric bypass    KLINE (dyspnea on exertion) 1/24/2017    Dyslipidemia 6/25/2019    Encounter for blood transfusion     Essential hypertension 12/29/2011    GERD (gastroesophageal reflux disease)     Headache(784.0)     History of lumpectomy of both breasts     1992 negative    Hyperlipidemia 7/15/2015    Hypertension     Hypothyroidism     Neuropathy     Obesity     SOHA (obstructive sleep apnea), Auto BIPAP Mod OSAS since Dec 2013, therapeutic and compliant 100%, ESS 6/24 Feb 2014 12/26/2013    SOHA on CPAP     Postmenopausal HRT (hormone replacement therapy)     Rash     Rosacea     Snoring     Squamous cell  carcinoma of skin     left forearm     Wears glasses      There are no hospital problems to display for this patient.       Objective:     Vitals:    11/04/20 1359   BP: 122/75   Pulse: 79        Physical Exam   Constitutional: She is oriented to person, place, and time. She appears well-developed and well-nourished.   Neck: Normal range of motion. No JVD present.   Cardiovascular: Normal rate, regular rhythm, normal heart sounds and intact distal pulses.   Pulmonary/Chest: Effort normal and breath sounds normal.   Neurological: She is alert and oriented to person, place, and time.   Skin: Skin is warm and dry.   Psychiatric: She has a normal mood and affect.   Nursing note and vitals reviewed.            ..    Chemistry        Component Value Date/Time     09/02/2020 0752    K 4.3 09/02/2020 0752     09/02/2020 0752    CO2 28 09/02/2020 0752    BUN 12 09/02/2020 0752    CREATININE 0.8 09/02/2020 0752     09/02/2020 0752        Component Value Date/Time    CALCIUM 9.2 09/02/2020 0752    ALKPHOS 132 09/02/2020 0752    AST 27 09/02/2020 0752    ALT 22 09/02/2020 0752    BILITOT 0.4 09/02/2020 0752    ESTGFRAFRICA >60.0 09/02/2020 0752    EGFRNONAA >60.0 09/02/2020 0752            ..  Lab Results   Component Value Date    CHOL 165 09/02/2020    CHOL 167 02/21/2020    CHOL 162 05/01/2019     Lab Results   Component Value Date    HDL 52 09/02/2020    HDL 62 02/21/2020    HDL 62 05/01/2019     Lab Results   Component Value Date    LDLCALC 79.2 09/02/2020    LDLCALC 87.4 02/21/2020    LDLCALC 78.0 05/01/2019     Lab Results   Component Value Date    TRIG 169 (H) 09/02/2020    TRIG 88 02/21/2020    TRIG 110 05/01/2019     Lab Results   Component Value Date    CHOLHDL 31.5 09/02/2020    CHOLHDL 37.1 02/21/2020    CHOLHDL 38.3 05/01/2019     ..  Lab Results   Component Value Date    WBC 3.97 04/01/2020    HGB 11.7 (L) 04/01/2020    HCT 40.1 04/01/2020    MCV 94 04/01/2020     04/01/2020        Test(s) Reviewed  I have reviewed the following in detail:  [] Stress test   [] Angiography   [x] Echocardiogram   [x] Labs   [] Other:       Assessment:         ICD-10-CM ICD-9-CM   1. Essential hypertension  I10 401.9   2. Atherosclerosis of abdominal aorta  I70.0 440.0   3. SOHA (obstructive sleep apnea), Auto BIPAP Mod OSAS since Dec 2013, therapeutic and compliant 100%, ESS 6/24 Feb 2014  G47.33 327.23   4. Controlled type 2 diabetes mellitus with complication, without long-term current use of insulin  E11.8 250.90   5. KLINE (dyspnea on exertion)  R06.00 786.09   6. Diabetic polyneuropathy associated with type 2 diabetes mellitus  E11.42 250.60     357.2   7. Dyslipidemia  E78.5 272.4   8. CVID (common variable immunodeficiency)  D83.9 279.06     Problem List Items Addressed This Visit     Essential hypertension - Primary    SOHA (obstructive sleep apnea), Auto BIPAP Mod OSAS since Dec 2013, therapeutic and compliant 100%, ESS 6/24 Feb 2014    Diabetes type 2, controlled    Diabetic polyneuropathy associated with type 2 diabetes mellitus    KLINE (dyspnea on exertion)    Atherosclerosis of abdominal aorta    CVID (common variable immunodeficiency)    Dyslipidemia    Overview     7/17 LHC/RHC   Normal cors, LVEDP 15, PAP 35/17                Plan:           Return to clinic 1 year   Low level/low impact aerobic exercise 5x's/wk. Heart healthy diet and risk factor modification.    See labs and med orders.      Portions of this note may have been created with voice recognition software.  Grammatical, syntax and spelling errors may be inevitable.

## 2020-11-04 NOTE — PROGRESS NOTES
This note was created using Dragon dictation software.  It occasionally misinterpreted phrases or words.      Date of surgery: August 29, 2017    Chief complaint: Left hand pain    History of present illness: 66-year-old female underwent left carpal tunnel release about 2 weeks ago.  Patient had some drainage and dehiscence of her wound.  Pain as a 2 out of 10.    Physical exam: Examination left hand shows a moderate amount of gapping in the wound.  No redness or drainage.  Neurovascularly intact.    X-rays: None    Assessment: Status post left carpal tunnel release    Plan: I reapproximated the wound and applied some Dermabond.  We then placed benzoin and Steri-Strips on top.  Follow-up in 2 weeks for another wound check.       [FreeTextEntry1] : The patient is a 68-year-old gentleman who seen in the office today because of the above. His daytime frequency is 7 times a day with nocturia x2. He has a weak stream but denies a urological and constitutional symptomatology.\par \par Past Urological History: Negative\par \par Urological Family History: Negative

## 2020-11-06 ENCOUNTER — PATIENT OUTREACH (OUTPATIENT)
Dept: OTHER | Facility: OTHER | Age: 70
End: 2020-11-06

## 2020-11-19 ENCOUNTER — PATIENT MESSAGE (OUTPATIENT)
Dept: ALLERGY | Facility: CLINIC | Age: 70
End: 2020-11-19

## 2020-11-24 ENCOUNTER — PATIENT MESSAGE (OUTPATIENT)
Dept: PULMONOLOGY | Facility: CLINIC | Age: 70
End: 2020-11-24

## 2020-12-01 ENCOUNTER — HOSPITAL ENCOUNTER (OUTPATIENT)
Dept: RADIOLOGY | Facility: HOSPITAL | Age: 70
Discharge: HOME | End: 2020-12-01
Attending: INTERNAL MEDICINE
Payer: MEDICARE

## 2020-12-01 DIAGNOSIS — R94.6 ABNORMAL THYROID FUNCTION TEST: ICD-10-CM

## 2020-12-01 PROCEDURE — 76536 US SOFT TISSUE HEAD NECK THYROID: ICD-10-PCS | Mod: 26,HCNC,, | Performed by: RADIOLOGY

## 2020-12-01 PROCEDURE — 76536 US EXAM OF HEAD AND NECK: CPT | Mod: 26,HCNC,, | Performed by: RADIOLOGY

## 2020-12-01 PROCEDURE — 76536 US EXAM OF HEAD AND NECK: CPT | Mod: TC,HCNC,PO

## 2020-12-09 ENCOUNTER — PES CALL (OUTPATIENT)
Dept: ADMINISTRATIVE | Facility: CLINIC | Age: 70
End: 2020-12-09

## 2020-12-14 ENCOUNTER — PATIENT MESSAGE (OUTPATIENT)
Dept: ADMINISTRATIVE | Facility: OTHER | Age: 70
End: 2020-12-14

## 2020-12-16 ENCOUNTER — OFFICE VISIT (OUTPATIENT)
Dept: ENDOCRINOLOGY | Facility: CLINIC | Age: 70
End: 2020-12-16
Payer: MEDICARE

## 2020-12-16 VITALS
WEIGHT: 203 LBS | HEART RATE: 78 BPM | OXYGEN SATURATION: 99 % | HEIGHT: 64 IN | SYSTOLIC BLOOD PRESSURE: 110 MMHG | BODY MASS INDEX: 34.66 KG/M2 | DIASTOLIC BLOOD PRESSURE: 64 MMHG

## 2020-12-16 DIAGNOSIS — R94.6 ABNORMAL THYROID FUNCTION TEST: Primary | ICD-10-CM

## 2020-12-16 DIAGNOSIS — E04.2 MULTINODULAR GOITER: ICD-10-CM

## 2020-12-16 PROCEDURE — 3008F PR BODY MASS INDEX (BMI) DOCUMENTED: ICD-10-PCS | Mod: HCNC,CPTII,S$GLB, | Performed by: INTERNAL MEDICINE

## 2020-12-16 PROCEDURE — 3288F FALL RISK ASSESSMENT DOCD: CPT | Mod: HCNC,CPTII,S$GLB, | Performed by: INTERNAL MEDICINE

## 2020-12-16 PROCEDURE — 3074F SYST BP LT 130 MM HG: CPT | Mod: HCNC,CPTII,S$GLB, | Performed by: INTERNAL MEDICINE

## 2020-12-16 PROCEDURE — 1159F MED LIST DOCD IN RCRD: CPT | Mod: HCNC,S$GLB,, | Performed by: INTERNAL MEDICINE

## 2020-12-16 PROCEDURE — 3078F DIAST BP <80 MM HG: CPT | Mod: HCNC,CPTII,S$GLB, | Performed by: INTERNAL MEDICINE

## 2020-12-16 PROCEDURE — 99999 PR PBB SHADOW E&M-EST. PATIENT-LVL V: CPT | Mod: PBBFAC,HCNC,, | Performed by: INTERNAL MEDICINE

## 2020-12-16 PROCEDURE — 3074F PR MOST RECENT SYSTOLIC BLOOD PRESSURE < 130 MM HG: ICD-10-PCS | Mod: HCNC,CPTII,S$GLB, | Performed by: INTERNAL MEDICINE

## 2020-12-16 PROCEDURE — 1159F PR MEDICATION LIST DOCUMENTED IN MEDICAL RECORD: ICD-10-PCS | Mod: HCNC,S$GLB,, | Performed by: INTERNAL MEDICINE

## 2020-12-16 PROCEDURE — 3008F BODY MASS INDEX DOCD: CPT | Mod: HCNC,CPTII,S$GLB, | Performed by: INTERNAL MEDICINE

## 2020-12-16 PROCEDURE — 3078F PR MOST RECENT DIASTOLIC BLOOD PRESSURE < 80 MM HG: ICD-10-PCS | Mod: HCNC,CPTII,S$GLB, | Performed by: INTERNAL MEDICINE

## 2020-12-16 PROCEDURE — 3288F PR FALLS RISK ASSESSMENT DOCUMENTED: ICD-10-PCS | Mod: HCNC,CPTII,S$GLB, | Performed by: INTERNAL MEDICINE

## 2020-12-16 PROCEDURE — 1101F PT FALLS ASSESS-DOCD LE1/YR: CPT | Mod: HCNC,CPTII,S$GLB, | Performed by: INTERNAL MEDICINE

## 2020-12-16 PROCEDURE — 1126F PR PAIN SEVERITY QUANTIFIED, NO PAIN PRESENT: ICD-10-PCS | Mod: HCNC,S$GLB,, | Performed by: INTERNAL MEDICINE

## 2020-12-16 PROCEDURE — 99213 OFFICE O/P EST LOW 20 MIN: CPT | Mod: HCNC,S$GLB,, | Performed by: INTERNAL MEDICINE

## 2020-12-16 PROCEDURE — 99999 PR PBB SHADOW E&M-EST. PATIENT-LVL V: ICD-10-PCS | Mod: PBBFAC,HCNC,, | Performed by: INTERNAL MEDICINE

## 2020-12-16 PROCEDURE — 1157F PR ADVANCE CARE PLAN OR EQUIV PRESENT IN MEDICAL RECORD: ICD-10-PCS | Mod: HCNC,S$GLB,, | Performed by: INTERNAL MEDICINE

## 2020-12-16 PROCEDURE — 1101F PR PT FALLS ASSESS DOC 0-1 FALLS W/OUT INJ PAST YR: ICD-10-PCS | Mod: HCNC,CPTII,S$GLB, | Performed by: INTERNAL MEDICINE

## 2020-12-16 PROCEDURE — 1157F ADVNC CARE PLAN IN RCRD: CPT | Mod: HCNC,S$GLB,, | Performed by: INTERNAL MEDICINE

## 2020-12-16 PROCEDURE — 1126F AMNT PAIN NOTED NONE PRSNT: CPT | Mod: HCNC,S$GLB,, | Performed by: INTERNAL MEDICINE

## 2020-12-16 PROCEDURE — 99213 PR OFFICE/OUTPT VISIT, EST, LEVL III, 20-29 MIN: ICD-10-PCS | Mod: HCNC,S$GLB,, | Performed by: INTERNAL MEDICINE

## 2020-12-16 NOTE — PROGRESS NOTES
CHIEF COMPLAINT: + TPO/MNG  70 y.o.  being seen as a f/u. Had been on synthroid in the past. Off since March 2019. No palpitations. No tremors. No biotin. No difficulty swallowing. No XRT to head/neck.         PAST MEDICAL HISTORY/PAST SURGICAL HISTORY:  Reviewed in [facility]    SOCIAL HISTORY: No T/A    FAMILY HISTORY:  + Hypothyroidism. + Dm 2.     MEDICATIONS/ALLERGIES: The patient's MedCard has been updated and reviewed.      ROS:   Constitutional: weight stable.   Eyes: No recent visual changes  ENT: No dysphagia  Cardiovascular: No CP  Respiratory: no increased SOB  Gastrointestinal: Denies recent bowel disturbances  GenitoUrinary - No dysuria  Skin: No new skin rash  Neurologic: No focal neurologic complaints  Remainder ROS negative        PE:    GENERAL: Well developed, well nourished.  NECK: Supple, trachea midline, No palpable thyroid nodules.   CHEST: Resp even and unlabored, CTA bilateral.  CARDIAC: RRR, S1, S2 heard, no murmurs, rubs, S3, or S4    Results for NEIL STEVENS (MRN [id number]) as of 12/16/2020 10:48   Ref. Range 12/1/2020 08:10   TSH Latest Ref Range: 0.400 - 4.000 uIU/mL 1.235       US SOFT TISSUE HEAD NECK THYROID     CLINICAL HISTORY:  Abnormal results of thyroid function studies     TECHNIQUE:  Ultrasound of the thyroid and cervical lymph nodes was performed.     COMPARISON:  05/10/2019     FINDINGS:  The right lobe of thyroid gland measures 4.0 x 1.5 x 1 point cm in size.  The left lobe the thyroid gland measures 5.0 x 1.7 x 1.5cm in size.  This gives an approximate volume of on the right of 4.4cc and an approximate volume on the left of 6.7 cc for a total approximate volume of 11.1 cc.     A predominantly cystic nodule is noted within the right lobe of the thyroid gland of 10 mm with some internal septation or echogenicity.  This appears similar to on the prior examination.  No worrisome detrimental changes are noted.  No nodules are noted meeting criteria for fine-needle aspiration  or biopsy.     Impression:     1. No worrisome changes since 05/10/2019.  A prominent dominantly cystic nodule is noted within the left lobe of the thyroid gland.  No nodules are noted meeting criteria for fine-needle aspiration or biopsy.      ASSESSMENT/PLAN:  1. Suppressed TSH- TSH now WNL. TPO +. Asymptomatic.     2. MNG- Do not meet criteria for FNA. No obstructive symptoms. At this point can f/u Q 2 years    FOLLOWUP  F/U 2 yr with TSH, Thyroid US

## 2020-12-28 ENCOUNTER — TELEPHONE (OUTPATIENT)
Dept: FAMILY MEDICINE | Facility: CLINIC | Age: 70
End: 2020-12-28

## 2020-12-28 NOTE — TELEPHONE ENCOUNTER
----- Message from Rhea Mixon sent at 12/28/2020  8:05 AM CST -----  Contact: [phone]/Self  Type:  Same Day Appointment Request    Caller is requesting a same day appointment.  Caller declined first available appointment listed below.    Name of Caller:pt  When is the first available appointment?12-  Symptoms:Rash on back / hip , possible shingles   Best Call Back Number:[phone]  Additional Information:

## 2020-12-29 ENCOUNTER — OFFICE VISIT (OUTPATIENT)
Dept: FAMILY MEDICINE | Facility: CLINIC | Age: 70
End: 2020-12-29
Payer: MEDICARE

## 2020-12-29 VITALS
SYSTOLIC BLOOD PRESSURE: 132 MMHG | BODY MASS INDEX: 34.25 KG/M2 | WEIGHT: 200.63 LBS | HEART RATE: 80 BPM | HEIGHT: 64 IN | OXYGEN SATURATION: 100 % | DIASTOLIC BLOOD PRESSURE: 78 MMHG

## 2020-12-29 DIAGNOSIS — M54.9 BACK PAIN, UNSPECIFIED BACK LOCATION, UNSPECIFIED BACK PAIN LATERALITY, UNSPECIFIED CHRONICITY: Primary | ICD-10-CM

## 2020-12-29 DIAGNOSIS — B02.8 HERPES ZOSTER WITH COMPLICATION: ICD-10-CM

## 2020-12-29 PROCEDURE — 3078F DIAST BP <80 MM HG: CPT | Mod: HCNC,CPTII,S$GLB, | Performed by: NURSE PRACTITIONER

## 2020-12-29 PROCEDURE — 3075F SYST BP GE 130 - 139MM HG: CPT | Mod: HCNC,CPTII,S$GLB, | Performed by: NURSE PRACTITIONER

## 2020-12-29 PROCEDURE — 1157F PR ADVANCE CARE PLAN OR EQUIV PRESENT IN MEDICAL RECORD: ICD-10-PCS | Mod: HCNC,S$GLB,, | Performed by: NURSE PRACTITIONER

## 2020-12-29 PROCEDURE — 1101F PR PT FALLS ASSESS DOC 0-1 FALLS W/OUT INJ PAST YR: ICD-10-PCS | Mod: HCNC,CPTII,S$GLB, | Performed by: NURSE PRACTITIONER

## 2020-12-29 PROCEDURE — 99214 PR OFFICE/OUTPT VISIT, EST, LEVL IV, 30-39 MIN: ICD-10-PCS | Mod: HCNC,S$GLB,, | Performed by: NURSE PRACTITIONER

## 2020-12-29 PROCEDURE — 99214 OFFICE O/P EST MOD 30 MIN: CPT | Mod: HCNC,S$GLB,, | Performed by: NURSE PRACTITIONER

## 2020-12-29 PROCEDURE — 3008F PR BODY MASS INDEX (BMI) DOCUMENTED: ICD-10-PCS | Mod: HCNC,CPTII,S$GLB, | Performed by: NURSE PRACTITIONER

## 2020-12-29 PROCEDURE — 1159F PR MEDICATION LIST DOCUMENTED IN MEDICAL RECORD: ICD-10-PCS | Mod: HCNC,S$GLB,, | Performed by: NURSE PRACTITIONER

## 2020-12-29 PROCEDURE — 1157F ADVNC CARE PLAN IN RCRD: CPT | Mod: HCNC,S$GLB,, | Performed by: NURSE PRACTITIONER

## 2020-12-29 PROCEDURE — 3078F PR MOST RECENT DIASTOLIC BLOOD PRESSURE < 80 MM HG: ICD-10-PCS | Mod: HCNC,CPTII,S$GLB, | Performed by: NURSE PRACTITIONER

## 2020-12-29 PROCEDURE — 3075F PR MOST RECENT SYSTOLIC BLOOD PRESS GE 130-139MM HG: ICD-10-PCS | Mod: HCNC,CPTII,S$GLB, | Performed by: NURSE PRACTITIONER

## 2020-12-29 PROCEDURE — 1126F PR PAIN SEVERITY QUANTIFIED, NO PAIN PRESENT: ICD-10-PCS | Mod: HCNC,S$GLB,, | Performed by: NURSE PRACTITIONER

## 2020-12-29 PROCEDURE — 3288F FALL RISK ASSESSMENT DOCD: CPT | Mod: HCNC,CPTII,S$GLB, | Performed by: NURSE PRACTITIONER

## 2020-12-29 PROCEDURE — 3288F PR FALLS RISK ASSESSMENT DOCUMENTED: ICD-10-PCS | Mod: HCNC,CPTII,S$GLB, | Performed by: NURSE PRACTITIONER

## 2020-12-29 PROCEDURE — 1159F MED LIST DOCD IN RCRD: CPT | Mod: HCNC,S$GLB,, | Performed by: NURSE PRACTITIONER

## 2020-12-29 PROCEDURE — 1126F AMNT PAIN NOTED NONE PRSNT: CPT | Mod: HCNC,S$GLB,, | Performed by: NURSE PRACTITIONER

## 2020-12-29 PROCEDURE — 99999 PR PBB SHADOW E&M-EST. PATIENT-LVL V: CPT | Mod: PBBFAC,HCNC,, | Performed by: NURSE PRACTITIONER

## 2020-12-29 PROCEDURE — 99999 PR PBB SHADOW E&M-EST. PATIENT-LVL V: ICD-10-PCS | Mod: PBBFAC,HCNC,, | Performed by: NURSE PRACTITIONER

## 2020-12-29 PROCEDURE — 1101F PT FALLS ASSESS-DOCD LE1/YR: CPT | Mod: HCNC,CPTII,S$GLB, | Performed by: NURSE PRACTITIONER

## 2020-12-29 PROCEDURE — 3008F BODY MASS INDEX DOCD: CPT | Mod: HCNC,CPTII,S$GLB, | Performed by: NURSE PRACTITIONER

## 2020-12-29 RX ORDER — TIZANIDINE 2 MG/1
2 TABLET ORAL EVERY 8 HOURS PRN
Qty: 30 TABLET | Refills: 0 | Status: SHIPPED | OUTPATIENT
Start: 2020-12-29 | End: 2021-01-08

## 2020-12-29 RX ORDER — VALACYCLOVIR HYDROCHLORIDE 1 G/1
1000 TABLET, FILM COATED ORAL 3 TIMES DAILY
Qty: 21 TABLET | Refills: 0 | Status: SHIPPED | OUTPATIENT
Start: 2020-12-29 | End: 2021-05-18

## 2020-12-29 RX ORDER — PREDNISONE 20 MG/1
TABLET ORAL
Qty: 18 TABLET | Refills: 0 | Status: ON HOLD | OUTPATIENT
Start: 2020-12-29 | End: 2021-06-12 | Stop reason: HOSPADM

## 2020-12-29 NOTE — PROGRESS NOTES
Subjective:       Patient ID: Cornelia Delatorre is a 70 y.o. female.    Chief Complaint: Rash    Patient says she has had pain in the left side of her back early last week. Wednesday night she noticed her rash. She started using acyclovir cream Saturday, rash has improved. Yesterday back pain got worse, spasms.Pain is worse with movement and position. Taking tylenol for pain.     Past Medical History:  No date: Allergy  No date: Arthritis  No date: Asthma  No date: Cancer      Comment:  skin cancer to right hand  No date: Cataract  1/24/2017: Chronic fatigue  3/7/2019: CVID (common variable immunodeficiency)  No date: Diabetes mellitus      Comment:  resolved with gastric bypass  1/24/2017: KLINE (dyspnea on exertion)  6/25/2019: Dyslipidemia  No date: Encounter for blood transfusion  12/29/2011: Essential hypertension  No date: GERD (gastroesophageal reflux disease)  No date: Headache(784.0)  No date: History of lumpectomy of both breasts      Comment:  1992 negative  7/15/2015: Hyperlipidemia  No date: Hypertension  No date: Hypothyroidism  No date: Neuropathy  No date: Obesity  12/26/2013: SOHA (obstructive sleep apnea), Auto BIPAP Mod OSAS since   Dec 2013, therapeutic and compliant 100%, ESS 6/24 Feb 2014  No date: SOHA on CPAP  No date: Postmenopausal HRT (hormone replacement therapy)  No date: Rash  No date: Rosacea  No date: Snoring  No date: Squamous cell carcinoma of skin      Comment:  left forearm   No date: Wears glasses    Past Surgical History:  No date: ADENOIDECTOMY  No date: APPENDECTOMY  No date: BLADDER SUSPENSION  1992: BREAST BIOPSY; Bilateral      Comment:  bilateral benign excisional biopsies  10/17/14: BUNIONECTOMY      Comment:  right, still has discomfort  No date: CATARACT EXTRACTION W/  INTRAOCULAR LENS IMPLANT; Bilateral  08/02/2017: CHOLECYSTECTOMY  No date: COLONOSCOPY  8/14/2019: EPIDURAL STEROID INJECTION INTO LUMBAR SPINE; N/A      Comment:  Procedure:  Injection-steroid-epidural-lumbar L5/S1;                 Surgeon: Fredi Rojas MD;  Location: [facility] OR;                 Service: Pain Management;  Laterality: N/A;  No date: ESOPHAGOGASTRODUODENOSCOPY      Comment:  dilated esophagus  No date: GASTRIC BYPASS      Comment:  2011 1980: HYSTERECTOMY  2009: interstim bladder      Comment:  10/14/14 new battery  1980: OOPHORECTOMY  2/18/2020: REPLACEMENT OF SACRAL NERVE STIMULATOR      Comment:  Procedure: REPLACEMENT, NEUROSTIMULATOR, SACRAL;                 Surgeon: Mary Jane Jarvis MD;  [facility];  Service: Urology;;  2/18/2020: REVISION OF PROCEDURE INVOLVING SACRAL NEUROSTIMULATOR   DEVICE; Right      Comment:  Procedure: REVISION, NEUROSTIMULATOR, SACRAL/ battery                replacement;  Surgeon: Mary Jane Jarvis MD;  Location:                [facility];  Service: Urology;  Laterality: Right;                 1hr/ rep contacted/ (CONNIE)  No date: SKIN CANCER EXCISION      Comment:  left hand  No date: TONSILLECTOMY  No date: WISDOM TOOTH EXTRACTION    Review of patient's family history indicates:  Problem: Breast cancer      Relation: Mother          Age of Onset: 50  Problem: Diabetes      Relation: Unknown          Age of Onset: (Not Specified)  Problem: Hypertension      Relation: Unknown          Age of Onset: (Not Specified)  Problem: Hypothyroidism      Relation: Unknown          Age of Onset: (Not Specified)  Problem: Breast cancer      Relation: Paternal Aunt          Age of Onset: (Not Specified)          Comment: 40's  Problem: Ovarian cancer      Relation: Paternal Grandmother          Age of Onset: (Not Specified)  Problem: Allergic rhinitis      Relation: Neg Hx          Age of Onset: (Not Specified)  Problem: Allergies      Relation: Neg Hx          Age of Onset: (Not Specified)  Problem: Angioedema      Relation: Neg Hx          Age of Onset: (Not Specified)  Problem: Asthma      Relation: Neg Hx           Age of Onset: (Not Specified)  Problem: Atopy      Relation: Neg Hx          Age of Onset: (Not Specified)  Problem: Eczema      Relation: Neg Hx          Age of Onset: (Not Specified)  Problem: Immunodeficiency      Relation: Neg Hx          Age of Onset: (Not Specified)  Problem: Rhinitis      Relation: Neg Hx          Age of Onset: (Not Specified)  Problem: Urticaria      Relation: Neg Hx          Age of Onset: (Not Specified)      Social History    Socioeconomic History      Marital status:       Spouse name: Not on file      Number of children: Not on file      Years of education: Not on file      Highest education level: Not on file    Occupational History      Not on file    Social Needs      Financial resource strain: Somewhat hard      Food insecurity        Worry: Never true        Inability: Never true      Transportation needs        Medical: No        Non-medical: No    Tobacco Use      Smoking status: Never Smoker      Smokeless tobacco: Never Used    Substance and Sexual Activity      Alcohol use: No        Frequency: Never        Binge frequency: Never      Drug use: No      Sexual activity: Not Currently    Lifestyle      Physical activity        Days per week: 0 days        Minutes per session: 0 min      Stress: Only a little    Relationships      Social connections        Talks on phone: More than three times a week        Gets together: Once a week        Attends [employer]ism service: Not on file        Active member of club or organization: Yes        Attends meetings of clubs or organizations: More than 4 times per year        Relationship status:     Other Topics      Concerns:        Are you pregnant or think you may be?: Not Asked        Breast-feeding: Not Asked    Social History Narrative      Not on file      Current Outpatient Medications:  albuterol (PROVENTIL/VENTOLIN HFA) 90 mcg/actuation inhaler, Inhale 2 puffs into the lungs every 4 (four) hours as needed. 2 puffs every  4 hours as needed for cough, wheeze, or shortness of breath, Disp: 54 g, Rfl: 3  ascorbic acid, vitamin C, (VITAMIN C) 1000 MG tablet, , Disp: , Rfl:   B-complex with vitamin C (Z-BEC OR EQUIV) tablet, Take 1 tablet by mouth once daily., Disp: , Rfl:   BIFIDOBACTERIUM INFANTIS (ALIGN ORAL), Take by mouth once daily., Disp: , Rfl:   biotin 10,000 mcg Cap, Take by mouth., Disp: , Rfl:   calcium carbonate (CALCIUM ANTACID) 300 mg (750 mg) Chew, Take by mouth., Disp: , Rfl:   cloNIDine (CATAPRES) 0.1 MG tablet, Take 1 tablet (0.1 mg total) by mouth 3 (three) times daily as needed (PRN SBP > 165 mmHg)., Disp: 90 tablet, Rfl: 6  cranberry 500 mg Cap, Take 1 capsule by mouth every evening., Disp: , Rfl:   cyanocobalamin (VITAMIN B-12) 1000 MCG tablet, Take 100 mcg by mouth once daily., Disp: , Rfl:   diclofenac sodium (VOLTAREN) 1 % Gel, Apply 2 grams topically once daily., Disp: 1 Tube, Rfl: 6  diltiaZEM (CARDIZEM CD) 120 MG Cp24, Take 1 capsule (120 mg total) by mouth every evening., Disp: 90 capsule, Rfl: 4  EPINEPHrine (EPIPEN) 0.3 mg/0.3 mL AtIn, , Disp: , Rfl: 3  esomeprazole (NEXIUM) 40 MG capsule, Take 1 capsule (40 mg total) by mouth before breakfast., Disp: 90 capsule, Rfl: 3  fexofenadine (ALLEGRA) 60 MG tablet, Take 60 mg by mouth once daily., Disp: , Rfl:   fish oil-omega-3 fatty acids 300-1,000 mg capsule, Take 2 g by mouth once daily., Disp: , Rfl:   fluticasone propion-salmeterol 45-21 mcg/dose (ADVAIR HFA) 45-21 mcg/actuation HFAA inhaler, Inhale 2 puffs into the lungs every 12 (twelve) hours. Controller, Disp: 36 g, Rfl: 3  fluticasone propionate (FLONASE) 50 mcg/actuation nasal spray, 2 sprays (100 mcg total) by Each Nostril route once daily., Disp: 16 g, Rfl: 11  gabapentin (NEURONTIN) 600 MG tablet, Take 1 tablet (600 mg total) by mouth 3 (three) times daily., Disp: 540 tablet, Rfl: 3  immun glob G,IgG,-pro-IgA 0-50 (HIZENTRA) 1 gram/5 mL (20 %) Soln, Inject 11 g into the skin once a week., Disp: 220  mL, Rfl: 12  inhalation spacing device, Use as directed for inhalation., Disp: 1 each, Rfl: 0  ipratropium (ATROVENT) 42 mcg (0.06 %) nasal spray, 2 sprays by Nasal route 4 (four) times daily., Disp: 15 mL, Rfl: 2  levalbuterol (XOPENEX) 1.25 mg/3 mL nebulizer solution, Take 3 mLs (1.25 mg total) by nebulization every 4 (four) hours as needed for Wheezing or Shortness of Breath. Rescue, Disp: 1 Box, Rfl: 11  metFORMIN (GLUCOPHAGE-XR) 500 MG XR 24hr tablet, Take 1 tablet (500 mg total) by mouth 2 (two) times daily with meals., Disp: 180 tablet, Rfl: 3  methylcellulose, laxative, (CITRUCEL) 500 mg Tab, Take 1 tablet by mouth every morning., Disp: , Rfl:   montelukast (SINGULAIR) 10 mg tablet, Take 1 tablet (10 mg total) by mouth every evening., Disp: 90 tablet, Rfl: 3  mucus clearing device Cecy, 1 Device by Misc.(Non-Drug; Combo Route) route 2 (two) times daily., Disp: 1 Device, Rfl: 0  multivitamin capsule, Take 1 capsule by mouth. Take one tablets daily, Disp: , Rfl:   mupirocin (BACTROBAN) 2 % ointment, AAA bid, Disp: 30 g, Rfl: 2  potassium chloride SA (K-DUR,KLOR-CON) 20 MEQ tablet, Take 1 tablet (20 mEq total) by mouth once daily., Disp: 90 tablet, Rfl: 3  pravastatin (PRAVACHOL) 40 MG tablet, Take 1 tablet (40 mg total) by mouth once daily., Disp: 90 tablet, Rfl: 4  SIMETHICONE (GAS-X ORAL), Take 1 capsule by mouth as needed., Disp: , Rfl:   valsartan-hydrochlorothiazide (DIOVAN-HCT) 160-12.5 mg per tablet, Take 1 tablet by mouth once daily., Disp: 90 tablet, Rfl: 3  vitamin D3-folic acid 5,000 unit- 1 mg Tab, Take by mouth., Disp: , Rfl:   azelastine (ASTELIN) 137 mcg (0.1 %) nasal spray, 1 spray (137 mcg total) by Nasal route 2 (two) times daily. (Patient not taking: Reported on 12/29/2020), Disp: 90 mL, Rfl: 3  azithromycin (ZITHROMAX) 500 MG tablet, Take 1 tablet (500 mg total) by mouth once daily. (Patient not taking: Reported on 12/29/2020), Disp: 3 tablet, Rfl: 5  guaiFENesin (MUCINEX) 600 mg 12 hr  tablet, Take 2 tablets (1,200 mg total) by mouth 2 (two) times daily. (Patient not taking: Reported on 12/29/2020), Disp: , Rfl:   guaifenesin-codeine 100-10 mg/5 ml (GUAIFENESIN AC)  mg/5 mL syrup, Take 5 mLs by mouth 3 (three) times daily as needed for Cough. (Patient not taking: Reported on 12/29/2020), Disp: 473 mL, Rfl: 2  predniSONE (DELTASONE) 20 MG tablet, 3 for 3 days then 2 for 3 days then one for 3 days and repeat for breathing problems (Patient not taking: Reported on 12/29/2020), Disp: 36 tablet, Rfl: 0    Current Facility-Administered Medications:  candida albicans skin test skin test 0.1 mL, 0.1 mL, Intradermal, 1 time in Clinic/HOD, Kailyn Fletcher MD  ana albicans skin test skin test 0.3 mL, 0.3 mL, Intradermal, 1 time in Clinic/HOD, Kailyn Fletcher MD        Review of patient's allergies indicates:   -- Sulfa (sulfonamide antibiotics) -- Hives   -- Naproxen -- Other (See Comments)    --  Other reaction(s): RT sided numbness   -- Albuterol -- Itching and Palpitations    --  Nebulizer only. Can use inhaler    Review of Systems   Constitutional: Negative for chills and fever.   Respiratory: Negative.  Negative for cough and shortness of breath.    Cardiovascular: Negative.  Negative for chest pain.   Gastrointestinal: Negative.    Genitourinary: Negative.  Negative for difficulty urinating, frequency and hematuria.   Musculoskeletal: Positive for back pain and myalgias. Negative for leg pain.   Integumentary:  Positive for rash.   Neurological: Negative for numbness.         Objective:      Physical Exam  Constitutional:       Appearance: Normal appearance.   Cardiovascular:      Rate and Rhythm: Normal rate and regular rhythm.      Heart sounds: No murmur.   Pulmonary:      Effort: Pulmonary effort is normal. No respiratory distress.      Breath sounds: Normal breath sounds.   Musculoskeletal:        Back:    Neurological:      General: No focal deficit present.      Mental Status:  She is alert and oriented to person, place, and time.   Psychiatric:         Mood and Affect: Mood normal.         Behavior: Behavior normal.         Assessment:       1. Back pain, unspecified back location, unspecified back pain laterality, unspecified chronicity    2. Herpes zoster with complication        Plan:       1. Back pain, unspecified back location, unspecified back pain laterality, unspecified chronicity  See pain management if not resolving. Fall precautions discussed.  - tiZANidine (ZANAFLEX) 2 MG tablet; Take 1 tablet (2 mg total) by mouth every 8 (eight) hours as needed (muscle spasms).  Dispense: 30 tablet; Refill: 0    2. Herpes zoster with complication  Precautions and follow up reviewed.   - valACYclovir (VALTREX) 1000 MG tablet; Take 1 tablet (1,000 mg total) by mouth 3 (three) times daily. for 7 days  Dispense: 21 tablet; Refill: 0  - predniSONE (DELTASONE) 20 MG tablet; 3 pills daily x 3 days, 2 pills daily x 3 days, 1 pill daily x 3 days  Dispense: 18 tablet; Refill: 0

## 2021-01-02 ENCOUNTER — PATIENT MESSAGE (OUTPATIENT)
Dept: ALLERGY | Facility: CLINIC | Age: 71
End: 2021-01-02

## 2021-01-04 ENCOUNTER — PATIENT MESSAGE (OUTPATIENT)
Dept: PULMONOLOGY | Facility: CLINIC | Age: 71
End: 2021-01-04

## 2021-01-07 ENCOUNTER — PATIENT MESSAGE (OUTPATIENT)
Dept: ALLERGY | Facility: CLINIC | Age: 71
End: 2021-01-07

## 2021-01-07 DIAGNOSIS — D83.9 CVID (COMMON VARIABLE IMMUNODEFICIENCY): Primary | ICD-10-CM

## 2021-01-09 ENCOUNTER — PATIENT MESSAGE (OUTPATIENT)
Dept: ENDOCRINOLOGY | Facility: CLINIC | Age: 71
End: 2021-01-09

## 2021-01-27 ENCOUNTER — OFFICE VISIT (OUTPATIENT)
Dept: UROLOGY | Facility: CLINIC | Age: 71
End: 2021-01-27
Payer: MEDICARE

## 2021-01-27 DIAGNOSIS — N30.90 BLADDER INFECTION: Primary | ICD-10-CM

## 2021-01-27 PROCEDURE — 1159F PR MEDICATION LIST DOCUMENTED IN MEDICAL RECORD: ICD-10-PCS | Mod: 95,,, | Performed by: UROLOGY

## 2021-01-27 PROCEDURE — 1157F PR ADVANCE CARE PLAN OR EQUIV PRESENT IN MEDICAL RECORD: ICD-10-PCS | Mod: 95,,, | Performed by: UROLOGY

## 2021-01-27 PROCEDURE — 1159F MED LIST DOCD IN RCRD: CPT | Mod: 95,,, | Performed by: UROLOGY

## 2021-01-27 PROCEDURE — 99442 PR PHYSICIAN TELEPHONE EVALUATION 11-20 MIN: CPT | Mod: 95,,, | Performed by: UROLOGY

## 2021-01-27 PROCEDURE — 1157F ADVNC CARE PLAN IN RCRD: CPT | Mod: 95,,, | Performed by: UROLOGY

## 2021-01-27 PROCEDURE — 99442 PR PHYSICIAN TELEPHONE EVALUATION 11-20 MIN: ICD-10-PCS | Mod: 95,,, | Performed by: UROLOGY

## 2021-01-27 RX ORDER — CEFPODOXIME PROXETIL 200 MG/1
200 TABLET, FILM COATED ORAL 2 TIMES DAILY
Qty: 14 TABLET | Refills: 0 | Status: SHIPPED | OUTPATIENT
Start: 2021-01-27 | End: 2021-04-15

## 2021-01-27 RX ORDER — DOXYCYCLINE 100 MG/1
100 CAPSULE ORAL 2 TIMES DAILY
Qty: 14 CAPSULE | Refills: 0 | Status: CANCELLED | OUTPATIENT
Start: 2021-01-27 | End: 2021-02-03

## 2021-02-20 DIAGNOSIS — E11.9 CONTROLLED TYPE 2 DIABETES MELLITUS WITHOUT COMPLICATION, WITHOUT LONG-TERM CURRENT USE OF INSULIN: ICD-10-CM

## 2021-02-23 ENCOUNTER — OFFICE VISIT (OUTPATIENT)
Dept: DERMATOLOGY | Facility: CLINIC | Age: 71
End: 2021-02-23
Payer: MEDICARE

## 2021-02-23 ENCOUNTER — TELEPHONE (OUTPATIENT)
Dept: FAMILY MEDICINE | Facility: CLINIC | Age: 71
End: 2021-02-23

## 2021-02-23 ENCOUNTER — OFFICE VISIT (OUTPATIENT)
Dept: FAMILY MEDICINE | Facility: CLINIC | Age: 71
End: 2021-02-23
Payer: MEDICARE

## 2021-02-23 VITALS
SYSTOLIC BLOOD PRESSURE: 120 MMHG | OXYGEN SATURATION: 97 % | DIASTOLIC BLOOD PRESSURE: 80 MMHG | HEIGHT: 64 IN | HEART RATE: 76 BPM | BODY MASS INDEX: 35 KG/M2 | WEIGHT: 205 LBS

## 2021-02-23 VITALS — HEIGHT: 64 IN | BODY MASS INDEX: 35 KG/M2 | WEIGHT: 205 LBS

## 2021-02-23 DIAGNOSIS — L82.1 SEBORRHEIC KERATOSIS: ICD-10-CM

## 2021-02-23 DIAGNOSIS — E11.42 DIABETIC POLYNEUROPATHY ASSOCIATED WITH TYPE 2 DIABETES MELLITUS: ICD-10-CM

## 2021-02-23 DIAGNOSIS — E78.5 DYSLIPIDEMIA: ICD-10-CM

## 2021-02-23 DIAGNOSIS — Z12.83 SKIN CANCER SCREENING: ICD-10-CM

## 2021-02-23 DIAGNOSIS — I70.0 ATHEROSCLEROSIS OF ABDOMINAL AORTA: ICD-10-CM

## 2021-02-23 DIAGNOSIS — Z00.00 ENCOUNTER FOR PREVENTIVE HEALTH EXAMINATION: Primary | ICD-10-CM

## 2021-02-23 DIAGNOSIS — D84.9 IMMUNE DEFICIENCY DISORDER: ICD-10-CM

## 2021-02-23 DIAGNOSIS — J47.9 BRONCHIECTASIS WITHOUT COMPLICATION: ICD-10-CM

## 2021-02-23 DIAGNOSIS — E03.9 ACQUIRED HYPOTHYROIDISM: ICD-10-CM

## 2021-02-23 DIAGNOSIS — D83.9 CVID (COMMON VARIABLE IMMUNODEFICIENCY): ICD-10-CM

## 2021-02-23 DIAGNOSIS — R14.2 BELCHING: ICD-10-CM

## 2021-02-23 DIAGNOSIS — I10 ESSENTIAL HYPERTENSION: ICD-10-CM

## 2021-02-23 DIAGNOSIS — E11.8 CONTROLLED TYPE 2 DIABETES MELLITUS WITH COMPLICATION, WITHOUT LONG-TERM CURRENT USE OF INSULIN: ICD-10-CM

## 2021-02-23 DIAGNOSIS — L30.9 DERMATITIS: ICD-10-CM

## 2021-02-23 DIAGNOSIS — R07.9 CHEST PAIN, UNSPECIFIED TYPE: ICD-10-CM

## 2021-02-23 DIAGNOSIS — E66.01 SEVERE OBESITY (BMI 35.0-35.9 WITH COMORBIDITY): ICD-10-CM

## 2021-02-23 DIAGNOSIS — Z85.828 PERSONAL HISTORY OF OTHER MALIGNANT NEOPLASM OF SKIN: Primary | ICD-10-CM

## 2021-02-23 DIAGNOSIS — L90.5 SCAR: ICD-10-CM

## 2021-02-23 DIAGNOSIS — I77.1 TORTUOUS AORTA: ICD-10-CM

## 2021-02-23 PROCEDURE — 1159F PR MEDICATION LIST DOCUMENTED IN MEDICAL RECORD: ICD-10-PCS | Mod: S$GLB,,, | Performed by: DERMATOLOGY

## 2021-02-23 PROCEDURE — 3008F PR BODY MASS INDEX (BMI) DOCUMENTED: ICD-10-PCS | Mod: CPTII,S$GLB,, | Performed by: NURSE PRACTITIONER

## 2021-02-23 PROCEDURE — 99999 PR PBB SHADOW E&M-EST. PATIENT-LVL IV: ICD-10-PCS | Mod: PBBFAC,,, | Performed by: DERMATOLOGY

## 2021-02-23 PROCEDURE — 1157F ADVNC CARE PLAN IN RCRD: CPT | Mod: S$GLB,,, | Performed by: NURSE PRACTITIONER

## 2021-02-23 PROCEDURE — 1159F MED LIST DOCD IN RCRD: CPT | Mod: S$GLB,,, | Performed by: DERMATOLOGY

## 2021-02-23 PROCEDURE — 1126F AMNT PAIN NOTED NONE PRSNT: CPT | Mod: S$GLB,,, | Performed by: DERMATOLOGY

## 2021-02-23 PROCEDURE — 99999 PR PBB SHADOW E&M-EST. PATIENT-LVL V: CPT | Mod: PBBFAC,,, | Performed by: NURSE PRACTITIONER

## 2021-02-23 PROCEDURE — 3079F DIAST BP 80-89 MM HG: CPT | Mod: CPTII,S$GLB,, | Performed by: NURSE PRACTITIONER

## 2021-02-23 PROCEDURE — 93010 EKG 12-LEAD: ICD-10-PCS | Mod: S$GLB,,, | Performed by: INTERNAL MEDICINE

## 2021-02-23 PROCEDURE — 1126F PR PAIN SEVERITY QUANTIFIED, NO PAIN PRESENT: ICD-10-PCS | Mod: S$GLB,,, | Performed by: NURSE PRACTITIONER

## 2021-02-23 PROCEDURE — 3288F PR FALLS RISK ASSESSMENT DOCUMENTED: ICD-10-PCS | Mod: CPTII,S$GLB,, | Performed by: NURSE PRACTITIONER

## 2021-02-23 PROCEDURE — 3288F PR FALLS RISK ASSESSMENT DOCUMENTED: ICD-10-PCS | Mod: CPTII,S$GLB,, | Performed by: DERMATOLOGY

## 2021-02-23 PROCEDURE — 3044F HG A1C LEVEL LT 7.0%: CPT | Mod: CPTII,S$GLB,, | Performed by: NURSE PRACTITIONER

## 2021-02-23 PROCEDURE — 3008F PR BODY MASS INDEX (BMI) DOCUMENTED: ICD-10-PCS | Mod: CPTII,S$GLB,, | Performed by: DERMATOLOGY

## 2021-02-23 PROCEDURE — 99213 OFFICE O/P EST LOW 20 MIN: CPT | Mod: S$GLB,,, | Performed by: DERMATOLOGY

## 2021-02-23 PROCEDURE — 99499 UNLISTED E&M SERVICE: CPT | Mod: S$GLB,,, | Performed by: NURSE PRACTITIONER

## 2021-02-23 PROCEDURE — 3288F FALL RISK ASSESSMENT DOCD: CPT | Mod: CPTII,S$GLB,, | Performed by: DERMATOLOGY

## 2021-02-23 PROCEDURE — 99499 RISK ADDL DX/OHS AUDIT: ICD-10-PCS | Mod: S$GLB,,, | Performed by: NURSE PRACTITIONER

## 2021-02-23 PROCEDURE — 1101F PR PT FALLS ASSESS DOC 0-1 FALLS W/OUT INJ PAST YR: ICD-10-PCS | Mod: CPTII,S$GLB,, | Performed by: DERMATOLOGY

## 2021-02-23 PROCEDURE — 93010 ELECTROCARDIOGRAM REPORT: CPT | Mod: 76,S$GLB,, | Performed by: INTERNAL MEDICINE

## 2021-02-23 PROCEDURE — G0439 PR MEDICARE ANNUAL WELLNESS SUBSEQUENT VISIT: ICD-10-PCS | Mod: S$GLB,,, | Performed by: NURSE PRACTITIONER

## 2021-02-23 PROCEDURE — G0439 PPPS, SUBSEQ VISIT: HCPCS | Mod: S$GLB,,, | Performed by: NURSE PRACTITIONER

## 2021-02-23 PROCEDURE — 3074F PR MOST RECENT SYSTOLIC BLOOD PRESSURE < 130 MM HG: ICD-10-PCS | Mod: CPTII,S$GLB,, | Performed by: DERMATOLOGY

## 2021-02-23 PROCEDURE — 1126F AMNT PAIN NOTED NONE PRSNT: CPT | Mod: S$GLB,,, | Performed by: NURSE PRACTITIONER

## 2021-02-23 PROCEDURE — 1126F PR PAIN SEVERITY QUANTIFIED, NO PAIN PRESENT: ICD-10-PCS | Mod: S$GLB,,, | Performed by: DERMATOLOGY

## 2021-02-23 PROCEDURE — 1101F PT FALLS ASSESS-DOCD LE1/YR: CPT | Mod: CPTII,S$GLB,, | Performed by: DERMATOLOGY

## 2021-02-23 PROCEDURE — 3288F FALL RISK ASSESSMENT DOCD: CPT | Mod: CPTII,S$GLB,, | Performed by: NURSE PRACTITIONER

## 2021-02-23 PROCEDURE — 3078F PR MOST RECENT DIASTOLIC BLOOD PRESSURE < 80 MM HG: ICD-10-PCS | Mod: CPTII,S$GLB,, | Performed by: DERMATOLOGY

## 2021-02-23 PROCEDURE — 99999 PR PBB SHADOW E&M-EST. PATIENT-LVL IV: CPT | Mod: PBBFAC,,, | Performed by: DERMATOLOGY

## 2021-02-23 PROCEDURE — 3078F DIAST BP <80 MM HG: CPT | Mod: CPTII,S$GLB,, | Performed by: DERMATOLOGY

## 2021-02-23 PROCEDURE — 3074F SYST BP LT 130 MM HG: CPT | Mod: CPTII,S$GLB,, | Performed by: NURSE PRACTITIONER

## 2021-02-23 PROCEDURE — 99213 PR OFFICE/OUTPT VISIT, EST, LEVL III, 20-29 MIN: ICD-10-PCS | Mod: S$GLB,,, | Performed by: DERMATOLOGY

## 2021-02-23 PROCEDURE — 3079F PR MOST RECENT DIASTOLIC BLOOD PRESSURE 80-89 MM HG: ICD-10-PCS | Mod: CPTII,S$GLB,, | Performed by: NURSE PRACTITIONER

## 2021-02-23 PROCEDURE — 99999 PR PBB SHADOW E&M-EST. PATIENT-LVL V: ICD-10-PCS | Mod: PBBFAC,,, | Performed by: NURSE PRACTITIONER

## 2021-02-23 PROCEDURE — 1157F PR ADVANCE CARE PLAN OR EQUIV PRESENT IN MEDICAL RECORD: ICD-10-PCS | Mod: S$GLB,,, | Performed by: DERMATOLOGY

## 2021-02-23 PROCEDURE — 3008F BODY MASS INDEX DOCD: CPT | Mod: CPTII,S$GLB,, | Performed by: NURSE PRACTITIONER

## 2021-02-23 PROCEDURE — 3074F PR MOST RECENT SYSTOLIC BLOOD PRESSURE < 130 MM HG: ICD-10-PCS | Mod: CPTII,S$GLB,, | Performed by: NURSE PRACTITIONER

## 2021-02-23 PROCEDURE — 1157F ADVNC CARE PLAN IN RCRD: CPT | Mod: S$GLB,,, | Performed by: DERMATOLOGY

## 2021-02-23 PROCEDURE — 3044F PR MOST RECENT HEMOGLOBIN A1C LEVEL <7.0%: ICD-10-PCS | Mod: CPTII,S$GLB,, | Performed by: NURSE PRACTITIONER

## 2021-02-23 PROCEDURE — 3074F SYST BP LT 130 MM HG: CPT | Mod: CPTII,S$GLB,, | Performed by: DERMATOLOGY

## 2021-02-23 PROCEDURE — 93005 ELECTROCARDIOGRAM TRACING: CPT | Mod: S$GLB,,, | Performed by: NURSE PRACTITIONER

## 2021-02-23 PROCEDURE — 1157F PR ADVANCE CARE PLAN OR EQUIV PRESENT IN MEDICAL RECORD: ICD-10-PCS | Mod: S$GLB,,, | Performed by: NURSE PRACTITIONER

## 2021-02-23 PROCEDURE — 1101F PR PT FALLS ASSESS DOC 0-1 FALLS W/OUT INJ PAST YR: ICD-10-PCS | Mod: CPTII,S$GLB,, | Performed by: NURSE PRACTITIONER

## 2021-02-23 PROCEDURE — 3008F BODY MASS INDEX DOCD: CPT | Mod: CPTII,S$GLB,, | Performed by: DERMATOLOGY

## 2021-02-23 PROCEDURE — 1101F PT FALLS ASSESS-DOCD LE1/YR: CPT | Mod: CPTII,S$GLB,, | Performed by: NURSE PRACTITIONER

## 2021-02-23 PROCEDURE — 93005 EKG 12-LEAD: ICD-10-PCS | Mod: S$GLB,,, | Performed by: NURSE PRACTITIONER

## 2021-02-23 RX ORDER — METFORMIN HYDROCHLORIDE 500 MG/1
500 TABLET, EXTENDED RELEASE ORAL 2 TIMES DAILY WITH MEALS
Qty: 180 TABLET | Refills: 3 | OUTPATIENT
Start: 2021-02-23 | End: 2022-02-23

## 2021-02-23 RX ORDER — VIT B6/L. MEFOLATE/ME-B12/ALA 25-3.5-1MG
CAPSULE ORAL
COMMUNITY
End: 2022-02-11

## 2021-02-23 RX ORDER — SUCRALFATE 1 G/1
1 TABLET ORAL 4 TIMES DAILY
Qty: 60 TABLET | Refills: 0 | Status: SHIPPED | OUTPATIENT
Start: 2021-02-23 | End: 2021-02-23

## 2021-02-23 RX ORDER — SUCRALFATE 1 G/1
1 TABLET ORAL 4 TIMES DAILY
Qty: 120 TABLET | Refills: 0 | Status: SHIPPED | OUTPATIENT
Start: 2021-02-23 | End: 2021-03-25

## 2021-02-24 ENCOUNTER — PATIENT MESSAGE (OUTPATIENT)
Dept: FAMILY MEDICINE | Facility: CLINIC | Age: 71
End: 2021-02-24

## 2021-02-25 ENCOUNTER — TELEPHONE (OUTPATIENT)
Dept: FAMILY MEDICINE | Facility: CLINIC | Age: 71
End: 2021-02-25

## 2021-03-02 ENCOUNTER — LAB VISIT (OUTPATIENT)
Dept: LAB | Facility: HOSPITAL | Age: 71
End: 2021-03-02
Attending: INTERNAL MEDICINE
Payer: MEDICARE

## 2021-03-02 DIAGNOSIS — E78.5 DYSLIPIDEMIA: ICD-10-CM

## 2021-03-02 DIAGNOSIS — Z00.00 ROUTINE PHYSICAL EXAMINATION: ICD-10-CM

## 2021-03-02 DIAGNOSIS — E11.9 CONTROLLED TYPE 2 DIABETES MELLITUS WITHOUT COMPLICATION, WITHOUT LONG-TERM CURRENT USE OF INSULIN: ICD-10-CM

## 2021-03-02 DIAGNOSIS — N39.0 RECURRENT UTI: ICD-10-CM

## 2021-03-02 DIAGNOSIS — D83.9 CVID (COMMON VARIABLE IMMUNODEFICIENCY): ICD-10-CM

## 2021-03-02 DIAGNOSIS — I10 ESSENTIAL HYPERTENSION: ICD-10-CM

## 2021-03-02 DIAGNOSIS — Z79.899 ENCOUNTER FOR LONG-TERM (CURRENT) USE OF MEDICATIONS: ICD-10-CM

## 2021-03-02 LAB
BASOPHILS # BLD AUTO: 0.02 K/UL (ref 0–0.2)
BASOPHILS NFR BLD: 0.5 % (ref 0–1.9)
DIFFERENTIAL METHOD: ABNORMAL
EOSINOPHIL # BLD AUTO: 0.1 K/UL (ref 0–0.5)
EOSINOPHIL NFR BLD: 1.8 % (ref 0–8)
ERYTHROCYTE [DISTWIDTH] IN BLOOD BY AUTOMATED COUNT: 14 % (ref 11.5–14.5)
HCT VFR BLD AUTO: 37.8 % (ref 37–48.5)
HGB BLD-MCNC: 11.9 G/DL (ref 12–16)
IMM GRANULOCYTES # BLD AUTO: 0.04 K/UL (ref 0–0.04)
IMM GRANULOCYTES NFR BLD AUTO: 0.9 % (ref 0–0.5)
LYMPHOCYTES # BLD AUTO: 1.5 K/UL (ref 1–4.8)
LYMPHOCYTES NFR BLD: 33.9 % (ref 18–48)
MCH RBC QN AUTO: 28 PG (ref 27–31)
MCHC RBC AUTO-ENTMCNC: 31.5 G/DL (ref 32–36)
MCV RBC AUTO: 89 FL (ref 82–98)
MONOCYTES # BLD AUTO: 0.3 K/UL (ref 0.3–1)
MONOCYTES NFR BLD: 7.5 % (ref 4–15)
NEUTROPHILS # BLD AUTO: 2.5 K/UL (ref 1.8–7.7)
NEUTROPHILS NFR BLD: 55.4 % (ref 38–73)
NRBC BLD-RTO: 0 /100 WBC
PLATELET # BLD AUTO: 203 K/UL (ref 150–350)
PMV BLD AUTO: 10.7 FL (ref 9.2–12.9)
RBC # BLD AUTO: 4.25 M/UL (ref 4–5.4)
WBC # BLD AUTO: 4.42 K/UL (ref 3.9–12.7)

## 2021-03-02 PROCEDURE — 83036 HEMOGLOBIN GLYCOSYLATED A1C: CPT

## 2021-03-02 PROCEDURE — 82784 ASSAY IGA/IGD/IGG/IGM EACH: CPT

## 2021-03-02 PROCEDURE — 80061 LIPID PANEL: CPT

## 2021-03-02 PROCEDURE — 82787 IGG 1 2 3 OR 4 EACH: CPT | Mod: 59

## 2021-03-02 PROCEDURE — 85025 COMPLETE CBC W/AUTO DIFF WBC: CPT

## 2021-03-02 PROCEDURE — 82784 ASSAY IGA/IGD/IGG/IGM EACH: CPT | Mod: 59

## 2021-03-02 PROCEDURE — 80053 COMPREHEN METABOLIC PANEL: CPT

## 2021-03-03 LAB
ALBUMIN SERPL BCP-MCNC: 3.6 G/DL (ref 3.5–5.2)
ALP SERPL-CCNC: 138 U/L (ref 55–135)
ALT SERPL W/O P-5'-P-CCNC: 24 U/L (ref 10–44)
ANION GAP SERPL CALC-SCNC: 11 MMOL/L (ref 8–16)
AST SERPL-CCNC: 26 U/L (ref 10–40)
BILIRUB SERPL-MCNC: 0.5 MG/DL (ref 0.1–1)
BUN SERPL-MCNC: 10 MG/DL (ref 8–23)
CALCIUM SERPL-MCNC: 9.4 MG/DL (ref 8.7–10.5)
CHLORIDE SERPL-SCNC: 101 MMOL/L (ref 95–110)
CHOLEST SERPL-MCNC: 168 MG/DL (ref 120–199)
CHOLEST/HDLC SERPL: 3 {RATIO} (ref 2–5)
CO2 SERPL-SCNC: 27 MMOL/L (ref 23–29)
CREAT SERPL-MCNC: 0.8 MG/DL (ref 0.5–1.4)
EST. GFR  (AFRICAN AMERICAN): >60 ML/MIN/1.73 M^2
EST. GFR  (NON AFRICAN AMERICAN): >60 ML/MIN/1.73 M^2
ESTIMATED AVG GLUCOSE: 131 MG/DL (ref 68–131)
GLUCOSE SERPL-MCNC: 101 MG/DL (ref 70–110)
HBA1C MFR BLD: 6.2 % (ref 4–5.6)
HDLC SERPL-MCNC: 56 MG/DL (ref 40–75)
HDLC SERPL: 33.3 % (ref 20–50)
IGA SERPL-MCNC: 149 MG/DL (ref 40–350)
IGG SERPL-MCNC: 787 MG/DL (ref 650–1600)
IGM SERPL-MCNC: 43 MG/DL (ref 50–300)
LDLC SERPL CALC-MCNC: 91 MG/DL (ref 63–159)
NONHDLC SERPL-MCNC: 112 MG/DL
POTASSIUM SERPL-SCNC: 4.2 MMOL/L (ref 3.5–5.1)
PROT SERPL-MCNC: 7.2 G/DL (ref 6–8.4)
SODIUM SERPL-SCNC: 139 MMOL/L (ref 136–145)
TRIGL SERPL-MCNC: 105 MG/DL (ref 30–150)

## 2021-03-05 LAB
IGG1 SER-MCNC: 399 MG/DL (ref 382–929)
IGG2 SER-MCNC: 288 MG/DL (ref 242–700)
IGG3 SER-MCNC: 25 MG/DL (ref 22–176)
IGG4 SER-MCNC: 4 MG/DL (ref 4–86)

## 2021-03-11 ENCOUNTER — TELEPHONE (OUTPATIENT)
Dept: ALLERGY | Facility: CLINIC | Age: 71
End: 2021-03-11

## 2021-03-11 ENCOUNTER — OFFICE VISIT (OUTPATIENT)
Dept: FAMILY MEDICINE | Facility: CLINIC | Age: 71
End: 2021-03-11
Payer: MEDICARE

## 2021-03-11 VITALS
HEART RATE: 72 BPM | OXYGEN SATURATION: 99 % | SYSTOLIC BLOOD PRESSURE: 122 MMHG | DIASTOLIC BLOOD PRESSURE: 68 MMHG | WEIGHT: 205 LBS | HEIGHT: 64 IN | BODY MASS INDEX: 35 KG/M2

## 2021-03-11 DIAGNOSIS — I10 ESSENTIAL HYPERTENSION: ICD-10-CM

## 2021-03-11 DIAGNOSIS — E11.9 CONTROLLED TYPE 2 DIABETES MELLITUS WITHOUT COMPLICATION, WITHOUT LONG-TERM CURRENT USE OF INSULIN: ICD-10-CM

## 2021-03-11 DIAGNOSIS — Z00.00 ROUTINE PHYSICAL EXAMINATION: Primary | ICD-10-CM

## 2021-03-11 DIAGNOSIS — E78.5 DYSLIPIDEMIA: ICD-10-CM

## 2021-03-11 PROCEDURE — 3074F SYST BP LT 130 MM HG: CPT | Mod: CPTII,S$GLB,, | Performed by: INTERNAL MEDICINE

## 2021-03-11 PROCEDURE — 99999 PR PBB SHADOW E&M-EST. PATIENT-LVL V: ICD-10-PCS | Mod: PBBFAC,,, | Performed by: INTERNAL MEDICINE

## 2021-03-11 PROCEDURE — 3044F HG A1C LEVEL LT 7.0%: CPT | Mod: CPTII,S$GLB,, | Performed by: INTERNAL MEDICINE

## 2021-03-11 PROCEDURE — 3008F BODY MASS INDEX DOCD: CPT | Mod: CPTII,S$GLB,, | Performed by: INTERNAL MEDICINE

## 2021-03-11 PROCEDURE — 3288F PR FALLS RISK ASSESSMENT DOCUMENTED: ICD-10-PCS | Mod: CPTII,S$GLB,, | Performed by: INTERNAL MEDICINE

## 2021-03-11 PROCEDURE — 99397 PR PREVENTIVE VISIT,EST,65 & OVER: ICD-10-PCS | Mod: S$GLB,,, | Performed by: INTERNAL MEDICINE

## 2021-03-11 PROCEDURE — 3288F FALL RISK ASSESSMENT DOCD: CPT | Mod: CPTII,S$GLB,, | Performed by: INTERNAL MEDICINE

## 2021-03-11 PROCEDURE — 99397 PER PM REEVAL EST PAT 65+ YR: CPT | Mod: S$GLB,,, | Performed by: INTERNAL MEDICINE

## 2021-03-11 PROCEDURE — 1157F PR ADVANCE CARE PLAN OR EQUIV PRESENT IN MEDICAL RECORD: ICD-10-PCS | Mod: S$GLB,,, | Performed by: INTERNAL MEDICINE

## 2021-03-11 PROCEDURE — 1101F PT FALLS ASSESS-DOCD LE1/YR: CPT | Mod: CPTII,S$GLB,, | Performed by: INTERNAL MEDICINE

## 2021-03-11 PROCEDURE — 3078F DIAST BP <80 MM HG: CPT | Mod: CPTII,S$GLB,, | Performed by: INTERNAL MEDICINE

## 2021-03-11 PROCEDURE — 3008F PR BODY MASS INDEX (BMI) DOCUMENTED: ICD-10-PCS | Mod: CPTII,S$GLB,, | Performed by: INTERNAL MEDICINE

## 2021-03-11 PROCEDURE — 3044F PR MOST RECENT HEMOGLOBIN A1C LEVEL <7.0%: ICD-10-PCS | Mod: CPTII,S$GLB,, | Performed by: INTERNAL MEDICINE

## 2021-03-11 PROCEDURE — 1101F PR PT FALLS ASSESS DOC 0-1 FALLS W/OUT INJ PAST YR: ICD-10-PCS | Mod: CPTII,S$GLB,, | Performed by: INTERNAL MEDICINE

## 2021-03-11 PROCEDURE — 3074F PR MOST RECENT SYSTOLIC BLOOD PRESSURE < 130 MM HG: ICD-10-PCS | Mod: CPTII,S$GLB,, | Performed by: INTERNAL MEDICINE

## 2021-03-11 PROCEDURE — 3078F PR MOST RECENT DIASTOLIC BLOOD PRESSURE < 80 MM HG: ICD-10-PCS | Mod: CPTII,S$GLB,, | Performed by: INTERNAL MEDICINE

## 2021-03-11 PROCEDURE — 99999 PR PBB SHADOW E&M-EST. PATIENT-LVL V: CPT | Mod: PBBFAC,,, | Performed by: INTERNAL MEDICINE

## 2021-03-11 PROCEDURE — 1126F PR PAIN SEVERITY QUANTIFIED, NO PAIN PRESENT: ICD-10-PCS | Mod: S$GLB,,, | Performed by: INTERNAL MEDICINE

## 2021-03-11 PROCEDURE — 1126F AMNT PAIN NOTED NONE PRSNT: CPT | Mod: S$GLB,,, | Performed by: INTERNAL MEDICINE

## 2021-03-11 PROCEDURE — 1157F ADVNC CARE PLAN IN RCRD: CPT | Mod: S$GLB,,, | Performed by: INTERNAL MEDICINE

## 2021-03-11 RX ORDER — PRAVASTATIN SODIUM 80 MG/1
80 TABLET ORAL DAILY
Qty: 90 TABLET | Refills: 4 | Status: SHIPPED | OUTPATIENT
Start: 2021-03-11 | End: 2021-11-11 | Stop reason: SDUPTHER

## 2021-03-11 RX ORDER — METFORMIN HYDROCHLORIDE 500 MG/1
500 TABLET, EXTENDED RELEASE ORAL 2 TIMES DAILY WITH MEALS
Qty: 180 TABLET | Refills: 3 | Status: SHIPPED | OUTPATIENT
Start: 2021-03-11 | End: 2022-03-10 | Stop reason: SDUPTHER

## 2021-03-15 ENCOUNTER — OFFICE VISIT (OUTPATIENT)
Dept: ALLERGY | Facility: CLINIC | Age: 71
End: 2021-03-15
Payer: MEDICARE

## 2021-03-15 VITALS — OXYGEN SATURATION: 98 % | HEIGHT: 64 IN | BODY MASS INDEX: 35 KG/M2 | HEART RATE: 81 BPM | WEIGHT: 205 LBS

## 2021-03-15 DIAGNOSIS — D83.9 CVID (COMMON VARIABLE IMMUNODEFICIENCY): Primary | ICD-10-CM

## 2021-03-15 DIAGNOSIS — J45.30 MILD PERSISTENT ASTHMA WITHOUT COMPLICATION: ICD-10-CM

## 2021-03-15 DIAGNOSIS — J47.9 BRONCHIECTASIS WITHOUT COMPLICATION: ICD-10-CM

## 2021-03-15 DIAGNOSIS — J31.0 CHRONIC RHINITIS: ICD-10-CM

## 2021-03-15 PROCEDURE — 3008F PR BODY MASS INDEX (BMI) DOCUMENTED: ICD-10-PCS | Mod: CPTII,S$GLB,, | Performed by: ALLERGY & IMMUNOLOGY

## 2021-03-15 PROCEDURE — 99999 PR PBB SHADOW E&M-EST. PATIENT-LVL V: ICD-10-PCS | Mod: PBBFAC,,, | Performed by: ALLERGY & IMMUNOLOGY

## 2021-03-15 PROCEDURE — 1159F MED LIST DOCD IN RCRD: CPT | Mod: S$GLB,,, | Performed by: ALLERGY & IMMUNOLOGY

## 2021-03-15 PROCEDURE — 99999 PR PBB SHADOW E&M-EST. PATIENT-LVL V: CPT | Mod: PBBFAC,,, | Performed by: ALLERGY & IMMUNOLOGY

## 2021-03-15 PROCEDURE — 99499 RISK ADDL DX/OHS AUDIT: ICD-10-PCS | Mod: S$GLB,,, | Performed by: ALLERGY & IMMUNOLOGY

## 2021-03-15 PROCEDURE — 99214 PR OFFICE/OUTPT VISIT, EST, LEVL IV, 30-39 MIN: ICD-10-PCS | Mod: S$GLB,,, | Performed by: ALLERGY & IMMUNOLOGY

## 2021-03-15 PROCEDURE — 1159F PR MEDICATION LIST DOCUMENTED IN MEDICAL RECORD: ICD-10-PCS | Mod: S$GLB,,, | Performed by: ALLERGY & IMMUNOLOGY

## 2021-03-15 PROCEDURE — 99499 UNLISTED E&M SERVICE: CPT | Mod: S$GLB,,, | Performed by: ALLERGY & IMMUNOLOGY

## 2021-03-15 PROCEDURE — 99214 OFFICE O/P EST MOD 30 MIN: CPT | Mod: S$GLB,,, | Performed by: ALLERGY & IMMUNOLOGY

## 2021-03-15 PROCEDURE — 1157F PR ADVANCE CARE PLAN OR EQUIV PRESENT IN MEDICAL RECORD: ICD-10-PCS | Mod: S$GLB,,, | Performed by: ALLERGY & IMMUNOLOGY

## 2021-03-15 PROCEDURE — 1157F ADVNC CARE PLAN IN RCRD: CPT | Mod: S$GLB,,, | Performed by: ALLERGY & IMMUNOLOGY

## 2021-03-15 PROCEDURE — 3008F BODY MASS INDEX DOCD: CPT | Mod: CPTII,S$GLB,, | Performed by: ALLERGY & IMMUNOLOGY

## 2021-03-17 ENCOUNTER — TELEPHONE (OUTPATIENT)
Dept: ALLERGY | Facility: CLINIC | Age: 71
End: 2021-03-17

## 2021-03-26 ENCOUNTER — TELEPHONE (OUTPATIENT)
Dept: ALLERGY | Facility: CLINIC | Age: 71
End: 2021-03-26

## 2021-04-12 ENCOUNTER — OFFICE VISIT (OUTPATIENT)
Dept: PULMONOLOGY | Facility: CLINIC | Age: 71
End: 2021-04-12
Payer: MEDICARE

## 2021-04-12 VITALS
OXYGEN SATURATION: 97 % | HEART RATE: 64 BPM | SYSTOLIC BLOOD PRESSURE: 126 MMHG | BODY MASS INDEX: 35.48 KG/M2 | WEIGHT: 207.81 LBS | DIASTOLIC BLOOD PRESSURE: 73 MMHG | HEIGHT: 64 IN

## 2021-04-12 DIAGNOSIS — J45.20 MILD INTERMITTENT ASTHMA WITHOUT COMPLICATION: ICD-10-CM

## 2021-04-12 DIAGNOSIS — J32.9 SINUSITIS, UNSPECIFIED CHRONICITY, UNSPECIFIED LOCATION: ICD-10-CM

## 2021-04-12 DIAGNOSIS — J31.0 CHRONIC NONALLERGIC RHINITIS: ICD-10-CM

## 2021-04-12 DIAGNOSIS — J47.9 BRONCHIECTASIS WITHOUT COMPLICATION: ICD-10-CM

## 2021-04-12 DIAGNOSIS — G47.33 OSA (OBSTRUCTIVE SLEEP APNEA): ICD-10-CM

## 2021-04-12 DIAGNOSIS — R06.02 SHORTNESS OF BREATH: Primary | ICD-10-CM

## 2021-04-12 PROCEDURE — 3008F PR BODY MASS INDEX (BMI) DOCUMENTED: ICD-10-PCS | Mod: CPTII,S$GLB,, | Performed by: NURSE PRACTITIONER

## 2021-04-12 PROCEDURE — 3288F FALL RISK ASSESSMENT DOCD: CPT | Mod: CPTII,S$GLB,, | Performed by: NURSE PRACTITIONER

## 2021-04-12 PROCEDURE — 1157F ADVNC CARE PLAN IN RCRD: CPT | Mod: S$GLB,,, | Performed by: NURSE PRACTITIONER

## 2021-04-12 PROCEDURE — 99214 PR OFFICE/OUTPT VISIT, EST, LEVL IV, 30-39 MIN: ICD-10-PCS | Mod: S$GLB,,, | Performed by: NURSE PRACTITIONER

## 2021-04-12 PROCEDURE — 1126F PR PAIN SEVERITY QUANTIFIED, NO PAIN PRESENT: ICD-10-PCS | Mod: S$GLB,,, | Performed by: NURSE PRACTITIONER

## 2021-04-12 PROCEDURE — 99999 PR PBB SHADOW E&M-EST. PATIENT-LVL V: CPT | Mod: PBBFAC,,, | Performed by: NURSE PRACTITIONER

## 2021-04-12 PROCEDURE — 1157F PR ADVANCE CARE PLAN OR EQUIV PRESENT IN MEDICAL RECORD: ICD-10-PCS | Mod: S$GLB,,, | Performed by: NURSE PRACTITIONER

## 2021-04-12 PROCEDURE — 1159F PR MEDICATION LIST DOCUMENTED IN MEDICAL RECORD: ICD-10-PCS | Mod: S$GLB,,, | Performed by: NURSE PRACTITIONER

## 2021-04-12 PROCEDURE — 1126F AMNT PAIN NOTED NONE PRSNT: CPT | Mod: S$GLB,,, | Performed by: NURSE PRACTITIONER

## 2021-04-12 PROCEDURE — 99214 OFFICE O/P EST MOD 30 MIN: CPT | Mod: S$GLB,,, | Performed by: NURSE PRACTITIONER

## 2021-04-12 PROCEDURE — 1159F MED LIST DOCD IN RCRD: CPT | Mod: S$GLB,,, | Performed by: NURSE PRACTITIONER

## 2021-04-12 PROCEDURE — 3288F PR FALLS RISK ASSESSMENT DOCUMENTED: ICD-10-PCS | Mod: CPTII,S$GLB,, | Performed by: NURSE PRACTITIONER

## 2021-04-12 PROCEDURE — 99999 PR PBB SHADOW E&M-EST. PATIENT-LVL V: ICD-10-PCS | Mod: PBBFAC,,, | Performed by: NURSE PRACTITIONER

## 2021-04-12 PROCEDURE — 1101F PR PT FALLS ASSESS DOC 0-1 FALLS W/OUT INJ PAST YR: ICD-10-PCS | Mod: CPTII,S$GLB,, | Performed by: NURSE PRACTITIONER

## 2021-04-12 PROCEDURE — 3008F BODY MASS INDEX DOCD: CPT | Mod: CPTII,S$GLB,, | Performed by: NURSE PRACTITIONER

## 2021-04-12 PROCEDURE — 1101F PT FALLS ASSESS-DOCD LE1/YR: CPT | Mod: CPTII,S$GLB,, | Performed by: NURSE PRACTITIONER

## 2021-04-12 RX ORDER — MONTELUKAST SODIUM 10 MG/1
10 TABLET ORAL NIGHTLY
Qty: 90 TABLET | Refills: 3 | Status: SHIPPED | OUTPATIENT
Start: 2021-04-12 | End: 2022-07-01 | Stop reason: SDUPTHER

## 2021-04-12 RX ORDER — GABAPENTIN 300 MG/1
CAPSULE ORAL
COMMUNITY
Start: 2021-03-16 | End: 2021-06-11 | Stop reason: CLARIF

## 2021-04-12 RX ORDER — IPRATROPIUM BROMIDE 42 UG/1
2 SPRAY, METERED NASAL 4 TIMES DAILY
Qty: 15 ML | Refills: 2 | Status: SHIPPED | OUTPATIENT
Start: 2021-04-12 | End: 2023-05-01

## 2021-04-12 RX ORDER — LEVALBUTEROL INHALATION SOLUTION 1.25 MG/3ML
1 SOLUTION RESPIRATORY (INHALATION) EVERY 4 HOURS PRN
Qty: 1 BOX | Refills: 11 | Status: SHIPPED | OUTPATIENT
Start: 2021-04-12 | End: 2024-01-22 | Stop reason: SDUPTHER

## 2021-04-15 ENCOUNTER — OFFICE VISIT (OUTPATIENT)
Dept: PAIN MEDICINE | Facility: CLINIC | Age: 71
End: 2021-04-15
Payer: MEDICARE

## 2021-04-15 VITALS
DIASTOLIC BLOOD PRESSURE: 67 MMHG | TEMPERATURE: 96 F | BODY MASS INDEX: 35.55 KG/M2 | HEART RATE: 67 BPM | OXYGEN SATURATION: 98 % | SYSTOLIC BLOOD PRESSURE: 122 MMHG | WEIGHT: 207.13 LBS | RESPIRATION RATE: 18 BRPM

## 2021-04-15 DIAGNOSIS — M54.42 CHRONIC BILATERAL LOW BACK PAIN WITH BILATERAL SCIATICA: ICD-10-CM

## 2021-04-15 DIAGNOSIS — G89.29 CHRONIC BILATERAL LOW BACK PAIN WITH BILATERAL SCIATICA: ICD-10-CM

## 2021-04-15 DIAGNOSIS — M54.41 CHRONIC BILATERAL LOW BACK PAIN WITH BILATERAL SCIATICA: ICD-10-CM

## 2021-04-15 DIAGNOSIS — M54.16 LUMBAR RADICULOPATHY: Primary | ICD-10-CM

## 2021-04-15 DIAGNOSIS — Z11.9 SCREENING EXAMINATION FOR INFECTIOUS DISEASE: ICD-10-CM

## 2021-04-15 DIAGNOSIS — M48.061 SPINAL STENOSIS OF LUMBAR REGION, UNSPECIFIED WHETHER NEUROGENIC CLAUDICATION PRESENT: ICD-10-CM

## 2021-04-15 PROCEDURE — 1125F PR PAIN SEVERITY QUANTIFIED, PAIN PRESENT: ICD-10-PCS | Mod: S$GLB,,, | Performed by: ANESTHESIOLOGY

## 2021-04-15 PROCEDURE — 1157F ADVNC CARE PLAN IN RCRD: CPT | Mod: S$GLB,,, | Performed by: ANESTHESIOLOGY

## 2021-04-15 PROCEDURE — 1101F PR PT FALLS ASSESS DOC 0-1 FALLS W/OUT INJ PAST YR: ICD-10-PCS | Mod: CPTII,S$GLB,, | Performed by: ANESTHESIOLOGY

## 2021-04-15 PROCEDURE — 99214 PR OFFICE/OUTPT VISIT, EST, LEVL IV, 30-39 MIN: ICD-10-PCS | Mod: S$GLB,,, | Performed by: ANESTHESIOLOGY

## 2021-04-15 PROCEDURE — 1101F PT FALLS ASSESS-DOCD LE1/YR: CPT | Mod: CPTII,S$GLB,, | Performed by: ANESTHESIOLOGY

## 2021-04-15 PROCEDURE — 1157F PR ADVANCE CARE PLAN OR EQUIV PRESENT IN MEDICAL RECORD: ICD-10-PCS | Mod: S$GLB,,, | Performed by: ANESTHESIOLOGY

## 2021-04-15 PROCEDURE — 99214 OFFICE O/P EST MOD 30 MIN: CPT | Mod: S$GLB,,, | Performed by: ANESTHESIOLOGY

## 2021-04-15 PROCEDURE — 1125F AMNT PAIN NOTED PAIN PRSNT: CPT | Mod: S$GLB,,, | Performed by: ANESTHESIOLOGY

## 2021-04-15 PROCEDURE — 3288F PR FALLS RISK ASSESSMENT DOCUMENTED: ICD-10-PCS | Mod: CPTII,S$GLB,, | Performed by: ANESTHESIOLOGY

## 2021-04-15 PROCEDURE — 3008F PR BODY MASS INDEX (BMI) DOCUMENTED: ICD-10-PCS | Mod: CPTII,S$GLB,, | Performed by: ANESTHESIOLOGY

## 2021-04-15 PROCEDURE — 1159F PR MEDICATION LIST DOCUMENTED IN MEDICAL RECORD: ICD-10-PCS | Mod: S$GLB,,, | Performed by: ANESTHESIOLOGY

## 2021-04-15 PROCEDURE — 3288F FALL RISK ASSESSMENT DOCD: CPT | Mod: CPTII,S$GLB,, | Performed by: ANESTHESIOLOGY

## 2021-04-15 PROCEDURE — 1159F MED LIST DOCD IN RCRD: CPT | Mod: S$GLB,,, | Performed by: ANESTHESIOLOGY

## 2021-04-15 PROCEDURE — 99999 PR PBB SHADOW E&M-EST. PATIENT-LVL V: ICD-10-PCS | Mod: PBBFAC,,, | Performed by: ANESTHESIOLOGY

## 2021-04-15 PROCEDURE — 3008F BODY MASS INDEX DOCD: CPT | Mod: CPTII,S$GLB,, | Performed by: ANESTHESIOLOGY

## 2021-04-15 PROCEDURE — 99999 PR PBB SHADOW E&M-EST. PATIENT-LVL V: CPT | Mod: PBBFAC,,, | Performed by: ANESTHESIOLOGY

## 2021-04-15 RX ORDER — SODIUM CHLORIDE, SODIUM LACTATE, POTASSIUM CHLORIDE, CALCIUM CHLORIDE 600; 310; 30; 20 MG/100ML; MG/100ML; MG/100ML; MG/100ML
INJECTION, SOLUTION INTRAVENOUS CONTINUOUS
Status: CANCELLED | OUTPATIENT
Start: 2021-05-03

## 2021-04-19 ENCOUNTER — HOSPITAL ENCOUNTER (OUTPATIENT)
Dept: PULMONOLOGY | Facility: HOSPITAL | Age: 71
Discharge: HOME | End: 2021-04-19
Attending: NURSE PRACTITIONER
Payer: MEDICARE

## 2021-04-19 ENCOUNTER — TELEPHONE (OUTPATIENT)
Dept: PULMONOLOGY | Facility: CLINIC | Age: 71
End: 2021-04-19

## 2021-04-19 ENCOUNTER — HOSPITAL ENCOUNTER (OUTPATIENT)
Dept: RADIOLOGY | Facility: HOSPITAL | Age: 71
Discharge: HOME | End: 2021-04-19
Attending: NURSE PRACTITIONER
Payer: MEDICARE

## 2021-04-19 ENCOUNTER — PATIENT MESSAGE (OUTPATIENT)
Dept: PULMONOLOGY | Facility: CLINIC | Age: 71
End: 2021-04-19

## 2021-04-19 DIAGNOSIS — J47.9 BRONCHIECTASIS WITHOUT COMPLICATION: ICD-10-CM

## 2021-04-19 DIAGNOSIS — R06.02 SHORTNESS OF BREATH: ICD-10-CM

## 2021-04-19 PROCEDURE — 94729 PR C02/MEMBANE DIFFUSE CAPACITY: ICD-10-PCS | Mod: 26,,, | Performed by: INTERNAL MEDICINE

## 2021-04-19 PROCEDURE — 94729 DIFFUSING CAPACITY: CPT

## 2021-04-19 PROCEDURE — 94727 GAS DIL/WSHOT DETER LNG VOL: CPT | Mod: 26,,, | Performed by: INTERNAL MEDICINE

## 2021-04-19 PROCEDURE — 71046 X-RAY EXAM CHEST 2 VIEWS: CPT | Mod: TC,FY

## 2021-04-19 PROCEDURE — 94729 DIFFUSING CAPACITY: CPT | Mod: 26,,, | Performed by: INTERNAL MEDICINE

## 2021-04-19 PROCEDURE — 94727 PR PULM FUNCTION TEST BY GAS: ICD-10-PCS | Mod: 26,,, | Performed by: INTERNAL MEDICINE

## 2021-04-19 PROCEDURE — 94727 GAS DIL/WSHOT DETER LNG VOL: CPT

## 2021-04-19 PROCEDURE — 94060 PR EVAL OF BRONCHOSPASM: ICD-10-PCS | Mod: 26,,, | Performed by: INTERNAL MEDICINE

## 2021-04-19 PROCEDURE — 71046 XR CHEST PA AND LATERAL: ICD-10-PCS | Mod: 26,,, | Performed by: RADIOLOGY

## 2021-04-19 PROCEDURE — 71046 X-RAY EXAM CHEST 2 VIEWS: CPT | Mod: 26,,, | Performed by: RADIOLOGY

## 2021-04-19 PROCEDURE — 94060 EVALUATION OF WHEEZING: CPT

## 2021-04-19 PROCEDURE — 94060 EVALUATION OF WHEEZING: CPT | Mod: 26,,, | Performed by: INTERNAL MEDICINE

## 2021-04-20 ENCOUNTER — TELEPHONE (OUTPATIENT)
Dept: PULMONOLOGY | Facility: CLINIC | Age: 71
End: 2021-04-20

## 2021-04-20 LAB
BRPFT: NORMAL
DLCO ADJ PRE: 16.77 ML/(MIN*MMHG)
DLCO SINGLE BREATH LLN: 15.95
DLCO SINGLE BREATH PRE REF: 77.4 %
DLCO SINGLE BREATH REF: 21.68
DLCOC SBVA LLN: 2.92
DLCOC SBVA PRE REF: 75.3 %
DLCOC SBVA REF: 4.36
DLCOC SINGLE BREATH LLN: 15.95
DLCOC SINGLE BREATH PRE REF: 77.4 %
DLCOC SINGLE BREATH REF: 21.68
DLCOVA LLN: 2.92
DLCOVA PRE REF: 75.3 %
DLCOVA PRE: 3.29 ML/(MIN*MMHG*L)
DLCOVA REF: 4.36
DLVAADJ PRE: 3.29 ML/(MIN*MMHG*L)
ERVN2 LLN: -16449.32
ERVN2 PRE REF: 51.1 %
ERVN2 PRE: 0.35 L
ERVN2 REF: 0.68
FEF 25 75 CHG: 4.2 %
FEF 25 75 LLN: 0.91
FEF 25 75 POST REF: 96.6 %
FEF 25 75 PRE REF: 92.7 %
FEF 25 75 REF: 1.94
FET100 CHG: -12.6 %
FEV1 CHG: 2.1 %
FEV1 FVC CHG: 0.6 %
FEV1 FVC LLN: 65
FEV1 FVC POST REF: 95.8 %
FEV1 FVC PRE REF: 95.2 %
FEV1 FVC REF: 78
FEV1 LLN: 1.66
FEV1 POST REF: 101.3 %
FEV1 PRE REF: 99.2 %
FEV1 REF: 2.27
FRCN2 LLN: 1.9
FRCN2 PRE REF: 61.7 %
FRCN2 REF: 2.72
FVC CHG: 1.5 %
FVC LLN: 2.14
FVC POST REF: 105 %
FVC PRE REF: 103.4 %
FVC REF: 2.92
IVC PRE: 2.92 L
IVC SINGLE BREATH LLN: 2.14
IVC SINGLE BREATH PRE REF: 100.1 %
IVC SINGLE BREATH REF: 2.92
MVV LLN: 73
MVV PRE REF: 90 %
MVV REF: 86
PEF CHG: -1.1 %
PEF LLN: 4.1
PEF POST REF: 92.1 %
PEF PRE REF: 93.2 %
PEF REF: 5.82
POST FEF 25 75: 1.87 L/S
POST FET 100: 7.24 SEC
POST FEV1 FVC: 75.02 %
POST FEV1: 2.3 L
POST FVC: 3.06 L
POST PEF: 5.36 L/S
PRE DLCO: 16.77 ML/(MIN*MMHG)
PRE FEF 25 75: 1.79 L/S
PRE FET 100: 8.28 SEC
PRE FEV1 FVC: 74.54 %
PRE FEV1: 2.25 L
PRE FRC N2: 1.68 L
PRE FVC: 3.02 L
PRE MVV: 77 L/MIN
PRE PEF: 5.42 L/S
RVN2 LLN: 1.46
RVN2 PRE REF: 67.9 %
RVN2 PRE: 1.38 L
RVN2 REF: 2.04
RVN2TLCN2 LLN: 32.49
RVN2TLCN2 PRE REF: 73.7 %
RVN2TLCN2 PRE: 31 %
RVN2TLCN2 REF: 42.08
TLCN2 LLN: 3.98
TLCN2 PRE REF: 89.9 %
TLCN2 PRE: 4.46 L
TLCN2 REF: 4.97
VA PRE: 5.1 L
VA SINGLE BREATH LLN: 4.82
VA SINGLE BREATH PRE REF: 105.8 %
VA SINGLE BREATH REF: 4.82
VCMAXN2 LLN: 2.14
VCMAXN2 PRE REF: 105.6 %
VCMAXN2 PRE: 3.08 L
VCMAXN2 REF: 2.92

## 2021-04-30 ENCOUNTER — PATIENT MESSAGE (OUTPATIENT)
Dept: SURGERY | Facility: HOSPITAL | Age: 71
End: 2021-04-30

## 2021-04-30 ENCOUNTER — LAB VISIT (OUTPATIENT)
Dept: FAMILY MEDICINE | Facility: CLINIC | Age: 71
End: 2021-04-30
Payer: MEDICARE

## 2021-04-30 DIAGNOSIS — Z11.9 SCREENING EXAMINATION FOR INFECTIOUS DISEASE: ICD-10-CM

## 2021-04-30 PROCEDURE — U0003 INFECTIOUS AGENT DETECTION BY NUCLEIC ACID (DNA OR RNA); SEVERE ACUTE RESPIRATORY SYNDROME CORONAVIRUS 2 (SARS-COV-2) (CORONAVIRUS DISEASE [COVID-19]), AMPLIFIED PROBE TECHNIQUE, MAKING USE OF HIGH THROUGHPUT TECHNOLOGIES AS DESCRIBED BY CMS-2020-01-R: HCPCS | Performed by: ANESTHESIOLOGY

## 2021-04-30 PROCEDURE — U0005 INFEC AGEN DETEC AMPLI PROBE: HCPCS | Performed by: ANESTHESIOLOGY

## 2021-05-01 LAB — SARS-COV-2 RNA RESP QL NAA+PROBE: NOT DETECTED

## 2021-05-03 ENCOUNTER — HOSPITAL ENCOUNTER (OUTPATIENT)
Dept: RADIOLOGY | Facility: HOSPITAL | Age: 71
Discharge: HOME | End: 2021-05-03
Attending: ANESTHESIOLOGY
Payer: MEDICARE

## 2021-05-03 ENCOUNTER — HOSPITAL ENCOUNTER (OUTPATIENT)
Facility: HOSPITAL | Age: 71
Discharge: HOME | End: 2021-05-03
Attending: ANESTHESIOLOGY | Admitting: ANESTHESIOLOGY
Payer: MEDICARE

## 2021-05-03 DIAGNOSIS — M54.16 LUMBAR RADICULOPATHY: ICD-10-CM

## 2021-05-03 DIAGNOSIS — M54.16 LUMBAR RADICULOPATHY: Primary | ICD-10-CM

## 2021-05-03 LAB — GLUCOSE SERPL-MCNC: 142 MG/DL (ref 70–110)

## 2021-05-03 PROCEDURE — 62323 PR INJ LUMBAR/SACRAL, W/IMAGING GUIDANCE: ICD-10-PCS | Mod: ,,, | Performed by: ANESTHESIOLOGY

## 2021-05-03 PROCEDURE — 25000003 PHARM REV CODE 250: Mod: PO | Performed by: ANESTHESIOLOGY

## 2021-05-03 PROCEDURE — 76000 FLUOROSCOPY <1 HR PHYS/QHP: CPT | Mod: TC,PO

## 2021-05-03 PROCEDURE — 62323 NJX INTERLAMINAR LMBR/SAC: CPT | Mod: ,,, | Performed by: ANESTHESIOLOGY

## 2021-05-03 PROCEDURE — 62323 NJX INTERLAMINAR LMBR/SAC: CPT | Mod: PO | Performed by: ANESTHESIOLOGY

## 2021-05-03 PROCEDURE — 25500020 PHARM REV CODE 255: Mod: PO | Performed by: ANESTHESIOLOGY

## 2021-05-03 PROCEDURE — 99152 PR MOD CONSCIOUS SEDATION, SAME PHYS, 5+ YRS, FIRST 15 MIN: ICD-10-PCS | Mod: ,,, | Performed by: ANESTHESIOLOGY

## 2021-05-03 PROCEDURE — 63600175 PHARM REV CODE 636 W HCPCS: Mod: PO | Performed by: ANESTHESIOLOGY

## 2021-05-03 PROCEDURE — A4216 STERILE WATER/SALINE, 10 ML: HCPCS | Mod: PO | Performed by: ANESTHESIOLOGY

## 2021-05-03 PROCEDURE — 99152 MOD SED SAME PHYS/QHP 5/>YRS: CPT | Mod: ,,, | Performed by: ANESTHESIOLOGY

## 2021-05-03 RX ORDER — SODIUM CHLORIDE 9 MG/ML
INJECTION, SOLUTION INTRAMUSCULAR; INTRAVENOUS; SUBCUTANEOUS
Status: DISCONTINUED | OUTPATIENT
Start: 2021-05-03 | End: 2021-05-03 | Stop reason: HOSPADM

## 2021-05-03 RX ORDER — METHYLPREDNISOLONE ACETATE 80 MG/ML
INJECTION, SUSPENSION INTRA-ARTICULAR; INTRALESIONAL; INTRAMUSCULAR; SOFT TISSUE
Status: DISCONTINUED | OUTPATIENT
Start: 2021-05-03 | End: 2021-05-03 | Stop reason: HOSPADM

## 2021-05-03 RX ORDER — LIDOCAINE HYDROCHLORIDE 10 MG/ML
INJECTION, SOLUTION EPIDURAL; INFILTRATION; INTRACAUDAL; PERINEURAL
Status: DISCONTINUED | OUTPATIENT
Start: 2021-05-03 | End: 2021-05-03 | Stop reason: HOSPADM

## 2021-05-03 RX ORDER — SODIUM BICARBONATE 42 MG/ML
INJECTION, SOLUTION INTRAVENOUS
Status: DISCONTINUED | OUTPATIENT
Start: 2021-05-03 | End: 2021-05-03 | Stop reason: HOSPADM

## 2021-05-03 RX ORDER — SODIUM CHLORIDE, SODIUM LACTATE, POTASSIUM CHLORIDE, CALCIUM CHLORIDE 600; 310; 30; 20 MG/100ML; MG/100ML; MG/100ML; MG/100ML
INJECTION, SOLUTION INTRAVENOUS CONTINUOUS
Status: DISCONTINUED | OUTPATIENT
Start: 2021-05-03 | End: 2021-05-03 | Stop reason: HOSPADM

## 2021-05-03 RX ORDER — MIDAZOLAM HYDROCHLORIDE 2 MG/2ML
INJECTION, SOLUTION INTRAMUSCULAR; INTRAVENOUS
Status: DISCONTINUED | OUTPATIENT
Start: 2021-05-03 | End: 2021-05-03 | Stop reason: HOSPADM

## 2021-05-03 RX ADMIN — SODIUM CHLORIDE, SODIUM LACTATE, POTASSIUM CHLORIDE, AND CALCIUM CHLORIDE: .6; .31; .03; .02 INJECTION, SOLUTION INTRAVENOUS at 08:05

## 2021-05-04 VITALS
RESPIRATION RATE: 18 BRPM | DIASTOLIC BLOOD PRESSURE: 62 MMHG | TEMPERATURE: 97 F | HEART RATE: 66 BPM | OXYGEN SATURATION: 96 % | WEIGHT: 207 LBS | BODY MASS INDEX: 35.34 KG/M2 | HEIGHT: 64 IN | SYSTOLIC BLOOD PRESSURE: 136 MMHG

## 2021-05-18 ENCOUNTER — OFFICE VISIT (OUTPATIENT)
Dept: PAIN MEDICINE | Facility: CLINIC | Age: 71
End: 2021-05-18
Payer: MEDICARE

## 2021-05-18 VITALS
HEIGHT: 64 IN | OXYGEN SATURATION: 98 % | BODY MASS INDEX: 34.95 KG/M2 | HEART RATE: 69 BPM | DIASTOLIC BLOOD PRESSURE: 71 MMHG | WEIGHT: 204.69 LBS | SYSTOLIC BLOOD PRESSURE: 162 MMHG

## 2021-05-18 DIAGNOSIS — M48.061 SPINAL STENOSIS OF LUMBAR REGION, UNSPECIFIED WHETHER NEUROGENIC CLAUDICATION PRESENT: Primary | ICD-10-CM

## 2021-05-18 PROCEDURE — 1101F PT FALLS ASSESS-DOCD LE1/YR: CPT | Mod: CPTII,S$GLB,, | Performed by: ANESTHESIOLOGY

## 2021-05-18 PROCEDURE — 3288F PR FALLS RISK ASSESSMENT DOCUMENTED: ICD-10-PCS | Mod: CPTII,S$GLB,, | Performed by: ANESTHESIOLOGY

## 2021-05-18 PROCEDURE — 99213 PR OFFICE/OUTPT VISIT, EST, LEVL III, 20-29 MIN: ICD-10-PCS | Mod: S$GLB,,, | Performed by: ANESTHESIOLOGY

## 2021-05-18 PROCEDURE — 1125F AMNT PAIN NOTED PAIN PRSNT: CPT | Mod: S$GLB,,, | Performed by: ANESTHESIOLOGY

## 2021-05-18 PROCEDURE — 1157F ADVNC CARE PLAN IN RCRD: CPT | Mod: S$GLB,,, | Performed by: ANESTHESIOLOGY

## 2021-05-18 PROCEDURE — 99999 PR PBB SHADOW E&M-EST. PATIENT-LVL III: ICD-10-PCS | Mod: PBBFAC,,, | Performed by: ANESTHESIOLOGY

## 2021-05-18 PROCEDURE — 3008F PR BODY MASS INDEX (BMI) DOCUMENTED: ICD-10-PCS | Mod: CPTII,S$GLB,, | Performed by: ANESTHESIOLOGY

## 2021-05-18 PROCEDURE — 1157F PR ADVANCE CARE PLAN OR EQUIV PRESENT IN MEDICAL RECORD: ICD-10-PCS | Mod: S$GLB,,, | Performed by: ANESTHESIOLOGY

## 2021-05-18 PROCEDURE — 3008F BODY MASS INDEX DOCD: CPT | Mod: CPTII,S$GLB,, | Performed by: ANESTHESIOLOGY

## 2021-05-18 PROCEDURE — 1125F PR PAIN SEVERITY QUANTIFIED, PAIN PRESENT: ICD-10-PCS | Mod: S$GLB,,, | Performed by: ANESTHESIOLOGY

## 2021-05-18 PROCEDURE — 99999 PR PBB SHADOW E&M-EST. PATIENT-LVL III: CPT | Mod: PBBFAC,,, | Performed by: ANESTHESIOLOGY

## 2021-05-18 PROCEDURE — 99213 OFFICE O/P EST LOW 20 MIN: CPT | Mod: S$GLB,,, | Performed by: ANESTHESIOLOGY

## 2021-05-18 PROCEDURE — 1159F MED LIST DOCD IN RCRD: CPT | Mod: S$GLB,,, | Performed by: ANESTHESIOLOGY

## 2021-05-18 PROCEDURE — 1101F PR PT FALLS ASSESS DOC 0-1 FALLS W/OUT INJ PAST YR: ICD-10-PCS | Mod: CPTII,S$GLB,, | Performed by: ANESTHESIOLOGY

## 2021-05-18 PROCEDURE — 3288F FALL RISK ASSESSMENT DOCD: CPT | Mod: CPTII,S$GLB,, | Performed by: ANESTHESIOLOGY

## 2021-05-18 PROCEDURE — 1159F PR MEDICATION LIST DOCUMENTED IN MEDICAL RECORD: ICD-10-PCS | Mod: S$GLB,,, | Performed by: ANESTHESIOLOGY

## 2021-05-24 ENCOUNTER — OFFICE VISIT (OUTPATIENT)
Dept: ORTHOPEDICS | Facility: CLINIC | Age: 71
End: 2021-05-24
Payer: MEDICARE

## 2021-05-24 VITALS
SYSTOLIC BLOOD PRESSURE: 124 MMHG | WEIGHT: 204 LBS | HEART RATE: 69 BPM | BODY MASS INDEX: 34.83 KG/M2 | DIASTOLIC BLOOD PRESSURE: 72 MMHG | HEIGHT: 64 IN

## 2021-05-24 DIAGNOSIS — M65.341 TRIGGER FINGER, RIGHT RING FINGER: Primary | ICD-10-CM

## 2021-05-24 PROCEDURE — 1101F PT FALLS ASSESS-DOCD LE1/YR: CPT | Mod: CPTII,S$GLB,, | Performed by: ORTHOPAEDIC SURGERY

## 2021-05-24 PROCEDURE — 1125F PR PAIN SEVERITY QUANTIFIED, PAIN PRESENT: ICD-10-PCS | Mod: S$GLB,,, | Performed by: ORTHOPAEDIC SURGERY

## 2021-05-24 PROCEDURE — 1159F PR MEDICATION LIST DOCUMENTED IN MEDICAL RECORD: ICD-10-PCS | Mod: S$GLB,,, | Performed by: ORTHOPAEDIC SURGERY

## 2021-05-24 PROCEDURE — 3008F BODY MASS INDEX DOCD: CPT | Mod: CPTII,S$GLB,, | Performed by: ORTHOPAEDIC SURGERY

## 2021-05-24 PROCEDURE — 1125F AMNT PAIN NOTED PAIN PRSNT: CPT | Mod: S$GLB,,, | Performed by: ORTHOPAEDIC SURGERY

## 2021-05-24 PROCEDURE — 1157F ADVNC CARE PLAN IN RCRD: CPT | Mod: S$GLB,,, | Performed by: ORTHOPAEDIC SURGERY

## 2021-05-24 PROCEDURE — 3008F PR BODY MASS INDEX (BMI) DOCUMENTED: ICD-10-PCS | Mod: CPTII,S$GLB,, | Performed by: ORTHOPAEDIC SURGERY

## 2021-05-24 PROCEDURE — 99999 PR PBB SHADOW E&M-EST. PATIENT-LVL V: CPT | Mod: PBBFAC,,, | Performed by: ORTHOPAEDIC SURGERY

## 2021-05-24 PROCEDURE — 3288F PR FALLS RISK ASSESSMENT DOCUMENTED: ICD-10-PCS | Mod: CPTII,S$GLB,, | Performed by: ORTHOPAEDIC SURGERY

## 2021-05-24 PROCEDURE — 3288F FALL RISK ASSESSMENT DOCD: CPT | Mod: CPTII,S$GLB,, | Performed by: ORTHOPAEDIC SURGERY

## 2021-05-24 PROCEDURE — 99999 PR PBB SHADOW E&M-EST. PATIENT-LVL V: ICD-10-PCS | Mod: PBBFAC,,, | Performed by: ORTHOPAEDIC SURGERY

## 2021-05-24 PROCEDURE — 99213 OFFICE O/P EST LOW 20 MIN: CPT | Mod: 25,S$GLB,, | Performed by: ORTHOPAEDIC SURGERY

## 2021-05-24 PROCEDURE — 1157F PR ADVANCE CARE PLAN OR EQUIV PRESENT IN MEDICAL RECORD: ICD-10-PCS | Mod: S$GLB,,, | Performed by: ORTHOPAEDIC SURGERY

## 2021-05-24 PROCEDURE — 99213 PR OFFICE/OUTPT VISIT, EST, LEVL III, 20-29 MIN: ICD-10-PCS | Mod: 25,S$GLB,, | Performed by: ORTHOPAEDIC SURGERY

## 2021-05-24 PROCEDURE — 1159F MED LIST DOCD IN RCRD: CPT | Mod: S$GLB,,, | Performed by: ORTHOPAEDIC SURGERY

## 2021-05-24 PROCEDURE — 20550 TENDON SHEATH: ICD-10-PCS | Mod: RT,S$GLB,, | Performed by: ORTHOPAEDIC SURGERY

## 2021-05-24 PROCEDURE — 20550 NJX 1 TENDON SHEATH/LIGAMENT: CPT | Mod: RT,S$GLB,, | Performed by: ORTHOPAEDIC SURGERY

## 2021-05-24 PROCEDURE — 1101F PR PT FALLS ASSESS DOC 0-1 FALLS W/OUT INJ PAST YR: ICD-10-PCS | Mod: CPTII,S$GLB,, | Performed by: ORTHOPAEDIC SURGERY

## 2021-05-24 RX ADMIN — TRIAMCINOLONE ACETONIDE 40 MG: 40 INJECTION, SUSPENSION INTRA-ARTICULAR; INTRAMUSCULAR at 10:05

## 2021-05-25 RX ORDER — TRIAMCINOLONE ACETONIDE 40 MG/ML
40 INJECTION, SUSPENSION INTRA-ARTICULAR; INTRAMUSCULAR
Status: DISCONTINUED | OUTPATIENT
Start: 2021-05-24 | End: 2021-05-25 | Stop reason: HOSPADM

## 2021-05-27 ENCOUNTER — OFFICE VISIT (OUTPATIENT)
Dept: UROLOGY | Facility: CLINIC | Age: 71
End: 2021-05-27
Payer: MEDICARE

## 2021-05-27 DIAGNOSIS — N39.0 RECURRENT UTI: Primary | ICD-10-CM

## 2021-05-27 PROCEDURE — 1157F ADVNC CARE PLAN IN RCRD: CPT | Mod: 95,,, | Performed by: NURSE PRACTITIONER

## 2021-05-27 PROCEDURE — 99213 PR OFFICE/OUTPT VISIT, EST, LEVL III, 20-29 MIN: ICD-10-PCS | Mod: 95,,, | Performed by: NURSE PRACTITIONER

## 2021-05-27 PROCEDURE — 1157F PR ADVANCE CARE PLAN OR EQUIV PRESENT IN MEDICAL RECORD: ICD-10-PCS | Mod: 95,,, | Performed by: NURSE PRACTITIONER

## 2021-05-27 PROCEDURE — 87077 CULTURE AEROBIC IDENTIFY: CPT | Performed by: NURSE PRACTITIONER

## 2021-05-27 PROCEDURE — 99213 OFFICE O/P EST LOW 20 MIN: CPT | Mod: 95,,, | Performed by: NURSE PRACTITIONER

## 2021-05-27 PROCEDURE — 87086 URINE CULTURE/COLONY COUNT: CPT | Performed by: NURSE PRACTITIONER

## 2021-05-27 PROCEDURE — 87088 URINE BACTERIA CULTURE: CPT | Performed by: NURSE PRACTITIONER

## 2021-05-27 PROCEDURE — 87186 SC STD MICRODIL/AGAR DIL: CPT | Performed by: NURSE PRACTITIONER

## 2021-05-27 PROCEDURE — 1159F MED LIST DOCD IN RCRD: CPT | Mod: 95,,, | Performed by: NURSE PRACTITIONER

## 2021-05-27 PROCEDURE — 1159F PR MEDICATION LIST DOCUMENTED IN MEDICAL RECORD: ICD-10-PCS | Mod: 95,,, | Performed by: NURSE PRACTITIONER

## 2021-05-30 LAB — BACTERIA UR CULT: ABNORMAL

## 2021-05-31 ENCOUNTER — TELEPHONE (OUTPATIENT)
Dept: UROLOGY | Facility: CLINIC | Age: 71
End: 2021-05-31

## 2021-05-31 DIAGNOSIS — N39.0 BACTERIAL UTI: ICD-10-CM

## 2021-05-31 DIAGNOSIS — A49.9 BACTERIAL UTI: Primary | ICD-10-CM

## 2021-05-31 DIAGNOSIS — A49.9 BACTERIAL UTI: ICD-10-CM

## 2021-05-31 DIAGNOSIS — N39.0 BACTERIAL UTI: Primary | ICD-10-CM

## 2021-05-31 RX ORDER — CEFDINIR 300 MG/1
300 CAPSULE ORAL 2 TIMES DAILY
Qty: 14 CAPSULE | Refills: 0 | Status: SHIPPED | OUTPATIENT
Start: 2021-05-31 | End: 2021-05-31

## 2021-05-31 RX ORDER — CEFDINIR 300 MG/1
300 CAPSULE ORAL 2 TIMES DAILY
Qty: 14 CAPSULE | Refills: 0 | Status: SHIPPED | OUTPATIENT
Start: 2021-05-31 | End: 2021-06-07

## 2021-06-11 PROBLEM — E87.1 HYPONATREMIA: Status: ACTIVE | Noted: 2021-06-11

## 2021-06-11 PROBLEM — R10.9 ABDOMINAL PAIN, VOMITING, AND DIARRHEA: Status: ACTIVE | Noted: 2021-06-11

## 2021-06-11 PROBLEM — E83.42 HYPOMAGNESEMIA: Status: ACTIVE | Noted: 2021-06-11

## 2021-06-11 PROBLEM — Z71.89 ADVANCE CARE PLANNING: Status: ACTIVE | Noted: 2021-06-11

## 2021-06-11 PROBLEM — R11.10 ABDOMINAL PAIN, VOMITING, AND DIARRHEA: Status: ACTIVE | Noted: 2021-06-11

## 2021-06-11 PROBLEM — R19.7 ABDOMINAL PAIN, VOMITING, AND DIARRHEA: Status: ACTIVE | Noted: 2021-06-11

## 2021-06-11 PROBLEM — R74.8 ELEVATED CK: Status: ACTIVE | Noted: 2021-06-11

## 2021-06-22 ENCOUNTER — TELEPHONE (OUTPATIENT)
Dept: DERMATOLOGY | Facility: CLINIC | Age: 71
End: 2021-06-22

## 2021-06-22 ENCOUNTER — OFFICE VISIT (OUTPATIENT)
Dept: DERMATOLOGY | Facility: CLINIC | Age: 71
End: 2021-06-22
Payer: MEDICARE

## 2021-06-22 ENCOUNTER — TELEPHONE (OUTPATIENT)
Dept: UROLOGY | Facility: CLINIC | Age: 71
End: 2021-06-22

## 2021-06-22 VITALS — BODY MASS INDEX: 34.55 KG/M2 | RESPIRATION RATE: 18 BRPM | HEIGHT: 64 IN | WEIGHT: 202.38 LBS

## 2021-06-22 DIAGNOSIS — L90.5 SCAR: Primary | ICD-10-CM

## 2021-06-22 DIAGNOSIS — L57.0 ACTINIC KERATOSES: ICD-10-CM

## 2021-06-22 DIAGNOSIS — L72.0 MILIA: ICD-10-CM

## 2021-06-22 DIAGNOSIS — L70.0 COMEDONE: ICD-10-CM

## 2021-06-22 DIAGNOSIS — Z85.828 PERSONAL HISTORY OF OTHER MALIGNANT NEOPLASM OF SKIN: ICD-10-CM

## 2021-06-22 PROCEDURE — 1157F PR ADVANCE CARE PLAN OR EQUIV PRESENT IN MEDICAL RECORD: ICD-10-PCS | Mod: S$GLB,,, | Performed by: DERMATOLOGY

## 2021-06-22 PROCEDURE — 99214 PR OFFICE/OUTPT VISIT, EST, LEVL IV, 30-39 MIN: ICD-10-PCS | Mod: S$GLB,,, | Performed by: DERMATOLOGY

## 2021-06-22 PROCEDURE — 99214 OFFICE O/P EST MOD 30 MIN: CPT | Mod: S$GLB,,, | Performed by: DERMATOLOGY

## 2021-06-22 PROCEDURE — 1101F PT FALLS ASSESS-DOCD LE1/YR: CPT | Mod: CPTII,S$GLB,, | Performed by: DERMATOLOGY

## 2021-06-22 PROCEDURE — 99999 PR PBB SHADOW E&M-EST. PATIENT-LVL V: CPT | Mod: PBBFAC,,, | Performed by: DERMATOLOGY

## 2021-06-22 PROCEDURE — 1101F PR PT FALLS ASSESS DOC 0-1 FALLS W/OUT INJ PAST YR: ICD-10-PCS | Mod: CPTII,S$GLB,, | Performed by: DERMATOLOGY

## 2021-06-22 PROCEDURE — 3008F PR BODY MASS INDEX (BMI) DOCUMENTED: ICD-10-PCS | Mod: CPTII,S$GLB,, | Performed by: DERMATOLOGY

## 2021-06-22 PROCEDURE — 3288F PR FALLS RISK ASSESSMENT DOCUMENTED: ICD-10-PCS | Mod: CPTII,S$GLB,, | Performed by: DERMATOLOGY

## 2021-06-22 PROCEDURE — 1159F MED LIST DOCD IN RCRD: CPT | Mod: S$GLB,,, | Performed by: DERMATOLOGY

## 2021-06-22 PROCEDURE — 1126F PR PAIN SEVERITY QUANTIFIED, NO PAIN PRESENT: ICD-10-PCS | Mod: S$GLB,,, | Performed by: DERMATOLOGY

## 2021-06-22 PROCEDURE — 1126F AMNT PAIN NOTED NONE PRSNT: CPT | Mod: S$GLB,,, | Performed by: DERMATOLOGY

## 2021-06-22 PROCEDURE — 1159F PR MEDICATION LIST DOCUMENTED IN MEDICAL RECORD: ICD-10-PCS | Mod: S$GLB,,, | Performed by: DERMATOLOGY

## 2021-06-22 PROCEDURE — 3288F FALL RISK ASSESSMENT DOCD: CPT | Mod: CPTII,S$GLB,, | Performed by: DERMATOLOGY

## 2021-06-22 PROCEDURE — 3008F BODY MASS INDEX DOCD: CPT | Mod: CPTII,S$GLB,, | Performed by: DERMATOLOGY

## 2021-06-22 PROCEDURE — 1157F ADVNC CARE PLAN IN RCRD: CPT | Mod: S$GLB,,, | Performed by: DERMATOLOGY

## 2021-06-22 PROCEDURE — 99999 PR PBB SHADOW E&M-EST. PATIENT-LVL V: ICD-10-PCS | Mod: PBBFAC,,, | Performed by: DERMATOLOGY

## 2021-06-30 ENCOUNTER — OFFICE VISIT (OUTPATIENT)
Dept: UROLOGY | Facility: CLINIC | Age: 71
End: 2021-06-30
Payer: MEDICARE

## 2021-06-30 VITALS
SYSTOLIC BLOOD PRESSURE: 119 MMHG | WEIGHT: 196.19 LBS | DIASTOLIC BLOOD PRESSURE: 65 MMHG | BODY MASS INDEX: 33.49 KG/M2 | HEIGHT: 64 IN | HEART RATE: 78 BPM

## 2021-06-30 DIAGNOSIS — R39.15 URINARY URGENCY: ICD-10-CM

## 2021-06-30 DIAGNOSIS — R35.0 INCREASED FREQUENCY OF URINATION: ICD-10-CM

## 2021-06-30 DIAGNOSIS — N39.0 RECURRENT UTI: Primary | ICD-10-CM

## 2021-06-30 PROCEDURE — 99215 OFFICE O/P EST HI 40 MIN: CPT | Mod: 25,S$GLB,, | Performed by: UROLOGY

## 2021-06-30 PROCEDURE — 99999 PR PBB SHADOW E&M-EST. PATIENT-LVL IV: CPT | Mod: PBBFAC,,, | Performed by: UROLOGY

## 2021-06-30 PROCEDURE — 51701 INSERT BLADDER CATHETER: CPT | Mod: S$GLB,,, | Performed by: UROLOGY

## 2021-06-30 PROCEDURE — 1157F PR ADVANCE CARE PLAN OR EQUIV PRESENT IN MEDICAL RECORD: ICD-10-PCS | Mod: S$GLB,,, | Performed by: UROLOGY

## 2021-06-30 PROCEDURE — 87088 URINE BACTERIA CULTURE: CPT | Performed by: UROLOGY

## 2021-06-30 PROCEDURE — 3008F BODY MASS INDEX DOCD: CPT | Mod: CPTII,S$GLB,, | Performed by: UROLOGY

## 2021-06-30 PROCEDURE — 1101F PT FALLS ASSESS-DOCD LE1/YR: CPT | Mod: CPTII,S$GLB,, | Performed by: UROLOGY

## 2021-06-30 PROCEDURE — 51701 PR INSERTION OF NON-INDWELLING BLADDER CATHETERIZATION FOR RESIDUAL UR: ICD-10-PCS | Mod: S$GLB,,, | Performed by: UROLOGY

## 2021-06-30 PROCEDURE — 3288F FALL RISK ASSESSMENT DOCD: CPT | Mod: CPTII,S$GLB,, | Performed by: UROLOGY

## 2021-06-30 PROCEDURE — 1159F PR MEDICATION LIST DOCUMENTED IN MEDICAL RECORD: ICD-10-PCS | Mod: S$GLB,,, | Performed by: UROLOGY

## 2021-06-30 PROCEDURE — 99215 PR OFFICE/OUTPT VISIT, EST, LEVL V, 40-54 MIN: ICD-10-PCS | Mod: 25,S$GLB,, | Performed by: UROLOGY

## 2021-06-30 PROCEDURE — 87186 SC STD MICRODIL/AGAR DIL: CPT | Performed by: UROLOGY

## 2021-06-30 PROCEDURE — 3288F PR FALLS RISK ASSESSMENT DOCUMENTED: ICD-10-PCS | Mod: CPTII,S$GLB,, | Performed by: UROLOGY

## 2021-06-30 PROCEDURE — 87077 CULTURE AEROBIC IDENTIFY: CPT | Performed by: UROLOGY

## 2021-06-30 PROCEDURE — 1101F PR PT FALLS ASSESS DOC 0-1 FALLS W/OUT INJ PAST YR: ICD-10-PCS | Mod: CPTII,S$GLB,, | Performed by: UROLOGY

## 2021-06-30 PROCEDURE — 1159F MED LIST DOCD IN RCRD: CPT | Mod: S$GLB,,, | Performed by: UROLOGY

## 2021-06-30 PROCEDURE — 1157F ADVNC CARE PLAN IN RCRD: CPT | Mod: S$GLB,,, | Performed by: UROLOGY

## 2021-06-30 PROCEDURE — 99999 PR PBB SHADOW E&M-EST. PATIENT-LVL IV: ICD-10-PCS | Mod: PBBFAC,,, | Performed by: UROLOGY

## 2021-06-30 PROCEDURE — 3008F PR BODY MASS INDEX (BMI) DOCUMENTED: ICD-10-PCS | Mod: CPTII,S$GLB,, | Performed by: UROLOGY

## 2021-06-30 PROCEDURE — 1126F AMNT PAIN NOTED NONE PRSNT: CPT | Mod: S$GLB,,, | Performed by: UROLOGY

## 2021-06-30 PROCEDURE — 87086 URINE CULTURE/COLONY COUNT: CPT | Performed by: UROLOGY

## 2021-06-30 PROCEDURE — 1126F PR PAIN SEVERITY QUANTIFIED, NO PAIN PRESENT: ICD-10-PCS | Mod: S$GLB,,, | Performed by: UROLOGY

## 2021-06-30 RX ORDER — DOXYCYCLINE 100 MG/1
100 CAPSULE ORAL 2 TIMES DAILY
Qty: 14 CAPSULE | Refills: 0 | Status: SHIPPED | OUTPATIENT
Start: 2021-06-30 | End: 2021-07-07

## 2021-06-30 RX ORDER — DOXYCYCLINE HYCLATE 100 MG
100 TABLET ORAL ONCE
Status: CANCELLED | OUTPATIENT
Start: 2021-06-30 | End: 2021-06-30

## 2021-06-30 RX ORDER — LIDOCAINE HYDROCHLORIDE 20 MG/ML
JELLY TOPICAL ONCE
Status: CANCELLED | OUTPATIENT
Start: 2021-06-30 | End: 2021-06-30

## 2021-07-01 ENCOUNTER — PATIENT MESSAGE (OUTPATIENT)
Dept: UROLOGY | Facility: CLINIC | Age: 71
End: 2021-07-01

## 2021-07-03 LAB — BACTERIA UR CULT: ABNORMAL

## 2021-07-06 ENCOUNTER — PATIENT MESSAGE (OUTPATIENT)
Dept: UROLOGY | Facility: CLINIC | Age: 71
End: 2021-07-06

## 2021-07-12 ENCOUNTER — OFFICE VISIT (OUTPATIENT)
Dept: PULMONOLOGY | Facility: CLINIC | Age: 71
End: 2021-07-12
Payer: MEDICARE

## 2021-07-12 VITALS
OXYGEN SATURATION: 98 % | HEART RATE: 72 BPM | DIASTOLIC BLOOD PRESSURE: 67 MMHG | WEIGHT: 201.19 LBS | HEIGHT: 64 IN | SYSTOLIC BLOOD PRESSURE: 110 MMHG | BODY MASS INDEX: 34.35 KG/M2

## 2021-07-12 DIAGNOSIS — J47.9 BRONCHIECTASIS WITHOUT COMPLICATION: ICD-10-CM

## 2021-07-12 DIAGNOSIS — J45.20 MILD INTERMITTENT ASTHMA WITHOUT COMPLICATION: Primary | ICD-10-CM

## 2021-07-12 DIAGNOSIS — G47.33 OSA TREATED WITH BIPAP: ICD-10-CM

## 2021-07-12 DIAGNOSIS — R09.89 CHRONIC SINUS COMPLAINTS: ICD-10-CM

## 2021-07-12 PROCEDURE — 3008F BODY MASS INDEX DOCD: CPT | Mod: CPTII,S$GLB,, | Performed by: NURSE PRACTITIONER

## 2021-07-12 PROCEDURE — 1157F ADVNC CARE PLAN IN RCRD: CPT | Mod: S$GLB,,, | Performed by: NURSE PRACTITIONER

## 2021-07-12 PROCEDURE — 99499 UNLISTED E&M SERVICE: CPT | Mod: HCNC,S$GLB,, | Performed by: NURSE PRACTITIONER

## 2021-07-12 PROCEDURE — 1157F PR ADVANCE CARE PLAN OR EQUIV PRESENT IN MEDICAL RECORD: ICD-10-PCS | Mod: S$GLB,,, | Performed by: NURSE PRACTITIONER

## 2021-07-12 PROCEDURE — 99214 PR OFFICE/OUTPT VISIT, EST, LEVL IV, 30-39 MIN: ICD-10-PCS | Mod: S$GLB,,, | Performed by: NURSE PRACTITIONER

## 2021-07-12 PROCEDURE — 1126F PR PAIN SEVERITY QUANTIFIED, NO PAIN PRESENT: ICD-10-PCS | Mod: S$GLB,,, | Performed by: NURSE PRACTITIONER

## 2021-07-12 PROCEDURE — 3008F PR BODY MASS INDEX (BMI) DOCUMENTED: ICD-10-PCS | Mod: CPTII,S$GLB,, | Performed by: NURSE PRACTITIONER

## 2021-07-12 PROCEDURE — 1126F AMNT PAIN NOTED NONE PRSNT: CPT | Mod: S$GLB,,, | Performed by: NURSE PRACTITIONER

## 2021-07-12 PROCEDURE — 99499 RISK ADDL DX/OHS AUDIT: ICD-10-PCS | Mod: HCNC,S$GLB,, | Performed by: NURSE PRACTITIONER

## 2021-07-12 PROCEDURE — 1159F MED LIST DOCD IN RCRD: CPT | Mod: S$GLB,,, | Performed by: NURSE PRACTITIONER

## 2021-07-12 PROCEDURE — 99999 PR PBB SHADOW E&M-EST. PATIENT-LVL V: ICD-10-PCS | Mod: PBBFAC,,, | Performed by: NURSE PRACTITIONER

## 2021-07-12 PROCEDURE — 99214 OFFICE O/P EST MOD 30 MIN: CPT | Mod: S$GLB,,, | Performed by: NURSE PRACTITIONER

## 2021-07-12 PROCEDURE — 3288F FALL RISK ASSESSMENT DOCD: CPT | Mod: CPTII,S$GLB,, | Performed by: NURSE PRACTITIONER

## 2021-07-12 PROCEDURE — 1101F PR PT FALLS ASSESS DOC 0-1 FALLS W/OUT INJ PAST YR: ICD-10-PCS | Mod: CPTII,S$GLB,, | Performed by: NURSE PRACTITIONER

## 2021-07-12 PROCEDURE — 1159F PR MEDICATION LIST DOCUMENTED IN MEDICAL RECORD: ICD-10-PCS | Mod: S$GLB,,, | Performed by: NURSE PRACTITIONER

## 2021-07-12 PROCEDURE — 1101F PT FALLS ASSESS-DOCD LE1/YR: CPT | Mod: CPTII,S$GLB,, | Performed by: NURSE PRACTITIONER

## 2021-07-12 PROCEDURE — 99999 PR PBB SHADOW E&M-EST. PATIENT-LVL V: CPT | Mod: PBBFAC,,, | Performed by: NURSE PRACTITIONER

## 2021-07-12 PROCEDURE — 3288F PR FALLS RISK ASSESSMENT DOCUMENTED: ICD-10-PCS | Mod: CPTII,S$GLB,, | Performed by: NURSE PRACTITIONER

## 2021-07-12 RX ORDER — PREDNISONE 10 MG/1
TABLET ORAL
Qty: 18 TABLET | Refills: 0 | Status: SHIPPED | OUTPATIENT
Start: 2021-07-12 | End: 2021-08-11

## 2021-07-19 ENCOUNTER — TELEPHONE (OUTPATIENT)
Dept: OTOLARYNGOLOGY | Facility: CLINIC | Age: 71
End: 2021-07-19

## 2021-07-19 ENCOUNTER — PROCEDURE VISIT (OUTPATIENT)
Dept: UROLOGY | Facility: CLINIC | Age: 71
End: 2021-07-19
Payer: MEDICARE

## 2021-07-19 VITALS
BODY MASS INDEX: 34.76 KG/M2 | DIASTOLIC BLOOD PRESSURE: 65 MMHG | RESPIRATION RATE: 16 BRPM | SYSTOLIC BLOOD PRESSURE: 153 MMHG | HEIGHT: 64 IN | HEART RATE: 77 BPM | WEIGHT: 203.63 LBS | TEMPERATURE: 98 F

## 2021-07-19 DIAGNOSIS — N39.0 RECURRENT UTI: ICD-10-CM

## 2021-07-19 DIAGNOSIS — N95.2 VAGINAL ATROPHY: Primary | ICD-10-CM

## 2021-07-19 PROCEDURE — 52000 CYSTOURETHROSCOPY: CPT | Mod: S$GLB,,, | Performed by: UROLOGY

## 2021-07-19 PROCEDURE — 52000 PR CYSTOURETHROSCOPY: ICD-10-PCS | Mod: S$GLB,,, | Performed by: UROLOGY

## 2021-07-19 RX ORDER — LIDOCAINE HYDROCHLORIDE 20 MG/ML
JELLY TOPICAL ONCE
Status: COMPLETED | OUTPATIENT
Start: 2021-07-19 | End: 2021-07-19

## 2021-07-19 RX ORDER — DOXYCYCLINE HYCLATE 100 MG
100 TABLET ORAL ONCE
Status: COMPLETED | OUTPATIENT
Start: 2021-07-19 | End: 2021-07-19

## 2021-07-19 RX ORDER — ESTRADIOL 0.1 MG/G
1 CREAM VAGINAL
Qty: 45 G | Refills: 3 | Status: SHIPPED | OUTPATIENT
Start: 2021-07-19 | End: 2023-05-01

## 2021-07-19 RX ADMIN — Medication 100 MG: at 10:07

## 2021-07-19 RX ADMIN — LIDOCAINE HYDROCHLORIDE: 20 JELLY TOPICAL at 10:07

## 2021-07-20 ENCOUNTER — OFFICE VISIT (OUTPATIENT)
Dept: OTOLARYNGOLOGY | Facility: CLINIC | Age: 71
End: 2021-07-20
Payer: MEDICARE

## 2021-07-20 VITALS — WEIGHT: 203.69 LBS | BODY MASS INDEX: 34.97 KG/M2

## 2021-07-20 DIAGNOSIS — J37.0 CHRONIC LARYNGITIS: Primary | ICD-10-CM

## 2021-07-20 DIAGNOSIS — J04.0 ACUTE LARYNGITIS: ICD-10-CM

## 2021-07-20 PROCEDURE — 1126F PR PAIN SEVERITY QUANTIFIED, NO PAIN PRESENT: ICD-10-PCS | Mod: CPTII,S$GLB,, | Performed by: OTOLARYNGOLOGY

## 2021-07-20 PROCEDURE — 1159F MED LIST DOCD IN RCRD: CPT | Mod: CPTII,S$GLB,, | Performed by: OTOLARYNGOLOGY

## 2021-07-20 PROCEDURE — 3288F FALL RISK ASSESSMENT DOCD: CPT | Mod: CPTII,S$GLB,, | Performed by: OTOLARYNGOLOGY

## 2021-07-20 PROCEDURE — 99999 PR PBB SHADOW E&M-EST. PATIENT-LVL IV: ICD-10-PCS | Mod: PBBFAC,,, | Performed by: OTOLARYNGOLOGY

## 2021-07-20 PROCEDURE — 3008F PR BODY MASS INDEX (BMI) DOCUMENTED: ICD-10-PCS | Mod: CPTII,S$GLB,, | Performed by: OTOLARYNGOLOGY

## 2021-07-20 PROCEDURE — 3288F PR FALLS RISK ASSESSMENT DOCUMENTED: ICD-10-PCS | Mod: CPTII,S$GLB,, | Performed by: OTOLARYNGOLOGY

## 2021-07-20 PROCEDURE — 3044F PR MOST RECENT HEMOGLOBIN A1C LEVEL <7.0%: ICD-10-PCS | Mod: CPTII,S$GLB,, | Performed by: OTOLARYNGOLOGY

## 2021-07-20 PROCEDURE — 99214 PR OFFICE/OUTPT VISIT, EST, LEVL IV, 30-39 MIN: ICD-10-PCS | Mod: S$GLB,,, | Performed by: OTOLARYNGOLOGY

## 2021-07-20 PROCEDURE — 3044F HG A1C LEVEL LT 7.0%: CPT | Mod: CPTII,S$GLB,, | Performed by: OTOLARYNGOLOGY

## 2021-07-20 PROCEDURE — 1126F AMNT PAIN NOTED NONE PRSNT: CPT | Mod: CPTII,S$GLB,, | Performed by: OTOLARYNGOLOGY

## 2021-07-20 PROCEDURE — 1101F PT FALLS ASSESS-DOCD LE1/YR: CPT | Mod: CPTII,S$GLB,, | Performed by: OTOLARYNGOLOGY

## 2021-07-20 PROCEDURE — 1101F PR PT FALLS ASSESS DOC 0-1 FALLS W/OUT INJ PAST YR: ICD-10-PCS | Mod: CPTII,S$GLB,, | Performed by: OTOLARYNGOLOGY

## 2021-07-20 PROCEDURE — 1159F PR MEDICATION LIST DOCUMENTED IN MEDICAL RECORD: ICD-10-PCS | Mod: CPTII,S$GLB,, | Performed by: OTOLARYNGOLOGY

## 2021-07-20 PROCEDURE — 3008F BODY MASS INDEX DOCD: CPT | Mod: CPTII,S$GLB,, | Performed by: OTOLARYNGOLOGY

## 2021-07-20 PROCEDURE — 1157F ADVNC CARE PLAN IN RCRD: CPT | Mod: CPTII,S$GLB,, | Performed by: OTOLARYNGOLOGY

## 2021-07-20 PROCEDURE — 99214 OFFICE O/P EST MOD 30 MIN: CPT | Mod: S$GLB,,, | Performed by: OTOLARYNGOLOGY

## 2021-07-20 PROCEDURE — 1157F PR ADVANCE CARE PLAN OR EQUIV PRESENT IN MEDICAL RECORD: ICD-10-PCS | Mod: CPTII,S$GLB,, | Performed by: OTOLARYNGOLOGY

## 2021-07-20 PROCEDURE — 99999 PR PBB SHADOW E&M-EST. PATIENT-LVL IV: CPT | Mod: PBBFAC,,, | Performed by: OTOLARYNGOLOGY

## 2021-07-20 RX ORDER — FLUCONAZOLE 100 MG/1
100 TABLET ORAL DAILY
Qty: 14 TABLET | Refills: 0 | Status: SHIPPED | OUTPATIENT
Start: 2021-07-20 | End: 2021-08-03

## 2021-07-31 PROCEDURE — 99457 RPM TX MGMT 1ST 20 MIN: CPT | Mod: S$GLB,,, | Performed by: INTERNAL MEDICINE

## 2021-07-31 PROCEDURE — 99457 PR MONITORING, PHYSIOL PARAM, REMOTE, 1ST 20 MINS, PER MONTH: ICD-10-PCS | Mod: S$GLB,,, | Performed by: INTERNAL MEDICINE

## 2021-08-03 ENCOUNTER — PATIENT MESSAGE (OUTPATIENT)
Dept: OTOLARYNGOLOGY | Facility: CLINIC | Age: 71
End: 2021-08-03

## 2021-08-10 ENCOUNTER — PATIENT MESSAGE (OUTPATIENT)
Dept: OTOLARYNGOLOGY | Facility: CLINIC | Age: 71
End: 2021-08-10

## 2021-08-11 RX ORDER — PREDNISONE 20 MG/1
20 TABLET ORAL DAILY
Qty: 5 TABLET | Refills: 0 | Status: SHIPPED | OUTPATIENT
Start: 2021-08-11 | End: 2021-08-16

## 2021-08-11 RX ORDER — DOXYCYCLINE HYCLATE 100 MG
100 TABLET ORAL 2 TIMES DAILY
Qty: 20 TABLET | Refills: 0 | Status: SHIPPED | OUTPATIENT
Start: 2021-08-11 | End: 2021-08-21

## 2021-08-13 ENCOUNTER — PATIENT MESSAGE (OUTPATIENT)
Dept: ALLERGY | Facility: CLINIC | Age: 71
End: 2021-08-13

## 2021-08-23 ENCOUNTER — IMMUNIZATION (OUTPATIENT)
Dept: PHARMACY | Facility: CLINIC | Age: 71
End: 2021-08-23
Payer: MEDICARE

## 2021-08-23 DIAGNOSIS — Z23 NEED FOR VACCINATION: Primary | ICD-10-CM

## 2021-09-08 ENCOUNTER — LAB VISIT (OUTPATIENT)
Dept: LAB | Facility: HOSPITAL | Age: 71
End: 2021-09-08
Attending: INTERNAL MEDICINE
Payer: MEDICARE

## 2021-09-08 DIAGNOSIS — E11.9 CONTROLLED TYPE 2 DIABETES MELLITUS WITHOUT COMPLICATION, WITHOUT LONG-TERM CURRENT USE OF INSULIN: ICD-10-CM

## 2021-09-08 PROCEDURE — 82570 ASSAY OF URINE CREATININE: CPT | Performed by: INTERNAL MEDICINE

## 2021-09-09 LAB
ALBUMIN/CREAT UR: NORMAL UG/MG (ref 0–30)
CREAT UR-MCNC: 60 MG/DL (ref 15–325)
MICROALBUMIN UR DL<=1MG/L-MCNC: <5 UG/ML

## 2021-09-10 ENCOUNTER — OFFICE VISIT (OUTPATIENT)
Dept: PAIN MEDICINE | Facility: CLINIC | Age: 71
End: 2021-09-10
Payer: MEDICARE

## 2021-09-10 ENCOUNTER — HOSPITAL ENCOUNTER (OUTPATIENT)
Dept: RADIOLOGY | Facility: HOSPITAL | Age: 71
Discharge: HOME | End: 2021-09-10
Attending: ANESTHESIOLOGY
Payer: MEDICARE

## 2021-09-10 VITALS
HEIGHT: 64 IN | BODY MASS INDEX: 34.55 KG/M2 | SYSTOLIC BLOOD PRESSURE: 150 MMHG | OXYGEN SATURATION: 96 % | HEART RATE: 67 BPM | DIASTOLIC BLOOD PRESSURE: 72 MMHG | WEIGHT: 202.38 LBS

## 2021-09-10 DIAGNOSIS — M54.9 DORSALGIA, UNSPECIFIED: ICD-10-CM

## 2021-09-10 DIAGNOSIS — M48.061 SPINAL STENOSIS OF LUMBAR REGION, UNSPECIFIED WHETHER NEUROGENIC CLAUDICATION PRESENT: ICD-10-CM

## 2021-09-10 DIAGNOSIS — M54.16 LUMBAR RADICULOPATHY: Primary | ICD-10-CM

## 2021-09-10 DIAGNOSIS — Z01.818 PRE-OP TESTING: ICD-10-CM

## 2021-09-10 PROCEDURE — 3077F PR MOST RECENT SYSTOLIC BLOOD PRESSURE >= 140 MM HG: ICD-10-PCS | Mod: HCNC,CPTII,S$GLB, | Performed by: ANESTHESIOLOGY

## 2021-09-10 PROCEDURE — 1159F MED LIST DOCD IN RCRD: CPT | Mod: HCNC,CPTII,S$GLB, | Performed by: ANESTHESIOLOGY

## 2021-09-10 PROCEDURE — 3061F PR NEG MICROALBUMINURIA RESULT DOCUMENTED/REVIEW: ICD-10-PCS | Mod: HCNC,CPTII,S$GLB, | Performed by: ANESTHESIOLOGY

## 2021-09-10 PROCEDURE — 3066F PR DOCUMENTATION OF TREATMENT FOR NEPHROPATHY: ICD-10-PCS | Mod: HCNC,CPTII,S$GLB, | Performed by: ANESTHESIOLOGY

## 2021-09-10 PROCEDURE — 1157F PR ADVANCE CARE PLAN OR EQUIV PRESENT IN MEDICAL RECORD: ICD-10-PCS | Mod: HCNC,CPTII,S$GLB, | Performed by: ANESTHESIOLOGY

## 2021-09-10 PROCEDURE — 1159F PR MEDICATION LIST DOCUMENTED IN MEDICAL RECORD: ICD-10-PCS | Mod: HCNC,CPTII,S$GLB, | Performed by: ANESTHESIOLOGY

## 2021-09-10 PROCEDURE — 99214 PR OFFICE/OUTPT VISIT, EST, LEVL IV, 30-39 MIN: ICD-10-PCS | Mod: HCNC,S$GLB,, | Performed by: ANESTHESIOLOGY

## 2021-09-10 PROCEDURE — 3078F PR MOST RECENT DIASTOLIC BLOOD PRESSURE < 80 MM HG: ICD-10-PCS | Mod: HCNC,CPTII,S$GLB, | Performed by: ANESTHESIOLOGY

## 2021-09-10 PROCEDURE — 3008F PR BODY MASS INDEX (BMI) DOCUMENTED: ICD-10-PCS | Mod: HCNC,CPTII,S$GLB, | Performed by: ANESTHESIOLOGY

## 2021-09-10 PROCEDURE — 3066F NEPHROPATHY DOC TX: CPT | Mod: HCNC,CPTII,S$GLB, | Performed by: ANESTHESIOLOGY

## 2021-09-10 PROCEDURE — 3044F PR MOST RECENT HEMOGLOBIN A1C LEVEL <7.0%: ICD-10-PCS | Mod: HCNC,CPTII,S$GLB, | Performed by: ANESTHESIOLOGY

## 2021-09-10 PROCEDURE — 1125F AMNT PAIN NOTED PAIN PRSNT: CPT | Mod: HCNC,CPTII,S$GLB, | Performed by: ANESTHESIOLOGY

## 2021-09-10 PROCEDURE — 3008F BODY MASS INDEX DOCD: CPT | Mod: HCNC,CPTII,S$GLB, | Performed by: ANESTHESIOLOGY

## 2021-09-10 PROCEDURE — 99999 PR PBB SHADOW E&M-EST. PATIENT-LVL V: CPT | Mod: PBBFAC,,, | Performed by: ANESTHESIOLOGY

## 2021-09-10 PROCEDURE — 1125F PR PAIN SEVERITY QUANTIFIED, PAIN PRESENT: ICD-10-PCS | Mod: HCNC,CPTII,S$GLB, | Performed by: ANESTHESIOLOGY

## 2021-09-10 PROCEDURE — 1157F ADVNC CARE PLAN IN RCRD: CPT | Mod: HCNC,CPTII,S$GLB, | Performed by: ANESTHESIOLOGY

## 2021-09-10 PROCEDURE — 72110 X-RAY EXAM L-2 SPINE 4/>VWS: CPT | Mod: TC,HCNC,PO

## 2021-09-10 PROCEDURE — 72110 X-RAY EXAM L-2 SPINE 4/>VWS: CPT | Mod: 26,HCNC,, | Performed by: RADIOLOGY

## 2021-09-10 PROCEDURE — 3288F PR FALLS RISK ASSESSMENT DOCUMENTED: ICD-10-PCS | Mod: HCNC,CPTII,S$GLB, | Performed by: ANESTHESIOLOGY

## 2021-09-10 PROCEDURE — 3044F HG A1C LEVEL LT 7.0%: CPT | Mod: HCNC,CPTII,S$GLB, | Performed by: ANESTHESIOLOGY

## 2021-09-10 PROCEDURE — 1160F PR REVIEW ALL MEDS BY PRESCRIBER/CLIN PHARMACIST DOCUMENTED: ICD-10-PCS | Mod: HCNC,CPTII,S$GLB, | Performed by: ANESTHESIOLOGY

## 2021-09-10 PROCEDURE — 1160F RVW MEDS BY RX/DR IN RCRD: CPT | Mod: HCNC,CPTII,S$GLB, | Performed by: ANESTHESIOLOGY

## 2021-09-10 PROCEDURE — 1101F PT FALLS ASSESS-DOCD LE1/YR: CPT | Mod: HCNC,CPTII,S$GLB, | Performed by: ANESTHESIOLOGY

## 2021-09-10 PROCEDURE — 99214 OFFICE O/P EST MOD 30 MIN: CPT | Mod: HCNC,S$GLB,, | Performed by: ANESTHESIOLOGY

## 2021-09-10 PROCEDURE — 3077F SYST BP >= 140 MM HG: CPT | Mod: HCNC,CPTII,S$GLB, | Performed by: ANESTHESIOLOGY

## 2021-09-10 PROCEDURE — 72110 XR LUMBAR SPINE 5 VIEW WITH FLEX AND EXT: ICD-10-PCS | Mod: 26,HCNC,, | Performed by: RADIOLOGY

## 2021-09-10 PROCEDURE — 3288F FALL RISK ASSESSMENT DOCD: CPT | Mod: HCNC,CPTII,S$GLB, | Performed by: ANESTHESIOLOGY

## 2021-09-10 PROCEDURE — 1101F PR PT FALLS ASSESS DOC 0-1 FALLS W/OUT INJ PAST YR: ICD-10-PCS | Mod: HCNC,CPTII,S$GLB, | Performed by: ANESTHESIOLOGY

## 2021-09-10 PROCEDURE — 3078F DIAST BP <80 MM HG: CPT | Mod: HCNC,CPTII,S$GLB, | Performed by: ANESTHESIOLOGY

## 2021-09-10 PROCEDURE — 99999 PR PBB SHADOW E&M-EST. PATIENT-LVL V: ICD-10-PCS | Mod: PBBFAC,,, | Performed by: ANESTHESIOLOGY

## 2021-09-10 PROCEDURE — 3061F NEG MICROALBUMINURIA REV: CPT | Mod: HCNC,CPTII,S$GLB, | Performed by: ANESTHESIOLOGY

## 2021-09-10 RX ORDER — ALPRAZOLAM 0.5 MG/1
0.5 TABLET, ORALLY DISINTEGRATING ORAL ONCE AS NEEDED
Status: CANCELLED | OUTPATIENT
Start: 2021-09-24 | End: 2033-02-19

## 2021-09-13 ENCOUNTER — PATIENT MESSAGE (OUTPATIENT)
Dept: PULMONOLOGY | Facility: CLINIC | Age: 71
End: 2021-09-13

## 2021-09-13 DIAGNOSIS — J45.20 MILD INTERMITTENT ASTHMA WITHOUT COMPLICATION: ICD-10-CM

## 2021-09-13 RX ORDER — FLUTICASONE PROPIONATE AND SALMETEROL XINAFOATE 45; 21 UG/1; UG/1
2 AEROSOL, METERED RESPIRATORY (INHALATION) EVERY 12 HOURS
Qty: 36 G | Refills: 3 | Status: SHIPPED | OUTPATIENT
Start: 2021-09-13 | End: 2022-01-12 | Stop reason: ALTCHOICE

## 2021-09-15 ENCOUNTER — OFFICE VISIT (OUTPATIENT)
Dept: FAMILY MEDICINE | Facility: CLINIC | Age: 71
End: 2021-09-15
Payer: MEDICARE

## 2021-09-15 VITALS
HEIGHT: 64 IN | WEIGHT: 203.06 LBS | HEART RATE: 79 BPM | DIASTOLIC BLOOD PRESSURE: 60 MMHG | TEMPERATURE: 98 F | SYSTOLIC BLOOD PRESSURE: 122 MMHG | BODY MASS INDEX: 34.67 KG/M2 | OXYGEN SATURATION: 95 %

## 2021-09-15 DIAGNOSIS — I10 ESSENTIAL HYPERTENSION: Primary | ICD-10-CM

## 2021-09-15 DIAGNOSIS — E11.9 CONTROLLED TYPE 2 DIABETES MELLITUS WITHOUT COMPLICATION, WITHOUT LONG-TERM CURRENT USE OF INSULIN: ICD-10-CM

## 2021-09-15 DIAGNOSIS — Z79.899 ENCOUNTER FOR LONG-TERM (CURRENT) USE OF MEDICATIONS: ICD-10-CM

## 2021-09-15 DIAGNOSIS — E78.5 DYSLIPIDEMIA: ICD-10-CM

## 2021-09-15 PROCEDURE — 3074F SYST BP LT 130 MM HG: CPT | Mod: HCNC,CPTII,S$GLB, | Performed by: INTERNAL MEDICINE

## 2021-09-15 PROCEDURE — 3066F PR DOCUMENTATION OF TREATMENT FOR NEPHROPATHY: ICD-10-PCS | Mod: HCNC,CPTII,S$GLB, | Performed by: INTERNAL MEDICINE

## 2021-09-15 PROCEDURE — 3044F PR MOST RECENT HEMOGLOBIN A1C LEVEL <7.0%: ICD-10-PCS | Mod: HCNC,CPTII,S$GLB, | Performed by: INTERNAL MEDICINE

## 2021-09-15 PROCEDURE — 3008F BODY MASS INDEX DOCD: CPT | Mod: HCNC,CPTII,S$GLB, | Performed by: INTERNAL MEDICINE

## 2021-09-15 PROCEDURE — 1160F PR REVIEW ALL MEDS BY PRESCRIBER/CLIN PHARMACIST DOCUMENTED: ICD-10-PCS | Mod: HCNC,CPTII,S$GLB, | Performed by: INTERNAL MEDICINE

## 2021-09-15 PROCEDURE — 3044F HG A1C LEVEL LT 7.0%: CPT | Mod: HCNC,CPTII,S$GLB, | Performed by: INTERNAL MEDICINE

## 2021-09-15 PROCEDURE — 1159F MED LIST DOCD IN RCRD: CPT | Mod: HCNC,CPTII,S$GLB, | Performed by: INTERNAL MEDICINE

## 2021-09-15 PROCEDURE — 1157F PR ADVANCE CARE PLAN OR EQUIV PRESENT IN MEDICAL RECORD: ICD-10-PCS | Mod: HCNC,CPTII,S$GLB, | Performed by: INTERNAL MEDICINE

## 2021-09-15 PROCEDURE — 3008F PR BODY MASS INDEX (BMI) DOCUMENTED: ICD-10-PCS | Mod: HCNC,CPTII,S$GLB, | Performed by: INTERNAL MEDICINE

## 2021-09-15 PROCEDURE — 1160F RVW MEDS BY RX/DR IN RCRD: CPT | Mod: HCNC,CPTII,S$GLB, | Performed by: INTERNAL MEDICINE

## 2021-09-15 PROCEDURE — 3061F PR NEG MICROALBUMINURIA RESULT DOCUMENTED/REVIEW: ICD-10-PCS | Mod: HCNC,CPTII,S$GLB, | Performed by: INTERNAL MEDICINE

## 2021-09-15 PROCEDURE — 3061F NEG MICROALBUMINURIA REV: CPT | Mod: HCNC,CPTII,S$GLB, | Performed by: INTERNAL MEDICINE

## 2021-09-15 PROCEDURE — 99999 PR PBB SHADOW E&M-EST. PATIENT-LVL V: CPT | Mod: PBBFAC,HCNC,, | Performed by: INTERNAL MEDICINE

## 2021-09-15 PROCEDURE — 1125F AMNT PAIN NOTED PAIN PRSNT: CPT | Mod: HCNC,CPTII,S$GLB, | Performed by: INTERNAL MEDICINE

## 2021-09-15 PROCEDURE — 99214 OFFICE O/P EST MOD 30 MIN: CPT | Mod: HCNC,S$GLB,, | Performed by: INTERNAL MEDICINE

## 2021-09-15 PROCEDURE — 1159F PR MEDICATION LIST DOCUMENTED IN MEDICAL RECORD: ICD-10-PCS | Mod: HCNC,CPTII,S$GLB, | Performed by: INTERNAL MEDICINE

## 2021-09-15 PROCEDURE — 3066F NEPHROPATHY DOC TX: CPT | Mod: HCNC,CPTII,S$GLB, | Performed by: INTERNAL MEDICINE

## 2021-09-15 PROCEDURE — 99999 PR PBB SHADOW E&M-EST. PATIENT-LVL V: ICD-10-PCS | Mod: PBBFAC,HCNC,, | Performed by: INTERNAL MEDICINE

## 2021-09-15 PROCEDURE — 99214 PR OFFICE/OUTPT VISIT, EST, LEVL IV, 30-39 MIN: ICD-10-PCS | Mod: HCNC,S$GLB,, | Performed by: INTERNAL MEDICINE

## 2021-09-15 PROCEDURE — 1125F PR PAIN SEVERITY QUANTIFIED, PAIN PRESENT: ICD-10-PCS | Mod: HCNC,CPTII,S$GLB, | Performed by: INTERNAL MEDICINE

## 2021-09-15 PROCEDURE — 3078F DIAST BP <80 MM HG: CPT | Mod: HCNC,CPTII,S$GLB, | Performed by: INTERNAL MEDICINE

## 2021-09-15 PROCEDURE — 1157F ADVNC CARE PLAN IN RCRD: CPT | Mod: HCNC,CPTII,S$GLB, | Performed by: INTERNAL MEDICINE

## 2021-09-15 PROCEDURE — 3078F PR MOST RECENT DIASTOLIC BLOOD PRESSURE < 80 MM HG: ICD-10-PCS | Mod: HCNC,CPTII,S$GLB, | Performed by: INTERNAL MEDICINE

## 2021-09-15 PROCEDURE — 3074F PR MOST RECENT SYSTOLIC BLOOD PRESSURE < 130 MM HG: ICD-10-PCS | Mod: HCNC,CPTII,S$GLB, | Performed by: INTERNAL MEDICINE

## 2021-09-21 ENCOUNTER — LAB VISIT (OUTPATIENT)
Dept: FAMILY MEDICINE | Facility: CLINIC | Age: 71
End: 2021-09-21
Payer: MEDICARE

## 2021-09-21 DIAGNOSIS — Z01.818 PRE-OP TESTING: ICD-10-CM

## 2021-09-21 PROCEDURE — U0003 INFECTIOUS AGENT DETECTION BY NUCLEIC ACID (DNA OR RNA); SEVERE ACUTE RESPIRATORY SYNDROME CORONAVIRUS 2 (SARS-COV-2) (CORONAVIRUS DISEASE [COVID-19]), AMPLIFIED PROBE TECHNIQUE, MAKING USE OF HIGH THROUGHPUT TECHNOLOGIES AS DESCRIBED BY CMS-2020-01-R: HCPCS | Mod: HCNC | Performed by: ANESTHESIOLOGY

## 2021-09-21 PROCEDURE — U0005 INFEC AGEN DETEC AMPLI PROBE: HCPCS | Performed by: ANESTHESIOLOGY

## 2021-09-22 LAB
SARS-COV-2 RNA RESP QL NAA+PROBE: NOT DETECTED
SARS-COV-2- CYCLE NUMBER: NORMAL

## 2021-09-24 ENCOUNTER — HOSPITAL ENCOUNTER (OUTPATIENT)
Dept: RADIOLOGY | Facility: HOSPITAL | Age: 71
Discharge: HOME | End: 2021-09-24
Attending: ANESTHESIOLOGY
Payer: MEDICARE

## 2021-09-24 ENCOUNTER — HOSPITAL ENCOUNTER (OUTPATIENT)
Facility: HOSPITAL | Age: 71
Discharge: HOME | End: 2021-09-24
Attending: ANESTHESIOLOGY | Admitting: ANESTHESIOLOGY
Payer: MEDICARE

## 2021-09-24 DIAGNOSIS — M54.16 LUMBAR RADICULOPATHY: Primary | ICD-10-CM

## 2021-09-24 DIAGNOSIS — M54.16 LUMBAR RADICULOPATHY: ICD-10-CM

## 2021-09-24 LAB — GLUCOSE SERPL-MCNC: 114 MG/DL (ref 70–110)

## 2021-09-24 PROCEDURE — 62323 NJX INTERLAMINAR LMBR/SAC: CPT | Mod: HCNC,,, | Performed by: ANESTHESIOLOGY

## 2021-09-24 PROCEDURE — 63600175 PHARM REV CODE 636 W HCPCS: Mod: HCNC,PO | Performed by: ANESTHESIOLOGY

## 2021-09-24 PROCEDURE — 25500020 PHARM REV CODE 255: Mod: HCNC,PO | Performed by: ANESTHESIOLOGY

## 2021-09-24 PROCEDURE — 25000003 PHARM REV CODE 250: Mod: HCNC,PO | Performed by: ANESTHESIOLOGY

## 2021-09-24 PROCEDURE — 76000 FLUOROSCOPY <1 HR PHYS/QHP: CPT | Mod: TC,HCNC,PO

## 2021-09-24 PROCEDURE — 62323 NJX INTERLAMINAR LMBR/SAC: CPT | Mod: HCNC,PO | Performed by: ANESTHESIOLOGY

## 2021-09-24 PROCEDURE — 62323 PR INJ LUMBAR/SACRAL, W/IMAGING GUIDANCE: ICD-10-PCS | Mod: HCNC,,, | Performed by: ANESTHESIOLOGY

## 2021-09-24 RX ORDER — METHYLPREDNISOLONE ACETATE 80 MG/ML
INJECTION, SUSPENSION INTRA-ARTICULAR; INTRALESIONAL; INTRAMUSCULAR; SOFT TISSUE
Status: DISCONTINUED | OUTPATIENT
Start: 2021-09-24 | End: 2021-09-24 | Stop reason: HOSPADM

## 2021-09-24 RX ORDER — ALPRAZOLAM 0.5 MG/1
0.5 TABLET, ORALLY DISINTEGRATING ORAL ONCE AS NEEDED
Status: COMPLETED | OUTPATIENT
Start: 2021-09-24 | End: 2021-09-24

## 2021-09-24 RX ORDER — LIDOCAINE HYDROCHLORIDE 10 MG/ML
INJECTION, SOLUTION EPIDURAL; INFILTRATION; INTRACAUDAL; PERINEURAL
Status: DISCONTINUED | OUTPATIENT
Start: 2021-09-24 | End: 2021-09-24 | Stop reason: HOSPADM

## 2021-09-24 RX ADMIN — ALPRAZOLAM 0.5 MG: 0.5 TABLET, ORALLY DISINTEGRATING ORAL at 10:09

## 2021-09-27 VITALS
OXYGEN SATURATION: 98 % | SYSTOLIC BLOOD PRESSURE: 141 MMHG | DIASTOLIC BLOOD PRESSURE: 63 MMHG | HEART RATE: 67 BPM | TEMPERATURE: 98 F | WEIGHT: 200 LBS | BODY MASS INDEX: 34.15 KG/M2 | RESPIRATION RATE: 16 BRPM | HEIGHT: 64 IN

## 2021-09-28 LAB
LEFT EYE DM RETINOPATHY: NEGATIVE
LEFT EYE DM RETINOPATHY: NEGATIVE
RIGHT EYE DM RETINOPATHY: NEGATIVE
RIGHT EYE DM RETINOPATHY: NEGATIVE

## 2021-10-08 ENCOUNTER — OFFICE VISIT (OUTPATIENT)
Dept: PAIN MEDICINE | Facility: CLINIC | Age: 71
End: 2021-10-08
Payer: MEDICARE

## 2021-10-08 VITALS
OXYGEN SATURATION: 96 % | HEART RATE: 64 BPM | WEIGHT: 199.06 LBS | SYSTOLIC BLOOD PRESSURE: 126 MMHG | BODY MASS INDEX: 33.98 KG/M2 | DIASTOLIC BLOOD PRESSURE: 60 MMHG | HEIGHT: 64 IN

## 2021-10-08 DIAGNOSIS — M48.061 SPINAL STENOSIS OF LUMBAR REGION, UNSPECIFIED WHETHER NEUROGENIC CLAUDICATION PRESENT: Primary | ICD-10-CM

## 2021-10-08 PROCEDURE — 3074F PR MOST RECENT SYSTOLIC BLOOD PRESSURE < 130 MM HG: ICD-10-PCS | Mod: HCNC,CPTII,S$GLB, | Performed by: ANESTHESIOLOGY

## 2021-10-08 PROCEDURE — 1101F PT FALLS ASSESS-DOCD LE1/YR: CPT | Mod: HCNC,CPTII,S$GLB, | Performed by: ANESTHESIOLOGY

## 2021-10-08 PROCEDURE — 1157F ADVNC CARE PLAN IN RCRD: CPT | Mod: HCNC,CPTII,S$GLB, | Performed by: ANESTHESIOLOGY

## 2021-10-08 PROCEDURE — 3074F SYST BP LT 130 MM HG: CPT | Mod: HCNC,CPTII,S$GLB, | Performed by: ANESTHESIOLOGY

## 2021-10-08 PROCEDURE — 3078F DIAST BP <80 MM HG: CPT | Mod: HCNC,CPTII,S$GLB, | Performed by: ANESTHESIOLOGY

## 2021-10-08 PROCEDURE — 3066F PR DOCUMENTATION OF TREATMENT FOR NEPHROPATHY: ICD-10-PCS | Mod: HCNC,CPTII,S$GLB, | Performed by: ANESTHESIOLOGY

## 2021-10-08 PROCEDURE — 1160F RVW MEDS BY RX/DR IN RCRD: CPT | Mod: HCNC,CPTII,S$GLB, | Performed by: ANESTHESIOLOGY

## 2021-10-08 PROCEDURE — 3061F PR NEG MICROALBUMINURIA RESULT DOCUMENTED/REVIEW: ICD-10-PCS | Mod: HCNC,CPTII,S$GLB, | Performed by: ANESTHESIOLOGY

## 2021-10-08 PROCEDURE — 3008F PR BODY MASS INDEX (BMI) DOCUMENTED: ICD-10-PCS | Mod: HCNC,CPTII,S$GLB, | Performed by: ANESTHESIOLOGY

## 2021-10-08 PROCEDURE — 99213 OFFICE O/P EST LOW 20 MIN: CPT | Mod: HCNC,S$GLB,, | Performed by: ANESTHESIOLOGY

## 2021-10-08 PROCEDURE — 99999 PR PBB SHADOW E&M-EST. PATIENT-LVL III: CPT | Mod: PBBFAC,HCNC,, | Performed by: ANESTHESIOLOGY

## 2021-10-08 PROCEDURE — 99213 PR OFFICE/OUTPT VISIT, EST, LEVL III, 20-29 MIN: ICD-10-PCS | Mod: HCNC,S$GLB,, | Performed by: ANESTHESIOLOGY

## 2021-10-08 PROCEDURE — 3044F HG A1C LEVEL LT 7.0%: CPT | Mod: HCNC,CPTII,S$GLB, | Performed by: ANESTHESIOLOGY

## 2021-10-08 PROCEDURE — 1159F MED LIST DOCD IN RCRD: CPT | Mod: HCNC,CPTII,S$GLB, | Performed by: ANESTHESIOLOGY

## 2021-10-08 PROCEDURE — 1157F PR ADVANCE CARE PLAN OR EQUIV PRESENT IN MEDICAL RECORD: ICD-10-PCS | Mod: HCNC,CPTII,S$GLB, | Performed by: ANESTHESIOLOGY

## 2021-10-08 PROCEDURE — 3061F NEG MICROALBUMINURIA REV: CPT | Mod: HCNC,CPTII,S$GLB, | Performed by: ANESTHESIOLOGY

## 2021-10-08 PROCEDURE — 3078F PR MOST RECENT DIASTOLIC BLOOD PRESSURE < 80 MM HG: ICD-10-PCS | Mod: HCNC,CPTII,S$GLB, | Performed by: ANESTHESIOLOGY

## 2021-10-08 PROCEDURE — 3066F NEPHROPATHY DOC TX: CPT | Mod: HCNC,CPTII,S$GLB, | Performed by: ANESTHESIOLOGY

## 2021-10-08 PROCEDURE — 1101F PR PT FALLS ASSESS DOC 0-1 FALLS W/OUT INJ PAST YR: ICD-10-PCS | Mod: HCNC,CPTII,S$GLB, | Performed by: ANESTHESIOLOGY

## 2021-10-08 PROCEDURE — 3044F PR MOST RECENT HEMOGLOBIN A1C LEVEL <7.0%: ICD-10-PCS | Mod: HCNC,CPTII,S$GLB, | Performed by: ANESTHESIOLOGY

## 2021-10-08 PROCEDURE — 3288F PR FALLS RISK ASSESSMENT DOCUMENTED: ICD-10-PCS | Mod: HCNC,CPTII,S$GLB, | Performed by: ANESTHESIOLOGY

## 2021-10-08 PROCEDURE — 1126F AMNT PAIN NOTED NONE PRSNT: CPT | Mod: HCNC,CPTII,S$GLB, | Performed by: ANESTHESIOLOGY

## 2021-10-08 PROCEDURE — 3288F FALL RISK ASSESSMENT DOCD: CPT | Mod: HCNC,CPTII,S$GLB, | Performed by: ANESTHESIOLOGY

## 2021-10-08 PROCEDURE — 1159F PR MEDICATION LIST DOCUMENTED IN MEDICAL RECORD: ICD-10-PCS | Mod: HCNC,CPTII,S$GLB, | Performed by: ANESTHESIOLOGY

## 2021-10-08 PROCEDURE — 3008F BODY MASS INDEX DOCD: CPT | Mod: HCNC,CPTII,S$GLB, | Performed by: ANESTHESIOLOGY

## 2021-10-08 PROCEDURE — 99999 PR PBB SHADOW E&M-EST. PATIENT-LVL III: ICD-10-PCS | Mod: PBBFAC,HCNC,, | Performed by: ANESTHESIOLOGY

## 2021-10-08 PROCEDURE — 1160F PR REVIEW ALL MEDS BY PRESCRIBER/CLIN PHARMACIST DOCUMENTED: ICD-10-PCS | Mod: HCNC,CPTII,S$GLB, | Performed by: ANESTHESIOLOGY

## 2021-10-08 PROCEDURE — 1126F PR PAIN SEVERITY QUANTIFIED, NO PAIN PRESENT: ICD-10-PCS | Mod: HCNC,CPTII,S$GLB, | Performed by: ANESTHESIOLOGY

## 2021-10-19 ENCOUNTER — PATIENT OUTREACH (OUTPATIENT)
Dept: ADMINISTRATIVE | Facility: HOSPITAL | Age: 71
End: 2021-10-19

## 2021-10-21 ENCOUNTER — OFFICE VISIT (OUTPATIENT)
Dept: PODIATRY | Facility: CLINIC | Age: 71
End: 2021-10-21
Payer: MEDICARE

## 2021-10-21 VITALS — BODY MASS INDEX: 34.17 KG/M2 | HEIGHT: 64 IN | RESPIRATION RATE: 20 BRPM

## 2021-10-21 DIAGNOSIS — E11.49 TYPE II DIABETES MELLITUS WITH NEUROLOGICAL MANIFESTATIONS: Primary | ICD-10-CM

## 2021-10-21 DIAGNOSIS — B35.1 ONYCHOMYCOSIS DUE TO DERMATOPHYTE: ICD-10-CM

## 2021-10-21 PROCEDURE — 3061F PR NEG MICROALBUMINURIA RESULT DOCUMENTED/REVIEW: ICD-10-PCS | Mod: HCNC,CPTII,S$GLB, | Performed by: PODIATRIST

## 2021-10-21 PROCEDURE — 1157F PR ADVANCE CARE PLAN OR EQUIV PRESENT IN MEDICAL RECORD: ICD-10-PCS | Mod: HCNC,CPTII,S$GLB, | Performed by: PODIATRIST

## 2021-10-21 PROCEDURE — 3061F NEG MICROALBUMINURIA REV: CPT | Mod: HCNC,CPTII,S$GLB, | Performed by: PODIATRIST

## 2021-10-21 PROCEDURE — 1160F PR REVIEW ALL MEDS BY PRESCRIBER/CLIN PHARMACIST DOCUMENTED: ICD-10-PCS | Mod: HCNC,CPTII,S$GLB, | Performed by: PODIATRIST

## 2021-10-21 PROCEDURE — 1126F PR PAIN SEVERITY QUANTIFIED, NO PAIN PRESENT: ICD-10-PCS | Mod: HCNC,CPTII,S$GLB, | Performed by: PODIATRIST

## 2021-10-21 PROCEDURE — 1159F PR MEDICATION LIST DOCUMENTED IN MEDICAL RECORD: ICD-10-PCS | Mod: HCNC,CPTII,S$GLB, | Performed by: PODIATRIST

## 2021-10-21 PROCEDURE — 99203 PR OFFICE/OUTPT VISIT, NEW, LEVL III, 30-44 MIN: ICD-10-PCS | Mod: HCNC,S$GLB,, | Performed by: PODIATRIST

## 2021-10-21 PROCEDURE — 1157F ADVNC CARE PLAN IN RCRD: CPT | Mod: HCNC,CPTII,S$GLB, | Performed by: PODIATRIST

## 2021-10-21 PROCEDURE — 99999 PR PBB SHADOW E&M-EST. PATIENT-LVL IV: CPT | Mod: PBBFAC,HCNC,, | Performed by: PODIATRIST

## 2021-10-21 PROCEDURE — 1159F MED LIST DOCD IN RCRD: CPT | Mod: HCNC,CPTII,S$GLB, | Performed by: PODIATRIST

## 2021-10-21 PROCEDURE — 1101F PT FALLS ASSESS-DOCD LE1/YR: CPT | Mod: HCNC,CPTII,S$GLB, | Performed by: PODIATRIST

## 2021-10-21 PROCEDURE — 99203 OFFICE O/P NEW LOW 30 MIN: CPT | Mod: HCNC,S$GLB,, | Performed by: PODIATRIST

## 2021-10-21 PROCEDURE — 3066F PR DOCUMENTATION OF TREATMENT FOR NEPHROPATHY: ICD-10-PCS | Mod: HCNC,CPTII,S$GLB, | Performed by: PODIATRIST

## 2021-10-21 PROCEDURE — 3044F HG A1C LEVEL LT 7.0%: CPT | Mod: HCNC,CPTII,S$GLB, | Performed by: PODIATRIST

## 2021-10-21 PROCEDURE — 99999 PR PBB SHADOW E&M-EST. PATIENT-LVL IV: ICD-10-PCS | Mod: PBBFAC,HCNC,, | Performed by: PODIATRIST

## 2021-10-21 PROCEDURE — 3288F PR FALLS RISK ASSESSMENT DOCUMENTED: ICD-10-PCS | Mod: HCNC,CPTII,S$GLB, | Performed by: PODIATRIST

## 2021-10-21 PROCEDURE — 3288F FALL RISK ASSESSMENT DOCD: CPT | Mod: HCNC,CPTII,S$GLB, | Performed by: PODIATRIST

## 2021-10-21 PROCEDURE — 3066F NEPHROPATHY DOC TX: CPT | Mod: HCNC,CPTII,S$GLB, | Performed by: PODIATRIST

## 2021-10-21 PROCEDURE — 3008F PR BODY MASS INDEX (BMI) DOCUMENTED: ICD-10-PCS | Mod: HCNC,CPTII,S$GLB, | Performed by: PODIATRIST

## 2021-10-21 PROCEDURE — 3008F BODY MASS INDEX DOCD: CPT | Mod: HCNC,CPTII,S$GLB, | Performed by: PODIATRIST

## 2021-10-21 PROCEDURE — 1101F PR PT FALLS ASSESS DOC 0-1 FALLS W/OUT INJ PAST YR: ICD-10-PCS | Mod: HCNC,CPTII,S$GLB, | Performed by: PODIATRIST

## 2021-10-21 PROCEDURE — 1160F RVW MEDS BY RX/DR IN RCRD: CPT | Mod: HCNC,CPTII,S$GLB, | Performed by: PODIATRIST

## 2021-10-21 PROCEDURE — 1126F AMNT PAIN NOTED NONE PRSNT: CPT | Mod: HCNC,CPTII,S$GLB, | Performed by: PODIATRIST

## 2021-10-21 PROCEDURE — 3044F PR MOST RECENT HEMOGLOBIN A1C LEVEL <7.0%: ICD-10-PCS | Mod: HCNC,CPTII,S$GLB, | Performed by: PODIATRIST

## 2021-10-21 RX ORDER — TRIAMCINOLONE ACETONIDE 40 MG/ML
INJECTION, SUSPENSION INTRA-ARTICULAR; INTRAMUSCULAR
COMMUNITY
Start: 2021-05-24 | End: 2022-02-17 | Stop reason: ALTCHOICE

## 2021-10-21 RX ORDER — AZELASTINE HYDROCHLORIDE 0.5 MG/ML
1 SOLUTION/ DROPS OPHTHALMIC
COMMUNITY
Start: 2021-09-30

## 2021-10-26 ENCOUNTER — OFFICE VISIT (OUTPATIENT)
Dept: DERMATOLOGY | Facility: CLINIC | Age: 71
End: 2021-10-26
Payer: MEDICARE

## 2021-10-26 DIAGNOSIS — L57.0 ACTINIC KERATOSES: ICD-10-CM

## 2021-10-26 DIAGNOSIS — L81.4 SOLAR LENTIGO: ICD-10-CM

## 2021-10-26 DIAGNOSIS — L90.5 SCAR: Primary | ICD-10-CM

## 2021-10-26 DIAGNOSIS — Z85.828 PERSONAL HISTORY OF OTHER MALIGNANT NEOPLASM OF SKIN: ICD-10-CM

## 2021-10-26 PROCEDURE — 1101F PR PT FALLS ASSESS DOC 0-1 FALLS W/OUT INJ PAST YR: ICD-10-PCS | Mod: HCNC,CPTII,S$GLB, | Performed by: DERMATOLOGY

## 2021-10-26 PROCEDURE — 3044F PR MOST RECENT HEMOGLOBIN A1C LEVEL <7.0%: ICD-10-PCS | Mod: HCNC,CPTII,S$GLB, | Performed by: DERMATOLOGY

## 2021-10-26 PROCEDURE — 3288F FALL RISK ASSESSMENT DOCD: CPT | Mod: HCNC,CPTII,S$GLB, | Performed by: DERMATOLOGY

## 2021-10-26 PROCEDURE — 17003 DESTRUCTION, PREMALIGNANT LESIONS; SECOND THROUGH 14 LESIONS: ICD-10-PCS | Mod: HCNC,S$GLB,, | Performed by: DERMATOLOGY

## 2021-10-26 PROCEDURE — 1160F PR REVIEW ALL MEDS BY PRESCRIBER/CLIN PHARMACIST DOCUMENTED: ICD-10-PCS | Mod: HCNC,CPTII,S$GLB, | Performed by: DERMATOLOGY

## 2021-10-26 PROCEDURE — 99213 OFFICE O/P EST LOW 20 MIN: CPT | Mod: 25,HCNC,S$GLB, | Performed by: DERMATOLOGY

## 2021-10-26 PROCEDURE — 3044F HG A1C LEVEL LT 7.0%: CPT | Mod: HCNC,CPTII,S$GLB, | Performed by: DERMATOLOGY

## 2021-10-26 PROCEDURE — 99213 PR OFFICE/OUTPT VISIT, EST, LEVL III, 20-29 MIN: ICD-10-PCS | Mod: 25,HCNC,S$GLB, | Performed by: DERMATOLOGY

## 2021-10-26 PROCEDURE — 3066F NEPHROPATHY DOC TX: CPT | Mod: HCNC,CPTII,S$GLB, | Performed by: DERMATOLOGY

## 2021-10-26 PROCEDURE — 1159F MED LIST DOCD IN RCRD: CPT | Mod: HCNC,CPTII,S$GLB, | Performed by: DERMATOLOGY

## 2021-10-26 PROCEDURE — 1157F PR ADVANCE CARE PLAN OR EQUIV PRESENT IN MEDICAL RECORD: ICD-10-PCS | Mod: HCNC,CPTII,S$GLB, | Performed by: DERMATOLOGY

## 2021-10-26 PROCEDURE — 1160F RVW MEDS BY RX/DR IN RCRD: CPT | Mod: HCNC,CPTII,S$GLB, | Performed by: DERMATOLOGY

## 2021-10-26 PROCEDURE — 3288F PR FALLS RISK ASSESSMENT DOCUMENTED: ICD-10-PCS | Mod: HCNC,CPTII,S$GLB, | Performed by: DERMATOLOGY

## 2021-10-26 PROCEDURE — 99999 PR PBB SHADOW E&M-EST. PATIENT-LVL IV: CPT | Mod: PBBFAC,HCNC,, | Performed by: DERMATOLOGY

## 2021-10-26 PROCEDURE — 3066F PR DOCUMENTATION OF TREATMENT FOR NEPHROPATHY: ICD-10-PCS | Mod: HCNC,CPTII,S$GLB, | Performed by: DERMATOLOGY

## 2021-10-26 PROCEDURE — 1157F ADVNC CARE PLAN IN RCRD: CPT | Mod: HCNC,CPTII,S$GLB, | Performed by: DERMATOLOGY

## 2021-10-26 PROCEDURE — 3061F PR NEG MICROALBUMINURIA RESULT DOCUMENTED/REVIEW: ICD-10-PCS | Mod: HCNC,CPTII,S$GLB, | Performed by: DERMATOLOGY

## 2021-10-26 PROCEDURE — 1101F PT FALLS ASSESS-DOCD LE1/YR: CPT | Mod: HCNC,CPTII,S$GLB, | Performed by: DERMATOLOGY

## 2021-10-26 PROCEDURE — 3061F NEG MICROALBUMINURIA REV: CPT | Mod: HCNC,CPTII,S$GLB, | Performed by: DERMATOLOGY

## 2021-10-26 PROCEDURE — 17003 DESTRUCT PREMALG LES 2-14: CPT | Mod: HCNC,S$GLB,, | Performed by: DERMATOLOGY

## 2021-10-26 PROCEDURE — 17000 PR DESTRUCTION(LASER SURGERY,CRYOSURGERY,CHEMOSURGERY),PREMALIGNANT LESIONS,FIRST LESION: ICD-10-PCS | Mod: HCNC,S$GLB,, | Performed by: DERMATOLOGY

## 2021-10-26 PROCEDURE — 17000 DESTRUCT PREMALG LESION: CPT | Mod: HCNC,S$GLB,, | Performed by: DERMATOLOGY

## 2021-10-26 PROCEDURE — 99999 PR PBB SHADOW E&M-EST. PATIENT-LVL IV: ICD-10-PCS | Mod: PBBFAC,HCNC,, | Performed by: DERMATOLOGY

## 2021-10-26 PROCEDURE — 1159F PR MEDICATION LIST DOCUMENTED IN MEDICAL RECORD: ICD-10-PCS | Mod: HCNC,CPTII,S$GLB, | Performed by: DERMATOLOGY

## 2021-11-02 ENCOUNTER — LAB VISIT (OUTPATIENT)
Dept: LAB | Facility: HOSPITAL | Age: 71
End: 2021-11-02
Attending: INTERNAL MEDICINE
Payer: MEDICARE

## 2021-11-02 ENCOUNTER — CLINICAL SUPPORT (OUTPATIENT)
Dept: CARDIOLOGY | Facility: HOSPITAL | Age: 71
End: 2021-11-02
Attending: INTERNAL MEDICINE
Payer: MEDICARE

## 2021-11-02 VITALS — HEIGHT: 64 IN | WEIGHT: 199 LBS | BODY MASS INDEX: 33.97 KG/M2

## 2021-11-02 DIAGNOSIS — I10 ESSENTIAL HYPERTENSION: ICD-10-CM

## 2021-11-02 DIAGNOSIS — I70.0 ATHEROSCLEROSIS OF ABDOMINAL AORTA: ICD-10-CM

## 2021-11-02 DIAGNOSIS — E11.8 CONTROLLED TYPE 2 DIABETES MELLITUS WITH COMPLICATION, WITHOUT LONG-TERM CURRENT USE OF INSULIN: ICD-10-CM

## 2021-11-02 DIAGNOSIS — E78.5 DYSLIPIDEMIA: ICD-10-CM

## 2021-11-02 DIAGNOSIS — R30.0 DYSURIA: ICD-10-CM

## 2021-11-02 LAB
ALBUMIN SERPL BCP-MCNC: 3.7 G/DL (ref 3.5–5.2)
ALP SERPL-CCNC: 135 U/L (ref 55–135)
ALT SERPL W/O P-5'-P-CCNC: 25 U/L (ref 10–44)
ANION GAP SERPL CALC-SCNC: 9 MMOL/L (ref 8–16)
ASCENDING AORTA: 3.08 CM
AST SERPL-CCNC: 30 U/L (ref 10–40)
AV INDEX (PROSTH): 0.79
AV MEAN GRADIENT: 4 MMHG
AV PEAK GRADIENT: 8 MMHG
AV VALVE AREA: 2.47 CM2
AV VELOCITY RATIO: 0.78
BACTERIA #/AREA URNS HPF: ABNORMAL /HPF
BILIRUB SERPL-MCNC: 0.5 MG/DL (ref 0.1–1)
BILIRUB UR QL STRIP: NEGATIVE
BSA FOR ECHO PROCEDURE: 2.02 M2
BUN SERPL-MCNC: 10 MG/DL (ref 8–23)
CALCIUM SERPL-MCNC: 9.4 MG/DL (ref 8.7–10.5)
CHLORIDE SERPL-SCNC: 99 MMOL/L (ref 95–110)
CHOLEST SERPL-MCNC: 169 MG/DL (ref 120–199)
CHOLEST/HDLC SERPL: 2.9 {RATIO} (ref 2–5)
CLARITY UR: CLEAR
CO2 SERPL-SCNC: 30 MMOL/L (ref 23–29)
COLOR UR: YELLOW
CREAT SERPL-MCNC: 0.8 MG/DL (ref 0.5–1.4)
CV ECHO LV RWT: 0.4 CM
DOP CALC AO PEAK VEL: 1.41 M/S
DOP CALC AO VTI: 34.64 CM
DOP CALC LVOT AREA: 3.1 CM2
DOP CALC LVOT DIAMETER: 1.99 CM
DOP CALC LVOT PEAK VEL: 1.1 M/S
DOP CALC LVOT STROKE VOLUME: 85.55 CM3
DOP CALCLVOT PEAK VEL VTI: 27.52 CM
E WAVE DECELERATION TIME: 184.72 MSEC
E/A RATIO: 1.17
E/E' RATIO: 7.33 M/S
ECHO LV POSTERIOR WALL: 0.89 CM (ref 0.6–1.1)
EJECTION FRACTION: 55 %
EST. GFR  (AFRICAN AMERICAN): >60 ML/MIN/1.73 M^2
EST. GFR  (NON AFRICAN AMERICAN): >60 ML/MIN/1.73 M^2
FRACTIONAL SHORTENING: 33 % (ref 28–44)
GLUCOSE SERPL-MCNC: 108 MG/DL (ref 70–110)
GLUCOSE UR QL STRIP: NEGATIVE
HDLC SERPL-MCNC: 59 MG/DL (ref 40–75)
HDLC SERPL: 34.9 % (ref 20–50)
HGB UR QL STRIP: NEGATIVE
INTERVENTRICULAR SEPTUM: 0.93 CM (ref 0.6–1.1)
KETONES UR QL STRIP: NEGATIVE
LA MAJOR: 4.64 CM
LA MINOR: 4.11 CM
LA WIDTH: 3.97 CM
LDLC SERPL CALC-MCNC: 82 MG/DL (ref 63–159)
LEFT ATRIUM SIZE: 3.74 CM
LEFT ATRIUM VOLUME INDEX: 28.2 ML/M2
LEFT ATRIUM VOLUME: 55.01 CM3
LEFT INTERNAL DIMENSION IN SYSTOLE: 2.97 CM (ref 2.1–4)
LEFT VENTRICLE DIASTOLIC VOLUME INDEX: 46.17 ML/M2
LEFT VENTRICLE DIASTOLIC VOLUME: 90.03 ML
LEFT VENTRICLE MASS INDEX: 68 G/M2
LEFT VENTRICLE SYSTOLIC VOLUME INDEX: 17.5 ML/M2
LEFT VENTRICLE SYSTOLIC VOLUME: 34.15 ML
LEFT VENTRICULAR INTERNAL DIMENSION IN DIASTOLE: 4.45 CM (ref 3.5–6)
LEFT VENTRICULAR MASS: 132.36 G
LEUKOCYTE ESTERASE UR QL STRIP: ABNORMAL
LV LATERAL E/E' RATIO: 7 M/S
LV SEPTAL E/E' RATIO: 7.7 M/S
MICROSCOPIC COMMENT: ABNORMAL
MV A" WAVE DURATION": 9.32 MSEC
MV PEAK A VEL: 0.66 M/S
MV PEAK E VEL: 0.77 M/S
MV STENOSIS PRESSURE HALF TIME: 53.57 MS
MV VALVE AREA P 1/2 METHOD: 4.11 CM2
NITRITE UR QL STRIP: POSITIVE
NONHDLC SERPL-MCNC: 110 MG/DL
PH UR STRIP: 7 [PH] (ref 5–8)
PISA TR MAX VEL: 2.14 M/S
POTASSIUM SERPL-SCNC: 4.3 MMOL/L (ref 3.5–5.1)
PROT SERPL-MCNC: 7.4 G/DL (ref 6–8.4)
PROT UR QL STRIP: NEGATIVE
PULM VEIN S/D RATIO: 1.1
PV PEAK D VEL: 0.42 M/S
PV PEAK S VEL: 0.46 M/S
RA MAJOR: 4.51 CM
RA PRESSURE: 3 MMHG
RA WIDTH: 3.27 CM
RBC #/AREA URNS HPF: 2 /HPF (ref 0–4)
RIGHT VENTRICULAR END-DIASTOLIC DIMENSION: 3.82 CM
RV TISSUE DOPPLER FREE WALL SYSTOLIC VELOCITY 1 (APICAL 4 CHAMBER VIEW): 11.73 CM/S
SINUS: 2.86 CM
SODIUM SERPL-SCNC: 138 MMOL/L (ref 136–145)
SP GR UR STRIP: 1.01 (ref 1–1.03)
STJ: 2.96 CM
TDI LATERAL: 0.11 M/S
TDI SEPTAL: 0.1 M/S
TDI: 0.11 M/S
TR MAX PG: 18 MMHG
TRICUSPID ANNULAR PLANE SYSTOLIC EXCURSION: 2.12 CM
TRIGL SERPL-MCNC: 140 MG/DL (ref 30–150)
TV REST PULMONARY ARTERY PRESSURE: 21 MMHG
URN SPEC COLLECT METH UR: ABNORMAL
WBC #/AREA URNS HPF: 20 /HPF (ref 0–5)

## 2021-11-02 PROCEDURE — 36415 COLL VENOUS BLD VENIPUNCTURE: CPT | Mod: HCNC,PO | Performed by: INTERNAL MEDICINE

## 2021-11-02 PROCEDURE — 93306 ECHO (CUPID ONLY): ICD-10-PCS | Mod: 26,HCNC,, | Performed by: INTERNAL MEDICINE

## 2021-11-02 PROCEDURE — 87088 URINE BACTERIA CULTURE: CPT | Mod: HCNC | Performed by: INTERNAL MEDICINE

## 2021-11-02 PROCEDURE — 81000 URINALYSIS NONAUTO W/SCOPE: CPT | Mod: HCNC,PO | Performed by: INTERNAL MEDICINE

## 2021-11-02 PROCEDURE — 87077 CULTURE AEROBIC IDENTIFY: CPT | Mod: HCNC | Performed by: INTERNAL MEDICINE

## 2021-11-02 PROCEDURE — 87186 SC STD MICRODIL/AGAR DIL: CPT | Mod: HCNC | Performed by: INTERNAL MEDICINE

## 2021-11-02 PROCEDURE — 80053 COMPREHEN METABOLIC PANEL: CPT | Mod: HCNC | Performed by: INTERNAL MEDICINE

## 2021-11-02 PROCEDURE — 80061 LIPID PANEL: CPT | Mod: HCNC | Performed by: INTERNAL MEDICINE

## 2021-11-02 PROCEDURE — 87086 URINE CULTURE/COLONY COUNT: CPT | Mod: HCNC | Performed by: INTERNAL MEDICINE

## 2021-11-02 PROCEDURE — 93306 TTE W/DOPPLER COMPLETE: CPT | Mod: HCNC,PO

## 2021-11-02 PROCEDURE — 93306 TTE W/DOPPLER COMPLETE: CPT | Mod: 26,HCNC,, | Performed by: INTERNAL MEDICINE

## 2021-11-02 RX ORDER — PHENAZOPYRIDINE HYDROCHLORIDE 200 MG/1
200 TABLET, FILM COATED ORAL 3 TIMES DAILY PRN
Qty: 9 TABLET | Refills: 0 | Status: SHIPPED | OUTPATIENT
Start: 2021-11-02 | End: 2021-11-05

## 2021-11-02 RX ORDER — CIPROFLOXACIN 500 MG/1
500 TABLET ORAL 2 TIMES DAILY
Qty: 14 TABLET | Refills: 0 | Status: SHIPPED | OUTPATIENT
Start: 2021-11-02 | End: 2021-11-09

## 2021-11-03 ENCOUNTER — PATIENT MESSAGE (OUTPATIENT)
Dept: FAMILY MEDICINE | Facility: CLINIC | Age: 71
End: 2021-11-03
Payer: MEDICARE

## 2021-11-03 ENCOUNTER — PATIENT MESSAGE (OUTPATIENT)
Dept: ENDOCRINOLOGY | Facility: CLINIC | Age: 71
End: 2021-11-03
Payer: MEDICARE

## 2021-11-04 LAB — BACTERIA UR CULT: ABNORMAL

## 2021-11-11 ENCOUNTER — OFFICE VISIT (OUTPATIENT)
Dept: CARDIOLOGY | Facility: CLINIC | Age: 71
End: 2021-11-11
Payer: MEDICARE

## 2021-11-11 VITALS
HEIGHT: 64 IN | SYSTOLIC BLOOD PRESSURE: 139 MMHG | HEART RATE: 67 BPM | DIASTOLIC BLOOD PRESSURE: 73 MMHG | WEIGHT: 202.81 LBS | BODY MASS INDEX: 34.62 KG/M2

## 2021-11-11 DIAGNOSIS — I10 ESSENTIAL HYPERTENSION: Primary | ICD-10-CM

## 2021-11-11 DIAGNOSIS — G47.33 OSA TREATED WITH BIPAP: ICD-10-CM

## 2021-11-11 DIAGNOSIS — R74.8 ELEVATED CK: ICD-10-CM

## 2021-11-11 DIAGNOSIS — E78.5 DYSLIPIDEMIA: ICD-10-CM

## 2021-11-11 PROCEDURE — 3066F NEPHROPATHY DOC TX: CPT | Mod: HCNC,CPTII,S$GLB, | Performed by: INTERNAL MEDICINE

## 2021-11-11 PROCEDURE — 1157F ADVNC CARE PLAN IN RCRD: CPT | Mod: HCNC,CPTII,S$GLB, | Performed by: INTERNAL MEDICINE

## 2021-11-11 PROCEDURE — 3061F PR NEG MICROALBUMINURIA RESULT DOCUMENTED/REVIEW: ICD-10-PCS | Mod: HCNC,CPTII,S$GLB, | Performed by: INTERNAL MEDICINE

## 2021-11-11 PROCEDURE — 1159F PR MEDICATION LIST DOCUMENTED IN MEDICAL RECORD: ICD-10-PCS | Mod: HCNC,CPTII,S$GLB, | Performed by: INTERNAL MEDICINE

## 2021-11-11 PROCEDURE — 99214 PR OFFICE/OUTPT VISIT, EST, LEVL IV, 30-39 MIN: ICD-10-PCS | Mod: HCNC,S$GLB,, | Performed by: INTERNAL MEDICINE

## 2021-11-11 PROCEDURE — 1160F RVW MEDS BY RX/DR IN RCRD: CPT | Mod: HCNC,CPTII,S$GLB, | Performed by: INTERNAL MEDICINE

## 2021-11-11 PROCEDURE — 3288F PR FALLS RISK ASSESSMENT DOCUMENTED: ICD-10-PCS | Mod: HCNC,CPTII,S$GLB, | Performed by: INTERNAL MEDICINE

## 2021-11-11 PROCEDURE — 1126F PR PAIN SEVERITY QUANTIFIED, NO PAIN PRESENT: ICD-10-PCS | Mod: HCNC,CPTII,S$GLB, | Performed by: INTERNAL MEDICINE

## 2021-11-11 PROCEDURE — 99999 PR PBB SHADOW E&M-EST. PATIENT-LVL V: CPT | Mod: PBBFAC,HCNC,, | Performed by: INTERNAL MEDICINE

## 2021-11-11 PROCEDURE — 3075F PR MOST RECENT SYSTOLIC BLOOD PRESS GE 130-139MM HG: ICD-10-PCS | Mod: HCNC,CPTII,S$GLB, | Performed by: INTERNAL MEDICINE

## 2021-11-11 PROCEDURE — 1101F PR PT FALLS ASSESS DOC 0-1 FALLS W/OUT INJ PAST YR: ICD-10-PCS | Mod: HCNC,CPTII,S$GLB, | Performed by: INTERNAL MEDICINE

## 2021-11-11 PROCEDURE — 3288F FALL RISK ASSESSMENT DOCD: CPT | Mod: HCNC,CPTII,S$GLB, | Performed by: INTERNAL MEDICINE

## 2021-11-11 PROCEDURE — 1159F MED LIST DOCD IN RCRD: CPT | Mod: HCNC,CPTII,S$GLB, | Performed by: INTERNAL MEDICINE

## 2021-11-11 PROCEDURE — 3044F HG A1C LEVEL LT 7.0%: CPT | Mod: HCNC,CPTII,S$GLB, | Performed by: INTERNAL MEDICINE

## 2021-11-11 PROCEDURE — 3008F PR BODY MASS INDEX (BMI) DOCUMENTED: ICD-10-PCS | Mod: HCNC,CPTII,S$GLB, | Performed by: INTERNAL MEDICINE

## 2021-11-11 PROCEDURE — 1126F AMNT PAIN NOTED NONE PRSNT: CPT | Mod: HCNC,CPTII,S$GLB, | Performed by: INTERNAL MEDICINE

## 2021-11-11 PROCEDURE — 3061F NEG MICROALBUMINURIA REV: CPT | Mod: HCNC,CPTII,S$GLB, | Performed by: INTERNAL MEDICINE

## 2021-11-11 PROCEDURE — 1160F PR REVIEW ALL MEDS BY PRESCRIBER/CLIN PHARMACIST DOCUMENTED: ICD-10-PCS | Mod: HCNC,CPTII,S$GLB, | Performed by: INTERNAL MEDICINE

## 2021-11-11 PROCEDURE — 3008F BODY MASS INDEX DOCD: CPT | Mod: HCNC,CPTII,S$GLB, | Performed by: INTERNAL MEDICINE

## 2021-11-11 PROCEDURE — 3075F SYST BP GE 130 - 139MM HG: CPT | Mod: HCNC,CPTII,S$GLB, | Performed by: INTERNAL MEDICINE

## 2021-11-11 PROCEDURE — 1101F PT FALLS ASSESS-DOCD LE1/YR: CPT | Mod: HCNC,CPTII,S$GLB, | Performed by: INTERNAL MEDICINE

## 2021-11-11 PROCEDURE — 3078F DIAST BP <80 MM HG: CPT | Mod: HCNC,CPTII,S$GLB, | Performed by: INTERNAL MEDICINE

## 2021-11-11 PROCEDURE — 3066F PR DOCUMENTATION OF TREATMENT FOR NEPHROPATHY: ICD-10-PCS | Mod: HCNC,CPTII,S$GLB, | Performed by: INTERNAL MEDICINE

## 2021-11-11 PROCEDURE — 99999 PR PBB SHADOW E&M-EST. PATIENT-LVL V: ICD-10-PCS | Mod: PBBFAC,HCNC,, | Performed by: INTERNAL MEDICINE

## 2021-11-11 PROCEDURE — 3078F PR MOST RECENT DIASTOLIC BLOOD PRESSURE < 80 MM HG: ICD-10-PCS | Mod: HCNC,CPTII,S$GLB, | Performed by: INTERNAL MEDICINE

## 2021-11-11 PROCEDURE — 1157F PR ADVANCE CARE PLAN OR EQUIV PRESENT IN MEDICAL RECORD: ICD-10-PCS | Mod: HCNC,CPTII,S$GLB, | Performed by: INTERNAL MEDICINE

## 2021-11-11 PROCEDURE — 3044F PR MOST RECENT HEMOGLOBIN A1C LEVEL <7.0%: ICD-10-PCS | Mod: HCNC,CPTII,S$GLB, | Performed by: INTERNAL MEDICINE

## 2021-11-11 PROCEDURE — 99214 OFFICE O/P EST MOD 30 MIN: CPT | Mod: HCNC,S$GLB,, | Performed by: INTERNAL MEDICINE

## 2021-11-11 RX ORDER — PRAVASTATIN SODIUM 80 MG/1
80 TABLET ORAL DAILY
Qty: 90 TABLET | Refills: 4 | Status: SHIPPED | OUTPATIENT
Start: 2021-11-11 | End: 2022-11-25

## 2021-11-11 RX ORDER — CLONIDINE HYDROCHLORIDE 0.1 MG/1
0.1 TABLET ORAL 3 TIMES DAILY PRN
Qty: 90 TABLET | Refills: 6 | Status: SHIPPED | OUTPATIENT
Start: 2021-11-11 | End: 2024-01-16

## 2021-11-11 RX ORDER — DILTIAZEM HYDROCHLORIDE 120 MG/1
120 CAPSULE, COATED, EXTENDED RELEASE ORAL NIGHTLY
Qty: 90 CAPSULE | Refills: 4 | Status: SHIPPED | OUTPATIENT
Start: 2021-11-11 | End: 2022-11-25

## 2021-11-11 RX ORDER — VALSARTAN AND HYDROCHLOROTHIAZIDE 160; 12.5 MG/1; MG/1
1 TABLET, FILM COATED ORAL DAILY
Qty: 90 TABLET | Refills: 3 | Status: SHIPPED | OUTPATIENT
Start: 2021-11-11 | End: 2022-09-12 | Stop reason: SDUPTHER

## 2021-11-17 ENCOUNTER — PATIENT MESSAGE (OUTPATIENT)
Dept: PULMONOLOGY | Facility: CLINIC | Age: 71
End: 2021-11-17
Payer: MEDICARE

## 2021-11-17 DIAGNOSIS — J45.20 MILD INTERMITTENT ASTHMA WITHOUT COMPLICATION: ICD-10-CM

## 2021-11-17 RX ORDER — PREDNISONE 10 MG/1
TABLET ORAL
Qty: 18 TABLET | Refills: 0 | Status: SHIPPED | OUTPATIENT
Start: 2021-11-17 | End: 2022-01-12 | Stop reason: SDUPTHER

## 2021-12-14 DIAGNOSIS — M25.569 KNEE PAIN, UNSPECIFIED CHRONICITY, UNSPECIFIED LATERALITY: Primary | ICD-10-CM

## 2021-12-15 ENCOUNTER — HOSPITAL ENCOUNTER (OUTPATIENT)
Dept: RADIOLOGY | Facility: HOSPITAL | Age: 71
Discharge: HOME | End: 2021-12-15
Attending: ORTHOPAEDIC SURGERY
Payer: MEDICARE

## 2021-12-15 ENCOUNTER — OFFICE VISIT (OUTPATIENT)
Dept: ORTHOPEDICS | Facility: CLINIC | Age: 71
End: 2021-12-15
Payer: MEDICARE

## 2021-12-15 VITALS — WEIGHT: 202 LBS | RESPIRATION RATE: 16 BRPM | HEIGHT: 64 IN | BODY MASS INDEX: 34.49 KG/M2

## 2021-12-15 DIAGNOSIS — M25.569 KNEE PAIN, UNSPECIFIED CHRONICITY, UNSPECIFIED LATERALITY: ICD-10-CM

## 2021-12-15 DIAGNOSIS — M17.10 ARTHRITIS OF KNEE: Primary | ICD-10-CM

## 2021-12-15 PROCEDURE — 73564 XR KNEE ORTHO RIGHT WITH FLEXION: ICD-10-PCS | Mod: 26,HCNC,RT, | Performed by: RADIOLOGY

## 2021-12-15 PROCEDURE — 1157F ADVNC CARE PLAN IN RCRD: CPT | Mod: HCNC,CPTII,S$GLB, | Performed by: ORTHOPAEDIC SURGERY

## 2021-12-15 PROCEDURE — 20610 LARGE JOINT ASPIRATION/INJECTION: R KNEE: ICD-10-PCS | Mod: HCNC,RT,S$GLB, | Performed by: ORTHOPAEDIC SURGERY

## 2021-12-15 PROCEDURE — 1157F PR ADVANCE CARE PLAN OR EQUIV PRESENT IN MEDICAL RECORD: ICD-10-PCS | Mod: HCNC,CPTII,S$GLB, | Performed by: ORTHOPAEDIC SURGERY

## 2021-12-15 PROCEDURE — 3066F NEPHROPATHY DOC TX: CPT | Mod: HCNC,CPTII,S$GLB, | Performed by: ORTHOPAEDIC SURGERY

## 2021-12-15 PROCEDURE — 3061F NEG MICROALBUMINURIA REV: CPT | Mod: HCNC,CPTII,S$GLB, | Performed by: ORTHOPAEDIC SURGERY

## 2021-12-15 PROCEDURE — 99213 PR OFFICE/OUTPT VISIT, EST, LEVL III, 20-29 MIN: ICD-10-PCS | Mod: 25,HCNC,S$GLB, | Performed by: ORTHOPAEDIC SURGERY

## 2021-12-15 PROCEDURE — 20610 DRAIN/INJ JOINT/BURSA W/O US: CPT | Mod: HCNC,RT,S$GLB, | Performed by: ORTHOPAEDIC SURGERY

## 2021-12-15 PROCEDURE — 73564 X-RAY EXAM KNEE 4 OR MORE: CPT | Mod: TC,HCNC,PO,RT

## 2021-12-15 PROCEDURE — 73564 X-RAY EXAM KNEE 4 OR MORE: CPT | Mod: 26,HCNC,RT, | Performed by: RADIOLOGY

## 2021-12-15 PROCEDURE — 99213 OFFICE O/P EST LOW 20 MIN: CPT | Mod: 25,HCNC,S$GLB, | Performed by: ORTHOPAEDIC SURGERY

## 2021-12-15 PROCEDURE — 73562 X-RAY EXAM OF KNEE 3: CPT | Mod: 26,HCNC,LT, | Performed by: RADIOLOGY

## 2021-12-15 PROCEDURE — 73562 XR KNEE ORTHO RIGHT WITH FLEXION: ICD-10-PCS | Mod: 26,HCNC,LT, | Performed by: RADIOLOGY

## 2021-12-15 PROCEDURE — 99999 PR PBB SHADOW E&M-EST. PATIENT-LVL IV: CPT | Mod: PBBFAC,HCNC,, | Performed by: ORTHOPAEDIC SURGERY

## 2021-12-15 PROCEDURE — 99999 PR PBB SHADOW E&M-EST. PATIENT-LVL IV: ICD-10-PCS | Mod: PBBFAC,HCNC,, | Performed by: ORTHOPAEDIC SURGERY

## 2021-12-15 PROCEDURE — 3066F PR DOCUMENTATION OF TREATMENT FOR NEPHROPATHY: ICD-10-PCS | Mod: HCNC,CPTII,S$GLB, | Performed by: ORTHOPAEDIC SURGERY

## 2021-12-15 PROCEDURE — 3061F PR NEG MICROALBUMINURIA RESULT DOCUMENTED/REVIEW: ICD-10-PCS | Mod: HCNC,CPTII,S$GLB, | Performed by: ORTHOPAEDIC SURGERY

## 2021-12-15 RX ORDER — TRIAMCINOLONE ACETONIDE 40 MG/ML
40 INJECTION, SUSPENSION INTRA-ARTICULAR; INTRAMUSCULAR
Status: DISCONTINUED | OUTPATIENT
Start: 2021-12-15 | End: 2021-12-15 | Stop reason: HOSPADM

## 2021-12-15 RX ADMIN — TRIAMCINOLONE ACETONIDE 40 MG: 40 INJECTION, SUSPENSION INTRA-ARTICULAR; INTRAMUSCULAR at 10:12

## 2022-01-01 DIAGNOSIS — R25.2 CRAMPS OF LEFT LOWER EXTREMITY: ICD-10-CM

## 2022-01-03 RX ORDER — POTASSIUM CHLORIDE 20 MEQ/1
TABLET, EXTENDED RELEASE ORAL
Qty: 90 TABLET | Refills: 4 | Status: SHIPPED | OUTPATIENT
Start: 2022-01-03 | End: 2023-03-13 | Stop reason: SDUPTHER

## 2022-01-10 ENCOUNTER — TELEPHONE (OUTPATIENT)
Dept: PULMONOLOGY | Facility: CLINIC | Age: 72
End: 2022-01-10
Payer: MEDICARE

## 2022-01-10 NOTE — TELEPHONE ENCOUNTER
Spoke with pharmacist. They needed some clarification on patient's levalbuterol (XOPENEX) 1.25 mg/3 mL - needed to update sig to max 3 daily. 1 q 4 hrs.     They would also like to ask if patient can get more than 1 box since it would be much cheaper if she was given 2. Told them would be okay..      ----- Message from Melchor Sánchez sent at 1/10/2022  9:55 AM CST -----  Regarding: clarafication  Contact: Patient  Patient want to speak with a nurse regarding medication clarification, please call back at [phone]  ext [phone]    Case number [id number]

## 2022-01-12 ENCOUNTER — OFFICE VISIT (OUTPATIENT)
Dept: PULMONOLOGY | Facility: CLINIC | Age: 72
End: 2022-01-12
Payer: MEDICARE

## 2022-01-12 VITALS
WEIGHT: 203.69 LBS | HEIGHT: 64 IN | BODY MASS INDEX: 34.77 KG/M2 | DIASTOLIC BLOOD PRESSURE: 80 MMHG | HEART RATE: 63 BPM | SYSTOLIC BLOOD PRESSURE: 151 MMHG | OXYGEN SATURATION: 98 %

## 2022-01-12 DIAGNOSIS — J47.9 BRONCHIECTASIS WITHOUT COMPLICATION: ICD-10-CM

## 2022-01-12 DIAGNOSIS — G47.33 OSA TREATED WITH BIPAP: ICD-10-CM

## 2022-01-12 DIAGNOSIS — J45.50 SEVERE PERSISTENT ASTHMA WITHOUT COMPLICATION: Primary | ICD-10-CM

## 2022-01-12 PROCEDURE — 1159F MED LIST DOCD IN RCRD: CPT | Mod: HCNC,CPTII,S$GLB, | Performed by: NURSE PRACTITIONER

## 2022-01-12 PROCEDURE — 1160F PR REVIEW ALL MEDS BY PRESCRIBER/CLIN PHARMACIST DOCUMENTED: ICD-10-PCS | Mod: HCNC,CPTII,S$GLB, | Performed by: NURSE PRACTITIONER

## 2022-01-12 PROCEDURE — 1157F ADVNC CARE PLAN IN RCRD: CPT | Mod: HCNC,CPTII,S$GLB, | Performed by: NURSE PRACTITIONER

## 2022-01-12 PROCEDURE — 99999 PR PBB SHADOW E&M-EST. PATIENT-LVL V: ICD-10-PCS | Mod: PBBFAC,HCNC,, | Performed by: NURSE PRACTITIONER

## 2022-01-12 PROCEDURE — 1157F PR ADVANCE CARE PLAN OR EQUIV PRESENT IN MEDICAL RECORD: ICD-10-PCS | Mod: HCNC,CPTII,S$GLB, | Performed by: NURSE PRACTITIONER

## 2022-01-12 PROCEDURE — 3079F DIAST BP 80-89 MM HG: CPT | Mod: HCNC,CPTII,S$GLB, | Performed by: NURSE PRACTITIONER

## 2022-01-12 PROCEDURE — 3079F PR MOST RECENT DIASTOLIC BLOOD PRESSURE 80-89 MM HG: ICD-10-PCS | Mod: HCNC,CPTII,S$GLB, | Performed by: NURSE PRACTITIONER

## 2022-01-12 PROCEDURE — 3008F PR BODY MASS INDEX (BMI) DOCUMENTED: ICD-10-PCS | Mod: HCNC,CPTII,S$GLB, | Performed by: NURSE PRACTITIONER

## 2022-01-12 PROCEDURE — 1126F AMNT PAIN NOTED NONE PRSNT: CPT | Mod: HCNC,CPTII,S$GLB, | Performed by: NURSE PRACTITIONER

## 2022-01-12 PROCEDURE — 99999 PR PBB SHADOW E&M-EST. PATIENT-LVL V: CPT | Mod: PBBFAC,HCNC,, | Performed by: NURSE PRACTITIONER

## 2022-01-12 PROCEDURE — 3008F BODY MASS INDEX DOCD: CPT | Mod: HCNC,CPTII,S$GLB, | Performed by: NURSE PRACTITIONER

## 2022-01-12 PROCEDURE — 1101F PT FALLS ASSESS-DOCD LE1/YR: CPT | Mod: HCNC,CPTII,S$GLB, | Performed by: NURSE PRACTITIONER

## 2022-01-12 PROCEDURE — 3077F PR MOST RECENT SYSTOLIC BLOOD PRESSURE >= 140 MM HG: ICD-10-PCS | Mod: HCNC,CPTII,S$GLB, | Performed by: NURSE PRACTITIONER

## 2022-01-12 PROCEDURE — 99499 RISK ADDL DX/OHS AUDIT: ICD-10-PCS | Mod: S$GLB,,, | Performed by: NURSE PRACTITIONER

## 2022-01-12 PROCEDURE — 3077F SYST BP >= 140 MM HG: CPT | Mod: HCNC,CPTII,S$GLB, | Performed by: NURSE PRACTITIONER

## 2022-01-12 PROCEDURE — 3288F PR FALLS RISK ASSESSMENT DOCUMENTED: ICD-10-PCS | Mod: HCNC,CPTII,S$GLB, | Performed by: NURSE PRACTITIONER

## 2022-01-12 PROCEDURE — 99213 PR OFFICE/OUTPT VISIT, EST, LEVL III, 20-29 MIN: ICD-10-PCS | Mod: HCNC,S$GLB,, | Performed by: NURSE PRACTITIONER

## 2022-01-12 PROCEDURE — 1160F RVW MEDS BY RX/DR IN RCRD: CPT | Mod: HCNC,CPTII,S$GLB, | Performed by: NURSE PRACTITIONER

## 2022-01-12 PROCEDURE — 1159F PR MEDICATION LIST DOCUMENTED IN MEDICAL RECORD: ICD-10-PCS | Mod: HCNC,CPTII,S$GLB, | Performed by: NURSE PRACTITIONER

## 2022-01-12 PROCEDURE — 1126F PR PAIN SEVERITY QUANTIFIED, NO PAIN PRESENT: ICD-10-PCS | Mod: HCNC,CPTII,S$GLB, | Performed by: NURSE PRACTITIONER

## 2022-01-12 PROCEDURE — 99213 OFFICE O/P EST LOW 20 MIN: CPT | Mod: HCNC,S$GLB,, | Performed by: NURSE PRACTITIONER

## 2022-01-12 PROCEDURE — 99499 UNLISTED E&M SERVICE: CPT | Mod: S$GLB,,, | Performed by: NURSE PRACTITIONER

## 2022-01-12 PROCEDURE — 1101F PR PT FALLS ASSESS DOC 0-1 FALLS W/OUT INJ PAST YR: ICD-10-PCS | Mod: HCNC,CPTII,S$GLB, | Performed by: NURSE PRACTITIONER

## 2022-01-12 PROCEDURE — 3288F FALL RISK ASSESSMENT DOCD: CPT | Mod: HCNC,CPTII,S$GLB, | Performed by: NURSE PRACTITIONER

## 2022-01-12 RX ORDER — PREDNISONE 10 MG/1
TABLET ORAL
Qty: 20 TABLET | Refills: 0 | Status: SHIPPED | OUTPATIENT
Start: 2022-01-12 | End: 2022-05-25 | Stop reason: SDUPTHER

## 2022-01-12 RX ORDER — FLUTICASONE FUROATE, UMECLIDINIUM BROMIDE AND VILANTEROL TRIFENATATE 200; 62.5; 25 UG/1; UG/1; UG/1
1 POWDER RESPIRATORY (INHALATION) DAILY
Qty: 60 EACH | Refills: 11
Start: 2022-01-12 | End: 2022-04-13 | Stop reason: SDUPTHER

## 2022-01-12 NOTE — PROGRESS NOTES
1/12/2022    Cornelia Delatorre  Office f/u    Chief Complaint   Patient presents with    6m f/u    Shortness of Breath    Asthma    Sleep Apnea     HPI:  1/12/2022- SOB worsening, on Advair daily and levalbuterol 2-3x daily for past 4 weeks, has noticed worsening of shortness of breath and sinus complaints.   Admitted to hospital for GI virus,   Noticed prednisone is needed occasionally at 10 mg notices improvement    On BiPAP nightly with benefit. No daytime fatigue, no issues with nasal pillow mask.     7/12/21- complaint of daily sinus drainage, has to clear throat repeatedly for past 2 months. Currently on Flonase nasal spray, ipratropium nasal spray, Singulair pills, zyrtec, corcindin daily with no improvement.   SOB- stable, required steroid therapy for 3 days twice in past 3 months. Only with exertion, worse in late evenings, improves with rest,  Using nebulizer 2x daily due to feeling of heaviness in chest.   Cough- mild, non productive, associated with post nasal drip from allergies. Nocturnal arousals 2x weekly for past 2 weeks,   Wearing CPAP nighty with benefit.     4/12/21- spent last 6 months in home, was able to get COVID 19 vaccine Med[place]. Allergies stable, few spells improve with nasal spray noticed improvement after getting air purifier.   Noticed spacer device helping with hoarse voice or oral thrush like before,   SOB- unchanged, daily complaint, varies with severity, worse with exertion, improves with rest and albuterol rescue. Has to sit up to breath when first laying  Down, not able to breath when laying on left side.   Occasional cough- productive, clear mucous, using nebulizer 3 x weekly.   CPAP for SOHA doing well,     10/13/2020- Allergies bother her every few days, currently on daily Singulair, zyrtec, flonase, astelin nasal spray, having sneezing fits.   Complaint of clear sinus drainage,   Hoarse voice has resolved after stopping symbicort.   Cough- improved,   SOB- doing well,  occasional episodes of not being able to catch breath, did not use nebulizer   Wearing CPAP nightly for SOHA, states benefits greatly    7/13/2020- Hoarse voice, loss of voice, productive cough, lost voice July 2019 tx by ENT who treated for acute laryngitis with diflucan; Recurrent problem. Hoarse voice return 4 weeks prior, ENT doctor recommends changing Asthma medications tx her with diflucan, doxycycline and prednisone for 5 days, states has improved.   SOB- worse when wearing face mask, currently on hizentra therapy,     5/4/2020- uses symbicort daily, uses rescue 2/wk - some anxiety, getting igg rx. Having more sinus problems with head colds- 3 in last 4 wks.  No prednisone- hizentra working well.        Nov 4, 2019- having mucous sensation, notes some sob relaxing - maybe worse night.  No nasal stuffy.  Uses cpap.  Uses symbicort bid, no rescue.  Mucous is clear.  No abx for sinus or lungs.  Getting immune infusions weekly - pt senses doing better since starting in May.  Patient Instructions   Breathing off and on short breath - need to use more albuterol to make clear where breathing problems come from    Mucous production may decrease if airways controlled- albuterol should help clearance.    Rescue inhaler may resolve any breathing problems- should use intermittently if any symptoms.    May use augmentin for sinus/lung problems- not optimal for all uti's       May 6,2019-due to get immune infusion next 2 days.  No prednisone, needs monlukast. abx stopped wks ago- mucous cleared.    Patient Instructions   Use antibiotic daily til immune therapy done a wk - then as needed like every one else  Immune therapy may keep you stable - better than antibiotics.   Use symbicort regular.  Lungs may stabilize.  Use prednisone if needed.  Lung  Nodules are stable for 2 yrs and no follow up needed.  Call if problems- hope all will be better yet.  March 7, 19-exacerbated in late Jan- cleared in feb (vague).  Now ill again  yellow and gray mucous (culture last Jan was nl yvonne).  Sl intermittent wheezes since last wk.  Sees Dr Herrera in [place]- gets allergy rx but declined to get now.  Pt has cvid by  igg and igm levels low and pneumococcal antibodies to 8/14 pneumococcal titers. Uses symbicort and singulair routinely.  Took prednisone completed 4 days ago- uses prednisone monthly- was able to skip December  .  Discussed with patient above for education the following:      Gastric by pass may cause malabsorption, protein levels have been too low and may affect ig levels-- somewhat.   Need to get follow up with dietician to assure adequate protein intake/levels.   Antibiotic may stabilize infections with common variable immune deficiency- azithromycin daily once cultures submitted.   Use augmentin if infection worsens.   Acapella/chest clapping help clear mucous, vest likewise if bronchiectasis.  Will not treat cause.   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  Use codeine to suppress mild coughing.   Need good immune system and no airway/asthma problems and good hygiene of bronchial tubes (no chronic infections) to prevent illness.     Lungs measured near normal with good response to bronchial medications 2017   Need ct to follow up and cultures to assure infection controlled.      Jan 28/2019- Feeling bad onset 2 weeks, took prednisone taper x 6 days and antibiotic Augmentin week prior, States no improvement. Cough- worse at night, no nocturnal arousals, takes cough suppressant syrup before bed. Productive yellow/brown color.   Nebulized albuterol 4x daily. States no fever.  Has started allergy injections waiting for insurance clearance for IGG therapy.   Discussed with patient above for education the following:    CT chest for February to monitor and assess for bronchiectasis, need diagnosis for insurance to cover vest therapy  Have  continue to  percuss back with hand.   Recommend purchasing Acepella device to help clear lungs of excess mucous, attaches to nebulizer device  Continue Nebulizer treatments 4x daily as needed.  Chest x-ray now to evaluate for pneumonia  Blood work at hospital   Will yeison to see results of sputum culture and x-ray before repeating antibiotic  Need to return sputum to clinic with in 4 hours of collecting  Fluconazole pills for oral thrush, this is a recurrent problem related to your weak immune system and use of inhaled steroids. Continue to rinse mouth out after using symbicort but problem will improve after starting immune replacement therapy.    oct 30,   2018-- had one day fever followed by cough/wheeze illness that lingered a wk. Pt seen by NP   [place] 2x, [place]  [place] like dying- only one day fever.  Violent cough.  Nebulizer ppt itch and palpitations- ok  On xopenex now.  Prednisone 40x3, 20 x 3 ,  augmentin cleared.  Now back to normal.  May 14, 2018- had spell /exacerbation with one round prednisone. Breathing good.  Follow-up in about 1 year (around 5/14/2019), or if symptoms worsen or fail to improve.   Discussed with patient above for education the following:     Check blood count and pneumonia vaccine response after last vaccine 4/27/2018   Use azithromycin for any lung/sinus infection- immune weakness likely   Asthma stable- use prednisone and singulair.   Use allergra and singulair and astelin/flonase   Lung nodule in lung still - Navigational bronchoscopy might find?  - will at least re check in a year.     Call if needed, re check next yr.  darlin 15, 2018--1 st illness in yr or so with cough and rattles, no flu.  Appetite ok.  Ill x wk, cough and wheeze and sob and noct worse, resp rx helps a bit.  Hasn't been using  Albuterol   Follow-up in about 6 months (around 7/15/2018).   Discussed with patient above for education the following:      tamiflu if flu.   Need to use albuterol for any lung symptoms.   Should get cough  Better controlled.      Prednisone 20 mg 3 for 3 , taper may be needed.  Give shot today, 1 daily for 3 may be enough with albuterol.   You had high eosinophils-- if asthma becomes more active - special therapy may help??        prevnar 13 would be good - should be off prednisone.  Immune system is sl weak  Protection only to 7.14 pneumonia germs.  oct 19, 2017- has scratchy throat, some sinus drip, has nightly sensation throat issues, post beryl and carpel tunnel surg.  No sinus lung infections.  Breathing and cough good.  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 Had pos tb gold.  June 21, 2017- had good [place] trip, tapered symbicort with some increase sensation of lung problems so maintained full dose. No infections.  No chest symptoms on symbicort.   April 24, feels a lot better with min cough, vague sob going left side down at night.  Going to [place] 2 weeks.  Uses symbicort and good results.  March 16, cough better, but comes and goes,  No great help with steroids.  Submitted 3 sputums.  Had pneumovax march last yr.  Breathing better. Got symbicort.   Had pneumonia vaccine last yr  3/8/2017 HPI: had onset cough Hernan illness, got dizzy few days with diarrhea and cough. Cough clear sm amt mucous. Did have 101 temp one day, fatigue, no muscle aches.  Appetite decreased.  Illnesses lingered 2 weeks but cough never remitted- has improved with inhahler use last 15 days.    Had gastric 2011 bypass with with continued diarrhea intially resolved. No regurg or reflux or swallow problems.   symbicort nearly  Resolved.    Sinuses ok.  No pets.  Never smoker.    Appetite good, feels well now.    headcolds to chest since childhood, nocturnal ??not recalled.  Had cats in past but got rid in 2003 or so.     The chief compliant  problem varies with instablilty at time    PFSH:  Past Medical History:   Diagnosis Date    Allergy     Arthritis     Asthma     Cancer      skin cancer to right hand    Cataract     Chronic fatigue 1/24/2017    CVID (common variable immunodeficiency) 3/7/2019    Diabetes mellitus     type2    KLINE (dyspnea on exertion) 1/24/2017    Dyslipidemia 6/25/2019    Encounter for blood transfusion     Essential hypertension 12/29/2011    GERD (gastroesophageal reflux disease)     Headache(784.0)     History of lumpectomy of both breasts     1992 negative    Hyperlipidemia 7/15/2015    Hypertension     Hypothyroidism     Neuropathy     Obesity     Obesity     Postmenopausal HRT (hormone replacement therapy)     Rash     Rosacea     Sleep apnea     on bipap    Snoring     Squamous cell carcinoma of skin     left forearm     UTI (urinary tract infection)     Wears glasses          Past Surgical History:   Procedure Laterality Date    ADENOIDECTOMY      APPENDECTOMY      BLADDER SUSPENSION      BREAST BIOPSY Bilateral 1992    bilateral benign excisional biopsies    BUNIONECTOMY  10/17/14    right, still has discomfort    CATARACT EXTRACTION W/  INTRAOCULAR LENS IMPLANT Bilateral     CHOLECYSTECTOMY  08/02/2017    COLONOSCOPY      CYSTOSCOPY      EPIDURAL STEROID INJECTION INTO LUMBAR SPINE N/A 8/14/2019    Procedure: Injection-steroid-epidural-lumbar L5/S1;  Surgeon: Fredi Rojas MD;  [facility];  Service: Pain Management;  Laterality: N/A;    EPIDURAL STEROID INJECTION INTO LUMBAR SPINE N/A 5/3/2021    Procedure: Injection-steroid-epidural-lumbar L5/S1 to the Left;  Surgeon: Fredi Rojas MD;  Location: [facility] OR;  Service: Pain Management;  Laterality: N/A;    EPIDURAL STEROID INJECTION INTO LUMBAR SPINE N/A 9/24/2021    Procedure: Injection-steroid-epidural-lumbar L5/S1;  Surgeon: Fredi Rjoas MD;  Location: [facility] OR;  Service: Pain Management;  Laterality: N/A;    ESOPHAGEAL DILATION N/A 6/11/2021    Procedure: DILATION, ESOPHAGUS;  Surgeon: Jake Sheriff MD;  Location: [facility] ENDO;  Service: Endoscopy;   Laterality: N/A;    ESOPHAGOGASTRODUODENOSCOPY      dilated esophagus    ESOPHAGOGASTRODUODENOSCOPY N/A 6/11/2021    Procedure: EGD (ESOPHAGOGASTRODUODENOSCOPY);  Surgeon: Jake Sheriff MD;  Location: [facility];  Service: Endoscopy;  Laterality: N/A;    GASTRIC BYPASS      2011    HYSTERECTOMY  1980    interstim bladder  2009    10/14/14 new battery    OOPHORECTOMY  1980    REPLACEMENT OF SACRAL NERVE STIMULATOR  2/18/2020    Procedure: REPLACEMENT, NEUROSTIMULATOR, SACRAL;  Surgeon: Mary Jane Jarvis MD;  Location: [facility] OR [address] [place] [address];  Service: Urology;;    REVISION OF PROCEDURE INVOLVING SACRAL NEUROSTIMULATOR DEVICE Right 2/18/2020    Procedure: REVISION, NEUROSTIMULATOR, SACRAL/ battery replacement;  Surgeon: Mary Jane Jarvis MD;  Location: [facility] OR [address] [place] [address];  Service: Urology;  Laterality: Right;  1hr/ rep contacted/ (CONNIE)    SKIN CANCER EXCISION      left hand    TONSILLECTOMY      WISDOM TOOTH EXTRACTION       Social History     Tobacco Use    Smoking status: Never Smoker    Smokeless tobacco: Never Used   Substance Use Topics    Alcohol use: Not Currently    Drug use: No     Family History   Problem Relation Age of Onset    Breast cancer Mother 50    Diabetes Unknown     Hypertension Unknown     Hypothyroidism Unknown     Breast cancer Paternal Aunt         40's    Ovarian cancer Paternal Grandmother     Allergic rhinitis Neg Hx     Allergies Neg Hx     Angioedema Neg Hx     Asthma Neg Hx     Atopy Neg Hx     Eczema Neg Hx     Immunodeficiency Neg Hx     Rhinitis Neg Hx     Urticaria Neg Hx      Review of patient's allergies indicates:   Allergen Reactions    Sulfa (sulfonamide antibiotics) Hives    Naproxen Other (See Comments)     Other reaction(s): RT sided numbness         Performance Status:The patient's activity level is functions out of house.      Review of Systems:  a review of eleven systems covering constitutional, Eye, HEENT, Psych, Respiratory, Cardiac, GI,  ", Musculoskeletal, Endocrine, Dermatologic was negative except for pertinent findings as listed ABOVE and below: all good,  Had dm that remitted with bypass 2011.  Positive for SOB, nasal drip,  Chest tightness    Exam:Comprehensive exam done. BP (!) 151/80 (BP Location: Left arm, Patient Position: Sitting, BP Method: Medium (Automatic))   Pulse 63   Ht 5' 4" (1.626 m)   Wt 92.4 kg (203 lb 11.3 oz)   SpO2 98% Comment: on room air at rest  BMI 34.97 kg/m²   Exam included Vitals as listed, and patient's appearance and affect and alertness and mood, oral exam for yeast and hygiene and pharynx lesions and Mallapatti (M) score, neck with inspection for jvd and masses and thyroid abnormalities and lymph nodes (supraclavicular and infraclavicular nodes and axillary also examined and noted if abn), chest exam included symmetry and effort and fremitus and percussion and auscultation, cardiac exam included rhythm and gallops and murmur and rubs and jvd and edema, abdominal exam for mass and hepatosplenomegaly and tenderness and hernias and bowel sounds, Musculoskeletal exam with muscle tone and posture and mobility/gait and  strength, and skin for rashes and cyanosis and pallor and turgor, extremity for clubbing.  Findings were normal except for pertinent findings listed below:  M3, good bs, rest good. Lungs clear-  chest is symmetric, no distress, normal percussion, normal fremitus and good normal breath sounds      Radiographs (ct chest and cxr) reviewed: view by direct vision  i do see clear bronchiectasis,nodules are noted.  Mucoid type impaction suggested in rul ant segment.  CT Abdomen Pelvis With Contrast 06/11/2021 lower lung bases clear    X-Ray Chest PA And Lateral 04/19/2021 Lungs clear      April 19, 2018 ct suggest tracheobronchomalacia on high res spot films- seen 3/7/19  CT Chest:  1. Region of endobronchial plugging unchanged since 2/7/17 of uncertain etiology. This could relate to a process such as " allergic bronchopulmonary aspergillosis, a solid mass nodule, mucous plugging, residual from aspiration, endobronchial spread of disease. Further followup or evaluation is necessary  Electronically signed by: Raffi Gottlieb MD  Date: 03/14/17    10/17/19 Chest X-ray clear    Labs reviewed igm low, igg lowish, humerol titers na    Lab Results   Component Value Date    WBC 5.87 06/11/2021    RBC 4.24 06/11/2021    HGB 11.8 (L) 06/11/2021    HCT 35.0 (L) 06/11/2021    MCV 83 06/11/2021    MCH 27.8 06/11/2021    MCHC 33.7 06/11/2021    RDW 14.0 06/11/2021     06/11/2021    MPV 10.7 06/11/2021    GRAN 4.0 06/11/2021    GRAN 68.0 06/11/2021    LYMPH 1.2 06/11/2021    LYMPH 19.9 06/11/2021    MONO 0.6 06/11/2021    MONO 9.7 06/11/2021    EOS 0.1 06/11/2021    BASO 0.01 06/11/2021    EOSINOPHIL 1.0 06/11/2021    BASOPHIL 0.2 06/11/2021         PFT  Spirometry with bronchodilator, lung volume by gas dilution, and diffusion capacity measured April 19, 2021. The FEV1 FVC ratio was 75% indicating no airflow obstruction measured by spirometry. The FEV1 was 99% predicted at 2.25 L. There was no   statistically significant improvement in spirometry following bronchodilator. Total lung capacity was within normal range at 90% of predicted. Diffusion was slightly low at 77% predicted-uncorrected for anemia if present.   Spirometry is normal. Lung volumes fall within normal range. Diffusion is slightly decreased. Clinical correlation recommended. The bronchodilator response was not significant.       Done [facility] with 10% response, otherwise nl  DATE OF PROCEDURE:  03/24/2017     1.  Spirometry reveals an FVC of 3.1 L, which is 107% predicted.  FEV1 is 2.3 L,   which is 100% predicted.  The ratio, there is preserved.  There is a positive,   but not significant bronchodilator response to both the FVC and FEV1.  Loop   contours appear with adequate effort as well.  2.  Lung volumes.  The total lung capacity is 4.4 L, which is  92% predicted.    There are no signs of gas trapping.  3.  Diffusing capacity is mild-to-moderate reduced at 68%, does improve to 96%   with alveolar volumes.     OVERALL IMPRESSION:  A normal spirometry with a robust yet statistically   insignificant bronchodilator response.  Normal lung volumes and a moderate   reduction in diffusion capacity.    CC: Jonathan Nicole M.D.        Plan:  Clinical impression is resonably certain and repeated evaluation prn +/- follow up will be needed as below.    Cornelia was seen today for 6m f/u, shortness of breath, asthma and sleep apnea.    Diagnoses and all orders for this visit:    Severe persistent asthma without complication  -     fluticasone-umeclidin-vilanter (TRELEGY ELLIPTA) 200-62.5-25 mcg inhaler; Inhale 1 puff into the lungs once daily.  -     predniSONE (DELTASONE) 10 MG tablet; 1 pill for 3-5 days repeat as needed for chest tightness.    SOHA treated with BiPAP    Bronchiectasis without complication  -     fluticasone-umeclidin-vilanter (TRELEGY ELLIPTA) 200-62.5-25 mcg inhaler; Inhale 1 puff into the lungs once daily.  -     predniSONE (DELTASONE) 10 MG tablet; 1 pill for 3-5 days repeat as needed for chest tightness.        Follow up in about 3 months (around 4/12/2022), or if symptoms worsen or fail to improve.    Discussed with patient above for education the following:      Patient Instructions   Spacer for rescue inhaler    Stop Advair and start Trelegy 200 1 puff once a day every day, rinse mouth after using due to risk for thrush if mouth or tongue has white sores contact clinic    Let me know which medication you prefer to get refills.     Take prednisone 10 mg 1 pill for 3-5 days for chest tightness when nebulizer does not fix

## 2022-02-09 ENCOUNTER — PATIENT MESSAGE (OUTPATIENT)
Dept: FAMILY MEDICINE | Facility: CLINIC | Age: 72
End: 2022-02-09
Payer: MEDICARE

## 2022-02-11 ENCOUNTER — OFFICE VISIT (OUTPATIENT)
Dept: PAIN MEDICINE | Facility: CLINIC | Age: 72
End: 2022-02-11
Payer: MEDICARE

## 2022-02-11 VITALS
TEMPERATURE: 98 F | HEART RATE: 81 BPM | WEIGHT: 204.25 LBS | BODY MASS INDEX: 34.87 KG/M2 | OXYGEN SATURATION: 96 % | HEIGHT: 64 IN | SYSTOLIC BLOOD PRESSURE: 127 MMHG | DIASTOLIC BLOOD PRESSURE: 59 MMHG

## 2022-02-11 DIAGNOSIS — M54.16 LUMBAR RADICULOPATHY: Primary | ICD-10-CM

## 2022-02-11 DIAGNOSIS — M48.061 SPINAL STENOSIS OF LUMBAR REGION, UNSPECIFIED WHETHER NEUROGENIC CLAUDICATION PRESENT: ICD-10-CM

## 2022-02-11 PROCEDURE — 1157F PR ADVANCE CARE PLAN OR EQUIV PRESENT IN MEDICAL RECORD: ICD-10-PCS | Mod: HCNC,CPTII,S$GLB, | Performed by: ANESTHESIOLOGY

## 2022-02-11 PROCEDURE — 1125F AMNT PAIN NOTED PAIN PRSNT: CPT | Mod: HCNC,CPTII,S$GLB, | Performed by: ANESTHESIOLOGY

## 2022-02-11 PROCEDURE — 1101F PR PT FALLS ASSESS DOC 0-1 FALLS W/OUT INJ PAST YR: ICD-10-PCS | Mod: HCNC,CPTII,S$GLB, | Performed by: ANESTHESIOLOGY

## 2022-02-11 PROCEDURE — 3074F PR MOST RECENT SYSTOLIC BLOOD PRESSURE < 130 MM HG: ICD-10-PCS | Mod: HCNC,CPTII,S$GLB, | Performed by: ANESTHESIOLOGY

## 2022-02-11 PROCEDURE — 3288F PR FALLS RISK ASSESSMENT DOCUMENTED: ICD-10-PCS | Mod: HCNC,CPTII,S$GLB, | Performed by: ANESTHESIOLOGY

## 2022-02-11 PROCEDURE — 3074F SYST BP LT 130 MM HG: CPT | Mod: HCNC,CPTII,S$GLB, | Performed by: ANESTHESIOLOGY

## 2022-02-11 PROCEDURE — 1160F RVW MEDS BY RX/DR IN RCRD: CPT | Mod: HCNC,CPTII,S$GLB, | Performed by: ANESTHESIOLOGY

## 2022-02-11 PROCEDURE — 1159F MED LIST DOCD IN RCRD: CPT | Mod: HCNC,CPTII,S$GLB, | Performed by: ANESTHESIOLOGY

## 2022-02-11 PROCEDURE — 1160F PR REVIEW ALL MEDS BY PRESCRIBER/CLIN PHARMACIST DOCUMENTED: ICD-10-PCS | Mod: HCNC,CPTII,S$GLB, | Performed by: ANESTHESIOLOGY

## 2022-02-11 PROCEDURE — 99214 PR OFFICE/OUTPT VISIT, EST, LEVL IV, 30-39 MIN: ICD-10-PCS | Mod: HCNC,S$GLB,, | Performed by: ANESTHESIOLOGY

## 2022-02-11 PROCEDURE — 3078F DIAST BP <80 MM HG: CPT | Mod: HCNC,CPTII,S$GLB, | Performed by: ANESTHESIOLOGY

## 2022-02-11 PROCEDURE — 1125F PR PAIN SEVERITY QUANTIFIED, PAIN PRESENT: ICD-10-PCS | Mod: HCNC,CPTII,S$GLB, | Performed by: ANESTHESIOLOGY

## 2022-02-11 PROCEDURE — 3008F PR BODY MASS INDEX (BMI) DOCUMENTED: ICD-10-PCS | Mod: HCNC,CPTII,S$GLB, | Performed by: ANESTHESIOLOGY

## 2022-02-11 PROCEDURE — 1159F PR MEDICATION LIST DOCUMENTED IN MEDICAL RECORD: ICD-10-PCS | Mod: HCNC,CPTII,S$GLB, | Performed by: ANESTHESIOLOGY

## 2022-02-11 PROCEDURE — 1157F ADVNC CARE PLAN IN RCRD: CPT | Mod: HCNC,CPTII,S$GLB, | Performed by: ANESTHESIOLOGY

## 2022-02-11 PROCEDURE — 99999 PR PBB SHADOW E&M-EST. PATIENT-LVL V: ICD-10-PCS | Mod: PBBFAC,HCNC,, | Performed by: ANESTHESIOLOGY

## 2022-02-11 PROCEDURE — 1101F PT FALLS ASSESS-DOCD LE1/YR: CPT | Mod: HCNC,CPTII,S$GLB, | Performed by: ANESTHESIOLOGY

## 2022-02-11 PROCEDURE — 3288F FALL RISK ASSESSMENT DOCD: CPT | Mod: HCNC,CPTII,S$GLB, | Performed by: ANESTHESIOLOGY

## 2022-02-11 PROCEDURE — 3078F PR MOST RECENT DIASTOLIC BLOOD PRESSURE < 80 MM HG: ICD-10-PCS | Mod: HCNC,CPTII,S$GLB, | Performed by: ANESTHESIOLOGY

## 2022-02-11 PROCEDURE — 99999 PR PBB SHADOW E&M-EST. PATIENT-LVL V: CPT | Mod: PBBFAC,HCNC,, | Performed by: ANESTHESIOLOGY

## 2022-02-11 PROCEDURE — 99214 OFFICE O/P EST MOD 30 MIN: CPT | Mod: HCNC,S$GLB,, | Performed by: ANESTHESIOLOGY

## 2022-02-11 PROCEDURE — 3008F BODY MASS INDEX DOCD: CPT | Mod: HCNC,CPTII,S$GLB, | Performed by: ANESTHESIOLOGY

## 2022-02-11 RX ORDER — SODIUM CHLORIDE, SODIUM LACTATE, POTASSIUM CHLORIDE, CALCIUM CHLORIDE 600; 310; 30; 20 MG/100ML; MG/100ML; MG/100ML; MG/100ML
INJECTION, SOLUTION INTRAVENOUS CONTINUOUS
Status: CANCELLED | OUTPATIENT
Start: 2022-02-21

## 2022-02-11 RX ORDER — MIDAZOLAM HYDROCHLORIDE 2 MG/2ML
2 INJECTION, SOLUTION INTRAMUSCULAR; INTRAVENOUS ONCE
Status: CANCELLED | OUTPATIENT
Start: 2022-02-21

## 2022-02-11 NOTE — PROGRESS NOTES
[facility] Follow Up Evaluation    Referred by: Patricia Valerio NP  Reason for referral: back pain    CC:   Chief Complaint   Patient presents with    Low-back Pain      Last 3 PDI Scores 9/10/2021 4/15/2021 8/5/2019   Pain Disability Index (PDI) 26 10 0       Interval HPI 2/11/22: Ms. Delatorre returns to the office for follow up.  Today she reports return of lower back pain, constant, aching, radiating down the back of both of her legs.  She denies any new numbness or weakness.  She has chronic urinary issues and has an InterStim placed this has been an issue for over 20 years no acute changes.    Pain intervention history:  - s/p L5/S1 ROM on 8/14/19 with close to 100% relief of her back and leg pain  - s/p L5/S1 ROM on 5/3/21 with 90% relief.  - s/p L5/S1 ROM on 9/24/21 with 100% relief    HPI:   Cornelia Delatorre is a 71 y.o. female who complains of back pain    Onset: about 3.5 months  Inciting Event: none  Progression: since onset, pain is gradually worsening  Typical Range: 3-10/10  Timing: intermittent  Quality: aching, burning, grabbing, sharp, shooting  Radiation: yes, down the back of both legs left > right  Associated numbness or weakness: yes numbness on the left leg, no weakness  Exacerbated by: standing, coughing/sneezing, walking  Allievated by: rest, heat, tylenol  Is Pain Level Acceptable?: No    Previous Therapies:  PT/OT: yes, felt like it got worse  HEP:   Interventions:   Surgery:  Medications: tylenol  - NSAIDS: avoids 2/2 h/o gastric bypass  - MSK Relaxants:   - TCAs:   - SNRIs:   - Topicals:   - Anticonvulsants: gabapentin 600mg TID  - Opioids:     History:    Current Outpatient Medications:     albuterol (PROVENTIL/VENTOLIN HFA) 90 mcg/actuation inhaler, Inhale 2 puffs into the lungs every 4 (four) hours as needed. 2 puffs every 4 hours as needed for cough, wheeze, or shortness of breath, Disp: 54 g, Rfl: 3    ascorbic acid, vitamin C, (VITAMIN C) 1000 MG tablet, Take  1,000 mg by mouth 2 (two) times daily. , Disp: , Rfl:     azelastine (OPTIVAR) 0.05 % ophthalmic solution, , Disp: , Rfl:     B-complex with vitamin C (Z-BEC OR EQUIV) tablet, Take 1 tablet by mouth once daily., Disp: , Rfl:     Bifidobacterium infantis (ALIGN ORAL), Take by mouth once daily., Disp: , Rfl:     biotin 10,000 mcg Cap, Take 1 tablet by mouth every evening. , Disp: , Rfl:     cloNIDine (CATAPRES) 0.1 MG tablet, Take 1 tablet (0.1 mg total) by mouth 3 (three) times daily as needed (PRN SBP > 165 mmHg)., Disp: 90 tablet, Rfl: 6    cranberry 500 mg Cap, Take 1 capsule by mouth every evening., Disp: , Rfl:     diclofenac sodium (VOLTAREN) 1 % Gel, Apply 2 grams topically once daily. (Patient taking differently: Apply 2 g topically as needed (arthritis).), Disp: 1 Tube, Rfl: 6    diltiaZEM (CARDIZEM CD) 120 MG Cp24, Take 1 capsule (120 mg total) by mouth every evening., Disp: 90 capsule, Rfl: 4    EPINEPHrine (EPIPEN) 0.3 mg/0.3 mL AtIn, Inject 1 each into the muscle as needed. , Disp: , Rfl: 3    esomeprazole (NEXIUM) 40 MG capsule, Take 1 capsule (40 mg total) by mouth before breakfast., Disp: 90 capsule, Rfl: 3    estradioL (ESTRACE) 0.01 % (0.1 mg/gram) vaginal cream, Place 1 g vaginally 3 (three) times a week. Place by fingertip application before bedtime three times a week (Monday, Wednesday, Friday), Disp: 45 g, Rfl: 3    fexofenadine (ALLEGRA) 180 MG tablet, Take 180 mg by mouth once daily. , Disp: , Rfl:     fish oil-omega-3 fatty acids 300-1,000 mg capsule, Take 2 g by mouth once daily., Disp: , Rfl:     fluticasone propionate (FLONASE) 50 mcg/actuation nasal spray, 2 sprays (100 mcg total) by Each Nostril route once daily., Disp: 16 g, Rfl: 11    fluticasone-umeclidin-vilanter (TRELEGY ELLIPTA) 200-62.5-25 mcg inhaler, Inhale 1 puff into the lungs once daily., Disp: 60 each, Rfl: 11    gabapentin (NEURONTIN) 600 MG tablet, Take 1 tablet (600 mg total) by mouth 3 (three) times  daily., Disp: 540 tablet, Rfl: 3    guaifenesin-codeine 100-10 mg/5 ml (GUAIFENESIN AC)  mg/5 mL syrup, Take 5 mLs by mouth 3 (three) times daily as needed for Cough., Disp: 473 mL, Rfl: 2    immun glob G,IgG,-pro-IgA 0-50 (HIZENTRA) 1 gram/5 mL (20 %) Soln, Inject 11 g into the skin once a week., Disp: 220 mL, Rfl: 12    inhalation spacing device, Use as directed for inhalation., Disp: 1 each, Rfl: 0    ipratropium (ATROVENT) 42 mcg (0.06 %) nasal spray, 2 sprays by Nasal route 4 (four) times daily. (Patient taking differently: 2 sprays by Nasal route every evening.), Disp: 15 mL, Rfl: 2    levalbuterol (XOPENEX) 1.25 mg/3 mL nebulizer solution, Take 3 mLs (1.25 mg total) by nebulization every 4 (four) hours as needed for Wheezing or Shortness of Breath. Rescue, Disp: 1 Box, Rfl: 11    metFORMIN (GLUCOPHAGE-XR) 500 MG ER 24hr tablet, Take 1 tablet (500 mg total) by mouth 2 (two) times daily with meals., Disp: 180 tablet, Rfl: 3    montelukast (SINGULAIR) 10 mg tablet, Take 1 tablet (10 mg total) by mouth every evening., Disp: 90 tablet, Rfl: 3    mucus clearing device Cecy, 1 Device by Misc.(Non-Drug; Combo Route) route 2 (two) times daily., Disp: 1 Device, Rfl: 0    multivitamin capsule, Take 1 capsule by mouth once daily. , Disp: , Rfl:     ondansetron (ZOFRAN-ODT) 4 MG TbDL, Take 1 tablet (4 mg total) by mouth every 8 (eight) hours as needed. (Patient taking differently: Take 4 mg by mouth every 8 (eight) hours as needed (vomiting).), Disp: 20 tablet, Rfl: 0    potassium chloride SA (K-DUR,KLOR-CON) 20 MEQ tablet, TAKE 1 TABLET EVERY DAY, Disp: 90 tablet, Rfl: 4    pravastatin (PRAVACHOL) 80 MG tablet, Take 1 tablet (80 mg total) by mouth once daily., Disp: 90 tablet, Rfl: 4    predniSONE (DELTASONE) 10 MG tablet, 1 pill for 3-5 days repeat as needed for chest tightness., Disp: 20 tablet, Rfl: 0    SIMETHICONE (GAS-X ORAL), Take 1 capsule by mouth as needed (gas relief). , Disp: , Rfl:      triamcinolone acetonide (KENALOG-40) 40 mg/mL injection, , Disp: , Rfl:     valsartan-hydrochlorothiazide (DIOVAN-HCT) 160-12.5 mg per tablet, Take 1 tablet by mouth once daily., Disp: 90 tablet, Rfl: 3    vitamin D3-folic acid 5,000 unit- 1 mg Tab, Take 1 tablet by mouth once daily. , Disp: , Rfl:     vit B6-L. tztyzjzm-gn-D15-ALA (NUFOLA) 25 mg-3,500 mcg DFE-1 mg-300 mg Cap, Nufola 25 mg-3,500 mcg [id number] capsule  Take 1 capsule twice a day by oral route for 90 days., Disp: , Rfl:     Past Medical History:   Diagnosis Date    Allergy     Arthritis     Asthma     Cancer     skin cancer to right hand    Cataract     Chronic fatigue 1/24/2017    CVID (common variable immunodeficiency) 3/7/2019    Diabetes mellitus     type2    KLINE (dyspnea on exertion) 1/24/2017    Dyslipidemia 6/25/2019    Encounter for blood transfusion     Essential hypertension 12/29/2011    GERD (gastroesophageal reflux disease)     Headache(784.0)     History of lumpectomy of both breasts     1992 negative    Hyperlipidemia 7/15/2015    Hypertension     Hypothyroidism     Neuropathy     Obesity     Obesity     Postmenopausal HRT (hormone replacement therapy)     Rash     Rosacea     Sleep apnea     on bipap    Snoring     Squamous cell carcinoma of skin     left forearm     UTI (urinary tract infection)     Wears glasses        Past Surgical History:   Procedure Laterality Date    ADENOIDECTOMY      APPENDECTOMY      BLADDER SUSPENSION      BREAST BIOPSY Bilateral 1992    bilateral benign excisional biopsies    BUNIONECTOMY  10/17/14    right, still has discomfort    CATARACT EXTRACTION W/  INTRAOCULAR LENS IMPLANT Bilateral     CHOLECYSTECTOMY  08/02/2017    COLONOSCOPY      CYSTOSCOPY      EPIDURAL STEROID INJECTION INTO LUMBAR SPINE N/A 8/14/2019    Procedure: Injection-steroid-epidural-lumbar L5/S1;  Surgeon: Fredi Rojas MD;  Location: [facility] OR;  Service: Pain Management;  Laterality: N/A;     EPIDURAL STEROID INJECTION INTO LUMBAR SPINE N/A 5/3/2021    Procedure: Injection-steroid-epidural-lumbar L5/S1 to the Left;  Surgeon: Fredi Rojas MD;  Location: [facility] OR;  Service: Pain Management;  Laterality: N/A;    EPIDURAL STEROID INJECTION INTO LUMBAR SPINE N/A 9/24/2021    Procedure: Injection-steroid-epidural-lumbar L5/S1;  Surgeon: Fredi Rojas MD;  Location: [facility] OR;  Service: Pain Management;  Laterality: N/A;    ESOPHAGEAL DILATION N/A 6/11/2021    Procedure: DILATION, ESOPHAGUS;  Surgeon: Jake Sheriff MD;  Location: [facility] ENDO;  Service: Endoscopy;  Laterality: N/A;    ESOPHAGOGASTRODUODENOSCOPY      dilated esophagus    ESOPHAGOGASTRODUODENOSCOPY N/A 6/11/2021    Procedure: EGD (ESOPHAGOGASTRODUODENOSCOPY);  Surgeon: Jake Sheriff MD;  Location: [facility] ENDO;  Service: Endoscopy;  Laterality: N/A;    GASTRIC BYPASS      2011    HYSTERECTOMY  1980    interstim bladder  2009    10/14/14 new battery    OOPHORECTOMY  1980    REPLACEMENT OF SACRAL NERVE STIMULATOR  2/18/2020    Procedure: REPLACEMENT, NEUROSTIMULATOR, SACRAL;  Surgeon: Mary Jane Jarvis MD;  Location: [facility] OR [address] [place] [address];  Service: Urology;;    REVISION OF PROCEDURE INVOLVING SACRAL NEUROSTIMULATOR DEVICE Right 2/18/2020    Procedure: REVISION, NEUROSTIMULATOR, SACRAL/ battery replacement;  Surgeon: Mary Jane Jarvis MD;  Location: [facility] OR 2ND FLR;  Service: Urology;  Laterality: Right;  1hr/ rep contacted/ (CONNIE)    SKIN CANCER EXCISION      left hand    TONSILLECTOMY      WISDOM TOOTH EXTRACTION         Family History   Problem Relation Age of Onset    Breast cancer Mother 50    Diabetes Unknown     Hypertension Unknown     Hypothyroidism Unknown     Breast cancer Paternal Aunt         40's    Ovarian cancer Paternal Grandmother     Allergic rhinitis Neg Hx     Allergies Neg Hx     Angioedema Neg Hx     Asthma Neg Hx     Atopy Neg Hx     Eczema Neg Hx     Immunodeficiency Neg Hx     Rhinitis  Neg Hx     Urticaria Neg Hx        Social History     Socioeconomic History    Marital status:    Tobacco Use    Smoking status: Never Smoker    Smokeless tobacco: Never Used   Substance and Sexual Activity    Alcohol use: Not Currently    Drug use: No    Sexual activity: Not Currently     Social Determinants of Health     Financial Resource Strain: Medium Risk    Difficulty of Paying Living Expenses: Somewhat hard   Food Insecurity: Unknown    Worried About Running Out of Food in the Last Year: Patient refused    Ran Out of Food in the Last Year: Patient refused   Transportation Needs: No Transportation Needs    Lack of Transportation (Medical): No    Lack of Transportation (Non-Medical): No   Physical Activity: Insufficiently Active    Days of Exercise per Week: 1 day    Minutes of Exercise per Session: 10 min   Stress: No Stress Concern Present    Feeling of Stress : Only a little   Social Connections: Unknown    Frequency of Communication with Friends and Family: More than three times a week    Frequency of Social Gatherings with Friends and Family: Once a week    Active Member of Clubs or Organizations: No    Attends Club or Organization Meetings: 1 to 4 times per year    Marital Status:        Review of patient's allergies indicates:   Allergen Reactions    Sulfa (sulfonamide antibiotics) Hives    Naproxen Other (See Comments)     Other reaction(s): RT sided numbness      Albuterol Itching and Palpitations     Nebulizer only. Can use inhaler       Review of Systems:  General ROS: negative for - fever  Psychological ROS: negative for - hostility  Hematological and Lymphatic ROS: positive for - bruising  negative for - bleeding problems  Endocrine ROS: negative for - unexpected weight changes  Respiratory ROS: positive for - cough and shortness of breath  Cardiovascular ROS: no chest pain or dyspnea on exertion  Gastrointestinal ROS: no abdominal pain, change in bowel habits,  "or black or bloody stools  Musculoskeletal ROS: negative for - muscular weakness  Neurological ROS: negative for - numbness/tingling  negative for - bowel and bladder control changes  Dermatological ROS: negative for rash    Physical Exam:  Vitals:    02/11/22 1415   BP: (!) 127/59   Pulse: 81   Temp: 97.6 °F (36.4 °C)   SpO2: 96%   Weight: 92.7 kg (204 lb 4.1 oz)   Height: 5' 4" (1.626 m)   PainSc:   8   PainLoc: Back     Body mass index is 35.06 kg/m².     Gen: NAD  Gait: gait intact  Psych:  Mood appropriate for given condition  HEENT: eyes anicteric   GI: Abd soft  CV: RRR  Lungs: breathing unlabored   ROM: limited AROM of the L spine in all planes, full ROM at ankles, knees and hips  Lumbar flexion 90 degrees, extension 50 degrees, side bending 30 degrees.    Sensation: intact to light touch in all dermatomes tested from L2-S1 bilaterally  Reflexes: 2+ b/l patella and 0/0 b/l achilles  Palpation: Diffusely tender over lumbar paraspinals   Tone: normal in the b/l knees and hips   Skin: intact  Extremities: No edema in b/l ankles or hands  Provacative tests: + b/l axial facet loading       Right Left   L2/3 Iliacus Hip flexion  5  5   L3/4 Qudratus Femoris Knee Extension  5  5   L4/5 Tib Anterior Ankle Dorsiflexion   5  5   L5/S1 Extensor Hallicus Longus Great toe extension  5  5                 S1/S2 Gastroc/Soleus Plantar Flexion  5  5     Imaging:  Xray lumbar spine 6/19/19  FINDINGS:  There is mild exaggeration of the normal lumbar lordosis.  There is 4 mm anterolisthesis of L4 on L5.  The vertebral bodies maintain normal height.  There is no fracture.  There is multilevel endplate osteophyte formation and multilevel facet joint arthropathy.  There is a sacral stimulator in place.    CT lumbar spine 8/6/19  FINDINGS:  Grade 1 anterolisthesis of L4 on L5.  Minimal retrolisthesis of L2 on L3.The vertebral body heights are well-maintained.No fractures are identified. Sacral stimulator partially visualized, which " appears to enter the right S3-4 neural foramen.    Mild multilevel degenerative disc disease with anterior osteophytosis, vacuum disc phenomenon at T10-11 and L1-2 and disc space narrowing, most pronounced and moderate at L1-2.    T12-L1: Small right central posterior disc osteophyte complex.  Mild bilateral facet arthrosis.  No significant spinal canal or neural foraminal stenosis.  L1-2: Circumferential disc bulge.  Mild bilateral facet arthrosis.  No significant spinal canal or neural foraminal stenosis.  L2-3: Minimal retrolisthesis.  Circumferential disc bulge.  Moderate bilateral facet arthrosis.  Mild bilateral neural foraminal stenosis.  Mild spinal canal stenosis.  L3-4: Circumferential disc bulge.  Ligamentum flavum infolding.  Moderate bilateral facet arthrosis.  Mild bilateral neural foraminal stenosis.  Mild spinal canal stenosis  L4-5: Grade 1 anterolisthesis.  Circumferential disc bulge, eccentric to the right.  Ligamentum flavum infolding.  Severe bilateral facet arthrosis.  Mild left and moderate right neural foraminal stenosis.  Severe spinal canal stenosis.  L5-S1: Mild diffuse disc bulge.  Moderate left and mild right facet arthrosis.  Moderate left and mild right neural foraminal stenosis.  No significant spinal canal stenosis.    Labs:  BMP  Lab Results   Component Value Date     11/02/2021    K 4.3 11/02/2021    CL 99 11/02/2021    CO2 30 (H) 11/02/2021    BUN 10 11/02/2021    CREATININE 0.8 11/02/2021    CALCIUM 9.4 11/02/2021    ANIONGAP 9 11/02/2021    ESTGFRAFRICA >60.0 11/02/2021    EGFRNONAA >60.0 11/02/2021     Lab Results   Component Value Date    ALT 25 11/02/2021    AST 30 11/02/2021    ALKPHOS 135 11/02/2021    BILITOT 0.5 11/02/2021       Assessment:  Problem List Items Addressed This Visit        Neuro    Lumbar radiculopathy - Primary      Other Visit Diagnoses     Spinal stenosis of lumbar region, unspecified whether neurogenic claudication present              Treatment  Plan:  71 y.o. year old female with PMH DM II, asthma, HTN, common variable immunodeficiency, GERD, h/o gastric bypass, SOHA presents to the office with lower back pain.      2/11/22 - Ms. Delatorre returns to the office for follow up.  Today she reports return of lower back pain, constant, aching, radiating down the back of both of her legs.  She denies any new numbness or weakness.  She has chronic urinary issues and has an InterStim placed this has been an issue for over 20 years no acute changes.    - on exam she has full strength and intact sensation to light touch  - I independently reviewed her lumbar CT from 2019 and she has L4-5 severe central canal stenosis  - she is status post L5-S1 ROM in October 2021 with near 100% relief lasting for over 4 months  - her pain is similar to previous pain and limiting her mobility and interfering with her ADLs  - we will schedule for repeat L5-S1 lumbar ROM.  The risks and benefits of this intervention, and alternative therapies were discussed with the patient.  The discussion of risks included infection, bleeding, need for additional procedures or surgery, nerve damage.  Questions regarding the procedure, risks, expected outcome, and possible side effects were solicited and answered to the patient's satisfaction.  Cornelia Delatorre wishes to proceed with the injection or procedure.  Written consent was obtained.  - follow-up 2 weeks post injection.  If she fails to get relief then we will get an updated lumbar CT and refer her to Neurosurgery for an evaluation    : Reviewed    Fredi Rojas M.D.  Interventional Pain Medicine / Anesthesiology

## 2022-02-11 NOTE — H&P (VIEW-ONLY)
[facility] Follow Up Evaluation    Referred by: Patricia Valerio NP  Reason for referral: back pain    CC:   Chief Complaint   Patient presents with    Low-back Pain      Last 3 PDI Scores 9/10/2021 4/15/2021 8/5/2019   Pain Disability Index (PDI) 26 10 0       Interval HPI 2/11/22: Ms. Delatorre returns to the office for follow up.  Today she reports return of lower back pain, constant, aching, radiating down the back of both of her legs.  She denies any new numbness or weakness.  She has chronic urinary issues and has an InterStim placed this has been an issue for over 20 years no acute changes.    Pain intervention history:  - s/p L5/S1 ROM on 8/14/19 with close to 100% relief of her back and leg pain  - s/p L5/S1 ROM on 5/3/21 with 90% relief.  - s/p L5/S1 ROM on 9/24/21 with 100% relief    HPI:   Cornelia Delatorre is a 71 y.o. female who complains of back pain    Onset: about 3.5 months  Inciting Event: none  Progression: since onset, pain is gradually worsening  Typical Range: 3-10/10  Timing: intermittent  Quality: aching, burning, grabbing, sharp, shooting  Radiation: yes, down the back of both legs left > right  Associated numbness or weakness: yes numbness on the left leg, no weakness  Exacerbated by: standing, coughing/sneezing, walking  Allievated by: rest, heat, tylenol  Is Pain Level Acceptable?: No    Previous Therapies:  PT/OT: yes, felt like it got worse  HEP:   Interventions:   Surgery:  Medications: tylenol  - NSAIDS: avoids 2/2 h/o gastric bypass  - MSK Relaxants:   - TCAs:   - SNRIs:   - Topicals:   - Anticonvulsants: gabapentin 600mg TID  - Opioids:     History:    Current Outpatient Medications:     albuterol (PROVENTIL/VENTOLIN HFA) 90 mcg/actuation inhaler, Inhale 2 puffs into the lungs every 4 (four) hours as needed. 2 puffs every 4 hours as needed for cough, wheeze, or shortness of breath, Disp: 54 g, Rfl: 3    ascorbic acid, vitamin C, (VITAMIN C) 1000 MG tablet, Take  1,000 mg by mouth 2 (two) times daily. , Disp: , Rfl:     azelastine (OPTIVAR) 0.05 % ophthalmic solution, , Disp: , Rfl:     B-complex with vitamin C (Z-BEC OR EQUIV) tablet, Take 1 tablet by mouth once daily., Disp: , Rfl:     Bifidobacterium infantis (ALIGN ORAL), Take by mouth once daily., Disp: , Rfl:     biotin 10,000 mcg Cap, Take 1 tablet by mouth every evening. , Disp: , Rfl:     cloNIDine (CATAPRES) 0.1 MG tablet, Take 1 tablet (0.1 mg total) by mouth 3 (three) times daily as needed (PRN SBP > 165 mmHg)., Disp: 90 tablet, Rfl: 6    cranberry 500 mg Cap, Take 1 capsule by mouth every evening., Disp: , Rfl:     diclofenac sodium (VOLTAREN) 1 % Gel, Apply 2 grams topically once daily. (Patient taking differently: Apply 2 g topically as needed (arthritis).), Disp: 1 Tube, Rfl: 6    diltiaZEM (CARDIZEM CD) 120 MG Cp24, Take 1 capsule (120 mg total) by mouth every evening., Disp: 90 capsule, Rfl: 4    EPINEPHrine (EPIPEN) 0.3 mg/0.3 mL AtIn, Inject 1 each into the muscle as needed. , Disp: , Rfl: 3    esomeprazole (NEXIUM) 40 MG capsule, Take 1 capsule (40 mg total) by mouth before breakfast., Disp: 90 capsule, Rfl: 3    estradioL (ESTRACE) 0.01 % (0.1 mg/gram) vaginal cream, Place 1 g vaginally 3 (three) times a week. Place by fingertip application before bedtime three times a week (Monday, Wednesday, Friday), Disp: 45 g, Rfl: 3    fexofenadine (ALLEGRA) 180 MG tablet, Take 180 mg by mouth once daily. , Disp: , Rfl:     fish oil-omega-3 fatty acids 300-1,000 mg capsule, Take 2 g by mouth once daily., Disp: , Rfl:     fluticasone propionate (FLONASE) 50 mcg/actuation nasal spray, 2 sprays (100 mcg total) by Each Nostril route once daily., Disp: 16 g, Rfl: 11    fluticasone-umeclidin-vilanter (TRELEGY ELLIPTA) 200-62.5-25 mcg inhaler, Inhale 1 puff into the lungs once daily., Disp: 60 each, Rfl: 11    gabapentin (NEURONTIN) 600 MG tablet, Take 1 tablet (600 mg total) by mouth 3 (three) times  daily., Disp: 540 tablet, Rfl: 3    guaifenesin-codeine 100-10 mg/5 ml (GUAIFENESIN AC)  mg/5 mL syrup, Take 5 mLs by mouth 3 (three) times daily as needed for Cough., Disp: 473 mL, Rfl: 2    immun glob G,IgG,-pro-IgA 0-50 (HIZENTRA) 1 gram/5 mL (20 %) Soln, Inject 11 g into the skin once a week., Disp: 220 mL, Rfl: 12    inhalation spacing device, Use as directed for inhalation., Disp: 1 each, Rfl: 0    ipratropium (ATROVENT) 42 mcg (0.06 %) nasal spray, 2 sprays by Nasal route 4 (four) times daily. (Patient taking differently: 2 sprays by Nasal route every evening.), Disp: 15 mL, Rfl: 2    levalbuterol (XOPENEX) 1.25 mg/3 mL nebulizer solution, Take 3 mLs (1.25 mg total) by nebulization every 4 (four) hours as needed for Wheezing or Shortness of Breath. Rescue, Disp: 1 Box, Rfl: 11    metFORMIN (GLUCOPHAGE-XR) 500 MG ER 24hr tablet, Take 1 tablet (500 mg total) by mouth 2 (two) times daily with meals., Disp: 180 tablet, Rfl: 3    montelukast (SINGULAIR) 10 mg tablet, Take 1 tablet (10 mg total) by mouth every evening., Disp: 90 tablet, Rfl: 3    mucus clearing device Cecy, 1 Device by Misc.(Non-Drug; Combo Route) route 2 (two) times daily., Disp: 1 Device, Rfl: 0    multivitamin capsule, Take 1 capsule by mouth once daily. , Disp: , Rfl:     ondansetron (ZOFRAN-ODT) 4 MG TbDL, Take 1 tablet (4 mg total) by mouth every 8 (eight) hours as needed. (Patient taking differently: Take 4 mg by mouth every 8 (eight) hours as needed (vomiting).), Disp: 20 tablet, Rfl: 0    potassium chloride SA (K-DUR,KLOR-CON) 20 MEQ tablet, TAKE 1 TABLET EVERY DAY, Disp: 90 tablet, Rfl: 4    pravastatin (PRAVACHOL) 80 MG tablet, Take 1 tablet (80 mg total) by mouth once daily., Disp: 90 tablet, Rfl: 4    predniSONE (DELTASONE) 10 MG tablet, 1 pill for 3-5 days repeat as needed for chest tightness., Disp: 20 tablet, Rfl: 0    SIMETHICONE (GAS-X ORAL), Take 1 capsule by mouth as needed (gas relief). , Disp: , Rfl:      triamcinolone acetonide (KENALOG-40) 40 mg/mL injection, , Disp: , Rfl:     valsartan-hydrochlorothiazide (DIOVAN-HCT) 160-12.5 mg per tablet, Take 1 tablet by mouth once daily., Disp: 90 tablet, Rfl: 3    vitamin D3-folic acid 5,000 unit- 1 mg Tab, Take 1 tablet by mouth once daily. , Disp: , Rfl:     vit B6-L. tfoxwoab-xc-C86-ALA (NUFOLA) 25 mg-3,500 mcg DFE-1 mg-300 mg Cap, Nufola 25 mg-3,500 mcg [id number] capsule  Take 1 capsule twice a day by oral route for 90 days., Disp: , Rfl:     Past Medical History:   Diagnosis Date    Allergy     Arthritis     Asthma     Cancer     skin cancer to right hand    Cataract     Chronic fatigue 1/24/2017    CVID (common variable immunodeficiency) 3/7/2019    Diabetes mellitus     type2    KLINE (dyspnea on exertion) 1/24/2017    Dyslipidemia 6/25/2019    Encounter for blood transfusion     Essential hypertension 12/29/2011    GERD (gastroesophageal reflux disease)     Headache(784.0)     History of lumpectomy of both breasts     1992 negative    Hyperlipidemia 7/15/2015    Hypertension     Hypothyroidism     Neuropathy     Obesity     Obesity     Postmenopausal HRT (hormone replacement therapy)     Rash     Rosacea     Sleep apnea     on bipap    Snoring     Squamous cell carcinoma of skin     left forearm     UTI (urinary tract infection)     Wears glasses        Past Surgical History:   Procedure Laterality Date    ADENOIDECTOMY      APPENDECTOMY      BLADDER SUSPENSION      BREAST BIOPSY Bilateral 1992    bilateral benign excisional biopsies    BUNIONECTOMY  10/17/14    right, still has discomfort    CATARACT EXTRACTION W/  INTRAOCULAR LENS IMPLANT Bilateral     CHOLECYSTECTOMY  08/02/2017    COLONOSCOPY      CYSTOSCOPY      EPIDURAL STEROID INJECTION INTO LUMBAR SPINE N/A 8/14/2019    Procedure: Injection-steroid-epidural-lumbar L5/S1;  Surgeon: Fredi Rojas MD;  Location: [facility] OR;  Service: Pain Management;  Laterality: N/A;     EPIDURAL STEROID INJECTION INTO LUMBAR SPINE N/A 5/3/2021    Procedure: Injection-steroid-epidural-lumbar L5/S1 to the Left;  Surgeon: Fredi Rojas MD;  Location: [facility] OR;  Service: Pain Management;  Laterality: N/A;    EPIDURAL STEROID INJECTION INTO LUMBAR SPINE N/A 9/24/2021    Procedure: Injection-steroid-epidural-lumbar L5/S1;  Surgeon: Fredi Rojas MD;  Location: [facility] OR;  Service: Pain Management;  Laterality: N/A;    ESOPHAGEAL DILATION N/A 6/11/2021    Procedure: DILATION, ESOPHAGUS;  Surgeon: Jake Sheriff MD;  Location: [facility] ENDO;  Service: Endoscopy;  Laterality: N/A;    ESOPHAGOGASTRODUODENOSCOPY      dilated esophagus    ESOPHAGOGASTRODUODENOSCOPY N/A 6/11/2021    Procedure: EGD (ESOPHAGOGASTRODUODENOSCOPY);  Surgeon: Jake Sheriff MD;  Location: [facility] ENDO;  Service: Endoscopy;  Laterality: N/A;    GASTRIC BYPASS      2011    HYSTERECTOMY  1980    interstim bladder  2009    10/14/14 new battery    OOPHORECTOMY  1980    REPLACEMENT OF SACRAL NERVE STIMULATOR  2/18/2020    Procedure: REPLACEMENT, NEUROSTIMULATOR, SACRAL;  Surgeon: Mary Jane Jarvis MD;  Location: [facility] OR [address] [place] [address];  Service: Urology;;    REVISION OF PROCEDURE INVOLVING SACRAL NEUROSTIMULATOR DEVICE Right 2/18/2020    Procedure: REVISION, NEUROSTIMULATOR, SACRAL/ battery replacement;  Surgeon: Mary Jane Jarvis MD;  Location: [facility] OR 2ND FLR;  Service: Urology;  Laterality: Right;  1hr/ rep contacted/ (CONNIE)    SKIN CANCER EXCISION      left hand    TONSILLECTOMY      WISDOM TOOTH EXTRACTION         Family History   Problem Relation Age of Onset    Breast cancer Mother 50    Diabetes Unknown     Hypertension Unknown     Hypothyroidism Unknown     Breast cancer Paternal Aunt         40's    Ovarian cancer Paternal Grandmother     Allergic rhinitis Neg Hx     Allergies Neg Hx     Angioedema Neg Hx     Asthma Neg Hx     Atopy Neg Hx     Eczema Neg Hx     Immunodeficiency Neg Hx     Rhinitis  Neg Hx     Urticaria Neg Hx        Social History     Socioeconomic History    Marital status:    Tobacco Use    Smoking status: Never Smoker    Smokeless tobacco: Never Used   Substance and Sexual Activity    Alcohol use: Not Currently    Drug use: No    Sexual activity: Not Currently     Social Determinants of Health     Financial Resource Strain: Medium Risk    Difficulty of Paying Living Expenses: Somewhat hard   Food Insecurity: Unknown    Worried About Running Out of Food in the Last Year: Patient refused    Ran Out of Food in the Last Year: Patient refused   Transportation Needs: No Transportation Needs    Lack of Transportation (Medical): No    Lack of Transportation (Non-Medical): No   Physical Activity: Insufficiently Active    Days of Exercise per Week: 1 day    Minutes of Exercise per Session: 10 min   Stress: No Stress Concern Present    Feeling of Stress : Only a little   Social Connections: Unknown    Frequency of Communication with Friends and Family: More than three times a week    Frequency of Social Gatherings with Friends and Family: Once a week    Active Member of Clubs or Organizations: No    Attends Club or Organization Meetings: 1 to 4 times per year    Marital Status:        Review of patient's allergies indicates:   Allergen Reactions    Sulfa (sulfonamide antibiotics) Hives    Naproxen Other (See Comments)     Other reaction(s): RT sided numbness      Albuterol Itching and Palpitations     Nebulizer only. Can use inhaler       Review of Systems:  General ROS: negative for - fever  Psychological ROS: negative for - hostility  Hematological and Lymphatic ROS: positive for - bruising  negative for - bleeding problems  Endocrine ROS: negative for - unexpected weight changes  Respiratory ROS: positive for - cough and shortness of breath  Cardiovascular ROS: no chest pain or dyspnea on exertion  Gastrointestinal ROS: no abdominal pain, change in bowel habits,  "or black or bloody stools  Musculoskeletal ROS: negative for - muscular weakness  Neurological ROS: negative for - numbness/tingling  negative for - bowel and bladder control changes  Dermatological ROS: negative for rash    Physical Exam:  Vitals:    02/11/22 1415   BP: (!) 127/59   Pulse: 81   Temp: 97.6 °F (36.4 °C)   SpO2: 96%   Weight: 92.7 kg (204 lb 4.1 oz)   Height: 5' 4" (1.626 m)   PainSc:   8   PainLoc: Back     Body mass index is 35.06 kg/m².     Gen: NAD  Gait: gait intact  Psych:  Mood appropriate for given condition  HEENT: eyes anicteric   GI: Abd soft  CV: RRR  Lungs: breathing unlabored   ROM: limited AROM of the L spine in all planes, full ROM at ankles, knees and hips  Lumbar flexion 90 degrees, extension 50 degrees, side bending 30 degrees.    Sensation: intact to light touch in all dermatomes tested from L2-S1 bilaterally  Reflexes: 2+ b/l patella and 0/0 b/l achilles  Palpation: Diffusely tender over lumbar paraspinals   Tone: normal in the b/l knees and hips   Skin: intact  Extremities: No edema in b/l ankles or hands  Provacative tests: + b/l axial facet loading       Right Left   L2/3 Iliacus Hip flexion  5  5   L3/4 Qudratus Femoris Knee Extension  5  5   L4/5 Tib Anterior Ankle Dorsiflexion   5  5   L5/S1 Extensor Hallicus Longus Great toe extension  5  5                 S1/S2 Gastroc/Soleus Plantar Flexion  5  5     Imaging:  Xray lumbar spine 6/19/19  FINDINGS:  There is mild exaggeration of the normal lumbar lordosis.  There is 4 mm anterolisthesis of L4 on L5.  The vertebral bodies maintain normal height.  There is no fracture.  There is multilevel endplate osteophyte formation and multilevel facet joint arthropathy.  There is a sacral stimulator in place.    CT lumbar spine 8/6/19  FINDINGS:  Grade 1 anterolisthesis of L4 on L5.  Minimal retrolisthesis of L2 on L3.The vertebral body heights are well-maintained.No fractures are identified. Sacral stimulator partially visualized, which " appears to enter the right S3-4 neural foramen.    Mild multilevel degenerative disc disease with anterior osteophytosis, vacuum disc phenomenon at T10-11 and L1-2 and disc space narrowing, most pronounced and moderate at L1-2.    T12-L1: Small right central posterior disc osteophyte complex.  Mild bilateral facet arthrosis.  No significant spinal canal or neural foraminal stenosis.  L1-2: Circumferential disc bulge.  Mild bilateral facet arthrosis.  No significant spinal canal or neural foraminal stenosis.  L2-3: Minimal retrolisthesis.  Circumferential disc bulge.  Moderate bilateral facet arthrosis.  Mild bilateral neural foraminal stenosis.  Mild spinal canal stenosis.  L3-4: Circumferential disc bulge.  Ligamentum flavum infolding.  Moderate bilateral facet arthrosis.  Mild bilateral neural foraminal stenosis.  Mild spinal canal stenosis  L4-5: Grade 1 anterolisthesis.  Circumferential disc bulge, eccentric to the right.  Ligamentum flavum infolding.  Severe bilateral facet arthrosis.  Mild left and moderate right neural foraminal stenosis.  Severe spinal canal stenosis.  L5-S1: Mild diffuse disc bulge.  Moderate left and mild right facet arthrosis.  Moderate left and mild right neural foraminal stenosis.  No significant spinal canal stenosis.    Labs:  BMP  Lab Results   Component Value Date     11/02/2021    K 4.3 11/02/2021    CL 99 11/02/2021    CO2 30 (H) 11/02/2021    BUN 10 11/02/2021    CREATININE 0.8 11/02/2021    CALCIUM 9.4 11/02/2021    ANIONGAP 9 11/02/2021    ESTGFRAFRICA >60.0 11/02/2021    EGFRNONAA >60.0 11/02/2021     Lab Results   Component Value Date    ALT 25 11/02/2021    AST 30 11/02/2021    ALKPHOS 135 11/02/2021    BILITOT 0.5 11/02/2021       Assessment:  Problem List Items Addressed This Visit        Neuro    Lumbar radiculopathy - Primary      Other Visit Diagnoses     Spinal stenosis of lumbar region, unspecified whether neurogenic claudication present              Treatment  Plan:  71 y.o. year old female with PMH DM II, asthma, HTN, common variable immunodeficiency, GERD, h/o gastric bypass, SOHA presents to the office with lower back pain.      2/11/22 - Ms. Delatorre returns to the office for follow up.  Today she reports return of lower back pain, constant, aching, radiating down the back of both of her legs.  She denies any new numbness or weakness.  She has chronic urinary issues and has an InterStim placed this has been an issue for over 20 years no acute changes.    - on exam she has full strength and intact sensation to light touch  - I independently reviewed her lumbar CT from 2019 and she has L4-5 severe central canal stenosis  - she is status post L5-S1 ROM in October 2021 with near 100% relief lasting for over 4 months  - her pain is similar to previous pain and limiting her mobility and interfering with her ADLs  - we will schedule for repeat L5-S1 lumbar ROM.  The risks and benefits of this intervention, and alternative therapies were discussed with the patient.  The discussion of risks included infection, bleeding, need for additional procedures or surgery, nerve damage.  Questions regarding the procedure, risks, expected outcome, and possible side effects were solicited and answered to the patient's satisfaction.  Cornelia Delatorre wishes to proceed with the injection or procedure.  Written consent was obtained.  - follow-up 2 weeks post injection.  If she fails to get relief then we will get an updated lumbar CT and refer her to Neurosurgery for an evaluation    : Reviewed    Fredi Rojas M.D.  Interventional Pain Medicine / Anesthesiology

## 2022-02-17 ENCOUNTER — OFFICE VISIT (OUTPATIENT)
Dept: FAMILY MEDICINE | Facility: CLINIC | Age: 72
End: 2022-02-17
Payer: MEDICARE

## 2022-02-17 VITALS
BODY MASS INDEX: 34.92 KG/M2 | HEIGHT: 64 IN | OXYGEN SATURATION: 97 % | SYSTOLIC BLOOD PRESSURE: 122 MMHG | WEIGHT: 204.56 LBS | HEART RATE: 89 BPM | DIASTOLIC BLOOD PRESSURE: 80 MMHG

## 2022-02-17 DIAGNOSIS — I10 ESSENTIAL HYPERTENSION: ICD-10-CM

## 2022-02-17 DIAGNOSIS — E11.69 TYPE 2 DIABETES MELLITUS WITH OTHER SPECIFIED COMPLICATION, WITHOUT LONG-TERM CURRENT USE OF INSULIN: ICD-10-CM

## 2022-02-17 DIAGNOSIS — Z00.00 ENCOUNTER FOR PREVENTIVE HEALTH EXAMINATION: Primary | ICD-10-CM

## 2022-02-17 DIAGNOSIS — J47.9 BRONCHIECTASIS WITHOUT COMPLICATION: ICD-10-CM

## 2022-02-17 DIAGNOSIS — G60.9 IDIOPATHIC PERIPHERAL NEUROPATHY: ICD-10-CM

## 2022-02-17 DIAGNOSIS — E11.42 DIABETIC POLYNEUROPATHY ASSOCIATED WITH TYPE 2 DIABETES MELLITUS: ICD-10-CM

## 2022-02-17 DIAGNOSIS — I70.0 ATHEROSCLEROSIS OF ABDOMINAL AORTA: ICD-10-CM

## 2022-02-17 DIAGNOSIS — I65.23 BILATERAL CAROTID ARTERY STENOSIS: ICD-10-CM

## 2022-02-17 DIAGNOSIS — D83.9 CVID (COMMON VARIABLE IMMUNODEFICIENCY): ICD-10-CM

## 2022-02-17 DIAGNOSIS — E66.01 SEVERE OBESITY (BMI 35.0-35.9 WITH COMORBIDITY): ICD-10-CM

## 2022-02-17 PROBLEM — E83.42 HYPOMAGNESEMIA: Status: RESOLVED | Noted: 2021-06-11 | Resolved: 2022-02-17

## 2022-02-17 PROBLEM — E87.1 HYPONATREMIA: Status: RESOLVED | Noted: 2021-06-11 | Resolved: 2022-02-17

## 2022-02-17 PROBLEM — J32.9 SINUSITIS: Status: RESOLVED | Noted: 2020-05-04 | Resolved: 2022-02-17

## 2022-02-17 PROCEDURE — 1159F MED LIST DOCD IN RCRD: CPT | Mod: HCNC,CPTII,S$GLB, | Performed by: NURSE PRACTITIONER

## 2022-02-17 PROCEDURE — 1170F PR FUNCTIONAL STATUS ASSESSED: ICD-10-PCS | Mod: HCNC,CPTII,S$GLB, | Performed by: NURSE PRACTITIONER

## 2022-02-17 PROCEDURE — 3288F FALL RISK ASSESSMENT DOCD: CPT | Mod: HCNC,CPTII,S$GLB, | Performed by: NURSE PRACTITIONER

## 2022-02-17 PROCEDURE — 1157F ADVNC CARE PLAN IN RCRD: CPT | Mod: HCNC,CPTII,S$GLB, | Performed by: NURSE PRACTITIONER

## 2022-02-17 PROCEDURE — 3008F BODY MASS INDEX DOCD: CPT | Mod: HCNC,CPTII,S$GLB, | Performed by: NURSE PRACTITIONER

## 2022-02-17 PROCEDURE — 99499 UNLISTED E&M SERVICE: CPT | Mod: HCNC,S$GLB,, | Performed by: NURSE PRACTITIONER

## 2022-02-17 PROCEDURE — G0439 PPPS, SUBSEQ VISIT: HCPCS | Mod: HCNC,S$GLB,, | Performed by: NURSE PRACTITIONER

## 2022-02-17 PROCEDURE — 99499 RISK ADDL DX/OHS AUDIT: ICD-10-PCS | Mod: HCNC,S$GLB,, | Performed by: NURSE PRACTITIONER

## 2022-02-17 PROCEDURE — 1125F AMNT PAIN NOTED PAIN PRSNT: CPT | Mod: HCNC,CPTII,S$GLB, | Performed by: NURSE PRACTITIONER

## 2022-02-17 PROCEDURE — 1101F PR PT FALLS ASSESS DOC 0-1 FALLS W/OUT INJ PAST YR: ICD-10-PCS | Mod: HCNC,CPTII,S$GLB, | Performed by: NURSE PRACTITIONER

## 2022-02-17 PROCEDURE — 3074F SYST BP LT 130 MM HG: CPT | Mod: HCNC,CPTII,S$GLB, | Performed by: NURSE PRACTITIONER

## 2022-02-17 PROCEDURE — 3079F PR MOST RECENT DIASTOLIC BLOOD PRESSURE 80-89 MM HG: ICD-10-PCS | Mod: HCNC,CPTII,S$GLB, | Performed by: NURSE PRACTITIONER

## 2022-02-17 PROCEDURE — 1160F PR REVIEW ALL MEDS BY PRESCRIBER/CLIN PHARMACIST DOCUMENTED: ICD-10-PCS | Mod: HCNC,CPTII,S$GLB, | Performed by: NURSE PRACTITIONER

## 2022-02-17 PROCEDURE — 1159F PR MEDICATION LIST DOCUMENTED IN MEDICAL RECORD: ICD-10-PCS | Mod: HCNC,CPTII,S$GLB, | Performed by: NURSE PRACTITIONER

## 2022-02-17 PROCEDURE — G0439 PR MEDICARE ANNUAL WELLNESS SUBSEQUENT VISIT: ICD-10-PCS | Mod: HCNC,S$GLB,, | Performed by: NURSE PRACTITIONER

## 2022-02-17 PROCEDURE — 3288F PR FALLS RISK ASSESSMENT DOCUMENTED: ICD-10-PCS | Mod: HCNC,CPTII,S$GLB, | Performed by: NURSE PRACTITIONER

## 2022-02-17 PROCEDURE — 1157F PR ADVANCE CARE PLAN OR EQUIV PRESENT IN MEDICAL RECORD: ICD-10-PCS | Mod: HCNC,CPTII,S$GLB, | Performed by: NURSE PRACTITIONER

## 2022-02-17 PROCEDURE — 99999 PR PBB SHADOW E&M-EST. PATIENT-LVL V: CPT | Mod: PBBFAC,HCNC,, | Performed by: NURSE PRACTITIONER

## 2022-02-17 PROCEDURE — 1101F PT FALLS ASSESS-DOCD LE1/YR: CPT | Mod: HCNC,CPTII,S$GLB, | Performed by: NURSE PRACTITIONER

## 2022-02-17 PROCEDURE — 3074F PR MOST RECENT SYSTOLIC BLOOD PRESSURE < 130 MM HG: ICD-10-PCS | Mod: HCNC,CPTII,S$GLB, | Performed by: NURSE PRACTITIONER

## 2022-02-17 PROCEDURE — 1160F RVW MEDS BY RX/DR IN RCRD: CPT | Mod: HCNC,CPTII,S$GLB, | Performed by: NURSE PRACTITIONER

## 2022-02-17 PROCEDURE — 3079F DIAST BP 80-89 MM HG: CPT | Mod: HCNC,CPTII,S$GLB, | Performed by: NURSE PRACTITIONER

## 2022-02-17 PROCEDURE — 99999 PR PBB SHADOW E&M-EST. PATIENT-LVL V: ICD-10-PCS | Mod: PBBFAC,HCNC,, | Performed by: NURSE PRACTITIONER

## 2022-02-17 PROCEDURE — 3008F PR BODY MASS INDEX (BMI) DOCUMENTED: ICD-10-PCS | Mod: HCNC,CPTII,S$GLB, | Performed by: NURSE PRACTITIONER

## 2022-02-17 PROCEDURE — 1125F PR PAIN SEVERITY QUANTIFIED, PAIN PRESENT: ICD-10-PCS | Mod: HCNC,CPTII,S$GLB, | Performed by: NURSE PRACTITIONER

## 2022-02-17 PROCEDURE — 1170F FXNL STATUS ASSESSED: CPT | Mod: HCNC,CPTII,S$GLB, | Performed by: NURSE PRACTITIONER

## 2022-02-17 NOTE — PROGRESS NOTES
"  Cornelia Delatorre presented for a  Medicare AWV and comprehensive Health Risk Assessment today. The following components were reviewed and updated:    · Medical history  · Family History  · Social history  · Allergies and Current Medications  · Health Risk Assessment  · Health Maintenance  · Care Team         ** See Completed Assessments for Annual Wellness Visit within the encounter summary.**         The following assessments were completed:  · Living Situation  · CAGE  · Depression Screening  · Timed Get Up and Go  · Whisper Test  · Cognitive Function Screening          · Nutrition Screening  · ADL Screening  · PAQ Screening        Vitals:    02/17/22 0902   BP: 122/80   BP Location: Left arm   Patient Position: Sitting   BP Method: Medium (Manual)   Pulse: 89   SpO2: 97%   Weight: 92.8 kg (204 lb 9.4 oz)   Height: 5' 4" (1.626 m)     Body mass index is 35.12 kg/m².  Physical Exam  Vitals reviewed.   Constitutional:       General: She is not in acute distress.  HENT:      Head: Normocephalic.   Cardiovascular:      Pulses:           Dorsalis pedis pulses are 2+ on the right side and 2+ on the left side.        Posterior tibial pulses are 2+ on the right side and 2+ on the left side.   Pulmonary:      Effort: Pulmonary effort is normal.   Feet:      Right foot:      Protective Sensation: 6 sites tested. 6 sites sensed.      Skin integrity: Skin integrity normal. No warmth, callus or dry skin.      Left foot:      Protective Sensation: 6 sites tested. 6 sites sensed.      Skin integrity: Skin integrity normal. No warmth, callus or dry skin.   Skin:     General: Skin is warm.   Neurological:      Mental Status: She is alert.               Diagnoses and health risks identified today and associated recommendations/orders:    1. Encounter for preventive health examination  Reviewed health maintenance and provided recommendations   DXA, mammogram, A1c scheduled    2. Diabetic polyneuropathy associated with type 2 " diabetes mellitus  Continue to monitor  Followed by [place]  .      3. Idiopathic peripheral neuropathy  Continue to monitor  Followed by [place].      4. Bronchiectasis without complication  Continue to monitor  Followed by Corey/[place].      5. Essential hypertension  Continue to monitor  Followed by Armond Cortez MD .      6. Atherosclerosis of abdominal aorta  Continue to monitor  Followed by Sadie  Taking statin.      7. CVID (common variable immunodeficiency)  Continue to monitor  Followed by Kemi  Receives IVIG.      8. Severe obesity (BMI 35.0-35.9 with comorbidity)  Continue to monitor  Followed by Armond Cortez MD   Encourage exercise as tolerated  Encourage healthy food choices.      9. Type 2 diabetes mellitus with other specified complication, without long-term current use of insulin  Continue to monitor  Followed by Armond Cortez MD   Last a1c 6.1  Taking metformin.      10. Bilateral carotid artery stenosis  Continue to monitor  Followed by Sadie  Taking statin.        Provided Cornelia with a 5-10 year written screening schedule and personal prevention plan. Recommendations were developed using the USPSTF age appropriate recommendations. Education, counseling, and referrals were provided as needed. After Visit Summary printed and given to patient which includes a list of additional screenings\tests needed.    Follow up in one year    Carie Victor NP  I offered to discuss advanced care planning, including how to pick a person who would make decisions for you if you were unable to make them for yourself, called a health care power of , and what kind of decisions you might make such as use of life sustaining treatments such as ventilators and tube feeding when faced with a life limiting illness recorded on a living will that they will need to know. (How you want to be cared for as you near the end of your natural life)     X  Patient has advanced directives on file, which we reviewed,  and they do not wish to make changes.

## 2022-02-17 NOTE — PATIENT INSTRUCTIONS
Counseling and Referral of Other Preventative  (Italic type indicates deductible and co-insurance are waived)    Patient Name: Cornelia Delatorre  Today's Date: 2/17/2022    Health Maintenance       Date Due Completion Date    Foot Exam 01/25/2022 1/25/2021 (Done)    Override on 1/25/2021: Done ()    Override on 5/20/2019: Done (Dr. Eng)    Override on 11/2/2018: Done (Diego)    Override on 4/27/2018: Done (Diego)    Override on 9/1/2016: Done (date approximately, seeing Dr. Willams)    Override on 1/4/2016: Done (with Dr. Eng)    Override on 10/19/2015: Done (Dr. Eng)    Override on 10/22/2014: Done (Dr Eng)    Mammogram 03/01/2022 3/1/2021    Hemoglobin A1c 03/08/2022 9/8/2021    Diabetes Urine Screening 09/08/2022 9/8/2021    Eye Exam 09/28/2022 9/28/2021    Override on 8/31/2017: Done (Dr. Genaro Sanderson)    Override on 8/29/2016: Done (Genaro Sanderson)    Override on 8/20/2015: Done    Override on 8/19/2014: Done (Dr Sanderson)    Lipid Panel 11/02/2022 11/2/2021    High Dose Statin 02/11/2023 2/11/2022    DEXA SCAN 02/27/2023 2/27/2020    TETANUS VACCINE 11/08/2023 11/8/2013    Colorectal Cancer Screening 10/05/2030 10/5/2020        No orders of the defined types were placed in this encounter.    The following information is provided to all patients.  This information is to help you find resources for any of the problems found today that may be affecting your health:                Living healthy guide: www.[facility].[place].gov      Understanding Diabetes: www.diabetes.org      Eating healthy: www.cdc.gov/healthyweight      CDC home safety checklist: www.cdc.gov/steadi/patient.html      Agency on Aging: www.goea.[place].gov      Alcoholics anonymous (AA): www.aa.org      Physical Activity: www.richard.nih.gov/ko5mvnk      Tobacco use: www.quitwithusla.org

## 2022-02-18 ENCOUNTER — LAB VISIT (OUTPATIENT)
Dept: FAMILY MEDICINE | Facility: CLINIC | Age: 72
End: 2022-02-18
Payer: MEDICARE

## 2022-02-18 DIAGNOSIS — Z01.818 PRE-OP TESTING: ICD-10-CM

## 2022-02-18 PROCEDURE — U0005 INFEC AGEN DETEC AMPLI PROBE: HCPCS | Performed by: ANESTHESIOLOGY

## 2022-02-18 PROCEDURE — U0003 INFECTIOUS AGENT DETECTION BY NUCLEIC ACID (DNA OR RNA); SEVERE ACUTE RESPIRATORY SYNDROME CORONAVIRUS 2 (SARS-COV-2) (CORONAVIRUS DISEASE [COVID-19]), AMPLIFIED PROBE TECHNIQUE, MAKING USE OF HIGH THROUGHPUT TECHNOLOGIES AS DESCRIBED BY CMS-2020-01-R: HCPCS | Mod: HCNC | Performed by: ANESTHESIOLOGY

## 2022-02-19 LAB — SARS-COV-2 RNA RESP QL NAA+PROBE: NOT DETECTED

## 2022-02-21 ENCOUNTER — HOSPITAL ENCOUNTER (OUTPATIENT)
Facility: HOSPITAL | Age: 72
Discharge: HOME | End: 2022-02-21
Attending: ANESTHESIOLOGY | Admitting: ANESTHESIOLOGY
Payer: MEDICARE

## 2022-02-21 ENCOUNTER — HOSPITAL ENCOUNTER (OUTPATIENT)
Dept: RADIOLOGY | Facility: HOSPITAL | Age: 72
Discharge: HOME | End: 2022-02-21
Attending: ANESTHESIOLOGY
Payer: MEDICARE

## 2022-02-21 DIAGNOSIS — M54.16 LUMBAR RADICULOPATHY: Primary | ICD-10-CM

## 2022-02-21 DIAGNOSIS — M51.36 DDD (DEGENERATIVE DISC DISEASE), LUMBAR: ICD-10-CM

## 2022-02-21 LAB — GLUCOSE SERPL-MCNC: 109 MG/DL (ref 70–110)

## 2022-02-21 PROCEDURE — 25000003 PHARM REV CODE 250: Mod: HCNC,PO | Performed by: ANESTHESIOLOGY

## 2022-02-21 PROCEDURE — 25500020 PHARM REV CODE 255: Mod: HCNC,PO | Performed by: ANESTHESIOLOGY

## 2022-02-21 PROCEDURE — 62323 PR INJ LUMBAR/SACRAL, W/IMAGING GUIDANCE: ICD-10-PCS | Mod: HCNC,,, | Performed by: ANESTHESIOLOGY

## 2022-02-21 PROCEDURE — 82962 GLUCOSE BLOOD TEST: CPT | Mod: HCNC,PO | Performed by: ANESTHESIOLOGY

## 2022-02-21 PROCEDURE — 63600175 PHARM REV CODE 636 W HCPCS: Mod: HCNC,PO | Performed by: ANESTHESIOLOGY

## 2022-02-21 PROCEDURE — 62323 NJX INTERLAMINAR LMBR/SAC: CPT | Mod: HCNC,PO | Performed by: ANESTHESIOLOGY

## 2022-02-21 PROCEDURE — 76000 FLUOROSCOPY <1 HR PHYS/QHP: CPT | Mod: TC,HCNC,PO

## 2022-02-21 PROCEDURE — 62323 NJX INTERLAMINAR LMBR/SAC: CPT | Mod: HCNC,,, | Performed by: ANESTHESIOLOGY

## 2022-02-21 RX ORDER — MIDAZOLAM HYDROCHLORIDE 1 MG/ML
2 INJECTION INTRAMUSCULAR; INTRAVENOUS ONCE
Status: COMPLETED | OUTPATIENT
Start: 2022-02-21 | End: 2022-02-21

## 2022-02-21 RX ORDER — LIDOCAINE HYDROCHLORIDE 10 MG/ML
INJECTION, SOLUTION EPIDURAL; INFILTRATION; INTRACAUDAL; PERINEURAL
Status: DISCONTINUED | OUTPATIENT
Start: 2022-02-21 | End: 2022-02-21 | Stop reason: HOSPADM

## 2022-02-21 RX ORDER — SODIUM CHLORIDE, SODIUM LACTATE, POTASSIUM CHLORIDE, CALCIUM CHLORIDE 600; 310; 30; 20 MG/100ML; MG/100ML; MG/100ML; MG/100ML
INJECTION, SOLUTION INTRAVENOUS CONTINUOUS
Status: DISCONTINUED | OUTPATIENT
Start: 2022-02-21 | End: 2022-02-21 | Stop reason: HOSPADM

## 2022-02-21 RX ORDER — METHYLPREDNISOLONE ACETATE 80 MG/ML
INJECTION, SUSPENSION INTRA-ARTICULAR; INTRALESIONAL; INTRAMUSCULAR; SOFT TISSUE
Status: DISCONTINUED | OUTPATIENT
Start: 2022-02-21 | End: 2022-02-21 | Stop reason: HOSPADM

## 2022-02-21 RX ADMIN — SODIUM CHLORIDE, SODIUM LACTATE, POTASSIUM CHLORIDE, AND CALCIUM CHLORIDE: .6; .31; .03; .02 INJECTION, SOLUTION INTRAVENOUS at 09:02

## 2022-02-21 RX ADMIN — MIDAZOLAM HYDROCHLORIDE 2 MG: 1 INJECTION INTRAMUSCULAR; INTRAVENOUS at 09:02

## 2022-02-21 NOTE — DISCHARGE SUMMARY
[facility]  Discharge Note  Short Stay    Admit Date: 2/21/2022    Discharge Date: 2/21/2022    Attending Physician: Fredi Rojas     Discharge Provider: Fredi Rojas    Diagnoses:  There are no hospital problems to display for this patient.      Discharged Condition: Good    Final Diagnoses: Lumbar radiculopathy [M54.16]    Disposition: Home or Self Care    Hospital Course: No complications, uneventful    Outcome of Hospitalization, Treatment, Procedure, or Surgery:  Patient was admitted for outpatient interventional pain management procedure. The patient tolerated the procedure well with no complications.    Follow up/Patient Instructions:  Follow up as scheduled in Pain Management office in 2-3 weeks.  Patient has received instructions and follow up date and time.    Medications:  Continue previous medications    Discharge Procedure Orders   Notify your health care provider if you experience any of the following:  temperature >100.4     Notify your health care provider if you experience any of the following:  persistent nausea and vomiting or diarrhea     Notify your health care provider if you experience any of the following:  severe uncontrolled pain     Notify your health care provider if you experience any of the following:  redness, tenderness, or signs of infection (pain, swelling, redness, odor or green/yellow discharge around incision site)     Notify your health care provider if you experience any of the following:  difficulty breathing or increased cough     Notify your health care provider if you experience any of the following:  severe persistent headache     Notify your health care provider if you experience any of the following:  worsening rash     Notify your health care provider if you experience any of the following:  persistent dizziness, light-headedness, or visual disturbances     Notify your health care provider if you experience any of the following:  increased confusion or  weakness     Activity as tolerated

## 2022-02-21 NOTE — OP NOTE
"Procedure Note    Procedure Date: 2/21/2022    Procedure Performed:  L5/S1 lumbar interlaminar epidural steroid injection under fluoroscopy.    Indications: Patient has failed conservative therapy.      Pre-op diagnosis: Lumbar Radiculopathy    Post-op diagnosis: same    Physician: Fredi Rojas MD    IV anxiolysis medications: versed 2mg    Medications injected: depomedrol 80mg, 1% Lidocaine 1ml, 2 mL sterile, preservative-free normal saline.    Local anesthetic used: 1% Lidocaine, 1 ml, 8.4% sodium bicarbonate 0.25ml    Estimated Blood Loss: none    Complications:  none    Technique:  The patient was interviewed in the holding area and Risks/Benefits were discussed, including, but not limited to, the possibility of new or different pain, bleeding or infection.   All questions were answered.  The patient understood and accepted risks.  Consent was verfied.  A time-out was taken to identify patient and procedure prior to starting the procedure. The patient was placed in the prone position on the fluoroscopy table. The area of the lumbar spine was prepped with Chloraprep and draped in a sterile manner. The L5/S1 interspace was identified and marked under AP fluoroscopy. The skin and subcutaneous tissues overlying the targeted interspace were anesthetized with 3-5 mL of 1% lidocaine using a 25G, 1.5" needle.  A 20G, 3.5" Tuohy epidural needle was directed toward the interspace under fluoroscopic guidance until the ligamentum flavum was engaged. From this point, a loss of resistance technique with a glass syringe and saline was used to identify entrance of the needle into the epidural space. Once loss of resistance was observed 1 mL of contrast solution was injected. An appropriate epidurogram was noted.  A 4 mL mixture consisting of saline, 1 mL 1% Lidocaine and 80 mg of depomedrol was injected slowly and without resistance.  The needle was flushed with normal saline and removed. The contrast was seen to be displaced " after injection. Patient was awake/responsive during all injections.  The patient tolerated the procedure well and was transferred to the .AC.. in stable condition.  The patient was monitored after the procedure and was given post-procedure and discharge instructions to follow at home. The patient was discharged in a stable condition.

## 2022-02-21 NOTE — DISCHARGE INSTRUCTIONS
PAIN MANAGEMENT    Home care instructions   Apply ice pack to the injection site for 20 minute prior for the first 24 hours for soreness/discomfort at injection site   DO NOT USE HEAT FOR 24 HOURS   Keep site clean and dry for 24 hours, remove bandaid when desired   Do not drive until tomorrow  Take care when walking after a lumbar injection     STEROIDS OR RADIOFREQUENCY    May take 10-14 days for full effects  Avoid strenuous exercises for 2 days      Resume Aspirin, Plavix, or Coumadin the day after the procedure unless other wise instructed  Resume home medication as prescribed today      CALL PHYSICIAN FOR:  Severe increase in your usual pain or appearance of new pain  Prolonged or increasing weakness or numbness in the legs or arms  Fever greater then 100 degrees F..  Drainage from the incision site, redness, active bleeding or increased swelling at the injection site  Headache that increases when your head is upright and decreases when you lie flat    FOR EMERGENCIES:   Go directly to Emergency Department for Shortness of breath, chest pain, or problems breathing

## 2022-02-21 NOTE — INTERVAL H&P NOTE
The patient has been examined and the H&P has been reviewed:    I concur with the findings and no changes have occurred since H&P was written.  ASA 2, MP II    There are no hospital problems to display for this patient.

## 2022-02-22 VITALS
WEIGHT: 200 LBS | SYSTOLIC BLOOD PRESSURE: 150 MMHG | HEART RATE: 68 BPM | DIASTOLIC BLOOD PRESSURE: 78 MMHG | HEIGHT: 64 IN | BODY MASS INDEX: 34.15 KG/M2 | RESPIRATION RATE: 18 BRPM | OXYGEN SATURATION: 99 % | TEMPERATURE: 98 F

## 2022-03-03 ENCOUNTER — LAB VISIT (OUTPATIENT)
Dept: LAB | Facility: HOSPITAL | Age: 72
End: 2022-03-03
Attending: INTERNAL MEDICINE
Payer: MEDICARE

## 2022-03-03 DIAGNOSIS — Z79.899 ENCOUNTER FOR LONG-TERM (CURRENT) USE OF MEDICATIONS: ICD-10-CM

## 2022-03-03 DIAGNOSIS — E78.5 DYSLIPIDEMIA: ICD-10-CM

## 2022-03-03 DIAGNOSIS — I10 ESSENTIAL HYPERTENSION: ICD-10-CM

## 2022-03-03 DIAGNOSIS — E11.9 CONTROLLED TYPE 2 DIABETES MELLITUS WITHOUT COMPLICATION, WITHOUT LONG-TERM CURRENT USE OF INSULIN: ICD-10-CM

## 2022-03-03 LAB
ALBUMIN SERPL BCP-MCNC: 3.7 G/DL (ref 3.5–5.2)
ALP SERPL-CCNC: 119 U/L (ref 55–135)
ALT SERPL W/O P-5'-P-CCNC: 29 U/L (ref 10–44)
ANION GAP SERPL CALC-SCNC: 9 MMOL/L (ref 8–16)
AST SERPL-CCNC: 30 U/L (ref 10–40)
BASOPHILS # BLD AUTO: 0.01 K/UL (ref 0–0.2)
BASOPHILS NFR BLD: 0.2 % (ref 0–1.9)
BILIRUB SERPL-MCNC: 0.5 MG/DL (ref 0.1–1)
BUN SERPL-MCNC: 11 MG/DL (ref 8–23)
CALCIUM SERPL-MCNC: 9.6 MG/DL (ref 8.7–10.5)
CHLORIDE SERPL-SCNC: 101 MMOL/L (ref 95–110)
CO2 SERPL-SCNC: 31 MMOL/L (ref 23–29)
CREAT SERPL-MCNC: 0.8 MG/DL (ref 0.5–1.4)
DIFFERENTIAL METHOD: ABNORMAL
EOSINOPHIL # BLD AUTO: 0 K/UL (ref 0–0.5)
EOSINOPHIL NFR BLD: 0.6 % (ref 0–8)
ERYTHROCYTE [DISTWIDTH] IN BLOOD BY AUTOMATED COUNT: 13.5 % (ref 11.5–14.5)
EST. GFR  (AFRICAN AMERICAN): >60 ML/MIN/1.73 M^2
EST. GFR  (NON AFRICAN AMERICAN): >60 ML/MIN/1.73 M^2
ESTIMATED AVG GLUCOSE: 128 MG/DL (ref 68–131)
GLUCOSE SERPL-MCNC: 98 MG/DL (ref 70–110)
HBA1C MFR BLD: 6.1 % (ref 4–5.6)
HCT VFR BLD AUTO: 40.9 % (ref 37–48.5)
HGB BLD-MCNC: 12.4 G/DL (ref 12–16)
IMM GRANULOCYTES # BLD AUTO: 0.03 K/UL (ref 0–0.04)
IMM GRANULOCYTES NFR BLD AUTO: 0.6 % (ref 0–0.5)
LYMPHOCYTES # BLD AUTO: 1.7 K/UL (ref 1–4.8)
LYMPHOCYTES NFR BLD: 35.9 % (ref 18–48)
MCH RBC QN AUTO: 28 PG (ref 27–31)
MCHC RBC AUTO-ENTMCNC: 30.3 G/DL (ref 32–36)
MCV RBC AUTO: 92 FL (ref 82–98)
MONOCYTES # BLD AUTO: 0.4 K/UL (ref 0.3–1)
MONOCYTES NFR BLD: 7.8 % (ref 4–15)
NEUTROPHILS # BLD AUTO: 2.6 K/UL (ref 1.8–7.7)
NEUTROPHILS NFR BLD: 54.9 % (ref 38–73)
NRBC BLD-RTO: 0 /100 WBC
PLATELET # BLD AUTO: 182 K/UL (ref 150–450)
PMV BLD AUTO: 11.1 FL (ref 9.2–12.9)
POTASSIUM SERPL-SCNC: 4.5 MMOL/L (ref 3.5–5.1)
PROT SERPL-MCNC: 7.4 G/DL (ref 6–8.4)
RBC # BLD AUTO: 4.43 M/UL (ref 4–5.4)
SODIUM SERPL-SCNC: 141 MMOL/L (ref 136–145)
WBC # BLD AUTO: 4.73 K/UL (ref 3.9–12.7)

## 2022-03-03 PROCEDURE — 36415 COLL VENOUS BLD VENIPUNCTURE: CPT | Mod: HCNC,PO | Performed by: INTERNAL MEDICINE

## 2022-03-03 PROCEDURE — 83036 HEMOGLOBIN GLYCOSYLATED A1C: CPT | Mod: HCNC | Performed by: INTERNAL MEDICINE

## 2022-03-03 PROCEDURE — 85025 COMPLETE CBC W/AUTO DIFF WBC: CPT | Mod: HCNC | Performed by: INTERNAL MEDICINE

## 2022-03-03 PROCEDURE — 80053 COMPREHEN METABOLIC PANEL: CPT | Mod: HCNC | Performed by: INTERNAL MEDICINE

## 2022-03-08 ENCOUNTER — OFFICE VISIT (OUTPATIENT)
Dept: DERMATOLOGY | Facility: CLINIC | Age: 72
End: 2022-03-08
Payer: MEDICARE

## 2022-03-08 VITALS — WEIGHT: 199.94 LBS | RESPIRATION RATE: 18 BRPM | HEIGHT: 64 IN | BODY MASS INDEX: 34.13 KG/M2

## 2022-03-08 DIAGNOSIS — Z12.83 SKIN CANCER SCREENING: ICD-10-CM

## 2022-03-08 DIAGNOSIS — L81.4 SOLAR LENTIGO: ICD-10-CM

## 2022-03-08 DIAGNOSIS — D48.5 NEOPLASM OF UNCERTAIN BEHAVIOR OF SKIN: Primary | ICD-10-CM

## 2022-03-08 DIAGNOSIS — L57.0 ACTINIC KERATOSIS: ICD-10-CM

## 2022-03-08 DIAGNOSIS — L90.5 SCAR: ICD-10-CM

## 2022-03-08 DIAGNOSIS — Z85.828 PERSONAL HISTORY OF OTHER MALIGNANT NEOPLASM OF SKIN: ICD-10-CM

## 2022-03-08 DIAGNOSIS — L82.1 SEBORRHEIC KERATOSIS: ICD-10-CM

## 2022-03-08 PROCEDURE — 11102 TANGNTL BX SKIN SINGLE LES: CPT | Mod: HCNC,S$GLB,, | Performed by: DERMATOLOGY

## 2022-03-08 PROCEDURE — 11102 PR TANGENTIAL BIOPSY, SKIN, SINGLE LESION: ICD-10-PCS | Mod: HCNC,S$GLB,, | Performed by: DERMATOLOGY

## 2022-03-08 PROCEDURE — 1159F PR MEDICATION LIST DOCUMENTED IN MEDICAL RECORD: ICD-10-PCS | Mod: HCNC,CPTII,S$GLB, | Performed by: DERMATOLOGY

## 2022-03-08 PROCEDURE — 17003 DESTRUCTION, PREMALIGNANT LESIONS; SECOND THROUGH 14 LESIONS: ICD-10-PCS | Mod: 59,HCNC,S$GLB, | Performed by: DERMATOLOGY

## 2022-03-08 PROCEDURE — 3008F BODY MASS INDEX DOCD: CPT | Mod: HCNC,CPTII,S$GLB, | Performed by: DERMATOLOGY

## 2022-03-08 PROCEDURE — 1157F ADVNC CARE PLAN IN RCRD: CPT | Mod: HCNC,CPTII,S$GLB, | Performed by: DERMATOLOGY

## 2022-03-08 PROCEDURE — 99213 PR OFFICE/OUTPT VISIT, EST, LEVL III, 20-29 MIN: ICD-10-PCS | Mod: 25,HCNC,S$GLB, | Performed by: DERMATOLOGY

## 2022-03-08 PROCEDURE — 3044F HG A1C LEVEL LT 7.0%: CPT | Mod: HCNC,CPTII,S$GLB, | Performed by: DERMATOLOGY

## 2022-03-08 PROCEDURE — 99213 OFFICE O/P EST LOW 20 MIN: CPT | Mod: 25,HCNC,S$GLB, | Performed by: DERMATOLOGY

## 2022-03-08 PROCEDURE — 17000 DESTRUCT PREMALG LESION: CPT | Mod: 59,HCNC,S$GLB, | Performed by: DERMATOLOGY

## 2022-03-08 PROCEDURE — 99999 PR PBB SHADOW E&M-EST. PATIENT-LVL V: ICD-10-PCS | Mod: PBBFAC,HCNC,, | Performed by: DERMATOLOGY

## 2022-03-08 PROCEDURE — 1159F MED LIST DOCD IN RCRD: CPT | Mod: HCNC,CPTII,S$GLB, | Performed by: DERMATOLOGY

## 2022-03-08 PROCEDURE — 11103 TANGNTL BX SKIN EA SEP/ADDL: CPT | Mod: HCNC,S$GLB,, | Performed by: DERMATOLOGY

## 2022-03-08 PROCEDURE — 99999 PR PBB SHADOW E&M-EST. PATIENT-LVL V: CPT | Mod: PBBFAC,HCNC,, | Performed by: DERMATOLOGY

## 2022-03-08 PROCEDURE — 1157F PR ADVANCE CARE PLAN OR EQUIV PRESENT IN MEDICAL RECORD: ICD-10-PCS | Mod: HCNC,CPTII,S$GLB, | Performed by: DERMATOLOGY

## 2022-03-08 PROCEDURE — 88305 TISSUE EXAM BY PATHOLOGIST: CPT | Mod: 59,HCNC | Performed by: PATHOLOGY

## 2022-03-08 PROCEDURE — 17000 PR DESTRUCTION(LASER SURGERY,CRYOSURGERY,CHEMOSURGERY),PREMALIGNANT LESIONS,FIRST LESION: ICD-10-PCS | Mod: 59,HCNC,S$GLB, | Performed by: DERMATOLOGY

## 2022-03-08 PROCEDURE — 3288F PR FALLS RISK ASSESSMENT DOCUMENTED: ICD-10-PCS | Mod: HCNC,CPTII,S$GLB, | Performed by: DERMATOLOGY

## 2022-03-08 PROCEDURE — 3044F PR MOST RECENT HEMOGLOBIN A1C LEVEL <7.0%: ICD-10-PCS | Mod: HCNC,CPTII,S$GLB, | Performed by: DERMATOLOGY

## 2022-03-08 PROCEDURE — 1101F PR PT FALLS ASSESS DOC 0-1 FALLS W/OUT INJ PAST YR: ICD-10-PCS | Mod: HCNC,CPTII,S$GLB, | Performed by: DERMATOLOGY

## 2022-03-08 PROCEDURE — 3288F FALL RISK ASSESSMENT DOCD: CPT | Mod: HCNC,CPTII,S$GLB, | Performed by: DERMATOLOGY

## 2022-03-08 PROCEDURE — 88305 TISSUE EXAM BY PATHOLOGIST: ICD-10-PCS | Mod: 26,HCNC,, | Performed by: PATHOLOGY

## 2022-03-08 PROCEDURE — 11103 PR TANGENTIAL BIOPSY, SKIN, EA ADDTL LESION: ICD-10-PCS | Mod: HCNC,S$GLB,, | Performed by: DERMATOLOGY

## 2022-03-08 PROCEDURE — 1126F AMNT PAIN NOTED NONE PRSNT: CPT | Mod: HCNC,CPTII,S$GLB, | Performed by: DERMATOLOGY

## 2022-03-08 PROCEDURE — 1101F PT FALLS ASSESS-DOCD LE1/YR: CPT | Mod: HCNC,CPTII,S$GLB, | Performed by: DERMATOLOGY

## 2022-03-08 PROCEDURE — 17003 DESTRUCT PREMALG LES 2-14: CPT | Mod: 59,HCNC,S$GLB, | Performed by: DERMATOLOGY

## 2022-03-08 PROCEDURE — 1126F PR PAIN SEVERITY QUANTIFIED, NO PAIN PRESENT: ICD-10-PCS | Mod: HCNC,CPTII,S$GLB, | Performed by: DERMATOLOGY

## 2022-03-08 PROCEDURE — 88305 TISSUE EXAM BY PATHOLOGIST: CPT | Mod: 26,HCNC,, | Performed by: PATHOLOGY

## 2022-03-08 PROCEDURE — 3008F PR BODY MASS INDEX (BMI) DOCUMENTED: ICD-10-PCS | Mod: HCNC,CPTII,S$GLB, | Performed by: DERMATOLOGY

## 2022-03-08 NOTE — PROGRESS NOTES
Subjective:       Patient ID:  Cornelia Delatorre is a 71 y.o. female who presents for   Chief Complaint   Patient presents with    Skin Check     Patient present for FBSC, LOV 10/26/21 by Kailyn Fletcher MD.     C/o lesions to L arm (hard to touch), R arm, R knee, R breast, L chin x months. Pt denies treating.    yes Phx of NMSC  Phx skin ca R hand and nose - Dr Parra - 2014   Phx SCC L hand -mohs Dr Jones  -2015   Uses CeraVe AM and PM cream daily for routine     no Fhx of melanoma.    Past Medical History:  No date: Allergy  No date: Arthritis  No date: Asthma  No date: Cancer      Comment:  skin cancer to right hand  No date: Cataract  1/24/2017: Chronic fatigue  3/7/2019: CVID (common variable immunodeficiency)  No date: Diabetes mellitus      Comment:  type2  1/24/2017: KLINE (dyspnea on exertion)  6/25/2019: Dyslipidemia  No date: Encounter for blood transfusion  12/29/2011: Essential hypertension  No date: GERD (gastroesophageal reflux disease)  No date: Headache(784.0)  No date: History of lumpectomy of both breasts      Comment:  1992 negative  7/15/2015: Hyperlipidemia  No date: Hypertension  No date: Hypothyroidism  No date: Neuropathy  No date: Obesity  No date: Obesity  No date: Postmenopausal HRT (hormone replacement therapy)  No date: Rash  No date: Rosacea  No date: Sleep apnea      Comment:  on bipap  No date: Snoring  No date: Squamous cell carcinoma of skin      Comment:  left forearm   No date: UTI (urinary tract infection)  No date: Wears glasses      Review of Systems   Constitutional: Negative for fever, chills and fatigue.   HENT: Negative for congestion, sore throat and mouth sores.    Respiratory: Negative for cough.    Gastrointestinal: Negative for nausea, vomiting and diarrhea.   Skin: Positive for daily sunscreen use. Negative for itching, rash, dry skin, sensitivity to antibiotic ointment, sensitivity to bandage adhesive and wears hat.   Hematologic/Lymphatic:  Bruises/bleeds easily (forearms, takes asa and prednisone for asthma).        Objective:    Physical Exam   Constitutional: She appears well-developed and well-nourished. No distress.   HENT:   Mouth/Throat: Lips normal.    Eyes: Lids are normal.  No conjunctival no injection.   Cardiovascular: There is no local extremity swelling and no dependent edema.     Neurological: She is alert and oriented to person, place, and time. She is not disoriented.   Psychiatric: She has a normal mood and affect.   Skin:   Areas Examined (abnormalities noted in diagram):   Scalp / Hair Palpated and Inspected  Head / Face Inspection Performed  Neck Inspection Performed  Chest / Axilla Inspection Performed  Abdomen Inspection Performed  Back Inspection Performed  RUE Inspected  LUE Inspection Performed  RLE Inspected  LLE Inspection Performed  Nails and Digits Inspection Performed                       Diagram Legend     Erythematous scaling macule/papule c/w actinic keratosis       Vascular papule c/w angioma      Pigmented verrucoid papule/plaque c/w seborrheic keratosis      Yellow umbilicated papule c/w sebaceous hyperplasia      Irregularly shaped tan macule c/w lentigo     1-2 mm smooth white papules consistent with Milia      Movable subcutaneous cyst with punctum c/w epidermal inclusion cyst      Subcutaneous movable cyst c/w pilar cyst      Firm pink to brown papule c/w dermatofibroma      Pedunculated fleshy papule(s) c/w skin tag(s)      Evenly pigmented macule c/w junctional nevus     Mildly variegated pigmented, slightly irregular-bordered macule c/w mildly atypical nevus      Flesh colored to evenly pigmented papule c/w intradermal nevus       Pink pearly papule/plaque c/w basal cell carcinoma      Erythematous hyperkeratotic cursted plaque c/w SCC      Surgical scar with no sign of skin cancer recurrence      Open and closed comedones      Inflammatory papules and pustules      Verrucoid papule consistent consistent  with wart     Erythematous eczematous patches and plaques     Dystrophic onycholytic nail with subungual debris c/w onychomycosis     Umbilicated papule    Erythematous-base heme-crusted tan verrucoid plaque consistent with inflamed seborrheic keratosis     Erythematous Silvery Scaling Plaque c/w Psoriasis     See annotation              Assessment / Plan:      Pathology Orders:     Normal Orders This Visit    Specimen to Pathology, Dermatology     Comments:    Number of Specimens:->2  ------------------------->-------------------------  Spec 1 Procedure:->Biopsy  Spec 1 Clinical Impression:->r/o bcc  Spec 1 Source:->R chest  ------------------------->-------------------------  Spec 2 Procedure:->Biopsy  Spec 2 Clinical Impression:->r/o bcc  Spec 2 Source:->L jawline    Questions:    Procedure Type: Dermatology and skin neoplasms    Number of Specimens: 2    ------------------------: -------------------------    Spec 1 Procedure: Biopsy    Spec 1 Clinical Impression: r/o bcc    Spec 1 Source: R chest    ------------------------: -------------------------    Spec 2 Procedure: Biopsy    Spec 2 Clinical Impression: r/o bcc    Spec 2 Source: L jawline    Release to patient:         Neoplasm of uncertain behavior of skin  -     Specimen to Pathology, Dermatology  Shave biopsy procedure note:    Shave biopsy performed after verbal consent including risk of infection, scar, recurrence, need for additional treatment of site. Area prepped with alcohol, anesthetized with approximately 1.0cc of 1% lidocaine with epinephrine. Lesional tissue shaved with razor blade. Hemostasis achieved with application of aluminum chloride followed by hyfrecation. No complications. Dressing applied. Wound care explained.        Actinic keratosis  Cryosurgery Procedure Note    Verbal consent from the patient is obtained and the patient is aware of the precancerous quality and need for treatment of these lesions. Liquid nitrogen cryosurgery is  applied to the 3 actinic keratoses, as detailed in the physical exam, to produce a freeze injury. The patient is aware that blisters may form and is instructed on wound care with gentle cleansing and use of vaseline ointment to keep moist until healed. The patient is supplied a handout on cryosurgery and is instructed to call if lesions do not completely resolve. Discussed risk postinflammatory pigmentary changes.       Personal history of other malignant neoplasm of skin  Area(s) of previous NMSC evaluated with no signs of recurrence.    Upper body skin examination performed today including at least 6 points as noted in physical examination. Suspicious lesions noted.    Scar  NER NMSC  Suspicious lesion noted.     Skin cancer screening  Area(s) of previous NMSC evaluated with no signs of recurrence.    Upper body skin examination performed today including at least 6 points as noted in physical examination. Suspicious lesions noted.      Solar lentigo  This is a benign hyperpigmented sun induced lesion. Daily sun protection will reduce the number of new lesions. Treatment of these benign lesions are considered cosmetic.      Seborrheic keratosis  These are benign inherited growths without a malignant potential. Reassurance given to patient. No treatment is necessary.                Follow up in about 3 months (around 6/8/2022).

## 2022-03-10 ENCOUNTER — OFFICE VISIT (OUTPATIENT)
Dept: FAMILY MEDICINE | Facility: CLINIC | Age: 72
End: 2022-03-10
Payer: MEDICARE

## 2022-03-10 ENCOUNTER — PATIENT MESSAGE (OUTPATIENT)
Dept: FAMILY MEDICINE | Facility: CLINIC | Age: 72
End: 2022-03-10

## 2022-03-10 VITALS
HEIGHT: 64 IN | HEART RATE: 71 BPM | WEIGHT: 204.13 LBS | BODY MASS INDEX: 34.85 KG/M2 | SYSTOLIC BLOOD PRESSURE: 130 MMHG | DIASTOLIC BLOOD PRESSURE: 76 MMHG | OXYGEN SATURATION: 98 % | TEMPERATURE: 98 F

## 2022-03-10 DIAGNOSIS — D83.9 CVID (COMMON VARIABLE IMMUNODEFICIENCY): ICD-10-CM

## 2022-03-10 DIAGNOSIS — E11.9 CONTROLLED TYPE 2 DIABETES MELLITUS WITHOUT COMPLICATION, WITHOUT LONG-TERM CURRENT USE OF INSULIN: ICD-10-CM

## 2022-03-10 DIAGNOSIS — E11.9 CONTROLLED TYPE 2 DIABETES MELLITUS WITHOUT COMPLICATION, WITHOUT LONG-TERM CURRENT USE OF INSULIN: Primary | ICD-10-CM

## 2022-03-10 DIAGNOSIS — I10 ESSENTIAL HYPERTENSION: Primary | ICD-10-CM

## 2022-03-10 DIAGNOSIS — E78.5 DYSLIPIDEMIA: ICD-10-CM

## 2022-03-10 PROCEDURE — 1159F PR MEDICATION LIST DOCUMENTED IN MEDICAL RECORD: ICD-10-PCS | Mod: HCNC,CPTII,S$GLB, | Performed by: INTERNAL MEDICINE

## 2022-03-10 PROCEDURE — 99999 PR PBB SHADOW E&M-EST. PATIENT-LVL V: CPT | Mod: PBBFAC,HCNC,, | Performed by: INTERNAL MEDICINE

## 2022-03-10 PROCEDURE — 3008F PR BODY MASS INDEX (BMI) DOCUMENTED: ICD-10-PCS | Mod: HCNC,CPTII,S$GLB, | Performed by: INTERNAL MEDICINE

## 2022-03-10 PROCEDURE — 99214 OFFICE O/P EST MOD 30 MIN: CPT | Mod: HCNC,S$GLB,, | Performed by: INTERNAL MEDICINE

## 2022-03-10 PROCEDURE — 1160F RVW MEDS BY RX/DR IN RCRD: CPT | Mod: HCNC,CPTII,S$GLB, | Performed by: INTERNAL MEDICINE

## 2022-03-10 PROCEDURE — 1160F PR REVIEW ALL MEDS BY PRESCRIBER/CLIN PHARMACIST DOCUMENTED: ICD-10-PCS | Mod: HCNC,CPTII,S$GLB, | Performed by: INTERNAL MEDICINE

## 2022-03-10 PROCEDURE — 1101F PT FALLS ASSESS-DOCD LE1/YR: CPT | Mod: HCNC,CPTII,S$GLB, | Performed by: INTERNAL MEDICINE

## 2022-03-10 PROCEDURE — 1157F ADVNC CARE PLAN IN RCRD: CPT | Mod: HCNC,CPTII,S$GLB, | Performed by: INTERNAL MEDICINE

## 2022-03-10 PROCEDURE — 1126F AMNT PAIN NOTED NONE PRSNT: CPT | Mod: HCNC,CPTII,S$GLB, | Performed by: INTERNAL MEDICINE

## 2022-03-10 PROCEDURE — 1126F PR PAIN SEVERITY QUANTIFIED, NO PAIN PRESENT: ICD-10-PCS | Mod: HCNC,CPTII,S$GLB, | Performed by: INTERNAL MEDICINE

## 2022-03-10 PROCEDURE — 3044F HG A1C LEVEL LT 7.0%: CPT | Mod: HCNC,CPTII,S$GLB, | Performed by: INTERNAL MEDICINE

## 2022-03-10 PROCEDURE — 3075F PR MOST RECENT SYSTOLIC BLOOD PRESS GE 130-139MM HG: ICD-10-PCS | Mod: HCNC,CPTII,S$GLB, | Performed by: INTERNAL MEDICINE

## 2022-03-10 PROCEDURE — 3044F PR MOST RECENT HEMOGLOBIN A1C LEVEL <7.0%: ICD-10-PCS | Mod: HCNC,CPTII,S$GLB, | Performed by: INTERNAL MEDICINE

## 2022-03-10 PROCEDURE — 1159F MED LIST DOCD IN RCRD: CPT | Mod: HCNC,CPTII,S$GLB, | Performed by: INTERNAL MEDICINE

## 2022-03-10 PROCEDURE — 3078F DIAST BP <80 MM HG: CPT | Mod: HCNC,CPTII,S$GLB, | Performed by: INTERNAL MEDICINE

## 2022-03-10 PROCEDURE — 3075F SYST BP GE 130 - 139MM HG: CPT | Mod: HCNC,CPTII,S$GLB, | Performed by: INTERNAL MEDICINE

## 2022-03-10 PROCEDURE — 3008F BODY MASS INDEX DOCD: CPT | Mod: HCNC,CPTII,S$GLB, | Performed by: INTERNAL MEDICINE

## 2022-03-10 PROCEDURE — 3078F PR MOST RECENT DIASTOLIC BLOOD PRESSURE < 80 MM HG: ICD-10-PCS | Mod: HCNC,CPTII,S$GLB, | Performed by: INTERNAL MEDICINE

## 2022-03-10 PROCEDURE — 1101F PR PT FALLS ASSESS DOC 0-1 FALLS W/OUT INJ PAST YR: ICD-10-PCS | Mod: HCNC,CPTII,S$GLB, | Performed by: INTERNAL MEDICINE

## 2022-03-10 PROCEDURE — 1157F PR ADVANCE CARE PLAN OR EQUIV PRESENT IN MEDICAL RECORD: ICD-10-PCS | Mod: HCNC,CPTII,S$GLB, | Performed by: INTERNAL MEDICINE

## 2022-03-10 PROCEDURE — 99214 PR OFFICE/OUTPT VISIT, EST, LEVL IV, 30-39 MIN: ICD-10-PCS | Mod: HCNC,S$GLB,, | Performed by: INTERNAL MEDICINE

## 2022-03-10 PROCEDURE — 3288F PR FALLS RISK ASSESSMENT DOCUMENTED: ICD-10-PCS | Mod: HCNC,CPTII,S$GLB, | Performed by: INTERNAL MEDICINE

## 2022-03-10 PROCEDURE — 3288F FALL RISK ASSESSMENT DOCD: CPT | Mod: HCNC,CPTII,S$GLB, | Performed by: INTERNAL MEDICINE

## 2022-03-10 PROCEDURE — 99999 PR PBB SHADOW E&M-EST. PATIENT-LVL V: ICD-10-PCS | Mod: PBBFAC,HCNC,, | Performed by: INTERNAL MEDICINE

## 2022-03-10 RX ORDER — METFORMIN HYDROCHLORIDE 500 MG/1
500 TABLET, EXTENDED RELEASE ORAL 2 TIMES DAILY WITH MEALS
Qty: 180 TABLET | Refills: 3 | Status: SHIPPED | OUTPATIENT
Start: 2022-03-10 | End: 2023-03-13 | Stop reason: SDUPTHER

## 2022-03-10 NOTE — PROGRESS NOTES
Subjective:       Patient ID: Cornelia Delatorre is a 71 y.o. female.    Chief Complaint: Annual Exam    Has metal implant for bladder.      CVID -On IV Ig;     Recalll:   Dr Nicole in [place].  Feels SOB mostly related to bronchiectasis.  Her symptoms have improved with asthma tx - Symbicort.    Pulmonary nodules - Dr Nicole evaluating.  Incomplete response to pneumovax - recommends repeat Prevnar 13.  Eosinophilia and pn 7.14 level at 50%  Recent angiogram was normal     Hyperthyroid - supra-theraputic now off Synthroid for 6 mo.  Was hypothyroid all her life.  Dr Ponce     HTN - controlled   DM - controlled;  Sees podiatry for peripheral neuropathy in past  Diabetic peripheral neuropathy - controlled with 600 mg gabapentin tid.  Outside foot doctor.   HLD - controlled for goal 70; high triglycerides.      Occasional anxiety relieved with prn hydroxyzine.   Had EGD- dysphagia with Dr Krishnan      Dx w [facility] likely related to some meds per testing neurology; stable       Follow-up  Pertinent negatives include no abdominal pain, arthralgias, chest pain, fever, joint swelling, nausea, rash or vomiting.   Hypertension  Pertinent negatives include no chest pain, palpitations or shortness of breath.     Review of Systems   Constitutional: Negative for appetite change and fever.   HENT: Negative for nosebleeds and trouble swallowing.    Eyes: Negative for discharge and visual disturbance.   Respiratory: Negative for choking and shortness of breath.    Cardiovascular: Negative for chest pain and palpitations.   Gastrointestinal: Negative for abdominal pain, nausea and vomiting.   Musculoskeletal: Negative for arthralgias and joint swelling.   Skin: Negative for rash and wound.   Neurological: Negative for dizziness and syncope.   Psychiatric/Behavioral: Negative for confusion and dysphoric mood.       Objective:      Vitals:    03/10/22 1047   BP: 130/76   Pulse: 71   Temp: 98.3 °F (36.8 °C)     Physical Exam  Vitals reviewed.    Eyes:      Conjunctiva/sclera: Conjunctivae normal.   Cardiovascular:      Rate and Rhythm: Normal rate and regular rhythm.   Pulmonary:      Effort: Pulmonary effort is normal.      Breath sounds: Normal breath sounds.   Musculoskeletal:      Cervical back: Normal range of motion.      Comments: Normal ROM bilateral    Skin:     General: Skin is warm and dry.   Neurological:      Cranial Nerves: No cranial nerve deficit (grossly intact).   Psychiatric:      Comments: Alert and orientated           Assessment:       1. Essential hypertension    2. Controlled type 2 diabetes mellitus without complication, without long-term current use of insulin    3. Dyslipidemia    4. CVID (common variable immunodeficiency)        Plan:       Essential hypertension  -     Comprehensive Metabolic Panel; Future; Expected date: 09/06/2022    Controlled type 2 diabetes mellitus without complication, without long-term current use of insulin  -     metFORMIN (GLUCOPHAGE-XR) 500 MG ER 24hr tablet; Take 1 tablet (500 mg total) by mouth 2 (two) times daily with meals.  Dispense: 180 tablet; Refill: 3  -     Hemoglobin A1C; Future; Expected date: 09/06/2022    Dyslipidemia  -     Lipid Panel; Future; Expected date: 09/06/2022    CVID (common variable immunodeficiency)            Medication List with Changes/Refills   Current Medications    ALBUTEROL (PROVENTIL/VENTOLIN HFA) 90 MCG/ACTUATION INHALER    Inhale 2 puffs into the lungs every 4 (four) hours as needed. 2 puffs every 4 hours as needed for cough, wheeze, or shortness of breath    ASCORBIC ACID, VITAMIN C, (VITAMIN C) 1000 MG TABLET    Take 1,000 mg by mouth 2 (two) times daily.     AZELASTINE (OPTIVAR) 0.05 % OPHTHALMIC SOLUTION    Place 1 drop into both eyes. As needed    B-COMPLEX WITH VITAMIN C (Z-BEC OR EQUIV) TABLET    Take 1 tablet by mouth once daily.    BIFIDOBACTERIUM INFANTIS (ALIGN ORAL)    Take by mouth once daily.    BIOTIN 10,000 MCG CAP    Take 1 tablet by mouth  every evening.     CLONIDINE (CATAPRES) 0.1 MG TABLET    Take 1 tablet (0.1 mg total) by mouth 3 (three) times daily as needed (PRN SBP > 165 mmHg).    CRANBERRY 500 MG CAP    Take 1 capsule by mouth every evening.    DICLOFENAC SODIUM (VOLTAREN) 1 % GEL    Apply 2 grams topically once daily.    DILTIAZEM (CARDIZEM CD) 120 MG CP24    Take 1 capsule (120 mg total) by mouth every evening.    EPINEPHRINE (EPIPEN) 0.3 MG/0.3 ML ATIN    Inject 1 each into the muscle as needed.     ESOMEPRAZOLE (NEXIUM) 40 MG CAPSULE    Take 1 capsule (40 mg total) by mouth before breakfast.    ESTRADIOL (ESTRACE) 0.01 % (0.1 MG/GRAM) VAGINAL CREAM    Place 1 g vaginally 3 (three) times a week. Place by fingertip application before bedtime three times a week (Monday, Wednesday, Friday)    FEXOFENADINE (ALLEGRA) 180 MG TABLET    Take 180 mg by mouth once daily.     FISH OIL-OMEGA-3 FATTY ACIDS 300-1,000 MG CAPSULE    Take 2 g by mouth once daily.    FLUTICASONE PROPIONATE (FLONASE) 50 MCG/ACTUATION NASAL SPRAY    2 sprays (100 mcg total) by Each Nostril route once daily.    FLUTICASONE-UMECLIDIN-VILANTER (TRELEGY ELLIPTA) 200-62.5-25 MCG INHALER    Inhale 1 puff into the lungs once daily.    GABAPENTIN (NEURONTIN) 600 MG TABLET    Take 1 tablet (600 mg total) by mouth 3 (three) times daily.    GUAIFENESIN-CODEINE 100-10 MG/5 ML (GUAIFENESIN AC)  MG/5 ML SYRUP    Take 5 mLs by mouth 3 (three) times daily as needed for Cough.    IMMUN GLOB G,IGG,-PRO-IGA 0-50 (HIZENTRA) 1 GRAM/5 ML (20 %) SOLN    Inject 11 g into the skin once a week.    INHALATION SPACING DEVICE    Use as directed for inhalation.    IPRATROPIUM (ATROVENT) 42 MCG (0.06 %) NASAL SPRAY    2 sprays by Nasal route 4 (four) times daily.    LEVALBUTEROL (XOPENEX) 1.25 MG/3 ML NEBULIZER SOLUTION    Take 3 mLs (1.25 mg total) by nebulization every 4 (four) hours as needed for Wheezing or Shortness of Breath. Rescue    MONTELUKAST (SINGULAIR) 10 MG TABLET    Take 1 tablet (10  mg total) by mouth every evening.    MUCUS CLEARING DEVICE KAREN    1 Device by Misc.(Non-Drug; Combo Route) route 2 (two) times daily.    MULTIVITAMIN CAPSULE    Take 1 capsule by mouth once daily.     ONDANSETRON (ZOFRAN-ODT) 4 MG TBDL    Take 1 tablet (4 mg total) by mouth every 8 (eight) hours as needed.    POTASSIUM CHLORIDE SA (K-DUR,KLOR-CON) 20 MEQ TABLET    TAKE 1 TABLET EVERY DAY    PRAVASTATIN (PRAVACHOL) 80 MG TABLET    Take 1 tablet (80 mg total) by mouth once daily.    PREDNISONE (DELTASONE) 10 MG TABLET    1 pill for 3-5 days repeat as needed for chest tightness.    SIMETHICONE (GAS-X ORAL)    Take 1 capsule by mouth as needed (gas relief).     VALSARTAN-HYDROCHLOROTHIAZIDE (DIOVAN-HCT) 160-12.5 MG PER TABLET    Take 1 tablet by mouth once daily.    VITAMIN D3-FOLIC ACID 5,000 UNIT- 1 MG TAB    Take 1 tablet by mouth once daily.    Changed and/or Refilled Medications    Modified Medication Previous Medication    METFORMIN (GLUCOPHAGE-XR) 500 MG ER 24HR TABLET metFORMIN (GLUCOPHAGE-XR) 500 MG ER 24hr tablet       Take 1 tablet (500 mg total) by mouth 2 (two) times daily with meals.    Take 1 tablet (500 mg total) by mouth 2 (two) times daily with meals.       Continue current management and monitor.    Counseled on regular exercise, maintenance of a healthy weight, balanced diet rich in fruits/vegetables and lean protein, and avoidance of unhealthy habits like smoking and excessive alcohol intake.   Also, counseled on importance of being compliant with medication, health appointments, diet and exercise.     Follow up in about 6 months (around 9/10/2022).  Annual

## 2022-03-11 ENCOUNTER — PATIENT MESSAGE (OUTPATIENT)
Dept: FAMILY MEDICINE | Facility: CLINIC | Age: 72
End: 2022-03-11
Payer: MEDICARE

## 2022-03-14 ENCOUNTER — PATIENT MESSAGE (OUTPATIENT)
Dept: ALLERGY | Facility: CLINIC | Age: 72
End: 2022-03-14
Payer: MEDICARE

## 2022-03-14 ENCOUNTER — OFFICE VISIT (OUTPATIENT)
Dept: PAIN MEDICINE | Facility: CLINIC | Age: 72
End: 2022-03-14
Payer: MEDICARE

## 2022-03-14 VITALS
OXYGEN SATURATION: 97 % | SYSTOLIC BLOOD PRESSURE: 148 MMHG | WEIGHT: 206.88 LBS | BODY MASS INDEX: 35.51 KG/M2 | DIASTOLIC BLOOD PRESSURE: 65 MMHG | HEART RATE: 77 BPM

## 2022-03-14 DIAGNOSIS — M54.16 LUMBAR RADICULOPATHY: Primary | ICD-10-CM

## 2022-03-14 DIAGNOSIS — M48.061 SPINAL STENOSIS OF LUMBAR REGION, UNSPECIFIED WHETHER NEUROGENIC CLAUDICATION PRESENT: ICD-10-CM

## 2022-03-14 DIAGNOSIS — D83.9 CVID (COMMON VARIABLE IMMUNODEFICIENCY): Primary | ICD-10-CM

## 2022-03-14 PROCEDURE — 1157F ADVNC CARE PLAN IN RCRD: CPT | Mod: HCNC,CPTII,S$GLB,

## 2022-03-14 PROCEDURE — 1159F PR MEDICATION LIST DOCUMENTED IN MEDICAL RECORD: ICD-10-PCS | Mod: HCNC,CPTII,S$GLB,

## 2022-03-14 PROCEDURE — 3077F PR MOST RECENT SYSTOLIC BLOOD PRESSURE >= 140 MM HG: ICD-10-PCS | Mod: HCNC,CPTII,S$GLB,

## 2022-03-14 PROCEDURE — 1125F PR PAIN SEVERITY QUANTIFIED, PAIN PRESENT: ICD-10-PCS | Mod: HCNC,CPTII,S$GLB,

## 2022-03-14 PROCEDURE — 99213 OFFICE O/P EST LOW 20 MIN: CPT | Mod: HCNC,S$GLB,,

## 2022-03-14 PROCEDURE — 3077F SYST BP >= 140 MM HG: CPT | Mod: HCNC,CPTII,S$GLB,

## 2022-03-14 PROCEDURE — 1159F MED LIST DOCD IN RCRD: CPT | Mod: HCNC,CPTII,S$GLB,

## 2022-03-14 PROCEDURE — 1157F PR ADVANCE CARE PLAN OR EQUIV PRESENT IN MEDICAL RECORD: ICD-10-PCS | Mod: HCNC,CPTII,S$GLB,

## 2022-03-14 PROCEDURE — 3008F BODY MASS INDEX DOCD: CPT | Mod: HCNC,CPTII,S$GLB,

## 2022-03-14 PROCEDURE — 3078F DIAST BP <80 MM HG: CPT | Mod: HCNC,CPTII,S$GLB,

## 2022-03-14 PROCEDURE — 3288F FALL RISK ASSESSMENT DOCD: CPT | Mod: HCNC,CPTII,S$GLB,

## 2022-03-14 PROCEDURE — 3044F PR MOST RECENT HEMOGLOBIN A1C LEVEL <7.0%: ICD-10-PCS | Mod: HCNC,CPTII,S$GLB,

## 2022-03-14 PROCEDURE — 3044F HG A1C LEVEL LT 7.0%: CPT | Mod: HCNC,CPTII,S$GLB,

## 2022-03-14 PROCEDURE — 1125F AMNT PAIN NOTED PAIN PRSNT: CPT | Mod: HCNC,CPTII,S$GLB,

## 2022-03-14 PROCEDURE — 1101F PR PT FALLS ASSESS DOC 0-1 FALLS W/OUT INJ PAST YR: ICD-10-PCS | Mod: HCNC,CPTII,S$GLB,

## 2022-03-14 PROCEDURE — 3008F PR BODY MASS INDEX (BMI) DOCUMENTED: ICD-10-PCS | Mod: HCNC,CPTII,S$GLB,

## 2022-03-14 PROCEDURE — 99213 PR OFFICE/OUTPT VISIT, EST, LEVL III, 20-29 MIN: ICD-10-PCS | Mod: HCNC,S$GLB,,

## 2022-03-14 PROCEDURE — 1101F PT FALLS ASSESS-DOCD LE1/YR: CPT | Mod: HCNC,CPTII,S$GLB,

## 2022-03-14 PROCEDURE — 99999 PR PBB SHADOW E&M-EST. PATIENT-LVL IV: CPT | Mod: PBBFAC,HCNC,,

## 2022-03-14 PROCEDURE — 3288F PR FALLS RISK ASSESSMENT DOCUMENTED: ICD-10-PCS | Mod: HCNC,CPTII,S$GLB,

## 2022-03-14 PROCEDURE — 3078F PR MOST RECENT DIASTOLIC BLOOD PRESSURE < 80 MM HG: ICD-10-PCS | Mod: HCNC,CPTII,S$GLB,

## 2022-03-14 PROCEDURE — 99999 PR PBB SHADOW E&M-EST. PATIENT-LVL IV: ICD-10-PCS | Mod: PBBFAC,HCNC,,

## 2022-03-14 NOTE — PROGRESS NOTES
[facility] Follow Up Evaluation    Referred by: Patricia Valerio NP  Reason for referral: back pain    CC:   Chief Complaint   Patient presents with    follow up      Last 3 PDI Scores 9/10/2021 4/15/2021 8/5/2019   Pain Disability Index (PDI) 26 10 0       Interval HPI 3/14/2022: Cornelia Delatorre returns to the clinic for follow up. She is s/p L5/S1 ROM on 02/21/2022 with  95% relief.  She reports her pain today as a 1/10,  with her pain radiating down her legs  Resolved.  Overall she is very satisfied with results of the ROM.   She denies any new numbness, weakness or new changes to her bowel or bladder function. She has chronic urinary issues and has an InterStim placed this has been an issue for over 20 years no acute changes.    Pain intervention history:  - s/p L5/S1 RMO on 8/14/19 with close to 100% relief of her back and leg pain  - s/p L5/S1 ROM on 5/3/21 with 90% relief.  - s/p L5/S1 ROM on 9/24/21 with 100% relief  - s/p L5/S1 ROM on 2/21/22 with 95% relief       HPI:   Cornelia Delatorre is a 71 y.o. female who complains of back pain    Onset: about 3.5 months  Inciting Event: none  Progression: since onset, pain is rapidly improving  Typical Range: 1-2/10  Timing: intermittent  Quality: aching, burning, grabbing, sharp, shooting  Radiation: yes, down the back of both legs left > right  Associated numbness or weakness: yes numbness on the left leg, no weakness  Exacerbated by: standing, coughing/sneezing, walking  Allievated by: rest, heat, tylenol  Is Pain Level Acceptable?:   Yes    Previous Therapies:  PT/OT: yes, felt like it got worse  HEP:   Interventions:   Surgery:  Medications: tylenol  - NSAIDS: avoids 2/2 h/o gastric bypass  - MSK Relaxants:   - TCAs:   - SNRIs:   - Topicals:   - Anticonvulsants: gabapentin 600mg TID  - Opioids:     History:    Current Outpatient Medications:     albuterol (PROVENTIL/VENTOLIN HFA) 90 mcg/actuation inhaler, Inhale 2 puffs into the lungs  every 4 (four) hours as needed. 2 puffs every 4 hours as needed for cough, wheeze, or shortness of breath, Disp: 54 g, Rfl: 3    ascorbic acid, vitamin C, (VITAMIN C) 1000 MG tablet, Take 1,000 mg by mouth 2 (two) times daily. , Disp: , Rfl:     azelastine (OPTIVAR) 0.05 % ophthalmic solution, Place 1 drop into both eyes. As needed, Disp: , Rfl:     B-complex with vitamin C (Z-BEC OR EQUIV) tablet, Take 1 tablet by mouth once daily., Disp: , Rfl:     Bifidobacterium infantis (ALIGN ORAL), Take by mouth once daily., Disp: , Rfl:     biotin 10,000 mcg Cap, Take 1 tablet by mouth every evening. , Disp: , Rfl:     cloNIDine (CATAPRES) 0.1 MG tablet, Take 1 tablet (0.1 mg total) by mouth 3 (three) times daily as needed (PRN SBP > 165 mmHg)., Disp: 90 tablet, Rfl: 6    cranberry 500 mg Cap, Take 1 capsule by mouth every evening., Disp: , Rfl:     diclofenac sodium (VOLTAREN) 1 % Gel, Apply 2 grams topically once daily. (Patient taking differently: Apply 2 g topically as needed (arthritis).), Disp: 1 Tube, Rfl: 6    diltiaZEM (CARDIZEM CD) 120 MG Cp24, Take 1 capsule (120 mg total) by mouth every evening., Disp: 90 capsule, Rfl: 4    EPINEPHrine (EPIPEN) 0.3 mg/0.3 mL AtIn, Inject 1 each into the muscle as needed. , Disp: , Rfl: 3    esomeprazole (NEXIUM) 40 MG capsule, Take 1 capsule (40 mg total) by mouth before breakfast., Disp: 90 capsule, Rfl: 3    estradioL (ESTRACE) 0.01 % (0.1 mg/gram) vaginal cream, Place 1 g vaginally 3 (three) times a week. Place by fingertip application before bedtime three times a week (Monday, Wednesday, Friday), Disp: 45 g, Rfl: 3    fexofenadine (ALLEGRA) 180 MG tablet, Take 180 mg by mouth once daily. , Disp: , Rfl:     fish oil-omega-3 fatty acids 300-1,000 mg capsule, Take 2 g by mouth once daily., Disp: , Rfl:     fluticasone propionate (FLONASE) 50 mcg/actuation nasal spray, 2 sprays (100 mcg total) by Each Nostril route once daily., Disp: 16 g, Rfl: 11     fluticasone-umeclidin-vilanter (TRELEGY ELLIPTA) 200-62.5-25 mcg inhaler, Inhale 1 puff into the lungs once daily., Disp: 60 each, Rfl: 11    gabapentin (NEURONTIN) 600 MG tablet, Take 1 tablet (600 mg total) by mouth 3 (three) times daily., Disp: 540 tablet, Rfl: 3    guaifenesin-codeine 100-10 mg/5 ml (GUAIFENESIN AC)  mg/5 mL syrup, Take 5 mLs by mouth 3 (three) times daily as needed for Cough., Disp: 473 mL, Rfl: 2    immun glob G,IgG,-pro-IgA 0-50 (HIZENTRA) 1 gram/5 mL (20 %) Soln, Inject 11 g into the skin once a week., Disp: 220 mL, Rfl: 12    inhalation spacing device, Use as directed for inhalation., Disp: 1 each, Rfl: 0    ipratropium (ATROVENT) 42 mcg (0.06 %) nasal spray, 2 sprays by Nasal route 4 (four) times daily. (Patient taking differently: 2 sprays by Nasal route every evening. As needed), Disp: 15 mL, Rfl: 2    levalbuterol (XOPENEX) 1.25 mg/3 mL nebulizer solution, Take 3 mLs (1.25 mg total) by nebulization every 4 (four) hours as needed for Wheezing or Shortness of Breath. Rescue, Disp: 1 Box, Rfl: 11    metFORMIN (GLUCOPHAGE-XR) 500 MG ER 24hr tablet, Take 1 tablet (500 mg total) by mouth 2 (two) times daily with meals., Disp: 180 tablet, Rfl: 3    montelukast (SINGULAIR) 10 mg tablet, Take 1 tablet (10 mg total) by mouth every evening., Disp: 90 tablet, Rfl: 3    mucus clearing device Cecy, 1 Device by Misc.(Non-Drug; Combo Route) route 2 (two) times daily., Disp: 1 Device, Rfl: 0    multivitamin capsule, Take 1 capsule by mouth once daily. , Disp: , Rfl:     ondansetron (ZOFRAN-ODT) 4 MG TbDL, Take 1 tablet (4 mg total) by mouth every 8 (eight) hours as needed. (Patient not taking: Reported on 3/10/2022), Disp: 20 tablet, Rfl: 0    potassium chloride SA (K-DUR,KLOR-CON) 20 MEQ tablet, TAKE 1 TABLET EVERY DAY, Disp: 90 tablet, Rfl: 4    pravastatin (PRAVACHOL) 80 MG tablet, Take 1 tablet (80 mg total) by mouth once daily., Disp: 90 tablet, Rfl: 4    predniSONE (DELTASONE)  10 MG tablet, 1 pill for 3-5 days repeat as needed for chest tightness. (Patient not taking: Reported on 3/10/2022), Disp: 20 tablet, Rfl: 0    SIMETHICONE (GAS-X ORAL), Take 1 capsule by mouth as needed (gas relief). , Disp: , Rfl:     valsartan-hydrochlorothiazide (DIOVAN-HCT) 160-12.5 mg per tablet, Take 1 tablet by mouth once daily., Disp: 90 tablet, Rfl: 3    vitamin D3-folic acid 5,000 unit- 1 mg Tab, Take 1 tablet by mouth once daily. , Disp: , Rfl:     Past Medical History:   Diagnosis Date    Allergy     Arthritis     Asthma     Cancer     skin cancer to right hand    Cataract     Chronic fatigue 1/24/2017    CVID (common variable immunodeficiency) 3/7/2019    Diabetes mellitus     type2    KLINE (dyspnea on exertion) 1/24/2017    Dyslipidemia 6/25/2019    Encounter for blood transfusion     Essential hypertension 12/29/2011    GERD (gastroesophageal reflux disease)     Headache(784.0)     History of lumpectomy of both breasts     1992 negative    Hyperlipidemia 7/15/2015    Hypertension     Hypothyroidism     Neuropathy     Obesity     Obesity     Postmenopausal HRT (hormone replacement therapy)     Rash     Rosacea     Sleep apnea     on bipap    Snoring     Squamous cell carcinoma of skin     left forearm     UTI (urinary tract infection)     Wears glasses        Past Surgical History:   Procedure Laterality Date    ADENOIDECTOMY      APPENDECTOMY      BLADDER SUSPENSION      BREAST BIOPSY Bilateral 1992    bilateral benign excisional biopsies    BUNIONECTOMY  10/17/14    right, still has discomfort    CATARACT EXTRACTION W/  INTRAOCULAR LENS IMPLANT Bilateral     CHOLECYSTECTOMY  08/02/2017    COLONOSCOPY      CYSTOSCOPY      EPIDURAL STEROID INJECTION INTO LUMBAR SPINE N/A 8/14/2019    Procedure: Injection-steroid-epidural-lumbar L5/S1;  Surgeon: Fredi Rojas MD;  Location: [facility] OR;  Service: Pain Management;  Laterality: N/A;    EPIDURAL STEROID INJECTION  INTO LUMBAR SPINE N/A 5/3/2021    Procedure: Injection-steroid-epidural-lumbar L5/S1 to the Left;  Surgeon: Fredi Rojas MD;  Location: [facility] OR;  Service: Pain Management;  Laterality: N/A;    EPIDURAL STEROID INJECTION INTO LUMBAR SPINE N/A 9/24/2021    Procedure: Injection-steroid-epidural-lumbar L5/S1;  Surgeon: Fredi Rojas MD;  Location: [facility] OR;  Service: Pain Management;  Laterality: N/A;    EPIDURAL STEROID INJECTION INTO LUMBAR SPINE N/A 2/21/2022    Procedure: Injection-steroid-epidural-lumbar L5/S1;  Surgeon: Fredi Rojas MD;  Location: [facility] OR;  Service: Pain Management;  Laterality: N/A;    ESOPHAGEAL DILATION N/A 6/11/2021    Procedure: DILATION, ESOPHAGUS;  Surgeon: Jake Sheriff MD;  Location: [facility];  Service: Endoscopy;  Laterality: N/A;    ESOPHAGOGASTRODUODENOSCOPY      dilated esophagus    ESOPHAGOGASTRODUODENOSCOPY N/A 6/11/2021    Procedure: EGD (ESOPHAGOGASTRODUODENOSCOPY);  Surgeon: Jake Sheriff MD;  Location: [facility];  Service: Endoscopy;  Laterality: N/A;    GASTRIC BYPASS      2011    HYSTERECTOMY  1980    interstim bladder  2009    10/14/14 new battery    OOPHORECTOMY  1980    REPLACEMENT OF SACRAL NERVE STIMULATOR  2/18/2020    Procedure: REPLACEMENT, NEUROSTIMULATOR, SACRAL;  Surgeon: Mary Jane Jarvis MD;  Location: [facility] OR [address] [place] [address];  Service: Urology;;    REVISION OF PROCEDURE INVOLVING SACRAL NEUROSTIMULATOR DEVICE Right 2/18/2020    Procedure: REVISION, NEUROSTIMULATOR, SACRAL/ battery replacement;  Surgeon: Mary Jane Jarvis MD;  Location: [facility] OR 2ND FLR;  Service: Urology;  Laterality: Right;  1hr/ rep contacted/ (CONNIE)    SKIN CANCER EXCISION      left hand    TONSILLECTOMY      WISDOM TOOTH EXTRACTION         Family History   Problem Relation Age of Onset    Breast cancer Mother 50    Diabetes Unknown     Hypertension Unknown     Hypothyroidism Unknown     Breast cancer Paternal Aunt         40's    Ovarian cancer Paternal  Grandmother     Allergic rhinitis Neg Hx     Allergies Neg Hx     Angioedema Neg Hx     Asthma Neg Hx     Atopy Neg Hx     Eczema Neg Hx     Immunodeficiency Neg Hx     Rhinitis Neg Hx     Urticaria Neg Hx        Social History     Socioeconomic History    Marital status:    Tobacco Use    Smoking status: Never Smoker    Smokeless tobacco: Never Used   Substance and Sexual Activity    Alcohol use: Not Currently    Drug use: No    Sexual activity: Not Currently     Social Determinants of Health     Financial Resource Strain: High Risk    Difficulty of Paying Living Expenses: Hard   Food Insecurity: Food Insecurity Present    Worried About Running Out of Food in the Last Year: Sometimes true    Ran Out of Food in the Last Year: Patient refused   Transportation Needs: Unknown    Lack of Transportation (Medical): No    Lack of Transportation (Non-Medical): Patient refused   Physical Activity: Unknown    Days of Exercise per Week: Patient refused    Minutes of Exercise per Session: 10 min   Stress: No Stress Concern Present    Feeling of Stress : Not at all   Social Connections: Unknown    Frequency of Communication with Friends and Family: More than three times a week    Frequency of Social Gatherings with Friends and Family: Once a week    Active Member of Clubs or Organizations: Patient refused    Attends Club or Organization Meetings: Patient refused    Marital Status:    Housing Stability: Unknown    Unable to Pay for Housing in the Last Year: Patient refused    Number of Places Lived in the Last Year: 1    Unstable Housing in the Last Year: No       Review of patient's allergies indicates:   Allergen Reactions    Sulfa (sulfonamide antibiotics) Hives    Naproxen Other (See Comments)     Other reaction(s): RT sided numbness      Albuterol Itching and Palpitations     Nebulizer only. Can use inhaler       Review of Systems:  General ROS: negative for -  fever  Psychological ROS: negative for - hostility  Hematological and Lymphatic ROS: positive for - bruising  negative for - bleeding problems  Endocrine ROS: negative for - unexpected weight changes  Respiratory ROS: positive for - cough and shortness of breath  Cardiovascular ROS: no chest pain or dyspnea on exertion  Gastrointestinal ROS: no abdominal pain, change in bowel habits, or black or bloody stools  Musculoskeletal ROS: negative for - muscular weakness  Neurological ROS: negative for - numbness/tingling  negative for -  New bowel and bladder control changes  Dermatological ROS: negative for rash    Physical Exam:  Vitals:    03/14/22 1306   BP: (!) 148/65   Pulse: 77   SpO2: 97%   Weight: 93.9 kg (206 lb 14.4 oz)   PainSc:   8   PainLoc: Back     Body mass index is 35.51 kg/m².     Gen: NAD  Gait: gait intact  Psych:  Mood appropriate for given condition  HEENT: eyes anicteric   GI: Abd soft  CV: RRR  Lungs: breathing unlabored   ROM: limited AROM of the L spine in all planes, full ROM at ankles, knees and hips  Lumbar flexion 90 degrees, extension 50 degrees, side bending 30 degrees.    Sensation: intact to light touch in all dermatomes tested from L2-S1 bilaterally  Reflexes: 2+ b/l patella and 0/0 b/l achilles  Palpation: Diffusely tender over lumbar paraspinals   Tone: normal in the b/l knees and hips   Skin: intact  Extremities: No edema in b/l ankles or hands  Provacative tests: + b/l axial facet loading       Right Left   L2/3 Iliacus Hip flexion  5  5   L3/4 Qudratus Femoris Knee Extension  5  5   L4/5 Tib Anterior Ankle Dorsiflexion   5  5   L5/S1 Extensor Hallicus Longus Great toe extension  5  5                 S1/S2 Gastroc/Soleus Plantar Flexion  5  5     Imaging:  Xray lumbar spine 6/19/19  FINDINGS:  There is mild exaggeration of the normal lumbar lordosis.  There is 4 mm anterolisthesis of L4 on L5.  The vertebral bodies maintain normal height.  There is no fracture.  There is multilevel  endplate osteophyte formation and multilevel facet joint arthropathy.  There is a sacral stimulator in place.    CT lumbar spine 8/6/19  FINDINGS:  Grade 1 anterolisthesis of L4 on L5.  Minimal retrolisthesis of L2 on L3.The vertebral body heights are well-maintained.No fractures are identified. Sacral stimulator partially visualized, which appears to enter the right S3-4 neural foramen.    Mild multilevel degenerative disc disease with anterior osteophytosis, vacuum disc phenomenon at T10-11 and L1-2 and disc space narrowing, most pronounced and moderate at L1-2.    T12-L1: Small right central posterior disc osteophyte complex.  Mild bilateral facet arthrosis.  No significant spinal canal or neural foraminal stenosis.  L1-2: Circumferential disc bulge.  Mild bilateral facet arthrosis.  No significant spinal canal or neural foraminal stenosis.  L2-3: Minimal retrolisthesis.  Circumferential disc bulge.  Moderate bilateral facet arthrosis.  Mild bilateral neural foraminal stenosis.  Mild spinal canal stenosis.  L3-4: Circumferential disc bulge.  Ligamentum flavum infolding.  Moderate bilateral facet arthrosis.  Mild bilateral neural foraminal stenosis.  Mild spinal canal stenosis  L4-5: Grade 1 anterolisthesis.  Circumferential disc bulge, eccentric to the right.  Ligamentum flavum infolding.  Severe bilateral facet arthrosis.  Mild left and moderate right neural foraminal stenosis.  Severe spinal canal stenosis.  L5-S1: Mild diffuse disc bulge.  Moderate left and mild right facet arthrosis.  Moderate left and mild right neural foraminal stenosis.  No significant spinal canal stenosis.    Labs:  BMP  Lab Results   Component Value Date     03/03/2022    K 4.5 03/03/2022     03/03/2022    CO2 31 (H) 03/03/2022    BUN 11 03/03/2022    CREATININE 0.8 03/03/2022    CALCIUM 9.6 03/03/2022    ANIONGAP 9 03/03/2022    ESTGFRAFRICA >60.0 03/03/2022    EGFRNONAA >60.0 03/03/2022     Lab Results   Component Value  Date    ALT 29 03/03/2022    AST 30 03/03/2022    ALKPHOS 119 03/03/2022    BILITOT 0.5 03/03/2022       Assessment:  Problem List Items Addressed This Visit        Neuro    Lumbar radiculopathy - Primary      Other Visit Diagnoses     Spinal stenosis of lumbar region, unspecified whether neurogenic claudication present              Treatment Plan:  71 y.o. year old female with PMH DM II, asthma, HTN, common variable immunodeficiency, GERD, h/o gastric bypass, SOHA presents to the office with lower back pain.      3/14/2022: Cornelia Delatorre returns to the clinic for follow up. She is s/p L5/S1 ROM on 02/21/2022 with  95% relief.  She reports her pain today as a 1/10,  with her pain radiating down her legs resolved.  Overall she is very satisfied with results of the ROM.   She denies any new numbness, weakness or new changes to her bowel or bladder function. She has chronic urinary issues and has an InterStim placed this has been an issue for over 20 years no acute changes.    - on exam she has full strength and intact sensation to light touch  - She is s/p L5/S1 ROM on 02/21/2022 with  95% relief.   - I independently reviewed her lumbar CT from 2019 and she has L4-5 severe central canal stenosis  - Since the injection she has improved mobility and improvement with her ADLs.  - Follow up as needed.  If she fails to get  Long-lasting relief then we will get an updated lumbar CT and refer her to Neurosurgery for an evaluation    :  Not applicable    The above note was completed, in part, with the aid of Dragon dictation software/hardware. Translation errors may be present.

## 2022-03-14 NOTE — TELEPHONE ENCOUNTER
Orders are in, she can do labs prior to seeing me or at visit but needs to be trough so just depends on when do for infusion

## 2022-03-15 ENCOUNTER — IMMUNIZATION (OUTPATIENT)
Dept: FAMILY MEDICINE | Facility: CLINIC | Age: 72
End: 2022-03-15
Payer: MEDICARE

## 2022-03-15 DIAGNOSIS — Z23 NEED FOR VACCINATION: Primary | ICD-10-CM

## 2022-03-15 LAB
FINAL PATHOLOGIC DIAGNOSIS: NORMAL
GROSS: NORMAL
Lab: NORMAL
MICROSCOPIC EXAM: NORMAL

## 2022-03-15 PROCEDURE — 91305 COVID-19, MRNA, LNP-S, PF, 30 MCG/0.3 ML DOSE VACCINE (PFIZER): CPT | Mod: HCNC,S$GLB,, | Performed by: FAMILY MEDICINE

## 2022-03-15 PROCEDURE — 0053A COVID-19, MRNA, LNP-S, PF, 30 MCG/0.3 ML DOSE VACCINE (PFIZER): CPT | Mod: HCNC,S$GLB,, | Performed by: FAMILY MEDICINE

## 2022-03-15 PROCEDURE — 0053A COVID-19, MRNA, LNP-S, PF, 30 MCG/0.3 ML DOSE VACCINE (PFIZER): ICD-10-PCS | Mod: HCNC,S$GLB,, | Performed by: FAMILY MEDICINE

## 2022-03-15 PROCEDURE — 91305 COVID-19, MRNA, LNP-S, PF, 30 MCG/0.3 ML DOSE VACCINE (PFIZER): ICD-10-PCS | Mod: HCNC,S$GLB,, | Performed by: FAMILY MEDICINE

## 2022-03-21 ENCOUNTER — LAB VISIT (OUTPATIENT)
Dept: LAB | Facility: HOSPITAL | Age: 72
End: 2022-03-21
Attending: ALLERGY & IMMUNOLOGY
Payer: MEDICARE

## 2022-03-21 DIAGNOSIS — D83.9 CVID (COMMON VARIABLE IMMUNODEFICIENCY): ICD-10-CM

## 2022-03-21 LAB
IGA SERPL-MCNC: 142 MG/DL (ref 40–350)
IGG SERPL-MCNC: 784 MG/DL (ref 650–1600)
IGM SERPL-MCNC: 14 MG/DL (ref 50–300)

## 2022-03-21 PROCEDURE — 82787 IGG 1 2 3 OR 4 EACH: CPT | Performed by: ALLERGY & IMMUNOLOGY

## 2022-03-21 PROCEDURE — 82784 ASSAY IGA/IGD/IGG/IGM EACH: CPT | Mod: 59 | Performed by: ALLERGY & IMMUNOLOGY

## 2022-03-21 PROCEDURE — 82784 ASSAY IGA/IGD/IGG/IGM EACH: CPT | Performed by: ALLERGY & IMMUNOLOGY

## 2022-03-24 LAB
IGG1 SER-MCNC: 413 MG/DL (ref 382–929)
IGG2 SER-MCNC: 300 MG/DL (ref 242–700)
IGG3 SER-MCNC: 17 MG/DL (ref 22–176)
IGG4 SER-MCNC: 5 MG/DL (ref 4–86)

## 2022-03-28 ENCOUNTER — OFFICE VISIT (OUTPATIENT)
Dept: ALLERGY | Facility: CLINIC | Age: 72
End: 2022-03-28
Payer: MEDICARE

## 2022-03-28 VITALS — OXYGEN SATURATION: 98 % | WEIGHT: 205.5 LBS | HEIGHT: 65 IN | HEART RATE: 119 BPM | BODY MASS INDEX: 34.24 KG/M2

## 2022-03-28 DIAGNOSIS — D83.9 CVID (COMMON VARIABLE IMMUNODEFICIENCY): Primary | ICD-10-CM

## 2022-03-28 DIAGNOSIS — J45.30 MILD PERSISTENT ASTHMA WITHOUT COMPLICATION: ICD-10-CM

## 2022-03-28 DIAGNOSIS — J31.0 CHRONIC RHINITIS: ICD-10-CM

## 2022-03-28 DIAGNOSIS — J47.9 BRONCHIECTASIS WITHOUT COMPLICATION: ICD-10-CM

## 2022-03-28 PROCEDURE — 3008F PR BODY MASS INDEX (BMI) DOCUMENTED: ICD-10-PCS | Mod: CPTII,S$GLB,, | Performed by: ALLERGY & IMMUNOLOGY

## 2022-03-28 PROCEDURE — 99499 RISK ADDL DX/OHS AUDIT: ICD-10-PCS | Mod: HCNC,S$GLB,, | Performed by: ALLERGY & IMMUNOLOGY

## 2022-03-28 PROCEDURE — 3044F PR MOST RECENT HEMOGLOBIN A1C LEVEL <7.0%: ICD-10-PCS | Mod: CPTII,S$GLB,, | Performed by: ALLERGY & IMMUNOLOGY

## 2022-03-28 PROCEDURE — 1157F PR ADVANCE CARE PLAN OR EQUIV PRESENT IN MEDICAL RECORD: ICD-10-PCS | Mod: CPTII,S$GLB,, | Performed by: ALLERGY & IMMUNOLOGY

## 2022-03-28 PROCEDURE — 1160F PR REVIEW ALL MEDS BY PRESCRIBER/CLIN PHARMACIST DOCUMENTED: ICD-10-PCS | Mod: CPTII,S$GLB,, | Performed by: ALLERGY & IMMUNOLOGY

## 2022-03-28 PROCEDURE — 99499 UNLISTED E&M SERVICE: CPT | Mod: HCNC,S$GLB,, | Performed by: ALLERGY & IMMUNOLOGY

## 2022-03-28 PROCEDURE — 99999 PR PBB SHADOW E&M-EST. PATIENT-LVL V: ICD-10-PCS | Mod: PBBFAC,,, | Performed by: ALLERGY & IMMUNOLOGY

## 2022-03-28 PROCEDURE — 99999 PR PBB SHADOW E&M-EST. PATIENT-LVL V: CPT | Mod: PBBFAC,,, | Performed by: ALLERGY & IMMUNOLOGY

## 2022-03-28 PROCEDURE — 99214 OFFICE O/P EST MOD 30 MIN: CPT | Mod: S$GLB,,, | Performed by: ALLERGY & IMMUNOLOGY

## 2022-03-28 PROCEDURE — 3044F HG A1C LEVEL LT 7.0%: CPT | Mod: CPTII,S$GLB,, | Performed by: ALLERGY & IMMUNOLOGY

## 2022-03-28 PROCEDURE — 99214 PR OFFICE/OUTPT VISIT, EST, LEVL IV, 30-39 MIN: ICD-10-PCS | Mod: S$GLB,,, | Performed by: ALLERGY & IMMUNOLOGY

## 2022-03-28 PROCEDURE — 3008F BODY MASS INDEX DOCD: CPT | Mod: CPTII,S$GLB,, | Performed by: ALLERGY & IMMUNOLOGY

## 2022-03-28 PROCEDURE — 1126F PR PAIN SEVERITY QUANTIFIED, NO PAIN PRESENT: ICD-10-PCS | Mod: CPTII,S$GLB,, | Performed by: ALLERGY & IMMUNOLOGY

## 2022-03-28 PROCEDURE — 1159F MED LIST DOCD IN RCRD: CPT | Mod: CPTII,S$GLB,, | Performed by: ALLERGY & IMMUNOLOGY

## 2022-03-28 PROCEDURE — 1160F RVW MEDS BY RX/DR IN RCRD: CPT | Mod: CPTII,S$GLB,, | Performed by: ALLERGY & IMMUNOLOGY

## 2022-03-28 PROCEDURE — 1126F AMNT PAIN NOTED NONE PRSNT: CPT | Mod: CPTII,S$GLB,, | Performed by: ALLERGY & IMMUNOLOGY

## 2022-03-28 PROCEDURE — 1159F PR MEDICATION LIST DOCUMENTED IN MEDICAL RECORD: ICD-10-PCS | Mod: CPTII,S$GLB,, | Performed by: ALLERGY & IMMUNOLOGY

## 2022-03-28 PROCEDURE — 1157F ADVNC CARE PLAN IN RCRD: CPT | Mod: CPTII,S$GLB,, | Performed by: ALLERGY & IMMUNOLOGY

## 2022-03-28 RX ORDER — HUMAN IMMUNOGLOBULIN G 0.2 G/ML
11 LIQUID SUBCUTANEOUS WEEKLY
Qty: 220 ML | Refills: 12 | Status: SHIPPED | OUTPATIENT
Start: 2022-03-28

## 2022-03-28 NOTE — PROGRESS NOTES
Subjective:       Patient ID: Cornelia Delatorre is a 71 y.o. female.    Chief Complaint:  Annual Exam (Annual/)        70 yo woman presents for continued evaluation of CVID, chronic rhinitis, bronchiectasis and asthma. She was last seen 3/15/2021,. She is on Hizentra 11 g weekly. She is doing well on this, less infections.  No sinusitis or pneumonia. She also has skin test with 1+ to one weed, rest negative. She is on allegra daily, montelukast daily, azelastine 2 SEN BID and Flonase 2 SEN daily. She had labs 3/21/2022 with IgG 784, al subclasses normal, IgM 14 and IgA 142. CBC and CMP stable.    She has runny some, congestion, PND and watery eyes.  She has not had any infections requiring antibiotics in last 12 months. No flares of asthma although has not been exercising much so finds some KLINE now. No reactions to infusion. She does prefer to say at weekly as opposed to every other week.    Prior History taken 4/11/19: consult from Dr Jonathan Nicole for CVID. She was diagnosed with asthma 2 years ago. She has had up and down with lots of mucus causing cough. She has SOB can only walk short amount before KLINE. She is currently on chronic Zithromax. She does have bronchiectasis. She hash ad pneumonia 2 times in past but none in last 5 years. She does not get frequent sinus infections more chest. She was ion hospital for exacerbation in 10/2018. She has some sneeze and runny nose but not much. She saw Dr Herrera and is on allergy shots since January but off last month due to asthma. She was prescribed Hizentra but cost was high for her. She is friends with a pt here who has same insurance and gets Hizentra from [facility] and pays nothing so she would like to transfer. She has labs 3/2017 with IgG 693, IgA 191, IgM 22 and IgE <35 with humoral panel protected to 8/14 strep titers. She had pneumovax 3/2016 and Prevnar 3/2017 prior to these labs. She had repeat humoral panel 7/2017 and protected to 8/14. She had another  pneumovax 4/27/18 and labs 6/28 still only protected to 8/14. Labs 11/2018 now shoe IgG low at 516 all 4 subclasses low, IgA 132, IgM low at 36. She has normal PFT but CT shows bronchiectasis. She has no eczema. No known food, insect or latex allergy. She has HTN and DM. She did have gastric bypass in past. She never smoked.      Environmental History: see history section for home environment  Review of Systems   Constitutional: Negative for appetite change, chills, fatigue and fever.   HENT: Positive for rhinorrhea and sneezing. Negative for congestion, ear discharge, ear pain, facial swelling, nosebleeds, postnasal drip, sinus pressure, sore throat, trouble swallowing and voice change.    Eyes: Negative for discharge, redness, itching and visual disturbance.   Respiratory: Positive for cough, choking, chest tightness, shortness of breath and wheezing.    Cardiovascular: Negative for chest pain, palpitations and leg swelling.   Gastrointestinal: Negative for abdominal distention, abdominal pain, constipation, diarrhea, nausea and vomiting.   Genitourinary: Negative for difficulty urinating.   Musculoskeletal: Positive for arthralgias. Negative for gait problem, joint swelling and myalgias.   Skin: Negative for color change and rash.   Neurological: Negative for dizziness, syncope, weakness, light-headedness and headaches.   Hematological: Negative for adenopathy. Does not bruise/bleed easily.   Psychiatric/Behavioral: Negative for agitation, behavioral problems, confusion and sleep disturbance. The patient is not nervous/anxious.         Objective:      Physical Exam  Vitals and nursing note reviewed.   Constitutional:       General: She is not in acute distress.     Appearance: Normal appearance. She is well-developed. She is not ill-appearing.   HENT:      Head: Normocephalic and atraumatic.      Right Ear: Hearing, ear canal and external ear normal.      Left Ear: Hearing, ear canal and external ear normal.       Nose: No septal deviation, mucosal edema or rhinorrhea.      Right Sinus: No maxillary sinus tenderness or frontal sinus tenderness.      Left Sinus: No maxillary sinus tenderness or frontal sinus tenderness.      Mouth/Throat:      Pharynx: Uvula midline. No uvula swelling.   Eyes:      General:         Right eye: No discharge.         Left eye: No discharge.      Conjunctiva/sclera: Conjunctivae normal.   Neck:      Thyroid: No thyromegaly.   Cardiovascular:      Rate and Rhythm: Normal rate and regular rhythm.      Heart sounds: Normal heart sounds.   Pulmonary:      Effort: Pulmonary effort is normal. No respiratory distress.      Breath sounds: Normal breath sounds. No wheezing.   Abdominal:      General: There is no distension.   Musculoskeletal:         General: Normal range of motion.      Cervical back: Normal range of motion.   Skin:     General: Skin is warm and dry.      Findings: No erythema or rash.   Neurological:      Mental Status: She is alert and oriented to person, place, and time.   Psychiatric:         Behavior: Behavior normal.         Thought Content: Thought content normal.         Judgment: Judgment normal.         Laboratory:   Percutaneous Skin Testing: prick skin test inhalants #60, 8/21/19: 1+ alli/dock weed, 3+ histamine control, remainder tested negative, see flow sheet  Assessment:       1. CVID (common variable immunodeficiency)    2. Chronic rhinitis    3. Bronchiectasis without complication    4. Mild persistent asthma without complication         Plan:       1. Pt with low IgG, IgM and no response to pneumococcal vaccines so has CVID. Continue IgG replacement - Hizentra 11g weekly- check trough levels 3/2023  2. continue azelastine 2 SEN BID, montelukast 10 mg daily, allegra daily and Flonase 2 SEN daily  3. RTC annually or sooner if needed

## 2022-04-13 ENCOUNTER — OFFICE VISIT (OUTPATIENT)
Dept: PULMONOLOGY | Facility: CLINIC | Age: 72
End: 2022-04-13
Payer: MEDICARE

## 2022-04-13 VITALS
BODY MASS INDEX: 34.47 KG/M2 | HEIGHT: 65 IN | SYSTOLIC BLOOD PRESSURE: 152 MMHG | OXYGEN SATURATION: 98 % | WEIGHT: 206.88 LBS | DIASTOLIC BLOOD PRESSURE: 71 MMHG | HEART RATE: 66 BPM

## 2022-04-13 DIAGNOSIS — J45.50 SEVERE PERSISTENT ASTHMA WITHOUT COMPLICATION: Primary | ICD-10-CM

## 2022-04-13 DIAGNOSIS — J47.9 BRONCHIECTASIS WITHOUT COMPLICATION: ICD-10-CM

## 2022-04-13 DIAGNOSIS — S01.81XA CHIN LACERATION, INITIAL ENCOUNTER: ICD-10-CM

## 2022-04-13 PROCEDURE — 3078F PR MOST RECENT DIASTOLIC BLOOD PRESSURE < 80 MM HG: ICD-10-PCS | Mod: CPTII,S$GLB,, | Performed by: NURSE PRACTITIONER

## 2022-04-13 PROCEDURE — 99999 PR PBB SHADOW E&M-EST. PATIENT-LVL V: CPT | Mod: PBBFAC,,, | Performed by: NURSE PRACTITIONER

## 2022-04-13 PROCEDURE — 1101F PT FALLS ASSESS-DOCD LE1/YR: CPT | Mod: CPTII,S$GLB,, | Performed by: NURSE PRACTITIONER

## 2022-04-13 PROCEDURE — 3288F FALL RISK ASSESSMENT DOCD: CPT | Mod: CPTII,S$GLB,, | Performed by: NURSE PRACTITIONER

## 2022-04-13 PROCEDURE — 3008F PR BODY MASS INDEX (BMI) DOCUMENTED: ICD-10-PCS | Mod: CPTII,S$GLB,, | Performed by: NURSE PRACTITIONER

## 2022-04-13 PROCEDURE — 99999 PR PBB SHADOW E&M-EST. PATIENT-LVL V: ICD-10-PCS | Mod: PBBFAC,,, | Performed by: NURSE PRACTITIONER

## 2022-04-13 PROCEDURE — 1126F PR PAIN SEVERITY QUANTIFIED, NO PAIN PRESENT: ICD-10-PCS | Mod: CPTII,S$GLB,, | Performed by: NURSE PRACTITIONER

## 2022-04-13 PROCEDURE — 1159F PR MEDICATION LIST DOCUMENTED IN MEDICAL RECORD: ICD-10-PCS | Mod: CPTII,S$GLB,, | Performed by: NURSE PRACTITIONER

## 2022-04-13 PROCEDURE — 1157F PR ADVANCE CARE PLAN OR EQUIV PRESENT IN MEDICAL RECORD: ICD-10-PCS | Mod: CPTII,S$GLB,, | Performed by: NURSE PRACTITIONER

## 2022-04-13 PROCEDURE — 1101F PR PT FALLS ASSESS DOC 0-1 FALLS W/OUT INJ PAST YR: ICD-10-PCS | Mod: CPTII,S$GLB,, | Performed by: NURSE PRACTITIONER

## 2022-04-13 PROCEDURE — 3044F HG A1C LEVEL LT 7.0%: CPT | Mod: CPTII,S$GLB,, | Performed by: NURSE PRACTITIONER

## 2022-04-13 PROCEDURE — 3008F BODY MASS INDEX DOCD: CPT | Mod: CPTII,S$GLB,, | Performed by: NURSE PRACTITIONER

## 2022-04-13 PROCEDURE — 3044F PR MOST RECENT HEMOGLOBIN A1C LEVEL <7.0%: ICD-10-PCS | Mod: CPTII,S$GLB,, | Performed by: NURSE PRACTITIONER

## 2022-04-13 PROCEDURE — 1157F ADVNC CARE PLAN IN RCRD: CPT | Mod: CPTII,S$GLB,, | Performed by: NURSE PRACTITIONER

## 2022-04-13 PROCEDURE — 1126F AMNT PAIN NOTED NONE PRSNT: CPT | Mod: CPTII,S$GLB,, | Performed by: NURSE PRACTITIONER

## 2022-04-13 PROCEDURE — 3078F DIAST BP <80 MM HG: CPT | Mod: CPTII,S$GLB,, | Performed by: NURSE PRACTITIONER

## 2022-04-13 PROCEDURE — 99213 PR OFFICE/OUTPT VISIT, EST, LEVL III, 20-29 MIN: ICD-10-PCS | Mod: S$GLB,,, | Performed by: NURSE PRACTITIONER

## 2022-04-13 PROCEDURE — 1159F MED LIST DOCD IN RCRD: CPT | Mod: CPTII,S$GLB,, | Performed by: NURSE PRACTITIONER

## 2022-04-13 PROCEDURE — 3077F PR MOST RECENT SYSTOLIC BLOOD PRESSURE >= 140 MM HG: ICD-10-PCS | Mod: CPTII,S$GLB,, | Performed by: NURSE PRACTITIONER

## 2022-04-13 PROCEDURE — 3288F PR FALLS RISK ASSESSMENT DOCUMENTED: ICD-10-PCS | Mod: CPTII,S$GLB,, | Performed by: NURSE PRACTITIONER

## 2022-04-13 PROCEDURE — 3077F SYST BP >= 140 MM HG: CPT | Mod: CPTII,S$GLB,, | Performed by: NURSE PRACTITIONER

## 2022-04-13 PROCEDURE — 99213 OFFICE O/P EST LOW 20 MIN: CPT | Mod: S$GLB,,, | Performed by: NURSE PRACTITIONER

## 2022-04-13 RX ORDER — FLUTICASONE FUROATE, UMECLIDINIUM BROMIDE AND VILANTEROL TRIFENATATE 200; 62.5; 25 UG/1; UG/1; UG/1
1 POWDER RESPIRATORY (INHALATION) DAILY
Qty: 90 EACH | Refills: 3 | Status: SHIPPED | OUTPATIENT
Start: 2022-04-13 | End: 2022-07-11

## 2022-04-13 RX ORDER — MUPIROCIN 20 MG/G
OINTMENT TOPICAL 3 TIMES DAILY
Qty: 15 G | Refills: 0 | Status: SHIPPED | OUTPATIENT
Start: 2022-04-13

## 2022-04-13 NOTE — PATIENT INSTRUCTIONS
Use Bactroban ointment 3x a day to help heal biopsy site faster    Continue current Bronchiectasis and Asthma medication regiment.

## 2022-04-13 NOTE — PROGRESS NOTES
4/13/2022    Cornelia Delatorre  Office f/u    Chief Complaint   Patient presents with    Follow-up     3 month f/u     Asthma     HPI:  4/13/2022- SOB- stable, worse in late evenings, taking prednisone 10 mg when needed, not used in past 2 months; worse with exertion, improves with rest and albuterol rescue.   On BIPAP with benefit, no daytime drowsiness.   Liked Trelegy. Still on Adviar until prescription runs out, has 2 weeks left.   Skin biopsy 5 weeks prior, left lower chin site irritated by wearing face mask. No edema or erythema,     1/12/2022- SOB worsening, on Advair daily and levalbuterol 2-3x daily for past 4 weeks, has noticed worsening of shortness of breath and sinus complaints.   Admitted to hospital for GI virus,   Noticed prednisone is needed occasionally at 10 mg notices improvement    On BiPAP nightly with benefit. No daytime fatigue, no issues with nasal pillow mask.     7/12/21- complaint of daily sinus drainage, has to clear throat repeatedly for past 2 months. Currently on Flonase nasal spray, ipratropium nasal spray, Singulair pills, zyrtec, corcindin daily with no improvement.   SOB- stable, required steroid therapy for 3 days twice in past 3 months. Only with exertion, worse in late evenings, improves with rest,  Using nebulizer 2x daily due to feeling of heaviness in chest.   Cough- mild, non productive, associated with post nasal drip from allergies. Nocturnal arousals 2x weekly for past 2 weeks,   Wearing CPAP nighty with benefit.     4/12/21- spent last 6 months in home, was able to get COVID 19 vaccine Mederna. Allergies stable, few spells improve with nasal spray noticed improvement after getting air purifier.   Noticed spacer device helping with hoarse voice or oral thrush like before,   SOB- unchanged, daily complaint, varies with severity, worse with exertion, improves with rest and albuterol rescue. Has to sit up to breath when first laying  Down, not able to breath when  laying on left side.   Occasional cough- productive, clear mucous, using nebulizer 3 x weekly.   CPAP for [facility] doing well,     10/13/2020- Allergies bother her every few days, currently on daily Singulair, zyrtec, flonase, astelin nasal spray, having sneezing fits.   Complaint of clear sinus drainage,   Hoarse voice has resolved after stopping symbicort.   Cough- improved,   SOB- doing well, occasional episodes of not being able to catch breath, did not use nebulizer   Wearing CPAP nightly for S[facility], states benefits greatly    7/13/2020- Hoarse voice, loss of voice, productive cough, lost voice July 2019 tx by ENT who treated for acute laryngitis with diflucan; Recurrent problem. Hoarse voice return 4 weeks prior, ENT doctor recommends changing Asthma medications tx her with diflucan, doxycycline and prednisone for 5 days, states has improved.   SOB- worse when wearing face mask, currently on hizentra therapy,     5/4/2020- uses symbicort daily, uses rescue 2/wk - some anxiety, getting igg rx. Having more sinus problems with head colds- 3 in last 4 wks.  No prednisone- hizentra working well.        Nov 4, 2019- having mucous sensation, notes some sob relaxing - maybe worse night.  No nasal stuffy.  Uses cpap.  Uses symbicort bid, no rescue.  Mucous is clear.  No abx for sinus or lungs.  Getting immune infusions weekly - pt senses doing better since starting in May.  Patient Instructions   Breathing off and on short breath - need to use more albuterol to make clear where breathing problems come from    Mucous production may decrease if airways controlled- albuterol should help clearance.    Rescue inhaler may resolve any breathing problems- should use intermittently if any symptoms.    May use augmentin for sinus/lung problems- not optimal for all uti's       May 6,2019-due to get immune infusion next 2 days.  No prednisone, needs monlukast. abx stopped wks ago- mucous cleared.    Patient Instructions   Use antibiotic  daily til immune therapy done a wk - then as needed like every one else  Immune therapy may keep you stable - better than antibiotics.   Use symbicort regular.  Lungs may stabilize.  Use prednisone if needed.  Lung  Nodules are stable for 2 yrs and no follow up needed.  Call if problems- hope all will be better yet.  March 7, 19-exacerbated in late Jan- cleared in feb (vague).  Now ill again yellow and gray mucous (culture last Jan was nl yvonne).  Sl intermittent wheezes since last wk.  Sees Dr Herrera in [place]- gets allergy rx but declined to get now.  Pt has cvid by  igg and igm levels low and pneumococcal antibodies to 8/14 pneumococcal titers. Uses symbicort and singulair routinely.  Took prednisone completed 4 days ago- uses prednisone monthly- was able to skip December  .  Discussed with patient above for education the following:      Gastric by pass may cause malabsorption, protein levels have been too low and may affect ig levels-- somewhat.   Need to get follow up with dietician to assure adequate protein intake/levels.   Antibiotic may stabilize infections with common variable immune deficiency- azithromycin daily once cultures submitted.   Use augmentin if infection worsens.   Acapella/chest clapping help clear mucous, vest likewise if bronchiectasis.  Will not treat cause.   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  Use codeine to suppress mild coughing.   Need good immune system and no airway/asthma problems and good hygiene of bronchial tubes (no chronic infections) to prevent illness.     Lungs measured near normal with good response to bronchial medications 2017   Need ct to follow up and cultures to assure infection controlled.      Jan 28/2019- Feeling bad onset 2 weeks, took prednisone taper x 6 days and antibiotic Augmentin week prior, States no improvement. Cough- worse at night, no nocturnal arousals, takes cough  suppressant syrup before bed. Productive yellow/brown color.   Nebulized albuterol 4x daily. States no fever.  Has started allergy injections waiting for insurance clearance for IGG therapy.   Discussed with patient above for education the following:    CT chest for February to monitor and assess for bronchiectasis, need diagnosis for insurance to cover vest therapy  Have  continue to percuss back with hand.   Recommend purchasing Acepella device to help clear lungs of excess mucous, attaches to nebulizer device  Continue Nebulizer treatments 4x daily as needed.  Chest x-ray now to evaluate for pneumonia  Blood work at hospital   Will yeison to see results of sputum culture and x-ray before repeating antibiotic  Need to return sputum to clinic with in 4 hours of collecting  Fluconazole pills for oral thrush, this is a recurrent problem related to your weak immune system and use of inhaled steroids. Continue to rinse mouth out after using symbicort but problem will improve after starting immune replacement therapy.    oct 30,   2018-- had one day fever followed by cough/wheeze illness that lingered a wk. Pt seen by NP   [place] 2x, [place].  [place] like dying- only one day fever.  Violent cough.  Nebulizer ppt itch and palpitations- ok  On xopenex now.  Prednisone 40x3, 20 x 3 ,  augmentin cleared.  Now back to normal.  May 14, 2018- had spell /exacerbation with one round prednisone. Breathing good.  Follow-up in about 1 year (around 5/14/2019), or if symptoms worsen or fail to improve.   Discussed with patient above for education the following:     Check blood count and pneumonia vaccine response after last vaccine 4/27/2018   Use azithromycin for any lung/sinus infection- immune weakness likely   Asthma stable- use prednisone and singulair.   Use allergra and singulair and astelin/flonase   Lung nodule in lung still - Navigational bronchoscopy might find?  - will at least re check in a year.     Call if  needed, re check next yr.  hernan 15, 2018--1 st illness in yr or so with cough and rattles, no flu.  Appetite ok.  Ill x wk, cough and wheeze and sob and noct worse, resp rx helps a bit.  Hasn't been using  Albuterol   Follow-up in about 6 months (around 7/15/2018).   Discussed with patient above for education the following:      tamiflu if flu.   Need to use albuterol for any lung symptoms.  Should get cough  Better controlled.      Prednisone 20 mg 3 for 3 , taper may be needed.  Give shot today, 1 daily for 3 may be enough with albuterol.   You had high eosinophils-- if asthma becomes more active - special therapy may help??        prevnar 13 would be good - should be off prednisone.  Immune system is sl weak  Protection only to 7.14 pneumonia germs.  oct 19, 2017- has scratchy throat, some sinus drip, has nightly sensation throat issues, post beryl and carpel tunnel surg.  No sinus lung infections.  Breathing and cough good.  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 Had pos tb gold.  June 21, 2017- had good [place] trip, tapered symbicort with some increase sensation of lung problems so maintained full dose. No infections.  No chest symptoms on symbicort.   April 24, feels a lot better with min cough, vague sob going left side down at night.  Going to [place] 2 weeks.  Uses symbicort and good results.  March 16, cough better, but comes and goes,  No great help with steroids.  Submitted 3 sputums.  Had pneumovax march last yr.  Breathing better. Got symbicort.   Had pneumonia vaccine last yr  3/8/2017 HPI: had onset cough Hernan illness, got dizzy few days with diarrhea and cough. Cough clear sm amt mucous. Did have 101 temp one day, fatigue, no muscle aches.  Appetite decreased.  Illnesses lingered 2 weeks but cough never remitted- has improved with inhahler use last 15 days.    Had gastric 2011 bypass with with continued diarrhea intially resolved. No regurg or reflux or swallow  problems.   symbicort nearly  Resolved.    Sinuses ok.  No pets.  Never smoker.    Appetite good, feels well now.    headcolds to chest since childhood, nocturnal ??not recalled.  Had cats in past but got rid in 2003 or so.     The chief compliant  problem varies with instablilty at time    PFSH:  Past Medical History:   Diagnosis Date    Allergy     Arthritis     Asthma     Cancer     skin cancer to right hand    Cataract     Chronic fatigue 1/24/2017    CVID (common variable immunodeficiency) 3/7/2019    Diabetes mellitus     type2    KLINE (dyspnea on exertion) 1/24/2017    Dyslipidemia 6/25/2019    Encounter for blood transfusion     Essential hypertension 12/29/2011    GERD (gastroesophageal reflux disease)     Headache(784.0)     History of lumpectomy of both breasts     1992 negative    Hyperlipidemia 7/15/2015    Hypertension     Hypothyroidism     Neuropathy     Obesity     Obesity     Postmenopausal HRT (hormone replacement therapy)     Rash     Rosacea     Sleep apnea     on bipap    Snoring     Squamous cell carcinoma of skin     left forearm     UTI (urinary tract infection)     Wears glasses          Past Surgical History:   Procedure Laterality Date    ADENOIDECTOMY      APPENDECTOMY      BLADDER SUSPENSION      BREAST BIOPSY Bilateral 1992    bilateral benign excisional biopsies    BUNIONECTOMY  10/17/14    right, still has discomfort    CATARACT EXTRACTION W/  INTRAOCULAR LENS IMPLANT Bilateral     CHOLECYSTECTOMY  08/02/2017    COLONOSCOPY      CYSTOSCOPY      EPIDURAL STEROID INJECTION INTO LUMBAR SPINE N/A 8/14/2019    Procedure: Injection-steroid-epidural-lumbar L5/S1;  Surgeon: Fredi Rojsa MD;  Location: [facility] OR;  Service: Pain Management;  Laterality: N/A;    EPIDURAL STEROID INJECTION INTO LUMBAR SPINE N/A 5/3/2021    Procedure: Injection-steroid-epidural-lumbar L5/S1 to the Left;  Surgeon: Fredi Rojas MD;  Location: [facility] OR;  Service: Pain  Management;  Laterality: N/A;    EPIDURAL STEROID INJECTION INTO LUMBAR SPINE N/A 9/24/2021    Procedure: Injection-steroid-epidural-lumbar L5/S1;  Surgeon: Fredi Rojas MD;  Location: [facility] OR;  Service: Pain Management;  Laterality: N/A;    EPIDURAL STEROID INJECTION INTO LUMBAR SPINE N/A 2/21/2022    Procedure: Injection-steroid-epidural-lumbar L5/S1;  Surgeon: Fredi Rojas MD;  Location: [facility] OR;  Service: Pain Management;  Laterality: N/A;    ESOPHAGEAL DILATION N/A 6/11/2021    Procedure: DILATION, ESOPHAGUS;  Surgeon: Jake Sheriff MD;  Location: [facility] ENDO;  Service: Endoscopy;  Laterality: N/A;    ESOPHAGOGASTRODUODENOSCOPY      dilated esophagus    ESOPHAGOGASTRODUODENOSCOPY N/A 6/11/2021    Procedure: EGD (ESOPHAGOGASTRODUODENOSCOPY);  Surgeon: Jake Sheriff MD;  Location: [facility] ENDO;  Service: Endoscopy;  Laterality: N/A;    GASTRIC BYPASS      2011    HYSTERECTOMY  1980    interstim bladder  2009    10/14/14 new battery    OOPHORECTOMY  1980    REPLACEMENT OF SACRAL NERVE STIMULATOR  2/18/2020    Procedure: REPLACEMENT, NEUROSTIMULATOR, SACRAL;  Surgeon: Mary Jane Jarvis MD;  Location: [facility] OR [address] [place] [address];  Service: Urology;;    REVISION OF PROCEDURE INVOLVING SACRAL NEUROSTIMULATOR DEVICE Right 2/18/2020    Procedure: REVISION, NEUROSTIMULATOR, SACRAL/ battery replacement;  Surgeon: Mary Jane Jarvis MD;  Location: [facility] OR [address] [place] [address];  Service: Urology;  Laterality: Right;  1hr/ rep contacted/ (CONNIE)    SKIN CANCER EXCISION      left hand    TONSILLECTOMY      WISDOM TOOTH EXTRACTION       Social History     Tobacco Use    Smoking status: Never Smoker    Smokeless tobacco: Never Used   Substance Use Topics    Alcohol use: Not Currently    Drug use: No     Family History   Problem Relation Age of Onset    Breast cancer Mother 50    Diabetes Unknown     Hypertension Unknown     Hypothyroidism Unknown     Breast cancer Paternal Aunt         40's    Ovarian cancer  "Paternal Grandmother     Allergic rhinitis Neg Hx     Allergies Neg Hx     Angioedema Neg Hx     Asthma Neg Hx     Atopy Neg Hx     Eczema Neg Hx     Immunodeficiency Neg Hx     Rhinitis Neg Hx     Urticaria Neg Hx      Review of patient's allergies indicates:   Allergen Reactions    Sulfa (sulfonamide antibiotics) Hives    Naproxen Other (See Comments)     Other reaction(s): RT sided numbness         Performance Status:The patient's activity level is functions out of house.      Review of Systems:  a review of eleven systems covering constitutional, Eye, HEENT, Psych, Respiratory, Cardiac, GI, , Musculoskeletal, Endocrine, Dermatologic was negative except for pertinent findings as listed ABOVE and below: all good,  Had dm that remitted with bypass 2011.  Positive for SOB, nasal drip,  Chest tightness    Exam:Comprehensive exam done. BP (!) 152/71 (BP Location: Left arm, Patient Position: Sitting, BP Method: Medium (Automatic))   Pulse 66   Ht 5' 4.5" (1.638 m)   Wt 93.9 kg (206 lb 14.4 oz)   SpO2 98% Comment: on room air at rest  BMI 34.97 kg/m²   Exam included Vitals as listed, and patient's appearance and affect and alertness and mood, oral exam for yeast and hygiene and pharynx lesions and Mallapatti (M) score, neck with inspection for jvd and masses and thyroid abnormalities and lymph nodes (supraclavicular and infraclavicular nodes and axillary also examined and noted if abn), chest exam included symmetry and effort and fremitus and percussion and auscultation, cardiac exam included rhythm and gallops and murmur and rubs and jvd and edema, abdominal exam for mass and hepatosplenomegaly and tenderness and hernias and bowel sounds, Musculoskeletal exam with muscle tone and posture and mobility/gait and  strength, and skin for rashes and cyanosis and pallor and turgor, extremity for clubbing.  Findings were normal except for pertinent findings listed below:  M3, good bs, rest good. Lungs " clear-  chest is symmetric, no distress, normal percussion, normal fremitus and good normal breath sounds  1 mm erythema at previous biopsy site, closure of skin. No drainage or edema    Radiographs (ct chest and cxr) reviewed: view by direct vision  i do see clear bronchiectasis,nodules are noted.  Mucoid type impaction suggested in rul ant segment.  CT Abdomen Pelvis With Contrast 06/11/2021 lower lung bases clear    X-Ray Chest PA And Lateral 04/19/2021 Lungs clear      April 19, 2018 ct suggest tracheobronchomalacia on high res spot films- seen 3/7/19  CT Chest:  1. Region of endobronchial plugging unchanged since 2/7/17 of uncertain etiology. This could relate to a process such as allergic bronchopulmonary aspergillosis, a solid mass nodule, mucous plugging, residual from aspiration, endobronchial spread of disease. Further followup or evaluation is necessary  Electronically signed by: Raffi Gottlieb MD  Date: 03/14/17    10/17/19 Chest X-ray clear    Labs reviewed igm low, igg lowish, humerol titers na    Lab Results   Component Value Date    WBC 4.73 03/03/2022    RBC 4.43 03/03/2022    HGB 12.4 03/03/2022    HCT 40.9 03/03/2022    MCV 92 03/03/2022    MCH 28.0 03/03/2022    MCHC 30.3 (L) 03/03/2022    RDW 13.5 03/03/2022     03/03/2022    MPV 11.1 03/03/2022    GRAN 2.6 03/03/2022    GRAN 54.9 03/03/2022    LYMPH 1.7 03/03/2022    LYMPH 35.9 03/03/2022    MONO 0.4 03/03/2022    MONO 7.8 03/03/2022    EOS 0.0 03/03/2022    BASO 0.01 03/03/2022    EOSINOPHIL 0.6 03/03/2022    BASOPHIL 0.2 03/03/2022         PFT  Spirometry with bronchodilator, lung volume by gas dilution, and diffusion capacity measured April 19, 2021. The FEV1 FVC ratio was 75% indicating no airflow obstruction measured by spirometry. The FEV1 was 99% predicted at 2.25 L. There was no   statistically significant improvement in spirometry following bronchodilator. Total lung capacity was within normal range at 90% of predicted. Diffusion  was slightly low at 77% predicted-uncorrected for anemia if present.   Spirometry is normal. Lung volumes fall within normal range. Diffusion is slightly decreased. Clinical correlation recommended. The bronchodilator response was not significant.       Done [facility] with 10% response, otherwise nl  DATE OF PROCEDURE:  03/24/2017     1.  Spirometry reveals an FVC of 3.1 L, which is 107% predicted.  FEV1 is 2.3 L,   which is 100% predicted.  The ratio, there is preserved.  There is a positive,   but not significant bronchodilator response to both the FVC and FEV1.  Loop   contours appear with adequate effort as well.  2.  Lung volumes.  The total lung capacity is 4.4 L, which is 92% predicted.    There are no signs of gas trapping.  3.  Diffusing capacity is mild-to-moderate reduced at 68%, does improve to 96%   with alveolar volumes.     OVERALL IMPRESSION:  A normal spirometry with a robust yet statistically   insignificant bronchodilator response.  Normal lung volumes and a moderate   reduction in diffusion capacity.    CC: Jonathan Nicole M.D.        Plan:  Clinical impression is resonably certain and repeated evaluation prn +/- follow up will be needed as below.    Cornelia was seen today for follow-up and asthma.    Diagnoses and all orders for this visit:    Severe persistent asthma without complication  -     fluticasone-umeclidin-vilanter (TRELEGY ELLIPTA) 200-62.5-25 mcg inhaler; Inhale 1 puff into the lungs once daily.    Bronchiectasis without complication  -     fluticasone-umeclidin-vilanter (TRELEGY ELLIPTA) 200-62.5-25 mcg inhaler; Inhale 1 puff into the lungs once daily.    Chin laceration, initial encounter  -     mupirocin (BACTROBAN) 2 % ointment; Apply topically 3 (three) times daily.        Follow up in about 3 months (around 7/13/2022), or if symptoms worsen or fail to improve.    Discussed with patient above for education the following:      Patient Instructions   Use Bactroban ointment 3x a  day to help heal biopsy site faster    Continue current Bronchiectasis and Asthma medication regiment.

## 2022-04-18 ENCOUNTER — PATIENT MESSAGE (OUTPATIENT)
Dept: FAMILY MEDICINE | Facility: CLINIC | Age: 72
End: 2022-04-18
Payer: MEDICARE

## 2022-04-18 DIAGNOSIS — N30.00 ACUTE CYSTITIS WITHOUT HEMATURIA: Primary | ICD-10-CM

## 2022-04-18 RX ORDER — NITROFURANTOIN 25; 75 MG/1; MG/1
100 CAPSULE ORAL 2 TIMES DAILY
Qty: 14 CAPSULE | Refills: 0 | Status: SHIPPED | OUTPATIENT
Start: 2022-04-18 | End: 2022-04-25

## 2022-04-18 NOTE — TELEPHONE ENCOUNTER
Requesting urine placed to check for uti     lov 03/10/22    Please check and see if the correct urines were placed. Thank you

## 2022-04-19 ENCOUNTER — LAB VISIT (OUTPATIENT)
Dept: LAB | Facility: HOSPITAL | Age: 72
End: 2022-04-19
Attending: INTERNAL MEDICINE
Payer: MEDICARE

## 2022-04-19 DIAGNOSIS — N30.00 ACUTE CYSTITIS WITHOUT HEMATURIA: ICD-10-CM

## 2022-04-19 LAB
BACTERIA #/AREA URNS HPF: ABNORMAL /HPF
BILIRUB UR QL STRIP: NEGATIVE
CLARITY UR: ABNORMAL
COLOR UR: YELLOW
GLUCOSE UR QL STRIP: NEGATIVE
HGB UR QL STRIP: NEGATIVE
KETONES UR QL STRIP: NEGATIVE
LEUKOCYTE ESTERASE UR QL STRIP: ABNORMAL
MICROSCOPIC COMMENT: ABNORMAL
NITRITE UR QL STRIP: POSITIVE
PH UR STRIP: 6 [PH] (ref 5–8)
PROT UR QL STRIP: NEGATIVE
SP GR UR STRIP: 1.01 (ref 1–1.03)
SQUAMOUS #/AREA URNS HPF: 1 /HPF
URN SPEC COLLECT METH UR: ABNORMAL
WBC #/AREA URNS HPF: 50 /HPF (ref 0–5)

## 2022-04-19 PROCEDURE — 87088 URINE BACTERIA CULTURE: CPT | Performed by: INTERNAL MEDICINE

## 2022-04-19 PROCEDURE — 87186 SC STD MICRODIL/AGAR DIL: CPT | Performed by: INTERNAL MEDICINE

## 2022-04-19 PROCEDURE — 87077 CULTURE AEROBIC IDENTIFY: CPT | Performed by: INTERNAL MEDICINE

## 2022-04-19 PROCEDURE — 81000 URINALYSIS NONAUTO W/SCOPE: CPT | Mod: PO | Performed by: INTERNAL MEDICINE

## 2022-04-19 PROCEDURE — 87086 URINE CULTURE/COLONY COUNT: CPT | Performed by: INTERNAL MEDICINE

## 2022-04-21 LAB — BACTERIA UR CULT: ABNORMAL

## 2022-04-27 ENCOUNTER — OFFICE VISIT (OUTPATIENT)
Dept: FAMILY MEDICINE | Facility: CLINIC | Age: 72
End: 2022-04-27
Payer: MEDICARE

## 2022-04-27 ENCOUNTER — LAB VISIT (OUTPATIENT)
Dept: LAB | Facility: HOSPITAL | Age: 72
End: 2022-04-27
Attending: NURSE PRACTITIONER
Payer: MEDICARE

## 2022-04-27 VITALS
HEIGHT: 64 IN | SYSTOLIC BLOOD PRESSURE: 124 MMHG | TEMPERATURE: 98 F | OXYGEN SATURATION: 99 % | WEIGHT: 205.69 LBS | BODY MASS INDEX: 35.12 KG/M2 | HEART RATE: 66 BPM | DIASTOLIC BLOOD PRESSURE: 82 MMHG

## 2022-04-27 DIAGNOSIS — N39.0 RECURRENT UTI: ICD-10-CM

## 2022-04-27 DIAGNOSIS — M54.9 ACUTE RIGHT-SIDED BACK PAIN, UNSPECIFIED BACK LOCATION: Primary | ICD-10-CM

## 2022-04-27 DIAGNOSIS — R10.9 RIGHT FLANK PAIN: ICD-10-CM

## 2022-04-27 DIAGNOSIS — R79.9 ABNORMAL BLOOD CHEMISTRY: Primary | ICD-10-CM

## 2022-04-27 DIAGNOSIS — M54.9 ACUTE RIGHT-SIDED BACK PAIN, UNSPECIFIED BACK LOCATION: ICD-10-CM

## 2022-04-27 LAB
ALBUMIN SERPL BCP-MCNC: 3.8 G/DL (ref 3.5–5.2)
ALP SERPL-CCNC: 141 U/L (ref 55–135)
ALT SERPL W/O P-5'-P-CCNC: 22 U/L (ref 10–44)
ANION GAP SERPL CALC-SCNC: 12 MMOL/L (ref 8–16)
AST SERPL-CCNC: 25 U/L (ref 10–40)
BACTERIA #/AREA URNS HPF: NORMAL /HPF
BASOPHILS # BLD AUTO: 0.02 K/UL (ref 0–0.2)
BASOPHILS NFR BLD: 0.3 % (ref 0–1.9)
BILIRUB SERPL-MCNC: 0.3 MG/DL (ref 0.1–1)
BILIRUB UR QL STRIP: NEGATIVE
BUN SERPL-MCNC: 12 MG/DL (ref 8–23)
CALCIUM SERPL-MCNC: 9.7 MG/DL (ref 8.7–10.5)
CHLORIDE SERPL-SCNC: 100 MMOL/L (ref 95–110)
CLARITY UR: CLEAR
CO2 SERPL-SCNC: 27 MMOL/L (ref 23–29)
COLOR UR: YELLOW
CREAT SERPL-MCNC: 0.9 MG/DL (ref 0.5–1.4)
DIFFERENTIAL METHOD: ABNORMAL
EOSINOPHIL # BLD AUTO: 0.1 K/UL (ref 0–0.5)
EOSINOPHIL NFR BLD: 1.1 % (ref 0–8)
ERYTHROCYTE [DISTWIDTH] IN BLOOD BY AUTOMATED COUNT: 13.6 % (ref 11.5–14.5)
EST. GFR  (AFRICAN AMERICAN): >60 ML/MIN/1.73 M^2
EST. GFR  (NON AFRICAN AMERICAN): >60 ML/MIN/1.73 M^2
GLUCOSE SERPL-MCNC: 102 MG/DL (ref 70–110)
GLUCOSE UR QL STRIP: NEGATIVE
HCT VFR BLD AUTO: 39 % (ref 37–48.5)
HGB BLD-MCNC: 12.4 G/DL (ref 12–16)
HGB UR QL STRIP: NEGATIVE
IMM GRANULOCYTES # BLD AUTO: 0.05 K/UL (ref 0–0.04)
IMM GRANULOCYTES NFR BLD AUTO: 0.8 % (ref 0–0.5)
KETONES UR QL STRIP: NEGATIVE
LEUKOCYTE ESTERASE UR QL STRIP: NEGATIVE
LYMPHOCYTES # BLD AUTO: 1.7 K/UL (ref 1–4.8)
LYMPHOCYTES NFR BLD: 26 % (ref 18–48)
MCH RBC QN AUTO: 28.4 PG (ref 27–31)
MCHC RBC AUTO-ENTMCNC: 31.8 G/DL (ref 32–36)
MCV RBC AUTO: 89 FL (ref 82–98)
MICROSCOPIC COMMENT: NORMAL
MONOCYTES # BLD AUTO: 0.4 K/UL (ref 0.3–1)
MONOCYTES NFR BLD: 6.5 % (ref 4–15)
NEUTROPHILS # BLD AUTO: 4.3 K/UL (ref 1.8–7.7)
NEUTROPHILS NFR BLD: 65.3 % (ref 38–73)
NITRITE UR QL STRIP: NEGATIVE
NRBC BLD-RTO: 0 /100 WBC
PH UR STRIP: 8 [PH] (ref 5–8)
PLATELET # BLD AUTO: 182 K/UL (ref 150–450)
PMV BLD AUTO: 9.8 FL (ref 9.2–12.9)
POTASSIUM SERPL-SCNC: 6 MMOL/L (ref 3.5–5.1)
PROT SERPL-MCNC: 7.6 G/DL (ref 6–8.4)
PROT UR QL STRIP: NEGATIVE
RBC # BLD AUTO: 4.37 M/UL (ref 4–5.4)
SODIUM SERPL-SCNC: 139 MMOL/L (ref 136–145)
SP GR UR STRIP: 1.01 (ref 1–1.03)
SQUAMOUS #/AREA URNS HPF: 1 /HPF
URN SPEC COLLECT METH UR: NORMAL
WBC # BLD AUTO: 6.51 K/UL (ref 3.9–12.7)

## 2022-04-27 PROCEDURE — 80053 COMPREHEN METABOLIC PANEL: CPT | Mod: PO | Performed by: NURSE PRACTITIONER

## 2022-04-27 PROCEDURE — 99999 PR PBB SHADOW E&M-EST. PATIENT-LVL III: CPT | Mod: PBBFAC,,, | Performed by: NURSE PRACTITIONER

## 2022-04-27 PROCEDURE — 3044F HG A1C LEVEL LT 7.0%: CPT | Mod: CPTII,S$GLB,, | Performed by: NURSE PRACTITIONER

## 2022-04-27 PROCEDURE — 1125F PR PAIN SEVERITY QUANTIFIED, PAIN PRESENT: ICD-10-PCS | Mod: CPTII,S$GLB,, | Performed by: NURSE PRACTITIONER

## 2022-04-27 PROCEDURE — 99214 PR OFFICE/OUTPT VISIT, EST, LEVL IV, 30-39 MIN: ICD-10-PCS | Mod: S$GLB,,, | Performed by: NURSE PRACTITIONER

## 2022-04-27 PROCEDURE — 85025 COMPLETE CBC W/AUTO DIFF WBC: CPT | Mod: PO | Performed by: NURSE PRACTITIONER

## 2022-04-27 PROCEDURE — 3074F PR MOST RECENT SYSTOLIC BLOOD PRESSURE < 130 MM HG: ICD-10-PCS | Mod: CPTII,S$GLB,, | Performed by: NURSE PRACTITIONER

## 2022-04-27 PROCEDURE — 87086 URINE CULTURE/COLONY COUNT: CPT | Performed by: NURSE PRACTITIONER

## 2022-04-27 PROCEDURE — 99214 OFFICE O/P EST MOD 30 MIN: CPT | Mod: S$GLB,,, | Performed by: NURSE PRACTITIONER

## 2022-04-27 PROCEDURE — 3008F BODY MASS INDEX DOCD: CPT | Mod: CPTII,S$GLB,, | Performed by: NURSE PRACTITIONER

## 2022-04-27 PROCEDURE — 3008F PR BODY MASS INDEX (BMI) DOCUMENTED: ICD-10-PCS | Mod: CPTII,S$GLB,, | Performed by: NURSE PRACTITIONER

## 2022-04-27 PROCEDURE — 3074F SYST BP LT 130 MM HG: CPT | Mod: CPTII,S$GLB,, | Performed by: NURSE PRACTITIONER

## 2022-04-27 PROCEDURE — 99999 PR PBB SHADOW E&M-EST. PATIENT-LVL III: ICD-10-PCS | Mod: PBBFAC,,, | Performed by: NURSE PRACTITIONER

## 2022-04-27 PROCEDURE — 3079F PR MOST RECENT DIASTOLIC BLOOD PRESSURE 80-89 MM HG: ICD-10-PCS | Mod: CPTII,S$GLB,, | Performed by: NURSE PRACTITIONER

## 2022-04-27 PROCEDURE — 36415 COLL VENOUS BLD VENIPUNCTURE: CPT | Mod: PO | Performed by: NURSE PRACTITIONER

## 2022-04-27 PROCEDURE — 81000 URINALYSIS NONAUTO W/SCOPE: CPT | Mod: PO | Performed by: NURSE PRACTITIONER

## 2022-04-27 PROCEDURE — 1125F AMNT PAIN NOTED PAIN PRSNT: CPT | Mod: CPTII,S$GLB,, | Performed by: NURSE PRACTITIONER

## 2022-04-27 PROCEDURE — 3079F DIAST BP 80-89 MM HG: CPT | Mod: CPTII,S$GLB,, | Performed by: NURSE PRACTITIONER

## 2022-04-27 PROCEDURE — 3044F PR MOST RECENT HEMOGLOBIN A1C LEVEL <7.0%: ICD-10-PCS | Mod: CPTII,S$GLB,, | Performed by: NURSE PRACTITIONER

## 2022-04-27 PROCEDURE — 1157F ADVNC CARE PLAN IN RCRD: CPT | Mod: CPTII,S$GLB,, | Performed by: NURSE PRACTITIONER

## 2022-04-27 PROCEDURE — 1157F PR ADVANCE CARE PLAN OR EQUIV PRESENT IN MEDICAL RECORD: ICD-10-PCS | Mod: CPTII,S$GLB,, | Performed by: NURSE PRACTITIONER

## 2022-04-27 RX ORDER — TIZANIDINE 4 MG/1
4 TABLET ORAL EVERY 8 HOURS PRN
Qty: 10 TABLET | Refills: 0 | Status: SHIPPED | OUTPATIENT
Start: 2022-04-27 | End: 2022-05-07

## 2022-04-27 NOTE — PROGRESS NOTES
urineSubjective:       Patient ID: Cornelia Delatorre is a 71 y.o. female.    Chief Complaint: Urinary Tract Infection (Having urgency to pee. No pressure, odor, dark color, burning. Not much itching.  ) and Back Pain (RT lumbar pain. No pain down the leg. It feels like it is grabbing or a ama horse.)    HPI  Treated for UTI with macrobid on 4/18/22--culture E coli sensitive to marcrobid--finished yesterday. She reports resolution of pressure and dysuria. Complaining of right lower/mid back pain, onset 1-2 weeks ago-- concerned about kidney. Denies fever, chills, sweats or body aches. Pain worse with position. Denies radiating pain, numbness, tingling or rash. Taking tylenol and applying otc top biofreeze which helps but pain is not resolving.     Vitals:    04/27/22 1119   BP: 124/82   Pulse: 66   Temp: 97.9 °F (36.6 °C)     Review of Systems   Constitutional: Negative for chills, diaphoresis and fever.   Respiratory: Negative for cough and shortness of breath.    Gastrointestinal: Negative for abdominal pain.   Genitourinary: Positive for flank pain. Negative for difficulty urinating, dysuria, hematuria and urgency.   Musculoskeletal: Positive for back pain. Negative for myalgias.       Past Medical History:   Diagnosis Date    Allergy     Arthritis     Asthma     Cancer     skin cancer to right hand    Cataract     Chronic fatigue 1/24/2017    CVID (common variable immunodeficiency) 3/7/2019    Diabetes mellitus     type2    KLINE (dyspnea on exertion) 1/24/2017    Dyslipidemia 6/25/2019    Encounter for blood transfusion     Essential hypertension 12/29/2011    GERD (gastroesophageal reflux disease)     Headache(784.0)     History of lumpectomy of both breasts     1992 negative    Hyperlipidemia 7/15/2015    Hypertension     Hypothyroidism     Neuropathy     Obesity     Obesity     Postmenopausal HRT (hormone replacement therapy)     Rash     Rosacea     Sleep apnea     on bipap     Snoring     Squamous cell carcinoma of skin     left forearm     UTI (urinary tract infection)     Wears glasses      Objective:      Physical Exam  Constitutional:       General: She is not in acute distress.     Appearance: She is well-developed. She is not ill-appearing, toxic-appearing or diaphoretic.   HENT:      Right Ear: Hearing normal.      Left Ear: Hearing normal.   Pulmonary:      Effort: No tachypnea or respiratory distress.   Abdominal:      Tenderness: There is no right CVA tenderness or left CVA tenderness.   Musculoskeletal:        Back:       Comments: Area of pain, mild TTP; no rash or swelling    Skin:     Coloration: Skin is not pale.      Findings: No ecchymosis, erythema, rash or wound.   Neurological:      Mental Status: She is alert and oriented to person, place, and time.   Psychiatric:         Speech: Speech normal.         Behavior: Behavior normal.         Thought Content: Thought content normal.         Judgment: Judgment normal.         Assessment:       1. Acute right-sided back pain, unspecified back location    2. Recurrent UTI        Plan:       Acute right-sided back pain, unspecified back location  -     CBC Auto Differential; Future; Expected date: 04/27/2022  -     Comprehensive Metabolic Panel; Future; Expected date: 04/27/2022  -     Urine culture; Future; Expected date: 04/27/2022  -     Urinalysis; Future; Expected date: 04/27/2022  -     Urinalysis Microscopic; Future; Expected date: 04/27/2022  -     tiZANidine (ZANAFLEX) 4 MG tablet; Take 1 tablet (4 mg total) by mouth every 8 (eight) hours as needed (muscle spasms).  Dispense: 10 tablet; Refill: 0    Recurrent UTI  -     CBC Auto Differential; Future; Expected date: 04/27/2022  -     Comprehensive Metabolic Panel; Future; Expected date: 04/27/2022        negative leukocytosis, renal function normal--reassuring  Trial muscle relaxer--notify me with response  patient unable to give urine in clinic--will bring back      Follow up for further evaluation if s/s worsen, fail to improve, or new symptoms arise.    Medication List with Changes/Refills   New Medications    TIZANIDINE (ZANAFLEX) 4 MG TABLET    Take 1 tablet (4 mg total) by mouth every 8 (eight) hours as needed (muscle spasms).   Current Medications    ALBUTEROL (PROVENTIL/VENTOLIN HFA) 90 MCG/ACTUATION INHALER    Inhale 2 puffs into the lungs every 4 (four) hours as needed. 2 puffs every 4 hours as needed for cough, wheeze, or shortness of breath    ASCORBIC ACID, VITAMIN C, (VITAMIN C) 1000 MG TABLET    Take 1,000 mg by mouth 2 (two) times daily.     AZELASTINE (OPTIVAR) 0.05 % OPHTHALMIC SOLUTION    Place 1 drop into both eyes. As needed    B-COMPLEX WITH VITAMIN C (Z-BEC OR EQUIV) TABLET    Take 1 tablet by mouth once daily.    BIFIDOBACTERIUM INFANTIS (ALIGN ORAL)    Take by mouth once daily.    BIOTIN 10,000 MCG CAP    Take 1 tablet by mouth every evening.     CLONIDINE (CATAPRES) 0.1 MG TABLET    Take 1 tablet (0.1 mg total) by mouth 3 (three) times daily as needed (PRN SBP > 165 mmHg).    CRANBERRY 500 MG CAP    Take 1 capsule by mouth every evening.    DICLOFENAC SODIUM (VOLTAREN) 1 % GEL    Apply 2 grams topically once daily.    DILTIAZEM (CARDIZEM CD) 120 MG CP24    Take 1 capsule (120 mg total) by mouth every evening.    EPINEPHRINE (EPIPEN) 0.3 MG/0.3 ML ATIN    Inject 1 each into the muscle as needed.     ESOMEPRAZOLE (NEXIUM) 40 MG CAPSULE    Take 1 capsule (40 mg total) by mouth before breakfast.    ESTRADIOL (ESTRACE) 0.01 % (0.1 MG/GRAM) VAGINAL CREAM    Place 1 g vaginally 3 (three) times a week. Place by fingertip application before bedtime three times a week (Monday, Wednesday, Friday)    FEXOFENADINE (ALLEGRA) 180 MG TABLET    Take 180 mg by mouth once daily.     FISH OIL-OMEGA-3 FATTY ACIDS 300-1,000 MG CAPSULE    Take 2 g by mouth once daily.    FLUTICASONE PROPIONATE (FLONASE) 50 MCG/ACTUATION NASAL SPRAY    2 sprays (100 mcg total) by Each  Nostril route once daily.    FLUTICASONE-UMECLIDIN-VILANTER (TRELEGY ELLIPTA) 200-62.5-25 MCG INHALER    Inhale 1 puff into the lungs once daily.    GABAPENTIN (NEURONTIN) 600 MG TABLET    Take 1 tablet (600 mg total) by mouth 3 (three) times daily.    GUAIFENESIN-CODEINE 100-10 MG/5 ML (GUAIFENESIN AC)  MG/5 ML SYRUP    Take 5 mLs by mouth 3 (three) times daily as needed for Cough.    IMMUN GLOB G,IGG,-PRO-IGA 0-50 (HIZENTRA) 1 GRAM/5 ML (20 %) SOLN    Inject 55 mLs (11 g total) into the skin once a week.    INHALATION SPACING DEVICE    Use as directed for inhalation.    IPRATROPIUM (ATROVENT) 42 MCG (0.06 %) NASAL SPRAY    2 sprays by Nasal route 4 (four) times daily.    LEVALBUTEROL (XOPENEX) 1.25 MG/3 ML NEBULIZER SOLUTION    Take 3 mLs (1.25 mg total) by nebulization every 4 (four) hours as needed for Wheezing or Shortness of Breath. Rescue    METFORMIN (GLUCOPHAGE-XR) 500 MG ER 24HR TABLET    Take 1 tablet (500 mg total) by mouth 2 (two) times daily with meals.    MONTELUKAST (SINGULAIR) 10 MG TABLET    Take 1 tablet (10 mg total) by mouth every evening.    MUCUS CLEARING DEVICE KAREN    1 Device by Misc.(Non-Drug; Combo Route) route 2 (two) times daily.    MULTIVITAMIN CAPSULE    Take 1 capsule by mouth once daily.     MUPIROCIN (BACTROBAN) 2 % OINTMENT    Apply topically 3 (three) times daily.    ONDANSETRON (ZOFRAN-ODT) 4 MG TBDL    Take 1 tablet (4 mg total) by mouth every 8 (eight) hours as needed.    POTASSIUM CHLORIDE SA (K-DUR,KLOR-CON) 20 MEQ TABLET    TAKE 1 TABLET EVERY DAY    PRAVASTATIN (PRAVACHOL) 80 MG TABLET    Take 1 tablet (80 mg total) by mouth once daily.    PREDNISONE (DELTASONE) 10 MG TABLET    1 pill for 3-5 days repeat as needed for chest tightness.    PSYLLIUM HUSK (METAMUCIL ORAL)    Take by mouth.    SIMETHICONE (GAS-X ORAL)    Take 1 capsule by mouth as needed (gas relief).     VALSARTAN-HYDROCHLOROTHIAZIDE (DIOVAN-HCT) 160-12.5 MG PER TABLET    Take 1 tablet by mouth once  daily.    VITAMIN D3-FOLIC ACID 5,000 UNIT- 1 MG TAB    Take 1 tablet by mouth once daily.

## 2022-04-28 LAB — BACTERIA UR CULT: NO GROWTH

## 2022-05-02 ENCOUNTER — PATIENT MESSAGE (OUTPATIENT)
Dept: FAMILY MEDICINE | Facility: CLINIC | Age: 72
End: 2022-05-02
Payer: MEDICARE

## 2022-05-03 ENCOUNTER — LAB VISIT (OUTPATIENT)
Dept: LAB | Facility: HOSPITAL | Age: 72
End: 2022-05-03
Attending: NURSE PRACTITIONER
Payer: MEDICARE

## 2022-05-03 DIAGNOSIS — R79.9 ABNORMAL BLOOD CHEMISTRY: ICD-10-CM

## 2022-05-03 LAB — POTASSIUM SERPL-SCNC: 4.6 MMOL/L (ref 3.5–5.1)

## 2022-05-03 PROCEDURE — 84132 ASSAY OF SERUM POTASSIUM: CPT | Performed by: NURSE PRACTITIONER

## 2022-05-03 PROCEDURE — 36415 COLL VENOUS BLD VENIPUNCTURE: CPT | Mod: PO | Performed by: NURSE PRACTITIONER

## 2022-05-09 ENCOUNTER — PATIENT MESSAGE (OUTPATIENT)
Dept: SMOKING CESSATION | Facility: CLINIC | Age: 72
End: 2022-05-09
Payer: MEDICARE

## 2022-05-18 ENCOUNTER — PATIENT MESSAGE (OUTPATIENT)
Dept: PULMONOLOGY | Facility: CLINIC | Age: 72
End: 2022-05-18
Payer: MEDICARE

## 2022-05-20 ENCOUNTER — PATIENT MESSAGE (OUTPATIENT)
Dept: PULMONOLOGY | Facility: CLINIC | Age: 72
End: 2022-05-20
Payer: MEDICARE

## 2022-05-20 DIAGNOSIS — U07.1 COVID: ICD-10-CM

## 2022-05-20 DIAGNOSIS — U07.1 COVID-19 VIRUS DETECTED: Primary | ICD-10-CM

## 2022-05-23 ENCOUNTER — INFUSION (OUTPATIENT)
Dept: INFECTIOUS DISEASES | Facility: HOSPITAL | Age: 72
End: 2022-05-23
Attending: INTERNAL MEDICINE
Payer: MEDICARE

## 2022-05-23 ENCOUNTER — PATIENT MESSAGE (OUTPATIENT)
Dept: ALLERGY | Facility: CLINIC | Age: 72
End: 2022-05-23
Payer: MEDICARE

## 2022-05-23 ENCOUNTER — PATIENT MESSAGE (OUTPATIENT)
Dept: PULMONOLOGY | Facility: CLINIC | Age: 72
End: 2022-05-23
Payer: MEDICARE

## 2022-05-23 VITALS
DIASTOLIC BLOOD PRESSURE: 65 MMHG | SYSTOLIC BLOOD PRESSURE: 110 MMHG | TEMPERATURE: 98 F | HEART RATE: 65 BPM | OXYGEN SATURATION: 98 % | RESPIRATION RATE: 20 BRPM

## 2022-05-23 DIAGNOSIS — U07.1 COVID-19 VIRUS DETECTED: ICD-10-CM

## 2022-05-23 DIAGNOSIS — U07.1 COVID: Primary | ICD-10-CM

## 2022-05-23 PROCEDURE — M0222 HC IV INJECTION, BEBTELOVIMAB, INCL POST ADMIN MONIT: HCPCS | Performed by: INTERNAL MEDICINE

## 2022-05-23 PROCEDURE — 63600175 PHARM REV CODE 636 W HCPCS: Mod: PN | Performed by: INTERNAL MEDICINE

## 2022-05-23 RX ORDER — DIPHENHYDRAMINE HYDROCHLORIDE 50 MG/ML
25 INJECTION INTRAMUSCULAR; INTRAVENOUS
Status: SHIPPED | OUTPATIENT
Start: 2022-05-23 | End: 2022-05-24

## 2022-05-23 RX ORDER — ONDANSETRON 4 MG/1
4 TABLET, ORALLY DISINTEGRATING ORAL
Status: SHIPPED | OUTPATIENT
Start: 2022-05-23 | End: 2022-05-24

## 2022-05-23 RX ORDER — ACETAMINOPHEN 325 MG/1
650 TABLET ORAL
Status: SHIPPED | OUTPATIENT
Start: 2022-05-23 | End: 2022-05-24

## 2022-05-23 RX ORDER — ALBUTEROL SULFATE 90 UG/1
2 AEROSOL, METERED RESPIRATORY (INHALATION)
Status: SHIPPED | OUTPATIENT
Start: 2022-05-23 | End: 2022-05-26

## 2022-05-23 RX ORDER — BEBTELOVIMAB 87.5 MG/ML
175 INJECTION, SOLUTION INTRAVENOUS
Status: COMPLETED | OUTPATIENT
Start: 2022-05-23 | End: 2022-05-23

## 2022-05-23 RX ORDER — EPINEPHRINE 0.3 MG/.3ML
0.3 INJECTION SUBCUTANEOUS
Status: SHIPPED | OUTPATIENT
Start: 2022-05-23 | End: 2022-05-26

## 2022-05-23 RX ADMIN — BEBTELOVIMAB 175 MG: 87.5 INJECTION, SOLUTION INTRAVENOUS at 03:05

## 2022-05-25 ENCOUNTER — OFFICE VISIT (OUTPATIENT)
Dept: PULMONOLOGY | Facility: CLINIC | Age: 72
End: 2022-05-25
Payer: MEDICARE

## 2022-05-25 VITALS
DIASTOLIC BLOOD PRESSURE: 75 MMHG | HEART RATE: 65 BPM | HEIGHT: 64 IN | BODY MASS INDEX: 35 KG/M2 | WEIGHT: 205 LBS | SYSTOLIC BLOOD PRESSURE: 132 MMHG | OXYGEN SATURATION: 96 %

## 2022-05-25 DIAGNOSIS — J20.8 ACUTE BRONCHITIS DUE TO COVID-19 VIRUS: Primary | ICD-10-CM

## 2022-05-25 DIAGNOSIS — U07.1 ACUTE BRONCHITIS DUE TO COVID-19 VIRUS: Primary | ICD-10-CM

## 2022-05-25 DIAGNOSIS — U07.1 COVID-19 VIRUS INFECTION: ICD-10-CM

## 2022-05-25 PROCEDURE — 3075F SYST BP GE 130 - 139MM HG: CPT | Mod: CPTII,S$GLB,, | Performed by: NURSE PRACTITIONER

## 2022-05-25 PROCEDURE — 99999 PR PBB SHADOW E&M-EST. PATIENT-LVL V: CPT | Mod: PBBFAC,,, | Performed by: NURSE PRACTITIONER

## 2022-05-25 PROCEDURE — 3075F PR MOST RECENT SYSTOLIC BLOOD PRESS GE 130-139MM HG: ICD-10-PCS | Mod: CPTII,S$GLB,, | Performed by: NURSE PRACTITIONER

## 2022-05-25 PROCEDURE — 99214 PR OFFICE/OUTPT VISIT, EST, LEVL IV, 30-39 MIN: ICD-10-PCS | Mod: S$GLB,,, | Performed by: NURSE PRACTITIONER

## 2022-05-25 PROCEDURE — 3078F DIAST BP <80 MM HG: CPT | Mod: CPTII,S$GLB,, | Performed by: NURSE PRACTITIONER

## 2022-05-25 PROCEDURE — 1101F PR PT FALLS ASSESS DOC 0-1 FALLS W/OUT INJ PAST YR: ICD-10-PCS | Mod: CPTII,S$GLB,, | Performed by: NURSE PRACTITIONER

## 2022-05-25 PROCEDURE — 1126F AMNT PAIN NOTED NONE PRSNT: CPT | Mod: CPTII,S$GLB,, | Performed by: NURSE PRACTITIONER

## 2022-05-25 PROCEDURE — 1157F ADVNC CARE PLAN IN RCRD: CPT | Mod: CPTII,S$GLB,, | Performed by: NURSE PRACTITIONER

## 2022-05-25 PROCEDURE — 3288F FALL RISK ASSESSMENT DOCD: CPT | Mod: CPTII,S$GLB,, | Performed by: NURSE PRACTITIONER

## 2022-05-25 PROCEDURE — 3008F PR BODY MASS INDEX (BMI) DOCUMENTED: ICD-10-PCS | Mod: CPTII,S$GLB,, | Performed by: NURSE PRACTITIONER

## 2022-05-25 PROCEDURE — 1159F MED LIST DOCD IN RCRD: CPT | Mod: CPTII,S$GLB,, | Performed by: NURSE PRACTITIONER

## 2022-05-25 PROCEDURE — 3044F PR MOST RECENT HEMOGLOBIN A1C LEVEL <7.0%: ICD-10-PCS | Mod: CPTII,S$GLB,, | Performed by: NURSE PRACTITIONER

## 2022-05-25 PROCEDURE — 3044F HG A1C LEVEL LT 7.0%: CPT | Mod: CPTII,S$GLB,, | Performed by: NURSE PRACTITIONER

## 2022-05-25 PROCEDURE — 1159F PR MEDICATION LIST DOCUMENTED IN MEDICAL RECORD: ICD-10-PCS | Mod: CPTII,S$GLB,, | Performed by: NURSE PRACTITIONER

## 2022-05-25 PROCEDURE — 99214 OFFICE O/P EST MOD 30 MIN: CPT | Mod: S$GLB,,, | Performed by: NURSE PRACTITIONER

## 2022-05-25 PROCEDURE — 3008F BODY MASS INDEX DOCD: CPT | Mod: CPTII,S$GLB,, | Performed by: NURSE PRACTITIONER

## 2022-05-25 PROCEDURE — 99999 PR PBB SHADOW E&M-EST. PATIENT-LVL V: ICD-10-PCS | Mod: PBBFAC,,, | Performed by: NURSE PRACTITIONER

## 2022-05-25 PROCEDURE — 1101F PT FALLS ASSESS-DOCD LE1/YR: CPT | Mod: CPTII,S$GLB,, | Performed by: NURSE PRACTITIONER

## 2022-05-25 PROCEDURE — 1126F PR PAIN SEVERITY QUANTIFIED, NO PAIN PRESENT: ICD-10-PCS | Mod: CPTII,S$GLB,, | Performed by: NURSE PRACTITIONER

## 2022-05-25 PROCEDURE — 3288F PR FALLS RISK ASSESSMENT DOCUMENTED: ICD-10-PCS | Mod: CPTII,S$GLB,, | Performed by: NURSE PRACTITIONER

## 2022-05-25 PROCEDURE — 1157F PR ADVANCE CARE PLAN OR EQUIV PRESENT IN MEDICAL RECORD: ICD-10-PCS | Mod: CPTII,S$GLB,, | Performed by: NURSE PRACTITIONER

## 2022-05-25 PROCEDURE — 3078F PR MOST RECENT DIASTOLIC BLOOD PRESSURE < 80 MM HG: ICD-10-PCS | Mod: CPTII,S$GLB,, | Performed by: NURSE PRACTITIONER

## 2022-05-25 RX ORDER — PREDNISONE 10 MG/1
TABLET ORAL
Qty: 20 TABLET | Refills: 0 | Status: SHIPPED | OUTPATIENT
Start: 2022-05-25 | End: 2023-07-06 | Stop reason: SDUPTHER

## 2022-05-25 RX ORDER — AZITHROMYCIN 250 MG/1
TABLET, FILM COATED ORAL
Qty: 6 TABLET | Refills: 0 | Status: SHIPPED | OUTPATIENT
Start: 2022-05-25 | End: 2022-08-17

## 2022-05-25 RX ORDER — CODEINE PHOSPHATE AND GUAIFENESIN 10; 100 MG/5ML; MG/5ML
10 SOLUTION ORAL EVERY 4 HOURS PRN
Qty: 420 ML | Refills: 0 | Status: SHIPPED | OUTPATIENT
Start: 2022-05-25 | End: 2022-06-01

## 2022-05-25 NOTE — PATIENT INSTRUCTIONS
Antibiotics for yellow or green mucous    Prednisone 10 mg pills, Take one pill a day for three days, repeat for shortness of breath or wheeze    Albuterol Inhaler 1-2 puffs every 4 hours, for cough or shortness of breath    Have chest x-ray to evaluate for pneumonia

## 2022-05-25 NOTE — PROGRESS NOTES
5/25/2022    Cornelia Delatorre  Office f/u    Chief Complaint   Patient presents with    covid f/u     HPI:  5/25/2022- Dx COVID 19 7 days prior tx with monoclonal Antibiodies two days prior. Feeling generalized weakness, diaphoresis, fatigue  Using Trelegy and nebulizer daily,  beats on her back   Cough - severe, complaint, worse at night, productive thick yellow mucous. Associated with late evening wheeze.   Lost sense of taste and smell.       4/13/2022- SOB- stable, worse in late evenings, taking prednisone 10 mg when needed, not used in past 2 months; worse with exertion, improves with rest and albuterol rescue.   On BIPAP with benefit, no daytime drowsiness.   Liked Trelegy. Still on Adviar until prescription runs out, has 2 weeks left.   Skin biopsy 5 weeks prior, left lower chin site irritated by wearing face mask. No edema or erythema,     1/12/2022- SOB worsening, on Advair daily and levalbuterol 2-3x daily for past 4 weeks, has noticed worsening of shortness of breath and sinus complaints.   Admitted to hospital for GI virus,   Noticed prednisone is needed occasionally at 10 mg notices improvement    On BiPAP nightly with benefit. No daytime fatigue, no issues with nasal pillow mask.     7/12/21- complaint of daily sinus drainage, has to clear throat repeatedly for past 2 months. Currently on Flonase nasal spray, ipratropium nasal spray, Singulair pills, zyrtec, corcindin daily with no improvement.   SOB- stable, required steroid therapy for 3 days twice in past 3 months. Only with exertion, worse in late evenings, improves with rest,  Using nebulizer 2x daily due to feeling of heaviness in chest.   Cough- mild, non productive, associated with post nasal drip from allergies. Nocturnal arousals 2x weekly for past 2 weeks,   Wearing CPAP nighty with benefit.     4/12/21- spent last 6 months in home, was able to get COVID 19 vaccine Mederna. Allergies stable, few spells improve with nasal spray  noticed improvement after getting air purifier.   Noticed spacer device helping with hoarse voice or oral thrush like before,   SOB- unchanged, daily complaint, varies with severity, worse with exertion, improves with rest and albuterol rescue. Has to sit up to breath when first laying  Down, not able to breath when laying on left side.   Occasional cough- productive, clear mucous, using nebulizer 3 x weekly.   CPAP for S[facility]A doing well,     10/13/2020- Allergies bother her every few days, currently on daily Singulair, zyrtec, flonase, astelin nasal spray, having sneezing fits.   Complaint of clear sinus drainage,   Hoarse voice has resolved after stopping symbicort.   Cough- improved,   SOB- doing well, occasional episodes of not being able to catch breath, did not use nebulizer   Wearing CPAP nightly for SHOA, states benefits greatly    7/13/2020- Hoarse voice, loss of voice, productive cough, lost voice July 2019 tx by ENT who treated for acute laryngitis with diflucan; Recurrent problem. Hoarse voice return 4 weeks prior, ENT doctor recommends changing Asthma medications tx her with diflucan, doxycycline and prednisone for 5 days, states has improved.   SOB- worse when wearing face mask, currently on hizentra therapy,     5/4/2020- uses symbicort daily, uses rescue 2/wk - some anxiety, getting igg rx. Having more sinus problems with head colds- 3 in last 4 wks.  No prednisone- hizentra working well.        Nov 4, 2019- having mucous sensation, notes some sob relaxing - maybe worse night.  No nasal stuffy.  Uses cpap.  Uses symbicort bid, no rescue.  Mucous is clear.  No abx for sinus or lungs.  Getting immune infusions weekly - pt senses doing better since starting in May.  Patient Instructions   Breathing off and on short breath - need to use more albuterol to make clear where breathing problems come from    Mucous production may decrease if airways controlled- albuterol should help clearance.    Rescue inhaler  may resolve any breathing problems- should use intermittently if any symptoms.    May use augmentin for sinus/lung problems- not optimal for all uti's       May 6,2019-due to get immune infusion next 2 days.  No prednisone, needs monlukast. abx stopped wks ago- mucous cleared.    Patient Instructions   Use antibiotic daily til immune therapy done a wk - then as needed like every one else  Immune therapy may keep you stable - better than antibiotics.   Use symbicort regular.  Lungs may stabilize.  Use prednisone if needed.  Lung  Nodules are stable for 2 yrs and no follow up needed.  Call if problems- hope all will be better yet.  March 7, 19-exacerbated in late Jan- cleared in feb (vague).  Now ill again yellow and gray mucous (culture last Jan was nl yvonne).  Sl intermittent wheezes since last wk.  Sees Dr Herrera in [place]- gets allergy rx but declined to get now.  Pt has cvid by  igg and igm levels low and pneumococcal antibodies to 8/14 pneumococcal titers. Uses symbicort and singulair routinely.  Took prednisone completed 4 days ago- uses prednisone monthly- was able to skip December  .  Discussed with patient above for education the following:      Gastric by pass may cause malabsorption, protein levels have been too low and may affect ig levels-- somewhat.   Need to get follow up with dietician to assure adequate protein intake/levels.   Antibiotic may stabilize infections with common variable immune deficiency- azithromycin daily once cultures submitted.   Use augmentin if infection worsens.   Acapella/chest clapping help clear mucous, vest likewise if bronchiectasis.  Will not treat cause.   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  Use codeine to suppress mild coughing.   Need good immune system and no airway/asthma problems and good hygiene of bronchial tubes (no chronic infections) to prevent illness.     Lungs measured near  normal with good response to bronchial medications 2017   Need ct to follow up and cultures to assure infection controlled.      Jan 28/2019- Feeling bad onset 2 weeks, took prednisone taper x 6 days and antibiotic Augmentin week prior, States no improvement. Cough- worse at night, no nocturnal arousals, takes cough suppressant syrup before bed. Productive yellow/brown color.   Nebulized albuterol 4x daily. States no fever.  Has started allergy injections waiting for insurance clearance for IGG therapy.   Discussed with patient above for education the following:    CT chest for February to monitor and assess for bronchiectasis, need diagnosis for insurance to cover vest therapy  Have  continue to percuss back with hand.   Recommend purchasing Acepella device to help clear lungs of excess mucous, attaches to nebulizer device  Continue Nebulizer treatments 4x daily as needed.  Chest x-ray now to evaluate for pneumonia  Blood work at hospital   Will yeison to see results of sputum culture and x-ray before repeating antibiotic  Need to return sputum to clinic with in 4 hours of collecting  Fluconazole pills for oral thrush, this is a recurrent problem related to your weak immune system and use of inhaled steroids. Continue to rinse mouth out after using symbicort but problem will improve after starting immune replacement therapy.    oct 30,   2018-- had one day fever followed by cough/wheeze illness that lingered a wk. Pt seen by NP   [place] 2x, [place].  [place] like dying- only one day fever.  Violent cough.  Nebulizer ppt itch and palpitations- ok  On xopenex now.  Prednisone 40x3, 20 x 3 ,  augmentin cleared.  Now back to normal.  May 14, 2018- had spell /exacerbation with one round prednisone. Breathing good.  Follow-up in about 1 year (around 5/14/2019), or if symptoms worsen or fail to improve.   Discussed with patient above for education the following:     Check blood count and pneumonia vaccine  response after last vaccine 4/27/2018   Use azithromycin for any lung/sinus infection- immune weakness likely   Asthma stable- use prednisone and singulair.   Use allergra and singulair and astelin/flonase   Lung nodule in lung still - Navigational bronchoscopy might find?  - will at least re check in a year.     Call if needed, re check next yr.  darlin 15, 2018--1 st illness in yr or so with cough and rattles, no flu.  Appetite ok.  Ill x wk, cough and wheeze and sob and noct worse, resp rx helps a bit.  Hasn't been using  Albuterol   Follow-up in about 6 months (around 7/15/2018).   Discussed with patient above for education the following:      tamiflu if flu.   Need to use albuterol for any lung symptoms.  Should get cough  Better controlled.      Prednisone 20 mg 3 for 3 , taper may be needed.  Give shot today, 1 daily for 3 may be enough with albuterol.   You had high eosinophils-- if asthma becomes more active - special therapy may help??        prevnar 13 would be good - should be off prednisone.  Immune system is sl weak  Protection only to 7.14 pneumonia germs.  oct 19, 2017- has scratchy throat, some sinus drip, has nightly sensation throat issues, post beryl and carpel tunnel surg.  No sinus lung infections.  Breathing and cough good.  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 Had pos tb gold.  June 21, 2017- had good [place] trip, tapered symbicort with some increase sensation of lung problems so maintained full dose. No infections.  No chest symptoms on symbicort.   April 24, feels a lot better with min cough, vague sob going left side down at night.  Going to [place] 2 weeks.  Uses symbicort and good results.  March 16, cough better, but comes and goes,  No great help with steroids.  Submitted 3 sputums.  Had pneumovax march last yr.  Breathing better. Got symbicort.   Had pneumonia vaccine last yr  3/8/2017 HPI: had onset cough Jan illness, got dizzy few days with  diarrhea and cough. Cough clear sm amt mucous. Did have 101 temp one day, fatigue, no muscle aches.  Appetite decreased.  Illnesses lingered 2 weeks but cough never remitted- has improved with inhahler use last 15 days.    Had gastric 2011 bypass with with continued diarrhea intially resolved. No regurg or reflux or swallow problems.   symbicort nearly  Resolved.    Sinuses ok.  No pets.  Never smoker.    Appetite good, feels well now.    headcolds to chest since childhood, nocturnal ??not recalled.  Had cats in past but got rid in 2003 or so.     The chief compliant  problem varies with instablilty at time    PFSH:  Past Medical History:   Diagnosis Date    Allergy     Arthritis     Asthma     Cancer     skin cancer to right hand    Cataract     Chronic fatigue 1/24/2017    CVID (common variable immunodeficiency) 3/7/2019    Diabetes mellitus     type2    KLINE (dyspnea on exertion) 1/24/2017    Dyslipidemia 6/25/2019    Encounter for blood transfusion     Essential hypertension 12/29/2011    GERD (gastroesophageal reflux disease)     Headache(784.0)     History of lumpectomy of both breasts     1992 negative    Hyperlipidemia 7/15/2015    Hypertension     Hypothyroidism     Neuropathy     Obesity     Obesity     Postmenopausal HRT (hormone replacement therapy)     Rash     Rosacea     Sleep apnea     on bipap    Snoring     Squamous cell carcinoma of skin     left forearm     UTI (urinary tract infection)     Wears glasses          Past Surgical History:   Procedure Laterality Date    ADENOIDECTOMY      APPENDECTOMY      BLADDER SUSPENSION      BREAST BIOPSY Bilateral 1992    bilateral benign excisional biopsies    BUNIONECTOMY  10/17/14    right, still has discomfort    CATARACT EXTRACTION W/  INTRAOCULAR LENS IMPLANT Bilateral     CHOLECYSTECTOMY  08/02/2017    COLONOSCOPY      CYSTOSCOPY      EPIDURAL STEROID INJECTION INTO LUMBAR SPINE N/A 8/14/2019    Procedure:  Injection-steroid-epidural-lumbar L5/S1;  Surgeon: Fredi Rojas MD;  Location: [facility] OR;  Service: Pain Management;  Laterality: N/A;    EPIDURAL STEROID INJECTION INTO LUMBAR SPINE N/A 5/3/2021    Procedure: Injection-steroid-epidural-lumbar L5/S1 to the Left;  Surgeon: Fredi Rojas MD;  Location: [facility] OR;  Service: Pain Management;  Laterality: N/A;    EPIDURAL STEROID INJECTION INTO LUMBAR SPINE N/A 9/24/2021    Procedure: Injection-steroid-epidural-lumbar L5/S1;  Surgeon: Fredi Rojas MD;  Location: [facility] OR;  Service: Pain Management;  Laterality: N/A;    EPIDURAL STEROID INJECTION INTO LUMBAR SPINE N/A 2/21/2022    Procedure: Injection-steroid-epidural-lumbar L5/S1;  Surgeon: Fredi Rojas MD;  Location: [facility] OR;  Service: Pain Management;  Laterality: N/A;    ESOPHAGEAL DILATION N/A 6/11/2021    Procedure: DILATION, ESOPHAGUS;  Surgeon: Jake Sheriff MD;  [facility];  Service: Endoscopy;  Laterality: N/A;    ESOPHAGOGASTRODUODENOSCOPY      dilated esophagus    ESOPHAGOGASTRODUODENOSCOPY N/A 6/11/2021    Procedure: EGD (ESOPHAGOGASTRODUODENOSCOPY);  Surgeon: Jake Sheriff MD;  [facility];  Service: Endoscopy;  Laterality: N/A;    GASTRIC BYPASS      2011    HYSTERECTOMY  1980    interstim bladder  2009    10/14/14 new battery    OOPHORECTOMY  1980    REPLACEMENT OF SACRAL NERVE STIMULATOR  2/18/2020    Procedure: REPLACEMENT, NEUROSTIMULATOR, SACRAL;  Surgeon: Mary Jane Jarvis MD;  Location: [facility] OR [address] [place] [address];  Service: Urology;;    REVISION OF PROCEDURE INVOLVING SACRAL NEUROSTIMULATOR DEVICE Right 2/18/2020    Procedure: REVISION, NEUROSTIMULATOR, SACRAL/ battery replacement;  Surgeon: Mary Jane Jarvis MD;  Location: [facility] OR 2ND FLR;  Service: Urology;  Laterality: Right;  1hr/ rep contacted/ (CONNIE)    SKIN CANCER EXCISION      left hand    TONSILLECTOMY      WISDOM TOOTH EXTRACTION       Social History     Tobacco Use    Smoking status: Never Smoker  "   Smokeless tobacco: Never Used   Substance Use Topics    Alcohol use: Not Currently    Drug use: No     Family History   Problem Relation Age of Onset    Breast cancer Mother 50    Diabetes Unknown     Hypertension Unknown     Hypothyroidism Unknown     Breast cancer Paternal Aunt         40's    Ovarian cancer Paternal Grandmother     Allergic rhinitis Neg Hx     Allergies Neg Hx     Angioedema Neg Hx     Asthma Neg Hx     Atopy Neg Hx     Eczema Neg Hx     Immunodeficiency Neg Hx     Rhinitis Neg Hx     Urticaria Neg Hx      Review of patient's allergies indicates:   Allergen Reactions    Sulfa (sulfonamide antibiotics) Hives    Naproxen Other (See Comments)     Other reaction(s): RT sided numbness         Performance Status:The patient's activity level is functions out of house.      Review of Systems:  a review of eleven systems covering constitutional, Eye, HEENT, Psych, Respiratory, Cardiac, GI, , Musculoskeletal, Endocrine, Dermatologic was negative except for pertinent findings as listed ABOVE and below: all good,  Had dm that remitted with bypass 2011.  Positive for SOB, nasal drip,  Chest tightness    Exam:Comprehensive exam done. /75 (BP Location: Left arm, Patient Position: Sitting, BP Method: Medium (Automatic))   Pulse 65   Ht 5' 4" (1.626 m)   Wt 93 kg (205 lb 0.4 oz)   SpO2 96% Comment: on room air at rest  BMI 35.19 kg/m²   Exam included Vitals as listed, and patient's appearance and affect and alertness and mood, oral exam for yeast and hygiene and pharynx lesions and Mallapatti (M) score, neck with inspection for jvd and masses and thyroid abnormalities and lymph nodes (supraclavicular and infraclavicular nodes and axillary also examined and noted if abn), chest exam included symmetry and effort and fremitus and percussion and auscultation, cardiac exam included rhythm and gallops and murmur and rubs and jvd and edema, abdominal exam for mass and " hepatosplenomegaly and tenderness and hernias and bowel sounds, Musculoskeletal exam with muscle tone and posture and mobility/gait and  strength, and skin for rashes and cyanosis and pallor and turgor, extremity for clubbing.  Findings were normal except for pertinent findings listed below:  M3, good bs, rest good. chest is symmetric, no distress, normal percussion, normal fremitus and good normal breath sounds  BS right diminished left clear    Radiographs (ct chest and cxr) reviewed: view by direct vision  i do see clear bronchiectasis,nodules are noted.  Mucoid type impaction suggested in rul ant segment.  CT Abdomen Pelvis With Contrast 06/11/2021 lower lung bases clear    X-Ray Chest PA And Lateral 04/19/2021 Lungs clear      April 19, 2018 ct suggest tracheobronchomalacia on high res spot films- seen 3/7/19  CT Chest:  1. Region of endobronchial plugging unchanged since 2/7/17 of uncertain etiology. This could relate to a process such as allergic bronchopulmonary aspergillosis, a solid mass nodule, mucous plugging, residual from aspiration, endobronchial spread of disease. Further followup or evaluation is necessary  Electronically signed by: Raffi Gottlieb MD  Date: 03/14/17    10/17/19 Chest X-ray clear    Labs reviewed igm low, igg lowish, humerol titers na    Lab Results   Component Value Date    WBC 6.51 04/27/2022    RBC 4.37 04/27/2022    HGB 12.4 04/27/2022    HCT 39.0 04/27/2022    MCV 89 04/27/2022    MCH 28.4 04/27/2022    MCHC 31.8 (L) 04/27/2022    RDW 13.6 04/27/2022     04/27/2022    MPV 9.8 04/27/2022    GRAN 4.3 04/27/2022    GRAN 65.3 04/27/2022    LYMPH 1.7 04/27/2022    LYMPH 26.0 04/27/2022    MONO 0.4 04/27/2022    MONO 6.5 04/27/2022    EOS 0.1 04/27/2022    BASO 0.02 04/27/2022    EOSINOPHIL 1.1 04/27/2022    BASOPHIL 0.3 04/27/2022         PFT  Spirometry with bronchodilator, lung volume by gas dilution, and diffusion capacity measured April 19, 2021. The FEV1 FVC ratio was  75% indicating no airflow obstruction measured by spirometry. The FEV1 was 99% predicted at 2.25 L. There was no   statistically significant improvement in spirometry following bronchodilator. Total lung capacity was within normal range at 90% of predicted. Diffusion was slightly low at 77% predicted-uncorrected for anemia if present.   Spirometry is normal. Lung volumes fall within normal range. Diffusion is slightly decreased. Clinical correlation recommended. The bronchodilator response was not significant.       Done [facility] with 10% response, otherwise nl  DATE OF PROCEDURE:  03/24/2017     1.  Spirometry reveals an FVC of 3.1 L, which is 107% predicted.  FEV1 is 2.3 L,   which is 100% predicted.  The ratio, there is preserved.  There is a positive,   but not significant bronchodilator response to both the FVC and FEV1.  Loop   contours appear with adequate effort as well.  2.  Lung volumes.  The total lung capacity is 4.4 L, which is 92% predicted.    There are no signs of gas trapping.  3.  Diffusing capacity is mild-to-moderate reduced at 68%, does improve to 96%   with alveolar volumes.     OVERALL IMPRESSION:  A normal spirometry with a robust yet statistically   insignificant bronchodilator response.  Normal lung volumes and a moderate   reduction in diffusion capacity.    CC: Jonathan Nicole M.D.        Plan:  Clinical impression is resonably certain and repeated evaluation prn +/- follow up will be needed as below.    Cornelia was seen today for covid f/u.    Diagnoses and all orders for this visit:    Acute bronchitis due to COVID-19 virus  -     COVID-19 Home Symptom Monitoring  - Duration (days): 14  -     guaiFENesin-codeine 100-10 mg/5 ml (TUSSI-ORGANIDIN NR)  mg/5 mL syrup; Take 10 mLs by mouth every 4 (four) hours as needed for Cough or Congestion.  -     azithromycin (Z-JEANETTE) 250 MG tablet; 2 pills day one, then 1 pill for 4 days  -     X-Ray Chest PA And Lateral; Future  -     predniSONE  (DELTASONE) 10 MG tablet; 1 pill for 3-5 days repeat as needed for chest tightness.    COVID-19 virus infection  -     COVID-19 Home Symptom Monitoring  - Duration (days): 14        Follow up if symptoms worsen or fail to improve.    Discussed with patient above for education the following:      Patient Instructions       Antibiotics for yellow or green mucous    Prednisone 10 mg pills, Take one pill a day for three days, repeat for shortness of breath or wheeze    Albuterol Inhaler 1-2 puffs every 4 hours, for cough or shortness of breath    Have chest x-ray to evaluate for pneumonia

## 2022-06-07 ENCOUNTER — OFFICE VISIT (OUTPATIENT)
Dept: DERMATOLOGY | Facility: CLINIC | Age: 72
End: 2022-06-07
Payer: MEDICARE

## 2022-06-07 VITALS — HEIGHT: 64 IN | WEIGHT: 205 LBS | BODY MASS INDEX: 35 KG/M2 | RESPIRATION RATE: 18 BRPM

## 2022-06-07 DIAGNOSIS — Z85.828 PERSONAL HISTORY OF OTHER MALIGNANT NEOPLASM OF SKIN: ICD-10-CM

## 2022-06-07 DIAGNOSIS — L57.0 ACTINIC KERATOSIS: Primary | ICD-10-CM

## 2022-06-07 DIAGNOSIS — Z12.83 SKIN CANCER SCREENING: ICD-10-CM

## 2022-06-07 DIAGNOSIS — L90.5 SCARS: ICD-10-CM
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
## 2022-06-07 PROCEDURE — 17003 DESTRUCTION, PREMALIGNANT LESIONS; SECOND THROUGH 14 LESIONS: ICD-10-PCS | Mod: S$GLB,,, | Performed by: DERMATOLOGY

## 2022-06-07 PROCEDURE — 17000 DESTRUCT PREMALG LESION: CPT | Mod: S$GLB,,, | Performed by: DERMATOLOGY

## 2022-06-07 PROCEDURE — 99999 PR PBB SHADOW E&M-EST. PATIENT-LVL IV: CPT | Mod: PBBFAC,,, | Performed by: DERMATOLOGY

## 2022-06-07 PROCEDURE — 99214 PR OFFICE/OUTPT VISIT, EST, LEVL IV, 30-39 MIN: ICD-10-PCS | Mod: 25,S$GLB,, | Performed by: DERMATOLOGY

## 2022-06-07 PROCEDURE — 1126F AMNT PAIN NOTED NONE PRSNT: CPT | Mod: CPTII,S$GLB,, | Performed by: DERMATOLOGY

## 2022-06-07 PROCEDURE — 1126F PR PAIN SEVERITY QUANTIFIED, NO PAIN PRESENT: ICD-10-PCS | Mod: CPTII,S$GLB,, | Performed by: DERMATOLOGY

## 2022-06-07 PROCEDURE — 3288F FALL RISK ASSESSMENT DOCD: CPT | Mod: CPTII,S$GLB,, | Performed by: DERMATOLOGY

## 2022-06-07 PROCEDURE — 17003 DESTRUCT PREMALG LES 2-14: CPT | Mod: S$GLB,,, | Performed by: DERMATOLOGY

## 2022-06-07 PROCEDURE — 3008F PR BODY MASS INDEX (BMI) DOCUMENTED: ICD-10-PCS | Mod: CPTII,S$GLB,, | Performed by: DERMATOLOGY

## 2022-06-07 PROCEDURE — 1101F PR PT FALLS ASSESS DOC 0-1 FALLS W/OUT INJ PAST YR: ICD-10-PCS | Mod: CPTII,S$GLB,, | Performed by: DERMATOLOGY

## 2022-06-07 PROCEDURE — 17000 PR DESTRUCTION(LASER SURGERY,CRYOSURGERY,CHEMOSURGERY),PREMALIGNANT LESIONS,FIRST LESION: ICD-10-PCS | Mod: S$GLB,,, | Performed by: DERMATOLOGY

## 2022-06-07 PROCEDURE — 1159F MED LIST DOCD IN RCRD: CPT | Mod: CPTII,S$GLB,, | Performed by: DERMATOLOGY

## 2022-06-07 PROCEDURE — 3044F PR MOST RECENT HEMOGLOBIN A1C LEVEL <7.0%: ICD-10-PCS | Mod: CPTII,S$GLB,, | Performed by: DERMATOLOGY

## 2022-06-07 PROCEDURE — 3044F HG A1C LEVEL LT 7.0%: CPT | Mod: CPTII,S$GLB,, | Performed by: DERMATOLOGY

## 2022-06-07 PROCEDURE — 99999 PR PBB SHADOW E&M-EST. PATIENT-LVL IV: ICD-10-PCS | Mod: PBBFAC,,, | Performed by: DERMATOLOGY

## 2022-06-07 PROCEDURE — 1157F ADVNC CARE PLAN IN RCRD: CPT | Mod: CPTII,S$GLB,, | Performed by: DERMATOLOGY

## 2022-06-07 PROCEDURE — 3008F BODY MASS INDEX DOCD: CPT | Mod: CPTII,S$GLB,, | Performed by: DERMATOLOGY

## 2022-06-07 PROCEDURE — 99214 OFFICE O/P EST MOD 30 MIN: CPT | Mod: 25,S$GLB,, | Performed by: DERMATOLOGY

## 2022-06-07 PROCEDURE — 3288F PR FALLS RISK ASSESSMENT DOCUMENTED: ICD-10-PCS | Mod: CPTII,S$GLB,, | Performed by: DERMATOLOGY

## 2022-06-07 PROCEDURE — 1101F PT FALLS ASSESS-DOCD LE1/YR: CPT | Mod: CPTII,S$GLB,, | Performed by: DERMATOLOGY

## 2022-06-07 PROCEDURE — 1157F PR ADVANCE CARE PLAN OR EQUIV PRESENT IN MEDICAL RECORD: ICD-10-PCS | Mod: CPTII,S$GLB,, | Performed by: DERMATOLOGY

## 2022-06-07 PROCEDURE — 1159F PR MEDICATION LIST DOCUMENTED IN MEDICAL RECORD: ICD-10-PCS | Mod: CPTII,S$GLB,, | Performed by: DERMATOLOGY

## 2022-06-07 RX ORDER — MOMETASONE FUROATE 1 MG/G
CREAM TOPICAL
Qty: 45 G | Refills: 1 | Status: SHIPPED | OUTPATIENT
Start: 2022-06-07

## 2022-06-07 NOTE — PROGRESS NOTES
Subjective:       Patient ID:  Cornelia Delatorre is a 71 y.o. female who presents for   Chief Complaint   Patient presents with    Skin Check     Patient present for [facility]. LOV 3/8/22 by Kailyn Fletcher MD.    c/o lesion to R cheek x 1 month. The patient denies any change, including change in color, increase in size, or spontaneous bleeding, associated with this lesion.   Not treating.    C/o lesion to R leg x weeks. Pt states a bug bit her 2x's. Treated w/ benadryl cream. Not helping.    1. Skin, right chest, shave biopsy:   - DERMAL SCAR.   - NEGATIVE FOR MALIGNANCY.   2. Skin, left jawline, shave biopsy:   - FIBROUS PAPULE (ANGIOFIBROMA).   This lesion is benign.     yes Phx of NMSC  Phx skin ca R hand and nose - Dr Parra - 2014   Phx SCC L hand -mohs Dr Jones -2015   Uses CeraVe AM and PM cream daily for routine     no Fhx of melanoma.    Past Medical History:  No date: Allergy  No date: Arthritis  No date: Asthma  No date: Cancer      Comment:  skin cancer to right hand  No date: Cataract  1/24/2017: Chronic fatigue  3/7/2019: CVID (common variable immunodeficiency)  No date: Diabetes mellitus      Comment:  type2  1/24/2017: KLINE (dyspnea on exertion)  6/25/2019: Dyslipidemia  No date: Encounter for blood transfusion  12/29/2011: Essential hypertension  No date: GERD (gastroesophageal reflux disease)  No date: Headache(784.0)  No date: History of lumpectomy of both breasts      Comment:  1992 negative  7/15/2015: Hyperlipidemia  No date: Hypertension  No date: Hypothyroidism  No date: Neuropathy  No date: Obesity  No date: Obesity  No date: Postmenopausal HRT (hormone replacement therapy)  No date: Rash  No date: Rosacea  No date: Sleep apnea      Comment:  on bipap  No date: Snoring  No date: Squamous cell carcinoma of skin      Comment:  left forearm   No date: UTI (urinary tract infection)  No date: Wears glasses      Review of Systems   Constitutional: Negative for fever, chills and fatigue.    HENT: Negative for congestion, sore throat and mouth sores.    Respiratory: Negative for cough.    Gastrointestinal: Negative for nausea, vomiting and diarrhea.   Skin: Positive for daily sunscreen use. Negative for itching, rash, dry skin, sensitivity to antibiotic ointment, sensitivity to bandage adhesive and wears hat.   Hematologic/Lymphatic: Bruises/bleeds easily (forearms, takes asa and prednisone for asthma).        Objective:    Physical Exam   Constitutional: She appears well-developed and well-nourished. No distress.   HENT:   Mouth/Throat: Lips normal.    Eyes: Lids are normal.  No conjunctival no injection.   Cardiovascular: There is no local extremity swelling and no dependent edema.     Neurological: She is alert and oriented to person, place, and time. She is not disoriented.   Psychiatric: She has a normal mood and affect.   Skin:   Areas Examined (abnormalities noted in diagram):   Scalp / Hair Palpated and Inspected  Head / Face Inspection Performed  Neck Inspection Performed  Chest / Axilla Inspection Performed  Abdomen Inspection Performed  Back Inspection Performed  RUE Inspected  LUE Inspection Performed  RLE Inspected  LLE Inspection Performed  Nails and Digits Inspection Performed                       Diagram Legend     Erythematous scaling macule/papule c/w actinic keratosis       Vascular papule c/w angioma      Pigmented verrucoid papule/plaque c/w seborrheic keratosis      Yellow umbilicated papule c/w sebaceous hyperplasia      Irregularly shaped tan macule c/w lentigo     1-2 mm smooth white papules consistent with Milia      Movable subcutaneous cyst with punctum c/w epidermal inclusion cyst      Subcutaneous movable cyst c/w pilar cyst      Firm pink to brown papule c/w dermatofibroma      Pedunculated fleshy papule(s) c/w skin tag(s)      Evenly pigmented macule c/w junctional nevus     Mildly variegated pigmented, slightly irregular-bordered macule c/w mildly atypical nevus       Flesh colored to evenly pigmented papule c/w intradermal nevus       Pink pearly papule/plaque c/w basal cell carcinoma      Erythematous hyperkeratotic cursted plaque c/w SCC      Surgical scar with no sign of skin cancer recurrence      Open and closed comedones      Inflammatory papules and pustules      Verrucoid papule consistent consistent with wart     Erythematous eczematous patches and plaques     Dystrophic onycholytic nail with subungual debris c/w onychomycosis     Umbilicated papule    Erythematous-base heme-crusted tan verrucoid plaque consistent with inflamed seborrheic keratosis     Erythematous Silvery Scaling Plaque c/w Psoriasis     See annotation      Assessment / Plan:        Actinic keratosis  Cryosurgery Procedure Note    Verbal consent from the patient is obtained and the patient is aware of the precancerous quality and need for treatment of these lesions. Liquid nitrogen cryosurgery is applied to the 3 actinic keratoses, as detailed in the physical exam, to produce a freeze injury. The patient is aware that blisters may form and is instructed on wound care with gentle cleansing and use of vaseline ointment to keep moist until healed. The patient is supplied a handout on cryosurgery and is instructed to call if lesions do not completely resolve. Discussed risk postinflammatory pigmentary changes.       Scars  NER NMSC  Reassurance     Personal history of other malignant neoplasm of skin  Area(s) of previous NMSC evaluated with no signs of recurrence.    Upper body skin examination performed today including at least 6 points as noted in physical examination. No lesions suspicious for malignancy noted.      Skin cancer screening  Area(s) of previous NMSC evaluated with no signs of recurrence.    Upper body skin examination performed today including at least 6 points as noted in physical examination. No lesions suspicious for malignancy noted.      Bug bite, initial encounter  -     mometasone  0.1% (ELOCON) 0.1 % cream; aaa bid x 1-2 weeks prn bug bites  Dispense: 45 g; Refill: 1             Follow up in about 4 months (around 10/7/2022).

## 2022-06-09 ENCOUNTER — PATIENT MESSAGE (OUTPATIENT)
Dept: PULMONOLOGY | Facility: CLINIC | Age: 72
End: 2022-06-09
Payer: MEDICARE

## 2022-06-09 ENCOUNTER — HOSPITAL ENCOUNTER (OUTPATIENT)
Dept: RADIOLOGY | Facility: HOSPITAL | Age: 72
Discharge: HOME | End: 2022-06-09
Attending: NURSE PRACTITIONER
Payer: MEDICARE

## 2022-06-09 DIAGNOSIS — U07.1 ACUTE BRONCHITIS DUE TO COVID-19 VIRUS: ICD-10-CM

## 2022-06-09 DIAGNOSIS — J20.8 ACUTE BRONCHITIS DUE TO COVID-19 VIRUS: ICD-10-CM

## 2022-06-09 PROCEDURE — 71046 X-RAY EXAM CHEST 2 VIEWS: CPT | Mod: TC,FY,PO

## 2022-06-09 PROCEDURE — 71046 X-RAY EXAM CHEST 2 VIEWS: CPT | Mod: 26,,, | Performed by: RADIOLOGY

## 2022-06-09 PROCEDURE — 71046 XR CHEST PA AND LATERAL: ICD-10-PCS | Mod: 26,,, | Performed by: RADIOLOGY

## 2022-07-11 ENCOUNTER — OFFICE VISIT (OUTPATIENT)
Dept: PULMONOLOGY | Facility: CLINIC | Age: 72
End: 2022-07-11
Payer: MEDICARE

## 2022-07-11 VITALS
HEIGHT: 64 IN | WEIGHT: 210.63 LBS | DIASTOLIC BLOOD PRESSURE: 74 MMHG | BODY MASS INDEX: 35.96 KG/M2 | OXYGEN SATURATION: 98 % | HEART RATE: 67 BPM | SYSTOLIC BLOOD PRESSURE: 129 MMHG

## 2022-07-11 DIAGNOSIS — J45.40 MODERATE PERSISTENT ASTHMA WITHOUT COMPLICATION: Primary | ICD-10-CM

## 2022-07-11 PROCEDURE — 1157F ADVNC CARE PLAN IN RCRD: CPT | Mod: CPTII,S$GLB,, | Performed by: NURSE PRACTITIONER

## 2022-07-11 PROCEDURE — 3078F DIAST BP <80 MM HG: CPT | Mod: CPTII,S$GLB,, | Performed by: NURSE PRACTITIONER

## 2022-07-11 PROCEDURE — 3078F PR MOST RECENT DIASTOLIC BLOOD PRESSURE < 80 MM HG: ICD-10-PCS | Mod: CPTII,S$GLB,, | Performed by: NURSE PRACTITIONER

## 2022-07-11 PROCEDURE — 99999 PR PBB SHADOW E&M-EST. PATIENT-LVL V: ICD-10-PCS | Mod: PBBFAC,,, | Performed by: NURSE PRACTITIONER

## 2022-07-11 PROCEDURE — 3074F PR MOST RECENT SYSTOLIC BLOOD PRESSURE < 130 MM HG: ICD-10-PCS | Mod: CPTII,S$GLB,, | Performed by: NURSE PRACTITIONER

## 2022-07-11 PROCEDURE — 1157F PR ADVANCE CARE PLAN OR EQUIV PRESENT IN MEDICAL RECORD: ICD-10-PCS | Mod: CPTII,S$GLB,, | Performed by: NURSE PRACTITIONER

## 2022-07-11 PROCEDURE — 1126F PR PAIN SEVERITY QUANTIFIED, NO PAIN PRESENT: ICD-10-PCS | Mod: CPTII,S$GLB,, | Performed by: NURSE PRACTITIONER

## 2022-07-11 PROCEDURE — 99213 PR OFFICE/OUTPT VISIT, EST, LEVL III, 20-29 MIN: ICD-10-PCS | Mod: S$GLB,,, | Performed by: NURSE PRACTITIONER

## 2022-07-11 PROCEDURE — 99213 OFFICE O/P EST LOW 20 MIN: CPT | Mod: S$GLB,,, | Performed by: NURSE PRACTITIONER

## 2022-07-11 PROCEDURE — 3074F SYST BP LT 130 MM HG: CPT | Mod: CPTII,S$GLB,, | Performed by: NURSE PRACTITIONER

## 2022-07-11 PROCEDURE — 3288F FALL RISK ASSESSMENT DOCD: CPT | Mod: CPTII,S$GLB,, | Performed by: NURSE PRACTITIONER

## 2022-07-11 PROCEDURE — 1101F PR PT FALLS ASSESS DOC 0-1 FALLS W/OUT INJ PAST YR: ICD-10-PCS | Mod: CPTII,S$GLB,, | Performed by: NURSE PRACTITIONER

## 2022-07-11 PROCEDURE — 3008F PR BODY MASS INDEX (BMI) DOCUMENTED: ICD-10-PCS | Mod: CPTII,S$GLB,, | Performed by: NURSE PRACTITIONER

## 2022-07-11 PROCEDURE — 1126F AMNT PAIN NOTED NONE PRSNT: CPT | Mod: CPTII,S$GLB,, | Performed by: NURSE PRACTITIONER

## 2022-07-11 PROCEDURE — 1159F PR MEDICATION LIST DOCUMENTED IN MEDICAL RECORD: ICD-10-PCS | Mod: CPTII,S$GLB,, | Performed by: NURSE PRACTITIONER

## 2022-07-11 PROCEDURE — 3044F PR MOST RECENT HEMOGLOBIN A1C LEVEL <7.0%: ICD-10-PCS | Mod: CPTII,S$GLB,, | Performed by: NURSE PRACTITIONER

## 2022-07-11 PROCEDURE — 3008F BODY MASS INDEX DOCD: CPT | Mod: CPTII,S$GLB,, | Performed by: NURSE PRACTITIONER

## 2022-07-11 PROCEDURE — 3288F PR FALLS RISK ASSESSMENT DOCUMENTED: ICD-10-PCS | Mod: CPTII,S$GLB,, | Performed by: NURSE PRACTITIONER

## 2022-07-11 PROCEDURE — 3044F HG A1C LEVEL LT 7.0%: CPT | Mod: CPTII,S$GLB,, | Performed by: NURSE PRACTITIONER

## 2022-07-11 PROCEDURE — 1159F MED LIST DOCD IN RCRD: CPT | Mod: CPTII,S$GLB,, | Performed by: NURSE PRACTITIONER

## 2022-07-11 PROCEDURE — 1101F PT FALLS ASSESS-DOCD LE1/YR: CPT | Mod: CPTII,S$GLB,, | Performed by: NURSE PRACTITIONER

## 2022-07-11 PROCEDURE — 99999 PR PBB SHADOW E&M-EST. PATIENT-LVL V: CPT | Mod: PBBFAC,,, | Performed by: NURSE PRACTITIONER

## 2022-07-11 RX ORDER — CALCIUM CARBONATE 300MG(750)
TABLET,CHEWABLE ORAL
COMMUNITY
Start: 2022-05-19 | End: 2022-08-17

## 2022-07-11 RX ORDER — BUDESONIDE 0.5 MG/2ML
0.5 INHALANT ORAL 2 TIMES DAILY
Qty: 120 ML | Refills: 5 | Status: SHIPPED | OUTPATIENT
Start: 2022-07-11 | End: 2022-07-13 | Stop reason: SDUPTHER

## 2022-07-11 RX ORDER — ARFORMOTEROL TARTRATE 15 UG/2ML
15 SOLUTION RESPIRATORY (INHALATION) 2 TIMES DAILY
Qty: 120 ML | Refills: 5 | Status: SHIPPED | OUTPATIENT
Start: 2022-07-11 | End: 2022-07-13 | Stop reason: SDUPTHER

## 2022-07-11 NOTE — PATIENT INSTRUCTIONS
Expect fatigue to improve with time    Continue BIPAP therapy     Changing Asthma therapy from Trelegy daily to Brovana and Budesonide twice a day through nebulizer machine. If cost is still to high contact clinic to order Generic Advair called [facility].

## 2022-07-11 NOTE — PROGRESS NOTES
7/11/2022    Cornelia Delatorre  Office f/u    Chief Complaint   Patient presents with    3m f/u     HPI:  7/11/2022- states feeling like her self again after COVID 19 in May, Cough is dramatically improved, non productive cough, few days week.   Fatigue continues, has to take daily naps. Wearing BiPAP nightly.    On Trelegy daily with benefit but cost is high, in donut whole.       5/25/2022- Dx COVID 19 7 days prior tx with monoclonal Antibiodies two days prior. Feeling generalized weakness, diaphoresis, fatigue  Using Trelegy and nebulizer daily,  beats on her back   Cough - severe, complaint, worse at night, productive thick yellow mucous. Associated with late evening wheeze.   Lost sense of taste and smell.       4/13/2022- SOB- stable, worse in late evenings, taking prednisone 10 mg when needed, not used in past 2 months; worse with exertion, improves with rest and albuterol rescue.   On BIPAP with benefit, no daytime drowsiness.   Liked Trelegy. Still on Adviar until prescription runs out, has 2 weeks left.   Skin biopsy 5 weeks prior, left lower chin site irritated by wearing face mask. No edema or erythema,     1/12/2022- SOB worsening, on Advair daily and levalbuterol 2-3x daily for past 4 weeks, has noticed worsening of shortness of breath and sinus complaints.   Admitted to hospital for GI virus,   Noticed prednisone is needed occasionally at 10 mg notices improvement    On BiPAP nightly with benefit. No daytime fatigue, no issues with nasal pillow mask.     7/12/21- complaint of daily sinus drainage, has to clear throat repeatedly for past 2 months. Currently on Flonase nasal spray, ipratropium nasal spray, Singulair pills, zyrtec, corcindin daily with no improvement.   SOB- stable, required steroid therapy for 3 days twice in past 3 months. Only with exertion, worse in late evenings, improves with rest,  Using nebulizer 2x daily due to feeling of heaviness in chest.   Cough- mild, non  productive, associated with post nasal drip from allergies. Nocturnal arousals 2x weekly for past 2 weeks,   Wearing CPAP nighty with benefit.     4/12/21- spent last 6 months in home, was able to get COVID 19 vaccine Mederna. Allergies stable, few spells improve with nasal spray noticed improvement after getting air purifier.   Noticed spacer device helping with hoarse voice or oral thrush like before,   SOB- unchanged, daily complaint, varies with severity, worse with exertion, improves with rest and albuterol rescue. Has to sit up to breath when first laying  Down, not able to breath when laying on left side.   Occasional cough- productive, clear mucous, using nebulizer 3 x weekly.   CPAP for [facility] doing well,     10/13/2020- Allergies bother her every few days, currently on daily Singulair, zyrtec, flonase, astelin nasal spray, having sneezing fits.   Complaint of clear sinus drainage,   Hoarse voice has resolved after stopping symbicort.   Cough- improved,   SOB- doing well, occasional episodes of not being able to catch breath, did not use nebulizer   Wearing CPAP nightly for S[facility], states benefits greatly    7/13/2020- Hoarse voice, loss of voice, productive cough, lost voice July 2019 tx by ENT who treated for acute laryngitis with diflucan; Recurrent problem. Hoarse voice return 4 weeks prior, ENT doctor recommends changing Asthma medications tx her with diflucan, doxycycline and prednisone for 5 days, states has improved.   SOB- worse when wearing face mask, currently on hizentra therapy,     5/4/2020- uses symbicort daily, uses rescue 2/wk - some anxiety, getting igg rx. Having more sinus problems with head colds- 3 in last 4 wks.  No prednisone- hizentra working well.        Nov 4, 2019- having mucous sensation, notes some sob relaxing - maybe worse night.  No nasal stuffy.  Uses cpap.  Uses symbicort bid, no rescue.  Mucous is clear.  No abx for sinus or lungs.  Getting immune infusions weekly - pt senses  doing better since starting in May.  Patient Instructions   Breathing off and on short breath - need to use more albuterol to make clear where breathing problems come from    Mucous production may decrease if airways controlled- albuterol should help clearance.    Rescue inhaler may resolve any breathing problems- should use intermittently if any symptoms.    May use augmentin for sinus/lung problems- not optimal for all uti's       May 6,2019-due to get immune infusion next 2 days.  No prednisone, needs monlukast. abx stopped wks ago- mucous cleared.    Patient Instructions   Use antibiotic daily til immune therapy done a wk - then as needed like every one else  Immune therapy may keep you stable - better than antibiotics.   Use symbicort regular.  Lungs may stabilize.  Use prednisone if needed.  Lung  Nodules are stable for 2 yrs and no follow up needed.  Call if problems- hope all will be better yet.  March 7, 19-exacerbated in late Jan- cleared in feb (vague).  Now ill again yellow and gray mucous (culture last Jan was nl yvonne).  Sl intermittent wheezes since last wk.  Sees Dr Herrera in [place]- gets allergy rx but declined to get now.  Pt has cvid by  igg and igm levels low and pneumococcal antibodies to 8/14 pneumococcal titers. Uses symbicort and singulair routinely.  Took prednisone completed 4 days ago- uses prednisone monthly- was able to skip December  .  Discussed with patient above for education the following:      Gastric by pass may cause malabsorption, protein levels have been too low and may affect ig levels-- somewhat.   Need to get follow up with dietician to assure adequate protein intake/levels.   Antibiotic may stabilize infections with common variable immune deficiency- azithromycin daily once cultures submitted.   Use augmentin if infection worsens.   Acapella/chest clapping help clear mucous, vest likewise if bronchiectasis.  Will not treat cause.   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  Use codeine to suppress mild coughing.   Need good immune system and no airway/asthma problems and good hygiene of bronchial tubes (no chronic infections) to prevent illness.     Lungs measured near normal with good response to bronchial medications 2017   Need ct to follow up and cultures to assure infection controlled.      Jan 28/2019- Feeling bad onset 2 weeks, took prednisone taper x 6 days and antibiotic Augmentin week prior, States no improvement. Cough- worse at night, no nocturnal arousals, takes cough suppressant syrup before bed. Productive yellow/brown color.   Nebulized albuterol 4x daily. States no fever.  Has started allergy injections waiting for insurance clearance for IGG therapy.   Discussed with patient above for education the following:    CT chest for February to monitor and assess for bronchiectasis, need diagnosis for insurance to cover vest therapy  Have  continue to percuss back with hand.   Recommend purchasing Acepella device to help clear lungs of excess mucous, attaches to nebulizer device  Continue Nebulizer treatments 4x daily as needed.  Chest x-ray now to evaluate for pneumonia  Blood work at hospital   Will yeison to see results of sputum culture and x-ray before repeating antibiotic  Need to return sputum to clinic with in 4 hours of collecting  Fluconazole pills for oral thrush, this is a recurrent problem related to your weak immune system and use of inhaled steroids. Continue to rinse mouth out after using symbicort but problem will improve after starting immune replacement therapy.    oct 30,   2018-- had one day fever followed by cough/wheeze illness that lingered a wk. Pt seen by VAS[facility] Llanos 2x, [place].  [place] like dying- only one day fever.  Violent cough.  Nebulizer ppt itch and palpitations- ok  On xopenex now.  Prednisone 40x3, 20 x 3 ,  augmentin cleared.  Now back to  normal.  May 14, 2018- had spell /exacerbation with one round prednisone. Breathing good.  Follow-up in about 1 year (around 5/14/2019), or if symptoms worsen or fail to improve.   Discussed with patient above for education the following:     Check blood count and pneumonia vaccine response after last vaccine 4/27/2018   Use azithromycin for any lung/sinus infection- immune weakness likely   Asthma stable- use prednisone and singulair.   Use allergra and singulair and astelin/flonase   Lung nodule in lung still - Navigational bronchoscopy might find?  - will at least re check in a year.     Call if needed, re check next yr.  darlin 15, 2018--1 st illness in yr or so with cough and rattles, no flu.  Appetite ok.  Ill x wk, cough and wheeze and sob and noct worse, resp rx helps a bit.  Hasn't been using  Albuterol   Follow-up in about 6 months (around 7/15/2018).   Discussed with patient above for education the following:      tamiflu if flu.   Need to use albuterol for any lung symptoms.  Should get cough  Better controlled.      Prednisone 20 mg 3 for 3 , taper may be needed.  Give shot today, 1 daily for 3 may be enough with albuterol.   You had high eosinophils-- if asthma becomes more active - special therapy may help??        prevnar 13 would be good - should be off prednisone.  Immune system is sl weak  Protection only to 7.14 pneumonia germs.  oct 19, 2017- has scratchy throat, some sinus drip, has nightly sensation throat issues, post beryl and carpel tunnel surg.  No sinus lung infections.  Breathing and cough good.  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 Had pos tb gold.  June 21, 2017- had good [place] trip, tapered symbicort with some increase sensation of lung problems so maintained full dose. No infections.  No chest symptoms on symbicort.   April 24, feels a lot better with min cough, vague sob going left side down at night.  Going to [place] 2 weeks.  Uses symbicort  and good results.  March 16, cough better, but comes and goes,  No great help with steroids.  Submitted 3 sputums.  Had pneumovax march last yr.  Breathing better. Got symbicort.   Had pneumonia vaccine last yr  3/8/2017 HPI: had onset cough [place] illness, got dizzy few days with diarrhea and cough. Cough clear sm amt mucous. Did have 101 temp one day, fatigue, no muscle aches.  Appetite decreased.  Illnesses lingered 2 weeks but cough never remitted- has improved with inhahler use last 15 days.    Had gastric 2011 bypass with with continued diarrhea intially resolved. No regurg or reflux or swallow problems.   symbicort nearly  Resolved.    Sinuses ok.  No pets.  Never smoker.    Appetite good, feels well now.    headcolds to chest since childhood, nocturnal ??not recalled.  Had cats in past but got rid in 2003 or so.     The chief compliant  problem varies with instablilty at time    PFSH:  Past Medical History:   Diagnosis Date    Allergy     Arthritis     Asthma     Cancer     skin cancer to right hand    Cataract     Chronic fatigue 1/24/2017    CVID (common variable immunodeficiency) 3/7/2019    Diabetes mellitus     type2    KLINE (dyspnea on exertion) 1/24/2017    Dyslipidemia 6/25/2019    Encounter for blood transfusion     Essential hypertension 12/29/2011    GERD (gastroesophageal reflux disease)     Headache(784.0)     History of lumpectomy of both breasts     1992 negative    Hyperlipidemia 7/15/2015    Hypertension     Hypothyroidism     Neuropathy     Obesity     Obesity     Postmenopausal HRT (hormone replacement therapy)     Rash     Rosacea     Sleep apnea     on bipap    Snoring     Squamous cell carcinoma of skin     left forearm     UTI (urinary tract infection)     Wears glasses          Past Surgical History:   Procedure Laterality Date    ADENOIDECTOMY      APPENDECTOMY      BLADDER SUSPENSION      BREAST BIOPSY Bilateral 1992    bilateral benign excisional  biopsies    BUNIONECTOMY  10/17/14    right, still has discomfort    CATARACT EXTRACTION W/  INTRAOCULAR LENS IMPLANT Bilateral     CHOLECYSTECTOMY  08/02/2017    COLONOSCOPY      CYSTOSCOPY      EPIDURAL STEROID INJECTION INTO LUMBAR SPINE N/A 8/14/2019    Procedure: Injection-steroid-epidural-lumbar L5/S1;  Surgeon: Fredi Rojas MD;  Location: [facility] OR;  Service: Pain Management;  Laterality: N/A;    EPIDURAL STEROID INJECTION INTO LUMBAR SPINE N/A 5/3/2021    Procedure: Injection-steroid-epidural-lumbar L5/S1 to the Left;  Surgeon: Fredi Rojas MD;  Location: [facility] OR;  Service: Pain Management;  Laterality: N/A;    EPIDURAL STEROID INJECTION INTO LUMBAR SPINE N/A 9/24/2021    Procedure: Injection-steroid-epidural-lumbar L5/S1;  Surgeon: Fredi Rojas MD;  Location: [facility] OR;  Service: Pain Management;  Laterality: N/A;    EPIDURAL STEROID INJECTION INTO LUMBAR SPINE N/A 2/21/2022    Procedure: Injection-steroid-epidural-lumbar L5/S1;  Surgeon: Fredi Rojas MD;  Location: [facility] OR;  Service: Pain Management;  Laterality: N/A;    ESOPHAGEAL DILATION N/A 6/11/2021    Procedure: DILATION, ESOPHAGUS;  Surgeon: Jake Sheriff MD;  Location: [facility];  Service: Endoscopy;  Laterality: N/A;    ESOPHAGOGASTRODUODENOSCOPY      dilated esophagus    ESOPHAGOGASTRODUODENOSCOPY N/A 6/11/2021    Procedure: EGD (ESOPHAGOGASTRODUODENOSCOPY);  Surgeon: Jake Sheriff MD;  Location: [facility];  Service: Endoscopy;  Laterality: N/A;    GASTRIC BYPASS      2011    HYSTERECTOMY  1980    interstim bladder  2009    10/14/14 new battery    OOPHORECTOMY  1980    REPLACEMENT OF SACRAL NERVE STIMULATOR  2/18/2020    Procedure: REPLACEMENT, NEUROSTIMULATOR, SACRAL;  Surgeon: Mary Jane Jarvis MD;  Location: [address];  Service: Urology;;    REVISION OF PROCEDURE INVOLVING SACRAL NEUROSTIMULATOR DEVICE Right 2/18/2020    Procedure: REVISION, NEUROSTIMULATOR, SACRAL/ battery replacement;  Surgeon:  "Mary Jane Jarvis MD;  Location: [facility] OR [address] [place] [address];  Service: Urology;  Laterality: Right;  1hr/ rep contacted/ (CONNIE)    SKIN CANCER EXCISION      left hand    TONSILLECTOMY      WISDOM TOOTH EXTRACTION       Social History     Tobacco Use    Smoking status: Never Smoker    Smokeless tobacco: Never Used   Substance Use Topics    Alcohol use: Not Currently    Drug use: No     Family History   Problem Relation Age of Onset    Breast cancer Mother 50    Diabetes Unknown     Hypertension Unknown     Hypothyroidism Unknown     Breast cancer Paternal Aunt         40's    Ovarian cancer Paternal Grandmother     Allergic rhinitis Neg Hx     Allergies Neg Hx     Angioedema Neg Hx     Asthma Neg Hx     Atopy Neg Hx     Eczema Neg Hx     Immunodeficiency Neg Hx     Rhinitis Neg Hx     Urticaria Neg Hx      Review of patient's allergies indicates:   Allergen Reactions    Sulfa (sulfonamide antibiotics) Hives    Naproxen Other (See Comments)     Other reaction(s): RT sided numbness         Performance Status:The patient's activity level is functions out of house.      Review of Systems:  a review of eleven systems covering constitutional, Eye, HEENT, Psych, Respiratory, Cardiac, GI, , Musculoskeletal, Endocrine, Dermatologic was negative except for pertinent findings as listed ABOVE and below: all good,  Had dm that remitted with bypass 2011.  Positive for SOB, nasal drip,  fatigue    Exam:Comprehensive exam done. /74 (BP Location: Left arm, Patient Position: Sitting, BP Method: Medium (Automatic))   Pulse 67   Ht 5' 4" (1.626 m)   Wt 95.5 kg (210 lb 10.4 oz)   SpO2 98% Comment: on room air at rest  BMI 36.16 kg/m²   Exam included Vitals as listed, and patient's appearance and affect and alertness and mood, oral exam for yeast and hygiene and pharynx lesions and Mallapatti (M) score, neck with inspection for jvd and masses and thyroid abnormalities and lymph nodes (supraclavicular and " infraclavicular nodes and axillary also examined and noted if abn), chest exam included symmetry and effort and fremitus and percussion and auscultation, cardiac exam included rhythm and gallops and murmur and rubs and jvd and edema, abdominal exam for mass and hepatosplenomegaly and tenderness and hernias and bowel sounds, Musculoskeletal exam with muscle tone and posture and mobility/gait and  strength, and skin for rashes and cyanosis and pallor and turgor, extremity for clubbing.  Findings were normal except for pertinent findings listed below:  M3, good bs, rest good. chest is symmetric, no distress, normal percussion, normal fremitus and good normal breath sounds    Radiographs (ct chest and cxr) reviewed: view by direct vision  i do see clear bronchiectasis,nodules are noted.  Mucoid type impaction suggested in rul ant segment.  X-Ray Chest PA And Lateral 06/09/2022 lungs clear    CT Abdomen Pelvis With Contrast 06/11/2021 lower lung bases clear    CT Chest Without Contrast  04/22/2019 stable non calcified nodules date to 2017 with no change.           Labs reviewed igm low, igg lowish, humerol titers na    Lab Results   Component Value Date    WBC 6.51 04/27/2022    RBC 4.37 04/27/2022    HGB 12.4 04/27/2022    HCT 39.0 04/27/2022    MCV 89 04/27/2022    MCH 28.4 04/27/2022    MCHC 31.8 (L) 04/27/2022    RDW 13.6 04/27/2022     04/27/2022    MPV 9.8 04/27/2022    GRAN 4.3 04/27/2022    GRAN 65.3 04/27/2022    LYMPH 1.7 04/27/2022    LYMPH 26.0 04/27/2022    MONO 0.4 04/27/2022    MONO 6.5 04/27/2022    EOS 0.1 04/27/2022    BASO 0.02 04/27/2022    EOSINOPHIL 1.1 04/27/2022    BASOPHIL 0.3 04/27/2022         PFT  Spirometry with bronchodilator, lung volume by gas dilution, and diffusion capacity measured April 19, 2021. The FEV1 FVC ratio was 75% indicating no airflow obstruction measured by spirometry. The FEV1 was 99% predicted at 2.25 L. There was no   statistically significant improvement in  spirometry following bronchodilator. Total lung capacity was within normal range at 90% of predicted. Diffusion was slightly low at 77% predicted-uncorrected for anemia if present.   Spirometry is normal. Lung volumes fall within normal range. Diffusion is slightly decreased. Clinical correlation recommended. The bronchodilator response was not significant.       Done st [facility] with 10% response, otherwise nl  DATE OF PROCEDURE:  03/24/2017     1.  Spirometry reveals an FVC of 3.1 L, which is 107% predicted.  FEV1 is 2.3 L,   which is 100% predicted.  The ratio, there is preserved.  There is a positive,   but not significant bronchodilator response to both the FVC and FEV1.  Loop   contours appear with adequate effort as well.  2.  Lung volumes.  The total lung capacity is 4.4 L, which is 92% predicted.    There are no signs of gas trapping.  3.  Diffusing capacity is mild-to-moderate reduced at 68%, does improve to 96%   with alveolar volumes.     OVERALL IMPRESSION:  A normal spirometry with a robust yet statistically   insignificant bronchodilator response.  Normal lung volumes and a moderate   reduction in diffusion capacity.    CC: Jonathan Nicole M.D.        Plan:  Clinical impression is resonably certain and repeated evaluation prn +/- follow up will be needed as below.    Cornelia was seen today for 3m f/u.    Diagnoses and all orders for this visit:    Moderate persistent asthma without complication  -     arformoteroL (BROVANA) 15 mcg/2 mL Nebu; Take 2 mLs (15 mcg total) by nebulization 2 (two) times a day. Controller  -     budesonide (PULMICORT) 0.5 mg/2 mL nebulizer solution; Take 2 mLs (0.5 mg total) by nebulization 2 (two) times daily. Controller        Follow up in about 6 months (around 1/11/2023), or if symptoms worsen or fail to improve.    Discussed with patient above for education the following:      Patient Instructions   Expect fatigue to improve with time    Continue BIPAP therapy     Changing  Asthma therapy from Trelegy daily to Brovana and Budesonide twice a day through nebulizer machine. If cost is still to high contact clinic to order Generic Advair called [facility].

## 2022-07-13 ENCOUNTER — PATIENT MESSAGE (OUTPATIENT)
Dept: PULMONOLOGY | Facility: CLINIC | Age: 72
End: 2022-07-13
Payer: MEDICARE

## 2022-07-13 DIAGNOSIS — J45.40 MODERATE PERSISTENT ASTHMA WITHOUT COMPLICATION: ICD-10-CM

## 2022-07-13 RX ORDER — ARFORMOTEROL TARTRATE 15 UG/2ML
15 SOLUTION RESPIRATORY (INHALATION) 2 TIMES DAILY
Qty: 120 ML | Refills: 5 | Status: SHIPPED | OUTPATIENT
Start: 2022-07-13 | End: 2022-10-27

## 2022-07-13 RX ORDER — BUDESONIDE 0.5 MG/2ML
0.5 INHALANT ORAL 2 TIMES DAILY
Qty: 120 ML | Refills: 5 | Status: SHIPPED | OUTPATIENT
Start: 2022-07-13 | End: 2022-10-27

## 2022-07-20 ENCOUNTER — OFFICE VISIT (OUTPATIENT)
Dept: ORTHOPEDICS | Facility: CLINIC | Age: 72
End: 2022-07-20
Payer: MEDICARE

## 2022-07-20 VITALS — HEIGHT: 64 IN | BODY MASS INDEX: 35.85 KG/M2 | WEIGHT: 210 LBS

## 2022-07-20 DIAGNOSIS — M65.341 TRIGGER FINGER, RIGHT RING FINGER: Primary | ICD-10-CM

## 2022-07-20 PROCEDURE — 1160F PR REVIEW ALL MEDS BY PRESCRIBER/CLIN PHARMACIST DOCUMENTED: ICD-10-PCS | Mod: CPTII,S$GLB,, | Performed by: PHYSICIAN ASSISTANT

## 2022-07-20 PROCEDURE — 3008F BODY MASS INDEX DOCD: CPT | Mod: CPTII,S$GLB,, | Performed by: PHYSICIAN ASSISTANT

## 2022-07-20 PROCEDURE — 1125F AMNT PAIN NOTED PAIN PRSNT: CPT | Mod: CPTII,S$GLB,, | Performed by: PHYSICIAN ASSISTANT

## 2022-07-20 PROCEDURE — 20550 TENDON SHEATH: ICD-10-PCS | Mod: RT,S$GLB,, | Performed by: PHYSICIAN ASSISTANT

## 2022-07-20 PROCEDURE — 99213 OFFICE O/P EST LOW 20 MIN: CPT | Mod: 25,S$GLB,, | Performed by: PHYSICIAN ASSISTANT

## 2022-07-20 PROCEDURE — 1159F PR MEDICATION LIST DOCUMENTED IN MEDICAL RECORD: ICD-10-PCS | Mod: CPTII,S$GLB,, | Performed by: PHYSICIAN ASSISTANT

## 2022-07-20 PROCEDURE — 1159F MED LIST DOCD IN RCRD: CPT | Mod: CPTII,S$GLB,, | Performed by: PHYSICIAN ASSISTANT

## 2022-07-20 PROCEDURE — 3044F PR MOST RECENT HEMOGLOBIN A1C LEVEL <7.0%: ICD-10-PCS | Mod: CPTII,S$GLB,, | Performed by: PHYSICIAN ASSISTANT

## 2022-07-20 PROCEDURE — 1101F PR PT FALLS ASSESS DOC 0-1 FALLS W/OUT INJ PAST YR: ICD-10-PCS | Mod: CPTII,S$GLB,, | Performed by: PHYSICIAN ASSISTANT

## 2022-07-20 PROCEDURE — 99213 PR OFFICE/OUTPT VISIT, EST, LEVL III, 20-29 MIN: ICD-10-PCS | Mod: 25,S$GLB,, | Performed by: PHYSICIAN ASSISTANT

## 2022-07-20 PROCEDURE — 1157F PR ADVANCE CARE PLAN OR EQUIV PRESENT IN MEDICAL RECORD: ICD-10-PCS | Mod: CPTII,S$GLB,, | Performed by: PHYSICIAN ASSISTANT

## 2022-07-20 PROCEDURE — 1125F PR PAIN SEVERITY QUANTIFIED, PAIN PRESENT: ICD-10-PCS | Mod: CPTII,S$GLB,, | Performed by: PHYSICIAN ASSISTANT

## 2022-07-20 PROCEDURE — 3044F HG A1C LEVEL LT 7.0%: CPT | Mod: CPTII,S$GLB,, | Performed by: PHYSICIAN ASSISTANT

## 2022-07-20 PROCEDURE — 1160F RVW MEDS BY RX/DR IN RCRD: CPT | Mod: CPTII,S$GLB,, | Performed by: PHYSICIAN ASSISTANT

## 2022-07-20 PROCEDURE — 3288F FALL RISK ASSESSMENT DOCD: CPT | Mod: CPTII,S$GLB,, | Performed by: PHYSICIAN ASSISTANT

## 2022-07-20 PROCEDURE — 20550 NJX 1 TENDON SHEATH/LIGAMENT: CPT | Mod: RT,S$GLB,, | Performed by: PHYSICIAN ASSISTANT

## 2022-07-20 PROCEDURE — 1101F PT FALLS ASSESS-DOCD LE1/YR: CPT | Mod: CPTII,S$GLB,, | Performed by: PHYSICIAN ASSISTANT

## 2022-07-20 PROCEDURE — 3288F PR FALLS RISK ASSESSMENT DOCUMENTED: ICD-10-PCS | Mod: CPTII,S$GLB,, | Performed by: PHYSICIAN ASSISTANT

## 2022-07-20 PROCEDURE — 99999 PR PBB SHADOW E&M-EST. PATIENT-LVL IV: ICD-10-PCS | Mod: PBBFAC,,, | Performed by: PHYSICIAN ASSISTANT

## 2022-07-20 PROCEDURE — 1157F ADVNC CARE PLAN IN RCRD: CPT | Mod: CPTII,S$GLB,, | Performed by: PHYSICIAN ASSISTANT

## 2022-07-20 PROCEDURE — 3008F PR BODY MASS INDEX (BMI) DOCUMENTED: ICD-10-PCS | Mod: CPTII,S$GLB,, | Performed by: PHYSICIAN ASSISTANT

## 2022-07-20 PROCEDURE — 99999 PR PBB SHADOW E&M-EST. PATIENT-LVL IV: CPT | Mod: PBBFAC,,, | Performed by: PHYSICIAN ASSISTANT

## 2022-07-20 RX ORDER — TRIAMCINOLONE ACETONIDE 40 MG/ML
40 INJECTION, SUSPENSION INTRA-ARTICULAR; INTRAMUSCULAR
Status: DISCONTINUED | OUTPATIENT
Start: 2022-07-20 | End: 2022-07-20 | Stop reason: HOSPADM

## 2022-07-20 RX ADMIN — TRIAMCINOLONE ACETONIDE 40 MG: 40 INJECTION, SUSPENSION INTRA-ARTICULAR; INTRAMUSCULAR at 09:07

## 2022-07-20 NOTE — PROGRESS NOTES
7/20/2022    HPI:  Cornelia Delatorre is a 71 y.o. female, who presents to clinic today with her  for continued evaluation of her right ring finger trigger finger.  States the injection or see the last visit provided significant relief to her trigger finger symptoms.  States recently her trigger finger symptoms have returned.  States symptoms are mild.  States she is here today to discuss further treatment options.  States her diabetes continues to be well controlled.  Denies any other complaints at this time.    PMHX:  Past Medical History:   Diagnosis Date    Allergy     Arthritis     Asthma     Cancer     skin cancer to right hand    Cataract     Chronic fatigue 1/24/2017    CVID (common variable immunodeficiency) 3/7/2019    Diabetes mellitus     type2    KLINE (dyspnea on exertion) 1/24/2017    Dyslipidemia 6/25/2019    Encounter for blood transfusion     Essential hypertension 12/29/2011    GERD (gastroesophageal reflux disease)     Headache(784.0)     History of lumpectomy of both breasts     1992 negative    Hyperlipidemia 7/15/2015    Hypertension     Hypothyroidism     Neuropathy     Obesity     Obesity     Postmenopausal HRT (hormone replacement therapy)     Rash     Rosacea     Sleep apnea     on bipap    Snoring     Squamous cell carcinoma of skin     left forearm     UTI (urinary tract infection)     Wears glasses        PSHX:  Past Surgical History:   Procedure Laterality Date    ADENOIDECTOMY      APPENDECTOMY      BLADDER SUSPENSION      BREAST BIOPSY Bilateral 1992    bilateral benign excisional biopsies    BUNIONECTOMY  10/17/14    right, still has discomfort    CATARACT EXTRACTION W/  INTRAOCULAR LENS IMPLANT Bilateral     CHOLECYSTECTOMY  08/02/2017    COLONOSCOPY      CYSTOSCOPY      EPIDURAL STEROID INJECTION INTO LUMBAR SPINE N/A 8/14/2019    Procedure: Injection-steroid-epidural-lumbar L5/S1;  Surgeon: Fredi Rojas MD;  Location: [facility] OR;   Service: Pain Management;  Laterality: N/A;    EPIDURAL STEROID INJECTION INTO LUMBAR SPINE N/A 5/3/2021    Procedure: Injection-steroid-epidural-lumbar L5/S1 to the Left;  Surgeon: Fredi Rojas MD;  Location: [facility] OR;  Service: Pain Management;  Laterality: N/A;    EPIDURAL STEROID INJECTION INTO LUMBAR SPINE N/A 9/24/2021    Procedure: Injection-steroid-epidural-lumbar L5/S1;  Surgeon: Fredi Rojas MD;  Location: [facility] OR;  Service: Pain Management;  Laterality: N/A;    EPIDURAL STEROID INJECTION INTO LUMBAR SPINE N/A 2/21/2022    Procedure: Injection-steroid-epidural-lumbar L5/S1;  Surgeon: Fredi Rojas MD;  Location: [facility] OR;  Service: Pain Management;  Laterality: N/A;    ESOPHAGEAL DILATION N/A 6/11/2021    Procedure: DILATION, ESOPHAGUS;  Surgeon: Jake Sheriff MD;  Location: [facility];  Service: Endoscopy;  Laterality: N/A;    ESOPHAGOGASTRODUODENOSCOPY      dilated esophagus    ESOPHAGOGASTRODUODENOSCOPY N/A 6/11/2021    Procedure: EGD (ESOPHAGOGASTRODUODENOSCOPY);  Surgeon: Jake Sheriff MD;  Location: [facility] ENDO;  Service: Endoscopy;  Laterality: N/A;    GASTRIC BYPASS      2011    HYSTERECTOMY  1980    interstim bladder  2009    10/14/14 new battery    OOPHORECTOMY  1980    REPLACEMENT OF SACRAL NERVE STIMULATOR  2/18/2020    Procedure: REPLACEMENT, NEUROSTIMULATOR, SACRAL;  Surgeon: Mary Jane Jarvis MD;  Location: [facility] OR [address] [place] [address];  Service: Urology;;    REVISION OF PROCEDURE INVOLVING SACRAL NEUROSTIMULATOR DEVICE Right 2/18/2020    Procedure: REVISION, NEUROSTIMULATOR, SACRAL/ battery replacement;  Surgeon: Mary Jane Jarvis MD;  Location: [facility] OR 2ND FLR;  Service: Urology;  Laterality: Right;  1hr/ rep contacted/ (CONNIE)    SKIN CANCER EXCISION      left hand    TONSILLECTOMY      WISDOM TOOTH EXTRACTION         FMHX:  Family History   Problem Relation Age of Onset    Breast cancer Mother 50    Diabetes Unknown     Hypertension Unknown     Hypothyroidism  Unknown     Breast cancer Paternal Aunt         40's    Ovarian cancer Paternal Grandmother     Allergic rhinitis Neg Hx     Allergies Neg Hx     Angioedema Neg Hx     Asthma Neg Hx     Atopy Neg Hx     Eczema Neg Hx     Immunodeficiency Neg Hx     Rhinitis Neg Hx     Urticaria Neg Hx        SOCHX:  Social History     Tobacco Use    Smoking status: Never Smoker    Smokeless tobacco: Never Used   Substance Use Topics    Alcohol use: Not Currently       ALLERGIES:  Sulfa (sulfonamide antibiotics), Naproxen, and Albuterol    CURRENT MEDICATIONS:  Current Outpatient Medications on File Prior to Visit   Medication Sig Dispense Refill    albuterol (PROVENTIL/VENTOLIN HFA) 90 mcg/actuation inhaler Inhale 2 puffs into the lungs every 4 (four) hours as needed. 2 puffs every 4 hours as needed for cough, wheeze, or shortness of breath 54 g 3    arformoteroL (BROVANA) 15 mcg/2 mL Nebu Take 2 mLs (15 mcg total) by nebulization 2 (two) times a day. Controller 120 mL 5    ascorbic acid, vitamin C, (VITAMIN C) 1000 MG tablet Take 1,000 mg by mouth 2 (two) times daily.       azelastine (OPTIVAR) 0.05 % ophthalmic solution Place 1 drop into both eyes. As needed      azithromycin (Z-JEANETTE) 250 MG tablet 2 pills day one, then 1 pill for 4 days 6 tablet 0    B-complex with vitamin C (Z-BEC OR EQUIV) tablet Take 1 tablet by mouth once daily.      Bifidobacterium infantis (ALIGN ORAL) Take by mouth once daily.      biotin 10,000 mcg Cap Take 1 tablet by mouth every evening.       budesonide (PULMICORT) 0.5 mg/2 mL nebulizer solution Take 2 mLs (0.5 mg total) by nebulization 2 (two) times daily. Controller 120 mL 5    cloNIDine (CATAPRES) 0.1 MG tablet Take 1 tablet (0.1 mg total) by mouth 3 (three) times daily as needed (PRN SBP > 165 mmHg). 90 tablet 6    cranberry 500 mg Cap Take 1 capsule by mouth every evening.      diclofenac sodium (VOLTAREN) 1 % Gel Apply 2 grams topically once daily. (Patient taking  differently: Apply 2 g topically as needed (arthritis).) 1 Tube 6    diltiaZEM (CARDIZEM CD) 120 MG Cp24 Take 1 capsule (120 mg total) by mouth every evening. 90 capsule 4    EPINEPHrine (EPIPEN) 0.3 mg/0.3 mL AtIn Inject 1 each into the muscle as needed.   3    esomeprazole (NEXIUM) 40 MG capsule Take 1 capsule (40 mg total) by mouth before breakfast. 90 capsule 3    estradioL (ESTRACE) 0.01 % (0.1 mg/gram) vaginal cream Place 1 g vaginally 3 (three) times a week. Place by fingertip application before bedtime three times a week (Monday, Wednesday, Friday) 45 g 3    fexofenadine (ALLEGRA) 180 MG tablet Take 180 mg by mouth once daily.       fish oil-omega-3 fatty acids 300-1,000 mg capsule Take 2 g by mouth once daily.      fluticasone propionate (FLONASE) 50 mcg/actuation nasal spray 2 sprays (100 mcg total) by Each Nostril route once daily. 16 g 11    gabapentin (NEURONTIN) 600 MG tablet Take 1 tablet (600 mg total) by mouth 3 (three) times daily. 540 tablet 3    guaifenesin-codeine 100-10 mg/5 ml (GUAIFENESIN AC)  mg/5 mL syrup Take 5 mLs by mouth 3 (three) times daily as needed for Cough. 473 mL 2    immun glob G,IgG,-pro-IgA 0-50 (HIZENTRA) 1 gram/5 mL (20 %) Soln Inject 55 mLs (11 g total) into the skin once a week. 220 mL 12    inhalation spacing device Use as directed for inhalation. 1 each 0    ipratropium (ATROVENT) 42 mcg (0.06 %) nasal spray 2 sprays by Nasal route 4 (four) times daily. (Patient taking differently: 2 sprays by Nasal route every evening. As needed) 15 mL 2    magnesium oxide 400 mg magnesium Tab       metFORMIN (GLUCOPHAGE-XR) 500 MG ER 24hr tablet Take 1 tablet (500 mg total) by mouth 2 (two) times daily with meals. 180 tablet 3    mometasone 0.1% (ELOCON) 0.1 % cream aaa bid x 1-2 weeks prn bug bites 45 g 1    montelukast (SINGULAIR) 10 mg tablet Take 1 tablet (10 mg total) by mouth every evening. 90 tablet 3    mucus clearing device Cecy 1 Device by  "Misc.(Non-Drug; Combo Route) route 2 (two) times daily. 1 Device 0    multivitamin capsule Take 1 capsule by mouth once daily.       mupirocin (BACTROBAN) 2 % ointment Apply topically 3 (three) times daily. 15 g 0    ondansetron (ZOFRAN-ODT) 4 MG TbDL Take 1 tablet (4 mg total) by mouth every 8 (eight) hours as needed. 20 tablet 0    potassium chloride SA (K-DUR,KLOR-CON) 20 MEQ tablet TAKE 1 TABLET EVERY DAY 90 tablet 4    pravastatin (PRAVACHOL) 80 MG tablet Take 1 tablet (80 mg total) by mouth once daily. 90 tablet 4    predniSONE (DELTASONE) 10 MG tablet 1 pill for 3-5 days repeat as needed for chest tightness. 20 tablet 0    psyllium husk (METAMUCIL ORAL) Take by mouth.      SIMETHICONE (GAS-X ORAL) Take 1 capsule by mouth as needed (gas relief).       valsartan-hydrochlorothiazide (DIOVAN-HCT) 160-12.5 mg per tablet Take 1 tablet by mouth once daily. 90 tablet 3    vitamin D3-folic acid 5,000 unit- 1 mg Tab Take 1 tablet by mouth once daily.       zinc 50 mg Tab       levalbuterol (XOPENEX) 1.25 mg/3 mL nebulizer solution Take 3 mLs (1.25 mg total) by nebulization every 4 (four) hours as needed for Wheezing or Shortness of Breath. Rescue 1 [address]     No current facility-administered medications on file prior to visit.       REVIEW OF SYSTEMS:  Review of Systems Complete; Negative, unless noted above.    GENERAL PHYSICAL EXAM:   Ht 5' 4" (1.626 m)   Wt 95.3 kg (210 lb)   BMI 36.05 kg/m²    GEN: well developed, well nourished, no acute distress   PULM: No wheezing, no respiratory distress   CV: RRR    ORTHO EXAM:   Examination of the right hand reveals no edema, erythema, ecchymosis, or skin breakdown.  Able make composite fist and fully extend all fingers.  Mild tenderness to palpation overlying the area of the A1 pulley of the right ring finger.  No tenderness to palpation over the areas of the remaining A1 pulleys of the right hand.  Active triggering noted of the right ring finger.  Normal " sensation of the right ring finger.  Normal sensation in the radial, ulnar, median nerve distributions.  Capillary refill less than 2 seconds in all fingers.    RADIOLOGY:   None.    LABS:   Her most recent hemoglobin A1cs reviewed in clinic today.  Her hemoglobin A1c from 03/03/2022 showed a value of 6.1, on 09/08/2021 showed a value of 6.1, and on 06/11/2021 showed a value of 6.6.    ASSESSMENT:   Right ring finger trigger finger    PLAN:  1. I discussed with Cornelia Delatorre the trigger finger pathology and treatment options in detail during today's visit.  After treatment options were discussed, we decided a repeat steroid injection would be the best course of action at this time. She verbally agreed with the treatment plan.    2. Informed consent was obtained.  After an alcohol prep followed by chlorhexidine prep, a steroid injection was placed into the right ring finger.  She tolerated the procedure well with no immediate complications.    3. I would like to have her follow up in clinic on a p.r.n. basis for any worsening of her symptoms or for any hand, wrist, or elbow problems/concerns.  She was instructed to contact the clinic for any problems/concerns in the interim.

## 2022-07-21 ENCOUNTER — TELEPHONE (OUTPATIENT)
Dept: PULMONOLOGY | Facility: CLINIC | Age: 72
End: 2022-07-21
Payer: MEDICARE

## 2022-08-17 ENCOUNTER — OFFICE VISIT (OUTPATIENT)
Dept: ORTHOPEDICS | Facility: CLINIC | Age: 72
End: 2022-08-17
Payer: MEDICARE

## 2022-08-17 ENCOUNTER — LAB VISIT (OUTPATIENT)
Dept: LAB | Facility: HOSPITAL | Age: 72
End: 2022-08-17
Attending: INTERNAL MEDICINE
Payer: MEDICARE

## 2022-08-17 VITALS — BODY MASS INDEX: 35.85 KG/M2 | HEIGHT: 64 IN | WEIGHT: 210 LBS | RESPIRATION RATE: 16 BRPM

## 2022-08-17 DIAGNOSIS — E11.9 CONTROLLED TYPE 2 DIABETES MELLITUS WITHOUT COMPLICATION, WITHOUT LONG-TERM CURRENT USE OF INSULIN: ICD-10-CM

## 2022-08-17 DIAGNOSIS — M17.10 ARTHRITIS OF KNEE: Primary | ICD-10-CM

## 2022-08-17 LAB
ALBUMIN/CREAT UR: 42.2 UG/MG (ref 0–30)
CREAT UR-MCNC: 128 MG/DL (ref 15–325)
MICROALBUMIN UR DL<=1MG/L-MCNC: 54 UG/ML

## 2022-08-17 PROCEDURE — 3008F BODY MASS INDEX DOCD: CPT | Mod: CPTII,S$GLB,, | Performed by: ORTHOPAEDIC SURGERY

## 2022-08-17 PROCEDURE — 99999 PR PBB SHADOW E&M-EST. PATIENT-LVL IV: CPT | Mod: PBBFAC,,, | Performed by: ORTHOPAEDIC SURGERY

## 2022-08-17 PROCEDURE — 1125F PR PAIN SEVERITY QUANTIFIED, PAIN PRESENT: ICD-10-PCS | Mod: CPTII,S$GLB,, | Performed by: ORTHOPAEDIC SURGERY

## 2022-08-17 PROCEDURE — 1125F AMNT PAIN NOTED PAIN PRSNT: CPT | Mod: CPTII,S$GLB,, | Performed by: ORTHOPAEDIC SURGERY

## 2022-08-17 PROCEDURE — 1159F MED LIST DOCD IN RCRD: CPT | Mod: CPTII,S$GLB,, | Performed by: ORTHOPAEDIC SURGERY

## 2022-08-17 PROCEDURE — 3044F PR MOST RECENT HEMOGLOBIN A1C LEVEL <7.0%: ICD-10-PCS | Mod: CPTII,S$GLB,, | Performed by: ORTHOPAEDIC SURGERY

## 2022-08-17 PROCEDURE — 99213 OFFICE O/P EST LOW 20 MIN: CPT | Mod: 25,S$GLB,, | Performed by: ORTHOPAEDIC SURGERY

## 2022-08-17 PROCEDURE — 20610 LARGE JOINT ASPIRATION/INJECTION: R KNEE: ICD-10-PCS | Mod: RT,S$GLB,, | Performed by: ORTHOPAEDIC SURGERY

## 2022-08-17 PROCEDURE — 3008F PR BODY MASS INDEX (BMI) DOCUMENTED: ICD-10-PCS | Mod: CPTII,S$GLB,, | Performed by: ORTHOPAEDIC SURGERY

## 2022-08-17 PROCEDURE — 1160F PR REVIEW ALL MEDS BY PRESCRIBER/CLIN PHARMACIST DOCUMENTED: ICD-10-PCS | Mod: CPTII,S$GLB,, | Performed by: ORTHOPAEDIC SURGERY

## 2022-08-17 PROCEDURE — 1157F ADVNC CARE PLAN IN RCRD: CPT | Mod: CPTII,S$GLB,, | Performed by: ORTHOPAEDIC SURGERY

## 2022-08-17 PROCEDURE — 1157F PR ADVANCE CARE PLAN OR EQUIV PRESENT IN MEDICAL RECORD: ICD-10-PCS | Mod: CPTII,S$GLB,, | Performed by: ORTHOPAEDIC SURGERY

## 2022-08-17 PROCEDURE — 1159F PR MEDICATION LIST DOCUMENTED IN MEDICAL RECORD: ICD-10-PCS | Mod: CPTII,S$GLB,, | Performed by: ORTHOPAEDIC SURGERY

## 2022-08-17 PROCEDURE — 3044F HG A1C LEVEL LT 7.0%: CPT | Mod: CPTII,S$GLB,, | Performed by: ORTHOPAEDIC SURGERY

## 2022-08-17 PROCEDURE — 20610 DRAIN/INJ JOINT/BURSA W/O US: CPT | Mod: RT,S$GLB,, | Performed by: ORTHOPAEDIC SURGERY

## 2022-08-17 PROCEDURE — 1160F RVW MEDS BY RX/DR IN RCRD: CPT | Mod: CPTII,S$GLB,, | Performed by: ORTHOPAEDIC SURGERY

## 2022-08-17 PROCEDURE — 99999 PR PBB SHADOW E&M-EST. PATIENT-LVL IV: ICD-10-PCS | Mod: PBBFAC,,, | Performed by: ORTHOPAEDIC SURGERY

## 2022-08-17 PROCEDURE — 99213 PR OFFICE/OUTPT VISIT, EST, LEVL III, 20-29 MIN: ICD-10-PCS | Mod: 25,S$GLB,, | Performed by: ORTHOPAEDIC SURGERY

## 2022-08-17 PROCEDURE — 82570 ASSAY OF URINE CREATININE: CPT | Performed by: INTERNAL MEDICINE

## 2022-08-17 PROCEDURE — 82043 UR ALBUMIN QUANTITATIVE: CPT | Performed by: INTERNAL MEDICINE

## 2022-08-17 RX ORDER — TRIAMCINOLONE ACETONIDE 40 MG/ML
40 INJECTION, SUSPENSION INTRA-ARTICULAR; INTRAMUSCULAR
Status: DISCONTINUED | OUTPATIENT
Start: 2022-08-17 | End: 2022-08-17 | Stop reason: HOSPADM

## 2022-08-17 RX ADMIN — TRIAMCINOLONE ACETONIDE 40 MG: 40 INJECTION, SUSPENSION INTRA-ARTICULAR; INTRAMUSCULAR at 09:08

## 2022-08-17 NOTE — PROGRESS NOTES
Past Medical History:   Diagnosis Date    Allergy     Arthritis     Asthma     Cancer     skin cancer to right hand    Cataract     Chronic fatigue 1/24/2017    CVID (common variable immunodeficiency) 3/7/2019    Diabetes mellitus     type2    KLINE (dyspnea on exertion) 1/24/2017    Dyslipidemia 6/25/2019    Encounter for blood transfusion     Essential hypertension 12/29/2011    GERD (gastroesophageal reflux disease)     Headache(784.0)     History of lumpectomy of both breasts     1992 negative    Hyperlipidemia 7/15/2015    Hypertension     Hypothyroidism     Neuropathy     Obesity     Obesity     Postmenopausal HRT (hormone replacement therapy)     Rash     Rosacea     Sleep apnea     on bipap    Snoring     Squamous cell carcinoma of skin     left forearm     UTI (urinary tract infection)     Wears glasses        Past Surgical History:   Procedure Laterality Date    ADENOIDECTOMY      APPENDECTOMY      BLADDER SUSPENSION      BREAST BIOPSY Bilateral 1992    bilateral benign excisional biopsies    BUNIONECTOMY  10/17/14    right, still has discomfort    CATARACT EXTRACTION W/  INTRAOCULAR LENS IMPLANT Bilateral     CHOLECYSTECTOMY  08/02/2017    COLONOSCOPY      CYSTOSCOPY      EPIDURAL STEROID INJECTION INTO LUMBAR SPINE N/A 8/14/2019    Procedure: Injection-steroid-epidural-lumbar L5/S1;  Surgeon: Fredi Rojas MD;  Location: [facility] OR;  Service: Pain Management;  Laterality: N/A;    EPIDURAL STEROID INJECTION INTO LUMBAR SPINE N/A 5/3/2021    Procedure: Injection-steroid-epidural-lumbar L5/S1 to the Left;  Surgeon: Fredi Rojas MD;  Location: [facility] OR;  Service: Pain Management;  Laterality: N/A;    EPIDURAL STEROID INJECTION INTO LUMBAR SPINE N/A 9/24/2021    Procedure: Injection-steroid-epidural-lumbar L5/S1;  Surgeon: Fredi Rojas MD;  Location: [facility] OR;  Service: Pain Management;  Laterality: N/A;    EPIDURAL STEROID INJECTION INTO LUMBAR SPINE N/A 2/21/2022     Procedure: Injection-steroid-epidural-lumbar L5/S1;  Surgeon: Fredi Rojas MD;  Location: [facility] OR;  Service: Pain Management;  Laterality: N/A;    ESOPHAGEAL DILATION N/A 6/11/2021    Procedure: DILATION, ESOPHAGUS;  Surgeon: Jake Sheriff MD;  Location: [facility] ENDO;  Service: Endoscopy;  Laterality: N/A;    ESOPHAGOGASTRODUODENOSCOPY      dilated esophagus    ESOPHAGOGASTRODUODENOSCOPY N/A 6/11/2021    Procedure: EGD (ESOPHAGOGASTRODUODENOSCOPY);  Surgeon: Jake Sheriff MD;  Location: [facility] ENDO;  Service: Endoscopy;  Laterality: N/A;    GASTRIC BYPASS      2011    HYSTERECTOMY  1980    interstim bladder  2009    10/14/14 new battery    OOPHORECTOMY  1980    REPLACEMENT OF SACRAL NERVE STIMULATOR  2/18/2020    Procedure: REPLACEMENT, NEUROSTIMULATOR, SACRAL;  Surgeon: Mary Jane Jarvis MD;  Location: [facility] OR [address] [place] [address];  Service: Urology;;    REVISION OF PROCEDURE INVOLVING SACRAL NEUROSTIMULATOR DEVICE Right 2/18/2020    Procedure: REVISION, NEUROSTIMULATOR, SACRAL/ battery replacement;  Surgeon: Mary Jane Jarvis MD;  Location: [facility] OR [address] [place] [address];  Service: Urology;  Laterality: Right;  1hr/ rep contacted/ (CONNIE)    SKIN CANCER EXCISION      left hand    TONSILLECTOMY      WISDOM TOOTH EXTRACTION         Current Outpatient Medications   Medication Sig    albuterol (PROVENTIL/VENTOLIN HFA) 90 mcg/actuation inhaler Inhale 2 puffs into the lungs every 4 (four) hours as needed. 2 puffs every 4 hours as needed for cough, wheeze, or shortness of breath    arformoteroL (BROVANA) 15 mcg/2 mL Nebu Take 2 mLs (15 mcg total) by nebulization 2 (two) times a day. Controller    ascorbic acid, vitamin C, (VITAMIN C) 1000 MG tablet Take 1,000 mg by mouth 2 (two) times daily.     azelastine (OPTIVAR) 0.05 % ophthalmic solution Place 1 drop into both eyes. As needed    B-complex with vitamin C (Z-BEC OR EQUIV) tablet Take 1 tablet by mouth once daily.    Bifidobacterium infantis (ALIGN ORAL) Take by  mouth once daily.    biotin 10,000 mcg Cap Take 1 tablet by mouth every evening.     budesonide (PULMICORT) 0.5 mg/2 mL nebulizer solution Take 2 mLs (0.5 mg total) by nebulization 2 (two) times daily. Controller    cloNIDine (CATAPRES) 0.1 MG tablet Take 1 tablet (0.1 mg total) by mouth 3 (three) times daily as needed (PRN SBP > 165 mmHg).    cranberry 500 mg Cap Take 1 capsule by mouth every evening.    diclofenac sodium (VOLTAREN) 1 % Gel Apply 2 grams topically once daily. (Patient taking differently: Apply 2 g topically as needed (arthritis).)    diltiaZEM (CARDIZEM CD) 120 MG Cp24 Take 1 capsule (120 mg total) by mouth every evening.    EPINEPHrine (EPIPEN) 0.3 mg/0.3 mL AtIn Inject 1 each into the muscle as needed.     esomeprazole (NEXIUM) 40 MG capsule Take 1 capsule (40 mg total) by mouth before breakfast.    estradioL (ESTRACE) 0.01 % (0.1 mg/gram) vaginal cream Place 1 g vaginally 3 (three) times a week. Place by fingertip application before bedtime three times a week (Monday, Wednesday, Friday)    fexofenadine (ALLEGRA) 180 MG tablet Take 180 mg by mouth once daily.     fish oil-omega-3 fatty acids 300-1,000 mg capsule Take 2 g by mouth once daily.    fluticasone propionate (FLONASE) 50 mcg/actuation nasal spray 2 sprays (100 mcg total) by Each Nostril route once daily.    gabapentin (NEURONTIN) 600 MG tablet Take 1 tablet (600 mg total) by mouth 3 (three) times daily.    guaifenesin-codeine 100-10 mg/5 ml (GUAIFENESIN AC)  mg/5 mL syrup Take 5 mLs by mouth 3 (three) times daily as needed for Cough.    immun glob G,IgG,-pro-IgA 0-50 (HIZENTRA) 1 gram/5 mL (20 %) Soln Inject 55 mLs (11 g total) into the skin once a week.    inhalation spacing device Use as directed for inhalation.    ipratropium (ATROVENT) 42 mcg (0.06 %) nasal spray 2 sprays by Nasal route 4 (four) times daily. (Patient taking differently: 2 sprays by Nasal route every evening. As needed)    metFORMIN  (GLUCOPHAGE-XR) 500 MG ER 24hr tablet Take 1 tablet (500 mg total) by mouth 2 (two) times daily with meals.    mometasone 0.1% (ELOCON) 0.1 % cream aaa bid x 1-2 weeks prn bug bites    montelukast (SINGULAIR) 10 mg tablet Take 1 tablet (10 mg total) by mouth every evening.    mucus clearing device Cecy 1 Device by Misc.(Non-Drug; Combo Route) route 2 (two) times daily.    multivitamin capsule Take 1 capsule by mouth once daily.     mupirocin (BACTROBAN) 2 % ointment Apply topically 3 (three) times daily.    ondansetron (ZOFRAN-ODT) 4 MG TbDL Take 1 tablet (4 mg total) by mouth every 8 (eight) hours as needed.    potassium chloride SA (K-DUR,KLOR-CON) 20 MEQ tablet TAKE 1 TABLET EVERY DAY    pravastatin (PRAVACHOL) 80 MG tablet Take 1 tablet (80 mg total) by mouth once daily.    predniSONE (DELTASONE) 10 MG tablet 1 pill for 3-5 days repeat as needed for chest tightness.    psyllium husk (METAMUCIL ORAL) Take by mouth.    SIMETHICONE (GAS-X ORAL) Take 1 capsule by mouth as needed (gas relief).     valsartan-hydrochlorothiazide (DIOVAN-HCT) 160-12.5 mg per tablet Take 1 tablet by mouth once daily.    vitamin D3-folic acid 5,000 unit- 1 mg Tab Take 1 tablet by mouth once daily.     azithromycin (Z-JEANETTE) 250 MG tablet 2 pills day one, then 1 pill for 4 days    levalbuterol (XOPENEX) 1.25 mg/3 mL nebulizer solution Take 3 mLs (1.25 mg total) by nebulization every 4 (four) hours as needed for Wheezing or Shortness of Breath. Rescue    magnesium oxide 400 mg magnesium Tab     zinc 50 mg Tab      No current facility-administered medications for this visit.       Review of patient's allergies indicates:   Allergen Reactions    Sulfa (sulfonamide antibiotics) Hives    Naproxen Other (See Comments)     Other reaction(s): RT sided numbness         Family History   Problem Relation Age of Onset    Breast cancer Mother 50    Diabetes Unknown     Hypertension Unknown     Hypothyroidism Unknown     Breast  cancer Paternal Aunt         40's    Ovarian cancer Paternal Grandmother     Allergic rhinitis Neg Hx     Allergies Neg Hx     Angioedema Neg Hx     Asthma Neg Hx     Atopy Neg Hx     Eczema Neg Hx     Immunodeficiency Neg Hx     Rhinitis Neg Hx     Urticaria Neg Hx        Social History     Socioeconomic History    Marital status:    Tobacco Use    Smoking status: Never Smoker    Smokeless tobacco: Never Used   Substance and Sexual Activity    Alcohol use: Not Currently    Drug use: No    Sexual activity: Not Currently     Social Determinants of Health     Financial Resource Strain: Unknown    Difficulty of Paying Living Expenses: Patient refused   Food Insecurity: Unknown    Worried About Running Out of Food in the Last Year: Patient refused    Ran Out of Food in the Last Year: Patient refused   Transportation Needs: Unknown    Lack of Transportation (Medical): Patient refused    Lack of Transportation (Non-Medical): Patient refused   Physical Activity: Unknown    Days of Exercise per Week: Patient refused    Minutes of Exercise per Session: 10 min   Stress: Unknown    Feeling of Stress : Patient refused   Social Connections: Unknown    Frequency of Communication with Friends and Family: More than three times a week    Frequency of Social Gatherings with Friends and Family: Once a week    Active Member of Clubs or Organizations: No    Attends Club or Organization Meetings: Never    Marital Status: Living with partner   Housing Stability: Unknown    Unable to Pay for Housing in the Last Year: Patient refused    Number of Places Lived in the Last Year: 1    Unstable Housing in the Last Year: No       Chief Complaint:   Chief Complaint   Patient presents with    Right Knee - Pain       History of present illness:  71-year-old female comes in with right knee pain.   Patient has had an issue with arthritis in the past and last treatment was back in December.  Has had  viscosupplementation previously with good success.  Currently rates the pain is a 4/10.  Pain over the medial aspect of the right knee.    Physical Examination:    Vital Signs:    Vitals:    08/17/22 0854   Resp: 16       Body mass index is 36.05 kg/m².    This a well-developed, well nourished patient in no acute distress.  They are alert and oriented and cooperative to examination.  Pt. walks without an antalgic gait.      Examination of the right knee shows no rashes or erythema. There are no masses ecchymosis or effusion. Patient has full range of motion from 0-130°. Patient is nontender to palpation over lateral joint line and moderately tender to palpation over the medial joint line. Patient has a - Lachman exam, - anterior drawer exam, and - posterior drawer exam. - Abdulkadir's exam. Knee is stable to varus and valgus stress. 5 out of 5 motor strength. Palpable distal pulses. Intact light touch sensation. Negative Patellofemoral crepitus      X-rays:  X-rays of the right knee is  reviewed which show moderate to severe medial joint space narrowing.  More moderate medial narrowing of the left knee     Assessment::  Moderate to severe right varus knee arthritis    Plan:  I reviewed the findings with her today.  I recommended cortisone injection calm her knee down.  We talked about repeating the viscosupplementation if it continues to bother her.  Talked about knee replacement as well.    This note was created using Geron voice recognition software that occasionally misinterpreted phrases or words.    Consult note is delivered via Epic messaging service.

## 2022-08-17 NOTE — PROCEDURES
Large Joint Aspiration/Injection: R knee    Date/Time: 8/17/2022 9:15 AM  Performed by: Robbin Edmond MD  Authorized by: Robbin Edmond MD     Consent Done?:  Yes (Verbal)  Indications:  Pain  Site marked: the procedure site was marked    Timeout: prior to procedure the correct patient, procedure, and site was verified    Local anesthetic:  Lidocaine 1% without epinephrine and bupivacaine 0.25% without epinephrine  Anesthetic total (ml):  6      Details:  Needle Size:  20 G  Approach:  Anterolateral  Location:  Knee  Site:  R knee  Medications:  40 mg triamcinolone acetonide 40 mg/mL  Patient tolerance:  Patient tolerated the procedure well with no immediate complications

## 2022-08-18 ENCOUNTER — PATIENT MESSAGE (OUTPATIENT)
Dept: FAMILY MEDICINE | Facility: CLINIC | Age: 72
End: 2022-08-18
Payer: MEDICARE

## 2022-08-19 ENCOUNTER — OFFICE VISIT (OUTPATIENT)
Dept: URGENT CARE | Facility: CLINIC | Age: 72
End: 2022-08-19
Payer: MEDICARE

## 2022-08-19 VITALS
DIASTOLIC BLOOD PRESSURE: 62 MMHG | HEART RATE: 63 BPM | TEMPERATURE: 98 F | BODY MASS INDEX: 35.85 KG/M2 | SYSTOLIC BLOOD PRESSURE: 155 MMHG | WEIGHT: 210 LBS | HEIGHT: 64 IN | RESPIRATION RATE: 18 BRPM | OXYGEN SATURATION: 99 %

## 2022-08-19 DIAGNOSIS — R30.0 DYSURIA: Primary | ICD-10-CM

## 2022-08-19 LAB
BILIRUB UR QL STRIP: NEGATIVE
GLUCOSE UR QL STRIP: NEGATIVE
KETONES UR QL STRIP: NEGATIVE
LEUKOCYTE ESTERASE UR QL STRIP: POSITIVE
PH, POC UA: 6 (ref 5–8)
POC BLOOD, URINE: NEGATIVE
POC NITRATES, URINE: NEGATIVE
PROT UR QL STRIP: NEGATIVE
SP GR UR STRIP: 1.01 (ref 1–1.03)
UROBILINOGEN UR STRIP-ACNC: NORMAL (ref 0.1–1.1)

## 2022-08-19 PROCEDURE — 3008F BODY MASS INDEX DOCD: CPT | Mod: CPTII,S$GLB,, | Performed by: FAMILY MEDICINE

## 2022-08-19 PROCEDURE — 3066F PR DOCUMENTATION OF TREATMENT FOR NEPHROPATHY: ICD-10-PCS | Mod: CPTII,S$GLB,, | Performed by: FAMILY MEDICINE

## 2022-08-19 PROCEDURE — 81003 POCT URINALYSIS, DIPSTICK, AUTOMATED, W/O SCOPE: ICD-10-PCS | Mod: QW,S$GLB,, | Performed by: FAMILY MEDICINE

## 2022-08-19 PROCEDURE — 3044F HG A1C LEVEL LT 7.0%: CPT | Mod: CPTII,S$GLB,, | Performed by: FAMILY MEDICINE

## 2022-08-19 PROCEDURE — 3077F PR MOST RECENT SYSTOLIC BLOOD PRESSURE >= 140 MM HG: ICD-10-PCS | Mod: CPTII,S$GLB,, | Performed by: FAMILY MEDICINE

## 2022-08-19 PROCEDURE — 1157F PR ADVANCE CARE PLAN OR EQUIV PRESENT IN MEDICAL RECORD: ICD-10-PCS | Mod: CPTII,S$GLB,, | Performed by: FAMILY MEDICINE

## 2022-08-19 PROCEDURE — 1157F ADVNC CARE PLAN IN RCRD: CPT | Mod: CPTII,S$GLB,, | Performed by: FAMILY MEDICINE

## 2022-08-19 PROCEDURE — 87086 URINE CULTURE/COLONY COUNT: CPT | Performed by: FAMILY MEDICINE

## 2022-08-19 PROCEDURE — 3060F POS MICROALBUMINURIA REV: CPT | Mod: CPTII,S$GLB,, | Performed by: FAMILY MEDICINE

## 2022-08-19 PROCEDURE — 3044F PR MOST RECENT HEMOGLOBIN A1C LEVEL <7.0%: ICD-10-PCS | Mod: CPTII,S$GLB,, | Performed by: FAMILY MEDICINE

## 2022-08-19 PROCEDURE — 87088 URINE BACTERIA CULTURE: CPT | Performed by: FAMILY MEDICINE

## 2022-08-19 PROCEDURE — 87186 SC STD MICRODIL/AGAR DIL: CPT | Performed by: FAMILY MEDICINE

## 2022-08-19 PROCEDURE — 3066F NEPHROPATHY DOC TX: CPT | Mod: CPTII,S$GLB,, | Performed by: FAMILY MEDICINE

## 2022-08-19 PROCEDURE — 87077 CULTURE AEROBIC IDENTIFY: CPT | Performed by: FAMILY MEDICINE

## 2022-08-19 PROCEDURE — 3078F DIAST BP <80 MM HG: CPT | Mod: CPTII,S$GLB,, | Performed by: FAMILY MEDICINE

## 2022-08-19 PROCEDURE — 3077F SYST BP >= 140 MM HG: CPT | Mod: CPTII,S$GLB,, | Performed by: FAMILY MEDICINE

## 2022-08-19 PROCEDURE — 3008F PR BODY MASS INDEX (BMI) DOCUMENTED: ICD-10-PCS | Mod: CPTII,S$GLB,, | Performed by: FAMILY MEDICINE

## 2022-08-19 PROCEDURE — 99214 OFFICE O/P EST MOD 30 MIN: CPT | Mod: S$GLB,,, | Performed by: FAMILY MEDICINE

## 2022-08-19 PROCEDURE — 1126F PR PAIN SEVERITY QUANTIFIED, NO PAIN PRESENT: ICD-10-PCS | Mod: CPTII,S$GLB,, | Performed by: FAMILY MEDICINE

## 2022-08-19 PROCEDURE — 99214 PR OFFICE/OUTPT VISIT, EST, LEVL IV, 30-39 MIN: ICD-10-PCS | Mod: S$GLB,,, | Performed by: FAMILY MEDICINE

## 2022-08-19 PROCEDURE — 3078F PR MOST RECENT DIASTOLIC BLOOD PRESSURE < 80 MM HG: ICD-10-PCS | Mod: CPTII,S$GLB,, | Performed by: FAMILY MEDICINE

## 2022-08-19 PROCEDURE — 3060F PR POS MICROALBUMINURIA RESULT DOCUMENTED/REVIEW: ICD-10-PCS | Mod: CPTII,S$GLB,, | Performed by: FAMILY MEDICINE

## 2022-08-19 PROCEDURE — 1126F AMNT PAIN NOTED NONE PRSNT: CPT | Mod: CPTII,S$GLB,, | Performed by: FAMILY MEDICINE

## 2022-08-19 PROCEDURE — 1159F MED LIST DOCD IN RCRD: CPT | Mod: CPTII,S$GLB,, | Performed by: FAMILY MEDICINE

## 2022-08-19 PROCEDURE — 1159F PR MEDICATION LIST DOCUMENTED IN MEDICAL RECORD: ICD-10-PCS | Mod: CPTII,S$GLB,, | Performed by: FAMILY MEDICINE

## 2022-08-19 PROCEDURE — 81003 URINALYSIS AUTO W/O SCOPE: CPT | Mod: QW,S$GLB,, | Performed by: FAMILY MEDICINE

## 2022-08-19 RX ORDER — CEPHALEXIN 500 MG/1
500 CAPSULE ORAL EVERY 12 HOURS
Qty: 20 CAPSULE | Refills: 0 | Status: SHIPPED | OUTPATIENT
Start: 2022-08-19 | End: 2022-08-29

## 2022-08-19 RX ORDER — PHENAZOPYRIDINE HYDROCHLORIDE 200 MG/1
200 TABLET, FILM COATED ORAL 3 TIMES DAILY PRN
Qty: 6 TABLET | Refills: 2 | Status: SHIPPED | OUTPATIENT
Start: 2022-08-19 | End: 2022-08-21

## 2022-08-19 NOTE — PROGRESS NOTES
"Subjective:       Patient ID: Cornelia Delatorre is a 71 y.o. female.    Vitals:  height is 5' 4" (1.626 m) and weight is 95.3 kg (210 lb). Her temporal temperature is 98.1 °F (36.7 °C). Her blood pressure is 155/62 (abnormal) and her pulse is 63. Her respiration is 18 and oxygen saturation is 99%.     Chief Complaint: Dysuria    Dysuria and pressure for 2 days. Pain 0/10    Dysuria   This is a new problem. The current episode started in the past 7 days. The problem has been unchanged. The pain is at a severity of 0/10. The patient is experiencing no pain. There has been no fever. Associated symptoms include urgency. She has tried nothing for the symptoms. The treatment provided no relief.       Genitourinary: Positive for dysuria and urgency.       Objective:      Physical Exam      Physical Exam  Vitals signs and nursing note reviewed.   Constitutional:       Appearance: Pt is well-developed. Alert, NAD.  Pt is cooperative.  Non-toxic appearance.  HENT:      Head: Normocephalic and atraumatic. .      Right Ear: External ear normal.      Left Ear: External ear normal.   Eyes:      General: Lids are normal.      Conjunctiva/sclera: Conjunctivae normal. Visual tracking is normal. Right eye exhibits no exudate. Left eye exhibits no exudate. No scleral icterus.     Pupils: Pupils are equal, round  Neck:      Musculoskeletal: range of motion without pain and neck supple.      Trachea: Trachea and phonation normal.   Cardiovascular:      Rate and Rhythm: Normal Rhythm. Extremities well perfused.   Pulmonary:      Effort: Pulmonary effort is normal. No respiratory distress.     .   Abdomen: NO obvious distention. SP pressure. No CVA tenderness  Musculoskeletal: Normal range of motion. No ambulation issues  Skin:     General: Skin is warm and dry. No open wounds or abrasions. No petechiae No cyanosis  no jaundice not diaphoretic, not pale, not purpuric  Neurological:      Mental Status:Pt is alert and oriented to person, " place, and time.   Psychiatric:         Speech: Speech normal.         Behavior: Behavior normal.         Thought Content: Thought content normal.         Judgment: Judgment normal.         Assessment:       1. Dysuria          Plan:         Dysuria  -     POCT Urinalysis, Dipstick, Automated, W/O Scope  -     Urine culture    Other orders  -     cephALEXin (KEFLEX) 500 MG capsule; Take 1 capsule (500 mg total) by mouth every 12 (twelve) hours. for 10 days  Dispense: 20 capsule; Refill: 0

## 2022-08-23 LAB — BACTERIA UR CULT: ABNORMAL

## 2022-08-26 ENCOUNTER — TELEPHONE (OUTPATIENT)
Dept: URGENT CARE | Facility: CLINIC | Age: 72
End: 2022-08-26
Payer: MEDICARE

## 2022-08-26 NOTE — TELEPHONE ENCOUNTER
Patient viewed Mychart        ----- Message from Srinivasan Pacheco MD sent at 8/23/2022  8:12 AM CDT -----  The results of the culture that we performed in clinic shows that it was likely sensitive to the antibiotic you were given. Please let provider on duty know if anything needed.

## 2022-09-02 ENCOUNTER — LAB VISIT (OUTPATIENT)
Dept: LAB | Facility: HOSPITAL | Age: 72
End: 2022-09-02
Attending: INTERNAL MEDICINE
Payer: MEDICARE

## 2022-09-02 DIAGNOSIS — I10 ESSENTIAL HYPERTENSION: ICD-10-CM

## 2022-09-02 DIAGNOSIS — E78.5 DYSLIPIDEMIA: ICD-10-CM

## 2022-09-02 DIAGNOSIS — E11.9 CONTROLLED TYPE 2 DIABETES MELLITUS WITHOUT COMPLICATION, WITHOUT LONG-TERM CURRENT USE OF INSULIN: ICD-10-CM

## 2022-09-02 LAB
ALBUMIN SERPL BCP-MCNC: 3.3 G/DL (ref 3.5–5.2)
ALP SERPL-CCNC: 120 U/L (ref 55–135)
ALT SERPL W/O P-5'-P-CCNC: 26 U/L (ref 10–44)
ANION GAP SERPL CALC-SCNC: 11 MMOL/L (ref 8–16)
AST SERPL-CCNC: 24 U/L (ref 10–40)
BILIRUB SERPL-MCNC: 0.4 MG/DL (ref 0.1–1)
BUN SERPL-MCNC: 11 MG/DL (ref 8–23)
CALCIUM SERPL-MCNC: 9.1 MG/DL (ref 8.7–10.5)
CHLORIDE SERPL-SCNC: 101 MMOL/L (ref 95–110)
CHOLEST SERPL-MCNC: 147 MG/DL (ref 120–199)
CHOLEST/HDLC SERPL: 2.7 {RATIO} (ref 2–5)
CO2 SERPL-SCNC: 26 MMOL/L (ref 23–29)
CREAT SERPL-MCNC: 0.8 MG/DL (ref 0.5–1.4)
EST. GFR  (NO RACE VARIABLE): >60 ML/MIN/1.73 M^2
ESTIMATED AVG GLUCOSE: 134 MG/DL (ref 68–131)
GLUCOSE SERPL-MCNC: 111 MG/DL (ref 70–110)
HBA1C MFR BLD: 6.3 % (ref 4–5.6)
HDLC SERPL-MCNC: 54 MG/DL (ref 40–75)
HDLC SERPL: 36.7 % (ref 20–50)
LDLC SERPL CALC-MCNC: 70.6 MG/DL (ref 63–159)
NONHDLC SERPL-MCNC: 93 MG/DL
POTASSIUM SERPL-SCNC: 4.4 MMOL/L (ref 3.5–5.1)
PROT SERPL-MCNC: 6.7 G/DL (ref 6–8.4)
SODIUM SERPL-SCNC: 138 MMOL/L (ref 136–145)
TRIGL SERPL-MCNC: 112 MG/DL (ref 30–150)

## 2022-09-02 PROCEDURE — 83036 HEMOGLOBIN GLYCOSYLATED A1C: CPT | Performed by: INTERNAL MEDICINE

## 2022-09-02 PROCEDURE — 80053 COMPREHEN METABOLIC PANEL: CPT | Performed by: INTERNAL MEDICINE

## 2022-09-02 PROCEDURE — 36415 COLL VENOUS BLD VENIPUNCTURE: CPT | Mod: PO | Performed by: INTERNAL MEDICINE

## 2022-09-02 PROCEDURE — 80061 LIPID PANEL: CPT | Performed by: INTERNAL MEDICINE

## 2022-09-12 ENCOUNTER — OFFICE VISIT (OUTPATIENT)
Dept: FAMILY MEDICINE | Facility: CLINIC | Age: 72
End: 2022-09-12
Payer: MEDICARE

## 2022-09-12 VITALS
HEIGHT: 64 IN | HEART RATE: 65 BPM | TEMPERATURE: 98 F | OXYGEN SATURATION: 98 % | WEIGHT: 211.44 LBS | RESPIRATION RATE: 18 BRPM | SYSTOLIC BLOOD PRESSURE: 130 MMHG | DIASTOLIC BLOOD PRESSURE: 80 MMHG | BODY MASS INDEX: 36.1 KG/M2

## 2022-09-12 DIAGNOSIS — E78.5 DYSLIPIDEMIA: ICD-10-CM

## 2022-09-12 DIAGNOSIS — F41.8 SITUATIONAL ANXIETY: ICD-10-CM

## 2022-09-12 DIAGNOSIS — E11.9 CONTROLLED TYPE 2 DIABETES MELLITUS WITHOUT COMPLICATION, WITHOUT LONG-TERM CURRENT USE OF INSULIN: ICD-10-CM

## 2022-09-12 DIAGNOSIS — I10 ESSENTIAL HYPERTENSION: ICD-10-CM

## 2022-09-12 DIAGNOSIS — Z00.00 ROUTINE PHYSICAL EXAMINATION: Primary | ICD-10-CM

## 2022-09-12 DIAGNOSIS — Z79.899 ENCOUNTER FOR LONG-TERM (CURRENT) USE OF MEDICATIONS: ICD-10-CM

## 2022-09-12 PROCEDURE — 3288F FALL RISK ASSESSMENT DOCD: CPT | Mod: CPTII,S$GLB,, | Performed by: INTERNAL MEDICINE

## 2022-09-12 PROCEDURE — 3066F PR DOCUMENTATION OF TREATMENT FOR NEPHROPATHY: ICD-10-PCS | Mod: CPTII,S$GLB,, | Performed by: INTERNAL MEDICINE

## 2022-09-12 PROCEDURE — G0008 ADMIN INFLUENZA VIRUS VAC: HCPCS | Mod: S$GLB,,, | Performed by: INTERNAL MEDICINE

## 2022-09-12 PROCEDURE — 1159F MED LIST DOCD IN RCRD: CPT | Mod: CPTII,S$GLB,, | Performed by: INTERNAL MEDICINE

## 2022-09-12 PROCEDURE — 3079F DIAST BP 80-89 MM HG: CPT | Mod: CPTII,S$GLB,, | Performed by: INTERNAL MEDICINE

## 2022-09-12 PROCEDURE — 3044F HG A1C LEVEL LT 7.0%: CPT | Mod: CPTII,S$GLB,, | Performed by: INTERNAL MEDICINE

## 2022-09-12 PROCEDURE — 1126F AMNT PAIN NOTED NONE PRSNT: CPT | Mod: CPTII,S$GLB,, | Performed by: INTERNAL MEDICINE

## 2022-09-12 PROCEDURE — 3008F PR BODY MASS INDEX (BMI) DOCUMENTED: ICD-10-PCS | Mod: CPTII,S$GLB,, | Performed by: INTERNAL MEDICINE

## 2022-09-12 PROCEDURE — 99999 PR PBB SHADOW E&M-EST. PATIENT-LVL V: ICD-10-PCS | Mod: PBBFAC,,, | Performed by: INTERNAL MEDICINE

## 2022-09-12 PROCEDURE — G0008 FLU VACCINE - QUADRIVALENT - ADJUVANTED: ICD-10-PCS | Mod: S$GLB,,, | Performed by: INTERNAL MEDICINE

## 2022-09-12 PROCEDURE — 3060F POS MICROALBUMINURIA REV: CPT | Mod: CPTII,S$GLB,, | Performed by: INTERNAL MEDICINE

## 2022-09-12 PROCEDURE — 1157F ADVNC CARE PLAN IN RCRD: CPT | Mod: CPTII,S$GLB,, | Performed by: INTERNAL MEDICINE

## 2022-09-12 PROCEDURE — 90694 FLU VACCINE - QUADRIVALENT - ADJUVANTED: ICD-10-PCS | Mod: S$GLB,,, | Performed by: INTERNAL MEDICINE

## 2022-09-12 PROCEDURE — 3079F PR MOST RECENT DIASTOLIC BLOOD PRESSURE 80-89 MM HG: ICD-10-PCS | Mod: CPTII,S$GLB,, | Performed by: INTERNAL MEDICINE

## 2022-09-12 PROCEDURE — 1101F PT FALLS ASSESS-DOCD LE1/YR: CPT | Mod: CPTII,S$GLB,, | Performed by: INTERNAL MEDICINE

## 2022-09-12 PROCEDURE — 3008F BODY MASS INDEX DOCD: CPT | Mod: CPTII,S$GLB,, | Performed by: INTERNAL MEDICINE

## 2022-09-12 PROCEDURE — 99999 PR PBB SHADOW E&M-EST. PATIENT-LVL V: CPT | Mod: PBBFAC,,, | Performed by: INTERNAL MEDICINE

## 2022-09-12 PROCEDURE — 3288F PR FALLS RISK ASSESSMENT DOCUMENTED: ICD-10-PCS | Mod: CPTII,S$GLB,, | Performed by: INTERNAL MEDICINE

## 2022-09-12 PROCEDURE — 99397 PR PREVENTIVE VISIT,EST,65 & OVER: ICD-10-PCS | Mod: 25,S$GLB,, | Performed by: INTERNAL MEDICINE

## 2022-09-12 PROCEDURE — 99397 PER PM REEVAL EST PAT 65+ YR: CPT | Mod: 25,S$GLB,, | Performed by: INTERNAL MEDICINE

## 2022-09-12 PROCEDURE — 3044F PR MOST RECENT HEMOGLOBIN A1C LEVEL <7.0%: ICD-10-PCS | Mod: CPTII,S$GLB,, | Performed by: INTERNAL MEDICINE

## 2022-09-12 PROCEDURE — 1157F PR ADVANCE CARE PLAN OR EQUIV PRESENT IN MEDICAL RECORD: ICD-10-PCS | Mod: CPTII,S$GLB,, | Performed by: INTERNAL MEDICINE

## 2022-09-12 PROCEDURE — 3075F PR MOST RECENT SYSTOLIC BLOOD PRESS GE 130-139MM HG: ICD-10-PCS | Mod: CPTII,S$GLB,, | Performed by: INTERNAL MEDICINE

## 2022-09-12 PROCEDURE — 3066F NEPHROPATHY DOC TX: CPT | Mod: CPTII,S$GLB,, | Performed by: INTERNAL MEDICINE

## 2022-09-12 PROCEDURE — 1159F PR MEDICATION LIST DOCUMENTED IN MEDICAL RECORD: ICD-10-PCS | Mod: CPTII,S$GLB,, | Performed by: INTERNAL MEDICINE

## 2022-09-12 PROCEDURE — 90694 VACC AIIV4 NO PRSRV 0.5ML IM: CPT | Mod: S$GLB,,, | Performed by: INTERNAL MEDICINE

## 2022-09-12 PROCEDURE — 1126F PR PAIN SEVERITY QUANTIFIED, NO PAIN PRESENT: ICD-10-PCS | Mod: CPTII,S$GLB,, | Performed by: INTERNAL MEDICINE

## 2022-09-12 PROCEDURE — 1101F PR PT FALLS ASSESS DOC 0-1 FALLS W/OUT INJ PAST YR: ICD-10-PCS | Mod: CPTII,S$GLB,, | Performed by: INTERNAL MEDICINE

## 2022-09-12 PROCEDURE — 3075F SYST BP GE 130 - 139MM HG: CPT | Mod: CPTII,S$GLB,, | Performed by: INTERNAL MEDICINE

## 2022-09-12 PROCEDURE — 3060F PR POS MICROALBUMINURIA RESULT DOCUMENTED/REVIEW: ICD-10-PCS | Mod: CPTII,S$GLB,, | Performed by: INTERNAL MEDICINE

## 2022-09-12 RX ORDER — HYDROXYZINE HYDROCHLORIDE 10 MG/1
10 TABLET, FILM COATED ORAL 3 TIMES DAILY PRN
Qty: 30 TABLET | Refills: 3 | Status: SHIPPED | OUTPATIENT
Start: 2022-09-12

## 2022-09-12 RX ORDER — VALSARTAN AND HYDROCHLOROTHIAZIDE 160; 12.5 MG/1; MG/1
1 TABLET, FILM COATED ORAL DAILY
Qty: 90 TABLET | Refills: 3 | Status: SHIPPED | OUTPATIENT
Start: 2022-09-12 | End: 2023-09-18 | Stop reason: SDUPTHER

## 2022-09-12 RX ORDER — FLUTICASONE FUROATE, UMECLIDINIUM BROMIDE AND VILANTEROL TRIFENATATE 200; 62.5; 25 UG/1; UG/1; UG/1
POWDER RESPIRATORY (INHALATION)
COMMUNITY
Start: 2022-07-15 | End: 2023-01-09

## 2022-09-12 NOTE — PROGRESS NOTES
HDF  given into left deltoid.  Vaccine verified with KATIE Banegas.  2 patient identifier used. well tolerated. Instructed to wait in lobby 15 minutes post injection for safety and what s/s to notify registration with.  See Immunization or MAR for NDC, Lot, and Expiration date.

## 2022-09-12 NOTE — PROGRESS NOTES
Subjective:       Patient ID: Cornelia Delatorre is a 71 y.o. female.    Chief Complaint: Follow-up (6 month)    Here for routine health maintenance.        Has metal implant for bladder.      CVID -On IV Ig;     Recalll:   Dr Nicole in [place].  Feels SOB mostly related to bronchiectasis.  Her symptoms have improved with asthma tx - Symbicort.    Pulmonary nodules - Dr Nicole evaluating.  Incomplete response to pneumovax - recommends repeat Prevnar 13.  Eosinophilia and pn 7.14 level at 50%  angiogram was normal    HTN - controlled   DM - controlled;  Sees podiatry for peripheral neuropathy in past  Diabetic peripheral neuropathy - controlled with 600 mg gabapentin tid.  Outside foot doctor.   HLD - controlled for goal 70; high triglycerides.      Occasional anxiety relieved with prn hydroxyzine.   Had EGD- dysphagia with Dr Krishnan      Dx w [facility] likely related to some meds per testing neurology; stable       Follow-up  Pertinent negatives include no abdominal pain, arthralgias, chest pain, fever, joint swelling, nausea, rash or vomiting.   Hypertension  Pertinent negatives include no chest pain, palpitations or shortness of breath.   Review of Systems   Constitutional:  Negative for appetite change and fever.   HENT:  Negative for nosebleeds and trouble swallowing.    Eyes:  Negative for discharge and visual disturbance.   Respiratory:  Negative for choking and shortness of breath.    Cardiovascular:  Negative for chest pain and palpitations.   Gastrointestinal:  Negative for abdominal pain, nausea and vomiting.   Musculoskeletal:  Negative for arthralgias and joint swelling.   Skin:  Negative for rash and wound.   Neurological:  Negative for dizziness and syncope.   Psychiatric/Behavioral:  Negative for confusion and dysphoric mood.      Objective:      Vitals:    09/12/22 1035   BP: 130/80   Pulse: 65   Resp: 18   Temp: 98.3 °F (36.8 °C)     Physical Exam  Vitals reviewed.   Eyes:      Conjunctiva/sclera:  Conjunctivae normal.   Neck:      Thyroid: No thyromegaly.      Trachea: Trachea normal.   Cardiovascular:      Heart sounds: Normal heart sounds.      Comments: Edema negative  Pulmonary:      Effort: Pulmonary effort is normal.      Breath sounds: Normal breath sounds.   Abdominal:      Palpations: Abdomen is soft. There is no hepatomegaly.   Musculoskeletal:      Cervical back: Normal range of motion.   Skin:     General: Skin is warm and dry.   Neurological:      Cranial Nerves: No cranial nerve deficit.      Comments: DTR decreased bilateral   Psychiatric:      Comments: Alert and Oriented          Assessment:       1. Routine physical examination    2. Situational anxiety    3. Essential hypertension    4. Controlled type 2 diabetes mellitus without complication, without long-term current use of insulin    5. Dyslipidemia    6. Encounter for long-term (current) use of medications          Plan:       Routine physical examination  -     Hemoglobin A1C; Future; Expected date: 03/11/2023  -     Comprehensive Metabolic Panel; Future; Expected date: 03/11/2023  -     Lipid Panel; Future; Expected date: 03/11/2023  -     CBC Auto Differential; Future; Expected date: 03/11/2023    Situational anxiety  -     hydrOXYzine HCL (ATARAX) 10 MG Tab; Take 1 tablet (10 mg total) by mouth 3 (three) times daily as needed.  Dispense: 30 tablet; Refill: 3    Essential hypertension  -     valsartan-hydrochlorothiazide (DIOVAN-HCT) 160-12.5 mg per tablet; Take 1 tablet by mouth once daily.  Dispense: 90 tablet; Refill: 3  -     Comprehensive Metabolic Panel; Future; Expected date: 03/11/2023    Controlled type 2 diabetes mellitus without complication, without long-term current use of insulin  -     Hemoglobin A1C; Future; Expected date: 03/11/2023  -     Microalbumin/Creatinine Ratio, Urine; Future; Expected date: 03/11/2023    Dyslipidemia  -     Lipid Panel; Future; Expected date: 03/11/2023    Encounter for long-term (current)  use of medications  -     CBC Auto Differential; Future; Expected date: 03/11/2023            Medication List with Changes/Refills   New Medications    HYDROXYZINE HCL (ATARAX) 10 MG TAB    Take 1 tablet (10 mg total) by mouth 3 (three) times daily as needed.   Current Medications    ALBUTEROL (PROVENTIL/VENTOLIN HFA) 90 MCG/ACTUATION INHALER    Inhale 2 puffs into the lungs every 4 (four) hours as needed. 2 puffs every 4 hours as needed for cough, wheeze, or shortness of breath    ARFORMOTEROL (BROVANA) 15 MCG/2 ML NEBU    Take 2 mLs (15 mcg total) by nebulization 2 (two) times a day. Controller    ASCORBIC ACID, VITAMIN C, (VITAMIN C) 1000 MG TABLET    Take 1,000 mg by mouth 2 (two) times daily.     AZELASTINE (OPTIVAR) 0.05 % OPHTHALMIC SOLUTION    Place 1 drop into both eyes. As needed    B-COMPLEX WITH VITAMIN C (Z-BEC OR EQUIV) TABLET    Take 1 tablet by mouth once daily.    BIFIDOBACTERIUM INFANTIS (ALIGN ORAL)    Take by mouth once daily.    BIOTIN 10,000 MCG CAP    Take 1 tablet by mouth every evening.     BUDESONIDE (PULMICORT) 0.5 MG/2 ML NEBULIZER SOLUTION    Take 2 mLs (0.5 mg total) by nebulization 2 (two) times daily. Controller    CLONIDINE (CATAPRES) 0.1 MG TABLET    Take 1 tablet (0.1 mg total) by mouth 3 (three) times daily as needed (PRN SBP > 165 mmHg).    CRANBERRY 500 MG CAP    Take 1 capsule by mouth every evening.    DICLOFENAC SODIUM (VOLTAREN) 1 % GEL    Apply 2 grams topically once daily.    DILTIAZEM (CARDIZEM CD) 120 MG CP24    Take 1 capsule (120 mg total) by mouth every evening.    EPINEPHRINE (EPIPEN) 0.3 MG/0.3 ML ATIN    Inject 1 each into the muscle as needed.     ESOMEPRAZOLE (NEXIUM) 40 MG CAPSULE    Take 1 capsule (40 mg total) by mouth before breakfast.    ESTRADIOL (ESTRACE) 0.01 % (0.1 MG/GRAM) VAGINAL CREAM    Place 1 g vaginally 3 (three) times a week. Place by fingertip application before bedtime three times a week (Monday, Wednesday, Friday)    FEXOFENADINE (ALLEGRA) 180  MG TABLET    Take 180 mg by mouth once daily.     FISH OIL-OMEGA-3 FATTY ACIDS 300-1,000 MG CAPSULE    Take 2 g by mouth once daily.    FLUTICASONE PROPIONATE (FLONASE) 50 MCG/ACTUATION NASAL SPRAY    2 sprays (100 mcg total) by Each Nostril route once daily.    GABAPENTIN (NEURONTIN) 600 MG TABLET    Take 1 tablet (600 mg total) by mouth 3 (three) times daily.    GUAIFENESIN-CODEINE 100-10 MG/5 ML (GUAIFENESIN AC)  MG/5 ML SYRUP    Take 5 mLs by mouth 3 (three) times daily as needed for Cough.    IMMUN GLOB G,IGG,-PRO-IGA 0-50 (HIZENTRA) 1 GRAM/5 ML (20 %) SOLN    Inject 55 mLs (11 g total) into the skin once a week.    INHALATION SPACING DEVICE    Use as directed for inhalation.    IPRATROPIUM (ATROVENT) 42 MCG (0.06 %) NASAL SPRAY    2 sprays by Nasal route 4 (four) times daily.    LEVALBUTEROL (XOPENEX) 1.25 MG/3 ML NEBULIZER SOLUTION    Take 3 mLs (1.25 mg total) by nebulization every 4 (four) hours as needed for Wheezing or Shortness of Breath. Rescue    METFORMIN (GLUCOPHAGE-XR) 500 MG ER 24HR TABLET    Take 1 tablet (500 mg total) by mouth 2 (two) times daily with meals.    MOMETASONE 0.1% (ELOCON) 0.1 % CREAM    aaa bid x 1-2 weeks prn bug bites    MONTELUKAST (SINGULAIR) 10 MG TABLET    Take 1 tablet (10 mg total) by mouth every evening.    MUCUS CLEARING DEVICE KAREN    1 Device by Misc.(Non-Drug; Combo Route) route 2 (two) times daily.    MULTIVITAMIN CAPSULE    Take 1 capsule by mouth once daily.     MUPIROCIN (BACTROBAN) 2 % OINTMENT    Apply topically 3 (three) times daily.    ONDANSETRON (ZOFRAN-ODT) 4 MG TBDL    Take 1 tablet (4 mg total) by mouth every 8 (eight) hours as needed.    POTASSIUM CHLORIDE SA (K-DUR,KLOR-CON) 20 MEQ TABLET    TAKE 1 TABLET EVERY DAY    PRAVASTATIN (PRAVACHOL) 80 MG TABLET    Take 1 tablet (80 mg total) by mouth once daily.    PREDNISONE (DELTASONE) 10 MG TABLET    1 pill for 3-5 days repeat as needed for chest tightness.    PSYLLIUM HUSK (METAMUCIL ORAL)    Take by  mouth.    SIMETHICONE (GAS-X ORAL)    Take 1 capsule by mouth as needed (gas relief).     TRELEGY ELLIPTA 200-62.5-25 MCG INHALER        VITAMIN D3-FOLIC ACID 5,000 UNIT- 1 MG TAB    Take 1 tablet by mouth once daily.    Changed and/or Refilled Medications    Modified Medication Previous Medication    VALSARTAN-HYDROCHLOROTHIAZIDE (DIOVAN-HCT) 160-12.5 MG PER TABLET valsartan-hydrochlorothiazide (DIOVAN-HCT) 160-12.5 mg per tablet       Take 1 tablet by mouth once daily.    Take 1 tablet by mouth once daily.       Wellness reviewed          Continue current management and monitor.    Counseled on regular exercise, maintenance of a healthy weight, balanced diet rich in fruits/vegetables and lean protein, and avoidance of unhealthy habits like smoking and excessive alcohol intake.   Also, counseled on importance of being compliant with medication, health appointments, diet and exercise.     Follow up in about 6 months (around 3/12/2023).

## 2022-10-03 LAB
LEFT EYE DM RETINOPATHY: NEGATIVE
RIGHT EYE DM RETINOPATHY: NEGATIVE

## 2022-10-10 ENCOUNTER — PATIENT MESSAGE (OUTPATIENT)
Dept: ADMINISTRATIVE | Facility: HOSPITAL | Age: 72
End: 2022-10-10
Payer: MEDICARE

## 2022-10-11 ENCOUNTER — TELEPHONE (OUTPATIENT)
Dept: DERMATOLOGY | Facility: CLINIC | Age: 72
End: 2022-10-11

## 2022-10-11 ENCOUNTER — OFFICE VISIT (OUTPATIENT)
Dept: DERMATOLOGY | Facility: CLINIC | Age: 72
End: 2022-10-11
Payer: MEDICARE

## 2022-10-11 VITALS — HEIGHT: 64 IN | BODY MASS INDEX: 36.1 KG/M2 | RESPIRATION RATE: 18 BRPM | WEIGHT: 211.44 LBS

## 2022-10-11 DIAGNOSIS — Z12.83 SKIN CANCER SCREENING: ICD-10-CM

## 2022-10-11 DIAGNOSIS — L73.9 FOLLICULITIS: Primary | ICD-10-CM

## 2022-10-11 DIAGNOSIS — L90.5 SCAR: ICD-10-CM

## 2022-10-11 DIAGNOSIS — L73.9 FOLLICULITIS: ICD-10-CM

## 2022-10-11 DIAGNOSIS — Z85.828 PERSONAL HISTORY OF OTHER MALIGNANT NEOPLASM OF SKIN: Primary | ICD-10-CM

## 2022-10-11 DIAGNOSIS — L57.0 ACTINIC KERATOSES: ICD-10-CM

## 2022-10-11 PROCEDURE — 3066F PR DOCUMENTATION OF TREATMENT FOR NEPHROPATHY: ICD-10-PCS | Mod: CPTII,S$GLB,, | Performed by: DERMATOLOGY

## 2022-10-11 PROCEDURE — 3066F NEPHROPATHY DOC TX: CPT | Mod: CPTII,S$GLB,, | Performed by: DERMATOLOGY

## 2022-10-11 PROCEDURE — 17000 PR DESTRUCTION(LASER SURGERY,CRYOSURGERY,CHEMOSURGERY),PREMALIGNANT LESIONS,FIRST LESION: ICD-10-PCS | Mod: S$GLB,,, | Performed by: DERMATOLOGY

## 2022-10-11 PROCEDURE — 99214 OFFICE O/P EST MOD 30 MIN: CPT | Mod: 25,S$GLB,, | Performed by: DERMATOLOGY

## 2022-10-11 PROCEDURE — 1126F AMNT PAIN NOTED NONE PRSNT: CPT | Mod: CPTII,S$GLB,, | Performed by: DERMATOLOGY

## 2022-10-11 PROCEDURE — 1159F PR MEDICATION LIST DOCUMENTED IN MEDICAL RECORD: ICD-10-PCS | Mod: CPTII,S$GLB,, | Performed by: DERMATOLOGY

## 2022-10-11 PROCEDURE — 99214 PR OFFICE/OUTPT VISIT, EST, LEVL IV, 30-39 MIN: ICD-10-PCS | Mod: 25,S$GLB,, | Performed by: DERMATOLOGY

## 2022-10-11 PROCEDURE — 17000 DESTRUCT PREMALG LESION: CPT | Mod: S$GLB,,, | Performed by: DERMATOLOGY

## 2022-10-11 PROCEDURE — 1101F PT FALLS ASSESS-DOCD LE1/YR: CPT | Mod: CPTII,S$GLB,, | Performed by: DERMATOLOGY

## 2022-10-11 PROCEDURE — 3008F BODY MASS INDEX DOCD: CPT | Mod: CPTII,S$GLB,, | Performed by: DERMATOLOGY

## 2022-10-11 PROCEDURE — 3044F PR MOST RECENT HEMOGLOBIN A1C LEVEL <7.0%: ICD-10-PCS | Mod: CPTII,S$GLB,, | Performed by: DERMATOLOGY

## 2022-10-11 PROCEDURE — 99999 PR PBB SHADOW E&M-EST. PATIENT-LVL V: ICD-10-PCS | Mod: PBBFAC,,, | Performed by: DERMATOLOGY

## 2022-10-11 PROCEDURE — 3008F PR BODY MASS INDEX (BMI) DOCUMENTED: ICD-10-PCS | Mod: CPTII,S$GLB,, | Performed by: DERMATOLOGY

## 2022-10-11 PROCEDURE — 1101F PR PT FALLS ASSESS DOC 0-1 FALLS W/OUT INJ PAST YR: ICD-10-PCS | Mod: CPTII,S$GLB,, | Performed by: DERMATOLOGY

## 2022-10-11 PROCEDURE — 3044F HG A1C LEVEL LT 7.0%: CPT | Mod: CPTII,S$GLB,, | Performed by: DERMATOLOGY

## 2022-10-11 PROCEDURE — 1157F PR ADVANCE CARE PLAN OR EQUIV PRESENT IN MEDICAL RECORD: ICD-10-PCS | Mod: CPTII,S$GLB,, | Performed by: DERMATOLOGY

## 2022-10-11 PROCEDURE — 99999 PR PBB SHADOW E&M-EST. PATIENT-LVL V: CPT | Mod: PBBFAC,,, | Performed by: DERMATOLOGY

## 2022-10-11 PROCEDURE — 3288F PR FALLS RISK ASSESSMENT DOCUMENTED: ICD-10-PCS | Mod: CPTII,S$GLB,, | Performed by: DERMATOLOGY

## 2022-10-11 PROCEDURE — 3060F POS MICROALBUMINURIA REV: CPT | Mod: CPTII,S$GLB,, | Performed by: DERMATOLOGY

## 2022-10-11 PROCEDURE — 3060F PR POS MICROALBUMINURIA RESULT DOCUMENTED/REVIEW: ICD-10-PCS | Mod: CPTII,S$GLB,, | Performed by: DERMATOLOGY

## 2022-10-11 PROCEDURE — 1160F RVW MEDS BY RX/DR IN RCRD: CPT | Mod: CPTII,S$GLB,, | Performed by: DERMATOLOGY

## 2022-10-11 PROCEDURE — 1159F MED LIST DOCD IN RCRD: CPT | Mod: CPTII,S$GLB,, | Performed by: DERMATOLOGY

## 2022-10-11 PROCEDURE — 1126F PR PAIN SEVERITY QUANTIFIED, NO PAIN PRESENT: ICD-10-PCS | Mod: CPTII,S$GLB,, | Performed by: DERMATOLOGY

## 2022-10-11 PROCEDURE — 1157F ADVNC CARE PLAN IN RCRD: CPT | Mod: CPTII,S$GLB,, | Performed by: DERMATOLOGY

## 2022-10-11 PROCEDURE — 1160F PR REVIEW ALL MEDS BY PRESCRIBER/CLIN PHARMACIST DOCUMENTED: ICD-10-PCS | Mod: CPTII,S$GLB,, | Performed by: DERMATOLOGY

## 2022-10-11 PROCEDURE — 3288F FALL RISK ASSESSMENT DOCD: CPT | Mod: CPTII,S$GLB,, | Performed by: DERMATOLOGY

## 2022-10-11 RX ORDER — ERYTHROMYCIN 20 MG/G
GEL TOPICAL
Qty: 30 G | Refills: 3 | Status: SHIPPED | OUTPATIENT
Start: 2022-10-11 | End: 2022-10-11

## 2022-10-11 RX ORDER — ERYTHROMYCIN 20 MG/ML
SOLUTION TOPICAL
Qty: 60 ML | Refills: 3 | Status: SHIPPED | OUTPATIENT
Start: 2022-10-11

## 2022-10-11 RX ORDER — CLINDAMYCIN PHOSPHATE 11.9 MG/ML
SOLUTION TOPICAL 2 TIMES DAILY
Qty: 30 ML | Refills: 2 | Status: CANCELLED | OUTPATIENT
Start: 2022-10-11

## 2022-10-11 RX ORDER — ERYTHROMYCIN 20 MG/ML
SOLUTION TOPICAL DAILY
Qty: 60 ML | Refills: 2 | Status: CANCELLED | OUTPATIENT
Start: 2022-10-11 | End: 2023-10-11

## 2022-10-11 NOTE — PROGRESS NOTES
Subjective:       Patient ID:  Cornelia Delatorre is a 71 y.o. female who presents for   Chief Complaint   Patient presents with    Follow-up     Patient present for [facility]. LOV 6/7/2022 by Kailyn Fletcher MD.    C/o lesion to R forearm x months. No changes. Pt states trauma to the area. Not treating.    yes Phx of NMSC  Phx skin ca R hand and nose - Dr Parra - 2014   Phx SCC L hand -mohs Dr Jones -2015   Uses CeraVe AM and PM cream daily for routine     no Fhx of melanoma.    Past Medical History:   Diagnosis Date    Allergy     Arthritis     Asthma     Cancer     skin cancer to right hand    Cataract     Chronic fatigue 1/24/2017    CVID (common variable immunodeficiency) 3/7/2019    Diabetes mellitus     type2    KLINE (dyspnea on exertion) 1/24/2017    Dyslipidemia 6/25/2019    Encounter for blood transfusion     Essential hypertension 12/29/2011    GERD (gastroesophageal reflux disease)     Headache(784.0)     History of lumpectomy of both breasts     1992 negative    Hyperlipidemia 7/15/2015    Hypertension     Hypothyroidism     Neuropathy     Obesity     Obesity     Postmenopausal HRT (hormone replacement therapy)     Rash     Rosacea     Sleep apnea     on bipap    Snoring     Squamous cell carcinoma of skin     left forearm     UTI (urinary tract infection)     Wears glasses         Review of Systems   Constitutional:  Negative for fever, chills and fatigue.   HENT:  Negative for congestion, sore throat and mouth sores.    Respiratory:  Negative for cough.    Gastrointestinal:  Negative for nausea, vomiting and diarrhea.   Skin:  Positive for daily sunscreen use. Negative for itching, rash, dry skin, sensitivity to antibiotic ointment, sensitivity to bandage adhesive and wears hat.   Hematologic/Lymphatic: Bruises/bleeds easily (forearms, takes asa and prednisone for asthma).      Objective:    Physical Exam   Constitutional: She appears well-developed and well-nourished. No distress.   HENT:    Mouth/Throat: Lips normal.    Eyes: Lids are normal.  No conjunctival no injection.   Cardiovascular:  There is no local extremity swelling and no dependent edema.             Neurological: She is alert and oriented to person, place, and time. She is not disoriented.   Psychiatric: She has a normal mood and affect.   Skin:   Areas Examined (abnormalities noted in diagram):   Scalp / Hair Palpated and Inspected  Head / Face Inspection Performed  Neck Inspection Performed  Chest / Axilla Inspection Performed  RUE Inspected  LUE Inspection Performed  Nails and Digits Inspection Performed                     Diagram Legend     Erythematous scaling macule/papule c/w actinic keratosis       Vascular papule c/w angioma      Pigmented verrucoid papule/plaque c/w seborrheic keratosis      Yellow umbilicated papule c/w sebaceous hyperplasia      Irregularly shaped tan macule c/w lentigo     1-2 mm smooth white papules consistent with Milia      Movable subcutaneous cyst with punctum c/w epidermal inclusion cyst      Subcutaneous movable cyst c/w pilar cyst      Firm pink to brown papule c/w dermatofibroma      Pedunculated fleshy papule(s) c/w skin tag(s)      Evenly pigmented macule c/w junctional nevus     Mildly variegated pigmented, slightly irregular-bordered macule c/w mildly atypical nevus      Flesh colored to evenly pigmented papule c/w intradermal nevus       Pink pearly papule/plaque c/w basal cell carcinoma      Erythematous hyperkeratotic cursted plaque c/w SCC      Surgical scar with no sign of skin cancer recurrence      Open and closed comedones      Inflammatory papules and pustules      Verrucoid papule consistent consistent with wart     Erythematous eczematous patches and plaques     Dystrophic onycholytic nail with subungual debris c/w onychomycosis     Umbilicated papule    Erythematous-base heme-crusted tan verrucoid plaque consistent with inflamed seborrheic keratosis     Erythematous Silvery Scaling  Plaque c/w Psoriasis     See annotation      Assessment / Plan:        Personal history of other malignant neoplasm of skin    Limited ubse  No suspicious lesions noted  Skin cancer screening  Limited ubse  No suspicious lesions noted    Scar  NER NMSC    Folliculitis  Lower face at site of hair growth per patient  Not flared today   Trial emgel-     erythromycin with ethanoL (EMGEL) 2 % gel; Thin film qday to bid prn  Dispense: 30 g; Refill: 3    Actinic keratoses  Cryosurgery Procedure Note    Verbal consent from the patient is obtained and the patient is aware of the precancerous quality and need for treatment of these lesions. Liquid nitrogen cryosurgery is applied to the 1 actinic keratoses, as detailed in the physical exam, to produce a freeze injury. The patient is aware that blisters may form and is instructed on wound care with gentle cleansing and use of vaseline ointment to keep moist until healed. The patient is supplied a handout on cryosurgery and is instructed to call if lesions do not completely resolve. Discussed risk postinflammatory pigmentary changes.            Follow up in about 6 months (around 4/11/2023).

## 2022-10-11 NOTE — PATIENT INSTRUCTIONS

## 2022-10-12 ENCOUNTER — TELEPHONE (OUTPATIENT)
Dept: FAMILY MEDICINE | Facility: CLINIC | Age: 72
End: 2022-10-12
Payer: MEDICARE

## 2022-10-12 NOTE — TELEPHONE ENCOUNTER
----- Message from Lucia Perry sent at 10/11/2022 10:46 AM CDT -----  Contact: Cornelia [phone]  Patient called requesting a call back from Dr. Cortez or his nurse, patient has questions regarding getting the booster and medication her dentist gave her Medrol 4 mg Dose Hema

## 2022-10-13 ENCOUNTER — PATIENT OUTREACH (OUTPATIENT)
Dept: ADMINISTRATIVE | Facility: HOSPITAL | Age: 72
End: 2022-10-13
Payer: MEDICARE

## 2022-10-14 ENCOUNTER — PATIENT OUTREACH (OUTPATIENT)
Dept: ADMINISTRATIVE | Facility: HOSPITAL | Age: 72
End: 2022-10-14
Payer: MEDICARE

## 2022-10-14 ENCOUNTER — IMMUNIZATION (OUTPATIENT)
Dept: FAMILY MEDICINE | Facility: CLINIC | Age: 72
End: 2022-10-14
Payer: MEDICARE

## 2022-10-14 DIAGNOSIS — Z23 NEED FOR VACCINATION: Primary | ICD-10-CM

## 2022-10-14 PROCEDURE — 91312 COVID-19, MRNA, LNP-S, BIVALENT BOOSTER, PF, 30 MCG/0.3 ML DOSE: CPT | Mod: S$GLB,,, | Performed by: FAMILY MEDICINE

## 2022-10-14 PROCEDURE — 91312 COVID-19, MRNA, LNP-S, BIVALENT BOOSTER, PF, 30 MCG/0.3 ML DOSE: ICD-10-PCS | Mod: S$GLB,,, | Performed by: FAMILY MEDICINE

## 2022-10-14 PROCEDURE — 0124A COVID-19, MRNA, LNP-S, BIVALENT BOOSTER, PF, 30 MCG/0.3 ML DOSE: CPT | Mod: PBBFAC | Performed by: FAMILY MEDICINE

## 2022-10-21 ENCOUNTER — TELEPHONE (OUTPATIENT)
Dept: OTOLARYNGOLOGY | Facility: CLINIC | Age: 72
End: 2022-10-21
Payer: MEDICARE

## 2022-10-21 NOTE — TELEPHONE ENCOUNTER
----- Message from Reid Veras sent at 10/21/2022  9:28 AM CDT -----  Contact: pt at [phone]  Type:  Sooner Appointment Request    Caller is requesting a sooner appointment.  Caller declined first available appointment listed below.  Caller will not accept being placed on the waitlist and is requesting a message be sent to doctor.    Name of Caller:  pt  When is the first available appointment?  12/6  Symptoms:  sinus issues  Best Call Back Number:  [phone]  Additional Information:  pt is calling the office to schedule an appt due to her having sinus issues and the date of 12/6 comes up she states she needs to be seen sooner than that date. Please call back and advise.

## 2022-10-21 NOTE — TELEPHONE ENCOUNTER
S/w pt and did inform her that we actually do not have anything sooner than her already scheduled appt. Pt understood. I have added her to wait list in case of cancellations.

## 2022-10-27 ENCOUNTER — OFFICE VISIT (OUTPATIENT)
Dept: OTOLARYNGOLOGY | Facility: CLINIC | Age: 72
End: 2022-10-27
Payer: MEDICARE

## 2022-10-27 VITALS — HEIGHT: 64 IN | WEIGHT: 211.44 LBS | TEMPERATURE: 99 F | BODY MASS INDEX: 36.1 KG/M2

## 2022-10-27 DIAGNOSIS — J01.00 ACUTE MAXILLARY SINUSITIS, RECURRENCE NOT SPECIFIED: ICD-10-CM

## 2022-10-27 DIAGNOSIS — J30.9 ALLERGIC RHINITIS, UNSPECIFIED SEASONALITY, UNSPECIFIED TRIGGER: Primary | ICD-10-CM

## 2022-10-27 DIAGNOSIS — J45.41 MODERATE PERSISTENT ASTHMA WITH (ACUTE) EXACERBATION: ICD-10-CM

## 2022-10-27 PROCEDURE — 99999 PR PBB SHADOW E&M-EST. PATIENT-LVL IV: CPT | Mod: PBBFAC,,, | Performed by: NURSE PRACTITIONER

## 2022-10-27 PROCEDURE — 1101F PR PT FALLS ASSESS DOC 0-1 FALLS W/OUT INJ PAST YR: ICD-10-PCS | Mod: CPTII,S$GLB,, | Performed by: NURSE PRACTITIONER

## 2022-10-27 PROCEDURE — 3066F PR DOCUMENTATION OF TREATMENT FOR NEPHROPATHY: ICD-10-PCS | Mod: CPTII,S$GLB,, | Performed by: NURSE PRACTITIONER

## 2022-10-27 PROCEDURE — 99499 UNLISTED E&M SERVICE: CPT | Mod: HCNC,S$GLB,, | Performed by: NURSE PRACTITIONER

## 2022-10-27 PROCEDURE — 99214 PR OFFICE/OUTPT VISIT, EST, LEVL IV, 30-39 MIN: ICD-10-PCS | Mod: S$GLB,,, | Performed by: NURSE PRACTITIONER

## 2022-10-27 PROCEDURE — 1101F PT FALLS ASSESS-DOCD LE1/YR: CPT | Mod: CPTII,S$GLB,, | Performed by: NURSE PRACTITIONER

## 2022-10-27 PROCEDURE — 99499 RISK ADDL DX/OHS AUDIT: ICD-10-PCS | Mod: HCNC,S$GLB,, | Performed by: NURSE PRACTITIONER

## 2022-10-27 PROCEDURE — 3060F POS MICROALBUMINURIA REV: CPT | Mod: CPTII,S$GLB,, | Performed by: NURSE PRACTITIONER

## 2022-10-27 PROCEDURE — 3060F PR POS MICROALBUMINURIA RESULT DOCUMENTED/REVIEW: ICD-10-PCS | Mod: CPTII,S$GLB,, | Performed by: NURSE PRACTITIONER

## 2022-10-27 PROCEDURE — 1157F ADVNC CARE PLAN IN RCRD: CPT | Mod: CPTII,S$GLB,, | Performed by: NURSE PRACTITIONER

## 2022-10-27 PROCEDURE — 87107 FUNGI IDENTIFICATION MOLD: CPT | Performed by: NURSE PRACTITIONER

## 2022-10-27 PROCEDURE — 1126F PR PAIN SEVERITY QUANTIFIED, NO PAIN PRESENT: ICD-10-PCS | Mod: CPTII,S$GLB,, | Performed by: NURSE PRACTITIONER

## 2022-10-27 PROCEDURE — 3288F PR FALLS RISK ASSESSMENT DOCUMENTED: ICD-10-PCS | Mod: CPTII,S$GLB,, | Performed by: NURSE PRACTITIONER

## 2022-10-27 PROCEDURE — 1159F PR MEDICATION LIST DOCUMENTED IN MEDICAL RECORD: ICD-10-PCS | Mod: CPTII,S$GLB,, | Performed by: NURSE PRACTITIONER

## 2022-10-27 PROCEDURE — 99999 PR PBB SHADOW E&M-EST. PATIENT-LVL IV: ICD-10-PCS | Mod: PBBFAC,,, | Performed by: NURSE PRACTITIONER

## 2022-10-27 PROCEDURE — 3288F FALL RISK ASSESSMENT DOCD: CPT | Mod: CPTII,S$GLB,, | Performed by: NURSE PRACTITIONER

## 2022-10-27 PROCEDURE — 1159F MED LIST DOCD IN RCRD: CPT | Mod: CPTII,S$GLB,, | Performed by: NURSE PRACTITIONER

## 2022-10-27 PROCEDURE — 3044F PR MOST RECENT HEMOGLOBIN A1C LEVEL <7.0%: ICD-10-PCS | Mod: CPTII,S$GLB,, | Performed by: NURSE PRACTITIONER

## 2022-10-27 PROCEDURE — 3066F NEPHROPATHY DOC TX: CPT | Mod: CPTII,S$GLB,, | Performed by: NURSE PRACTITIONER

## 2022-10-27 PROCEDURE — 87070 CULTURE OTHR SPECIMN AEROBIC: CPT | Performed by: NURSE PRACTITIONER

## 2022-10-27 PROCEDURE — 1157F PR ADVANCE CARE PLAN OR EQUIV PRESENT IN MEDICAL RECORD: ICD-10-PCS | Mod: CPTII,S$GLB,, | Performed by: NURSE PRACTITIONER

## 2022-10-27 PROCEDURE — 99214 OFFICE O/P EST MOD 30 MIN: CPT | Mod: S$GLB,,, | Performed by: NURSE PRACTITIONER

## 2022-10-27 PROCEDURE — 1126F AMNT PAIN NOTED NONE PRSNT: CPT | Mod: CPTII,S$GLB,, | Performed by: NURSE PRACTITIONER

## 2022-10-27 PROCEDURE — 3044F HG A1C LEVEL LT 7.0%: CPT | Mod: CPTII,S$GLB,, | Performed by: NURSE PRACTITIONER

## 2022-10-27 RX ORDER — CHLORPHENIRAMINE MALEATE 4 MG
4 TABLET ORAL
Qty: 90 TABLET | Refills: 12 | Status: SHIPPED | OUTPATIENT
Start: 2022-10-27 | End: 2023-10-27

## 2022-10-27 RX ORDER — AZELASTINE 1 MG/ML
1 SPRAY, METERED NASAL 2 TIMES DAILY
Qty: 30 ML | Refills: 12 | Status: SHIPPED | OUTPATIENT
Start: 2022-10-27 | End: 2023-10-27

## 2022-10-27 NOTE — PROGRESS NOTES
"Subjective:       Patient ID: Cornelia Delatorre is a 71 y.o. female.    Chief Complaint: No chief complaint on file.    HPI  Patient was last seen by Dr. Johnson for chronic laryngitis on 07/20/2021; Diflucan and recommended voice rest. Patient returns today for "sinus issues." Patient states she "can't stop blowing my nose and sneezing." Profuse clear rhinorrhea and excessive sneezing fits. Taking Allegra, Singulair, Flonase, Optivar, hydroxyzine without improvement. She states the only thing that works in an over-the-counter pill that comes in a blue box and is 4 mg.     Review of Systems   Constitutional: Negative.  Negative for fever.   HENT:  Positive for rhinorrhea and sneezing. Negative for nasal congestion, dental problem, facial swelling, postnasal drip, sinus pressure/congestion, sore throat, trouble swallowing and voice change.         Frequent throat clearing  Globus sensation   Eyes:  Positive for discharge and itching. Negative for redness.   Respiratory: Negative.  Negative for cough and choking.    Cardiovascular: Negative.    Gastrointestinal: Negative.    Musculoskeletal: Negative.    Integumentary:  Negative.   Neurological: Negative.    Hematological: Negative.    Psychiatric/Behavioral: Negative.         Objective:      Physical Exam  Vitals and nursing note reviewed.   Constitutional:       General: She is not in acute distress.     Appearance: She is well-developed. She is not ill-appearing or diaphoretic.   HENT:      Head: Normocephalic and atraumatic.      Right Ear: Hearing, tympanic membrane, ear canal and external ear normal. No middle ear effusion. Tympanic membrane is not erythematous.      Left Ear: Hearing, tympanic membrane, ear canal and external ear normal.  No middle ear effusion. Tympanic membrane is not erythematous.      Nose: Mucosal edema, congestion and rhinorrhea present.      Right Turbinates: Swollen.      Left Turbinates: Swollen.      Right Sinus: No maxillary " sinus tenderness or frontal sinus tenderness.      Left Sinus: No maxillary sinus tenderness or frontal sinus tenderness.      Mouth/Throat:      Mouth: Mucous membranes are not pale, not dry and not cyanotic. No oral lesions.      Tongue: No lesions.      Palate: No lesions.      Pharynx: Uvula midline. No oropharyngeal exudate or posterior oropharyngeal erythema.   Eyes:      General: Lids are normal. No scleral icterus.        Right eye: No discharge.         Left eye: No discharge.   Neck:      Thyroid: No thyroid mass or thyromegaly.      Trachea: Trachea normal. No tracheal deviation.   Cardiovascular:      Rate and Rhythm: Normal rate.   Pulmonary:      Effort: Pulmonary effort is normal. No respiratory distress.      Breath sounds: Normal air entry.   Musculoskeletal:         General: Normal range of motion.      Cervical back: Normal range of motion and neck supple.   Lymphadenopathy:      Head:      Right side of head: No submental, submandibular, tonsillar, preauricular or posterior auricular adenopathy.      Left side of head: No submental, submandibular, tonsillar, preauricular or posterior auricular adenopathy.      Cervical: No cervical adenopathy.      Right cervical: No superficial or posterior cervical adenopathy.     Left cervical: No superficial or posterior cervical adenopathy.   Skin:     General: Skin is warm and dry.      Coloration: Skin is not pale.      Findings: No lesion or rash.   Neurological:      Mental Status: She is alert and oriented to person, place, and time.      Motor: Motor function is intact.      Coordination: Coordination is intact.      Gait: Gait normal.   Psychiatric:         Attention and Perception: Attention normal.         Mood and Affect: Mood normal.         Speech: Speech normal.         Behavior: Behavior normal. Behavior is cooperative.       Assessment:       Problem List Items Addressed This Visit    None  Visit Diagnoses       Allergic rhinitis, unspecified  seasonality, unspecified trigger    -  Primary    Relevant Medications    chlorpheniramine (CHLOR-TRIMETON) 4 mg tablet    azelastine (ASTELIN) 137 mcg (0.1 %) nasal spray    Other Relevant Orders    Allergen, Cocklebur    Allergen, Elm Staley    Allergen, Meadow Grass ([place] Blue)    Allergen, Mucor Racemosus    Allergen, Pecan Tree IgE    Allergen, White [place]    Allergen-Alternaria Alternata    Allergen-Staley    Allergen-Common Pigweed    Allergen-Silver Birch    Aspergillus fumagatus IgE    Bermuda grass IgE    Cat epithelium IgE    Cladosporium IgE    Cockroach, American IgE    [place], bald IgE    D. farinae IgE    D. pteronyssinus IgE    Dog dander IgE    Feather Panel #2    [place]han grass IgE    Marsh elder, rough IgE    Mugwort IgE    Nettle IgE    Oak, white IgE    Penicillium IgE    Plantain, English IgE    RAST Allergen Maple ([place])    RAST Allergen [place]    RAST Allergen for [place]    RAST Allergen, Lamb's Quarters    RAST Allergen, Sheep Sorrel([place])    Ragweed, short, common IgE    Carmelo IgE    Bahia grass IgE    Chaetomium globosum IgE    Curvularia lunata IgE    Setomalanomma rostrata IgE    Phoma betae IgE    Allergen-[place]    Allergen-Maple [place]    [place], western white IgE    Pollen, walnut IgE    [place], black IgE    Ragweed, Western IgE    Thistle, Russian IgE    Horse dander IgE    Moderate persistent asthma with (acute) exacerbation        Relevant Orders    Allergen, Cocklebur    Allergen, Elm Staley    Allergen, Meadow Grass ([place] Blue)    Allergen, Mucor Racemosus    Allergen, Pecan Tree IgE    Allergen, White [place]    Allergen-Alternaria Alternata    Allergen-Staley    Allergen-Common Pigweed    Allergen-Silver Birch    Aspergillus fumagatus IgE    Bermuda grass IgE    Cat epithelium IgE    Cladosporium IgE    Cockroach, American IgE    [place], bald IgE    D. farinae IgE    D. pteronyssinus IgE    Dog dander IgE    Feather Panel #2    Roshan grass  IgE    [place] elder, rough IgE    Mugwort IgE    Nettle IgE    Oak, white IgE    Penicillium IgE    Plantain, English IgE    RAST Allergen Maple ([place])    RAST Allergen [place]    RAST Allergen for [place]    RAST Allergen, Lamb's Quarters    RAST Allergen, Sheep Sorrel([place])    Ragweed, short, common IgE    Carmelo IgE    Bahia grass IgE    Chaetomium globosum IgE    Curvularia lunata IgE    Setomalanomma rostrata IgE    Phoma betae IgE    Allergen-[place]    Allergen-Maple [place]    [place], western white IgE    Pollen, walnut IgE    [place], black IgE    Ragweed, Western IgE    Thistle, Russian IgE    Horse dander IgE    Acute maxillary sinusitis, recurrence not specified        Relevant Orders    Aerobic culture            Plan:     Culture sent from right nasal cavity -- will notify pt of results as soon as available.    Patient states the only thing that helps relieve her symptoms is chlorpheniramine 4 mg. Rx sent to her pharmacy for Q8hrs. Would not also need to take fexofenadine.   Discussed when to use Flonase and when to use Astelin. Patient states her Astelin prescription was cancelled and needs to be renewed. This was done.   RAST testing at her convenience.  Patient encouraged to return to clinic if symptoms worsen/persist and as needed for further ENT symptoms or concerns.

## 2022-10-27 NOTE — PATIENT INSTRUCTIONS
Some of the Top Considerations for Chronic Throat Clearin. Nasal allergies -- Typical constellation of symptoms seen with nasal allergies: itchy, red, watery eyes; itchy, red, watery nose; excessive sneezing; excessive stuffiness. If this one best describes your current state, then discuss with your primary care provider whether you should see an allergist or take daily allergy medications.     2. Sinus Infection -- Typical constellation of symptoms seen with acute bacterial sinus infection are:  Green-gold, foul-smelling, foul-tasting mucus from nose and throat, inability to breathe through nose, inability to smell or taste well, facial pain and swelling, dental pain, headaches around eyes, sore throat and productive cough. If this one best describes your current state, then let's get sinus imaging to rule out infection.     3.  Silent reflux -- Typical constellation of symptoms seen with silent reflux: post-nasal drip sensation with absence of significant runny nose or nasal congestion, sensation of thick or too much mucus in the back of throat, raspy voice, frequent throat clearing, lump in the back of throat, frequent sore throats. If this one best describes your current state, discuss with your primary care provider whether you should see a gastroenterologist or take daily reflux medications. Your GI doctor may want to do an Upper GI or obtain a barium swallow or pH monitoring test.

## 2022-10-28 ENCOUNTER — OFFICE VISIT (OUTPATIENT)
Dept: PODIATRY | Facility: CLINIC | Age: 72
End: 2022-10-28
Payer: MEDICARE

## 2022-10-28 VITALS — HEIGHT: 64 IN | BODY MASS INDEX: 36.29 KG/M2

## 2022-10-28 DIAGNOSIS — R20.2 PARESTHESIA OF FOOT, BILATERAL: ICD-10-CM

## 2022-10-28 DIAGNOSIS — L60.3 NAIL DYSTROPHY: ICD-10-CM

## 2022-10-28 DIAGNOSIS — L84 CORN OR CALLUS: ICD-10-CM

## 2022-10-28 DIAGNOSIS — E11.42 DIABETIC POLYNEUROPATHY ASSOCIATED WITH TYPE 2 DIABETES MELLITUS: Primary | ICD-10-CM

## 2022-10-28 DIAGNOSIS — E11.42 DM TYPE 2 WITH DIABETIC PERIPHERAL NEUROPATHY: ICD-10-CM

## 2022-10-28 PROCEDURE — 99213 OFFICE O/P EST LOW 20 MIN: CPT | Mod: S$GLB,,, | Performed by: PODIATRIST

## 2022-10-28 PROCEDURE — 3044F PR MOST RECENT HEMOGLOBIN A1C LEVEL <7.0%: ICD-10-PCS | Mod: CPTII,S$GLB,, | Performed by: PODIATRIST

## 2022-10-28 PROCEDURE — 3288F FALL RISK ASSESSMENT DOCD: CPT | Mod: CPTII,S$GLB,, | Performed by: PODIATRIST

## 2022-10-28 PROCEDURE — 1126F AMNT PAIN NOTED NONE PRSNT: CPT | Mod: CPTII,S$GLB,, | Performed by: PODIATRIST

## 2022-10-28 PROCEDURE — 1101F PT FALLS ASSESS-DOCD LE1/YR: CPT | Mod: CPTII,S$GLB,, | Performed by: PODIATRIST

## 2022-10-28 PROCEDURE — 1159F MED LIST DOCD IN RCRD: CPT | Mod: CPTII,S$GLB,, | Performed by: PODIATRIST

## 2022-10-28 PROCEDURE — 3060F POS MICROALBUMINURIA REV: CPT | Mod: CPTII,S$GLB,, | Performed by: PODIATRIST

## 2022-10-28 PROCEDURE — 1159F PR MEDICATION LIST DOCUMENTED IN MEDICAL RECORD: ICD-10-PCS | Mod: CPTII,S$GLB,, | Performed by: PODIATRIST

## 2022-10-28 PROCEDURE — 3066F PR DOCUMENTATION OF TREATMENT FOR NEPHROPATHY: ICD-10-PCS | Mod: CPTII,S$GLB,, | Performed by: PODIATRIST

## 2022-10-28 PROCEDURE — 1126F PR PAIN SEVERITY QUANTIFIED, NO PAIN PRESENT: ICD-10-PCS | Mod: CPTII,S$GLB,, | Performed by: PODIATRIST

## 2022-10-28 PROCEDURE — 3044F HG A1C LEVEL LT 7.0%: CPT | Mod: CPTII,S$GLB,, | Performed by: PODIATRIST

## 2022-10-28 PROCEDURE — 3066F NEPHROPATHY DOC TX: CPT | Mod: CPTII,S$GLB,, | Performed by: PODIATRIST

## 2022-10-28 PROCEDURE — 3060F PR POS MICROALBUMINURIA RESULT DOCUMENTED/REVIEW: ICD-10-PCS | Mod: CPTII,S$GLB,, | Performed by: PODIATRIST

## 2022-10-28 PROCEDURE — 1157F PR ADVANCE CARE PLAN OR EQUIV PRESENT IN MEDICAL RECORD: ICD-10-PCS | Mod: CPTII,S$GLB,, | Performed by: PODIATRIST

## 2022-10-28 PROCEDURE — 99213 PR OFFICE/OUTPT VISIT, EST, LEVL III, 20-29 MIN: ICD-10-PCS | Mod: S$GLB,,, | Performed by: PODIATRIST

## 2022-10-28 PROCEDURE — 1157F ADVNC CARE PLAN IN RCRD: CPT | Mod: CPTII,S$GLB,, | Performed by: PODIATRIST

## 2022-10-28 PROCEDURE — 99999 PR PBB SHADOW E&M-EST. PATIENT-LVL IV: ICD-10-PCS | Mod: PBBFAC,,, | Performed by: PODIATRIST

## 2022-10-28 PROCEDURE — 3288F PR FALLS RISK ASSESSMENT DOCUMENTED: ICD-10-PCS | Mod: CPTII,S$GLB,, | Performed by: PODIATRIST

## 2022-10-28 PROCEDURE — 99999 PR PBB SHADOW E&M-EST. PATIENT-LVL IV: CPT | Mod: PBBFAC,,, | Performed by: PODIATRIST

## 2022-10-28 PROCEDURE — 1101F PR PT FALLS ASSESS DOC 0-1 FALLS W/OUT INJ PAST YR: ICD-10-PCS | Mod: CPTII,S$GLB,, | Performed by: PODIATRIST

## 2022-10-28 RX ORDER — GABAPENTIN 600 MG/1
600 TABLET ORAL 3 TIMES DAILY
Qty: 540 TABLET | Refills: 3 | Status: SHIPPED | OUTPATIENT
Start: 2022-10-28 | End: 2024-01-22 | Stop reason: SDUPTHER

## 2022-10-28 NOTE — PROGRESS NOTES
Subjective:      Patient ID: Cornelia Delatorre is a 71 y.o. female.    Chief Complaint: No chief complaint on file.    Cornelia is a 71 y.o. female who presents to the clinic for evaluation and treatment of diabetic feet. Cornelia has a past medical history of Allergy, Arthritis, Asthma, Cancer, Cataract, Chronic fatigue (1/24/2017), CVID (common variable immunodeficiency) (3/7/2019), Diabetes mellitus, KLINE (dyspnea on exertion) (1/24/2017), Dyslipidemia (6/25/2019), Encounter for blood transfusion, Essential hypertension (12/29/2011), GERD (gastroesophageal reflux disease), Headache(784.0), History of lumpectomy of both breasts, Hyperlipidemia (7/15/2015), Hypertension, Hypothyroidism, Neuropathy, Obesity, Obesity, Postmenopausal HRT (hormone replacement therapy), Rash, Rosacea, Sleep apnea, Snoring, Squamous cell carcinoma of skin, UTI (urinary tract infection), and Wears glasses. Patient relates having neuropathy pain in the feet.  States this is countered with taking 1800mg gabapentin PO QD and with interventions per Pain Management.  Requests a refill of gabapentin with today's exam.  Also, notes having thickened skin to the tip of the Rt. Great toe.  Denies this being a source of pain.  Inquires as to a moisturizer that will improve this issue.  Lastly, notes having nail changes to bilateral great toe and the 2nd toenail.  She has been applying a topical antifungal, for the past year, with no improvement noted. Notes decent control over her blood glucose.  Denies any additional pedal complaints.        PCP: Armond Cortez MD    Date Last Seen by PCP: 9/22    Hemoglobin A1C   Date Value Ref Range Status   09/02/2022 6.3 (H) 4.0 - 5.6 % Final     Comment:     ADA Screening Guidelines:  5.7-6.4%  Consistent with prediabetes  >or=6.5%  Consistent with diabetes    High levels of fetal hemoglobin interfere with the HbA1C  assay. Heterozygous hemoglobin variants (HbS, HgC, etc)do  not significantly interfere  with this assay.   However, presence of multiple variants may affect accuracy.     03/03/2022 6.1 (H) 4.0 - 5.6 % Final     Comment:     ADA Screening Guidelines:  5.7-6.4%  Consistent with prediabetes  >or=6.5%  Consistent with diabetes    High levels of fetal hemoglobin interfere with the HbA1C  assay. Heterozygous hemoglobin variants (HbS, HgC, etc)do  not significantly interfere with this assay.   However, presence of multiple variants may affect accuracy.     09/08/2021 6.1 (H) 4.0 - 5.6 % Final     Comment:     ADA Screening Guidelines:  5.7-6.4%  Consistent with prediabetes  >or=6.5%  Consistent with diabetes    High levels of fetal hemoglobin interfere with the HbA1C  assay. Heterozygous hemoglobin variants (HbS, HgC, etc)do  not significantly interfere with this assay.   However, presence of multiple variants may affect accuracy.           Review of Systems   Constitutional: Negative for chills and fever.   Cardiovascular:  Negative for claudication and leg swelling.   Skin:  Positive for color change and nail changes.   Musculoskeletal:  Negative for joint pain, muscle cramps and muscle weakness.   Gastrointestinal:  Negative for nausea and vomiting.   Neurological:  Positive for paresthesias. Negative for numbness.   Psychiatric/Behavioral:  Negative for altered mental status.          Objective:      Physical Exam  Constitutional:       Appearance: Normal appearance. She is not ill-appearing.   Cardiovascular:      Pulses:           Dorsalis pedis pulses are 2+ on the right side and 2+ on the left side.        Posterior tibial pulses are 2+ on the right side and 2+ on the left side.      Comments: CFT is < 3 seconds bilateral.  Pedal hair growth is decreased bilateral.  Varicosities noted bilateral.  No lower extremity edema noted bilateral.  Toes are warm to touch bilateral.     Musculoskeletal:         General: No tenderness or signs of injury.      Right lower leg: No edema.      Left lower leg: No  edema.      Comments: Muscle strength 5/5 in all muscle groups bilateral.  No tenderness nor crepitation with ROM of foot/ankle joints bilateral.  No tenderness with palpation of bilateral foot and ankle.   Bilateral semi-rigid contracture of toes 2-5.     Skin:     General: Skin is warm and dry.      Capillary Refill: Capillary refill takes 2 to 3 seconds.      Findings: Lesion present. No bruising, ecchymosis, erythema, signs of injury, laceration, rash or wound.      Comments: Pedal skin has normal turgor, temperature, and texture bilateral.  Dystrophy noted to bilateral hallux and the Lt. 2nd toenail.  Remaining toenails x 7 appear normotrophic. Hyperkeratotic lesion noted to the distal tip of the Rt. Great toe.      Neurological:      General: No focal deficit present.      Mental Status: She is alert.      Sensory: No sensory deficit.      Motor: No weakness or atrophy.      Comments: Protective sensation per Hasty-Nic monofilament is intact bilateral.  Vibratory sensation is intact bilateral.  Light touch is intact bilateral.              Assessment:       Encounter Diagnoses   Name Primary?    Diabetic polyneuropathy associated with type 2 diabetes mellitus Yes    Paresthesia of foot, bilateral     DM type 2 with diabetic peripheral neuropathy     Corn or callus     Nail dystrophy          Plan:       Diagnoses and all orders for this visit:    Diabetic polyneuropathy associated with type 2 diabetes mellitus    Paresthesia of foot, bilateral    DM type 2 with diabetic peripheral neuropathy  -     gabapentin (NEURONTIN) 600 MG tablet; Take 1 tablet (600 mg total) by mouth 3 (three) times daily.    Corn or callus    Nail dystrophy    I counseled the patient on her conditions, their implications and medical management.    - No additional treatment warranted in regards to nail dystrophy of bilateral hallux and the Lt. 2nd toenail.      - Given information regarding 40% urea and 2% salicylic acid cream to  be applied to areas of callus build up daily.    - Given both verbal and written information regarding alpha lipoic acid and B-complex vitamins.  Take as instructed in clinic.     - Shoe inspection. Diabetic Foot Education. Patient reminded of the importance of good nutrition and blood sugar control to help prevent podiatric complications of diabetes. Patient instructed on proper foot hygeine. We discussed wearing proper shoe gear, daily foot inspections, never walking without protective shoe gear, never putting sharp instruments to feet, routine podiatric visits every 12 months.      - Patient instructed to inspect her feet, wear protective shoe gear when ambulatory, moisturizer to maintain skin integrity and follow in this office in approximately 12 months, sooner p.r.n.    Srinivasan Gillette DPM

## 2022-10-28 NOTE — PATIENT INSTRUCTIONS
Recommend purchasing Ebanel's 40% urea and 2% salicylic acid cream from [employer].  To apply to all calluses once daily.

## 2022-11-01 LAB — BACTERIA SPEC AEROBE CULT: ABNORMAL

## 2022-11-03 ENCOUNTER — PATIENT MESSAGE (OUTPATIENT)
Dept: UROLOGY | Facility: CLINIC | Age: 72
End: 2022-11-03
Payer: MEDICARE

## 2022-11-04 ENCOUNTER — PATIENT MESSAGE (OUTPATIENT)
Dept: OTOLARYNGOLOGY | Facility: CLINIC | Age: 72
End: 2022-11-04
Payer: MEDICARE

## 2022-11-07 ENCOUNTER — LAB VISIT (OUTPATIENT)
Dept: LAB | Facility: HOSPITAL | Age: 72
End: 2022-11-07
Payer: MEDICARE

## 2022-11-07 DIAGNOSIS — J30.9 ALLERGIC RHINITIS, UNSPECIFIED SEASONALITY, UNSPECIFIED TRIGGER: ICD-10-CM

## 2022-11-07 DIAGNOSIS — J45.41 MODERATE PERSISTENT ASTHMA WITH (ACUTE) EXACERBATION: ICD-10-CM

## 2022-11-07 PROCEDURE — 86003 ALLG SPEC IGE CRUDE XTRC EA: CPT | Performed by: NURSE PRACTITIONER

## 2022-11-07 PROCEDURE — 86003 ALLG SPEC IGE CRUDE XTRC EA: CPT | Mod: 59 | Performed by: NURSE PRACTITIONER

## 2022-11-09 LAB
AMER SYCAMORE IGE QN: <0.35 KU/L
FEATHER PANEL #2: <0.35 KU/L

## 2022-11-10 ENCOUNTER — PATIENT MESSAGE (OUTPATIENT)
Dept: OTOLARYNGOLOGY | Facility: CLINIC | Age: 72
End: 2022-11-10
Payer: MEDICARE

## 2022-11-10 LAB
A ALTERNATA IGE QN: <0.1 KU/L
A FUMIGATUS IGE QN: <0.1 KU/L
ALLERGEN BOXELDER MAPLE TREE IGE: <0.1 KU/L
ALLERGEN CHAETOMIUM GLOBOSUM IGE: <0.1 KU/L
ALLERGEN MAPLE (BOX ELDER) CLASS: NORMAL
ALLERGEN MULBERRY CLASS: NORMAL
ALLERGEN MULBERRY TREE IGE: <0.1 KU/L
ALLERGEN PIGWEED IGE: <0.1 KU/L
ALLERGEN WALNUT TREE IGE: <0.1 KU/L
ALLERGEN WHITE ASH TREE IGE: <0.1 KU/L
ALLERGEN WHITE PINE TREE IGE: <0.1 KU/L
BAHIA GRASS IGE QN: <0.1 KU/L
BALD CYPRESS IGE QN: <0.1 KU/L
BERMUDA GRASS IGE QN: <0.1 KU/L
C HERBARUM IGE QN: <0.1 KU/L
C LUNATA IGE QN: <0.1 KU/L
CAT DANDER IGE QN: <0.1 KU/L
CEDAR IGE QN: <0.1 KU/L
CHAETOMIUM GLOB. CLASS: NORMAL
COCKLEBUR IGE QN: <0.1 KU/L
COMMON PIGWEED CLASS: NORMAL
COMMON RAGWEED IGE QN: <0.1 KU/L
COTTONWOOD IGE QN: <0.1 KU/L
D FARINAE IGE QN: <0.1 KU/L
D PTERONYSS IGE QN: <0.1 KU/L
DEPRECATED A ALTERNATA IGE RAST QL: NORMAL
DEPRECATED A FUMIGATUS IGE RAST QL: NORMAL
DEPRECATED BAHIA GRASS IGE RAST QL: NORMAL
DEPRECATED BALD CYPRESS IGE RAST QL: NORMAL
DEPRECATED BERMUDA GRASS IGE RAST QL: NORMAL
DEPRECATED C HERBARUM IGE RAST QL: NORMAL
DEPRECATED C LUNATA IGE RAST QL: NORMAL
DEPRECATED CAT DANDER IGE RAST QL: NORMAL
DEPRECATED CEDAR IGE RAST QL: NORMAL
DEPRECATED COCKLEBUR IGE RAST QL: NORMAL
DEPRECATED COMMON RAGWEED IGE RAST QL: NORMAL
DEPRECATED COTTONWOOD IGE RAST QL: NORMAL
DEPRECATED D FARINAE IGE RAST QL: NORMAL
DEPRECATED D PTERONYSS IGE RAST QL: NORMAL
DEPRECATED DOG DANDER IGE RAST QL: NORMAL
DEPRECATED ELDER IGE RAST QL: NORMAL
DEPRECATED ENGL PLANTAIN IGE RAST QL: NORMAL
DEPRECATED GOOSEFOOT IGE RAST QL: NORMAL
DEPRECATED HORSE DANDER IGE RAST QL: NORMAL
DEPRECATED JOHNSON GRASS IGE RAST QL: NORMAL
DEPRECATED KENT BLUE GRASS IGE RAST QL: NORMAL
DEPRECATED LONDON PLANE IGE RAST QL: NORMAL
DEPRECATED M RACEMOSUS IGE RAST QL: NORMAL
DEPRECATED MUGWORT IGE RAST QL: NORMAL
DEPRECATED NETTLE IGE RAST QL: NORMAL
DEPRECATED P BETAE IGE RAST QL: NORMAL
DEPRECATED P NOTATUM IGE RAST QL: NORMAL
DEPRECATED PECAN/HICK TREE IGE RAST QL: NORMAL
DEPRECATED ROACH IGE RAST QL: NORMAL
DEPRECATED S ROSTRATA IGE RAST QL: NORMAL
DEPRECATED SALTWORT IGE RAST QL: NORMAL
DEPRECATED SHEEP SORREL IGE RAST QL: NORMAL
DEPRECATED SILVER BIRCH IGE RAST QL: NORMAL
DEPRECATED TIMOTHY IGE RAST QL: NORMAL
DEPRECATED WEST RAGWEED IGE RAST QL: NORMAL
DEPRECATED WHITE OAK IGE RAST QL: NORMAL
DEPRECATED WILLOW IGE RAST QL: NORMAL
DOG DANDER IGE QN: <0.1 KU/L
ELDER IGE QN: <0.1 KU/L
ELM CEDAR CLASS: NORMAL
ELM CEDAR, IGE: <0.1 KU/L
ENGL PLANTAIN IGE QN: <0.1 KU/L
GOOSEFOOT IGE QN: <0.1 KU/L
HORSE DANDER IGE QN: <0.1 KU/L
JOHNSON GRASS IGE QN: <0.1 KU/L
KENT BLUE GRASS IGE QN: <0.1 KU/L
LONDON PLANE IGE QN: <0.1 KU/L
M RACEMOSUS IGE QN: <0.1 KU/L
MUGWORT IGE QN: <0.1 KU/L
NETTLE IGE QN: <0.1 KU/L
P BETAE IGE QN: <0.1 KU/L
P NOTATUM IGE QN: <0.1 KU/L
PECAN/HICK TREE IGE QN: <0.1 KU/L
ROACH IGE QN: <0.1 KU/L
S ROSTRATA IGE QN: <0.1 KU/L
SALTWORT IGE QN: <0.1 KU/L
SHEEP SORREL IGE QN: <0.1 KU/L
SILVER BIRCH IGE QN: <0.1 KU/L
TIMOTHY IGE QN: <0.1 KU/L
WALNUT TREE CLASS: NORMAL
WEST RAGWEED IGE QN: <0.1 KU/L
WHITE ASH CLASS: NORMAL
WHITE OAK IGE QN: <0.1 KU/L
WHITE PINE CLASS: NORMAL
WILLOW IGE QN: <0.1 KU/L

## 2022-11-11 ENCOUNTER — OFFICE VISIT (OUTPATIENT)
Dept: CARDIOLOGY | Facility: CLINIC | Age: 72
End: 2022-11-11
Payer: MEDICARE

## 2022-11-11 ENCOUNTER — PATIENT MESSAGE (OUTPATIENT)
Dept: OTOLARYNGOLOGY | Facility: CLINIC | Age: 72
End: 2022-11-11
Payer: MEDICARE

## 2022-11-11 VITALS
HEART RATE: 65 BPM | HEIGHT: 64 IN | BODY MASS INDEX: 35.9 KG/M2 | WEIGHT: 210.31 LBS | DIASTOLIC BLOOD PRESSURE: 80 MMHG | SYSTOLIC BLOOD PRESSURE: 146 MMHG

## 2022-11-11 DIAGNOSIS — E78.5 DYSLIPIDEMIA: ICD-10-CM

## 2022-11-11 DIAGNOSIS — I65.23 BILATERAL CAROTID ARTERY STENOSIS: ICD-10-CM

## 2022-11-11 DIAGNOSIS — R06.09 DOE (DYSPNEA ON EXERTION): ICD-10-CM

## 2022-11-11 DIAGNOSIS — I10 ESSENTIAL HYPERTENSION: Primary | ICD-10-CM

## 2022-11-11 DIAGNOSIS — I70.0 ATHEROSCLEROSIS OF ABDOMINAL AORTA: ICD-10-CM

## 2022-11-11 PROCEDURE — 3008F PR BODY MASS INDEX (BMI) DOCUMENTED: ICD-10-PCS | Mod: CPTII,S$GLB,, | Performed by: INTERNAL MEDICINE

## 2022-11-11 PROCEDURE — 99999 PR PBB SHADOW E&M-EST. PATIENT-LVL III: CPT | Mod: PBBFAC,,, | Performed by: INTERNAL MEDICINE

## 2022-11-11 PROCEDURE — 3044F PR MOST RECENT HEMOGLOBIN A1C LEVEL <7.0%: ICD-10-PCS | Mod: CPTII,S$GLB,, | Performed by: INTERNAL MEDICINE

## 2022-11-11 PROCEDURE — 3077F SYST BP >= 140 MM HG: CPT | Mod: CPTII,S$GLB,, | Performed by: INTERNAL MEDICINE

## 2022-11-11 PROCEDURE — 1159F MED LIST DOCD IN RCRD: CPT | Mod: CPTII,S$GLB,, | Performed by: INTERNAL MEDICINE

## 2022-11-11 PROCEDURE — 3079F PR MOST RECENT DIASTOLIC BLOOD PRESSURE 80-89 MM HG: ICD-10-PCS | Mod: CPTII,S$GLB,, | Performed by: INTERNAL MEDICINE

## 2022-11-11 PROCEDURE — 3079F DIAST BP 80-89 MM HG: CPT | Mod: CPTII,S$GLB,, | Performed by: INTERNAL MEDICINE

## 2022-11-11 PROCEDURE — 1159F PR MEDICATION LIST DOCUMENTED IN MEDICAL RECORD: ICD-10-PCS | Mod: CPTII,S$GLB,, | Performed by: INTERNAL MEDICINE

## 2022-11-11 PROCEDURE — 3066F NEPHROPATHY DOC TX: CPT | Mod: CPTII,S$GLB,, | Performed by: INTERNAL MEDICINE

## 2022-11-11 PROCEDURE — 3066F PR DOCUMENTATION OF TREATMENT FOR NEPHROPATHY: ICD-10-PCS | Mod: CPTII,S$GLB,, | Performed by: INTERNAL MEDICINE

## 2022-11-11 PROCEDURE — 3060F POS MICROALBUMINURIA REV: CPT | Mod: CPTII,S$GLB,, | Performed by: INTERNAL MEDICINE

## 2022-11-11 PROCEDURE — 3044F HG A1C LEVEL LT 7.0%: CPT | Mod: CPTII,S$GLB,, | Performed by: INTERNAL MEDICINE

## 2022-11-11 PROCEDURE — 1126F PR PAIN SEVERITY QUANTIFIED, NO PAIN PRESENT: ICD-10-PCS | Mod: CPTII,S$GLB,, | Performed by: INTERNAL MEDICINE

## 2022-11-11 PROCEDURE — 3077F PR MOST RECENT SYSTOLIC BLOOD PRESSURE >= 140 MM HG: ICD-10-PCS | Mod: CPTII,S$GLB,, | Performed by: INTERNAL MEDICINE

## 2022-11-11 PROCEDURE — 1157F ADVNC CARE PLAN IN RCRD: CPT | Mod: CPTII,S$GLB,, | Performed by: INTERNAL MEDICINE

## 2022-11-11 PROCEDURE — 99999 PR PBB SHADOW E&M-EST. PATIENT-LVL III: ICD-10-PCS | Mod: PBBFAC,,, | Performed by: INTERNAL MEDICINE

## 2022-11-11 PROCEDURE — 1126F AMNT PAIN NOTED NONE PRSNT: CPT | Mod: CPTII,S$GLB,, | Performed by: INTERNAL MEDICINE

## 2022-11-11 PROCEDURE — 99213 PR OFFICE/OUTPT VISIT, EST, LEVL III, 20-29 MIN: ICD-10-PCS | Mod: S$GLB,,, | Performed by: INTERNAL MEDICINE

## 2022-11-11 PROCEDURE — 3008F BODY MASS INDEX DOCD: CPT | Mod: CPTII,S$GLB,, | Performed by: INTERNAL MEDICINE

## 2022-11-11 PROCEDURE — 3060F PR POS MICROALBUMINURIA RESULT DOCUMENTED/REVIEW: ICD-10-PCS | Mod: CPTII,S$GLB,, | Performed by: INTERNAL MEDICINE

## 2022-11-11 PROCEDURE — 1157F PR ADVANCE CARE PLAN OR EQUIV PRESENT IN MEDICAL RECORD: ICD-10-PCS | Mod: CPTII,S$GLB,, | Performed by: INTERNAL MEDICINE

## 2022-11-11 PROCEDURE — 99213 OFFICE O/P EST LOW 20 MIN: CPT | Mod: S$GLB,,, | Performed by: INTERNAL MEDICINE

## 2022-11-11 NOTE — PROGRESS NOTES
Subjective:    Patient ID:  Cornelia Delatorre is a 72 y.o. female who presents for follow-up of Hypertension (Annual f/u; former pt of Dr. Noel )      HPI  She comes with no complaints, no chest pain, no shortness of breath  FC II    Review of Systems   Constitutional: Negative for decreased appetite, malaise/fatigue, weight gain and weight loss.   Cardiovascular:  Negative for chest pain, dyspnea on exertion, leg swelling, palpitations and syncope.   Respiratory:  Negative for cough and shortness of breath.    Gastrointestinal: Negative.    Neurological:  Negative for weakness.   All other systems reviewed and are negative.     Objective:      Physical Exam  Vitals and nursing note reviewed.   Constitutional:       Appearance: Normal appearance. She is well-developed.   HENT:      Head: Normocephalic.   Eyes:      Pupils: Pupils are equal, round, and reactive to light.   Neck:      Thyroid: No thyromegaly.      Vascular: No carotid bruit or JVD.   Cardiovascular:      Rate and Rhythm: Normal rate and regular rhythm.      Chest Wall: PMI is not displaced.      Pulses: Normal pulses and intact distal pulses.      Heart sounds: Normal heart sounds. No murmur heard.    No gallop.   Pulmonary:      Effort: Pulmonary effort is normal.      Breath sounds: Normal breath sounds.   Abdominal:      Palpations: Abdomen is soft. There is no mass.      Tenderness: There is no abdominal tenderness.   Musculoskeletal:         General: Normal range of motion.      Cervical back: Normal range of motion and neck supple.   Skin:     General: Skin is warm.   Neurological:      Mental Status: She is alert and oriented to person, place, and time.      Sensory: No sensory deficit.      Deep Tendon Reflexes: Reflexes are normal and symmetric.       Assessment:       1. Essential hypertension    2. Atherosclerosis of abdominal aorta    3. Dyslipidemia    4. KLINE (dyspnea on exertion)    5. Bilateral carotid artery stenosis         Plan:      Continue all cardiac medications  Regular exercise program  Weight loss  Follow-up in 1 year with Kristen Guerrero and with echocardiogram

## 2022-11-18 ENCOUNTER — DOCUMENTATION ONLY (OUTPATIENT)
Dept: ALLERGY | Facility: CLINIC | Age: 72
End: 2022-11-18
Payer: MEDICARE

## 2022-11-18 NOTE — PROGRESS NOTES
Signed orders for Hizentra 20% 11 mg faxed to [facility] @ [phone]. Original sent to scanning

## 2022-12-01 ENCOUNTER — HOSPITAL ENCOUNTER (OUTPATIENT)
Dept: RADIOLOGY | Facility: HOSPITAL | Age: 72
Discharge: HOME | End: 2022-12-01
Attending: INTERNAL MEDICINE
Payer: MEDICARE

## 2022-12-01 DIAGNOSIS — E04.2 MULTINODULAR GOITER: ICD-10-CM

## 2022-12-01 DIAGNOSIS — R94.6 ABNORMAL THYROID FUNCTION TEST: ICD-10-CM

## 2022-12-01 PROCEDURE — 76536 US SOFT TISSUE HEAD NECK THYROID: ICD-10-PCS | Mod: 26,,, | Performed by: RADIOLOGY

## 2022-12-01 PROCEDURE — 76536 US EXAM OF HEAD AND NECK: CPT | Mod: 26,,, | Performed by: RADIOLOGY

## 2022-12-01 PROCEDURE — 76536 US EXAM OF HEAD AND NECK: CPT | Mod: TC,PO

## 2022-12-07 ENCOUNTER — PES CALL (OUTPATIENT)
Dept: ADMINISTRATIVE | Facility: CLINIC | Age: 72
End: 2022-12-07
Payer: MEDICARE

## 2022-12-13 ENCOUNTER — PATIENT MESSAGE (OUTPATIENT)
Dept: ALLERGY | Facility: CLINIC | Age: 72
End: 2022-12-13
Payer: MEDICARE

## 2022-12-13 NOTE — TELEPHONE ENCOUNTER
Good afternoon Augie,    I am happy to forward this message to Dr. Mcmullen and Caren. I do not handle Hizentra.  However, I do see were I had faxed signed orders in November.       Brigitte Lui LPN (Licensed Nurse)   Encounter Date: 11/18/2022   Signed  Signed orders for Hizentra 20% 11 mg faxed to [facility] @ [phone]. Original sent to scanning

## 2022-12-14 ENCOUNTER — OFFICE VISIT (OUTPATIENT)
Dept: ORTHOPEDICS | Facility: CLINIC | Age: 72
End: 2022-12-14
Payer: MEDICARE

## 2022-12-14 VITALS — BODY MASS INDEX: 35.85 KG/M2 | WEIGHT: 210 LBS | HEIGHT: 64 IN | RESPIRATION RATE: 18 BRPM

## 2022-12-14 DIAGNOSIS — M17.10 ARTHRITIS OF KNEE: Primary | ICD-10-CM

## 2022-12-14 PROCEDURE — 3044F HG A1C LEVEL LT 7.0%: CPT | Mod: CPTII,S$GLB,, | Performed by: ORTHOPAEDIC SURGERY

## 2022-12-14 PROCEDURE — 1160F RVW MEDS BY RX/DR IN RCRD: CPT | Mod: CPTII,S$GLB,, | Performed by: ORTHOPAEDIC SURGERY

## 2022-12-14 PROCEDURE — 99999 PR PBB SHADOW E&M-EST. PATIENT-LVL IV: ICD-10-PCS | Mod: PBBFAC,,, | Performed by: ORTHOPAEDIC SURGERY

## 2022-12-14 PROCEDURE — 1125F PR PAIN SEVERITY QUANTIFIED, PAIN PRESENT: ICD-10-PCS | Mod: CPTII,S$GLB,, | Performed by: ORTHOPAEDIC SURGERY

## 2022-12-14 PROCEDURE — 1125F AMNT PAIN NOTED PAIN PRSNT: CPT | Mod: CPTII,S$GLB,, | Performed by: ORTHOPAEDIC SURGERY

## 2022-12-14 PROCEDURE — 3066F PR DOCUMENTATION OF TREATMENT FOR NEPHROPATHY: ICD-10-PCS | Mod: CPTII,S$GLB,, | Performed by: ORTHOPAEDIC SURGERY

## 2022-12-14 PROCEDURE — 1101F PT FALLS ASSESS-DOCD LE1/YR: CPT | Mod: CPTII,S$GLB,, | Performed by: ORTHOPAEDIC SURGERY

## 2022-12-14 PROCEDURE — 99999 PR PBB SHADOW E&M-EST. PATIENT-LVL IV: CPT | Mod: PBBFAC,,, | Performed by: ORTHOPAEDIC SURGERY

## 2022-12-14 PROCEDURE — 1101F PR PT FALLS ASSESS DOC 0-1 FALLS W/OUT INJ PAST YR: ICD-10-PCS | Mod: CPTII,S$GLB,, | Performed by: ORTHOPAEDIC SURGERY

## 2022-12-14 PROCEDURE — 1160F PR REVIEW ALL MEDS BY PRESCRIBER/CLIN PHARMACIST DOCUMENTED: ICD-10-PCS | Mod: CPTII,S$GLB,, | Performed by: ORTHOPAEDIC SURGERY

## 2022-12-14 PROCEDURE — 3060F POS MICROALBUMINURIA REV: CPT | Mod: CPTII,S$GLB,, | Performed by: ORTHOPAEDIC SURGERY

## 2022-12-14 PROCEDURE — 99213 PR OFFICE/OUTPT VISIT, EST, LEVL III, 20-29 MIN: ICD-10-PCS | Mod: 25,S$GLB,, | Performed by: ORTHOPAEDIC SURGERY

## 2022-12-14 PROCEDURE — 20610 LARGE JOINT ASPIRATION/INJECTION: R KNEE: ICD-10-PCS | Mod: RT,S$GLB,, | Performed by: ORTHOPAEDIC SURGERY

## 2022-12-14 PROCEDURE — 1157F PR ADVANCE CARE PLAN OR EQUIV PRESENT IN MEDICAL RECORD: ICD-10-PCS | Mod: CPTII,S$GLB,, | Performed by: ORTHOPAEDIC SURGERY

## 2022-12-14 PROCEDURE — 3008F PR BODY MASS INDEX (BMI) DOCUMENTED: ICD-10-PCS | Mod: CPTII,S$GLB,, | Performed by: ORTHOPAEDIC SURGERY

## 2022-12-14 PROCEDURE — 99213 OFFICE O/P EST LOW 20 MIN: CPT | Mod: 25,S$GLB,, | Performed by: ORTHOPAEDIC SURGERY

## 2022-12-14 PROCEDURE — 3044F PR MOST RECENT HEMOGLOBIN A1C LEVEL <7.0%: ICD-10-PCS | Mod: CPTII,S$GLB,, | Performed by: ORTHOPAEDIC SURGERY

## 2022-12-14 PROCEDURE — 3060F PR POS MICROALBUMINURIA RESULT DOCUMENTED/REVIEW: ICD-10-PCS | Mod: CPTII,S$GLB,, | Performed by: ORTHOPAEDIC SURGERY

## 2022-12-14 PROCEDURE — 1159F MED LIST DOCD IN RCRD: CPT | Mod: CPTII,S$GLB,, | Performed by: ORTHOPAEDIC SURGERY

## 2022-12-14 PROCEDURE — 3288F FALL RISK ASSESSMENT DOCD: CPT | Mod: CPTII,S$GLB,, | Performed by: ORTHOPAEDIC SURGERY

## 2022-12-14 PROCEDURE — 3288F PR FALLS RISK ASSESSMENT DOCUMENTED: ICD-10-PCS | Mod: CPTII,S$GLB,, | Performed by: ORTHOPAEDIC SURGERY

## 2022-12-14 PROCEDURE — 1157F ADVNC CARE PLAN IN RCRD: CPT | Mod: CPTII,S$GLB,, | Performed by: ORTHOPAEDIC SURGERY

## 2022-12-14 PROCEDURE — 20610 DRAIN/INJ JOINT/BURSA W/O US: CPT | Mod: RT,S$GLB,, | Performed by: ORTHOPAEDIC SURGERY

## 2022-12-14 PROCEDURE — 3066F NEPHROPATHY DOC TX: CPT | Mod: CPTII,S$GLB,, | Performed by: ORTHOPAEDIC SURGERY

## 2022-12-14 PROCEDURE — 1159F PR MEDICATION LIST DOCUMENTED IN MEDICAL RECORD: ICD-10-PCS | Mod: CPTII,S$GLB,, | Performed by: ORTHOPAEDIC SURGERY

## 2022-12-14 PROCEDURE — 3008F BODY MASS INDEX DOCD: CPT | Mod: CPTII,S$GLB,, | Performed by: ORTHOPAEDIC SURGERY

## 2022-12-14 RX ORDER — TRIAMCINOLONE ACETONIDE 40 MG/ML
40 INJECTION, SUSPENSION INTRA-ARTICULAR; INTRAMUSCULAR
Status: DISCONTINUED | OUTPATIENT
Start: 2022-12-14 | End: 2022-12-14 | Stop reason: HOSPADM

## 2022-12-14 RX ADMIN — TRIAMCINOLONE ACETONIDE 40 MG: 40 INJECTION, SUSPENSION INTRA-ARTICULAR; INTRAMUSCULAR at 03:12

## 2022-12-14 NOTE — PROGRESS NOTES
Past Medical History:   Diagnosis Date    Allergy     Arthritis     Asthma     Cancer     skin cancer to right hand    Cataract     Chronic fatigue 1/24/2017    CVID (common variable immunodeficiency) 3/7/2019    Diabetes mellitus     type2    KLINE (dyspnea on exertion) 1/24/2017    Dyslipidemia 6/25/2019    Encounter for blood transfusion     Essential hypertension 12/29/2011    GERD (gastroesophageal reflux disease)     Headache(784.0)     History of lumpectomy of both breasts     1992 negative    Hyperlipidemia 7/15/2015    Hypertension     Hypothyroidism     Neuropathy     Obesity     Obesity     Postmenopausal HRT (hormone replacement therapy)     Rash     Rosacea     Sleep apnea     on bipap    Snoring     Squamous cell carcinoma of skin     left forearm     UTI (urinary tract infection)     Wears glasses        Past Surgical History:   Procedure Laterality Date    ADENOIDECTOMY      APPENDECTOMY      BLADDER SUSPENSION      BREAST BIOPSY Bilateral 1992    bilateral benign excisional biopsies    BUNIONECTOMY  10/17/14    right, still has discomfort    CATARACT EXTRACTION W/  INTRAOCULAR LENS IMPLANT Bilateral     CHOLECYSTECTOMY  08/02/2017    COLONOSCOPY      CYSTOSCOPY      EPIDURAL STEROID INJECTION INTO LUMBAR SPINE N/A 8/14/2019    Procedure: Injection-steroid-epidural-lumbar L5/S1;  Surgeon: Fredi Rojas MD;  Location: [facility] OR;  Service: Pain Management;  Laterality: N/A;    EPIDURAL STEROID INJECTION INTO LUMBAR SPINE N/A 5/3/2021    Procedure: Injection-steroid-epidural-lumbar L5/S1 to the Left;  Surgeon: Fredi Rojas MD;  Location: [facility] OR;  Service: Pain Management;  Laterality: N/A;    EPIDURAL STEROID INJECTION INTO LUMBAR SPINE N/A 9/24/2021    Procedure: Injection-steroid-epidural-lumbar L5/S1;  Surgeon: Fredi Rojas MD;  Location: [facility] OR;  Service: Pain Management;  Laterality: N/A;    EPIDURAL STEROID INJECTION INTO LUMBAR SPINE N/A 2/21/2022    Procedure:  Injection-steroid-epidural-lumbar L5/S1;  Surgeon: Fredi Rojas MD;  Location: [facility] OR;  Service: Pain Management;  Laterality: N/A;    ESOPHAGEAL DILATION N/A 6/11/2021    Procedure: DILATION, ESOPHAGUS;  Surgeon: Jake Sheriff MD;  Location: [facility] ENDO;  Service: Endoscopy;  Laterality: N/A;    ESOPHAGOGASTRODUODENOSCOPY      dilated esophagus    ESOPHAGOGASTRODUODENOSCOPY N/A 6/11/2021    Procedure: EGD (ESOPHAGOGASTRODUODENOSCOPY);  Surgeon: Jake Sheriff MD;  Location: [facility] ENDO;  Service: Endoscopy;  Laterality: N/A;    GASTRIC BYPASS      2011    HYSTERECTOMY  1980    interstim bladder  2009    10/14/14 new battery    OOPHORECTOMY  1980    REPLACEMENT OF SACRAL NERVE STIMULATOR  2/18/2020    Procedure: REPLACEMENT, NEUROSTIMULATOR, SACRAL;  Surgeon: Mary Jane Jarvis MD;  Location: [facility] OR [address] [place] [address];  Service: Urology;;    REVISION OF PROCEDURE INVOLVING SACRAL NEUROSTIMULATOR DEVICE Right 2/18/2020    Procedure: REVISION, NEUROSTIMULATOR, SACRAL/ battery replacement;  Surgeon: Mary Jane Jarvis MD;  Location: [facility] OR [address] [place] [address];  Service: Urology;  Laterality: Right;  1hr/ rep contacted/ (CONNIE)    SKIN CANCER EXCISION      left hand    TONSILLECTOMY      WISDOM TOOTH EXTRACTION         Current Outpatient Medications   Medication Sig    albuterol (PROVENTIL/VENTOLIN HFA) 90 mcg/actuation inhaler Inhale 2 puffs into the lungs every 4 (four) hours as needed. 2 puffs every 4 hours as needed for cough, wheeze, or shortness of breath    ascorbic acid, vitamin C, (VITAMIN C) 1000 MG tablet Take 1,000 mg by mouth 2 (two) times daily.     azelastine (ASTELIN) 137 mcg (0.1 %) nasal spray 1 spray (137 mcg total) by Nasal route 2 (two) times daily.    azelastine (OPTIVAR) 0.05 % ophthalmic solution Place 1 drop into both eyes. As needed    B-complex with vitamin C (Z-BEC OR EQUIV) tablet Take 1 tablet by mouth once daily.    Bifidobacterium infantis (ALIGN ORAL) Take by mouth once daily.    biotin 10,000 mcg  Cap Take 1 tablet by mouth every evening.     chlorpheniramine (CHLOR-TRIMETON) 4 mg tablet Take 1 tablet (4 mg total) by mouth every 8 (eight) hours while awake.    cranberry 500 mg Cap Take 1 capsule by mouth every evening.    diclofenac sodium (VOLTAREN) 1 % Gel Apply 2 grams topically once daily. (Patient taking differently: Apply 2 g topically as needed (arthritis).)    diltiaZEM (CARDIZEM CD) 120 MG Cp24 TAKE 1 CAPSULE (120 MG TOTAL) BY MOUTH EVERY EVENING.    EPINEPHrine (EPIPEN) 0.3 mg/0.3 mL AtIn Inject 1 each into the muscle as needed.     erythromycin with ethanoL (THERAMYCIN) 2 % external solution Aaa bid prn    esomeprazole (NEXIUM) 40 MG capsule Take 1 capsule (40 mg total) by mouth before breakfast.    estradioL (ESTRACE) 0.01 % (0.1 mg/gram) vaginal cream Place 1 g vaginally 3 (three) times a week. Place by fingertip application before bedtime three times a week (Monday, Wednesday, Friday)    fexofenadine (ALLEGRA) 180 MG tablet Take 180 mg by mouth once daily.     fish oil-omega-3 fatty acids 300-1,000 mg capsule Take 2 g by mouth once daily.    fluticasone propionate (FLONASE) 50 mcg/actuation nasal spray 2 sprays (100 mcg total) by Each Nostril route once daily.    gabapentin (NEURONTIN) 600 MG tablet Take 1 tablet (600 mg total) by mouth 3 (three) times daily.    guaifenesin-codeine 100-10 mg/5 ml (GUAIFENESIN AC)  mg/5 mL syrup Take 5 mLs by mouth 3 (three) times daily as needed for Cough.    hydrOXYzine HCL (ATARAX) 10 MG Tab Take 1 tablet (10 mg total) by mouth 3 (three) times daily as needed.    immun glob G,IgG,-pro-IgA 0-50 (HIZENTRA) 1 gram/5 mL (20 %) Soln Inject 55 mLs (11 g total) into the skin once a week.    inhalation spacing device Use as directed for inhalation.    ipratropium (ATROVENT) 42 mcg (0.06 %) nasal spray 2 sprays by Nasal route 4 (four) times daily.    metFORMIN (GLUCOPHAGE-XR) 500 MG ER 24hr tablet Take 1 tablet (500 mg total) by mouth 2 (two) times daily with  meals.    mometasone 0.1% (ELOCON) 0.1 % cream aaa bid x 1-2 weeks prn bug bites    montelukast (SINGULAIR) 10 mg tablet Take 1 tablet (10 mg total) by mouth every evening.    mucus clearing device Cecy 1 Device by Misc.(Non-Drug; Combo Route) route 2 (two) times daily.    multivitamin capsule Take 1 capsule by mouth once daily.     mupirocin (BACTROBAN) 2 % ointment Apply topically 3 (three) times daily.    ondansetron (ZOFRAN-ODT) 4 MG TbDL Take 1 tablet (4 mg total) by mouth every 8 (eight) hours as needed.    potassium chloride SA (K-DUR,KLOR-CON) 20 MEQ tablet TAKE 1 TABLET EVERY DAY    pravastatin (PRAVACHOL) 80 MG tablet TAKE 1 TABLET EVERY DAY    predniSONE (DELTASONE) 10 MG tablet 1 pill for 3-5 days repeat as needed for chest tightness.    psyllium husk (METAMUCIL ORAL) Take by mouth.    SIMETHICONE (GAS-X ORAL) Take 1 capsule by mouth as needed (gas relief).     TRELEGY ELLIPTA 200-62.5-25 mcg inhaler     valsartan-hydrochlorothiazide (DIOVAN-HCT) 160-12.5 mg per tablet Take 1 tablet by mouth once daily.    vitamin D3-folic acid 5,000 unit- 1 mg Tab Take 1 tablet by mouth once daily.     cloNIDine (CATAPRES) 0.1 MG tablet Take 1 tablet (0.1 mg total) by mouth 3 (three) times daily as needed (PRN SBP > 165 mmHg).    levalbuterol (XOPENEX) 1.25 mg/3 mL nebulizer solution Take 3 mLs (1.25 mg total) by nebulization every 4 (four) hours as needed for Wheezing or Shortness of Breath. Rescue     No current facility-administered medications for this visit.       Review of patient's allergies indicates:   Allergen Reactions    Sulfa (sulfonamide antibiotics) Hives    Naproxen Other (See Comments)     Other reaction(s): RT sided numbness         Family History   Problem Relation Age of Onset    Breast cancer Mother 50    Diabetes Unknown     Hypertension Unknown     Hypothyroidism Unknown     Breast cancer Paternal Aunt         40's    Ovarian cancer Paternal Grandmother     Allergic rhinitis Neg Hx     Allergies  Neg Hx     Angioedema Neg Hx     Asthma Neg Hx     Atopy Neg Hx     Eczema Neg Hx     Immunodeficiency Neg Hx     Rhinitis Neg Hx     Urticaria Neg Hx        Social History     Socioeconomic History    Marital status:    Tobacco Use    Smoking status: Never    Smokeless tobacco: Never   Substance and Sexual Activity    Alcohol use: Not Currently    Drug use: No    Sexual activity: Not Currently     Social Determinants of Health     Financial Resource Strain: Medium Risk    Difficulty of Paying Living Expenses: Somewhat hard   Food Insecurity: Food Insecurity Present    Worried About Running Out of Food in the Last Year: Sometimes true    Ran Out of Food in the Last Year: Patient refused   Transportation Needs: No Transportation Needs    Lack of Transportation (Medical): No    Lack of Transportation (Non-Medical): No   Physical Activity: Unknown    Days of Exercise per Week: Patient refused    Minutes of Exercise per Session: Patient refused   Stress: No Stress Concern Present    Feeling of Stress : Only a little   Social Connections: Unknown    Frequency of Communication with Friends and Family: More than three times a week    Frequency of Social Gatherings with Friends and Family: Once a week    Active Member of Clubs or Organizations: Yes    Attends Club or Organization Meetings: More than 4 times per year    Marital Status:    Housing Stability: Low Risk     Unable to Pay for Housing in the Last Year: No    Number of Places Lived in the Last Year: 1    Unstable Housing in the Last Year: No       Chief Complaint:   Chief Complaint   Patient presents with    Follow-up     follow up right knee        History of present illness:  72-year-old female comes in with right knee pain.   Patient has had an issue with arthritis in the past and last treatment was several months ago.  Has had viscosupplementation previously with good success.  Currently rates the pain is a 6/10.  Pain over the medial aspect of  the right knee.    Physical Examination:    Vital Signs:    Vitals:    12/14/22 1333   Resp: 18       Body mass index is 36.05 kg/m².    This a well-developed, well nourished patient in no acute distress.  They are alert and oriented and cooperative to examination.  Pt. walks without an antalgic gait.      Examination of the right knee shows no rashes or erythema. There are no masses ecchymosis or effusion. Patient has full range of motion from 0-130°. Patient is nontender to palpation over lateral joint line and moderately tender to palpation over the medial joint line. Patient has a - Lachman exam, - anterior drawer exam, and - posterior drawer exam. - Abdulkadir's exam. Knee is stable to varus and valgus stress. 5 out of 5 motor strength. Palpable distal pulses. Intact light touch sensation. Negative Patellofemoral crepitus      X-rays:  X-rays of the right knee is  reviewed which show moderate to severe medial joint space narrowing.  More moderate medial narrowing of the left knee     Assessment::  Moderate to severe right varus knee arthritis    Plan:  I reviewed the findings with her today.  I recommended cortisone injection calm her knee down.  We talked about repeating the viscosupplementation if it continues to bother her.  Talked about knee replacement as well.    This note was created using [employer] voice recognition software that occasionally misinterpreted phrases or words.    Consult note is delivered via Epic messaging service.

## 2022-12-14 NOTE — PROCEDURES
Large Joint Aspiration/Injection: R knee    Date/Time: 12/14/2022 3:00 PM  Performed by: Robbin Edmond MD  Authorized by: Robbin Edmond MD     Consent Done?:  Yes (Verbal)  Indications:  Pain  Site marked: the procedure site was marked    Timeout: prior to procedure the correct patient, procedure, and site was verified    Local anesthetic:  Lidocaine 1% without epinephrine and bupivacaine 0.25% without epinephrine  Anesthetic total (ml):  6      Details:  Needle Size:  20 G  Approach:  Anterolateral  Location:  Knee  Site:  R knee  Medications:  40 mg triamcinolone acetonide 40 mg/mL  Patient tolerance:  Patient tolerated the procedure well with no immediate complications

## 2022-12-19 ENCOUNTER — OFFICE VISIT (OUTPATIENT)
Dept: ENDOCRINOLOGY | Facility: CLINIC | Age: 72
End: 2022-12-19
Payer: MEDICARE

## 2022-12-19 ENCOUNTER — OFFICE VISIT (OUTPATIENT)
Dept: OTOLARYNGOLOGY | Facility: CLINIC | Age: 72
End: 2022-12-19
Payer: MEDICARE

## 2022-12-19 VITALS
OXYGEN SATURATION: 99 % | WEIGHT: 208 LBS | SYSTOLIC BLOOD PRESSURE: 116 MMHG | DIASTOLIC BLOOD PRESSURE: 62 MMHG | HEIGHT: 64 IN | BODY MASS INDEX: 35.51 KG/M2 | HEART RATE: 75 BPM

## 2022-12-19 VITALS — HEIGHT: 64 IN | WEIGHT: 209 LBS | BODY MASS INDEX: 35.68 KG/M2

## 2022-12-19 DIAGNOSIS — E04.1 THYROID NODULE: Primary | ICD-10-CM

## 2022-12-19 DIAGNOSIS — J31.0 NON-ALLERGIC RHINITIS: Primary | ICD-10-CM

## 2022-12-19 DIAGNOSIS — J37.0 CHRONIC LARYNGITIS: ICD-10-CM

## 2022-12-19 DIAGNOSIS — J04.0 ACUTE LARYNGITIS: ICD-10-CM

## 2022-12-19 PROCEDURE — 3288F PR FALLS RISK ASSESSMENT DOCUMENTED: ICD-10-PCS | Mod: CPTII,S$GLB,, | Performed by: INTERNAL MEDICINE

## 2022-12-19 PROCEDURE — 1100F PTFALLS ASSESS-DOCD GE2>/YR: CPT | Mod: CPTII,S$GLB,, | Performed by: INTERNAL MEDICINE

## 2022-12-19 PROCEDURE — 3288F FALL RISK ASSESSMENT DOCD: CPT | Mod: CPTII,S$GLB,, | Performed by: INTERNAL MEDICINE

## 2022-12-19 PROCEDURE — 1101F PT FALLS ASSESS-DOCD LE1/YR: CPT | Mod: CPTII,S$GLB,, | Performed by: OTOLARYNGOLOGY

## 2022-12-19 PROCEDURE — 1101F PR PT FALLS ASSESS DOC 0-1 FALLS W/OUT INJ PAST YR: ICD-10-PCS | Mod: CPTII,S$GLB,, | Performed by: OTOLARYNGOLOGY

## 2022-12-19 PROCEDURE — 1160F PR REVIEW ALL MEDS BY PRESCRIBER/CLIN PHARMACIST DOCUMENTED: ICD-10-PCS | Mod: CPTII,S$GLB,, | Performed by: INTERNAL MEDICINE

## 2022-12-19 PROCEDURE — 1126F AMNT PAIN NOTED NONE PRSNT: CPT | Mod: CPTII,S$GLB,, | Performed by: OTOLARYNGOLOGY

## 2022-12-19 PROCEDURE — 99214 OFFICE O/P EST MOD 30 MIN: CPT | Mod: S$GLB,,, | Performed by: OTOLARYNGOLOGY

## 2022-12-19 PROCEDURE — 99999 PR PBB SHADOW E&M-EST. PATIENT-LVL IV: CPT | Mod: PBBFAC,,, | Performed by: OTOLARYNGOLOGY

## 2022-12-19 PROCEDURE — 99213 OFFICE O/P EST LOW 20 MIN: CPT | Mod: S$GLB,,, | Performed by: INTERNAL MEDICINE

## 2022-12-19 PROCEDURE — 99213 PR OFFICE/OUTPT VISIT, EST, LEVL III, 20-29 MIN: ICD-10-PCS | Mod: S$GLB,,, | Performed by: INTERNAL MEDICINE

## 2022-12-19 PROCEDURE — 3060F PR POS MICROALBUMINURIA RESULT DOCUMENTED/REVIEW: ICD-10-PCS | Mod: CPTII,S$GLB,, | Performed by: INTERNAL MEDICINE

## 2022-12-19 PROCEDURE — 99214 PR OFFICE/OUTPT VISIT, EST, LEVL IV, 30-39 MIN: ICD-10-PCS | Mod: S$GLB,,, | Performed by: OTOLARYNGOLOGY

## 2022-12-19 PROCEDURE — 3288F PR FALLS RISK ASSESSMENT DOCUMENTED: ICD-10-PCS | Mod: CPTII,S$GLB,, | Performed by: OTOLARYNGOLOGY

## 2022-12-19 PROCEDURE — 3044F HG A1C LEVEL LT 7.0%: CPT | Mod: CPTII,S$GLB,, | Performed by: OTOLARYNGOLOGY

## 2022-12-19 PROCEDURE — 1157F PR ADVANCE CARE PLAN OR EQUIV PRESENT IN MEDICAL RECORD: ICD-10-PCS | Mod: CPTII,S$GLB,, | Performed by: OTOLARYNGOLOGY

## 2022-12-19 PROCEDURE — 1160F PR REVIEW ALL MEDS BY PRESCRIBER/CLIN PHARMACIST DOCUMENTED: ICD-10-PCS | Mod: CPTII,S$GLB,, | Performed by: OTOLARYNGOLOGY

## 2022-12-19 PROCEDURE — 1159F MED LIST DOCD IN RCRD: CPT | Mod: CPTII,S$GLB,, | Performed by: INTERNAL MEDICINE

## 2022-12-19 PROCEDURE — 1126F AMNT PAIN NOTED NONE PRSNT: CPT | Mod: CPTII,S$GLB,, | Performed by: INTERNAL MEDICINE

## 2022-12-19 PROCEDURE — 1126F PR PAIN SEVERITY QUANTIFIED, NO PAIN PRESENT: ICD-10-PCS | Mod: CPTII,S$GLB,, | Performed by: INTERNAL MEDICINE

## 2022-12-19 PROCEDURE — 3066F PR DOCUMENTATION OF TREATMENT FOR NEPHROPATHY: ICD-10-PCS | Mod: CPTII,S$GLB,, | Performed by: INTERNAL MEDICINE

## 2022-12-19 PROCEDURE — 3066F NEPHROPATHY DOC TX: CPT | Mod: CPTII,S$GLB,, | Performed by: INTERNAL MEDICINE

## 2022-12-19 PROCEDURE — 3044F PR MOST RECENT HEMOGLOBIN A1C LEVEL <7.0%: ICD-10-PCS | Mod: CPTII,S$GLB,, | Performed by: OTOLARYNGOLOGY

## 2022-12-19 PROCEDURE — 3008F PR BODY MASS INDEX (BMI) DOCUMENTED: ICD-10-PCS | Mod: CPTII,S$GLB,, | Performed by: INTERNAL MEDICINE

## 2022-12-19 PROCEDURE — 1157F ADVNC CARE PLAN IN RCRD: CPT | Mod: CPTII,S$GLB,, | Performed by: INTERNAL MEDICINE

## 2022-12-19 PROCEDURE — 3066F NEPHROPATHY DOC TX: CPT | Mod: CPTII,S$GLB,, | Performed by: OTOLARYNGOLOGY

## 2022-12-19 PROCEDURE — 1157F PR ADVANCE CARE PLAN OR EQUIV PRESENT IN MEDICAL RECORD: ICD-10-PCS | Mod: CPTII,S$GLB,, | Performed by: INTERNAL MEDICINE

## 2022-12-19 PROCEDURE — 1160F RVW MEDS BY RX/DR IN RCRD: CPT | Mod: CPTII,S$GLB,, | Performed by: INTERNAL MEDICINE

## 2022-12-19 PROCEDURE — 3288F FALL RISK ASSESSMENT DOCD: CPT | Mod: CPTII,S$GLB,, | Performed by: OTOLARYNGOLOGY

## 2022-12-19 PROCEDURE — 3078F PR MOST RECENT DIASTOLIC BLOOD PRESSURE < 80 MM HG: ICD-10-PCS | Mod: CPTII,S$GLB,, | Performed by: INTERNAL MEDICINE

## 2022-12-19 PROCEDURE — 3008F BODY MASS INDEX DOCD: CPT | Mod: CPTII,S$GLB,, | Performed by: INTERNAL MEDICINE

## 2022-12-19 PROCEDURE — 99999 PR PBB SHADOW E&M-EST. PATIENT-LVL V: ICD-10-PCS | Mod: PBBFAC,,, | Performed by: INTERNAL MEDICINE

## 2022-12-19 PROCEDURE — 1159F PR MEDICATION LIST DOCUMENTED IN MEDICAL RECORD: ICD-10-PCS | Mod: CPTII,S$GLB,, | Performed by: OTOLARYNGOLOGY

## 2022-12-19 PROCEDURE — 1126F PR PAIN SEVERITY QUANTIFIED, NO PAIN PRESENT: ICD-10-PCS | Mod: CPTII,S$GLB,, | Performed by: OTOLARYNGOLOGY

## 2022-12-19 PROCEDURE — 1159F PR MEDICATION LIST DOCUMENTED IN MEDICAL RECORD: ICD-10-PCS | Mod: CPTII,S$GLB,, | Performed by: INTERNAL MEDICINE

## 2022-12-19 PROCEDURE — 99999 PR PBB SHADOW E&M-EST. PATIENT-LVL V: CPT | Mod: PBBFAC,,, | Performed by: INTERNAL MEDICINE

## 2022-12-19 PROCEDURE — 99999 PR PBB SHADOW E&M-EST. PATIENT-LVL IV: ICD-10-PCS | Mod: PBBFAC,,, | Performed by: OTOLARYNGOLOGY

## 2022-12-19 PROCEDURE — 3060F POS MICROALBUMINURIA REV: CPT | Mod: CPTII,S$GLB,, | Performed by: OTOLARYNGOLOGY

## 2022-12-19 PROCEDURE — 3074F SYST BP LT 130 MM HG: CPT | Mod: CPTII,S$GLB,, | Performed by: INTERNAL MEDICINE

## 2022-12-19 PROCEDURE — 3008F BODY MASS INDEX DOCD: CPT | Mod: CPTII,S$GLB,, | Performed by: OTOLARYNGOLOGY

## 2022-12-19 PROCEDURE — 3060F POS MICROALBUMINURIA REV: CPT | Mod: CPTII,S$GLB,, | Performed by: INTERNAL MEDICINE

## 2022-12-19 PROCEDURE — 3078F DIAST BP <80 MM HG: CPT | Mod: CPTII,S$GLB,, | Performed by: INTERNAL MEDICINE

## 2022-12-19 PROCEDURE — 3060F PR POS MICROALBUMINURIA RESULT DOCUMENTED/REVIEW: ICD-10-PCS | Mod: CPTII,S$GLB,, | Performed by: OTOLARYNGOLOGY

## 2022-12-19 PROCEDURE — 3044F PR MOST RECENT HEMOGLOBIN A1C LEVEL <7.0%: ICD-10-PCS | Mod: CPTII,S$GLB,, | Performed by: INTERNAL MEDICINE

## 2022-12-19 PROCEDURE — 3044F HG A1C LEVEL LT 7.0%: CPT | Mod: CPTII,S$GLB,, | Performed by: INTERNAL MEDICINE

## 2022-12-19 PROCEDURE — 1159F MED LIST DOCD IN RCRD: CPT | Mod: CPTII,S$GLB,, | Performed by: OTOLARYNGOLOGY

## 2022-12-19 PROCEDURE — 1157F ADVNC CARE PLAN IN RCRD: CPT | Mod: CPTII,S$GLB,, | Performed by: OTOLARYNGOLOGY

## 2022-12-19 PROCEDURE — 3066F PR DOCUMENTATION OF TREATMENT FOR NEPHROPATHY: ICD-10-PCS | Mod: CPTII,S$GLB,, | Performed by: OTOLARYNGOLOGY

## 2022-12-19 PROCEDURE — 1160F RVW MEDS BY RX/DR IN RCRD: CPT | Mod: CPTII,S$GLB,, | Performed by: OTOLARYNGOLOGY

## 2022-12-19 PROCEDURE — 3008F PR BODY MASS INDEX (BMI) DOCUMENTED: ICD-10-PCS | Mod: CPTII,S$GLB,, | Performed by: OTOLARYNGOLOGY

## 2022-12-19 PROCEDURE — 1100F PR PT FALLS ASSESS DOC 2+ FALLS/FALL W/INJURY/YR: ICD-10-PCS | Mod: CPTII,S$GLB,, | Performed by: INTERNAL MEDICINE

## 2022-12-19 PROCEDURE — 3074F PR MOST RECENT SYSTOLIC BLOOD PRESSURE < 130 MM HG: ICD-10-PCS | Mod: CPTII,S$GLB,, | Performed by: INTERNAL MEDICINE

## 2022-12-19 RX ORDER — FLUCONAZOLE 100 MG/1
100 TABLET ORAL DAILY
Qty: 10 TABLET | Refills: 0 | Status: SHIPPED | OUTPATIENT
Start: 2022-12-19 | End: 2022-12-29

## 2022-12-19 RX ORDER — INSULIN PUMP SYRINGE, 3 ML
EACH MISCELLANEOUS
Qty: 1 EACH | Refills: 0 | Status: SHIPPED | OUTPATIENT
Start: 2022-12-19 | End: 2024-03-25

## 2022-12-19 NOTE — PROGRESS NOTES
CHIEF COMPLAINT: + TPO/MNG  72 y.o.  being seen as a f/u. Had been on synthroid in the past. Off since March 2019. No palpitations. No tremors. No difficulty swallowing. Has had EGD stretching in the past.  No XRT to head/neck. Gets periodic asthma exacerbation. No worsening. Concerned about weight. Exercise limited due to back, knee, and asthma. Occasionally has to be on prednisone.         PAST MEDICAL HISTORY/PAST SURGICAL HISTORY:  Reviewed in [facility]    SOCIAL HISTORY: No T/A    FAMILY HISTORY:  + Hypothyroidism. + Dm 2.     MEDICATIONS/ALLERGIES: The patient's MedCard has been updated and reviewed.        PE:    GENERAL: Well developed, well nourished.  NECK: Supple, trachea midline, No palpable thyroid nodules.   CHEST: Resp even and unlabored, CTA bilateral.  CARDIAC: RRR, S1, S2 heard, no murmurs, rubs, S3, or S4       Latest Reference Range & Units 12/01/22 09:24   TSH 0.400 - 4.000 uIU/mL 1.173       US SOFT TISSUE HEAD NECK THYROID     CLINICAL HISTORY:  Abnormal results of thyroid function studies     TECHNIQUE:  Ultrasound of the thyroid and cervical lymph nodes was performed.     COMPARISON:  Thyroid ultrasound-12/01/2020     FINDINGS:  The right thyroid lobe measures 5.1 x 1.5 x 2.0 cm in the left thyroid lobe measures 5.6 x 1.9 x 2.1 cm.  The total thyroid weight is 20.1 g.  There is a diffusely heterogeneous thyroid parenchymal echotexture.  There is an unchanged 11 x 8 x 10 mm smooth, circumscribed, wider than tall, primarily cystic nodule observed within the left thyroid midpole at its union with the left aspect of the thyroid isthmus.  No new thyroid nodules which meet the criteria for FNA/core biopsy.     No pathologically enlarged or morphologically abnormal lymph nodes in the left or right neck adjacent to the thyroid fossa.     Impression:     No interval detrimental change when compared to the prior study.  Thyroid gland which is at the upper limits of normal in size and shows a diffuse  heterogeneous parenchymal echotexture.  No new thyroid nodules which meet the criteria for FNA/core biopsy.      ASSESSMENT/PLAN:  1. Suppressed TSH- TSH now WNL. TPO +. Asymptomatic.     2. MNG- Do not meet criteria for FNA. No obstructive symptoms. Can f/u Q 2 years    FOLLOWUP  F/U 2 yr with TSH, Thyroid US

## 2022-12-19 NOTE — PROGRESS NOTES
Subjective:       Patient ID: Cornelia Delatorre is a 72 y.o. female.    Chief Complaint: discuss rhinear      Cornelia is here for follow-up of non-allergic rhinitis.     Patient validated questionnaires (if applicable):  SNOT-22 score: : (P) 34  NOSE score:: (P) 50%  ETDQ-7 score:: (P) 2.3    No flowsheet data found.  No flowsheet data found.  No flowsheet data found.         Review of Systems   Constitutional: Negative for activity change and appetite change.   Respiratory: Negative for difficulty breathing and wheezing   Cardiovascular: Negative for chest pain.      Objective:      Physical Exam      Tests / Results:  ***    Assessment:       No diagnosis found.      Plan:         ***      
Subjective:       Patient ID: Cornelia Delatorre is a 72 y.o. female.    Chief Complaint: discuss rhinear    Cornelia is here for follow-up of chronic laryngitis.  She is here for this as well as vasomotor rhinitis.   She has been using Flonase in the am and Astelin and Atrovent prn    She began to have issues recently with worsening of voice.   She was on an antibiotic in October for a tooth issue.  She gets Hyzentra weekly for Immunoglobulin def.     Patient validated questionnaires (if applicable):  SNOT-22 score: : (P) 34  NOSE score:: (P) 50%  ETDQ-7 score:: (P) 2.3    No flowsheet data found.  No flowsheet data found.  No flowsheet data found.         Review of Systems   Constitutional: Negative for activity change and appetite change.   Respiratory: Negative for difficulty breathing and wheezing   Cardiovascular: Negative for chest pain.      Objective:        Constitutional:   Vital signs are normal. She appears well-developed and well-nourished.     Head:  Normocephalic and atraumatic.     Ears:  Hearing normal to normal and whispered voice; external ear normal without scars, lesions, or masses; ear canal, tympanic membrane, and middle ear normal..     Nose:  Nose normal including turbinates, nasal mucosa, sinuses and nasal septum. Septal deviation (L) present.     Mouth/Throat  Oropharynx clear and moist without lesions or asymmetry.   Palatal thrush      Neck:  Neck normal without thyromegaly masses, asymmetry, normal tracheal structure, crepitus, and tenderness.       Tests / Results:  RAST reviewed    Assessment:       1. Non-allergic rhinitis    2. Chronic laryngitis    3. Acute laryngitis          Plan:       Diflucan for laryngitis     Continue ARMANDO meds  Discussed RhinAer as an option  She will consider      
Fearful of needles and IV

## 2023-01-09 ENCOUNTER — PATIENT MESSAGE (OUTPATIENT)
Dept: PULMONOLOGY | Facility: CLINIC | Age: 73
End: 2023-01-09

## 2023-01-09 ENCOUNTER — DOCUMENTATION ONLY (OUTPATIENT)
Dept: ALLERGY | Facility: CLINIC | Age: 73
End: 2023-01-09
Payer: MEDICARE

## 2023-01-09 ENCOUNTER — OFFICE VISIT (OUTPATIENT)
Dept: PULMONOLOGY | Facility: CLINIC | Age: 73
End: 2023-01-09
Payer: MEDICARE

## 2023-01-09 ENCOUNTER — PATIENT MESSAGE (OUTPATIENT)
Dept: FAMILY MEDICINE | Facility: CLINIC | Age: 73
End: 2023-01-09
Payer: MEDICARE

## 2023-01-09 VITALS
HEART RATE: 62 BPM | DIASTOLIC BLOOD PRESSURE: 80 MMHG | BODY MASS INDEX: 35.8 KG/M2 | WEIGHT: 209.69 LBS | HEIGHT: 64 IN | OXYGEN SATURATION: 99 % | SYSTOLIC BLOOD PRESSURE: 139 MMHG

## 2023-01-09 DIAGNOSIS — G47.33 OSA TREATED WITH BIPAP: ICD-10-CM

## 2023-01-09 DIAGNOSIS — J47.9 BRONCHIECTASIS WITHOUT COMPLICATION: Primary | ICD-10-CM

## 2023-01-09 DIAGNOSIS — J45.20 MILD INTERMITTENT ASTHMA WITHOUT COMPLICATION: ICD-10-CM

## 2023-01-09 PROCEDURE — 3079F DIAST BP 80-89 MM HG: CPT | Mod: HCNC,CPTII,S$GLB, | Performed by: NURSE PRACTITIONER

## 2023-01-09 PROCEDURE — 3008F BODY MASS INDEX DOCD: CPT | Mod: HCNC,CPTII,S$GLB, | Performed by: NURSE PRACTITIONER

## 2023-01-09 PROCEDURE — 3075F SYST BP GE 130 - 139MM HG: CPT | Mod: HCNC,CPTII,S$GLB, | Performed by: NURSE PRACTITIONER

## 2023-01-09 PROCEDURE — 99213 PR OFFICE/OUTPT VISIT, EST, LEVL III, 20-29 MIN: ICD-10-PCS | Mod: HCNC,S$GLB,, | Performed by: NURSE PRACTITIONER

## 2023-01-09 PROCEDURE — 1126F PR PAIN SEVERITY QUANTIFIED, NO PAIN PRESENT: ICD-10-PCS | Mod: HCNC,CPTII,S$GLB, | Performed by: NURSE PRACTITIONER

## 2023-01-09 PROCEDURE — 3008F PR BODY MASS INDEX (BMI) DOCUMENTED: ICD-10-PCS | Mod: HCNC,CPTII,S$GLB, | Performed by: NURSE PRACTITIONER

## 2023-01-09 PROCEDURE — 1157F PR ADVANCE CARE PLAN OR EQUIV PRESENT IN MEDICAL RECORD: ICD-10-PCS | Mod: HCNC,CPTII,S$GLB, | Performed by: NURSE PRACTITIONER

## 2023-01-09 PROCEDURE — 1159F PR MEDICATION LIST DOCUMENTED IN MEDICAL RECORD: ICD-10-PCS | Mod: HCNC,CPTII,S$GLB, | Performed by: NURSE PRACTITIONER

## 2023-01-09 PROCEDURE — 1159F MED LIST DOCD IN RCRD: CPT | Mod: HCNC,CPTII,S$GLB, | Performed by: NURSE PRACTITIONER

## 2023-01-09 PROCEDURE — 1157F ADVNC CARE PLAN IN RCRD: CPT | Mod: HCNC,CPTII,S$GLB, | Performed by: NURSE PRACTITIONER

## 2023-01-09 PROCEDURE — 99213 OFFICE O/P EST LOW 20 MIN: CPT | Mod: HCNC,S$GLB,, | Performed by: NURSE PRACTITIONER

## 2023-01-09 PROCEDURE — 1101F PR PT FALLS ASSESS DOC 0-1 FALLS W/OUT INJ PAST YR: ICD-10-PCS | Mod: HCNC,CPTII,S$GLB, | Performed by: NURSE PRACTITIONER

## 2023-01-09 PROCEDURE — 3075F PR MOST RECENT SYSTOLIC BLOOD PRESS GE 130-139MM HG: ICD-10-PCS | Mod: HCNC,CPTII,S$GLB, | Performed by: NURSE PRACTITIONER

## 2023-01-09 PROCEDURE — 99999 PR PBB SHADOW E&M-EST. PATIENT-LVL V: ICD-10-PCS | Mod: PBBFAC,HCNC,, | Performed by: NURSE PRACTITIONER

## 2023-01-09 PROCEDURE — 99999 PR PBB SHADOW E&M-EST. PATIENT-LVL V: CPT | Mod: PBBFAC,HCNC,, | Performed by: NURSE PRACTITIONER

## 2023-01-09 PROCEDURE — 1101F PT FALLS ASSESS-DOCD LE1/YR: CPT | Mod: HCNC,CPTII,S$GLB, | Performed by: NURSE PRACTITIONER

## 2023-01-09 PROCEDURE — 1126F AMNT PAIN NOTED NONE PRSNT: CPT | Mod: HCNC,CPTII,S$GLB, | Performed by: NURSE PRACTITIONER

## 2023-01-09 PROCEDURE — 3288F PR FALLS RISK ASSESSMENT DOCUMENTED: ICD-10-PCS | Mod: HCNC,CPTII,S$GLB, | Performed by: NURSE PRACTITIONER

## 2023-01-09 PROCEDURE — 3079F PR MOST RECENT DIASTOLIC BLOOD PRESSURE 80-89 MM HG: ICD-10-PCS | Mod: HCNC,CPTII,S$GLB, | Performed by: NURSE PRACTITIONER

## 2023-01-09 PROCEDURE — 3288F FALL RISK ASSESSMENT DOCD: CPT | Mod: HCNC,CPTII,S$GLB, | Performed by: NURSE PRACTITIONER

## 2023-01-09 RX ORDER — FLUTICASONE FUROATE AND VILANTEROL 200; 25 UG/1; UG/1
1 POWDER RESPIRATORY (INHALATION) DAILY
Qty: 180 EACH | Refills: 2 | Status: SHIPPED | OUTPATIENT
Start: 2023-04-09 | End: 2023-05-01

## 2023-01-09 RX ORDER — PERPHENAZINE 16 MG
600 TABLET ORAL 2 TIMES DAILY
COMMUNITY
End: 2023-09-08 | Stop reason: ALTCHOICE

## 2023-01-09 RX ORDER — INFLUENZA A VIRUS A/VICTORIA/2570/2019 IVR-215 (H1N1) ANTIGEN (FORMALDEHYDE INACTIVATED), INFLUENZA A VIRUS A/DARWIN/6/2021 IVR-227 (H3N2) ANTIGEN (FORMALDEHYDE INACTIVATED), INFLUENZA B VIRUS B/AUSTRIA/1359417/2021 BVR-26 ANTIGEN (FORMALDEHYDE INACTIVATED), INFLUENZA B VIRUS B/PHUKET/3073/2013 BVR-1B ANTIGEN (FORMALDEHYDE INACTIVATED) 15; 15; 15; 15 UG/.5ML; UG/.5ML; UG/.5ML; UG/.5ML
INJECTION, SUSPENSION INTRAMUSCULAR
COMMUNITY
Start: 2022-09-12 | End: 2023-05-26 | Stop reason: ALTCHOICE

## 2023-01-09 NOTE — PROGRESS NOTES
1/9/2023    Cornelia Delatorre  Office f/u    Chief Complaint   Patient presents with    6m f/u    Asthma     HPI:  1/9/2023- states feeling good, noticed worsening sinus drainage and productive cough when laying down at night for past 3 weeks, improves with albuterol inhaler or nebulizer. On Trelegy daily.  No recent steroid therapy. Steroid injection in knee 6 weeks Prior.  Wearing BIPAP nightly with benefit. Daytime fatigue is resolved. No complaint of morning headaches. Waiting for recalled BIPAP machine.     7/11/2022- states feeling like her self again after COVID 19 in May, Cough is dramatically improved, non productive cough, few days week.   Fatigue continues, has to take daily naps. Wearing BiPAP nightly.    On Trelegy daily with benefit but cost is high, in donut whole.       5/25/2022- Dx COVID 19 7 days prior tx with monoclonal Antibiodies two days prior. Feeling generalized weakness, diaphoresis, fatigue  Using Trelegy and nebulizer daily,  beats on her back   Cough - severe, complaint, worse at night, productive thick yellow mucous. Associated with late evening wheeze.   Lost sense of taste and smell.       4/13/2022- SOB- stable, worse in late evenings, taking prednisone 10 mg when needed, not used in past 2 months; worse with exertion, improves with rest and albuterol rescue.   On BIPAP with benefit, no daytime drowsiness.   Liked Trelegy. Still on Adviar until prescription runs out, has 2 weeks left.   Skin biopsy 5 weeks prior, left lower chin site irritated by wearing face mask. No edema or erythema,     1/12/2022- SOB worsening, on Advair daily and levalbuterol 2-3x daily for past 4 weeks, has noticed worsening of shortness of breath and sinus complaints.   Admitted to hospital for GI virus,   Noticed prednisone is needed occasionally at 10 mg notices improvement    On BiPAP nightly with benefit. No daytime fatigue, no issues with nasal pillow mask.     7/12/21- complaint of daily sinus  drainage, has to clear throat repeatedly for past 2 months. Currently on Flonase nasal spray, ipratropium nasal spray, Singulair pills, zyrtec, corcindin daily with no improvement.   SOB- stable, required steroid therapy for 3 days twice in past 3 months. Only with exertion, worse in late evenings, improves with rest,  Using nebulizer 2x daily due to feeling of heaviness in chest.   Cough- mild, non productive, associated with post nasal drip from allergies. Nocturnal arousals 2x weekly for past 2 weeks,   Wearing CPAP nighty with benefit.     4/12/21- spent last 6 months in home, was able to get COVID 19 vaccine Mederna. Allergies stable, few spells improve with nasal spray noticed improvement after getting air purifier.   Noticed spacer device helping with hoarse voice or oral thrush like before,   SOB- unchanged, daily complaint, varies with severity, worse with exertion, improves with rest and albuterol rescue. Has to sit up to breath when first laying  Down, not able to breath when laying on left side.   Occasional cough- productive, clear mucous, using nebulizer 3 x weekly.   CPAP for SOHA doing well,     10/13/2020- Allergies bother her every few days, currently on daily Singulair, zyrtec, flonase, astelin nasal spray, having sneezing fits.   Complaint of clear sinus drainage,   Hoarse voice has resolved after stopping symbicort.   Cough- improved,   SOB- doing well, occasional episodes of not being able to catch breath, did not use nebulizer   Wearing CPAP nightly for SOHA, states benefits greatly    7/13/2020- Hoarse voice, loss of voice, productive cough, lost voice July 2019 tx by ENT who treated for acute laryngitis with diflucan; Recurrent problem. Hoarse voice return 4 weeks prior, ENT doctor recommends changing Asthma medications tx her with diflucan, doxycycline and prednisone for 5 days, states has improved.   SOB- worse when wearing face mask, currently on hizentra therapy,     5/4/2020- uses  symbicort daily, uses rescue 2/wk - some anxiety, getting igg rx. Having more sinus problems with head colds- 3 in last 4 wks.  No prednisone- hizentra working well.        Nov 4, 2019- having mucous sensation, notes some sob relaxing - maybe worse night.  No nasal stuffy.  Uses cpap.  Uses symbicort bid, no rescue.  Mucous is clear.  No abx for sinus or lungs.  Getting immune infusions weekly - pt senses doing better since starting in May.  Patient Instructions   Breathing off and on short breath - need to use more albuterol to make clear where breathing problems come from    Mucous production may decrease if airways controlled- albuterol should help clearance.    Rescue inhaler may resolve any breathing problems- should use intermittently if any symptoms.    May use augmentin for sinus/lung problems- not optimal for all uti's       May 6,2019-due to get immune infusion next 2 days.  No prednisone, needs monlukast. abx stopped wks ago- mucous cleared.    Patient Instructions   Use antibiotic daily til immune therapy done a wk - then as needed like every one else  Immune therapy may keep you stable - better than antibiotics.   Use symbicort regular.  Lungs may stabilize.  Use prednisone if needed.  Lung  Nodules are stable for 2 yrs and no follow up needed.  Call if problems- hope all will be better yet.  March 7, 19-exacerbated in late Jan- cleared in feb (vague).  Now ill again yellow and gray mucous (culture last Jan was nl yvonne).  Sl intermittent wheezes since last wk.  Sees Dr Herrera in [place]- gets allergy rx but declined to get now.  Pt has cvid by  igg and igm levels low and pneumococcal antibodies to 8/14 pneumococcal titers. Uses symbicort and singulair routinely.  Took prednisone completed 4 days ago- uses prednisone monthly- was able to skip December  .  Discussed with patient above for education the following:      Gastric by pass may cause malabsorption, protein levels have been too low and may  affect ig levels-- somewhat.   Need to get follow up with dietician to assure adequate protein intake/levels.   Antibiotic may stabilize infections with common variable immune deficiency- azithromycin daily once cultures submitted.   Use augmentin if infection worsens.   Acapella/chest clapping help clear mucous, vest likewise if bronchiectasis.  Will not treat cause.   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  Use codeine to suppress mild coughing.   Need good immune system and no airway/asthma problems and good hygiene of bronchial tubes (no chronic infections) to prevent illness.     Lungs measured near normal with good response to bronchial medications 2017   Need ct to follow up and cultures to assure infection controlled.      Jan 28/2019- Feeling bad onset 2 weeks, took prednisone taper x 6 days and antibiotic Augmentin week prior, States no improvement. Cough- worse at night, no nocturnal arousals, takes cough suppressant syrup before bed. Productive yellow/brown color.   Nebulized albuterol 4x daily. States no fever.  Has started allergy injections waiting for insurance clearance for IGG therapy.   Discussed with patient above for education the following:    CT chest for February to monitor and assess for bronchiectasis, need diagnosis for insurance to cover vest therapy  Have  continue to percuss back with hand.   Recommend purchasing Acepella device to help clear lungs of excess mucous, attaches to nebulizer device  Continue Nebulizer treatments 4x daily as needed.  Chest x-ray now to evaluate for pneumonia  Blood work at hospital   Will yeison to see results of sputum culture and x-ray before repeating antibiotic  Need to return sputum to clinic with in 4 hours of collecting  Fluconazole pills for oral thrush, this is a recurrent problem related to your weak immune system and use of inhaled steroids. Continue to rinse mouth out  after using symbicort but problem will improve after starting immune replacement therapy.    oct 30,   2018-- had one day fever followed by cough/wheeze illness that lingered a wk. Pt seen by NP   [place] 2x, [place].  [place] like dying- only one day fever.  Violent cough.  Nebulizer ppt itch and palpitations- ok  On xopenex now.  Prednisone 40x3, 20 x 3 ,  augmentin cleared.  Now back to normal.  May 14, 2018- had spell /exacerbation with one round prednisone. Breathing good.  Follow-up in about 1 year (around 5/14/2019), or if symptoms worsen or fail to improve.   Discussed with patient above for education the following:     Check blood count and pneumonia vaccine response after last vaccine 4/27/2018   Use azithromycin for any lung/sinus infection- immune weakness likely   Asthma stable- use prednisone and singulair.   Use allergra and singulair and astelin/flonase   Lung nodule in lung still - Navigational bronchoscopy might find?  - will at least re check in a year.     Call if needed, re check next yr.  darlin 15, 2018--1 st illness in yr or so with cough and rattles, no flu.  Appetite ok.  Ill x wk, cough and wheeze and sob and noct worse, resp rx helps a bit.  Hasn't been using  Albuterol   Follow-up in about 6 months (around 7/15/2018).   Discussed with patient above for education the following:      tamiflu if flu.   Need to use albuterol for any lung symptoms.  Should get cough  Better controlled.      Prednisone 20 mg 3 for 3 , taper may be needed.  Give shot today, 1 daily for 3 may be enough with albuterol.   You had high eosinophils-- if asthma becomes more active - special therapy may help??        prevnar 13 would be good - should be off prednisone.  Immune system is sl weak  Protection only to 7.14 pneumonia germs.  oct 19, 2017- has scratchy throat, some sinus drip, has nightly sensation throat issues, post beryl and carpel tunnel surg.  No sinus lung infections.  Breathing and cough good.  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 Had pos tb gold.  June 21, 2017- had good [place] trip, tapered symbicort with some increase sensation of lung problems so maintained full dose. No infections.  No chest symptoms on symbicort.   April 24, feels a lot better with min cough, vague sob going left side down at night.  Going to [place] 2 weeks.  Uses symbicort and good results.  March 16, cough better, but comes and goes,  No great help with steroids.  Submitted 3 sputums.  Had pneumovax march last yr.  Breathing better. Got symbicort.   Had pneumonia vaccine last yr  3/8/2017 HPI: had onset cough Hernan illness, got dizzy few days with diarrhea and cough. Cough clear sm amt mucous. Did have 101 temp one day, fatigue, no muscle aches.  Appetite decreased.  Illnesses lingered 2 weeks but cough never remitted- has improved with inhahler use last 15 days.    Had gastric 2011 bypass with with continued diarrhea intially resolved. No regurg or reflux or swallow problems.   symbicort nearly  Resolved.    Sinuses ok.  No pets.  Never smoker.    Appetite good, feels well now.    headcolds to chest since childhood, nocturnal ??not recalled.  Had cats in past but got rid in 2003 or so.     The chief compliant  problem varies with instablilty at time    PFSH:  Past Medical History:   Diagnosis Date    Allergy     Arthritis     Asthma     Cancer     skin cancer to right hand    Cataract     Chronic fatigue 1/24/2017    CVID (common variable immunodeficiency) 3/7/2019    Diabetes mellitus     type2    KLINE (dyspnea on exertion) 1/24/2017    Dyslipidemia 6/25/2019    Encounter for blood transfusion     Essential hypertension 12/29/2011    GERD (gastroesophageal reflux disease)     Headache(784.0)     History of lumpectomy of both breasts     1992 negative    Hyperlipidemia 7/15/2015    Hypertension     Hypothyroidism     Neuropathy     Obesity     Obesity     Postmenopausal HRT (hormone replacement  therapy)     Rash     Rosacea     Sleep apnea     on bipap    Snoring     Squamous cell carcinoma of skin     left forearm     UTI (urinary tract infection)     Wears glasses          Past Surgical History:   Procedure Laterality Date    ADENOIDECTOMY      APPENDECTOMY      BLADDER SUSPENSION      BREAST BIOPSY Bilateral 1992    bilateral benign excisional biopsies    BUNIONECTOMY  10/17/14    right, still has discomfort    CATARACT EXTRACTION W/  INTRAOCULAR LENS IMPLANT Bilateral     CHOLECYSTECTOMY  08/02/2017    COLONOSCOPY      CYSTOSCOPY      EPIDURAL STEROID INJECTION INTO LUMBAR SPINE N/A 8/14/2019    Procedure: Injection-steroid-epidural-lumbar L5/S1;  Surgeon: Fredi Rojas MD;  Location: [facility] OR;  Service: Pain Management;  Laterality: N/A;    EPIDURAL STEROID INJECTION INTO LUMBAR SPINE N/A 5/3/2021    Procedure: Injection-steroid-epidural-lumbar L5/S1 to the Left;  Surgeon: Fredi Rojas MD;  Location: [facility] OR;  Service: Pain Management;  Laterality: N/A;    EPIDURAL STEROID INJECTION INTO LUMBAR SPINE N/A 9/24/2021    Procedure: Injection-steroid-epidural-lumbar L5/S1;  Surgeon: Fredi Rojas MD;  Location: [facility] OR;  Service: Pain Management;  Laterality: N/A;    EPIDURAL STEROID INJECTION INTO LUMBAR SPINE N/A 2/21/2022    Procedure: Injection-steroid-epidural-lumbar L5/S1;  Surgeon: Fredi Rojas MD;  Location: [facility] OR;  Service: Pain Management;  Laterality: N/A;    ESOPHAGEAL DILATION N/A 6/11/2021    Procedure: DILATION, ESOPHAGUS;  Surgeon: Jake Sheriff MD;  Location: [facility];  Service: Endoscopy;  Laterality: N/A;    ESOPHAGOGASTRODUODENOSCOPY      dilated esophagus    ESOPHAGOGASTRODUODENOSCOPY N/A 6/11/2021    Procedure: EGD (ESOPHAGOGASTRODUODENOSCOPY);  Surgeon: Jake Sheriff MD;  Location: [facility] ENDO;  Service: Endoscopy;  Laterality: N/A;    GASTRIC BYPASS      2011    HYSTERECTOMY  1980    interstim bladder  2009    10/14/14 new battery    OOPHORECTOMY  1980     "REPLACEMENT OF SACRAL NERVE STIMULATOR  2/18/2020    Procedure: REPLACEMENT, NEUROSTIMULATOR, SACRAL;  Surgeon: Mary Jane Jarvis MD;  Location: [facility] OR [address] [place] [address];  Service: Urology;;    REVISION OF PROCEDURE INVOLVING SACRAL NEUROSTIMULATOR DEVICE Right 2/18/2020    Procedure: REVISION, NEUROSTIMULATOR, SACRAL/ battery replacement;  Surgeon: Mary Jane Jarvis MD;  Location: [facility] OR [address] [place] [address];  Service: Urology;  Laterality: Right;  1hr/ rep contacted/ (CONNIE)    SKIN CANCER EXCISION      left hand    TONSILLECTOMY      WISDOM TOOTH EXTRACTION       Social History     Tobacco Use    Smoking status: Never    Smokeless tobacco: Never   Substance Use Topics    Alcohol use: Not Currently    Drug use: No     Family History   Problem Relation Age of Onset    Breast cancer Mother 50    Diabetes Unknown     Hypertension Unknown     Hypothyroidism Unknown     Breast cancer Paternal Aunt         40's    Ovarian cancer Paternal Grandmother     Allergic rhinitis Neg Hx     Allergies Neg Hx     Angioedema Neg Hx     Asthma Neg Hx     Atopy Neg Hx     Eczema Neg Hx     Immunodeficiency Neg Hx     Rhinitis Neg Hx     Urticaria Neg Hx      Review of patient's allergies indicates:   Allergen Reactions    Sulfa (sulfonamide antibiotics) Hives    Naproxen Other (See Comments)     Other reaction(s): RT sided numbness         Performance Status:The patient's activity level is functions out of house.      Review of Systems:  a review of eleven systems covering constitutional, Eye, HEENT, Psych, Respiratory, Cardiac, GI, , Musculoskeletal, Endocrine, Dermatologic was negative except for pertinent findings as listed ABOVE and below: all good,  Had dm that remitted with bypass 2011.  Positive for SOB, nasal drip,  fatigue    Exam:Comprehensive exam done. /80 (BP Location: Right arm, Patient Position: Sitting, BP Method: Medium (Automatic))   Pulse 62   Ht 5' 4" (1.626 m)   Wt 95.1 kg (209 lb 10.5 oz)   SpO2 99% Comment: on room air " at rest  BMI 35.99 kg/m²   Exam included Vitals as listed, and patient's appearance and affect and alertness and mood, oral exam for yeast and hygiene and pharynx lesions and Mallapatti (M) score, neck with inspection for jvd and masses and thyroid abnormalities and lymph nodes (supraclavicular and infraclavicular nodes and axillary also examined and noted if abn), chest exam included symmetry and effort and fremitus and percussion and auscultation, cardiac exam included rhythm and gallops and murmur and rubs and jvd and edema, abdominal exam for mass and hepatosplenomegaly and tenderness and hernias and bowel sounds, Musculoskeletal exam with muscle tone and posture and mobility/gait and  strength, and skin for rashes and cyanosis and pallor and turgor, extremity for clubbing.  Findings were normal except for pertinent findings listed below:  M3, good bs, rest good. chest is symmetric, no distress, normal percussion, normal fremitus and breath sounds bilateral faint wheeze    Radiographs (ct chest and cxr) reviewed: view by direct vision  i do see clear bronchiectasis,nodules are noted.  Mucoid type impaction suggested in rul ant segment.  X-Ray Chest PA And Lateral 06/09/2022 lungs clear    CT Abdomen Pelvis With Contrast 06/11/2021 lower lung bases clear    CT Chest Without Contrast  04/22/2019 stable non calcified nodules date to 2017 with no change.           Labs reviewed igm low, igg lowish, humerol titers na    Lab Results   Component Value Date    WBC 6.51 04/27/2022    RBC 4.37 04/27/2022    HGB 12.4 04/27/2022    HCT 39.0 04/27/2022    MCV 89 04/27/2022    MCH 28.4 04/27/2022    MCHC 31.8 (L) 04/27/2022    RDW 13.6 04/27/2022     04/27/2022    MPV 9.8 04/27/2022    GRAN 4.3 04/27/2022    GRAN 65.3 04/27/2022    LYMPH 1.7 04/27/2022    LYMPH 26.0 04/27/2022    MONO 0.4 04/27/2022    MONO 6.5 04/27/2022    EOS 0.1 04/27/2022    BASO 0.02 04/27/2022    EOSINOPHIL 1.1 04/27/2022    BASOPHIL 0.3  04/27/2022     Aerobic culture 10/27/22 CURVULARIA SPECIES      Latest Reference Range & Units 11/02/21 09:54 03/03/22 09:57 04/27/22 11:47 09/02/22 08:48   CO2 23 - 29 mmol/L 30 (H) 31 (H) 27 26   (H): Data is abnormally high    PFT  Spirometry with bronchodilator, lung volume by gas dilution, and diffusion capacity measured April 19, 2021. The FEV1 FVC ratio was 75% indicating no airflow obstruction measured by spirometry. The FEV1 was 99% predicted at 2.25 L. There was no   statistically significant improvement in spirometry following bronchodilator. Total lung capacity was within normal range at 90% of predicted. Diffusion was slightly low at 77% predicted-uncorrected for anemia if present.   Spirometry is normal. Lung volumes fall within normal range. Diffusion is slightly decreased. Clinical correlation recommended. The bronchodilator response was not significant.       Done [facility] with 10% response, otherwise nl  DATE OF PROCEDURE:  03/24/2017     1.  Spirometry reveals an FVC of 3.1 L, which is 107% predicted.  FEV1 is 2.3 L,   which is 100% predicted.  The ratio, there is preserved.  There is a positive,   but not significant bronchodilator response to both the FVC and FEV1.  Loop   contours appear with adequate effort as well.  2.  Lung volumes.  The total lung capacity is 4.4 L, which is 92% predicted.    There are no signs of gas trapping.  3.  Diffusing capacity is mild-to-moderate reduced at 68%, does improve to 96%   with alveolar volumes.     OVERALL IMPRESSION:  A normal spirometry with a robust yet statistically   insignificant bronchodilator response.  Normal lung volumes and a moderate   reduction in diffusion capacity.    CC: Jonathan Nicole M.D.        Plan:  Clinical impression is resonably certain and repeated evaluation prn +/- follow up will be needed as below.    Cornelia was seen today for 6m f/u and asthma.    Diagnoses and all orders for this visit:    Bronchiectasis without  complication  -     fluticasone furoate-vilanteroL (BREO ELLIPTA) 200-25 mcg/dose DsDv diskus inhaler; Inhale 1 puff into the lungs once daily. Controller    Mild intermittent asthma without complication    SOHA treated with BiPAP     Continue current therapy plan      Follow up in about 3 months (around 4/9/2023), or if symptoms worsen or fail to improve.    Discussed with patient above for education the following:      Patient Instructions   Asthma Action plan    Will change from Trelegy to Breo due to high cost    Prednisone 10 mg pills, Take one pill a day for three days, repeat for shortness of breath or wheeze    Albuterol Inhaler 1-2 puffs every 4 hours, for cough or shortness of breath    Continue BIPAP therapy

## 2023-01-09 NOTE — PATIENT INSTRUCTIONS
Asthma Action plan    Will change from Trelegy to Breo due to high cost    Prednisone 10 mg pills, Take one pill a day for three days, repeat for shortness of breath or wheeze    Albuterol Inhaler 1-2 puffs every 4 hours, for cough or shortness of breath    Continue BIPAP therapy

## 2023-01-10 ENCOUNTER — OFFICE VISIT (OUTPATIENT)
Dept: FAMILY MEDICINE | Facility: CLINIC | Age: 73
End: 2023-01-10
Payer: MEDICARE

## 2023-01-10 ENCOUNTER — DOCUMENTATION ONLY (OUTPATIENT)
Dept: ALLERGY | Facility: CLINIC | Age: 73
End: 2023-01-10
Payer: MEDICARE

## 2023-01-10 ENCOUNTER — PATIENT MESSAGE (OUTPATIENT)
Dept: PULMONOLOGY | Facility: CLINIC | Age: 73
End: 2023-01-10
Payer: MEDICARE

## 2023-01-10 DIAGNOSIS — Z00.00 ENCOUNTER FOR PREVENTIVE HEALTH EXAMINATION: Primary | ICD-10-CM

## 2023-01-10 DIAGNOSIS — B35.1 ONYCHOMYCOSIS OF MULTIPLE TOENAILS WITH TYPE 2 DIABETES MELLITUS: ICD-10-CM

## 2023-01-10 DIAGNOSIS — D83.9 CVID (COMMON VARIABLE IMMUNODEFICIENCY): ICD-10-CM

## 2023-01-10 DIAGNOSIS — J47.9 BRONCHIECTASIS WITHOUT COMPLICATION: ICD-10-CM

## 2023-01-10 DIAGNOSIS — E11.42 DIABETIC POLYNEUROPATHY ASSOCIATED WITH TYPE 2 DIABETES MELLITUS: ICD-10-CM

## 2023-01-10 DIAGNOSIS — I10 ESSENTIAL HYPERTENSION: ICD-10-CM

## 2023-01-10 DIAGNOSIS — I65.23 BILATERAL CAROTID ARTERY STENOSIS: ICD-10-CM

## 2023-01-10 DIAGNOSIS — I70.0 ATHEROSCLEROSIS OF ABDOMINAL AORTA: ICD-10-CM

## 2023-01-10 DIAGNOSIS — E11.69 ONYCHOMYCOSIS OF MULTIPLE TOENAILS WITH TYPE 2 DIABETES MELLITUS: ICD-10-CM

## 2023-01-10 DIAGNOSIS — E66.01 SEVERE OBESITY (BMI 35.0-35.9 WITH COMORBIDITY): ICD-10-CM

## 2023-01-10 PROCEDURE — 3075F PR MOST RECENT SYSTOLIC BLOOD PRESS GE 130-139MM HG: ICD-10-PCS | Mod: HCNC,CPTII,S$GLB, | Performed by: NURSE PRACTITIONER

## 2023-01-10 PROCEDURE — 3008F BODY MASS INDEX DOCD: CPT | Mod: HCNC,CPTII,S$GLB, | Performed by: NURSE PRACTITIONER

## 2023-01-10 PROCEDURE — 1126F PR PAIN SEVERITY QUANTIFIED, NO PAIN PRESENT: ICD-10-PCS | Mod: HCNC,CPTII,S$GLB, | Performed by: NURSE PRACTITIONER

## 2023-01-10 PROCEDURE — 1157F PR ADVANCE CARE PLAN OR EQUIV PRESENT IN MEDICAL RECORD: ICD-10-PCS | Mod: HCNC,CPTII,S$GLB, | Performed by: NURSE PRACTITIONER

## 2023-01-10 PROCEDURE — 3079F PR MOST RECENT DIASTOLIC BLOOD PRESSURE 80-89 MM HG: ICD-10-PCS | Mod: HCNC,CPTII,S$GLB, | Performed by: NURSE PRACTITIONER

## 2023-01-10 PROCEDURE — G0439 PPPS, SUBSEQ VISIT: HCPCS | Mod: HCNC,S$GLB,, | Performed by: NURSE PRACTITIONER

## 2023-01-10 PROCEDURE — 3288F PR FALLS RISK ASSESSMENT DOCUMENTED: ICD-10-PCS | Mod: HCNC,CPTII,S$GLB, | Performed by: NURSE PRACTITIONER

## 2023-01-10 PROCEDURE — 99999 PR PBB SHADOW E&M-EST. PATIENT-LVL V: ICD-10-PCS | Mod: PBBFAC,HCNC,, | Performed by: NURSE PRACTITIONER

## 2023-01-10 PROCEDURE — 1126F AMNT PAIN NOTED NONE PRSNT: CPT | Mod: HCNC,CPTII,S$GLB, | Performed by: NURSE PRACTITIONER

## 2023-01-10 PROCEDURE — 3008F PR BODY MASS INDEX (BMI) DOCUMENTED: ICD-10-PCS | Mod: HCNC,CPTII,S$GLB, | Performed by: NURSE PRACTITIONER

## 2023-01-10 PROCEDURE — 99999 PR PBB SHADOW E&M-EST. PATIENT-LVL V: CPT | Mod: PBBFAC,HCNC,, | Performed by: NURSE PRACTITIONER

## 2023-01-10 PROCEDURE — 3288F FALL RISK ASSESSMENT DOCD: CPT | Mod: HCNC,CPTII,S$GLB, | Performed by: NURSE PRACTITIONER

## 2023-01-10 PROCEDURE — 1101F PT FALLS ASSESS-DOCD LE1/YR: CPT | Mod: HCNC,CPTII,S$GLB, | Performed by: NURSE PRACTITIONER

## 2023-01-10 PROCEDURE — 3075F SYST BP GE 130 - 139MM HG: CPT | Mod: HCNC,CPTII,S$GLB, | Performed by: NURSE PRACTITIONER

## 2023-01-10 PROCEDURE — 1101F PR PT FALLS ASSESS DOC 0-1 FALLS W/OUT INJ PAST YR: ICD-10-PCS | Mod: HCNC,CPTII,S$GLB, | Performed by: NURSE PRACTITIONER

## 2023-01-10 PROCEDURE — 1157F ADVNC CARE PLAN IN RCRD: CPT | Mod: HCNC,CPTII,S$GLB, | Performed by: NURSE PRACTITIONER

## 2023-01-10 PROCEDURE — G0439 PR MEDICARE ANNUAL WELLNESS SUBSEQUENT VISIT: ICD-10-PCS | Mod: HCNC,S$GLB,, | Performed by: NURSE PRACTITIONER

## 2023-01-10 PROCEDURE — 3079F DIAST BP 80-89 MM HG: CPT | Mod: HCNC,CPTII,S$GLB, | Performed by: NURSE PRACTITIONER

## 2023-01-10 NOTE — PROGRESS NOTES
PA form for [place] scanned to Dr. Mcmullen's nurse Brigitte. PA form completed. Once signed will fax to [facility] [phone]

## 2023-01-10 NOTE — PATIENT INSTRUCTIONS
Counseling and Referral of Other Preventative  (Italic type indicates deductible and co-insurance are waived)    Patient Name: Cornelia Delatorre  Today's Date: 1/10/2023    Health Maintenance       Date Due Completion Date    Aspirin/Antiplatelet Therapy Never done ---    Foot Exam 02/17/2023 2/17/2022    Override on 1/25/2021: Done ()    Override on 5/20/2019: Done (Dr. Eng)    Override on 11/2/2018: Done (Diego)    Override on 4/27/2018: Done (Diego)    Override on 9/1/2016: Done (date approximately, seeing Dr. Willams)    Override on 1/4/2016: Done (with Dr. Eng)    Override on 10/19/2015: Done (Dr. Eng)    Override on 10/22/2014: Done (Dr Eng)    Mammogram 03/04/2023 3/4/2022    Hemoglobin A1c 03/02/2023 9/2/2022    Diabetes Urine Screening 08/17/2023 8/17/2022    Lipid Panel 09/02/2023 9/2/2022    Eye Exam 10/03/2023 10/3/2022    Override on 8/31/2017: Done (Dr. Genaro Sanderson)    Override on 8/29/2016: Done (Genaro Sanderson)    Override on 8/20/2015: Done    Override on 8/19/2014: Done (Dr Sanderson)    TETANUS VACCINE 11/08/2023 11/8/2013    High Dose Statin 01/10/2024 1/10/2023    DEXA Scan 03/04/2025 3/4/2022    Colorectal Cancer Screening 10/05/2030 10/5/2020        No orders of the defined types were placed in this encounter.    The following information is provided to all patients.  This information is to help you find resources for any of the problems found today that may be affecting your health:                Living healthy guide: www.[facility].[place].gov      Understanding Diabetes: www.diabetes.org      Eating healthy: www.cdc.gov/healthyweight      CDC home safety checklist: www.cdc.gov/steadi/patient.html      Agency on Aging: www.goea.[place].gov      Alcoholics anonymous (AA): www.aa.org      Physical Activity: www.richard.nih.gov/uy3elxt      Tobacco use: www.quitwithusla.org

## 2023-01-10 NOTE — PROGRESS NOTES
"  Cornelia Delatorre presented for a  Medicare AWV and comprehensive Health Risk Assessment today. The following components were reviewed and updated:    Medical history  Family History  Social history  Allergies and Current Medications  Health Risk Assessment  Health Maintenance  Care Team         ** See Completed Assessments for Annual Wellness Visit within the encounter summary.**         The following assessments were completed:  Living Situation  CAGE  Depression Screening  Timed Get Up and Go  Whisper Test  Cognitive Function Screening      Nutrition Screening  ADL Screening  PAQ Screening    Review for Opioid Screening: Patient does not have rx for Opioids.    Review for Substance Use Disorders: Patient does not use substance.     Vitals:    01/10/23 1237   BP: 136/82   BP Location: Left arm   Patient Position: Sitting   BP Method: Medium (Manual)   Pulse: 68   SpO2: 99%   Weight: 94.3 kg (207 lb 14.3 oz)   Height: 5' 4" (1.626 m)     Body mass index is 35.68 kg/m².  Physical Exam  Vitals reviewed.   Constitutional:       General: She is not in acute distress.  HENT:      Head: Normocephalic.   Cardiovascular:      Rate and Rhythm: Normal rate.      Pulses:           Dorsalis pedis pulses are 1+ on the right side and 1+ on the left side.        Posterior tibial pulses are 1+ on the right side and 1+ on the left side.   Pulmonary:      Effort: Pulmonary effort is normal. No respiratory distress.   Feet:      Right foot:      Protective Sensation: 6 sites tested.  4 sites sensed.      Skin integrity: Callus present. No ulcer, blister, skin breakdown or erythema.      Left foot:      Protective Sensation: 6 sites tested.  4 sites sensed.      Skin integrity: Callus present. No ulcer, blister, skin breakdown or erythema.   Skin:     General: Skin is warm.   Neurological:      General: No focal deficit present.      Mental Status: She is alert.   Psychiatric:         Mood and Affect: Mood normal.         "     Diagnoses and health risks identified today and associated recommendations/orders:    1. Encounter for preventive health examination  Reviewed health maintenance and provided recommendations   A1c, diabetic urine and lipid panel scheduled for 3/3/23  All additional health maintenance is UTD     2. Bronchiectasis without complication  Continue to monitor  Followed by Dr Nicole.      3. Severe obesity (BMI 35.0-35.9 with comorbidity)  Continue to monitor  Followed by Dr Cortez  Encourage healthy food chocies    4. CVID (common variable immunodeficiency)  Continue to monitor  Followed by Dr Mcmullen    5. Atherosclerosis of abdominal aorta  Continue to monitor  Followed by Dr Gonzalez  CT lumbar 8/2/19.      6. Bilateral carotid artery stenosis  Continue to monitor  Followed by dr Gonzalez  US 8/2/19.      7. Diabetic polyneuropathy associated with type 2 diabetes mellitus  Continue to monitor  Followed by Dr Cortez  Last a1c 6.3 taking metformin.      8. Essential hypertension  Continue to monitor  Followed by Dr Cortez    9.  Onychomycosis of multiple toenails with type 2 diabetes mellitus  Continue to monitor  Followed by Dr Gillette.        Provided Cornelia with a 5-10 year written screening schedule and personal prevention plan. Recommendations were developed using the USPSTF age appropriate recommendations. Education, counseling, and referrals were provided as needed. After Visit Summary printed and given to patient which includes a list of additional screenings\tests needed.    Follow up in one year    Carie Victor NP    I offered to discuss advanced care planning, including how to pick a person who would make decisions for you if you were unable to make them for yourself, called a health care power of , and what kind of decisions you might make such as use of life sustaining treatments such as ventilators and tube feeding when faced with a life limiting illness recorded on a living will that  they will need to know. (How you want to be cared for as you near the end of your natural life)     X  Patient has advanced directives on file, they would like to make changes. I provided information on how to revoke their previous directives and make new ones.

## 2023-01-10 NOTE — PROGRESS NOTES
Signed Prior authorization request for for Hizentra faxed to [facility] along with recent clinicals as requested. Original request to scanning.     Fax # [phone]

## 2023-01-11 ENCOUNTER — TELEPHONE (OUTPATIENT)
Dept: ALLERGY | Facility: CLINIC | Age: 73
End: 2023-01-11
Payer: MEDICARE

## 2023-01-11 NOTE — TELEPHONE ENCOUNTER
----- Message from Jaspreet Dunbar sent at 1/11/2023  4:18 PM CST -----  Contact: Torie  Type:  Pharmacy Calling to Clarify an RX    Name of Caller:  Torie  Pharmacy Name:    [facility]       Prescription Name:  immun glob G,IgG,-pro-IgA 0-50 (HIZENTRA) 1 gram/5 mL (20 %) Soln  What do they need to clarify?:  Pastora REIS  Best Call Back Number:  [phone]  Additional Information:

## 2023-01-16 ENCOUNTER — PATIENT MESSAGE (OUTPATIENT)
Dept: ALLERGY | Facility: CLINIC | Age: 73
End: 2023-01-16
Payer: MEDICARE

## 2023-01-17 ENCOUNTER — PATIENT MESSAGE (OUTPATIENT)
Dept: OTOLARYNGOLOGY | Facility: CLINIC | Age: 73
End: 2023-01-17
Payer: MEDICARE

## 2023-01-23 ENCOUNTER — TELEPHONE (OUTPATIENT)
Dept: ALLERGY | Facility: CLINIC | Age: 73
End: 2023-01-23
Payer: MEDICARE

## 2023-01-23 ENCOUNTER — OFFICE VISIT (OUTPATIENT)
Dept: UROLOGY | Facility: CLINIC | Age: 73
End: 2023-01-23
Payer: MEDICARE

## 2023-01-23 VITALS
WEIGHT: 206.81 LBS | HEIGHT: 64 IN | SYSTOLIC BLOOD PRESSURE: 127 MMHG | DIASTOLIC BLOOD PRESSURE: 75 MMHG | BODY MASS INDEX: 35.31 KG/M2 | HEART RATE: 73 BPM

## 2023-01-23 DIAGNOSIS — N30.90 BLADDER INFECTION: Primary | ICD-10-CM

## 2023-01-23 PROCEDURE — 87086 URINE CULTURE/COLONY COUNT: CPT | Mod: HCNC | Performed by: UROLOGY

## 2023-01-23 PROCEDURE — 99999 PR PBB SHADOW E&M-EST. PATIENT-LVL V: ICD-10-PCS | Mod: PBBFAC,HCNC,, | Performed by: UROLOGY

## 2023-01-23 PROCEDURE — 95970 ALYS NPGT W/O PRGRMG: CPT | Mod: HCNC,59,S$GLB, | Performed by: UROLOGY

## 2023-01-23 PROCEDURE — 3008F BODY MASS INDEX DOCD: CPT | Mod: HCNC,CPTII,S$GLB, | Performed by: UROLOGY

## 2023-01-23 PROCEDURE — 1157F ADVNC CARE PLAN IN RCRD: CPT | Mod: HCNC,CPTII,S$GLB, | Performed by: UROLOGY

## 2023-01-23 PROCEDURE — 99215 PR OFFICE/OUTPT VISIT, EST, LEVL V, 40-54 MIN: ICD-10-PCS | Mod: HCNC,S$GLB,, | Performed by: UROLOGY

## 2023-01-23 PROCEDURE — 3288F FALL RISK ASSESSMENT DOCD: CPT | Mod: HCNC,CPTII,S$GLB, | Performed by: UROLOGY

## 2023-01-23 PROCEDURE — 1101F PR PT FALLS ASSESS DOC 0-1 FALLS W/OUT INJ PAST YR: ICD-10-PCS | Mod: HCNC,CPTII,S$GLB, | Performed by: UROLOGY

## 2023-01-23 PROCEDURE — 1159F PR MEDICATION LIST DOCUMENTED IN MEDICAL RECORD: ICD-10-PCS | Mod: HCNC,CPTII,S$GLB, | Performed by: UROLOGY

## 2023-01-23 PROCEDURE — 95970 PR ANALYZE NEUROSTIM,NO REPROG: ICD-10-PCS | Mod: HCNC,59,S$GLB, | Performed by: UROLOGY

## 2023-01-23 PROCEDURE — 99215 OFFICE O/P EST HI 40 MIN: CPT | Mod: HCNC,S$GLB,, | Performed by: UROLOGY

## 2023-01-23 PROCEDURE — 87088 URINE BACTERIA CULTURE: CPT | Mod: HCNC | Performed by: UROLOGY

## 2023-01-23 PROCEDURE — 3078F PR MOST RECENT DIASTOLIC BLOOD PRESSURE < 80 MM HG: ICD-10-PCS | Mod: HCNC,CPTII,S$GLB, | Performed by: UROLOGY

## 2023-01-23 PROCEDURE — 99999 PR PBB SHADOW E&M-EST. PATIENT-LVL V: CPT | Mod: PBBFAC,HCNC,, | Performed by: UROLOGY

## 2023-01-23 PROCEDURE — 1159F MED LIST DOCD IN RCRD: CPT | Mod: HCNC,CPTII,S$GLB, | Performed by: UROLOGY

## 2023-01-23 PROCEDURE — 3078F DIAST BP <80 MM HG: CPT | Mod: HCNC,CPTII,S$GLB, | Performed by: UROLOGY

## 2023-01-23 PROCEDURE — 81002 URINALYSIS NONAUTO W/O SCOPE: CPT | Mod: HCNC,S$GLB,, | Performed by: UROLOGY

## 2023-01-23 PROCEDURE — 3074F SYST BP LT 130 MM HG: CPT | Mod: HCNC,CPTII,S$GLB, | Performed by: UROLOGY

## 2023-01-23 PROCEDURE — 1101F PT FALLS ASSESS-DOCD LE1/YR: CPT | Mod: HCNC,CPTII,S$GLB, | Performed by: UROLOGY

## 2023-01-23 PROCEDURE — 81002 PR URINALYSIS NONAUTO W/O SCOPE: ICD-10-PCS | Mod: HCNC,S$GLB,, | Performed by: UROLOGY

## 2023-01-23 PROCEDURE — 3288F PR FALLS RISK ASSESSMENT DOCUMENTED: ICD-10-PCS | Mod: HCNC,CPTII,S$GLB, | Performed by: UROLOGY

## 2023-01-23 PROCEDURE — 87077 CULTURE AEROBIC IDENTIFY: CPT | Mod: HCNC | Performed by: UROLOGY

## 2023-01-23 PROCEDURE — 3074F PR MOST RECENT SYSTOLIC BLOOD PRESSURE < 130 MM HG: ICD-10-PCS | Mod: HCNC,CPTII,S$GLB, | Performed by: UROLOGY

## 2023-01-23 PROCEDURE — 1126F PR PAIN SEVERITY QUANTIFIED, NO PAIN PRESENT: ICD-10-PCS | Mod: HCNC,CPTII,S$GLB, | Performed by: UROLOGY

## 2023-01-23 PROCEDURE — 1126F AMNT PAIN NOTED NONE PRSNT: CPT | Mod: HCNC,CPTII,S$GLB, | Performed by: UROLOGY

## 2023-01-23 PROCEDURE — 3008F PR BODY MASS INDEX (BMI) DOCUMENTED: ICD-10-PCS | Mod: HCNC,CPTII,S$GLB, | Performed by: UROLOGY

## 2023-01-23 PROCEDURE — 87186 SC STD MICRODIL/AGAR DIL: CPT | Mod: HCNC | Performed by: UROLOGY

## 2023-01-23 PROCEDURE — 1157F PR ADVANCE CARE PLAN OR EQUIV PRESENT IN MEDICAL RECORD: ICD-10-PCS | Mod: HCNC,CPTII,S$GLB, | Performed by: UROLOGY

## 2023-01-23 RX ORDER — CEFDINIR 300 MG/1
300 CAPSULE ORAL 2 TIMES DAILY
Qty: 14 CAPSULE | Refills: 0 | Status: SHIPPED | OUTPATIENT
Start: 2023-01-23 | End: 2023-01-30

## 2023-01-23 NOTE — TELEPHONE ENCOUNTER
[facility]  Spoke to Yoko.  Hizentra is denied under [facility] medicare part D benefits(pharmacy) but now covered under part B(Medical) benefits.    Approval good through 12/31/23

## 2023-01-23 NOTE — TELEPHONE ENCOUNTER
----- Message from Shalom Bey RN sent at 1/10/2023  4:48 PM CST -----  Contact: [facility]    ----- Message -----  From: Mary Mcclendon  Sent: 1/10/2023  11:23 AM CST  To: Kemi [facility] Staff    [facility] is requesting information about pts medication: immun glob G,IgG,-pro-IgA 0-50 (HIZENTRA) 1 gram/5 mL (20 %) Soln    Representative verified name, , providers name and medication name, and phone number. However, was unable to identify the address as she states their system does not store the address on file.     Contact number: [phone]   Reference number: [id number]      [facility] - [place], [place] - [address]  [address] [address]  Phone: [phone] Fax: [phone]

## 2023-01-23 NOTE — PATIENT INSTRUCTIONS
[employer] patient support  [email or web address] was provided

## 2023-01-23 NOTE — PROGRESS NOTES
CHIEF COMPLAINT:    Mrs. Delatorre is a 72 y.o. female presenting for a follow up on urinary urgency, frequency and possible bladder infection.    PRESENTING ILLNESS:    Cornelia Delatorre is a 72 y.o. female who is presents with a history of overactive bladder refractory to medications.  She has an InterStim in place, battery was replaced on 2/18/2020.  She is having issues with the Smart .  Does not appear to be holding the charge.  She charged it yesterday and by this morning it was down to 50%.  She charged it again today and it is down to 95%.  It used to hold a charge well.  She feels the stimulation intermittently in the vagina.      For the past 2 days, she has had suprapubic pressure and a malodorous urine.  She does not have flank pain or fever. She states her primary had given her Cipro in the past but we discussed that there is a black box warning for the use of cipro (cardiotoxicity and concern for connective tissues)  she is amenable to other antibiotics, (discussed that I would be getting a culture so will make sure she is on something that it is sensitive)       REVIEW OF SYSTEMS:    Review of Systems   Constitutional: Negative.    HENT: Negative.     Eyes: Negative.    Respiratory: Negative.     Cardiovascular: Negative.    Gastrointestinal: Negative.    Genitourinary:  Positive for frequency and urgency.   Musculoskeletal: Negative.    Skin: Negative.    Neurological: Negative.    Endo/Heme/Allergies: Negative.    Psychiatric/Behavioral: Negative.       PATIENT HISTORY:    Past Medical History:   Diagnosis Date    Allergy     Arthritis     Asthma     Cancer     skin cancer to right hand    Cataract     Chronic fatigue 1/24/2017    CVID (common variable immunodeficiency) 3/7/2019    Diabetes mellitus     type2    KLINE (dyspnea on exertion) 1/24/2017    Dyslipidemia 6/25/2019    Encounter for blood transfusion     Essential hypertension 12/29/2011    GERD (gastroesophageal reflux disease)      Headache(784.0)     History of lumpectomy of both breasts     1992 negative    Hyperlipidemia 7/15/2015    Hypertension     Hypothyroidism     Neuropathy     Obesity     Obesity     Postmenopausal HRT (hormone replacement therapy)     Rash     Rosacea     Sleep apnea     on bipap    Snoring     Squamous cell carcinoma of skin     left forearm     UTI (urinary tract infection)     Wears glasses        Past Surgical History:   Procedure Laterality Date    ADENOIDECTOMY      APPENDECTOMY      BLADDER SUSPENSION      BREAST BIOPSY Bilateral 1992    bilateral benign excisional biopsies    BUNIONECTOMY  10/17/14    right, still has discomfort    CATARACT EXTRACTION W/  INTRAOCULAR LENS IMPLANT Bilateral     CHOLECYSTECTOMY  08/02/2017    COLONOSCOPY      CYSTOSCOPY      EPIDURAL STEROID INJECTION INTO LUMBAR SPINE N/A 8/14/2019    Procedure: Injection-steroid-epidural-lumbar L5/S1;  Surgeon: Fredi Rojas MD;  Location: [facility] OR;  Service: Pain Management;  Laterality: N/A;    EPIDURAL STEROID INJECTION INTO LUMBAR SPINE N/A 5/3/2021    Procedure: Injection-steroid-epidural-lumbar L5/S1 to the Left;  Surgeon: Fredi Rojas MD;  Location: [facility] OR;  Service: Pain Management;  Laterality: N/A;    EPIDURAL STEROID INJECTION INTO LUMBAR SPINE N/A 9/24/2021    Procedure: Injection-steroid-epidural-lumbar L5/S1;  Surgeon: Fredi Rojas MD;  Location: [facility] OR;  Service: Pain Management;  Laterality: N/A;    EPIDURAL STEROID INJECTION INTO LUMBAR SPINE N/A 2/21/2022    Procedure: Injection-steroid-epidural-lumbar L5/S1;  Surgeon: Fredi Rojas MD;  Location: [facility] OR;  Service: Pain Management;  Laterality: N/A;    ESOPHAGEAL DILATION N/A 6/11/2021    Procedure: DILATION, ESOPHAGUS;  Surgeon: Jake Sheriff MD;  Location: [facility];  Service: Endoscopy;  Laterality: N/A;    ESOPHAGOGASTRODUODENOSCOPY      dilated esophagus    ESOPHAGOGASTRODUODENOSCOPY N/A 6/11/2021    Procedure: EGD (ESOPHAGOGASTRODUODENOSCOPY);   Surgeon: Jake Sheriff MD;  Location: [facility];  Service: Endoscopy;  Laterality: N/A;    GASTRIC BYPASS      2011    HYSTERECTOMY  1980    interstim bladder  2009    10/14/14 new battery    OOPHORECTOMY  1980    REPLACEMENT OF SACRAL NERVE STIMULATOR  2/18/2020    Procedure: REPLACEMENT, NEUROSTIMULATOR, SACRAL;  Surgeon: Mary Jane Jarvis MD;  [facility];  Service: Urology;;    REVISION OF PROCEDURE INVOLVING SACRAL NEUROSTIMULATOR DEVICE Right 2/18/2020    Procedure: REVISION, NEUROSTIMULATOR, SACRAL/ battery replacement;  Surgeon: Mary Jane Jarvis MD;  [facility];  Service: Urology;  Laterality: Right;  1hr/ rep contacted/ (CONNIE)    SKIN CANCER EXCISION      left hand    TONSILLECTOMY      WISDOM TOOTH EXTRACTION         Family History   Problem Relation Age of Onset    Breast cancer Mother 50    Diabetes Unknown     Hypertension Unknown     Hypothyroidism Unknown     Breast cancer Paternal Aunt         40's    Ovarian cancer Paternal Grandmother      Social History     Socioeconomic History    Marital status:    Tobacco Use    Smoking status: Never    Smokeless tobacco: Never   Substance and Sexual Activity    Alcohol use: Not Currently    Drug use: No    Sexual activity: Not Currently     Social Determinants of Health     Financial Resource Strain: Low Risk     Difficulty of Paying Living Expenses: Not very hard   Food Insecurity: No Food Insecurity    Worried About Running Out of Food in the Last Year: Never true    Ran Out of Food in the Last Year: Never true   Transportation Needs: No Transportation Needs    Lack of Transportation (Medical): No    Lack of Transportation (Non-Medical): No   Physical Activity: Inactive    Days of Exercise per Week: 0 days    Minutes of Exercise per Session: 0 min   Stress: Stress Concern Present    Feeling of Stress : To some extent   Social Connections: Moderately Integrated    Frequency of Communication with Friends and Family:  More than three times a week    Frequency of Social Gatherings with Friends and Family: Once a week    Attends Faith Services: More than 4 times per year    Active Member of Clubs or Organizations: No    Attends Club or Organization Meetings: Never    Marital Status: Living with partner   Housing Stability: Low Risk     Unable to Pay for Housing in the Last Year: No    Number of Places Lived in the Last Year: 1    Unstable Housing in the Last Year: No       Allergies:  Sulfa (sulfonamide antibiotics), Naproxen, and Albuterol    Medications:  Outpatient Encounter Medications as of 1/23/2023   Medication Sig Dispense Refill    albuterol (PROVENTIL/VENTOLIN HFA) 90 mcg/actuation inhaler Inhale 2 puffs into the lungs every 4 (four) hours as needed. 2 puffs every 4 hours as needed for cough, wheeze, or shortness of breath 54 g 3    alpha lipoic acid 600 mg Cap Take 600 mg by mouth 2 (two) times daily.      ascorbic acid, vitamin C, (VITAMIN C) 1000 MG tablet Take 1,000 mg by mouth 2 (two) times daily.       azelastine (ASTELIN) 137 mcg (0.1 %) nasal spray 1 spray (137 mcg total) by Nasal route 2 (two) times daily. 30 mL 12    azelastine (OPTIVAR) 0.05 % ophthalmic solution Place 1 drop into both eyes. As needed      B-complex with vitamin C (Z-BEC OR EQUIV) tablet Take 1 tablet by mouth once daily.      Bifidobacterium infantis (ALIGN ORAL) Take by mouth once daily.      biotin 10,000 mcg Cap Take 1 tablet by mouth every evening.       blood sugar diagnostic [employer] preferred meter and strips. Check daily 90 each 3    blood-glucose meter kit Use as instructed. Insurance preferred meter. 1 each 0    chlorpheniramine (CHLOR-TRIMETON) 4 mg tablet Take 1 tablet (4 mg total) by mouth every 8 (eight) hours while awake. 90 tablet 12    cranberry 500 mg Cap Take 1 capsule by mouth every evening.      diclofenac sodium (VOLTAREN) 1 % Gel Apply 2 grams topically once daily. (Patient taking differently: Apply 2 g  topically as needed (arthritis).) 1 Tube 6    diltiaZEM (CARDIZEM CD) 120 MG Cp24 TAKE 1 CAPSULE (120 MG TOTAL) BY MOUTH EVERY EVENING. 90 capsule 4    EPINEPHrine (EPIPEN) 0.3 mg/0.3 mL AtIn Inject 1 each into the muscle as needed.   3    erythromycin with ethanoL (THERAMYCIN) 2 % external solution Aaa bid prn 60 mL 3    esomeprazole (NEXIUM) 40 MG capsule Take 1 capsule (40 mg total) by mouth before breakfast. 90 capsule 3    estradioL (ESTRACE) 0.01 % (0.1 mg/gram) vaginal cream Place 1 g vaginally 3 (three) times a week. Place by fingertip application before bedtime three times a week (Monday, Wednesday, Friday) 45 g 3    fexofenadine (ALLEGRA) 180 MG tablet Take 180 mg by mouth once daily.       fish oil-omega-3 fatty acids 300-1,000 mg capsule Take 2 g by mouth once daily.      FLUAD QUAD 2022-23,65Y UP,,PF, 60 mcg (15 mcg x 4)/0.5 mL Syrg       [START ON 4/9/2023] fluticasone furoate-vilanteroL (BREO ELLIPTA) 200-25 mcg/dose DsDv diskus inhaler Inhale 1 puff into the lungs once daily. Controller 180 each 2    fluticasone propionate (FLONASE) 50 mcg/actuation nasal spray 2 sprays (100 mcg total) by Each Nostril route once daily. 16 g 11    gabapentin (NEURONTIN) 600 MG tablet Take 1 tablet (600 mg total) by mouth 3 (three) times daily. 540 tablet 3    guaifenesin-codeine 100-10 mg/5 ml (GUAIFENESIN AC)  mg/5 mL syrup Take 5 mLs by mouth 3 (three) times daily as needed for Cough. 473 mL 2    hydrOXYzine HCL (ATARAX) 10 MG Tab Take 1 tablet (10 mg total) by mouth 3 (three) times daily as needed. 30 tablet 3    immun glob G,IgG,-pro-IgA 0-50 (HIZENTRA) 1 gram/5 mL (20 %) Soln Inject 55 mLs (11 g total) into the skin once a week. 220 mL 12    inhalation spacing device Use as directed for inhalation. 1 each 0    ipratropium (ATROVENT) 42 mcg (0.06 %) nasal spray 2 sprays by Nasal route 4 (four) times daily. 15 mL 2    lancets (ACCU-CHEK SOFTCLIX LANCETS) Misc Use to check blood sugar daily. 100 each 11     metFORMIN (GLUCOPHAGE-XR) 500 MG ER 24hr tablet Take 1 tablet (500 mg total) by mouth 2 (two) times daily with meals. 180 tablet 3    mometasone 0.1% (ELOCON) 0.1 % cream aaa bid x 1-2 weeks prn bug bites 45 g 1    montelukast (SINGULAIR) 10 mg tablet Take 1 tablet (10 mg total) by mouth every evening. 90 tablet 3    mucus clearing device Cecy 1 Device by Misc.(Non-Drug; Combo Route) route 2 (two) times daily. 1 Device 0    multivitamin capsule Take 1 capsule by mouth once daily.       mupirocin (BACTROBAN) 2 % ointment Apply topically 3 (three) times daily. 15 g 0    ondansetron (ZOFRAN-ODT) 4 MG TbDL Take 1 tablet (4 mg total) by mouth every 8 (eight) hours as needed. 20 tablet 0    potassium chloride SA (K-DUR,KLOR-CON) 20 MEQ tablet TAKE 1 TABLET EVERY DAY 90 tablet 4    pravastatin (PRAVACHOL) 80 MG tablet TAKE 1 TABLET EVERY DAY 90 tablet 4    predniSONE (DELTASONE) 10 MG tablet 1 pill for 3-5 days repeat as needed for chest tightness. 20 tablet 0    psyllium husk (METAMUCIL ORAL) Take by mouth.      SIMETHICONE (GAS-X ORAL) Take 1 capsule by mouth as needed (gas relief).       valsartan-hydrochlorothiazide (DIOVAN-HCT) 160-12.5 mg per tablet Take 1 tablet by mouth once daily. 90 tablet 3    vitamin D3-folic acid 5,000 unit- 1 mg Tab Take 1 tablet by mouth once daily.       cloNIDine (CATAPRES) 0.1 MG tablet Take 1 tablet (0.1 mg total) by mouth 3 (three) times daily as needed (PRN SBP > 165 mmHg). 90 tablet 6    [] fluconazole (DIFLUCAN) 100 MG tablet Take 1 tablet (100 mg total) by mouth once daily. for 10 days 10 tablet 0    levalbuterol (XOPENEX) 1.25 mg/3 mL nebulizer solution Take 3 mLs (1.25 mg total) by nebulization every 4 (four) hours as needed for Wheezing or Shortness of Breath. Rescue 1 Box 11    [DISCONTINUED] TRELEGY ELLIPTA 200-62.5-25 mcg inhaler        No facility-administered encounter medications on file as of 2023.         PHYSICAL EXAMINATION:    The patient generally appears  in good health, is appropriately interactive, and is in no apparent distress.    Skin: No lesions.    Mental: Cooperative with normal affect.    Neuro: Grossly intact.    HEENT: Normal. No evidence of lymphadenopathy.    Chest:  normal inspiratory effort.    Abdomen: Soft, non-tender. No masses or organomegaly. Bladder is not palpable. No evidence of flank discomfort. No evidence of inguinal hernia.    Extremities: No clubbing, cyanosis, or edema    Normal external female genitalia  Grade II urogenital atrophy  Urethral meatus is normal  Urethra and bladder are nontender to bimanual exam  Well supported anteriorly and posteriorly   Uterus and cervix are normal  No adnexal masses    LABS:    Lab Results   Component Value Date    BUN 11 09/02/2022    CREATININE 0.8 09/02/2022       UA 1.010, pH 6, + leuk, + nitrite, tr protein, 50 blood, otherwise, negative.     IMPRESSION:    Bladder infection  Urgency, frequency    PLAN:    1. Interrogated the SNM.  She is on program 7, 0+, 2 and 3 are -, 3.4 mA, 270 ms, 25 Hz.  Impedances were checked ranges from 499-1084 ohms.    2.  The catheterized specimen was sent for culture  3.  Cefdinir sent to her preferred pharmacy.   4.  Information for [employer] patient support placed on the AVS so she can contact them regarding the Smart  not holding a charge.     I spent 40 minutes with the patient of which more than half was spent in direct consultation with the patient in regards to our treatment and plan.

## 2023-01-24 VITALS
HEIGHT: 64 IN | OXYGEN SATURATION: 99 % | HEART RATE: 68 BPM | WEIGHT: 207.88 LBS | DIASTOLIC BLOOD PRESSURE: 82 MMHG | BODY MASS INDEX: 35.49 KG/M2 | SYSTOLIC BLOOD PRESSURE: 136 MMHG

## 2023-01-24 PROBLEM — E11.69 ONYCHOMYCOSIS OF MULTIPLE TOENAILS WITH TYPE 2 DIABETES MELLITUS: Status: ACTIVE | Noted: 2023-01-24

## 2023-01-24 PROBLEM — B35.1 ONYCHOMYCOSIS OF MULTIPLE TOENAILS WITH TYPE 2 DIABETES MELLITUS: Status: ACTIVE | Noted: 2023-01-24

## 2023-01-26 ENCOUNTER — PATIENT MESSAGE (OUTPATIENT)
Dept: OBSTETRICS AND GYNECOLOGY | Facility: CLINIC | Age: 73
End: 2023-01-26
Payer: MEDICARE

## 2023-01-26 ENCOUNTER — PATIENT MESSAGE (OUTPATIENT)
Dept: UROLOGY | Facility: CLINIC | Age: 73
End: 2023-01-26
Payer: MEDICARE

## 2023-01-26 LAB — BACTERIA UR CULT: ABNORMAL

## 2023-01-30 ENCOUNTER — TELEPHONE (OUTPATIENT)
Dept: ORTHOPEDICS | Facility: CLINIC | Age: 73
End: 2023-01-30
Payer: MEDICARE

## 2023-01-30 DIAGNOSIS — M17.10 ARTHRITIS OF KNEE: Primary | ICD-10-CM

## 2023-01-30 DIAGNOSIS — M25.551 RIGHT HIP PAIN: ICD-10-CM

## 2023-01-30 NOTE — TELEPHONE ENCOUNTER
----- Message from Misbah Sanchez sent at 1/30/2023  8:23 AM CST -----  Regarding: est pt, R leg pain, wants to be seen today, call pt   Contact: pt   est pt, R leg pain, wants to be seen today, call pt

## 2023-01-30 NOTE — TELEPHONE ENCOUNTER
Pt appt scheduled with Ms. Bocanegra for appt tomorrow per pt request as Amanda Swan not in [place] on Mondays.

## 2023-01-31 ENCOUNTER — OFFICE VISIT (OUTPATIENT)
Dept: ORTHOPEDICS | Facility: CLINIC | Age: 73
End: 2023-01-31
Payer: MEDICARE

## 2023-01-31 ENCOUNTER — HOSPITAL ENCOUNTER (OUTPATIENT)
Dept: RADIOLOGY | Facility: HOSPITAL | Age: 73
Discharge: HOME | End: 2023-01-31
Attending: NURSE PRACTITIONER
Payer: MEDICARE

## 2023-01-31 DIAGNOSIS — M25.551 RIGHT HIP PAIN: ICD-10-CM

## 2023-01-31 DIAGNOSIS — M70.61 TROCHANTERIC BURSITIS OF RIGHT HIP: Primary | ICD-10-CM

## 2023-01-31 DIAGNOSIS — M17.10 ARTHRITIS OF KNEE: ICD-10-CM

## 2023-01-31 PROCEDURE — 1157F PR ADVANCE CARE PLAN OR EQUIV PRESENT IN MEDICAL RECORD: ICD-10-PCS | Mod: HCNC,CPTII,S$GLB, | Performed by: NURSE PRACTITIONER

## 2023-01-31 PROCEDURE — 20610 DRAIN/INJ JOINT/BURSA W/O US: CPT | Mod: HCNC,RT,S$GLB, | Performed by: NURSE PRACTITIONER

## 2023-01-31 PROCEDURE — 1101F PR PT FALLS ASSESS DOC 0-1 FALLS W/OUT INJ PAST YR: ICD-10-PCS | Mod: HCNC,CPTII,S$GLB, | Performed by: NURSE PRACTITIONER

## 2023-01-31 PROCEDURE — 20610 LARGE JOINT ASPIRATION/INJECTION: R GREATER TROCHANTERIC BURSA: ICD-10-PCS | Mod: HCNC,RT,S$GLB, | Performed by: NURSE PRACTITIONER

## 2023-01-31 PROCEDURE — 1160F PR REVIEW ALL MEDS BY PRESCRIBER/CLIN PHARMACIST DOCUMENTED: ICD-10-PCS | Mod: HCNC,CPTII,S$GLB, | Performed by: NURSE PRACTITIONER

## 2023-01-31 PROCEDURE — 73502 XR HIP WITH PELVIS WHEN PERFORMED, 2 OR 3  VIEWS RIGHT: ICD-10-PCS | Mod: 26,HCNC,RT, | Performed by: RADIOLOGY

## 2023-01-31 PROCEDURE — 99999 PR PBB SHADOW E&M-EST. PATIENT-LVL IV: CPT | Mod: PBBFAC,HCNC,, | Performed by: NURSE PRACTITIONER

## 2023-01-31 PROCEDURE — 1157F ADVNC CARE PLAN IN RCRD: CPT | Mod: HCNC,CPTII,S$GLB, | Performed by: NURSE PRACTITIONER

## 2023-01-31 PROCEDURE — 1159F PR MEDICATION LIST DOCUMENTED IN MEDICAL RECORD: ICD-10-PCS | Mod: HCNC,CPTII,S$GLB, | Performed by: NURSE PRACTITIONER

## 2023-01-31 PROCEDURE — 99999 PR PBB SHADOW E&M-EST. PATIENT-LVL IV: ICD-10-PCS | Mod: PBBFAC,HCNC,, | Performed by: NURSE PRACTITIONER

## 2023-01-31 PROCEDURE — 3288F FALL RISK ASSESSMENT DOCD: CPT | Mod: HCNC,CPTII,S$GLB, | Performed by: NURSE PRACTITIONER

## 2023-01-31 PROCEDURE — 73560 XR KNEE ORTHO RIGHT: ICD-10-PCS | Mod: 26,HCNC,LT, | Performed by: RADIOLOGY

## 2023-01-31 PROCEDURE — 1101F PT FALLS ASSESS-DOCD LE1/YR: CPT | Mod: HCNC,CPTII,S$GLB, | Performed by: NURSE PRACTITIONER

## 2023-01-31 PROCEDURE — 73560 X-RAY EXAM OF KNEE 1 OR 2: CPT | Mod: TC,HCNC,PO,LT

## 2023-01-31 PROCEDURE — 1159F MED LIST DOCD IN RCRD: CPT | Mod: HCNC,CPTII,S$GLB, | Performed by: NURSE PRACTITIONER

## 2023-01-31 PROCEDURE — 73562 XR KNEE ORTHO RIGHT: ICD-10-PCS | Mod: 26,HCNC,RT, | Performed by: RADIOLOGY

## 2023-01-31 PROCEDURE — 1125F PR PAIN SEVERITY QUANTIFIED, PAIN PRESENT: ICD-10-PCS | Mod: HCNC,CPTII,S$GLB, | Performed by: NURSE PRACTITIONER

## 2023-01-31 PROCEDURE — 73562 X-RAY EXAM OF KNEE 3: CPT | Mod: 26,HCNC,RT, | Performed by: RADIOLOGY

## 2023-01-31 PROCEDURE — 3288F PR FALLS RISK ASSESSMENT DOCUMENTED: ICD-10-PCS | Mod: HCNC,CPTII,S$GLB, | Performed by: NURSE PRACTITIONER

## 2023-01-31 PROCEDURE — 73502 X-RAY EXAM HIP UNI 2-3 VIEWS: CPT | Mod: 26,HCNC,RT, | Performed by: RADIOLOGY

## 2023-01-31 PROCEDURE — 1160F RVW MEDS BY RX/DR IN RCRD: CPT | Mod: HCNC,CPTII,S$GLB, | Performed by: NURSE PRACTITIONER

## 2023-01-31 PROCEDURE — 99213 OFFICE O/P EST LOW 20 MIN: CPT | Mod: HCNC,25,S$GLB, | Performed by: NURSE PRACTITIONER

## 2023-01-31 PROCEDURE — 73560 X-RAY EXAM OF KNEE 1 OR 2: CPT | Mod: 26,HCNC,LT, | Performed by: RADIOLOGY

## 2023-01-31 PROCEDURE — 1125F AMNT PAIN NOTED PAIN PRSNT: CPT | Mod: HCNC,CPTII,S$GLB, | Performed by: NURSE PRACTITIONER

## 2023-01-31 PROCEDURE — 99213 PR OFFICE/OUTPT VISIT, EST, LEVL III, 20-29 MIN: ICD-10-PCS | Mod: HCNC,25,S$GLB, | Performed by: NURSE PRACTITIONER

## 2023-01-31 PROCEDURE — 73502 X-RAY EXAM HIP UNI 2-3 VIEWS: CPT | Mod: TC,HCNC,PO,RT

## 2023-01-31 RX ORDER — TRIAMCINOLONE ACETONIDE 40 MG/ML
40 INJECTION, SUSPENSION INTRA-ARTICULAR; INTRAMUSCULAR
Status: DISCONTINUED | OUTPATIENT
Start: 2023-01-31 | End: 2023-01-31 | Stop reason: HOSPADM

## 2023-01-31 RX ADMIN — TRIAMCINOLONE ACETONIDE 40 MG: 40 INJECTION, SUSPENSION INTRA-ARTICULAR; INTRAMUSCULAR at 10:01

## 2023-01-31 NOTE — PROCEDURES
Large Joint Aspiration/Injection: R greater trochanteric bursa    Date/Time: 1/31/2023 10:00 AM  Performed by: FELIX Murrell  Authorized by: FELIX Murrell     Consent Done?:  Yes (Verbal)  Indications:  Pain  Timeout: prior to procedure the correct patient, procedure, and site was verified    Prep: patient was prepped and draped in usual sterile fashion    Local anesthetic:  Lidocaine 1% without epinephrine  Anesthetic total (ml):  5      Details:  Needle Size:  21 G  Approach:  Anterolateral  Location:  Hip  Site:  R greater trochanteric bursa  Medications:  40 mg triamcinolone acetonide 40 mg/mL  Patient tolerance:  Patient tolerated the procedure well with no immediate complications

## 2023-01-31 NOTE — PROGRESS NOTES
Chief Complaint   Patient presents with    Right Hip - Pain    Right Knee - Pain       HPI:    This is a 72 y.o. who presents today complaining of right hip pain for 2 weeks after no known trauma or injury; she does report that she adjusted her bladder stimulator by moving it 2 points up. Pain is aching. No numbness or tingling. No associated signs or symptoms.      Past Medical History:   Diagnosis Date    Allergy     Arthritis     Asthma     Cancer     skin cancer to right hand    Cataract     Chronic fatigue 1/24/2017    CVID (common variable immunodeficiency) 3/7/2019    Diabetes mellitus     type2    KLINE (dyspnea on exertion) 1/24/2017    Dyslipidemia 6/25/2019    Encounter for blood transfusion     Essential hypertension 12/29/2011    GERD (gastroesophageal reflux disease)     Headache(784.0)     History of lumpectomy of both breasts     1992 negative    Hyperlipidemia 7/15/2015    Hypertension     Hypothyroidism     Neuropathy     Obesity     Obesity     Postmenopausal HRT (hormone replacement therapy)     Rash     Rosacea     Sleep apnea     on bipap    Snoring     Squamous cell carcinoma of skin     left forearm     UTI (urinary tract infection)     Wears glasses       Past Surgical History:   Procedure Laterality Date    ADENOIDECTOMY      APPENDECTOMY      BLADDER SUSPENSION      BREAST BIOPSY Bilateral 1992    bilateral benign excisional biopsies    BUNIONECTOMY  10/17/14    right, still has discomfort    CATARACT EXTRACTION W/  INTRAOCULAR LENS IMPLANT Bilateral     CHOLECYSTECTOMY  08/02/2017    COLONOSCOPY      CYSTOSCOPY      EPIDURAL STEROID INJECTION INTO LUMBAR SPINE N/A 8/14/2019    Procedure: Injection-steroid-epidural-lumbar L5/S1;  Surgeon: Fredi Rojas MD;  Location: [facility] OR;  Service: Pain Management;  Laterality: N/A;    EPIDURAL STEROID INJECTION INTO LUMBAR SPINE N/A 5/3/2021    Procedure: Injection-steroid-epidural-lumbar L5/S1 to the Left;  Surgeon: Fredi Rojas MD;  Location:  [facility] OR;  Service: Pain Management;  Laterality: N/A;    EPIDURAL STEROID INJECTION INTO LUMBAR SPINE N/A 9/24/2021    Procedure: Injection-steroid-epidural-lumbar L5/S1;  Surgeon: Fredi Rojsa MD;  Location: [facility] OR;  Service: Pain Management;  Laterality: N/A;    EPIDURAL STEROID INJECTION INTO LUMBAR SPINE N/A 2/21/2022    Procedure: Injection-steroid-epidural-lumbar L5/S1;  Surgeon: Fredi Rojas MD;  Location: [facility] OR;  Service: Pain Management;  Laterality: N/A;    ESOPHAGEAL DILATION N/A 6/11/2021    Procedure: DILATION, ESOPHAGUS;  Surgeon: Jake Sheriff MD;  Location: [facility] ENDO;  Service: Endoscopy;  Laterality: N/A;    ESOPHAGOGASTRODUODENOSCOPY      dilated esophagus    ESOPHAGOGASTRODUODENOSCOPY N/A 6/11/2021    Procedure: EGD (ESOPHAGOGASTRODUODENOSCOPY);  Surgeon: Jake Sheriff MD;  [facility];  Service: Endoscopy;  Laterality: N/A;    GASTRIC BYPASS      2011    HYSTERECTOMY  1980    interstim bladder  2009    10/14/14 new battery    OOPHORECTOMY  1980    REPLACEMENT OF SACRAL NERVE STIMULATOR  2/18/2020    Procedure: REPLACEMENT, NEUROSTIMULATOR, SACRAL;  Surgeon: Mary Jane Jarvis MD;  Location: [facility] OR [address] [place] [address];  Service: Urology;;    REVISION OF PROCEDURE INVOLVING SACRAL NEUROSTIMULATOR DEVICE Right 2/18/2020    Procedure: REVISION, NEUROSTIMULATOR, SACRAL/ battery replacement;  Surgeon: Mary Jane Jarvis MD;  Location: [facility] OR [address] [place] [address];  Service: Urology;  Laterality: Right;  1hr/ rep contacted/ (CONNIE)    SKIN CANCER EXCISION      left hand    TONSILLECTOMY      WISDOM TOOTH EXTRACTION        Current Outpatient Medications on File Prior to Visit   Medication Sig Dispense Refill    albuterol (PROVENTIL/VENTOLIN HFA) 90 mcg/actuation inhaler Inhale 2 puffs into the lungs every 4 (four) hours as needed. 2 puffs every 4 hours as needed for cough, wheeze, or shortness of breath 54 g 3    alpha lipoic acid 600 mg Cap Take 600 mg by mouth 2 (two) times daily.      ascorbic  acid, vitamin C, (VITAMIN C) 1000 MG tablet Take 1,000 mg by mouth 2 (two) times daily.       azelastine (ASTELIN) 137 mcg (0.1 %) nasal spray 1 spray (137 mcg total) by Nasal route 2 (two) times daily. 30 mL 12    azelastine (OPTIVAR) 0.05 % ophthalmic solution Place 1 drop into both eyes. As needed      B-complex with vitamin C (Z-BEC OR EQUIV) tablet Take 1 tablet by mouth once daily.      Bifidobacterium infantis (ALIGN ORAL) Take by mouth once daily.      biotin 10,000 mcg Cap Take 1 tablet by mouth every evening.       blood sugar diagnostic [employer] preferred meter and strips. Check daily 90 each 3    blood-glucose meter kit Use as instructed. Insurance preferred meter. 1 each 0    chlorpheniramine (CHLOR-TRIMETON) 4 mg tablet Take 1 tablet (4 mg total) by mouth every 8 (eight) hours while awake. 90 tablet 12    cranberry 500 mg Cap Take 1 capsule by mouth every evening.      diclofenac sodium (VOLTAREN) 1 % Gel Apply 2 grams topically once daily. (Patient taking differently: Apply 2 g topically as needed (arthritis).) 1 Tube 6    diltiaZEM (CARDIZEM CD) 120 MG Cp24 TAKE 1 CAPSULE (120 MG TOTAL) BY MOUTH EVERY EVENING. 90 capsule 4    EPINEPHrine (EPIPEN) 0.3 mg/0.3 mL AtIn Inject 1 each into the muscle as needed.   3    erythromycin with ethanoL (THERAMYCIN) 2 % external solution Aaa bid prn 60 mL 3    esomeprazole (NEXIUM) 40 MG capsule Take 1 capsule (40 mg total) by mouth before breakfast. 90 capsule 3    estradioL (ESTRACE) 0.01 % (0.1 mg/gram) vaginal cream Place 1 g vaginally 3 (three) times a week. Place by fingertip application before bedtime three times a week (Monday, Wednesday, Friday) 45 g 3    fexofenadine (ALLEGRA) 180 MG tablet Take 180 mg by mouth once daily.       fish oil-omega-3 fatty acids 300-1,000 mg capsule Take 2 g by mouth once daily.      FLUAD QUAD 2022-23,65Y UP,,PF, 60 mcg (15 mcg x 4)/0.5 mL Syrg       [START ON 4/9/2023] fluticasone furoate-vilanteroL (BREO ELLIPTA)  200-25 mcg/dose DsDv diskus inhaler Inhale 1 puff into the lungs once daily. Controller 180 each 2    fluticasone propionate (FLONASE) 50 mcg/actuation nasal spray 2 sprays (100 mcg total) by Each Nostril route once daily. 16 g 11    gabapentin (NEURONTIN) 600 MG tablet Take 1 tablet (600 mg total) by mouth 3 (three) times daily. 540 tablet 3    guaifenesin-codeine 100-10 mg/5 ml (GUAIFENESIN AC)  mg/5 mL syrup Take 5 mLs by mouth 3 (three) times daily as needed for Cough. 473 mL 2    hydrOXYzine HCL (ATARAX) 10 MG Tab Take 1 tablet (10 mg total) by mouth 3 (three) times daily as needed. 30 tablet 3    immun glob G,IgG,-pro-IgA 0-50 (HIZENTRA) 1 gram/5 mL (20 %) Soln Inject 55 mLs (11 g total) into the skin once a week. 220 mL 12    inhalation spacing device Use as directed for inhalation. 1 each 0    ipratropium (ATROVENT) 42 mcg (0.06 %) nasal spray 2 sprays by Nasal route 4 (four) times daily. 15 mL 2    lancets (ACCU-CHEK SOFTCLIX LANCETS) Misc Use to check blood sugar daily. 100 each 11    metFORMIN (GLUCOPHAGE-XR) 500 MG ER 24hr tablet Take 1 tablet (500 mg total) by mouth 2 (two) times daily with meals. 180 tablet 3    mometasone 0.1% (ELOCON) 0.1 % cream aaa bid x 1-2 weeks prn bug bites 45 g 1    montelukast (SINGULAIR) 10 mg tablet Take 1 tablet (10 mg total) by mouth every evening. 90 tablet 3    mucus clearing device Cecy 1 Device by Misc.(Non-Drug; Combo Route) route 2 (two) times daily. 1 Device 0    multivitamin capsule Take 1 capsule by mouth once daily.       mupirocin (BACTROBAN) 2 % ointment Apply topically 3 (three) times daily. 15 g 0    ondansetron (ZOFRAN-ODT) 4 MG TbDL Take 1 tablet (4 mg total) by mouth every 8 (eight) hours as needed. 20 tablet 0    potassium chloride SA (K-DUR,KLOR-CON) 20 MEQ tablet TAKE 1 TABLET EVERY DAY 90 tablet 4    pravastatin (PRAVACHOL) 80 MG tablet TAKE 1 TABLET EVERY DAY 90 tablet 4    predniSONE (DELTASONE) 10 MG tablet 1 pill for 3-5 days repeat as  needed for chest tightness. 20 tablet 0    psyllium husk (METAMUCIL ORAL) Take by mouth.      SIMETHICONE (GAS-X ORAL) Take 1 capsule by mouth as needed (gas relief).       valsartan-hydrochlorothiazide (DIOVAN-HCT) 160-12.5 mg per tablet Take 1 tablet by mouth once daily. 90 tablet 3    vitamin D3-folic acid 5,000 unit- 1 mg Tab Take 1 tablet by mouth once daily.       [] cefdinir (OMNICEF) 300 MG capsule Take 1 capsule (300 mg total) by mouth 2 (two) times daily. for 7 days 14 capsule 0    cloNIDine (CATAPRES) 0.1 MG tablet Take 1 tablet (0.1 mg total) by mouth 3 (three) times daily as needed (PRN SBP > 165 mmHg). 90 tablet 6    levalbuterol (XOPENEX) 1.25 mg/3 mL nebulizer solution Take 3 mLs (1.25 mg total) by nebulization every 4 (four) hours as needed for Wheezing or Shortness of Breath. Rescue 1 Box 11     No current facility-administered medications on file prior to visit.      Review of patient's allergies indicates:   Allergen Reactions    Sulfa (sulfonamide antibiotics) Hives    Naproxen Other (See Comments)     Other reaction(s): RT sided numbness      Albuterol Itching and Palpitations     Nebulizer only. Can use inhaler      Family History not pertinent   Social History     Socioeconomic History    Marital status:    Tobacco Use    Smoking status: Never    Smokeless tobacco: Never   Substance and Sexual Activity    Alcohol use: Not Currently    Drug use: No    Sexual activity: Not Currently     Social Determinants of Health     Financial Resource Strain: Low Risk     Difficulty of Paying Living Expenses: Not very hard   Food Insecurity: No Food Insecurity    Worried About Running Out of Food in the Last Year: Never true    Ran Out of Food in the Last Year: Never true   Transportation Needs: No Transportation Needs    Lack of Transportation (Medical): No    Lack of Transportation (Non-Medical): No   Physical Activity: Inactive    Days of Exercise per Week: 0 days    Minutes of Exercise per  Session: 0 min   Stress: Stress Concern Present    Feeling of Stress : To some extent   Social Connections: Moderately Integrated    Frequency of Communication with Friends and Family: More than three times a week    Frequency of Social Gatherings with Friends and Family: Once a week    Attends Mu-ism Services: More than 4 times per year    Active Member of Clubs or Organizations: No    Attends Club or Organization Meetings: Never    Marital Status: Living with partner   Housing Stability: Low Risk     Unable to Pay for Housing in the Last Year: No    Number of Places Lived in the Last Year: 1    Unstable Housing in the Last Year: No         Review of Systems:   Constitutional:  Denies fever or chills    Eyes:  Denies change in visual acuity    HENT:  Denies nasal congestion or sore throat    Respiratory:  Denies cough or shortness of breath    Cardiovascular:  Denies chest pain or edema    GI:  Denies abdominal pain, nausea, vomiting, bloody stools or diarrhea    :  Denies dysuria    Integument:  Denies rash    Neurologic:  Denies headache, focal weakness or sensory changes    Endocrine:  Denies polyuria or polydipsia    Lymphatic:  Denies swollen glands    Psychiatric:  Denies depression or anxiety       Physical Exam:    Constitutional:  Well developed, well nourished, no acute distress, non-toxic appearance    Integument:  Well hydrated, no rash    Lymphatic:  No lymphadenopathy noted    Neurologic:  Alert & oriented x 3,     Psychiatric:  Speech and behavior appropriate        Bilateral Hip Exam Performed    Right Hip Exam     Tenderness   The patient is experiencing tenderness in the greater trochanter.    Range of Motion   The patient has normal hip ROM.    Muscle Strength   Abduction: 4/5     Other   Erythema: absent  Sensation: normal  Pulse: present    Left Hip Exam   Hip exam performed same as contralateral hip and is normal.     X-rays were performed today, personally reviewed by me and findings  discussed with the patient.  2 views of the right hip show no fractures or dislocations       Trochanteric bursitis of right hip  -     Large Joint Aspiration/Injection: R greater trochanteric bursa      Using an aseptic technique, I injected 5 cc of lidocaine 1% without and 1 cc of kenalog 40mg into the right Hip. The patient tolerated this well.    RTC in 6 weeks or prn.       Answers submitted by the patient for this visit:  Orthopedics Questionnaire (Submitted on 1/30/2023)  unexpected weight change: No  appetite change : No  sleep disturbance: No  IMMUNOCOMPROMISED: Yes  nervous/ anxious: No  dysphoric mood: No  rash: No  visual disturbance: No  eye redness: No  eye pain: No  ear pain: No  tinnitus: Yes  hearing loss: No  sinus pressure : No  nosebleeds: No  enviro allergies: Yes  food allergies: No  cough: No  shortness of breath: Yes  sweating: No  dysuria: No  frequency: Yes  difficulty urinating: No  hematuria: No  painful intercourse: No  chest pain: No  palpitations: No  nausea: No  vomiting: No  diarrhea: No  blood in stool: No  constipation: No  headaches: No  dizziness: No  numbness: No  seizures: No  joint swelling: Yes  myalgia: Yes  weakness: No  back pain: Yes   (Submitted on 1/30/2023)  Chief Complaint: Leg pain  Pain Chronicity: new  History of trauma: No  Onset: in the past 7 days  Frequency: constantly  Progression since onset: unchanged  injury location: at home  pain- numeric: 8/10  pain location: right hip, right knee, right ankle  pain quality: sharp  Radiating Pain: Yes  If your pain is radiating, to what part of the body?: right foot, right knee  Aggravating factors: bearing weight, walking  fever: No  inability to bear weight: Yes  itching: No  joint locking: Yes  limited range of motion: No  stiffness: No  tingling: No  Treatments tried: cold, heat, OTC ointments, OTC pain meds, rest  physical therapy: not tried  Improvement on treatment: mild

## 2023-02-13 NOTE — PROGRESS NOTES
CHIEF COMPLAINT:    Mrs. Delatorre is a 67 y.o. female presenting for a 6 month follow up for history of urgency, frequency status post InterStim    PRESENTING ILLNESS:    Cornelia Delatorre is a 67 y.o. female who returns for follow up.  She states she has some more urgency, frequency and changed the settings in the iCon but then it was uncomfortable and she turned it down.  She would like to see how the sensation can be improved.     REVIEW OF SYSTEMS:    Review of Systems   Constitutional: Positive for malaise/fatigue.   HENT: Negative.    Eyes: Negative.    Respiratory: Negative.    Cardiovascular: Negative.    Genitourinary: Positive for frequency and urgency.   Musculoskeletal: Positive for joint pain.   Skin: Negative.    Neurological: Negative.    Endo/Heme/Allergies: Negative.    Psychiatric/Behavioral: Negative.      PATIENT HISTORY:    Past Medical History:   Diagnosis Date    Allergy     Arthritis     Asthma     Cataract     Chronic fatigue 1/24/2017    Diabetes mellitus     resolved with gastric bypass    Diabetes mellitus, type 2     Encounter for blood transfusion     GERD (gastroesophageal reflux disease)     Headache(784.0)     History of lumpectomy of both breasts     1992 negative    Hyperlipidemia 7/15/2015    Hypertension     Hypothyroidism     Neuropathy     Obesity     SOHA on CPAP     Postmenopausal HRT (hormone replacement therapy)     Rash     Rosacea     Snoring     Wears glasses        Past Surgical History:   Procedure Laterality Date    ADENOIDECTOMY      APPENDECTOMY      BLADDER SUSPENSION      BUNIONECTOMY  10/17/14    right, still has discomfort    CATARACT EXTRACTION W/  INTRAOCULAR LENS IMPLANT Bilateral     CHOLECYSTECTOMY  08/02/2017    COLONOSCOPY      ESOPHAGOGASTRODUODENOSCOPY      dilated esophagus    GASTRIC BYPASS      2011    HYSTERECTOMY      interstim bladder  2009    10/14/14 new battery    SKIN CANCER EXCISION      left hand     TONSILLECTOMY      WISDOM TOOTH EXTRACTION         Family History   Problem Relation Age of Onset    Diabetes      Hypertension      Hypothyroidism       Social History    Marital status:      Social History Main Topics    Smoking status: Never Smoker    Smokeless tobacco: Never Used    Alcohol use No    Drug use: No    Sexual activity: Not Currently     Allergies:  Sulfa (sulfonamide antibiotics) and Naproxen    Medications:  Outpatient Encounter Prescriptions as of 1/29/2018   Medication Sig Dispense Refill    albuterol 90 mcg/actuation inhaler Inhale 2 puffs into the lungs every 4 (four) hours as needed. 2 puffs every 4 hours as needed for cough, wheeze, or shortness of breath 3 Inhaler 3    aspirin (ECOTRIN) 81 MG EC tablet Take 81 mg by mouth once daily.      azelastine (ASTELIN) 137 mcg (0.1 %) nasal spray 1 spray (137 mcg total) by Nasal route 2 (two) times daily. (Patient taking differently: 1 spray by Nasal route once daily. ) 90 mL 3    azelastine (OPTIVAR) 0.05 % ophthalmic solution Place 1 drop into both eyes daily as needed.       BIFIDOBACTERIUM INFANTIS (ALIGN ORAL) Take by mouth once daily.      ciclopirox (PENLAC) 8 % Soln       cranberry 500 mg Cap Take 1 capsule by mouth every evening.      esomeprazole (NEXIUM) 40 MG capsule Take 1 capsule (40 mg total) by mouth before breakfast. 90 capsule 3    fexofenadine (ALLEGRA) 60 MG tablet Take 60 mg by mouth once daily.      fish oil-omega-3 fatty acids 300-1,000 mg capsule Take 2 g by mouth once daily.      fluticasone (FLONASE) 50 mcg/actuation nasal spray 2 sprays by Each Nare route once daily. (Patient taking differently: 2 sprays by Each Nare route every evening. ) 3 Bottle 3    gabapentin (NEURONTIN) 600 MG tablet Take 1 tablet (600 mg total) by mouth 3 (three) times daily. 270 tablet 3    hydrochlorothiazide (HYDRODIURIL) 25 MG tablet Take 1 tablet (25 mg total) by mouth once daily. 90 tablet 2    hydrOXYzine HCl  (ATARAX) 10 MG Tab Take 3 tablets (30 mg total) by mouth 3 (three) times daily as needed (anxiety). 90 tablet 6    irbesartan (AVAPRO) 75 MG tablet Take 1 tablet (75 mg total) by mouth every evening. 90 tablet 3    levothyroxine (SYNTHROID) 50 MCG tablet Take 1 tablet (50 mcg total) by mouth once daily. On M and F only 30 tablet 3    levothyroxine (SYNTHROID) 75 MCG tablet Take 1 tablet (75 mcg total) by mouth once daily. On T, W, Th, Sat, Sun 90 tablet 3    metFORMIN (GLUCOPHAGE) 500 MG tablet Take 1,000 mg by mouth 2 (two) times daily with meals.      methylcellulose, laxative, (CITRUCEL) 500 mg Tab Take 1 tablet by mouth every morning.      metoprolol tartrate (LOPRESSOR) 50 MG tablet Take 0.5 tablets (25 mg total) by mouth once daily at 6am. (Patient taking differently: Take by mouth once daily at 6am. ) 90 tablet 3    montelukast (SINGULAIR) 10 mg tablet Take 1 tablet (10 mg total) by mouth every evening. 90 tablet 3    multivitamin capsule Take 1 capsule by mouth. Take one tablets daily      potassium chloride SA (K-DUR,KLOR-CON) 20 MEQ tablet Take 1 tablet (20 mEq total) by mouth once daily. 90 tablet 2    pravastatin (PRAVACHOL) 40 MG tablet Take 1 tablet (40 mg total) by mouth once daily. (Patient taking differently: Take 40 mg by mouth nightly. ) 90 tablet 3    SIMETHICONE (GAS-X ORAL) Take 1 capsule by mouth as needed.      SYMBICORT 160-4.5 mcg/actuation HFAA Inhale 2 puffs into the lungs every 12 (twelve) hours. 3 Inhaler 3     No facility-administered encounter medications on file as of 1/29/2018.          PHYSICAL EXAMINATION:    The patient generally appears in good health, is appropriately interactive, and is in no apparent distress.    Skin: No lesions.    Mental: Cooperative with normal affect.    Neuro: Grossly intact.    HEENT: Normal. No evidence of lymphadenopathy.    Chest:  normal inspiratory effort.    Abdomen:  Soft, non-tender. No masses or organomegaly. Bladder is not  palpable. No evidence of flank discomfort. No evidence of inguinal hernia.    Extremities: No clubbing, cyanosis, or edema      LABS:    Lab Results   Component Value Date    BUN 10 10/23/2017    CREATININE 0.9 10/23/2017     UA 1.015, pH 5, otherwise, negative    IMPRESSION:    Urgency, frequency    PLAN:    1. Interrogation:  Last reprogrammin2017  Hours in use: 3823  Percent use:   83%  Program in use was 4     All lead pairs were checked at 1.0 A, 210 pulse width and ranged from 499-1415 ohms, < 15 mA     Program         % Used           Leads       Energy       PW      Hz    Sensation    Changed  Program 1                               0+, 1+, 3-  1.2 A           210      14        tailbone Increased pw to 300, decreased energy to 0.8  Program 2                               3+, 1-         2.0 A          210      14        tailbone Increased pw to 300, decreased energy to 1.3  Program 3                               2+, 3+, 0-   3.5 A          300      14        Vaginal           Decreased to 2.6         Program 4       100%               C+, 0-        1.9 A          360      14        Vaginal            Increased to 2.0     Battery Life:  Programs checked:  1016 ohms, < 15 mA  % Battery life:  34-65%  Months remainin-38 months     iCon was reprogrammed.  Reviewed on how to use the iCon.     2. Follow up in 6 months.                fever

## 2023-02-23 ENCOUNTER — PROCEDURE VISIT (OUTPATIENT)
Dept: OTOLARYNGOLOGY | Facility: CLINIC | Age: 73
End: 2023-02-23
Payer: MEDICARE

## 2023-02-23 ENCOUNTER — PATIENT MESSAGE (OUTPATIENT)
Dept: OTOLARYNGOLOGY | Facility: CLINIC | Age: 73
End: 2023-02-23

## 2023-02-23 VITALS
WEIGHT: 210 LBS | DIASTOLIC BLOOD PRESSURE: 72 MMHG | HEIGHT: 64 IN | BODY MASS INDEX: 35.85 KG/M2 | HEART RATE: 62 BPM | SYSTOLIC BLOOD PRESSURE: 133 MMHG

## 2023-02-23 DIAGNOSIS — J31.0 NON-ALLERGIC RHINITIS: Primary | ICD-10-CM

## 2023-02-23 PROCEDURE — 30117 PR EXCIS/DEST INTRANAS LESION; INT APP: ICD-10-PCS | Mod: HCNC,S$GLB,, | Performed by: OTOLARYNGOLOGY

## 2023-02-23 PROCEDURE — 30117 REMOVAL OF INTRANASAL LESION: CPT | Mod: HCNC,S$GLB,, | Performed by: OTOLARYNGOLOGY

## 2023-02-23 NOTE — PROGRESS NOTES
02/23/2023     Cornelia Delatorre  [id number]  1950    PRE-OPERATIVE DIAGNOSIS  1. Chronic rhinitis    POST-OPERATIVE DIAGNOSIS  Same    PROCEDURES PERFORMED  1. Intranasal ablation of posterior nasal nerve    SURGEON: lFy Johnson MD    ASSISTANT:  None    ANESTHESIA: Local     COMPLICATIONS: None    BLOOD LOSS: minimal    SPECIMENS  None    INDICATIONS  Cornelia Delatorre is a 72 y.o. female who was seen in clinic for evaluation of the above diagnosis. Based on clinical assessment and failure to improve consistently with medical therapy, the following procedures were discussed as an option for treatment. After risks, benefits, and alternatives were discussed the patient decided to proceed.     PROCEDURE IN DETAIL  The patient was seen in the clinic, and consent was signed. The nasal cavity was anesthetized with topical 4% tetracaine and 10% lidocaine. Epinephrine 1:10,000 was placed on pledgets and used to decongest the nasal passage. The RhinAer device was used at the 's recommended settings. I identified the region of the right posterior nasal nerve, medial and anterior to the insertion of the middle turbinate. Ablation was performed in a stairstep fashion along the course of the nerve. I then treated along the posterior aspect of the inferior turbinates. The instrument was then removed. There was no bleeding.  An identical procedure was performed bilaterally. She had a moderate septal deviation but I was able to work around this.      The patient tolerated the procedure well.

## 2023-02-23 NOTE — PATIENT INSTRUCTIONS
Post-op RhinAer Instructions  Fly Johnson MD  Department of Otolaryngology, Head and Neck Surgery  [facility]      PHONE NUMBERS:    Office number: [phone]  EMERGENCY: call 911  Nurses Line: [phone]  My cell phone: [phone]    WHAT TO EXPECT    You may experience some inflammation and tenderness at the treatment site.    You may experience some mild bloody discharge, especially after a nasal rinse.  Crusting is normal, and will occur for a few weeks following the procedure. They will begin to come off after 5-7 days and sometimes will need to be removed in the clinic at the post-operative visit.    It is not normal to have severe bright red bleeding from the nose following this procedure.  Call if this occurs.    If you need to blow your nose, please do so gently  Do not impinge 1 manipulate the treatment area  The best way to keep the nose as open as possible is to use saline.  This can be performed has a saline spray or nasal irrigation.  The following is some information on how to make nasal irrigation solution.    Nasal Irrigations  This will help remove the allergens, debris, and mucous from your nose to help you breathe. It will also clear it in preparation for other nasal medications.    To perform, purchase an over the counter sinus irrigation kit such as the [employer] Sinus Rinse Kit. Use as directed on the box. You should use distilled water or water that was previously boiled and left to cool. If you wish, you may make your own solution. However, salt packets are available in the nasal section in your  drug store.     A rough estimate for making salt solution is:  8oz water  2 teaspoons salt (pickling, stu or Kosher salt)  1 teaspoon baking soda    After each use, rinse the bottle with small amount of rubbing alcohol and clean with soap.  Replace the irrigation bottle if it becomes visibly soiled or every few weeks.      ACTIVITY    First 1-2 days after  surgery  Plan to rest. Light activity is OK. If you are feeling well, you can resume normal activities.  You may shower   You may gently blow nose    NUTRITION    You may resume a normal diet    MEDICATIONS    Resume you home medications    Call my office if you experience any of the following:    Fever higher than 101 F  Clear, watery nasal discharge  Any visual changes or marked swelling around the eyes  Severe headache or neck stiffness  Brisk bleeding

## 2023-02-27 ENCOUNTER — DOCUMENTATION ONLY (OUTPATIENT)
Dept: ALLERGY | Facility: CLINIC | Age: 73
End: 2023-02-27
Payer: MEDICARE

## 2023-02-27 NOTE — PROGRESS NOTES
Received a fax from [employer] regarding Hizentra.    Hizentra has been approved from 1/1/23-12/31/23    Member ID [id number]    Letter sent to scanning.

## 2023-03-02 ENCOUNTER — PATIENT MESSAGE (OUTPATIENT)
Dept: FAMILY MEDICINE | Facility: CLINIC | Age: 73
End: 2023-03-02
Payer: MEDICARE

## 2023-03-02 DIAGNOSIS — R30.0 DYSURIA: Primary | ICD-10-CM

## 2023-03-02 NOTE — TELEPHONE ENCOUNTER
Please see [facility] message and advise    Sending to you a lil confused on the pcp.    Can the orders get placed for tomorrow are do you want her  to be seen she is having labs tomorrow     Please advise thank you

## 2023-03-03 ENCOUNTER — LAB VISIT (OUTPATIENT)
Dept: LAB | Facility: HOSPITAL | Age: 73
End: 2023-03-03
Attending: INTERNAL MEDICINE
Payer: MEDICARE

## 2023-03-03 ENCOUNTER — TELEPHONE (OUTPATIENT)
Dept: FAMILY MEDICINE | Facility: CLINIC | Age: 73
End: 2023-03-03
Payer: MEDICARE

## 2023-03-03 DIAGNOSIS — R30.0 DYSURIA: ICD-10-CM

## 2023-03-03 DIAGNOSIS — E78.5 DYSLIPIDEMIA: ICD-10-CM

## 2023-03-03 DIAGNOSIS — Z79.899 ENCOUNTER FOR LONG-TERM (CURRENT) USE OF MEDICATIONS: ICD-10-CM

## 2023-03-03 DIAGNOSIS — I10 ESSENTIAL HYPERTENSION: ICD-10-CM

## 2023-03-03 DIAGNOSIS — E11.9 CONTROLLED TYPE 2 DIABETES MELLITUS WITHOUT COMPLICATION, WITHOUT LONG-TERM CURRENT USE OF INSULIN: ICD-10-CM

## 2023-03-03 DIAGNOSIS — Z00.00 ROUTINE PHYSICAL EXAMINATION: ICD-10-CM

## 2023-03-03 LAB
ALBUMIN SERPL BCP-MCNC: 3.6 G/DL (ref 3.5–5.2)
ALP SERPL-CCNC: 108 U/L (ref 55–135)
ALT SERPL W/O P-5'-P-CCNC: 18 U/L (ref 10–44)
ANION GAP SERPL CALC-SCNC: 13 MMOL/L (ref 8–16)
AST SERPL-CCNC: 21 U/L (ref 10–40)
BACTERIA #/AREA URNS HPF: ABNORMAL /HPF
BASOPHILS # BLD AUTO: 0.01 K/UL (ref 0–0.2)
BASOPHILS NFR BLD: 0.2 % (ref 0–1.9)
BILIRUB SERPL-MCNC: 0.4 MG/DL (ref 0.1–1)
BILIRUB UR QL STRIP: NEGATIVE
BUN SERPL-MCNC: 12 MG/DL (ref 8–23)
CALCIUM SERPL-MCNC: 9.5 MG/DL (ref 8.7–10.5)
CHLORIDE SERPL-SCNC: 101 MMOL/L (ref 95–110)
CHOLEST SERPL-MCNC: 162 MG/DL (ref 120–199)
CHOLEST/HDLC SERPL: 2.7 {RATIO} (ref 2–5)
CLARITY UR: CLEAR
CO2 SERPL-SCNC: 27 MMOL/L (ref 23–29)
COLOR UR: YELLOW
CREAT SERPL-MCNC: 0.9 MG/DL (ref 0.5–1.4)
DIFFERENTIAL METHOD: ABNORMAL
EOSINOPHIL # BLD AUTO: 0 K/UL (ref 0–0.5)
EOSINOPHIL NFR BLD: 0.6 % (ref 0–8)
ERYTHROCYTE [DISTWIDTH] IN BLOOD BY AUTOMATED COUNT: 14.5 % (ref 11.5–14.5)
EST. GFR  (NO RACE VARIABLE): >60 ML/MIN/1.73 M^2
ESTIMATED AVG GLUCOSE: 128 MG/DL (ref 68–131)
GLUCOSE SERPL-MCNC: 96 MG/DL (ref 70–110)
GLUCOSE UR QL STRIP: NEGATIVE
HBA1C MFR BLD: 6.1 % (ref 4–5.6)
HCT VFR BLD AUTO: 36.6 % (ref 37–48.5)
HDLC SERPL-MCNC: 61 MG/DL (ref 40–75)
HDLC SERPL: 37.7 % (ref 20–50)
HGB BLD-MCNC: 11.6 G/DL (ref 12–16)
HGB UR QL STRIP: NEGATIVE
IMM GRANULOCYTES # BLD AUTO: 0.03 K/UL (ref 0–0.04)
IMM GRANULOCYTES NFR BLD AUTO: 0.6 % (ref 0–0.5)
KETONES UR QL STRIP: NEGATIVE
LDLC SERPL CALC-MCNC: 81.6 MG/DL (ref 63–159)
LEUKOCYTE ESTERASE UR QL STRIP: ABNORMAL
LYMPHOCYTES # BLD AUTO: 1.5 K/UL (ref 1–4.8)
LYMPHOCYTES NFR BLD: 30.1 % (ref 18–48)
MCH RBC QN AUTO: 28.4 PG (ref 27–31)
MCHC RBC AUTO-ENTMCNC: 31.7 G/DL (ref 32–36)
MCV RBC AUTO: 90 FL (ref 82–98)
MICROSCOPIC COMMENT: ABNORMAL
MONOCYTES # BLD AUTO: 0.5 K/UL (ref 0.3–1)
MONOCYTES NFR BLD: 9.2 % (ref 4–15)
NEUTROPHILS # BLD AUTO: 3 K/UL (ref 1.8–7.7)
NEUTROPHILS NFR BLD: 59.3 % (ref 38–73)
NITRITE UR QL STRIP: NEGATIVE
NONHDLC SERPL-MCNC: 101 MG/DL
NRBC BLD-RTO: 0 /100 WBC
PH UR STRIP: 6 [PH] (ref 5–8)
PLATELET # BLD AUTO: 178 K/UL (ref 150–450)
PMV BLD AUTO: 11.2 FL (ref 9.2–12.9)
POTASSIUM SERPL-SCNC: 4.3 MMOL/L (ref 3.5–5.1)
PROT SERPL-MCNC: 7.2 G/DL (ref 6–8.4)
PROT UR QL STRIP: NEGATIVE
RBC # BLD AUTO: 4.09 M/UL (ref 4–5.4)
SODIUM SERPL-SCNC: 141 MMOL/L (ref 136–145)
SP GR UR STRIP: 1.01 (ref 1–1.03)
TRIGL SERPL-MCNC: 97 MG/DL (ref 30–150)
TSH SERPL DL<=0.005 MIU/L-ACNC: 1.45 UIU/ML (ref 0.4–4)
URN SPEC COLLECT METH UR: ABNORMAL
WBC # BLD AUTO: 5.09 K/UL (ref 3.9–12.7)
WBC #/AREA URNS HPF: 12 /HPF (ref 0–5)

## 2023-03-03 PROCEDURE — 87086 URINE CULTURE/COLONY COUNT: CPT | Mod: HCNC | Performed by: INTERNAL MEDICINE

## 2023-03-03 PROCEDURE — 87088 URINE BACTERIA CULTURE: CPT | Mod: HCNC | Performed by: INTERNAL MEDICINE

## 2023-03-03 PROCEDURE — 87077 CULTURE AEROBIC IDENTIFY: CPT | Mod: HCNC | Performed by: INTERNAL MEDICINE

## 2023-03-03 PROCEDURE — 83036 HEMOGLOBIN GLYCOSYLATED A1C: CPT | Mod: HCNC | Performed by: INTERNAL MEDICINE

## 2023-03-03 PROCEDURE — 80053 COMPREHEN METABOLIC PANEL: CPT | Mod: HCNC | Performed by: INTERNAL MEDICINE

## 2023-03-03 PROCEDURE — 87186 SC STD MICRODIL/AGAR DIL: CPT | Mod: HCNC | Performed by: INTERNAL MEDICINE

## 2023-03-03 PROCEDURE — 84443 ASSAY THYROID STIM HORMONE: CPT | Mod: HCNC | Performed by: INTERNAL MEDICINE

## 2023-03-03 PROCEDURE — 81000 URINALYSIS NONAUTO W/SCOPE: CPT | Mod: HCNC,PO | Performed by: INTERNAL MEDICINE

## 2023-03-03 PROCEDURE — 36415 COLL VENOUS BLD VENIPUNCTURE: CPT | Mod: HCNC,PO | Performed by: INTERNAL MEDICINE

## 2023-03-03 PROCEDURE — 80061 LIPID PANEL: CPT | Mod: HCNC | Performed by: INTERNAL MEDICINE

## 2023-03-03 PROCEDURE — 85025 COMPLETE CBC W/AUTO DIFF WBC: CPT | Mod: HCNC | Performed by: INTERNAL MEDICINE

## 2023-03-03 RX ORDER — CIPROFLOXACIN 500 MG/1
500 TABLET ORAL 2 TIMES DAILY
Qty: 14 TABLET | Refills: 0 | Status: SHIPPED | OUTPATIENT
Start: 2023-03-03 | End: 2023-03-10

## 2023-03-06 LAB — BACTERIA UR CULT: ABNORMAL

## 2023-03-13 ENCOUNTER — TELEPHONE (OUTPATIENT)
Dept: FAMILY MEDICINE | Facility: CLINIC | Age: 73
End: 2023-03-13

## 2023-03-13 ENCOUNTER — OFFICE VISIT (OUTPATIENT)
Dept: FAMILY MEDICINE | Facility: CLINIC | Age: 73
End: 2023-03-13
Payer: MEDICARE

## 2023-03-13 VITALS
WEIGHT: 210.56 LBS | OXYGEN SATURATION: 98 % | TEMPERATURE: 97 F | HEIGHT: 64 IN | BODY MASS INDEX: 35.95 KG/M2 | DIASTOLIC BLOOD PRESSURE: 74 MMHG | HEART RATE: 74 BPM | SYSTOLIC BLOOD PRESSURE: 133 MMHG

## 2023-03-13 DIAGNOSIS — R53.1 WEAKNESS: ICD-10-CM

## 2023-03-13 DIAGNOSIS — D64.9 NORMOCYTIC ANEMIA: ICD-10-CM

## 2023-03-13 DIAGNOSIS — E11.9 CONTROLLED TYPE 2 DIABETES MELLITUS WITHOUT COMPLICATION, WITHOUT LONG-TERM CURRENT USE OF INSULIN: Primary | ICD-10-CM

## 2023-03-13 DIAGNOSIS — J45.20 MILD INTERMITTENT ASTHMA WITHOUT COMPLICATION: ICD-10-CM

## 2023-03-13 DIAGNOSIS — J32.9 SINUSITIS, UNSPECIFIED CHRONICITY, UNSPECIFIED LOCATION: ICD-10-CM

## 2023-03-13 DIAGNOSIS — I10 ESSENTIAL HYPERTENSION: ICD-10-CM

## 2023-03-13 DIAGNOSIS — E11.9 CONTROLLED TYPE 2 DIABETES MELLITUS WITHOUT COMPLICATION, WITHOUT LONG-TERM CURRENT USE OF INSULIN: ICD-10-CM

## 2023-03-13 DIAGNOSIS — R25.2 CRAMPS OF LEFT LOWER EXTREMITY: ICD-10-CM

## 2023-03-13 DIAGNOSIS — E78.5 DYSLIPIDEMIA: ICD-10-CM

## 2023-03-13 PROCEDURE — 3044F HG A1C LEVEL LT 7.0%: CPT | Mod: HCNC,CPTII,S$GLB, | Performed by: INTERNAL MEDICINE

## 2023-03-13 PROCEDURE — 3288F FALL RISK ASSESSMENT DOCD: CPT | Mod: HCNC,CPTII,S$GLB, | Performed by: INTERNAL MEDICINE

## 2023-03-13 PROCEDURE — 3008F BODY MASS INDEX DOCD: CPT | Mod: HCNC,CPTII,S$GLB, | Performed by: INTERNAL MEDICINE

## 2023-03-13 PROCEDURE — 1159F PR MEDICATION LIST DOCUMENTED IN MEDICAL RECORD: ICD-10-PCS | Mod: HCNC,CPTII,S$GLB, | Performed by: INTERNAL MEDICINE

## 2023-03-13 PROCEDURE — 3078F DIAST BP <80 MM HG: CPT | Mod: HCNC,CPTII,S$GLB, | Performed by: INTERNAL MEDICINE

## 2023-03-13 PROCEDURE — 3066F NEPHROPATHY DOC TX: CPT | Mod: HCNC,CPTII,S$GLB, | Performed by: INTERNAL MEDICINE

## 2023-03-13 PROCEDURE — 1126F PR PAIN SEVERITY QUANTIFIED, NO PAIN PRESENT: ICD-10-PCS | Mod: HCNC,CPTII,S$GLB, | Performed by: INTERNAL MEDICINE

## 2023-03-13 PROCEDURE — 3060F POS MICROALBUMINURIA REV: CPT | Mod: HCNC,CPTII,S$GLB, | Performed by: INTERNAL MEDICINE

## 2023-03-13 PROCEDURE — 99215 PR OFFICE/OUTPT VISIT, EST, LEVL V, 40-54 MIN: ICD-10-PCS | Mod: HCNC,S$GLB,, | Performed by: INTERNAL MEDICINE

## 2023-03-13 PROCEDURE — 1157F PR ADVANCE CARE PLAN OR EQUIV PRESENT IN MEDICAL RECORD: ICD-10-PCS | Mod: HCNC,CPTII,S$GLB, | Performed by: INTERNAL MEDICINE

## 2023-03-13 PROCEDURE — 99999 PR PBB SHADOW E&M-EST. PATIENT-LVL V: ICD-10-PCS | Mod: PBBFAC,HCNC,, | Performed by: INTERNAL MEDICINE

## 2023-03-13 PROCEDURE — 3075F SYST BP GE 130 - 139MM HG: CPT | Mod: HCNC,CPTII,S$GLB, | Performed by: INTERNAL MEDICINE

## 2023-03-13 PROCEDURE — 99999 PR PBB SHADOW E&M-EST. PATIENT-LVL V: CPT | Mod: PBBFAC,HCNC,, | Performed by: INTERNAL MEDICINE

## 2023-03-13 PROCEDURE — 1160F RVW MEDS BY RX/DR IN RCRD: CPT | Mod: HCNC,CPTII,S$GLB, | Performed by: INTERNAL MEDICINE

## 2023-03-13 PROCEDURE — 3008F PR BODY MASS INDEX (BMI) DOCUMENTED: ICD-10-PCS | Mod: HCNC,CPTII,S$GLB, | Performed by: INTERNAL MEDICINE

## 2023-03-13 PROCEDURE — 1157F ADVNC CARE PLAN IN RCRD: CPT | Mod: HCNC,CPTII,S$GLB, | Performed by: INTERNAL MEDICINE

## 2023-03-13 PROCEDURE — 3075F PR MOST RECENT SYSTOLIC BLOOD PRESS GE 130-139MM HG: ICD-10-PCS | Mod: HCNC,CPTII,S$GLB, | Performed by: INTERNAL MEDICINE

## 2023-03-13 PROCEDURE — 99215 OFFICE O/P EST HI 40 MIN: CPT | Mod: HCNC,S$GLB,, | Performed by: INTERNAL MEDICINE

## 2023-03-13 PROCEDURE — 3044F PR MOST RECENT HEMOGLOBIN A1C LEVEL <7.0%: ICD-10-PCS | Mod: HCNC,CPTII,S$GLB, | Performed by: INTERNAL MEDICINE

## 2023-03-13 PROCEDURE — 3288F PR FALLS RISK ASSESSMENT DOCUMENTED: ICD-10-PCS | Mod: HCNC,CPTII,S$GLB, | Performed by: INTERNAL MEDICINE

## 2023-03-13 PROCEDURE — 1126F AMNT PAIN NOTED NONE PRSNT: CPT | Mod: HCNC,CPTII,S$GLB, | Performed by: INTERNAL MEDICINE

## 2023-03-13 PROCEDURE — 1160F PR REVIEW ALL MEDS BY PRESCRIBER/CLIN PHARMACIST DOCUMENTED: ICD-10-PCS | Mod: HCNC,CPTII,S$GLB, | Performed by: INTERNAL MEDICINE

## 2023-03-13 PROCEDURE — 1101F PR PT FALLS ASSESS DOC 0-1 FALLS W/OUT INJ PAST YR: ICD-10-PCS | Mod: HCNC,CPTII,S$GLB, | Performed by: INTERNAL MEDICINE

## 2023-03-13 PROCEDURE — 1101F PT FALLS ASSESS-DOCD LE1/YR: CPT | Mod: HCNC,CPTII,S$GLB, | Performed by: INTERNAL MEDICINE

## 2023-03-13 PROCEDURE — 3078F PR MOST RECENT DIASTOLIC BLOOD PRESSURE < 80 MM HG: ICD-10-PCS | Mod: HCNC,CPTII,S$GLB, | Performed by: INTERNAL MEDICINE

## 2023-03-13 PROCEDURE — 1159F MED LIST DOCD IN RCRD: CPT | Mod: HCNC,CPTII,S$GLB, | Performed by: INTERNAL MEDICINE

## 2023-03-13 PROCEDURE — 3060F PR POS MICROALBUMINURIA RESULT DOCUMENTED/REVIEW: ICD-10-PCS | Mod: HCNC,CPTII,S$GLB, | Performed by: INTERNAL MEDICINE

## 2023-03-13 PROCEDURE — 3066F PR DOCUMENTATION OF TREATMENT FOR NEPHROPATHY: ICD-10-PCS | Mod: HCNC,CPTII,S$GLB, | Performed by: INTERNAL MEDICINE

## 2023-03-13 RX ORDER — METFORMIN HYDROCHLORIDE 500 MG/1
500 TABLET, EXTENDED RELEASE ORAL 2 TIMES DAILY WITH MEALS
Qty: 180 TABLET | Refills: 3 | Status: SHIPPED | OUTPATIENT
Start: 2023-03-13 | End: 2024-01-22 | Stop reason: SDUPTHER

## 2023-03-13 RX ORDER — POTASSIUM CHLORIDE 20 MEQ/1
20 TABLET, EXTENDED RELEASE ORAL DAILY
Qty: 90 TABLET | Refills: 4 | Status: SHIPPED | OUTPATIENT
Start: 2023-03-13

## 2023-03-13 RX ORDER — SEMAGLUTIDE 1.34 MG/ML
0.5 INJECTION, SOLUTION SUBCUTANEOUS
Qty: 1.5 ML | Refills: 11 | Status: SHIPPED | OUTPATIENT
Start: 2023-03-13 | End: 2023-03-14 | Stop reason: SDUPTHER

## 2023-03-13 RX ORDER — SEMAGLUTIDE 1.34 MG/ML
0.25 INJECTION, SOLUTION SUBCUTANEOUS
Qty: 4 EACH | Refills: 0 | Status: SHIPPED | OUTPATIENT
Start: 2023-03-13 | End: 2023-03-14

## 2023-03-13 RX ORDER — SEMAGLUTIDE 1.34 MG/ML
0.25 INJECTION, SOLUTION SUBCUTANEOUS
Qty: 4 EACH | Refills: 0 | Status: SHIPPED | OUTPATIENT
Start: 2023-03-13 | End: 2023-03-13 | Stop reason: SDUPTHER

## 2023-03-13 RX ORDER — SEMAGLUTIDE 1.34 MG/ML
0.5 INJECTION, SOLUTION SUBCUTANEOUS
Qty: 1.5 ML | Refills: 11 | Status: SHIPPED | OUTPATIENT
Start: 2023-03-13 | End: 2023-03-13 | Stop reason: SDUPTHER

## 2023-03-13 RX ORDER — POTASSIUM CHLORIDE 20 MEQ/1
20 TABLET, EXTENDED RELEASE ORAL DAILY
Qty: 90 TABLET | Refills: 4 | Status: SHIPPED | OUTPATIENT
Start: 2023-03-13 | End: 2023-03-13 | Stop reason: SDUPTHER

## 2023-03-13 RX ORDER — METFORMIN HYDROCHLORIDE 500 MG/1
500 TABLET, EXTENDED RELEASE ORAL 2 TIMES DAILY WITH MEALS
Qty: 180 TABLET | Refills: 3 | Status: SHIPPED | OUTPATIENT
Start: 2023-03-13 | End: 2023-03-13 | Stop reason: SDUPTHER

## 2023-03-13 RX ORDER — MONTELUKAST SODIUM 10 MG/1
10 TABLET ORAL NIGHTLY
Qty: 90 TABLET | Refills: 3 | Status: SHIPPED | OUTPATIENT
Start: 2023-03-13 | End: 2023-03-13 | Stop reason: SDUPTHER

## 2023-03-13 RX ORDER — MONTELUKAST SODIUM 10 MG/1
10 TABLET ORAL NIGHTLY
Qty: 90 TABLET | Refills: 3 | Status: SHIPPED | OUTPATIENT
Start: 2023-03-13 | End: 2024-01-22 | Stop reason: SDUPTHER

## 2023-03-13 NOTE — TELEPHONE ENCOUNTER
Pharmacy needs clarification on ozempic. You stated 0.25 for 7 days and then start 0.5 for 4 days.  Please clarify prescription.  The pharmacy needs clarification.

## 2023-03-13 NOTE — PROGRESS NOTES
Subjective:       Patient ID: Cornelia Delatorre is a 72 y.o. female.  Chief Complaint: Hypertension     HPI    Has metal implant for bladder.       CVID -On IV Ig;      Bronchiectasis/ asthma - stable.   Recalll:   Dr Nicole in [place].  Feels SOB mostly related to bronchiectasis.  Her symptoms have improved with asthma tx - Symbicort.    Pulmonary nodules - Dr Nicole evaluating.  Incomplete response to pneumovax - recommends repeat Prevnar 13.  Eosinophilia and pn 7.14 level at 50%  angiogram was normal     HTN - controlled   DM - controlled;  Sees podiatry for peripheral neuropathy in past  Diabetic peripheral neuropathy - controlled with 600 mg gabapentin tid.  Outside foot doctor.   HLD - controlled for goal 70; high triglycerides.      Occasional anxiety relieved with prn hydroxyzine.   Had EGD- dysphagia with Dr Krishnan      Dx w [facility] likely related to some meds per testing neurology; stable     Complains of frequent UTIs - 4/yr.  I am treating one now and her symptoms are improving.  Discussed daily Macrobid prophylaxis.    Complains of b/l leg weakness.  Gets SOB walking to mailbox.  Was inactive with lung issues, but this seems to be stable now.  Echo 11/2021 EF wnl, grade II diastolic dysfunction.  Likely deconditioning.         Assessment:       1. Controlled type 2 diabetes mellitus without complication, without long-term current use of insulin    2. Essential hypertension    3. Dyslipidemia    4. Weakness    5. Sinusitis, unspecified chronicity, unspecified location    6. Mild intermittent asthma without complication    7. Cramps of left lower extremity    8. Normocytic anemia    9. Body mass index (BMI) 36.0-36.9, adult          Plan:       Controlled type 2 diabetes mellitus without complication, without long-term current use of insulin  -     metFORMIN (GLUCOPHAGE-XR) 500 MG ER 24hr tablet; Take 1 tablet (500 mg total) by mouth 2 (two) times daily with meals.  Dispense: 180 tablet; Refill: 3  -      semaglutide (OZEMPIC) 0.25 mg or 0.5 mg(2 mg/1.5 mL) pen injector; Inject 0.25 mg into the skin every 7 days. Then start the 0.5 mg dose for 4 days  Dispense: 4 each; Refill: 0  -     semaglutide (OZEMPIC) 0.25 mg or 0.5 mg(2 mg/1.5 mL) pen injector; Inject 0.5 mg into the skin every 7 days.  Dispense: 1.5 mL; Refill: 11  -     Hemoglobin A1C; Future; Expected date: 09/09/2023    Essential hypertension  -     Comprehensive Metabolic Panel; Future; Expected date: 09/09/2023    Dyslipidemia    Weakness  -     Ambulatory referral/consult to Physical/Occupational Therapy; Future; Expected date: 03/20/2023  -     Vitamin D; Future; Expected date: 09/09/2023    Sinusitis, unspecified chronicity, unspecified location  -     montelukast (SINGULAIR) 10 mg tablet; Take 1 tablet (10 mg total) by mouth every evening.  Dispense: 90 tablet; Refill: 3    Mild intermittent asthma without complication  -     montelukast (SINGULAIR) 10 mg tablet; Take 1 tablet (10 mg total) by mouth every evening.  Dispense: 90 tablet; Refill: 3    Cramps of left lower extremity  -     potassium chloride SA (K-DUR,KLOR-CON) 20 MEQ tablet; Take 1 tablet (20 mEq total) by mouth once daily.  Dispense: 90 tablet; Refill: 4    Normocytic anemia  -     CBC Auto Differential; Future; Expected date: 09/09/2023  -     Iron and TIBC; Future; Expected date: 09/09/2023  -     Ferritin; Future; Expected date: 09/09/2023    Body mass index (BMI) 36.0-36.9, adult  -     Vitamin D; Future; Expected date: 09/09/2023            Continue current management and monitor.  Other diagnoses were reviewed and found stable and will continue to monitor.  Counseled on regular exercise, maintenance of a healthy weight, balanced diet rich in fruits/vegetables and lean protein, and avoidance of unhealthy habits like smoking and excessive alcohol intake.   Also, counseled on importance of being compliant with medication, health appointments, diet and exercise.     Follow up in  about 6 months (around 9/13/2023).    Total time spent on patient's needs today in preparing for the visit, the actual visit including counseling, managing the patient's needs and electronic record documentation was more than 40 minutes        Medication List with Changes/Refills   New Medications    SEMAGLUTIDE (OZEMPIC) 0.25 MG OR 0.5 MG(2 MG/1.5 ML) PEN INJECTOR    Inject 0.25 mg into the skin every 7 days. Then start the 0.5 mg dose for 4 days    SEMAGLUTIDE (OZEMPIC) 0.25 MG OR 0.5 MG(2 MG/1.5 ML) PEN INJECTOR    Inject 0.5 mg into the skin every 7 days.   Current Medications    ALBUTEROL (PROVENTIL/VENTOLIN HFA) 90 MCG/ACTUATION INHALER    Inhale 2 puffs into the lungs every 4 (four) hours as needed. 2 puffs every 4 hours as needed for cough, wheeze, or shortness of breath    ALPHA LIPOIC ACID 600 MG CAP    Take 600 mg by mouth 2 (two) times daily.    ASCORBIC ACID, VITAMIN C, (VITAMIN C) 1000 MG TABLET    Take 1,000 mg by mouth 2 (two) times daily.     AZELASTINE (ASTELIN) 137 MCG (0.1 %) NASAL SPRAY    1 spray (137 mcg total) by Nasal route 2 (two) times daily.    AZELASTINE (OPTIVAR) 0.05 % OPHTHALMIC SOLUTION    Place 1 drop into both eyes. As needed    B-COMPLEX WITH VITAMIN C (Z-BEC OR EQUIV) TABLET    Take 1 tablet by mouth once daily.    BIFIDOBACTERIUM INFANTIS (ALIGN ORAL)    Take by mouth once daily.    BIOTIN 10,000 MCG CAP    Take 1 tablet by mouth every evening.     BLOOD SUGAR DIAGNOSTIC STRP    Insurance preferred meter and strips. Check daily    BLOOD-GLUCOSE METER KIT    Use as instructed. Insurance preferred meter.    CHLORPHENIRAMINE (CHLOR-TRIMETON) 4 MG TABLET    Take 1 tablet (4 mg total) by mouth every 8 (eight) hours while awake.    CLONIDINE (CATAPRES) 0.1 MG TABLET    Take 1 tablet (0.1 mg total) by mouth 3 (three) times daily as needed (PRN SBP > 165 mmHg).    CRANBERRY 500 MG CAP    Take 1 capsule by mouth every evening.    DICLOFENAC SODIUM (VOLTAREN) 1 % GEL    Apply 2 grams  topically once daily.    DILTIAZEM (CARDIZEM CD) 120 MG CP24    TAKE 1 CAPSULE (120 MG TOTAL) BY MOUTH EVERY EVENING.    EPINEPHRINE (EPIPEN) 0.3 MG/0.3 ML ATIN    Inject 1 each into the muscle as needed.     ERYTHROMYCIN WITH ETHANOL (THERAMYCIN) 2 % EXTERNAL SOLUTION    Aaa bid prn    ESOMEPRAZOLE (NEXIUM) 40 MG CAPSULE    Take 1 capsule (40 mg total) by mouth before breakfast.    ESTRADIOL (ESTRACE) 0.01 % (0.1 MG/GRAM) VAGINAL CREAM    Place 1 g vaginally 3 (three) times a week. Place by fingertip application before bedtime three times a week (Monday, Wednesday, Friday)    FEXOFENADINE (ALLEGRA) 180 MG TABLET    Take 180 mg by mouth once daily.     FISH OIL-OMEGA-3 FATTY ACIDS 300-1,000 MG CAPSULE    Take 2 g by mouth once daily.    FLUAD QUAD 2022-23,65Y UP,,PF, 60 MCG (15 MCG X 4)/0.5 ML SYRG        FLUTICASONE FUROATE-VILANTEROL (BREO ELLIPTA) 200-25 MCG/DOSE DSDV DISKUS INHALER    Inhale 1 puff into the lungs once daily. Controller    FLUTICASONE PROPIONATE (FLONASE) 50 MCG/ACTUATION NASAL SPRAY    2 sprays (100 mcg total) by Each Nostril route once daily.    GABAPENTIN (NEURONTIN) 600 MG TABLET    Take 1 tablet (600 mg total) by mouth 3 (three) times daily.    GUAIFENESIN-CODEINE 100-10 MG/5 ML (GUAIFENESIN AC)  MG/5 ML SYRUP    Take 5 mLs by mouth 3 (three) times daily as needed for Cough.    HYDROXYZINE HCL (ATARAX) 10 MG TAB    Take 1 tablet (10 mg total) by mouth 3 (three) times daily as needed.    IMMUN GLOB G,IGG,-PRO-IGA 0-50 (HIZENTRA) 1 GRAM/5 ML (20 %) SOLN    Inject 55 mLs (11 g total) into the skin once a week.    INHALATION SPACING DEVICE    Use as directed for inhalation.    IPRATROPIUM (ATROVENT) 42 MCG (0.06 %) NASAL SPRAY    2 sprays by Nasal route 4 (four) times daily.    LANCETS (ACCU-CHEK SOFTCLIX LANCETS) MISC    Use to check blood sugar daily.    LEVALBUTEROL (XOPENEX) 1.25 MG/3 ML NEBULIZER SOLUTION    Take 3 mLs (1.25 mg total) by nebulization every 4 (four) hours as needed for  Wheezing or Shortness of Breath. Rescue    MOMETASONE 0.1% (ELOCON) 0.1 % CREAM    aaa bid x 1-2 weeks prn bug bites    MUCUS CLEARING DEVICE KAREN    1 Device by Misc.(Non-Drug; Combo Route) route 2 (two) times daily.    MULTIVITAMIN CAPSULE    Take 1 capsule by mouth once daily.     MUPIROCIN (BACTROBAN) 2 % OINTMENT    Apply topically 3 (three) times daily.    ONDANSETRON (ZOFRAN-ODT) 4 MG TBDL    Take 1 tablet (4 mg total) by mouth every 8 (eight) hours as needed.    PRAVASTATIN (PRAVACHOL) 80 MG TABLET    TAKE 1 TABLET EVERY DAY    PREDNISONE (DELTASONE) 10 MG TABLET    1 pill for 3-5 days repeat as needed for chest tightness.    PSYLLIUM HUSK (METAMUCIL ORAL)    Take by mouth.    SIMETHICONE (GAS-X ORAL)    Take 1 capsule by mouth as needed (gas relief).     VALSARTAN-HYDROCHLOROTHIAZIDE (DIOVAN-HCT) 160-12.5 MG PER TABLET    Take 1 tablet by mouth once daily.    VITAMIN D3-FOLIC ACID 5,000 UNIT- 1 MG TAB    Take 1 tablet by mouth once daily.    Changed and/or Refilled Medications    Modified Medication Previous Medication    METFORMIN (GLUCOPHAGE-XR) 500 MG ER 24HR TABLET metFORMIN (GLUCOPHAGE-XR) 500 MG ER 24hr tablet       Take 1 tablet (500 mg total) by mouth 2 (two) times daily with meals.    Take 1 tablet (500 mg total) by mouth 2 (two) times daily with meals.    MONTELUKAST (SINGULAIR) 10 MG TABLET montelukast (SINGULAIR) 10 mg tablet       Take 1 tablet (10 mg total) by mouth every evening.    Take 1 tablet (10 mg total) by mouth every evening.    POTASSIUM CHLORIDE SA (K-DUR,KLOR-CON) 20 MEQ TABLET potassium chloride SA (K-DUR,KLOR-CON) 20 MEQ tablet       Take 1 tablet (20 mEq total) by mouth once daily.    TAKE 1 TABLET EVERY DAY       BP Readings from Last 3 Encounters:   03/13/23 133/74   02/23/23 133/72   01/23/23 127/75     Hemoglobin A1C   Date Value Ref Range Status   03/03/2023 6.1 (H) 4.0 - 5.6 % Final     Comment:     ADA Screening Guidelines:  5.7-6.4%  Consistent with prediabetes  >or=6.5%   Consistent with diabetes    High levels of fetal hemoglobin interfere with the HbA1C  assay. Heterozygous hemoglobin variants (HbS, HgC, etc)do  not significantly interfere with this assay.   However, presence of multiple variants may affect accuracy.     09/02/2022 6.3 (H) 4.0 - 5.6 % Final     Comment:     ADA Screening Guidelines:  5.7-6.4%  Consistent with prediabetes  >or=6.5%  Consistent with diabetes    High levels of fetal hemoglobin interfere with the HbA1C  assay. Heterozygous hemoglobin variants (HbS, HgC, etc)do  not significantly interfere with this assay.   However, presence of multiple variants may affect accuracy.     03/03/2022 6.1 (H) 4.0 - 5.6 % Final     Comment:     ADA Screening Guidelines:  5.7-6.4%  Consistent with prediabetes  >or=6.5%  Consistent with diabetes    High levels of fetal hemoglobin interfere with the HbA1C  assay. Heterozygous hemoglobin variants (HbS, HgC, etc)do  not significantly interfere with this assay.   However, presence of multiple variants may affect accuracy.       Lab Results   Component Value Date    TSH 1.447 03/03/2023     Lab Results   Component Value Date    LDLCALC 81.6 03/03/2023    LDLCALC 70.6 09/02/2022    LDLCALC 82.0 11/02/2021     Lab Results   Component Value Date    TRIG 97 03/03/2023    TRIG 112 09/02/2022    TRIG 140 11/02/2021     Wt Readings from Last 3 Encounters:   03/13/23 95.5 kg (210 lb 8.6 oz)   02/23/23 95.3 kg (210 lb)   01/23/23 93.8 kg (206 lb 12.7 oz)     Lab Results   Component Value Date    HGB 11.6 (L) 03/03/2023    HCT 36.6 (L) 03/03/2023    WBC 5.09 03/03/2023    ALT 18 03/03/2023    AST 21 03/03/2023     03/03/2023    K 4.3 03/03/2023    CREATININE 0.9 03/03/2023           Review of Systems        Objective:      Vitals:    03/13/23 1050   BP: 133/74   Pulse: 74   Temp: 97.4 °F (36.3 °C)     Physical Exam  Vitals reviewed.   Constitutional:       Appearance: Normal appearance.   Eyes:      Conjunctiva/sclera: Conjunctivae  normal.   Cardiovascular:      Rate and Rhythm: Normal rate.   Pulmonary:      Effort: Pulmonary effort is normal.      Breath sounds: Normal breath sounds.   Musculoskeletal:      Cervical back: Normal range of motion.      Comments: Normal ROM bilateral    Skin:     General: Skin is warm and dry.   Neurological:      Mental Status: She is alert.      Cranial Nerves: Cranial nerve deficit: grossly intact.   Psychiatric:      Comments: Alert and orientated

## 2023-03-13 NOTE — TELEPHONE ENCOUNTER
----- Message from Mihaela Alvarez, Patient Care Assistant sent at 3/13/2023 11:30 AM CDT -----  Regarding: advice  Contact: meg with [facility]  Type: Needs Medical Advice    Who Called:  meg with [facility]    Pharmacy name and phone #:    [facility] - [place] - [address]  [address]  [place] [address]  Phone: [phone] Fax: [phone]    Best Call Back Number: [phone] (home)     Additional Information: meg with [facility] states he would like a callback regarding semaglutide (OZEMPIC) 0.25 mg or 0.5 mg(2 mg/1.5 mL) pen injector. Please call to advise. Thanks!

## 2023-03-13 NOTE — TELEPHONE ENCOUNTER
She states she needs all her meds from today to go to [facility].    Pharmacy was verified and realized after she left.

## 2023-03-14 RX ORDER — SEMAGLUTIDE 1.34 MG/ML
0.25 INJECTION, SOLUTION SUBCUTANEOUS
Qty: 1.5 ML | Refills: 0 | Status: SHIPPED | OUTPATIENT
Start: 2023-03-14 | End: 2023-03-17 | Stop reason: SDUPTHER

## 2023-03-14 RX ORDER — SEMAGLUTIDE 1.34 MG/ML
0.5 INJECTION, SOLUTION SUBCUTANEOUS
Qty: 1.5 ML | Refills: 11 | Status: SHIPPED | OUTPATIENT
Start: 2023-03-14 | End: 2023-03-20

## 2023-03-17 ENCOUNTER — PATIENT MESSAGE (OUTPATIENT)
Dept: FAMILY MEDICINE | Facility: CLINIC | Age: 73
End: 2023-03-17
Payer: MEDICARE

## 2023-03-17 DIAGNOSIS — E11.9 CONTROLLED TYPE 2 DIABETES MELLITUS WITHOUT COMPLICATION, WITHOUT LONG-TERM CURRENT USE OF INSULIN: ICD-10-CM

## 2023-03-17 NOTE — TELEPHONE ENCOUNTER
No new care gaps identified.  [facility] Embedded Care Gaps. Reference number: [id number]. 3/17/2023   1:08:15 PM CDT

## 2023-03-20 ENCOUNTER — TELEPHONE (OUTPATIENT)
Dept: FAMILY MEDICINE | Facility: CLINIC | Age: 73
End: 2023-03-20

## 2023-03-20 ENCOUNTER — OFFICE VISIT (OUTPATIENT)
Dept: OTOLARYNGOLOGY | Facility: CLINIC | Age: 73
End: 2023-03-20
Payer: MEDICARE

## 2023-03-20 ENCOUNTER — CLINICAL SUPPORT (OUTPATIENT)
Dept: REHABILITATION | Facility: HOSPITAL | Age: 73
End: 2023-03-20
Attending: INTERNAL MEDICINE
Payer: MEDICARE

## 2023-03-20 VITALS — HEIGHT: 64 IN | BODY MASS INDEX: 35.95 KG/M2 | WEIGHT: 210.56 LBS

## 2023-03-20 DIAGNOSIS — J31.0 NON-ALLERGIC RHINITIS: Primary | ICD-10-CM

## 2023-03-20 DIAGNOSIS — E11.9 CONTROLLED TYPE 2 DIABETES MELLITUS WITHOUT COMPLICATION, WITHOUT LONG-TERM CURRENT USE OF INSULIN: ICD-10-CM

## 2023-03-20 DIAGNOSIS — R53.1 WEAKNESS: ICD-10-CM

## 2023-03-20 PROCEDURE — 1101F PT FALLS ASSESS-DOCD LE1/YR: CPT | Mod: HCNC,CPTII,S$GLB, | Performed by: OTOLARYNGOLOGY

## 2023-03-20 PROCEDURE — 1159F MED LIST DOCD IN RCRD: CPT | Mod: HCNC,CPTII,S$GLB, | Performed by: OTOLARYNGOLOGY

## 2023-03-20 PROCEDURE — 3044F PR MOST RECENT HEMOGLOBIN A1C LEVEL <7.0%: ICD-10-PCS | Mod: HCNC,CPTII,S$GLB, | Performed by: OTOLARYNGOLOGY

## 2023-03-20 PROCEDURE — 3066F NEPHROPATHY DOC TX: CPT | Mod: HCNC,CPTII,S$GLB, | Performed by: OTOLARYNGOLOGY

## 2023-03-20 PROCEDURE — 3060F PR POS MICROALBUMINURIA RESULT DOCUMENTED/REVIEW: ICD-10-PCS | Mod: HCNC,CPTII,S$GLB, | Performed by: OTOLARYNGOLOGY

## 2023-03-20 PROCEDURE — 3008F BODY MASS INDEX DOCD: CPT | Mod: HCNC,CPTII,S$GLB, | Performed by: OTOLARYNGOLOGY

## 2023-03-20 PROCEDURE — 3044F HG A1C LEVEL LT 7.0%: CPT | Mod: HCNC,CPTII,S$GLB, | Performed by: OTOLARYNGOLOGY

## 2023-03-20 PROCEDURE — 1157F ADVNC CARE PLAN IN RCRD: CPT | Mod: HCNC,CPTII,S$GLB, | Performed by: OTOLARYNGOLOGY

## 2023-03-20 PROCEDURE — 1157F PR ADVANCE CARE PLAN OR EQUIV PRESENT IN MEDICAL RECORD: ICD-10-PCS | Mod: HCNC,CPTII,S$GLB, | Performed by: OTOLARYNGOLOGY

## 2023-03-20 PROCEDURE — 1126F AMNT PAIN NOTED NONE PRSNT: CPT | Mod: HCNC,CPTII,S$GLB, | Performed by: OTOLARYNGOLOGY

## 2023-03-20 PROCEDURE — 1159F PR MEDICATION LIST DOCUMENTED IN MEDICAL RECORD: ICD-10-PCS | Mod: HCNC,CPTII,S$GLB, | Performed by: OTOLARYNGOLOGY

## 2023-03-20 PROCEDURE — 3060F POS MICROALBUMINURIA REV: CPT | Mod: HCNC,CPTII,S$GLB, | Performed by: OTOLARYNGOLOGY

## 2023-03-20 PROCEDURE — 97161 PT EVAL LOW COMPLEX 20 MIN: CPT | Mod: HCNC,PO

## 2023-03-20 PROCEDURE — 3288F PR FALLS RISK ASSESSMENT DOCUMENTED: ICD-10-PCS | Mod: HCNC,CPTII,S$GLB, | Performed by: OTOLARYNGOLOGY

## 2023-03-20 PROCEDURE — 1101F PR PT FALLS ASSESS DOC 0-1 FALLS W/OUT INJ PAST YR: ICD-10-PCS | Mod: HCNC,CPTII,S$GLB, | Performed by: OTOLARYNGOLOGY

## 2023-03-20 PROCEDURE — 1160F PR REVIEW ALL MEDS BY PRESCRIBER/CLIN PHARMACIST DOCUMENTED: ICD-10-PCS | Mod: HCNC,CPTII,S$GLB, | Performed by: OTOLARYNGOLOGY

## 2023-03-20 PROCEDURE — 99024 PR POST-OP FOLLOW-UP VISIT: ICD-10-PCS | Mod: HCNC,S$GLB,, | Performed by: OTOLARYNGOLOGY

## 2023-03-20 PROCEDURE — 3288F FALL RISK ASSESSMENT DOCD: CPT | Mod: HCNC,CPTII,S$GLB, | Performed by: OTOLARYNGOLOGY

## 2023-03-20 PROCEDURE — 99999 PR PBB SHADOW E&M-EST. PATIENT-LVL IV: CPT | Mod: PBBFAC,HCNC,, | Performed by: OTOLARYNGOLOGY

## 2023-03-20 PROCEDURE — 99024 POSTOP FOLLOW-UP VISIT: CPT | Mod: HCNC,S$GLB,, | Performed by: OTOLARYNGOLOGY

## 2023-03-20 PROCEDURE — 3008F PR BODY MASS INDEX (BMI) DOCUMENTED: ICD-10-PCS | Mod: HCNC,CPTII,S$GLB, | Performed by: OTOLARYNGOLOGY

## 2023-03-20 PROCEDURE — 1160F RVW MEDS BY RX/DR IN RCRD: CPT | Mod: HCNC,CPTII,S$GLB, | Performed by: OTOLARYNGOLOGY

## 2023-03-20 PROCEDURE — 3066F PR DOCUMENTATION OF TREATMENT FOR NEPHROPATHY: ICD-10-PCS | Mod: HCNC,CPTII,S$GLB, | Performed by: OTOLARYNGOLOGY

## 2023-03-20 PROCEDURE — 1126F PR PAIN SEVERITY QUANTIFIED, NO PAIN PRESENT: ICD-10-PCS | Mod: HCNC,CPTII,S$GLB, | Performed by: OTOLARYNGOLOGY

## 2023-03-20 PROCEDURE — 99999 PR PBB SHADOW E&M-EST. PATIENT-LVL IV: ICD-10-PCS | Mod: PBBFAC,HCNC,, | Performed by: OTOLARYNGOLOGY

## 2023-03-20 RX ORDER — SEMAGLUTIDE 1.34 MG/ML
0.5 INJECTION, SOLUTION SUBCUTANEOUS
Qty: 1.5 ML | Refills: 11 | OUTPATIENT
Start: 2023-03-20 | End: 2024-03-19

## 2023-03-20 RX ORDER — SEMAGLUTIDE 1.34 MG/ML
INJECTION, SOLUTION SUBCUTANEOUS
Qty: 5.25 ML | Refills: 4 | Status: SHIPPED | OUTPATIENT
Start: 2023-03-20 | End: 2023-03-20

## 2023-03-20 RX ORDER — SEMAGLUTIDE 1.34 MG/ML
INJECTION, SOLUTION SUBCUTANEOUS
Qty: 5.25 ML | Refills: 4 | Status: SHIPPED | OUTPATIENT
Start: 2023-03-20 | End: 2023-09-18

## 2023-03-20 NOTE — TELEPHONE ENCOUNTER
Rx resent 3rd time.  CALL them and find out if this works.  Find out specifically what is not clear if not.

## 2023-03-20 NOTE — TELEPHONE ENCOUNTER
----- Message from Yumi Adorno sent at 3/20/2023 11:26 AM CDT -----  Regarding: clarification needed for pharmacy  [facility] pharmacy will not fill the ozempic prescription because they say it is not clear how its written. They wont fill until they speak with someone from dr rodriguez staff. Please call her back @ [phone] to let her know this is done please.

## 2023-03-20 NOTE — PLAN OF CARE
"[facility]  Physical Therapy Initial Evaluation  Date: 3/20/2023   Name: Cornelia Delatorre  Clinic Number: [id number]    Therapy Diagnosis:   Encounter Diagnosis   Name Primary?    Weakness      Physician: Armond Cortez MD    Physician Orders: PT Eval and Treat   Medical Diagnosis from Referral: Weakness   Evaluation Date: 3/20/2023  Authorization Period Expiration: 3/12/2024  Plan of Care Expiration: 6/20/2023  Progress Note Due: 4/20/2023  Visit # / Visits authorized: 1/ 1   FOTO: 1/3    Precautions: Standard and Fall     Time In: 1350  Time Out: 1430  Total Appointment Time (timed & untimed codes): 40 minutes      SUBJECTIVE   Date of onset: " a couple years"    History of current condition - Cornelia reports: she feels like her legs, hips, and knees are getting weaker; she has been experiencing constant pain in her back(same pain as 2019 pain)/previously mentioned areas. Patient reports ack pain that is constant, helps with rubbing, Voltaren gel, and heat help. Day to day activities include: walking to the mailbox gets her out of breath, can't ride her bike more than a couple of minutes without her bike hurting (recumbent bike), cooks; she is limited with all.     Falls: yes but 6-8 months ago secondary to wet floor    Imaging, none:     Prior Therapy: yes in 2019 for back   Social History:  lives with their spouse  Occupation: retired  Prior Level of Function: able to complete ADLs without pain   Current Level of Function: ADLs limited     Pain:  Current 5/10, worst 8/10, best 5/10   Location:   Superior buttock   Description: Aching and Dull  Aggravating Factors: Sitting, Standing, Bending, Walking, Extension, Flexing, and Lifting  Easing Factors: massage, relaxation, hot bath, and rest    Patients goals: "to get stronger and be able to go up stairs without limitation."     Medical History:   Past Medical History:   Diagnosis Date    Allergy     Arthritis     Asthma     Cancer "     skin cancer to right hand    Cataract     Chronic fatigue 1/24/2017    CVID (common variable immunodeficiency) 3/7/2019    Diabetes mellitus     type2    KLINE (dyspnea on exertion) 1/24/2017    Dyslipidemia 6/25/2019    Encounter for blood transfusion     Essential hypertension 12/29/2011    GERD (gastroesophageal reflux disease)     Headache(784.0)     History of lumpectomy of both breasts     1992 negative    Hyperlipidemia 7/15/2015    Hypertension     Hypothyroidism     Neuropathy     Obesity     Obesity     Postmenopausal HRT (hormone replacement therapy)     Rash     Rosacea     Sleep apnea     on bipap    Snoring     Squamous cell carcinoma of skin     left forearm     UTI (urinary tract infection)     Wears glasses        Surgical History:   Cornelia Delatorre  has a past surgical history that includes Gastric bypass; Tonsillectomy; Adenoidectomy; Appendectomy; Colonoscopy; interstim bladder (2009); Esophagogastroduodenoscopy; Bunionectomy (10/17/14); Cataract extraction w/  intraocular lens implant (Bilateral); Sondheimer tooth extraction; Skin cancer excision; Bladder suspension; Cholecystectomy (08/02/2017); Hysterectomy (1980); Oophorectomy (1980); Breast biopsy (Bilateral, 1992); Epidural steroid injection into lumbar spine (N/A, 8/14/2019); Revision of procedure involving sacral neurostimulator device (Right, 2/18/2020); Replacement of sacral nerve stimulator (2/18/2020); Epidural steroid injection into lumbar spine (N/A, 5/3/2021); Esophagogastroduodenoscopy (N/A, 6/11/2021); Esophageal dilation (N/A, 6/11/2021); Cystoscopy; Epidural steroid injection into lumbar spine (N/A, 9/24/2021); and Epidural steroid injection into lumbar spine (N/A, 2/21/2022).    Medications:   Cornelia has a current medication list which includes the following prescription(s): albuterol, alpha lipoic acid, ascorbic acid (vitamin c), azelastine, azelastine, b-complex with vitamin c, bifidobacterium infantis, biotin, blood  sugar diagnostic, blood-glucose meter, chlorpheniramine, clonidine, cranberry, diclofenac sodium, diltiazem, epinephrine, erythromycin with ethanol, esomeprazole, estradiol, fexofenadine, fish oil-omega-3 fatty acids, fluad quad 2022-23(65y up)(pf), [START ON 4/9/2023] fluticasone furoate-vilanterol, fluticasone propionate, gabapentin, guaifenesin-codeine 100-10 mg/5 ml, hydroxyzine hcl, hizentra, inhalation spacing device, ipratropium, lancets, levalbuterol, metformin, mometasone 0.1%, montelukast, mucus clearing device, multivitamin, mupirocin, ondansetron, potassium chloride sa, pravastatin, prednisone, psyllium husk, ozempic, ozempic, simethicone, valsartan-hydrochlorothiazide, and vitamin d3-folic acid.    Allergies:   Review of patient's allergies indicates:   Allergen Reactions    Sulfa (sulfonamide antibiotics) Hives    Naproxen Other (See Comments)     Other reaction(s): RT sided numbness      Albuterol Itching and Palpitations     Nebulizer only. Can use inhaler        Objective     Observation: unremarkable     Posture: unremarkable       Lower Extremity Strength  Right LE  Left LE    Knee extension: 4/5 Knee extension: 4/5   Knee flexion: 4/5 Knee flexion: 4/5   Hip flexion: 4+/5 Hip flexion: 4/5   Hip Internal Rotation:  4/5    Hip Internal Rotation: 4/5      Hip External Rotation: 4/5    Hip External Rotation: 4/5      Hip extension:  3+/5 Hip extension: 3+/5   Hip abduction: 3/5 Hip abduction: 3/5   Ankle dorsiflexion: 5/5 Ankle dorsiflexion: 5/5   Ankle plantarflexion: 4/5 Ankle plantarflexion: 4/5       TUG: 10.71       Evaluation   Single Limb Stance R LE 2s  (<10 sec = HIGH FALL RISK)   Single Limb Stance L LE 3s  (<10 sec = HIGH FALL RISK)   Recumbent bike   lvl 2 at 50 rpms Time to failure: 1min 30s    Precor leg press 0#   lvl 2 17 reps (no pause)   5 times sit-stand 16.87 seconds w/ no arms       Sensation: intact      CMS Impairment/Limitation/Restriction for FOTO lower leg  Survey    Therapist reviewed FOTO scores for Cornelia Delatorre on 3/20/2023.   FOTO documents entered into [employer] - see Media section.    Limitation Score: 53%  Category: Mobility         TREATMENT   Treatment Time In: 00  Treatment Time Out: 00  Total Treatment time separate from Evaluation: 00 minutes      Home Exercises and Patient Education Provided    Education provided:   - Role of PT, PT POC    Written Home Exercises Provided:  NA .  Exercises were reviewed and Cornelia was able to demonstrate them prior to the end of the session.  Cornelia demonstrated  NA  understanding of the education provided.     See EMR under  NA  for exercises provided  NA .    Assessment   Cornelia is a 72 y.o. female referred to outpatient Physical Therapy with a medical diagnosis of weakness. Physical exam is consistent with referring dx. Primary impairments include AROM, PROM, joint mobility, strength, balance, soft tissue restrictions, and pain which limits functional mobility. This pt is a good candidate for skilled PT tx and stands to benefit from a combination of manual therapy including joint mobilizations with trigger point/myofacscial release, therapeutic exercise to establish core/joint stability, neuromuscular re-education, dry needling and modalities Prn. The pt has been educated on their dx/POC and consents to further PT tx.      Pt prognosis is Good.   Pt will benefit from skilled outpatient Physical Therapy to address the deficits stated above and in the chart below, provide pt/family education, and to maximize pt's level of independence.     Plan of care discussed with patient: Yes  Pt's spiritual, cultural and educational needs considered and patient is agreeable to the plan of care and goals as stated below:     Anticipated Barriers for therapy: chronicity    Medical Necessity is demonstrated by the following  History  Co-morbidities and personal factors that may impact the plan of care Co-morbidities:    advanced age, anxiety, COPD/asthma, and high BMI    Personal Factors:   age     low   Examination  Body Structures and Functions, activity limitations and participation restrictions that may impact the plan of care Body Regions:   back  lower extremities  trunk    Body Systems:    gross symmetry  ROM  strength  gross coordinated movement  balance  gait  transfers  transitions  motor control  motor learning    Participation Restrictions:   Community ambulation    Activity limitations:   Learning and applying knowledge  no deficits    General Tasks and Commands  no deficits    Communication  no deficits    Mobility  lifting and carrying objects  walking  driving (bike, car, motorcycle)    Self care  washing oneself (bathing, drying, washing hands)  toileting  dressing  looking after one's health    Domestic Life  shopping  cooking  doing house work (cleaning house, washing dishes, laundry)    Interactions/Relationships  no deficits    Life Areas  no deficits    Community and Social Life  community life  recreation and leisure         moderate   Clinical Presentation stable and uncomplicated low   Decision Making/ Complexity Score: low        Goals:  Short-Term Goals: 2-4 weeks  - The patient will be independent with initial home exercise program.  - The patient will increase TUG to at least 8s to demonstrate improved balance and stability with ambulation.   - The patient will improve 5 times sit to stand to at least 13s to demonstrate improved functional mobility.     Long-Term Goals: 6-8 weeks  - Pt to achieve <45 functional measure as measured by the FOTO to demonstrate decreased disability.  - The patient will be independent with home exercise program and symptom management.  - The patient will complete at least 20 reps of precor leg press to indicate improved functional strength.  - The patient will increase recumbent bike time to at least 2 minutes to indicate improved functional endurance.       PLAN   Plan of  care Certification: 3/20/2023 to 6/20/2023.    Outpatient Physical Therapy 2 times weekly for 10 weeks to include the following interventions: Aquatic Therapy, Cervical/Lumbar Traction, Electrical Stimulation prn, Gait Training, Manual Therapy, Moist Heat/ Ice, Neuromuscular Re-ed, Patient Education, Self Care, Therapeutic Activities, and Therapeutic Exercise.     Chad Suarez, PT      I CERTIFY THE NEED FOR THESE SERVICES FURNISHED UNDER THIS PLAN OF TREATMENT AND WHILE UNDER MY CARE   Physician's comments:     Physician's Signature: ___________________________________________________

## 2023-03-20 NOTE — TELEPHONE ENCOUNTER
[facility] pharmacy will not fill the ozempic prescription because they say it is not clear how its written

## 2023-03-21 ENCOUNTER — TELEPHONE (OUTPATIENT)
Dept: CARDIOLOGY | Facility: CLINIC | Age: 73
End: 2023-03-21
Payer: MEDICARE

## 2023-03-21 ENCOUNTER — TELEPHONE (OUTPATIENT)
Dept: OBSTETRICS AND GYNECOLOGY | Facility: CLINIC | Age: 73
End: 2023-03-21
Payer: MEDICARE

## 2023-03-21 NOTE — PROGRESS NOTES
Cornelia is here for a post-operative visit following RhinAer    No issues, doing well  50% improvement in symptoms    Exam:  Alert, active, appropriate  Nasal cavity suctioned, patent - thick, mucopurulent crust filling right nasal cavity cleaned. Turbinates well healed and nasal cavity patent bilaterally.      Healing well  Message with update in 1-2 mos  Follow-up prn

## 2023-03-21 NOTE — TELEPHONE ENCOUNTER
----- Message from Lencho Nobles sent at 3/21/2023  1:01 PM CDT -----  Regarding: Advice  Contact: Patient  Type:  Needs Medical Advice    Who Called: Patient  Symptoms (please be specific): need to know if office received records   How long has patient had these symptoms:    Pharmacy name and phone #:    Would the patient rather a call back or a response via [facility]? call  Best Call Back Number: [phone]  Additional Information: Patient called needing office to call her. Appointment on 03/29/23

## 2023-03-24 ENCOUNTER — TELEPHONE (OUTPATIENT)
Dept: ADMINISTRATIVE | Facility: HOSPITAL | Age: 73
End: 2023-03-24
Payer: MEDICARE

## 2023-03-24 ENCOUNTER — PATIENT OUTREACH (OUTPATIENT)
Dept: ADMINISTRATIVE | Facility: HOSPITAL | Age: 73
End: 2023-03-24
Payer: MEDICARE

## 2023-03-29 ENCOUNTER — HOSPITAL ENCOUNTER (OUTPATIENT)
Dept: RADIOLOGY | Facility: HOSPITAL | Age: 73
Discharge: HOME | End: 2023-03-29
Attending: STUDENT IN AN ORGANIZED HEALTH CARE EDUCATION/TRAINING PROGRAM
Payer: MEDICARE

## 2023-03-29 ENCOUNTER — OFFICE VISIT (OUTPATIENT)
Dept: OBSTETRICS AND GYNECOLOGY | Facility: CLINIC | Age: 73
End: 2023-03-29
Payer: MEDICARE

## 2023-03-29 VITALS
SYSTOLIC BLOOD PRESSURE: 140 MMHG | WEIGHT: 209.88 LBS | BODY MASS INDEX: 36.03 KG/M2 | DIASTOLIC BLOOD PRESSURE: 84 MMHG

## 2023-03-29 DIAGNOSIS — Z12.31 ENCOUNTER FOR SCREENING MAMMOGRAM FOR MALIGNANT NEOPLASM OF BREAST: ICD-10-CM

## 2023-03-29 DIAGNOSIS — Z01.419 WELL WOMAN EXAM: Primary | ICD-10-CM

## 2023-03-29 DIAGNOSIS — Z01.419 WELL WOMAN EXAM: ICD-10-CM

## 2023-03-29 PROCEDURE — 3077F SYST BP >= 140 MM HG: CPT | Mod: HCNC,CPTII,S$GLB, | Performed by: STUDENT IN AN ORGANIZED HEALTH CARE EDUCATION/TRAINING PROGRAM

## 2023-03-29 PROCEDURE — 1101F PT FALLS ASSESS-DOCD LE1/YR: CPT | Mod: HCNC,CPTII,S$GLB, | Performed by: STUDENT IN AN ORGANIZED HEALTH CARE EDUCATION/TRAINING PROGRAM

## 2023-03-29 PROCEDURE — 1101F PR PT FALLS ASSESS DOC 0-1 FALLS W/OUT INJ PAST YR: ICD-10-PCS | Mod: HCNC,CPTII,S$GLB, | Performed by: STUDENT IN AN ORGANIZED HEALTH CARE EDUCATION/TRAINING PROGRAM

## 2023-03-29 PROCEDURE — 3060F PR POS MICROALBUMINURIA RESULT DOCUMENTED/REVIEW: ICD-10-PCS | Mod: HCNC,CPTII,S$GLB, | Performed by: STUDENT IN AN ORGANIZED HEALTH CARE EDUCATION/TRAINING PROGRAM

## 2023-03-29 PROCEDURE — 3288F FALL RISK ASSESSMENT DOCD: CPT | Mod: HCNC,CPTII,S$GLB, | Performed by: STUDENT IN AN ORGANIZED HEALTH CARE EDUCATION/TRAINING PROGRAM

## 2023-03-29 PROCEDURE — 77067 SCR MAMMO BI INCL CAD: CPT | Mod: 26,HCNC,, | Performed by: RADIOLOGY

## 2023-03-29 PROCEDURE — 3079F PR MOST RECENT DIASTOLIC BLOOD PRESSURE 80-89 MM HG: ICD-10-PCS | Mod: HCNC,CPTII,S$GLB, | Performed by: STUDENT IN AN ORGANIZED HEALTH CARE EDUCATION/TRAINING PROGRAM

## 2023-03-29 PROCEDURE — 3066F NEPHROPATHY DOC TX: CPT | Mod: HCNC,CPTII,S$GLB, | Performed by: STUDENT IN AN ORGANIZED HEALTH CARE EDUCATION/TRAINING PROGRAM

## 2023-03-29 PROCEDURE — 1157F PR ADVANCE CARE PLAN OR EQUIV PRESENT IN MEDICAL RECORD: ICD-10-PCS | Mod: HCNC,CPTII,S$GLB, | Performed by: STUDENT IN AN ORGANIZED HEALTH CARE EDUCATION/TRAINING PROGRAM

## 2023-03-29 PROCEDURE — 99999 PR PBB SHADOW E&M-EST. PATIENT-LVL IV: CPT | Mod: PBBFAC,HCNC,, | Performed by: STUDENT IN AN ORGANIZED HEALTH CARE EDUCATION/TRAINING PROGRAM

## 2023-03-29 PROCEDURE — 3008F PR BODY MASS INDEX (BMI) DOCUMENTED: ICD-10-PCS | Mod: HCNC,CPTII,S$GLB, | Performed by: STUDENT IN AN ORGANIZED HEALTH CARE EDUCATION/TRAINING PROGRAM

## 2023-03-29 PROCEDURE — 1159F PR MEDICATION LIST DOCUMENTED IN MEDICAL RECORD: ICD-10-PCS | Mod: HCNC,CPTII,S$GLB, | Performed by: STUDENT IN AN ORGANIZED HEALTH CARE EDUCATION/TRAINING PROGRAM

## 2023-03-29 PROCEDURE — 77063 MAMMO DIGITAL SCREENING BILAT WITH TOMO: ICD-10-PCS | Mod: 26,HCNC,, | Performed by: RADIOLOGY

## 2023-03-29 PROCEDURE — 3288F PR FALLS RISK ASSESSMENT DOCUMENTED: ICD-10-PCS | Mod: HCNC,CPTII,S$GLB, | Performed by: STUDENT IN AN ORGANIZED HEALTH CARE EDUCATION/TRAINING PROGRAM

## 2023-03-29 PROCEDURE — 3008F BODY MASS INDEX DOCD: CPT | Mod: HCNC,CPTII,S$GLB, | Performed by: STUDENT IN AN ORGANIZED HEALTH CARE EDUCATION/TRAINING PROGRAM

## 2023-03-29 PROCEDURE — 1159F MED LIST DOCD IN RCRD: CPT | Mod: HCNC,CPTII,S$GLB, | Performed by: STUDENT IN AN ORGANIZED HEALTH CARE EDUCATION/TRAINING PROGRAM

## 2023-03-29 PROCEDURE — 77067 MAMMO DIGITAL SCREENING BILAT WITH TOMO: ICD-10-PCS | Mod: 26,HCNC,, | Performed by: RADIOLOGY

## 2023-03-29 PROCEDURE — 1157F ADVNC CARE PLAN IN RCRD: CPT | Mod: HCNC,CPTII,S$GLB, | Performed by: STUDENT IN AN ORGANIZED HEALTH CARE EDUCATION/TRAINING PROGRAM

## 2023-03-29 PROCEDURE — 3044F PR MOST RECENT HEMOGLOBIN A1C LEVEL <7.0%: ICD-10-PCS | Mod: HCNC,CPTII,S$GLB, | Performed by: STUDENT IN AN ORGANIZED HEALTH CARE EDUCATION/TRAINING PROGRAM

## 2023-03-29 PROCEDURE — G0101 PR CA SCREEN;PELVIC/BREAST EXAM: ICD-10-PCS | Mod: HCNC,S$GLB,, | Performed by: STUDENT IN AN ORGANIZED HEALTH CARE EDUCATION/TRAINING PROGRAM

## 2023-03-29 PROCEDURE — 99999 PR PBB SHADOW E&M-EST. PATIENT-LVL IV: ICD-10-PCS | Mod: PBBFAC,HCNC,, | Performed by: STUDENT IN AN ORGANIZED HEALTH CARE EDUCATION/TRAINING PROGRAM

## 2023-03-29 PROCEDURE — 3079F DIAST BP 80-89 MM HG: CPT | Mod: HCNC,CPTII,S$GLB, | Performed by: STUDENT IN AN ORGANIZED HEALTH CARE EDUCATION/TRAINING PROGRAM

## 2023-03-29 PROCEDURE — 3066F PR DOCUMENTATION OF TREATMENT FOR NEPHROPATHY: ICD-10-PCS | Mod: HCNC,CPTII,S$GLB, | Performed by: STUDENT IN AN ORGANIZED HEALTH CARE EDUCATION/TRAINING PROGRAM

## 2023-03-29 PROCEDURE — 77067 SCR MAMMO BI INCL CAD: CPT | Mod: TC,HCNC,PN

## 2023-03-29 PROCEDURE — 77063 BREAST TOMOSYNTHESIS BI: CPT | Mod: 26,HCNC,, | Performed by: RADIOLOGY

## 2023-03-29 PROCEDURE — 3077F PR MOST RECENT SYSTOLIC BLOOD PRESSURE >= 140 MM HG: ICD-10-PCS | Mod: HCNC,CPTII,S$GLB, | Performed by: STUDENT IN AN ORGANIZED HEALTH CARE EDUCATION/TRAINING PROGRAM

## 2023-03-29 PROCEDURE — 3060F POS MICROALBUMINURIA REV: CPT | Mod: HCNC,CPTII,S$GLB, | Performed by: STUDENT IN AN ORGANIZED HEALTH CARE EDUCATION/TRAINING PROGRAM

## 2023-03-29 PROCEDURE — G0101 CA SCREEN;PELVIC/BREAST EXAM: HCPCS | Mod: HCNC,S$GLB,, | Performed by: STUDENT IN AN ORGANIZED HEALTH CARE EDUCATION/TRAINING PROGRAM

## 2023-03-29 PROCEDURE — 3044F HG A1C LEVEL LT 7.0%: CPT | Mod: HCNC,CPTII,S$GLB, | Performed by: STUDENT IN AN ORGANIZED HEALTH CARE EDUCATION/TRAINING PROGRAM

## 2023-03-29 NOTE — PROGRESS NOTES
History & Physical  Gynecology      SUBJECTIVE:     Chief Complaint: Establish Care (A) and Annual Exam       History of Present Illness:    Here to establish care and gyn annual. No problems.     Gyn: no PMB, no HRT, hx of hyst in 1980 for bleeding. No hx of abnormal pap smears. Sexually active. Mom had breast cancer. No other immediate FH of gyn or colon cancer.    No tobacco.     Review of patient's allergies indicates:   Allergen Reactions    Sulfa (sulfonamide antibiotics) Hives    Naproxen Other (See Comments)     Other reaction(s): RT sided numbness      Albuterol Itching and Palpitations     Nebulizer only. Can use inhaler       Past Medical History:   Diagnosis Date    Allergy     Arthritis     Asthma     Cancer     skin cancer to right hand    Cataract     Chronic fatigue 1/24/2017    CVID (common variable immunodeficiency) 3/7/2019    Diabetes mellitus     type2    KLINE (dyspnea on exertion) 1/24/2017    Dyslipidemia 6/25/2019    Encounter for blood transfusion     Essential hypertension 12/29/2011    GERD (gastroesophageal reflux disease)     Headache(784.0)     History of lumpectomy of both breasts     1992 negative    Hyperlipidemia 7/15/2015    Hypertension     Hypothyroidism     Neuropathy     Obesity     Obesity     Postmenopausal HRT (hormone replacement therapy)     Rash     Rosacea     Sleep apnea     on bipap    Snoring     Squamous cell carcinoma of skin     left forearm     UTI (urinary tract infection)     Wears glasses      Past Surgical History:   Procedure Laterality Date    ADENOIDECTOMY      APPENDECTOMY      BLADDER SUSPENSION      BREAST BIOPSY Bilateral 1992    bilateral benign excisional biopsies    BUNIONECTOMY  10/17/14    right, still has discomfort    CATARACT EXTRACTION W/  INTRAOCULAR LENS IMPLANT Bilateral     CHOLECYSTECTOMY  08/02/2017    COLONOSCOPY      CYSTOSCOPY      EPIDURAL STEROID INJECTION INTO LUMBAR SPINE N/A 8/14/2019    Procedure: Injection-steroid-epidural-lumbar  L5/S1;  Surgeon: Fredi Rojas MD;  [place] [facility] [place];  Service: Pain Management;  Laterality: N/A;    EPIDURAL STEROID INJECTION INTO LUMBAR SPINE N/A 5/3/2021    Procedure: Injection-steroid-epidural-lumbar L5/S1 to the Left;  Surgeon: Fredi Rojas MD;  [place] [facility] [place];  Service: Pain Management;  Laterality: N/A;    EPIDURAL STEROID INJECTION INTO LUMBAR SPINE N/A 2021    Procedure: Injection-steroid-epidural-lumbar L5/S1;  Surgeon: Fredi Rojas MD;  [place] [facility] [place];  Service: Pain Management;  Laterality: N/A;    EPIDURAL STEROID INJECTION INTO LUMBAR SPINE N/A 2022    Procedure: Injection-steroid-epidural-lumbar L5/S1;  Surgeon: Fredi Rojas MD;  [place] [facility] [place];  Service: Pain Management;  Laterality: N/A;    ESOPHAGEAL DILATION N/A 2021    Procedure: DILATION, ESOPHAGUS;  Surgeon: Jake Sheriff MD;  Location: [facility];  Service: Endoscopy;  Laterality: N/A;    ESOPHAGOGASTRODUODENOSCOPY      dilated esophagus    ESOPHAGOGASTRODUODENOSCOPY N/A 2021    Procedure: EGD (ESOPHAGOGASTRODUODENOSCOPY);  Surgeon: Jake Sheriff MD;  Location: [facility];  Service: Endoscopy;  Laterality: N/A;    GASTRIC BYPASS          HYSTERECTOMY      interstim bladder  2009    10/14/14 new battery    OOPHORECTOMY      REPLACEMENT OF SACRAL NERVE STIMULATOR  2020    Procedure: REPLACEMENT, NEUROSTIMULATOR, SACRAL;  Surgeon: Mary Jane Jarvis MD;  Location: [facility] OR [address] [place] [address];  Service: Urology;;    REVISION OF PROCEDURE INVOLVING SACRAL NEUROSTIMULATOR DEVICE Right 2020    Procedure: REVISION, NEUROSTIMULATOR, SACRAL/ battery replacement;  Surgeon: Mary Jane Jarvis MD;  Location: [facility] OR [address] [place] [address];  Service: Urology;  Laterality: Right;  1hr/ rep contacted/ (CONNIE)    SKIN CANCER EXCISION      left hand    TONSILLECTOMY      WISDOM TOOTH EXTRACTION       OB History          3    Para   3    Term   3            AB        Living             SAB        IAB         Ectopic        Multiple        Live Births                   Family History   Problem Relation Age of Onset    Cervical cancer Paternal Grandmother     Ovarian cancer Paternal Grandmother     Breast cancer Mother 50    Breast cancer Paternal Aunt         40's    Diabetes Other     Hypertension Other     Hypothyroidism Other     Cervical cancer Paternal Cousin     Allergic rhinitis Neg Hx     Allergies Neg Hx     Angioedema Neg Hx     Asthma Neg Hx     Atopy Neg Hx     Eczema Neg Hx     Immunodeficiency Neg Hx     Rhinitis Neg Hx     Urticaria Neg Hx     Uterine cancer Neg Hx      Social History     Tobacco Use    Smoking status: Never    Smokeless tobacco: Never   Substance Use Topics    Alcohol use: Not Currently    Drug use: No       Current Outpatient Medications   Medication Sig    albuterol (PROVENTIL/VENTOLIN HFA) 90 mcg/actuation inhaler Inhale 2 puffs into the lungs every 4 (four) hours as needed. 2 puffs every 4 hours as needed for cough, wheeze, or shortness of breath    alpha lipoic acid 600 mg Cap Take 600 mg by mouth 2 (two) times daily.    ascorbic acid, vitamin C, (VITAMIN C) 1000 MG tablet Take 1,000 mg by mouth 2 (two) times daily.     azelastine (ASTELIN) 137 mcg (0.1 %) nasal spray 1 spray (137 mcg total) by Nasal route 2 (two) times daily.    azelastine (OPTIVAR) 0.05 % ophthalmic solution Place 1 drop into both eyes. As needed    B-complex with vitamin C (Z-BEC OR EQUIV) tablet Take 1 tablet by mouth once daily.    Bifidobacterium infantis (ALIGN ORAL) Take by mouth once daily.    biotin 10,000 mcg Cap Take 1 tablet by mouth every evening.     blood sugar diagnostic [employer] preferred meter and strips. Check daily    blood-glucose meter kit Use as instructed. Insurance preferred meter.    chlorpheniramine (CHLOR-TRIMETON) 4 mg tablet Take 1 tablet (4 mg total) by mouth every 8 (eight) hours while awake.    cloNIDine (CATAPRES) 0.1 MG tablet Take 1 tablet (0.1 mg total) by mouth 3  (three) times daily as needed (PRN SBP > 165 mmHg).    cranberry 500 mg Cap Take 1 capsule by mouth every evening.    diclofenac sodium (VOLTAREN) 1 % Gel Apply 2 grams topically once daily. (Patient taking differently: Apply 2 g topically as needed (arthritis).)    diltiaZEM (CARDIZEM CD) 120 MG Cp24 TAKE 1 CAPSULE (120 MG TOTAL) BY MOUTH EVERY EVENING.    EPINEPHrine (EPIPEN) 0.3 mg/0.3 mL AtIn Inject 1 each into the muscle as needed.     erythromycin with ethanoL (THERAMYCIN) 2 % external solution Aaa bid prn    esomeprazole (NEXIUM) 40 MG capsule Take 1 capsule (40 mg total) by mouth before breakfast.    estradioL (ESTRACE) 0.01 % (0.1 mg/gram) vaginal cream Place 1 g vaginally 3 (three) times a week. Place by fingertip application before bedtime three times a week (Monday, Wednesday, Friday)    fexofenadine (ALLEGRA) 180 MG tablet Take 180 mg by mouth once daily.     fish oil-omega-3 fatty acids 300-1,000 mg capsule Take 2 g by mouth once daily.    FLUAD QUAD 2022-23,65Y UP,,PF, 60 mcg (15 mcg x 4)/0.5 mL Syrg     [START ON 4/9/2023] fluticasone furoate-vilanteroL (BREO ELLIPTA) 200-25 mcg/dose DsDv diskus inhaler Inhale 1 puff into the lungs once daily. Controller    fluticasone propionate (FLONASE) 50 mcg/actuation nasal spray 2 sprays (100 mcg total) by Each Nostril route once daily.    gabapentin (NEURONTIN) 600 MG tablet Take 1 tablet (600 mg total) by mouth 3 (three) times daily.    guaifenesin-codeine 100-10 mg/5 ml (GUAIFENESIN AC)  mg/5 mL syrup Take 5 mLs by mouth 3 (three) times daily as needed for Cough.    hydrOXYzine HCL (ATARAX) 10 MG Tab Take 1 tablet (10 mg total) by mouth 3 (three) times daily as needed.    immun glob G,IgG,-pro-IgA 0-50 (HIZENTRA) 1 gram/5 mL (20 %) Soln Inject 55 mLs (11 g total) into the skin once a week.    inhalation spacing device Use as directed for inhalation.    ipratropium (ATROVENT) 42 mcg (0.06 %) nasal spray 2 sprays by Nasal route 4 (four) times daily.     lancets (ACCU-CHEK SOFTCLIX LANCETS) Misc Use to check blood sugar daily.    levalbuterol (XOPENEX) 1.25 mg/3 mL nebulizer solution Take 3 mLs (1.25 mg total) by nebulization every 4 (four) hours as needed for Wheezing or Shortness of Breath. Rescue    metFORMIN (GLUCOPHAGE-XR) 500 MG ER 24hr tablet Take 1 tablet (500 mg total) by mouth 2 (two) times daily with meals.    mometasone 0.1% (ELOCON) 0.1 % cream aaa bid x 1-2 weeks prn bug bites    montelukast (SINGULAIR) 10 mg tablet Take 1 tablet (10 mg total) by mouth every evening.    mucus clearing device Cecy 1 Device by Misc.(Non-Drug; Combo Route) route 2 (two) times daily.    multivitamin capsule Take 1 capsule by mouth once daily.     mupirocin (BACTROBAN) 2 % ointment Apply topically 3 (three) times daily.    ondansetron (ZOFRAN-ODT) 4 MG TbDL Take 1 tablet (4 mg total) by mouth every 8 (eight) hours as needed.    potassium chloride SA (K-DUR,KLOR-CON) 20 MEQ tablet Take 1 tablet (20 mEq total) by mouth once daily.    pravastatin (PRAVACHOL) 80 MG tablet TAKE 1 TABLET EVERY DAY    predniSONE (DELTASONE) 10 MG tablet 1 pill for 3-5 days repeat as needed for chest tightness.    psyllium husk (METAMUCIL ORAL) Take by mouth.    semaglutide (OZEMPIC) 0.25 mg or 0.5 mg(2 mg/1.5 mL) pen injector Inject 0.25 mg into the skin every 7 days for 30 days, THEN 0.5 mg every 7 days.    SIMETHICONE (GAS-X ORAL) Take 1 capsule by mouth as needed (gas relief).     valsartan-hydrochlorothiazide (DIOVAN-HCT) 160-12.5 mg per tablet Take 1 tablet by mouth once daily.    vitamin D3-folic acid 5,000 unit- 1 mg Tab Take 1 tablet by mouth once daily.      No current facility-administered medications for this visit.         Review of Systems:  Review of Systems   Constitutional:  Negative for chills, fatigue and fever.   HENT:  Negative for congestion.    Eyes:  Negative for visual disturbance.   Respiratory:  Negative for cough and shortness of breath.    Cardiovascular:  Negative for  chest pain and palpitations.   Gastrointestinal:  Negative for abdominal distention, abdominal pain, constipation, diarrhea, nausea and vomiting.   Genitourinary:  Negative for difficulty urinating, dysuria, hematuria, vaginal bleeding and vaginal discharge.   Skin:  Negative for rash.   Neurological:  Negative for dizziness, seizures, light-headedness and headaches.   Hematological:  Does not bruise/bleed easily.   Psychiatric/Behavioral:  Negative for dysphoric mood. The patient is not nervous/anxious.       OBJECTIVE:     Physical Exam:  Physical Exam  Vitals reviewed.   Constitutional:       General: She is not in acute distress.     Appearance: Normal appearance. She is well-developed.   HENT:      Head: Normocephalic and atraumatic.   Cardiovascular:      Rate and Rhythm: Normal rate and regular rhythm.   Pulmonary:      Effort: Pulmonary effort is normal.   Chest:   Breasts:     Right: No inverted nipple, mass, nipple discharge, skin change or tenderness.      Left: No inverted nipple, mass, nipple discharge, skin change or tenderness.   Abdominal:      General: There is no distension.      Palpations: Abdomen is soft.      Tenderness: There is no abdominal tenderness.   Genitourinary:     Vagina: Normal.      Comments: Normal external female genitalia, normal hair distribution. Vaginal mucosa pink, moist, well rugated, scant white physiologic discharge. No blood in vault. Vaginal cuff intact, no lesions. Adnexa without fullness or tenderness.    Skin:     General: Skin is warm.   Neurological:      Mental Status: She is alert and oriented to person, place, and time.   Psychiatric:         Behavior: Behavior normal.         Thought Content: Thought content normal.         Judgment: Judgment normal.         ASSESSMENT:       ICD-10-CM ICD-9-CM    1. Well woman exam  Z01.419 V72.31 CANCELED: Mammo Digital Screening Bilat      2. Encounter for screening mammogram for malignant neoplasm of breast  Z12.31 V76.12  CANCELED: Mammo Digital Screening Bilat          No orders of the defined types were placed in this encounter.          Plan:      Well woman:  - - Pap n/a  - MMG ordered  - colonoscopy up to date  - tobacco cessation n/a  - contraception n/a  - HPV vaccine n/a  - Safe Sex n/a  - DEXA up to date  - Counseled to take daily multivitamin. If patient is of reproductive age and not on contraception, to take prenatal vitamin. Patient has been counseled on the vitamin D and calcium requirements per ACOG recommendations.  Age   Calcium(mg/day)   Vitamin D (IU/day)  9-18    1300                       600  19-50  1000                       600  51-70  1200                       600  >70      1,200                      800  Patient to continue vit D and calcium    Tiffany Miramontes M.D.  Obstetrics and Gynecology

## 2023-03-30 ENCOUNTER — PATIENT MESSAGE (OUTPATIENT)
Dept: PULMONOLOGY | Facility: CLINIC | Age: 73
End: 2023-03-30
Payer: MEDICARE

## 2023-04-04 ENCOUNTER — CLINICAL SUPPORT (OUTPATIENT)
Dept: REHABILITATION | Facility: HOSPITAL | Age: 73
End: 2023-04-04
Attending: INTERNAL MEDICINE
Payer: MEDICARE

## 2023-04-04 DIAGNOSIS — R53.1 WEAKNESS: Primary | ICD-10-CM

## 2023-04-04 PROCEDURE — 97113 AQUATIC THERAPY/EXERCISES: CPT | Mod: HCNC,PO,CQ

## 2023-04-04 NOTE — PROGRESS NOTES
Physical Therapy Aquatic Treatment Note     Name: Cornelia Delatorre  Clinic Number: [id number]    Therapy Diagnosis: No diagnosis found.  Physician: Armond Cortez MD  Visit Date: 4/4/2023  Physician Orders: PT Eval and Treat   Medical Diagnosis from Referral: Weakness   Evaluation Date: 3/20/2023  Authorization Period Expiration: 3/12/2024  Plan of Care Expiration: 6/20/2023  Progress Note Due: 4/20/2023  Visit # / Visits authorized: 2/ 1   FOTO: 1/3     Precautions: Standard and Fall      Time In: 1:45 PM  Time Out: 2:30  Total Appointment Time (timed & untimed codes): 40 minutes      Subjective     Pt reports: that she is having B LBP today 4/10  she was not issued home exercise program given last session.     Pain: 4/10  Location: bilateral back     Objective     Cornelia received aquatic exercises to develop strength, endurance, ROM, flexibility, posture, and core stabilization for 45 minutes including:  AMB 3 min each   FWD 1.0 mph  BWD 0.5 mph  Side 0.4 mph    Stretches 3 x 20 sec  HSS  Quad    LE exs 10x each  Mini Squat with QS  Heel Raise with GS  Hip ext with HS curl  Hip flex with LAQ  Hip ABD/ADD    UE exs 10x each  Shld flex/ext  Shld Horiz ABD/ADD    Endurance 3 min  Marching      Home Exercises Provided and Patient Education Provided     Education provided:   - progress towards goals   - role of therapy     Written Home Exercises Provided: Patient was issued HEP for pool.  Exercises were reviewed and Cornelia was able to demonstrate them prior to the end of the session.   Pt received a written copy of exercises to perform at home.     Cornelia demonstrated good  understanding of the education provided.     Assessment     Cornelia kal today's tx with aquatic ther ex well. She reported relief from LBP upon entering pool She was w/o difficulty or complaint all activities today. She was able to perform some LE exs in deep portion of pool. UE exs on platform.   Cornelia Is progressing well  towards her goals.   Pt prognosis is Good.     Pt will continue to benefit from skilled outpatient physical therapy to address the deficits listed in the problem list box on initial evaluation, provide pt/family education and to maximize pt's level of independence in the home and community environment.     Pt's spiritual, cultural and educational needs considered and pt agreeable to plan of care and goals.    Anticipated barriers to physical therapy: chronicity     Goals:     Short-Term Goals: 2-4 weeks  - The patient will be independent with initial home exercise program.  - The patient will increase TUG to at least 8s to demonstrate improved balance and stability with ambulation.   - The patient will improve 5 times sit to stand to at least 13s to demonstrate improved functional mobility.     Long-Term Goals: 6-8 weeks  - Pt to achieve <45 functional measure as measured by the FOTO to demonstrate decreased disability.  - The patient will be independent with home exercise program and symptom management.  - The patient will complete at least 20 reps of precor leg press to indicate improved functional strength.  - The patient will increase recumbent bike time to at least 2 minutes to indicate improved functional endurance  Plan     Continue 2x per week. Plan of care Certification: 3/20/2023 to 6/20/2023.     Outpatient Physical Therapy 2 times weekly for 10 weeks to include the following interventions: Aquatic Therapy, Cervical/Lumbar Traction, Electrical Stimulation prn, Gait Training, Manual Therapy, Moist Heat/ Ice, Neuromuscular Re-ed, Patient Education, Self Care, Therapeutic Activities, and Therapeutic Exercise.        Jules Garcia, PTA

## 2023-04-11 ENCOUNTER — OFFICE VISIT (OUTPATIENT)
Dept: DERMATOLOGY | Facility: CLINIC | Age: 73
End: 2023-04-11
Payer: MEDICARE

## 2023-04-11 DIAGNOSIS — L57.0 ACTINIC KERATOSES: Primary | ICD-10-CM

## 2023-04-11 DIAGNOSIS — Z12.83 SKIN CANCER SCREENING: ICD-10-CM

## 2023-04-11 DIAGNOSIS — L82.1 SEBORRHEIC KERATOSIS: ICD-10-CM

## 2023-04-11 PROCEDURE — 3288F PR FALLS RISK ASSESSMENT DOCUMENTED: ICD-10-PCS | Mod: HCNC,CPTII,S$GLB, | Performed by: DERMATOLOGY

## 2023-04-11 PROCEDURE — 1157F PR ADVANCE CARE PLAN OR EQUIV PRESENT IN MEDICAL RECORD: ICD-10-PCS | Mod: HCNC,CPTII,S$GLB, | Performed by: DERMATOLOGY

## 2023-04-11 PROCEDURE — 17003 DESTRUCT PREMALG LES 2-14: CPT | Mod: HCNC,S$GLB,, | Performed by: DERMATOLOGY

## 2023-04-11 PROCEDURE — 1101F PR PT FALLS ASSESS DOC 0-1 FALLS W/OUT INJ PAST YR: ICD-10-PCS | Mod: HCNC,CPTII,S$GLB, | Performed by: DERMATOLOGY

## 2023-04-11 PROCEDURE — 3288F FALL RISK ASSESSMENT DOCD: CPT | Mod: HCNC,CPTII,S$GLB, | Performed by: DERMATOLOGY

## 2023-04-11 PROCEDURE — 1159F PR MEDICATION LIST DOCUMENTED IN MEDICAL RECORD: ICD-10-PCS | Mod: HCNC,CPTII,S$GLB, | Performed by: DERMATOLOGY

## 2023-04-11 PROCEDURE — 3044F PR MOST RECENT HEMOGLOBIN A1C LEVEL <7.0%: ICD-10-PCS | Mod: HCNC,CPTII,S$GLB, | Performed by: DERMATOLOGY

## 2023-04-11 PROCEDURE — 1160F RVW MEDS BY RX/DR IN RCRD: CPT | Mod: HCNC,CPTII,S$GLB, | Performed by: DERMATOLOGY

## 2023-04-11 PROCEDURE — 17000 DESTRUCT PREMALG LESION: CPT | Mod: HCNC,S$GLB,, | Performed by: DERMATOLOGY

## 2023-04-11 PROCEDURE — 99213 OFFICE O/P EST LOW 20 MIN: CPT | Mod: 25,HCNC,S$GLB, | Performed by: DERMATOLOGY

## 2023-04-11 PROCEDURE — 17003 DESTRUCTION, PREMALIGNANT LESIONS; SECOND THROUGH 14 LESIONS: ICD-10-PCS | Mod: HCNC,S$GLB,, | Performed by: DERMATOLOGY

## 2023-04-11 PROCEDURE — 17000 PR DESTRUCTION(LASER SURGERY,CRYOSURGERY,CHEMOSURGERY),PREMALIGNANT LESIONS,FIRST LESION: ICD-10-PCS | Mod: HCNC,S$GLB,, | Performed by: DERMATOLOGY

## 2023-04-11 PROCEDURE — 1160F PR REVIEW ALL MEDS BY PRESCRIBER/CLIN PHARMACIST DOCUMENTED: ICD-10-PCS | Mod: HCNC,CPTII,S$GLB, | Performed by: DERMATOLOGY

## 2023-04-11 PROCEDURE — 1126F AMNT PAIN NOTED NONE PRSNT: CPT | Mod: HCNC,CPTII,S$GLB, | Performed by: DERMATOLOGY

## 2023-04-11 PROCEDURE — 3060F POS MICROALBUMINURIA REV: CPT | Mod: HCNC,CPTII,S$GLB, | Performed by: DERMATOLOGY

## 2023-04-11 PROCEDURE — 99213 PR OFFICE/OUTPT VISIT, EST, LEVL III, 20-29 MIN: ICD-10-PCS | Mod: 25,HCNC,S$GLB, | Performed by: DERMATOLOGY

## 2023-04-11 PROCEDURE — 99999 PR PBB SHADOW E&M-EST. PATIENT-LVL III: ICD-10-PCS | Mod: PBBFAC,HCNC,, | Performed by: DERMATOLOGY

## 2023-04-11 PROCEDURE — 3066F PR DOCUMENTATION OF TREATMENT FOR NEPHROPATHY: ICD-10-PCS | Mod: HCNC,CPTII,S$GLB, | Performed by: DERMATOLOGY

## 2023-04-11 PROCEDURE — 1159F MED LIST DOCD IN RCRD: CPT | Mod: HCNC,CPTII,S$GLB, | Performed by: DERMATOLOGY

## 2023-04-11 PROCEDURE — 3044F HG A1C LEVEL LT 7.0%: CPT | Mod: HCNC,CPTII,S$GLB, | Performed by: DERMATOLOGY

## 2023-04-11 PROCEDURE — 1126F PR PAIN SEVERITY QUANTIFIED, NO PAIN PRESENT: ICD-10-PCS | Mod: HCNC,CPTII,S$GLB, | Performed by: DERMATOLOGY

## 2023-04-11 PROCEDURE — 3066F NEPHROPATHY DOC TX: CPT | Mod: HCNC,CPTII,S$GLB, | Performed by: DERMATOLOGY

## 2023-04-11 PROCEDURE — 99999 PR PBB SHADOW E&M-EST. PATIENT-LVL III: CPT | Mod: PBBFAC,HCNC,, | Performed by: DERMATOLOGY

## 2023-04-11 PROCEDURE — 1157F ADVNC CARE PLAN IN RCRD: CPT | Mod: HCNC,CPTII,S$GLB, | Performed by: DERMATOLOGY

## 2023-04-11 PROCEDURE — 3060F PR POS MICROALBUMINURIA RESULT DOCUMENTED/REVIEW: ICD-10-PCS | Mod: HCNC,CPTII,S$GLB, | Performed by: DERMATOLOGY

## 2023-04-11 PROCEDURE — 1101F PT FALLS ASSESS-DOCD LE1/YR: CPT | Mod: HCNC,CPTII,S$GLB, | Performed by: DERMATOLOGY

## 2023-04-11 NOTE — PROGRESS NOTES
Subjective:      Patient ID:  Cornelia Delatorre is a 72 y.o. female who presents for   Chief Complaint   Patient presents with    Skin Check     TBSC    Spot     On face, arms, and lips       Spot  Established patient for TBSC  C/o spots on face, arms, and lip   Has h/x of skin cancer     Review of Systems   Constitutional:  Negative for fever, chills and fatigue.   HENT:  Negative for congestion, sore throat and mouth sores.    Respiratory:  Negative for cough.    Gastrointestinal:  Negative for nausea, vomiting and diarrhea.   Skin:  Positive for daily sunscreen use. Negative for itching, rash, dry skin, sensitivity to antibiotic ointment, sensitivity to bandage adhesive and wears hat.   Hematologic/Lymphatic: Bruises/bleeds easily (forearms, takes asa and prednisone for asthma).     Objective:   Physical Exam   Constitutional: She appears well-developed and well-nourished. No distress.   HENT:   Mouth/Throat: Lips normal.    Eyes: Lids are normal.  No conjunctival no injection.   Cardiovascular:  There is no local extremity swelling and no dependent edema.             Neurological: She is alert and oriented to person, place, and time. She is not disoriented.   Psychiatric: She has a normal mood and affect.   Skin:   Areas Examined (abnormalities noted in diagram):   Scalp / Hair Palpated and Inspected  Head / Face Inspection Performed  Neck Inspection Performed  Chest / Axilla Inspection Performed  Abdomen Inspection Performed  Back Inspection Performed  RUE Inspected  LUE Inspection Performed  RLE Inspected  LLE Inspection Performed  Nails and Digits Inspection Performed                   Diagram Legend     Erythematous scaling macule/papule c/w actinic keratosis       Vascular papule c/w angioma      Pigmented verrucoid papule/plaque c/w seborrheic keratosis      Yellow umbilicated papule c/w sebaceous hyperplasia      Irregularly shaped tan macule c/w lentigo     1-2 mm smooth white papules consistent  with Milia      Movable subcutaneous cyst with punctum c/w epidermal inclusion cyst      Subcutaneous movable cyst c/w pilar cyst      Firm pink to brown papule c/w dermatofibroma      Pedunculated fleshy papule(s) c/w skin tag(s)      Evenly pigmented macule c/w junctional nevus     Mildly variegated pigmented, slightly irregular-bordered macule c/w mildly atypical nevus      Flesh colored to evenly pigmented papule c/w intradermal nevus       Pink pearly papule/plaque c/w basal cell carcinoma      Erythematous hyperkeratotic cursted plaque c/w SCC      Surgical scar with no sign of skin cancer recurrence      Open and closed comedones      Inflammatory papules and pustules      Verrucoid papule consistent consistent with wart     Erythematous eczematous patches and plaques     Dystrophic onycholytic nail with subungual debris c/w onychomycosis     Umbilicated papule    Erythematous-base heme-crusted tan verrucoid plaque consistent with inflamed seborrheic keratosis     Erythematous Silvery Scaling Plaque c/w Psoriasis     See annotation      Assessment / Plan:        Actinic keratoses  Cryosurgery Procedure Note    Verbal consent from the patient is obtained and the patient is aware of the precancerous quality and need for treatment of these lesions. Liquid nitrogen cryosurgery is applied to the 9 actinic keratoses, as detailed in the physical exam, to produce a freeze injury. The patient is aware that blisters may form and is instructed on wound care with gentle cleansing and use of vaseline ointment to keep moist until healed. The patient is supplied a handout on cryosurgery and is instructed to call if lesions do not completely resolve. Discussed risk postinflammatory pigmentary changes.       Seborrheic keratosis  Arm  These are benign inherited growths without a malignant potential. Reassurance given to patient. No treatment is necessary.       Skin cancer screening  Area(s) of previous NMSC evaluated with no  signs of recurrence.    Upper body skin examination performed today including at least 6 points as noted in physical examination. No lesions suspicious for malignancy noted.               No follow-ups on file.

## 2023-04-17 ENCOUNTER — TELEPHONE (OUTPATIENT)
Dept: ALLERGY | Facility: CLINIC | Age: 73
End: 2023-04-17
Payer: MEDICARE

## 2023-04-17 DIAGNOSIS — D83.9 CVID (COMMON VARIABLE IMMUNODEFICIENCY): Primary | ICD-10-CM

## 2023-04-17 NOTE — TELEPHONE ENCOUNTER
----- Message from Augie Krueger LPN sent at 4/17/2023  2:10 PM CDT -----  Regarding: FW: appt  Contact: @[phone]  Rescheduled her for 05/01/2023 for annual.  Does she needs labs done before visit.  Please advise    Augie  ----- Message -----  From: Bing Gupta MA  Sent: 4/17/2023   9:21 AM CDT  To: Augie Krueger LPN  Subject: FW: appt                                         Please assist  ----- Message -----  From: Jatin Lieberman  Sent: 4/17/2023   9:08 AM CDT  To: [facility] Staff  Subject: appt                                             Made pt appt on 10/2  for 11 am in [facility] she needs labs done before.. unsure what labs she needs done  pls call and [phone]

## 2023-04-24 ENCOUNTER — LAB VISIT (OUTPATIENT)
Dept: LAB | Facility: HOSPITAL | Age: 73
End: 2023-04-24
Attending: ALLERGY & IMMUNOLOGY
Payer: MEDICARE

## 2023-04-24 ENCOUNTER — OFFICE VISIT (OUTPATIENT)
Dept: PODIATRY | Facility: CLINIC | Age: 73
End: 2023-04-24
Payer: MEDICARE

## 2023-04-24 DIAGNOSIS — D83.9 CVID (COMMON VARIABLE IMMUNODEFICIENCY): ICD-10-CM

## 2023-04-24 DIAGNOSIS — R60.0 PERIPHERAL EDEMA: Primary | ICD-10-CM

## 2023-04-24 DIAGNOSIS — M77.51 POSTCALCANEAL BURSITIS OF RIGHT FOOT: ICD-10-CM

## 2023-04-24 DIAGNOSIS — M62.461 GASTROCNEMIUS EQUINUS, RIGHT: ICD-10-CM

## 2023-04-24 LAB
IGA SERPL-MCNC: 182 MG/DL (ref 40–350)
IGG SERPL-MCNC: 904 MG/DL (ref 650–1600)
IGM SERPL-MCNC: 26 MG/DL (ref 50–300)

## 2023-04-24 PROCEDURE — 1125F PR PAIN SEVERITY QUANTIFIED, PAIN PRESENT: ICD-10-PCS | Mod: HCNC,CPTII,S$GLB, | Performed by: PODIATRIST

## 2023-04-24 PROCEDURE — 1101F PT FALLS ASSESS-DOCD LE1/YR: CPT | Mod: HCNC,CPTII,S$GLB, | Performed by: PODIATRIST

## 2023-04-24 PROCEDURE — 99213 PR OFFICE/OUTPT VISIT, EST, LEVL III, 20-29 MIN: ICD-10-PCS | Mod: HCNC,S$GLB,, | Performed by: PODIATRIST

## 2023-04-24 PROCEDURE — 3066F PR DOCUMENTATION OF TREATMENT FOR NEPHROPATHY: ICD-10-PCS | Mod: HCNC,CPTII,S$GLB, | Performed by: PODIATRIST

## 2023-04-24 PROCEDURE — 1101F PR PT FALLS ASSESS DOC 0-1 FALLS W/OUT INJ PAST YR: ICD-10-PCS | Mod: HCNC,CPTII,S$GLB, | Performed by: PODIATRIST

## 2023-04-24 PROCEDURE — 3044F PR MOST RECENT HEMOGLOBIN A1C LEVEL <7.0%: ICD-10-PCS | Mod: HCNC,CPTII,S$GLB, | Performed by: PODIATRIST

## 2023-04-24 PROCEDURE — 3288F PR FALLS RISK ASSESSMENT DOCUMENTED: ICD-10-PCS | Mod: HCNC,CPTII,S$GLB, | Performed by: PODIATRIST

## 2023-04-24 PROCEDURE — 1159F PR MEDICATION LIST DOCUMENTED IN MEDICAL RECORD: ICD-10-PCS | Mod: HCNC,CPTII,S$GLB, | Performed by: PODIATRIST

## 2023-04-24 PROCEDURE — 99999 PR PBB SHADOW E&M-EST. PATIENT-LVL IV: ICD-10-PCS | Mod: PBBFAC,HCNC,, | Performed by: PODIATRIST

## 2023-04-24 PROCEDURE — 82784 ASSAY IGA/IGD/IGG/IGM EACH: CPT | Mod: 59,HCNC | Performed by: ALLERGY & IMMUNOLOGY

## 2023-04-24 PROCEDURE — 1159F MED LIST DOCD IN RCRD: CPT | Mod: HCNC,CPTII,S$GLB, | Performed by: PODIATRIST

## 2023-04-24 PROCEDURE — 3044F HG A1C LEVEL LT 7.0%: CPT | Mod: HCNC,CPTII,S$GLB, | Performed by: PODIATRIST

## 2023-04-24 PROCEDURE — 82784 ASSAY IGA/IGD/IGG/IGM EACH: CPT | Mod: HCNC | Performed by: ALLERGY & IMMUNOLOGY

## 2023-04-24 PROCEDURE — 99213 OFFICE O/P EST LOW 20 MIN: CPT | Mod: HCNC,S$GLB,, | Performed by: PODIATRIST

## 2023-04-24 PROCEDURE — 3288F FALL RISK ASSESSMENT DOCD: CPT | Mod: HCNC,CPTII,S$GLB, | Performed by: PODIATRIST

## 2023-04-24 PROCEDURE — 36415 COLL VENOUS BLD VENIPUNCTURE: CPT | Mod: HCNC,PO | Performed by: ALLERGY & IMMUNOLOGY

## 2023-04-24 PROCEDURE — 3060F PR POS MICROALBUMINURIA RESULT DOCUMENTED/REVIEW: ICD-10-PCS | Mod: HCNC,CPTII,S$GLB, | Performed by: PODIATRIST

## 2023-04-24 PROCEDURE — 1157F ADVNC CARE PLAN IN RCRD: CPT | Mod: HCNC,CPTII,S$GLB, | Performed by: PODIATRIST

## 2023-04-24 PROCEDURE — 3066F NEPHROPATHY DOC TX: CPT | Mod: HCNC,CPTII,S$GLB, | Performed by: PODIATRIST

## 2023-04-24 PROCEDURE — 1157F PR ADVANCE CARE PLAN OR EQUIV PRESENT IN MEDICAL RECORD: ICD-10-PCS | Mod: HCNC,CPTII,S$GLB, | Performed by: PODIATRIST

## 2023-04-24 PROCEDURE — 82787 IGG 1 2 3 OR 4 EACH: CPT | Mod: 59,HCNC | Performed by: ALLERGY & IMMUNOLOGY

## 2023-04-24 PROCEDURE — 99999 PR PBB SHADOW E&M-EST. PATIENT-LVL IV: CPT | Mod: PBBFAC,HCNC,, | Performed by: PODIATRIST

## 2023-04-24 PROCEDURE — 3060F POS MICROALBUMINURIA REV: CPT | Mod: HCNC,CPTII,S$GLB, | Performed by: PODIATRIST

## 2023-04-24 PROCEDURE — 1125F AMNT PAIN NOTED PAIN PRSNT: CPT | Mod: HCNC,CPTII,S$GLB, | Performed by: PODIATRIST

## 2023-04-24 NOTE — PATIENT INSTRUCTIONS
- Perform stretching exercises, see handout for details, to address tightness of calf muscles.      - Recommend wearing supportive shoes only.  Avoid barefoot walking, flip flops, and Crocs, as this will exacerbate current symptoms.      - Recommend icing the affected heel a minimum of 20 minutes daily.    - Avoid high impact activities such as squatting, stooping, and running as these activities will exacerbate symptoms.      - May consider applying a topical analgesic (Voltaren cream) to help with pain symptoms.

## 2023-04-25 NOTE — PROGRESS NOTES
Subjective:      Patient ID: Cornelia Delatorre is a 72 y.o. female.    Chief Complaint: Ankle Pain      Cornelia is a 72 y.o. female with a past medical history of Allergy, Arthritis, Asthma, Cancer, Cataract, Chronic fatigue (01/24/2017), CVID (common variable immunodeficiency) (03/07/2019), Diabetes mellitus, KLINE (dyspnea on exertion) (01/24/2017), Dyslipidemia (06/25/2019), Encounter for blood transfusion, Essential hypertension (12/29/2011), GERD (gastroesophageal reflux disease), Headache(784.0), History of lumpectomy of both breasts, Hyperlipidemia (07/15/2015), Hypertension, Hypothyroidism, Neuropathy, Obesity, Obesity, Postmenopausal HRT (hormone replacement therapy), Rash, Rosacea, Sleep apnea, Snoring, Squamous cell carcinoma of skin, UTI (urinary tract infection), and Wears glasses. Patient relates having pronounced swelling of the Rt. Foot and ankle that has been present for several weeks.  Notes prominence of this issue after standing on her feet for prolonged periods.  Has attempted to elevate with some improvement noted.  Denies a past history of venous insufficiency.  Also, notes the posterior heel is tender to touch.  Symptoms are aggravated with all applied pressure and alleviated with rest.  Denies an inciting event or trauma to the area.  Denies any additional pedal complaints.        Hemoglobin A1C   Date Value Ref Range Status   03/03/2023 6.1 (H) 4.0 - 5.6 % Final     Comment:     ADA Screening Guidelines:  5.7-6.4%  Consistent with prediabetes  >or=6.5%  Consistent with diabetes    High levels of fetal hemoglobin interfere with the HbA1C  assay. Heterozygous hemoglobin variants (HbS, HgC, etc)do  not significantly interfere with this assay.   However, presence of multiple variants may affect accuracy.     09/02/2022 6.3 (H) 4.0 - 5.6 % Final     Comment:     ADA Screening Guidelines:  5.7-6.4%  Consistent with prediabetes  >or=6.5%  Consistent with diabetes    High levels of fetal  hemoglobin interfere with the HbA1C  assay. Heterozygous hemoglobin variants (HbS, HgC, etc)do  not significantly interfere with this assay.   However, presence of multiple variants may affect accuracy.     03/03/2022 6.1 (H) 4.0 - 5.6 % Final     Comment:     ADA Screening Guidelines:  5.7-6.4%  Consistent with prediabetes  >or=6.5%  Consistent with diabetes    High levels of fetal hemoglobin interfere with the HbA1C  assay. Heterozygous hemoglobin variants (HbS, HgC, etc)do  not significantly interfere with this assay.   However, presence of multiple variants may affect accuracy.           Review of Systems   Constitutional: Negative for chills and fever.   Cardiovascular:  Positive for leg swelling. Negative for claudication.   Skin:  Positive for color change and nail changes.   Musculoskeletal:  Negative for joint pain, muscle cramps and muscle weakness.   Gastrointestinal:  Negative for nausea and vomiting.   Neurological:  Positive for paresthesias. Negative for numbness.   Psychiatric/Behavioral:  Negative for altered mental status.          Objective:      Physical Exam  Constitutional:       Appearance: Normal appearance. She is not ill-appearing.   Cardiovascular:      Pulses:           Dorsalis pedis pulses are 2+ on the right side and 2+ on the left side.        Posterior tibial pulses are 2+ on the right side and 2+ on the left side.      Comments: CFT is < 3 seconds bilateral.  Pedal hair growth is decreased bilateral.  Varicosities noted bilateral.  No lower extremity edema noted bilateral.  Toes are warm to touch bilateral.   Moderate nonpitting edema noted to the Rt. Foot and ankle.    Musculoskeletal:         General: Tenderness present. No signs of injury.      Right lower leg: Edema present.      Left lower leg: No edema.      Comments: Muscle strength 5/5 in all muscle groups bilateral.  No tenderness nor crepitation with ROM of foot/ankle joints bilateral.  Bilateral semi-rigid contracture of  toes 2-5.  Pain with palpation to the bursa overlying the Rt. Retrocalcaneal exostosis.  Rt. Sided gastrocnemius equinus noted.      Skin:     General: Skin is warm and dry.      Capillary Refill: Capillary refill takes 2 to 3 seconds.      Findings: No bruising, ecchymosis, erythema, signs of injury, laceration, lesion, rash or wound.      Comments: Pedal skin has normal turgor, temperature, and texture bilateral.  Dystrophy noted to bilateral hallux and the Lt. 2nd toenail.  Remaining toenails x 7 appear normotrophic. Hyperkeratotic lesion noted to the distal tip of the Rt. Great toe.      Neurological:      General: No focal deficit present.      Mental Status: She is alert.      Sensory: No sensory deficit.      Motor: No weakness or atrophy.      Comments: Protective sensation per [place] monofilament is intact bilateral.  Vibratory sensation is intact bilateral.  Light touch is intact bilateral.              Assessment:       Encounter Diagnoses   Name Primary?    Peripheral edema Yes    Postcalcaneal bursitis of right foot     Gastrocnemius equinus, right          Plan:       Cornelia was seen today for ankle pain.    Diagnoses and all orders for this visit:    Peripheral edema  -     US Lower Extremity Veins Bilateral Insufficiency; Future    Postcalcaneal bursitis of right foot    Gastrocnemius equinus, right      I counseled the patient on her conditions, their implications and medical management.    - Orders written for a venous ultrasound to rule out bilateral insufficiency.    - Discussed performing stretching exercises to address the Rt. Sided equinus.     - Recommend wearing supportive shoes only.  Discussed avoidance of barefoot walking, flip flops, and Crocs, as this will exacerbate current symptoms.       - Recommend icing the affected heel a minimum of 20 minutes daily.    - Discussed avoidance of high impact activities such as squatting, stooping, and running as these activities will  exacerbate symptoms.       - May consider applying a topical analgesic (Voltaren cream) to help with pain symptoms.       - RTC prn or sooner if symptoms fail to resolve within 6 weeks.  Will consider corticosteroid injection and/or PT at that time.     Srinivasan Gillette DPM

## 2023-04-27 LAB
IGG1 SER-MCNC: 457 MG/DL (ref 382–929)
IGG2 SER-MCNC: 322 MG/DL (ref 242–700)
IGG3 SER-MCNC: 22 MG/DL (ref 22–176)
IGG4 SER-MCNC: 6 MG/DL (ref 4–86)

## 2023-05-01 ENCOUNTER — OFFICE VISIT (OUTPATIENT)
Dept: PULMONOLOGY | Facility: CLINIC | Age: 73
End: 2023-05-01
Payer: MEDICARE

## 2023-05-01 ENCOUNTER — OFFICE VISIT (OUTPATIENT)
Dept: ALLERGY | Facility: CLINIC | Age: 73
End: 2023-05-01
Payer: MEDICARE

## 2023-05-01 VITALS
BODY MASS INDEX: 35.61 KG/M2 | SYSTOLIC BLOOD PRESSURE: 130 MMHG | DIASTOLIC BLOOD PRESSURE: 73 MMHG | HEART RATE: 65 BPM | HEIGHT: 64 IN | OXYGEN SATURATION: 99 % | WEIGHT: 208.56 LBS

## 2023-05-01 VITALS — HEIGHT: 64 IN | WEIGHT: 207.25 LBS | BODY MASS INDEX: 35.38 KG/M2

## 2023-05-01 DIAGNOSIS — J45.30 MILD PERSISTENT ASTHMA WITHOUT COMPLICATION: ICD-10-CM

## 2023-05-01 DIAGNOSIS — J47.9 BRONCHIECTASIS WITHOUT COMPLICATION: ICD-10-CM

## 2023-05-01 DIAGNOSIS — J45.50 SEVERE PERSISTENT ASTHMA WITHOUT COMPLICATION: Primary | ICD-10-CM

## 2023-05-01 DIAGNOSIS — J31.0 CHRONIC RHINITIS: ICD-10-CM

## 2023-05-01 DIAGNOSIS — D83.9 CVID (COMMON VARIABLE IMMUNODEFICIENCY): Primary | ICD-10-CM

## 2023-05-01 PROBLEM — J45.20 INTERMITTENT ASTHMA: Status: RESOLVED | Noted: 2017-06-21 | Resolved: 2023-05-01

## 2023-05-01 PROCEDURE — 3044F HG A1C LEVEL LT 7.0%: CPT | Mod: HCNC,CPTII,S$GLB, | Performed by: NURSE PRACTITIONER

## 2023-05-01 PROCEDURE — 99214 PR OFFICE/OUTPT VISIT, EST, LEVL IV, 30-39 MIN: ICD-10-PCS | Mod: HCNC,S$GLB,, | Performed by: ALLERGY & IMMUNOLOGY

## 2023-05-01 PROCEDURE — 3288F FALL RISK ASSESSMENT DOCD: CPT | Mod: HCNC,CPTII,S$GLB, | Performed by: NURSE PRACTITIONER

## 2023-05-01 PROCEDURE — 3075F PR MOST RECENT SYSTOLIC BLOOD PRESS GE 130-139MM HG: ICD-10-PCS | Mod: HCNC,CPTII,S$GLB, | Performed by: NURSE PRACTITIONER

## 2023-05-01 PROCEDURE — 99214 OFFICE O/P EST MOD 30 MIN: CPT | Mod: HCNC,S$GLB,, | Performed by: ALLERGY & IMMUNOLOGY

## 2023-05-01 PROCEDURE — 1159F MED LIST DOCD IN RCRD: CPT | Mod: HCNC,CPTII,S$GLB, | Performed by: ALLERGY & IMMUNOLOGY

## 2023-05-01 PROCEDURE — 1157F ADVNC CARE PLAN IN RCRD: CPT | Mod: HCNC,CPTII,S$GLB, | Performed by: ALLERGY & IMMUNOLOGY

## 2023-05-01 PROCEDURE — 3078F DIAST BP <80 MM HG: CPT | Mod: HCNC,CPTII,S$GLB, | Performed by: NURSE PRACTITIONER

## 2023-05-01 PROCEDURE — 99213 OFFICE O/P EST LOW 20 MIN: CPT | Mod: HCNC,S$GLB,, | Performed by: NURSE PRACTITIONER

## 2023-05-01 PROCEDURE — 1157F PR ADVANCE CARE PLAN OR EQUIV PRESENT IN MEDICAL RECORD: ICD-10-PCS | Mod: HCNC,CPTII,S$GLB, | Performed by: NURSE PRACTITIONER

## 2023-05-01 PROCEDURE — 1157F ADVNC CARE PLAN IN RCRD: CPT | Mod: HCNC,CPTII,S$GLB, | Performed by: NURSE PRACTITIONER

## 2023-05-01 PROCEDURE — 1160F RVW MEDS BY RX/DR IN RCRD: CPT | Mod: HCNC,CPTII,S$GLB, | Performed by: ALLERGY & IMMUNOLOGY

## 2023-05-01 PROCEDURE — 3066F PR DOCUMENTATION OF TREATMENT FOR NEPHROPATHY: ICD-10-PCS | Mod: HCNC,CPTII,S$GLB, | Performed by: NURSE PRACTITIONER

## 2023-05-01 PROCEDURE — 1126F AMNT PAIN NOTED NONE PRSNT: CPT | Mod: HCNC,CPTII,S$GLB, | Performed by: NURSE PRACTITIONER

## 2023-05-01 PROCEDURE — 1160F PR REVIEW ALL MEDS BY PRESCRIBER/CLIN PHARMACIST DOCUMENTED: ICD-10-PCS | Mod: HCNC,CPTII,S$GLB, | Performed by: ALLERGY & IMMUNOLOGY

## 2023-05-01 PROCEDURE — 1159F PR MEDICATION LIST DOCUMENTED IN MEDICAL RECORD: ICD-10-PCS | Mod: HCNC,CPTII,S$GLB, | Performed by: NURSE PRACTITIONER

## 2023-05-01 PROCEDURE — 3008F BODY MASS INDEX DOCD: CPT | Mod: HCNC,CPTII,S$GLB, | Performed by: NURSE PRACTITIONER

## 2023-05-01 PROCEDURE — 99999 PR PBB SHADOW E&M-EST. PATIENT-LVL V: ICD-10-PCS | Mod: PBBFAC,HCNC,, | Performed by: NURSE PRACTITIONER

## 2023-05-01 PROCEDURE — 3044F HG A1C LEVEL LT 7.0%: CPT | Mod: HCNC,CPTII,S$GLB, | Performed by: ALLERGY & IMMUNOLOGY

## 2023-05-01 PROCEDURE — 3060F POS MICROALBUMINURIA REV: CPT | Mod: HCNC,CPTII,S$GLB, | Performed by: NURSE PRACTITIONER

## 2023-05-01 PROCEDURE — 99999 PR PBB SHADOW E&M-EST. PATIENT-LVL V: CPT | Mod: PBBFAC,HCNC,, | Performed by: NURSE PRACTITIONER

## 2023-05-01 PROCEDURE — 1101F PT FALLS ASSESS-DOCD LE1/YR: CPT | Mod: HCNC,CPTII,S$GLB, | Performed by: NURSE PRACTITIONER

## 2023-05-01 PROCEDURE — 1126F AMNT PAIN NOTED NONE PRSNT: CPT | Mod: HCNC,CPTII,S$GLB, | Performed by: ALLERGY & IMMUNOLOGY

## 2023-05-01 PROCEDURE — 99213 PR OFFICE/OUTPT VISIT, EST, LEVL III, 20-29 MIN: ICD-10-PCS | Mod: HCNC,S$GLB,, | Performed by: NURSE PRACTITIONER

## 2023-05-01 PROCEDURE — 3066F NEPHROPATHY DOC TX: CPT | Mod: HCNC,CPTII,S$GLB, | Performed by: NURSE PRACTITIONER

## 2023-05-01 PROCEDURE — 3008F PR BODY MASS INDEX (BMI) DOCUMENTED: ICD-10-PCS | Mod: HCNC,CPTII,S$GLB, | Performed by: NURSE PRACTITIONER

## 2023-05-01 PROCEDURE — 99999 PR PBB SHADOW E&M-EST. PATIENT-LVL II: CPT | Mod: PBBFAC,HCNC,, | Performed by: ALLERGY & IMMUNOLOGY

## 2023-05-01 PROCEDURE — 3066F PR DOCUMENTATION OF TREATMENT FOR NEPHROPATHY: ICD-10-PCS | Mod: HCNC,CPTII,S$GLB, | Performed by: ALLERGY & IMMUNOLOGY

## 2023-05-01 PROCEDURE — 3078F PR MOST RECENT DIASTOLIC BLOOD PRESSURE < 80 MM HG: ICD-10-PCS | Mod: HCNC,CPTII,S$GLB, | Performed by: NURSE PRACTITIONER

## 2023-05-01 PROCEDURE — 1126F PR PAIN SEVERITY QUANTIFIED, NO PAIN PRESENT: ICD-10-PCS | Mod: HCNC,CPTII,S$GLB, | Performed by: NURSE PRACTITIONER

## 2023-05-01 PROCEDURE — 1126F PR PAIN SEVERITY QUANTIFIED, NO PAIN PRESENT: ICD-10-PCS | Mod: HCNC,CPTII,S$GLB, | Performed by: ALLERGY & IMMUNOLOGY

## 2023-05-01 PROCEDURE — 1159F MED LIST DOCD IN RCRD: CPT | Mod: HCNC,CPTII,S$GLB, | Performed by: NURSE PRACTITIONER

## 2023-05-01 PROCEDURE — 1159F PR MEDICATION LIST DOCUMENTED IN MEDICAL RECORD: ICD-10-PCS | Mod: HCNC,CPTII,S$GLB, | Performed by: ALLERGY & IMMUNOLOGY

## 2023-05-01 PROCEDURE — 3060F PR POS MICROALBUMINURIA RESULT DOCUMENTED/REVIEW: ICD-10-PCS | Mod: HCNC,CPTII,S$GLB, | Performed by: ALLERGY & IMMUNOLOGY

## 2023-05-01 PROCEDURE — 3066F NEPHROPATHY DOC TX: CPT | Mod: HCNC,CPTII,S$GLB, | Performed by: ALLERGY & IMMUNOLOGY

## 2023-05-01 PROCEDURE — 3288F PR FALLS RISK ASSESSMENT DOCUMENTED: ICD-10-PCS | Mod: HCNC,CPTII,S$GLB, | Performed by: NURSE PRACTITIONER

## 2023-05-01 PROCEDURE — 3060F PR POS MICROALBUMINURIA RESULT DOCUMENTED/REVIEW: ICD-10-PCS | Mod: HCNC,CPTII,S$GLB, | Performed by: NURSE PRACTITIONER

## 2023-05-01 PROCEDURE — 1101F PR PT FALLS ASSESS DOC 0-1 FALLS W/OUT INJ PAST YR: ICD-10-PCS | Mod: HCNC,CPTII,S$GLB, | Performed by: NURSE PRACTITIONER

## 2023-05-01 PROCEDURE — 3008F PR BODY MASS INDEX (BMI) DOCUMENTED: ICD-10-PCS | Mod: HCNC,CPTII,S$GLB, | Performed by: ALLERGY & IMMUNOLOGY

## 2023-05-01 PROCEDURE — 3044F PR MOST RECENT HEMOGLOBIN A1C LEVEL <7.0%: ICD-10-PCS | Mod: HCNC,CPTII,S$GLB, | Performed by: ALLERGY & IMMUNOLOGY

## 2023-05-01 PROCEDURE — 3060F POS MICROALBUMINURIA REV: CPT | Mod: HCNC,CPTII,S$GLB, | Performed by: ALLERGY & IMMUNOLOGY

## 2023-05-01 PROCEDURE — 3044F PR MOST RECENT HEMOGLOBIN A1C LEVEL <7.0%: ICD-10-PCS | Mod: HCNC,CPTII,S$GLB, | Performed by: NURSE PRACTITIONER

## 2023-05-01 PROCEDURE — 99999 PR PBB SHADOW E&M-EST. PATIENT-LVL II: ICD-10-PCS | Mod: PBBFAC,HCNC,, | Performed by: ALLERGY & IMMUNOLOGY

## 2023-05-01 PROCEDURE — 3008F BODY MASS INDEX DOCD: CPT | Mod: HCNC,CPTII,S$GLB, | Performed by: ALLERGY & IMMUNOLOGY

## 2023-05-01 PROCEDURE — 1157F PR ADVANCE CARE PLAN OR EQUIV PRESENT IN MEDICAL RECORD: ICD-10-PCS | Mod: HCNC,CPTII,S$GLB, | Performed by: ALLERGY & IMMUNOLOGY

## 2023-05-01 PROCEDURE — 3075F SYST BP GE 130 - 139MM HG: CPT | Mod: HCNC,CPTII,S$GLB, | Performed by: NURSE PRACTITIONER

## 2023-05-01 RX ORDER — FLUTICASONE FUROATE, UMECLIDINIUM BROMIDE AND VILANTEROL TRIFENATATE 200; 62.5; 25 UG/1; UG/1; UG/1
1 POWDER RESPIRATORY (INHALATION) DAILY
Qty: 60 EACH | Refills: 0 | Status: SHIPPED | OUTPATIENT
Start: 2023-05-01 | End: 2023-07-06

## 2023-05-01 RX ORDER — TEZEPELUMAB-EKKO 210 MG/1.9ML
210 INJECTION, SOLUTION SUBCUTANEOUS
Qty: 1.91 ML | Refills: 11 | Status: ACTIVE | OUTPATIENT
Start: 2023-05-01

## 2023-05-01 NOTE — PROGRESS NOTES
5/1/2023    Cornelia Delatorre  Office f/u    Chief Complaint   Patient presents with    3m f/u    Wheezing     HPI:  5/1/2023- complaint of wheeze, onset 1 week, took 5 days of prednisone to control, states slightly improved.   Required systemic steroids in January and again in February for Asthma control  Associated with wheeze and chest tightness, SOB is worsening with time, difficult to walk in stores with out stopping to rest.   Has new motor in BIPAP, wearing nightly with benefit.     1/9/2023- states feeling good, noticed worsening sinus drainage and productive cough when laying down at night for past 3 weeks, improves with albuterol inhaler or nebulizer. On Trelegy daily.  No recent steroid therapy. Steroid injection in knee 6 weeks Prior.  Wearing BIPAP nightly with benefit. Daytime fatigue is resolved. No complaint of morning headaches. Waiting for recalled BIPAP machine.     7/11/2022- states feeling like her self again after COVID 19 in May, Cough is dramatically improved, non productive cough, few days week.   Fatigue continues, has to take daily naps. Wearing BiPAP nightly.    On Trelegy daily with benefit but cost is high, in donut whole.       5/25/2022- Dx COVID 19 7 days prior tx with monoclonal Antibiodies two days prior. Feeling generalized weakness, diaphoresis, fatigue  Using Trelegy and nebulizer daily,  beats on her back   Cough - severe, complaint, worse at night, productive thick yellow mucous. Associated with late evening wheeze.   Lost sense of taste and smell.       4/13/2022- SOB- stable, worse in late evenings, taking prednisone 10 mg when needed, not used in past 2 months; worse with exertion, improves with rest and albuterol rescue.   On BIPAP with benefit, no daytime drowsiness.   Liked Trelegy. Still on Adviar until prescription runs out, has 2 weeks left.   Skin biopsy 5 weeks prior, left lower chin site irritated by wearing face mask. No edema or erythema,     1/12/2022-  SOB worsening, on Advair daily and levalbuterol 2-3x daily for past 4 weeks, has noticed worsening of shortness of breath and sinus complaints.   Admitted to hospital for GI virus,   Noticed prednisone is needed occasionally at 10 mg notices improvement    On BiPAP nightly with benefit. No daytime fatigue, no issues with nasal pillow mask.     7/12/21- complaint of daily sinus drainage, has to clear throat repeatedly for past 2 months. Currently on Flonase nasal spray, ipratropium nasal spray, Singulair pills, zyrtec, corcindin daily with no improvement.   SOB- stable, required steroid therapy for 3 days twice in past 3 months. Only with exertion, worse in late evenings, improves with rest,  Using nebulizer 2x daily due to feeling of heaviness in chest.   Cough- mild, non productive, associated with post nasal drip from allergies. Nocturnal arousals 2x weekly for past 2 weeks,   Wearing CPAP nighty with benefit.     4/12/21- spent last 6 months in home, was able to get COVID 19 vaccine Mederna. Allergies stable, few spells improve with nasal spray noticed improvement after getting air purifier.   Noticed spacer device helping with hoarse voice or oral thrush like before,   SOB- unchanged, daily complaint, varies with severity, worse with exertion, improves with rest and albuterol rescue. Has to sit up to breath when first laying  Down, not able to breath when laying on left side.   Occasional cough- productive, clear mucous, using nebulizer 3 x weekly.   CPAP for [facility] doing well,     10/13/2020- Allergies bother her every few days, currently on daily Singulair, zyrtec, flonase, astelin nasal spray, having sneezing fits.   Complaint of clear sinus drainage,   Hoarse voice has resolved after stopping symbicort.   Cough- improved,   SOB- doing well, occasional episodes of not being able to catch breath, did not use nebulizer   Wearing CPAP nightly for [facility], states benefits greatly    7/13/2020- Hoarse voice, loss of  voice, productive cough, lost voice July 2019 tx by ENT who treated for acute laryngitis with diflucan; Recurrent problem. Hoarse voice return 4 weeks prior, ENT doctor recommends changing Asthma medications tx her with diflucan, doxycycline and prednisone for 5 days, states has improved.   SOB- worse when wearing face mask, currently on hizentra therapy,     5/4/2020- uses symbicort daily, uses rescue 2/wk - some anxiety, getting igg rx. Having more sinus problems with head colds- 3 in last 4 wks.  No prednisone- hizentra working well.        Nov 4, 2019- having mucous sensation, notes some sob relaxing - maybe worse night.  No nasal stuffy.  Uses cpap.  Uses symbicort bid, no rescue.  Mucous is clear.  No abx for sinus or lungs.  Getting immune infusions weekly - pt senses doing better since starting in May.  Patient Instructions   Breathing off and on short breath - need to use more albuterol to make clear where breathing problems come from    Mucous production may decrease if airways controlled- albuterol should help clearance.    Rescue inhaler may resolve any breathing problems- should use intermittently if any symptoms.    May use augmentin for sinus/lung problems- not optimal for all uti's       May 6,2019-due to get immune infusion next 2 days.  No prednisone, needs monlukast. abx stopped wks ago- mucous cleared.    Patient Instructions   Use antibiotic daily til immune therapy done a wk - then as needed like every one else  Immune therapy may keep you stable - better than antibiotics.   Use symbicort regular.  Lungs may stabilize.  Use prednisone if needed.  Lung  Nodules are stable for 2 yrs and no follow up needed.  Call if problems- hope all will be better yet.  March 7, 19-exacerbated in late Jan- cleared in feb (vague).  Now ill again yellow and gray mucous (culture last Jan was nl yvonne).  Sl intermittent wheezes since last wk.  Sees Dr Herrera in [place]- gets allergy rx but declined to get now.   Pt has cvid by  igg and igm levels low and pneumococcal antibodies to 8/14 pneumococcal titers. Uses symbicort and singulair routinely.  Took prednisone completed 4 days ago- uses prednisone monthly- was able to skip December  .  Discussed with patient above for education the following:      Gastric by pass may cause malabsorption, protein levels have been too low and may affect ig levels-- somewhat.   Need to get follow up with dietician to assure adequate protein intake/levels.   Antibiotic may stabilize infections with common variable immune deficiency- azithromycin daily once cultures submitted.   Use augmentin if infection worsens.   Acapella/chest clapping help clear mucous, vest likewise if bronchiectasis.  Will not treat cause.   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  Use codeine to suppress mild coughing.   Need good immune system and no airway/asthma problems and good hygiene of bronchial tubes (no chronic infections) to prevent illness.     Lungs measured near normal with good response to bronchial medications 2017   Need ct to follow up and cultures to assure infection controlled.      Jan 28/2019- Feeling bad onset 2 weeks, took prednisone taper x 6 days and antibiotic Augmentin week prior, States no improvement. Cough- worse at night, no nocturnal arousals, takes cough suppressant syrup before bed. Productive yellow/brown color.   Nebulized albuterol 4x daily. States no fever.  Has started allergy injections waiting for insurance clearance for IGG therapy.   Discussed with patient above for education the following:    CT chest for February to monitor and assess for bronchiectasis, need diagnosis for insurance to cover vest therapy  Have  continue to percuss back with hand.   Recommend purchasing Acepella device to help clear lungs of excess mucous, attaches to nebulizer device  Continue Nebulizer treatments 4x daily as  needed.  Chest x-ray now to evaluate for pneumonia  Blood work at hospital   Will yeison to see results of sputum culture and x-ray before repeating antibiotic  Need to return sputum to clinic with in 4 hours of collecting  Fluconazole pills for oral thrush, this is a recurrent problem related to your weak immune system and use of inhaled steroids. Continue to rinse mouth out after using symbicort but problem will improve after starting immune replacement therapy.    oct 30,   2018-- had one day fever followed by cough/wheeze illness that lingered a wk. Pt seen by NP   [place] 2x, [place].  [place] like dying- only one day fever.  Violent cough.  Nebulizer ppt itch and palpitations- ok  On xopenex now.  Prednisone 40x3, 20 x 3 ,  augmentin cleared.  Now back to normal.  May 14, 2018- had spell /exacerbation with one round prednisone. Breathing good.  Follow-up in about 1 year (around 5/14/2019), or if symptoms worsen or fail to improve.   Discussed with patient above for education the following:     Check blood count and pneumonia vaccine response after last vaccine 4/27/2018   Use azithromycin for any lung/sinus infection- immune weakness likely   Asthma stable- use prednisone and singulair.   Use allergra and singulair and astelin/flonase   Lung nodule in lung still - Navigational bronchoscopy might find?  - will at least re check in a year.     Call if needed, re check next yr.  darlin 15, 2018--1 st illness in yr or so with cough and rattles, no flu.  Appetite ok.  Ill x wk, cough and wheeze and sob and noct worse, resp rx helps a bit.  Hasn't been using  Albuterol   Follow-up in about 6 months (around 7/15/2018).   Discussed with patient above for education the following:      tamiflu if flu.   Need to use albuterol for any lung symptoms.  Should get cough  Better controlled.      Prednisone 20 mg 3 for 3 , taper may be needed.  Give shot today, 1 daily for 3 may be enough with albuterol.   You had high  eosinophils-- if asthma becomes more active - special therapy may help??        prevnar 13 would be good - should be off prednisone.  Immune system is sl weak  Protection only to 7.14 pneumonia germs.  oct 19, 2017- has scratchy throat, some sinus drip, has nightly sensation throat issues, post beryl and carpel tunnel surg.  No sinus lung infections.  Breathing and cough good.  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 Had pos tb gold.  June 21, 2017- had good [place] trip, tapered symbicort with some increase sensation of lung problems so maintained full dose. No infections.  No chest symptoms on symbicort.   April 24, feels a lot better with min cough, vague sob going left side down at night.  Going to [place] 2 weeks.  Uses symbicort and good results.  March 16, cough better, but comes and goes,  No great help with steroids.  Submitted 3 sputums.  Had pneumovax march last yr.  Breathing better. Got symbicort.   Had pneumonia vaccine last yr  3/8/2017 HPI: had onset cough [place] illness, got dizzy few days with diarrhea and cough. Cough clear sm amt mucous. Did have 101 temp one day, fatigue, no muscle aches.  Appetite decreased.  Illnesses lingered 2 weeks but cough never remitted- has improved with inhahler use last 15 days.    Had gastric 2011 bypass with with continued diarrhea intially resolved. No regurg or reflux or swallow problems.   symbicort nearly  Resolved.    Sinuses ok.  No pets.  Never smoker.    Appetite good, feels well now.    headcolds to chest since childhood, nocturnal ??not recalled.  Had cats in past but got rid in 2003 or so.     The chief compliant  problem varies with instablilty at time    PFSH:  Past Medical History:   Diagnosis Date    Allergy     Arthritis     Asthma     Cancer     skin cancer to right hand    Cataract     Chronic fatigue 01/24/2017    CVID (common variable immunodeficiency) 03/07/2019    Diabetes mellitus     type2    KLINE (dyspnea on  exertion) 01/24/2017    Dyslipidemia 06/25/2019    Encounter for blood transfusion     Essential hypertension 12/29/2011    GERD (gastroesophageal reflux disease)     Headache(784.0)     History of lumpectomy of both breasts     1992 negative    Hyperlipidemia 07/15/2015    Hypertension     Hypothyroidism     Neuropathy     Obesity     Obesity     Postmenopausal HRT (hormone replacement therapy)     Rash     Rosacea     Sleep apnea     on bipap    Snoring     Squamous cell carcinoma of skin     left forearm     UTI (urinary tract infection)     Wears glasses          Past Surgical History:   Procedure Laterality Date    ADENOIDECTOMY      APPENDECTOMY      BLADDER SUSPENSION      BREAST BIOPSY Bilateral 1992    bilateral benign excisional biopsies    BUNIONECTOMY  10/17/14    right, still has discomfort    CATARACT EXTRACTION W/  INTRAOCULAR LENS IMPLANT Bilateral     CHOLECYSTECTOMY  08/02/2017    COLONOSCOPY      CYSTOSCOPY      EPIDURAL STEROID INJECTION INTO LUMBAR SPINE N/A 8/14/2019    Procedure: Injection-steroid-epidural-lumbar L5/S1;  Surgeon: Fredi Rojas MD;  Location: [facility] OR;  Service: Pain Management;  Laterality: N/A;    EPIDURAL STEROID INJECTION INTO LUMBAR SPINE N/A 5/3/2021    Procedure: Injection-steroid-epidural-lumbar L5/S1 to the Left;  Surgeon: Fredi Rojas MD;  Location: [facility] OR;  Service: Pain Management;  Laterality: N/A;    EPIDURAL STEROID INJECTION INTO LUMBAR SPINE N/A 9/24/2021    Procedure: Injection-steroid-epidural-lumbar L5/S1;  Surgeon: Fredi Rojas MD;  Location: [facility] OR;  Service: Pain Management;  Laterality: N/A;    EPIDURAL STEROID INJECTION INTO LUMBAR SPINE N/A 2/21/2022    Procedure: Injection-steroid-epidural-lumbar L5/S1;  Surgeon: Fredi Rojas MD;  Location: [facility] OR;  Service: Pain Management;  Laterality: N/A;    ESOPHAGEAL DILATION N/A 6/11/2021    Procedure: DILATION, ESOPHAGUS;  Surgeon: Jake Sheriff MD;  Location: [facility] ENDO;  Service:  Endoscopy;  Laterality: N/A;    ESOPHAGOGASTRODUODENOSCOPY      dilated esophagus    ESOPHAGOGASTRODUODENOSCOPY N/A 6/11/2021    Procedure: EGD (ESOPHAGOGASTRODUODENOSCOPY);  Surgeon: Jake Sheriff MD;  Location: [facility];  Service: Endoscopy;  Laterality: N/A;    GASTRIC BYPASS      2011    HYSTERECTOMY  1980    interstim bladder  2009    10/14/14 new battery    OOPHORECTOMY  1980    REPLACEMENT OF SACRAL NERVE STIMULATOR  2/18/2020    Procedure: REPLACEMENT, NEUROSTIMULATOR, SACRAL;  Surgeon: Mary Jane Jarvis MD;  Location: [facility] OR [address] [place] [address];  Service: Urology;;    REVISION OF PROCEDURE INVOLVING SACRAL NEUROSTIMULATOR DEVICE Right 2/18/2020    Procedure: REVISION, NEUROSTIMULATOR, SACRAL/ battery replacement;  Surgeon: Mary Jane Jarvis MD;  Location: [facility] OR [address] [place] [address];  Service: Urology;  Laterality: Right;  1hr/ rep contacted/ (CONNIE)    SKIN CANCER EXCISION      left hand    TONSILLECTOMY      WISDOM TOOTH EXTRACTION       Social History     Tobacco Use    Smoking status: Never    Smokeless tobacco: Never   Substance Use Topics    Alcohol use: Not Currently    Drug use: No     Family History   Problem Relation Age of Onset    Breast cancer Mother 50    Breast cancer Paternal Aunt         40's    Cervical cancer Paternal Grandmother     Ovarian cancer Paternal Grandmother     Cervical cancer Paternal Cousin     Ovarian cancer Paternal Cousin     Diabetes Other     Hypertension Other     Hypothyroidism Other     Allergic rhinitis Neg Hx     Allergies Neg Hx     Angioedema Neg Hx     Asthma Neg Hx     Atopy Neg Hx     Eczema Neg Hx     Immunodeficiency Neg Hx     Rhinitis Neg Hx     Urticaria Neg Hx     Uterine cancer Neg Hx      Review of patient's allergies indicates:   Allergen Reactions    Sulfa (sulfonamide antibiotics) Hives    Naproxen Other (See Comments)     Other reaction(s): RT sided numbness         Performance Status:The patient's activity level is functions out of house.      Review of Systems:   "a review of eleven systems covering constitutional, Eye, HEENT, Psych, Respiratory, Cardiac, GI, , Musculoskeletal, Endocrine, Dermatologic was negative except for pertinent findings as listed ABOVE and below: all good,  Had dm that remitted with bypass 2011.    Positive for SOB, nasal drip,  cough, wheeze, fatigue    Exam:Comprehensive exam done. /73 (BP Location: Right arm, Patient Position: Sitting, BP Method: Medium (Automatic))   Pulse 65   Ht 5' 4" (1.626 m)   Wt 94.6 kg (208 lb 8.9 oz)   SpO2 99% Comment: on room air at rest  BMI 35.80 kg/m²   Exam included Vitals as listed, and patient's appearance and affect and alertness and mood, oral exam for yeast and hygiene and pharynx lesions and Mallapatti (M) score, neck with inspection for jvd and masses and thyroid abnormalities and lymph nodes (supraclavicular and infraclavicular nodes and axillary also examined and noted if abn), chest exam included symmetry and effort and fremitus and percussion and auscultation, cardiac exam included rhythm and gallops and murmur and rubs and jvd and edema, abdominal exam for mass and hepatosplenomegaly and tenderness and hernias and bowel sounds, Musculoskeletal exam with muscle tone and posture and mobility/gait and  strength, and skin for rashes and cyanosis and pallor and turgor, extremity for clubbing.  Findings were normal except for pertinent findings listed below:  M3, good bs, rest good. chest is symmetric, no distress, normal percussion, normal fremitus and breath sounds bilateral faint wheeze    Radiographs (ct chest and cxr) reviewed: view by direct vision  i do see clear bronchiectasis,nodules are noted.  Mucoid type impaction suggested in rul ant segment.  X-Ray Chest PA And Lateral 06/09/2022 lungs clear    CT Abdomen Pelvis With Contrast 06/11/2021 lower lung bases clear    CT Chest Without Contrast  04/22/2019 stable non calcified nodules date to 2017 with no change.           Labs reviewed " igm low, igg lowish, humerol titers na    Lab Results   Component Value Date    WBC 5.09 03/03/2023    RBC 4.09 03/03/2023    HGB 11.6 (L) 03/03/2023    HCT 36.6 (L) 03/03/2023    MCV 90 03/03/2023    MCH 28.4 03/03/2023    MCHC 31.7 (L) 03/03/2023    RDW 14.5 03/03/2023     03/03/2023    MPV 11.2 03/03/2023    GRAN 3.0 03/03/2023    GRAN 59.3 03/03/2023    LYMPH 1.5 03/03/2023    LYMPH 30.1 03/03/2023    MONO 0.5 03/03/2023    MONO 9.2 03/03/2023    EOS 0.0 03/03/2023    BASO 0.01 03/03/2023    EOSINOPHIL 0.6 03/03/2023    BASOPHIL 0.2 03/03/2023      Latest Reference Range & Units 01/28/19 16:17   Eos # 0.0 - 0.5 K/uL 0.2       Aerobic culture 10/27/22 CURVULARIA SPECIES      Latest Reference Range & Units 11/02/21 09:54 03/03/22 09:57 04/27/22 11:47 09/02/22 08:48   CO2 23 - 29 mmol/L 30 (H) 31 (H) 27 26   (H): Data is abnormally high    PFT  Spirometry with bronchodilator, lung volume by gas dilution, and diffusion capacity measured April 19, 2021. The FEV1 FVC ratio was 75% indicating no airflow obstruction measured by spirometry. The FEV1 was 99% predicted at 2.25 L. There was no   statistically significant improvement in spirometry following bronchodilator. Total lung capacity was within normal range at 90% of predicted. Diffusion was slightly low at 77% predicted-uncorrected for anemia if present.   Spirometry is normal. Lung volumes fall within normal range. Diffusion is slightly decreased. Clinical correlation recommended. The bronchodilator response was not significant.       [place] with 10% response, otherwise nl  DATE OF PROCEDURE:  03/24/2017     1.  Spirometry reveals an FVC of 3.1 L, which is 107% predicted.  FEV1 is 2.3 L,   which is 100% predicted.  The ratio, there is preserved.  There is a positive,   but not significant bronchodilator response to both the FVC and FEV1.  Loop   contours appear with adequate effort as well.  2.  Lung volumes.  The total lung capacity is 4.4 L,  which is 92% predicted.    There are no signs of gas trapping.  3.  Diffusing capacity is mild-to-moderate reduced at 68%, does improve to 96%   with alveolar volumes.     OVERALL IMPRESSION:  A normal spirometry with a robust yet statistically   insignificant bronchodilator response.  Normal lung volumes and a moderate   reduction in diffusion capacity.    CC: Jonathan Nicole M.D.      Plan:  Clinical impression is resonably certain and repeated evaluation prn +/- follow up will be needed as below.    Cornelia was seen today for 3m f/u and wheezing.    Diagnoses and all orders for this visit:    Severe persistent asthma without complication  -     tezepelumab-ekko (TEZSPIRE) 210 mg/1.91 mL (110 mg/mL) Syrg; Inject 1.91 mLs (210 mg total) into the skin every 28 days.    Other orders  -     fluticasone-umeclidin-vilanter (TRELEGY ELLIPTA) 200-62.5-25 mcg inhaler; Inhale 1 puff into the lungs once daily.            No follow-ups on file.    Discussed with patient above for education the following:      Patient Instructions   Continue the Breo you have then start the Trelegy with coupon I gave you    Ordering Tezspire, expect phone call from [facility]. Will first try through insurance then financial assistance through .

## 2023-05-01 NOTE — PATIENT INSTRUCTIONS
Continue the Breo you have then start the Trelegy with coupon I gave you    Ordering Tezspire, [facility]. Will first try through insurance then financial assistance through .

## 2023-05-01 NOTE — PROGRESS NOTES
Subjective:       Patient ID: Cornelia Delatorre is a 72 y.o. female.    Chief Complaint:  Annual Exam (CVID)        73 yo woman presents for continued evaluation of CVID, chronic rhinitis, bronchiectasis and asthma. She was last seen 3/28/2022,. She is on Hizentra 11 g weekly. She is doing well on this, less infections.  She had a sinu issue and had procedure with Dr Johnson and doing better, no further infections. No pneumonia. She does have frequent UTI's and had bout with chronic diarrhea. She also has skin test with 1+ to one weed, rest negative. She is on allegra daily, montelukast daily, azelastine 2 SEN BID and Flonase 2 SEN daily. She had labs 4/24/2023 with IgG 904, al subclasses normal, IgM 26 and IgA 182. CBC and CMP stable.    She has occ runny nose, congestion, PND and watery eyes.  No flares of asthma. No reactions to infusion. She does prefer to say at weekly as opposed to every other week.    Prior History taken 4/11/19: consult from Dr Jonathan Nicole for CVID. She was diagnosed with asthma 2 years ago. She has had up and down with lots of mucus causing cough. She has SOB can only walk short amount before KLINE. She is currently on chronic Zithromax. She does have bronchiectasis. She hash ad pneumonia 2 times in past but none in last 5 years. She does not get frequent sinus infections more chest. She was ion hospital for exacerbation in 10/2018. She has some sneeze and runny nose but not much. She saw Dr Herrera and is on allergy shots since January but off last month due to asthma. She was prescribed Hizentra but cost was high for her. She is friends with a pt here who has same insurance and gets Hizentra from [facility] and pays nothing so she would like to transfer. She has labs 3/2017 with IgG 693, IgA 191, IgM 22 and IgE <35 with humoral panel protected to 8/14 strep titers. She had pneumovax 3/2016 and Prevnar 3/2017 prior to these labs. She had repeat humoral panel 7/2017 and protected to 8/14. She  had another pneumovax 4/27/18 and labs 6/28 still only protected to 8/14. Labs 11/2018 now shoe IgG low at 516 all 4 subclasses low, IgA 132, IgM low at 36. She has normal PFT but CT shows bronchiectasis. She has no eczema. No known food, insect or latex allergy. She has HTN and DM. She did have gastric bypass in past. She never smoked.      Environmental History: see history section for home environment  Review of Systems   Constitutional:  Negative for appetite change, chills, fatigue and fever.   HENT:  Positive for rhinorrhea and sneezing. Negative for congestion, ear discharge, ear pain, facial swelling, nosebleeds, postnasal drip, sinus pressure, sore throat, trouble swallowing and voice change.    Eyes:  Negative for discharge, redness, itching and visual disturbance.   Respiratory:  Positive for cough, choking, chest tightness, shortness of breath and wheezing.    Cardiovascular:  Negative for chest pain, palpitations and leg swelling.   Gastrointestinal:  Negative for abdominal distention, abdominal pain, constipation, diarrhea, nausea and vomiting.   Genitourinary:  Negative for difficulty urinating.   Musculoskeletal:  Positive for arthralgias. Negative for gait problem, joint swelling and myalgias.   Skin:  Negative for color change and rash.   Neurological:  Negative for dizziness, syncope, weakness, light-headedness and headaches.   Hematological:  Negative for adenopathy. Does not bruise/bleed easily.   Psychiatric/Behavioral:  Negative for agitation, behavioral problems, confusion and sleep disturbance. The patient is not nervous/anxious.       Objective:      Physical Exam  Vitals and nursing note reviewed.   Constitutional:       General: She is not in acute distress.     Appearance: Normal appearance. She is well-developed. She is not ill-appearing.   HENT:      Head: Normocephalic and atraumatic.      Right Ear: Hearing, ear canal and external ear normal.      Left Ear: Hearing, ear canal and  external ear normal.      Nose: No septal deviation, mucosal edema or rhinorrhea.      Right Sinus: No maxillary sinus tenderness or frontal sinus tenderness.      Left Sinus: No maxillary sinus tenderness or frontal sinus tenderness.      Mouth/Throat:      Pharynx: Uvula midline. No uvula swelling.   Eyes:      General:         Right eye: No discharge.         Left eye: No discharge.      Conjunctiva/sclera: Conjunctivae normal.   Neck:      Thyroid: No thyromegaly.   Cardiovascular:      Rate and Rhythm: Normal rate and regular rhythm.      Heart sounds: Normal heart sounds.   Pulmonary:      Effort: Pulmonary effort is normal. No respiratory distress.      Breath sounds: Normal breath sounds. No wheezing.   Abdominal:      General: There is no distension.   Musculoskeletal:         General: Normal range of motion.      Cervical back: Normal range of motion.   Skin:     General: Skin is warm and dry.      Findings: No erythema or rash.   Neurological:      Mental Status: She is alert and oriented to person, place, and time.   Psychiatric:         Behavior: Behavior normal.         Thought Content: Thought content normal.         Judgment: Judgment normal.       Laboratory:   Percutaneous Skin Testing: prick skin test inhalants #60, 8/21/19: 1+ alli/dock weed, 3+ histamine control, remainder tested negative, see flow sheet  Assessment:       1. CVID (common variable immunodeficiency)    2. Chronic rhinitis    3. Bronchiectasis without complication    4. Mild persistent asthma without complication         Plan:       1. Pt with low IgG, IgM and no response to pneumococcal vaccines so has CVID. Continue IgG replacement - Hizentra 11g weekly- check trough levels 4/2024  2. continue azelastine 2 SEN BID, montelukast 10 mg daily, allegra daily and Flonase 2 SEN daily  3. RTC annually or sooner if needed

## 2023-05-02 ENCOUNTER — PATIENT MESSAGE (OUTPATIENT)
Dept: OTOLARYNGOLOGY | Facility: CLINIC | Age: 73
End: 2023-05-02
Payer: MEDICARE

## 2023-05-04 ENCOUNTER — TELEPHONE (OUTPATIENT)
Dept: PHARMACY | Facility: CLINIC | Age: 73
End: 2023-05-04
Payer: MEDICARE

## 2023-05-04 NOTE — TELEPHONE ENCOUNTER
Laney, this is Kesha Dos Santos, clinical pharmacist with [facility] that is part of your care team.  We have begun working on your prescription that your doctor has sent us. Our next steps include:     Working with your insurance company to obtain approval for your medication  Working with you to ensure your medication is affordable     We will be calling you along the way with updates on your medication but if you have any concerns or receive information that you would like to discuss please reach us at [phone].    Welcome call outcome: Patient/caregiver reached

## 2023-05-08 ENCOUNTER — HOSPITAL ENCOUNTER (OUTPATIENT)
Dept: RADIOLOGY | Facility: HOSPITAL | Age: 73
Discharge: HOME | End: 2023-05-08
Attending: PODIATRIST
Payer: MEDICARE

## 2023-05-08 DIAGNOSIS — R60.0 PERIPHERAL EDEMA: ICD-10-CM

## 2023-05-08 PROCEDURE — 93970 US LOWER EXTREMITY VEINS BILATERAL INSUFFICIENCY: ICD-10-PCS | Mod: 26,HCNC,, | Performed by: RADIOLOGY

## 2023-05-08 PROCEDURE — 93970 EXTREMITY STUDY: CPT | Mod: 26,HCNC,, | Performed by: RADIOLOGY

## 2023-05-08 PROCEDURE — 93970 EXTREMITY STUDY: CPT | Mod: TC,HCNC,PO

## 2023-05-09 ENCOUNTER — TELEPHONE (OUTPATIENT)
Dept: PSYCHIATRY | Facility: CLINIC | Age: 73
End: 2023-05-09
Payer: MEDICARE

## 2023-05-09 NOTE — TELEPHONE ENCOUNTER
----- Message from Arash Yan sent at 5/9/2023  3:01 PM CDT -----  Contact: adeel [phone]     8am-8pm     Monday-friday  Type: Needs Medical Advice  Who Called:  adeel Okeefe Call Back Number: [phone]     8am-8pm     Monday-friday  Additional Information: Adeel is calling the office regarding pt for paper they faxed over for the medication, they were trying to see if the application was received. Please call back and advise.

## 2023-05-10 ENCOUNTER — PATIENT MESSAGE (OUTPATIENT)
Dept: PODIATRY | Facility: CLINIC | Age: 73
End: 2023-05-10
Payer: MEDICARE

## 2023-05-10 NOTE — PROGRESS NOTES
Patient was notified of the following results , yes she would like a ref. To Vascular, she gave verbal understanding.            Please let the patient know the ultrasound was positive for insufficiency of the veins of both legs.  This is the causative agent for swelling of said limbs. See if she would be interested in seeing Vascular for possible treatment options

## 2023-05-11 ENCOUNTER — TELEPHONE (OUTPATIENT)
Dept: CARDIOLOGY | Facility: CLINIC | Age: 73
End: 2023-05-11
Payer: MEDICARE

## 2023-05-11 DIAGNOSIS — I87.2 VENOUS INSUFFICIENCY OF BOTH LOWER EXTREMITIES: Primary | ICD-10-CM

## 2023-05-11 NOTE — TELEPHONE ENCOUNTER
----- Message from Reid May sent at 5/11/2023 11:12 AM CDT -----  Contact: pt at  [phone]  Type:  Sooner Appointment Request    Caller is requesting a sooner appointment.  Caller declined first available appointment listed below.  Caller will not accept being placed on the waitlist and is requesting a message be sent to doctor.    Name of Caller:  pt  When is the first available appointment?  7/3  Symptoms:  trouble with right ankle swelling  Best Call Back Number:  [phone]  Additional Information:  pt is calling the office to schedule an appt due to having trouble with right ankle swelling and the date of 7/3 comes up she states she needs to be seen sooner.

## 2023-05-15 ENCOUNTER — TELEPHONE (OUTPATIENT)
Dept: PULMONOLOGY | Facility: CLINIC | Age: 73
End: 2023-05-15
Payer: MEDICARE

## 2023-05-15 NOTE — TELEPHONE ENCOUNTER
Forms have been filled out completely an faxed to [employer] & assistance program   ----- Message from Leesa Nava sent at 5/15/2023  2:03 PM CDT -----  Contact: KIFFANY    ----- Message -----  From: Dannielle Mayen  Sent: 5/15/2023   1:51 PM CDT  To: [facility] Staff    Type:  Patient Returning Call    Who Called:  KIFFANY     Who Left Message for Patient:  GITA     Does the patient know what this is regarding?:  YES - Enrollment Form (incomplete form)     Best Call Back Number:  [phone]  Fax Nu [phone]    Additional Information:  Returning your phone call regarding enrollment form. Section 3, 5 and 6 needs to be completed  Please call back and advise. Thanks

## 2023-05-16 ENCOUNTER — TELEPHONE (OUTPATIENT)
Dept: PULMONOLOGY | Facility: CLINIC | Age: 73
End: 2023-05-16
Payer: MEDICARE

## 2023-05-16 NOTE — TELEPHONE ENCOUNTER
Called [facility] and they need [facility] to change form due to for medicare patients it need to be the prefilled syr. not pen.    I let [facility] know too.  That's all they waiting on.   ----- Message from Sabiha Membreno sent at 5/16/2023 10:03 AM CDT -----  Contact: AUTUMN CORONA  Type:  Needs Medical Advice    Who Called: AN[facility] CORONA   Symptoms (please be specific): NEEDS A RX FOR THE PREFILLED SYRINGE    How long has patient had these symptoms:  N/A  Pharm[facility] name and phone #:  [facility]  Would the patient rather a call back or a response via [facility]?  CALL   Best Call Back Number:  [phone] / FAX # [phone]  Additional Information: THANK YOU

## 2023-05-17 ENCOUNTER — TELEPHONE (OUTPATIENT)
Dept: PULMONOLOGY | Facility: CLINIC | Age: 73
End: 2023-05-17
Payer: MEDICARE

## 2023-05-17 NOTE — TELEPHONE ENCOUNTER
Called pt and advised that she can remove her dressing after 48 hours and cover her incision with a waterproof band aid until her PO appointment with Dr. Edmond. Pt verbalized understanding.    Size Of Lesion In Cm (Required): 1.5

## 2023-05-17 NOTE — TELEPHONE ENCOUNTER
----- Message from Leesa Nava sent at 5/16/2023  9:06 AM CDT -----    ----- Message -----  From: Shanice Moseley  Sent: 5/16/2023   8:28 AM CDT  To: [facility] Staff    Type: Needs Medical Advice  Who Called:  [facility] support program  Best Call Back Number: [phone]   Additional Information: Application for assistance form for the pt was faxed over, and they would like to know the statues, pl call bk and advise Thank

## 2023-05-17 NOTE — TELEPHONE ENCOUNTER
[facility], the [facility] was received and faxed to them on 5/15/23.

## 2023-05-18 ENCOUNTER — OFFICE VISIT (OUTPATIENT)
Dept: CARDIOLOGY | Facility: CLINIC | Age: 73
End: 2023-05-18
Payer: MEDICARE

## 2023-05-18 ENCOUNTER — PATIENT MESSAGE (OUTPATIENT)
Dept: CARDIOLOGY | Facility: CLINIC | Age: 73
End: 2023-05-18

## 2023-05-18 ENCOUNTER — TELEPHONE (OUTPATIENT)
Dept: PULMONOLOGY | Facility: CLINIC | Age: 73
End: 2023-05-18
Payer: MEDICARE

## 2023-05-18 VITALS
WEIGHT: 209 LBS | HEART RATE: 69 BPM | DIASTOLIC BLOOD PRESSURE: 80 MMHG | HEIGHT: 64 IN | SYSTOLIC BLOOD PRESSURE: 143 MMHG | BODY MASS INDEX: 35.68 KG/M2

## 2023-05-18 DIAGNOSIS — I87.2 VENOUS INSUFFICIENCY OF BOTH LOWER EXTREMITIES: ICD-10-CM

## 2023-05-18 DIAGNOSIS — E78.5 DYSLIPIDEMIA: ICD-10-CM

## 2023-05-18 DIAGNOSIS — I10 ESSENTIAL HYPERTENSION: ICD-10-CM

## 2023-05-18 DIAGNOSIS — I70.0 ATHEROSCLEROSIS OF ABDOMINAL AORTA: ICD-10-CM

## 2023-05-18 PROCEDURE — 3044F HG A1C LEVEL LT 7.0%: CPT | Mod: CPTII,,,

## 2023-05-18 PROCEDURE — 1126F PR PAIN SEVERITY QUANTIFIED, NO PAIN PRESENT: ICD-10-PCS | Mod: CPTII,,,

## 2023-05-18 PROCEDURE — 3044F PR MOST RECENT HEMOGLOBIN A1C LEVEL <7.0%: ICD-10-PCS | Mod: CPTII,,,

## 2023-05-18 PROCEDURE — 99214 OFFICE O/P EST MOD 30 MIN: CPT | Mod: PO

## 2023-05-18 PROCEDURE — 1101F PR PT FALLS ASSESS DOC 0-1 FALLS W/OUT INJ PAST YR: ICD-10-PCS | Mod: CPTII,,,

## 2023-05-18 PROCEDURE — 3008F BODY MASS INDEX DOCD: CPT | Mod: CPTII,,,

## 2023-05-18 PROCEDURE — 1159F PR MEDICATION LIST DOCUMENTED IN MEDICAL RECORD: ICD-10-PCS | Mod: CPTII,,,

## 2023-05-18 PROCEDURE — 3060F POS MICROALBUMINURIA REV: CPT | Mod: CPTII,,,

## 2023-05-18 PROCEDURE — 99214 PR OFFICE/OUTPT VISIT, EST, LEVL IV, 30-39 MIN: ICD-10-PCS | Mod: ,,,

## 2023-05-18 PROCEDURE — 99999 PR PBB SHADOW E&M-EST. PATIENT-LVL IV: ICD-10-PCS | Mod: PBBFAC,,,

## 2023-05-18 PROCEDURE — 3077F SYST BP >= 140 MM HG: CPT | Mod: CPTII,,,

## 2023-05-18 PROCEDURE — 3288F PR FALLS RISK ASSESSMENT DOCUMENTED: ICD-10-PCS | Mod: CPTII,,,

## 2023-05-18 PROCEDURE — 1157F PR ADVANCE CARE PLAN OR EQUIV PRESENT IN MEDICAL RECORD: ICD-10-PCS | Mod: CPTII,,,

## 2023-05-18 PROCEDURE — 3066F NEPHROPATHY DOC TX: CPT | Mod: CPTII,,,

## 2023-05-18 PROCEDURE — 3066F PR DOCUMENTATION OF TREATMENT FOR NEPHROPATHY: ICD-10-PCS | Mod: CPTII,,,

## 2023-05-18 PROCEDURE — 99999 PR PBB SHADOW E&M-EST. PATIENT-LVL IV: CPT | Mod: PBBFAC,,,

## 2023-05-18 PROCEDURE — 99214 OFFICE O/P EST MOD 30 MIN: CPT | Mod: ,,,

## 2023-05-18 PROCEDURE — 1157F ADVNC CARE PLAN IN RCRD: CPT | Mod: CPTII,,,

## 2023-05-18 PROCEDURE — 1101F PT FALLS ASSESS-DOCD LE1/YR: CPT | Mod: CPTII,,,

## 2023-05-18 PROCEDURE — 3008F PR BODY MASS INDEX (BMI) DOCUMENTED: ICD-10-PCS | Mod: CPTII,,,

## 2023-05-18 PROCEDURE — 1159F MED LIST DOCD IN RCRD: CPT | Mod: CPTII,,,

## 2023-05-18 PROCEDURE — 3288F FALL RISK ASSESSMENT DOCD: CPT | Mod: CPTII,,,

## 2023-05-18 PROCEDURE — 3079F DIAST BP 80-89 MM HG: CPT | Mod: CPTII,,,

## 2023-05-18 PROCEDURE — 3077F PR MOST RECENT SYSTOLIC BLOOD PRESSURE >= 140 MM HG: ICD-10-PCS | Mod: CPTII,,,

## 2023-05-18 PROCEDURE — 3079F PR MOST RECENT DIASTOLIC BLOOD PRESSURE 80-89 MM HG: ICD-10-PCS | Mod: CPTII,,,

## 2023-05-18 PROCEDURE — 1126F AMNT PAIN NOTED NONE PRSNT: CPT | Mod: CPTII,,,

## 2023-05-18 PROCEDURE — 3060F PR POS MICROALBUMINURIA RESULT DOCUMENTED/REVIEW: ICD-10-PCS | Mod: CPTII,,,

## 2023-05-18 NOTE — ASSESSMENT & PLAN NOTE
BP well controlled at home   Continue cardizem 120 mg   Continue diovan-hct 160-12.5 mg daily   Low Na diet

## 2023-05-18 NOTE — PROGRESS NOTES
Subjective:    Patient ID:  Cornelia Delatorre is a 72 y.o. female patient here for evaluation Edema (Right ankle)    History of Present Illness:     Mrs. Delatorre is a 72 year old F who follows with Dr. Gonzalez here today because she has been having right ankle pain and swelling. She saw a podiatrists who diagnosed her with bursitis and gave her stretching to do and to return in 6 weeks if no improvement for a steroid injection. He also ordered a venous US which revealed venous insufficiency of the greater saphenous veins bilaterally. She does not have any pre-tibial edema or leg pain/aching. No other complaints.       Most Recent Echocardiogram Results  Results for orders placed in visit on 11/02/21    Echo Color Flow Doppler? Yes    Interpretation Summary  · The left ventricle is normal in size with normal systolic function.  · Grade II left ventricular diastolic dysfunction.  · The estimated PA systolic pressure is 21 mmHg.  · Normal right ventricular size with normal right ventricular systolic function.  · Normal central venous pressure (3 mmHg).  · The estimated ejection fraction is 55%.  · Mild tricuspid regurgitation.    REVIEW OF SYSTEMS: As noted in HPI   CARDIOVASCULAR: No recent chest pain, palpitations, arm, neck, or jaw pain.  RESPIRATORY: No recent fever, cough chills, SOB.  : No blood in the urine  GI: No nausea, vomiting, or blood in stool.   MUSCULOSKELETAL: + R ankle pain. No myalgias or falls.   NEURO: No syncope, lightheadedness, or dizziness.  EYES: No sudden changes in vision.     Past Medical History:   Diagnosis Date    Allergy     Arthritis     Asthma     Cancer     skin cancer to right hand    Cataract     Chronic fatigue 01/24/2017    CVID (common variable immunodeficiency) 03/07/2019    Diabetes mellitus     type2    KLINE (dyspnea on exertion) 01/24/2017    Dyslipidemia 06/25/2019    Encounter for blood transfusion     Essential hypertension 12/29/2011    GERD (gastroesophageal  reflux disease)     Headache(784.0)     History of lumpectomy of both breasts     1992 negative    Hyperlipidemia 07/15/2015    Hypertension     Hypothyroidism     Neuropathy     Obesity     Obesity     Postmenopausal HRT (hormone replacement therapy)     Rash     Rosacea     Sleep apnea     on bipap    Snoring     Squamous cell carcinoma of skin     left forearm     UTI (urinary tract infection)     Wears glasses      Past Surgical History:   Procedure Laterality Date    ADENOIDECTOMY      APPENDECTOMY      BLADDER SUSPENSION      BREAST BIOPSY Bilateral 1992    bilateral benign excisional biopsies    BUNIONECTOMY  10/17/14    right, still has discomfort    CATARACT EXTRACTION W/  INTRAOCULAR LENS IMPLANT Bilateral     CHOLECYSTECTOMY  08/02/2017    COLONOSCOPY      CYSTOSCOPY      EPIDURAL STEROID INJECTION INTO LUMBAR SPINE N/A 8/14/2019    Procedure: Injection-steroid-epidural-lumbar L5/S1;  Surgeon: Fredi Rojas MD;  Location: [facility] OR;  Service: Pain Management;  Laterality: N/A;    EPIDURAL STEROID INJECTION INTO LUMBAR SPINE N/A 5/3/2021    Procedure: Injection-steroid-epidural-lumbar L5/S1 to the Left;  Surgeon: Fredi Rojas MD;  Location: [facility] OR;  Service: Pain Management;  Laterality: N/A;    EPIDURAL STEROID INJECTION INTO LUMBAR SPINE N/A 9/24/2021    Procedure: Injection-steroid-epidural-lumbar L5/S1;  Surgeon: Fredi Rojas MD;  Location: [facility] OR;  Service: Pain Management;  Laterality: N/A;    EPIDURAL STEROID INJECTION INTO LUMBAR SPINE N/A 2/21/2022    Procedure: Injection-steroid-epidural-lumbar L5/S1;  Surgeon: Fredi Rojas MD;  Location: [facility] OR;  Service: Pain Management;  Laterality: N/A;    ESOPHAGEAL DILATION N/A 6/11/2021    Procedure: DILATION, ESOPHAGUS;  Surgeon: Jake Sheriff MD;  Location: [facility];  Service: Endoscopy;  Laterality: N/A;    ESOPHAGOGASTRODUODENOSCOPY      dilated esophagus    ESOPHAGOGASTRODUODENOSCOPY N/A 6/11/2021    Procedure: EGD  (ESOPHAGOGASTRODUODENOSCOPY);  Surgeon: Jake Sheriff MD;  Location: [facility];  Service: Endoscopy;  Laterality: N/A;    GASTRIC BYPASS      2011    HYSTERECTOMY  1980    interstim bladder  2009    10/14/14 new battery    OOPHORECTOMY  1980    REPLACEMENT OF SACRAL NERVE STIMULATOR  2/18/2020    Procedure: REPLACEMENT, NEUROSTIMULATOR, SACRAL;  Surgeon: Mary Jane Jarvis MD;  [facility];  Service: Urology;;    REVISION OF PROCEDURE INVOLVING SACRAL NEUROSTIMULATOR DEVICE Right 2/18/2020    Procedure: REVISION, NEUROSTIMULATOR, SACRAL/ battery replacement;  Surgeon: Mary Jane Jarvis MD;  [facility];  Service: Urology;  Laterality: Right;  1hr/ rep contacted/ (CONNIE)    SKIN CANCER EXCISION      left hand    TONSILLECTOMY      WISDOM TOOTH EXTRACTION       Social History     Tobacco Use    Smoking status: Never    Smokeless tobacco: Never   Substance Use Topics    Alcohol use: Not Currently    Drug use: No         Objective      Vitals:    05/18/23 0820   BP: (!) 143/80   Pulse: 69       LAST EKG  Results for orders placed or performed during the hospital encounter of 06/10/21   EKG 12-lead    Collection Time: 06/10/21  7:01 PM    Narrative    Test Reason : R11.10,    Vent. Rate : 068 BPM     Atrial Rate : 068 BPM     P-R Int : 150 ms          QRS Dur : 132 ms      QT Int : 446 ms       P-R-T Axes : 063 007 044 degrees     QTc Int : 474 ms    Normal sinus rhythm  Right bundle branch block  Abnormal ECG  When compared with ECG of 23-FEB-2021 16:08,  No significant change was found  Confirmed by Jules Spaulding MD [id number]) on 6/11/2021 11:16:36 AM    Referred By: [email or web address]   SELF           Confirmed By:Jules Spaulding MD     LIPIDS - LAST 2   Lab Results   Component Value Date    CHOL 162 03/03/2023    CHOL 147 09/02/2022    HDL 61 03/03/2023    HDL 54 09/02/2022    LDLCALC 81.6 03/03/2023    LDLCALC 70.6 09/02/2022    TRIG 97 03/03/2023    TRIG 112 09/02/2022    CHOLHDL 37.7 03/03/2023     CHOLHDL 36.7 09/02/2022     CARDIAC PROFILE - LAST 2  Lab Results   Component Value Date     (H) 10/07/2018    BNP 27 03/24/2017     (H) 06/11/2021     (H) 06/10/2021    CPKMB 2.0 12/08/2011    TROPONINI 0.013 06/11/2021    TROPONINI 0.016 06/11/2021      CBC - LAST 2  Lab Results   Component Value Date    WBC 5.09 03/03/2023    WBC 6.51 04/27/2022    RBC 4.09 03/03/2023    RBC 4.37 04/27/2022    HGB 11.6 (L) 03/03/2023    HGB 12.4 04/27/2022    HCT 36.6 (L) 03/03/2023    HCT 39.0 04/27/2022     03/03/2023     04/27/2022     Lab Results   Component Value Date    INR 1.0 10/07/2018    INR 0.9 04/12/2005    APTT 29.1 04/13/2005     CHEMISTRY - LAST 2  Lab Results   Component Value Date     03/03/2023     09/02/2022    K 4.3 03/03/2023    K 4.4 09/02/2022     03/03/2023     09/02/2022    CO2 27 03/03/2023    CO2 26 09/02/2022    ANIONGAP 13 03/03/2023    ANIONGAP 11 09/02/2022    BUN 12 03/03/2023    BUN 11 09/02/2022    CREATININE 0.9 03/03/2023    CREATININE 0.8 09/02/2022    GLU 96 03/03/2023     (H) 09/02/2022    CALCIUM 9.5 03/03/2023    CALCIUM 9.1 09/02/2022    MG 1.7 06/11/2021    MG 1.3 (L) 06/10/2021    ALBUMIN 3.6 03/03/2023    ALBUMIN 3.3 (L) 09/02/2022    PROT 7.2 03/03/2023    PROT 6.7 09/02/2022    ALKPHOS 108 03/03/2023    ALKPHOS 120 09/02/2022    ALT 18 03/03/2023    ALT 26 09/02/2022    AST 21 03/03/2023    AST 24 09/02/2022    BILITOT 0.4 03/03/2023    BILITOT 0.4 09/02/2022      ENDOCRINE - LAST 2  Lab Results   Component Value Date    HGBA1C 6.1 (H) 03/03/2023    HGBA1C 6.3 (H) 09/02/2022    TSH 1.447 03/03/2023    TSH 1.173 12/01/2022        PHYSICAL EXAM  CONSTITUTIONAL: Well built, well nourished in no apparent distress  NECK: no carotid bruit, no JVD  LUNGS: CTA  CHEST WALL: no tenderness  HEART: regular rate and rhythm, S1, S2 normal, no murmur, click, rub or gallop   ABDOMEN: soft, non-tender; bowel sounds normal; no  masses,  no organomegaly  EXTREMITIES: Posterior R ankle red, tender, mild swelling. No pitting edema BL.   NEURO: AAO X 3    I HAVE REVIEWED :    The vital signs, most recent cardiac testing, and most recent pertinent non-cardiology provider notes.    Current Outpatient Medications   Medication Instructions    albuterol (PROVENTIL/VENTOLIN HFA) 90 mcg/actuation inhaler 2 puffs, Inhalation, Every 4 hours PRN, 2 puffs every 4 hours as needed for cough, wheeze, or shortness of breath    alpha lipoic acid 600 mg, Oral, 2 times daily    ascorbic acid (vitamin C) (VITAMIN C) 1,000 mg, Oral, 2 times daily    azelastine (ASTELIN) 137 mcg, Nasal, 2 times daily    azelastine (OPTIVAR) 0.05 % ophthalmic solution 1 drop, Both Eyes, As needed    B-complex with vitamin C (Z-BEC OR EQUIV) tablet 1 tablet, Oral, Daily    Bifidobacterium infantis (ALIGN ORAL) Oral, Daily    biotin 10,000 mcg Cap 1 tablet, Oral, Nightly    blood sugar diagnostic [employer] preferred meter and strips. Check daily    blood-glucose meter kit Use as instructed. Insurance preferred meter.    chlorpheniramine (CHLOR-TRIMETON) 4 mg, Oral, Every 8 hours while awake    cloNIDine (CATAPRES) 0.1 mg, Oral, 3 times daily PRN    cranberry 500 mg Cap 1 capsule, Oral, Nightly    diclofenac sodium (VOLTAREN) 1 % Gel Apply 2 grams topically once daily.    diltiaZEM (CARDIZEM CD) 120 mg, Oral, Nightly    EPINEPHrine (EPIPEN) 0.3 mg/0.3 mL AtIn 1 each, Intramuscular, As needed (PRN)    erythromycin with ethanoL (THERAMYCIN) 2 % external solution Aaa bid prn    esomeprazole (NEXIUM) 40 mg, Oral, Before breakfast    fexofenadine (ALLEGRA) 180 mg, Oral, Daily    fish oil-omega-3 fatty acids 300-1,000 mg capsule 2 g, Oral, Daily    FLUAD QUAD 2022-23,65Y UP,,PF, 60 mcg (15 mcg x 4)/0.5 mL Syrg No dose, route, or frequency recorded.    fluticasone propionate (FLONASE) 100 mcg, Each Nostril, Daily    fluticasone-umeclidin-vilanter (TRELEGY ELLIPTA) 200-62.5-25 mcg  inhaler 1 puff, Inhalation, Daily    fluticasone-umeclidin-vilanter (TRELEGY ELLIPTA) 200-62.5-25 mcg inhaler 1 puff, Inhalation, Daily    gabapentin (NEURONTIN) 600 mg, Oral, 3 times daily    guaifenesin-codeine 100-10 mg/5 ml (GUAIFENESIN AC)  mg/5 mL syrup 5 mLs, Oral, 3 times daily PRN    hydrOXYzine HCL (ATARAX) 10 mg, Oral, 3 times daily PRN    immun glob G,IgG,-pro-IgA 0-50 (HIZENTRA) 1 gram/5 mL (20 %) Soln 11 g, Subcutaneous, Weekly    inhalation spacing device Use as directed for inhalation.    lancets (ACCU-CHEK SOFTCLIX LANCETS) Misc Use to check blood sugar daily.    levalbuterol (XOPENEX) 1.25 mg, Nebulization, Every 4 hours PRN, Rescue    metFORMIN (GLUCOPHAGE-XR) 500 mg, Oral, 2 times daily with meals    mometasone 0.1% (ELOCON) 0.1 % cream aaa bid x 1-2 weeks prn bug bites    montelukast (SINGULAIR) 10 mg, Oral, Nightly    mucus clearing device Cecy 1 Device, Misc.(Non-Drug; Combo Route), 2 times daily    multivitamin capsule 1 capsule, Oral, Daily    mupirocin (BACTROBAN) 2 % ointment Topical (Top), 3 times daily    ondansetron (ZOFRAN-ODT) 4 mg, Oral, Every 8 hours PRN    potassium chloride SA (K-DUR,KLOR-CON) 20 MEQ tablet 20 mEq, Oral, Daily    pravastatin (PRAVACHOL) 80 MG tablet TAKE 1 TABLET EVERY DAY    predniSONE (DELTASONE) 10 MG tablet 1 pill for 3-5 days repeat as needed for chest tightness.    psyllium husk (METAMUCIL ORAL) Oral    semaglutide (OZEMPIC) 0.25 mg or 0.5 mg(2 mg/1.5 mL) pen injector Inject 0.25 mg into the skin every 7 days for 30 days, THEN 0.5 mg every 7 days.    SIMETHICONE (GAS-X ORAL) 1 capsule, Oral, As needed (PRN)    TEZSPIRE 210 mg, Subcutaneous, Every 28 days    valsartan-hydrochlorothiazide (DIOVAN-HCT) 160-12.5 mg per tablet 1 tablet, Oral, Daily    vitamin D3-folic acid 5,000 unit- 1 mg Tab 1 tablet, Oral, Daily      Assessment & Plan     Essential hypertension  BP well controlled at home   Continue cardizem 120 mg   Continue diovan-hct 160-12.5 mg daily    Low Na diet     Atherosclerosis of abdominal aorta  Continue statin medication     Dyslipidemia  Continue statin medication     Venous insufficiency of both lower extremities  Greater saphenous vein bilaterally   Patient with  Mild R ankle swelling and pain... also with bursitis   Follow up with podiatry regarding steroid injection   Wear compression socks and elevate legs   If swelling worsens will add HCTZ 12.5 mg alone and consider vein clinic referral         Follow up in about 6 months (around 11/18/2023).     Naomi Peters, PA-C [facility]   Office: [phone]

## 2023-05-18 NOTE — ASSESSMENT & PLAN NOTE
Greater saphenous vein bilaterally   Patient with  Mild R ankle swelling and pain... also with bursitis   Follow up with podiatry regarding steroid injection   Wear compression socks and elevate legs   If swelling worsens will add HCTZ 12.5 mg alone and consider vein clinic referral

## 2023-05-22 ENCOUNTER — OFFICE VISIT (OUTPATIENT)
Dept: PODIATRY | Facility: CLINIC | Age: 73
End: 2023-05-22
Payer: MEDICARE

## 2023-05-22 VITALS — WEIGHT: 209 LBS | HEIGHT: 64 IN | BODY MASS INDEX: 35.68 KG/M2

## 2023-05-22 DIAGNOSIS — M77.51 POSTCALCANEAL BURSITIS OF RIGHT FOOT: ICD-10-CM

## 2023-05-22 DIAGNOSIS — M76.61 ACHILLES TENDINITIS OF RIGHT LOWER EXTREMITY: ICD-10-CM

## 2023-05-22 DIAGNOSIS — M62.461 GASTROCNEMIUS EQUINUS, RIGHT: Primary | ICD-10-CM

## 2023-05-22 PROCEDURE — 1125F AMNT PAIN NOTED PAIN PRSNT: CPT | Mod: CPTII,S$GLB,, | Performed by: STUDENT IN AN ORGANIZED HEALTH CARE EDUCATION/TRAINING PROGRAM

## 2023-05-22 PROCEDURE — 1159F MED LIST DOCD IN RCRD: CPT | Mod: CPTII,S$GLB,, | Performed by: STUDENT IN AN ORGANIZED HEALTH CARE EDUCATION/TRAINING PROGRAM

## 2023-05-22 PROCEDURE — 1125F PR PAIN SEVERITY QUANTIFIED, PAIN PRESENT: ICD-10-PCS | Mod: CPTII,S$GLB,, | Performed by: STUDENT IN AN ORGANIZED HEALTH CARE EDUCATION/TRAINING PROGRAM

## 2023-05-22 PROCEDURE — 1159F PR MEDICATION LIST DOCUMENTED IN MEDICAL RECORD: ICD-10-PCS | Mod: CPTII,S$GLB,, | Performed by: STUDENT IN AN ORGANIZED HEALTH CARE EDUCATION/TRAINING PROGRAM

## 2023-05-22 PROCEDURE — 1101F PT FALLS ASSESS-DOCD LE1/YR: CPT | Mod: CPTII,S$GLB,, | Performed by: STUDENT IN AN ORGANIZED HEALTH CARE EDUCATION/TRAINING PROGRAM

## 2023-05-22 PROCEDURE — 3044F PR MOST RECENT HEMOGLOBIN A1C LEVEL <7.0%: ICD-10-PCS | Mod: CPTII,S$GLB,, | Performed by: STUDENT IN AN ORGANIZED HEALTH CARE EDUCATION/TRAINING PROGRAM

## 2023-05-22 PROCEDURE — 1157F PR ADVANCE CARE PLAN OR EQUIV PRESENT IN MEDICAL RECORD: ICD-10-PCS | Mod: CPTII,S$GLB,, | Performed by: STUDENT IN AN ORGANIZED HEALTH CARE EDUCATION/TRAINING PROGRAM

## 2023-05-22 PROCEDURE — 99999 PR PBB SHADOW E&M-EST. PATIENT-LVL V: CPT | Mod: PBBFAC,,, | Performed by: STUDENT IN AN ORGANIZED HEALTH CARE EDUCATION/TRAINING PROGRAM

## 2023-05-22 PROCEDURE — 3066F PR DOCUMENTATION OF TREATMENT FOR NEPHROPATHY: ICD-10-PCS | Mod: CPTII,S$GLB,, | Performed by: STUDENT IN AN ORGANIZED HEALTH CARE EDUCATION/TRAINING PROGRAM

## 2023-05-22 PROCEDURE — 3288F PR FALLS RISK ASSESSMENT DOCUMENTED: ICD-10-PCS | Mod: CPTII,S$GLB,, | Performed by: STUDENT IN AN ORGANIZED HEALTH CARE EDUCATION/TRAINING PROGRAM

## 2023-05-22 PROCEDURE — 3060F PR POS MICROALBUMINURIA RESULT DOCUMENTED/REVIEW: ICD-10-PCS | Mod: CPTII,S$GLB,, | Performed by: STUDENT IN AN ORGANIZED HEALTH CARE EDUCATION/TRAINING PROGRAM

## 2023-05-22 PROCEDURE — 3008F PR BODY MASS INDEX (BMI) DOCUMENTED: ICD-10-PCS | Mod: CPTII,S$GLB,, | Performed by: STUDENT IN AN ORGANIZED HEALTH CARE EDUCATION/TRAINING PROGRAM

## 2023-05-22 PROCEDURE — 3060F POS MICROALBUMINURIA REV: CPT | Mod: CPTII,S$GLB,, | Performed by: STUDENT IN AN ORGANIZED HEALTH CARE EDUCATION/TRAINING PROGRAM

## 2023-05-22 PROCEDURE — 3044F HG A1C LEVEL LT 7.0%: CPT | Mod: CPTII,S$GLB,, | Performed by: STUDENT IN AN ORGANIZED HEALTH CARE EDUCATION/TRAINING PROGRAM

## 2023-05-22 PROCEDURE — 3066F NEPHROPATHY DOC TX: CPT | Mod: CPTII,S$GLB,, | Performed by: STUDENT IN AN ORGANIZED HEALTH CARE EDUCATION/TRAINING PROGRAM

## 2023-05-22 PROCEDURE — 1101F PR PT FALLS ASSESS DOC 0-1 FALLS W/OUT INJ PAST YR: ICD-10-PCS | Mod: CPTII,S$GLB,, | Performed by: STUDENT IN AN ORGANIZED HEALTH CARE EDUCATION/TRAINING PROGRAM

## 2023-05-22 PROCEDURE — 3288F FALL RISK ASSESSMENT DOCD: CPT | Mod: CPTII,S$GLB,, | Performed by: STUDENT IN AN ORGANIZED HEALTH CARE EDUCATION/TRAINING PROGRAM

## 2023-05-22 PROCEDURE — 99215 OFFICE O/P EST HI 40 MIN: CPT | Mod: PN | Performed by: STUDENT IN AN ORGANIZED HEALTH CARE EDUCATION/TRAINING PROGRAM

## 2023-05-22 PROCEDURE — 1160F PR REVIEW ALL MEDS BY PRESCRIBER/CLIN PHARMACIST DOCUMENTED: ICD-10-PCS | Mod: CPTII,S$GLB,, | Performed by: STUDENT IN AN ORGANIZED HEALTH CARE EDUCATION/TRAINING PROGRAM

## 2023-05-22 PROCEDURE — 1160F RVW MEDS BY RX/DR IN RCRD: CPT | Mod: CPTII,S$GLB,, | Performed by: STUDENT IN AN ORGANIZED HEALTH CARE EDUCATION/TRAINING PROGRAM

## 2023-05-22 PROCEDURE — 99999 PR PBB SHADOW E&M-EST. PATIENT-LVL V: ICD-10-PCS | Mod: PBBFAC,,, | Performed by: STUDENT IN AN ORGANIZED HEALTH CARE EDUCATION/TRAINING PROGRAM

## 2023-05-22 PROCEDURE — 1157F ADVNC CARE PLAN IN RCRD: CPT | Mod: CPTII,S$GLB,, | Performed by: STUDENT IN AN ORGANIZED HEALTH CARE EDUCATION/TRAINING PROGRAM

## 2023-05-22 PROCEDURE — 99214 OFFICE O/P EST MOD 30 MIN: CPT | Mod: S$GLB,,, | Performed by: STUDENT IN AN ORGANIZED HEALTH CARE EDUCATION/TRAINING PROGRAM

## 2023-05-22 PROCEDURE — 3008F BODY MASS INDEX DOCD: CPT | Mod: CPTII,S$GLB,, | Performed by: STUDENT IN AN ORGANIZED HEALTH CARE EDUCATION/TRAINING PROGRAM

## 2023-05-22 PROCEDURE — 99214 PR OFFICE/OUTPT VISIT, EST, LEVL IV, 30-39 MIN: ICD-10-PCS | Mod: S$GLB,,, | Performed by: STUDENT IN AN ORGANIZED HEALTH CARE EDUCATION/TRAINING PROGRAM

## 2023-05-22 NOTE — PROGRESS NOTES
Chief Complaint   Patient presents with    cortizone injection           HPI:   Cornelia Delatorre is a 72 y.o. female with concerns  of painful posterior aspect of the right heel.  She is been dealing with this pain for several months.  She saw Dr. Gillette previously and was recommended some stretching as well as intervention for the veins of bilateral lower extremities.  She saw her cardiologist who recommended she come back for evaluation of musculoskeletal pain.    She relates pain to the back of the heel for at least a few months.  She went to physical therapy for other reasons and was unhappy with physical therapy due to the need for water therapy.  She reports that the pain is worse with activities.  She reports that the pain is more of a sharp pain then tightness and a dull ache.    Patient Active Problem List   Diagnosis    Severe obesity (BMI 35.0-35.9 with comorbidity)    Essential hypertension    Thyroid cyst    Osteoarthrosis, unspecified whether generalized or localized, lower leg    Neuropathy in diabetes    Hammertoe    Porokeratosis    History of bariatric surgery    SOHA treated with BiPAP    Bunionette    Peripheral neuropathy    Type 2 diabetes mellitus    Urinary urgency    Lesion of nasal septum    Gastroesophageal reflux    Status post right foot surgery    Diabetic polyneuropathy associated with type 2 diabetes mellitus    Onychomycosis due to dermatophyte    Arthritis of left ankle    Acquired hypothyroidism    Frequency of micturition    KLINE (dyspnea on exertion)    Abnormal CT scan, lung    Immune deficiency disorder    Bronchiectasis without complication    Lung nodule    Exposure to TB    Atherosclerosis of abdominal aorta    Tortuous aorta    CVID (common variable immunodeficiency)    Tracheobronchomalacia    Leg pain, posterior    Gait abnormality    Dyslipidemia    Lumbar radiculopathy    Chronic bilateral low back pain with bilateral sciatica    Arthritis of finger of right hand     Overactive bladder    Rectocele    Abdominal pain, vomiting, and diarrhea    Advance care planning    Elevated CK    Chronic sinus complaints    Bilateral carotid artery stenosis    COVID    Onychomycosis of multiple toenails with type 2 diabetes mellitus    Venous insufficiency of both lower extremities         Current Outpatient Medications on File Prior to Visit   Medication Sig Dispense Refill    albuterol (PROVENTIL/VENTOLIN HFA) 90 mcg/actuation inhaler Inhale 2 puffs into the lungs every 4 (four) hours as needed. 2 puffs every 4 hours as needed for cough, wheeze, or shortness of breath 54 g 3    alpha lipoic acid 600 mg Cap Take 600 mg by mouth 2 (two) times daily.      ascorbic acid, vitamin C, (VITAMIN C) 1000 MG tablet Take 1,000 mg by mouth 2 (two) times daily.       azelastine (ASTELIN) 137 mcg (0.1 %) nasal spray 1 spray (137 mcg total) by Nasal route 2 (two) times daily. 30 mL 12    azelastine (OPTIVAR) 0.05 % ophthalmic solution Place 1 drop into both eyes. As needed      B-complex with vitamin C (Z-BEC OR EQUIV) tablet Take 1 tablet by mouth once daily.      Bifidobacterium infantis (ALIGN ORAL) Take by mouth once daily.      biotin 10,000 mcg Cap Take 1 tablet by mouth every evening.       blood sugar diagnostic [employer] preferred meter and strips. Check daily 90 each 3    blood-glucose meter kit Use as instructed. Insurance preferred meter. 1 each 0    chlorpheniramine (CHLOR-TRIMETON) 4 mg tablet Take 1 tablet (4 mg total) by mouth every 8 (eight) hours while awake. 90 tablet 12    cranberry 500 mg Cap Take 1 capsule by mouth every evening.      diclofenac sodium (VOLTAREN) 1 % Gel Apply 2 grams topically once daily. (Patient taking differently: Apply 2 g topically as needed (arthritis).) 1 Tube 6    diltiaZEM (CARDIZEM CD) 120 MG Cp24 TAKE 1 CAPSULE (120 MG TOTAL) BY MOUTH EVERY EVENING. 90 capsule 4    EPINEPHrine (EPIPEN) 0.3 mg/0.3 mL AtIn Inject 1 each into the muscle as needed.   3     erythromycin with ethanoL (THERAMYCIN) 2 % external solution Aaa bid prn 60 mL 3    esomeprazole (NEXIUM) 40 MG capsule Take 1 capsule (40 mg total) by mouth before breakfast. 90 capsule 3    fexofenadine (ALLEGRA) 180 MG tablet Take 180 mg by mouth once daily.       fish oil-omega-3 fatty acids 300-1,000 mg capsule Take 2 g by mouth once daily.      FLUAD QUAD 2022-23,65Y UP,,PF, 60 mcg (15 mcg x 4)/0.5 mL Syrg       fluticasone propionate (FLONASE) 50 mcg/actuation nasal spray 2 sprays (100 mcg total) by Each Nostril route once daily. 16 g 11    fluticasone-umeclidin-vilanter (TRELEGY ELLIPTA) 200-62.5-25 mcg inhaler Inhale 1 puff into the lungs once daily. 60 each 0    fluticasone-umeclidin-vilanter (TRELEGY ELLIPTA) 200-62.5-25 mcg inhaler Inhale 1 puff into the lungs once daily. 60 each 0    gabapentin (NEURONTIN) 600 MG tablet Take 1 tablet (600 mg total) by mouth 3 (three) times daily. 540 tablet 3    guaifenesin-codeine 100-10 mg/5 ml (GUAIFENESIN AC)  mg/5 mL syrup Take 5 mLs by mouth 3 (three) times daily as needed for Cough. 473 mL 2    hydrOXYzine HCL (ATARAX) 10 MG Tab Take 1 tablet (10 mg total) by mouth 3 (three) times daily as needed. 30 tablet 3    immun glob G,IgG,-pro-IgA 0-50 (HIZENTRA) 1 gram/5 mL (20 %) Soln Inject 55 mLs (11 g total) into the skin once a week. 220 mL 12    inhalation spacing device Use as directed for inhalation. 1 each 0    lancets (ACCU-CHEK SOFTCLIX LANCETS) Misc Use to check blood sugar daily. 100 each 11    metFORMIN (GLUCOPHAGE-XR) 500 MG ER 24hr tablet Take 1 tablet (500 mg total) by mouth 2 (two) times daily with meals. 180 tablet 3    mometasone 0.1% (ELOCON) 0.1 % cream aaa bid x 1-2 weeks prn bug bites 45 g 1    montelukast (SINGULAIR) 10 mg tablet Take 1 tablet (10 mg total) by mouth every evening. 90 tablet 3    mucus clearing device Cecy 1 Device by Misc.(Non-Drug; Combo Route) route 2 (two) times daily. 1 Device 0    multivitamin capsule Take 1 capsule by  mouth once daily.       mupirocin (BACTROBAN) 2 % ointment Apply topically 3 (three) times daily. 15 g 0    ondansetron (ZOFRAN-ODT) 4 MG TbDL Take 1 tablet (4 mg total) by mouth every 8 (eight) hours as needed. 20 tablet 0    potassium chloride SA (K-DUR,KLOR-CON) 20 MEQ tablet Take 1 tablet (20 mEq total) by mouth once daily. 90 tablet 4    pravastatin (PRAVACHOL) 80 MG tablet TAKE 1 TABLET EVERY DAY 90 tablet 4    predniSONE (DELTASONE) 10 MG tablet 1 pill for 3-5 days repeat as needed for chest tightness. 20 tablet 0    psyllium husk (METAMUCIL ORAL) Take by mouth.      semaglutide (OZEMPIC) 0.25 mg or 0.5 mg(2 mg/1.5 mL) pen injector Inject 0.25 mg into the skin every 7 days for 30 days, THEN 0.5 mg every 7 days. 5.25 mL 4    SIMETHICONE (GAS-X ORAL) Take 1 capsule by mouth as needed (gas relief).       tezepelumab-ekko (TEZSPIRE) 210 mg/1.91 mL (110 mg/mL) Syrg Inject 1.91 mLs (210 mg total) into the skin every 28 days. 1.91 mL 11    valsartan-hydrochlorothiazide (DIOVAN-HCT) 160-12.5 mg per tablet Take 1 tablet by mouth once daily. 90 tablet 3    vitamin D3-folic acid 5,000 unit- 1 mg Tab Take 1 tablet by mouth once daily.       cloNIDine (CATAPRES) 0.1 MG tablet Take 1 tablet (0.1 mg total) by mouth 3 (three) times daily as needed (PRN SBP > 165 mmHg). 90 tablet 6    levalbuterol (XOPENEX) 1.25 mg/3 mL nebulizer solution Take 3 mLs (1.25 mg total) by nebulization every 4 (four) hours as needed for Wheezing or Shortness of Breath. Rescue 1 Box 11     No current facility-administered medications on file prior to visit.           Review of patient's allergies indicates:   Allergen Reactions    Sulfa (sulfonamide antibiotics) Hives    Naproxen Other (See Comments)     Other reaction(s): RT sided numbness      Albuterol Itching and Palpitations     Nebulizer only. Can use inhaler           ROS:   General ROS: negative for - chills, fever or night sweats  Respiratory ROS: no cough, shortness of breath, or  wheezing  Cardiovascular ROS: no chest pain or dyspnea on exertion  Musculoskeletal ROS: positive for - right heel pain  negative for - joint pain  Neurological ROS: negative  Dermatological ROS: negative        EXAM:   There were no vitals filed for this visit.     General:  alert and oriented x 3 and in no apparent distress.     Vascular:   Dorsalis Pedis:  present     Posterior Tibial:  present  Capillary refill time:  2 seconds  Temperature of toes warm to touch  Edema: Present bilaterally      Neurological:           Dermatological:       Musculoskeletal:    Palpable pain to the achilles tendon right, tightness to the Achilles tendon with ROM right, palpable exostosis inferior lateral aspect of calcaneus right, and achilles tendon thickening right      XRAY:  Historical x-rays reviewed with retrocalcaneal exostosis from 2017          ASSESSMENT/ PLAN:    Problem List Items Addressed This Visit    None  Visit Diagnoses       Gastrocnemius equinus, right    -  Primary    Relevant Orders    Ambulatory referral/consult to Physical/Occupational Therapy    Postcalcaneal bursitis of right foot        Relevant Orders    Ambulatory referral/consult to Physical/Occupational Therapy    Achilles tendinitis of right lower extremity        Relevant Orders    Ambulatory referral/consult to Physical/Occupational Therapy              I counseled the patient on patient's conditions, their implications and medical management.   Notes reviewed from the other providers as well as historical x-rays.  I think the patient's dealing with pain from the retrocalcaneal exostosis and bursitis of the retrocalcaneal bursa.  However, I would not recommend a steroid injection to this area as there is a risk of Achilles tendon rupture with an injection to this area.  I offered the patient many different treatment options but she seems somewhat resistant to most of them.  We will try another attempt at physical therapy but this time without  water therapy and she can try iontophoresis.  F/u 6 weeks    Porfirio Galloway, SAIRA    No follow-ups on file.

## 2023-05-26 ENCOUNTER — PATIENT MESSAGE (OUTPATIENT)
Dept: FAMILY MEDICINE | Facility: CLINIC | Age: 73
End: 2023-05-26

## 2023-05-26 ENCOUNTER — OFFICE VISIT (OUTPATIENT)
Dept: FAMILY MEDICINE | Facility: CLINIC | Age: 73
End: 2023-05-26
Payer: MEDICARE

## 2023-05-26 VITALS
HEIGHT: 64 IN | WEIGHT: 209.19 LBS | DIASTOLIC BLOOD PRESSURE: 72 MMHG | OXYGEN SATURATION: 95 % | SYSTOLIC BLOOD PRESSURE: 112 MMHG | HEART RATE: 81 BPM | BODY MASS INDEX: 35.71 KG/M2

## 2023-05-26 DIAGNOSIS — R30.0 DYSURIA: ICD-10-CM

## 2023-05-26 DIAGNOSIS — N30.01 ACUTE CYSTITIS WITH HEMATURIA: Primary | ICD-10-CM

## 2023-05-26 LAB
BACTERIA #/AREA URNS HPF: ABNORMAL /HPF
BILIRUB UR QL STRIP: NEGATIVE
CLARITY UR: ABNORMAL
COLOR UR: YELLOW
GLUCOSE UR QL STRIP: NEGATIVE
HGB UR QL STRIP: ABNORMAL
KETONES UR QL STRIP: NEGATIVE
LEUKOCYTE ESTERASE UR QL STRIP: NEGATIVE
MICROSCOPIC COMMENT: ABNORMAL
NITRITE UR QL STRIP: POSITIVE
PH UR STRIP: 6 [PH] (ref 5–8)
PROT UR QL STRIP: NEGATIVE
RBC #/AREA URNS HPF: 1 /HPF (ref 0–4)
SP GR UR STRIP: 1.01 (ref 1–1.03)
URN SPEC COLLECT METH UR: ABNORMAL
WBC #/AREA URNS HPF: 12 /HPF (ref 0–5)

## 2023-05-26 PROCEDURE — 1159F MED LIST DOCD IN RCRD: CPT | Mod: CPTII,S$GLB,, | Performed by: PHYSICIAN ASSISTANT

## 2023-05-26 PROCEDURE — 1157F ADVNC CARE PLAN IN RCRD: CPT | Mod: CPTII,S$GLB,, | Performed by: PHYSICIAN ASSISTANT

## 2023-05-26 PROCEDURE — 99999 PR PBB SHADOW E&M-EST. PATIENT-LVL IV: ICD-10-PCS | Mod: PBBFAC,,, | Performed by: PHYSICIAN ASSISTANT

## 2023-05-26 PROCEDURE — 3288F PR FALLS RISK ASSESSMENT DOCUMENTED: ICD-10-PCS | Mod: CPTII,S$GLB,, | Performed by: PHYSICIAN ASSISTANT

## 2023-05-26 PROCEDURE — 99213 PR OFFICE/OUTPT VISIT, EST, LEVL III, 20-29 MIN: ICD-10-PCS | Mod: S$GLB,,, | Performed by: PHYSICIAN ASSISTANT

## 2023-05-26 PROCEDURE — 99999 PR PBB SHADOW E&M-EST. PATIENT-LVL IV: CPT | Mod: PBBFAC,,, | Performed by: PHYSICIAN ASSISTANT

## 2023-05-26 PROCEDURE — 1157F PR ADVANCE CARE PLAN OR EQUIV PRESENT IN MEDICAL RECORD: ICD-10-PCS | Mod: CPTII,S$GLB,, | Performed by: PHYSICIAN ASSISTANT

## 2023-05-26 PROCEDURE — 1159F PR MEDICATION LIST DOCUMENTED IN MEDICAL RECORD: ICD-10-PCS | Mod: CPTII,S$GLB,, | Performed by: PHYSICIAN ASSISTANT

## 2023-05-26 PROCEDURE — 3066F PR DOCUMENTATION OF TREATMENT FOR NEPHROPATHY: ICD-10-PCS | Mod: CPTII,S$GLB,, | Performed by: PHYSICIAN ASSISTANT

## 2023-05-26 PROCEDURE — 3008F BODY MASS INDEX DOCD: CPT | Mod: CPTII,S$GLB,, | Performed by: PHYSICIAN ASSISTANT

## 2023-05-26 PROCEDURE — 3066F NEPHROPATHY DOC TX: CPT | Mod: CPTII,S$GLB,, | Performed by: PHYSICIAN ASSISTANT

## 2023-05-26 PROCEDURE — 87086 URINE CULTURE/COLONY COUNT: CPT | Performed by: PHYSICIAN ASSISTANT

## 2023-05-26 PROCEDURE — 1125F AMNT PAIN NOTED PAIN PRSNT: CPT | Mod: CPTII,S$GLB,, | Performed by: PHYSICIAN ASSISTANT

## 2023-05-26 PROCEDURE — 3074F PR MOST RECENT SYSTOLIC BLOOD PRESSURE < 130 MM HG: ICD-10-PCS | Mod: CPTII,S$GLB,, | Performed by: PHYSICIAN ASSISTANT

## 2023-05-26 PROCEDURE — 3044F PR MOST RECENT HEMOGLOBIN A1C LEVEL <7.0%: ICD-10-PCS | Mod: CPTII,S$GLB,, | Performed by: PHYSICIAN ASSISTANT

## 2023-05-26 PROCEDURE — 81000 URINALYSIS NONAUTO W/SCOPE: CPT | Mod: PO | Performed by: PHYSICIAN ASSISTANT

## 2023-05-26 PROCEDURE — 3288F FALL RISK ASSESSMENT DOCD: CPT | Mod: CPTII,S$GLB,, | Performed by: PHYSICIAN ASSISTANT

## 2023-05-26 PROCEDURE — 1125F PR PAIN SEVERITY QUANTIFIED, PAIN PRESENT: ICD-10-PCS | Mod: CPTII,S$GLB,, | Performed by: PHYSICIAN ASSISTANT

## 2023-05-26 PROCEDURE — 3044F HG A1C LEVEL LT 7.0%: CPT | Mod: CPTII,S$GLB,, | Performed by: PHYSICIAN ASSISTANT

## 2023-05-26 PROCEDURE — 1101F PT FALLS ASSESS-DOCD LE1/YR: CPT | Mod: CPTII,S$GLB,, | Performed by: PHYSICIAN ASSISTANT

## 2023-05-26 PROCEDURE — 87186 SC STD MICRODIL/AGAR DIL: CPT | Performed by: PHYSICIAN ASSISTANT

## 2023-05-26 PROCEDURE — 87088 URINE BACTERIA CULTURE: CPT | Performed by: PHYSICIAN ASSISTANT

## 2023-05-26 PROCEDURE — 87077 CULTURE AEROBIC IDENTIFY: CPT | Performed by: PHYSICIAN ASSISTANT

## 2023-05-26 PROCEDURE — 99213 OFFICE O/P EST LOW 20 MIN: CPT | Mod: S$GLB,,, | Performed by: PHYSICIAN ASSISTANT

## 2023-05-26 PROCEDURE — 3078F PR MOST RECENT DIASTOLIC BLOOD PRESSURE < 80 MM HG: ICD-10-PCS | Mod: CPTII,S$GLB,, | Performed by: PHYSICIAN ASSISTANT

## 2023-05-26 PROCEDURE — 3060F PR POS MICROALBUMINURIA RESULT DOCUMENTED/REVIEW: ICD-10-PCS | Mod: CPTII,S$GLB,, | Performed by: PHYSICIAN ASSISTANT

## 2023-05-26 PROCEDURE — 3078F DIAST BP <80 MM HG: CPT | Mod: CPTII,S$GLB,, | Performed by: PHYSICIAN ASSISTANT

## 2023-05-26 PROCEDURE — 3074F SYST BP LT 130 MM HG: CPT | Mod: CPTII,S$GLB,, | Performed by: PHYSICIAN ASSISTANT

## 2023-05-26 PROCEDURE — 3060F POS MICROALBUMINURIA REV: CPT | Mod: CPTII,S$GLB,, | Performed by: PHYSICIAN ASSISTANT

## 2023-05-26 PROCEDURE — 3008F PR BODY MASS INDEX (BMI) DOCUMENTED: ICD-10-PCS | Mod: CPTII,S$GLB,, | Performed by: PHYSICIAN ASSISTANT

## 2023-05-26 PROCEDURE — 1101F PR PT FALLS ASSESS DOC 0-1 FALLS W/OUT INJ PAST YR: ICD-10-PCS | Mod: CPTII,S$GLB,, | Performed by: PHYSICIAN ASSISTANT

## 2023-05-26 RX ORDER — AMOXICILLIN AND CLAVULANATE POTASSIUM 875; 125 MG/1; MG/1
1 TABLET, FILM COATED ORAL EVERY 12 HOURS
Qty: 10 TABLET | Refills: 0 | Status: SHIPPED | OUTPATIENT
Start: 2023-05-26 | End: 2023-05-31

## 2023-05-26 NOTE — PROGRESS NOTES
"Subjective:      Patient ID: Cornelia Delatorre is a 72 y.o. female.    Chief Complaint: Urinary Tract Infection (Frequent urination /Back pain /)    Patient is new to me.    HPI  Patient has PMH of peripheral neuropathy, Type 2 DM, HTN, dyslipidemia, CVID, bariatric surgery, hypothyroidism, GERD, and SOHA treated with BiPAP.  History of UTIs, no kidney stones.    Patient reports urinary frequency and low back pain for the past two nights.  Not taking any medication for this.  Denies fever, abdominal pain, dysuria, hematuria.    Review of Systems   Constitutional:  Negative for appetite change, chills and fever.   Respiratory:  Positive for shortness of breath (baseline).    Cardiovascular:  Negative for chest pain.   Gastrointestinal:  Negative for abdominal pain, blood in stool, constipation, diarrhea, nausea and vomiting.   Genitourinary:  Positive for flank pain and frequency. Negative for dysuria and hematuria.       Objective:   /72   Pulse 81   Ht 5' 4" (1.626 m)   Wt 94.9 kg (209 lb 3.5 oz)   SpO2 95%   BMI 35.91 kg/m²     Physical Exam  Vitals reviewed.   Constitutional:       Appearance: Normal appearance. She is well-developed.   HENT:      Head: Normocephalic and atraumatic.      Right Ear: External ear normal.      Left Ear: External ear normal.   Eyes:      Conjunctiva/sclera: Conjunctivae normal.   Cardiovascular:      Rate and Rhythm: Normal rate and regular rhythm.      Heart sounds: Normal heart sounds. No murmur heard.    No friction rub. No gallop.   Pulmonary:      Effort: Pulmonary effort is normal. No respiratory distress.      Breath sounds: Normal breath sounds. No wheezing, rhonchi or rales.   Abdominal:      Palpations: Abdomen is soft.      Tenderness: There is no abdominal tenderness. There is no right CVA tenderness or left CVA tenderness.   Musculoskeletal:         General: Normal range of motion.   Skin:     General: Skin is warm and dry.      Findings: No rash. "   Neurological:      General: No focal deficit present.      Mental Status: She is alert and oriented to person, place, and time.   Psychiatric:         Mood and Affect: Mood normal.         Behavior: Behavior normal.         Judgment: Judgment normal.     Assessment:      1. Acute cystitis with hematuria    2. Dysuria       Plan:   1. Acute cystitis with hematuria  - amoxicillin-clavulanate 875-125mg (AUGMENTIN) 875-125 mg per tablet; Take 1 tablet by mouth every 12 (twelve) hours. for 5 days  Dispense: 10 tablet; Refill: 0    2. Dysuria  - Urinalysis, Reflex to Urine Culture Urine, Clean Catch  - Urinalysis Microscopic  - Urine culture    Follow up as needed.  Patient agreed with plan and expressed understanding.    Thank you for allowing me to serve you,

## 2023-05-29 ENCOUNTER — TELEPHONE (OUTPATIENT)
Dept: PULMONOLOGY | Facility: CLINIC | Age: 73
End: 2023-05-29
Payer: MEDICARE

## 2023-05-29 DIAGNOSIS — J45.50 SEVERE PERSISTENT ASTHMA WITHOUT COMPLICATION: Primary | ICD-10-CM

## 2023-05-29 LAB — BACTERIA UR CULT: ABNORMAL

## 2023-05-29 NOTE — TELEPHONE ENCOUNTER
I have reached out to patient to get her scheduled for injection for Buy and Bill.  I left voicemail.

## 2023-05-29 NOTE — TELEPHONE ENCOUNTER
----- Message from Daysi Llanos NP sent at 5/29/2023  9:24 AM CDT -----  Regarding: FW: Tezspire  Would you put this pt and the buy bill schedule, I will order the medication and BCN order if you will attach for me.     Daysi Llanos  ----- Message -----  From: Kesha Dos Santos PharmD  Sent: 5/25/2023  10:47 AM CDT  To: Daysi Llanos NP, Aniyah Lan MA, #  Subject: Tezspire                                         Good morning Daysi and Staff,    We have been in contact with the [employer] program hub and [employer] [facility] to come up with a plan for our Tezspire pts for copay assistance. The patient assistance program is only currently available for syringes, the auto-injector is not ready on the program yet. The syringes are only indicated for in-office use where they must send to the office and not the patient, where as the autoinjector is for home use.     With our [facility] policy, we cannot accept any medication from outside pharmacy to the office, therefore PAP will not be an option for this patient. The hub and [employer] [facility] is currently working on expediting the auto-injector to be available for PAP, however, it is taking longer than we would hope.     For this patient, her copay for Tezspire on pharmacy benefit is $782.18 for syringe and $854.32 for autoinjector. Processing Tezspire under medical via Buy&Bill may be a cheaper option for the patient if you would like to continue with Tezspire. If not, please consider changing to a different therapy that does have PAP availabilities to assist the patient. [facility] will stand by for your decision. Please let us know if [employer] can assist.    Thank you,  Kesha Dos Santos, Lane  Clinical Pharmacist, Financial Assistance Team  [facility]  (P): [phone]  (F): [phone]

## 2023-06-05 ENCOUNTER — CLINICAL SUPPORT (OUTPATIENT)
Dept: CARDIOLOGY | Facility: HOSPITAL | Age: 73
End: 2023-06-05
Attending: INTERNAL MEDICINE
Payer: MEDICARE

## 2023-06-05 VITALS — HEIGHT: 64 IN | WEIGHT: 209 LBS | HEART RATE: 61 BPM | BODY MASS INDEX: 35.68 KG/M2

## 2023-06-05 DIAGNOSIS — E78.5 DYSLIPIDEMIA: ICD-10-CM

## 2023-06-05 DIAGNOSIS — I70.0 ATHEROSCLEROSIS OF ABDOMINAL AORTA: ICD-10-CM

## 2023-06-05 DIAGNOSIS — I10 ESSENTIAL HYPERTENSION: ICD-10-CM

## 2023-06-05 DIAGNOSIS — R06.09 DOE (DYSPNEA ON EXERTION): ICD-10-CM

## 2023-06-05 DIAGNOSIS — I65.23 BILATERAL CAROTID ARTERY STENOSIS: ICD-10-CM

## 2023-06-05 LAB
ASCENDING AORTA: 2.58 CM
AV INDEX (PROSTH): 0.72
AV MEAN GRADIENT: 5 MMHG
AV PEAK GRADIENT: 10 MMHG
AV VALVE AREA: 2.36 CM2
AV VELOCITY RATIO: 0.68
BSA FOR ECHO PROCEDURE: 2.07 M2
CV ECHO LV RWT: 0.38 CM
DOP CALC AO PEAK VEL: 1.55 M/S
DOP CALC AO VTI: 38.3 CM
DOP CALC LVOT AREA: 3.3 CM2
DOP CALC LVOT DIAMETER: 2.05 CM
DOP CALC LVOT PEAK VEL: 1.05 M/S
DOP CALC LVOT STROKE VOLUME: 90.39 CM3
DOP CALCLVOT PEAK VEL VTI: 27.4 CM
E WAVE DECELERATION TIME: 228.93 MSEC
E/A RATIO: 0.9
E/E' RATIO: 10.15 M/S
ECHO LV POSTERIOR WALL: 0.97 CM (ref 0.6–1.1)
EJECTION FRACTION: 60 %
FRACTIONAL SHORTENING: 38 % (ref 28–44)
INTERVENTRICULAR SEPTUM: 0.96 CM (ref 0.6–1.1)
IVRT: 151.28 MSEC
LA MAJOR: 5.13 CM
LA MINOR: 5.04 CM
LA WIDTH: 3 CM
LEFT ATRIUM SIZE: 3.11 CM
LEFT ATRIUM VOLUME INDEX: 20.3 ML/M2
LEFT ATRIUM VOLUME: 40.32 CM3
LEFT INTERNAL DIMENSION IN SYSTOLE: 3.14 CM (ref 2.1–4)
LEFT VENTRICLE DIASTOLIC VOLUME INDEX: 60.76 ML/M2
LEFT VENTRICLE DIASTOLIC VOLUME: 120.92 ML
LEFT VENTRICLE MASS INDEX: 89 G/M2
LEFT VENTRICLE SYSTOLIC VOLUME INDEX: 19.7 ML/M2
LEFT VENTRICLE SYSTOLIC VOLUME: 39.23 ML
LEFT VENTRICULAR INTERNAL DIMENSION IN DIASTOLE: 5.05 CM (ref 3.5–6)
LEFT VENTRICULAR MASS: 176.39 G
LV LATERAL E/E' RATIO: 11 M/S
LV SEPTAL E/E' RATIO: 9.43 M/S
LVOT MG: 2.17 MMHG
LVOT MV: 0.66 CM/S
MV PEAK A VEL: 0.73 M/S
MV PEAK E VEL: 0.66 M/S
MV STENOSIS PRESSURE HALF TIME: 66.39 MS
MV VALVE AREA P 1/2 METHOD: 3.31 CM2
PISA TR MAX VEL: 2.56 M/S
PULM VEIN S/D RATIO: 1.72
PV PEAK D VEL: 0.39 M/S
PV PEAK S VEL: 0.67 M/S
RA MAJOR: 4.59 CM
RA PRESSURE: 3 MMHG
RA WIDTH: 3.5 CM
RIGHT VENTRICULAR END-DIASTOLIC DIMENSION: 2.8 CM
RIGHT VENTRICULAR LENGTH IN DIASTOLE (APICAL 4-CHAMBER VIEW): 5.31 CM
RV MID DIAMA: 2.4 CM
RV TISSUE DOPPLER FREE WALL SYSTOLIC VELOCITY 1 (APICAL 4 CHAMBER VIEW): 0.01 CM/S
SINUS: 2.46 CM
STJ: 2.11 CM
TDI LATERAL: 0.06 M/S
TDI SEPTAL: 0.07 M/S
TDI: 0.07 M/S
TR MAX PG: 26 MMHG
TRICUSPID ANNULAR PLANE SYSTOLIC EXCURSION: 2.29 CM
TV REST PULMONARY ARTERY PRESSURE: 29 MMHG

## 2023-06-05 PROCEDURE — 93306 TTE W/DOPPLER COMPLETE: CPT | Mod: PO

## 2023-06-05 PROCEDURE — 93306 TTE W/DOPPLER COMPLETE: CPT | Mod: 26,,, | Performed by: INTERNAL MEDICINE

## 2023-06-05 PROCEDURE — 93306 ECHO (CUPID ONLY): ICD-10-PCS | Mod: 26,,, | Performed by: INTERNAL MEDICINE

## 2023-06-07 ENCOUNTER — TELEPHONE (OUTPATIENT)
Dept: UROLOGY | Facility: CLINIC | Age: 73
End: 2023-06-07
Payer: MEDICARE

## 2023-06-07 ENCOUNTER — TELEPHONE (OUTPATIENT)
Dept: FAMILY MEDICINE | Facility: CLINIC | Age: 73
End: 2023-06-07
Payer: MEDICARE

## 2023-06-07 NOTE — TELEPHONE ENCOUNTER
----- Message from Allison Solomon sent at 6/7/2023 11:52 AM CDT -----  Regarding: Appointment  Contact: [phone]  Calling to schedule an appointment for recurring uti as soon as possible.. Please call and schedule.

## 2023-06-07 NOTE — TELEPHONE ENCOUNTER
----- Message from Allison Solomon sent at 6/7/2023 11:50 AM CDT -----  Regarding: Appointment  Contact: [phone]  Calling to schedule an appointment for recurring uti as soon as possible.. Please call and schedule.

## 2023-06-09 ENCOUNTER — CLINICAL SUPPORT (OUTPATIENT)
Dept: PULMONOLOGY | Facility: CLINIC | Age: 73
End: 2023-06-09
Payer: MEDICARE

## 2023-06-09 DIAGNOSIS — J45.50 SEVERE PERSISTENT ASTHMA WITHOUT COMPLICATION: Primary | ICD-10-CM

## 2023-06-09 PROCEDURE — 96372 THER/PROPH/DIAG INJ SC/IM: CPT | Mod: S$GLB,,, | Performed by: NURSE PRACTITIONER

## 2023-06-09 PROCEDURE — 96372 PR INJECTION,THERAP/PROPH/DIAG2ST, IM OR SUBCUT: ICD-10-PCS | Mod: S$GLB,,, | Performed by: NURSE PRACTITIONER

## 2023-06-09 NOTE — PROGRESS NOTES
Pt has arrived for her Tezspire injection.  Injection given into her right arm SQ.  No swelling, redness, or reaction noted to the injection site.  Pt tolerated well.  Next appt scheduled for the patient.

## 2023-06-16 DIAGNOSIS — J45.50 SEVERE PERSISTENT ASTHMA WITHOUT COMPLICATION: Primary | ICD-10-CM

## 2023-06-28 ENCOUNTER — OFFICE VISIT (OUTPATIENT)
Dept: ORTHOPEDICS | Facility: CLINIC | Age: 73
End: 2023-06-28
Payer: MEDICARE

## 2023-06-28 VITALS — WEIGHT: 209 LBS | BODY MASS INDEX: 35.68 KG/M2 | RESPIRATION RATE: 18 BRPM | HEIGHT: 64 IN

## 2023-06-28 DIAGNOSIS — M17.10 ARTHRITIS OF KNEE: Primary | ICD-10-CM

## 2023-06-28 PROCEDURE — 99999 PR PBB SHADOW E&M-EST. PATIENT-LVL IV: ICD-10-PCS | Mod: PBBFAC,,, | Performed by: ORTHOPAEDIC SURGERY

## 2023-06-28 PROCEDURE — 99213 OFFICE O/P EST LOW 20 MIN: CPT | Mod: 25,S$GLB,, | Performed by: ORTHOPAEDIC SURGERY

## 2023-06-28 PROCEDURE — 3288F PR FALLS RISK ASSESSMENT DOCUMENTED: ICD-10-PCS | Mod: CPTII,S$GLB,, | Performed by: ORTHOPAEDIC SURGERY

## 2023-06-28 PROCEDURE — 20610 LARGE JOINT ASPIRATION/INJECTION: R KNEE: ICD-10-PCS | Mod: RT,S$GLB,, | Performed by: ORTHOPAEDIC SURGERY

## 2023-06-28 PROCEDURE — 1101F PT FALLS ASSESS-DOCD LE1/YR: CPT | Mod: CPTII,S$GLB,, | Performed by: ORTHOPAEDIC SURGERY

## 2023-06-28 PROCEDURE — 3044F PR MOST RECENT HEMOGLOBIN A1C LEVEL <7.0%: ICD-10-PCS | Mod: CPTII,S$GLB,, | Performed by: ORTHOPAEDIC SURGERY

## 2023-06-28 PROCEDURE — 20610 DRAIN/INJ JOINT/BURSA W/O US: CPT | Mod: RT,S$GLB,, | Performed by: ORTHOPAEDIC SURGERY

## 2023-06-28 PROCEDURE — 1157F PR ADVANCE CARE PLAN OR EQUIV PRESENT IN MEDICAL RECORD: ICD-10-PCS | Mod: CPTII,S$GLB,, | Performed by: ORTHOPAEDIC SURGERY

## 2023-06-28 PROCEDURE — 3060F POS MICROALBUMINURIA REV: CPT | Mod: CPTII,S$GLB,, | Performed by: ORTHOPAEDIC SURGERY

## 2023-06-28 PROCEDURE — 3008F PR BODY MASS INDEX (BMI) DOCUMENTED: ICD-10-PCS | Mod: CPTII,S$GLB,, | Performed by: ORTHOPAEDIC SURGERY

## 2023-06-28 PROCEDURE — 1101F PR PT FALLS ASSESS DOC 0-1 FALLS W/OUT INJ PAST YR: ICD-10-PCS | Mod: CPTII,S$GLB,, | Performed by: ORTHOPAEDIC SURGERY

## 2023-06-28 PROCEDURE — 3288F FALL RISK ASSESSMENT DOCD: CPT | Mod: CPTII,S$GLB,, | Performed by: ORTHOPAEDIC SURGERY

## 2023-06-28 PROCEDURE — 3066F NEPHROPATHY DOC TX: CPT | Mod: CPTII,S$GLB,, | Performed by: ORTHOPAEDIC SURGERY

## 2023-06-28 PROCEDURE — 99999 PR PBB SHADOW E&M-EST. PATIENT-LVL IV: CPT | Mod: PBBFAC,,, | Performed by: ORTHOPAEDIC SURGERY

## 2023-06-28 PROCEDURE — 3066F PR DOCUMENTATION OF TREATMENT FOR NEPHROPATHY: ICD-10-PCS | Mod: CPTII,S$GLB,, | Performed by: ORTHOPAEDIC SURGERY

## 2023-06-28 PROCEDURE — 1159F PR MEDICATION LIST DOCUMENTED IN MEDICAL RECORD: ICD-10-PCS | Mod: CPTII,S$GLB,, | Performed by: ORTHOPAEDIC SURGERY

## 2023-06-28 PROCEDURE — 3060F PR POS MICROALBUMINURIA RESULT DOCUMENTED/REVIEW: ICD-10-PCS | Mod: CPTII,S$GLB,, | Performed by: ORTHOPAEDIC SURGERY

## 2023-06-28 PROCEDURE — 99213 PR OFFICE/OUTPT VISIT, EST, LEVL III, 20-29 MIN: ICD-10-PCS | Mod: 25,S$GLB,, | Performed by: ORTHOPAEDIC SURGERY

## 2023-06-28 PROCEDURE — 1159F MED LIST DOCD IN RCRD: CPT | Mod: CPTII,S$GLB,, | Performed by: ORTHOPAEDIC SURGERY

## 2023-06-28 PROCEDURE — 1125F PR PAIN SEVERITY QUANTIFIED, PAIN PRESENT: ICD-10-PCS | Mod: CPTII,S$GLB,, | Performed by: ORTHOPAEDIC SURGERY

## 2023-06-28 PROCEDURE — 1125F AMNT PAIN NOTED PAIN PRSNT: CPT | Mod: CPTII,S$GLB,, | Performed by: ORTHOPAEDIC SURGERY

## 2023-06-28 PROCEDURE — 3044F HG A1C LEVEL LT 7.0%: CPT | Mod: CPTII,S$GLB,, | Performed by: ORTHOPAEDIC SURGERY

## 2023-06-28 PROCEDURE — 3008F BODY MASS INDEX DOCD: CPT | Mod: CPTII,S$GLB,, | Performed by: ORTHOPAEDIC SURGERY

## 2023-06-28 PROCEDURE — 1157F ADVNC CARE PLAN IN RCRD: CPT | Mod: CPTII,S$GLB,, | Performed by: ORTHOPAEDIC SURGERY

## 2023-06-28 RX ORDER — TRIAMCINOLONE ACETONIDE 40 MG/ML
40 INJECTION, SUSPENSION INTRA-ARTICULAR; INTRAMUSCULAR
Status: DISCONTINUED | OUTPATIENT
Start: 2023-06-28 | End: 2023-06-28 | Stop reason: HOSPADM

## 2023-06-28 RX ADMIN — TRIAMCINOLONE ACETONIDE 40 MG: 40 INJECTION, SUSPENSION INTRA-ARTICULAR; INTRAMUSCULAR at 11:06

## 2023-06-28 NOTE — PROGRESS NOTES
Past Medical History:   Diagnosis Date    Allergy     Arthritis     Asthma     Cancer     skin cancer to right hand    Cataract     Chronic fatigue 01/24/2017    CVID (common variable immunodeficiency) 03/07/2019    Diabetes mellitus     type2    KLINE (dyspnea on exertion) 01/24/2017    Dyslipidemia 06/25/2019    Encounter for blood transfusion     Essential hypertension 12/29/2011    GERD (gastroesophageal reflux disease)     Headache(784.0)     History of lumpectomy of both breasts     1992 negative    Hyperlipidemia 07/15/2015    Hypertension     Hypothyroidism     Neuropathy     Obesity     Obesity     Postmenopausal HRT (hormone replacement therapy)     Rash     Rosacea     Sleep apnea     on bipap    Snoring     Squamous cell carcinoma of skin     left forearm     UTI (urinary tract infection)     Wears glasses        Past Surgical History:   Procedure Laterality Date    ADENOIDECTOMY      APPENDECTOMY      BLADDER SUSPENSION      BREAST BIOPSY Bilateral 1992    bilateral benign excisional biopsies    BUNIONECTOMY  10/17/14    right, still has discomfort    CATARACT EXTRACTION W/  INTRAOCULAR LENS IMPLANT Bilateral     CHOLECYSTECTOMY  08/02/2017    COLONOSCOPY      CYSTOSCOPY      EPIDURAL STEROID INJECTION INTO LUMBAR SPINE N/A 8/14/2019    Procedure: Injection-steroid-epidural-lumbar L5/S1;  Surgeon: Fredi Rojas MD;  Location: [facility] OR;  Service: Pain Management;  Laterality: N/A;    EPIDURAL STEROID INJECTION INTO LUMBAR SPINE N/A 5/3/2021    Procedure: Injection-steroid-epidural-lumbar L5/S1 to the Left;  Surgeon: Fredi Rojas MD;  Location: [facility] OR;  Service: Pain Management;  Laterality: N/A;    EPIDURAL STEROID INJECTION INTO LUMBAR SPINE N/A 9/24/2021    Procedure: Injection-steroid-epidural-lumbar L5/S1;  Surgeon: Fredi Rojas MD;  Location: [facility] OR;  Service: Pain Management;  Laterality: N/A;    EPIDURAL STEROID INJECTION INTO LUMBAR SPINE N/A 2/21/2022    Procedure:  Injection-steroid-epidural-lumbar L5/S1;  Surgeon: Fredi Rojas MD;  Location: [facility] OR;  Service: Pain Management;  Laterality: N/A;    ESOPHAGEAL DILATION N/A 6/11/2021    Procedure: DILATION, ESOPHAGUS;  Surgeon: Jake Sheriff MD;  Location: [facility] ENDO;  Service: Endoscopy;  Laterality: N/A;    ESOPHAGOGASTRODUODENOSCOPY      dilated esophagus    ESOPHAGOGASTRODUODENOSCOPY N/A 6/11/2021    Procedure: EGD (ESOPHAGOGASTRODUODENOSCOPY);  Surgeon: Jake Sheriff MD;  Location: [facility] ENDO;  Service: Endoscopy;  Laterality: N/A;    GASTRIC BYPASS      2011    HYSTERECTOMY  1980    interstim bladder  2009    10/14/14 new battery    OOPHORECTOMY  1980    REPLACEMENT OF SACRAL NERVE STIMULATOR  2/18/2020    Procedure: REPLACEMENT, NEUROSTIMULATOR, SACRAL;  Surgeon: Mary Jane Jarvis MD;  Location: [facility] OR [address] [place] [address];  Service: Urology;;    REVISION OF PROCEDURE INVOLVING SACRAL NEUROSTIMULATOR DEVICE Right 2/18/2020    Procedure: REVISION, NEUROSTIMULATOR, SACRAL/ battery replacement;  Surgeon: Mary Jane Jarvis MD;  Location: [facility] OR [address] [place] [address];  Service: Urology;  Laterality: Right;  1hr/ rep contacted/ (CONNIE)    SKIN CANCER EXCISION      left hand    TONSILLECTOMY      WISDOM TOOTH EXTRACTION         Current Outpatient Medications   Medication Sig    albuterol (PROVENTIL/VENTOLIN HFA) 90 mcg/actuation inhaler Inhale 2 puffs into the lungs every 4 (four) hours as needed. 2 puffs every 4 hours as needed for cough, wheeze, or shortness of breath    alpha lipoic acid 600 mg Cap Take 600 mg by mouth 2 (two) times daily.    ascorbic acid, vitamin C, (VITAMIN C) 1000 MG tablet Take 1,000 mg by mouth 2 (two) times daily.     azelastine (ASTELIN) 137 mcg (0.1 %) nasal spray 1 spray (137 mcg total) by Nasal route 2 (two) times daily.    azelastine (OPTIVAR) 0.05 % ophthalmic solution Place 1 drop into both eyes. As needed    B-complex with vitamin C (Z-BEC OR EQUIV) tablet Take 1 tablet by mouth once daily.     Bifidobacterium infantis (ALIGN ORAL) Take by mouth once daily.    biotin 10,000 mcg Cap Take 1 tablet by mouth every evening.     blood sugar diagnostic [employer] preferred meter and strips. Check daily    blood-glucose meter kit Use as instructed. Insurance preferred meter.    chlorpheniramine (CHLOR-TRIMETON) 4 mg tablet Take 1 tablet (4 mg total) by mouth every 8 (eight) hours while awake.    cloNIDine (CATAPRES) 0.1 MG tablet Take 1 tablet (0.1 mg total) by mouth 3 (three) times daily as needed (PRN SBP > 165 mmHg).    cranberry 500 mg Cap Take 1 capsule by mouth every evening.    diclofenac sodium (VOLTAREN) 1 % Gel Apply 2 grams topically once daily. (Patient taking differently: Apply 2 g topically as needed (arthritis).)    diltiaZEM (CARDIZEM CD) 120 MG Cp24 TAKE 1 CAPSULE (120 MG TOTAL) BY MOUTH EVERY EVENING.    EPINEPHrine (EPIPEN) 0.3 mg/0.3 mL AtIn Inject 1 each into the muscle as needed.     erythromycin with ethanoL (THERAMYCIN) 2 % external solution Aaa bid prn    esomeprazole (NEXIUM) 40 MG capsule Take 1 capsule (40 mg total) by mouth before breakfast.    fexofenadine (ALLEGRA) 180 MG tablet Take 180 mg by mouth once daily.     fish oil-omega-3 fatty acids 300-1,000 mg capsule Take 2 g by mouth once daily.    fluticasone propionate (FLONASE) 50 mcg/actuation nasal spray 2 sprays (100 mcg total) by Each Nostril route once daily.    fluticasone-umeclidin-vilanter (TRELEGY ELLIPTA) 200-62.5-25 mcg inhaler Inhale 1 puff into the lungs once daily.    fluticasone-umeclidin-vilanter (TRELEGY ELLIPTA) 200-62.5-25 mcg inhaler Inhale 1 puff into the lungs once daily.    gabapentin (NEURONTIN) 600 MG tablet Take 1 tablet (600 mg total) by mouth 3 (three) times daily.    guaifenesin-codeine 100-10 mg/5 ml (GUAIFENESIN AC)  mg/5 mL syrup Take 5 mLs by mouth 3 (three) times daily as needed for Cough.    hydrOXYzine HCL (ATARAX) 10 MG Tab Take 1 tablet (10 mg total) by mouth 3 (three) times daily as  needed.    immun glob G,IgG,-pro-IgA 0-50 (HIZENTRA) 1 gram/5 mL (20 %) Soln Inject 55 mLs (11 g total) into the skin once a week.    inhalation spacing device Use as directed for inhalation.    lancets (ACCU-CHEK SOFTCLIX LANCETS) Misc Use to check blood sugar daily.    levalbuterol (XOPENEX) 1.25 mg/3 mL nebulizer solution Take 3 mLs (1.25 mg total) by nebulization every 4 (four) hours as needed for Wheezing or Shortness of Breath. Rescue    metFORMIN (GLUCOPHAGE-XR) 500 MG ER 24hr tablet Take 1 tablet (500 mg total) by mouth 2 (two) times daily with meals.    mometasone 0.1% (ELOCON) 0.1 % cream aaa bid x 1-2 weeks prn bug bites    montelukast (SINGULAIR) 10 mg tablet Take 1 tablet (10 mg total) by mouth every evening.    mucus clearing device Cecy 1 Device by Misc.(Non-Drug; Combo Route) route 2 (two) times daily.    multivitamin capsule Take 1 capsule by mouth once daily.     mupirocin (BACTROBAN) 2 % ointment Apply topically 3 (three) times daily.    ondansetron (ZOFRAN-ODT) 4 MG TbDL Take 1 tablet (4 mg total) by mouth every 8 (eight) hours as needed.    potassium chloride SA (K-DUR,KLOR-CON) 20 MEQ tablet Take 1 tablet (20 mEq total) by mouth once daily.    pravastatin (PRAVACHOL) 80 MG tablet TAKE 1 TABLET EVERY DAY    predniSONE (DELTASONE) 10 MG tablet 1 pill for 3-5 days repeat as needed for chest tightness.    psyllium husk (METAMUCIL ORAL) Take by mouth.    semaglutide (OZEMPIC) 0.25 mg or 0.5 mg(2 mg/1.5 mL) pen injector Inject 0.25 mg into the skin every 7 days for 30 days, THEN 0.5 mg every 7 days.    SIMETHICONE (GAS-X ORAL) Take 1 capsule by mouth as needed (gas relief).     tezepelumab-ekko (TEZSPIRE) 210 mg/1.91 mL (110 mg/mL) Syrg Inject 1.91 mLs (210 mg total) into the skin every 28 days.    valsartan-hydrochlorothiazide (DIOVAN-HCT) 160-12.5 mg per tablet Take 1 tablet by mouth once daily.    vitamin D3-folic acid 5,000 unit- 1 mg Tab Take 1 tablet by mouth once daily.      Current  Facility-Administered Medications   Medication    [START ON 7/14/2023] tezepelumab-ekko Syrg 210 mg       Review of patient's allergies indicates:   Allergen Reactions    Sulfa (sulfonamide antibiotics) Hives    Naproxen Other (See Comments)     Other reaction(s): RT sided numbness         Family History   Problem Relation Age of Onset    Breast cancer Mother 50    Breast cancer Paternal Aunt         40's    Cervical cancer Paternal Grandmother     Ovarian cancer Paternal Grandmother     Cervical cancer Paternal Cousin     Ovarian cancer Paternal Cousin     Diabetes Other     Hypertension Other     Hypothyroidism Other     Allergic rhinitis Neg Hx     Allergies Neg Hx     Angioedema Neg Hx     Asthma Neg Hx     Atopy Neg Hx     Eczema Neg Hx     Immunodeficiency Neg Hx     Rhinitis Neg Hx     Urticaria Neg Hx     Uterine cancer Neg Hx        Social History     Socioeconomic History    Marital status:    Tobacco Use    Smoking status: Never    Smokeless tobacco: Never   Substance and Sexual Activity    Alcohol use: Not Currently    Drug use: No    Sexual activity: Not Currently     Social Determinants of Health     Financial Resource Strain: Medium Risk    Difficulty of Paying Living Expenses: Somewhat hard   Food Insecurity: Food Insecurity Present    Worried About Running Out of Food in the Last Year: Sometimes true    Ran Out of Food in the Last Year: Sometimes true   Transportation Needs: No Transportation Needs    Lack of Transportation (Medical): No    Lack of Transportation (Non-Medical): No   Physical Activity: Unknown    Days of Exercise per Week: Patient refused    Minutes of Exercise per Session: 0 min   Stress: No Stress Concern Present    Feeling of Stress : Only a little   Social Connections: Moderately Integrated    Frequency of Communication with Friends and Family: More than three times a week    Frequency of Social Gatherings with Friends and Family: Once a week    Attends Rastafarian Services:  More than 4 times per year    Active Member of Clubs or Organizations: Yes    Attends Club or Organization Meetings: 1 to 4 times per year    Marital Status:    Housing Stability: Unknown    Unable to Pay for Housing in the Last Year: Patient refused    Number of Places Lived in the Last Year: 1    Unstable Housing in the Last Year: No       Chief Complaint:   No chief complaint on file.      History of present illness:  72-year-old female comes in with right knee pain.   Patient has had an issue with arthritis in the past and last treatment was several months ago.  Has had viscosupplementation previously with good success.  Currently rates the pain is a 6/10.  Pain over the medial aspect of the right knee.  Knee is been hurting over last 4-6 weeks.  Last treatment was a cortisone injection back in December.    Physical Examination:    Vital Signs:    There were no vitals filed for this visit.      There is no height or weight on file to calculate BMI.    This a well-developed, well nourished patient in no acute distress.  They are alert and oriented and cooperative to examination.  Pt. walks without an antalgic gait.      Examination of the right knee shows no rashes or erythema. There are no masses ecchymosis or effusion. Patient has full range of motion from 0-130°. Patient is nontender to palpation over lateral joint line and moderately tender to palpation over the medial joint line.  Knee is stable to varus and valgus stress. 5 out of 5 motor strength. Palpable distal pulses. Intact light touch sensation. Negative Patellofemoral crepitus      X-rays:  X-rays of the right knee is  reviewed which show moderate to severe medial joint space narrowing.  More moderate medial narrowing of the left knee     Assessment::  Moderate to severe right varus knee arthritis    Plan:  I reviewed the findings with her today.  I recommended cortisone injection calm her knee down.  We talked about repeating the  viscosupplementation if it continues to bother her.  Talked about knee replacement as well.    This note was created using [employer] voice recognition software that occasionally misinterpreted phrases or words.    Consult note is delivered via Epic messaging service.

## 2023-06-28 NOTE — PROCEDURES
Large Joint Aspiration/Injection: R knee    Date/Time: 6/28/2023 11:15 AM  Performed by: Robbin Edmond MD  Authorized by: Robbin Edmond MD     Consent Done?:  Yes (Verbal)  Indications:  Pain  Site marked: the procedure site was marked    Timeout: prior to procedure the correct patient, procedure, and site was verified    Local anesthetic: Ropivicaine.  Anesthetic total (ml):  3      Details:  Needle Size:  20 G  Approach:  Anterolateral  Location:  Knee  Site:  R knee  Medications:  40 mg triamcinolone acetonide 40 mg/mL  Patient tolerance:  Patient tolerated the procedure well with no immediate complications

## 2023-07-06 ENCOUNTER — PATIENT MESSAGE (OUTPATIENT)
Dept: PULMONOLOGY | Facility: CLINIC | Age: 73
End: 2023-07-06

## 2023-07-06 ENCOUNTER — OFFICE VISIT (OUTPATIENT)
Dept: PULMONOLOGY | Facility: CLINIC | Age: 73
End: 2023-07-06
Payer: MEDICARE

## 2023-07-06 VITALS
DIASTOLIC BLOOD PRESSURE: 76 MMHG | HEIGHT: 64 IN | OXYGEN SATURATION: 98 % | BODY MASS INDEX: 35.7 KG/M2 | SYSTOLIC BLOOD PRESSURE: 118 MMHG | WEIGHT: 209.13 LBS | HEART RATE: 61 BPM

## 2023-07-06 DIAGNOSIS — H66.91 RIGHT OTITIS MEDIA, UNSPECIFIED OTITIS MEDIA TYPE: ICD-10-CM

## 2023-07-06 DIAGNOSIS — J45.51 SEVERE PERSISTENT ASTHMA WITH ACUTE EXACERBATION: Primary | ICD-10-CM

## 2023-07-06 PROCEDURE — 1159F MED LIST DOCD IN RCRD: CPT | Mod: HCNC,CPTII,S$GLB, | Performed by: NURSE PRACTITIONER

## 2023-07-06 PROCEDURE — 3044F HG A1C LEVEL LT 7.0%: CPT | Mod: HCNC,CPTII,S$GLB, | Performed by: NURSE PRACTITIONER

## 2023-07-06 PROCEDURE — 3078F DIAST BP <80 MM HG: CPT | Mod: HCNC,CPTII,S$GLB, | Performed by: NURSE PRACTITIONER

## 2023-07-06 PROCEDURE — 96372 THER/PROPH/DIAG INJ SC/IM: CPT | Mod: HCNC,S$GLB,, | Performed by: NURSE PRACTITIONER

## 2023-07-06 PROCEDURE — 1160F RVW MEDS BY RX/DR IN RCRD: CPT | Mod: HCNC,CPTII,S$GLB, | Performed by: NURSE PRACTITIONER

## 2023-07-06 PROCEDURE — 1159F PR MEDICATION LIST DOCUMENTED IN MEDICAL RECORD: ICD-10-PCS | Mod: HCNC,CPTII,S$GLB, | Performed by: NURSE PRACTITIONER

## 2023-07-06 PROCEDURE — 1126F PR PAIN SEVERITY QUANTIFIED, NO PAIN PRESENT: ICD-10-PCS | Mod: HCNC,CPTII,S$GLB, | Performed by: NURSE PRACTITIONER

## 2023-07-06 PROCEDURE — 99214 OFFICE O/P EST MOD 30 MIN: CPT | Mod: HCNC,25,S$GLB, | Performed by: NURSE PRACTITIONER

## 2023-07-06 PROCEDURE — 96372 PR INJECTION,THERAP/PROPH/DIAG2ST, IM OR SUBCUT: ICD-10-PCS | Mod: HCNC,S$GLB,, | Performed by: NURSE PRACTITIONER

## 2023-07-06 PROCEDURE — 1126F AMNT PAIN NOTED NONE PRSNT: CPT | Mod: HCNC,CPTII,S$GLB, | Performed by: NURSE PRACTITIONER

## 2023-07-06 PROCEDURE — 1157F ADVNC CARE PLAN IN RCRD: CPT | Mod: HCNC,CPTII,S$GLB, | Performed by: NURSE PRACTITIONER

## 2023-07-06 PROCEDURE — 99999 PR PBB SHADOW E&M-EST. PATIENT-LVL V: ICD-10-PCS | Mod: PBBFAC,,, | Performed by: NURSE PRACTITIONER

## 2023-07-06 PROCEDURE — 99214 PR OFFICE/OUTPT VISIT, EST, LEVL IV, 30-39 MIN: ICD-10-PCS | Mod: HCNC,25,S$GLB, | Performed by: NURSE PRACTITIONER

## 2023-07-06 PROCEDURE — 3288F FALL RISK ASSESSMENT DOCD: CPT | Mod: HCNC,CPTII,S$GLB, | Performed by: NURSE PRACTITIONER

## 2023-07-06 PROCEDURE — 3074F SYST BP LT 130 MM HG: CPT | Mod: HCNC,CPTII,S$GLB, | Performed by: NURSE PRACTITIONER

## 2023-07-06 PROCEDURE — 3074F PR MOST RECENT SYSTOLIC BLOOD PRESSURE < 130 MM HG: ICD-10-PCS | Mod: HCNC,CPTII,S$GLB, | Performed by: NURSE PRACTITIONER

## 2023-07-06 PROCEDURE — 1157F PR ADVANCE CARE PLAN OR EQUIV PRESENT IN MEDICAL RECORD: ICD-10-PCS | Mod: HCNC,CPTII,S$GLB, | Performed by: NURSE PRACTITIONER

## 2023-07-06 PROCEDURE — 3066F NEPHROPATHY DOC TX: CPT | Mod: HCNC,CPTII,S$GLB, | Performed by: NURSE PRACTITIONER

## 2023-07-06 PROCEDURE — 1101F PR PT FALLS ASSESS DOC 0-1 FALLS W/OUT INJ PAST YR: ICD-10-PCS | Mod: HCNC,CPTII,S$GLB, | Performed by: NURSE PRACTITIONER

## 2023-07-06 PROCEDURE — 1101F PT FALLS ASSESS-DOCD LE1/YR: CPT | Mod: HCNC,CPTII,S$GLB, | Performed by: NURSE PRACTITIONER

## 2023-07-06 PROCEDURE — 1160F PR REVIEW ALL MEDS BY PRESCRIBER/CLIN PHARMACIST DOCUMENTED: ICD-10-PCS | Mod: HCNC,CPTII,S$GLB, | Performed by: NURSE PRACTITIONER

## 2023-07-06 PROCEDURE — 99999 PR PBB SHADOW E&M-EST. PATIENT-LVL V: CPT | Mod: PBBFAC,,, | Performed by: NURSE PRACTITIONER

## 2023-07-06 PROCEDURE — 3066F PR DOCUMENTATION OF TREATMENT FOR NEPHROPATHY: ICD-10-PCS | Mod: HCNC,CPTII,S$GLB, | Performed by: NURSE PRACTITIONER

## 2023-07-06 PROCEDURE — 3008F PR BODY MASS INDEX (BMI) DOCUMENTED: ICD-10-PCS | Mod: HCNC,CPTII,S$GLB, | Performed by: NURSE PRACTITIONER

## 2023-07-06 PROCEDURE — 3288F PR FALLS RISK ASSESSMENT DOCUMENTED: ICD-10-PCS | Mod: HCNC,CPTII,S$GLB, | Performed by: NURSE PRACTITIONER

## 2023-07-06 PROCEDURE — 3078F PR MOST RECENT DIASTOLIC BLOOD PRESSURE < 80 MM HG: ICD-10-PCS | Mod: HCNC,CPTII,S$GLB, | Performed by: NURSE PRACTITIONER

## 2023-07-06 PROCEDURE — 3060F POS MICROALBUMINURIA REV: CPT | Mod: HCNC,CPTII,S$GLB, | Performed by: NURSE PRACTITIONER

## 2023-07-06 PROCEDURE — 3060F PR POS MICROALBUMINURIA RESULT DOCUMENTED/REVIEW: ICD-10-PCS | Mod: HCNC,CPTII,S$GLB, | Performed by: NURSE PRACTITIONER

## 2023-07-06 PROCEDURE — 3008F BODY MASS INDEX DOCD: CPT | Mod: HCNC,CPTII,S$GLB, | Performed by: NURSE PRACTITIONER

## 2023-07-06 PROCEDURE — 3044F PR MOST RECENT HEMOGLOBIN A1C LEVEL <7.0%: ICD-10-PCS | Mod: HCNC,CPTII,S$GLB, | Performed by: NURSE PRACTITIONER

## 2023-07-06 RX ORDER — DOXYCYCLINE HYCLATE 100 MG
100 TABLET ORAL 2 TIMES DAILY
Qty: 20 TABLET | Refills: 0 | Status: SHIPPED | OUTPATIENT
Start: 2023-07-06 | End: 2023-07-16

## 2023-07-06 RX ORDER — PREDNISONE 10 MG/1
TABLET ORAL
Qty: 36 TABLET | Refills: 0 | Status: SHIPPED | OUTPATIENT
Start: 2023-07-06 | End: 2023-09-08 | Stop reason: SDUPTHER

## 2023-07-06 RX ORDER — FLUTICASONE FUROATE, UMECLIDINIUM BROMIDE AND VILANTEROL TRIFENATATE 200; 62.5; 25 UG/1; UG/1; UG/1
1 POWDER RESPIRATORY (INHALATION) DAILY
Qty: 60 EACH | Refills: 11 | Status: SHIPPED | OUTPATIENT
Start: 2023-07-06 | End: 2023-10-17 | Stop reason: SDUPTHER

## 2023-07-06 RX ORDER — ONDANSETRON 4 MG/1
4 TABLET, ORALLY DISINTEGRATING ORAL EVERY 8 HOURS PRN
Qty: 20 TABLET | Refills: 0 | Status: SHIPPED | OUTPATIENT
Start: 2023-07-06 | End: 2024-01-08 | Stop reason: SDUPTHER

## 2023-07-06 RX ORDER — BETAMETHASONE SODIUM PHOSPHATE AND BETAMETHASONE ACETATE 3; 3 MG/ML; MG/ML
6 INJECTION, SUSPENSION INTRA-ARTICULAR; INTRALESIONAL; INTRAMUSCULAR; SOFT TISSUE
Status: COMPLETED | OUTPATIENT
Start: 2023-07-06 | End: 2023-07-06

## 2023-07-06 RX ADMIN — BETAMETHASONE SODIUM PHOSPHATE AND BETAMETHASONE ACETATE 6 MG: 3; 3 INJECTION, SUSPENSION INTRA-ARTICULAR; INTRALESIONAL; INTRAMUSCULAR; SOFT TISSUE at 10:07

## 2023-07-06 NOTE — PROGRESS NOTES
7/6/2023    Cornelia Delatorre  Office f/u    Chief Complaint   Patient presents with    Follow-up    Cough    Shortness of Breath     HPI:  7/6/2023- complaint of recurrent shortness of breath, worsened in past 3 weeks started, started prednisone 10 mg with no benefit. Using nebulizer or albuterol inhaler every 4 hours.   Associated with chest tightness and cough. Productive clear mucous. Having nocturnal coughing fits most nights.   Complaint of nausea and dizziness,   On BIPAP nightly, no issues with sleep apnea.     5/1/2023- complaint of wheeze, onset 1 week, took 5 days of prednisone to control, states slightly improved.   Required systemic steroids in January and again in February for Asthma control  Associated with wheeze and chest tightness, SOB is worsening with time, difficult to walk in stores with out stopping to rest.   Has new motor in BIPAP, wearing nightly with benefit.     1/9/2023- states feeling good, noticed worsening sinus drainage and productive cough when laying down at night for past 3 weeks, improves with albuterol inhaler or nebulizer. On Trelegy daily.  No recent steroid therapy. Steroid injection in knee 6 weeks Prior.  Wearing BIPAP nightly with benefit. Daytime fatigue is resolved. No complaint of morning headaches. Waiting for recalled BIPAP machine.     7/11/2022- states feeling like her self again after COVID 19 in May, Cough is dramatically improved, non productive cough, few days week.   Fatigue continues, has to take daily naps. Wearing BiPAP nightly.    On Trelegy daily with benefit but cost is high, in donut whole.       5/25/2022- Dx COVID 19 7 days prior tx with monoclonal Antibiodies two days prior. Feeling generalized weakness, diaphoresis, fatigue  Using Trelegy and nebulizer daily,  beats on her back   Cough - severe, complaint, worse at night, productive thick yellow mucous. Associated with late evening wheeze.   Lost sense of taste and smell.       4/13/2022-  SOB- stable, worse in late evenings, taking prednisone 10 mg when needed, not used in past 2 months; worse with exertion, improves with rest and albuterol rescue.   On BIPAP with benefit, no daytime drowsiness.   Liked Trelegy. Still on Adviar until prescription runs out, has 2 weeks left.   Skin biopsy 5 weeks prior, left lower chin site irritated by wearing face mask. No edema or erythema,     1/12/2022- SOB worsening, on Advair daily and levalbuterol 2-3x daily for past 4 weeks, has noticed worsening of shortness of breath and sinus complaints.   Admitted to hospital for GI virus,   Noticed prednisone is needed occasionally at 10 mg notices improvement    On BiPAP nightly with benefit. No daytime fatigue, no issues with nasal pillow mask.     7/12/21- complaint of daily sinus drainage, has to clear throat repeatedly for past 2 months. Currently on Flonase nasal spray, ipratropium nasal spray, Singulair pills, zyrtec, corcindin daily with no improvement.   SOB- stable, required steroid therapy for 3 days twice in past 3 months. Only with exertion, worse in late evenings, improves with rest,  Using nebulizer 2x daily due to feeling of heaviness in chest.   Cough- mild, non productive, associated with post nasal drip from allergies. Nocturnal arousals 2x weekly for past 2 weeks,   Wearing CPAP nighty with benefit.     4/12/21- spent last 6 months in home, was able to get COVID 19 vaccine Mederna. Allergies stable, few spells improve with nasal spray noticed improvement after getting air purifier.   Noticed spacer device helping with hoarse voice or oral thrush like before,   SOB- unchanged, daily complaint, varies with severity, worse with exertion, improves with rest and albuterol rescue. Has to sit up to breath when first laying  Down, not able to breath when laying on left side.   Occasional cough- productive, clear mucous, using nebulizer 3 x weekly.   CPAP for [facility] doing well,     10/13/2020- Allergies bother  her every few days, currently on daily Singulair, zyrtec, flonase, astelin nasal spray, having sneezing fits.   Complaint of clear sinus drainage,   Hoarse voice has resolved after stopping symbicort.   Cough- improved,   SOB- doing well, occasional episodes of not being able to catch breath, did not use nebulizer   Wearing CPAP nightly for [facility], states benefits greatly    7/13/2020- Hoarse voice, loss of voice, productive cough, lost voice July 2019 tx by ENT who treated for acute laryngitis with diflucan; Recurrent problem. Hoarse voice return 4 weeks prior, ENT doctor recommends changing Asthma medications tx her with diflucan, doxycycline and prednisone for 5 days, states has improved.   SOB- worse when wearing face mask, currently on hizentra therapy,     5/4/2020- uses symbicort daily, uses rescue 2/wk - some anxiety, getting igg rx. Having more sinus problems with head colds- 3 in last 4 wks.  No prednisone- hizentra working well.        Nov 4, 2019- having mucous sensation, notes some sob relaxing - maybe worse night.  No nasal stuffy.  Uses cpap.  Uses symbicort bid, no rescue.  Mucous is clear.  No abx for sinus or lungs.  Getting immune infusions weekly - pt senses doing better since starting in May.  Patient Instructions   Breathing off and on short breath - need to use more albuterol to make clear where breathing problems come from    Mucous production may decrease if airways controlled- albuterol should help clearance.    Rescue inhaler may resolve any breathing problems- should use intermittently if any symptoms.    May use augmentin for sinus/lung problems- not optimal for all uti's       May 6,2019-due to get immune infusion next 2 days.  No prednisone, needs monlukast. abx stopped wks ago- mucous cleared.    Patient Instructions   Use antibiotic daily til immune therapy done a wk - then as needed like every one else  Immune therapy may keep you stable - better than antibiotics.   Use symbicort  regular.  Lungs may stabilize.  Use prednisone if needed.  Lung  Nodules are stable for 2 yrs and no follow up needed.  Call if problems- hope all will be better yet.  March 7, 19-exacerbated in late Jan- cleared in feb (vague).  Now ill again yellow and gray mucous (culture last Jan was nl yvonne).  Sl intermittent wheezes since last wk.  Sees Dr Herrera in [place]- gets allergy rx but declined to get now.  Pt has cvid by  igg and igm levels low and pneumococcal antibodies to 8/14 pneumococcal titers. Uses symbicort and singulair routinely.  Took prednisone completed 4 days ago- uses prednisone monthly- was able to skip December  .  Discussed with patient above for education the following:      Gastric by pass may cause malabsorption, protein levels have been too low and may affect ig levels-- somewhat.   Need to get follow up with dietician to assure adequate protein intake/levels.   Antibiotic may stabilize infections with common variable immune deficiency- azithromycin daily once cultures submitted.   Use augmentin if infection worsens.   Acapella/chest clapping help clear mucous, vest likewise if bronchiectasis.  Will not treat cause.   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  Use codeine to suppress mild coughing.   Need good immune system and no airway/asthma problems and good hygiene of bronchial tubes (no chronic infections) to prevent illness.     Lungs measured near normal with good response to bronchial medications 2017   Need ct to follow up and cultures to assure infection controlled.      Jan 28/2019- Feeling bad onset 2 weeks, took prednisone taper x 6 days and antibiotic Augmentin week prior, States no improvement. Cough- worse at night, no nocturnal arousals, takes cough suppressant syrup before bed. Productive yellow/brown color.   Nebulized albuterol 4x daily. States no fever.  Has started allergy injections waiting for  insurance clearance for IGG therapy.   Discussed with patient above for education the following:    CT chest for February to monitor and assess for bronchiectasis, need diagnosis for insurance to cover vest therapy  Have  continue to percuss back with hand.   Recommend purchasing Acepella device to help clear lungs of excess mucous, attaches to nebulizer device  Continue Nebulizer treatments 4x daily as needed.  Chest x-ray now to evaluate for pneumonia  Blood work at hospital   Will yeison to see results of sputum culture and x-ray before repeating antibiotic  Need to return sputum to clinic with in 4 hours of collecting  Fluconazole pills for oral thrush, this is a recurrent problem related to your weak immune system and use of inhaled steroids. Continue to rinse mouth out after using symbicort but problem will improve after starting immune replacement therapy.    oct 30,   2018-- had one day fever followed by cough/wheeze illness that lingered a wk. Pt seen by NP   [place] 2x, [place].  [place] like dying- only one day fever.  Violent cough.  Nebulizer ppt itch and palpitations- ok  On xopenex now.  Prednisone 40x3, 20 x 3 ,  augmentin cleared.  Now back to normal.      May 14, 2018- had spell /exacerbation with one round prednisone. Breathing good.  Follow-up in about 1 year (around 5/14/2019), or if symptoms worsen or fail to improve.   Discussed with patient above for education the following:     Check blood count and pneumonia vaccine response after last vaccine 4/27/2018   Use azithromycin for any lung/sinus infection- immune weakness likely   Asthma stable- use prednisone and singulair.   Use allergra and singulair and astelin/flonase   Lung nodule in lung still - Navigational bronchoscopy might find?  - will at least re check in a year.     Call if needed, re check next yr.    darlin 15, 2018--1 st illness in yr or so with cough and rattles, no flu.  Appetite ok.  Ill x wk, cough and wheeze and sob  and noct worse, resp rx helps a bit.  Hasn't been using  Albuterol   Follow-up in about 6 months (around 7/15/2018).   Discussed with patient above for education the following:      tamiflu if flu.   Need to use albuterol for any lung symptoms.  Should get cough  Better controlled.      Prednisone 20 mg 3 for 3 , taper may be needed.  Give shot today, 1 daily for 3 may be enough with albuterol.   You had high eosinophils-- if asthma becomes more active - special therapy may help??     prevnar 13 would be good - should be off prednisone.  Immune system is sl weak  Protection only to 7.14 pneumonia germs.    oct 19, 2017- has scratchy throat, some sinus drip, has nightly sensation throat issues, post beryl and carpel tunnel surg.  No sinus lung infections.  Breathing and cough good.  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 Had pos tb gold.  June 21, 2017- had good [place] trip, tapered symbicort with some increase sensation of lung problems so maintained full dose. No infections.  No chest symptoms on symbicort.   April 24, feels a lot better with min cough, vague sob going left side down at night.  Going to [place] 2 weeks.  Uses symbicort and good results.  March 16, cough better, but comes and goes,  No great help with steroids.  Submitted 3 sputums.  Had pneumovax march last yr.  Breathing better. Got symbicort.   Had pneumonia vaccine last yr    3/8/2017 HPI: had onset cough Hernan illness, got dizzy few days with diarrhea and cough. Cough clear sm amt mucous. Did have 101 temp one day, fatigue, no muscle aches.  Appetite decreased.  Illnesses lingered 2 weeks but cough never remitted- has improved with inhahler use last 15 days.    Had gastric 2011 bypass with with continued diarrhea intially resolved. No regurg or reflux or swallow problems.   symbicort nearly  Resolved.    Sinuses ok.  No pets.  Never smoker.    Appetite good, feels well now.    headcolds to chest since  childhood, nocturnal ??not recalled.  Had cats in past but got rid in 2003 or so.     The chief compliant  problem varies with instablilty at time    PFSH:  Past Medical History:   Diagnosis Date    Allergy     Arthritis     Asthma     Cancer     skin cancer to right hand    Cataract     Chronic fatigue 01/24/2017    CVID (common variable immunodeficiency) 03/07/2019    Diabetes mellitus     type2    KLINE (dyspnea on exertion) 01/24/2017    Dyslipidemia 06/25/2019    Encounter for blood transfusion     Essential hypertension 12/29/2011    GERD (gastroesophageal reflux disease)     Headache(784.0)     History of lumpectomy of both breasts     1992 negative    Hyperlipidemia 07/15/2015    Hypertension     Hypothyroidism     Neuropathy     Obesity     Obesity     Postmenopausal HRT (hormone replacement therapy)     Rash     Rosacea     Sleep apnea     on bipap    Snoring     Squamous cell carcinoma of skin     left forearm     UTI (urinary tract infection)     Wears glasses          Past Surgical History:   Procedure Laterality Date    ADENOIDECTOMY      APPENDECTOMY      BLADDER SUSPENSION      BREAST BIOPSY Bilateral 1992    bilateral benign excisional biopsies    BUNIONECTOMY  10/17/14    right, still has discomfort    CATARACT EXTRACTION W/  INTRAOCULAR LENS IMPLANT Bilateral     CHOLECYSTECTOMY  08/02/2017    COLONOSCOPY      CYSTOSCOPY      EPIDURAL STEROID INJECTION INTO LUMBAR SPINE N/A 8/14/2019    Procedure: Injection-steroid-epidural-lumbar L5/S1;  Surgeon: Fredi Rojas MD;  Location: [facility] OR;  Service: Pain Management;  Laterality: N/A;    EPIDURAL STEROID INJECTION INTO LUMBAR SPINE N/A 5/3/2021    Procedure: Injection-steroid-epidural-lumbar L5/S1 to the Left;  Surgeon: Fredi Rojas MD;  Location: [facility] OR;  Service: Pain Management;  Laterality: N/A;    EPIDURAL STEROID INJECTION INTO LUMBAR SPINE N/A 9/24/2021    Procedure: Injection-steroid-epidural-lumbar L5/S1;  Surgeon: Fredi Rojas MD;   Location: [facility] OR;  Service: Pain Management;  Laterality: N/A;    EPIDURAL STEROID INJECTION INTO LUMBAR SPINE N/A 2/21/2022    Procedure: Injection-steroid-epidural-lumbar L5/S1;  Surgeon: Fredi Rojas MD;  Location: [facility] OR;  Service: Pain Management;  Laterality: N/A;    ESOPHAGEAL DILATION N/A 6/11/2021    Procedure: DILATION, ESOPHAGUS;  Surgeon: Jake Sheriff MD;  Location: [facility];  Service: Endoscopy;  Laterality: N/A;    ESOPHAGOGASTRODUODENOSCOPY      dilated esophagus    ESOPHAGOGASTRODUODENOSCOPY N/A 6/11/2021    Procedure: EGD (ESOPHAGOGASTRODUODENOSCOPY);  Surgeon: Jake Sheriff MD;  Location: [facility] ENDO;  Service: Endoscopy;  Laterality: N/A;    GASTRIC BYPASS      2011    HYSTERECTOMY  1980    interstim bladder  2009    10/14/14 new battery    OOPHORECTOMY  1980    REPLACEMENT OF SACRAL NERVE STIMULATOR  2/18/2020    Procedure: REPLACEMENT, NEUROSTIMULATOR, SACRAL;  Surgeon: Mary Jane Jarvis MD;  Location: [facility] OR [address] [place] [address];  Service: Urology;;    REVISION OF PROCEDURE INVOLVING SACRAL NEUROSTIMULATOR DEVICE Right 2/18/2020    Procedure: REVISION, NEUROSTIMULATOR, SACRAL/ battery replacement;  Surgeon: Mary Jane Jarvis MD;  Location: [facility] OR [address] [place] [address];  Service: Urology;  Laterality: Right;  1hr/ rep contacted/ (CONNIE)    SKIN CANCER EXCISION      left hand    TONSILLECTOMY      WISDOM TOOTH EXTRACTION       Social History     Tobacco Use    Smoking status: Never    Smokeless tobacco: Never   Substance Use Topics    Alcohol use: Not Currently    Drug use: No     Family History   Problem Relation Age of Onset    Breast cancer Mother 50    Breast cancer Paternal Aunt         40's    Cervical cancer Paternal Grandmother     Ovarian cancer Paternal Grandmother     Cervical cancer Paternal Cousin     Ovarian cancer Paternal Cousin     Diabetes Other     Hypertension Other     Hypothyroidism Other     Allergic rhinitis Neg Hx     Allergies Neg Hx     Angioedema Neg Hx     Asthma Neg Hx      "Atopy Neg Hx     Eczema Neg Hx     Immunodeficiency Neg Hx     Rhinitis Neg Hx     Urticaria Neg Hx     Uterine cancer Neg Hx      Review of patient's allergies indicates:   Allergen Reactions    Sulfa (sulfonamide antibiotics) Hives    Naproxen Other (See Comments)     Other reaction(s): RT sided numbness         Performance Status:The patient's activity level is functions out of house.      Review of Systems:  a review of eleven systems covering constitutional, Eye, HEENT, Psych, Respiratory, Cardiac, GI, , Musculoskeletal, Endocrine, Dermatologic was negative except for pertinent findings as listed ABOVE and below: all good,  Had dm that remitted with bypass 2011.    Positive for SOB, nasal drip,  cough, wheeze, fatigue    Exam:Comprehensive exam done. /76 (BP Location: Right arm, Patient Position: Sitting, BP Method: Medium (Automatic))   Pulse 61   Ht 5' 4" (1.626 m)   Wt 94.8 kg (209 lb 1.7 oz)   SpO2 98% Comment: on room air at rest  BMI 35.89 kg/m²   Exam included Vitals as listed, and patient's appearance and affect and alertness and mood, oral exam for yeast and hygiene and pharynx lesions and Mallapatti (M) score, neck with inspection for jvd and masses and thyroid abnormalities and lymph nodes (supraclavicular and infraclavicular nodes and axillary also examined and noted if abn), chest exam included symmetry and effort and fremitus and percussion and auscultation, cardiac exam included rhythm and gallops and murmur and rubs and jvd and edema, abdominal exam for mass and hepatosplenomegaly and tenderness and hernias and bowel sounds, Musculoskeletal exam with muscle tone and posture and mobility/gait and  strength, and skin for rashes and cyanosis and pallor and turgor, extremity for clubbing.  Findings were normal except for pertinent findings listed below:  M3,  chest is symmetric, no distress, normal percussion, normal fremitus and breath sounds bilateral faint wheeze  Right tympanic " membrane distorted    Radiographs (ct chest and cxr) reviewed: view by direct vision  i do see clear bronchiectasis,nodules are noted.  Mucoid type impaction suggested in rul ant segment.  X-Ray Chest PA And Lateral 06/09/2022 lungs clear    CT Abdomen Pelvis With Contrast 06/11/2021 lower lung bases clear    CT Chest Without Contrast  04/22/2019 stable non calcified nodules date to 2017 with no change.           Labs reviewed igm low, igg lowish, humerol titers na    Lab Results   Component Value Date    WBC 5.09 03/03/2023    RBC 4.09 03/03/2023    HGB 11.6 (L) 03/03/2023    HCT 36.6 (L) 03/03/2023    MCV 90 03/03/2023    MCH 28.4 03/03/2023    MCHC 31.7 (L) 03/03/2023    RDW 14.5 03/03/2023     03/03/2023    MPV 11.2 03/03/2023    GRAN 3.0 03/03/2023    GRAN 59.3 03/03/2023    LYMPH 1.5 03/03/2023    LYMPH 30.1 03/03/2023    MONO 0.5 03/03/2023    MONO 9.2 03/03/2023    EOS 0.0 03/03/2023    BASO 0.01 03/03/2023    EOSINOPHIL 0.6 03/03/2023    BASOPHIL 0.2 03/03/2023      Latest Reference Range & Units 01/28/19 16:17   Eos # 0.0 - 0.5 K/uL 0.2       Aerobic culture 10/27/22 CURVULARIA SPECIES      Latest Reference Range & Units 11/02/21 09:54 03/03/22 09:57 04/27/22 11:47 09/02/22 08:48   CO2 23 - 29 mmol/L 30 (H) 31 (H) 27 26   (H): Data is abnormally high    PFT  Spirometry with bronchodilator, lung volume by gas dilution, and diffusion capacity measured April 19, 2021. The FEV1 FVC ratio was 75% indicating no airflow obstruction measured by spirometry. The FEV1 was 99% predicted at 2.25 L. There was no   statistically significant improvement in spirometry following bronchodilator. Total lung capacity was within normal range at 90% of predicted. Diffusion was slightly low at 77% predicted-uncorrected for anemia if present.   Spirometry is normal. Lung volumes fall within normal range. Diffusion is slightly decreased. Clinical correlation recommended. The bronchodilator response was not significant.        Done [facility] with 10% response, otherwise nl  DATE OF PROCEDURE:  03/24/2017     1.  Spirometry reveals an FVC of 3.1 L, which is 107% predicted.  FEV1 is 2.3 L,   which is 100% predicted.  The ratio, there is preserved.  There is a positive,   but not significant bronchodilator response to both the FVC and FEV1.  Loop   contours appear with adequate effort as well.  2.  Lung volumes.  The total lung capacity is 4.4 L, which is 92% predicted.    There are no signs of gas trapping.  3.  Diffusing capacity is mild-to-moderate reduced at 68%, does improve to 96%   with alveolar volumes.     OVERALL IMPRESSION:  A normal spirometry with a robust yet statistically   insignificant bronchodilator response.  Normal lung volumes and a moderate   reduction in diffusion capacity.    CC: Jonathan Nicole M.D.      Plan:  Clinical impression is resonably certain and repeated evaluation prn +/- follow up will be needed as below.    Cornelia was seen today for follow-up, cough and shortness of breath.    Diagnoses and all orders for this visit:    Severe persistent asthma with acute exacerbation  -     betamethasone acetate-betamethasone sodium phosphate injection 6 mg  -     fluticasone-umeclidin-vilanter (TRELEGY ELLIPTA) 200-62.5-25 mcg inhaler; Inhale 1 puff into the lungs once daily.  -     ondansetron (ZOFRAN-ODT) 4 MG TbDL; Take 1 tablet (4 mg total) by mouth every 8 (eight) hours as needed (nausea).  -     doxycycline (VIBRA-TABS) 100 MG tablet; Take 1 tablet (100 mg total) by mouth 2 (two) times daily. for 10 days  -     X-Ray Chest PA And Lateral; Future  -     predniSONE (DELTASONE) 10 MG tablet; 3 pills for 3 days, 2 for 3 days, them 1 for 3 days, repeat for cough    Right otitis media, unspecified otitis media type  -     doxycycline (VIBRA-TABS) 100 MG tablet; Take 1 tablet (100 mg total) by mouth 2 (two) times daily. for 10 days        Follow up in about 3 months (around 10/6/2023), or if symptoms worsen or  fail to improve.    Discussed with patient above for education the following:      There are no Patient Instructions on file for this visit.

## 2023-07-07 ENCOUNTER — HOSPITAL ENCOUNTER (OUTPATIENT)
Dept: RADIOLOGY | Facility: HOSPITAL | Age: 73
Discharge: HOME | End: 2023-07-07
Attending: NURSE PRACTITIONER
Payer: MEDICARE

## 2023-07-07 DIAGNOSIS — J45.51 SEVERE PERSISTENT ASTHMA WITH ACUTE EXACERBATION: ICD-10-CM

## 2023-07-07 PROCEDURE — 71046 X-RAY EXAM CHEST 2 VIEWS: CPT | Mod: TC,HCNC,FY,PO

## 2023-07-07 PROCEDURE — 71046 X-RAY EXAM CHEST 2 VIEWS: CPT | Mod: 26,HCNC,, | Performed by: RADIOLOGY

## 2023-07-07 PROCEDURE — 71046 XR CHEST PA AND LATERAL: ICD-10-PCS | Mod: 26,HCNC,, | Performed by: RADIOLOGY

## 2023-07-14 ENCOUNTER — CLINICAL SUPPORT (OUTPATIENT)
Dept: PULMONOLOGY | Facility: CLINIC | Age: 73
End: 2023-07-14
Payer: MEDICARE

## 2023-07-14 DIAGNOSIS — J45.51 SEVERE PERSISTENT ASTHMA WITH ACUTE EXACERBATION: Primary | ICD-10-CM

## 2023-07-14 PROCEDURE — 96372 PR INJECTION,THERAP/PROPH/DIAG2ST, IM OR SUBCUT: ICD-10-PCS | Mod: HCNC,S$GLB,, | Performed by: NURSE PRACTITIONER

## 2023-07-14 PROCEDURE — 96372 THER/PROPH/DIAG INJ SC/IM: CPT | Mod: HCNC,S$GLB,, | Performed by: NURSE PRACTITIONER

## 2023-07-14 NOTE — PROGRESS NOTES
Pt here for her monthly Tezspire injection.  2 patient identifiers used (Name and )  Injection given into the left arm SQ.  Pt tolerated well.  No redness, swelling, bleeding, or reaction noted to the injection site.      NDC:  99834-960-23  LOT: [id number]  EXP:  2025

## 2023-07-17 ENCOUNTER — OFFICE VISIT (OUTPATIENT)
Dept: UROLOGY | Facility: CLINIC | Age: 73
End: 2023-07-17
Payer: MEDICARE

## 2023-07-17 ENCOUNTER — PATIENT MESSAGE (OUTPATIENT)
Dept: UROLOGY | Facility: CLINIC | Age: 73
End: 2023-07-17

## 2023-07-17 VITALS
DIASTOLIC BLOOD PRESSURE: 77 MMHG | HEART RATE: 69 BPM | SYSTOLIC BLOOD PRESSURE: 135 MMHG | HEIGHT: 64 IN | WEIGHT: 211.63 LBS | BODY MASS INDEX: 36.13 KG/M2

## 2023-07-17 DIAGNOSIS — N95.2 VAGINAL ATROPHY: Primary | ICD-10-CM

## 2023-07-17 DIAGNOSIS — N39.0 RECURRENT UTI: ICD-10-CM

## 2023-07-17 DIAGNOSIS — N39.41 URGE INCONTINENCE: ICD-10-CM

## 2023-07-17 PROCEDURE — 3066F PR DOCUMENTATION OF TREATMENT FOR NEPHROPATHY: ICD-10-PCS | Mod: HCNC,CPTII,S$GLB, | Performed by: UROLOGY

## 2023-07-17 PROCEDURE — 1101F PT FALLS ASSESS-DOCD LE1/YR: CPT | Mod: HCNC,CPTII,S$GLB, | Performed by: UROLOGY

## 2023-07-17 PROCEDURE — 3008F BODY MASS INDEX DOCD: CPT | Mod: HCNC,CPTII,S$GLB, | Performed by: UROLOGY

## 2023-07-17 PROCEDURE — 1159F PR MEDICATION LIST DOCUMENTED IN MEDICAL RECORD: ICD-10-PCS | Mod: HCNC,CPTII,S$GLB, | Performed by: UROLOGY

## 2023-07-17 PROCEDURE — 3060F POS MICROALBUMINURIA REV: CPT | Mod: HCNC,CPTII,S$GLB, | Performed by: UROLOGY

## 2023-07-17 PROCEDURE — 1126F AMNT PAIN NOTED NONE PRSNT: CPT | Mod: HCNC,CPTII,S$GLB, | Performed by: UROLOGY

## 2023-07-17 PROCEDURE — 3078F DIAST BP <80 MM HG: CPT | Mod: HCNC,CPTII,S$GLB, | Performed by: UROLOGY

## 2023-07-17 PROCEDURE — 3044F HG A1C LEVEL LT 7.0%: CPT | Mod: HCNC,CPTII,S$GLB, | Performed by: UROLOGY

## 2023-07-17 PROCEDURE — 3066F NEPHROPATHY DOC TX: CPT | Mod: HCNC,CPTII,S$GLB, | Performed by: UROLOGY

## 2023-07-17 PROCEDURE — 1101F PR PT FALLS ASSESS DOC 0-1 FALLS W/OUT INJ PAST YR: ICD-10-PCS | Mod: HCNC,CPTII,S$GLB, | Performed by: UROLOGY

## 2023-07-17 PROCEDURE — 3060F PR POS MICROALBUMINURIA RESULT DOCUMENTED/REVIEW: ICD-10-PCS | Mod: HCNC,CPTII,S$GLB, | Performed by: UROLOGY

## 2023-07-17 PROCEDURE — 3288F FALL RISK ASSESSMENT DOCD: CPT | Mod: HCNC,CPTII,S$GLB, | Performed by: UROLOGY

## 2023-07-17 PROCEDURE — 1126F PR PAIN SEVERITY QUANTIFIED, NO PAIN PRESENT: ICD-10-PCS | Mod: HCNC,CPTII,S$GLB, | Performed by: UROLOGY

## 2023-07-17 PROCEDURE — 99999 PR PBB SHADOW E&M-EST. PATIENT-LVL IV: ICD-10-PCS | Mod: PBBFAC,HCNC,, | Performed by: UROLOGY

## 2023-07-17 PROCEDURE — 3288F PR FALLS RISK ASSESSMENT DOCUMENTED: ICD-10-PCS | Mod: HCNC,CPTII,S$GLB, | Performed by: UROLOGY

## 2023-07-17 PROCEDURE — 3075F SYST BP GE 130 - 139MM HG: CPT | Mod: HCNC,CPTII,S$GLB, | Performed by: UROLOGY

## 2023-07-17 PROCEDURE — 99215 PR OFFICE/OUTPT VISIT, EST, LEVL V, 40-54 MIN: ICD-10-PCS | Mod: HCNC,S$GLB,, | Performed by: UROLOGY

## 2023-07-17 PROCEDURE — 99999 PR PBB SHADOW E&M-EST. PATIENT-LVL IV: CPT | Mod: PBBFAC,HCNC,, | Performed by: UROLOGY

## 2023-07-17 PROCEDURE — 1157F ADVNC CARE PLAN IN RCRD: CPT | Mod: HCNC,CPTII,S$GLB, | Performed by: UROLOGY

## 2023-07-17 PROCEDURE — 1159F MED LIST DOCD IN RCRD: CPT | Mod: HCNC,CPTII,S$GLB, | Performed by: UROLOGY

## 2023-07-17 PROCEDURE — 3044F PR MOST RECENT HEMOGLOBIN A1C LEVEL <7.0%: ICD-10-PCS | Mod: HCNC,CPTII,S$GLB, | Performed by: UROLOGY

## 2023-07-17 PROCEDURE — 99215 OFFICE O/P EST HI 40 MIN: CPT | Mod: HCNC,S$GLB,, | Performed by: UROLOGY

## 2023-07-17 PROCEDURE — 3008F PR BODY MASS INDEX (BMI) DOCUMENTED: ICD-10-PCS | Mod: HCNC,CPTII,S$GLB, | Performed by: UROLOGY

## 2023-07-17 PROCEDURE — 1157F PR ADVANCE CARE PLAN OR EQUIV PRESENT IN MEDICAL RECORD: ICD-10-PCS | Mod: HCNC,CPTII,S$GLB, | Performed by: UROLOGY

## 2023-07-17 PROCEDURE — 3075F PR MOST RECENT SYSTOLIC BLOOD PRESS GE 130-139MM HG: ICD-10-PCS | Mod: HCNC,CPTII,S$GLB, | Performed by: UROLOGY

## 2023-07-17 PROCEDURE — 3078F PR MOST RECENT DIASTOLIC BLOOD PRESSURE < 80 MM HG: ICD-10-PCS | Mod: HCNC,CPTII,S$GLB, | Performed by: UROLOGY

## 2023-07-17 RX ORDER — ESTRADIOL 2 MG/1
2 SYSTEM VAGINAL
Qty: 1 EACH | Refills: 3 | Status: SHIPPED | OUTPATIENT
Start: 2023-07-17 | End: 2023-07-18

## 2023-07-17 NOTE — PROGRESS NOTES
CHIEF COMPLAINT:    Mrs. Delatorre is a 72 y.o. female presenting for a follow up on urgency, frequency and recurrent UTI    PRESENTING ILLNESS:    Cornelia Delatorre is a 72 y.o. female who is presents with a history of overactive bladder refractory to medications.  She has an InterStim in place, battery was replaced on 2/18/2020.      She states she did not have the Smart  replaced.  She just regularly recharges the Smart  and the Blue tooth connector device.  She increased the stimulation.  She feels it in the perineum.     She states she continues to get bladder infections.  When queried about topical estrogen cream, she is not using it consistently.  She states her bowels are mostly well controlled, though she sometimes has loose bowels.  She states she drinks well over 1500 ml water a day.    REVIEW OF SYSTEMS:    Review of Systems   Constitutional: Negative.    HENT: Negative.     Eyes: Negative.    Respiratory: Negative.     Cardiovascular: Negative.    Gastrointestinal: Negative.    Genitourinary:  Positive for frequency and urgency.   Musculoskeletal:  Positive for joint pain.   Skin: Negative.    Neurological: Negative.    Endo/Heme/Allergies:         Type II diabetes   Psychiatric/Behavioral: Negative.       PATIENT HISTORY:    Past Medical History:   Diagnosis Date    Allergy     Arthritis     Asthma     Cancer     skin cancer to right hand    Cataract     Chronic fatigue 01/24/2017    CVID (common variable immunodeficiency) 03/07/2019    Diabetes mellitus     type2    KLINE (dyspnea on exertion) 01/24/2017    Dyslipidemia 06/25/2019    Encounter for blood transfusion     Essential hypertension 12/29/2011    GERD (gastroesophageal reflux disease)     Headache(784.0)     History of lumpectomy of both breasts     1992 negative    Hyperlipidemia 07/15/2015    Hypertension     Hypothyroidism     Neuropathy     Obesity     Obesity     Postmenopausal HRT (hormone replacement therapy)      Rash     Rosacea     Sleep apnea     on bipap    Snoring     Squamous cell carcinoma of skin     left forearm     UTI (urinary tract infection)     Wears glasses        Past Surgical History:   Procedure Laterality Date    ADENOIDECTOMY      APPENDECTOMY      BLADDER SUSPENSION      BREAST BIOPSY Bilateral 1992    bilateral benign excisional biopsies    BUNIONECTOMY  10/17/14    right, still has discomfort    CATARACT EXTRACTION W/  INTRAOCULAR LENS IMPLANT Bilateral     CHOLECYSTECTOMY  08/02/2017    COLONOSCOPY      CYSTOSCOPY      EPIDURAL STEROID INJECTION INTO LUMBAR SPINE N/A 8/14/2019    Procedure: Injection-steroid-epidural-lumbar L5/S1;  Surgeon: Fredi Rojas MD;  Location: [facility] OR;  Service: Pain Management;  Laterality: N/A;    EPIDURAL STEROID INJECTION INTO LUMBAR SPINE N/A 5/3/2021    Procedure: Injection-steroid-epidural-lumbar L5/S1 to the Left;  Surgeon: Fredi Rojas MD;  Location: [facility] OR;  Service: Pain Management;  Laterality: N/A;    EPIDURAL STEROID INJECTION INTO LUMBAR SPINE N/A 9/24/2021    Procedure: Injection-steroid-epidural-lumbar L5/S1;  Surgeon: Fredi Rojas MD;  Location: [facility] OR;  Service: Pain Management;  Laterality: N/A;    EPIDURAL STEROID INJECTION INTO LUMBAR SPINE N/A 2/21/2022    Procedure: Injection-steroid-epidural-lumbar L5/S1;  Surgeon: Fredi Rojas MD;  Location: [facility] OR;  Service: Pain Management;  Laterality: N/A;    ESOPHAGEAL DILATION N/A 6/11/2021    Procedure: DILATION, ESOPHAGUS;  Surgeon: Jake Sheriff MD;  Location: [facility];  Service: Endoscopy;  Laterality: N/A;    ESOPHAGOGASTRODUODENOSCOPY      dilated esophagus    ESOPHAGOGASTRODUODENOSCOPY N/A 6/11/2021    Procedure: EGD (ESOPHAGOGASTRODUODENOSCOPY);  Surgeon: Jake Sheriff MD;  Location: [facility] ENDO;  Service: Endoscopy;  Laterality: N/A;    GASTRIC BYPASS      2011    HYSTERECTOMY  1980    interstim bladder  2009    10/14/14 new battery    OOPHORECTOMY  1980    REPLACEMENT OF  SACRAL NERVE STIMULATOR  2/18/2020    Procedure: REPLACEMENT, NEUROSTIMULATOR, SACRAL;  Surgeon: Mary Jane Jarvis MD;  [facility];  Service: Urology;;    REVISION OF PROCEDURE INVOLVING SACRAL NEUROSTIMULATOR DEVICE Right 2/18/2020    Procedure: REVISION, NEUROSTIMULATOR, SACRAL/ battery replacement;  Surgeon: Mary Jane Jarvis MD;  Location: [facility] OR 2ND FLR;  Service: Urology;  Laterality: Right;  1hr/ rep contacted/ (CONNIE)    SKIN CANCER EXCISION      left hand    TONSILLECTOMY      WISDOM TOOTH EXTRACTION         Family History   Problem Relation Age of Onset    Breast cancer Mother 50    Breast cancer Paternal Aunt         40's    Cervical cancer Paternal Grandmother     Ovarian cancer Paternal Grandmother     Cervical cancer Paternal Cousin     Ovarian cancer Paternal Cousin     Diabetes Other     Hypertension Other     Hypothyroidism Other      Social History     Socioeconomic History    Marital status:    Tobacco Use    Smoking status: Never    Smokeless tobacco: Never   Substance and Sexual Activity    Alcohol use: Not Currently    Drug use: No    Sexual activity: Not Currently     Social Determinants of Health     Financial Resource Strain: Medium Risk    Difficulty of Paying Living Expenses: Somewhat hard   Food Insecurity: Food Insecurity Present    Worried About Running Out of Food in the Last Year: Sometimes true    Ran Out of Food in the Last Year: Sometimes true   Transportation Needs: No Transportation Needs    Lack of Transportation (Medical): No    Lack of Transportation (Non-Medical): No   Physical Activity: Unknown    Days of Exercise per Week: Patient refused    Minutes of Exercise per Session: 0 min   Stress: No Stress Concern Present    Feeling of Stress : Only a little   Social Connections: Moderately Integrated    Frequency of Communication with Friends and Family: More than three times a week    Frequency of Social Gatherings with Friends and Family: Once a week     Attends Zoroastrianism Services: More than 4 times per year    Active Member of Clubs or Organizations: Yes    Attends Club or Organization Meetings: 1 to 4 times per year    Marital Status:    Housing Stability: Unknown    Unable to Pay for Housing in the Last Year: Patient refused    Number of Places Lived in the Last Year: 1    Unstable Housing in the Last Year: No       Allergies:  Sulfa (sulfonamide antibiotics), Naproxen, and Albuterol    Medications:  Outpatient Encounter Medications as of 7/17/2023   Medication Sig Dispense Refill    albuterol (PROVENTIL/VENTOLIN HFA) 90 mcg/actuation inhaler Inhale 2 puffs into the lungs every 4 (four) hours as needed. 2 puffs every 4 hours as needed for cough, wheeze, or shortness of breath 54 g 3    alpha lipoic acid 600 mg Cap Take 600 mg by mouth 2 (two) times daily.      ascorbic acid, vitamin C, (VITAMIN C) 1000 MG tablet Take 1,000 mg by mouth 2 (two) times daily.       azelastine (ASTELIN) 137 mcg (0.1 %) nasal spray 1 spray (137 mcg total) by Nasal route 2 (two) times daily. 30 mL 12    azelastine (OPTIVAR) 0.05 % ophthalmic solution Place 1 drop into both eyes. As needed      B-complex with vitamin C (Z-BEC OR EQUIV) tablet Take 1 tablet by mouth once daily.      Bifidobacterium infantis (ALIGN ORAL) Take by mouth once daily.      biotin 10,000 mcg Cap Take 1 tablet by mouth every evening.       blood sugar diagnostic [employer] preferred meter and strips. Check daily 90 each 3    blood-glucose meter kit Use as instructed. Insurance preferred meter. 1 each 0    chlorpheniramine (CHLOR-TRIMETON) 4 mg tablet Take 1 tablet (4 mg total) by mouth every 8 (eight) hours while awake. 90 tablet 12    cloNIDine (CATAPRES) 0.1 MG tablet Take 1 tablet (0.1 mg total) by mouth 3 (three) times daily as needed (PRN SBP > 165 mmHg). 90 tablet 6    cranberry 500 mg Cap Take 1 capsule by mouth every evening.      diclofenac sodium (VOLTAREN) 1 % Gel Apply 2 grams topically  once daily. (Patient taking differently: Apply 2 g topically as needed (arthritis).) 1 Tube 6    diltiaZEM (CARDIZEM CD) 120 MG Cp24 TAKE 1 CAPSULE (120 MG TOTAL) BY MOUTH EVERY EVENING. 90 capsule 4    [] doxycycline (VIBRA-TABS) 100 MG tablet Take 1 tablet (100 mg total) by mouth 2 (two) times daily. for 10 days 20 tablet 0    EPINEPHrine (EPIPEN) 0.3 mg/0.3 mL AtIn Inject 1 each into the muscle as needed.   3    erythromycin with ethanoL (THERAMYCIN) 2 % external solution Aaa bid prn 60 mL 3    esomeprazole (NEXIUM) 40 MG capsule Take 1 capsule (40 mg total) by mouth before breakfast. 90 capsule 3    fexofenadine (ALLEGRA) 180 MG tablet Take 180 mg by mouth once daily.       fish oil-omega-3 fatty acids 300-1,000 mg capsule Take 2 g by mouth once daily.      fluticasone propionate (FLONASE) 50 mcg/actuation nasal spray 2 sprays (100 mcg total) by Each Nostril route once daily. 16 g 11    fluticasone-umeclidin-vilanter (TRELEGY ELLIPTA) 200-62.5-25 mcg inhaler Inhale 1 puff into the lungs once daily. 60 each 11    gabapentin (NEURONTIN) 600 MG tablet Take 1 tablet (600 mg total) by mouth 3 (three) times daily. 540 tablet 3    guaifenesin-codeine 100-10 mg/5 ml (GUAIFENESIN AC)  mg/5 mL syrup Take 5 mLs by mouth 3 (three) times daily as needed for Cough. 473 mL 2    hydrOXYzine HCL (ATARAX) 10 MG Tab Take 1 tablet (10 mg total) by mouth 3 (three) times daily as needed. 30 tablet 3    immun glob G,IgG,-pro-IgA 0-50 (HIZENTRA) 1 gram/5 mL (20 %) Soln Inject 55 mLs (11 g total) into the skin once a week. 220 mL 12    inhalation spacing device Use as directed for inhalation. 1 each 0    lancets (ACCU-CHEK SOFTCLIX LANCETS) Misc Use to check blood sugar daily. 100 each 11    levalbuterol (XOPENEX) 1.25 mg/3 mL nebulizer solution Take 3 mLs (1.25 mg total) by nebulization every 4 (four) hours as needed for Wheezing or Shortness of Breath. Rescue 1 Box 11    metFORMIN (GLUCOPHAGE-XR) 500 MG ER 24hr tablet  Take 1 tablet (500 mg total) by mouth 2 (two) times daily with meals. 180 tablet 3    mometasone 0.1% (ELOCON) 0.1 % cream aaa bid x 1-2 weeks prn bug bites 45 g 1    montelukast (SINGULAIR) 10 mg tablet Take 1 tablet (10 mg total) by mouth every evening. 90 tablet 3    mucus clearing device Cecy 1 Device by Misc.(Non-Drug; Combo Route) route 2 (two) times daily. 1 Device 0    multivitamin capsule Take 1 capsule by mouth once daily.       mupirocin (BACTROBAN) 2 % ointment Apply topically 3 (three) times daily. 15 g 0    ondansetron (ZOFRAN-ODT) 4 MG TbDL Take 1 tablet (4 mg total) by mouth every 8 (eight) hours as needed (nausea). 20 tablet 0    potassium chloride SA (K-DUR,KLOR-CON) 20 MEQ tablet Take 1 tablet (20 mEq total) by mouth once daily. 90 tablet 4    pravastatin (PRAVACHOL) 80 MG tablet TAKE 1 TABLET EVERY DAY 90 tablet 4    predniSONE (DELTASONE) 10 MG tablet 3 pills for 3 days, 2 for 3 days, them 1 for 3 days, repeat for cough 36 tablet 0    psyllium husk (METAMUCIL ORAL) Take by mouth.      semaglutide (OZEMPIC) 0.25 mg or 0.5 mg(2 mg/1.5 mL) pen injector Inject 0.25 mg into the skin every 7 days for 30 days, THEN 0.5 mg every 7 days. 5.25 mL 4    SIMETHICONE (GAS-X ORAL) Take 1 capsule by mouth as needed (gas relief).       tezepelumab-ekko (TEZSPIRE) 210 mg/1.91 mL (110 mg/mL) Syrg Inject 1.91 mLs (210 mg total) into the skin every 28 days. 1.91 mL 11    valsartan-hydrochlorothiazide (DIOVAN-HCT) 160-12.5 mg per tablet Take 1 tablet by mouth once daily. 90 tablet 3    vitamin D3-folic acid 5,000 unit- 1 mg Tab Take 1 tablet by mouth once daily.       [DISCONTINUED] fluticasone-umeclidin-vilanter (TRELEGY ELLIPTA) 200-62.5-25 mcg inhaler Inhale 1 puff into the lungs once daily. 60 each 0    [DISCONTINUED] fluticasone-umeclidin-vilanter (TRELEGY ELLIPTA) 200-62.5-25 mcg inhaler Inhale 1 puff into the lungs once daily. 60 each 0    [DISCONTINUED] ondansetron (ZOFRAN-ODT) 4 MG TbDL Take 1 tablet (4 mg  total) by mouth every 8 (eight) hours as needed. 20 tablet 0    [DISCONTINUED] predniSONE (DELTASONE) 10 MG tablet 1 pill for 3-5 days repeat as needed for chest tightness. 20 tablet 0    [] betamethasone acetate-betamethasone sodium phosphate injection 6 mg       [] tezepelumab-ekko Syrg 210 mg        No facility-administered encounter medications on file as of 2023.         PHYSICAL EXAMINATION:    The patient generally appears in good health, is appropriately interactive, and is in no apparent distress.    Skin: No lesions.    Mental: Cooperative with normal affect.    Neuro: Grossly intact.    HEENT: Normal. No evidence of lymphadenopathy.    Chest:  normal inspiratory effort.    Abdomen: Soft, non-tender. No masses or organomegaly. Bladder is not palpable. No evidence of flank discomfort. No evidence of inguinal hernia.    Extremities: No clubbing, cyanosis, or edema      LABS:    Lab Results   Component Value Date    BUN 12 2023    CREATININE 0.9 2023       UA 1.010, pH 5, tr leuk, tr blood, otherwise, negative. (Voided) (Last cystoscopy 2021)    IMPRESSION:    Urgency, urge incontinence.    Vaginal atrophy    PLAN:    1.  She is on program 7, 0+, 2 and 3 are -, 3.2 mA, 270 ms, 25 Hz.  Impedances were checked ranges from 499-1049 ohms.    2.  Changed to program 5, 3+, 0-1-, 1.9 mA, 330 ms, 25 Hz.  The battery life is 1.7 to 10 months on this program.   3.  Switched from topical estrace cream to Estring (states it is on formulary non preferred)  recurrent UTI handout provided  4.   Follow up in 6 months    I spent 40 minutes with the patient of which more than half was spent in direct consultation with the patient in regards to our treatment and plan.

## 2023-07-18 RX ORDER — ESTRADIOL 0.1 MG/G
1 CREAM VAGINAL
Qty: 45 G | Refills: 3 | Status: SHIPPED | OUTPATIENT
Start: 2023-07-19

## 2023-07-18 NOTE — TELEPHONE ENCOUNTER
Patient requested that the prescription for Estrace cream be sent to [facility]. This was done and the Rx for Estring was removed.

## 2023-07-23 ENCOUNTER — PATIENT MESSAGE (OUTPATIENT)
Dept: UROLOGY | Facility: CLINIC | Age: 73
End: 2023-07-23
Payer: MEDICARE

## 2023-07-31 ENCOUNTER — PATIENT MESSAGE (OUTPATIENT)
Dept: FAMILY MEDICINE | Facility: CLINIC | Age: 73
End: 2023-07-31
Payer: MEDICARE

## 2023-07-31 NOTE — TELEPHONE ENCOUNTER
----- Message from Janee Paulino sent at 7/31/2023  8:39 AM CDT -----  Regarding: booster injection  Contact: pt  Type:  Needs Medical Advice    Who Called: pt  Would the patient rather a call back or a response via [facility]? Call back  Best Call Back Number: [phone]    Additional Information: sts she wants to schedule an appointment to get the #6 Covid injection for her and Josiah Faust. The pharmacy told her that she has to schedule an appt---please advise---thank you---

## 2023-08-11 DIAGNOSIS — J45.51 SEVERE PERSISTENT ASTHMA WITH ACUTE EXACERBATION: Primary | ICD-10-CM

## 2023-08-15 ENCOUNTER — OFFICE VISIT (OUTPATIENT)
Dept: DERMATOLOGY | Facility: CLINIC | Age: 73
End: 2023-08-15
Payer: MEDICARE

## 2023-08-15 VITALS — BODY MASS INDEX: 36.02 KG/M2 | HEIGHT: 64 IN | WEIGHT: 211 LBS

## 2023-08-15 DIAGNOSIS — L90.5 SCAR: Primary | ICD-10-CM

## 2023-08-15 DIAGNOSIS — Z85.828 PERSONAL HISTORY OF OTHER MALIGNANT NEOPLASM OF SKIN: ICD-10-CM

## 2023-08-15 DIAGNOSIS — Z12.83 SKIN CANCER SCREENING: ICD-10-CM

## 2023-08-15 DIAGNOSIS — L57.0 ACTINIC KERATOSIS: ICD-10-CM

## 2023-08-15 PROCEDURE — 1160F RVW MEDS BY RX/DR IN RCRD: CPT | Mod: HCNC,CPTII,S$GLB, | Performed by: DERMATOLOGY

## 2023-08-15 PROCEDURE — 17000 DESTRUCT PREMALG LESION: CPT | Mod: HCNC,S$GLB,, | Performed by: DERMATOLOGY

## 2023-08-15 PROCEDURE — 3288F PR FALLS RISK ASSESSMENT DOCUMENTED: ICD-10-PCS | Mod: HCNC,CPTII,S$GLB, | Performed by: DERMATOLOGY

## 2023-08-15 PROCEDURE — 1101F PR PT FALLS ASSESS DOC 0-1 FALLS W/OUT INJ PAST YR: ICD-10-PCS | Mod: HCNC,CPTII,S$GLB, | Performed by: DERMATOLOGY

## 2023-08-15 PROCEDURE — 17000 PR DESTRUCTION(LASER SURGERY,CRYOSURGERY,CHEMOSURGERY),PREMALIGNANT LESIONS,FIRST LESION: ICD-10-PCS | Mod: HCNC,S$GLB,, | Performed by: DERMATOLOGY

## 2023-08-15 PROCEDURE — 3066F NEPHROPATHY DOC TX: CPT | Mod: HCNC,CPTII,S$GLB, | Performed by: DERMATOLOGY

## 2023-08-15 PROCEDURE — 3288F FALL RISK ASSESSMENT DOCD: CPT | Mod: HCNC,CPTII,S$GLB, | Performed by: DERMATOLOGY

## 2023-08-15 PROCEDURE — 1126F PR PAIN SEVERITY QUANTIFIED, NO PAIN PRESENT: ICD-10-PCS | Mod: HCNC,CPTII,S$GLB, | Performed by: DERMATOLOGY

## 2023-08-15 PROCEDURE — 1157F PR ADVANCE CARE PLAN OR EQUIV PRESENT IN MEDICAL RECORD: ICD-10-PCS | Mod: HCNC,CPTII,S$GLB, | Performed by: DERMATOLOGY

## 2023-08-15 PROCEDURE — 17003 DESTRUCTION, PREMALIGNANT LESIONS; SECOND THROUGH 14 LESIONS: ICD-10-PCS | Mod: HCNC,S$GLB,, | Performed by: DERMATOLOGY

## 2023-08-15 PROCEDURE — 3044F PR MOST RECENT HEMOGLOBIN A1C LEVEL <7.0%: ICD-10-PCS | Mod: HCNC,CPTII,S$GLB, | Performed by: DERMATOLOGY

## 2023-08-15 PROCEDURE — 3066F PR DOCUMENTATION OF TREATMENT FOR NEPHROPATHY: ICD-10-PCS | Mod: HCNC,CPTII,S$GLB, | Performed by: DERMATOLOGY

## 2023-08-15 PROCEDURE — 99999 PR PBB SHADOW E&M-EST. PATIENT-LVL IV: CPT | Mod: PBBFAC,HCNC,, | Performed by: DERMATOLOGY

## 2023-08-15 PROCEDURE — 3060F PR POS MICROALBUMINURIA RESULT DOCUMENTED/REVIEW: ICD-10-PCS | Mod: HCNC,CPTII,S$GLB, | Performed by: DERMATOLOGY

## 2023-08-15 PROCEDURE — 1126F AMNT PAIN NOTED NONE PRSNT: CPT | Mod: HCNC,CPTII,S$GLB, | Performed by: DERMATOLOGY

## 2023-08-15 PROCEDURE — 3008F BODY MASS INDEX DOCD: CPT | Mod: HCNC,CPTII,S$GLB, | Performed by: DERMATOLOGY

## 2023-08-15 PROCEDURE — 99213 OFFICE O/P EST LOW 20 MIN: CPT | Mod: 25,HCNC,S$GLB, | Performed by: DERMATOLOGY

## 2023-08-15 PROCEDURE — 3060F POS MICROALBUMINURIA REV: CPT | Mod: HCNC,CPTII,S$GLB, | Performed by: DERMATOLOGY

## 2023-08-15 PROCEDURE — 1159F PR MEDICATION LIST DOCUMENTED IN MEDICAL RECORD: ICD-10-PCS | Mod: HCNC,CPTII,S$GLB, | Performed by: DERMATOLOGY

## 2023-08-15 PROCEDURE — 99999 PR PBB SHADOW E&M-EST. PATIENT-LVL IV: ICD-10-PCS | Mod: PBBFAC,HCNC,, | Performed by: DERMATOLOGY

## 2023-08-15 PROCEDURE — 3008F PR BODY MASS INDEX (BMI) DOCUMENTED: ICD-10-PCS | Mod: HCNC,CPTII,S$GLB, | Performed by: DERMATOLOGY

## 2023-08-15 PROCEDURE — 3044F HG A1C LEVEL LT 7.0%: CPT | Mod: HCNC,CPTII,S$GLB, | Performed by: DERMATOLOGY

## 2023-08-15 PROCEDURE — 17003 DESTRUCT PREMALG LES 2-14: CPT | Mod: HCNC,S$GLB,, | Performed by: DERMATOLOGY

## 2023-08-15 PROCEDURE — 1159F MED LIST DOCD IN RCRD: CPT | Mod: HCNC,CPTII,S$GLB, | Performed by: DERMATOLOGY

## 2023-08-15 PROCEDURE — 1157F ADVNC CARE PLAN IN RCRD: CPT | Mod: HCNC,CPTII,S$GLB, | Performed by: DERMATOLOGY

## 2023-08-15 PROCEDURE — 1160F PR REVIEW ALL MEDS BY PRESCRIBER/CLIN PHARMACIST DOCUMENTED: ICD-10-PCS | Mod: HCNC,CPTII,S$GLB, | Performed by: DERMATOLOGY

## 2023-08-15 PROCEDURE — 1101F PT FALLS ASSESS-DOCD LE1/YR: CPT | Mod: HCNC,CPTII,S$GLB, | Performed by: DERMATOLOGY

## 2023-08-15 PROCEDURE — 99213 PR OFFICE/OUTPT VISIT, EST, LEVL III, 20-29 MIN: ICD-10-PCS | Mod: 25,HCNC,S$GLB, | Performed by: DERMATOLOGY

## 2023-08-15 NOTE — PROGRESS NOTES
Subjective:      Patient ID:  Cornelia Delatorre is a 72 y.o. female who presents for   Chief Complaint   Patient presents with    Skin Check     UBSC     HPI    Established patient for UBSC      yes Phx of NMSC  Phx skin ca R hand and nose - Dr Parra - 2014   Phx SCC L hand -mohs Dr Jones -2015   Uses CeraVe AM and PM cream daily for routine     no Fhx of melanoma.  Review of Systems   Constitutional:  Negative for fever, chills and fatigue.   HENT:  Negative for congestion, sore throat and mouth sores.    Respiratory:  Negative for cough.    Gastrointestinal:  Negative for nausea, vomiting and diarrhea.   Skin:  Positive for daily sunscreen use. Negative for itching, rash, dry skin, sensitivity to antibiotic ointment, sensitivity to bandage adhesive and wears hat.   Hematologic/Lymphatic: Bruises/bleeds easily (forearms, takes asa and prednisone for asthma).       Objective:   Physical Exam   Constitutional: She appears well-developed and well-nourished. No distress.   HENT:   Mouth/Throat: Lips normal.    Eyes: Lids are normal.  No conjunctival no injection.   Cardiovascular:  There is no local extremity swelling and no dependent edema.             Neurological: She is alert and oriented to person, place, and time. She is not disoriented.   Psychiatric: She has a normal mood and affect.   Skin:   Areas Examined (abnormalities noted in diagram):   Scalp / Hair Palpated and Inspected  Head / Face Inspection Performed  Neck Inspection Performed  Chest / Axilla Inspection Performed  Abdomen Inspection Performed  Back Inspection Performed  RUE Inspected  LUE Inspection Performed  RLE Inspected  LLE Inspection Performed  Nails and Digits Inspection Performed                     Diagram Legend     Erythematous scaling macule/papule c/w actinic keratosis       Vascular papule c/w angioma      Pigmented verrucoid papule/plaque c/w seborrheic keratosis      Yellow umbilicated papule c/w sebaceous hyperplasia       Irregularly shaped tan macule c/w lentigo     1-2 mm smooth white papules consistent with Milia      Movable subcutaneous cyst with punctum c/w epidermal inclusion cyst      Subcutaneous movable cyst c/w pilar cyst      Firm pink to brown papule c/w dermatofibroma      Pedunculated fleshy papule(s) c/w skin tag(s)      Evenly pigmented macule c/w junctional nevus     Mildly variegated pigmented, slightly irregular-bordered macule c/w mildly atypical nevus      Flesh colored to evenly pigmented papule c/w intradermal nevus       Pink pearly papule/plaque c/w basal cell carcinoma      Erythematous hyperkeratotic cursted plaque c/w SCC      Surgical scar with no sign of skin cancer recurrence      Open and closed comedones      Inflammatory papules and pustules      Verrucoid papule consistent consistent with wart     Erythematous eczematous patches and plaques     Dystrophic onycholytic nail with subungual debris c/w onychomycosis     Umbilicated papule    Erythematous-base heme-crusted tan verrucoid plaque consistent with inflamed seborrheic keratosis     Erythematous Silvery Scaling Plaque c/w Psoriasis     See annotation      Assessment / Plan:        Scar  NER NMSC  Reassurance     Personal history of other malignant neoplasm of skin  Area(s) of previous NMSC evaluated with no signs of recurrence.    Upper body skin examination performed today including at least 6 points as noted in physical examination. No lesions suspicious for malignancy noted.      Skin cancer screening  Area(s) of previous NMSC evaluated with no signs of recurrence.    Upper body skin examination performed today including at least 6 points as noted in physical examination. No lesions suspicious for malignancy noted.    Actinic keratosis    Cryosurgery Procedure Note    Verbal consent from the patient is obtained and the patient is aware of the precancerous quality and need for treatment of these lesions. Liquid nitrogen cryosurgery is  applied to the 2 actinic keratoses, as detailed in the physical exam, to produce a freeze injury. The patient is aware that blisters may form and is instructed on wound care with gentle cleansing and use of vaseline ointment to keep moist until healed. The patient is supplied a handout on cryosurgery and is instructed to call if lesions do not completely resolve. Discussed risk postinflammatory pigmentary changes.            No follow-ups on file.

## 2023-08-18 ENCOUNTER — CLINICAL SUPPORT (OUTPATIENT)
Dept: PULMONOLOGY | Facility: CLINIC | Age: 73
End: 2023-08-18
Payer: MEDICARE

## 2023-08-18 DIAGNOSIS — J45.50 SEVERE PERSISTENT ASTHMA WITHOUT COMPLICATION: Primary | ICD-10-CM

## 2023-08-18 PROCEDURE — 96372 PR INJECTION,THERAP/PROPH/DIAG2ST, IM OR SUBCUT: ICD-10-PCS | Mod: HCNC,S$GLB,, | Performed by: NURSE PRACTITIONER

## 2023-08-18 PROCEDURE — 96372 THER/PROPH/DIAG INJ SC/IM: CPT | Mod: HCNC,S$GLB,, | Performed by: NURSE PRACTITIONER

## 2023-08-18 NOTE — PROGRESS NOTES
Patient has arrived for month Tezspire injection.  2 patient identified used (Name and )  Injection given into the right arm SQ.  No bleeding, redness, swelling, or reaction noted to the injection site.  Patient tolerated well.    NDC:  60364-304-57  LOT:  [id number]  EXP:  2025

## 2023-09-07 ENCOUNTER — TELEPHONE (OUTPATIENT)
Dept: PULMONOLOGY | Facility: CLINIC | Age: 73
End: 2023-09-07
Payer: MEDICARE

## 2023-09-07 ENCOUNTER — PATIENT MESSAGE (OUTPATIENT)
Dept: PULMONOLOGY | Facility: CLINIC | Age: 73
End: 2023-09-07
Payer: MEDICARE

## 2023-09-07 NOTE — TELEPHONE ENCOUNTER
Placed a call to the pt in regards to  whether she can take the Covid vaccine even though she is on Tezspire injections. Pt can do the covid vaccine.

## 2023-09-08 ENCOUNTER — OFFICE VISIT (OUTPATIENT)
Dept: PULMONOLOGY | Facility: CLINIC | Age: 73
End: 2023-09-08
Payer: MEDICARE

## 2023-09-08 VITALS
BODY MASS INDEX: 36.79 KG/M2 | HEIGHT: 64 IN | HEART RATE: 75 BPM | WEIGHT: 215.5 LBS | SYSTOLIC BLOOD PRESSURE: 146 MMHG | DIASTOLIC BLOOD PRESSURE: 83 MMHG | OXYGEN SATURATION: 97 %

## 2023-09-08 DIAGNOSIS — J39.8 TRACHEOBRONCHOMALACIA: ICD-10-CM

## 2023-09-08 DIAGNOSIS — R09.89 CHRONIC SINUS COMPLAINTS: ICD-10-CM

## 2023-09-08 DIAGNOSIS — R91.1 LUNG NODULE: ICD-10-CM

## 2023-09-08 DIAGNOSIS — J47.9 BRONCHIECTASIS WITHOUT COMPLICATION: ICD-10-CM

## 2023-09-08 DIAGNOSIS — D83.9 CVID (COMMON VARIABLE IMMUNODEFICIENCY): ICD-10-CM

## 2023-09-08 DIAGNOSIS — G47.33 OSA TREATED WITH BIPAP: ICD-10-CM

## 2023-09-08 DIAGNOSIS — E66.01 CLASS 2 SEVERE OBESITY DUE TO EXCESS CALORIES WITH SERIOUS COMORBIDITY AND BODY MASS INDEX (BMI) OF 36.0 TO 36.9 IN ADULT: ICD-10-CM

## 2023-09-08 DIAGNOSIS — J45.51 SEVERE PERSISTENT ASTHMA WITH ACUTE EXACERBATION: Primary | ICD-10-CM

## 2023-09-08 PROCEDURE — 99999 PR PBB SHADOW E&M-EST. PATIENT-LVL V: ICD-10-PCS | Mod: PBBFAC,HCNC,, | Performed by: NURSE PRACTITIONER

## 2023-09-08 PROCEDURE — 3066F NEPHROPATHY DOC TX: CPT | Mod: HCNC,CPTII,S$GLB, | Performed by: NURSE PRACTITIONER

## 2023-09-08 PROCEDURE — 1159F PR MEDICATION LIST DOCUMENTED IN MEDICAL RECORD: ICD-10-PCS | Mod: HCNC,CPTII,S$GLB, | Performed by: NURSE PRACTITIONER

## 2023-09-08 PROCEDURE — 3008F PR BODY MASS INDEX (BMI) DOCUMENTED: ICD-10-PCS | Mod: HCNC,CPTII,S$GLB, | Performed by: NURSE PRACTITIONER

## 2023-09-08 PROCEDURE — 3079F PR MOST RECENT DIASTOLIC BLOOD PRESSURE 80-89 MM HG: ICD-10-PCS | Mod: HCNC,CPTII,S$GLB, | Performed by: NURSE PRACTITIONER

## 2023-09-08 PROCEDURE — 99999 PR PBB SHADOW E&M-EST. PATIENT-LVL V: CPT | Mod: PBBFAC,HCNC,, | Performed by: NURSE PRACTITIONER

## 2023-09-08 PROCEDURE — 3044F PR MOST RECENT HEMOGLOBIN A1C LEVEL <7.0%: ICD-10-PCS | Mod: HCNC,CPTII,S$GLB, | Performed by: NURSE PRACTITIONER

## 2023-09-08 PROCEDURE — 1101F PR PT FALLS ASSESS DOC 0-1 FALLS W/OUT INJ PAST YR: ICD-10-PCS | Mod: HCNC,CPTII,S$GLB, | Performed by: NURSE PRACTITIONER

## 2023-09-08 PROCEDURE — 1159F MED LIST DOCD IN RCRD: CPT | Mod: HCNC,CPTII,S$GLB, | Performed by: NURSE PRACTITIONER

## 2023-09-08 PROCEDURE — 1157F PR ADVANCE CARE PLAN OR EQUIV PRESENT IN MEDICAL RECORD: ICD-10-PCS | Mod: HCNC,CPTII,S$GLB, | Performed by: NURSE PRACTITIONER

## 2023-09-08 PROCEDURE — 3008F BODY MASS INDEX DOCD: CPT | Mod: HCNC,CPTII,S$GLB, | Performed by: NURSE PRACTITIONER

## 2023-09-08 PROCEDURE — 3060F PR POS MICROALBUMINURIA RESULT DOCUMENTED/REVIEW: ICD-10-PCS | Mod: HCNC,CPTII,S$GLB, | Performed by: NURSE PRACTITIONER

## 2023-09-08 PROCEDURE — 1101F PT FALLS ASSESS-DOCD LE1/YR: CPT | Mod: HCNC,CPTII,S$GLB, | Performed by: NURSE PRACTITIONER

## 2023-09-08 PROCEDURE — 3077F SYST BP >= 140 MM HG: CPT | Mod: HCNC,CPTII,S$GLB, | Performed by: NURSE PRACTITIONER

## 2023-09-08 PROCEDURE — 99214 OFFICE O/P EST MOD 30 MIN: CPT | Mod: HCNC,S$GLB,, | Performed by: NURSE PRACTITIONER

## 2023-09-08 PROCEDURE — 3066F PR DOCUMENTATION OF TREATMENT FOR NEPHROPATHY: ICD-10-PCS | Mod: HCNC,CPTII,S$GLB, | Performed by: NURSE PRACTITIONER

## 2023-09-08 PROCEDURE — 3288F FALL RISK ASSESSMENT DOCD: CPT | Mod: HCNC,CPTII,S$GLB, | Performed by: NURSE PRACTITIONER

## 2023-09-08 PROCEDURE — 3060F POS MICROALBUMINURIA REV: CPT | Mod: HCNC,CPTII,S$GLB, | Performed by: NURSE PRACTITIONER

## 2023-09-08 PROCEDURE — 99214 PR OFFICE/OUTPT VISIT, EST, LEVL IV, 30-39 MIN: ICD-10-PCS | Mod: HCNC,S$GLB,, | Performed by: NURSE PRACTITIONER

## 2023-09-08 PROCEDURE — 3077F PR MOST RECENT SYSTOLIC BLOOD PRESSURE >= 140 MM HG: ICD-10-PCS | Mod: HCNC,CPTII,S$GLB, | Performed by: NURSE PRACTITIONER

## 2023-09-08 PROCEDURE — 3288F PR FALLS RISK ASSESSMENT DOCUMENTED: ICD-10-PCS | Mod: HCNC,CPTII,S$GLB, | Performed by: NURSE PRACTITIONER

## 2023-09-08 PROCEDURE — 1157F ADVNC CARE PLAN IN RCRD: CPT | Mod: HCNC,CPTII,S$GLB, | Performed by: NURSE PRACTITIONER

## 2023-09-08 PROCEDURE — 3044F HG A1C LEVEL LT 7.0%: CPT | Mod: HCNC,CPTII,S$GLB, | Performed by: NURSE PRACTITIONER

## 2023-09-08 PROCEDURE — 3079F DIAST BP 80-89 MM HG: CPT | Mod: HCNC,CPTII,S$GLB, | Performed by: NURSE PRACTITIONER

## 2023-09-08 RX ORDER — DOXYCYCLINE 100 MG/1
100 CAPSULE ORAL EVERY 12 HOURS
Qty: 20 CAPSULE | Refills: 0 | Status: SHIPPED | OUTPATIENT
Start: 2023-09-08 | End: 2023-09-18

## 2023-09-08 RX ORDER — PREDNISONE 10 MG/1
TABLET ORAL
Qty: 18 TABLET | Refills: 0 | Status: SHIPPED | OUTPATIENT
Start: 2023-09-08 | End: 2024-01-08

## 2023-09-08 RX ORDER — GUAIFENESIN 600 MG/1
600 TABLET, EXTENDED RELEASE ORAL 2 TIMES DAILY
Qty: 14 TABLET | Refills: 0 | Status: SHIPPED | OUTPATIENT
Start: 2023-09-08 | End: 2023-09-15

## 2023-09-08 RX ORDER — LEVALBUTEROL TARTRATE 45 UG/1
1-2 AEROSOL, METERED ORAL EVERY 4 HOURS PRN
Qty: 15 G | Refills: 11 | Status: SHIPPED | OUTPATIENT
Start: 2023-09-08 | End: 2024-09-07

## 2023-09-08 NOTE — PROGRESS NOTES
9/8/2023    Cornelia Delatorre  In office visit     Chief Complaint   Patient presents with    Cough     HPI:  09/08/2023: Hx: Asthma, Lung nodules, CVID, SOHA on Bipap  Cough: Associated with feelings of chest congestion - associated with coughing spells that lead to overall fatigue. Worse at night time - associated with difficulty falling asleep and staying asleep. Cough is productive with intermittent mucous - difficult to expectorate.   Using Trelegy once per day with benefit.   Using nebulized treatments - Levalbuterol - 1-2 treatments per day depending on chest cough.    Using Albuterol inhaler as needed - approx use varies, approx 1-2x per day.  States used Albuterol last night and woke up this morning feeling flushed. Endorses hand shakiness and chest palpitations at times with Albuterol use.  Completed regimen of Prednisone and Doxy after last appointment with Daysi Llanos in July.   Denies the use of supplemental oxygen.  On Tezspire - next dose due next week.       7/6/2023- complaint of recurrent shortness of breath, worsened in past 3 weeks started, started prednisone 10 mg with no benefit. Using nebulizer or albuterol inhaler every 4 hours.   Associated with chest tightness and cough. Productive clear mucous. Having nocturnal coughing fits most nights.   Complaint of nausea and dizziness,   On BIPAP nightly, no issues with sleep apnea.   Severe persistent asthma with acute exacerbation  -     betamethasone acetate-betamethasone sodium phosphate injection 6 mg  -     fluticasone-umeclidin-vilanter (TRELEGY ELLIPTA) 200-62.5-25 mcg inhaler; Inhale 1 puff into the lungs once daily.  -     ondansetron (ZOFRAN-ODT) 4 MG TbDL; Take 1 tablet (4 mg total) by mouth every 8 (eight) hours as needed (nausea).  -     doxycycline (VIBRA-TABS) 100 MG tablet; Take 1 tablet (100 mg total) by mouth 2 (two) times daily. for 10 days  -     X-Ray Chest PA And Lateral; Future  -     predniSONE (DELTASONE) 10 MG tablet; 3  pills for 3 days, 2 for 3 days, them 1 for 3 days, repeat for cough  Right otitis media, unspecified otitis media type  -     doxycycline (VIBRA-TABS) 100 MG tablet; Take 1 tablet (100 mg total) by mouth 2 (two) times daily. for 10 days          5/1/2023- complaint of wheeze, onset 1 week, took 5 days of prednisone to control, states slightly improved.   Required systemic steroids in January and again in February for Asthma control  Associated with wheeze and chest tightness, SOB is worsening with time, difficult to walk in stores with out stopping to rest.   Has new motor in BIPAP, wearing nightly with benefit.       1/9/2023- states feeling good, noticed worsening sinus drainage and productive cough when laying down at night for past 3 weeks, improves with albuterol inhaler or nebulizer. On Trelegy daily.  No recent steroid therapy. Steroid injection in knee 6 weeks Prior.  Wearing BIPAP nightly with benefit. Daytime fatigue is resolved. No complaint of morning headaches. Waiting for recalled BIPAP machine.     7/11/2022- states feeling like her self again after COVID 19 in May, Cough is dramatically improved, non productive cough, few days week.   Fatigue continues, has to take daily naps. Wearing BiPAP nightly.    On Trelegy daily with benefit but cost is high, in donut whole.       5/25/2022- Dx COVID 19 7 days prior tx with monoclonal Antibiodies two days prior. Feeling generalized weakness, diaphoresis, fatigue  Using Trelegy and nebulizer daily,  beats on her back   Cough - severe, complaint, worse at night, productive thick yellow mucous. Associated with late evening wheeze.   Lost sense of taste and smell.       4/13/2022- SOB- stable, worse in late evenings, taking prednisone 10 mg when needed, not used in past 2 months; worse with exertion, improves with rest and albuterol rescue.   On BIPAP with benefit, no daytime drowsiness.   Liked Trelegy. Still on Adviar until prescription runs out, has 2  weeks left.   Skin biopsy 5 weeks prior, left lower chin site irritated by wearing face mask. No edema or erythema,     1/12/2022- SOB worsening, on Advair daily and levalbuterol 2-3x daily for past 4 weeks, has noticed worsening of shortness of breath and sinus complaints.   Admitted to hospital for GI virus,   Noticed prednisone is needed occasionally at 10 mg notices improvement    On BiPAP nightly with benefit. No daytime fatigue, no issues with nasal pillow mask.     7/12/21- complaint of daily sinus drainage, has to clear throat repeatedly for past 2 months. Currently on Flonase nasal spray, ipratropium nasal spray, Singulair pills, zyrtec, corcindin daily with no improvement.   SOB- stable, required steroid therapy for 3 days twice in past 3 months. Only with exertion, worse in late evenings, improves with rest,  Using nebulizer 2x daily due to feeling of heaviness in chest.   Cough- mild, non productive, associated with post nasal drip from allergies. Nocturnal arousals 2x weekly for past 2 weeks,   Wearing CPAP nighty with benefit.     4/12/21- spent last 6 months in home, was able to get COVID 19 vaccine Mederna. Allergies stable, few spells improve with nasal spray noticed improvement after getting air purifier.   Noticed spacer device helping with hoarse voice or oral thrush like before,   SOB- unchanged, daily complaint, varies with severity, worse with exertion, improves with rest and albuterol rescue. Has to sit up to breath when first laying  Down, not able to breath when laying on left side.   Occasional cough- productive, clear mucous, using nebulizer 3 x weekly.   CPAP for SOHA doing well,     10/13/2020- Allergies bother her every few days, currently on daily Singulair, zyrtec, flonase, astelin nasal spray, having sneezing fits.   Complaint of clear sinus drainage,   Hoarse voice has resolved after stopping symbicort.   Cough- improved,   SOB- doing well, occasional episodes of not being able to  catch breath, did not use nebulizer   Wearing CPAP nightly for SOHA, states benefits greatly    7/13/2020- Hoarse voice, loss of voice, productive cough, lost voice July 2019 tx by ENT who treated for acute laryngitis with diflucan; Recurrent problem. Hoarse voice return 4 weeks prior, ENT doctor recommends changing Asthma medications tx her with diflucan, doxycycline and prednisone for 5 days, states has improved.   SOB- worse when wearing face mask, currently on hizentra therapy,     5/4/2020- uses symbicort daily, uses rescue 2/wk - some anxiety, getting igg rx. Having more sinus problems with head colds- 3 in last 4 wks.  No prednisone- hizentra working well.        Nov 4, 2019- having mucous sensation, notes some sob relaxing - maybe worse night.  No nasal stuffy.  Uses cpap.  Uses symbicort bid, no rescue.  Mucous is clear.  No abx for sinus or lungs.  Getting immune infusions weekly - pt senses doing better since starting in May.  Patient Instructions   Breathing off and on short breath - need to use more albuterol to make clear where breathing problems come from  Mucous production may decrease if airways controlled- albuterol should help clearance.  Rescue inhaler may resolve any breathing problems- should use intermittently if any symptoms.  May use augmentin for sinus/lung problems- not optimal for all uti's       May 6,2019-due to get immune infusion next 2 days.  No prednisone, needs monlukast. abx stopped wks ago- mucous cleared.    Patient Instructions   Use antibiotic daily til immune therapy done a wk - then as needed like every one else  Immune therapy may keep you stable - better than antibiotics.   Use symbicort regular.  Lungs may stabilize.  Use prednisone if needed.  Lung  Nodules are stable for 2 yrs and no follow up needed.  Call if problems- hope all will be better yet.  March 7, 19-exacerbated in late Jan- cleared in feb (vague).  Now ill again yellow and gray mucous (culture last Jan was nl  yvonne).  Sl intermittent wheezes since last wk.  Sees Dr Herrera in [place]- gets allergy rx but declined to get now.  Pt has cvid by  igg and igm levels low and pneumococcal antibodies to 8/14 pneumococcal titers. Uses symbicort and singulair routinely.  Took prednisone completed 4 days ago- uses prednisone monthly- was able to skip December  .  Discussed with patient above for education the following:      Gastric by pass may cause malabsorption, protein levels have been too low and may affect ig levels-- somewhat.   Need to get follow up with dietician to assure adequate protein intake/levels.   Antibiotic may stabilize infections with common variable immune deficiency- azithromycin daily once cultures submitted.   Use augmentin if infection worsens.   Acapella/chest clapping help clear mucous, vest likewise if bronchiectasis.  Will not treat cause.   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  Use codeine to suppress mild coughing.   Need good immune system and no airway/asthma problems and good hygiene of bronchial tubes (no chronic infections) to prevent illness.     Lungs measured near normal with good response to bronchial medications 2017   Need ct to follow up and cultures to assure infection controlled.      Jan 28/2019- Feeling bad onset 2 weeks, took prednisone taper x 6 days and antibiotic Augmentin week prior, States no improvement. Cough- worse at night, no nocturnal arousals, takes cough suppressant syrup before bed. Productive yellow/brown color.   Nebulized albuterol 4x daily. States no fever.  Has started allergy injections waiting for insurance clearance for IGG therapy.   Discussed with patient above for education the following:    CT chest for February to monitor and assess for bronchiectasis, need diagnosis for insurance to cover vest therapy  Have  continue to percuss back with hand.   Recommend purchasing  Acepella device to help clear lungs of excess mucous, attaches to nebulizer device  Continue Nebulizer treatments 4x daily as needed.  Chest x-ray now to evaluate for pneumonia  Blood work at hospital   Will yeison to see results of sputum culture and x-ray before repeating antibiotic  Need to return sputum to clinic with in 4 hours of collecting  Fluconazole pills for oral thrush, this is a recurrent problem related to your weak immune system and use of inhaled steroids. Continue to rinse mouth out after using symbicort but problem will improve after starting immune replacement therapy.    oct 30,   2018-- had one day fever followed by cough/wheeze illness that lingered a wk. Pt seen by NP   [place] 2x, [place].  [place] like dying- only one day fever.  Violent cough.  Nebulizer ppt itch and palpitations- ok  On xopenex now.  Prednisone 40x3, 20 x 3 ,  augmentin cleared.  Now back to normal.      May 14, 2018- had spell /exacerbation with one round prednisone. Breathing good.  Follow-up in about 1 year (around 5/14/2019), or if symptoms worsen or fail to improve.   Discussed with patient above for education the following:     Check blood count and pneumonia vaccine response after last vaccine 4/27/2018   Use azithromycin for any lung/sinus infection- immune weakness likely   Asthma stable- use prednisone and singulair.   Use allergra and singulair and astelin/flonase   Lung nodule in lung still - Navigational bronchoscopy might find?  - will at least re check in a year.     Call if needed, re check next yr.    darlin 15, 2018--1 st illness in yr or so with cough and rattles, no flu.  Appetite ok.  Ill x wk, cough and wheeze and sob and noct worse, resp rx helps a bit.  Hasn't been using  Albuterol   Follow-up in about 6 months (around 7/15/2018).   Discussed with patient above for education the following:      tamiflu if flu.   Need to use albuterol for any lung symptoms.  Should get cough  Better controlled.       Prednisone 20 mg 3 for 3 , taper may be needed.  Give shot today, 1 daily for 3 may be enough with albuterol.   You had high eosinophils-- if asthma becomes more active - special therapy may help??     prevnar 13 would be good - should be off prednisone.  Immune system is sl weak  Protection only to 7.14 pneumonia germs.    oct 19, 2017- has scratchy throat, some sinus drip, has nightly sensation throat issues, post beryl and carpel tunnel surg.  No sinus lung infections.  Breathing and cough good.  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 Had pos tb gold.  June 21, 2017- had good [place] trip, tapered symbicort with some increase sensation of lung problems so maintained full dose. No infections.  No chest symptoms on symbicort.   April 24, feels a lot better with min cough, vague sob going left side down at night.  Going to [place] 2 weeks.  Uses symbicort and good results.  March 16, cough better, but comes and goes,  No great help with steroids.  Submitted 3 sputums.  Had pneumovax march last yr.  Breathing better. Got symbicort.   Had pneumonia vaccine last yr    3/8/2017 HPI: had onset cough Hernan illness, got dizzy few days with diarrhea and cough. Cough clear sm amt mucous. Did have 101 temp one day, fatigue, no muscle aches.  Appetite decreased.  Illnesses lingered 2 weeks but cough never remitted- has improved with inhahler use last 15 days.    Had gastric 2011 bypass with with continued diarrhea intially resolved. No regurg or reflux or swallow problems.   symbicort nearly  Resolved.    Sinuses ok.  No pets.  Never smoker.    Appetite good, feels well now.    headcolds to chest since childhood, nocturnal ??not recalled.  Had cats in past but got rid in 2003 or so.     The chief compliant problem is new to me; previously seen per Daysi Llanos NP     [facility]:  Past Medical History:   Diagnosis Date    Allergy     Arthritis     Asthma     Cancer     skin cancer to right hand     Cataract     Chronic fatigue 01/24/2017    CVID (common variable immunodeficiency) 03/07/2019    Diabetes mellitus     type2    KLINE (dyspnea on exertion) 01/24/2017    Dyslipidemia 06/25/2019    Encounter for blood transfusion     Essential hypertension 12/29/2011    GERD (gastroesophageal reflux disease)     Headache(784.0)     History of lumpectomy of both breasts     1992 negative    Hyperlipidemia 07/15/2015    Hypertension     Hypothyroidism     Neuropathy     Obesity     Obesity     Postmenopausal HRT (hormone replacement therapy)     Rash     Rosacea     Sleep apnea     on bipap    Snoring     Squamous cell carcinoma of skin     left forearm     UTI (urinary tract infection)     Wears glasses          Past Surgical History:   Procedure Laterality Date    ADENOIDECTOMY      APPENDECTOMY      BLADDER SUSPENSION      BREAST BIOPSY Bilateral 1992    bilateral benign excisional biopsies    BUNIONECTOMY  10/17/14    right, still has discomfort    CATARACT EXTRACTION W/  INTRAOCULAR LENS IMPLANT Bilateral     CHOLECYSTECTOMY  08/02/2017    COLONOSCOPY      CYSTOSCOPY      EPIDURAL STEROID INJECTION INTO LUMBAR SPINE N/A 8/14/2019    Procedure: Injection-steroid-epidural-lumbar L5/S1;  Surgeon: Fredi Rojas MD;  Location: [facility] OR;  Service: Pain Management;  Laterality: N/A;    EPIDURAL STEROID INJECTION INTO LUMBAR SPINE N/A 5/3/2021    Procedure: Injection-steroid-epidural-lumbar L5/S1 to the Left;  Surgeon: Fredi Rojas MD;  Location: [facility] OR;  Service: Pain Management;  Laterality: N/A;    EPIDURAL STEROID INJECTION INTO LUMBAR SPINE N/A 9/24/2021    Procedure: Injection-steroid-epidural-lumbar L5/S1;  Surgeon: Fredi Rojas MD;  Location: [facility] OR;  Service: Pain Management;  Laterality: N/A;    EPIDURAL STEROID INJECTION INTO LUMBAR SPINE N/A 2/21/2022    Procedure: Injection-steroid-epidural-lumbar L5/S1;  Surgeon: Fredi Rojas MD;  Location: [facility] OR;  Service: Pain Management;  Laterality: N/A;     ESOPHAGEAL DILATION N/A 6/11/2021    Procedure: DILATION, ESOPHAGUS;  Surgeon: Jake Sheriff MD;  Location: [facility] ENDO;  Service: Endoscopy;  Laterality: N/A;    ESOPHAGOGASTRODUODENOSCOPY      dilated esophagus    ESOPHAGOGASTRODUODENOSCOPY N/A 6/11/2021    Procedure: EGD (ESOPHAGOGASTRODUODENOSCOPY);  Surgeon: Jake Sheriff MD;  Location: [facility] ENDO;  Service: Endoscopy;  Laterality: N/A;    GASTRIC BYPASS      2011    HYSTERECTOMY  1980    interstim bladder  2009    10/14/14 new battery    OOPHORECTOMY  1980    REPLACEMENT OF SACRAL NERVE STIMULATOR  2/18/2020    Procedure: REPLACEMENT, NEUROSTIMULATOR, SACRAL;  Surgeon: Mary Jane Jarvis MD;  Location: [facility] OR [address] [place] [address];  Service: Urology;;    REVISION OF PROCEDURE INVOLVING SACRAL NEUROSTIMULATOR DEVICE Right 2/18/2020    Procedure: REVISION, NEUROSTIMULATOR, SACRAL/ battery replacement;  Surgeon: Mary Jane Jarvis MD;  Location: [facility] OR [address] [place] [address];  Service: Urology;  Laterality: Right;  1hr/ rep contacted/ (CONNIE)    SKIN CANCER EXCISION      left hand    TONSILLECTOMY      WISDOM TOOTH EXTRACTION       Social History     Tobacco Use    Smoking status: Never    Smokeless tobacco: Never   Substance Use Topics    Alcohol use: Not Currently    Drug use: No     Family History   Problem Relation Age of Onset    Breast cancer Mother 50    Breast cancer Paternal Aunt         40's    Cervical cancer Paternal Grandmother     Ovarian cancer Paternal Grandmother     Cervical cancer Paternal Cousin     Ovarian cancer Paternal Cousin     Diabetes Other     Hypertension Other     Hypothyroidism Other     Allergic rhinitis Neg Hx     Allergies Neg Hx     Angioedema Neg Hx     Asthma Neg Hx     Atopy Neg Hx     Eczema Neg Hx     Immunodeficiency Neg Hx     Rhinitis Neg Hx     Urticaria Neg Hx     Uterine cancer Neg Hx      Review of patient's allergies indicates:   Allergen Reactions    Sulfa (sulfonamide antibiotics) Hives    Naproxen Other (See Comments)     Other  "reaction(s): RT sided numbness         Performance Status:The patient's activity level is functions out of house.      Review of Systems:  a review of eleven systems covering constitutional, Eye, HEENT, Psych, Respiratory, Cardiac, GI, , Musculoskeletal, Endocrine, Dermatologic was negative except for pertinent findings as listed ABOVE and below: all good,  Had dm that remitted with bypass 2011.    Positive for SOB, nasal drip,  cough, wheeze, fatigue    Exam:Comprehensive exam done. BP (!) 146/83 (BP Location: Left arm, Patient Position: Sitting, BP Method: Small (Automatic))   Pulse 75   Ht 5' 4" (1.626 m)   Wt 97.7 kg (215 lb 8 oz)   SpO2 97% Comment: on room air at rest  BMI 36.99 kg/m²   Exam included Vitals as listed, and patient's appearance and affect and alertness and mood, oral exam for yeast and hygiene and pharynx lesions and Mallapatti (M) score, neck with inspection for jvd and masses and thyroid abnormalities and lymph nodes (supraclavicular and infraclavicular nodes and axillary also examined and noted if abn), chest exam included symmetry and effort and fremitus and percussion and auscultation, cardiac exam included rhythm and gallops and murmur and rubs and jvd and edema, abdominal exam for mass and hepatosplenomegaly and tenderness and hernias and bowel sounds, Musculoskeletal exam with muscle tone and posture and mobility/gait and  strength, and skin for rashes and cyanosis and pallor and turgor, extremity for clubbing.  Findings were normal except for pertinent findings listed below:  M3,  chest is symmetric, no distress, normal percussion. Mild rhonchi to the LLL, clear throughout rest. On room air, in no acute distress.      Radiographs (ct chest and cxr) reviewed: view by direct vision  i do see clear bronchiectasis,nodules are noted.  Mucoid type impaction suggested in rul ant segment.    X-Ray Chest PA And Lateral 06/09/2022 lungs clear    CT Abdomen Pelvis With Contrast " 06/11/2021 lower lung bases clear    CT Chest Without Contrast  04/22/2019 stable non calcified nodules date to 2017 with no change.         Patient's labs were reviewed including CBC and CMP    Lab Results   Component Value Date    WBC 5.09 03/03/2023    RBC 4.09 03/03/2023    HGB 11.6 (L) 03/03/2023    HCT 36.6 (L) 03/03/2023    MCV 90 03/03/2023    MCH 28.4 03/03/2023    MCHC 31.7 (L) 03/03/2023    RDW 14.5 03/03/2023     03/03/2023    MPV 11.2 03/03/2023    GRAN 3.0 03/03/2023    GRAN 59.3 03/03/2023    LYMPH 1.5 03/03/2023    LYMPH 30.1 03/03/2023    MONO 0.5 03/03/2023    MONO 9.2 03/03/2023    EOS 0.0 03/03/2023    BASO 0.01 03/03/2023    EOSINOPHIL 0.6 03/03/2023    BASOPHIL 0.2 03/03/2023      Latest Reference Range & Units 01/28/19 16:17   Eos # 0.0 - 0.5 K/uL 0.2       Aerobic culture 10/27/22 CURVULARIA SPECIES      Latest Reference Range & Units 11/02/21 09:54 03/03/22 09:57 04/27/22 11:47 09/02/22 08:48   CO2 23 - 29 mmol/L 30 (H) 31 (H) 27 26   (H): Data is abnormally high    PFT  Spirometry with bronchodilator, lung volume by gas dilution, and diffusion capacity measured April 19, 2021. The FEV1 FVC ratio was 75% indicating no airflow obstruction measured by spirometry. The FEV1 was 99% predicted at 2.25 L. There was no   statistically significant improvement in spirometry following bronchodilator. Total lung capacity was within normal range at 90% of predicted. Diffusion was slightly low at 77% predicted-uncorrected for anemia if present.   Spirometry is normal. Lung volumes fall within normal range. Diffusion is slightly decreased. Clinical correlation recommended. The bronchodilator response was not significant.       Done [facility] with 10% response, otherwise nl  DATE OF PROCEDURE:  03/24/2017     1.  Spirometry reveals an FVC of 3.1 L, which is 107% predicted.  FEV1 is 2.3 L,   which is 100% predicted.  The ratio, there is preserved.  There is a positive,   but not significant  bronchodilator response to both the FVC and FEV1.  Loop   contours appear with adequate effort as well.  2.  Lung volumes.  The total lung capacity is 4.4 L, which is 92% predicted.    There are no signs of gas trapping.  3.  Diffusing capacity is mild-to-moderate reduced at 68%, does improve to 96%   with alveolar volumes.     OVERALL IMPRESSION:  A normal spirometry with a robust yet statistically   insignificant bronchodilator response.  Normal lung volumes and a moderate   reduction in diffusion capacity.    CC: Jonathan Nicole M.D.      Plan:  Clinical impression is resonably certain and repeated evaluation prn +/- follow up will be needed as below.    Cornelia was seen today for cough.    Diagnoses and all orders for this visit:    Severe persistent asthma with acute exacerbation  -     levalbuterol (XOPENEX HFA) 45 mcg/actuation inhaler; Inhale 1-2 puffs into the lungs every 4 (four) hours as needed for Wheezing or Shortness of Breath. Rescue  -     predniSONE (DELTASONE) 10 MG tablet; 3 pills for 3 days, 2 for 3 days, them 1 for 3 days, repeat for cough  -     doxycycline (VIBRAMYCIN) 100 MG Cap; Take 1 capsule (100 mg total) by mouth every 12 (twelve) hours. for 10 days  -     X-Ray Chest PA And Lateral; Future    Bronchiectasis without complication  -     guaiFENesin (MUCINEX) 600 mg 12 hr tablet; Take 1 tablet (600 mg total) by mouth 2 (two) times daily. for 7 days    SOHA treated with BiPAP    Lung nodule    Tracheobronchomalacia    CVID (common variable immunodeficiency)    Class 2 severe obesity due to excess calories with serious comorbidity and body mass index (BMI) of 36.0 to 36.9 in adult    Chronic sinus complaints          Follow up in about 1 month (around 10/8/2023), or if symptoms worsen or fail to improve.    Discussed with patient above for education the following:      Patient Instructions   Continue current asthma regimen including Trelegy once per day. This inhaler contains an inhaled  steroid component. Rinse mouth after each use due to risk for thrush development. If mouth or tongue develops white sores please contact the clinic and I will order a prescription mouth wash.     Start using Levalbuterol inhaler as needed for shortness of breath, wheezing, cough.    Continue to use nebulized treatments as needed for mucous production.    Continue Tezspire.    Prednisone - take as prescribed.    Chest Xray.    Doxycycline - take only if your mucous changes to green, yellow, brown in color.    Mucinex twice per day for the next week or so. Can start using Aerobeka device ten breaths twice per day for the next week or so.     Continue current prescription medication regiment. Keep follow up appointment as scheduled. Please call the office if you have any questions or concerns.

## 2023-09-08 NOTE — PATIENT INSTRUCTIONS
Continue current asthma regimen including Trelegy once per day. This inhaler contains an inhaled steroid component. Rinse mouth after each use due to risk for thrush development. If mouth or tongue develops white sores please contact the clinic and I will order a prescription mouth wash.     Start using Levalbuterol inhaler as needed for shortness of breath, wheezing, cough.    Continue to use nebulized treatments as needed for mucous production.    Continue Tezspire.    Prednisone - take as prescribed.    Chest Xray.    Doxycycline - take only if your mucous changes to green, yellow, brown in color.    Mucinex twice per day for the next week or so. Can start using Aerobeka device ten breaths twice per day for the next week or so.     Continue current prescription medication regiment. Keep follow up appointment as scheduled. Please call the office if you have any questions or concerns.

## 2023-09-11 ENCOUNTER — HOSPITAL ENCOUNTER (OUTPATIENT)
Dept: RADIOLOGY | Facility: HOSPITAL | Age: 73
Discharge: HOME | End: 2023-09-11
Attending: NURSE PRACTITIONER
Payer: MEDICARE

## 2023-09-11 DIAGNOSIS — J45.51 SEVERE PERSISTENT ASTHMA WITH ACUTE EXACERBATION: ICD-10-CM

## 2023-09-11 PROCEDURE — 71046 X-RAY EXAM CHEST 2 VIEWS: CPT | Mod: 26,HCNC,, | Performed by: RADIOLOGY

## 2023-09-11 PROCEDURE — 71046 XR CHEST PA AND LATERAL: ICD-10-PCS | Mod: 26,HCNC,, | Performed by: RADIOLOGY

## 2023-09-11 PROCEDURE — 71046 X-RAY EXAM CHEST 2 VIEWS: CPT | Mod: TC,HCNC,FY,PO

## 2023-09-12 DIAGNOSIS — J45.50 SEVERE PERSISTENT ASTHMA WITHOUT COMPLICATION: ICD-10-CM

## 2023-09-13 ENCOUNTER — PATIENT MESSAGE (OUTPATIENT)
Dept: PULMONOLOGY | Facility: CLINIC | Age: 73
End: 2023-09-13
Payer: MEDICARE

## 2023-09-14 ENCOUNTER — TELEPHONE (OUTPATIENT)
Dept: PULMONOLOGY | Facility: CLINIC | Age: 73
End: 2023-09-14
Payer: MEDICARE

## 2023-09-14 NOTE — TELEPHONE ENCOUNTER
----- Message from Marie Gill MA sent at 9/13/2023  2:42 PM CDT -----  Contact: [employer]    ----- Message -----  From: Angle Johnson  Sent: 9/13/2023   2:23 PM CDT  To: Abby Batres Staff    Type:  Pharmacy Calling to Clarify an RX    Name of Caller:  Pharmacy  Pharmacy Name:    [facility] - [place] - [address] [facility] [address]  [address] [place] [address]  Phone: [phone] Fax: [phone]  Prescription Name:  levalbuterol (XOPENEX HFA) 45 mcg/actuation inhaler 15 g 11 9/8/2023 9/7/2024   Sig - Route: Inhale 1-2 puffs into the lungs every 4 (four) hours as needed for Wheezing or Shortness of Breath. Rescue - Inhalation   What do they need to clarify?:  They are needing to get authorization for the PA.  Best Call Back Number:  [phone], ref # [id number]  Additional Information:  Please call back to answer clinical questions. Thanks!

## 2023-09-15 ENCOUNTER — TELEPHONE (OUTPATIENT)
Dept: PULMONOLOGY | Facility: CLINIC | Age: 73
End: 2023-09-15

## 2023-09-15 ENCOUNTER — CLINICAL SUPPORT (OUTPATIENT)
Dept: PULMONOLOGY | Facility: CLINIC | Age: 73
End: 2023-09-15
Payer: MEDICARE

## 2023-09-15 DIAGNOSIS — J45.50 SEVERE PERSISTENT ASTHMA WITHOUT COMPLICATION: Primary | ICD-10-CM

## 2023-09-15 PROCEDURE — 96372 PR INJECTION,THERAP/PROPH/DIAG2ST, IM OR SUBCUT: ICD-10-PCS | Mod: HCNC,S$GLB,, | Performed by: NURSE PRACTITIONER

## 2023-09-15 PROCEDURE — 96372 THER/PROPH/DIAG INJ SC/IM: CPT | Mod: HCNC,S$GLB,, | Performed by: NURSE PRACTITIONER

## 2023-09-15 NOTE — PROGRESS NOTES
Patient is here for her monthly Tezspire injection.  2 patient identifiers used (Name and ).  Patient received the injection to the left arm                    subcutaneous.  No redness, bleeding, or swelling noted to the injection site.  Pt tolerated well.    NDC:81375-603-89  LOT: [id number]  EXP:  2025

## 2023-09-15 NOTE — TELEPHONE ENCOUNTER
Spoke to patient   ----- Message from Dannielle Mayen sent at 9/15/2023  4:06 PM CDT -----  Regarding: Patient Returning Call  Contact: NEIL STEVENS [phone]  Type:  Patient Returning Call        Who Called:  NEIL STEVENS    Who Left Message for Patient: NETTA     Does the patient know what this is regarding?  YES - RESULTS    Best Call Back Number:  [phone] (home)       Additional Information:  Patient is returning nurse/Ma phone call, VM was left but no message was  enter in Epic.   Please call back and advise. Thanks

## 2023-09-17 ENCOUNTER — PATIENT MESSAGE (OUTPATIENT)
Dept: PULMONOLOGY | Facility: CLINIC | Age: 73
End: 2023-09-17
Payer: MEDICARE

## 2023-09-18 ENCOUNTER — OFFICE VISIT (OUTPATIENT)
Dept: FAMILY MEDICINE | Facility: CLINIC | Age: 73
End: 2023-09-18
Payer: MEDICARE

## 2023-09-18 VITALS
HEIGHT: 64 IN | OXYGEN SATURATION: 97 % | TEMPERATURE: 98 F | SYSTOLIC BLOOD PRESSURE: 116 MMHG | BODY MASS INDEX: 36.51 KG/M2 | DIASTOLIC BLOOD PRESSURE: 68 MMHG | HEART RATE: 69 BPM | WEIGHT: 213.88 LBS

## 2023-09-18 DIAGNOSIS — Z00.00 ROUTINE PHYSICAL EXAMINATION: Primary | ICD-10-CM

## 2023-09-18 DIAGNOSIS — I10 ESSENTIAL HYPERTENSION: ICD-10-CM

## 2023-09-18 DIAGNOSIS — E11.9 CONTROLLED TYPE 2 DIABETES MELLITUS WITHOUT COMPLICATION, WITHOUT LONG-TERM CURRENT USE OF INSULIN: ICD-10-CM

## 2023-09-18 DIAGNOSIS — E78.5 DYSLIPIDEMIA: ICD-10-CM

## 2023-09-18 DIAGNOSIS — D64.9 NORMOCYTIC ANEMIA: ICD-10-CM

## 2023-09-18 DIAGNOSIS — I65.23 BILATERAL CAROTID ARTERY STENOSIS: Primary | ICD-10-CM

## 2023-09-18 PROCEDURE — 3008F BODY MASS INDEX DOCD: CPT | Mod: HCNC,CPTII,S$GLB, | Performed by: INTERNAL MEDICINE

## 2023-09-18 PROCEDURE — 1101F PT FALLS ASSESS-DOCD LE1/YR: CPT | Mod: HCNC,CPTII,S$GLB, | Performed by: INTERNAL MEDICINE

## 2023-09-18 PROCEDURE — 3074F PR MOST RECENT SYSTOLIC BLOOD PRESSURE < 130 MM HG: ICD-10-PCS | Mod: HCNC,CPTII,S$GLB, | Performed by: INTERNAL MEDICINE

## 2023-09-18 PROCEDURE — 1126F AMNT PAIN NOTED NONE PRSNT: CPT | Mod: HCNC,CPTII,S$GLB, | Performed by: INTERNAL MEDICINE

## 2023-09-18 PROCEDURE — 3078F DIAST BP <80 MM HG: CPT | Mod: HCNC,CPTII,S$GLB, | Performed by: INTERNAL MEDICINE

## 2023-09-18 PROCEDURE — 3008F PR BODY MASS INDEX (BMI) DOCUMENTED: ICD-10-PCS | Mod: HCNC,CPTII,S$GLB, | Performed by: INTERNAL MEDICINE

## 2023-09-18 PROCEDURE — 3044F PR MOST RECENT HEMOGLOBIN A1C LEVEL <7.0%: ICD-10-PCS | Mod: HCNC,CPTII,S$GLB, | Performed by: INTERNAL MEDICINE

## 2023-09-18 PROCEDURE — 3066F PR DOCUMENTATION OF TREATMENT FOR NEPHROPATHY: ICD-10-PCS | Mod: HCNC,CPTII,S$GLB, | Performed by: INTERNAL MEDICINE

## 2023-09-18 PROCEDURE — 1157F ADVNC CARE PLAN IN RCRD: CPT | Mod: HCNC,CPTII,S$GLB, | Performed by: INTERNAL MEDICINE

## 2023-09-18 PROCEDURE — 1157F PR ADVANCE CARE PLAN OR EQUIV PRESENT IN MEDICAL RECORD: ICD-10-PCS | Mod: HCNC,CPTII,S$GLB, | Performed by: INTERNAL MEDICINE

## 2023-09-18 PROCEDURE — 3066F NEPHROPATHY DOC TX: CPT | Mod: HCNC,CPTII,S$GLB, | Performed by: INTERNAL MEDICINE

## 2023-09-18 PROCEDURE — 1159F PR MEDICATION LIST DOCUMENTED IN MEDICAL RECORD: ICD-10-PCS | Mod: HCNC,CPTII,S$GLB, | Performed by: INTERNAL MEDICINE

## 2023-09-18 PROCEDURE — 3060F POS MICROALBUMINURIA REV: CPT | Mod: HCNC,CPTII,S$GLB, | Performed by: INTERNAL MEDICINE

## 2023-09-18 PROCEDURE — 1160F PR REVIEW ALL MEDS BY PRESCRIBER/CLIN PHARMACIST DOCUMENTED: ICD-10-PCS | Mod: HCNC,CPTII,S$GLB, | Performed by: INTERNAL MEDICINE

## 2023-09-18 PROCEDURE — 3288F PR FALLS RISK ASSESSMENT DOCUMENTED: ICD-10-PCS | Mod: HCNC,CPTII,S$GLB, | Performed by: INTERNAL MEDICINE

## 2023-09-18 PROCEDURE — 99999 PR PBB SHADOW E&M-EST. PATIENT-LVL V: CPT | Mod: PBBFAC,HCNC,, | Performed by: INTERNAL MEDICINE

## 2023-09-18 PROCEDURE — 1159F MED LIST DOCD IN RCRD: CPT | Mod: HCNC,CPTII,S$GLB, | Performed by: INTERNAL MEDICINE

## 2023-09-18 PROCEDURE — 1126F PR PAIN SEVERITY QUANTIFIED, NO PAIN PRESENT: ICD-10-PCS | Mod: HCNC,CPTII,S$GLB, | Performed by: INTERNAL MEDICINE

## 2023-09-18 PROCEDURE — 3078F PR MOST RECENT DIASTOLIC BLOOD PRESSURE < 80 MM HG: ICD-10-PCS | Mod: HCNC,CPTII,S$GLB, | Performed by: INTERNAL MEDICINE

## 2023-09-18 PROCEDURE — 3288F FALL RISK ASSESSMENT DOCD: CPT | Mod: HCNC,CPTII,S$GLB, | Performed by: INTERNAL MEDICINE

## 2023-09-18 PROCEDURE — 1101F PR PT FALLS ASSESS DOC 0-1 FALLS W/OUT INJ PAST YR: ICD-10-PCS | Mod: HCNC,CPTII,S$GLB, | Performed by: INTERNAL MEDICINE

## 2023-09-18 PROCEDURE — 99397 PER PM REEVAL EST PAT 65+ YR: CPT | Mod: HCNC,S$GLB,, | Performed by: INTERNAL MEDICINE

## 2023-09-18 PROCEDURE — 99999 PR PBB SHADOW E&M-EST. PATIENT-LVL V: ICD-10-PCS | Mod: PBBFAC,HCNC,, | Performed by: INTERNAL MEDICINE

## 2023-09-18 PROCEDURE — 3074F SYST BP LT 130 MM HG: CPT | Mod: HCNC,CPTII,S$GLB, | Performed by: INTERNAL MEDICINE

## 2023-09-18 PROCEDURE — 3044F HG A1C LEVEL LT 7.0%: CPT | Mod: HCNC,CPTII,S$GLB, | Performed by: INTERNAL MEDICINE

## 2023-09-18 PROCEDURE — 3060F PR POS MICROALBUMINURIA RESULT DOCUMENTED/REVIEW: ICD-10-PCS | Mod: HCNC,CPTII,S$GLB, | Performed by: INTERNAL MEDICINE

## 2023-09-18 PROCEDURE — 1160F RVW MEDS BY RX/DR IN RCRD: CPT | Mod: HCNC,CPTII,S$GLB, | Performed by: INTERNAL MEDICINE

## 2023-09-18 PROCEDURE — 99397 PR PREVENTIVE VISIT,EST,65 & OVER: ICD-10-PCS | Mod: HCNC,S$GLB,, | Performed by: INTERNAL MEDICINE

## 2023-09-18 RX ORDER — RESPIRATORY SYNCYTIAL VISUS VACCINE RECOMBINANT, ADJUVANTED 120MCG/0.5
0.5 KIT INTRAMUSCULAR ONCE
Qty: 1 EACH | Refills: 0 | Status: SHIPPED | OUTPATIENT
Start: 2023-09-18 | End: 2023-09-18

## 2023-09-18 RX ORDER — VALSARTAN AND HYDROCHLOROTHIAZIDE 160; 12.5 MG/1; MG/1
1 TABLET, FILM COATED ORAL DAILY
Qty: 90 TABLET | Refills: 3 | Status: SHIPPED | OUTPATIENT
Start: 2023-09-18 | End: 2023-11-06 | Stop reason: DRUGHIGH

## 2023-09-18 NOTE — PROGRESS NOTES
Subjective:       Patient ID: Cornelia Delatorre is a 72 y.o. female.  Chief Complaint: Diabetes     HPI    Here for routine health maintenance.        Has metal implant for bladder.       CVID -On IV Ig;      Bronchiectasis/ asthma - stable.   Recalll:   Dr Nicole in [place].  Feels SOB mostly related to bronchiectasis.  Her symptoms have improved with asthma tx - Symbicort.    Pulmonary nodules - Dr Nicole evaluating.  Incomplete response to pneumovax - recommends repeat Prevnar 13.  Eosinophilia and pn 7.14 level at 50%  angiogram was normal     HTN - controlled   DM - controlled;  Sees podiatry for peripheral neuropathy in past  Diabetic peripheral neuropathy - controlled with 600 mg gabapentin tid.  Outside foot doctor.   HLD - controlled for goal 70; high triglycerides.      Occasional anxiety relieved with prn hydroxyzine.   Had EGD- dysphagia with Dr Krishnan     Normocytic anemia - no blood/melena.  Iron stable.  [facility]scp 2022 wnl per pt.  EGD 2021 for dysphagia did not show contributing etiology per patient.  Dr Krishnan      Dx w [facility] likely related to some meds per testing neurology; stable      Complains of frequent UTIs - 4/yr.  I am treating one now and her symptoms are improving.  Discussed daily Macrobid prophylaxis.     Complains of b/l leg weakness.  Gets SOB walking to mailbox.  Was inactive with lung issues, but this seems to be stable now.  Echo 11/2021 EF wnl, grade II diastolic dysfunction.  Likely deconditioning.         Assessment:       1. Routine physical examination    2. Essential hypertension    3. Controlled type 2 diabetes mellitus without complication, without long-term current use of insulin    4. Dyslipidemia    5. Normocytic anemia        Plan:       Routine physical examination    Essential hypertension  -     valsartan-hydrochlorothiazide (DIOVAN-HCT) 160-12.5 mg per tablet; Take 1 tablet by mouth once daily.  Dispense: 90 tablet; Refill: 3  -     Comprehensive Metabolic Panel; Future;  Expected date: 03/16/2024    Controlled type 2 diabetes mellitus without complication, without long-term current use of insulin  -     Microalbumin/Creatinine Ratio, Urine; Future; Expected date: 03/16/2024  -     Hemoglobin A1C; Future; Expected date: 03/16/2024    Dyslipidemia  -     Lipid Panel; Future; Expected date: 03/16/2024    Normocytic anemia  -     CBC Auto Differential; Future; Expected date: 03/16/2024  -     Occult blood x 1, stool; Future  -     Occult blood x 1, stool; Future  -     Occult blood x 1, stool; Future          Wellness reviewed      Continue current management and monitor.  Other diagnoses were reviewed and found stable and will continue to monitor.  Counseled on regular exercise, maintenance of a healthy weight, balanced diet rich in fruits/vegetables and lean protein, and avoidance of unhealthy habits like smoking and excessive alcohol intake.   Also, counseled on importance of being compliant with medication, health appointments, diet and exercise.     Follow up in about 6 months (around 3/18/2024).      Medication List with Changes/Refills   Current Medications    ASCORBIC ACID, VITAMIN C, (VITAMIN C) 1000 MG TABLET    Take 1,000 mg by mouth 2 (two) times daily.     AZELASTINE (ASTELIN) 137 MCG (0.1 %) NASAL SPRAY    1 spray (137 mcg total) by Nasal route 2 (two) times daily.    AZELASTINE (OPTIVAR) 0.05 % OPHTHALMIC SOLUTION    Place 1 drop into both eyes. As needed    B-COMPLEX WITH VITAMIN C (Z-BEC OR EQUIV) TABLET    Take 1 tablet by mouth once daily.    BIFIDOBACTERIUM INFANTIS (ALIGN ORAL)    Take by mouth once daily.    BIOTIN 10,000 MCG CAP    Take 1 tablet by mouth every evening.     BLOOD SUGAR DIAGNOSTIC STRP    Insurance preferred meter and strips. Check daily    BLOOD-GLUCOSE METER KIT    Use as instructed. Insurance preferred meter.    CHLORPHENIRAMINE (CHLOR-TRIMETON) 4 MG TABLET    Take 1 tablet (4 mg total) by mouth every 8 (eight) hours while awake.    CLONIDINE  (CATAPRES) 0.1 MG TABLET    Take 1 tablet (0.1 mg total) by mouth 3 (three) times daily as needed (PRN SBP > 165 mmHg).    CRANBERRY 500 MG CAP    Take 1 capsule by mouth every evening.    DICLOFENAC SODIUM (VOLTAREN) 1 % GEL    Apply 2 grams topically once daily.    DILTIAZEM (CARDIZEM CD) 120 MG CP24    TAKE 1 CAPSULE (120 MG TOTAL) BY MOUTH EVERY EVENING.    DOXYCYCLINE (VIBRAMYCIN) 100 MG CAP    Take 1 capsule (100 mg total) by mouth every 12 (twelve) hours. for 10 days    EPINEPHRINE (EPIPEN) 0.3 MG/0.3 ML ATIN    Inject 1 each into the muscle as needed.     ERYTHROMYCIN WITH ETHANOL (THERAMYCIN) 2 % EXTERNAL SOLUTION    Aaa bid prn    ESOMEPRAZOLE (NEXIUM) 40 MG CAPSULE    Take 1 capsule (40 mg total) by mouth before breakfast.    ESTRADIOL (ESTRACE) 0.01 % (0.1 MG/GRAM) VAGINAL CREAM    Place 1 g vaginally 3 (three) times a week. Place by fingertip application before bedtime three times a week (Monday, Wednesday, Friday)    FEXOFENADINE (ALLEGRA) 180 MG TABLET    Take 180 mg by mouth once daily.     FISH OIL-OMEGA-3 FATTY ACIDS 300-1,000 MG CAPSULE    Take 2 g by mouth once daily.    FLUTICASONE PROPIONATE (FLONASE) 50 MCG/ACTUATION NASAL SPRAY    2 sprays (100 mcg total) by Each Nostril route once daily.    FLUTICASONE-UMECLIDIN-VILANTER (TRELEGY ELLIPTA) 200-62.5-25 MCG INHALER    Inhale 1 puff into the lungs once daily.    GABAPENTIN (NEURONTIN) 600 MG TABLET    Take 1 tablet (600 mg total) by mouth 3 (three) times daily.    GUAIFENESIN-CODEINE 100-10 MG/5 ML (GUAIFENESIN AC)  MG/5 ML SYRUP    Take 5 mLs by mouth 3 (three) times daily as needed for Cough.    HYDROXYZINE HCL (ATARAX) 10 MG TAB    Take 1 tablet (10 mg total) by mouth 3 (three) times daily as needed.    IMMUN GLOB G,IGG,-PRO-IGA 0-50 (HIZENTRA) 1 GRAM/5 ML (20 %) SOLN    Inject 55 mLs (11 g total) into the skin once a week.    INHALATION SPACING DEVICE    Use as directed for inhalation.    LANCETS (ACCU-CHEK SOFTCLIX LANCETS) MISC     Use to check blood sugar daily.    LEVALBUTEROL (XOPENEX HFA) 45 MCG/ACTUATION INHALER    Inhale 1-2 puffs into the lungs every 4 (four) hours as needed for Wheezing or Shortness of Breath. Rescue    LEVALBUTEROL (XOPENEX) 1.25 MG/3 ML NEBULIZER SOLUTION    Take 3 mLs (1.25 mg total) by nebulization every 4 (four) hours as needed for Wheezing or Shortness of Breath. Rescue    METFORMIN (GLUCOPHAGE-XR) 500 MG ER 24HR TABLET    Take 1 tablet (500 mg total) by mouth 2 (two) times daily with meals.    MOMETASONE 0.1% (ELOCON) 0.1 % CREAM    aaa bid x 1-2 weeks prn bug bites    MONTELUKAST (SINGULAIR) 10 MG TABLET    Take 1 tablet (10 mg total) by mouth every evening.    MUCUS CLEARING DEVICE KAREN    1 Device by Misc.(Non-Drug; Combo Route) route 2 (two) times daily.    MULTIVITAMIN CAPSULE    Take 1 capsule by mouth once daily.     MUPIROCIN (BACTROBAN) 2 % OINTMENT    Apply topically 3 (three) times daily.    ONDANSETRON (ZOFRAN-ODT) 4 MG TBDL    Take 1 tablet (4 mg total) by mouth every 8 (eight) hours as needed (nausea).    POTASSIUM CHLORIDE SA (K-DUR,KLOR-CON) 20 MEQ TABLET    Take 1 tablet (20 mEq total) by mouth once daily.    PRAVASTATIN (PRAVACHOL) 80 MG TABLET    TAKE 1 TABLET EVERY DAY    PREDNISONE (DELTASONE) 10 MG TABLET    3 pills for 3 days, 2 for 3 days, them 1 for 3 days, repeat for cough    PSYLLIUM HUSK (METAMUCIL ORAL)    Take by mouth.    SIMETHICONE (GAS-X ORAL)    Take 1 capsule by mouth as needed (gas relief).     TEZEPELUMAB-EKKO (TEZSPIRE) 210 MG/1.91 ML (110 MG/ML) SYRG    Inject 1.91 mLs (210 mg total) into the skin every 28 days.    VITAMIN D3-FOLIC ACID 5,000 UNIT- 1 MG TAB    Take 1 tablet by mouth once daily.    Changed and/or Refilled Medications    Modified Medication Previous Medication    VALSARTAN-HYDROCHLOROTHIAZIDE (DIOVAN-HCT) 160-12.5 MG PER TABLET valsartan-hydrochlorothiazide (DIOVAN-HCT) 160-12.5 mg per tablet       Take 1 tablet by mouth once daily.    Take 1 tablet by mouth  once daily.   Discontinued Medications    SEMAGLUTIDE (OZEMPIC) 0.25 MG OR 0.5 MG(2 MG/1.5 ML) PEN INJECTOR    Inject 0.25 mg into the skin every 7 days for 30 days, THEN 0.5 mg every 7 days.       BP Readings from Last 3 Encounters:   09/18/23 116/68   09/08/23 (!) 146/83   07/17/23 135/77     Hemoglobin A1C   Date Value Ref Range Status   09/11/2023 6.4 (H) 4.0 - 5.6 % Final     Comment:     ADA Screening Guidelines:  5.7-6.4%  Consistent with prediabetes  >or=6.5%  Consistent with diabetes    High levels of fetal hemoglobin interfere with the HbA1C  assay. Heterozygous hemoglobin variants (HbS, HgC, etc)do  not significantly interfere with this assay.   However, presence of multiple variants may affect accuracy.     03/03/2023 6.1 (H) 4.0 - 5.6 % Final     Comment:     ADA Screening Guidelines:  5.7-6.4%  Consistent with prediabetes  >or=6.5%  Consistent with diabetes    High levels of fetal hemoglobin interfere with the HbA1C  assay. Heterozygous hemoglobin variants (HbS, HgC, etc)do  not significantly interfere with this assay.   However, presence of multiple variants may affect accuracy.     09/02/2022 6.3 (H) 4.0 - 5.6 % Final     Comment:     ADA Screening Guidelines:  5.7-6.4%  Consistent with prediabetes  >or=6.5%  Consistent with diabetes    High levels of fetal hemoglobin interfere with the HbA1C  assay. Heterozygous hemoglobin variants (HbS, HgC, etc)do  not significantly interfere with this assay.   However, presence of multiple variants may affect accuracy.       Lab Results   Component Value Date    TSH 1.447 03/03/2023     Lab Results   Component Value Date    LDLCALC 81.6 03/03/2023    LDLCALC 70.6 09/02/2022    LDLCALC 82.0 11/02/2021     Lab Results   Component Value Date    TRIG 97 03/03/2023    TRIG 112 09/02/2022    TRIG 140 11/02/2021     Wt Readings from Last 3 Encounters:   09/18/23 97 kg (213 lb 13.5 oz)   09/08/23 97.7 kg (215 lb 8 oz)   08/15/23 95.7 kg (211 lb)     Lab Results    Component Value Date    HGB 11.9 (L) 09/11/2023    HCT 36.9 (L) 09/11/2023    WBC 7.22 09/11/2023    ALT 24 09/11/2023    AST 26 09/11/2023     09/11/2023    K 4.7 09/11/2023    CREATININE 0.9 09/11/2023           Review of Systems   Constitutional:  Negative for diaphoresis and fever.   HENT:  Negative for drooling and nosebleeds.    Eyes:  Negative for discharge and redness.   Respiratory:  Negative for apnea and choking.    Cardiovascular:  Negative for chest pain and palpitations.   Gastrointestinal:  Negative for abdominal pain and nausea.   Skin:  Negative for color change.   Neurological:  Negative for seizures and syncope.   Psychiatric/Behavioral:  Negative for behavioral problems.            Objective:      Vitals:    09/18/23 1135   BP: 116/68   Pulse: 69   Temp: 98.2 °F (36.8 °C)     Physical Exam  Vitals reviewed.   Eyes:      Conjunctiva/sclera: Conjunctivae normal.   Neck:      Thyroid: No thyromegaly.      Trachea: Trachea normal.   Cardiovascular:      Rate and Rhythm: Normal rate and regular rhythm.      Comments: Edema negative  Pulmonary:      Effort: Pulmonary effort is normal.      Breath sounds: Normal breath sounds.   Abdominal:      General: Bowel sounds are normal.      Palpations: Abdomen is soft. There is no hepatomegaly.   Musculoskeletal:      Cervical back: Normal range of motion.      Comments: ROM normal bilateral  Strength normal bilateral  Right foot in a boot   Skin:     General: Skin is warm and dry.   Neurological:      Deep Tendon Reflexes: Reflexes are normal and symmetric.   Psychiatric:      Comments: Alert and Oriented

## 2023-10-03 ENCOUNTER — DOCUMENTATION ONLY (OUTPATIENT)
Dept: ALLERGY | Facility: CLINIC | Age: 73
End: 2023-10-03
Payer: MEDICARE

## 2023-10-03 NOTE — PROGRESS NOTES
PA from [facility] for Hizentra covered from 1/1/2023 to 12/31/2023 has been extended for the next calendar year.     PA# [id number]

## 2023-10-05 ENCOUNTER — TELEPHONE (OUTPATIENT)
Dept: PULMONOLOGY | Facility: CLINIC | Age: 73
End: 2023-10-05
Payer: MEDICARE

## 2023-10-05 ENCOUNTER — PATIENT MESSAGE (OUTPATIENT)
Dept: PULMONOLOGY | Facility: CLINIC | Age: 73
End: 2023-10-05
Payer: MEDICARE

## 2023-10-05 NOTE — TELEPHONE ENCOUNTER
Spoke to pt on phone concerning bill for Tezspire.   sent message to [employer] and Tezspire  to assist with co-pay of $782.69.

## 2023-10-13 ENCOUNTER — CLINICAL SUPPORT (OUTPATIENT)
Dept: PULMONOLOGY | Facility: CLINIC | Age: 73
End: 2023-10-13
Payer: MEDICARE

## 2023-10-13 DIAGNOSIS — J45.50 SEVERE PERSISTENT ASTHMA WITHOUT COMPLICATION: Primary | ICD-10-CM

## 2023-10-13 NOTE — PROGRESS NOTES
Patient is here for her monthly Tezspire injection.  2 patient identifiers used (Name and ).  Patient received the injection to the left arm                  subcutaneous.  No redness, bleeding, or swelling noted to the injection site.  Pt tolerated well.    NDC: 5513-112-01  LOT:  [id number]  EXP:   2025

## 2023-10-16 ENCOUNTER — OFFICE VISIT (OUTPATIENT)
Dept: CARDIOLOGY | Facility: CLINIC | Age: 73
End: 2023-10-16
Payer: MEDICARE

## 2023-10-16 VITALS
HEIGHT: 64 IN | HEART RATE: 75 BPM | BODY MASS INDEX: 36.32 KG/M2 | SYSTOLIC BLOOD PRESSURE: 130 MMHG | WEIGHT: 212.75 LBS | DIASTOLIC BLOOD PRESSURE: 70 MMHG

## 2023-10-16 DIAGNOSIS — I87.2 VENOUS INSUFFICIENCY OF BOTH LOWER EXTREMITIES: ICD-10-CM

## 2023-10-16 DIAGNOSIS — I70.0 ATHEROSCLEROSIS OF ABDOMINAL AORTA: ICD-10-CM

## 2023-10-16 DIAGNOSIS — I10 ESSENTIAL HYPERTENSION: ICD-10-CM

## 2023-10-16 DIAGNOSIS — E78.5 DYSLIPIDEMIA: ICD-10-CM

## 2023-10-16 DIAGNOSIS — I65.23 BILATERAL CAROTID ARTERY STENOSIS: ICD-10-CM

## 2023-10-16 DIAGNOSIS — I10 BENIGN ESSENTIAL HTN: Primary | ICD-10-CM

## 2023-10-16 PROCEDURE — 3008F PR BODY MASS INDEX (BMI) DOCUMENTED: ICD-10-PCS | Mod: HCNC,CPTII,S$GLB,

## 2023-10-16 PROCEDURE — 3066F NEPHROPATHY DOC TX: CPT | Mod: HCNC,CPTII,S$GLB,

## 2023-10-16 PROCEDURE — 3044F HG A1C LEVEL LT 7.0%: CPT | Mod: HCNC,CPTII,S$GLB,

## 2023-10-16 PROCEDURE — 99214 OFFICE O/P EST MOD 30 MIN: CPT | Mod: HCNC,S$GLB,,

## 2023-10-16 PROCEDURE — 3288F PR FALLS RISK ASSESSMENT DOCUMENTED: ICD-10-PCS | Mod: HCNC,CPTII,S$GLB,

## 2023-10-16 PROCEDURE — 3066F PR DOCUMENTATION OF TREATMENT FOR NEPHROPATHY: ICD-10-PCS | Mod: HCNC,CPTII,S$GLB,

## 2023-10-16 PROCEDURE — 3060F POS MICROALBUMINURIA REV: CPT | Mod: HCNC,CPTII,S$GLB,

## 2023-10-16 PROCEDURE — 1159F PR MEDICATION LIST DOCUMENTED IN MEDICAL RECORD: ICD-10-PCS | Mod: HCNC,CPTII,S$GLB,

## 2023-10-16 PROCEDURE — 99999 PR PBB SHADOW E&M-EST. PATIENT-LVL IV: CPT | Mod: PBBFAC,HCNC,,

## 2023-10-16 PROCEDURE — 3008F BODY MASS INDEX DOCD: CPT | Mod: HCNC,CPTII,S$GLB,

## 2023-10-16 PROCEDURE — 3060F PR POS MICROALBUMINURIA RESULT DOCUMENTED/REVIEW: ICD-10-PCS | Mod: HCNC,CPTII,S$GLB,

## 2023-10-16 PROCEDURE — 1157F ADVNC CARE PLAN IN RCRD: CPT | Mod: HCNC,CPTII,S$GLB,

## 2023-10-16 PROCEDURE — 1101F PR PT FALLS ASSESS DOC 0-1 FALLS W/OUT INJ PAST YR: ICD-10-PCS | Mod: HCNC,CPTII,S$GLB,

## 2023-10-16 PROCEDURE — 3288F FALL RISK ASSESSMENT DOCD: CPT | Mod: HCNC,CPTII,S$GLB,

## 2023-10-16 PROCEDURE — 3078F DIAST BP <80 MM HG: CPT | Mod: HCNC,CPTII,S$GLB,

## 2023-10-16 PROCEDURE — 3075F SYST BP GE 130 - 139MM HG: CPT | Mod: HCNC,CPTII,S$GLB,

## 2023-10-16 PROCEDURE — 1101F PT FALLS ASSESS-DOCD LE1/YR: CPT | Mod: HCNC,CPTII,S$GLB,

## 2023-10-16 PROCEDURE — 1159F MED LIST DOCD IN RCRD: CPT | Mod: HCNC,CPTII,S$GLB,

## 2023-10-16 PROCEDURE — 99214 PR OFFICE/OUTPT VISIT, EST, LEVL IV, 30-39 MIN: ICD-10-PCS | Mod: HCNC,S$GLB,,

## 2023-10-16 PROCEDURE — 1157F PR ADVANCE CARE PLAN OR EQUIV PRESENT IN MEDICAL RECORD: ICD-10-PCS | Mod: HCNC,CPTII,S$GLB,

## 2023-10-16 PROCEDURE — 3078F PR MOST RECENT DIASTOLIC BLOOD PRESSURE < 80 MM HG: ICD-10-PCS | Mod: HCNC,CPTII,S$GLB,

## 2023-10-16 PROCEDURE — 3044F PR MOST RECENT HEMOGLOBIN A1C LEVEL <7.0%: ICD-10-PCS | Mod: HCNC,CPTII,S$GLB,

## 2023-10-16 PROCEDURE — 1126F AMNT PAIN NOTED NONE PRSNT: CPT | Mod: HCNC,CPTII,S$GLB,

## 2023-10-16 PROCEDURE — 3075F PR MOST RECENT SYSTOLIC BLOOD PRESS GE 130-139MM HG: ICD-10-PCS | Mod: HCNC,CPTII,S$GLB,

## 2023-10-16 PROCEDURE — 1126F PR PAIN SEVERITY QUANTIFIED, NO PAIN PRESENT: ICD-10-PCS | Mod: HCNC,CPTII,S$GLB,

## 2023-10-16 PROCEDURE — 99999 PR PBB SHADOW E&M-EST. PATIENT-LVL IV: ICD-10-PCS | Mod: PBBFAC,HCNC,,

## 2023-10-16 NOTE — PROGRESS NOTES
Subjective:    Patient ID:  Cornelia Delatorre is a 72 y.o. female patient here for evaluation Follow-up (Annual f/u/)    History of Present Illness:     Mrs. Delatorre is a 72 year old F who follows with Dr. Gonzalez here today for a follow up. BP has been high, abg 150/90 on home log. This makes her feel flushed in the face and have a general feeling of being unwell. No TIA like symptoms, CP, dizziness, swelling, SOB.   She is taking diovan-HCT int he Am and cardizem in the PM.           Most Recent Echocardiogram Results  Results for orders placed in visit on 06/05/23    Echo    Interpretation Summary  · The left ventricle is normal in size with normal systolic function.  · The estimated ejection fraction is 60%.  · Normal left ventricular diastolic function.  · Normal right ventricular size with normal right ventricular systolic function.  · Normal central venous pressure (3 mmHg).  · The estimated PA systolic pressure is 29 mmHg.      Most Recent Nuclear Stress Test Results  No results found for this or any previous visit.      Most Recent Cardiac PET Stress Test Results  No results found for this or any previous visit.      Most Recent Cardiovascular Angiogram results  No results found for this or any previous visit.      Other Most Recent Cardiology Results  Results for orders placed during the hospital encounter of 06/10/21    CARDIAC MONITORING STRIPS      REVIEW OF SYSTEMS: As noted in HPI   CARDIOVASCULAR: No recent chest pain, palpitations, arm/neck/jaw pain, or edema.  RESPIRATORY: No recent fever, cough, SOB.  : No blood in the urine  GI: No reflux, nausea, vomiting, or blood in stool.   MUSCULOSKELETAL: No falls.   NEURO: No headaches, syncope, or dizziness.  EYES: No sudden changes in vision.   + facial flushing, + malaise     Past Medical History:   Diagnosis Date    Allergy     Arthritis     Asthma     Cancer     skin cancer to right hand    Cataract     Chronic fatigue 01/24/2017    CVID  (common variable immunodeficiency) 03/07/2019    Diabetes mellitus     type2    KLIEN (dyspnea on exertion) 01/24/2017    Dyslipidemia 06/25/2019    Encounter for blood transfusion     Essential hypertension 12/29/2011    GERD (gastroesophageal reflux disease)     Headache(784.0)     History of lumpectomy of both breasts     1992 negative    Hyperlipidemia 07/15/2015    Hypertension     Hypothyroidism     Neuropathy     Obesity     Obesity     Postmenopausal HRT (hormone replacement therapy)     Rash     Rosacea     Sleep apnea     on bipap    Snoring     Squamous cell carcinoma of skin     left forearm     UTI (urinary tract infection)     Wears glasses      Past Surgical History:   Procedure Laterality Date    ADENOIDECTOMY      APPENDECTOMY      BLADDER SUSPENSION      BREAST BIOPSY Bilateral 1992    bilateral benign excisional biopsies    BUNIONECTOMY  10/17/14    right, still has discomfort    CATARACT EXTRACTION W/  INTRAOCULAR LENS IMPLANT Bilateral     CHOLECYSTECTOMY  08/02/2017    COLONOSCOPY      CYSTOSCOPY      EPIDURAL STEROID INJECTION INTO LUMBAR SPINE N/A 8/14/2019    Procedure: Injection-steroid-epidural-lumbar L5/S1;  Surgeon: Fredi Rojas MD;  Location: [facility] OR;  Service: Pain Management;  Laterality: N/A;    EPIDURAL STEROID INJECTION INTO LUMBAR SPINE N/A 5/3/2021    Procedure: Injection-steroid-epidural-lumbar L5/S1 to the Left;  Surgeon: Fredi Rojas MD;  Location: [facility] OR;  Service: Pain Management;  Laterality: N/A;    EPIDURAL STEROID INJECTION INTO LUMBAR SPINE N/A 9/24/2021    Procedure: Injection-steroid-epidural-lumbar L5/S1;  Surgeon: Fredi Rojas MD;  Location: [facility] OR;  Service: Pain Management;  Laterality: N/A;    EPIDURAL STEROID INJECTION INTO LUMBAR SPINE N/A 2/21/2022    Procedure: Injection-steroid-epidural-lumbar L5/S1;  Surgeon: Fredi Rojas MD;  Location: [facility] OR;  Service: Pain Management;  Laterality: N/A;    ESOPHAGEAL DILATION N/A 6/11/2021    Procedure:  DILATION, ESOPHAGUS;  Surgeon: Jake Sheriff MD;  Location: [facility] ENDO;  Service: Endoscopy;  Laterality: N/A;    ESOPHAGOGASTRODUODENOSCOPY      dilated esophagus    ESOPHAGOGASTRODUODENOSCOPY N/A 6/11/2021    Procedure: EGD (ESOPHAGOGASTRODUODENOSCOPY);  Surgeon: Jake Sheriff MD;  Location: [facility] ENDO;  Service: Endoscopy;  Laterality: N/A;    GASTRIC BYPASS      2011    HYSTERECTOMY  1980    interstim bladder  2009    10/14/14 new battery    OOPHORECTOMY  1980    REPLACEMENT OF SACRAL NERVE STIMULATOR  2/18/2020    Procedure: REPLACEMENT, NEUROSTIMULATOR, SACRAL;  Surgeon: Mary Jane Jarvis MD;  [facility];  Service: Urology;;    REVISION OF PROCEDURE INVOLVING SACRAL NEUROSTIMULATOR DEVICE Right 2/18/2020    Procedure: REVISION, NEUROSTIMULATOR, SACRAL/ battery replacement;  Surgeon: Mary Jane Jarvis MD;  [facility];  Service: Urology;  Laterality: Right;  1hr/ rep contacted/ (CONNIE)    SKIN CANCER EXCISION      left hand    TONSILLECTOMY      WISDOM TOOTH EXTRACTION       Social History     Tobacco Use    Smoking status: Never    Smokeless tobacco: Never   Substance Use Topics    Alcohol use: Not Currently    Drug use: No         Objective      Vitals:    10/16/23 1327   BP: 130/70   Pulse: 75       LAST EKG  Results for orders placed or performed during the hospital encounter of 06/10/21   EKG 12-lead    Collection Time: 06/10/21  7:01 PM    Narrative    Test Reason : R11.10,    Vent. Rate : 068 BPM     Atrial Rate : 068 BPM     P-R Int : 150 ms          QRS Dur : 132 ms      QT Int : 446 ms       P-R-T Axes : 063 007 044 degrees     QTc Int : 474 ms    Normal sinus rhythm  Right bundle branch block  Abnormal ECG  When compared with ECG of 23-FEB-2021 16:08,  No significant change was found  Confirmed by Jasson ESTES, Jules ([id number]) on 6/11/2021 11:16:36 AM    Referred By: [email or web address]   SELF           Confirmed By:Jules Spaulding MD     LIPIDS - LAST 2   Lab Results    Component Value Date    CHOL 162 03/03/2023    CHOL 147 09/02/2022    HDL 61 03/03/2023    HDL 54 09/02/2022    LDLCALC 81.6 03/03/2023    LDLCALC 70.6 09/02/2022    TRIG 97 03/03/2023    TRIG 112 09/02/2022    CHOLHDL 37.7 03/03/2023    CHOLHDL 36.7 09/02/2022     CARDIAC PROFILE - LAST 2  Lab Results   Component Value Date     (H) 10/07/2018    BNP 27 03/24/2017     (H) 06/11/2021     (H) 06/10/2021    CPKMB 2.0 12/08/2011    TROPONINI 0.013 06/11/2021    TROPONINI 0.016 06/11/2021      CBC - LAST 2  Lab Results   Component Value Date    WBC 7.22 09/11/2023    WBC 5.09 03/03/2023    RBC 4.17 09/11/2023    RBC 4.09 03/03/2023    HGB 11.9 (L) 09/11/2023    HGB 11.6 (L) 03/03/2023    HCT 36.9 (L) 09/11/2023    HCT 36.6 (L) 03/03/2023     09/11/2023     03/03/2023     Lab Results   Component Value Date    INR 1.0 10/07/2018    INR 0.9 04/12/2005    APTT 29.1 04/13/2005     CHEMISTRY - LAST 2  Lab Results   Component Value Date     09/11/2023     03/03/2023    K 4.7 09/11/2023    K 4.3 03/03/2023     09/11/2023     03/03/2023    CO2 29 09/11/2023    CO2 27 03/03/2023    ANIONGAP 10 09/11/2023    ANIONGAP 13 03/03/2023    BUN 14 09/11/2023    BUN 12 03/03/2023    CREATININE 0.9 09/11/2023    CREATININE 0.9 03/03/2023    GLU 99 09/11/2023    GLU 96 03/03/2023    CALCIUM 9.6 09/11/2023    CALCIUM 9.5 03/03/2023    MG 1.7 06/11/2021    MG 1.3 (L) 06/10/2021    ALBUMIN 3.6 09/11/2023    ALBUMIN 3.6 03/03/2023    PROT 7.2 09/11/2023    PROT 7.2 03/03/2023    ALKPHOS 122 09/11/2023    ALKPHOS 108 03/03/2023    ALT 24 09/11/2023    ALT 18 03/03/2023    AST 26 09/11/2023    AST 21 03/03/2023    BILITOT 0.4 09/11/2023    BILITOT 0.4 03/03/2023      ENDOCRINE - LAST 2  Lab Results   Component Value Date    HGBA1C 6.4 (H) 09/11/2023    HGBA1C 6.1 (H) 03/03/2023    TSH 1.447 03/03/2023    TSH 1.173 12/01/2022        PHYSICAL EXAM  CONSTITUTIONAL: Well built, well  nourished in no apparent distress  NECK: no carotid bruit, no JVD  LUNGS: CTA  CHEST WALL: no tenderness  HEART: regular rate and rhythm, S1, S2 normal, no murmur, click, rub or gallop   ABDOMEN: soft, non-tender; bowel sounds normal; no masses,  no organomegaly  EXTREMITIES: Extremities normal, no edema, no calf tenderness noted  NEURO: AAO X 3    I HAVE REVIEWED :    The vital signs, most recent cardiac testing, and most recent pertinent non-cardiology provider notes.    Current Outpatient Medications   Medication Instructions    ascorbic acid (vitamin C) (VITAMIN C) 1,000 mg, Oral, 2 times daily    azelastine (ASTELIN) 137 mcg, Nasal, 2 times daily    azelastine (OPTIVAR) 0.05 % ophthalmic solution 1 drop, Both Eyes, As needed    B-complex with vitamin C (Z-BEC OR EQUIV) tablet 1 tablet, Oral, Daily    Bifidobacterium infantis (ALIGN ORAL) Oral, Daily    biotin 10,000 mcg Cap 1 tablet, Oral, Nightly    blood sugar diagnostic [employer] preferred meter and strips. Check daily    blood-glucose meter kit Use as instructed. Insurance preferred meter.    chlorpheniramine (CHLOR-TRIMETON) 4 mg, Oral, Every 8 hours while awake    cloNIDine (CATAPRES) 0.1 mg, Oral, 3 times daily PRN    cranberry 500 mg Cap 1 capsule, Oral, Nightly    diclofenac sodium (VOLTAREN) 1 % Gel Apply 2 grams topically once daily.    diltiaZEM (CARDIZEM CD) 120 mg, Oral, Nightly    EPINEPHrine (EPIPEN) 0.3 mg/0.3 mL AtIn 1 each, Intramuscular, As needed (PRN)    erythromycin with ethanoL (THERAMYCIN) 2 % external solution Aaa bid prn    esomeprazole (NEXIUM) 40 mg, Oral, Before breakfast    estradioL (ESTRACE) 1 g, Vaginal, Three times weekly, Place by fingertip application before bedtime three times a week (Monday, Wednesday, Friday)    fexofenadine (ALLEGRA) 180 mg, Oral, Daily    fish oil-omega-3 fatty acids 300-1,000 mg capsule 2 g, Oral, Daily    fluticasone propionate (FLONASE) 100 mcg, Each Nostril, Daily     fluticasone-umeclidin-vilanter (TRELEGY ELLIPTA) 200-62.5-25 mcg inhaler 1 puff, Inhalation, Daily    gabapentin (NEURONTIN) 600 mg, Oral, 3 times daily    guaifenesin-codeine 100-10 mg/5 ml (GUAIFENESIN AC)  mg/5 mL syrup 5 mLs, Oral, 3 times daily PRN    hydrOXYzine HCL (ATARAX) 10 mg, Oral, 3 times daily PRN    immun glob G,IgG,-pro-IgA 0-50 (HIZENTRA) 1 gram/5 mL (20 %) Soln 11 g, Subcutaneous, Weekly    inhalation spacing device Use as directed for inhalation.    lancets (ACCU-CHEK SOFTCLIX LANCETS) Misc Use to check blood sugar daily.    levalbuterol (XOPENEX HFA) 45 mcg/actuation inhaler 1-2 puffs, Inhalation, Every 4 hours PRN, Rescue    levalbuterol (XOPENEX) 1.25 mg, Nebulization, Every 4 hours PRN, Rescue    metFORMIN (GLUCOPHAGE-XR) 500 mg, Oral, 2 times daily with meals    mometasone 0.1% (ELOCON) 0.1 % cream aaa bid x 1-2 weeks prn bug bites    montelukast (SINGULAIR) 10 mg, Oral, Nightly    mucus clearing device Cecy 1 Device, Misc.(Non-Drug; Combo Route), 2 times daily    multivitamin capsule 1 capsule, Oral, Daily    mupirocin (BACTROBAN) 2 % ointment Topical (Top), 3 times daily    ondansetron (ZOFRAN-ODT) 4 mg, Oral, Every 8 hours PRN    potassium chloride SA (K-DUR,KLOR-CON) 20 MEQ tablet 20 mEq, Oral, Daily    pravastatin (PRAVACHOL) 80 MG tablet TAKE 1 TABLET EVERY DAY    predniSONE (DELTASONE) 10 MG tablet 3 pills for 3 days, 2 for 3 days, them 1 for 3 days, repeat for cough    psyllium husk (METAMUCIL ORAL) Oral    SIMETHICONE (GAS-X ORAL) 1 capsule, Oral, As needed (PRN)    TEZSPIRE 210 mg, Subcutaneous, Every 28 days    valsartan-hydrochlorothiazide (DIOVAN-HCT) 160-12.5 mg per tablet 1 tablet, Oral, Daily    vitamin D3-folic acid 5,000 unit- 1 mg Tab 1 tablet, Oral, Daily      Assessment & Plan     Essential hypertension  BP has been high at home per log   Increase diovan-hct to BID   Check elytes Friday   Continue cardizem 120 mg nightly   Low Na diet     Atherosclerosis of  abdominal aorta  Continue pravachol 80 mg     Dyslipidemia  Continue statin     Bilateral carotid artery stenosis  Nonobs US 2019   Cont statin     Venous insufficiency of both lower extremities  Stable          Will decide her f/u based on BP response to med increase when I call her Friday.     Naomi Peters, PA-C [facility]   Office: [phone]

## 2023-10-16 NOTE — ASSESSMENT & PLAN NOTE
BP has been high at home per log   Increase diovan-hct to BID   Check elytes Friday   Continue cardizem 120 mg nightly   Low Na diet

## 2023-10-17 ENCOUNTER — OFFICE VISIT (OUTPATIENT)
Dept: PULMONOLOGY | Facility: CLINIC | Age: 73
End: 2023-10-17
Payer: MEDICARE

## 2023-10-17 VITALS
SYSTOLIC BLOOD PRESSURE: 123 MMHG | DIASTOLIC BLOOD PRESSURE: 67 MMHG | OXYGEN SATURATION: 98 % | HEIGHT: 64 IN | HEART RATE: 74 BPM | WEIGHT: 214.19 LBS | BODY MASS INDEX: 36.57 KG/M2

## 2023-10-17 DIAGNOSIS — G47.33 OSA TREATED WITH BIPAP: ICD-10-CM

## 2023-10-17 DIAGNOSIS — J44.89 ASTHMA WITH COPD: Primary | ICD-10-CM

## 2023-10-17 PROCEDURE — 3078F DIAST BP <80 MM HG: CPT | Mod: HCNC,CPTII,S$GLB, | Performed by: NURSE PRACTITIONER

## 2023-10-17 PROCEDURE — 99213 OFFICE O/P EST LOW 20 MIN: CPT | Mod: HCNC,S$GLB,, | Performed by: NURSE PRACTITIONER

## 2023-10-17 PROCEDURE — 99213 PR OFFICE/OUTPT VISIT, EST, LEVL III, 20-29 MIN: ICD-10-PCS | Mod: HCNC,S$GLB,, | Performed by: NURSE PRACTITIONER

## 2023-10-17 PROCEDURE — 1157F ADVNC CARE PLAN IN RCRD: CPT | Mod: HCNC,CPTII,S$GLB, | Performed by: NURSE PRACTITIONER

## 2023-10-17 PROCEDURE — 1159F PR MEDICATION LIST DOCUMENTED IN MEDICAL RECORD: ICD-10-PCS | Mod: HCNC,CPTII,S$GLB, | Performed by: NURSE PRACTITIONER

## 2023-10-17 PROCEDURE — 3074F PR MOST RECENT SYSTOLIC BLOOD PRESSURE < 130 MM HG: ICD-10-PCS | Mod: HCNC,CPTII,S$GLB, | Performed by: NURSE PRACTITIONER

## 2023-10-17 PROCEDURE — 3066F PR DOCUMENTATION OF TREATMENT FOR NEPHROPATHY: ICD-10-PCS | Mod: HCNC,CPTII,S$GLB, | Performed by: NURSE PRACTITIONER

## 2023-10-17 PROCEDURE — 3008F BODY MASS INDEX DOCD: CPT | Mod: HCNC,CPTII,S$GLB, | Performed by: NURSE PRACTITIONER

## 2023-10-17 PROCEDURE — 3288F PR FALLS RISK ASSESSMENT DOCUMENTED: ICD-10-PCS | Mod: HCNC,CPTII,S$GLB, | Performed by: NURSE PRACTITIONER

## 2023-10-17 PROCEDURE — 1101F PT FALLS ASSESS-DOCD LE1/YR: CPT | Mod: HCNC,CPTII,S$GLB, | Performed by: NURSE PRACTITIONER

## 2023-10-17 PROCEDURE — 1101F PR PT FALLS ASSESS DOC 0-1 FALLS W/OUT INJ PAST YR: ICD-10-PCS | Mod: HCNC,CPTII,S$GLB, | Performed by: NURSE PRACTITIONER

## 2023-10-17 PROCEDURE — 3074F SYST BP LT 130 MM HG: CPT | Mod: HCNC,CPTII,S$GLB, | Performed by: NURSE PRACTITIONER

## 2023-10-17 PROCEDURE — 3078F PR MOST RECENT DIASTOLIC BLOOD PRESSURE < 80 MM HG: ICD-10-PCS | Mod: HCNC,CPTII,S$GLB, | Performed by: NURSE PRACTITIONER

## 2023-10-17 PROCEDURE — 1126F PR PAIN SEVERITY QUANTIFIED, NO PAIN PRESENT: ICD-10-PCS | Mod: HCNC,CPTII,S$GLB, | Performed by: NURSE PRACTITIONER

## 2023-10-17 PROCEDURE — 3044F HG A1C LEVEL LT 7.0%: CPT | Mod: HCNC,CPTII,S$GLB, | Performed by: NURSE PRACTITIONER

## 2023-10-17 PROCEDURE — 99999 PR PBB SHADOW E&M-EST. PATIENT-LVL V: ICD-10-PCS | Mod: PBBFAC,HCNC,, | Performed by: NURSE PRACTITIONER

## 2023-10-17 PROCEDURE — 3060F POS MICROALBUMINURIA REV: CPT | Mod: HCNC,CPTII,S$GLB, | Performed by: NURSE PRACTITIONER

## 2023-10-17 PROCEDURE — 3008F PR BODY MASS INDEX (BMI) DOCUMENTED: ICD-10-PCS | Mod: HCNC,CPTII,S$GLB, | Performed by: NURSE PRACTITIONER

## 2023-10-17 PROCEDURE — 3066F NEPHROPATHY DOC TX: CPT | Mod: HCNC,CPTII,S$GLB, | Performed by: NURSE PRACTITIONER

## 2023-10-17 PROCEDURE — 3060F PR POS MICROALBUMINURIA RESULT DOCUMENTED/REVIEW: ICD-10-PCS | Mod: HCNC,CPTII,S$GLB, | Performed by: NURSE PRACTITIONER

## 2023-10-17 PROCEDURE — 1157F PR ADVANCE CARE PLAN OR EQUIV PRESENT IN MEDICAL RECORD: ICD-10-PCS | Mod: HCNC,CPTII,S$GLB, | Performed by: NURSE PRACTITIONER

## 2023-10-17 PROCEDURE — 3044F PR MOST RECENT HEMOGLOBIN A1C LEVEL <7.0%: ICD-10-PCS | Mod: HCNC,CPTII,S$GLB, | Performed by: NURSE PRACTITIONER

## 2023-10-17 PROCEDURE — 99999 PR PBB SHADOW E&M-EST. PATIENT-LVL V: CPT | Mod: PBBFAC,HCNC,, | Performed by: NURSE PRACTITIONER

## 2023-10-17 PROCEDURE — 3288F FALL RISK ASSESSMENT DOCD: CPT | Mod: HCNC,CPTII,S$GLB, | Performed by: NURSE PRACTITIONER

## 2023-10-17 PROCEDURE — 1159F MED LIST DOCD IN RCRD: CPT | Mod: HCNC,CPTII,S$GLB, | Performed by: NURSE PRACTITIONER

## 2023-10-17 PROCEDURE — 1126F AMNT PAIN NOTED NONE PRSNT: CPT | Mod: HCNC,CPTII,S$GLB, | Performed by: NURSE PRACTITIONER

## 2023-10-17 RX ORDER — FLUTICASONE FUROATE, UMECLIDINIUM BROMIDE AND VILANTEROL TRIFENATATE 200; 62.5; 25 UG/1; UG/1; UG/1
1 POWDER RESPIRATORY (INHALATION) DAILY
Qty: 180 EACH | Refills: 3 | Status: SHIPPED | OUTPATIENT
Start: 2023-10-17

## 2023-10-17 NOTE — PROGRESS NOTES
10/17/2023    Cornelia Delatorre  In office visit     Chief Complaint   Patient presents with    3m f/u    Asthma     HPI:  10/17/2023- received large bill from [facility] for Tezspire therapy, states she has noticed dramatic improvement in breathing after starting Tezspire for past 6 months. Having fewer exacerbations as before. Treated for exacerbation in September that was treated with systemic steroids and antibiotics.   States her and her  have noticed her activity level has improved in last 4 months. States she is sleeping better and feels better through out the day.     On BIPAP nightly for sleep apnea, no complaints of fatigue.     09/08/2023: Hx: Asthma, Lung nodules, CVID, SOHA on Bipap  Cough: Associated with feelings of chest congestion - associated with coughing spells that lead to overall fatigue. Worse at night time - associated with difficulty falling asleep and staying asleep. Cough is productive with intermittent mucous - difficult to expectorate.   Using Trelegy once per day with benefit.   Using nebulized treatments - Levalbuterol - 1-2 treatments per day depending on chest cough.    Using Albuterol inhaler as needed - approx use varies, approx 1-2x per day.  States used Albuterol last night and woke up this morning feeling flushed. Endorses hand shakiness and chest palpitations at times with Albuterol use.  Completed regimen of Prednisone and Doxy after last appointment with Daysi Llanos in July.   Denies the use of supplemental oxygen.  On Tezspire - next dose due next week.       7/6/2023- complaint of recurrent shortness of breath, worsened in past 3 weeks started, started prednisone 10 mg with no benefit. Using nebulizer or albuterol inhaler every 4 hours.   Associated with chest tightness and cough. Productive clear mucous. Having nocturnal coughing fits most nights.   Complaint of nausea and dizziness,   On BIPAP nightly, no issues with sleep apnea.   Severe persistent asthma with  acute exacerbation  -     betamethasone acetate-betamethasone sodium phosphate injection 6 mg  -     fluticasone-umeclidin-vilanter (TRELEGY ELLIPTA) 200-62.5-25 mcg inhaler; Inhale 1 puff into the lungs once daily.  -     ondansetron (ZOFRAN-ODT) 4 MG TbDL; Take 1 tablet (4 mg total) by mouth every 8 (eight) hours as needed (nausea).  -     doxycycline (VIBRA-TABS) 100 MG tablet; Take 1 tablet (100 mg total) by mouth 2 (two) times daily. for 10 days  -     X-Ray Chest PA And Lateral; Future  -     predniSONE (DELTASONE) 10 MG tablet; 3 pills for 3 days, 2 for 3 days, them 1 for 3 days, repeat for cough  Right otitis media, unspecified otitis media type  -     doxycycline (VIBRA-TABS) 100 MG tablet; Take 1 tablet (100 mg total) by mouth 2 (two) times daily. for 10 days          5/1/2023- complaint of wheeze, onset 1 week, took 5 days of prednisone to control, states slightly improved.   Required systemic steroids in January and again in February for Asthma control  Associated with wheeze and chest tightness, SOB is worsening with time, difficult to walk in stores with out stopping to rest.   Has new motor in BIPAP, wearing nightly with benefit.       1/9/2023- states feeling good, noticed worsening sinus drainage and productive cough when laying down at night for past 3 weeks, improves with albuterol inhaler or nebulizer. On Trelegy daily.  No recent steroid therapy. Steroid injection in knee 6 weeks Prior.  Wearing BIPAP nightly with benefit. Daytime fatigue is resolved. No complaint of morning headaches. Waiting for recalled BIPAP machine.     7/11/2022- states feeling like her self again after COVID 19 in May, Cough is dramatically improved, non productive cough, few days week.   Fatigue continues, has to take daily naps. Wearing BiPAP nightly.    On Trelegy daily with benefit but cost is high, in donut whole.       5/25/2022- Dx COVID 19 7 days prior tx with monoclonal Antibiodies two days prior. Feeling  generalized weakness, diaphoresis, fatigue  Using Trelegy and nebulizer daily,  beats on her back   Cough - severe, complaint, worse at night, productive thick yellow mucous. Associated with late evening wheeze.   Lost sense of taste and smell.       4/13/2022- SOB- stable, worse in late evenings, taking prednisone 10 mg when needed, not used in past 2 months; worse with exertion, improves with rest and albuterol rescue.   On BIPAP with benefit, no daytime drowsiness.   Liked Trelegy. Still on Adviar until prescription runs out, has 2 weeks left.   Skin biopsy 5 weeks prior, left lower chin site irritated by wearing face mask. No edema or erythema,     1/12/2022- SOB worsening, on Advair daily and levalbuterol 2-3x daily for past 4 weeks, has noticed worsening of shortness of breath and sinus complaints.   Admitted to hospital for GI virus,   Noticed prednisone is needed occasionally at 10 mg notices improvement    On BiPAP nightly with benefit. No daytime fatigue, no issues with nasal pillow mask.     7/12/21- complaint of daily sinus drainage, has to clear throat repeatedly for past 2 months. Currently on Flonase nasal spray, ipratropium nasal spray, Singulair pills, zyrtec, corcindin daily with no improvement.   SOB- stable, required steroid therapy for 3 days twice in past 3 months. Only with exertion, worse in late evenings, improves with rest,  Using nebulizer 2x daily due to feeling of heaviness in chest.   Cough- mild, non productive, associated with post nasal drip from allergies. Nocturnal arousals 2x weekly for past 2 weeks,   Wearing CPAP nighty with benefit.     4/12/21- spent last 6 months in home, was able to get COVID 19 vaccine Mederna. Allergies stable, few spells improve with nasal spray noticed improvement after getting air purifier.   Noticed spacer device helping with hoarse voice or oral thrush like before,   SOB- unchanged, daily complaint, varies with severity, worse with exertion,  improves with rest and albuterol rescue. Has to sit up to breath when first laying  Down, not able to breath when laying on left side.   Occasional cough- productive, clear mucous, using nebulizer 3 x weekly.   CPAP for SOHA doing well,     10/13/2020- Allergies bother her every few days, currently on daily Singulair, zyrtec, flonase, astelin nasal spray, having sneezing fits.   Complaint of clear sinus drainage,   Hoarse voice has resolved after stopping symbicort.   Cough- improved,   SOB- doing well, occasional episodes of not being able to catch breath, did not use nebulizer   Wearing CPAP nightly for SOHA, states benefits greatly    7/13/2020- Hoarse voice, loss of voice, productive cough, lost voice July 2019 tx by ENT who treated for acute laryngitis with diflucan; Recurrent problem. Hoarse voice return 4 weeks prior, ENT doctor recommends changing Asthma medications tx her with diflucan, doxycycline and prednisone for 5 days, states has improved.   SOB- worse when wearing face mask, currently on hizentra therapy,     5/4/2020- uses symbicort daily, uses rescue 2/wk - some anxiety, getting igg rx. Having more sinus problems with head colds- 3 in last 4 wks.  No prednisone- hizentra working well.        Nov 4, 2019- having mucous sensation, notes some sob relaxing - maybe worse night.  No nasal stuffy.  Uses cpap.  Uses symbicort bid, no rescue.  Mucous is clear.  No abx for sinus or lungs.  Getting immune infusions weekly - pt senses doing better since starting in May.  Patient Instructions   Breathing off and on short breath - need to use more albuterol to make clear where breathing problems come from  Mucous production may decrease if airways controlled- albuterol should help clearance.  Rescue inhaler may resolve any breathing problems- should use intermittently if any symptoms.  May use augmentin for sinus/lung problems- not optimal for all uti's       May 6,2019-due to get immune infusion next 2 days.  No  prednisone, needs monlukast. abx stopped wks ago- mucous cleared.    Patient Instructions   Use antibiotic daily til immune therapy done a wk - then as needed like every one else  Immune therapy may keep you stable - better than antibiotics.   Use symbicort regular.  Lungs may stabilize.  Use prednisone if needed.  Lung  Nodules are stable for 2 yrs and no follow up needed.  Call if problems- hope all will be better yet.  March 7, 19-exacerbated in late Jan- cleared in feb (vague).  Now ill again yellow and gray mucous (culture last Jan was nl yvonne).  Sl intermittent wheezes since last wk.  Sees Dr Herrera in [place]- gets allergy rx but declined to get now.  Pt has cvid by  igg and igm levels low and pneumococcal antibodies to 8/14 pneumococcal titers. Uses symbicort and singulair routinely.  Took prednisone completed 4 days ago- uses prednisone monthly- was able to skip December  .  Discussed with patient above for education the following:      Gastric by pass may cause malabsorption, protein levels have been too low and may affect ig levels-- somewhat.   Need to get follow up with dietician to assure adequate protein intake/levels.   Antibiotic may stabilize infections with common variable immune deficiency- azithromycin daily once cultures submitted.   Use augmentin if infection worsens.   Acapella/chest clapping help clear mucous, vest likewise if bronchiectasis.  Will not treat cause.   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  Use codeine to suppress mild coughing.   Need good immune system and no airway/asthma problems and good hygiene of bronchial tubes (no chronic infections) to prevent illness.     Lungs measured near normal with good response to bronchial medications 2017   Need ct to follow up and cultures to assure infection controlled.      Jan 28/2019- Feeling bad onset 2 weeks, took prednisone taper x 6 days and antibiotic  Augmentin week prior, States no improvement. Cough- worse at night, no nocturnal arousals, takes cough suppressant syrup before bed. Productive yellow/brown color.   Nebulized albuterol 4x daily. States no fever.  Has started allergy injections waiting for insurance clearance for IGG therapy.   Discussed with patient above for education the following:    CT chest for February to monitor and assess for bronchiectasis, need diagnosis for insurance to cover vest therapy  Have  continue to percuss back with hand.   Recommend purchasing Acepella device to help clear lungs of excess mucous, attaches to nebulizer device  Continue Nebulizer treatments 4x daily as needed.  Chest x-ray now to evaluate for pneumonia  Blood work at hospital   Will yeison to see results of sputum culture and x-ray before repeating antibiotic  Need to return sputum to clinic with in 4 hours of collecting  Fluconazole pills for oral thrush, this is a recurrent problem related to your weak immune system and use of inhaled steroids. Continue to rinse mouth out after using symbicort but problem will improve after starting immune replacement therapy.    oct 30,   2018-- had one day fever followed by cough/wheeze illness that lingered a wk. Pt seen by NP   [place] 2x, [place].  [place] like dying- only one day fever.  Violent cough.  Nebulizer ppt itch and palpitations- ok  On xopenex now.  Prednisone 40x3, 20 x 3 ,  augmentin cleared.  Now back to normal.      May 14, 2018- had spell /exacerbation with one round prednisone. Breathing good.  Follow-up in about 1 year (around 5/14/2019), or if symptoms worsen or fail to improve.   Discussed with patient above for education the following:     Check blood count and pneumonia vaccine response after last vaccine 4/27/2018   Use azithromycin for any lung/sinus infection- immune weakness likely   Asthma stable- use prednisone and singulair.   Use allergra and singulair and astelin/flonase   Lung  nodule in lung still - Navigational bronchoscopy might find?  - will at least re check in a year.     Call if needed, re check next yr.    darlin 15, 2018--1 st illness in yr or so with cough and rattles, no flu.  Appetite ok.  Ill x wk, cough and wheeze and sob and noct worse, resp rx helps a bit.  Hasn't been using  Albuterol   Follow-up in about 6 months (around 7/15/2018).   Discussed with patient above for education the following:      tamiflu if flu.   Need to use albuterol for any lung symptoms.  Should get cough  Better controlled.      Prednisone 20 mg 3 for 3 , taper may be needed.  Give shot today, 1 daily for 3 may be enough with albuterol.   You had high eosinophils-- if asthma becomes more active - special therapy may help??     prevnar 13 would be good - should be off prednisone.  Immune system is sl weak  Protection only to 7.14 pneumonia germs.    oct 19, 2017- has scratchy throat, some sinus drip, has nightly sensation throat issues, post beryl and carpel tunnel surg.  No sinus lung infections.  Breathing and cough good.  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 Had pos tb gold.  June 21, 2017- had good [place] trip, tapered symbicort with some increase sensation of lung problems so maintained full dose. No infections.  No chest symptoms on symbicort.   April 24, feels a lot better with min cough, vague sob going left side down at night.  Going to [place] 2 weeks.  Uses symbicort and good results.  March 16, cough better, but comes and goes,  No great help with steroids.  Submitted 3 sputums.  Had pneumovax march last yr.  Breathing better. Got symbicort.   Had pneumonia vaccine last yr    3/8/2017 HPI: had onset cough Jan illness, got dizzy few days with diarrhea and cough. Cough clear sm amt mucous. Did have 101 temp one day, fatigue, no muscle aches.  Appetite decreased.  Illnesses lingered 2 weeks but cough never remitted- has improved with inhahler use last 15  days.    Had gastric 2011 bypass with with continued diarrhea intially resolved. No regurg or reflux or swallow problems.   symbicort nearly  Resolved.    Sinuses ok.  No pets.  Never smoker.    Appetite good, feels well now.    headcolds to chest since childhood, nocturnal ??not recalled.  Had cats in past but got rid in 2003 or so.     The chief compliant problem varies with instablilty at time      PFSH:  Past Medical History:   Diagnosis Date    Allergy     Arthritis     Asthma     Cancer     skin cancer to right hand    Cataract     Chronic fatigue 01/24/2017    CVID (common variable immunodeficiency) 03/07/2019    Diabetes mellitus     type2    KLINE (dyspnea on exertion) 01/24/2017    Dyslipidemia 06/25/2019    Encounter for blood transfusion     Essential hypertension 12/29/2011    GERD (gastroesophageal reflux disease)     Headache(784.0)     History of lumpectomy of both breasts     1992 negative    Hyperlipidemia 07/15/2015    Hypertension     Hypothyroidism     Neuropathy     Obesity     Obesity     Postmenopausal HRT (hormone replacement therapy)     Rash     Rosacea     Sleep apnea     on bipap    Snoring     Squamous cell carcinoma of skin     left forearm     UTI (urinary tract infection)     Wears glasses          Past Surgical History:   Procedure Laterality Date    ADENOIDECTOMY      APPENDECTOMY      BLADDER SUSPENSION      BREAST BIOPSY Bilateral 1992    bilateral benign excisional biopsies    BUNIONECTOMY  10/17/14    right, still has discomfort    CATARACT EXTRACTION W/  INTRAOCULAR LENS IMPLANT Bilateral     CHOLECYSTECTOMY  08/02/2017    COLONOSCOPY      CYSTOSCOPY      EPIDURAL STEROID INJECTION INTO LUMBAR SPINE N/A 8/14/2019    Procedure: Injection-steroid-epidural-lumbar L5/S1;  Surgeon: Fredi Rojas MD;  Location: [facility];  Service: Pain Management;  Laterality: N/A;    EPIDURAL STEROID INJECTION INTO LUMBAR SPINE N/A 5/3/2021    Procedure: Injection-steroid-epidural-lumbar L5/S1 to  the Left;  Surgeon: Fredi Rojas MD;  Location: [facility] OR;  Service: Pain Management;  Laterality: N/A;    EPIDURAL STEROID INJECTION INTO LUMBAR SPINE N/A 9/24/2021    Procedure: Injection-steroid-epidural-lumbar L5/S1;  Surgeon: Fredi Rojas MD;  Location: [facility] OR;  Service: Pain Management;  Laterality: N/A;    EPIDURAL STEROID INJECTION INTO LUMBAR SPINE N/A 2/21/2022    Procedure: Injection-steroid-epidural-lumbar L5/S1;  Surgeon: Fredi Rojas MD;  Location: [facility] OR;  Service: Pain Management;  Laterality: N/A;    ESOPHAGEAL DILATION N/A 6/11/2021    Procedure: DILATION, ESOPHAGUS;  Surgeon: Jake Sheriff MD;  Location: [facility];  Service: Endoscopy;  Laterality: N/A;    ESOPHAGOGASTRODUODENOSCOPY      dilated esophagus    ESOPHAGOGASTRODUODENOSCOPY N/A 6/11/2021    Procedure: EGD (ESOPHAGOGASTRODUODENOSCOPY);  Surgeon: Jake Sehriff MD;  Location: [facility];  Service: Endoscopy;  Laterality: N/A;    GASTRIC BYPASS      2011    HYSTERECTOMY  1980    interstim bladder  2009    10/14/14 new battery    OOPHORECTOMY  1980    REPLACEMENT OF SACRAL NERVE STIMULATOR  2/18/2020    Procedure: REPLACEMENT, NEUROSTIMULATOR, SACRAL;  Surgeon: Mary Jane Jarvis MD;  Location: [facility] OR [address] [place] [address];  Service: Urology;;    REVISION OF PROCEDURE INVOLVING SACRAL NEUROSTIMULATOR DEVICE Right 2/18/2020    Procedure: REVISION, NEUROSTIMULATOR, SACRAL/ battery replacement;  Surgeon: Mary Jane Jarvis MD;  Location: [facility] OR 2ND FLR;  Service: Urology;  Laterality: Right;  1hr/ rep contacted/ (CONNIE)    SKIN CANCER EXCISION      left hand    TONSILLECTOMY      WISDOM TOOTH EXTRACTION       Social History     Tobacco Use    Smoking status: Never    Smokeless tobacco: Never   Substance Use Topics    Alcohol use: Not Currently    Drug use: No     Family History   Problem Relation Age of Onset    Breast cancer Mother 50    Breast cancer Paternal Aunt         40's    Cervical cancer Paternal Grandmother     Ovarian cancer  "Paternal Grandmother     Cervical cancer Paternal Cousin     Ovarian cancer Paternal Cousin     Diabetes Other     Hypertension Other     Hypothyroidism Other     Allergic rhinitis Neg Hx     Allergies Neg Hx     Angioedema Neg Hx     Asthma Neg Hx     Atopy Neg Hx     Eczema Neg Hx     Immunodeficiency Neg Hx     Rhinitis Neg Hx     Urticaria Neg Hx     Uterine cancer Neg Hx      Review of patient's allergies indicates:   Allergen Reactions    Sulfa (sulfonamide antibiotics) Hives    Naproxen Other (See Comments)     Other reaction(s): RT sided numbness         Performance Status:The patient's activity level is functions out of house.      Review of Systems:  a review of eleven systems covering constitutional, Eye, HEENT, Psych, Respiratory, Cardiac, GI, , Musculoskeletal, Endocrine, Dermatologic was negative except for pertinent findings as listed ABOVE and below: all good,       Exam:Comprehensive exam done. /67 (BP Location: Right arm, Patient Position: Sitting, BP Method: Medium (Automatic))   Pulse 74   Ht 5' 4" (1.626 m)   Wt 97.1 kg (214 lb 2.8 oz)   SpO2 98% Comment: on room air at rest  BMI 36.76 kg/m²   Exam included Vitals as listed, and patient's appearance and affect and alertness and mood, oral exam for yeast and hygiene and pharynx lesions and Mallapatti (M) score, neck with inspection for jvd and masses and thyroid abnormalities and lymph nodes (supraclavicular and infraclavicular nodes and axillary also examined and noted if abn), chest exam included symmetry and effort and fremitus and percussion and auscultation, cardiac exam included rhythm and gallops and murmur and rubs and jvd and edema, abdominal exam for mass and hepatosplenomegaly and tenderness and hernias and bowel sounds, Musculoskeletal exam with muscle tone and posture and mobility/gait and  strength, and skin for rashes and cyanosis and pallor and turgor, extremity for clubbing.  Findings were normal except for " pertinent findings listed below:  M3,  chest is symmetric, no distress, normal percussion. Breath sounds clear   On room air, in no acute distress.      Radiographs (ct chest and cxr) reviewed: view by direct vision  i do see clear bronchiectasis,nodules are noted.  Mucoid type impaction suggested in rul ant segment.    X-Ray Chest PA And Lateral 09/11/2023 chest x-ray clear      X-Ray Chest PA And Lateral 06/09/2022 lungs clear    CT Abdomen Pelvis With Contrast 06/11/2021 lower lung bases clear    CT Chest Without Contrast  04/22/2019 stable non calcified nodules date to 2017 with no change.         Patient's labs were reviewed including CBC and CMP    Lab Results   Component Value Date    WBC 7.22 09/11/2023    RBC 4.17 09/11/2023    HGB 11.9 (L) 09/11/2023    HCT 36.9 (L) 09/11/2023    MCV 89 09/11/2023    MCH 28.5 09/11/2023    MCHC 32.2 09/11/2023    RDW 15.2 (H) 09/11/2023     09/11/2023    MPV 12.1 09/11/2023    GRAN 4.2 09/11/2023    GRAN 57.9 09/11/2023    LYMPH 2.4 09/11/2023    LYMPH 33.1 09/11/2023    MONO 0.6 09/11/2023    MONO 7.9 09/11/2023    EOS 0.0 09/11/2023    BASO 0.02 09/11/2023    EOSINOPHIL 0.1 09/11/2023    BASOPHIL 0.3 09/11/2023      Latest Reference Range & Units 01/28/19 16:17   Eos # 0.0 - 0.5 K/uL 0.2       Aerobic culture 10/27/22 CURVULARIA SPECIES      Latest Reference Range & Units 11/02/21 09:54 03/03/22 09:57 04/27/22 11:47 09/02/22 08:48   CO2 23 - 29 mmol/L 30 (H) 31 (H) 27 26   (H): Data is abnormally high    PFT  Spirometry with bronchodilator, lung volume by gas dilution, and diffusion capacity measured April 19, 2021. The FEV1 FVC ratio was 75% indicating no airflow obstruction measured by spirometry. The FEV1 was 99% predicted at 2.25 L. There was no   statistically significant improvement in spirometry following bronchodilator. Total lung capacity was within normal range at 90% of predicted. Diffusion was slightly low at 77% predicted-uncorrected for anemia if  present.   Spirometry is normal. Lung volumes fall within normal range. Diffusion is slightly decreased. Clinical correlation recommended. The bronchodilator response was not significant.       Done [facility] with 10% response, otherwise nl  DATE OF PROCEDURE:  03/24/2017     1.  Spirometry reveals an FVC of 3.1 L, which is 107% predicted.  FEV1 is 2.3 L,   which is 100% predicted.  The ratio, there is preserved.  There is a positive,   but not significant bronchodilator response to both the FVC and FEV1.  Loop   contours appear with adequate effort as well.  2.  Lung volumes.  The total lung capacity is 4.4 L, which is 92% predicted.    There are no signs of gas trapping.  3.  Diffusing capacity is mild-to-moderate reduced at 68%, does improve to 96%   with alveolar volumes.     OVERALL IMPRESSION:  A normal spirometry with a robust yet statistically   insignificant bronchodilator response.  Normal lung volumes and a moderate   reduction in diffusion capacity.    CC: Jonathan Nicole M.D.      Plan:  Clinical impression is resonably certain and repeated evaluation prn +/- follow up will be needed as below.    Cornelia was seen today for 3m f/u and asthma.    Diagnoses and all orders for this visit:    Asthma with COPD  -     Complete PFT with bronchodilator; Future  -     fluticasone-umeclidin-vilanter (TRELEGY ELLIPTA) 200-62.5-25 mcg inhaler; Inhale 1 puff into the lungs once daily.    SOHA treated with BiPAP  Comments:  - continue BiPAP therapy          Follow up in about 3 months (around 1/17/2024), or if symptoms worsen or fail to improve.    Discussed with patient above for education the following:      Patient Instructions   Will continue Tezspire, sending [facility] bill to     Continue Trelegy and nebulizer daily     Lung function test before next appointment.

## 2023-10-17 NOTE — PATIENT INSTRUCTIONS
Will continue Tezspire, sending [facility] bill to     Continue Trelegy and nebulizer daily     Lung function test before next appointment.

## 2023-10-18 ENCOUNTER — PATIENT MESSAGE (OUTPATIENT)
Dept: PULMONOLOGY | Facility: CLINIC | Age: 73
End: 2023-10-18
Payer: MEDICARE

## 2023-10-18 ENCOUNTER — TELEPHONE (OUTPATIENT)
Dept: PULMONOLOGY | Facility: CLINIC | Age: 73
End: 2023-10-18
Payer: MEDICARE

## 2023-10-18 NOTE — TELEPHONE ENCOUNTER
----- Message from Iesha Pope, Patient Care Assistant sent at 10/18/2023  3:11 PM CDT -----  Contact: [facility]  Type:  Pharmacy Calling to Clarify an RX    Name of Caller:  Daysi  Pharmacy Name:    [facility] - [place], [place] - [address]  [address]  [place] [address]  Phone: [phone] Fax: [phone]  Prescription Name:  fluticasone-umeclidin-vilanter (TRELEGY ELLIPTA) 200-62.5-25 mcg inhaler  What do they need to clarify?:  a cancellation was sent and the pharm is calling to verify that  Additional Information:  thanks

## 2023-10-19 ENCOUNTER — PATIENT MESSAGE (OUTPATIENT)
Dept: CARDIOLOGY | Facility: CLINIC | Age: 73
End: 2023-10-19
Payer: MEDICARE

## 2023-10-20 ENCOUNTER — LAB VISIT (OUTPATIENT)
Dept: LAB | Facility: HOSPITAL | Age: 73
End: 2023-10-20
Payer: MEDICARE

## 2023-10-20 DIAGNOSIS — I10 BENIGN ESSENTIAL HTN: ICD-10-CM

## 2023-10-20 PROCEDURE — 83735 ASSAY OF MAGNESIUM: CPT | Mod: HCNC

## 2023-10-20 PROCEDURE — 80048 BASIC METABOLIC PNL TOTAL CA: CPT | Mod: HCNC

## 2023-10-20 PROCEDURE — 36415 COLL VENOUS BLD VENIPUNCTURE: CPT | Mod: HCNC,PO

## 2023-10-21 LAB
ANION GAP SERPL CALC-SCNC: 10 MMOL/L (ref 8–16)
BUN SERPL-MCNC: 11 MG/DL (ref 8–23)
CALCIUM SERPL-MCNC: 9.6 MG/DL (ref 8.7–10.5)
CHLORIDE SERPL-SCNC: 102 MMOL/L (ref 95–110)
CO2 SERPL-SCNC: 26 MMOL/L (ref 23–29)
CREAT SERPL-MCNC: 1 MG/DL (ref 0.5–1.4)
EST. GFR  (NO RACE VARIABLE): >60 ML/MIN/1.73 M^2
GLUCOSE SERPL-MCNC: 121 MG/DL (ref 70–110)
MAGNESIUM SERPL-MCNC: 1.9 MG/DL (ref 1.6–2.6)
POTASSIUM SERPL-SCNC: 4.5 MMOL/L (ref 3.5–5.1)
SODIUM SERPL-SCNC: 138 MMOL/L (ref 136–145)

## 2023-10-22 NOTE — PROGRESS NOTES
Refill Authorization Note     is requesting a refill authorization.    Brief assessment and rationale for refill: APPROVE: prr        Comments:   Requested Prescriptions   Pending Prescriptions Disp Refills    pravastatin (PRAVACHOL) 40 MG tablet 90 tablet 1     Sig: Take 1 tablet (40 mg total) by mouth once daily.       Cardiovascular:  Antilipid - Statins Passed - 1/10/2020  9:16 AM        Passed - Patient is at least 18 years old        Passed - Office visit in past 12 months or future 90 days     Recent Outpatient Visits            2 days ago Synovitis of right hand    [place] - Orthopedics Xavi Mckeon MD    2 weeks ago Abnormal thyroid function test    [facility] Endocrinology Ayaz Ponce DO    2 months ago Essential hypertension    [facility] Cardiology Juan Alberto Noel MD    2 months ago Bronchiectasis without complication    [facility] Pul[facility] Jonathan Nicole MD    2 months ago Personal history of other malignant neoplasm of skin    [place] - Dermatology Kailyn Fletcher MD          Future Appointments              In 4 days Kailyn Fletcher MD [place] - Dermatology, [place]    In 2 weeks ANNUAL WELLNESS VISIT-NURSE PRACTITIONER, [place] [facility] [place] - Family Medicine, [place]    In 3 weeks MD Kalyan Sykes - Urology 4th Floor, [facility]    In 1 month LAB, [place] [facility]    In 2 months Armond Cortez MD [facility]    In 3 months MD Bertrnad Higgins - Pulmonary, [facility]    In 9 months [facility]                Passed - Lipid Panel completed in last 360 days     Lab Results   Component Value Date    CHOL 162 05/01/2019    HDL 62 05/01/2019    LDLCALC 78.0 05/01/2019    TRIG 110 05/01/2019             Passed - ALT is 94 or below and within 360 days     ALT   Date Value Ref Range Status   09/04/2019 24 10 - 44 U/L Final   08/26/2019 22 10 -  44 U/L Final   05/01/2019 37 10 - 44 U/L Final              Passed - AST is 54 or below and within 360 days     AST   Date Value Ref Range Status   09/04/2019 28 10 - 40 U/L Final   08/26/2019 22 10 - 40 U/L Final   05/01/2019 30 10 - 40 U/L Final                 VITAL SIGNS    Telemetry:    Vital Signs Last 24 Hrs  T(C): 36.8 (10-22-23 @ 11:46), Max: 36.9 (10-21-23 @ 18:48)  T(F): 98.3 (10-22-23 @ 11:46), Max: 98.5 (10-21-23 @ 18:48)  HR: 80 (10-22-23 @ 14:00) (76 - 100)  BP: 148/77 (10-22-23 @ 14:00) (126/73 - 148/77)  RR: 18 (10-22-23 @ 11:46) (18 - 18)  SpO2: 98% (10-22-23 @ 11:46) (97% - 98%)            10-21 @ 07:01  -  10-22 @ 07:00  --------------------------------------------------------  IN: 677 mL / OUT: 1525 mL / NET: -848 mL    10-22 @ 07:01  -  10-22 @ 15:20  --------------------------------------------------------  IN: 0 mL / OUT: 500 mL / NET: -500 mL       Daily     Daily Weight in k.1 (22 Oct 2023 08:01)  Admit Wt: Drug Dosing Weight  Height (cm): 182.9 (11 Oct 2023 07:05)  Weight (kg): 104.6 (11 Oct 2023 07:05)  BMI (kg/m2): 31.3 (11 Oct 2023 07:05)  BSA (m2): 2.26 (11 Oct 2023 07:05)    Bilirubin Total: 0.7 mg/dL (10-22 @ 06:43)    CAPILLARY BLOOD GLUCOSE      POCT Blood Glucose.: 195 mg/dL (22 Oct 2023 11:28)  POCT Blood Glucose.: 122 mg/dL (22 Oct 2023 07:43)  POCT Blood Glucose.: 156 mg/dL (21 Oct 2023 21:22)  POCT Blood Glucose.: 178 mg/dL (21 Oct 2023 16:17)          MEDICATIONS  acetaminophen     Tablet .. 650 milliGRAM(s) Oral every 6 hours PRN  albuterol/ipratropium for Nebulization 3 milliLiter(s) Nebulizer every 6 hours PRN  aspirin enteric coated 81 milliGRAM(s) Oral daily  atorvastatin 40 milliGRAM(s) Oral at bedtime  bisacodyl Suppository 10 milliGRAM(s) Rectal once  buDESOnide    Inhalation Suspension 0.25 milliGRAM(s) Inhalation two times a day  chlorhexidine 2% Cloths 1 Application(s) Topical daily  dextrose 5%. 1000 milliLiter(s) IV Continuous <Continuous>  dextrose 5%. 1000 milliLiter(s) IV Continuous <Continuous>  dextrose 50% Injectable 50 milliLiter(s) IV Push every 15 minutes  dextrose 50% Injectable 25 milliLiter(s) IV Push every 15 minutes  dextrose Oral Gel 15 Gram(s) Oral once  famotidine    Tablet 20 milliGRAM(s) Oral daily  glucagon  Injectable 1 milliGRAM(s) IntraMuscular once  heparin   Injectable 5000 Unit(s) SubCutaneous every 8 hours  hydrALAZINE 100 milliGRAM(s) Oral every 8 hours  insulin glargine Injectable (LANTUS) 16 Unit(s) SubCutaneous at bedtime  insulin lispro (ADMELOG) corrective regimen sliding scale   SubCutaneous three times a day before meals  insulin lispro Injectable (ADMELOG) 8 Unit(s) SubCutaneous three times a day before meals  metoprolol tartrate 12.5 milliGRAM(s) Oral two times a day  oxyCODONE    IR 10 milliGRAM(s) Oral every 4 hours PRN  polyethylene glycol 3350 17 Gram(s) Oral daily  senna 2 Tablet(s) Oral at bedtime  sodium bicarbonate 650 milliGRAM(s) Oral every 8 hours  sodium chloride 0.9% lock flush 3 milliLiter(s) IV Push every 8 hours  sodium chloride 0.9%. 1000 milliLiter(s) IV Continuous <Continuous>      >>> <<<  PHYSICAL EXAM  Subjective: NAD  Neurology: alert and oriented x 3, nonfocal, no gross deficits  CV : s1s2  Sternal Wound :  CDI , Stable+staple  Lungs:CTA  Abdomen: soft, NT,ND, ( )BM  :  voiding  Extremities: -c/c/e      LABS  10-22    139  |  108  |  56<H>  ----------------------------<  121<H>  5.4<H>   |  17<L>  |  2.48<H>    Ca    9.2      22 Oct 2023 06:43  Phos  3.1     10  Mg     2.6     10-21    TPro  7.0  /  Alb  3.5  /  TBili  0.7  /  DBili  x   /  AST  29  /  ALT  18  /  AlkPhos  47  10-22                                 11.2   13.30 )-----------( 327      ( 22 Oct 2023 06:45 )             34.8                 PAST MEDICAL & SURGICAL HISTORY:  HTN (Hypertension)      DM (Diabetes Mellitus)      Hypercholesteremia      Asthma  - on symbicort and pro air      Smoker - stpped smoking cigarettes but now smokes cigars      Peripheral Neuropathy      DM (diabetes mellitus)      HTN (hypertension)      Neuropathy      HLD (hyperlipidemia)      Pulmonary embolus      Heart failure, chronic systolic      Polysubstance abuse      EtOH dependence      H/O chronic hepatitis      Stage 4 chronic kidney disease      History of Colonoscopy      S/P laparoscopic cholecystectomy

## 2023-10-23 ENCOUNTER — PATIENT MESSAGE (OUTPATIENT)
Dept: CARDIOLOGY | Facility: CLINIC | Age: 73
End: 2023-10-23
Payer: MEDICARE

## 2023-10-25 ENCOUNTER — PATIENT MESSAGE (OUTPATIENT)
Dept: ADMINISTRATIVE | Facility: OTHER | Age: 73
End: 2023-10-25
Payer: MEDICARE

## 2023-10-27 ENCOUNTER — PATIENT MESSAGE (OUTPATIENT)
Dept: CARDIOLOGY | Facility: CLINIC | Age: 73
End: 2023-10-27
Payer: MEDICARE

## 2023-10-30 LAB
LEFT EYE DM RETINOPATHY: NEGATIVE
RIGHT EYE DM RETINOPATHY: NEGATIVE

## 2023-10-30 RX ORDER — DOXAZOSIN 2 MG/1
2 TABLET ORAL NIGHTLY
Qty: 30 TABLET | Refills: 11 | Status: SHIPPED | OUTPATIENT
Start: 2023-10-30 | End: 2024-01-17

## 2023-11-05 ENCOUNTER — PATIENT MESSAGE (OUTPATIENT)
Dept: CARDIOLOGY | Facility: CLINIC | Age: 73
End: 2023-11-05
Payer: MEDICARE

## 2023-11-05 DIAGNOSIS — I10 ESSENTIAL HYPERTENSION: Primary | ICD-10-CM

## 2023-11-06 ENCOUNTER — PATIENT OUTREACH (OUTPATIENT)
Dept: ADMINISTRATIVE | Facility: HOSPITAL | Age: 73
End: 2023-11-06
Payer: MEDICARE

## 2023-11-06 RX ORDER — VALSARTAN AND HYDROCHLOROTHIAZIDE 160; 12.5 MG/1; MG/1
1 TABLET, FILM COATED ORAL 2 TIMES DAILY
Qty: 180 TABLET | Refills: 3 | Status: SHIPPED | OUTPATIENT
Start: 2023-11-06 | End: 2023-11-09

## 2023-11-06 NOTE — LETTER
AUTHORIZATION FOR RELEASE OF   CONFIDENTIAL INFORMATION    DR MARTINEZ    We are seeing Cornelia Delatorre, date of birth 1950, in the clinic at [facility]. Armond Cortez MD is the patient's PCP. Cornelia Delatorre has an outstanding lab/procedure at the time we reviewed her chart. In order to help keep her health information updated, she has authorized us to request the following medical record(s):       COLONOSCOPY W/RECALL DATE         Please fax records to [facility], [place], Troy J., MD, [phone]    Thank you in advance,       Isabel BARBOSA  Care Coordinator  [place] [facility] [address] [place] [address]  Phone [phone]  Fax [phone]           Patient Name: Cornelia Delatorre  : 1950  Patient Phone #: [phone]

## 2023-11-06 NOTE — PROGRESS NOTES

## 2023-11-09 ENCOUNTER — PATIENT MESSAGE (OUTPATIENT)
Dept: CARDIOLOGY | Facility: CLINIC | Age: 73
End: 2023-11-09
Payer: MEDICARE

## 2023-11-09 DIAGNOSIS — I10 ESSENTIAL HYPERTENSION: ICD-10-CM

## 2023-11-09 RX ORDER — VALSARTAN 160 MG/1
160 TABLET ORAL 2 TIMES DAILY
Qty: 180 TABLET | Refills: 3 | Status: SHIPPED | OUTPATIENT
Start: 2023-11-09 | End: 2023-12-21

## 2023-11-09 RX ORDER — HYDROCHLOROTHIAZIDE 12.5 MG/1
12.5 TABLET ORAL 2 TIMES DAILY
Qty: 60 TABLET | Refills: 11 | Status: SHIPPED | OUTPATIENT
Start: 2023-11-09 | End: 2023-12-21

## 2023-11-16 ENCOUNTER — TELEPHONE (OUTPATIENT)
Dept: ALLERGY | Facility: CLINIC | Age: 73
End: 2023-11-16
Payer: MEDICARE

## 2023-11-16 NOTE — TELEPHONE ENCOUNTER
Nre RX for Hizentra faxed to Destiney Dougherty ( [facility] ) at [phone] by Nicanor parson.  Original uploaded to

## 2023-11-17 ENCOUNTER — CLINICAL SUPPORT (OUTPATIENT)
Dept: PULMONOLOGY | Facility: CLINIC | Age: 73
End: 2023-11-17
Payer: MEDICARE

## 2023-11-17 DIAGNOSIS — J45.50 SEVERE PERSISTENT ASTHMA WITHOUT COMPLICATION: Primary | ICD-10-CM

## 2023-11-17 NOTE — PROGRESS NOTES
Patient is here for her monthly Tezspire  injection.  2 patient identifiers used (Name and ).  Patient received the injection to the Right arm subcutaneous.  No redness, bleeding, or swelling noted to the injection site.  Pt tolerated well.    NDC:91036-588-58  LOT: [id number]  EXP: 2025

## 2023-11-29 ENCOUNTER — OFFICE VISIT (OUTPATIENT)
Dept: ORTHOPEDICS | Facility: CLINIC | Age: 73
End: 2023-11-29
Payer: MEDICARE

## 2023-11-29 VITALS — HEIGHT: 64 IN | BODY MASS INDEX: 36.54 KG/M2 | WEIGHT: 214 LBS | RESPIRATION RATE: 18 BRPM

## 2023-11-29 DIAGNOSIS — M17.10 ARTHRITIS OF KNEE: Primary | ICD-10-CM

## 2023-11-29 PROCEDURE — 1157F ADVNC CARE PLAN IN RCRD: CPT | Mod: HCNC,CPTII,S$GLB, | Performed by: ORTHOPAEDIC SURGERY

## 2023-11-29 PROCEDURE — 20610 DRAIN/INJ JOINT/BURSA W/O US: CPT | Mod: HCNC,RT,S$GLB, | Performed by: ORTHOPAEDIC SURGERY

## 2023-11-29 PROCEDURE — 3044F HG A1C LEVEL LT 7.0%: CPT | Mod: HCNC,CPTII,S$GLB, | Performed by: ORTHOPAEDIC SURGERY

## 2023-11-29 PROCEDURE — 1101F PR PT FALLS ASSESS DOC 0-1 FALLS W/OUT INJ PAST YR: ICD-10-PCS | Mod: HCNC,CPTII,S$GLB, | Performed by: ORTHOPAEDIC SURGERY

## 2023-11-29 PROCEDURE — 1101F PT FALLS ASSESS-DOCD LE1/YR: CPT | Mod: HCNC,CPTII,S$GLB, | Performed by: ORTHOPAEDIC SURGERY

## 2023-11-29 PROCEDURE — 1157F PR ADVANCE CARE PLAN OR EQUIV PRESENT IN MEDICAL RECORD: ICD-10-PCS | Mod: HCNC,CPTII,S$GLB, | Performed by: ORTHOPAEDIC SURGERY

## 2023-11-29 PROCEDURE — 1125F PR PAIN SEVERITY QUANTIFIED, PAIN PRESENT: ICD-10-PCS | Mod: HCNC,CPTII,S$GLB, | Performed by: ORTHOPAEDIC SURGERY

## 2023-11-29 PROCEDURE — 3066F NEPHROPATHY DOC TX: CPT | Mod: HCNC,CPTII,S$GLB, | Performed by: ORTHOPAEDIC SURGERY

## 2023-11-29 PROCEDURE — 4010F PR ACE/ARB THEARPY RXD/TAKEN: ICD-10-PCS | Mod: HCNC,CPTII,S$GLB, | Performed by: ORTHOPAEDIC SURGERY

## 2023-11-29 PROCEDURE — 3060F POS MICROALBUMINURIA REV: CPT | Mod: HCNC,CPTII,S$GLB, | Performed by: ORTHOPAEDIC SURGERY

## 2023-11-29 PROCEDURE — 3044F PR MOST RECENT HEMOGLOBIN A1C LEVEL <7.0%: ICD-10-PCS | Mod: HCNC,CPTII,S$GLB, | Performed by: ORTHOPAEDIC SURGERY

## 2023-11-29 PROCEDURE — 1159F MED LIST DOCD IN RCRD: CPT | Mod: HCNC,CPTII,S$GLB, | Performed by: ORTHOPAEDIC SURGERY

## 2023-11-29 PROCEDURE — 99999 PR PBB SHADOW E&M-EST. PATIENT-LVL II: ICD-10-PCS | Mod: PBBFAC,HCNC,, | Performed by: ORTHOPAEDIC SURGERY

## 2023-11-29 PROCEDURE — 99999 PR PBB SHADOW E&M-EST. PATIENT-LVL II: CPT | Mod: PBBFAC,HCNC,, | Performed by: ORTHOPAEDIC SURGERY

## 2023-11-29 PROCEDURE — 3066F PR DOCUMENTATION OF TREATMENT FOR NEPHROPATHY: ICD-10-PCS | Mod: HCNC,CPTII,S$GLB, | Performed by: ORTHOPAEDIC SURGERY

## 2023-11-29 PROCEDURE — 1160F RVW MEDS BY RX/DR IN RCRD: CPT | Mod: HCNC,CPTII,S$GLB, | Performed by: ORTHOPAEDIC SURGERY

## 2023-11-29 PROCEDURE — 1159F PR MEDICATION LIST DOCUMENTED IN MEDICAL RECORD: ICD-10-PCS | Mod: HCNC,CPTII,S$GLB, | Performed by: ORTHOPAEDIC SURGERY

## 2023-11-29 PROCEDURE — 20610 LARGE JOINT ASPIRATION/INJECTION: R KNEE: ICD-10-PCS | Mod: HCNC,RT,S$GLB, | Performed by: ORTHOPAEDIC SURGERY

## 2023-11-29 PROCEDURE — 4010F ACE/ARB THERAPY RXD/TAKEN: CPT | Mod: HCNC,CPTII,S$GLB, | Performed by: ORTHOPAEDIC SURGERY

## 2023-11-29 PROCEDURE — 3008F BODY MASS INDEX DOCD: CPT | Mod: HCNC,CPTII,S$GLB, | Performed by: ORTHOPAEDIC SURGERY

## 2023-11-29 PROCEDURE — 3288F PR FALLS RISK ASSESSMENT DOCUMENTED: ICD-10-PCS | Mod: HCNC,CPTII,S$GLB, | Performed by: ORTHOPAEDIC SURGERY

## 2023-11-29 PROCEDURE — 3060F PR POS MICROALBUMINURIA RESULT DOCUMENTED/REVIEW: ICD-10-PCS | Mod: HCNC,CPTII,S$GLB, | Performed by: ORTHOPAEDIC SURGERY

## 2023-11-29 PROCEDURE — 1160F PR REVIEW ALL MEDS BY PRESCRIBER/CLIN PHARMACIST DOCUMENTED: ICD-10-PCS | Mod: HCNC,CPTII,S$GLB, | Performed by: ORTHOPAEDIC SURGERY

## 2023-11-29 PROCEDURE — 99214 PR OFFICE/OUTPT VISIT, EST, LEVL IV, 30-39 MIN: ICD-10-PCS | Mod: 25,HCNC,S$GLB, | Performed by: ORTHOPAEDIC SURGERY

## 2023-11-29 PROCEDURE — 99214 OFFICE O/P EST MOD 30 MIN: CPT | Mod: 25,HCNC,S$GLB, | Performed by: ORTHOPAEDIC SURGERY

## 2023-11-29 PROCEDURE — 3008F PR BODY MASS INDEX (BMI) DOCUMENTED: ICD-10-PCS | Mod: HCNC,CPTII,S$GLB, | Performed by: ORTHOPAEDIC SURGERY

## 2023-11-29 PROCEDURE — 3288F FALL RISK ASSESSMENT DOCD: CPT | Mod: HCNC,CPTII,S$GLB, | Performed by: ORTHOPAEDIC SURGERY

## 2023-11-29 PROCEDURE — 1125F AMNT PAIN NOTED PAIN PRSNT: CPT | Mod: HCNC,CPTII,S$GLB, | Performed by: ORTHOPAEDIC SURGERY

## 2023-11-29 RX ORDER — TRIAMCINOLONE ACETONIDE 40 MG/ML
40 INJECTION, SUSPENSION INTRA-ARTICULAR; INTRAMUSCULAR
Status: DISCONTINUED | OUTPATIENT
Start: 2023-11-29 | End: 2023-11-29 | Stop reason: HOSPADM

## 2023-11-29 RX ADMIN — TRIAMCINOLONE ACETONIDE 40 MG: 40 INJECTION, SUSPENSION INTRA-ARTICULAR; INTRAMUSCULAR at 11:11

## 2023-11-29 NOTE — PROGRESS NOTES
Past Medical History:   Diagnosis Date    Allergy     Arthritis     Asthma     Cancer     skin cancer to right hand    Cataract     Chronic fatigue 01/24/2017    CVID (common variable immunodeficiency) 03/07/2019    Diabetes mellitus     type2    KLINE (dyspnea on exertion) 01/24/2017    Dyslipidemia 06/25/2019    Encounter for blood transfusion     Essential hypertension 12/29/2011    GERD (gastroesophageal reflux disease)     Headache(784.0)     History of lumpectomy of both breasts     1992 negative    Hyperlipidemia 07/15/2015    Hypertension     Hypothyroidism     Neuropathy     Obesity     Obesity     Postmenopausal HRT (hormone replacement therapy)     Rash     Rosacea     Sleep apnea     on bipap    Snoring     Squamous cell carcinoma of skin     left forearm     UTI (urinary tract infection)     Wears glasses        Past Surgical History:   Procedure Laterality Date    ADENOIDECTOMY      APPENDECTOMY      BLADDER SUSPENSION      BREAST BIOPSY Bilateral 1992    bilateral benign excisional biopsies    BUNIONECTOMY  10/17/14    right, still has discomfort    CATARACT EXTRACTION W/  INTRAOCULAR LENS IMPLANT Bilateral     CHOLECYSTECTOMY  08/02/2017    COLONOSCOPY      CYSTOSCOPY      EPIDURAL STEROID INJECTION INTO LUMBAR SPINE N/A 8/14/2019    Procedure: Injection-steroid-epidural-lumbar L5/S1;  Surgeon: Fredi Rojas MD;  Location: [facility] OR;  Service: Pain Management;  Laterality: N/A;    EPIDURAL STEROID INJECTION INTO LUMBAR SPINE N/A 5/3/2021    Procedure: Injection-steroid-epidural-lumbar L5/S1 to the Left;  Surgeon: Fredi Rojas MD;  Location: [facility] OR;  Service: Pain Management;  Laterality: N/A;    EPIDURAL STEROID INJECTION INTO LUMBAR SPINE N/A 9/24/2021    Procedure: Injection-steroid-epidural-lumbar L5/S1;  Surgeon: Fredi Rojas MD;  Location: [facility] OR;  Service: Pain Management;  Laterality: N/A;    EPIDURAL STEROID INJECTION INTO LUMBAR SPINE N/A 2/21/2022    Procedure:  Injection-steroid-epidural-lumbar L5/S1;  Surgeon: Fredi Rojas MD;  Location: [facility] OR;  Service: Pain Management;  Laterality: N/A;    ESOPHAGEAL DILATION N/A 6/11/2021    Procedure: DILATION, ESOPHAGUS;  Surgeon: Jake Sheriff MD;  Location: [facility] ENDO;  Service: Endoscopy;  Laterality: N/A;    ESOPHAGOGASTRODUODENOSCOPY      dilated esophagus    ESOPHAGOGASTRODUODENOSCOPY N/A 6/11/2021    Procedure: EGD (ESOPHAGOGASTRODUODENOSCOPY);  Surgeon: Jake Sheriff MD;  Location: [facility] ENDO;  Service: Endoscopy;  Laterality: N/A;    GASTRIC BYPASS      2011    HYSTERECTOMY  1980    interstim bladder  2009    10/14/14 new battery    OOPHORECTOMY  1980    REPLACEMENT OF SACRAL NERVE STIMULATOR  2/18/2020    Procedure: REPLACEMENT, NEUROSTIMULATOR, SACRAL;  Surgeon: Mary Jane Jarvis MD;  Location: [facility] OR [address] [place] [address];  Service: Urology;;    REVISION OF PROCEDURE INVOLVING SACRAL NEUROSTIMULATOR DEVICE Right 2/18/2020    Procedure: REVISION, NEUROSTIMULATOR, SACRAL/ battery replacement;  Surgeon: Mary Jane Jarvis MD;  Location: [facility] OR [address] [place] [address];  Service: Urology;  Laterality: Right;  1hr/ rep contacted/ (CONNIE)    SKIN CANCER EXCISION      left hand    TONSILLECTOMY      WISDOM TOOTH EXTRACTION         Current Outpatient Medications   Medication Sig    ascorbic acid, vitamin C, (VITAMIN C) 1000 MG tablet Take 1,000 mg by mouth 2 (two) times daily.     azelastine (OPTIVAR) 0.05 % ophthalmic solution Place 1 drop into both eyes. As needed    B-complex with vitamin C (Z-BEC OR EQUIV) tablet Take 1 tablet by mouth once daily.    Bifidobacterium infantis (ALIGN ORAL) Take by mouth once daily.    biotin 10,000 mcg Cap Take 1 tablet by mouth every evening.     blood sugar diagnostic [employer] preferred meter and strips. Check daily    blood-glucose meter kit Use as instructed. Insurance preferred meter.    cranberry 500 mg Cap Take 1 capsule by mouth every evening.    diclofenac sodium (VOLTAREN) 1 % Gel Apply 2  grams topically once daily. (Patient taking differently: Apply 2 g topically as needed (arthritis).)    doxazosin (CARDURA) 2 MG tablet Take 1 tablet (2 mg total) by mouth every evening.    EPINEPHrine (EPIPEN) 0.3 mg/0.3 mL AtIn Inject 1 each into the muscle as needed.     erythromycin with ethanoL (THERAMYCIN) 2 % external solution Aaa bid prn    esomeprazole (NEXIUM) 40 MG capsule Take 1 capsule (40 mg total) by mouth before breakfast.    estradioL (ESTRACE) 0.01 % (0.1 mg/gram) vaginal cream Place 1 g vaginally 3 (three) times a week. Place by fingertip application before bedtime three times a week (Monday, Wednesday, Friday)    fexofenadine (ALLEGRA) 180 MG tablet Take 180 mg by mouth once daily.     fish oil-omega-3 fatty acids 300-1,000 mg capsule Take 2 g by mouth once daily.    fluticasone propionate (FLONASE) 50 mcg/actuation nasal spray 2 sprays (100 mcg total) by Each Nostril route once daily.    fluticasone-umeclidin-vilanter (TRELEGY ELLIPTA) 200-62.5-25 mcg inhaler Inhale 1 puff into the lungs once daily.    gabapentin (NEURONTIN) 600 MG tablet Take 1 tablet (600 mg total) by mouth 3 (three) times daily.    guaifenesin-codeine 100-10 mg/5 ml (GUAIFENESIN AC)  mg/5 mL syrup Take 5 mLs by mouth 3 (three) times daily as needed for Cough.    hydroCHLOROthiazide (HYDRODIURIL) 12.5 MG Tab Take 1 tablet (12.5 mg total) by mouth 2 (two) times a day.    hydrOXYzine HCL (ATARAX) 10 MG Tab Take 1 tablet (10 mg total) by mouth 3 (three) times daily as needed.    immun glob G,IgG,-pro-IgA 0-50 (HIZENTRA) 1 gram/5 mL (20 %) Soln Inject 55 mLs (11 g total) into the skin once a week.    inhalation spacing device Use as directed for inhalation.    lancets (ACCU-CHEK SOFTCLIX LANCETS) Misc Use to check blood sugar daily.    levalbuterol (XOPENEX HFA) 45 mcg/actuation inhaler Inhale 1-2 puffs into the lungs every 4 (four) hours as needed for Wheezing or Shortness of Breath. Rescue    metFORMIN (GLUCOPHAGE-XR) 500  MG ER 24hr tablet Take 1 tablet (500 mg total) by mouth 2 (two) times daily with meals.    mometasone 0.1% (ELOCON) 0.1 % cream aaa bid x 1-2 weeks prn bug bites    montelukast (SINGULAIR) 10 mg tablet Take 1 tablet (10 mg total) by mouth every evening.    mucus clearing device Cecy 1 Device by Misc.(Non-Drug; Combo Route) route 2 (two) times daily.    multivitamin capsule Take 1 capsule by mouth once daily.     mupirocin (BACTROBAN) 2 % ointment Apply topically 3 (three) times daily.    ondansetron (ZOFRAN-ODT) 4 MG TbDL Take 1 tablet (4 mg total) by mouth every 8 (eight) hours as needed (nausea).    potassium chloride SA (K-DUR,KLOR-CON) 20 MEQ tablet Take 1 tablet (20 mEq total) by mouth once daily.    pravastatin (PRAVACHOL) 80 MG tablet TAKE 1 TABLET EVERY DAY    predniSONE (DELTASONE) 10 MG tablet 3 pills for 3 days, 2 for 3 days, them 1 for 3 days, repeat for cough    psyllium husk (METAMUCIL ORAL) Take by mouth.    SIMETHICONE (GAS-X ORAL) Take 1 capsule by mouth as needed (gas relief).     tezepelumab-ekko (TEZSPIRE) 210 mg/1.91 mL (110 mg/mL) Syrg Inject 1.91 mLs (210 mg total) into the skin every 28 days.    valsartan (DIOVAN) 160 MG tablet Take 1 tablet (160 mg total) by mouth 2 (two) times daily.    vitamin D3-folic acid 5,000 unit- 1 mg Tab Take 1 tablet by mouth once daily.     cloNIDine (CATAPRES) 0.1 MG tablet Take 1 tablet (0.1 mg total) by mouth 3 (three) times daily as needed (PRN SBP > 165 mmHg).    diltiaZEM (CARDIZEM CD) 120 MG Cp24 TAKE 1 CAPSULE (120 MG TOTAL) BY MOUTH EVERY EVENING.    levalbuterol (XOPENEX) 1.25 mg/3 mL nebulizer solution Take 3 mLs (1.25 mg total) by nebulization every 4 (four) hours as needed for Wheezing or Shortness of Breath. Rescue     No current facility-administered medications for this visit.       Review of patient's allergies indicates:   Allergen Reactions    Sulfa (sulfonamide antibiotics) Hives    Naproxen Other (See Comments)     Other reaction(s): RT sided  numbness         Family History   Problem Relation Age of Onset    Breast cancer Mother 50    Breast cancer Paternal Aunt         40's    Cervical cancer Paternal Grandmother     Ovarian cancer Paternal Grandmother     Cervical cancer Paternal Cousin     Ovarian cancer Paternal Cousin     Diabetes Other     Hypertension Other     Hypothyroidism Other     Allergic rhinitis Neg Hx     Allergies Neg Hx     Angioedema Neg Hx     Asthma Neg Hx     Atopy Neg Hx     Eczema Neg Hx     Immunodeficiency Neg Hx     Rhinitis Neg Hx     Urticaria Neg Hx     Uterine cancer Neg Hx        Social History     Socioeconomic History    Marital status:    Tobacco Use    Smoking status: Never    Smokeless tobacco: Never   Substance and Sexual Activity    Alcohol use: Not Currently    Drug use: No    Sexual activity: Not Currently     Social Determinants of Health     Financial Resource Strain: Medium Risk (10/11/2023)    Overall Financial Resource Strain (CARDIA)     Difficulty of Paying Living Expenses: Somewhat hard   Food Insecurity: Food Insecurity Present (10/11/2023)    Hunger Vital Sign     Worried About Running Out of Food in the Last Year: Sometimes true     Ran Out of Food in the Last Year: Patient refused   Transportation Needs: No Transportation Needs (10/11/2023)    PRAPARE - Transportation     Lack of Transportation (Medical): No     Lack of Transportation (Non-Medical): No   Physical Activity: Inactive (10/11/2023)    Exercise Vital Sign     Days of Exercise per Week: 0 days     Minutes of Exercise per Session: 0 min   Stress: Unknown (10/11/2023)    [employer] [employer] - Occupational Stress Questionnaire     Feeling of Stress : Patient refused   Social Connections: Socially Integrated (10/11/2023)    Social Connection and Isolation Panel [NHANES]     Frequency of Communication with Friends and Family: More than three times a week     Frequency of Social Gatherings with Friends and Family:  Patient refused     Attends Alevism Services: More than 4 times per year     Active Member of Clubs or Organizations: Yes     Attends Club or Organization Meetings: More than 4 times per year     Marital Status: Living with partner   Housing Stability: Unknown (10/11/2023)    Housing Stability Vital Sign     Unable to Pay for Housing in the Last Year: Patient refused     Number of Places Lived in the Last Year: 1     Unstable Housing in the Last Year: No       Chief Complaint:   Chief Complaint   Patient presents with    Follow-up     Follow up right knee        History of present illness:  73-year-old female comes in with right knee pain.   Patient has had an issue with arthritis in the past.  Has had viscosupplementation previously with good success.  Pain over the medial aspect of the right knee.  Knee is been hurting over last 4 weeks.  Last treatment was a cortisone injection back in June.    Physical Examination:    Vital Signs:    Vitals:    11/29/23 1100   Resp: 18         Body mass index is 36.73 kg/m².    This a well-developed, well nourished patient in no acute distress.  They are alert and oriented and cooperative to examination.  Pt. walks without an antalgic gait.      Examination of the right knee shows no rashes or erythema. There are no masses ecchymosis or effusion. Patient has full range of motion from 0-130°. Patient is nontender to palpation over lateral joint line and moderately tender to palpation over the medial joint line.  Knee is stable to varus and valgus stress. 5 out of 5 motor strength. Palpable distal pulses. Intact light touch sensation. Negative Patellofemoral crepitus      X-rays:  X-rays of the right knee is  reviewed which show moderate to severe medial joint space narrowing.  More moderate medial narrowing of the left knee     Assessment::  Moderate to severe right varus knee arthritis    Plan:  I reviewed the findings with her today.  I recommended cortisone injection calm  her knee down.  We talked about repeating the viscosupplementation if it continues to bother her.  Talked about knee replacement as well.    This note was created using Drivr voice recognition software that occasionally misinterpreted phrases or words.    Consult note is delivered via Epic messaging service.

## 2023-11-29 NOTE — PROCEDURES
Large Joint Aspiration/Injection: R knee    Date/Time: 11/29/2023 11:30 AM    Performed by: Robbin Edmond MD  Authorized by: Robbin Edmond MD    Consent Done?:  Yes (Verbal)  Indications:  Pain  Site marked: the procedure site was marked    Timeout: prior to procedure the correct patient, procedure, and site was verified    Local anesthetic: Ropivicaine.  Anesthetic total (ml):  3      Details:  Needle Size:  20 G  Approach:  Anterolateral  Location:  Knee  Site:  R knee  Medications:  40 mg triamcinolone acetonide 40 mg/mL  Patient tolerance:  Patient tolerated the procedure well with no immediate complications

## 2023-12-15 ENCOUNTER — CLINICAL SUPPORT (OUTPATIENT)
Dept: PULMONOLOGY | Facility: CLINIC | Age: 73
End: 2023-12-15
Payer: MEDICARE

## 2023-12-15 DIAGNOSIS — J45.50 SEVERE PERSISTENT ASTHMA WITHOUT COMPLICATION: Primary | ICD-10-CM

## 2023-12-15 NOTE — PROGRESS NOTES
Patient is here for her monthly Tezspire injection.  2 patient identifiers used (Name and ).  Patient received the injection to the left arm                    subcutaneous.  No redness, bleeding, or swelling noted to the injection site.  Pt tolerated well.    NDC: 16772-613-23  LOT: [id number]  EXP:  2025

## 2023-12-18 ENCOUNTER — HOSPITAL ENCOUNTER (OUTPATIENT)
Dept: PULMONOLOGY | Facility: HOSPITAL | Age: 73
Discharge: HOME | End: 2023-12-18
Attending: NURSE PRACTITIONER
Payer: MEDICARE

## 2023-12-18 DIAGNOSIS — J44.89 ASTHMA WITH COPD: ICD-10-CM

## 2023-12-18 LAB
DLCO SINGLE BREATH LLN: 15.4
DLCO SINGLE BREATH PRE REF: 63.3 %
DLCO SINGLE BREATH REF: 21.14
DLCOC SBVA LLN: 2.84
DLCOC SBVA REF: 4.28
DLCOC SINGLE BREATH LLN: 15.4
DLCOC SINGLE BREATH REF: 21.14
DLCOVA LLN: 2.84
DLCOVA PRE REF: 73.7 %
DLCOVA PRE: 3.16 ML/(MIN*MMHG*L) (ref 2.84–5.72)
DLCOVA REF: 4.28
ERV LLN: -16449.37
ERV PRE REF: 124.6 %
ERV REF: 0.63
FEF 25 75 CHG: 0.6 %
FEF 25 75 LLN: 0.84
FEF 25 75 POST REF: 142.7 %
FEF 25 75 PRE REF: 141.9 %
FEF 25 75 REF: 1.83
FET100 CHG: -25.1 %
FEV1 CHG: 2.8 %
FEV1 FVC CHG: 1.1 %
FEV1 FVC LLN: 65
FEV1 FVC POST REF: 106.3 %
FEV1 FVC PRE REF: 105.1 %
FEV1 FVC REF: 78
FEV1 LLN: 1.58
FEV1 POST REF: 115.2 %
FEV1 PRE REF: 112.1 %
FEV1 REF: 2.18
FRCPLETH LLN: 1.89
FRCPLETH PREREF: 96.7 %
FRCPLETH REF: 2.71
FVC CHG: 1.6 %
FVC LLN: 2.04
FVC POST REF: 107.4 %
FVC PRE REF: 105.7 %
FVC REF: 2.81
IVC PRE: 2.93 L (ref 2.04–3.62)
IVC SINGLE BREATH LLN: 2.04
IVC SINGLE BREATH PRE REF: 104.1 %
IVC SINGLE BREATH REF: 2.81
MVV LLN: 70
MVV PRE REF: 95.7 %
MVV REF: 82
PEF CHG: 36.4 %
PEF LLN: 3.86
PEF POST REF: 99.6 %
PEF PRE REF: 73 %
PEF REF: 5.57
POST FEF 25 75: 2.61 L/S (ref 0.84–3.25)
POST FET 100: 5.57 SEC
POST FEV1 FVC: 82.92 % (ref 64.57–89.55)
POST FEV1: 2.51 L (ref 1.58–2.75)
POST FVC: 3.02 L (ref 2.04–3.62)
POST PEF: 5.55 L/S (ref 3.86–7.28)
PRE DLCO: 13.37 ML/(MIN*MMHG) (ref 15.4–26.87)
PRE ERV: 0.79 L (ref -16449.37–16450.63)
PRE FEF 25 75: 2.6 L/S (ref 0.84–3.25)
PRE FET 100: 7.44 SEC
PRE FEV1 FVC: 82.01 % (ref 64.57–89.55)
PRE FEV1: 2.44 L (ref 1.58–2.75)
PRE FRC PL: 2.62 L (ref 1.89–3.53)
PRE FVC: 2.97 L (ref 2.04–3.62)
PRE MVV: 78.32 L/MIN (ref 69.57–94.12)
PRE PEF: 4.07 L/S (ref 3.86–7.28)
PRE RV: 1.83 L (ref 1.5–2.65)
PRE TLC: 4.81 L (ref 3.95–5.93)
RAW LLN: 3.06
RAW PRE REF: 86.8 %
RAW PRE: 2.65 CMH2O*S/L (ref 3.06–3.06)
RAW REF: 3.06
RV LLN: 1.5
RV PRE REF: 88.2 %
RV REF: 2.08
RVTLC LLN: 34
RVTLC PRE REF: 88.5 %
RVTLC PRE: 38.13 % (ref 33.51–52.69)
RVTLC REF: 43
TLC LLN: 3.95
TLC PRE REF: 97.3 %
TLC REF: 4.94
VA PRE: 4.24 L (ref 4.79–4.79)
VA SINGLE BREATH LLN: 4.79
VA SINGLE BREATH PRE REF: 88.5 %
VA SINGLE BREATH REF: 4.79
VC LLN: 2.04
VC PRE REF: 105.7 %
VC PRE: 2.97 L (ref 2.04–3.62)
VC REF: 2.81

## 2023-12-18 PROCEDURE — 94726 PULM FUNCT TST PLETHYSMOGRAP: ICD-10-PCS | Mod: 26,,, | Performed by: INTERNAL MEDICINE

## 2023-12-18 PROCEDURE — 94060 PR EVAL OF BRONCHOSPASM: ICD-10-PCS | Mod: 26,,, | Performed by: INTERNAL MEDICINE

## 2023-12-18 PROCEDURE — 94726 PLETHYSMOGRAPHY LUNG VOLUMES: CPT

## 2023-12-18 PROCEDURE — 94060 EVALUATION OF WHEEZING: CPT

## 2023-12-18 PROCEDURE — 94729 DIFFUSING CAPACITY: CPT

## 2023-12-18 PROCEDURE — 94729 PR C02/MEMBANE DIFFUSE CAPACITY: ICD-10-PCS | Mod: 26,,, | Performed by: INTERNAL MEDICINE

## 2023-12-18 PROCEDURE — 94729 DIFFUSING CAPACITY: CPT | Mod: 26,,, | Performed by: INTERNAL MEDICINE

## 2023-12-18 PROCEDURE — 94060 EVALUATION OF WHEEZING: CPT | Mod: 26,,, | Performed by: INTERNAL MEDICINE

## 2023-12-18 PROCEDURE — 94726 PLETHYSMOGRAPHY LUNG VOLUMES: CPT | Mod: 26,,, | Performed by: INTERNAL MEDICINE

## 2023-12-18 RX ORDER — LEVALBUTEROL INHALATION SOLUTION 1.25 MG/3ML
1.25 SOLUTION RESPIRATORY (INHALATION)
Status: DISPENSED | OUTPATIENT
Start: 2023-12-18

## 2023-12-18 RX ORDER — LEVALBUTEROL 1.25 MG/.5ML
SOLUTION, CONCENTRATE RESPIRATORY (INHALATION)
Status: DISPENSED
Start: 2023-12-18 | End: 2023-12-18

## 2023-12-29 ENCOUNTER — TELEPHONE (OUTPATIENT)
Dept: PAIN MEDICINE | Facility: CLINIC | Age: 73
End: 2023-12-29
Payer: MEDICARE

## 2023-12-29 NOTE — TELEPHONE ENCOUNTER
----- Message from Toro Rivers sent at 12/28/2023 10:34 AM CST -----  Type: Needs Medical Advice  Who Called:  pt  Symptoms (please be specific):  pt said she called yesterday about a sooner appt other than 1/29 and she have not heard back from the office--please call and advise  Best Call Back Number: [phone] (home)     Additional Information: thank you

## 2024-01-04 ENCOUNTER — TELEPHONE (OUTPATIENT)
Dept: PAIN MEDICINE | Facility: CLINIC | Age: 74
End: 2024-01-04

## 2024-01-04 ENCOUNTER — HOSPITAL ENCOUNTER (EMERGENCY)
Facility: HOSPITAL | Age: 74
Discharge: HOME | End: 2024-01-04
Attending: EMERGENCY MEDICINE
Payer: MEDICARE

## 2024-01-04 VITALS
RESPIRATION RATE: 20 BRPM | BODY MASS INDEX: 35.85 KG/M2 | DIASTOLIC BLOOD PRESSURE: 88 MMHG | TEMPERATURE: 98 F | WEIGHT: 210 LBS | OXYGEN SATURATION: 95 % | SYSTOLIC BLOOD PRESSURE: 124 MMHG | HEIGHT: 64 IN | HEART RATE: 64 BPM

## 2024-01-04 DIAGNOSIS — M54.9 ACUTE BACK PAIN, UNSPECIFIED BACK LOCATION, UNSPECIFIED BACK PAIN LATERALITY: Primary | ICD-10-CM

## 2024-01-04 DIAGNOSIS — N39.0 URINARY TRACT INFECTION WITHOUT HEMATURIA, SITE UNSPECIFIED: ICD-10-CM

## 2024-01-04 DIAGNOSIS — M54.9 BACK PAIN: ICD-10-CM

## 2024-01-04 LAB
ALBUMIN SERPL BCP-MCNC: 3.9 G/DL (ref 3.5–5.2)
ALP SERPL-CCNC: 156 U/L (ref 55–135)
ALT SERPL W/O P-5'-P-CCNC: 22 U/L (ref 10–44)
ANION GAP SERPL CALC-SCNC: 14 MMOL/L (ref 8–16)
AST SERPL-CCNC: 34 U/L (ref 10–40)
BACTERIA #/AREA URNS HPF: ABNORMAL /HPF
BASOPHILS # BLD AUTO: 0.01 K/UL (ref 0–0.2)
BASOPHILS NFR BLD: 0.1 % (ref 0–1.9)
BILIRUB SERPL-MCNC: 0.9 MG/DL (ref 0.1–1)
BILIRUB UR QL STRIP: NEGATIVE
BUN SERPL-MCNC: 9 MG/DL (ref 8–23)
CALCIUM SERPL-MCNC: 10.1 MG/DL (ref 8.7–10.5)
CHLORIDE SERPL-SCNC: 91 MMOL/L (ref 95–110)
CLARITY UR: ABNORMAL
CO2 SERPL-SCNC: 24 MMOL/L (ref 23–29)
COLOR UR: YELLOW
CREAT SERPL-MCNC: 0.9 MG/DL (ref 0.5–1.4)
DIFFERENTIAL METHOD BLD: ABNORMAL
EOSINOPHIL # BLD AUTO: 0 K/UL (ref 0–0.5)
EOSINOPHIL NFR BLD: 0.1 % (ref 0–8)
ERYTHROCYTE [DISTWIDTH] IN BLOOD BY AUTOMATED COUNT: 14.4 % (ref 11.5–14.5)
EST. GFR  (NO RACE VARIABLE): >60 ML/MIN/1.73 M^2
GLUCOSE SERPL-MCNC: 124 MG/DL (ref 70–110)
GLUCOSE UR QL STRIP: NEGATIVE
HCT VFR BLD AUTO: 38 % (ref 37–48.5)
HGB BLD-MCNC: 12.8 G/DL (ref 12–16)
HGB UR QL STRIP: NEGATIVE
IMM GRANULOCYTES # BLD AUTO: 0.05 K/UL (ref 0–0.04)
IMM GRANULOCYTES NFR BLD AUTO: 0.7 % (ref 0–0.5)
KETONES UR QL STRIP: ABNORMAL
LEUKOCYTE ESTERASE UR QL STRIP: ABNORMAL
LIPASE SERPL-CCNC: 38 U/L (ref 4–60)
LYMPHOCYTES # BLD AUTO: 1.4 K/UL (ref 1–4.8)
LYMPHOCYTES NFR BLD: 18.7 % (ref 18–48)
MCH RBC QN AUTO: 27.8 PG (ref 27–31)
MCHC RBC AUTO-ENTMCNC: 33.7 G/DL (ref 32–36)
MCV RBC AUTO: 82 FL (ref 82–98)
MICROSCOPIC COMMENT: ABNORMAL
MONOCYTES # BLD AUTO: 0.7 K/UL (ref 0.3–1)
MONOCYTES NFR BLD: 10.1 % (ref 4–15)
NEUTROPHILS # BLD AUTO: 5.2 K/UL (ref 1.8–7.7)
NEUTROPHILS NFR BLD: 70.3 % (ref 38–73)
NITRITE UR QL STRIP: POSITIVE
NON-SQ EPI CELLS #/AREA URNS HPF: 1 /HPF
NRBC BLD-RTO: 0 /100 WBC
PH UR STRIP: 6 [PH] (ref 5–8)
PLATELET # BLD AUTO: 190 K/UL (ref 150–450)
PMV BLD AUTO: 10.1 FL (ref 9.2–12.9)
POTASSIUM SERPL-SCNC: 4.2 MMOL/L (ref 3.5–5.1)
PROT SERPL-MCNC: 7.9 G/DL (ref 6–8.4)
PROT UR QL STRIP: ABNORMAL
RBC # BLD AUTO: 4.61 M/UL (ref 4–5.4)
RBC #/AREA URNS HPF: 1 /HPF (ref 0–4)
SODIUM SERPL-SCNC: 129 MMOL/L (ref 136–145)
SP GR UR STRIP: 1.01 (ref 1–1.03)
SQUAMOUS #/AREA URNS HPF: 5 /HPF
URN SPEC COLLECT METH UR: ABNORMAL
UROBILINOGEN UR STRIP-ACNC: NEGATIVE EU/DL
WBC # BLD AUTO: 7.33 K/UL (ref 3.9–12.7)
WBC #/AREA URNS HPF: 6 /HPF (ref 0–5)
YEAST URNS QL MICRO: ABNORMAL

## 2024-01-04 PROCEDURE — 96361 HYDRATE IV INFUSION ADD-ON: CPT

## 2024-01-04 PROCEDURE — 99284 EMERGENCY DEPT VISIT MOD MDM: CPT | Mod: 25

## 2024-01-04 PROCEDURE — 85025 COMPLETE CBC W/AUTO DIFF WBC: CPT | Performed by: PHYSICIAN ASSISTANT

## 2024-01-04 PROCEDURE — 36415 COLL VENOUS BLD VENIPUNCTURE: CPT | Performed by: PHYSICIAN ASSISTANT

## 2024-01-04 PROCEDURE — 63600175 PHARM REV CODE 636 W HCPCS: Performed by: EMERGENCY MEDICINE

## 2024-01-04 PROCEDURE — 25000003 PHARM REV CODE 250: Performed by: PHYSICIAN ASSISTANT

## 2024-01-04 PROCEDURE — 83690 ASSAY OF LIPASE: CPT | Performed by: PHYSICIAN ASSISTANT

## 2024-01-04 PROCEDURE — 96374 THER/PROPH/DIAG INJ IV PUSH: CPT

## 2024-01-04 PROCEDURE — 81000 URINALYSIS NONAUTO W/SCOPE: CPT | Performed by: PHYSICIAN ASSISTANT

## 2024-01-04 PROCEDURE — 80053 COMPREHEN METABOLIC PANEL: CPT | Performed by: PHYSICIAN ASSISTANT

## 2024-01-04 RX ORDER — ONDANSETRON 4 MG/1
4 TABLET, ORALLY DISINTEGRATING ORAL
Status: COMPLETED | OUTPATIENT
Start: 2024-01-04 | End: 2024-01-04

## 2024-01-04 RX ORDER — DICLOFENAC SODIUM 50 MG/1
50 TABLET, DELAYED RELEASE ORAL 2 TIMES DAILY PRN
Qty: 10 TABLET | Refills: 0 | Status: SHIPPED | OUTPATIENT
Start: 2024-01-04 | End: 2024-01-23

## 2024-01-04 RX ORDER — KETOROLAC TROMETHAMINE 30 MG/ML
15 INJECTION, SOLUTION INTRAMUSCULAR; INTRAVENOUS
Status: COMPLETED | OUTPATIENT
Start: 2024-01-04 | End: 2024-01-04

## 2024-01-04 RX ORDER — AMOXICILLIN AND CLAVULANATE POTASSIUM 875; 125 MG/1; MG/1
1 TABLET, FILM COATED ORAL 2 TIMES DAILY
Qty: 14 TABLET | Refills: 0 | Status: SHIPPED | OUTPATIENT
Start: 2024-01-04 | End: 2024-02-07 | Stop reason: ALTCHOICE

## 2024-01-04 RX ADMIN — KETOROLAC TROMETHAMINE 15 MG: 30 INJECTION, SOLUTION INTRAMUSCULAR; INTRAVENOUS at 12:01

## 2024-01-04 RX ADMIN — SODIUM CHLORIDE, POTASSIUM CHLORIDE, SODIUM LACTATE AND CALCIUM CHLORIDE 500 ML: 600; 310; 30; 20 INJECTION, SOLUTION INTRAVENOUS at 12:01

## 2024-01-04 RX ADMIN — ONDANSETRON 4 MG: 4 TABLET, ORALLY DISINTEGRATING ORAL at 11:01

## 2024-01-04 NOTE — TELEPHONE ENCOUNTER
Spoke with patient's daughter, patient is currently in the hospital. Daughter states she will call back if she needs to reschedule the appointment.

## 2024-01-04 NOTE — TELEPHONE ENCOUNTER
----- Message from Magen Morgan Patient Care Assistant sent at 1/4/2024  7:56 AM CST -----  Contact: Pt Lloyd Maynard  Type: Same Day Appointment    Caller is requesting a same day appointment.  Caller declined first available appt listed below.    Name of caller:Pt Lloyd Maynard  When is the first available appt:01/08/24  Symptoms:Sciatica radiating from hip to leg,nausea, not eating well,severe pain  Best Call back number:[phone] (work)  Additional Info:Please advise-Thank you~

## 2024-01-04 NOTE — ED PROVIDER NOTES
Encounter Date: 1/4/2024       History     Chief Complaint   Patient presents with    Vomiting     Back pain since panchito jonathan left side , nausea, dry heaving, since day before yesterday.      73-year-old female with a past medical history of diabetes mellitus, hypertension, hyperlipidemia, and reflux disease presents for back pain.  The patient reports that she has had generalized back pain, to her entire back for the last 10 days.  She denies any recent trauma, chest pain, cough, congestion, fever/chills, dysuria, hematuria, bowel/bladder incontinence, or urinary retention.  The patient's family member reports that she started taking p.o. tramadol and Zanaflex that was in the cabinet 2 days ago.  He reports the patient has had some itching and nausea and vomiting since starting these medications as well.  She has not seen anyone for the back pain.  She reports the pain radiates down her legs.  She denies any new paresthesias as well.  She reports her pain is worse with movement.  There are no alleviating factors.      Review of patient's allergies indicates:   Allergen Reactions    Sulfa (sulfonamide antibiotics) Hives    Naproxen Other (See Comments)     Other reaction(s): RT sided numbness      Albuterol Itching and Palpitations     Nebulizer only. Can use inhaler     Past Medical History:   Diagnosis Date    Allergy     Arthritis     Asthma     Cancer     skin cancer to right hand    Cataract     Chronic fatigue 01/24/2017    CVID (common variable immunodeficiency) 03/07/2019    Diabetes mellitus     type2    KLINE (dyspnea on exertion) 01/24/2017    Dyslipidemia 06/25/2019    Encounter for blood transfusion     Essential hypertension 12/29/2011    GERD (gastroesophageal reflux disease)     Headache(784.0)     History of lumpectomy of both breasts     1992 negative    Hyperlipidemia 07/15/2015    Hypertension     Hypothyroidism     Neuropathy     Obesity     Obesity     Postmenopausal HRT (hormone replacement  therapy)     Rash     Rosacea     Sleep apnea     on bipap    Snoring     Squamous cell carcinoma of skin     left forearm     UTI (urinary tract infection)     Wears glasses      Past Surgical History:   Procedure Laterality Date    ADENOIDECTOMY      APPENDECTOMY      BLADDER SUSPENSION      BREAST BIOPSY Bilateral 1992    bilateral benign excisional biopsies    BUNIONECTOMY  10/17/14    right, still has discomfort    CATARACT EXTRACTION W/  INTRAOCULAR LENS IMPLANT Bilateral     CHOLECYSTECTOMY  08/02/2017    COLONOSCOPY      CYSTOSCOPY      EPIDURAL STEROID INJECTION INTO LUMBAR SPINE N/A 8/14/2019    Procedure: Injection-steroid-epidural-lumbar L5/S1;  Surgeon: Fredi Rojas MD;  Location: [facility] OR;  Service: Pain Management;  Laterality: N/A;    EPIDURAL STEROID INJECTION INTO LUMBAR SPINE N/A 5/3/2021    Procedure: Injection-steroid-epidural-lumbar L5/S1 to the Left;  Surgeon: Fredi Rojas MD;  Location: [facility] OR;  Service: Pain Management;  Laterality: N/A;    EPIDURAL STEROID INJECTION INTO LUMBAR SPINE N/A 9/24/2021    Procedure: Injection-steroid-epidural-lumbar L5/S1;  Surgeon: Fredi Rojas MD;  Location: [facility] OR;  Service: Pain Management;  Laterality: N/A;    EPIDURAL STEROID INJECTION INTO LUMBAR SPINE N/A 2/21/2022    Procedure: Injection-steroid-epidural-lumbar L5/S1;  Surgeon: Fredi Rojas MD;  Location: [facility] OR;  Service: Pain Management;  Laterality: N/A;    ESOPHAGEAL DILATION N/A 6/11/2021    Procedure: DILATION, ESOPHAGUS;  Surgeon: Jake Sheriff MD;  Location: [facility];  Service: Endoscopy;  Laterality: N/A;    ESOPHAGOGASTRODUODENOSCOPY      dilated esophagus    ESOPHAGOGASTRODUODENOSCOPY N/A 6/11/2021    Procedure: EGD (ESOPHAGOGASTRODUODENOSCOPY);  Surgeon: Jake Sheriff MD;  Location: [facility] ENDO;  Service: Endoscopy;  Laterality: N/A;    GASTRIC BYPASS      2011    HYSTERECTOMY  1980    interstim bladder  2009    10/14/14 new battery    OOPHORECTOMY  1980     REPLACEMENT OF SACRAL NERVE STIMULATOR  2/18/2020    Procedure: REPLACEMENT, NEUROSTIMULATOR, SACRAL;  Surgeon: Mary Jane Jarvis MD;  Location: [facility] OR [address] [place] [address];  Service: Urology;;    REVISION OF PROCEDURE INVOLVING SACRAL NEUROSTIMULATOR DEVICE Right 2/18/2020    Procedure: REVISION, NEUROSTIMULATOR, SACRAL/ battery replacement;  Surgeon: Mary Jane aJrvis MD;  Location: [facility] OR [address] [place] [address];  Service: Urology;  Laterality: Right;  1hr/ rep contacted/ (CONNIE)    SKIN CANCER EXCISION      left hand    TONSILLECTOMY      WISDOM TOOTH EXTRACTION       Family History   Problem Relation Age of Onset    Breast cancer Mother 50    Breast cancer Paternal Aunt         40's    Cervical cancer Paternal Grandmother     Ovarian cancer Paternal Grandmother     Cervical cancer Paternal Cousin     Ovarian cancer Paternal Cousin     Diabetes Other     Hypertension Other     Hypothyroidism Other     Allergic rhinitis Neg Hx     Allergies Neg Hx     Angioedema Neg Hx     Asthma Neg Hx     Atopy Neg Hx     Eczema Neg Hx     Immunodeficiency Neg Hx     Rhinitis Neg Hx     Urticaria Neg Hx     Uterine cancer Neg Hx      Social History     Tobacco Use    Smoking status: Never    Smokeless tobacco: Never   Substance Use Topics    Alcohol use: Not Currently    Drug use: No     Review of Systems   Constitutional:  Negative for chills and fever.   HENT:  Negative for congestion.    Respiratory:  Negative for cough and shortness of breath.    Cardiovascular:  Negative for chest pain.   Gastrointestinal:  Positive for nausea and vomiting. Negative for abdominal pain.   Genitourinary:  Negative for dysuria.   Musculoskeletal:  Positive for back pain. Negative for gait problem.   Skin:  Negative for color change.   Neurological:  Negative for dizziness and numbness.   Psychiatric/Behavioral:  Negative for agitation.        Physical Exam     Initial Vitals [01/04/24 1033]   BP Pulse Resp Temp SpO2   (!) 148/65 76 20 98 °F (36.7 °C) 99 %      MAP        --         Physical Exam    Nursing note and vitals reviewed.  Constitutional: She appears well-developed and well-nourished.   Tearful and anxious.   HENT:   Head: Atraumatic.   Eyes: EOM are normal. Pupils are equal, round, and reactive to light.   Neck:   Normal range of motion.  Cardiovascular:  Normal rate and regular rhythm.           Pulmonary/Chest: Breath sounds normal.   Abdominal: Abdomen is soft. Bowel sounds are normal. She exhibits no distension. There is no abdominal tenderness. There is no rebound and no guarding.   Musculoskeletal:         General: No tenderness. Normal range of motion.      Right shoulder: Normal.      Left shoulder: Normal.      Cervical back: Normal range of motion.      Comments: No midline spinal tenderness or step-offs noted.  No tenderness, skin color changes, ecchymosis, or skin lesions noted to her back.     Neurological: She is alert and oriented to person, place, and time. She has normal strength. GCS score is 15. GCS eye subscore is 4. GCS verbal subscore is 5. GCS motor subscore is 6.   Skin: Skin is warm and dry.   Psychiatric: She has a normal mood and affect.         ED Course   Procedures  Labs Reviewed   CBC W/ AUTO DIFFERENTIAL - Abnormal; Notable for the following components:       Result Value    Immature Granulocytes 0.7 (*)     Immature Grans (Abs) 0.05 (*)     All other components within normal limits   COMPREHENSIVE METABOLIC PANEL - Abnormal; Notable for the following components:    Sodium 129 (*)     Chloride 91 (*)     Glucose 124 (*)     Alkaline Phosphatase 156 (*)     All other components within normal limits   URINALYSIS, REFLEX TO URINE CULTURE - Abnormal; Notable for the following components:    Appearance, UA Hazy (*)     Protein, UA Trace (*)     Ketones, UA 1+ (*)     Nitrite, UA Positive (*)     Leukocytes, UA 1+ (*)     All other components within normal limits    Narrative:     Specimen Source->Urine   URINALYSIS MICROSCOPIC - Abnormal;  Notable for the following components:    WBC, UA 6 (*)     Bacteria Many (*)     Non-Squam Epith 1 (*)     All other components within normal limits    Narrative:     Specimen Source->Urine   LIPASE          Imaging Results    None          Medications   ondansetron disintegrating tablet 4 mg (4 mg Oral Given 1/4/24 1131)   lactated ringers bolus 500 mL (0 mLs Intravenous Stopped 1/4/24 1336)   ketorolac injection 15 mg (15 mg Intravenous Given 1/4/24 1216)     Medical Decision Making  73-year-old female presented with back pain.    Initial differential diagnosis included but not limited to sciatica, musculoskeletal pain, and UTI.    Amount and/or Complexity of Data Reviewed  Labs: ordered.    Risk  Prescription drug management.  Risk Details: The patient was emergently evaluated in the emergency department, her evaluation was significant for an elderly female with a benign abdominal exam noted.  The patient also has no spinal tenderness noted as well.  The patient's labs were significant for infection in her urine.  The patient's pain was treated with a dose of parental Toradol, with significant improvement in it here.  I believe the etiology of the patient's symptoms are likely multifactorial.  I believe that she is having musculoskeletal back pain along with her UTI.  The etiology the patient's itching and nausea vomiting is likely side effects secondary to the p.o. tramadol.  The patient is stable for discharge to home and does not require further care or workup at this time.  She will be discharged home with p.o. diclofenac as well as p.o. Augmentin.  She is referred to primary care for follow-up.                                      Clinical Impression:  Final diagnoses:  [M54.9] Back pain  [M54.9] Acute back pain, unspecified back location, unspecified back pain laterality (Primary)  [N39.0] Urinary tract infection without hematuria, site unspecified          ED Disposition Condition    Discharge Stable           ED Prescriptions       Medication Sig Dispense Start Date End Date Auth. Provider    diclofenac (VOLTAREN) 50 MG EC tablet Take 1 tablet (50 mg total) by mouth 2 (two) times daily as needed (pain). 10 tablet 1/4/2024 -- Naresh Jones MD    amoxicillin-clavulanate 875-125mg (AUGMENTIN) 875-125 mg per tablet Take 1 tablet by mouth 2 (two) times daily. 14 tablet 1/4/2024 -- Naresh Jones MD          Follow-up Information       Follow up With Specialties Details Why Contact Info    Armond Cortez MD Internal Medicine Schedule an appointment as soon as possible for a visit   [address] [place] [address] [phone]               Naresh Jones MD  01/04/24 5466

## 2024-01-04 NOTE — FIRST PROVIDER EVALUATION
Emergency Department TeleTriage Encounter Note      CHIEF COMPLAINT    Chief Complaint   Patient presents with    Vomiting     Back pain since panchito jonathan left side , nausea, dry heaving, since day before yesterday.        VITAL SIGNS   Initial Vitals [01/04/24 1033]   BP Pulse Resp Temp SpO2   (!) 148/65 76 20 98 °F (36.7 °C) 99 %      MAP       --            ALLERGIES    Review of patient's allergies indicates:   Allergen Reactions    Sulfa (sulfonamide antibiotics) Hives    Naproxen Other (See Comments)     Other reaction(s): RT sided numbness      Albuterol Itching and Palpitations     Nebulizer only. Can use inhaler       PROVIDER TRIAGE NOTE  Patient presents with complaint of back pain for a week with nausea and vomiting that started 2 days ago.  Reports decreased urine output.  Denies fevers.  Reports diarrhea yesterday.      Phy:   Constitutional: well nourished, well developed, appearing stated age, NAD        Initial orders will be placed and care will be transferred to an alternate provider when patient is roomed for a full evaluation. Any additional orders and the final disposition will be determined by that provider.        ORDERS  Labs Reviewed - No data to display    ED Orders (720h ago, onward)      None              Virtual Visit Note: The provider triage portion of this emergency department evaluation and documentation was performed via [employer], a HIPAA-compliant telemedicine application, in concert with a tele-presenter in the room. A face to face patient evaluation with one of my colleagues will occur once the patient is placed in an emergency department room.      DISCLAIMER: This note was prepared with [employer] voice recognition transcription software. Garbled syntax, mangled pronouns, and other bizarre constructions may be attributed to that software system.

## 2024-01-06 ENCOUNTER — ON-DEMAND VIRTUAL (OUTPATIENT)
Dept: URGENT CARE | Facility: CLINIC | Age: 74
End: 2024-01-06
Payer: MEDICARE

## 2024-01-06 ENCOUNTER — NURSE TRIAGE (OUTPATIENT)
Dept: ADMINISTRATIVE | Facility: CLINIC | Age: 74
End: 2024-01-06
Payer: MEDICARE

## 2024-01-06 DIAGNOSIS — M79.605 PAIN IN BOTH LOWER EXTREMITIES: Primary | ICD-10-CM

## 2024-01-06 DIAGNOSIS — M79.604 PAIN IN BOTH LOWER EXTREMITIES: Primary | ICD-10-CM

## 2024-01-06 PROCEDURE — 99213 OFFICE O/P EST LOW 20 MIN: CPT | Mod: 95,,, | Performed by: FAMILY MEDICINE

## 2024-01-06 RX ORDER — ROPINIROLE 0.25 MG/1
0.25 TABLET, FILM COATED ORAL NIGHTLY
Qty: 10 TABLET | Refills: 0 | Status: SHIPPED | OUTPATIENT
Start: 2024-01-06 | End: 2024-02-07 | Stop reason: ALTCHOICE

## 2024-01-06 NOTE — TELEPHONE ENCOUNTER
"OOC NT incoming call -  Pt daughter, Laury, reports pt having "severe ama horses." reports seen at ED and under treatment for UTI. Pt currently not with caller but NT will call Toy and speak with pt on 3 way. Pt and children on the line. Muscle spasm protocol followed and pt advised to be seen by provider with in 4 hours. On demand VV offered and accepted. Encounter routed to PCP.  Reason for Disposition   [1] SEVERE pain AND [2] taking a statin medicine (a lipid or cholesterol lowering drug)    Additional Information   Negative: Shock suspected (e.g., cold/pale/clammy skin, too weak to stand, low BP, rapid pulse)   Negative: Difficult to awaken or acting confused (e.g., disoriented, slurred speech)   Negative: Sounds like a life-threatening emergency to the triager   Negative: Dark (cola or tea-colored) or red-colored urine   Negative: [1] Drinking very little AND [2] dehydration suspected (e.g., no urine > 12 hours, very dry mouth, very lightheaded)   Negative: Patient sounds very sick or weak to the triager   Negative: [1] SEVERE pain (e.g., excruciating, unable to do any normal activities) AND [2] not improved 2 hours after pain medicine    Protocols used: Muscle Aches and Body Pain-A-AH    "

## 2024-01-07 ENCOUNTER — ON-DEMAND VIRTUAL (OUTPATIENT)
Dept: URGENT CARE | Facility: CLINIC | Age: 74
End: 2024-01-07
Payer: MEDICARE

## 2024-01-07 DIAGNOSIS — G25.81 RESTLESS LEG SYNDROME: Primary | ICD-10-CM

## 2024-01-07 PROCEDURE — 99499 UNLISTED E&M SERVICE: CPT | Mod: 95,,, | Performed by: FAMILY MEDICINE

## 2024-01-07 RX ORDER — ROPINIROLE 0.25 MG/1
0.25 TABLET, FILM COATED ORAL NIGHTLY
Qty: 10 TABLET | Refills: 0 | Status: SHIPPED | OUTPATIENT
Start: 2024-01-07 | End: 2024-01-17

## 2024-01-07 NOTE — PROGRESS NOTES
Subjective:      Patient ID: Cornelia Delatorre is a 73 y.o. female.    Vitals:  vitals were not taken for this visit.     Chief Complaint: Spasms (/)      Visit Type: TELE AUDIOVISUAL    Present with the patient at the time of consultation: TELEMED PRESENT WITH PATIENT: family member    Past Medical History:   Diagnosis Date    Allergy     Arthritis     Asthma     Cancer     skin cancer to right hand    Cataract     Chronic fatigue 01/24/2017    CVID (common variable immunodeficiency) 03/07/2019    Diabetes mellitus     type2    KLINE (dyspnea on exertion) 01/24/2017    Dyslipidemia 06/25/2019    Encounter for blood transfusion     Essential hypertension 12/29/2011    GERD (gastroesophageal reflux disease)     Headache(784.0)     History of lumpectomy of both breasts     1992 negative    Hyperlipidemia 07/15/2015    Hypertension     Hypothyroidism     Neuropathy     Obesity     Obesity     Postmenopausal HRT (hormone replacement therapy)     Rash     Rosacea     Sleep apnea     on bipap    Snoring     Squamous cell carcinoma of skin     left forearm     UTI (urinary tract infection)     Wears glasses      Past Surgical History:   Procedure Laterality Date    ADENOIDECTOMY      APPENDECTOMY      BLADDER SUSPENSION      BREAST BIOPSY Bilateral 1992    bilateral benign excisional biopsies    BUNIONECTOMY  10/17/14    right, still has discomfort    CATARACT EXTRACTION W/  INTRAOCULAR LENS IMPLANT Bilateral     CHOLECYSTECTOMY  08/02/2017    COLONOSCOPY      CYSTOSCOPY      EPIDURAL STEROID INJECTION INTO LUMBAR SPINE N/A 8/14/2019    Procedure: Injection-steroid-epidural-lumbar L5/S1;  Surgeon: Fredi Rojas MD;  Location: [facility] OR;  Service: Pain Management;  Laterality: N/A;    EPIDURAL STEROID INJECTION INTO LUMBAR SPINE N/A 5/3/2021    Procedure: Injection-steroid-epidural-lumbar L5/S1 to the Left;  Surgeon: Fredi Rojas MD;  Location: [facility] OR;  Service: Pain Management;  Laterality: N/A;    EPIDURAL STEROID  INJECTION INTO LUMBAR SPINE N/A 9/24/2021    Procedure: Injection-steroid-epidural-lumbar L5/S1;  Surgeon: Fredi Rojas MD;  Location: [facility] OR;  Service: Pain Management;  Laterality: N/A;    EPIDURAL STEROID INJECTION INTO LUMBAR SPINE N/A 2/21/2022    Procedure: Injection-steroid-epidural-lumbar L5/S1;  Surgeon: Fredi Rojas MD;  Location: [facility] OR;  Service: Pain Management;  Laterality: N/A;    ESOPHAGEAL DILATION N/A 6/11/2021    Procedure: DILATION, ESOPHAGUS;  Surgeon: Jake Sheriff MD;  Location: [facility] ENDO;  Service: Endoscopy;  Laterality: N/A;    ESOPHAGOGASTRODUODENOSCOPY      dilated esophagus    ESOPHAGOGASTRODUODENOSCOPY N/A 6/11/2021    Procedure: EGD (ESOPHAGOGASTRODUODENOSCOPY);  Surgeon: Jake Sheriff MD;  Location: [facility] ENDO;  Service: Endoscopy;  Laterality: N/A;    GASTRIC BYPASS      2011    HYSTERECTOMY  1980    interstim bladder  2009    10/14/14 new battery    OOPHORECTOMY  1980    REPLACEMENT OF SACRAL NERVE STIMULATOR  2/18/2020    Procedure: REPLACEMENT, NEUROSTIMULATOR, SACRAL;  Surgeon: Mary Jane Jarvis MD;  Location: [facility] OR [address] [place] [address];  Service: Urology;;    REVISION OF PROCEDURE INVOLVING SACRAL NEUROSTIMULATOR DEVICE Right 2/18/2020    Procedure: REVISION, NEUROSTIMULATOR, SACRAL/ battery replacement;  Surgeon: Mary Jane Jarvis MD;  Location: [facility] OR 2ND FLR;  Service: Urology;  Laterality: Right;  1hr/ rep contacted/ (CONNIE)    SKIN CANCER EXCISION      left hand    TONSILLECTOMY      WISDOM TOOTH EXTRACTION       Review of patient's allergies indicates:   Allergen Reactions    Sulfa (sulfonamide antibiotics) Hives    Naproxen Other (See Comments)     Other reaction(s): RT sided numbness      Albuterol Itching and Palpitations     Nebulizer only. Can use inhaler     Current Outpatient Medications on File Prior to Visit   Medication Sig Dispense Refill    amoxicillin-clavulanate 875-125mg (AUGMENTIN) 875-125 mg per tablet Take 1 tablet by mouth 2 (two) times daily.  14 tablet 0    ascorbic acid, vitamin C, (VITAMIN C) 1000 MG tablet Take 1,000 mg by mouth 2 (two) times daily.       azelastine (OPTIVAR) 0.05 % ophthalmic solution Place 1 drop into both eyes. As needed      B-complex with vitamin C (Z-BEC OR EQUIV) tablet Take 1 tablet by mouth once daily.      Bifidobacterium infantis (ALIGN ORAL) Take by mouth once daily.      biotin 10,000 mcg Cap Take 1 tablet by mouth every evening.       blood sugar diagnostic [employer] preferred meter and strips. Check daily 90 each 3    blood-glucose meter kit Use as instructed. Insurance preferred meter. 1 each 0    cloNIDine (CATAPRES) 0.1 MG tablet Take 1 tablet (0.1 mg total) by mouth 3 (three) times daily as needed (PRN SBP > 165 mmHg). 90 tablet 6    cranberry 500 mg Cap Take 1 capsule by mouth every evening.      diclofenac (VOLTAREN) 50 MG EC tablet Take 1 tablet (50 mg total) by mouth 2 (two) times daily as needed (pain). 10 tablet 0    diltiaZEM (CARDIZEM CD) 120 MG Cp24 TAKE 1 CAPSULE (120 MG TOTAL) BY MOUTH EVERY EVENING. 90 capsule 4    doxazosin (CARDURA) 2 MG tablet Take 1 tablet (2 mg total) by mouth every evening. 30 tablet 11    EPINEPHrine (EPIPEN) 0.3 mg/0.3 mL AtIn Inject 1 each into the muscle as needed.   3    erythromycin with ethanoL (THERAMYCIN) 2 % external solution Aaa bid prn 60 mL 3    esomeprazole (NEXIUM) 40 MG capsule Take 1 capsule (40 mg total) by mouth before breakfast. 90 capsule 3    estradioL (ESTRACE) 0.01 % (0.1 mg/gram) vaginal cream Place 1 g vaginally 3 (three) times a week. Place by fingertip application before bedtime three times a week (Monday, Wednesday, Friday) 45 g 3    fexofenadine (ALLEGRA) 180 MG tablet Take 180 mg by mouth once daily.       fish oil-omega-3 fatty acids 300-1,000 mg capsule Take 2 g by mouth once daily.      fluticasone propionate (FLONASE) 50 mcg/actuation nasal spray 2 sprays (100 mcg total) by Each Nostril route once daily. 16 g 11     fluticasone-umeclidin-vilanter (TRELEGY ELLIPTA) 200-62.5-25 mcg inhaler Inhale 1 puff into the lungs once daily. 180 each 3    gabapentin (NEURONTIN) 600 MG tablet Take 1 tablet (600 mg total) by mouth 3 (three) times daily. 540 tablet 3    guaifenesin-codeine 100-10 mg/5 ml (GUAIFENESIN AC)  mg/5 mL syrup Take 5 mLs by mouth 3 (three) times daily as needed for Cough. 473 mL 2    hydrOXYzine HCL (ATARAX) 10 MG Tab Take 1 tablet (10 mg total) by mouth 3 (three) times daily as needed. 30 tablet 3    immun glob G,IgG,-pro-IgA 0-50 (HIZENTRA) 1 gram/5 mL (20 %) Soln Inject 55 mLs (11 g total) into the skin once a week. 220 mL 12    inhalation spacing device Use as directed for inhalation. 1 each 0    lancets (ACCU-CHEK SOFTCLIX LANCETS) Misc Use to check blood sugar daily. 100 each 11    levalbuterol (XOPENEX HFA) 45 mcg/actuation inhaler Inhale 1-2 puffs into the lungs every 4 (four) hours as needed for Wheezing or Shortness of Breath. Rescue 15 g 11    levalbuterol (XOPENEX) 1.25 mg/3 mL nebulizer solution Take 3 mLs (1.25 mg total) by nebulization every 4 (four) hours as needed for Wheezing or Shortness of Breath. Rescue 1 Box 11    metFORMIN (GLUCOPHAGE-XR) 500 MG ER 24hr tablet Take 1 tablet (500 mg total) by mouth 2 (two) times daily with meals. 180 tablet 3    mometasone 0.1% (ELOCON) 0.1 % cream aaa bid x 1-2 weeks prn bug bites 45 g 1    montelukast (SINGULAIR) 10 mg tablet Take 1 tablet (10 mg total) by mouth every evening. 90 tablet 3    mucus clearing device Cecy 1 Device by Misc.(Non-Drug; Combo Route) route 2 (two) times daily. 1 Device 0    multivitamin capsule Take 1 capsule by mouth once daily.       mupirocin (BACTROBAN) 2 % ointment Apply topically 3 (three) times daily. 15 g 0    ondansetron (ZOFRAN-ODT) 4 MG TbDL Take 1 tablet (4 mg total) by mouth every 8 (eight) hours as needed (nausea). 20 tablet 0    potassium chloride SA (K-DUR,KLOR-CON) 20 MEQ tablet Take 1 tablet (20 mEq total) by  mouth once daily. 90 tablet 4    pravastatin (PRAVACHOL) 80 MG tablet TAKE 1 TABLET EVERY DAY 90 tablet 4    predniSONE (DELTASONE) 10 MG tablet 3 pills for 3 days, 2 for 3 days, them 1 for 3 days, repeat for cough 18 tablet 0    psyllium husk (METAMUCIL ORAL) Take by mouth.      SIMETHICONE (GAS-X ORAL) Take 1 capsule by mouth as needed (gas relief).       tezepelumab-ekko (TEZSPIRE) 210 mg/1.91 mL (110 mg/mL) Syrg Inject 1.91 mLs (210 mg total) into the skin every 28 days. 1.91 mL 11    valsartan-hydrochlorothiazide (DIOVAN-HCT) 320-25 mg per tablet Take 1 tablet by mouth once daily. 90 tablet 1    vitamin D3-folic acid 5,000 unit- 1 mg Tab Take 1 tablet by mouth once daily.        Current Facility-Administered Medications on File Prior to Visit   Medication Dose Route Frequency Provider Last Rate Last Admin    levalbuterol nebulizer solution 1.25 mg  1.25 mg Nebulization 1 time in Clinic/HOD Daysi Llanos, VASYL         Family History   Problem Relation Age of Onset    Breast cancer Mother 50    Breast cancer Paternal Aunt         40's    Cervical cancer Paternal Grandmother     Ovarian cancer Paternal Grandmother     Cervical cancer Paternal Cousin     Ovarian cancer Paternal Cousin     Diabetes Other     Hypertension Other     Hypothyroidism Other     Allergic rhinitis Neg Hx     Allergies Neg Hx     Angioedema Neg Hx     Asthma Neg Hx     Atopy Neg Hx     Eczema Neg Hx     Immunodeficiency Neg Hx     Rhinitis Neg Hx     Urticaria Neg Hx     Uterine cancer Neg Hx        Medications Ordered                [facility] [place]  [address] [facility] [address]   [address] [place] [address]    Telephone: [phone]   Fax: [phone]   Hours: Not open 24 hours                         E-Prescribed (1 of 1)              rOPINIRole (REQUIP) 0.25 MG tablet    Sig: Take 1 tablet (0.25 mg total) by mouth every evening.       Start: 1/6/24     Quantity: 10 tablet Refills: 0                           Ohs Peq Odvv Intake     1/6/2024  5:12 PM CST - Filed by Patient   Describe your reason for todays visit severe night time muscle spasms   What is your current physical address in the event of a medical emergency? [address]  [place] [address]   Are you able to take your vital signs? Yes   Systolic Blood Pressure: 121   Diastolic Blood Pressure: 56   Weight: 210   Height: 64   Pulse:    Temperature: 97.8   Respiration rate:    Pulse Oxygen:    Please attach any relevant images or files          Patient presents with muscle spasms in her legs and back.  This recurrs every night.  It starts with itching then she has back pain and muscle spasm.  She was seen in the ER for this and diagnosed with a UTI.  She has augmentin for the UTI.  There has been increasing spasms over the past few days.  The ER checked  electrolytes as well and these were normal except for a low sodium.  She has tried pickle juice, diclofenac, muscle relaxers with no relief.  The pain is severe and unrelenting.  She has nausea when the pain is severe.     Spasms  Pertinent negatives include no abdominal pain, chills, coughing, diaphoresis, fatigue or fever.       Constitution: Positive for activity change. Negative for appetite change, chills, sweating, fatigue, fever, unexpected weight change and generalized weakness.   Eyes:  Negative for vision loss and double vision.   Respiratory:  Negative for chest tightness, cough and sputum production.    Gastrointestinal:  Negative for abdominal trauma and abdominal pain.   Musculoskeletal:  Positive for pain, arthritis, back pain and muscle cramps. Negative for abnormal ROM of joint and gout.   Neurological:  Negative for history of migraines, disorientation and altered mental status.   Psychiatric/Behavioral:  Negative for altered mental status, disorientation, confusion, agitation and nervous/anxious. The patient is not nervous/anxious.         Objective:   The physical exam was conducted virtually.  Physical Exam    Constitutional: She is oriented to person, place, and time. obesity  HENT:   Head: Normocephalic and atraumatic.   Ears:   Right Ear: External ear normal.   Left Ear: External ear normal.   Eyes: Conjunctivae are normal.   Pulmonary/Chest: Effort normal.   Neurological: She is alert and oriented to person, place, and time.   Psychiatric: Her behavior is normal.       Assessment:     1. Pain in both lower extremities        Plan:       Pain in both lower extremities  -     rOPINIRole (REQUIP) 0.25 MG tablet; Take 1 tablet (0.25 mg total) by mouth every evening.  Dispense: 10 tablet; Refill: 0

## 2024-01-07 NOTE — PROGRESS NOTES
Called in because she did not receive her meds' was sent to [facility] and not [facility] per patient request

## 2024-01-08 ENCOUNTER — HOSPITAL ENCOUNTER (OUTPATIENT)
Dept: RADIOLOGY | Facility: HOSPITAL | Age: 74
Discharge: HOME | End: 2024-01-08
Payer: MEDICARE

## 2024-01-08 ENCOUNTER — OFFICE VISIT (OUTPATIENT)
Dept: PAIN MEDICINE | Facility: CLINIC | Age: 74
End: 2024-01-08
Payer: MEDICARE

## 2024-01-08 ENCOUNTER — TELEPHONE (OUTPATIENT)
Dept: FAMILY MEDICINE | Facility: CLINIC | Age: 74
End: 2024-01-08
Payer: MEDICARE

## 2024-01-08 ENCOUNTER — TELEPHONE (OUTPATIENT)
Dept: DERMATOLOGY | Facility: CLINIC | Age: 74
End: 2024-01-08
Payer: MEDICARE

## 2024-01-08 VITALS
BODY MASS INDEX: 34.35 KG/M2 | WEIGHT: 201.19 LBS | HEART RATE: 75 BPM | HEIGHT: 64 IN | DIASTOLIC BLOOD PRESSURE: 70 MMHG | SYSTOLIC BLOOD PRESSURE: 123 MMHG

## 2024-01-08 DIAGNOSIS — M54.16 LUMBAR RADICULOPATHY, CHRONIC: ICD-10-CM

## 2024-01-08 DIAGNOSIS — M54.16 LUMBAR RADICULOPATHY: Primary | ICD-10-CM

## 2024-01-08 DIAGNOSIS — M54.16 LUMBAR RADICULOPATHY: ICD-10-CM

## 2024-01-08 DIAGNOSIS — M48.07 SPINAL STENOSIS, LUMBOSACRAL REGION: ICD-10-CM

## 2024-01-08 DIAGNOSIS — J45.51 SEVERE PERSISTENT ASTHMA WITH ACUTE EXACERBATION: ICD-10-CM

## 2024-01-08 DIAGNOSIS — M54.9 DORSALGIA, UNSPECIFIED: ICD-10-CM

## 2024-01-08 DIAGNOSIS — M48.061 SPINAL STENOSIS OF LUMBAR REGION, UNSPECIFIED WHETHER NEUROGENIC CLAUDICATION PRESENT: ICD-10-CM

## 2024-01-08 PROCEDURE — 3288F FALL RISK ASSESSMENT DOCD: CPT | Mod: HCNC,CPTII,S$GLB,

## 2024-01-08 PROCEDURE — 3008F BODY MASS INDEX DOCD: CPT | Mod: HCNC,CPTII,S$GLB,

## 2024-01-08 PROCEDURE — 3074F SYST BP LT 130 MM HG: CPT | Mod: HCNC,CPTII,S$GLB,

## 2024-01-08 PROCEDURE — 1101F PT FALLS ASSESS-DOCD LE1/YR: CPT | Mod: HCNC,CPTII,S$GLB,

## 2024-01-08 PROCEDURE — 1125F AMNT PAIN NOTED PAIN PRSNT: CPT | Mod: HCNC,CPTII,S$GLB,

## 2024-01-08 PROCEDURE — 1159F MED LIST DOCD IN RCRD: CPT | Mod: HCNC,CPTII,S$GLB,

## 2024-01-08 PROCEDURE — 3078F DIAST BP <80 MM HG: CPT | Mod: HCNC,CPTII,S$GLB,

## 2024-01-08 PROCEDURE — 72110 X-RAY EXAM L-2 SPINE 4/>VWS: CPT | Mod: TC,HCNC,PO

## 2024-01-08 PROCEDURE — 99999 PR PBB SHADOW E&M-EST. PATIENT-LVL V: CPT | Mod: PBBFAC,HCNC,,

## 2024-01-08 PROCEDURE — 1157F ADVNC CARE PLAN IN RCRD: CPT | Mod: HCNC,CPTII,S$GLB,

## 2024-01-08 PROCEDURE — 72110 X-RAY EXAM L-2 SPINE 4/>VWS: CPT | Mod: 26,HCNC,, | Performed by: RADIOLOGY

## 2024-01-08 PROCEDURE — 99214 OFFICE O/P EST MOD 30 MIN: CPT | Mod: HCNC,S$GLB,,

## 2024-01-08 RX ORDER — ONDANSETRON HYDROCHLORIDE 8 MG/1
8 TABLET, FILM COATED ORAL EVERY 8 HOURS PRN
Qty: 30 TABLET | Refills: 1 | Status: SHIPPED | OUTPATIENT
Start: 2024-01-08 | End: 2024-01-08 | Stop reason: SDUPTHER

## 2024-01-08 RX ORDER — TIZANIDINE 4 MG/1
4 TABLET ORAL EVERY 6 HOURS PRN
Qty: 40 TABLET | Refills: 1 | Status: SHIPPED | OUTPATIENT
Start: 2024-01-08 | End: 2024-01-18

## 2024-01-08 RX ORDER — METHYLPREDNISOLONE 4 MG/1
TABLET ORAL
Qty: 21 EACH | Refills: 0 | Status: SHIPPED | OUTPATIENT
Start: 2024-01-08 | End: 2024-01-29

## 2024-01-08 RX ORDER — ONDANSETRON HYDROCHLORIDE 8 MG/1
8 TABLET, FILM COATED ORAL EVERY 8 HOURS PRN
Qty: 30 TABLET | Refills: 1 | Status: SHIPPED | OUTPATIENT
Start: 2024-01-08 | End: 2024-01-18

## 2024-01-08 RX ORDER — TRAMADOL HYDROCHLORIDE 50 MG/1
50 TABLET ORAL EVERY 8 HOURS PRN
Qty: 21 TABLET | Refills: 0 | Status: SHIPPED | OUTPATIENT
Start: 2024-01-08

## 2024-01-08 NOTE — TELEPHONE ENCOUNTER
----- Message from Shanice Moseley sent at 1/8/2024  9:26 AM CST -----  Type: Needs Medical Advice  Who Called:  belle  Best Call Back Number: [phone]  Additional Information: pt has an appt tomorrow, she is requesting a call to r/s, pl call bk to advise thanks

## 2024-01-08 NOTE — TELEPHONE ENCOUNTER
Patient states it is severe ama horses in lower back down legs.     She has stop taking the pravastatin, she is seeing pain doctor today once he addresses she will let us know if she needs anything

## 2024-01-08 NOTE — TELEPHONE ENCOUNTER
----- Message from Daysi Fairbanks sent at 1/8/2024 11:19 AM CST -----  Contact: pt  Type:  Needs Medical Advice    Who Called: pt    Symptoms (please be specific): ama horses all down back and legs     How long has patient had these symptoms:  a week (was seen in ED for it)    Pharmacy name and phone #:    [facility] [place] - [address]  [address]  [place] [address]  Phone: [phone] Fax: [phone]    Would the patient rather a call back or a response via [facility]? Call back    Best Call Back Number: [phone]    Additional Information: pt has stopped taking her pravastatin (PRAVACHOL) 80 MG tablet  Thinks that is what is causing the ama horses all down back    She thinks the medication was making it worse, wants to talk about adjusting dosage. Pain was so bad she was dry heaving causing stomach pain.    Please call to advise  Thanks

## 2024-01-08 NOTE — TELEPHONE ENCOUNTER
Evisit sent over weekend.  Lower abdominal pain not appropriate for Evisit.  Needs evaluation here or [facility].  Cancel Evisit.

## 2024-01-08 NOTE — PROGRESS NOTES
[facility] Follow Up Evaluation    Referred by: Patricia Valerio NP  Reason for referral: back pain    CC:   Chief Complaint   Patient presents with    Low-back Pain    Leg Pain    Hip Pain     Pain bilaterally, but worse on the left side.          1/8/2024     1:58 PM 9/10/2021     2:33 PM 4/15/2021     8:34 AM   Last 3 PDI Scores   Pain Disability Index (PDI) 49 26 10       Interval HPI 1/8/2024: Cornelia Delatorre returns to the office for follow up.  Returns for follow-up with worsening lower back pain, 10/10, across her lower back with radiation down bilateral legs into her calves, constant, worsened with physical activities, somewhat improved with rest.  Pain started around Thanksgiving and has been persistent since.  She reports some intermittent numbness and tingling throughout her legs, pain related weakness.  She reports chronic bladder incontinence but denies any changes or any bowel incontinence.  She has been taking NSAIDs and muscle relaxers without significant relief of her pain.  Pain has been so severe she is reported to the emergency department.      Pain intervention history:  - s/p L5/S1 ROM on 8/14/19 with close to 100% relief of her back and leg pain  - s/p L5/S1 ROM on 5/3/21 with 90% relief.  - s/p L5/S1 ROM on 9/24/21 with 100% relief  - s/p L5/S1 ROM on 2/21/22 with 95% relief       HPI:   Cornelia Delatorre is a 73 y.o. female who complains of back pain    Onset: about 3.5 months  Inciting Event: none  Progression: since onset, pain is rapidly improving  Typical Range: 1-2/10  Timing: intermittent  Quality: aching, burning, grabbing, sharp, shooting  Radiation: yes, down the back of both legs left > right  Associated numbness or weakness: yes numbness on the left leg, no weakness  Exacerbated by: standing, coughing/sneezing, walking  Allievated by: rest, heat, tylenol  Is Pain Level Acceptable?:   Yes    Previous Therapies:  PT/OT: yes, felt like it got worse  HEP:    Interventions:   Surgery:  Medications: tylenol  - NSAIDS: avoids 2/2 h/o gastric bypass  - MSK Relaxants:   - TCAs:   - SNRIs:   - Topicals:   - Anticonvulsants: gabapentin 600mg TID  - Opioids:     History:    Current Outpatient Medications:     amoxicillin-clavulanate 875-125mg (AUGMENTIN) 875-125 mg per tablet, Take 1 tablet by mouth 2 (two) times daily., Disp: 14 tablet, Rfl: 0    ascorbic acid, vitamin C, (VITAMIN C) 1000 MG tablet, Take 1,000 mg by mouth 2 (two) times daily. , Disp: , Rfl:     azelastine (OPTIVAR) 0.05 % ophthalmic solution, Place 1 drop into both eyes. As needed, Disp: , Rfl:     B-complex with vitamin C (Z-BEC OR EQUIV) tablet, Take 1 tablet by mouth once daily., Disp: , Rfl:     Bifidobacterium infantis (ALIGN ORAL), Take by mouth once daily., Disp: , Rfl:     biotin 10,000 mcg Cap, Take 1 tablet by mouth every evening. , Disp: , Rfl:     blood sugar diagnostic Strp, Insurance preferred meter and strips. Check daily, Disp: 90 each, Rfl: 3    cranberry 500 mg Cap, Take 1 capsule by mouth every evening., Disp: , Rfl:     diclofenac (VOLTAREN) 50 MG EC tablet, Take 1 tablet (50 mg total) by mouth 2 (two) times daily as needed (pain)., Disp: 10 tablet, Rfl: 0    doxazosin (CARDURA) 2 MG tablet, Take 1 tablet (2 mg total) by mouth every evening., Disp: 30 tablet, Rfl: 11    EPINEPHrine (EPIPEN) 0.3 mg/0.3 mL AtIn, Inject 1 each into the muscle as needed. , Disp: , Rfl: 3    erythromycin with ethanoL (THERAMYCIN) 2 % external solution, Aaa bid prn, Disp: 60 mL, Rfl: 3    esomeprazole (NEXIUM) 40 MG capsule, Take 1 capsule (40 mg total) by mouth before breakfast., Disp: 90 capsule, Rfl: 3    estradioL (ESTRACE) 0.01 % (0.1 mg/gram) vaginal cream, Place 1 g vaginally 3 (three) times a week. Place by fingertip application before bedtime three times a week (Monday, Wednesday, Friday), Disp: 45 g, Rfl: 3    fexofenadine (ALLEGRA) 180 MG tablet, Take 180 mg by mouth once daily. , Disp: , Rfl:      fish oil-omega-3 fatty acids 300-1,000 mg capsule, Take 2 g by mouth once daily., Disp: , Rfl:     fluticasone propionate (FLONASE) 50 mcg/actuation nasal spray, 2 sprays (100 mcg total) by Each Nostril route once daily., Disp: 16 g, Rfl: 11    fluticasone-umeclidin-vilanter (TRELEGY ELLIPTA) 200-62.5-25 mcg inhaler, Inhale 1 puff into the lungs once daily., Disp: 180 each, Rfl: 3    gabapentin (NEURONTIN) 600 MG tablet, Take 1 tablet (600 mg total) by mouth 3 (three) times daily., Disp: 540 tablet, Rfl: 3    guaifenesin-codeine 100-10 mg/5 ml (GUAIFENESIN AC)  mg/5 mL syrup, Take 5 mLs by mouth 3 (three) times daily as needed for Cough., Disp: 473 mL, Rfl: 2    hydrOXYzine HCL (ATARAX) 10 MG Tab, Take 1 tablet (10 mg total) by mouth 3 (three) times daily as needed., Disp: 30 tablet, Rfl: 3    immun glob G,IgG,-pro-IgA 0-50 (HIZENTRA) 1 gram/5 mL (20 %) Soln, Inject 55 mLs (11 g total) into the skin once a week., Disp: 220 mL, Rfl: 12    inhalation spacing device, Use as directed for inhalation., Disp: 1 each, Rfl: 0    lancets (ACCU-CHEK SOFTCLIX LANCETS) Misc, Use to check blood sugar daily., Disp: 100 each, Rfl: 11    levalbuterol (XOPENEX HFA) 45 mcg/actuation inhaler, Inhale 1-2 puffs into the lungs every 4 (four) hours as needed for Wheezing or Shortness of Breath. Rescue, Disp: 15 g, Rfl: 11    metFORMIN (GLUCOPHAGE-XR) 500 MG ER 24hr tablet, Take 1 tablet (500 mg total) by mouth 2 (two) times daily with meals., Disp: 180 tablet, Rfl: 3    mometasone 0.1% (ELOCON) 0.1 % cream, aaa bid x 1-2 weeks prn bug bites, Disp: 45 g, Rfl: 1    montelukast (SINGULAIR) 10 mg tablet, Take 1 tablet (10 mg total) by mouth every evening., Disp: 90 tablet, Rfl: 3    mucus clearing device Cecy, 1 Device by Misc.(Non-Drug; Combo Route) route 2 (two) times daily., Disp: 1 Device, Rfl: 0    multivitamin capsule, Take 1 capsule by mouth once daily. , Disp: , Rfl:     mupirocin (BACTROBAN) 2 % ointment, Apply topically 3  (three) times daily., Disp: 15 g, Rfl: 0    potassium chloride SA (K-DUR,KLOR-CON) 20 MEQ tablet, Take 1 tablet (20 mEq total) by mouth once daily., Disp: 90 tablet, Rfl: 4    psyllium husk (METAMUCIL ORAL), Take by mouth., Disp: , Rfl:     rOPINIRole (REQUIP) 0.25 MG tablet, Take 1 tablet (0.25 mg total) by mouth every evening., Disp: 10 tablet, Rfl: 0    rOPINIRole (REQUIP) 0.25 MG tablet, Take 1 tablet (0.25 mg total) by mouth every evening. for 10 days, Disp: 10 tablet, Rfl: 0    SIMETHICONE (GAS-X ORAL), Take 1 capsule by mouth as needed (gas relief). , Disp: , Rfl:     tezepelumab-ekko (TEZSPIRE) 210 mg/1.91 mL (110 mg/mL) Syrg, Inject 1.91 mLs (210 mg total) into the skin every 28 days., Disp: 1.91 mL, Rfl: 11    valsartan-hydrochlorothiazide (DIOVAN-HCT) 320-25 mg per tablet, Take 1 tablet by mouth once daily., Disp: 90 tablet, Rfl: 1    vitamin D3-folic acid 5,000 unit- 1 mg Tab, Take 1 tablet by mouth once daily. , Disp: , Rfl:     blood-glucose meter kit, Use as instructed. Insurance preferred meter., Disp: 1 each, Rfl: 0    cloNIDine (CATAPRES) 0.1 MG tablet, Take 1 tablet (0.1 mg total) by mouth 3 (three) times daily as needed (PRN SBP > 165 mmHg)., Disp: 90 tablet, Rfl: 6    diltiaZEM (CARDIZEM CD) 120 MG Cp24, TAKE 1 CAPSULE (120 MG TOTAL) BY MOUTH EVERY EVENING., Disp: 90 capsule, Rfl: 4    levalbuterol (XOPENEX) 1.25 mg/3 mL nebulizer solution, Take 3 mLs (1.25 mg total) by nebulization every 4 (four) hours as needed for Wheezing or Shortness of Breath. Rescue, Disp: 1 Box, Rfl: 11    methylPREDNISolone (MEDROL DOSEPACK) 4 mg tablet, use as directed, Disp: 21 each, Rfl: 0    ondansetron (ZOFRAN) 8 MG tablet, Take 1 tablet (8 mg total) by mouth every 8 (eight) hours as needed for Nausea., Disp: 30 tablet, Rfl: 01    pravastatin (PRAVACHOL) 80 MG tablet, TAKE 1 TABLET EVERY DAY (Patient not taking: Reported on 1/8/2024), Disp: 90 tablet, Rfl: 4    tiZANidine (ZANAFLEX) 4 MG tablet, Take 1 tablet (4 mg  total) by mouth every 6 (six) hours as needed., Disp: 40 tablet, Rfl: 01    Current Facility-Administered Medications:     levalbuterol nebulizer solution 1.25 mg, 1.25 mg, Nebulization, 1 time in Clinic/HOD, Daysi Llanos NP    Past Medical History:   Diagnosis Date    Allergy     Arthritis     Asthma     Cancer     skin cancer to right hand    Cataract     Chronic fatigue 01/24/2017    CVID (common variable immunodeficiency) 03/07/2019    Diabetes mellitus     type2    KLINE (dyspnea on exertion) 01/24/2017    Dyslipidemia 06/25/2019    Encounter for blood transfusion     Essential hypertension 12/29/2011    GERD (gastroesophageal reflux disease)     Headache(784.0)     History of lumpectomy of both breasts     1992 negative    Hyperlipidemia 07/15/2015    Hypertension     Hypothyroidism     Neuropathy     Obesity     Obesity     Postmenopausal HRT (hormone replacement therapy)     Rash     Rosacea     Sleep apnea     on bipap    Snoring     Squamous cell carcinoma of skin     left forearm     UTI (urinary tract infection)     Wears glasses        Past Surgical History:   Procedure Laterality Date    ADENOIDECTOMY      APPENDECTOMY      BLADDER SUSPENSION      BREAST BIOPSY Bilateral 1992    bilateral benign excisional biopsies    BUNIONECTOMY  10/17/14    right, still has discomfort    CATARACT EXTRACTION W/  INTRAOCULAR LENS IMPLANT Bilateral     CHOLECYSTECTOMY  08/02/2017    COLONOSCOPY      CYSTOSCOPY      EPIDURAL STEROID INJECTION INTO LUMBAR SPINE N/A 8/14/2019    Procedure: Injection-steroid-epidural-lumbar L5/S1;  Surgeon: Fredi Rojas MD;  Location: [facility] OR;  Service: Pain Management;  Laterality: N/A;    EPIDURAL STEROID INJECTION INTO LUMBAR SPINE N/A 5/3/2021    Procedure: Injection-steroid-epidural-lumbar L5/S1 to the Left;  Surgeon: Fredi Rojas MD;  Location: [facility] OR;  Service: Pain Management;  Laterality: N/A;    EPIDURAL STEROID INJECTION INTO LUMBAR SPINE N/A 9/24/2021    Procedure:  Injection-steroid-epidural-lumbar L5/S1;  Surgeon: Fredi Rojas MD;  Location: [facility] OR;  Service: Pain Management;  Laterality: N/A;    EPIDURAL STEROID INJECTION INTO LUMBAR SPINE N/A 2/21/2022    Procedure: Injection-steroid-epidural-lumbar L5/S1;  Surgeon: Fredi Rojas MD;  Location: [facility] OR;  Service: Pain Management;  Laterality: N/A;    ESOPHAGEAL DILATION N/A 6/11/2021    Procedure: DILATION, ESOPHAGUS;  Surgeon: Jake Sheriff MD;  Location: [facility];  Service: Endoscopy;  Laterality: N/A;    ESOPHAGOGASTRODUODENOSCOPY      dilated esophagus    ESOPHAGOGASTRODUODENOSCOPY N/A 6/11/2021    Procedure: EGD (ESOPHAGOGASTRODUODENOSCOPY);  Surgeon: Jake Sheriff MD;  Location: [facility];  Service: Endoscopy;  Laterality: N/A;    GASTRIC BYPASS      2011    HYSTERECTOMY  1980    interstim bladder  2009    10/14/14 new battery    OOPHORECTOMY  1980    REPLACEMENT OF SACRAL NERVE STIMULATOR  2/18/2020    Procedure: REPLACEMENT, NEUROSTIMULATOR, SACRAL;  Surgeon: Mary Jane Jarvis MD;  Location: [facility] OR [address] [place] [address];  Service: Urology;;    REVISION OF PROCEDURE INVOLVING SACRAL NEUROSTIMULATOR DEVICE Right 2/18/2020    Procedure: REVISION, NEUROSTIMULATOR, SACRAL/ battery replacement;  Surgeon: Mary Jane Jarvis MD;  Location: [facility] OR [address] [place] [address];  Service: Urology;  Laterality: Right;  1hr/ rep contacted/ (CONNIE)    SKIN CANCER EXCISION      left hand    TONSILLECTOMY      WISDOM TOOTH EXTRACTION         Family History   Problem Relation Age of Onset    Breast cancer Mother 50    Breast cancer Paternal Aunt         40's    Cervical cancer Paternal Grandmother     Ovarian cancer Paternal Grandmother     Cervical cancer Paternal Cousin     Ovarian cancer Paternal Cousin     Diabetes Other     Hypertension Other     Hypothyroidism Other     Allergic rhinitis Neg Hx     Allergies Neg Hx     Angioedema Neg Hx     Asthma Neg Hx     Atopy Neg Hx     Eczema Neg Hx     Immunodeficiency Neg Hx     Rhinitis Neg Hx      Urticaria Neg Hx     Uterine cancer Neg Hx        Social History     Socioeconomic History    Marital status:    Tobacco Use    Smoking status: Never    Smokeless tobacco: Never   Substance and Sexual Activity    Alcohol use: Not Currently    Drug use: No    Sexual activity: Not Currently     Social Determinants of Health     Financial Resource Strain: Medium Risk (10/11/2023)    Overall Financial Resource Strain (CARDIA)     Difficulty of Paying Living Expenses: Somewhat hard   Food Insecurity: Food Insecurity Present (10/11/2023)    Hunger Vital Sign     Worried About Running Out of Food in the Last Year: Sometimes true     Ran Out of Food in the Last Year: Patient declined   Transportation Needs: No Transportation Needs (10/11/2023)    PRAPARE - Transportation     Lack of Transportation (Medical): No     Lack of Transportation (Non-Medical): No   Physical Activity: Inactive (10/11/2023)    Exercise Vital Sign     Days of Exercise per Week: 0 days     Minutes of Exercise per Session: 0 min   Stress: Patient Declined (10/11/2023)    [employer] [place] [employer] - Occupational Stress Questionnaire     Feeling of Stress : Patient declined   Social Connections: Socially Integrated (10/11/2023)    Social Connection and Isolation Panel [NHANES]     Frequency of Communication with Friends and Family: More than three times a week     Frequency of Social Gatherings with Friends and Family: Patient declined     Attends Jehovah's witness Services: More than 4 times per year     Active Member of Clubs or Organizations: Yes     Attends Club or Organization Meetings: More than 4 times per year     Marital Status: Living with partner   Housing Stability: Unknown (10/11/2023)    Housing Stability Vital Sign     Unable to Pay for Housing in the Last Year: Patient refused     Number of Places Lived in the Last Year: 1     Unstable Housing in the Last Year: No       Review of patient's allergies indicates:   Allergen  "Reactions    Sulfa (sulfonamide antibiotics) Hives    Naproxen Other (See Comments)     Other reaction(s): RT sided numbness      Albuterol Itching and Palpitations     Nebulizer only. Can use inhaler       Review of Systems:  General ROS: negative for - fever  Psychological ROS: negative for - hostility  Hematological and Lymphatic ROS: positive for - bruising  negative for - bleeding problems  Endocrine ROS: negative for - unexpected weight changes  Respiratory ROS: positive for - cough and shortness of breath  Cardiovascular ROS: no chest pain or dyspnea on exertion  Gastrointestinal ROS: no abdominal pain, change in bowel habits, or black or bloody stools  Musculoskeletal ROS: negative for - muscular weakness  Neurological ROS: negative for - numbness/tingling  negative for -  New bowel and bladder control changes  Dermatological ROS: negative for rash    Physical Exam:  Vitals:    01/08/24 1356   BP: 123/70   Pulse: 75   Weight: 91.3 kg (201 lb 2.7 oz)   Height: 5' 4" (1.626 m)   PainSc:   9   PainLoc: Back     Body mass index is 34.53 kg/m².     Gen: NAD  Gait:  Antalgic gait, ambulating with Rollator.  Psych:  Mood appropriate for given condition  HEENT: eyes anicteric   GI: Abd soft  CV: RRR  Lungs: breathing unlabored   ROM: limited AROM of the L spine in all planes, full ROM at ankles, knees and hips  Lumbar flexion 90 degrees, extension 50 degrees, side bending 30 degrees.    Sensation: intact to light touch in all dermatomes tested from L2-S1 bilaterally  Reflexes: 0+ b/l patella and 0/0 b/l achilles  Palpation: Diffusely tender over lumbar paraspinals   Tone: normal in the b/l knees and hips   Skin: intact  Extremities: No edema in b/l ankles or hands       Right Left   L2/3 Iliacus Hip flexion  5  5   L3/4 Qudratus Femoris Knee Extension  5  5   L4/5 Tib Anterior Ankle Dorsiflexion   5  5   L5/S1 Extensor Hallicus Longus Great toe extension  5  5                 S1/S2 Gastroc/Soleus Plantar Flexion  5  " 5     Imaging:  Xray lumbar spine 6/19/19  FINDINGS:  There is mild exaggeration of the normal lumbar lordosis.  There is 4 mm anterolisthesis of L4 on L5.  The vertebral bodies maintain normal height.  There is no fracture.  There is multilevel endplate osteophyte formation and multilevel facet joint arthropathy.  There is a sacral stimulator in place.    CT lumbar spine 8/6/19  FINDINGS:  Grade 1 anterolisthesis of L4 on L5.  Minimal retrolisthesis of L2 on L3.The vertebral body heights are well-maintained.No fractures are identified. Sacral stimulator partially visualized, which appears to enter the right S3-4 neural foramen.    Mild multilevel degenerative disc disease with anterior osteophytosis, vacuum disc phenomenon at T10-11 and L1-2 and disc space narrowing, most pronounced and moderate at L1-2.    T12-L1: Small right central posterior disc osteophyte complex.  Mild bilateral facet arthrosis.  No significant spinal canal or neural foraminal stenosis.  L1-2: Circumferential disc bulge.  Mild bilateral facet arthrosis.  No significant spinal canal or neural foraminal stenosis.  L2-3: Minimal retrolisthesis.  Circumferential disc bulge.  Moderate bilateral facet arthrosis.  Mild bilateral neural foraminal stenosis.  Mild spinal canal stenosis.  L3-4: Circumferential disc bulge.  Ligamentum flavum infolding.  Moderate bilateral facet arthrosis.  Mild bilateral neural foraminal stenosis.  Mild spinal canal stenosis  L4-5: Grade 1 anterolisthesis.  Circumferential disc bulge, eccentric to the right.  Ligamentum flavum infolding.  Severe bilateral facet arthrosis.  Mild left and moderate right neural foraminal stenosis.  Severe spinal canal stenosis.  L5-S1: Mild diffuse disc bulge.  Moderate left and mild right facet arthrosis.  Moderate left and mild right neural foraminal stenosis.  No significant spinal canal stenosis.    Labs:  BMP  Lab Results   Component Value Date     (L) 01/04/2024    K 4.2  01/04/2024    CL 91 (L) 01/04/2024    CO2 24 01/04/2024    BUN 9 01/04/2024    CREATININE 0.9 01/04/2024    CALCIUM 10.1 01/04/2024    ANIONGAP 14 01/04/2024    ESTGFRAFRICA >60 04/27/2022    EGFRNONAA >60 04/27/2022     Lab Results   Component Value Date    ALT 22 01/04/2024    AST 34 01/04/2024    ALKPHOS 156 (H) 01/04/2024    BILITOT 0.9 01/04/2024       Assessment:  Problem List Items Addressed This Visit          Neuro    Lumbar radiculopathy - Primary    Relevant Medications    methylPREDNISolone (MEDROL DOSEPACK) 4 mg tablet    tiZANidine (ZANAFLEX) 4 MG tablet    Other Relevant Orders    X-Ray Lumbar Spine 5 View     Other Visit Diagnoses       Severe persistent asthma with acute exacerbation        Relevant Medications    ondansetron (ZOFRAN) 8 MG tablet    Dorsalgia, unspecified        Relevant Orders    CT Lumbar Spine Without Contrast    Lumbar radiculopathy, chronic        Relevant Orders    CT Lumbar Spine Without Contrast    Spinal stenosis, lumbosacral region        Relevant Orders    CT Lumbar Spine Without Contrast    Spinal stenosis of lumbar region, unspecified whether neurogenic claudication present                  Treatment Plan:  73 y.o. year old female with PMH DM II, asthma, HTN, common variable immunodeficiency, GERD, h/o gastric bypass, SOHA presents to the office with lower back pain.      1/8/2024: Cornelia Delatorre returns to the office for follow up.  Returns for follow-up with worsening lower back pain, 10/10, across her lower back with radiation down bilateral legs into her calves, constant, worsened with physical activities, somewhat improved with rest.  Pain started around Thanksgiving and has been persistent since.  She reports some intermittent numbness and tingling throughout her legs, pain related weakness.  She reports chronic bladder incontinence but denies any changes or any bowel incontinence.  She has been taking NSAIDs and muscle relaxers without significant relief of  her pain.  Pain has been so severe she is reported to the emergency department.      - on exam she has full strength in her lower extremities.  She has some mild tenderness to palpation of her lower midline spine.  - I believe her lumbar radicular pain has returned from the narrowing at L4/5 and her severe central canal stenosis.  - her pain is too severe to restart formal physical therapy.  No relief with NSAIDs and muscle relaxers.  She was previously taken gabapentin 600 mg t.i.d. but discontinued out of concern it was causing her pain.  - at this time I would like to order an updated x-ray of her lumbar spine and CT of her lumbar spine.  Previous imaging is from 2019.  - while we get her set up with his imaging I have provided her with an oral Medrol Dosepak, refill on tizanidine and Zofran as she is having severe nausea from the pain.  Continue with diclofenac  - short course of tramadol sent to Dr. Rojas today for approval.  - we will call her with results of imaging and future treatment plan.  She responded very well to L5/S1 ROM in 2022.      :  Not applicable    The above note was completed, in part, with the aid of Dragon dictation software/hardware. Translation errors may be present.

## 2024-01-09 DIAGNOSIS — M54.16 LUMBAR RADICULOPATHY: Primary | ICD-10-CM

## 2024-01-09 RX ORDER — TRAMADOL HYDROCHLORIDE 50 MG/1
50 TABLET ORAL EVERY 8 HOURS PRN
Qty: 21 TABLET | Refills: 0 | Status: CANCELLED | OUTPATIENT
Start: 2024-01-09

## 2024-01-09 NOTE — TELEPHONE ENCOUNTER
----- Message from Andria Lawanda sent at 1/9/2024  9:14 AM CST -----  Type:  RX Refill Request    Who Called:  pt  Refill or New Rx:  refill  RX Name and Strength:  traMADoL (ULTRAM) 50 mg tablet  How is the patient currently taking it? (ex. 1XDay):  as directed  Is this a 30 day or 90 day RX:  7  Preferred Pharmacy with phone number:    [id number] [place] [address] [address] [place] [address] & [facility] [address] [place] [id number]  Phone: [phone] Fax: [phone]      Best Call Back Number:  [phone]    Additional Information:  pt is calling in regards to the pharmacy is unable to get the medication from the  and needs something else called in for the pt. Please call back and advise. Thanks!

## 2024-01-10 RX ORDER — HYDROCODONE BITARTRATE AND ACETAMINOPHEN 5; 325 MG/1; MG/1
1 TABLET ORAL EVERY 8 HOURS PRN
Qty: 21 TABLET | Refills: 0 | Status: SHIPPED | OUTPATIENT
Start: 2024-01-10 | End: 2024-01-17

## 2024-01-10 NOTE — TELEPHONE ENCOUNTER
I have reviewed the [place] Board of Pharmacy website and there are no abberancies.        Message from nurse states that pharmacy does not have tramadol.

## 2024-01-11 DIAGNOSIS — Z00.00 ENCOUNTER FOR MEDICARE ANNUAL WELLNESS EXAM: ICD-10-CM

## 2024-01-17 ENCOUNTER — PATIENT MESSAGE (OUTPATIENT)
Dept: FAMILY MEDICINE | Facility: CLINIC | Age: 74
End: 2024-01-17
Payer: MEDICARE

## 2024-01-17 DIAGNOSIS — R30.0 DYSURIA: Primary | ICD-10-CM

## 2024-01-22 ENCOUNTER — HOSPITAL ENCOUNTER (OUTPATIENT)
Dept: RADIOLOGY | Facility: HOSPITAL | Age: 74
Discharge: HOME | End: 2024-01-22
Payer: MEDICARE

## 2024-01-22 ENCOUNTER — OFFICE VISIT (OUTPATIENT)
Dept: FAMILY MEDICINE | Facility: CLINIC | Age: 74
End: 2024-01-22
Payer: MEDICARE

## 2024-01-22 ENCOUNTER — TELEPHONE (OUTPATIENT)
Dept: PAIN MEDICINE | Facility: CLINIC | Age: 74
End: 2024-01-22
Payer: MEDICARE

## 2024-01-22 ENCOUNTER — OFFICE VISIT (OUTPATIENT)
Dept: PULMONOLOGY | Facility: CLINIC | Age: 74
End: 2024-01-22
Payer: MEDICARE

## 2024-01-22 VITALS
SYSTOLIC BLOOD PRESSURE: 126 MMHG | HEART RATE: 69 BPM | WEIGHT: 202.63 LBS | DIASTOLIC BLOOD PRESSURE: 34 MMHG | BODY MASS INDEX: 34.78 KG/M2 | OXYGEN SATURATION: 99 %

## 2024-01-22 VITALS
DIASTOLIC BLOOD PRESSURE: 79 MMHG | SYSTOLIC BLOOD PRESSURE: 157 MMHG | WEIGHT: 202.69 LBS | HEIGHT: 64 IN | BODY MASS INDEX: 34.6 KG/M2 | HEART RATE: 67 BPM | OXYGEN SATURATION: 99 %

## 2024-01-22 DIAGNOSIS — E11.9 CONTROLLED TYPE 2 DIABETES MELLITUS WITHOUT COMPLICATION, WITHOUT LONG-TERM CURRENT USE OF INSULIN: ICD-10-CM

## 2024-01-22 DIAGNOSIS — J47.9 BRONCHIECTASIS WITHOUT COMPLICATION: ICD-10-CM

## 2024-01-22 DIAGNOSIS — E78.5 DYSLIPIDEMIA: ICD-10-CM

## 2024-01-22 DIAGNOSIS — E11.42 DM TYPE 2 WITH DIABETIC PERIPHERAL NEUROPATHY: ICD-10-CM

## 2024-01-22 DIAGNOSIS — J45.20 MILD INTERMITTENT ASTHMA WITHOUT COMPLICATION: ICD-10-CM

## 2024-01-22 DIAGNOSIS — S32.050A COMPRESSION FRACTURE OF L5 VERTEBRA, INITIAL ENCOUNTER: Primary | ICD-10-CM

## 2024-01-22 DIAGNOSIS — E66.01 CLASS 2 SEVERE OBESITY DUE TO EXCESS CALORIES WITH SERIOUS COMORBIDITY AND BODY MASS INDEX (BMI) OF 36.0 TO 36.9 IN ADULT: ICD-10-CM

## 2024-01-22 DIAGNOSIS — N30.00 ACUTE CYSTITIS WITHOUT HEMATURIA: Primary | ICD-10-CM

## 2024-01-22 DIAGNOSIS — I70.0 ATHEROSCLEROSIS OF ABDOMINAL AORTA: ICD-10-CM

## 2024-01-22 DIAGNOSIS — M54.9 DORSALGIA, UNSPECIFIED: ICD-10-CM

## 2024-01-22 DIAGNOSIS — E11.42 DIABETIC POLYNEUROPATHY ASSOCIATED WITH TYPE 2 DIABETES MELLITUS: ICD-10-CM

## 2024-01-22 DIAGNOSIS — M48.07 SPINAL STENOSIS, LUMBOSACRAL REGION: ICD-10-CM

## 2024-01-22 DIAGNOSIS — D83.9 CVID (COMMON VARIABLE IMMUNODEFICIENCY): ICD-10-CM

## 2024-01-22 DIAGNOSIS — J32.9 SINUSITIS, UNSPECIFIED CHRONICITY, UNSPECIFIED LOCATION: ICD-10-CM

## 2024-01-22 DIAGNOSIS — E11.69 TYPE 2 DIABETES MELLITUS WITH OTHER SPECIFIED COMPLICATION, WITHOUT LONG-TERM CURRENT USE OF INSULIN: ICD-10-CM

## 2024-01-22 DIAGNOSIS — D69.2 SENILE PURPURA: Primary | ICD-10-CM

## 2024-01-22 DIAGNOSIS — G47.33 OSA TREATED WITH BIPAP: ICD-10-CM

## 2024-01-22 DIAGNOSIS — I10 ESSENTIAL HYPERTENSION: ICD-10-CM

## 2024-01-22 DIAGNOSIS — E03.9 ACQUIRED HYPOTHYROIDISM: ICD-10-CM

## 2024-01-22 DIAGNOSIS — M54.16 LUMBAR RADICULOPATHY, CHRONIC: ICD-10-CM

## 2024-01-22 DIAGNOSIS — J45.50 SEVERE PERSISTENT ASTHMA WITHOUT COMPLICATION: ICD-10-CM

## 2024-01-22 LAB
BILIRUB UR QL STRIP: NEGATIVE
CLARITY UR: CLEAR
COLOR UR: YELLOW
GLUCOSE UR QL STRIP: NEGATIVE
HGB UR QL STRIP: NEGATIVE
KETONES UR QL STRIP: NEGATIVE
LEUKOCYTE ESTERASE UR QL STRIP: NEGATIVE
NITRITE UR QL STRIP: NEGATIVE
PH UR STRIP: 6 [PH] (ref 5–8)
PROT UR QL STRIP: NEGATIVE
SP GR UR STRIP: 1.01 (ref 1–1.03)
URN SPEC COLLECT METH UR: NORMAL

## 2024-01-22 PROCEDURE — 1157F ADVNC CARE PLAN IN RCRD: CPT | Mod: HCNC,CPTII,S$GLB, | Performed by: NURSE PRACTITIONER

## 2024-01-22 PROCEDURE — 3288F FALL RISK ASSESSMENT DOCD: CPT | Mod: HCNC,CPTII,S$GLB,

## 2024-01-22 PROCEDURE — 3077F SYST BP >= 140 MM HG: CPT | Mod: HCNC,CPTII,S$GLB, | Performed by: NURSE PRACTITIONER

## 2024-01-22 PROCEDURE — 72131 CT LUMBAR SPINE W/O DYE: CPT | Mod: 26,HCNC,, | Performed by: RADIOLOGY

## 2024-01-22 PROCEDURE — 99214 OFFICE O/P EST MOD 30 MIN: CPT | Mod: HCNC,S$GLB,,

## 2024-01-22 PROCEDURE — 3008F BODY MASS INDEX DOCD: CPT | Mod: HCNC,CPTII,S$GLB,

## 2024-01-22 PROCEDURE — 1125F AMNT PAIN NOTED PAIN PRSNT: CPT | Mod: HCNC,CPTII,S$GLB,

## 2024-01-22 PROCEDURE — 99213 OFFICE O/P EST LOW 20 MIN: CPT | Mod: HCNC,S$GLB,, | Performed by: NURSE PRACTITIONER

## 2024-01-22 PROCEDURE — 99999 PR PBB SHADOW E&M-EST. PATIENT-LVL V: CPT | Mod: PBBFAC,HCNC,, | Performed by: NURSE PRACTITIONER

## 2024-01-22 PROCEDURE — 3078F DIAST BP <80 MM HG: CPT | Mod: HCNC,CPTII,S$GLB, | Performed by: NURSE PRACTITIONER

## 2024-01-22 PROCEDURE — 1126F AMNT PAIN NOTED NONE PRSNT: CPT | Mod: HCNC,CPTII,S$GLB, | Performed by: NURSE PRACTITIONER

## 2024-01-22 PROCEDURE — 1157F ADVNC CARE PLAN IN RCRD: CPT | Mod: HCNC,CPTII,S$GLB,

## 2024-01-22 PROCEDURE — 87086 URINE CULTURE/COLONY COUNT: CPT | Mod: HCNC

## 2024-01-22 PROCEDURE — 3078F DIAST BP <80 MM HG: CPT | Mod: HCNC,CPTII,S$GLB,

## 2024-01-22 PROCEDURE — 72131 CT LUMBAR SPINE W/O DYE: CPT | Mod: TC,HCNC,PO

## 2024-01-22 PROCEDURE — 3074F SYST BP LT 130 MM HG: CPT | Mod: HCNC,CPTII,S$GLB,

## 2024-01-22 PROCEDURE — 99999 PR PBB SHADOW E&M-EST. PATIENT-LVL IV: CPT | Mod: PBBFAC,HCNC,,

## 2024-01-22 PROCEDURE — 1159F MED LIST DOCD IN RCRD: CPT | Mod: HCNC,CPTII,S$GLB,

## 2024-01-22 PROCEDURE — 81003 URINALYSIS AUTO W/O SCOPE: CPT | Mod: HCNC,PO

## 2024-01-22 PROCEDURE — 1159F MED LIST DOCD IN RCRD: CPT | Mod: HCNC,CPTII,S$GLB, | Performed by: NURSE PRACTITIONER

## 2024-01-22 PROCEDURE — 1101F PT FALLS ASSESS-DOCD LE1/YR: CPT | Mod: HCNC,CPTII,S$GLB,

## 2024-01-22 PROCEDURE — 3008F BODY MASS INDEX DOCD: CPT | Mod: HCNC,CPTII,S$GLB, | Performed by: NURSE PRACTITIONER

## 2024-01-22 PROCEDURE — 1101F PT FALLS ASSESS-DOCD LE1/YR: CPT | Mod: HCNC,CPTII,S$GLB, | Performed by: NURSE PRACTITIONER

## 2024-01-22 PROCEDURE — 3288F FALL RISK ASSESSMENT DOCD: CPT | Mod: HCNC,CPTII,S$GLB, | Performed by: NURSE PRACTITIONER

## 2024-01-22 RX ORDER — DILTIAZEM HYDROCHLORIDE 60 MG/1
120 TABLET, FILM COATED ORAL DAILY
Qty: 20 TABLET | Refills: 0 | Status: SHIPPED | OUTPATIENT
Start: 2024-01-22 | End: 2024-02-01

## 2024-01-22 RX ORDER — PRAVASTATIN SODIUM 80 MG/1
80 TABLET ORAL DAILY
Qty: 90 TABLET | Refills: 4 | Status: SHIPPED | OUTPATIENT
Start: 2024-01-22

## 2024-01-22 RX ORDER — METFORMIN HYDROCHLORIDE 500 MG/1
500 TABLET, EXTENDED RELEASE ORAL 2 TIMES DAILY WITH MEALS
Qty: 180 TABLET | Refills: 3 | Status: SHIPPED | OUTPATIENT
Start: 2024-01-22 | End: 2025-01-21

## 2024-01-22 RX ORDER — MONTELUKAST SODIUM 10 MG/1
10 TABLET ORAL NIGHTLY
Qty: 90 TABLET | Refills: 3 | Status: SHIPPED | OUTPATIENT
Start: 2024-01-22

## 2024-01-22 RX ORDER — LEVALBUTEROL INHALATION SOLUTION 1.25 MG/3ML
1 SOLUTION RESPIRATORY (INHALATION) EVERY 4 HOURS PRN
Qty: 1 EACH | Refills: 11 | Status: SHIPPED | OUTPATIENT
Start: 2024-01-22 | End: 2025-01-21

## 2024-01-22 RX ORDER — MUPIROCIN 20 MG/G
OINTMENT TOPICAL 3 TIMES DAILY
Qty: 30 G | Refills: 1 | Status: SHIPPED | OUTPATIENT
Start: 2024-01-22

## 2024-01-22 RX ORDER — GABAPENTIN 600 MG/1
600 TABLET ORAL 3 TIMES DAILY
Qty: 540 TABLET | Refills: 3 | Status: SHIPPED | OUTPATIENT
Start: 2024-01-22

## 2024-01-22 RX ORDER — TIZANIDINE 4 MG/1
8 TABLET ORAL 2 TIMES DAILY
COMMUNITY
Start: 2024-01-19

## 2024-01-22 RX ORDER — DILTIAZEM HYDROCHLORIDE 120 MG/1
120 CAPSULE, COATED, EXTENDED RELEASE ORAL NIGHTLY
Qty: 90 CAPSULE | Refills: 4 | Status: SHIPPED | OUTPATIENT
Start: 2024-01-22 | End: 2024-03-25

## 2024-01-22 NOTE — PROGRESS NOTES
1/22/2024    Cornelia Delatorre  In office visit     Chief Complaint   Patient presents with    3m f/u    Bronchiectasis     HPI:  1/22/2024- states noticed improvement in shortness of breath while on Tezspire. Seen in ER in December for injury to back followed by pain management. No complaint of cough, wheeze, chest tightness, or shortness of breath. Using nebulizer only as needed when trigger by excessive mucous and cough with high pollen levels in environment.     Took medrol dose pack from ER for back pain, no issues with lungs.     10/17/2023- received large bill from [facility] for Tezspire therapy, states she has noticed dramatic improvement in breathing after starting Tezspire for past 6 months. Having fewer exacerbations as before. Treated for exacerbation in September that was treated with systemic steroids and antibiotics.   States her and her  have noticed her activity level has improved in last 4 months. States she is sleeping better and feels better through out the day.     On BIPAP nightly for sleep apnea, no complaints of fatigue.     09/08/2023: Hx: Asthma, Lung nodules, CVID, SOHA on Bipap  Cough: Associated with feelings of chest congestion - associated with coughing spells that lead to overall fatigue. Worse at night time - associated with difficulty falling asleep and staying asleep. Cough is productive with intermittent mucous - difficult to expectorate.   Using Trelegy once per day with benefit.   Using nebulized treatments - Levalbuterol - 1-2 treatments per day depending on chest cough.    Using Albuterol inhaler as needed - approx use varies, approx 1-2x per day.  States used Albuterol last night and woke up this morning feeling flushed. Endorses hand shakiness and chest palpitations at times with Albuterol use.  Completed regimen of Prednisone and Doxy after last appointment with Daysi Llanos in July.   Denies the use of supplemental oxygen.  On Tezspire - next dose due next week.        7/6/2023- complaint of recurrent shortness of breath, worsened in past 3 weeks started, started prednisone 10 mg with no benefit. Using nebulizer or albuterol inhaler every 4 hours.   Associated with chest tightness and cough. Productive clear mucous. Having nocturnal coughing fits most nights.   Complaint of nausea and dizziness,   On BIPAP nightly, no issues with sleep apnea.   Severe persistent asthma with acute exacerbation  -     betamethasone acetate-betamethasone sodium phosphate injection 6 mg  -     fluticasone-umeclidin-vilanter (TRELEGY ELLIPTA) 200-62.5-25 mcg inhaler; Inhale 1 puff into the lungs once daily.  -     ondansetron (ZOFRAN-ODT) 4 MG TbDL; Take 1 tablet (4 mg total) by mouth every 8 (eight) hours as needed (nausea).  -     doxycycline (VIBRA-TABS) 100 MG tablet; Take 1 tablet (100 mg total) by mouth 2 (two) times daily. for 10 days  -     X-Ray Chest PA And Lateral; Future  -     predniSONE (DELTASONE) 10 MG tablet; 3 pills for 3 days, 2 for 3 days, them 1 for 3 days, repeat for cough  Right otitis media, unspecified otitis media type  -     doxycycline (VIBRA-TABS) 100 MG tablet; Take 1 tablet (100 mg total) by mouth 2 (two) times daily. for 10 days          5/1/2023- complaint of wheeze, onset 1 week, took 5 days of prednisone to control, states slightly improved.   Required systemic steroids in January and again in February for Asthma control  Associated with wheeze and chest tightness, SOB is worsening with time, difficult to walk in stores with out stopping to rest.   Has new motor in BIPAP, wearing nightly with benefit.       1/9/2023- states feeling good, noticed worsening sinus drainage and productive cough when laying down at night for past 3 weeks, improves with albuterol inhaler or nebulizer. On Trelegy daily.  No recent steroid therapy. Steroid injection in knee 6 weeks Prior.  Wearing BIPAP nightly with benefit. Daytime fatigue is resolved. No complaint of morning  headaches. Waiting for recalled BIPAP machine.     7/11/2022- states feeling like her self again after COVID 19 in May, Cough is dramatically improved, non productive cough, few days week.   Fatigue continues, has to take daily naps. Wearing BiPAP nightly.    On Trelegy daily with benefit but cost is high, in donut whole.       5/25/2022- Dx COVID 19 7 days prior tx with monoclonal Antibiodies two days prior. Feeling generalized weakness, diaphoresis, fatigue  Using Trelegy and nebulizer daily,  beats on her back   Cough - severe, complaint, worse at night, productive thick yellow mucous. Associated with late evening wheeze.   Lost sense of taste and smell.       4/13/2022- SOB- stable, worse in late evenings, taking prednisone 10 mg when needed, not used in past 2 months; worse with exertion, improves with rest and albuterol rescue.   On BIPAP with benefit, no daytime drowsiness.   Liked Trelegy. Still on Adviar until prescription runs out, has 2 weeks left.   Skin biopsy 5 weeks prior, left lower chin site irritated by wearing face mask. No edema or erythema,     1/12/2022- SOB worsening, on Advair daily and levalbuterol 2-3x daily for past 4 weeks, has noticed worsening of shortness of breath and sinus complaints.   Admitted to hospital for GI virus,   Noticed prednisone is needed occasionally at 10 mg notices improvement    On BiPAP nightly with benefit. No daytime fatigue, no issues with nasal pillow mask.     7/12/21- complaint of daily sinus drainage, has to clear throat repeatedly for past 2 months. Currently on Flonase nasal spray, ipratropium nasal spray, Singulair pills, zyrtec, corcindin daily with no improvement.   SOB- stable, required steroid therapy for 3 days twice in past 3 months. Only with exertion, worse in late evenings, improves with rest,  Using nebulizer 2x daily due to feeling of heaviness in chest.   Cough- mild, non productive, associated with post nasal drip from allergies.  Nocturnal arousals 2x weekly for past 2 weeks,   Wearing CPAP nighty with benefit.     4/12/21- spent last 6 months in home, was able to get COVID 19 vaccine Mederna. Allergies stable, few spells improve with nasal spray noticed improvement after getting air purifier.   Noticed spacer device helping with hoarse voice or oral thrush like before,   SOB- unchanged, daily complaint, varies with severity, worse with exertion, improves with rest and albuterol rescue. Has to sit up to breath when first laying  Down, not able to breath when laying on left side.   Occasional cough- productive, clear mucous, using nebulizer 3 x weekly.   CPAP for SOHA doing well,     10/13/2020- Allergies bother her every few days, currently on daily Singulair, zyrtec, flonase, astelin nasal spray, having sneezing fits.   Complaint of clear sinus drainage,   Hoarse voice has resolved after stopping symbicort.   Cough- improved,   SOB- doing well, occasional episodes of not being able to catch breath, did not use nebulizer   Wearing CPAP nightly for SOHA, states benefits greatly    7/13/2020- Hoarse voice, loss of voice, productive cough, lost voice July 2019 tx by ENT who treated for acute laryngitis with diflucan; Recurrent problem. Hoarse voice return 4 weeks prior, ENT doctor recommends changing Asthma medications tx her with diflucan, doxycycline and prednisone for 5 days, states has improved.   SOB- worse when wearing face mask, currently on hizentra therapy,     5/4/2020- uses symbicort daily, uses rescue 2/wk - some anxiety, getting igg rx. Having more sinus problems with head colds- 3 in last 4 wks.  No prednisone- hizentra working well.        Nov 4, 2019- having mucous sensation, notes some sob relaxing - maybe worse night.  No nasal stuffy.  Uses cpap.  Uses symbicort bid, no rescue.  Mucous is clear.  No abx for sinus or lungs.  Getting immune infusions weekly - pt senses doing better since starting in May.  Patient Instructions    Breathing off and on short breath - need to use more albuterol to make clear where breathing problems come from  Mucous production may decrease if airways controlled- albuterol should help clearance.  Rescue inhaler may resolve any breathing problems- should use intermittently if any symptoms.  May use augmentin for sinus/lung problems- not optimal for all uti's       May 6,2019-due to get immune infusion next 2 days.  No prednisone, needs monlukast. abx stopped wks ago- mucous cleared.    Patient Instructions   Use antibiotic daily til immune therapy done a wk - then as needed like every one else  Immune therapy may keep you stable - better than antibiotics.   Use symbicort regular.  Lungs may stabilize.  Use prednisone if needed.  Lung  Nodules are stable for 2 yrs and no follow up needed.  Call if problems- hope all will be better yet.  March 7, 19-exacerbated in late Jan- cleared in feb (vague).  Now ill again yellow and gray mucous (culture last Jan was nl yvonne).  Sl intermittent wheezes since last wk.  Sees Dr Herrera in [place]- gets allergy rx but declined to get now.  Pt has cvid by  igg and igm levels low and pneumococcal antibodies to 8/14 pneumococcal titers. Uses symbicort and singulair routinely.  Took prednisone completed 4 days ago- uses prednisone monthly- was able to skip December  .  Discussed with patient above for education the following:      Gastric by pass may cause malabsorption, protein levels have been too low and may affect ig levels-- somewhat.   Need to get follow up with dietician to assure adequate protein intake/levels.   Antibiotic may stabilize infections with common variable immune deficiency- azithromycin daily once cultures submitted.   Use augmentin if infection worsens.   Acapella/chest clapping help clear mucous, vest likewise if bronchiectasis.  Will not treat cause.   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  Use codeine to suppress mild coughing.   Need good immune system and no airway/asthma problems and good hygiene of bronchial tubes (no chronic infections) to prevent illness.     Lungs measured near normal with good response to bronchial medications 2017   Need ct to follow up and cultures to assure infection controlled.      Jan 28/2019- Feeling bad onset 2 weeks, took prednisone taper x 6 days and antibiotic Augmentin week prior, States no improvement. Cough- worse at night, no nocturnal arousals, takes cough suppressant syrup before bed. Productive yellow/brown color.   Nebulized albuterol 4x daily. States no fever.  Has started allergy injections waiting for insurance clearance for IGG therapy.   Discussed with patient above for education the following:    CT chest for February to monitor and assess for bronchiectasis, need diagnosis for insurance to cover vest therapy  Have  continue to percuss back with hand.   Recommend purchasing Acepella device to help clear lungs of excess mucous, attaches to nebulizer device  Continue Nebulizer treatments 4x daily as needed.  Chest x-ray now to evaluate for pneumonia  Blood work at hospital   Will yeison to see results of sputum culture and x-ray before repeating antibiotic  Need to return sputum to clinic with in 4 hours of collecting  Fluconazole pills for oral thrush, this is a recurrent problem related to your weak immune system and use of inhaled steroids. Continue to rinse mouth out after using symbicort but problem will improve after starting immune replacement therapy.    oct 30,   2018-- had one day fever followed by cough/wheeze illness that lingered a wk. Pt seen by VASYL Llanos 2x, went to  Er once.  Alexander like dying- only one day fever.  Violent cough.  Nebulizer ppt itch and palpitations- ok  On xopenex now.  Prednisone 40x3, 20 x 3 ,  augmentin cleared.  Now back to normal.      May 14, 2018- had spell /exacerbation with one round  prednisone. Breathing good.  Follow-up in about 1 year (around 5/14/2019), or if symptoms worsen or fail to improve.   Discussed with patient above for education the following:     Check blood count and pneumonia vaccine response after last vaccine 4/27/2018   Use azithromycin for any lung/sinus infection- immune weakness likely   Asthma stable- use prednisone and singulair.   Use allergra and singulair and astelin/flonase   Lung nodule in lung still - Navigational bronchoscopy might find?  - will at least re check in a year.     Call if needed, re check next yr.    darlin 15, 2018--1 st illness in yr or so with cough and rattles, no flu.  Appetite ok.  Ill x wk, cough and wheeze and sob and noct worse, resp rx helps a bit.  Hasn't been using  Albuterol   Follow-up in about 6 months (around 7/15/2018).   Discussed with patient above for education the following:      tamiflu if flu.   Need to use albuterol for any lung symptoms.  Should get cough  Better controlled.      Prednisone 20 mg 3 for 3 , taper may be needed.  Give shot today, 1 daily for 3 may be enough with albuterol.   You had high eosinophils-- if asthma becomes more active - special therapy may help??     prevnar 13 would be good - should be off prednisone.  Immune system is sl weak  Protection only to 7.14 pneumonia germs.    oct 19, 2017- has scratchy throat, some sinus drip, has nightly sensation throat issues, post beryl and carpel tunnel surg.  No sinus lung infections.  Breathing and cough good.  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 Had pos tb gold.  June 21, 2017- had good [place] trip, tapered symbicort with some increase sensation of lung problems so maintained full dose. No infections.  No chest symptoms on symbicort.   April 24, feels a lot better with min cough, vague sob going left side down at night.  Going to [place] 2 weeks.  Uses symbicort and good results.  March 16, cough better, but comes and goes,   No great help with steroids.  Submitted 3 sputums.  Had pneumovax march last yr.  Breathing better. Got symbicort.   Had pneumonia vaccine last yr    3/8/2017 HPI: had onset cough [place] illness, got dizzy few days with diarrhea and cough. Cough clear sm amt mucous. Did have 101 temp one day, fatigue, no muscle aches.  Appetite decreased.  Illnesses lingered 2 weeks but cough never remitted- has improved with inhahler use last 15 days.    Had gastric 2011 bypass with with continued diarrhea intially resolved. No regurg or reflux or swallow problems.   symbicort nearly  Resolved.    Sinuses ok.  No pets.  Never smoker.    Appetite good, feels well now.    headcolds to chest since childhood, nocturnal ??not recalled.  Had cats in past but got rid in 2003 or so.     The chief compliant problem varies with instablilty at time      PFSH:  Past Medical History:   Diagnosis Date    Allergy     Arthritis     Asthma     Cancer     skin cancer to right hand    Cataract     Chronic fatigue 01/24/2017    CVID (common variable immunodeficiency) 03/07/2019    Diabetes mellitus     type2    KLINE (dyspnea on exertion) 01/24/2017    Dyslipidemia 06/25/2019    Encounter for blood transfusion     Essential hypertension 12/29/2011    GERD (gastroesophageal reflux disease)     Headache(784.0)     History of lumpectomy of both breasts     1992 negative    Hyperlipidemia 07/15/2015    Hypertension     Hypothyroidism     Neuropathy     Obesity     Obesity     Postmenopausal HRT (hormone replacement therapy)     Rash     Rosacea     Sleep apnea     on bipap    Snoring     Squamous cell carcinoma of skin     left forearm     UTI (urinary tract infection)     Wears glasses          Past Surgical History:   Procedure Laterality Date    ADENOIDECTOMY      APPENDECTOMY      BLADDER SUSPENSION      BREAST BIOPSY Bilateral 1992    bilateral benign excisional biopsies    BUNIONECTOMY  10/17/14    right, still has discomfort    CATARACT EXTRACTION W/   INTRAOCULAR LENS IMPLANT Bilateral     CHOLECYSTECTOMY  08/02/2017    COLONOSCOPY      CYSTOSCOPY      EPIDURAL STEROID INJECTION INTO LUMBAR SPINE N/A 8/14/2019    Procedure: Injection-steroid-epidural-lumbar L5/S1;  Surgeon: Fredi Rojas MD;  Location: [facility] OR;  Service: Pain Management;  Laterality: N/A;    EPIDURAL STEROID INJECTION INTO LUMBAR SPINE N/A 5/3/2021    Procedure: Injection-steroid-epidural-lumbar L5/S1 to the Left;  Surgeon: Fredi Rojas MD;  Location: [facility] OR;  Service: Pain Management;  Laterality: N/A;    EPIDURAL STEROID INJECTION INTO LUMBAR SPINE N/A 9/24/2021    Procedure: Injection-steroid-epidural-lumbar L5/S1;  Surgeon: Fredi Rojas MD;  Location: [facility] OR;  Service: Pain Management;  Laterality: N/A;    EPIDURAL STEROID INJECTION INTO LUMBAR SPINE N/A 2/21/2022    Procedure: Injection-steroid-epidural-lumbar L5/S1;  Surgeon: Fredi Rojas MD;  Location: [facility] OR;  Service: Pain Management;  Laterality: N/A;    ESOPHAGEAL DILATION N/A 6/11/2021    Procedure: DILATION, ESOPHAGUS;  Surgeon: Jake Sheriff MD;  Location: [facility];  Service: Endoscopy;  Laterality: N/A;    ESOPHAGOGASTRODUODENOSCOPY      dilated esophagus    ESOPHAGOGASTRODUODENOSCOPY N/A 6/11/2021    Procedure: EGD (ESOPHAGOGASTRODUODENOSCOPY);  Surgeon: Jake Sheriff MD;  Location: [facility] ENDO;  Service: Endoscopy;  Laterality: N/A;    GASTRIC BYPASS      2011    HYSTERECTOMY  1980    interstim bladder  2009    10/14/14 new battery    OOPHORECTOMY  1980    REPLACEMENT OF SACRAL NERVE STIMULATOR  2/18/2020    Procedure: REPLACEMENT, NEUROSTIMULATOR, SACRAL;  Surgeon: Mary Jane Jarvis MD;  Location: [facility] OR 2ND FLR;  Service: Urology;;    REVISION OF PROCEDURE INVOLVING SACRAL NEUROSTIMULATOR DEVICE Right 2/18/2020    Procedure: REVISION, NEUROSTIMULATOR, SACRAL/ battery replacement;  Surgeon: Mary Jane Jarvis MD;  Location: [facility] OR 2ND FLR;  Service: Urology;  Laterality: Right;  1hr/ rep contacted/  "(CONNIE)    SKIN CANCER EXCISION      left hand    TONSILLECTOMY      WISDOM TOOTH EXTRACTION       Social History     Tobacco Use    Smoking status: Never    Smokeless tobacco: Never   Substance Use Topics    Alcohol use: Not Currently    Drug use: No     Family History   Problem Relation Age of Onset    Breast cancer Mother 50    Breast cancer Paternal Aunt         40's    Cervical cancer Paternal Grandmother     Ovarian cancer Paternal Grandmother     Cervical cancer Paternal Cousin     Ovarian cancer Paternal Cousin     Diabetes Other     Hypertension Other     Hypothyroidism Other     Allergic rhinitis Neg Hx     Allergies Neg Hx     Angioedema Neg Hx     Asthma Neg Hx     Atopy Neg Hx     Eczema Neg Hx     Immunodeficiency Neg Hx     Rhinitis Neg Hx     Urticaria Neg Hx     Uterine cancer Neg Hx      Review of patient's allergies indicates:   Allergen Reactions    Sulfa (sulfonamide antibiotics) Hives    Naproxen Other (See Comments)     Other reaction(s): RT sided numbness         Performance Status:The patient's activity level is functions out of house.      Review of Systems:  a review of eleven systems covering constitutional, Eye, HEENT, Psych, Respiratory, Cardiac, GI, , Musculoskeletal, Endocrine, Dermatologic was negative except for pertinent findings as listed ABOVE and below: all good,       Exam:Comprehensive exam done. BP (!) 157/79 (BP Location: Right arm, Patient Position: Sitting, BP Method: Medium (Automatic))   Pulse 67   Ht 5' 4" (1.626 m)   Wt 92 kg (202 lb 11.4 oz)   SpO2 99% Comment: on room air at rest  BMI 34.80 kg/m²   Exam included Vitals as listed, and patient's appearance and affect and alertness and mood, oral exam for yeast and hygiene and pharynx lesions and Mallapatti (M) score, neck with inspection for jvd and masses and thyroid abnormalities and lymph nodes (supraclavicular and infraclavicular nodes and axillary also examined and noted if abn), chest exam included symmetry " and effort and fremitus and percussion and auscultation, cardiac exam included rhythm and gallops and murmur and rubs and jvd and edema, abdominal exam for mass and hepatosplenomegaly and tenderness and hernias and bowel sounds, Musculoskeletal exam with muscle tone and posture and mobility/gait and  strength, and skin for rashes and cyanosis and pallor and turgor, extremity for clubbing.  Findings were normal except for pertinent findings listed below:  M3,  chest is symmetric, no distress, normal percussion. Breath sounds clear   On room air      Radiographs (ct chest and cxr) reviewed: view by direct vision  i do see clear bronchiectasis,nodules are noted.  Mucoid type impaction suggested in rul ant segment.    X-Ray Chest PA And Lateral 09/11/2023 chest x-ray clear      X-Ray Chest PA And Lateral 06/09/2022 lungs clear    CT Abdomen Pelvis With Contrast 06/11/2021 lower lung bases clear    CT Chest Without Contrast  04/22/2019 stable non calcified nodules date to 2017 with no change.         Patient's labs were reviewed including CBC and CMP    Lab Results   Component Value Date    WBC 7.33 01/04/2024    RBC 4.61 01/04/2024    HGB 12.8 01/04/2024    HCT 38.0 01/04/2024    MCV 82 01/04/2024    MCH 27.8 01/04/2024    MCHC 33.7 01/04/2024    RDW 14.4 01/04/2024     01/04/2024    MPV 10.1 01/04/2024    GRAN 5.2 01/04/2024    GRAN 70.3 01/04/2024    LYMPH 1.4 01/04/2024    LYMPH 18.7 01/04/2024    MONO 0.7 01/04/2024    MONO 10.1 01/04/2024    EOS 0.0 01/04/2024    BASO 0.01 01/04/2024    EOSINOPHIL 0.1 01/04/2024    BASOPHIL 0.1 01/04/2024      Latest Reference Range & Units 01/28/19 16:17   Eos # 0.0 - 0.5 K/uL 0.2       Aerobic culture 10/27/22 CURVULARIA SPECIES      Latest Reference Range & Units 11/02/21 09:54 03/03/22 09:57 04/27/22 11:47 09/02/22 08:48   CO2 23 - 29 mmol/L 30 (H) 31 (H) 27 26   (H): Data is abnormally high    PFT  Spirometry bronchodilator, lung volume by body box, diffusion  capacity measured December 18, 2023. Spirometry is normal. There was no improvement spirometry following bronchodilator. Lung volumes are within normal range. Diffusion was slightly low at 63% predicted. Clinical correlation recommended. (Physician Final: 12/18/2023 01:03PM, Elect    Spirometry with bronchodilator, lung volume by gas dilution, and diffusion capacity measured April 19, 2021. The FEV1 FVC ratio was 75% indicating no airflow obstruction measured by spirometry. The FEV1 was 99% predicted at 2.25 L. There was no   statistically significant improvement in spirometry following bronchodilator. Total lung capacity was within normal range at 90% of predicted. Diffusion was slightly low at 77% predicted-uncorrected for anemia if present.   Spirometry is normal. Lung volumes fall within normal range. Diffusion is slightly decreased. Clinical correlation recommended. The bronchodilator response was not significant.       Done [facility] with 10% response, otherwise nl  DATE OF PROCEDURE:  03/24/2017     1.  Spirometry reveals an FVC of 3.1 L, which is 107% predicted.  FEV1 is 2.3 L,   which is 100% predicted.  The ratio, there is preserved.  There is a positive,   but not significant bronchodilator response to both the FVC and FEV1.  Loop   contours appear with adequate effort as well.  2.  Lung volumes.  The total lung capacity is 4.4 L, which is 92% predicted.    There are no signs of gas trapping.  3.  Diffusing capacity is mild-to-moderate reduced at 68%, does improve to 96%   with alveolar volumes.      Plan:  Clinical impression is resonably certain and repeated evaluation prn +/- follow up will be needed as below.    Cornelia was seen today for 3m f/u and bronchiectasis.    Diagnoses and all orders for this visit:    Senile purpura  -     mupirocin (BACTROBAN) 2 % ointment; Apply topically 3 (three) times daily.    Bronchiectasis without complication  -     levalbuterol (XOPENEX) 1.25 mg/3 mL nebulizer  solution; Take 3 mLs (1.25 mg total) by nebulization every 4 (four) hours as needed for Wheezing or Shortness of Breath. Rescue  -     NEBULIZER FOR HOME USE    SOHA treated with BiPAP  Comments:  - continue BIPAP therapy    Severe persistent asthma without complication  -     NEBULIZER FOR HOME USE            Follow up in about 6 months (around 7/22/2024), or if symptoms worsen or fail to improve.    Discussed with patient above for education the following:      Patient Instructions   Will continue current Asthma prescription medication regiment

## 2024-01-22 NOTE — PROGRESS NOTES
Name: Cornelia Delatorre  MRN: [id number]  : 1950  PCP: Armond Cortez MD    HPI      Patient follows with Dr. Cortez, new to me. Presents for increasing urinary frequency over the last 2 weeks. Complains of dysuria as well. Seen in ED on  for back pain in which she was given antibiotic for UTI. Completed course of Augmetin however she is still having urinary symptoms. Denies any fevers, nausea, flank pain, or hematuria.     Review of Systems   Constitutional:  Negative for fever.   Respiratory:  Negative for shortness of breath.    Cardiovascular:  Negative for chest pain.   Gastrointestinal:  Negative for nausea and vomiting.   Genitourinary:  Positive for dysuria, frequency and urgency. Negative for flank pain.   All other systems reviewed and are negative.      Patient Active Problem List   Diagnosis    Class 2 severe obesity due to excess calories with serious comorbidity and body mass index (BMI) of 36.0 to 36.9 in adult    Essential hypertension    Thyroid cyst    Osteoarthrosis, unspecified whether generalized or localized, lower leg    Neuropathy in diabetes    Hammertoe    Porokeratosis    History of bariatric surgery    SOHA treated with BiPAP    Bunionette    Peripheral neuropathy    Type 2 diabetes mellitus    Urinary urgency    Lesion of nasal septum    Gastroesophageal reflux    Status post right foot surgery    Diabetic polyneuropathy associated with type 2 diabetes mellitus    Onychomycosis due to dermatophyte    Arthritis of left ankle    Acquired hypothyroidism    Frequency of micturition    KLINE (dyspnea on exertion)    Abnormal CT scan, lung    Immune deficiency disorder    Bronchiectasis without complication    Lung nodule    Exposure to TB    Atherosclerosis of abdominal aorta    Tortuous aorta    CVID (common variable immunodeficiency)    Tracheobronchomalacia    Leg pain, posterior    Gait abnormality    Dyslipidemia    Lumbar radiculopathy    Chronic bilateral low back pain with  bilateral sciatica    Arthritis of finger of right hand    Overactive bladder    Rectocele    Abdominal pain, vomiting, and diarrhea    Advance care planning    Elevated CK    Chronic sinus complaints    Bilateral carotid artery stenosis    COVID    Onychomycosis of multiple toenails with type 2 diabetes mellitus    Venous insufficiency of both lower extremities       Vitals:    01/22/24 0826   BP: (!) 126/34   Pulse: 69       Physical Exam  Constitutional:       General: She is not in acute distress.     Appearance: She is well-developed.   HENT:      Head: Normocephalic and atraumatic.      Right Ear: External ear normal.      Left Ear: External ear normal.   Eyes:      Conjunctiva/sclera: Conjunctivae normal.      Pupils: Pupils are equal, round, and reactive to light.   Neck:      Thyroid: No thyromegaly.   Cardiovascular:      Heart sounds: S1 normal and S2 normal.   Musculoskeletal:         General: No swelling or tenderness. Normal range of motion.   Skin:     General: Skin is warm and dry.      Coloration: Skin is not jaundiced or pale.   Neurological:      General: No focal deficit present.      Mental Status: She is alert and oriented to person, place, and time.      Cranial Nerves: No cranial nerve deficit.   Psychiatric:         Mood and Affect: Mood normal.         Behavior: Behavior normal.         1. Acute cystitis without hematuria  -     Urinalysis  -     CULTURE, URINE    2. Essential hypertension  -     diltiaZEM (CARDIZEM CD) 120 MG Cp24; Take 1 capsule (120 mg total) by mouth every evening.  Dispense: 90 capsule; Refill: 4  -     diltiaZEM (CARDIZEM) 60 MG tablet; Take 2 tablets (120 mg total) by mouth once daily. for 10 days  Dispense: 20 tablet; Refill: 0    3. DM type 2 with diabetic peripheral neuropathy  -     gabapentin (NEURONTIN) 600 MG tablet; Take 1 tablet (600 mg total) by mouth 3 (three) times daily.  Dispense: 540 tablet; Refill: 3    4. Dyslipidemia  Overview:  7/17 [facility]    Normal cors, LVEDP 15, PAP 35/17    Orders:  -     pravastatin (PRAVACHOL) 80 MG tablet; Take 1 tablet (80 mg total) by mouth once daily.  Dispense: 90 tablet; Refill: 4    5. Controlled type 2 diabetes mellitus without complication, without long-term current use of insulin  -     metFORMIN (GLUCOPHAGE-XR) 500 MG ER 24hr tablet; Take 1 tablet (500 mg total) by mouth 2 (two) times daily with meals.  Dispense: 180 tablet; Refill: 3    6. Sinusitis, unspecified chronicity, unspecified location  -     montelukast (SINGULAIR) 10 mg tablet; Take 1 tablet (10 mg total) by mouth every evening.  Dispense: 90 tablet; Refill: 3    7. Mild intermittent asthma without complication  -     montelukast (SINGULAIR) 10 mg tablet; Take 1 tablet (10 mg total) by mouth every evening.  Dispense: 90 tablet; Refill: 3    8. Diabetic polyneuropathy associated with type 2 diabetes mellitus   Chronic. Resume gabapentin    9. Class 2 severe obesity due to excess calories with serious comorbidity and body mass index (BMI) of 34.0 to 34.9 in adult   Body mass index is 34.78 kg/m². Morbid obesity complicates all aspects of disease management from diagnostic modalities to treatment. Weight loss encouraged and health benefits explained to patient.          Follow up as scheduled or if symptoms fail to improve       SMILEY Singleton  01/22/2024

## 2024-01-22 NOTE — TELEPHONE ENCOUNTER
Called and left voicemail.  Discussed patient has compression fracture.  Ordering back brace.    Please get patient fitted for back brace.  Orders are in.

## 2024-01-23 ENCOUNTER — LAB VISIT (OUTPATIENT)
Dept: LAB | Facility: HOSPITAL | Age: 74
End: 2024-01-23
Attending: INTERNAL MEDICINE
Payer: MEDICARE

## 2024-01-23 DIAGNOSIS — I10 ESSENTIAL HYPERTENSION: ICD-10-CM

## 2024-01-23 LAB — BACTERIA UR CULT: NORMAL

## 2024-01-23 PROCEDURE — 36415 COLL VENOUS BLD VENIPUNCTURE: CPT | Mod: HCNC,PO | Performed by: INTERNAL MEDICINE

## 2024-01-23 PROCEDURE — 80048 BASIC METABOLIC PNL TOTAL CA: CPT | Mod: HCNC | Performed by: INTERNAL MEDICINE

## 2024-01-24 LAB
ANION GAP SERPL CALC-SCNC: 11 MMOL/L (ref 8–16)
BUN SERPL-MCNC: 14 MG/DL (ref 8–23)
CALCIUM SERPL-MCNC: 9.5 MG/DL (ref 8.7–10.5)
CHLORIDE SERPL-SCNC: 99 MMOL/L (ref 95–110)
CO2 SERPL-SCNC: 25 MMOL/L (ref 23–29)
CREAT SERPL-MCNC: 1 MG/DL (ref 0.5–1.4)
EST. GFR  (NO RACE VARIABLE): >60 ML/MIN/1.73 M^2
GLUCOSE SERPL-MCNC: 91 MG/DL (ref 70–110)
POTASSIUM SERPL-SCNC: 4.2 MMOL/L (ref 3.5–5.1)
SODIUM SERPL-SCNC: 135 MMOL/L (ref 136–145)

## 2024-01-25 ENCOUNTER — PATIENT MESSAGE (OUTPATIENT)
Dept: FAMILY MEDICINE | Facility: CLINIC | Age: 74
End: 2024-01-25
Payer: MEDICARE

## 2024-01-25 ENCOUNTER — PATIENT MESSAGE (OUTPATIENT)
Dept: PULMONOLOGY | Facility: CLINIC | Age: 74
End: 2024-01-25
Payer: MEDICARE

## 2024-01-25 ENCOUNTER — TELEPHONE (OUTPATIENT)
Dept: PULMONOLOGY | Facility: CLINIC | Age: 74
End: 2024-01-25
Payer: MEDICARE

## 2024-01-29 ENCOUNTER — TELEPHONE (OUTPATIENT)
Dept: PAIN MEDICINE | Facility: CLINIC | Age: 74
End: 2024-01-29

## 2024-01-29 ENCOUNTER — OFFICE VISIT (OUTPATIENT)
Dept: PAIN MEDICINE | Facility: CLINIC | Age: 74
End: 2024-01-29
Payer: MEDICARE

## 2024-01-29 VITALS
WEIGHT: 206.88 LBS | BODY MASS INDEX: 35.32 KG/M2 | SYSTOLIC BLOOD PRESSURE: 139 MMHG | HEART RATE: 63 BPM | HEIGHT: 64 IN | DIASTOLIC BLOOD PRESSURE: 61 MMHG

## 2024-01-29 DIAGNOSIS — S32.050D COMPRESSION FRACTURE OF L5 VERTEBRA WITH ROUTINE HEALING, SUBSEQUENT ENCOUNTER: ICD-10-CM

## 2024-01-29 DIAGNOSIS — M54.16 LUMBAR RADICULOPATHY: Primary | ICD-10-CM

## 2024-01-29 DIAGNOSIS — M54.9 DORSALGIA, UNSPECIFIED: ICD-10-CM

## 2024-01-29 DIAGNOSIS — M48.061 SPINAL STENOSIS OF LUMBAR REGION, UNSPECIFIED WHETHER NEUROGENIC CLAUDICATION PRESENT: ICD-10-CM

## 2024-01-29 PROCEDURE — 1159F MED LIST DOCD IN RCRD: CPT | Mod: HCNC,CPTII,S$GLB,

## 2024-01-29 PROCEDURE — 1125F AMNT PAIN NOTED PAIN PRSNT: CPT | Mod: HCNC,CPTII,S$GLB,

## 2024-01-29 PROCEDURE — 3288F FALL RISK ASSESSMENT DOCD: CPT | Mod: HCNC,CPTII,S$GLB,

## 2024-01-29 PROCEDURE — 3078F DIAST BP <80 MM HG: CPT | Mod: HCNC,CPTII,S$GLB,

## 2024-01-29 PROCEDURE — 3008F BODY MASS INDEX DOCD: CPT | Mod: HCNC,CPTII,S$GLB,

## 2024-01-29 PROCEDURE — 3075F SYST BP GE 130 - 139MM HG: CPT | Mod: HCNC,CPTII,S$GLB,

## 2024-01-29 PROCEDURE — 1101F PT FALLS ASSESS-DOCD LE1/YR: CPT | Mod: HCNC,CPTII,S$GLB,

## 2024-01-29 PROCEDURE — 99214 OFFICE O/P EST MOD 30 MIN: CPT | Mod: HCNC,S$GLB,,

## 2024-01-29 PROCEDURE — 99999 PR PBB SHADOW E&M-EST. PATIENT-LVL IV: CPT | Mod: PBBFAC,HCNC,,

## 2024-01-29 PROCEDURE — 1157F ADVNC CARE PLAN IN RCRD: CPT | Mod: HCNC,CPTII,S$GLB,

## 2024-01-29 NOTE — H&P (VIEW-ONLY)
[facility] Follow Up Evaluation    Referred by: Patricia Valerio NP  Reason for referral: back pain    CC:   Chief Complaint   Patient presents with    Low-back Pain          1/29/2024     8:13 AM 1/8/2024     1:58 PM 9/10/2021     2:33 PM   Last 3 PDI Scores   Pain Disability Index (PDI) 41 [phone] 26     Interval HPI 1/29/2024: Cornelia Delatorre returns to the office for follow up.  She returns for follow-up for her L5 compression fracture.  Since last office visit updated CT lumbar spine showed burst type compression fracture of L5.  She has been wearing her LSO brace and taking Tylenol and gabapentin for pain.  Overall she feels like her pain is somewhat improving but continues to have significant pain, 7/10, constant, worse in the morning, improved as the day progresses.  Pain is located in her lower back with radiation into bilateral buttocks in the back of bilateral legs.  She denies any new numbness, weakness or any new changes to her bowel or bladder function.      Pain intervention history:  - s/p L5/S1 ROM on 8/14/19 with close to 100% relief of her back and leg pain  - s/p L5/S1 ROM on 5/3/21 with 90% relief.  - s/p L5/S1 ROM on 9/24/21 with 100% relief  - s/p L5/S1 ROM on 2/21/22 with 95% relief       HPI:   Cornelia Delatorre is a 73 y.o. female who complains of back pain    Onset: about 3.5 months  Inciting Event: none  Progression: since onset, pain is rapidly improving  Typical Range: 1-2/10  Timing: intermittent  Quality: aching, burning, grabbing, sharp, shooting  Radiation: yes, down the back of both legs left > right  Associated numbness or weakness: yes numbness on the left leg, no weakness  Exacerbated by: standing, coughing/sneezing, walking  Allievated by: rest, heat, tylenol  Is Pain Level Acceptable?:   Yes    Previous Therapies:  PT/OT: yes, felt like it got worse  HEP:   Interventions:   Surgery:  Medications: tylenol  - NSAIDS: avoids 2/2 h/o gastric bypass  - MSK  Relaxants:   - TCAs:   - SNRIs:   - Topicals:   - Anticonvulsants: gabapentin 600mg TID  - Opioids:     History:    Current Outpatient Medications:     amoxicillin-clavulanate 875-125mg (AUGMENTIN) 875-125 mg per tablet, Take 1 tablet by mouth 2 (two) times daily. (Patient not taking: Reported on 1/29/2024), Disp: 14 tablet, Rfl: 0    ascorbic acid, vitamin C, (VITAMIN C) 1000 MG tablet, Take 1,000 mg by mouth 2 (two) times daily. , Disp: , Rfl:     azelastine (OPTIVAR) 0.05 % ophthalmic solution, Place 1 drop into both eyes. As needed, Disp: , Rfl:     B-complex with vitamin C (Z-BEC OR EQUIV) tablet, Take 1 tablet by mouth once daily., Disp: , Rfl:     Bifidobacterium infantis (ALIGN ORAL), Take by mouth once daily., Disp: , Rfl:     biotin 10,000 mcg Cap, Take 1 tablet by mouth every evening. , Disp: , Rfl:     blood sugar diagnostic Strp, Insurance preferred meter and strips. Check daily, Disp: 90 each, Rfl: 3    blood-glucose meter kit, Use as instructed. Insurance preferred meter., Disp: 1 each, Rfl: 0    cranberry 500 mg Cap, Take 1 capsule by mouth every evening., Disp: , Rfl:     diltiaZEM (CARDIZEM CD) 120 MG Cp24, Take 1 capsule (120 mg total) by mouth every evening., Disp: 90 capsule, Rfl: 4    diltiaZEM (CARDIZEM) 60 MG tablet, Take 2 tablets (120 mg total) by mouth once daily. for 10 days, Disp: 20 tablet, Rfl: 0    doxazosin (CARDURA) 2 MG tablet, Take 2 mg by mouth every evening., Disp: , Rfl:     EPINEPHrine (EPIPEN) 0.3 mg/0.3 mL AtIn, Inject 1 each into the muscle as needed. , Disp: , Rfl: 3    erythromycin with ethanoL (THERAMYCIN) 2 % external solution, Aaa bid prn, Disp: 60 mL, Rfl: 3    esomeprazole (NEXIUM) 40 MG capsule, Take 1 capsule (40 mg total) by mouth before breakfast., Disp: 90 capsule, Rfl: 3    estradioL (ESTRACE) 0.01 % (0.1 mg/gram) vaginal cream, Place 1 g vaginally 3 (three) times a week. Place by fingertip application before bedtime three times a week (Monday, Wednesday,  Friday), Disp: 45 g, Rfl: 3    fexofenadine (ALLEGRA) 180 MG tablet, Take 180 mg by mouth once daily. , Disp: , Rfl:     fish oil-omega-3 fatty acids 300-1,000 mg capsule, Take 2 g by mouth once daily., Disp: , Rfl:     fluticasone propionate (FLONASE) 50 mcg/actuation nasal spray, 2 sprays (100 mcg total) by Each Nostril route once daily., Disp: 16 g, Rfl: 11    fluticasone-umeclidin-vilanter (TRELEGY ELLIPTA) 200-62.5-25 mcg inhaler, Inhale 1 puff into the lungs once daily., Disp: 180 each, Rfl: 3    gabapentin (NEURONTIN) 600 MG tablet, Take 1 tablet (600 mg total) by mouth 3 (three) times daily., Disp: 540 tablet, Rfl: 3    guaifenesin-codeine 100-10 mg/5 ml (GUAIFENESIN AC)  mg/5 mL syrup, Take 5 mLs by mouth 3 (three) times daily as needed for Cough., Disp: 473 mL, Rfl: 2    hydrOXYzine HCL (ATARAX) 10 MG Tab, Take 1 tablet (10 mg total) by mouth 3 (three) times daily as needed., Disp: 30 tablet, Rfl: 3    immun glob G,IgG,-pro-IgA 0-50 (HIZENTRA) 1 gram/5 mL (20 %) Soln, Inject 55 mLs (11 g total) into the skin once a week., Disp: 220 mL, Rfl: 12    inhalation spacing device, Use as directed for inhalation., Disp: 1 each, Rfl: 0    lancets (ACCU-CHEK SOFTCLIX LANCETS) Misc, Use to check blood sugar daily., Disp: 100 each, Rfl: 11    levalbuterol (XOPENEX HFA) 45 mcg/actuation inhaler, Inhale 1-2 puffs into the lungs every 4 (four) hours as needed for Wheezing or Shortness of Breath. Rescue, Disp: 15 g, Rfl: 11    levalbuterol (XOPENEX) 1.25 mg/3 mL nebulizer solution, Take 3 mLs (1.25 mg total) by nebulization every 4 (four) hours as needed for Wheezing or Shortness of Breath. Rescue, Disp: 1 each, Rfl: 11    metFORMIN (GLUCOPHAGE-XR) 500 MG ER 24hr tablet, Take 1 tablet (500 mg total) by mouth 2 (two) times daily with meals., Disp: 180 tablet, Rfl: 3    methylPREDNISolone (MEDROL DOSEPACK) 4 mg tablet, use as directed (Patient not taking: Reported on 1/29/2024), Disp: 21 each, Rfl: 0    mometasone 0.1%  (ELOCON) 0.1 % cream, aaa bid x 1-2 weeks prn bug bites, Disp: 45 g, Rfl: 1    montelukast (SINGULAIR) 10 mg tablet, Take 1 tablet (10 mg total) by mouth every evening., Disp: 90 tablet, Rfl: 3    mucus clearing device Cecy, 1 Device by Misc.(Non-Drug; Combo Route) route 2 (two) times daily., Disp: 1 Device, Rfl: 0    multivitamin capsule, Take 1 capsule by mouth once daily. , Disp: , Rfl:     mupirocin (BACTROBAN) 2 % ointment, Apply topically 3 (three) times daily., Disp: 15 g, Rfl: 0    mupirocin (BACTROBAN) 2 % ointment, Apply topically 3 (three) times daily., Disp: 30 g, Rfl: 1    potassium chloride SA (K-DUR,KLOR-CON) 20 MEQ tablet, Take 1 tablet (20 mEq total) by mouth once daily., Disp: 90 tablet, Rfl: 4    pravastatin (PRAVACHOL) 80 MG tablet, Take 1 tablet (80 mg total) by mouth once daily., Disp: 90 tablet, Rfl: 4    psyllium husk (METAMUCIL ORAL), Take by mouth., Disp: , Rfl:     rOPINIRole (REQUIP) 0.25 MG tablet, Take 1 tablet (0.25 mg total) by mouth every evening. (Patient not taking: Reported on 1/29/2024), Disp: 10 tablet, Rfl: 0    SIMETHICONE (GAS-X ORAL), Take 1 capsule by mouth as needed (gas relief). , Disp: , Rfl:     tezepelumab-ekko (TEZSPIRE) 210 mg/1.91 mL (110 mg/mL) Syrg, Inject 1.91 mLs (210 mg total) into the skin every 28 days., Disp: 1.91 mL, Rfl: 11    tiZANidine (ZANAFLEX) 4 MG tablet, Take 8 mg by mouth 2 (two) times daily., Disp: , Rfl:     traMADoL (ULTRAM) 50 mg tablet, Take 1 tablet (50 mg total) by mouth every 8 (eight) hours as needed for Pain., Disp: 21 tablet, Rfl: 0    valsartan-hydrochlorothiazide (DIOVAN-HCT) 320-25 mg per tablet, Take 1 tablet by mouth once daily., Disp: 90 tablet, Rfl: 1    vitamin D3-folic acid 5,000 unit- 1 mg Tab, Take 1 tablet by mouth once daily. , Disp: , Rfl:     Current Facility-Administered Medications:     levalbuterol nebulizer solution 1.25 mg, 1.25 mg, Nebulization, 1 time in Clinic/HOD, Dasyi Llanos NP    Past Medical History:    Diagnosis Date    Allergy     Arthritis     Asthma     Cancer     skin cancer to right hand    Cataract     Chronic fatigue 01/24/2017    CVID (common variable immunodeficiency) 03/07/2019    Diabetes mellitus     type2    KLINE (dyspnea on exertion) 01/24/2017    Dyslipidemia 06/25/2019    Encounter for blood transfusion     Essential hypertension 12/29/2011    GERD (gastroesophageal reflux disease)     Headache(784.0)     History of lumpectomy of both breasts     1992 negative    Hyperlipidemia 07/15/2015    Hypertension     Hypothyroidism     Neuropathy     Obesity     Obesity     Postmenopausal HRT (hormone replacement therapy)     Rash     Rosacea     Sleep apnea     on bipap    Snoring     Squamous cell carcinoma of skin     left forearm     UTI (urinary tract infection)     Wears glasses        Past Surgical History:   Procedure Laterality Date    ADENOIDECTOMY      APPENDECTOMY      BLADDER SUSPENSION      BREAST BIOPSY Bilateral 1992    bilateral benign excisional biopsies    BUNIONECTOMY  10/17/14    right, still has discomfort    CATARACT EXTRACTION W/  INTRAOCULAR LENS IMPLANT Bilateral     CHOLECYSTECTOMY  08/02/2017    COLONOSCOPY      CYSTOSCOPY      EPIDURAL STEROID INJECTION INTO LUMBAR SPINE N/A 8/14/2019    Procedure: Injection-steroid-epidural-lumbar L5/S1;  Surgeon: Fredi Rojas MD;  Location: [facility] OR;  Service: Pain Management;  Laterality: N/A;    EPIDURAL STEROID INJECTION INTO LUMBAR SPINE N/A 5/3/2021    Procedure: Injection-steroid-epidural-lumbar L5/S1 to the Left;  Surgeon: Fredi Rojas MD;  Location: [facility] OR;  Service: Pain Management;  Laterality: N/A;    EPIDURAL STEROID INJECTION INTO LUMBAR SPINE N/A 9/24/2021    Procedure: Injection-steroid-epidural-lumbar L5/S1;  Surgeon: Fredi Rojas MD;  Location: [facility] OR;  Service: Pain Management;  Laterality: N/A;    EPIDURAL STEROID INJECTION INTO LUMBAR SPINE N/A 2/21/2022    Procedure: Injection-steroid-epidural-lumbar L5/S1;   Surgeon: Fredi Rojas MD;  Location: [facility] OR;  Service: Pain Management;  Laterality: N/A;    ESOPHAGEAL DILATION N/A 6/11/2021    Procedure: DILATION, ESOPHAGUS;  Surgeon: Jake Sheriff MD;  [facility];  Service: Endoscopy;  Laterality: N/A;    ESOPHAGOGASTRODUODENOSCOPY      dilated esophagus    ESOPHAGOGASTRODUODENOSCOPY N/A 6/11/2021    Procedure: EGD (ESOPHAGOGASTRODUODENOSCOPY);  Surgeon: Jake Sheriff MD;  Location: [facility] ENDO;  Service: Endoscopy;  Laterality: N/A;    GASTRIC BYPASS      2011    HYSTERECTOMY  1980    interstim bladder  2009    10/14/14 new battery    OOPHORECTOMY  1980    REPLACEMENT OF SACRAL NERVE STIMULATOR  2/18/2020    Procedure: REPLACEMENT, NEUROSTIMULATOR, SACRAL;  Surgeon: Mary Jane Jarvis MD;  Location: [facility] OR [address] [place] [address];  Service: Urology;;    REVISION OF PROCEDURE INVOLVING SACRAL NEUROSTIMULATOR DEVICE Right 2/18/2020    Procedure: REVISION, NEUROSTIMULATOR, SACRAL/ battery replacement;  Surgeon: Mary Jane Jarvis MD;  Location: [facility] OR [address] [place] [address];  Service: Urology;  Laterality: Right;  1hr/ rep contacted/ (CONNIE)    SKIN CANCER EXCISION      left hand    TONSILLECTOMY      WISDOM TOOTH EXTRACTION         Family History   Problem Relation Age of Onset    Breast cancer Mother 50    Breast cancer Paternal Aunt         40's    Cervical cancer Paternal Grandmother     Ovarian cancer Paternal Grandmother     Cervical cancer Paternal Cousin     Ovarian cancer Paternal Cousin     Diabetes Other     Hypertension Other     Hypothyroidism Other     Allergic rhinitis Neg Hx     Allergies Neg Hx     Angioedema Neg Hx     Asthma Neg Hx     Atopy Neg Hx     Eczema Neg Hx     Immunodeficiency Neg Hx     Rhinitis Neg Hx     Urticaria Neg Hx     Uterine cancer Neg Hx        Social History     Socioeconomic History    Marital status:    Tobacco Use    Smoking status: Never    Smokeless tobacco: Never   Substance and Sexual Activity    Alcohol use: Not Currently    Drug  use: No    Sexual activity: Not Currently     Social Determinants of Health     Financial Resource Strain: Medium Risk (10/11/2023)    Overall Financial Resource Strain (CARDIA)     Difficulty of Paying Living Expenses: Somewhat hard   Food Insecurity: Food Insecurity Present (10/11/2023)    Hunger Vital Sign     Worried About Running Out of Food in the Last Year: Sometimes true     Ran Out of Food in the Last Year: Patient declined   Transportation Needs: No Transportation Needs (10/11/2023)    PRAPARE - Transportation     Lack of Transportation (Medical): No     Lack of Transportation (Non-Medical): No   Physical Activity: Inactive (10/11/2023)    Exercise Vital Sign     Days of Exercise per Week: 0 days     Minutes of Exercise per Session: 0 min   Stress: Patient Declined (10/11/2023)    [employer] - Occupational Stress Questionnaire     Feeling of Stress : Patient declined   Social Connections: Unknown (10/11/2023)    Social Connection and Isolation Panel [NHANES]     Frequency of Communication with Friends and Family: More than three times a week     Frequency of Social Gatherings with Friends and Family: Patient declined     Active Member of Clubs or Organizations: Yes     Attends Club or Organization Meetings: More than 4 times per year     Marital Status: Living with partner   Housing Stability: Unknown (10/11/2023)    Housing Stability Vital Sign     Unable to Pay for Housing in the Last Year: Patient refused     Number of Places Lived in the Last Year: 1     Unstable Housing in the Last Year: No       Review of patient's allergies indicates:   Allergen Reactions    Sulfa (sulfonamide antibiotics) Hives    Naproxen Other (See Comments)     Other reaction(s): RT sided numbness      Albuterol Itching and Palpitations     Nebulizer only. Can use inhaler       Review of Systems:  General ROS: negative for - fever  Psychological ROS: negative for - hostility  Hematological and  "Lymphatic ROS: positive for - bruising  negative for - bleeding problems  Endocrine ROS: negative for - unexpected weight changes  Respiratory ROS: positive for - cough and shortness of breath  Cardiovascular ROS: no chest pain or dyspnea on exertion  Gastrointestinal ROS: no abdominal pain, change in bowel habits, or black or bloody stools  Musculoskeletal ROS: negative for - muscular weakness  Neurological ROS: negative for - numbness/tingling  negative for -  New bowel and bladder control changes  Dermatological ROS: negative for rash    Physical Exam:  Vitals:    01/29/24 0809   BP: 139/61   Pulse: 63   Weight: 93.9 kg (206 lb 14.4 oz)   Height: 5' 4" (1.626 m)   PainSc:   7   PainLoc: Back     Body mass index is 35.51 kg/m².     Gen: NAD  Gait:  Antalgic gait, ambulating with Rollator.  Psych:  Mood appropriate for given condition  HEENT: eyes anicteric   GI: Abd soft  CV: RRR  Lungs: breathing unlabored   ROM: limited AROM of the L spine in all planes, full ROM at ankles, knees and hips  Lumbar flexion 90 degrees, extension 50 degrees, side bending 30 degrees.    Sensation: intact to light touch in all dermatomes tested from L2-S1 bilaterally  Reflexes: 0+ b/l patella and 0/0 b/l achilles  Palpation:  Moderately tender over lumbar paraspinals and lower midline spine  Tone: normal in the b/l knees and hips   Skin: intact  Extremities: No edema in b/l ankles or hands       Right Left   L2/3 Iliacus Hip flexion  5  5   L3/4 Qudratus Femoris Knee Extension  5  5   L4/5 Tib Anterior Ankle Dorsiflexion   5  5   L5/S1 Extensor Hallicus Longus Great toe extension  5  5                 S1/S2 Gastroc/Soleus Plantar Flexion  5  5     Imaging:  Xray lumbar spine 6/19/19  FINDINGS:  There is mild exaggeration of the normal lumbar lordosis.  There is 4 mm anterolisthesis of L4 on L5.  The vertebral bodies maintain normal height.  There is no fracture.  There is multilevel endplate osteophyte formation and multilevel facet " joint arthropathy.  There is a sacral stimulator in place.    CT lumbar spine 8/6/19  FINDINGS:  Grade 1 anterolisthesis of L4 on L5.  Minimal retrolisthesis of L2 on L3.The vertebral body heights are well-maintained.No fractures are identified. Sacral stimulator partially visualized, which appears to enter the right S3-4 neural foramen.    Mild multilevel degenerative disc disease with anterior osteophytosis, vacuum disc phenomenon at T10-11 and L1-2 and disc space narrowing, most pronounced and moderate at L1-2.    T12-L1: Small right central posterior disc osteophyte complex.  Mild bilateral facet arthrosis.  No significant spinal canal or neural foraminal stenosis.  L1-2: Circumferential disc bulge.  Mild bilateral facet arthrosis.  No significant spinal canal or neural foraminal stenosis.  L2-3: Minimal retrolisthesis.  Circumferential disc bulge.  Moderate bilateral facet arthrosis.  Mild bilateral neural foraminal stenosis.  Mild spinal canal stenosis.  L3-4: Circumferential disc bulge.  Ligamentum flavum infolding.  Moderate bilateral facet arthrosis.  Mild bilateral neural foraminal stenosis.  Mild spinal canal stenosis  L4-5: Grade 1 anterolisthesis.  Circumferential disc bulge, eccentric to the right.  Ligamentum flavum infolding.  Severe bilateral facet arthrosis.  Mild left and moderate right neural foraminal stenosis.  Severe spinal canal stenosis.  L5-S1: Mild diffuse disc bulge.  Moderate left and mild right facet arthrosis.  Moderate left and mild right neural foraminal stenosis.  No significant spinal canal stenosis.    CT lumbar spine 01/22/2024  FINDINGS:  Bones: There is diffuse bony demineralization.  There is a comminuted burst type fracture deformity of L5 with mild osseous retropulsion on the order of 3 mm.  Height loss centrally of the vertebral body is on the order of 40%.  This appears new or progressed from prior radiographs 01/08/2024 noting minimal height loss may have been present in  retrospect suggestive of likely subacute fracture with mild progression of height loss.  Chronic appearing Schmorl's node along the superior endplate of L2 but new from prior CT 08/02/2019.  No additional fractures identified.     Alignment: Grade 1 anterolisthesis of L4 on L5 and trace retrolisthesis of L2 on L3.  Mild broad levocurvature.     Disc levels:     T12-L1: Shallow disc bulging and mild bilateral facet arthrosis mildly narrowing the spinal canal.  The foramina appear patent.  L1-L2: Shallow disc bulging and mild-to-moderate facet arthrosis producing mild to moderate narrowing of the osseous spinal canal.  No more than mild bilateral foraminal narrowing.  L2-L3: Shallow disc bulging and moderate bilateral facet arthrosis producing likely moderate narrowing of the osseous spinal canal with no more than mild bilateral foraminal narrowing.  L3-L4: Shallow disc bulging and moderate bilateral facet arthrosis with ligamentum flavum thickening producing likely mild to moderate narrowing of the osseous spinal canal and bilateral foramina.  L4-L5: Disc bulging without osseous retropulsion and severe bilateral facet arthrosis with ligamentum flavum thickening severely narrowing the spinal canal progressed from the prior CT 08/02/2019.  At least moderate left and moderate to severe right foraminal narrowing.  L5-S1: Disc bulging and severe bilateral facet arthrosis produces mild to moderate narrowing of the osseous spinal canal.  Moderate left and mild right foraminal narrowing.     Soft Tissues: Partially imaged right-sided sacral neural stimulator entering the S3 foramen.  Atherosclerotic changes.  Cholecystectomy clips are noted.  Partially imaged postoperative changes of the stomach.     Impression:     1. Burst type fracture deformity with mild height loss involving the superior endplate of L5 with mild osseous retropulsion which appears acute or subacute in age with clinical correlation needed.  No additional  fractures identified.  Evaluation is somewhat limited on the basis of diffuse bony demineralization.  2. Progressive severe multifactorial spinal canal and advanced bilateral foraminal narrowing at L4-L5.  3. Additional degenerative changes as discussed above.  This report was flagged in Epic as abnormal.    Labs:  BMP  Lab Results   Component Value Date     (L) 01/23/2024    K 4.2 01/23/2024    CL 99 01/23/2024    CO2 25 01/23/2024    BUN 14 01/23/2024    CREATININE 1.0 01/23/2024    CALCIUM 9.5 01/23/2024    ANIONGAP 11 01/23/2024    ESTGFRAFRICA >60 04/27/2022    EGFRNONAA >60 04/27/2022     Lab Results   Component Value Date    ALT 22 01/04/2024    AST 34 01/04/2024    ALKPHOS 156 (H) 01/04/2024    BILITOT 0.9 01/04/2024       Assessment:  Problem List Items Addressed This Visit          Neuro    Lumbar radiculopathy - Primary     Other Visit Diagnoses       Compression fracture of L5 vertebra with routine healing, subsequent encounter        Spinal stenosis of lumbar region, unspecified whether neurogenic claudication present        Dorsalgia, unspecified                    Treatment Plan:  73 y.o. year old female with PMH DM II, asthma, HTN, common variable immunodeficiency, GERD, h/o gastric bypass, SOHA presents to the office with lower back pain.      1/29/2024: Cornelia Delatorre returns to the office for follow up.  She returns for follow-up for her L5 compression fracture.  Since last office visit updated CT lumbar spine showed burst type compression fracture of L5.  She has been wearing her LSO brace and taking Tylenol and gabapentin for pain.  Overall she feels like her pain is somewhat improving but continues to have significant pain, 7/10, constant, worse in the morning, improved as the day progresses.  Pain is located in her lower back with radiation into bilateral buttocks in the back of bilateral legs.  She denies any new numbness, weakness or any new changes to her bowel or bladder  function.    - on exam she has full strength in her lower extremities.  She continues to wear LSO brace  - we reviewed her updated lumbar CT in office today and is consistent with a burst type fracture of the L5 vertebral body.  She also continues to have severe central canal stenosis worse at L4/5 and multilevel bilateral foraminal narrowing.  - she is likely having a combination of pain from her compression fracture and lumbar radicular pain.  - this pain is limiting her mobility interfering with her ADLs and quality of life.  She is finding only mild relief with Tylenol and gabapentin.  She did not tolerate tramadol or hydrocodone as it caused her to have constipation.  - discussed with patient today for compression fracture pain, we will need to allow to heal time.  Unable to perform kyphoplasty due to type of fracture.  - for he central canal stenosis pain and radicular pain we will schedule her for a repeat L5/S1 ROM.  She is done very well with these in the past providing 95% relief lasting over a year.  - follow-up 2 weeks post procedure or sooner if needed.      :  Not applicable    The above note was completed, in part, with the aid of Dragon dictation software/hardware. Translation errors may be present.

## 2024-01-29 NOTE — PROGRESS NOTES
[facility] Follow Up Evaluation    Referred by: Patricia Valerio NP  Reason for referral: back pain    CC:   Chief Complaint   Patient presents with    Low-back Pain          1/29/2024     8:13 AM 1/8/2024     1:58 PM 9/10/2021     2:33 PM   Last 3 PDI Scores   Pain Disability Index (PDI) 41 [phone] 26     Interval HPI 1/29/2024: Cornelia Delatorre returns to the office for follow up.  She returns for follow-up for her L5 compression fracture.  Since last office visit updated CT lumbar spine showed burst type compression fracture of L5.  She has been wearing her LSO brace and taking Tylenol and gabapentin for pain.  Overall she feels like her pain is somewhat improving but continues to have significant pain, 7/10, constant, worse in the morning, improved as the day progresses.  Pain is located in her lower back with radiation into bilateral buttocks in the back of bilateral legs.  She denies any new numbness, weakness or any new changes to her bowel or bladder function.      Pain intervention history:  - s/p L5/S1 ROM on 8/14/19 with close to 100% relief of her back and leg pain  - s/p L5/S1 ROM on 5/3/21 with 90% relief.  - s/p L5/S1 ROM on 9/24/21 with 100% relief  - s/p L5/S1 ROM on 2/21/22 with 95% relief       HPI:   Cornelia Delatorre is a 73 y.o. female who complains of back pain    Onset: about 3.5 months  Inciting Event: none  Progression: since onset, pain is rapidly improving  Typical Range: 1-2/10  Timing: intermittent  Quality: aching, burning, grabbing, sharp, shooting  Radiation: yes, down the back of both legs left > right  Associated numbness or weakness: yes numbness on the left leg, no weakness  Exacerbated by: standing, coughing/sneezing, walking  Allievated by: rest, heat, tylenol  Is Pain Level Acceptable?:   Yes    Previous Therapies:  PT/OT: yes, felt like it got worse  HEP:   Interventions:   Surgery:  Medications: tylenol  - NSAIDS: avoids 2/2 h/o gastric bypass  - MSK  Relaxants:   - TCAs:   - SNRIs:   - Topicals:   - Anticonvulsants: gabapentin 600mg TID  - Opioids:     History:    Current Outpatient Medications:     amoxicillin-clavulanate 875-125mg (AUGMENTIN) 875-125 mg per tablet, Take 1 tablet by mouth 2 (two) times daily. (Patient not taking: Reported on 1/29/2024), Disp: 14 tablet, Rfl: 0    ascorbic acid, vitamin C, (VITAMIN C) 1000 MG tablet, Take 1,000 mg by mouth 2 (two) times daily. , Disp: , Rfl:     azelastine (OPTIVAR) 0.05 % ophthalmic solution, Place 1 drop into both eyes. As needed, Disp: , Rfl:     B-complex with vitamin C (Z-BEC OR EQUIV) tablet, Take 1 tablet by mouth once daily., Disp: , Rfl:     Bifidobacterium infantis (ALIGN ORAL), Take by mouth once daily., Disp: , Rfl:     biotin 10,000 mcg Cap, Take 1 tablet by mouth every evening. , Disp: , Rfl:     blood sugar diagnostic Strp, Insurance preferred meter and strips. Check daily, Disp: 90 each, Rfl: 3    blood-glucose meter kit, Use as instructed. Insurance preferred meter., Disp: 1 each, Rfl: 0    cranberry 500 mg Cap, Take 1 capsule by mouth every evening., Disp: , Rfl:     diltiaZEM (CARDIZEM CD) 120 MG Cp24, Take 1 capsule (120 mg total) by mouth every evening., Disp: 90 capsule, Rfl: 4    diltiaZEM (CARDIZEM) 60 MG tablet, Take 2 tablets (120 mg total) by mouth once daily. for 10 days, Disp: 20 tablet, Rfl: 0    doxazosin (CARDURA) 2 MG tablet, Take 2 mg by mouth every evening., Disp: , Rfl:     EPINEPHrine (EPIPEN) 0.3 mg/0.3 mL AtIn, Inject 1 each into the muscle as needed. , Disp: , Rfl: 3    erythromycin with ethanoL (THERAMYCIN) 2 % external solution, Aaa bid prn, Disp: 60 mL, Rfl: 3    esomeprazole (NEXIUM) 40 MG capsule, Take 1 capsule (40 mg total) by mouth before breakfast., Disp: 90 capsule, Rfl: 3    estradioL (ESTRACE) 0.01 % (0.1 mg/gram) vaginal cream, Place 1 g vaginally 3 (three) times a week. Place by fingertip application before bedtime three times a week (Monday, Wednesday,  Friday), Disp: 45 g, Rfl: 3    fexofenadine (ALLEGRA) 180 MG tablet, Take 180 mg by mouth once daily. , Disp: , Rfl:     fish oil-omega-3 fatty acids 300-1,000 mg capsule, Take 2 g by mouth once daily., Disp: , Rfl:     fluticasone propionate (FLONASE) 50 mcg/actuation nasal spray, 2 sprays (100 mcg total) by Each Nostril route once daily., Disp: 16 g, Rfl: 11    fluticasone-umeclidin-vilanter (TRELEGY ELLIPTA) 200-62.5-25 mcg inhaler, Inhale 1 puff into the lungs once daily., Disp: 180 each, Rfl: 3    gabapentin (NEURONTIN) 600 MG tablet, Take 1 tablet (600 mg total) by mouth 3 (three) times daily., Disp: 540 tablet, Rfl: 3    guaifenesin-codeine 100-10 mg/5 ml (GUAIFENESIN AC)  mg/5 mL syrup, Take 5 mLs by mouth 3 (three) times daily as needed for Cough., Disp: 473 mL, Rfl: 2    hydrOXYzine HCL (ATARAX) 10 MG Tab, Take 1 tablet (10 mg total) by mouth 3 (three) times daily as needed., Disp: 30 tablet, Rfl: 3    immun glob G,IgG,-pro-IgA 0-50 (HIZENTRA) 1 gram/5 mL (20 %) Soln, Inject 55 mLs (11 g total) into the skin once a week., Disp: 220 mL, Rfl: 12    inhalation spacing device, Use as directed for inhalation., Disp: 1 each, Rfl: 0    lancets (ACCU-CHEK SOFTCLIX LANCETS) Misc, Use to check blood sugar daily., Disp: 100 each, Rfl: 11    levalbuterol (XOPENEX HFA) 45 mcg/actuation inhaler, Inhale 1-2 puffs into the lungs every 4 (four) hours as needed for Wheezing or Shortness of Breath. Rescue, Disp: 15 g, Rfl: 11    levalbuterol (XOPENEX) 1.25 mg/3 mL nebulizer solution, Take 3 mLs (1.25 mg total) by nebulization every 4 (four) hours as needed for Wheezing or Shortness of Breath. Rescue, Disp: 1 each, Rfl: 11    metFORMIN (GLUCOPHAGE-XR) 500 MG ER 24hr tablet, Take 1 tablet (500 mg total) by mouth 2 (two) times daily with meals., Disp: 180 tablet, Rfl: 3    methylPREDNISolone (MEDROL DOSEPACK) 4 mg tablet, use as directed (Patient not taking: Reported on 1/29/2024), Disp: 21 each, Rfl: 0    mometasone 0.1%  (ELOCON) 0.1 % cream, aaa bid x 1-2 weeks prn bug bites, Disp: 45 g, Rfl: 1    montelukast (SINGULAIR) 10 mg tablet, Take 1 tablet (10 mg total) by mouth every evening., Disp: 90 tablet, Rfl: 3    mucus clearing device Cecy, 1 Device by Misc.(Non-Drug; Combo Route) route 2 (two) times daily., Disp: 1 Device, Rfl: 0    multivitamin capsule, Take 1 capsule by mouth once daily. , Disp: , Rfl:     mupirocin (BACTROBAN) 2 % ointment, Apply topically 3 (three) times daily., Disp: 15 g, Rfl: 0    mupirocin (BACTROBAN) 2 % ointment, Apply topically 3 (three) times daily., Disp: 30 g, Rfl: 1    potassium chloride SA (K-DUR,KLOR-CON) 20 MEQ tablet, Take 1 tablet (20 mEq total) by mouth once daily., Disp: 90 tablet, Rfl: 4    pravastatin (PRAVACHOL) 80 MG tablet, Take 1 tablet (80 mg total) by mouth once daily., Disp: 90 tablet, Rfl: 4    psyllium husk (METAMUCIL ORAL), Take by mouth., Disp: , Rfl:     rOPINIRole (REQUIP) 0.25 MG tablet, Take 1 tablet (0.25 mg total) by mouth every evening. (Patient not taking: Reported on 1/29/2024), Disp: 10 tablet, Rfl: 0    SIMETHICONE (GAS-X ORAL), Take 1 capsule by mouth as needed (gas relief). , Disp: , Rfl:     tezepelumab-ekko (TEZSPIRE) 210 mg/1.91 mL (110 mg/mL) Syrg, Inject 1.91 mLs (210 mg total) into the skin every 28 days., Disp: 1.91 mL, Rfl: 11    tiZANidine (ZANAFLEX) 4 MG tablet, Take 8 mg by mouth 2 (two) times daily., Disp: , Rfl:     traMADoL (ULTRAM) 50 mg tablet, Take 1 tablet (50 mg total) by mouth every 8 (eight) hours as needed for Pain., Disp: 21 tablet, Rfl: 0    valsartan-hydrochlorothiazide (DIOVAN-HCT) 320-25 mg per tablet, Take 1 tablet by mouth once daily., Disp: 90 tablet, Rfl: 1    vitamin D3-folic acid 5,000 unit- 1 mg Tab, Take 1 tablet by mouth once daily. , Disp: , Rfl:     Current Facility-Administered Medications:     levalbuterol nebulizer solution 1.25 mg, 1.25 mg, Nebulization, 1 time in Clinic/HOD, Daysi Llanos NP    Past Medical History:    Diagnosis Date    Allergy     Arthritis     Asthma     Cancer     skin cancer to right hand    Cataract     Chronic fatigue 01/24/2017    CVID (common variable immunodeficiency) 03/07/2019    Diabetes mellitus     type2    KLINE (dyspnea on exertion) 01/24/2017    Dyslipidemia 06/25/2019    Encounter for blood transfusion     Essential hypertension 12/29/2011    GERD (gastroesophageal reflux disease)     Headache(784.0)     History of lumpectomy of both breasts     1992 negative    Hyperlipidemia 07/15/2015    Hypertension     Hypothyroidism     Neuropathy     Obesity     Obesity     Postmenopausal HRT (hormone replacement therapy)     Rash     Rosacea     Sleep apnea     on bipap    Snoring     Squamous cell carcinoma of skin     left forearm     UTI (urinary tract infection)     Wears glasses        Past Surgical History:   Procedure Laterality Date    ADENOIDECTOMY      APPENDECTOMY      BLADDER SUSPENSION      BREAST BIOPSY Bilateral 1992    bilateral benign excisional biopsies    BUNIONECTOMY  10/17/14    right, still has discomfort    CATARACT EXTRACTION W/  INTRAOCULAR LENS IMPLANT Bilateral     CHOLECYSTECTOMY  08/02/2017    COLONOSCOPY      CYSTOSCOPY      EPIDURAL STEROID INJECTION INTO LUMBAR SPINE N/A 8/14/2019    Procedure: Injection-steroid-epidural-lumbar L5/S1;  Surgeon: Fredi Rojas MD;  Location: [facility] OR;  Service: Pain Management;  Laterality: N/A;    EPIDURAL STEROID INJECTION INTO LUMBAR SPINE N/A 5/3/2021    Procedure: Injection-steroid-epidural-lumbar L5/S1 to the Left;  Surgeon: Fredi Rojas MD;  Location: [facility] OR;  Service: Pain Management;  Laterality: N/A;    EPIDURAL STEROID INJECTION INTO LUMBAR SPINE N/A 9/24/2021    Procedure: Injection-steroid-epidural-lumbar L5/S1;  Surgeon: Fredi Rojas MD;  Location: [facility] OR;  Service: Pain Management;  Laterality: N/A;    EPIDURAL STEROID INJECTION INTO LUMBAR SPINE N/A 2/21/2022    Procedure: Injection-steroid-epidural-lumbar L5/S1;   Surgeon: Fredi Rojas MD;  Location: [facility] OR;  Service: Pain Management;  Laterality: N/A;    ESOPHAGEAL DILATION N/A 6/11/2021    Procedure: DILATION, ESOPHAGUS;  Surgeon: Jake Sheriff MD;  Location: [facility];  Service: Endoscopy;  Laterality: N/A;    ESOPHAGOGASTRODUODENOSCOPY      dilated esophagus    ESOPHAGOGASTRODUODENOSCOPY N/A 6/11/2021    Procedure: EGD (ESOPHAGOGASTRODUODENOSCOPY);  Surgeon: Jake Sheriff MD;  Location: [facility] ENDO;  Service: Endoscopy;  Laterality: N/A;    GASTRIC BYPASS      2011    HYSTERECTOMY  1980    interstim bladder  2009    10/14/14 new battery    OOPHORECTOMY  1980    REPLACEMENT OF SACRAL NERVE STIMULATOR  2/18/2020    Procedure: REPLACEMENT, NEUROSTIMULATOR, SACRAL;  Surgeon: Mary Jane Jarvis MD;  Location: [facility] OR [address] [place] [address];  Service: Urology;;    REVISION OF PROCEDURE INVOLVING SACRAL NEUROSTIMULATOR DEVICE Right 2/18/2020    Procedure: REVISION, NEUROSTIMULATOR, SACRAL/ battery replacement;  Surgeon: Mary Jane Jarvis MD;  Location: [facility] OR [address] [place] [address];  Service: Urology;  Laterality: Right;  1hr/ rep contacted/ (CONNIE)    SKIN CANCER EXCISION      left hand    TONSILLECTOMY      WISDOM TOOTH EXTRACTION         Family History   Problem Relation Age of Onset    Breast cancer Mother 50    Breast cancer Paternal Aunt         40's    Cervical cancer Paternal Grandmother     Ovarian cancer Paternal Grandmother     Cervical cancer Paternal Cousin     Ovarian cancer Paternal Cousin     Diabetes Other     Hypertension Other     Hypothyroidism Other     Allergic rhinitis Neg Hx     Allergies Neg Hx     Angioedema Neg Hx     Asthma Neg Hx     Atopy Neg Hx     Eczema Neg Hx     Immunodeficiency Neg Hx     Rhinitis Neg Hx     Urticaria Neg Hx     Uterine cancer Neg Hx        Social History     Socioeconomic History    Marital status:    Tobacco Use    Smoking status: Never    Smokeless tobacco: Never   Substance and Sexual Activity    Alcohol use: Not Currently    Drug  use: No    Sexual activity: Not Currently     Social Determinants of Health     Financial Resource Strain: Medium Risk (10/11/2023)    Overall Financial Resource Strain (CARDIA)     Difficulty of Paying Living Expenses: Somewhat hard   Food Insecurity: Food Insecurity Present (10/11/2023)    Hunger Vital Sign     Worried About Running Out of Food in the Last Year: Sometimes true     Ran Out of Food in the Last Year: Patient declined   Transportation Needs: No Transportation Needs (10/11/2023)    PRAPARE - Transportation     Lack of Transportation (Medical): No     Lack of Transportation (Non-Medical): No   Physical Activity: Inactive (10/11/2023)    Exercise Vital Sign     Days of Exercise per Week: 0 days     Minutes of Exercise per Session: 0 min   Stress: Patient Declined (10/11/2023)    [employer] [place] [employer] - Occupational Stress Questionnaire     Feeling of Stress : Patient declined   Social Connections: Unknown (10/11/2023)    Social Connection and Isolation Panel [NHANES]     Frequency of Communication with Friends and Family: More than three times a week     Frequency of Social Gatherings with Friends and Family: Patient declined     Active Member of Clubs or Organizations: Yes     Attends Club or Organization Meetings: More than 4 times per year     Marital Status: Living with partner   Housing Stability: Unknown (10/11/2023)    Housing Stability Vital Sign     Unable to Pay for Housing in the Last Year: Patient refused     Number of Places Lived in the Last Year: 1     Unstable Housing in the Last Year: No       Review of patient's allergies indicates:   Allergen Reactions    Sulfa (sulfonamide antibiotics) Hives    Naproxen Other (See Comments)     Other reaction(s): RT sided numbness      Albuterol Itching and Palpitations     Nebulizer only. Can use inhaler       Review of Systems:  General ROS: negative for - fever  Psychological ROS: negative for - hostility  Hematological and  "Lymphatic ROS: positive for - bruising  negative for - bleeding problems  Endocrine ROS: negative for - unexpected weight changes  Respiratory ROS: positive for - cough and shortness of breath  Cardiovascular ROS: no chest pain or dyspnea on exertion  Gastrointestinal ROS: no abdominal pain, change in bowel habits, or black or bloody stools  Musculoskeletal ROS: negative for - muscular weakness  Neurological ROS: negative for - numbness/tingling  negative for -  New bowel and bladder control changes  Dermatological ROS: negative for rash    Physical Exam:  Vitals:    01/29/24 0809   BP: 139/61   Pulse: 63   Weight: 93.9 kg (206 lb 14.4 oz)   Height: 5' 4" (1.626 m)   PainSc:   7   PainLoc: Back     Body mass index is 35.51 kg/m².     Gen: NAD  Gait:  Antalgic gait, ambulating with Rollator.  Psych:  Mood appropriate for given condition  HEENT: eyes anicteric   GI: Abd soft  CV: RRR  Lungs: breathing unlabored   ROM: limited AROM of the L spine in all planes, full ROM at ankles, knees and hips  Lumbar flexion 90 degrees, extension 50 degrees, side bending 30 degrees.    Sensation: intact to light touch in all dermatomes tested from L2-S1 bilaterally  Reflexes: 0+ b/l patella and 0/0 b/l achilles  Palpation:  Moderately tender over lumbar paraspinals and lower midline spine  Tone: normal in the b/l knees and hips   Skin: intact  Extremities: No edema in b/l ankles or hands       Right Left   L2/3 Iliacus Hip flexion  5  5   L3/4 Qudratus Femoris Knee Extension  5  5   L4/5 Tib Anterior Ankle Dorsiflexion   5  5   L5/S1 Extensor Hallicus Longus Great toe extension  5  5                 S1/S2 Gastroc/Soleus Plantar Flexion  5  5     Imaging:  Xray lumbar spine 6/19/19  FINDINGS:  There is mild exaggeration of the normal lumbar lordosis.  There is 4 mm anterolisthesis of L4 on L5.  The vertebral bodies maintain normal height.  There is no fracture.  There is multilevel endplate osteophyte formation and multilevel facet " joint arthropathy.  There is a sacral stimulator in place.    CT lumbar spine 8/6/19  FINDINGS:  Grade 1 anterolisthesis of L4 on L5.  Minimal retrolisthesis of L2 on L3.The vertebral body heights are well-maintained.No fractures are identified. Sacral stimulator partially visualized, which appears to enter the right S3-4 neural foramen.    Mild multilevel degenerative disc disease with anterior osteophytosis, vacuum disc phenomenon at T10-11 and L1-2 and disc space narrowing, most pronounced and moderate at L1-2.    T12-L1: Small right central posterior disc osteophyte complex.  Mild bilateral facet arthrosis.  No significant spinal canal or neural foraminal stenosis.  L1-2: Circumferential disc bulge.  Mild bilateral facet arthrosis.  No significant spinal canal or neural foraminal stenosis.  L2-3: Minimal retrolisthesis.  Circumferential disc bulge.  Moderate bilateral facet arthrosis.  Mild bilateral neural foraminal stenosis.  Mild spinal canal stenosis.  L3-4: Circumferential disc bulge.  Ligamentum flavum infolding.  Moderate bilateral facet arthrosis.  Mild bilateral neural foraminal stenosis.  Mild spinal canal stenosis  L4-5: Grade 1 anterolisthesis.  Circumferential disc bulge, eccentric to the right.  Ligamentum flavum infolding.  Severe bilateral facet arthrosis.  Mild left and moderate right neural foraminal stenosis.  Severe spinal canal stenosis.  L5-S1: Mild diffuse disc bulge.  Moderate left and mild right facet arthrosis.  Moderate left and mild right neural foraminal stenosis.  No significant spinal canal stenosis.    CT lumbar spine 01/22/2024  FINDINGS:  Bones: There is diffuse bony demineralization.  There is a comminuted burst type fracture deformity of L5 with mild osseous retropulsion on the order of 3 mm.  Height loss centrally of the vertebral body is on the order of 40%.  This appears new or progressed from prior radiographs 01/08/2024 noting minimal height loss may have been present in  retrospect suggestive of likely subacute fracture with mild progression of height loss.  Chronic appearing Schmorl's node along the superior endplate of L2 but new from prior CT 08/02/2019.  No additional fractures identified.     Alignment: Grade 1 anterolisthesis of L4 on L5 and trace retrolisthesis of L2 on L3.  Mild broad levocurvature.     Disc levels:     T12-L1: Shallow disc bulging and mild bilateral facet arthrosis mildly narrowing the spinal canal.  The foramina appear patent.  L1-L2: Shallow disc bulging and mild-to-moderate facet arthrosis producing mild to moderate narrowing of the osseous spinal canal.  No more than mild bilateral foraminal narrowing.  L2-L3: Shallow disc bulging and moderate bilateral facet arthrosis producing likely moderate narrowing of the osseous spinal canal with no more than mild bilateral foraminal narrowing.  L3-L4: Shallow disc bulging and moderate bilateral facet arthrosis with ligamentum flavum thickening producing likely mild to moderate narrowing of the osseous spinal canal and bilateral foramina.  L4-L5: Disc bulging without osseous retropulsion and severe bilateral facet arthrosis with ligamentum flavum thickening severely narrowing the spinal canal progressed from the prior CT 08/02/2019.  At least moderate left and moderate to severe right foraminal narrowing.  L5-S1: Disc bulging and severe bilateral facet arthrosis produces mild to moderate narrowing of the osseous spinal canal.  Moderate left and mild right foraminal narrowing.     Soft Tissues: Partially imaged right-sided sacral neural stimulator entering the S3 foramen.  Atherosclerotic changes.  Cholecystectomy clips are noted.  Partially imaged postoperative changes of the stomach.     Impression:     1. Burst type fracture deformity with mild height loss involving the superior endplate of L5 with mild osseous retropulsion which appears acute or subacute in age with clinical correlation needed.  No additional  fractures identified.  Evaluation is somewhat limited on the basis of diffuse bony demineralization.  2. Progressive severe multifactorial spinal canal and advanced bilateral foraminal narrowing at L4-L5.  3. Additional degenerative changes as discussed above.  This report was flagged in Epic as abnormal.    Labs:  BMP  Lab Results   Component Value Date     (L) 01/23/2024    K 4.2 01/23/2024    CL 99 01/23/2024    CO2 25 01/23/2024    BUN 14 01/23/2024    CREATININE 1.0 01/23/2024    CALCIUM 9.5 01/23/2024    ANIONGAP 11 01/23/2024    ESTGFRAFRICA >60 04/27/2022    EGFRNONAA >60 04/27/2022     Lab Results   Component Value Date    ALT 22 01/04/2024    AST 34 01/04/2024    ALKPHOS 156 (H) 01/04/2024    BILITOT 0.9 01/04/2024       Assessment:  Problem List Items Addressed This Visit          Neuro    Lumbar radiculopathy - Primary     Other Visit Diagnoses       Compression fracture of L5 vertebra with routine healing, subsequent encounter        Spinal stenosis of lumbar region, unspecified whether neurogenic claudication present        Dorsalgia, unspecified                    Treatment Plan:  73 y.o. year old female with PMH DM II, asthma, HTN, common variable immunodeficiency, GERD, h/o gastric bypass, SOHA presents to the office with lower back pain.      1/29/2024: Cornelia Delatorre returns to the office for follow up.  She returns for follow-up for her L5 compression fracture.  Since last office visit updated CT lumbar spine showed burst type compression fracture of L5.  She has been wearing her LSO brace and taking Tylenol and gabapentin for pain.  Overall she feels like her pain is somewhat improving but continues to have significant pain, 7/10, constant, worse in the morning, improved as the day progresses.  Pain is located in her lower back with radiation into bilateral buttocks in the back of bilateral legs.  She denies any new numbness, weakness or any new changes to her bowel or bladder  function.    - on exam she has full strength in her lower extremities.  She continues to wear LSO brace  - we reviewed her updated lumbar CT in office today and is consistent with a burst type fracture of the L5 vertebral body.  She also continues to have severe central canal stenosis worse at L4/5 and multilevel bilateral foraminal narrowing.  - she is likely having a combination of pain from her compression fracture and lumbar radicular pain.  - this pain is limiting her mobility interfering with her ADLs and quality of life.  She is finding only mild relief with Tylenol and gabapentin.  She did not tolerate tramadol or hydrocodone as it caused her to have constipation.  - discussed with patient today for compression fracture pain, we will need to allow to heal time.  Unable to perform kyphoplasty due to type of fracture.  - for he central canal stenosis pain and radicular pain we will schedule her for a repeat L5/S1 ROM.  She is done very well with these in the past providing 95% relief lasting over a year.  - follow-up 2 weeks post procedure or sooner if needed.      :  Not applicable    The above note was completed, in part, with the aid of Dragon dictation software/hardware. Translation errors may be present.

## 2024-01-29 NOTE — TELEPHONE ENCOUNTER
Please schedule patient for the following procedure:    Physician - Dr Rojas    Type of Procedure/Injection - Lumbar Epidural  L5/S1           Laterality - NA      Anxiolysis- Local      Need to hold medication - Yes      Fish Oil for 7 days and NSAIDs for 2 days        Clearance needed - No      Follow up - 2 week

## 2024-01-30 DIAGNOSIS — M54.16 LUMBAR RADICULOPATHY: Primary | ICD-10-CM

## 2024-01-30 RX ORDER — SODIUM CHLORIDE, SODIUM LACTATE, POTASSIUM CHLORIDE, CALCIUM CHLORIDE 600; 310; 30; 20 MG/100ML; MG/100ML; MG/100ML; MG/100ML
INJECTION, SOLUTION INTRAVENOUS CONTINUOUS
Status: CANCELLED | OUTPATIENT
Start: 2024-01-30

## 2024-01-30 NOTE — TELEPHONE ENCOUNTER
Spoke with patient and scheduled. Advised to hold fish oil x 7 and NSAIDS x 2. Pre op information given and follow up appointment scheduled.

## 2024-02-02 ENCOUNTER — CLINICAL SUPPORT (OUTPATIENT)
Dept: PULMONOLOGY | Facility: CLINIC | Age: 74
End: 2024-02-02
Payer: MEDICARE

## 2024-02-02 DIAGNOSIS — J45.50 SEVERE PERSISTENT ASTHMA WITHOUT COMPLICATION: Primary | ICD-10-CM

## 2024-02-02 NOTE — PROGRESS NOTES
Patient is here for her monthly Tezspire injection.  2 patient identifiers used (Name and ).  Patient received the injection to the left arm   subcutaneous.  No redness, bleeding, or swelling noted to the injection site.  Pt tolerated well.    NDC:91728-061-79  LOT: [id number]  EXP: 2025

## 2024-02-06 ENCOUNTER — OFFICE VISIT (OUTPATIENT)
Dept: DERMATOLOGY | Facility: CLINIC | Age: 74
End: 2024-02-06
Payer: MEDICARE

## 2024-02-06 VITALS — WEIGHT: 207 LBS | HEIGHT: 64 IN | RESPIRATION RATE: 18 BRPM | BODY MASS INDEX: 35.34 KG/M2

## 2024-02-06 DIAGNOSIS — Z85.828 PERSONAL HISTORY OF OTHER MALIGNANT NEOPLASM OF SKIN: ICD-10-CM

## 2024-02-06 DIAGNOSIS — L57.0 ACTINIC KERATOSES: ICD-10-CM

## 2024-02-06 DIAGNOSIS — L90.5 SCAR: Primary | ICD-10-CM

## 2024-02-06 DIAGNOSIS — B37.2 CANDIDA ONYCHOMYCOSIS: ICD-10-CM

## 2024-02-06 PROCEDURE — 3288F FALL RISK ASSESSMENT DOCD: CPT | Mod: HCNC,CPTII,S$GLB, | Performed by: DERMATOLOGY

## 2024-02-06 PROCEDURE — 99214 OFFICE O/P EST MOD 30 MIN: CPT | Mod: 25,HCNC,S$GLB, | Performed by: DERMATOLOGY

## 2024-02-06 PROCEDURE — 1101F PT FALLS ASSESS-DOCD LE1/YR: CPT | Mod: HCNC,CPTII,S$GLB, | Performed by: DERMATOLOGY

## 2024-02-06 PROCEDURE — 17000 DESTRUCT PREMALG LESION: CPT | Mod: HCNC,S$GLB,, | Performed by: DERMATOLOGY

## 2024-02-06 PROCEDURE — 1157F ADVNC CARE PLAN IN RCRD: CPT | Mod: HCNC,CPTII,S$GLB, | Performed by: DERMATOLOGY

## 2024-02-06 PROCEDURE — 99999 PR PBB SHADOW E&M-EST. PATIENT-LVL IV: CPT | Mod: PBBFAC,HCNC,, | Performed by: DERMATOLOGY

## 2024-02-06 PROCEDURE — 1160F RVW MEDS BY RX/DR IN RCRD: CPT | Mod: HCNC,CPTII,S$GLB, | Performed by: DERMATOLOGY

## 2024-02-06 PROCEDURE — 3008F BODY MASS INDEX DOCD: CPT | Mod: HCNC,CPTII,S$GLB, | Performed by: DERMATOLOGY

## 2024-02-06 PROCEDURE — 17003 DESTRUCT PREMALG LES 2-14: CPT | Mod: HCNC,S$GLB,, | Performed by: DERMATOLOGY

## 2024-02-06 PROCEDURE — 1159F MED LIST DOCD IN RCRD: CPT | Mod: HCNC,CPTII,S$GLB, | Performed by: DERMATOLOGY

## 2024-02-06 RX ORDER — FLUCONAZOLE 200 MG/1
TABLET ORAL
Qty: 12 TABLET | Refills: 1 | Status: SHIPPED | OUTPATIENT
Start: 2024-02-06

## 2024-02-06 NOTE — PROGRESS NOTES
Subjective:      Patient ID:  Cornelia Delatorre is a 73 y.o. female who presents for   Chief Complaint   Patient presents with    Skin Check     HPI  Patient present for skin check. LOV 8/15/23 by dr. Fletcher.    C/o spots on chest, face, and arms.  Nails lifting up on fingernails  Has upcoming epidural procedure for lumbar disc issue    yes Phx of NMSC  Phx skin ca R hand and nose - Dr Parra - 2014   Phx SCC L hand -mohs Dr Jones -2015   Uses CeraVe AM and PM cream daily for routine     no Fhx of melanoma.    Past Medical History:   Diagnosis Date    Allergy     Arthritis     Asthma     Cancer     skin cancer to right hand    Cataract     Chronic fatigue 01/24/2017    CVID (common variable immunodeficiency) 03/07/2019    Diabetes mellitus     type2    KLINE (dyspnea on exertion) 01/24/2017    Dyslipidemia 06/25/2019    Encounter for blood transfusion     Essential hypertension 12/29/2011    GERD (gastroesophageal reflux disease)     Headache(784.0)     History of lumpectomy of both breasts     1992 negative    Hyperlipidemia 07/15/2015    Hypertension     Hypothyroidism     Neuropathy     Obesity     Obesity     Postmenopausal HRT (hormone replacement therapy)     Rash     Rosacea     Sleep apnea     on bipap    Snoring     Squamous cell carcinoma of skin     left forearm     UTI (urinary tract infection)     Wears glasses        Review of Systems   Constitutional:  Negative for fever, chills and fatigue.   HENT:  Negative for congestion, sore throat and mouth sores.    Respiratory:  Negative for cough.    Gastrointestinal:  Negative for nausea, vomiting and diarrhea.   Skin:  Positive for daily sunscreen use. Negative for itching, rash, dry skin, sensitivity to antibiotic ointment, sensitivity to bandage adhesive and wears hat.   Hematologic/Lymphatic: Bruises/bleeds easily (forearms, takes asa and prednisone for asthma).       Objective:   Physical Exam   Constitutional: She appears well-developed  and well-nourished. No distress.   HENT:   Mouth/Throat: Lips normal.    Eyes: Lids are normal.  No conjunctival no injection.   Neck:       Cardiovascular:  There is no local extremity swelling and no dependent edema.             Neurological: She is alert and oriented to person, place, and time. She is not disoriented.   Psychiatric: She has a normal mood and affect.   Skin:   Areas Examined (abnormalities noted in diagram):   Scalp / Hair Palpated and Inspected  Head / Face Inspection Performed  Neck Inspection Performed  Chest / Axilla Inspection Performed  Abdomen Inspection Performed  Back Inspection Performed  RUE Inspected  LUE Inspection Performed  RLE Inspected  LLE Inspection Performed  Nails and Digits Inspection Performed                     Diagram Legend     Erythematous scaling macule/papule c/w actinic keratosis       Vascular papule c/w angioma      Pigmented verrucoid papule/plaque c/w seborrheic keratosis      Yellow umbilicated papule c/w sebaceous hyperplasia      Irregularly shaped tan macule c/w lentigo     1-2 mm smooth white papules consistent with Milia      Movable subcutaneous cyst with punctum c/w epidermal inclusion cyst      Subcutaneous movable cyst c/w pilar cyst      Firm pink to brown papule c/w dermatofibroma      Pedunculated fleshy papule(s) c/w skin tag(s)      Evenly pigmented macule c/w junctional nevus     Mildly variegated pigmented, slightly irregular-bordered macule c/w mildly atypical nevus      Flesh colored to evenly pigmented papule c/w intradermal nevus       Pink pearly papule/plaque c/w basal cell carcinoma      Erythematous hyperkeratotic cursted plaque c/w SCC      Surgical scar with no sign of skin cancer recurrence      Open and closed comedones      Inflammatory papules and pustules      Verrucoid papule consistent consistent with wart     Erythematous eczematous patches and plaques     Dystrophic onycholytic nail with subungual debris c/w onychomycosis      Umbilicated papule    Erythematous-base heme-crusted tan verrucoid plaque consistent with inflamed seborrheic keratosis     Erythematous Silvery Scaling Plaque c/w Psoriasis     See annotation      Assessment / Plan:        Scar  NER NMSC    Personal history of other malignant neoplasm of skin  Area(s) of previous NMSC evaluated with no signs of recurrence.    Upper body skin examination performed today including at least 6 points as noted in physical examination. No lesions suspicious for malignancy noted.    Candida onychomycosis  -     fluconazole (DIFLUCAN) 200 MG Tab; 1 po q week  Dispense: 12 tablet; Refill: 1    Actinic keratoses  Cryosurgery Procedure Note    Verbal consent from the patient is obtained and the patient is aware of the precancerous quality and need for treatment of these lesions. Liquid nitrogen cryosurgery is applied to the 2 actinic keratoses, as detailed in the physical exam, to produce a freeze injury. The patient is aware that blisters may form and is instructed on wound care with gentle cleansing and use of vaseline ointment to keep moist until healed. The patient is supplied a handout on cryosurgery and is instructed to call if lesions do not completely resolve. Discussed risk postinflammatory pigmentary changes.                Follow up in about 6 months (around 8/6/2024).

## 2024-02-07 ENCOUNTER — PATIENT MESSAGE (OUTPATIENT)
Dept: DERMATOLOGY | Facility: CLINIC | Age: 74
End: 2024-02-07
Payer: MEDICARE

## 2024-02-09 ENCOUNTER — HOSPITAL ENCOUNTER (OUTPATIENT)
Facility: HOSPITAL | Age: 74
Discharge: HOME | End: 2024-02-09
Attending: ANESTHESIOLOGY | Admitting: ANESTHESIOLOGY
Payer: MEDICARE

## 2024-02-09 ENCOUNTER — HOSPITAL ENCOUNTER (OUTPATIENT)
Dept: RADIOLOGY | Facility: HOSPITAL | Age: 74
Discharge: HOME | End: 2024-02-09
Attending: ANESTHESIOLOGY
Payer: MEDICARE

## 2024-02-09 DIAGNOSIS — M54.16 LUMBAR RADICULOPATHY: Primary | ICD-10-CM

## 2024-02-09 DIAGNOSIS — M54.50 LOWER BACK PAIN: ICD-10-CM

## 2024-02-09 LAB — GLUCOSE SERPL-MCNC: 101 MG/DL (ref 70–110)

## 2024-02-09 PROCEDURE — 62323 NJX INTERLAMINAR LMBR/SAC: CPT | Mod: HCNC,,, | Performed by: ANESTHESIOLOGY

## 2024-02-09 PROCEDURE — 82962 GLUCOSE BLOOD TEST: CPT | Mod: HCNC,PO | Performed by: ANESTHESIOLOGY

## 2024-02-09 PROCEDURE — A4216 STERILE WATER/SALINE, 10 ML: HCPCS | Mod: HCNC,PO | Performed by: ANESTHESIOLOGY

## 2024-02-09 PROCEDURE — 25000003 PHARM REV CODE 250: Mod: HCNC,PO | Performed by: ANESTHESIOLOGY

## 2024-02-09 PROCEDURE — 62323 NJX INTERLAMINAR LMBR/SAC: CPT | Mod: HCNC,PO | Performed by: ANESTHESIOLOGY

## 2024-02-09 PROCEDURE — 76000 FLUOROSCOPY <1 HR PHYS/QHP: CPT | Mod: TC,HCNC,PO

## 2024-02-09 PROCEDURE — 63600175 PHARM REV CODE 636 W HCPCS: Mod: HCNC,PO | Performed by: ANESTHESIOLOGY

## 2024-02-09 PROCEDURE — 25500020 PHARM REV CODE 255: Mod: HCNC,PO | Performed by: ANESTHESIOLOGY

## 2024-02-09 RX ORDER — METHYLPREDNISOLONE ACETATE 80 MG/ML
INJECTION, SUSPENSION INTRA-ARTICULAR; INTRALESIONAL; INTRAMUSCULAR; SOFT TISSUE
Status: DISCONTINUED | OUTPATIENT
Start: 2024-02-09 | End: 2024-02-09 | Stop reason: HOSPADM

## 2024-02-09 RX ORDER — SODIUM CHLORIDE 0.9 % (FLUSH) 0.9 %
SYRINGE (ML) INJECTION
Status: DISCONTINUED | OUTPATIENT
Start: 2024-02-09 | End: 2024-02-09 | Stop reason: HOSPADM

## 2024-02-09 RX ORDER — LIDOCAINE HYDROCHLORIDE 10 MG/ML
INJECTION, SOLUTION EPIDURAL; INFILTRATION; INTRACAUDAL; PERINEURAL
Status: DISCONTINUED | OUTPATIENT
Start: 2024-02-09 | End: 2024-02-09 | Stop reason: HOSPADM

## 2024-02-09 RX ORDER — SODIUM CHLORIDE, SODIUM LACTATE, POTASSIUM CHLORIDE, CALCIUM CHLORIDE 600; 310; 30; 20 MG/100ML; MG/100ML; MG/100ML; MG/100ML
INJECTION, SOLUTION INTRAVENOUS CONTINUOUS
Status: DISCONTINUED | OUTPATIENT
Start: 2024-02-09 | End: 2024-02-09 | Stop reason: HOSPADM

## 2024-02-09 RX ORDER — MIDAZOLAM HYDROCHLORIDE 2 MG/2ML
INJECTION, SOLUTION INTRAMUSCULAR; INTRAVENOUS
Status: DISCONTINUED | OUTPATIENT
Start: 2024-02-09 | End: 2024-02-09 | Stop reason: HOSPADM

## 2024-02-09 RX ADMIN — SODIUM CHLORIDE, POTASSIUM CHLORIDE, SODIUM LACTATE AND CALCIUM CHLORIDE: 600; 310; 30; 20 INJECTION, SOLUTION INTRAVENOUS at 09:02

## 2024-02-09 NOTE — OP NOTE
"Procedure Note    Procedure Date: 2/9/2024    Procedure Performed:  L5/S1 lumbar interlaminar epidural steroid injection under fluoroscopy.    Indications:  Cornelia Delatorre presents with lumbar radiculitis/radiculopathy secondary to disc herniation, osteophyte/osteophyte complexes, and/or severe degenerative disc disease producing foraminal or central spinal stenosis.  The pain has been present for at least 4 weeks and the patient has failed to respond to noninvasive conservative care.  Pain rated by NRS at baseline prior to intervention is 6/10.  Their radiculitis/radiculopathy and/or neurogenic claudication is severe enough to greatly impact their quality of life or function.     Pre-op diagnosis: Lumbar Radiculopathy    Post-op diagnosis: same    Physician: Fredi Rojas MD    IV anxiolysis medications: versed 2mg    Medications injected: depomedrol 80mg, 1% Lidocaine 1ml, 2 mL sterile, preservative-free normal saline.    Local anesthetic used: 1% Lidocaine, 1 ml    Estimated Blood Loss: none    Complications:  none    Technique:  The patient was interviewed in the holding area and Risks/Benefits were discussed, including, but not limited to, the possibility of new or different pain, bleeding or infection.   All questions were answered.  The patient understood and accepted risks.  Consent was verfied.  A time-out was taken to identify patient and procedure prior to starting the procedure. The patient was placed in the prone position on the fluoroscopy table. The area of the lumbar spine was prepped with Chloraprep x2 and draped in a sterile manner. The L5/S1 interspace was identified and marked under AP fluoroscopy. The skin and subcutaneous tissues overlying the targeted interspace were anesthetized with 3-5 mL of 1% lidocaine using a 25G, 1.5" needle.  A 20G, 3.5" Tuohy epidural needle was directed toward the interspace under fluoroscopic guidance until the ligamentum flavum was engaged. From this point, a " loss of resistance technique with a glass syringe and saline was used to identify entrance of the needle into the epidural space. Once loss of resistance was observed 1 mL of contrast solution was injected. An appropriate epidurogram was noted.  A 4 mL mixture consisting of saline, 1 mL 1% Lidocaine and 80 mg of depomedrol was injected slowly and without resistance.  The needle was flushed with normal saline and removed. The contrast was seen to be displaced after injection. Patient was awake/responsive during all injections.  The patient tolerated the procedure well and was transferred to the [facility] in stable condition.  The patient was monitored after the procedure and was given post-procedure and discharge instructions to follow at home. The patient was discharged in a stable condition.

## 2024-02-09 NOTE — DISCHARGE SUMMARY
[facility]  Discharge Note  Short Stay    Admit Date: 2/9/2024    Discharge Date: 2/9/2024    Attending Physician: Fredi Rojas     Discharge Provider: Fredi Rojas    Diagnoses:  There are no hospital problems to display for this patient.      Discharged Condition: Good    Final Diagnoses: Lumbar radiculopathy [M54.16]    Disposition: Home or Self Care    Hospital Course: No complications, uneventful    Outcome of Hospitalization, Treatment, Procedure, or Surgery:  Patient was admitted for outpatient interventional pain management procedure. The patient tolerated the procedure well with no complications.    Follow up/Patient Instructions:  Follow up as scheduled in Pain Management office in 2-3 weeks.  Patient has received instructions and follow up date and time.    Medications:  Continue previous medications    Discharge Procedure Orders   Notify your health care provider if you experience any of the following:  temperature >100.4     Notify your health care provider if you experience any of the following:  persistent nausea and vomiting or diarrhea     Notify your health care provider if you experience any of the following:  severe uncontrolled pain     Notify your health care provider if you experience any of the following:  redness, tenderness, or signs of infection (pain, swelling, redness, odor or green/yellow discharge around incision site)     Notify your health care provider if you experience any of the following:  difficulty breathing or increased cough     Notify your health care provider if you experience any of the following:  severe persistent headache     Notify your health care provider if you experience any of the following:  worsening rash     Notify your health care provider if you experience any of the following:  persistent dizziness, light-headedness, or visual disturbances     Notify your health care provider if you experience any of the following:  increased confusion or  weakness     Activity as tolerated

## 2024-02-09 NOTE — PLAN OF CARE
Vital signs stable. Discharge instructions reviewed with patient. Questions answered. Verbalized understanding.  Dr. Rojas came by ant talked to pt regrding her pain she was having.  Pt much calmer at this time.

## 2024-02-09 NOTE — INTERVAL H&P NOTE
The patient has been examined and the H&P has been reviewed:    I concur with the findings and no changes have occurred since H&P was written.  The risks and benefits of this intervention, and alternative therapies were discussed with the patient.  The discussion of risks included infection, bleeding, need for additional procedures or surgery, nerve damage.  Questions regarding the procedure, risks, expected outcome, and possible side effects were solicited and answered to the patient's satisfaction.  Cornelia Delatorre wishes to proceed with the injection or procedure.  Written consent was obtained.  ASA 3, MP II      There are no hospital problems to display for this patient.

## 2024-02-12 VITALS
TEMPERATURE: 97 F | BODY MASS INDEX: 35.34 KG/M2 | HEIGHT: 64 IN | RESPIRATION RATE: 16 BRPM | SYSTOLIC BLOOD PRESSURE: 142 MMHG | HEART RATE: 74 BPM | WEIGHT: 207 LBS | OXYGEN SATURATION: 96 % | DIASTOLIC BLOOD PRESSURE: 70 MMHG

## 2024-02-26 ENCOUNTER — TELEPHONE (OUTPATIENT)
Dept: PAIN MEDICINE | Facility: CLINIC | Age: 74
End: 2024-02-26

## 2024-02-26 ENCOUNTER — OFFICE VISIT (OUTPATIENT)
Dept: PAIN MEDICINE | Facility: CLINIC | Age: 74
End: 2024-02-26
Payer: MEDICARE

## 2024-02-26 VITALS
DIASTOLIC BLOOD PRESSURE: 65 MMHG | HEIGHT: 64 IN | WEIGHT: 203.5 LBS | BODY MASS INDEX: 34.74 KG/M2 | SYSTOLIC BLOOD PRESSURE: 141 MMHG | HEART RATE: 80 BPM

## 2024-02-26 DIAGNOSIS — S32.050D COMPRESSION FRACTURE OF L5 VERTEBRA WITH ROUTINE HEALING, SUBSEQUENT ENCOUNTER: ICD-10-CM

## 2024-02-26 DIAGNOSIS — M48.061 SPINAL STENOSIS OF LUMBAR REGION, UNSPECIFIED WHETHER NEUROGENIC CLAUDICATION PRESENT: ICD-10-CM

## 2024-02-26 DIAGNOSIS — I10 ESSENTIAL HYPERTENSION: ICD-10-CM

## 2024-02-26 DIAGNOSIS — M54.16 LUMBAR RADICULOPATHY: Primary | ICD-10-CM

## 2024-02-26 PROCEDURE — 1101F PT FALLS ASSESS-DOCD LE1/YR: CPT | Mod: HCNC,CPTII,S$GLB,

## 2024-02-26 PROCEDURE — 99999 PR PBB SHADOW E&M-EST. PATIENT-LVL IV: CPT | Mod: PBBFAC,HCNC,,

## 2024-02-26 PROCEDURE — 1159F MED LIST DOCD IN RCRD: CPT | Mod: HCNC,CPTII,S$GLB,

## 2024-02-26 PROCEDURE — 3288F FALL RISK ASSESSMENT DOCD: CPT | Mod: HCNC,CPTII,S$GLB,

## 2024-02-26 PROCEDURE — 3078F DIAST BP <80 MM HG: CPT | Mod: HCNC,CPTII,S$GLB,

## 2024-02-26 PROCEDURE — 3077F SYST BP >= 140 MM HG: CPT | Mod: HCNC,CPTII,S$GLB,

## 2024-02-26 PROCEDURE — 3008F BODY MASS INDEX DOCD: CPT | Mod: HCNC,CPTII,S$GLB,

## 2024-02-26 PROCEDURE — 1157F ADVNC CARE PLAN IN RCRD: CPT | Mod: HCNC,CPTII,S$GLB,

## 2024-02-26 PROCEDURE — 99214 OFFICE O/P EST MOD 30 MIN: CPT | Mod: HCNC,S$GLB,,

## 2024-02-26 PROCEDURE — 1125F AMNT PAIN NOTED PAIN PRSNT: CPT | Mod: HCNC,CPTII,S$GLB,

## 2024-02-26 RX ORDER — VALSARTAN AND HYDROCHLOROTHIAZIDE 320; 25 MG/1; MG/1
1 TABLET, FILM COATED ORAL DAILY
Qty: 90 TABLET | Refills: 2 | Status: SHIPPED | OUTPATIENT
Start: 2024-02-26 | End: 2024-03-05 | Stop reason: SDUPTHER

## 2024-02-26 NOTE — TELEPHONE ENCOUNTER
Refill Routing Note   Medication(s) are not appropriate for processing by [facility] for the following reason(s):        New or recently adjusted medication  Required vitals abnormal  ED/Hospital Visit since last OV with provider    [facility] action(s):  Defer               Appointments  past 12m or future 3m with PCP    Date Provider   Last Visit   9/18/2023 Armond Cortez MD   Next Visit   Visit date not found Armond Cortez MD   ED visits in past 90 days: 1        Note composed:12:55 PM 02/26/2024

## 2024-02-26 NOTE — PROGRESS NOTES
[facility] Follow Up Evaluation    Referred by: Patricia Valerio NP  Reason for referral: back pain    CC:   Chief Complaint   Patient presents with    Low-back Pain          2/26/2024     2:51 PM 1/29/2024     8:13 AM 1/8/2024     1:58 PM   Last 3 PDI Scores   Pain Disability Index (PDI) 33 [phone] 49     Interval HPI 2/26/2024: Cornelia Delatorre returns to the office for follow up.  She is s/p L5/S1 ROM on 02/09/2024 with 50% relief.  She has found relief however not complete.  She continues to report severe lower back pain with radiation down bilateral legs, right greater than left, worsened with standing and certain movements, somewhat improved with rest.  She rates her remaining pain as an 8/10.  She denies any numbness, weakness or any new changes to her bowel or bladder function.  She continues to wear her LSO brace.        Pain intervention history:  - s/p L5/S1 ROM on 8/14/19 with close to 100% relief of her back and leg pain  - s/p L5/S1 ROM on 5/3/21 with 90% relief.  - s/p L5/S1 ROM on 9/24/21 with 100% relief  - s/p L5/S1 ROM on 2/21/22 with 95% relief   - s/p L5/S1 ROM on 02/09/2024 with 50% relief.      HPI:   Cornelia Delatorre is a 73 y.o. female who complains of back pain    Onset: about 3.5 months  Inciting Event: none  Progression: since onset, pain is rapidly improving  Typical Range: 1-2/10  Timing: intermittent  Quality: aching, burning, grabbing, sharp, shooting  Radiation: yes, down the back of both legs left > right  Associated numbness or weakness: yes numbness on the left leg, no weakness  Exacerbated by: standing, coughing/sneezing, walking  Allievated by: rest, heat, tylenol  Is Pain Level Acceptable?:   Yes    Previous Therapies:  PT/OT: yes, felt like it got worse  HEP:   Interventions:   Surgery:  Medications: tylenol  - NSAIDS: avoids 2/2 h/o gastric bypass  - MSK Relaxants:   - TCAs:   - SNRIs:   - Topicals:   - Anticonvulsants: gabapentin 600mg TID  - Opioids:      History:    Current Outpatient Medications:     ascorbic acid, vitamin C, (VITAMIN C) 1000 MG tablet, Take 1,000 mg by mouth 2 (two) times daily. , Disp: , Rfl:     azelastine (OPTIVAR) 0.05 % ophthalmic solution, Place 1 drop into both eyes. As needed, Disp: , Rfl:     B-complex with vitamin C (Z-BEC OR EQUIV) tablet, Take 1 tablet by mouth once daily., Disp: , Rfl:     Bifidobacterium infantis (ALIGN ORAL), Take by mouth once daily., Disp: , Rfl:     biotin 10,000 mcg Cap, Take 1 tablet by mouth every evening. , Disp: , Rfl:     blood sugar diagnostic Strp, Insurance preferred meter and strips. Check daily, Disp: 90 each, Rfl: 3    cranberry 500 mg Cap, Take 1 capsule by mouth every evening., Disp: , Rfl:     diltiaZEM (CARDIZEM CD) 120 MG Cp24, Take 1 capsule (120 mg total) by mouth every evening. (Patient taking differently: Take 120 mg by mouth once daily.), Disp: 90 capsule, Rfl: 4    doxazosin (CARDURA) 2 MG tablet, Take 2 mg by mouth every evening., Disp: , Rfl:     EPINEPHrine (EPIPEN) 0.3 mg/0.3 mL AtIn, Inject 1 each into the muscle as needed. , Disp: , Rfl: 3    erythromycin with ethanoL (THERAMYCIN) 2 % external solution, Aaa bid prn, Disp: 60 mL, Rfl: 3    esomeprazole (NEXIUM) 40 MG capsule, Take 1 capsule (40 mg total) by mouth before breakfast., Disp: 90 capsule, Rfl: 3    estradioL (ESTRACE) 0.01 % (0.1 mg/gram) vaginal cream, Place 1 g vaginally 3 (three) times a week. Place by fingertip application before bedtime three times a week (Monday, Wednesday, Friday), Disp: 45 g, Rfl: 3    fexofenadine (ALLEGRA) 180 MG tablet, Take 180 mg by mouth once daily. , Disp: , Rfl:     fish oil-omega-3 fatty acids 300-1,000 mg capsule, Take 2 g by mouth once daily., Disp: , Rfl:     fluconazole (DIFLUCAN) 200 MG Tab, 1 po q week, Disp: 12 tablet, Rfl: 1    fluticasone propionate (FLONASE) 50 mcg/actuation nasal spray, 2 sprays (100 mcg total) by Each Nostril route once daily., Disp: 16 g, Rfl: 11     fluticasone-umeclidin-vilanter (TRELEGY ELLIPTA) 200-62.5-25 mcg inhaler, Inhale 1 puff into the lungs once daily., Disp: 180 each, Rfl: 3    gabapentin (NEURONTIN) 600 MG tablet, Take 1 tablet (600 mg total) by mouth 3 (three) times daily., Disp: 540 tablet, Rfl: 3    guaifenesin-codeine 100-10 mg/5 ml (GUAIFENESIN AC)  mg/5 mL syrup, Take 5 mLs by mouth 3 (three) times daily as needed for Cough., Disp: 473 mL, Rfl: 2    hydrOXYzine HCL (ATARAX) 10 MG Tab, Take 1 tablet (10 mg total) by mouth 3 (three) times daily as needed., Disp: 30 tablet, Rfl: 3    immun glob G,IgG,-pro-IgA 0-50 (HIZENTRA) 1 gram/5 mL (20 %) Soln, Inject 55 mLs (11 g total) into the skin once a week., Disp: 220 mL, Rfl: 12    inhalation spacing device, Use as directed for inhalation., Disp: 1 each, Rfl: 0    lancets (ACCU-CHEK SOFTCLIX LANCETS) Misc, Use to check blood sugar daily., Disp: 100 each, Rfl: 11    levalbuterol (XOPENEX HFA) 45 mcg/actuation inhaler, Inhale 1-2 puffs into the lungs every 4 (four) hours as needed for Wheezing or Shortness of Breath. Rescue, Disp: 15 g, Rfl: 11    levalbuterol (XOPENEX) 1.25 mg/3 mL nebulizer solution, Take 3 mLs (1.25 mg total) by nebulization every 4 (four) hours as needed for Wheezing or Shortness of Breath. Rescue, Disp: 1 each, Rfl: 11    metFORMIN (GLUCOPHAGE-XR) 500 MG ER 24hr tablet, Take 1 tablet (500 mg total) by mouth 2 (two) times daily with meals., Disp: 180 tablet, Rfl: 3    mometasone 0.1% (ELOCON) 0.1 % cream, aaa bid x 1-2 weeks prn bug bites, Disp: 45 g, Rfl: 1    montelukast (SINGULAIR) 10 mg tablet, Take 1 tablet (10 mg total) by mouth every evening., Disp: 90 tablet, Rfl: 3    mucus clearing device Cecy, 1 Device by Misc.(Non-Drug; Combo Route) route 2 (two) times daily., Disp: 1 Device, Rfl: 0    multivitamin capsule, Take 1 capsule by mouth once daily. , Disp: , Rfl:     mupirocin (BACTROBAN) 2 % ointment, Apply topically 3 (three) times daily., Disp: 15 g, Rfl: 0     mupirocin (BACTROBAN) 2 % ointment, Apply topically 3 (three) times daily., Disp: 30 g, Rfl: 1    potassium chloride SA (K-DUR,KLOR-CON) 20 MEQ tablet, Take 1 tablet (20 mEq total) by mouth once daily., Disp: 90 tablet, Rfl: 4    pravastatin (PRAVACHOL) 80 MG tablet, Take 1 tablet (80 mg total) by mouth once daily., Disp: 90 tablet, Rfl: 4    psyllium husk (METAMUCIL ORAL), Take by mouth., Disp: , Rfl:     SIMETHICONE (GAS-X ORAL), Take 1 capsule by mouth as needed (gas relief). , Disp: , Rfl:     tezepelumab-ekko (TEZSPIRE) 210 mg/1.91 mL (110 mg/mL) Syrg, Inject 1.91 mLs (210 mg total) into the skin every 28 days., Disp: 1.91 mL, Rfl: 11    tiZANidine (ZANAFLEX) 4 MG tablet, Take 8 mg by mouth 2 (two) times daily., Disp: , Rfl:     traMADoL (ULTRAM) 50 mg tablet, Take 1 tablet (50 mg total) by mouth every 8 (eight) hours as needed for Pain., Disp: 21 tablet, Rfl: 0    valsartan-hydrochlorothiazide (DIOVAN-HCT) 320-25 mg per tablet, TAKE 1 TABLET BY MOUTH ONCE DAILY., Disp: 90 tablet, Rfl: 2    vitamin D3-folic acid 5,000 unit- 1 mg Tab, Take 1 tablet by mouth once daily. , Disp: , Rfl:     blood-glucose meter kit, Use as instructed. Insurance preferred meter., Disp: 1 each, Rfl: 0    Current Facility-Administered Medications:     levalbuterol nebulizer solution 1.25 mg, 1.25 mg, Nebulization, 1 time in Clinic/HOD, Daysi Llanos NP    Past Medical History:   Diagnosis Date    Allergy     Arthritis     Asthma     Cancer     skin cancer to right hand    Cataract     Chronic fatigue 01/24/2017    CVID (common variable immunodeficiency) 03/07/2019    Diabetes mellitus     type2    KLINE (dyspnea on exertion) 01/24/2017    Dyslipidemia 06/25/2019    Encounter for blood transfusion     Essential hypertension 12/29/2011    GERD (gastroesophageal reflux disease)     Headache(784.0)     History of lumpectomy of both breasts     1992 negative    Hyperlipidemia 07/15/2015    Hypertension     Hypothyroidism     Neuropathy      Obesity     Obesity     Postmenopausal HRT (hormone replacement therapy)     Rash     Rosacea     Sleep apnea     on bipap    Snoring     Squamous cell carcinoma of skin     left forearm     UTI (urinary tract infection)     Wears glasses        Past Surgical History:   Procedure Laterality Date    ADENOIDECTOMY      APPENDECTOMY      BLADDER SUSPENSION      BREAST BIOPSY Bilateral 1992    bilateral benign excisional biopsies    BUNIONECTOMY  10/17/14    right, still has discomfort    CATARACT EXTRACTION W/  INTRAOCULAR LENS IMPLANT Bilateral     CHOLECYSTECTOMY  08/02/2017    COLONOSCOPY      CYSTOSCOPY      EPIDURAL STEROID INJECTION INTO LUMBAR SPINE N/A 8/14/2019    Procedure: Injection-steroid-epidural-lumbar L5/S1;  Surgeon: Fredi Rojas MD;  Location: [facility] OR;  Service: Pain Management;  Laterality: N/A;    EPIDURAL STEROID INJECTION INTO LUMBAR SPINE N/A 5/3/2021    Procedure: Injection-steroid-epidural-lumbar L5/S1 to the Left;  Surgeon: Fredi Rojas MD;  Location: [facility] OR;  Service: Pain Management;  Laterality: N/A;    EPIDURAL STEROID INJECTION INTO LUMBAR SPINE N/A 9/24/2021    Procedure: Injection-steroid-epidural-lumbar L5/S1;  Surgeon: Fredi Rojas MD;  Location: [facility] OR;  Service: Pain Management;  Laterality: N/A;    EPIDURAL STEROID INJECTION INTO LUMBAR SPINE N/A 2/21/2022    Procedure: Injection-steroid-epidural-lumbar L5/S1;  Surgeon: Fredi Rojas MD;  Location: [facility] OR;  Service: Pain Management;  Laterality: N/A;    EPIDURAL STEROID INJECTION INTO LUMBAR SPINE N/A 2/9/2024    Procedure: Injection-steroid-epidural-lumbar       L5/S1;  Surgeon: Fredi Rojas MD;  Location: [facility] OR;  Service: Pain Management;  Laterality: N/A;    ESOPHAGEAL DILATION N/A 6/11/2021    Procedure: DILATION, ESOPHAGUS;  Surgeon: Jake Sheriff MD;  Location: [facility];  Service: Endoscopy;  Laterality: N/A;    ESOPHAGOGASTRODUODENOSCOPY      dilated esophagus    ESOPHAGOGASTRODUODENOSCOPY N/A  6/11/2021    Procedure: EGD (ESOPHAGOGASTRODUODENOSCOPY);  Surgeon: Jake Sheriff MD;  Location: [facility];  Service: Endoscopy;  Laterality: N/A;    GASTRIC BYPASS      2011    HYSTERECTOMY  1980    interstim bladder  2009    10/14/14 new battery    OOPHORECTOMY  1980    REPLACEMENT OF SACRAL NERVE STIMULATOR  2/18/2020    Procedure: REPLACEMENT, NEUROSTIMULATOR, SACRAL;  Surgeon: Mary Jane Jarvis MD;  [facility];  Service: Urology;;    REVISION OF PROCEDURE INVOLVING SACRAL NEUROSTIMULATOR DEVICE Right 2/18/2020    Procedure: REVISION, NEUROSTIMULATOR, SACRAL/ battery replacement;  Surgeon: Mary Jane Jarvis MD;  Location: [facility] OR 2ND FLR;  Service: Urology;  Laterality: Right;  1hr/ rep contacted/ (CONNIE)    SKIN CANCER EXCISION      left hand    TONSILLECTOMY      WISDOM TOOTH EXTRACTION         Family History   Problem Relation Age of Onset    Breast cancer Mother 50    Breast cancer Paternal Aunt         40's    Cervical cancer Paternal Grandmother     Ovarian cancer Paternal Grandmother     Cervical cancer Paternal Cousin     Ovarian cancer Paternal Cousin     Diabetes Other     Hypertension Other     Hypothyroidism Other     Allergic rhinitis Neg Hx     Allergies Neg Hx     Angioedema Neg Hx     Asthma Neg Hx     Atopy Neg Hx     Eczema Neg Hx     Immunodeficiency Neg Hx     Rhinitis Neg Hx     Urticaria Neg Hx     Uterine cancer Neg Hx        Social History     Socioeconomic History    Marital status:    Tobacco Use    Smoking status: Never    Smokeless tobacco: Never   Substance and Sexual Activity    Alcohol use: Not Currently    Drug use: No    Sexual activity: Not Currently     Social Determinants of Health     Financial Resource Strain: Medium Risk (10/11/2023)    Overall Financial Resource Strain (CARDIA)     Difficulty of Paying Living Expenses: Somewhat hard   Food Insecurity: Food Insecurity Present (10/11/2023)    Hunger Vital Sign     Worried About Running Out of Food  in the Last Year: Sometimes true     Ran Out of Food in the Last Year: Patient declined   Transportation Needs: No Transportation Needs (10/11/2023)    PRAPARE - Transportation     Lack of Transportation (Medical): No     Lack of Transportation (Non-Medical): No   Physical Activity: Inactive (10/11/2023)    Exercise Vital Sign     Days of Exercise per Week: 0 days     Minutes of Exercise per Session: 0 min   Stress: Patient Declined (10/11/2023)    [place] [employer] - Occupational Stress Questionnaire     Feeling of Stress : Patient declined   Social Connections: Unknown (10/11/2023)    Social Connection and Isolation Panel [NHANES]     Frequency of Communication with Friends and Family: More than three times a week     Frequency of Social Gatherings with Friends and Family: Patient declined     Active Member of Clubs or Organizations: Yes     Attends Club or Organization Meetings: More than 4 times per year     Marital Status: Living with partner   Housing Stability: Unknown (10/11/2023)    Housing Stability Vital Sign     Unable to Pay for Housing in the Last Year: Patient refused     Number of Places Lived in the Last Year: 1     Unstable Housing in the Last Year: No       Review of patient's allergies indicates:   Allergen Reactions    Sulfa (sulfonamide antibiotics) Hives    Naproxen Other (See Comments)     Other reaction(s): RT sided numbness      Albuterol Itching and Palpitations     Nebulizer only. Can use inhaler       Review of Systems:  General ROS: negative for - fever  Psychological ROS: negative for - hostility  Hematological and Lymphatic ROS: positive for - bruising  negative for - bleeding problems  Endocrine ROS: negative for - unexpected weight changes  Respiratory ROS: positive for - cough and shortness of breath  Cardiovascular ROS: no chest pain or dyspnea on exertion  Gastrointestinal ROS: no abdominal pain, change in bowel habits, or black or bloody  "stools  Musculoskeletal ROS: negative for - muscular weakness  Neurological ROS: negative for - numbness/tingling  negative for -  New bowel and bladder control changes  Dermatological ROS: negative for rash    Physical Exam:  Vitals:    02/26/24 1450   BP: (!) 141/65   Pulse: 80   Weight: 92.3 kg (203 lb 7.8 oz)   Height: 5' 4" (1.626 m)   PainSc:   7   PainLoc: Back     Body mass index is 34.93 kg/m².     Gen: NAD  Gait:  Antalgic gait, ambulating with Rollator.  Psych:  Mood appropriate for given condition  HEENT: eyes anicteric   GI: Abd soft  CV: RRR  Lungs: breathing unlabored   ROM: limited AROM of the L spine in all planes, full ROM at ankles, knees and hips  Lumbar flexion 90 degrees, extension 50 degrees, side bending 30 degrees.    Sensation: intact to light touch in all dermatomes tested from L2-S1 bilaterally  Reflexes: 0+ b/l patella and 0/0 b/l achilles  Palpation:  Moderately tender over lumbar paraspinals and lower midline spine  Tone: normal in the b/l knees and hips   Skin: intact  Extremities: No edema in b/l ankles or hands       Right Left   L2/3 Iliacus Hip flexion  5  5   L3/4 Qudratus Femoris Knee Extension  5  5   L4/5 Tib Anterior Ankle Dorsiflexion   5  5   L5/S1 Extensor Hallicus Longus Great toe extension  5  5                 S1/S2 Gastroc/Soleus Plantar Flexion  5  5     Imaging:  Xray lumbar spine 6/19/19  FINDINGS:  There is mild exaggeration of the normal lumbar lordosis.  There is 4 mm anterolisthesis of L4 on L5.  The vertebral bodies maintain normal height.  There is no fracture.  There is multilevel endplate osteophyte formation and multilevel facet joint arthropathy.  There is a sacral stimulator in place.    CT lumbar spine 8/6/19  FINDINGS:  Grade 1 anterolisthesis of L4 on L5.  Minimal retrolisthesis of L2 on L3.The vertebral body heights are well-maintained.No fractures are identified. Sacral stimulator partially visualized, which appears to enter the right S3-4 neural " foramen.    Mild multilevel degenerative disc disease with anterior osteophytosis, vacuum disc phenomenon at T10-11 and L1-2 and disc space narrowing, most pronounced and moderate at L1-2.    T12-L1: Small right central posterior disc osteophyte complex.  Mild bilateral facet arthrosis.  No significant spinal canal or neural foraminal stenosis.  L1-2: Circumferential disc bulge.  Mild bilateral facet arthrosis.  No significant spinal canal or neural foraminal stenosis.  L2-3: Minimal retrolisthesis.  Circumferential disc bulge.  Moderate bilateral facet arthrosis.  Mild bilateral neural foraminal stenosis.  Mild spinal canal stenosis.  L3-4: Circumferential disc bulge.  Ligamentum flavum infolding.  Moderate bilateral facet arthrosis.  Mild bilateral neural foraminal stenosis.  Mild spinal canal stenosis  L4-5: Grade 1 anterolisthesis.  Circumferential disc bulge, eccentric to the right.  Ligamentum flavum infolding.  Severe bilateral facet arthrosis.  Mild left and moderate right neural foraminal stenosis.  Severe spinal canal stenosis.  L5-S1: Mild diffuse disc bulge.  Moderate left and mild right facet arthrosis.  Moderate left and mild right neural foraminal stenosis.  No significant spinal canal stenosis.    CT lumbar spine 01/22/2024  FINDINGS:  Bones: There is diffuse bony demineralization.  There is a comminuted burst type fracture deformity of L5 with mild osseous retropulsion on the order of 3 mm.  Height loss centrally of the vertebral body is on the order of 40%.  This appears new or progressed from prior radiographs 01/08/2024 noting minimal height loss may have been present in retrospect suggestive of likely subacute fracture with mild progression of height loss.  Chronic appearing Schmorl's node along the superior endplate of L2 but new from prior CT 08/02/2019.  No additional fractures identified.     Alignment: Grade 1 anterolisthesis of L4 on L5 and trace retrolisthesis of L2 on L3.  Mild broad  levocurvature.     Disc levels:     T12-L1: Shallow disc bulging and mild bilateral facet arthrosis mildly narrowing the spinal canal.  The foramina appear patent.  L1-L2: Shallow disc bulging and mild-to-moderate facet arthrosis producing mild to moderate narrowing of the osseous spinal canal.  No more than mild bilateral foraminal narrowing.  L2-L3: Shallow disc bulging and moderate bilateral facet arthrosis producing likely moderate narrowing of the osseous spinal canal with no more than mild bilateral foraminal narrowing.  L3-L4: Shallow disc bulging and moderate bilateral facet arthrosis with ligamentum flavum thickening producing likely mild to moderate narrowing of the osseous spinal canal and bilateral foramina.  L4-L5: Disc bulging without osseous retropulsion and severe bilateral facet arthrosis with ligamentum flavum thickening severely narrowing the spinal canal progressed from the prior CT 08/02/2019.  At least moderate left and moderate to severe right foraminal narrowing.  L5-S1: Disc bulging and severe bilateral facet arthrosis produces mild to moderate narrowing of the osseous spinal canal.  Moderate left and mild right foraminal narrowing.     Soft Tissues: Partially imaged right-sided sacral neural stimulator entering the S3 foramen.  Atherosclerotic changes.  Cholecystectomy clips are noted.  Partially imaged postoperative changes of the stomach.     Impression:     1. Burst type fracture deformity with mild height loss involving the superior endplate of L5 with mild osseous retropulsion which appears acute or subacute in age with clinical correlation needed.  No additional fractures identified.  Evaluation is somewhat limited on the basis of diffuse bony demineralization.  2. Progressive severe multifactorial spinal canal and advanced bilateral foraminal narrowing at L4-L5.  3. Additional degenerative changes as discussed above.  This report was flagged in Epic as abnormal.    Labs:  BMP  Lab  Results   Component Value Date     (L) 01/23/2024    K 4.2 01/23/2024    CL 99 01/23/2024    CO2 25 01/23/2024    BUN 14 01/23/2024    CREATININE 1.0 01/23/2024    CALCIUM 9.5 01/23/2024    ANIONGAP 11 01/23/2024    ESTGFRAFRICA >60 04/27/2022    EGFRNONAA >60 04/27/2022     Lab Results   Component Value Date    ALT 22 01/04/2024    AST 34 01/04/2024    ALKPHOS 156 (H) 01/04/2024    BILITOT 0.9 01/04/2024       Assessment:  Problem List Items Addressed This Visit          Neuro    Lumbar radiculopathy - Primary     Other Visit Diagnoses       Compression fracture of L5 vertebra with routine healing, subsequent encounter        Spinal stenosis of lumbar region, unspecified whether neurogenic claudication present                      Treatment Plan:  73 y.o. year old female with PMH DM II, asthma, HTN, common variable immunodeficiency, GERD, h/o gastric bypass, SOHA presents to the office with lower back pain.      2/26/2024: Cornelia Delatorre returns to the office for follow up.  She is s/p L5/S1 ROM on 02/09/2024 with 50% relief.  She has found relief however not complete.  She continues to report severe lower back pain with radiation down bilateral legs, right greater than left, worsened with standing and certain movements, somewhat improved with rest.  She rates her remaining pain as an 8/10.  She denies any numbness, weakness or any new changes to her bowel or bladder function.  She continues to wear her LSO brace.    - on exam she has full strength in her lower extremities.  She continues to wear LSO brace  - She is s/p L5/S1 ROM on 02/09/2024 with 50% relief.  - we reviewed her updated lumbar CT in office today and is consistent with a burst type fracture of the L5 vertebral body.  She also continues to have severe central canal stenosis worse at L4/5 and multilevel bilateral foraminal narrowing.  - she found relief with the lumbar epidural however not complete.  She continues to have severe pain that  limits her mobility interferes with her ADLs and quality of life.  - likely continues to have a combination of pain from burst fracture in addition to central canal stenosis and foraminal narrowing.  She is describing the radicular pain as most problematic for her at this time.  - in order to maximize all conservative options I would like to schedule her for bilateral L4/5 transforaminal ROM  - follow-up 2 weeks post procedure or sooner if needed.  If she fails to get significant lasting relief we will refer her to Neurosurgery for further evaluation.      :  Not applicable    The above note was completed, in part, with the aid of Dragon dictation software/hardware. Translation errors may be present.

## 2024-02-26 NOTE — H&P (VIEW-ONLY)
[facility] Follow Up Evaluation    Referred by: Patricia Valerio NP  Reason for referral: back pain    CC:   Chief Complaint   Patient presents with    Low-back Pain          2/26/2024     2:51 PM 1/29/2024     8:13 AM 1/8/2024     1:58 PM   Last 3 PDI Scores   Pain Disability Index (PDI) 33 41 49     Interval HPI 2/26/2024: Cornelia Delatorre returns to the office for follow up.  She is s/p L5/S1 ROM on 02/09/2024 with 50% relief.  She has found relief however not complete.  She continues to report severe lower back pain with radiation down bilateral legs, right greater than left, worsened with standing and certain movements, somewhat improved with rest.  She rates her remaining pain as an 8/10.  She denies any numbness, weakness or any new changes to her bowel or bladder function.  She continues to wear her LSO brace.        Pain intervention history:  - s/p L5/S1 ROM on 8/14/19 with close to 100% relief of her back and leg pain  - s/p L5/S1 ROM on 5/3/21 with 90% relief.  - s/p L5/S1 ROM on 9/24/21 with 100% relief  - s/p L5/S1 ROM on 2/21/22 with 95% relief   - s/p L5/S1 ROM on 02/09/2024 with 50% relief.      HPI:   Cornelia Delatorre is a 73 y.o. female who complains of back pain    Onset: about 3.5 months  Inciting Event: none  Progression: since onset, pain is rapidly improving  Typical Range: 1-2/10  Timing: intermittent  Quality: aching, burning, grabbing, sharp, shooting  Radiation: yes, down the back of both legs left > right  Associated numbness or weakness: yes numbness on the left leg, no weakness  Exacerbated by: standing, coughing/sneezing, walking  Allievated by: rest, heat, tylenol  Is Pain Level Acceptable?:   Yes    Previous Therapies:  PT/OT: yes, felt like it got worse  HEP:   Interventions:   Surgery:  Medications: tylenol  - NSAIDS: avoids 2/2 h/o gastric bypass  - MSK Relaxants:   - TCAs:   - SNRIs:   - Topicals:   - Anticonvulsants: gabapentin 600mg TID  - Opioids:      History:    Current Outpatient Medications:     ascorbic acid, vitamin C, (VITAMIN C) 1000 MG tablet, Take 1,000 mg by mouth 2 (two) times daily. , Disp: , Rfl:     azelastine (OPTIVAR) 0.05 % ophthalmic solution, Place 1 drop into both eyes. As needed, Disp: , Rfl:     B-complex with vitamin C (Z-BEC OR EQUIV) tablet, Take 1 tablet by mouth once daily., Disp: , Rfl:     Bifidobacterium infantis (ALIGN ORAL), Take by mouth once daily., Disp: , Rfl:     biotin 10,000 mcg Cap, Take 1 tablet by mouth every evening. , Disp: , Rfl:     blood sugar diagnostic Strp, Insurance preferred meter and strips. Check daily, Disp: 90 each, Rfl: 3    cranberry 500 mg Cap, Take 1 capsule by mouth every evening., Disp: , Rfl:     diltiaZEM (CARDIZEM CD) 120 MG Cp24, Take 1 capsule (120 mg total) by mouth every evening. (Patient taking differently: Take 120 mg by mouth once daily.), Disp: 90 capsule, Rfl: 4    doxazosin (CARDURA) 2 MG tablet, Take 2 mg by mouth every evening., Disp: , Rfl:     EPINEPHrine (EPIPEN) 0.3 mg/0.3 mL AtIn, Inject 1 each into the muscle as needed. , Disp: , Rfl: 3    erythromycin with ethanoL (THERAMYCIN) 2 % external solution, Aaa bid prn, Disp: 60 mL, Rfl: 3    esomeprazole (NEXIUM) 40 MG capsule, Take 1 capsule (40 mg total) by mouth before breakfast., Disp: 90 capsule, Rfl: 3    estradioL (ESTRACE) 0.01 % (0.1 mg/gram) vaginal cream, Place 1 g vaginally 3 (three) times a week. Place by fingertip application before bedtime three times a week (Monday, Wednesday, Friday), Disp: 45 g, Rfl: 3    fexofenadine (ALLEGRA) 180 MG tablet, Take 180 mg by mouth once daily. , Disp: , Rfl:     fish oil-omega-3 fatty acids 300-1,000 mg capsule, Take 2 g by mouth once daily., Disp: , Rfl:     fluconazole (DIFLUCAN) 200 MG Tab, 1 po q week, Disp: 12 tablet, Rfl: 1    fluticasone propionate (FLONASE) 50 mcg/actuation nasal spray, 2 sprays (100 mcg total) by Each Nostril route once daily., Disp: 16 g, Rfl: 11     fluticasone-umeclidin-vilanter (TRELEGY ELLIPTA) 200-62.5-25 mcg inhaler, Inhale 1 puff into the lungs once daily., Disp: 180 each, Rfl: 3    gabapentin (NEURONTIN) 600 MG tablet, Take 1 tablet (600 mg total) by mouth 3 (three) times daily., Disp: 540 tablet, Rfl: 3    guaifenesin-codeine 100-10 mg/5 ml (GUAIFENESIN AC)  mg/5 mL syrup, Take 5 mLs by mouth 3 (three) times daily as needed for Cough., Disp: 473 mL, Rfl: 2    hydrOXYzine HCL (ATARAX) 10 MG Tab, Take 1 tablet (10 mg total) by mouth 3 (three) times daily as needed., Disp: 30 tablet, Rfl: 3    immun glob G,IgG,-pro-IgA 0-50 (HIZENTRA) 1 gram/5 mL (20 %) Soln, Inject 55 mLs (11 g total) into the skin once a week., Disp: 220 mL, Rfl: 12    inhalation spacing device, Use as directed for inhalation., Disp: 1 each, Rfl: 0    lancets (ACCU-CHEK SOFTCLIX LANCETS) Misc, Use to check blood sugar daily., Disp: 100 each, Rfl: 11    levalbuterol (XOPENEX HFA) 45 mcg/actuation inhaler, Inhale 1-2 puffs into the lungs every 4 (four) hours as needed for Wheezing or Shortness of Breath. Rescue, Disp: 15 g, Rfl: 11    levalbuterol (XOPENEX) 1.25 mg/3 mL nebulizer solution, Take 3 mLs (1.25 mg total) by nebulization every 4 (four) hours as needed for Wheezing or Shortness of Breath. Rescue, Disp: 1 each, Rfl: 11    metFORMIN (GLUCOPHAGE-XR) 500 MG ER 24hr tablet, Take 1 tablet (500 mg total) by mouth 2 (two) times daily with meals., Disp: 180 tablet, Rfl: 3    mometasone 0.1% (ELOCON) 0.1 % cream, aaa bid x 1-2 weeks prn bug bites, Disp: 45 g, Rfl: 1    montelukast (SINGULAIR) 10 mg tablet, Take 1 tablet (10 mg total) by mouth every evening., Disp: 90 tablet, Rfl: 3    mucus clearing device Cecy, 1 Device by Misc.(Non-Drug; Combo Route) route 2 (two) times daily., Disp: 1 Device, Rfl: 0    multivitamin capsule, Take 1 capsule by mouth once daily. , Disp: , Rfl:     mupirocin (BACTROBAN) 2 % ointment, Apply topically 3 (three) times daily., Disp: 15 g, Rfl: 0     mupirocin (BACTROBAN) 2 % ointment, Apply topically 3 (three) times daily., Disp: 30 g, Rfl: 1    potassium chloride SA (K-DUR,KLOR-CON) 20 MEQ tablet, Take 1 tablet (20 mEq total) by mouth once daily., Disp: 90 tablet, Rfl: 4    pravastatin (PRAVACHOL) 80 MG tablet, Take 1 tablet (80 mg total) by mouth once daily., Disp: 90 tablet, Rfl: 4    psyllium husk (METAMUCIL ORAL), Take by mouth., Disp: , Rfl:     SIMETHICONE (GAS-X ORAL), Take 1 capsule by mouth as needed (gas relief). , Disp: , Rfl:     tezepelumab-ekko (TEZSPIRE) 210 mg/1.91 mL (110 mg/mL) Syrg, Inject 1.91 mLs (210 mg total) into the skin every 28 days., Disp: 1.91 mL, Rfl: 11    tiZANidine (ZANAFLEX) 4 MG tablet, Take 8 mg by mouth 2 (two) times daily., Disp: , Rfl:     traMADoL (ULTRAM) 50 mg tablet, Take 1 tablet (50 mg total) by mouth every 8 (eight) hours as needed for Pain., Disp: 21 tablet, Rfl: 0    valsartan-hydrochlorothiazide (DIOVAN-HCT) 320-25 mg per tablet, TAKE 1 TABLET BY MOUTH ONCE DAILY., Disp: 90 tablet, Rfl: 2    vitamin D3-folic acid 5,000 unit- 1 mg Tab, Take 1 tablet by mouth once daily. , Disp: , Rfl:     blood-glucose meter kit, Use as instructed. Insurance preferred meter., Disp: 1 each, Rfl: 0    Current Facility-Administered Medications:     levalbuterol nebulizer solution 1.25 mg, 1.25 mg, Nebulization, 1 time in Clinic/HOD, Daysi Llanos NP    Past Medical History:   Diagnosis Date    Allergy     Arthritis     Asthma     Cancer     skin cancer to right hand    Cataract     Chronic fatigue 01/24/2017    CVID (common variable immunodeficiency) 03/07/2019    Diabetes mellitus     type2    KLINE (dyspnea on exertion) 01/24/2017    Dyslipidemia 06/25/2019    Encounter for blood transfusion     Essential hypertension 12/29/2011    GERD (gastroesophageal reflux disease)     Headache(784.0)     History of lumpectomy of both breasts     1992 negative    Hyperlipidemia 07/15/2015    Hypertension     Hypothyroidism     Neuropathy      Obesity     Obesity     Postmenopausal HRT (hormone replacement therapy)     Rash     Rosacea     Sleep apnea     on bipap    Snoring     Squamous cell carcinoma of skin     left forearm     UTI (urinary tract infection)     Wears glasses        Past Surgical History:   Procedure Laterality Date    ADENOIDECTOMY      APPENDECTOMY      BLADDER SUSPENSION      BREAST BIOPSY Bilateral 1992    bilateral benign excisional biopsies    BUNIONECTOMY  10/17/14    right, still has discomfort    CATARACT EXTRACTION W/  INTRAOCULAR LENS IMPLANT Bilateral     CHOLECYSTECTOMY  08/02/2017    COLONOSCOPY      CYSTOSCOPY      EPIDURAL STEROID INJECTION INTO LUMBAR SPINE N/A 8/14/2019    Procedure: Injection-steroid-epidural-lumbar L5/S1;  Surgeon: Fredi Rojas MD;  Location: [facility] OR;  Service: Pain Management;  Laterality: N/A;    EPIDURAL STEROID INJECTION INTO LUMBAR SPINE N/A 5/3/2021    Procedure: Injection-steroid-epidural-lumbar L5/S1 to the Left;  Surgeon: Fredi Rojas MD;  Location: [facility] OR;  Service: Pain Management;  Laterality: N/A;    EPIDURAL STEROID INJECTION INTO LUMBAR SPINE N/A 9/24/2021    Procedure: Injection-steroid-epidural-lumbar L5/S1;  Surgeon: Fredi Rojas MD;  Location: [facility] OR;  Service: Pain Management;  Laterality: N/A;    EPIDURAL STEROID INJECTION INTO LUMBAR SPINE N/A 2/21/2022    Procedure: Injection-steroid-epidural-lumbar L5/S1;  Surgeon: Fredi Rojas MD;  Location: [facility] OR;  Service: Pain Management;  Laterality: N/A;    EPIDURAL STEROID INJECTION INTO LUMBAR SPINE N/A 2/9/2024    Procedure: Injection-steroid-epidural-lumbar       L5/S1;  Surgeon: Fredi Rojas MD;  Location: [facility] OR;  Service: Pain Management;  Laterality: N/A;    ESOPHAGEAL DILATION N/A 6/11/2021    Procedure: DILATION, ESOPHAGUS;  Surgeon: Jake Sheriff MD;  Location: [facility];  Service: Endoscopy;  Laterality: N/A;    ESOPHAGOGASTRODUODENOSCOPY      dilated esophagus    ESOPHAGOGASTRODUODENOSCOPY N/A  6/11/2021    Procedure: EGD (ESOPHAGOGASTRODUODENOSCOPY);  Surgeon: Jake Sheriff MD;  Location: [facility];  Service: Endoscopy;  Laterality: N/A;    GASTRIC BYPASS      2011    HYSTERECTOMY  1980    interstim bladder  2009    10/14/14 new battery    OOPHORECTOMY  1980    REPLACEMENT OF SACRAL NERVE STIMULATOR  2/18/2020    Procedure: REPLACEMENT, NEUROSTIMULATOR, SACRAL;  Surgeon: Mary Jane Jarvis MD;  [facility];  Service: Urology;;    REVISION OF PROCEDURE INVOLVING SACRAL NEUROSTIMULATOR DEVICE Right 2/18/2020    Procedure: REVISION, NEUROSTIMULATOR, SACRAL/ battery replacement;  Surgeon: Mary Jane Jarvis MD;  Location: [facility] OR 2ND FLR;  Service: Urology;  Laterality: Right;  1hr/ rep contacted/ (CONNIE)    SKIN CANCER EXCISION      left hand    TONSILLECTOMY      WISDOM TOOTH EXTRACTION         Family History   Problem Relation Age of Onset    Breast cancer Mother 50    Breast cancer Paternal Aunt         40's    Cervical cancer Paternal Grandmother     Ovarian cancer Paternal Grandmother     Cervical cancer Paternal Cousin     Ovarian cancer Paternal Cousin     Diabetes Other     Hypertension Other     Hypothyroidism Other     Allergic rhinitis Neg Hx     Allergies Neg Hx     Angioedema Neg Hx     Asthma Neg Hx     Atopy Neg Hx     Eczema Neg Hx     Immunodeficiency Neg Hx     Rhinitis Neg Hx     Urticaria Neg Hx     Uterine cancer Neg Hx        Social History     Socioeconomic History    Marital status:    Tobacco Use    Smoking status: Never    Smokeless tobacco: Never   Substance and Sexual Activity    Alcohol use: Not Currently    Drug use: No    Sexual activity: Not Currently     Social Determinants of Health     Financial Resource Strain: Medium Risk (10/11/2023)    Overall Financial Resource Strain (CARDIA)     Difficulty of Paying Living Expenses: Somewhat hard   Food Insecurity: Food Insecurity Present (10/11/2023)    Hunger Vital Sign     Worried About Running Out of Food  in the Last Year: Sometimes true     Ran Out of Food in the Last Year: Patient declined   Transportation Needs: No Transportation Needs (10/11/2023)    PRAPARE - Transportation     Lack of Transportation (Medical): No     Lack of Transportation (Non-Medical): No   Physical Activity: Inactive (10/11/2023)    Exercise Vital Sign     Days of Exercise per Week: 0 days     Minutes of Exercise per Session: 0 min   Stress: Patient Declined (10/11/2023)    [employer] [place] [employer] - Occupational Stress Questionnaire     Feeling of Stress : Patient declined   Social Connections: Unknown (10/11/2023)    Social Connection and Isolation Panel [NHANES]     Frequency of Communication with Friends and Family: More than three times a week     Frequency of Social Gatherings with Friends and Family: Patient declined     Active Member of Clubs or Organizations: Yes     Attends Club or Organization Meetings: More than 4 times per year     Marital Status: Living with partner   Housing Stability: Unknown (10/11/2023)    Housing Stability Vital Sign     Unable to Pay for Housing in the Last Year: Patient refused     Number of Places Lived in the Last Year: 1     Unstable Housing in the Last Year: No       Review of patient's allergies indicates:   Allergen Reactions    Sulfa (sulfonamide antibiotics) Hives    Naproxen Other (See Comments)     Other reaction(s): RT sided numbness      Albuterol Itching and Palpitations     Nebulizer only. Can use inhaler       Review of Systems:  General ROS: negative for - fever  Psychological ROS: negative for - hostility  Hematological and Lymphatic ROS: positive for - bruising  negative for - bleeding problems  Endocrine ROS: negative for - unexpected weight changes  Respiratory ROS: positive for - cough and shortness of breath  Cardiovascular ROS: no chest pain or dyspnea on exertion  Gastrointestinal ROS: no abdominal pain, change in bowel habits, or black or bloody  "stools  Musculoskeletal ROS: negative for - muscular weakness  Neurological ROS: negative for - numbness/tingling  negative for -  New bowel and bladder control changes  Dermatological ROS: negative for rash    Physical Exam:  Vitals:    02/26/24 1450   BP: (!) 141/65   Pulse: 80   Weight: 92.3 kg (203 lb 7.8 oz)   Height: 5' 4" (1.626 m)   PainSc:   7   PainLoc: Back     Body mass index is 34.93 kg/m².     Gen: NAD  Gait:  Antalgic gait, ambulating with Rollator.  Psych:  Mood appropriate for given condition  HEENT: eyes anicteric   GI: Abd soft  CV: RRR  Lungs: breathing unlabored   ROM: limited AROM of the L spine in all planes, full ROM at ankles, knees and hips  Lumbar flexion 90 degrees, extension 50 degrees, side bending 30 degrees.    Sensation: intact to light touch in all dermatomes tested from L2-S1 bilaterally  Reflexes: 0+ b/l patella and 0/0 b/l achilles  Palpation:  Moderately tender over lumbar paraspinals and lower midline spine  Tone: normal in the b/l knees and hips   Skin: intact  Extremities: No edema in b/l ankles or hands       Right Left   L2/3 Iliacus Hip flexion  5  5   L3/4 Qudratus Femoris Knee Extension  5  5   L4/5 Tib Anterior Ankle Dorsiflexion   5  5   L5/S1 Extensor Hallicus Longus Great toe extension  5  5                 S1/S2 Gastroc/Soleus Plantar Flexion  5  5     Imaging:  Xray lumbar spine 6/19/19  FINDINGS:  There is mild exaggeration of the normal lumbar lordosis.  There is 4 mm anterolisthesis of L4 on L5.  The vertebral bodies maintain normal height.  There is no fracture.  There is multilevel endplate osteophyte formation and multilevel facet joint arthropathy.  There is a sacral stimulator in place.    CT lumbar spine 8/6/19  FINDINGS:  Grade 1 anterolisthesis of L4 on L5.  Minimal retrolisthesis of L2 on L3.The vertebral body heights are well-maintained.No fractures are identified. Sacral stimulator partially visualized, which appears to enter the right S3-4 neural " foramen.    Mild multilevel degenerative disc disease with anterior osteophytosis, vacuum disc phenomenon at T10-11 and L1-2 and disc space narrowing, most pronounced and moderate at L1-2.    T12-L1: Small right central posterior disc osteophyte complex.  Mild bilateral facet arthrosis.  No significant spinal canal or neural foraminal stenosis.  L1-2: Circumferential disc bulge.  Mild bilateral facet arthrosis.  No significant spinal canal or neural foraminal stenosis.  L2-3: Minimal retrolisthesis.  Circumferential disc bulge.  Moderate bilateral facet arthrosis.  Mild bilateral neural foraminal stenosis.  Mild spinal canal stenosis.  L3-4: Circumferential disc bulge.  Ligamentum flavum infolding.  Moderate bilateral facet arthrosis.  Mild bilateral neural foraminal stenosis.  Mild spinal canal stenosis  L4-5: Grade 1 anterolisthesis.  Circumferential disc bulge, eccentric to the right.  Ligamentum flavum infolding.  Severe bilateral facet arthrosis.  Mild left and moderate right neural foraminal stenosis.  Severe spinal canal stenosis.  L5-S1: Mild diffuse disc bulge.  Moderate left and mild right facet arthrosis.  Moderate left and mild right neural foraminal stenosis.  No significant spinal canal stenosis.    CT lumbar spine 01/22/2024  FINDINGS:  Bones: There is diffuse bony demineralization.  There is a comminuted burst type fracture deformity of L5 with mild osseous retropulsion on the order of 3 mm.  Height loss centrally of the vertebral body is on the order of 40%.  This appears new or progressed from prior radiographs 01/08/2024 noting minimal height loss may have been present in retrospect suggestive of likely subacute fracture with mild progression of height loss.  Chronic appearing Schmorl's node along the superior endplate of L2 but new from prior CT 08/02/2019.  No additional fractures identified.     Alignment: Grade 1 anterolisthesis of L4 on L5 and trace retrolisthesis of L2 on L3.  Mild broad  levocurvature.     Disc levels:     T12-L1: Shallow disc bulging and mild bilateral facet arthrosis mildly narrowing the spinal canal.  The foramina appear patent.  L1-L2: Shallow disc bulging and mild-to-moderate facet arthrosis producing mild to moderate narrowing of the osseous spinal canal.  No more than mild bilateral foraminal narrowing.  L2-L3: Shallow disc bulging and moderate bilateral facet arthrosis producing likely moderate narrowing of the osseous spinal canal with no more than mild bilateral foraminal narrowing.  L3-L4: Shallow disc bulging and moderate bilateral facet arthrosis with ligamentum flavum thickening producing likely mild to moderate narrowing of the osseous spinal canal and bilateral foramina.  L4-L5: Disc bulging without osseous retropulsion and severe bilateral facet arthrosis with ligamentum flavum thickening severely narrowing the spinal canal progressed from the prior CT 08/02/2019.  At least moderate left and moderate to severe right foraminal narrowing.  L5-S1: Disc bulging and severe bilateral facet arthrosis produces mild to moderate narrowing of the osseous spinal canal.  Moderate left and mild right foraminal narrowing.     Soft Tissues: Partially imaged right-sided sacral neural stimulator entering the S3 foramen.  Atherosclerotic changes.  Cholecystectomy clips are noted.  Partially imaged postoperative changes of the stomach.     Impression:     1. Burst type fracture deformity with mild height loss involving the superior endplate of L5 with mild osseous retropulsion which appears acute or subacute in age with clinical correlation needed.  No additional fractures identified.  Evaluation is somewhat limited on the basis of diffuse bony demineralization.  2. Progressive severe multifactorial spinal canal and advanced bilateral foraminal narrowing at L4-L5.  3. Additional degenerative changes as discussed above.  This report was flagged in Epic as abnormal.    Labs:  BMP  Lab  Results   Component Value Date     (L) 01/23/2024    K 4.2 01/23/2024    CL 99 01/23/2024    CO2 25 01/23/2024    BUN 14 01/23/2024    CREATININE 1.0 01/23/2024    CALCIUM 9.5 01/23/2024    ANIONGAP 11 01/23/2024    ESTGFRAFRICA >60 04/27/2022    EGFRNONAA >60 04/27/2022     Lab Results   Component Value Date    ALT 22 01/04/2024    AST 34 01/04/2024    ALKPHOS 156 (H) 01/04/2024    BILITOT 0.9 01/04/2024       Assessment:  Problem List Items Addressed This Visit          Neuro    Lumbar radiculopathy - Primary     Other Visit Diagnoses       Compression fracture of L5 vertebra with routine healing, subsequent encounter        Spinal stenosis of lumbar region, unspecified whether neurogenic claudication present                      Treatment Plan:  73 y.o. year old female with PMH DM II, asthma, HTN, common variable immunodeficiency, GERD, h/o gastric bypass, SOHA presents to the office with lower back pain.      2/26/2024: Cornelia Delatorre returns to the office for follow up.  She is s/p L5/S1 ROM on 02/09/2024 with 50% relief.  She has found relief however not complete.  She continues to report severe lower back pain with radiation down bilateral legs, right greater than left, worsened with standing and certain movements, somewhat improved with rest.  She rates her remaining pain as an 8/10.  She denies any numbness, weakness or any new changes to her bowel or bladder function.  She continues to wear her LSO brace.    - on exam she has full strength in her lower extremities.  She continues to wear LSO brace  - She is s/p L5/S1 ROM on 02/09/2024 with 50% relief.  - we reviewed her updated lumbar CT in office today and is consistent with a burst type fracture of the L5 vertebral body.  She also continues to have severe central canal stenosis worse at L4/5 and multilevel bilateral foraminal narrowing.  - she found relief with the lumbar epidural however not complete.  She continues to have severe pain that  limits her mobility interferes with her ADLs and quality of life.  - likely continues to have a combination of pain from burst fracture in addition to central canal stenosis and foraminal narrowing.  She is describing the radicular pain as most problematic for her at this time.  - in order to maximize all conservative options I would like to schedule her for bilateral L4/5 transforaminal ROM  - follow-up 2 weeks post procedure or sooner if needed.  If she fails to get significant lasting relief we will refer her to Neurosurgery for further evaluation.      :  Not applicable    The above note was completed, in part, with the aid of Dragon dictation software/hardware. Translation errors may be present.

## 2024-02-26 NOTE — TELEPHONE ENCOUNTER
Please schedule patient for the following procedure:    Physician - Dr Rojas    Type of Procedure/Injection - Lumbar Transforaminal Epidural  L4/5           Laterality - Bilateral      Anxiolysis- RNIV      Need to hold medication - Yes      Fish Oil for 7 days      Clearance needed - No      Follow up - 2 week

## 2024-02-26 NOTE — TELEPHONE ENCOUNTER
No care due was identified.  [facility] Embedded Care Due Messages. Reference number: [id number].   2/26/2024 12:26:21 PM CST

## 2024-02-28 DIAGNOSIS — M54.16 LUMBAR RADICULOPATHY: Primary | ICD-10-CM

## 2024-02-28 RX ORDER — SODIUM CHLORIDE, SODIUM LACTATE, POTASSIUM CHLORIDE, CALCIUM CHLORIDE 600; 310; 30; 20 MG/100ML; MG/100ML; MG/100ML; MG/100ML
INJECTION, SOLUTION INTRAVENOUS CONTINUOUS
Status: CANCELLED | OUTPATIENT
Start: 2024-02-28

## 2024-02-28 NOTE — TELEPHONE ENCOUNTER
Spoke with patient to schedule. Patient has been holding Fish and advised not to resume till after scheduled ROM. Pre op information given and follow up scheduled.

## 2024-03-05 ENCOUNTER — HOSPITAL ENCOUNTER (OUTPATIENT)
Facility: HOSPITAL | Age: 74
Discharge: HOME | End: 2024-03-05
Attending: ANESTHESIOLOGY | Admitting: ANESTHESIOLOGY
Payer: MEDICARE

## 2024-03-05 ENCOUNTER — HOSPITAL ENCOUNTER (OUTPATIENT)
Dept: RADIOLOGY | Facility: HOSPITAL | Age: 74
Discharge: HOME | End: 2024-03-05
Attending: ANESTHESIOLOGY
Payer: MEDICARE

## 2024-03-05 VITALS
WEIGHT: 203 LBS | HEIGHT: 64 IN | DIASTOLIC BLOOD PRESSURE: 75 MMHG | TEMPERATURE: 98 F | OXYGEN SATURATION: 98 % | SYSTOLIC BLOOD PRESSURE: 135 MMHG | BODY MASS INDEX: 34.66 KG/M2 | HEART RATE: 72 BPM | RESPIRATION RATE: 16 BRPM

## 2024-03-05 DIAGNOSIS — I10 ESSENTIAL HYPERTENSION: ICD-10-CM

## 2024-03-05 DIAGNOSIS — M54.50 LOWER BACK PAIN: ICD-10-CM

## 2024-03-05 DIAGNOSIS — M54.16 LUMBAR RADICULOPATHY: Primary | ICD-10-CM

## 2024-03-05 LAB — GLUCOSE SERPL-MCNC: 94 MG/DL (ref 70–110)

## 2024-03-05 PROCEDURE — 64483 NJX AA&/STRD TFRM EPI L/S 1: CPT | Mod: 50,HCNC,PO | Performed by: ANESTHESIOLOGY

## 2024-03-05 PROCEDURE — 76000 FLUOROSCOPY <1 HR PHYS/QHP: CPT | Mod: TC,HCNC,PO

## 2024-03-05 PROCEDURE — 25000003 PHARM REV CODE 250: Mod: HCNC,PO | Performed by: ANESTHESIOLOGY

## 2024-03-05 PROCEDURE — A4216 STERILE WATER/SALINE, 10 ML: HCPCS | Mod: HCNC,PO | Performed by: ANESTHESIOLOGY

## 2024-03-05 PROCEDURE — 64483 NJX AA&/STRD TFRM EPI L/S 1: CPT | Mod: 50,HCNC,, | Performed by: ANESTHESIOLOGY

## 2024-03-05 PROCEDURE — 25500020 PHARM REV CODE 255: Mod: HCNC,PO | Performed by: ANESTHESIOLOGY

## 2024-03-05 PROCEDURE — 63600175 PHARM REV CODE 636 W HCPCS: Mod: HCNC,PO | Performed by: ANESTHESIOLOGY

## 2024-03-05 RX ORDER — VALSARTAN AND HYDROCHLOROTHIAZIDE 320; 25 MG/1; MG/1
1 TABLET, FILM COATED ORAL DAILY
Qty: 90 TABLET | Refills: 2 | Status: SHIPPED | OUTPATIENT
Start: 2024-03-05 | End: 2024-09-01

## 2024-03-05 RX ORDER — SODIUM CHLORIDE 9 MG/ML
INJECTION, SOLUTION INTRAMUSCULAR; INTRAVENOUS; SUBCUTANEOUS
Status: DISCONTINUED | OUTPATIENT
Start: 2024-03-05 | End: 2024-03-05 | Stop reason: HOSPADM

## 2024-03-05 RX ORDER — MIDAZOLAM HYDROCHLORIDE 2 MG/2ML
INJECTION, SOLUTION INTRAMUSCULAR; INTRAVENOUS
Status: DISCONTINUED | OUTPATIENT
Start: 2024-03-05 | End: 2024-03-05 | Stop reason: HOSPADM

## 2024-03-05 RX ORDER — LIDOCAINE HYDROCHLORIDE 10 MG/ML
INJECTION, SOLUTION EPIDURAL; INFILTRATION; INTRACAUDAL; PERINEURAL
Status: DISCONTINUED | OUTPATIENT
Start: 2024-03-05 | End: 2024-03-05 | Stop reason: HOSPADM

## 2024-03-05 RX ORDER — DOXAZOSIN 2 MG/1
2 TABLET ORAL NIGHTLY
Qty: 90 TABLET | Refills: 3 | Status: SHIPPED | OUTPATIENT
Start: 2024-03-05

## 2024-03-05 RX ORDER — DEXAMETHASONE SODIUM PHOSPHATE 10 MG/ML
INJECTION INTRAMUSCULAR; INTRAVENOUS
Status: DISCONTINUED | OUTPATIENT
Start: 2024-03-05 | End: 2024-03-05 | Stop reason: HOSPADM

## 2024-03-05 RX ORDER — SODIUM CHLORIDE, SODIUM LACTATE, POTASSIUM CHLORIDE, CALCIUM CHLORIDE 600; 310; 30; 20 MG/100ML; MG/100ML; MG/100ML; MG/100ML
INJECTION, SOLUTION INTRAVENOUS CONTINUOUS
Status: DISCONTINUED | OUTPATIENT
Start: 2024-03-05 | End: 2024-03-05 | Stop reason: HOSPADM

## 2024-03-05 RX ORDER — BUPIVACAINE HYDROCHLORIDE 2.5 MG/ML
INJECTION, SOLUTION EPIDURAL; INFILTRATION; INTRACAUDAL
Status: DISCONTINUED | OUTPATIENT
Start: 2024-03-05 | End: 2024-03-05 | Stop reason: HOSPADM

## 2024-03-05 NOTE — OP NOTE
Procedure Note    Procedure Date: 3/5/2024    Procedure Performed: bilateral Transforaminal Epidural @ L4/5, with Fluoroscopic Guidance    Indications: Cornelia Delatorre presents with lumbar radiculitis/radiculopathy secondary to disc herniation, osteophyte/osteophyte complexes, and/or severe degenerative disc disease producing foraminal or central spinal stenosis.  The pain has been present for at least 4 weeks and the patient has failed to respond to noninvasive conservative care.  Pain rated by NRS at baseline prior to intervention is 6/10.  Their radiculitis/radiculopathy and/or neurogenic claudication is severe enough to greatly impact their quality of life or function.     Pre-op diagnosis: Lumbar Radiculitis/Radiculopathy    Post-op diagnosis: same    Physician: Fredi Rojas MD    IV anxiolysis medications: versed 2mg    Medications injected: 0.25% Bupivacaine 2ml, dexamethasone 10mg, 3 mL sterile, preservative-free normal saline    Local anesthetic used: 1% Lidocaine 2 ml    Estimated Blood Loss: none    Complications:  none    Technique:  The patient was interviewed in the holding area and Risks/Benefits were discussed, including, but not limited to, the possibility of new or different pain, bleeding or infection.   All questions were answered.  The patient understood and accepted risks.  Consent was reviewed.  A time out was taken to identify the patient, procedure and side of the procedure. The patient was placed in a prone position, then prepped and draped in the usual sterile fashion using ChloraPrep x2 and sterile towels.  The Bilateral L4-5 neural foramena were identified under fluoroscopic guidance in AP and oblique view.  Local anesthetic was given by raising a wheal and going down to the hub of a 25-gauge 1.5 inch needle.  In oblique view, a 5 inch 22-gauge bent-tip spinal needle was introduced into the foramen @ L4 and positioned in the posterior superior quarter of the foramen.  .5cc of  Omnipaque contrast was injected live in an AP fluoroscopic view at each level demonstrating appropriate needle position with contrast spread outlining the respective nerve root and also medially into the epidural space without intravascular or intrathecal spread.  Then, after negative aspiration, a mixture of 0.25% Bupivacaine 2ml, dexamethasone 10mg, 3 mL sterile, preservative-free normal saline was divided evenly and injected slowly and incrementally.  The needle(s) was(were) flushed with normal saline and removed.  The patient tolerated the procedure well.  The patient was monitored after the procedure.  Patient was given post procedure and discharge instructions to follow at home. The patient was discharged in a stable condition.

## 2024-03-05 NOTE — PLAN OF CARE
Plan of care discussed with patient. Procedure education provided. All questions and concerns answered. Patient verbalizes understanding.     Patient on Fluconazole once weekly x a few months, for infection under fingernail. $th dose will be this Friday. Dr. Rojas made aware and okay to proceed.

## 2024-03-05 NOTE — TELEPHONE ENCOUNTER
----- Message from Carrie Correa sent at 3/5/2024  9:05 AM CST -----  Contact: self  Type:  Patient Returning Call    Who Called:  pt  Who Left Message for Patient:  matt  Does the patient know what this is regarding?:  yes  Best Call Back Number:  [phone]   Additional Information:  please call

## 2024-03-05 NOTE — TELEPHONE ENCOUNTER
No care due was identified.  [facility] Embedded Care Due Messages. Reference number: [id number].   3/05/2024 11:40:43 AM CST

## 2024-03-07 ENCOUNTER — OFFICE VISIT (OUTPATIENT)
Dept: FAMILY MEDICINE | Facility: CLINIC | Age: 74
End: 2024-03-07
Payer: MEDICARE

## 2024-03-07 VITALS
HEART RATE: 62 BPM | HEIGHT: 64 IN | OXYGEN SATURATION: 98 % | BODY MASS INDEX: 35.12 KG/M2 | DIASTOLIC BLOOD PRESSURE: 72 MMHG | WEIGHT: 205.69 LBS | SYSTOLIC BLOOD PRESSURE: 138 MMHG

## 2024-03-07 DIAGNOSIS — E11.42 DIABETIC POLYNEUROPATHY ASSOCIATED WITH TYPE 2 DIABETES MELLITUS: ICD-10-CM

## 2024-03-07 DIAGNOSIS — Z78.0 POSTMENOPAUSAL: ICD-10-CM

## 2024-03-07 DIAGNOSIS — B35.1 ONYCHOMYCOSIS OF MULTIPLE TOENAILS WITH TYPE 2 DIABETES MELLITUS: ICD-10-CM

## 2024-03-07 DIAGNOSIS — J47.9 BRONCHIECTASIS WITHOUT COMPLICATION: ICD-10-CM

## 2024-03-07 DIAGNOSIS — D69.2 NONTHROMBOCYTOPENIC PURPURA: ICD-10-CM

## 2024-03-07 DIAGNOSIS — D83.9 CVID (COMMON VARIABLE IMMUNODEFICIENCY): ICD-10-CM

## 2024-03-07 DIAGNOSIS — I70.0 ATHEROSCLEROSIS OF ABDOMINAL AORTA: ICD-10-CM

## 2024-03-07 DIAGNOSIS — N32.81 OVERACTIVE BLADDER: ICD-10-CM

## 2024-03-07 DIAGNOSIS — E11.69 ONYCHOMYCOSIS OF MULTIPLE TOENAILS WITH TYPE 2 DIABETES MELLITUS: ICD-10-CM

## 2024-03-07 DIAGNOSIS — R26.9 ABNORMALITY OF GAIT AND MOBILITY: ICD-10-CM

## 2024-03-07 DIAGNOSIS — I65.23 BILATERAL CAROTID ARTERY STENOSIS: ICD-10-CM

## 2024-03-07 DIAGNOSIS — Z00.00 ENCOUNTER FOR PREVENTIVE HEALTH EXAMINATION: Primary | ICD-10-CM

## 2024-03-07 DIAGNOSIS — R39.15 URINARY URGENCY: ICD-10-CM

## 2024-03-07 DIAGNOSIS — E11.69 TYPE 2 DIABETES MELLITUS WITH OTHER SPECIFIED COMPLICATION, WITHOUT LONG-TERM CURRENT USE OF INSULIN: ICD-10-CM

## 2024-03-07 PROCEDURE — 3066F NEPHROPATHY DOC TX: CPT | Mod: HCNC,CPTII,S$GLB, | Performed by: NURSE PRACTITIONER

## 2024-03-07 PROCEDURE — 1125F AMNT PAIN NOTED PAIN PRSNT: CPT | Mod: HCNC,CPTII,S$GLB, | Performed by: NURSE PRACTITIONER

## 2024-03-07 PROCEDURE — 1101F PT FALLS ASSESS-DOCD LE1/YR: CPT | Mod: HCNC,CPTII,S$GLB, | Performed by: NURSE PRACTITIONER

## 2024-03-07 PROCEDURE — 1160F RVW MEDS BY RX/DR IN RCRD: CPT | Mod: HCNC,CPTII,S$GLB, | Performed by: NURSE PRACTITIONER

## 2024-03-07 PROCEDURE — 3044F HG A1C LEVEL LT 7.0%: CPT | Mod: HCNC,CPTII,S$GLB, | Performed by: NURSE PRACTITIONER

## 2024-03-07 PROCEDURE — G0439 PPPS, SUBSEQ VISIT: HCPCS | Mod: HCNC,S$GLB,, | Performed by: NURSE PRACTITIONER

## 2024-03-07 PROCEDURE — 99999 PR PBB SHADOW E&M-EST. PATIENT-LVL V: CPT | Mod: PBBFAC,HCNC,, | Performed by: NURSE PRACTITIONER

## 2024-03-07 PROCEDURE — 3075F SYST BP GE 130 - 139MM HG: CPT | Mod: HCNC,CPTII,S$GLB, | Performed by: NURSE PRACTITIONER

## 2024-03-07 PROCEDURE — 1157F ADVNC CARE PLAN IN RCRD: CPT | Mod: HCNC,CPTII,S$GLB, | Performed by: NURSE PRACTITIONER

## 2024-03-07 PROCEDURE — 3288F FALL RISK ASSESSMENT DOCD: CPT | Mod: HCNC,CPTII,S$GLB, | Performed by: NURSE PRACTITIONER

## 2024-03-07 PROCEDURE — 3078F DIAST BP <80 MM HG: CPT | Mod: HCNC,CPTII,S$GLB, | Performed by: NURSE PRACTITIONER

## 2024-03-07 PROCEDURE — 1159F MED LIST DOCD IN RCRD: CPT | Mod: HCNC,CPTII,S$GLB, | Performed by: NURSE PRACTITIONER

## 2024-03-07 PROCEDURE — 3061F NEG MICROALBUMINURIA REV: CPT | Mod: HCNC,CPTII,S$GLB, | Performed by: NURSE PRACTITIONER

## 2024-03-07 NOTE — PATIENT INSTRUCTIONS
Counseling and Referral of Other Preventative  (Italic type indicates deductible and co-insurance are waived)    Patient Name: Cornelia Delatorre  Today's Date: 3/7/2024    Health Maintenance       Date Due Completion Date    Aspirin/Antiplatelet Therapy Never done ---    COVID-19 Vaccine (6 - 2023-24 season) 09/01/2023 10/14/2022    TETANUS VACCINE 11/08/2023 11/8/2013    Foot Exam 01/10/2024 1/10/2023    Override on 1/25/2021: Done ()    Override on 5/20/2019: Done (Dr. Eng)    Override on 11/2/2018: Done (Diego)    Override on 4/27/2018: Done (Diego)    Override on 9/1/2016: Done (date approximately, seeing Dr. Willams)    Override on 1/4/2016: Done (with Dr. Eng)    Override on 10/19/2015: Done (Dr. Eng)    Override on 10/22/2014: Done (Dr Eng)    Lipid Panel 03/03/2024 3/3/2023    Diabetes Urine Screening 03/03/2024 3/3/2023    Hemoglobin A1c 03/11/2024 9/11/2023    Mammogram 03/29/2024 3/29/2023    Eye Exam 10/30/2024 10/30/2023    Override on 8/31/2017: Done (Dr. Genaro Sanderson)    Override on 8/29/2016: Done (Genaro Sanderson)    Override on 8/20/2015: Done    Override on 8/19/2014: Done (Dr Sanderson)    DEXA Scan 03/04/2025 3/4/2022    High Dose Statin 03/07/2025 3/7/2024    Colorectal Cancer Screening 10/05/2025 10/5/2020        Orders Placed This Encounter   Procedures    Ambulatory referral/consult to Urology     The following information is provided to all patients.  This information is to help you find resources for any of the problems found today that may be affecting your health:                  Living healthy guide: www.[facility].[place].gov      Understanding Diabetes: www.diabetes.org      Eating healthy: www.cdc.gov/healthyweight      CDC home safety checklist: www.cdc.gov/steadi/patient.html      Agency on Aging: www.goea.[place].[facility]      Alcoholics anonymous (AA): www.aa.org      Physical Activity: www.richard.nih.gov/td9tipa      Tobacco use: www.quitwithusla.org

## 2024-03-08 ENCOUNTER — CLINICAL SUPPORT (OUTPATIENT)
Dept: PULMONOLOGY | Facility: CLINIC | Age: 74
End: 2024-03-08
Payer: MEDICARE

## 2024-03-08 DIAGNOSIS — J45.50 SEVERE PERSISTENT ASTHMA WITHOUT COMPLICATION: Primary | ICD-10-CM

## 2024-03-08 NOTE — PROGRESS NOTES
Patient is here for her monthly Tezspire  injection.  2 patient identifiers used (Name and ).  Patient received the injection to the right arm  subcutaneous.  No redness, bleeding, or swelling noted to the injection site.  Pt tolerated well.    NDC:04549-118-92  LOT: [id number]  EXP: 2025

## 2024-03-11 ENCOUNTER — LAB VISIT (OUTPATIENT)
Dept: LAB | Facility: HOSPITAL | Age: 74
End: 2024-03-11
Attending: INTERNAL MEDICINE
Payer: MEDICARE

## 2024-03-11 DIAGNOSIS — E11.9 CONTROLLED TYPE 2 DIABETES MELLITUS WITHOUT COMPLICATION, WITHOUT LONG-TERM CURRENT USE OF INSULIN: ICD-10-CM

## 2024-03-11 DIAGNOSIS — E78.5 DYSLIPIDEMIA: ICD-10-CM

## 2024-03-11 DIAGNOSIS — D64.9 NORMOCYTIC ANEMIA: ICD-10-CM

## 2024-03-11 DIAGNOSIS — I10 ESSENTIAL HYPERTENSION: ICD-10-CM

## 2024-03-11 LAB
ALBUMIN SERPL BCP-MCNC: 3.3 G/DL (ref 3.5–5.2)
ALP SERPL-CCNC: 116 U/L (ref 55–135)
ALT SERPL W/O P-5'-P-CCNC: 23 U/L (ref 10–44)
ANION GAP SERPL CALC-SCNC: 9 MMOL/L (ref 8–16)
AST SERPL-CCNC: 23 U/L (ref 10–40)
BASOPHILS # BLD AUTO: 0.01 K/UL (ref 0–0.2)
BASOPHILS NFR BLD: 0.2 % (ref 0–1.9)
BILIRUB SERPL-MCNC: 0.3 MG/DL (ref 0.1–1)
BUN SERPL-MCNC: 15 MG/DL (ref 8–23)
CALCIUM SERPL-MCNC: 9.7 MG/DL (ref 8.7–10.5)
CHLORIDE SERPL-SCNC: 105 MMOL/L (ref 95–110)
CHOLEST SERPL-MCNC: 138 MG/DL (ref 120–199)
CHOLEST/HDLC SERPL: 2.6 {RATIO} (ref 2–5)
CO2 SERPL-SCNC: 26 MMOL/L (ref 23–29)
CREAT SERPL-MCNC: 0.7 MG/DL (ref 0.5–1.4)
DIFFERENTIAL METHOD BLD: ABNORMAL
EOSINOPHIL # BLD AUTO: 0 K/UL (ref 0–0.5)
EOSINOPHIL NFR BLD: 0.4 % (ref 0–8)
ERYTHROCYTE [DISTWIDTH] IN BLOOD BY AUTOMATED COUNT: 16.2 % (ref 11.5–14.5)
EST. GFR  (NO RACE VARIABLE): >60 ML/MIN/1.73 M^2
ESTIMATED AVG GLUCOSE: 123 MG/DL (ref 68–131)
GLUCOSE SERPL-MCNC: 100 MG/DL (ref 70–110)
HBA1C MFR BLD: 5.9 % (ref 4–5.6)
HCT VFR BLD AUTO: 38.2 % (ref 37–48.5)
HDLC SERPL-MCNC: 53 MG/DL (ref 40–75)
HDLC SERPL: 38.4 % (ref 20–50)
HGB BLD-MCNC: 11.7 G/DL (ref 12–16)
IMM GRANULOCYTES # BLD AUTO: 0.03 K/UL (ref 0–0.04)
IMM GRANULOCYTES NFR BLD AUTO: 0.7 % (ref 0–0.5)
LDLC SERPL CALC-MCNC: 56.2 MG/DL (ref 63–159)
LYMPHOCYTES # BLD AUTO: 1.5 K/UL (ref 1–4.8)
LYMPHOCYTES NFR BLD: 33.6 % (ref 18–48)
MCH RBC QN AUTO: 28.3 PG (ref 27–31)
MCHC RBC AUTO-ENTMCNC: 30.6 G/DL (ref 32–36)
MCV RBC AUTO: 92 FL (ref 82–98)
MONOCYTES # BLD AUTO: 0.3 K/UL (ref 0.3–1)
MONOCYTES NFR BLD: 7.2 % (ref 4–15)
NEUTROPHILS # BLD AUTO: 2.6 K/UL (ref 1.8–7.7)
NEUTROPHILS NFR BLD: 57.9 % (ref 38–73)
NONHDLC SERPL-MCNC: 85 MG/DL
NRBC BLD-RTO: 0 /100 WBC
PLATELET # BLD AUTO: 159 K/UL (ref 150–450)
PMV BLD AUTO: 12.2 FL (ref 9.2–12.9)
POTASSIUM SERPL-SCNC: 4.7 MMOL/L (ref 3.5–5.1)
PROT SERPL-MCNC: 6.7 G/DL (ref 6–8.4)
RBC # BLD AUTO: 4.14 M/UL (ref 4–5.4)
SODIUM SERPL-SCNC: 140 MMOL/L (ref 136–145)
TRIGL SERPL-MCNC: 144 MG/DL (ref 30–150)
WBC # BLD AUTO: 4.56 K/UL (ref 3.9–12.7)

## 2024-03-11 PROCEDURE — 85025 COMPLETE CBC W/AUTO DIFF WBC: CPT | Mod: HCNC | Performed by: INTERNAL MEDICINE

## 2024-03-11 PROCEDURE — 83036 HEMOGLOBIN GLYCOSYLATED A1C: CPT | Mod: HCNC | Performed by: INTERNAL MEDICINE

## 2024-03-11 PROCEDURE — 36415 COLL VENOUS BLD VENIPUNCTURE: CPT | Mod: HCNC,PO | Performed by: INTERNAL MEDICINE

## 2024-03-11 PROCEDURE — 80053 COMPREHEN METABOLIC PANEL: CPT | Mod: HCNC | Performed by: INTERNAL MEDICINE

## 2024-03-11 PROCEDURE — 80061 LIPID PANEL: CPT | Mod: HCNC | Performed by: INTERNAL MEDICINE

## 2024-03-13 PROBLEM — D69.2 NONTHROMBOCYTOPENIC PURPURA: Status: ACTIVE | Noted: 2024-03-13

## 2024-03-13 NOTE — PROGRESS NOTES
"  Cornelia Delatorre presented for an initial Medicare AWV today. The following components were reviewed and updated:    Medical history  Family History  Social history  Allergies and Current Medications  Health Risk Assessment  Health Maintenance  Care Team    **See Completed Assessments for Annual Wellness visit with in the encounter summary    The following assessments were completed:  Depression Screening  Cognitive function Screening    Timed Get Up Test  Whisper Test      Opioid documentation:      Patient does not have a current opioid prescription.          Vitals:    03/07/24 1012   BP: 138/72   BP Location: Left arm   Patient Position: Sitting   BP Method: Medium (Manual)   Pulse: 62   SpO2: 98%   Weight: 93.3 kg (205 lb 11 oz)   Height: 5' 4" (1.626 m)     Body mass index is 35.31 kg/m².       Physical Exam  Vitals reviewed.   Constitutional:       General: She is not in acute distress.  HENT:      Head: Normocephalic.   Cardiovascular:      Pulses:           Dorsalis pedis pulses are 1+ on the right side and 1+ on the left side.        Posterior tibial pulses are 1+ on the right side and 1+ on the left side.   Pulmonary:      Effort: Pulmonary effort is normal. No respiratory distress.   Feet:      Right foot:      Protective Sensation: 6 sites tested.  5 sites sensed.      Skin integrity: Dry skin present.      Toenail Condition: Right toenails are abnormally thick.      Left foot:      Protective Sensation: 6 sites tested.  5 sites sensed.      Skin integrity: Dry skin present.      Toenail Condition: Left toenails are abnormally thick.   Skin:     General: Skin is warm.   Neurological:      General: No focal deficit present.      Mental Status: She is alert.   Psychiatric:         Mood and Affect: Mood normal.           Diagnoses and health risks identified today and associated recommendations/orders:  1. Encounter for preventive health examination  Reviewed health maintenance and provided " recommendations    Pt to discuss covid vaccine with pulm  Recommend tdap    2. Bronchiectasis without complication  Continue to monitor  Followed by Viet FALLON  CT chest 2/7/17.      3. Onychomycosis of multiple toenails with type 2 diabetes mellitus  Continue to monitor  Followed by Dr Merrill.      4. Atherosclerosis of abdominal aorta  Continue to monitor  Followed by Armond Cortez MD   pravastatin - 80 MG   Encourage cardiovascular exercise.      5. Bilateral carotid artery stenosis  Continue to monitor  Followed by Armond Cortez MD   Encourag eehalthy food chocies  BP controlled.      6. CVID (common variable immunodeficiency)  Continue to monitor  Followed by Armond Cortez MD .      7. Diabetic polyneuropathy associated with type 2 diabetes mellitus  Continue to monitor  Followed by Armond Cortez MD   Last A1c 6.4  Taking metformin.      8. Nonthrombocytopenic purpura  Continue to monitor  Followed by Armond Cortez MD .      9. Type 2 diabetes mellitus with other specified complication, without long-term current use of insulin  Continue to monitor  Followed by Armond Cortez MD   Encourage healthy food chocies and exercise.      10. Overactive bladder  - Ambulatory referral/consult to Urology; Future    11. Urinary urgency  - Ambulatory referral/consult to Urology; Future    12. Abnormality of gait and mobility  Continue to monitor  Followed by Armond Cortez MD .      13. Postmenopausal  - DXA Bone Density Axial Skeleton 1 or more sites; Future      Provided Cornelia with a 5-10 year written screening schedule and personal prevention plan. Recommendations were developed using the USPSTF age appropriate recommendations. Education, counseling, and referrals were provided as needed.  After Visit Summary printed and given to patient which includes a list of additional screenings\tests needed.    No follow-ups on file.      Carie Victor NP

## 2024-03-19 ENCOUNTER — OFFICE VISIT (OUTPATIENT)
Dept: PAIN MEDICINE | Facility: CLINIC | Age: 74
End: 2024-03-19
Payer: MEDICARE

## 2024-03-19 VITALS
HEIGHT: 64 IN | SYSTOLIC BLOOD PRESSURE: 160 MMHG | DIASTOLIC BLOOD PRESSURE: 72 MMHG | WEIGHT: 210.13 LBS | BODY MASS INDEX: 35.87 KG/M2 | HEART RATE: 68 BPM

## 2024-03-19 DIAGNOSIS — S32.050D COMPRESSION FRACTURE OF L5 VERTEBRA WITH ROUTINE HEALING, SUBSEQUENT ENCOUNTER: ICD-10-CM

## 2024-03-19 DIAGNOSIS — M54.16 LUMBAR RADICULOPATHY: Primary | ICD-10-CM

## 2024-03-19 DIAGNOSIS — M48.061 SPINAL STENOSIS OF LUMBAR REGION, UNSPECIFIED WHETHER NEUROGENIC CLAUDICATION PRESENT: ICD-10-CM

## 2024-03-19 PROCEDURE — 3078F DIAST BP <80 MM HG: CPT | Mod: HCNC,CPTII,S$GLB,

## 2024-03-19 PROCEDURE — 3077F SYST BP >= 140 MM HG: CPT | Mod: HCNC,CPTII,S$GLB,

## 2024-03-19 PROCEDURE — 1157F ADVNC CARE PLAN IN RCRD: CPT | Mod: HCNC,CPTII,S$GLB,

## 2024-03-19 PROCEDURE — 3288F FALL RISK ASSESSMENT DOCD: CPT | Mod: HCNC,CPTII,S$GLB,

## 2024-03-19 PROCEDURE — 1125F AMNT PAIN NOTED PAIN PRSNT: CPT | Mod: HCNC,CPTII,S$GLB,

## 2024-03-19 PROCEDURE — 1159F MED LIST DOCD IN RCRD: CPT | Mod: HCNC,CPTII,S$GLB,

## 2024-03-19 PROCEDURE — 3008F BODY MASS INDEX DOCD: CPT | Mod: HCNC,CPTII,S$GLB,

## 2024-03-19 PROCEDURE — 3061F NEG MICROALBUMINURIA REV: CPT | Mod: HCNC,CPTII,S$GLB,

## 2024-03-19 PROCEDURE — 99213 OFFICE O/P EST LOW 20 MIN: CPT | Mod: HCNC,S$GLB,,

## 2024-03-19 PROCEDURE — 99999 PR PBB SHADOW E&M-EST. PATIENT-LVL IV: CPT | Mod: PBBFAC,HCNC,,

## 2024-03-19 PROCEDURE — 1101F PT FALLS ASSESS-DOCD LE1/YR: CPT | Mod: HCNC,CPTII,S$GLB,

## 2024-03-19 PROCEDURE — 3044F HG A1C LEVEL LT 7.0%: CPT | Mod: HCNC,CPTII,S$GLB,

## 2024-03-19 PROCEDURE — 3066F NEPHROPATHY DOC TX: CPT | Mod: HCNC,CPTII,S$GLB,

## 2024-03-19 NOTE — PROGRESS NOTES
[facility] Follow Up Evaluation    Referred by: Patricia Valerio NP  Reason for referral: back pain    CC:   Chief Complaint   Patient presents with    Low-back Pain          3/19/2024    10:13 AM 2/26/2024     2:51 PM 1/29/2024     8:13 AM   Last 3 PDI Scores   Pain Disability Index (PDI) 19 33 41     Interval HPI 3/19/2024: Cornelia Delatorre returns to the office for follow up.  She is s/p bilateral L4/5 TFESI on 03/05/2024 with 60% relief.  She reports good relief of her previous severe lower back pain with radiation into legs.  She continues to have some remaining pain across her lower back back of left knee and back of right leg but overall manageable.  She denies any new numbness, weakness or any new changes to her bowel or bladder function.  She is able to stand, walk, cook for longer periods of time now.      Pain intervention history:  - s/p L5/S1 ROM on 8/14/19 with close to 100% relief of her back and leg pain  - s/p L5/S1 ROM on 5/3/21 with 90% relief.  - s/p L5/S1 ROM on 9/24/21 with 100% relief  - s/p L5/S1 ROM on 2/21/22 with 95% relief   - s/p L5/S1 ROM on 02/09/2024 with 50% relief.  - s/p bilateral L4/5 TFESI on 03/05/2024 with 60% relief      HPI:   Cornelia Delatorre is a 73 y.o. female who complains of back pain    Onset: about 3.5 months  Inciting Event: none  Progression: since onset, pain is rapidly improving  Typical Range: 1-2/10  Timing: intermittent  Quality: aching, burning, grabbing, sharp, shooting  Radiation: yes, down the back of both legs left > right  Associated numbness or weakness: yes numbness on the left leg, no weakness  Exacerbated by: standing, coughing/sneezing, walking  Allievated by: rest, heat, tylenol  Is Pain Level Acceptable?:   Yes    Previous Therapies:  PT/OT: yes, felt like it got worse  HEP:   Interventions:   Surgery:  Medications: tylenol  - NSAIDS: avoids 2/2 h/o gastric bypass  - MSK Relaxants:   - TCAs:   - SNRIs:   - Topicals:   -  Anticonvulsants: gabapentin 600mg TID  - Opioids:     History:    Current Outpatient Medications:     ascorbic acid, vitamin C, (VITAMIN C) 1000 MG tablet, Take 1,000 mg by mouth 2 (two) times daily. , Disp: , Rfl:     azelastine (OPTIVAR) 0.05 % ophthalmic solution, Place 1 drop into both eyes. As needed, Disp: , Rfl:     B-complex with vitamin C (Z-BEC OR EQUIV) tablet, Take 1 tablet by mouth once daily., Disp: , Rfl:     Bifidobacterium infantis (ALIGN ORAL), Take by mouth once daily., Disp: , Rfl:     biotin 10,000 mcg Cap, Take 1 tablet by mouth every evening. , Disp: , Rfl:     blood sugar diagnostic Strp, Insurance preferred meter and strips. Check daily, Disp: 90 each, Rfl: 3    cranberry 500 mg Cap, Take 1 capsule by mouth every evening., Disp: , Rfl:     diltiaZEM (CARDIZEM CD) 120 MG Cp24, Take 1 capsule (120 mg total) by mouth every evening. (Patient taking differently: Take 120 mg by mouth once daily.), Disp: 90 capsule, Rfl: 4    doxazosin (CARDURA) 2 MG tablet, Take 1 tablet (2 mg total) by mouth every evening., Disp: 90 tablet, Rfl: 3    EPINEPHrine (EPIPEN) 0.3 mg/0.3 mL AtIn, Inject 1 each into the muscle as needed. , Disp: , Rfl: 3    erythromycin with ethanoL (THERAMYCIN) 2 % external solution, Aaa bid prn, Disp: 60 mL, Rfl: 3    esomeprazole (NEXIUM) 40 MG capsule, Take 1 capsule (40 mg total) by mouth before breakfast., Disp: 90 capsule, Rfl: 3    estradioL (ESTRACE) 0.01 % (0.1 mg/gram) vaginal cream, Place 1 g vaginally 3 (three) times a week. Place by fingertip application before bedtime three times a week (Monday, Wednesday, Friday), Disp: 45 g, Rfl: 3    fexofenadine (ALLEGRA) 180 MG tablet, Take 180 mg by mouth once daily. , Disp: , Rfl:     fish oil-omega-3 fatty acids 300-1,000 mg capsule, Take 2 g by mouth once daily., Disp: , Rfl:     fluconazole (DIFLUCAN) 200 MG Tab, 1 po q week, Disp: 12 tablet, Rfl: 1    fluticasone propionate (FLONASE) 50 mcg/actuation nasal spray, 2 sprays (100  mcg total) by Each Nostril route once daily., Disp: 16 g, Rfl: 11    fluticasone-umeclidin-vilanter (TRELEGY ELLIPTA) 200-62.5-25 mcg inhaler, Inhale 1 puff into the lungs once daily., Disp: 180 each, Rfl: 3    gabapentin (NEURONTIN) 600 MG tablet, Take 1 tablet (600 mg total) by mouth 3 (three) times daily., Disp: 540 tablet, Rfl: 3    guaifenesin-codeine 100-10 mg/5 ml (GUAIFENESIN AC)  mg/5 mL syrup, Take 5 mLs by mouth 3 (three) times daily as needed for Cough., Disp: 473 mL, Rfl: 2    hydrOXYzine HCL (ATARAX) 10 MG Tab, Take 1 tablet (10 mg total) by mouth 3 (three) times daily as needed., Disp: 30 tablet, Rfl: 3    immun glob G,IgG,-pro-IgA 0-50 (HIZENTRA) 1 gram/5 mL (20 %) Soln, Inject 55 mLs (11 g total) into the skin once a week., Disp: 220 mL, Rfl: 12    inhalation spacing device, Use as directed for inhalation., Disp: 1 each, Rfl: 0    lancets (ACCU-CHEK SOFTCLIX LANCETS) Misc, Use to check blood sugar daily., Disp: 100 each, Rfl: 11    levalbuterol (XOPENEX HFA) 45 mcg/actuation inhaler, Inhale 1-2 puffs into the lungs every 4 (four) hours as needed for Wheezing or Shortness of Breath. Rescue, Disp: 15 g, Rfl: 11    levalbuterol (XOPENEX) 1.25 mg/3 mL nebulizer solution, Take 3 mLs (1.25 mg total) by nebulization every 4 (four) hours as needed for Wheezing or Shortness of Breath. Rescue, Disp: 1 each, Rfl: 11    metFORMIN (GLUCOPHAGE-XR) 500 MG ER 24hr tablet, Take 1 tablet (500 mg total) by mouth 2 (two) times daily with meals., Disp: 180 tablet, Rfl: 3    mometasone 0.1% (ELOCON) 0.1 % cream, aaa bid x 1-2 weeks prn bug bites, Disp: 45 g, Rfl: 1    montelukast (SINGULAIR) 10 mg tablet, Take 1 tablet (10 mg total) by mouth every evening., Disp: 90 tablet, Rfl: 3    mucus clearing device Cecy, 1 Device by Misc.(Non-Drug; Combo Route) route 2 (two) times daily., Disp: 1 Device, Rfl: 0    multivitamin capsule, Take 1 capsule by mouth once daily. , Disp: , Rfl:     mupirocin (BACTROBAN) 2 % ointment,  Apply topically 3 (three) times daily., Disp: 15 g, Rfl: 0    mupirocin (BACTROBAN) 2 % ointment, Apply topically 3 (three) times daily., Disp: 30 g, Rfl: 1    potassium chloride SA (K-DUR,KLOR-CON) 20 MEQ tablet, Take 1 tablet (20 mEq total) by mouth once daily., Disp: 90 tablet, Rfl: 4    pravastatin (PRAVACHOL) 80 MG tablet, Take 1 tablet (80 mg total) by mouth once daily., Disp: 90 tablet, Rfl: 4    psyllium husk (METAMUCIL ORAL), Take by mouth., Disp: , Rfl:     SIMETHICONE (GAS-X ORAL), Take 1 capsule by mouth as needed (gas relief). , Disp: , Rfl:     tezepelumab-ekko (TEZSPIRE) 210 mg/1.91 mL (110 mg/mL) Syrg, Inject 1.91 mLs (210 mg total) into the skin every 28 days., Disp: 1.91 mL, Rfl: 11    tiZANidine (ZANAFLEX) 4 MG tablet, Take 8 mg by mouth 2 (two) times daily., Disp: , Rfl:     traMADoL (ULTRAM) 50 mg tablet, Take 1 tablet (50 mg total) by mouth every 8 (eight) hours as needed for Pain., Disp: 21 tablet, Rfl: 0    valsartan-hydrochlorothiazide (DIOVAN-HCT) 320-25 mg per tablet, Take 1 tablet by mouth once daily., Disp: 90 tablet, Rfl: 2    vitamin D3-folic acid 5,000 unit- 1 mg Tab, Take 1 tablet by mouth once daily. , Disp: , Rfl:     blood-glucose meter kit, Use as instructed. Insurance preferred meter., Disp: 1 each, Rfl: 0    Current Facility-Administered Medications:     levalbuterol nebulizer solution 1.25 mg, 1.25 mg, Nebulization, 1 time in Clinic/HOD, Daysi Llanos NP    Past Medical History:   Diagnosis Date    Allergy     Arthritis     Asthma     Cancer     skin cancer to right hand    Cataract     Chronic fatigue 01/24/2017    CVID (common variable immunodeficiency) 03/07/2019    Diabetes mellitus     type2    KLINE (dyspnea on exertion) 01/24/2017    Dyslipidemia 06/25/2019    Encounter for blood transfusion     Essential hypertension 12/29/2011    GERD (gastroesophageal reflux disease)     Headache(784.0)     History of lumpectomy of both breasts     1992 negative    Hyperlipidemia  07/15/2015    Hypertension     Hypothyroidism     Neuropathy     Obesity     Obesity     Postmenopausal HRT (hormone replacement therapy)     Rash     Rosacea     Sleep apnea     on bipap    Snoring     Squamous cell carcinoma of skin     left forearm     UTI (urinary tract infection)     Wears glasses        Past Surgical History:   Procedure Laterality Date    ADENOIDECTOMY      APPENDECTOMY      BLADDER SUSPENSION      BREAST BIOPSY Bilateral 1992    bilateral benign excisional biopsies    BUNIONECTOMY  10/17/14    right, still has discomfort    CATARACT EXTRACTION W/  INTRAOCULAR LENS IMPLANT Bilateral     CHOLECYSTECTOMY  08/02/2017    COLONOSCOPY      CYSTOSCOPY      EPIDURAL STEROID INJECTION INTO LUMBAR SPINE N/A 8/14/2019    Procedure: Injection-steroid-epidural-lumbar L5/S1;  Surgeon: Fredi Rojas MD;  Location: [facility] OR;  Service: Pain Management;  Laterality: N/A;    EPIDURAL STEROID INJECTION INTO LUMBAR SPINE N/A 5/3/2021    Procedure: Injection-steroid-epidural-lumbar L5/S1 to the Left;  Surgeon: Fredi Rojas MD;  Location: [facility] OR;  Service: Pain Management;  Laterality: N/A;    EPIDURAL STEROID INJECTION INTO LUMBAR SPINE N/A 9/24/2021    Procedure: Injection-steroid-epidural-lumbar L5/S1;  Surgeon: Fredi Rojas MD;  Location: [facility] OR;  Service: Pain Management;  Laterality: N/A;    EPIDURAL STEROID INJECTION INTO LUMBAR SPINE N/A 2/21/2022    Procedure: Injection-steroid-epidural-lumbar L5/S1;  Surgeon: Fredi Rojas MD;  Location: [facility] OR;  Service: Pain Management;  Laterality: N/A;    EPIDURAL STEROID INJECTION INTO LUMBAR SPINE N/A 2/9/2024    Procedure: Injection-steroid-epidural-lumbar       L5/S1;  Surgeon: Fredi Rojas MD;  Location: [facility] OR;  Service: Pain Management;  Laterality: N/A;    ESOPHAGEAL DILATION N/A 6/11/2021    Procedure: DILATION, ESOPHAGUS;  Surgeon: Jake Sheriff MD;  Location: [facility];  Service: Endoscopy;  Laterality: N/A;     ESOPHAGOGASTRODUODENOSCOPY      dilated esophagus    ESOPHAGOGASTRODUODENOSCOPY N/A 6/11/2021    Procedure: EGD (ESOPHAGOGASTRODUODENOSCOPY);  Surgeon: Jake Sheriff MD;  Location: [facility];  Service: Endoscopy;  Laterality: N/A;    GASTRIC BYPASS      2011    HYSTERECTOMY  1980    interstim bladder  2009    10/14/14 new battery    OOPHORECTOMY  1980    REPLACEMENT OF SACRAL NERVE STIMULATOR  2/18/2020    Procedure: REPLACEMENT, NEUROSTIMULATOR, SACRAL;  Surgeon: Mary Jane Jarvis MD;  Location: [facility] OR [address] [place] [address];  Service: Urology;;    REVISION OF PROCEDURE INVOLVING SACRAL NEUROSTIMULATOR DEVICE Right 2/18/2020    Procedure: REVISION, NEUROSTIMULATOR, SACRAL/ battery replacement;  Surgeon: Mary Jane Jarvis MD;  Location: [facility] OR [address] [place] [address];  Service: Urology;  Laterality: Right;  1hr/ rep contacted/ (CONNIE)    SKIN CANCER EXCISION      left hand    TONSILLECTOMY      TRANSFORAMINAL EPIDURAL INJECTION OF STEROID Bilateral 3/5/2024    Procedure: Injection,steroid,epidural,transforaminal approach      L4/5;  Surgeon: Fredi Rojas MD;  Location: [facility];  Service: Pain Management;  Laterality: Bilateral;    WISDOM TOOTH EXTRACTION         Family History   Problem Relation Age of Onset    Breast cancer Mother 50    Breast cancer Paternal Aunt         40's    Cervical cancer Paternal Grandmother     Ovarian cancer Paternal Grandmother     Cervical cancer Paternal Cousin     Ovarian cancer Paternal Cousin     Diabetes Other     Hypertension Other     Hypothyroidism Other     Allergic rhinitis Neg Hx     Allergies Neg Hx     Angioedema Neg Hx     Asthma Neg Hx     Atopy Neg Hx     Eczema Neg Hx     Immunodeficiency Neg Hx     Rhinitis Neg Hx     Urticaria Neg Hx     Uterine cancer Neg Hx        Social History     Socioeconomic History    Marital status:    Tobacco Use    Smoking status: Never    Smokeless tobacco: Never   Substance and Sexual Activity    Alcohol use: Not Currently    Drug use: No    Sexual  activity: Not Currently     Social Determinants of Health     Financial Resource Strain: Medium Risk (3/7/2024)    Overall Financial Resource Strain (CARDIA)     Difficulty of Paying Living Expenses: Somewhat hard   Food Insecurity: No Food Insecurity (3/7/2024)    Hunger Vital Sign     Worried About Running Out of Food in the Last Year: Never true     Ran Out of Food in the Last Year: Never true   Transportation Needs: No Transportation Needs (3/7/2024)    PRAPARE - Transportation     Lack of Transportation (Medical): No     Lack of Transportation (Non-Medical): No   Physical Activity: Inactive (3/7/2024)    Exercise Vital Sign     Days of Exercise per Week: 0 days     Minutes of Exercise per Session: 0 min   Stress: Stress Concern Present (3/7/2024)    [employer] [place] [employer] - Occupational Stress Questionnaire     Feeling of Stress : Rather much   Social Connections: Moderately Isolated (3/7/2024)    Social Connection and Isolation Panel [NHANES]     Frequency of Communication with Friends and Family: More than three times a week     Frequency of Social Gatherings with Friends and Family: Twice a week     Attends Jainism Services: Never     Active Member of Clubs or Organizations: No     Attends Club or Organization Meetings: Never     Marital Status: Living with partner   Housing Stability: Low Risk  (3/7/2024)    Housing Stability Vital Sign     Unable to Pay for Housing in the Last Year: No     Number of Places Lived in the Last Year: 1     Unstable Housing in the Last Year: No       Review of patient's allergies indicates:   Allergen Reactions    Sulfa (sulfonamide antibiotics) Hives    Naproxen Other (See Comments)     Other reaction(s): RT sided numbness      Albuterol Itching and Palpitations     Nebulizer only. Can use inhaler       Review of Systems:  General ROS: negative for - fever  Psychological ROS: negative for - hostility  Hematological and Lymphatic ROS: positive for -  "bruising  negative for - bleeding problems  Endocrine ROS: negative for - unexpected weight changes  Respiratory ROS: positive for - cough and shortness of breath  Cardiovascular ROS: no chest pain or dyspnea on exertion  Gastrointestinal ROS: no abdominal pain, change in bowel habits, or black or bloody stools  Musculoskeletal ROS: negative for - muscular weakness  Neurological ROS: negative for - numbness/tingling  negative for -  New bowel and bladder control changes  Dermatological ROS: negative for rash    Physical Exam:  Vitals:    03/19/24 1015   BP: (!) 160/72   Pulse: 68   Weight: 95.3 kg (210 lb 1.6 oz)   Height: 5' 4" (1.626 m)   PainSc:   4   PainLoc: Back     Body mass index is 36.06 kg/m².     Gen: NAD  Gait:  Antalgic gait, ambulating with Rollator.  Psych:  Mood appropriate for given condition  HEENT: eyes anicteric   GI: Abd soft  CV: RRR  Lungs: breathing unlabored   ROM: limited AROM of the L spine in all planes, full ROM at ankles, knees and hips  Lumbar flexion 90 degrees, extension 50 degrees, side bending 30 degrees.    Sensation: intact to light touch in all dermatomes tested from L2-S1 bilaterally  Reflexes: 0+ b/l patella and 0/0 b/l achilles  Palpation:  Moderately tender over lumbar paraspinals and lower midline spine  Tone: normal in the b/l knees and hips   Skin: intact  Extremities: No edema in b/l ankles or hands       Right Left   L2/3 Iliacus Hip flexion  5  5   L3/4 Qudratus Femoris Knee Extension  5  5   L4/5 Tib Anterior Ankle Dorsiflexion   5  5   L5/S1 Extensor Hallicus Longus Great toe extension  5  5                 S1/S2 Gastroc/Soleus Plantar Flexion  5  5     Imaging:  Xray lumbar spine 6/19/19  FINDINGS:  There is mild exaggeration of the normal lumbar lordosis.  There is 4 mm anterolisthesis of L4 on L5.  The vertebral bodies maintain normal height.  There is no fracture.  There is multilevel endplate osteophyte formation and multilevel facet joint arthropathy.  There is " a sacral stimulator in place.    CT lumbar spine 8/6/19  FINDINGS:  Grade 1 anterolisthesis of L4 on L5.  Minimal retrolisthesis of L2 on L3.The vertebral body heights are well-maintained.No fractures are identified. Sacral stimulator partially visualized, which appears to enter the right S3-4 neural foramen.    Mild multilevel degenerative disc disease with anterior osteophytosis, vacuum disc phenomenon at T10-11 and L1-2 and disc space narrowing, most pronounced and moderate at L1-2.    T12-L1: Small right central posterior disc osteophyte complex.  Mild bilateral facet arthrosis.  No significant spinal canal or neural foraminal stenosis.  L1-2: Circumferential disc bulge.  Mild bilateral facet arthrosis.  No significant spinal canal or neural foraminal stenosis.  L2-3: Minimal retrolisthesis.  Circumferential disc bulge.  Moderate bilateral facet arthrosis.  Mild bilateral neural foraminal stenosis.  Mild spinal canal stenosis.  L3-4: Circumferential disc bulge.  Ligamentum flavum infolding.  Moderate bilateral facet arthrosis.  Mild bilateral neural foraminal stenosis.  Mild spinal canal stenosis  L4-5: Grade 1 anterolisthesis.  Circumferential disc bulge, eccentric to the right.  Ligamentum flavum infolding.  Severe bilateral facet arthrosis.  Mild left and moderate right neural foraminal stenosis.  Severe spinal canal stenosis.  L5-S1: Mild diffuse disc bulge.  Moderate left and mild right facet arthrosis.  Moderate left and mild right neural foraminal stenosis.  No significant spinal canal stenosis.    CT lumbar spine 01/22/2024  FINDINGS:  Bones: There is diffuse bony demineralization.  There is a comminuted burst type fracture deformity of L5 with mild osseous retropulsion on the order of 3 mm.  Height loss centrally of the vertebral body is on the order of 40%.  This appears new or progressed from prior radiographs 01/08/2024 noting minimal height loss may have been present in retrospect suggestive of  likely subacute fracture with mild progression of height loss.  Chronic appearing Schmorl's node along the superior endplate of L2 but new from prior CT 08/02/2019.  No additional fractures identified.     Alignment: Grade 1 anterolisthesis of L4 on L5 and trace retrolisthesis of L2 on L3.  Mild broad levocurvature.     Disc levels:     T12-L1: Shallow disc bulging and mild bilateral facet arthrosis mildly narrowing the spinal canal.  The foramina appear patent.  L1-L2: Shallow disc bulging and mild-to-moderate facet arthrosis producing mild to moderate narrowing of the osseous spinal canal.  No more than mild bilateral foraminal narrowing.  L2-L3: Shallow disc bulging and moderate bilateral facet arthrosis producing likely moderate narrowing of the osseous spinal canal with no more than mild bilateral foraminal narrowing.  L3-L4: Shallow disc bulging and moderate bilateral facet arthrosis with ligamentum flavum thickening producing likely mild to moderate narrowing of the osseous spinal canal and bilateral foramina.  L4-L5: Disc bulging without osseous retropulsion and severe bilateral facet arthrosis with ligamentum flavum thickening severely narrowing the spinal canal progressed from the prior CT 08/02/2019.  At least moderate left and moderate to severe right foraminal narrowing.  L5-S1: Disc bulging and severe bilateral facet arthrosis produces mild to moderate narrowing of the osseous spinal canal.  Moderate left and mild right foraminal narrowing.     Soft Tissues: Partially imaged right-sided sacral neural stimulator entering the S3 foramen.  Atherosclerotic changes.  Cholecystectomy clips are noted.  Partially imaged postoperative changes of the stomach.     Impression:     1. Burst type fracture deformity with mild height loss involving the superior endplate of L5 with mild osseous retropulsion which appears acute or subacute in age with clinical correlation needed.  No additional fractures identified.   Evaluation is somewhat limited on the basis of diffuse bony demineralization.  2. Progressive severe multifactorial spinal canal and advanced bilateral foraminal narrowing at L4-L5.  3. Additional degenerative changes as discussed above.  This report was flagged in Epic as abnormal.    Labs:  BMP  Lab Results   Component Value Date     03/11/2024    K 4.7 03/11/2024     03/11/2024    CO2 26 03/11/2024    BUN 15 03/11/2024    CREATININE 0.7 03/11/2024    CALCIUM 9.7 03/11/2024    ANIONGAP 9 03/11/2024    ESTGFRAFRICA >60 04/27/2022    EGFRNONAA >60 04/27/2022     Lab Results   Component Value Date    ALT 23 03/11/2024    AST 23 03/11/2024    ALKPHOS 116 03/11/2024    BILITOT 0.3 03/11/2024       Assessment:  Problem List Items Addressed This Visit          Neuro    Lumbar radiculopathy - Primary     Other Visit Diagnoses       Compression fracture of L5 vertebra with routine healing, subsequent encounter        Spinal stenosis of lumbar region, unspecified whether neurogenic claudication present                        Treatment Plan:  73 y.o. year old female with PMH DM II, asthma, HTN, common variable immunodeficiency, GERD, h/o gastric bypass, SOHA presents to the office with lower back pain.      3/19/2024: Cornelia Delatorre returns to the office for follow up.  She is s/p bilateral L4/5 TFESI on 03/05/2024 with 60% relief.  She reports good relief of her previous severe lower back pain with radiation into legs.  She continues to have some remaining pain across her lower back back of left knee and back of right leg but overall manageable.  She denies any new numbness, weakness or any new changes to her bowel or bladder function.  She is able to stand, walk, cook for longer periods of time now.      - on exam she has full strength in her lower extremities.    - She is s/p bilateral L4/5 TFESI on 03/05/2024 with 60% relief.  -  lumbar CT is consistent with a burst type fracture of the L5 vertebral  body.  She also continues to have severe central canal stenosis worse at L4/5 and multilevel bilateral foraminal narrowing.  - she responded well to the 2nd epidural, remaining pain is tolerable at this time.  - recommend she discontinue wearing LSO brace as I believe her compression fracture is likely well healed at this time.  - follow up as needed.  If she fails to get lasting relief with this we will refer her to Neurosurgery for further evaluation.      :  Not applicable    The above note was completed, in part, with the aid of Dragon dictation software/hardware. Translation errors may be present.

## 2024-03-20 ENCOUNTER — HOSPITAL ENCOUNTER (OUTPATIENT)
Dept: RADIOLOGY | Facility: HOSPITAL | Age: 74
Discharge: HOME | End: 2024-03-20
Attending: NURSE PRACTITIONER
Payer: MEDICARE

## 2024-03-20 DIAGNOSIS — Z78.0 POSTMENOPAUSAL: ICD-10-CM

## 2024-03-20 PROCEDURE — 77080 DXA BONE DENSITY AXIAL: CPT | Mod: TC,HCNC,PO

## 2024-03-20 PROCEDURE — 77080 DXA BONE DENSITY AXIAL: CPT | Mod: 26,HCNC,, | Performed by: RADIOLOGY

## 2024-03-25 ENCOUNTER — OFFICE VISIT (OUTPATIENT)
Dept: FAMILY MEDICINE | Facility: CLINIC | Age: 74
End: 2024-03-25
Payer: MEDICARE

## 2024-03-25 ENCOUNTER — TELEPHONE (OUTPATIENT)
Dept: UROLOGY | Facility: CLINIC | Age: 74
End: 2024-03-25
Payer: MEDICARE

## 2024-03-25 VITALS
SYSTOLIC BLOOD PRESSURE: 130 MMHG | TEMPERATURE: 98 F | BODY MASS INDEX: 35.31 KG/M2 | DIASTOLIC BLOOD PRESSURE: 74 MMHG | HEIGHT: 64 IN | OXYGEN SATURATION: 95 % | HEART RATE: 85 BPM | WEIGHT: 206.81 LBS

## 2024-03-25 DIAGNOSIS — D83.9 CVID (COMMON VARIABLE IMMUNODEFICIENCY): Primary | ICD-10-CM

## 2024-03-25 DIAGNOSIS — D83.9 CVID (COMMON VARIABLE IMMUNODEFICIENCY): ICD-10-CM

## 2024-03-25 DIAGNOSIS — N32.81 OVERACTIVE BLADDER: ICD-10-CM

## 2024-03-25 DIAGNOSIS — E78.2 MIXED HYPERLIPIDEMIA: ICD-10-CM

## 2024-03-25 DIAGNOSIS — I10 ESSENTIAL HYPERTENSION: ICD-10-CM

## 2024-03-25 DIAGNOSIS — E11.69 TYPE 2 DIABETES MELLITUS WITH OTHER SPECIFIED COMPLICATION, WITHOUT LONG-TERM CURRENT USE OF INSULIN: Primary | ICD-10-CM

## 2024-03-25 DIAGNOSIS — J45.909 ASTHMA, UNSPECIFIED ASTHMA SEVERITY, UNSPECIFIED WHETHER COMPLICATED, UNSPECIFIED WHETHER PERSISTENT: ICD-10-CM

## 2024-03-25 PROCEDURE — 3078F DIAST BP <80 MM HG: CPT | Mod: HCNC,CPTII,S$GLB, | Performed by: NURSE PRACTITIONER

## 2024-03-25 PROCEDURE — 3066F NEPHROPATHY DOC TX: CPT | Mod: HCNC,CPTII,S$GLB, | Performed by: NURSE PRACTITIONER

## 2024-03-25 PROCEDURE — 3061F NEG MICROALBUMINURIA REV: CPT | Mod: HCNC,CPTII,S$GLB, | Performed by: NURSE PRACTITIONER

## 2024-03-25 PROCEDURE — 3288F FALL RISK ASSESSMENT DOCD: CPT | Mod: HCNC,CPTII,S$GLB, | Performed by: NURSE PRACTITIONER

## 2024-03-25 PROCEDURE — 1125F AMNT PAIN NOTED PAIN PRSNT: CPT | Mod: HCNC,CPTII,S$GLB, | Performed by: NURSE PRACTITIONER

## 2024-03-25 PROCEDURE — 99999 PR PBB SHADOW E&M-EST. PATIENT-LVL V: CPT | Mod: PBBFAC,HCNC,, | Performed by: NURSE PRACTITIONER

## 2024-03-25 PROCEDURE — 3075F SYST BP GE 130 - 139MM HG: CPT | Mod: HCNC,CPTII,S$GLB, | Performed by: NURSE PRACTITIONER

## 2024-03-25 PROCEDURE — 3044F HG A1C LEVEL LT 7.0%: CPT | Mod: HCNC,CPTII,S$GLB, | Performed by: NURSE PRACTITIONER

## 2024-03-25 PROCEDURE — 1159F MED LIST DOCD IN RCRD: CPT | Mod: HCNC,CPTII,S$GLB, | Performed by: NURSE PRACTITIONER

## 2024-03-25 PROCEDURE — 1101F PT FALLS ASSESS-DOCD LE1/YR: CPT | Mod: HCNC,CPTII,S$GLB, | Performed by: NURSE PRACTITIONER

## 2024-03-25 PROCEDURE — 99214 OFFICE O/P EST MOD 30 MIN: CPT | Mod: HCNC,S$GLB,, | Performed by: NURSE PRACTITIONER

## 2024-03-25 PROCEDURE — 3008F BODY MASS INDEX DOCD: CPT | Mod: HCNC,CPTII,S$GLB, | Performed by: NURSE PRACTITIONER

## 2024-03-25 PROCEDURE — 1157F ADVNC CARE PLAN IN RCRD: CPT | Mod: HCNC,CPTII,S$GLB, | Performed by: NURSE PRACTITIONER

## 2024-03-25 RX ORDER — DILTIAZEM HYDROCHLORIDE 120 MG/1
120 CAPSULE, COATED, EXTENDED RELEASE ORAL DAILY
Qty: 90 CAPSULE | Refills: 3 | Status: SHIPPED | OUTPATIENT
Start: 2024-03-25 | End: 2025-03-25

## 2024-03-25 NOTE — PROGRESS NOTES
Subjective:       Patient ID: Cornelia Delatorre is a 73 y.o. female.    Chief Complaint: Health Maintenance    HPI  Here for routine health maintenance  Completed labs prior to visit--reviewed, acceptable      Has metal implant for bladder     CVID -On IV Ig;      Bronchiectasis/ asthma - stable. pulmonary nodules.   Dr Nicole in [place].  Feels SOB mostly related to bronchiectasis.  Her symptoms have improved with asthma tx - Trellegy and Tezspire     HTN - controlled   DM - controlled;  Sees podiatry for peripheral neuropathy in past  Diabetic peripheral neuropathy - controlled with 600 mg gabapentin tid.  Outside foot doctor.   HLD - controlled for goal 70; high triglycerides.      Occasional anxiety relieved with prn hydroxyzine.      Normocytic anemia - mild, stable. no blood/melena.  Iron stable.  [facility] 2022 wnl per pt.  EGD 2021 for dysphagia did not show contributing etiology per patient.  Dr Krishnan      Dx w [facility] likely related to some meds per testing neurology; stable      Recurrent UTI:     Osteopenia: taking D3 and calcium     Vitals:    03/25/24 1334   BP: 130/74   Pulse: 85   Temp: 97.7 °F (36.5 °C)     Review of Systems   Constitutional:  Negative for fever.   Respiratory:  Negative for cough and shortness of breath.    Cardiovascular:  Negative for chest pain.   Neurological:  Negative for dizziness and light-headedness.       Past Medical History:   Diagnosis Date    Allergy     Arthritis     Asthma     Cancer     skin cancer to right hand    Cataract     Chronic fatigue 01/24/2017    CVID (common variable immunodeficiency) 03/07/2019    Diabetes mellitus     type2    KLINE (dyspnea on exertion) 01/24/2017    Dyslipidemia 06/25/2019    Encounter for blood transfusion     Essential hypertension 12/29/2011    GERD (gastroesophageal reflux disease)     Headache(784.0)     History of lumpectomy of both breasts     1992 negative    Hyperlipidemia 07/15/2015    Hypertension     Hypothyroidism     Neuropathy      Obesity     Obesity     Postmenopausal HRT (hormone replacement therapy)     Rash     Rosacea     Sleep apnea     on bipap    Snoring     Squamous cell carcinoma of skin     left forearm     UTI (urinary tract infection)     Wears glasses      Objective:      Physical Exam  Vitals and nursing note reviewed.   Constitutional:       General: She is not in acute distress.     Appearance: She is not diaphoretic.   HENT:      Head: Normocephalic.   Eyes:      General:         Right eye: No discharge.         Left eye: No discharge.   Neck:      Trachea: No tracheal deviation.   Cardiovascular:      Rate and Rhythm: Normal rate and regular rhythm.      Heart sounds: Normal heart sounds.   Pulmonary:      Effort: Pulmonary effort is normal.   Skin:     Coloration: Skin is not pale.   Neurological:      Mental Status: She is alert and oriented to person, place, and time.   Psychiatric:         Speech: Speech normal.         Behavior: Behavior normal.         Thought Content: Thought content normal.         Judgment: Judgment normal.         Assessment:       1. Type 2 diabetes mellitus with other specified complication, without long-term current use of insulin    2. Essential hypertension    3. Mixed hyperlipidemia    4. CVID (common variable immunodeficiency)    5. Overactive bladder    6. Asthma, unspecified asthma severity, unspecified whether complicated, unspecified whether persistent        Plan:       Type 2 diabetes mellitus with other specified complication, without long-term current use of insulin  -     Comprehensive Metabolic Panel; Future; Expected date: 03/25/2024  -     Hemoglobin A1C; Future; Expected date: 03/25/2024    Essential hypertension  -     diltiaZEM (CARDIZEM CD) 120 MG Cp24; Take 1 capsule (120 mg total) by mouth once daily.  Dispense: 90 capsule; Refill: 3  -     CBC Auto Differential; Future; Expected date: 03/25/2024  -     TSH; Future; Expected date: 03/25/2024    Mixed  hyperlipidemia    CVID (common variable immunodeficiency)    Overactive bladder    Asthma, unspecified asthma severity, unspecified whether complicated, unspecified whether persistent          HTN controlled in clinic long term. Continue current medications     Follow up in about 6 months (around 9/25/2024).    Medication List with Changes/Refills   Current Medications    ASCORBIC ACID, VITAMIN C, (VITAMIN C) 1000 MG TABLET    Take 1,000 mg by mouth 2 (two) times daily.     AZELASTINE (OPTIVAR) 0.05 % OPHTHALMIC SOLUTION    Place 1 drop into both eyes. As needed    B-COMPLEX WITH VITAMIN C (Z-BEC OR EQUIV) TABLET    Take 1 tablet by mouth once daily.    BIFIDOBACTERIUM INFANTIS (ALIGN ORAL)    Take by mouth once daily.    BIOTIN 10,000 MCG CAP    Take 1 tablet by mouth every evening.     BLOOD SUGAR DIAGNOSTIC STRP    Insurance preferred meter and strips. Check daily    BLOOD-GLUCOSE METER KIT    Use as instructed. Insurance preferred meter.    CRANBERRY 500 MG CAP    Take 1 capsule by mouth every evening.    DOXAZOSIN (CARDURA) 2 MG TABLET    Take 1 tablet (2 mg total) by mouth every evening.    EPINEPHRINE (EPIPEN) 0.3 MG/0.3 ML ATIN    Inject 1 each into the muscle as needed.     ERYTHROMYCIN WITH ETHANOL (THERAMYCIN) 2 % EXTERNAL SOLUTION    Aaa bid prn    ESOMEPRAZOLE (NEXIUM) 40 MG CAPSULE    Take 1 capsule (40 mg total) by mouth before breakfast.    ESTRADIOL (ESTRACE) 0.01 % (0.1 MG/GRAM) VAGINAL CREAM    Place 1 g vaginally 3 (three) times a week. Place by fingertip application before bedtime three times a week (Monday, Wednesday, Friday)    FEXOFENADINE (ALLEGRA) 180 MG TABLET    Take 180 mg by mouth once daily.     FISH OIL-OMEGA-3 FATTY ACIDS 300-1,000 MG CAPSULE    Take 2 g by mouth once daily.    FLUCONAZOLE (DIFLUCAN) 200 MG TAB    1 po q week    FLUTICASONE PROPIONATE (FLONASE) 50 MCG/ACTUATION NASAL SPRAY    2 sprays (100 mcg total) by Each Nostril route once daily.    FLUTICASONE-UMECLIDIN-VILANTER  (TRELEGY ELLIPTA) 200-62.5-25 MCG INHALER    Inhale 1 puff into the lungs once daily.    GABAPENTIN (NEURONTIN) 600 MG TABLET    Take 1 tablet (600 mg total) by mouth 3 (three) times daily.    GUAIFENESIN-CODEINE 100-10 MG/5 ML (GUAIFENESIN AC)  MG/5 ML SYRUP    Take 5 mLs by mouth 3 (three) times daily as needed for Cough.    HYDROXYZINE HCL (ATARAX) 10 MG TAB    Take 1 tablet (10 mg total) by mouth 3 (three) times daily as needed.    IMMUN GLOB G,IGG,-PRO-IGA 0-50 (HIZENTRA) 1 GRAM/5 ML (20 %) SOLN    Inject 55 mLs (11 g total) into the skin once a week.    INHALATION SPACING DEVICE    Use as directed for inhalation.    LANCETS (ACCU-CHEK SOFTCLIX LANCETS) MISC    Use to check blood sugar daily.    LEVALBUTEROL (XOPENEX HFA) 45 MCG/ACTUATION INHALER    Inhale 1-2 puffs into the lungs every 4 (four) hours as needed for Wheezing or Shortness of Breath. Rescue    LEVALBUTEROL (XOPENEX) 1.25 MG/3 ML NEBULIZER SOLUTION    Take 3 mLs (1.25 mg total) by nebulization every 4 (four) hours as needed for Wheezing or Shortness of Breath. Rescue    METFORMIN (GLUCOPHAGE-XR) 500 MG ER 24HR TABLET    Take 1 tablet (500 mg total) by mouth 2 (two) times daily with meals.    MOMETASONE 0.1% (ELOCON) 0.1 % CREAM    aaa bid x 1-2 weeks prn bug bites    MONTELUKAST (SINGULAIR) 10 MG TABLET    Take 1 tablet (10 mg total) by mouth every evening.    MUCUS CLEARING DEVICE KAREN    1 Device by Misc.(Non-Drug; Combo Route) route 2 (two) times daily.    MULTIVITAMIN CAPSULE    Take 1 capsule by mouth once daily.     MUPIROCIN (BACTROBAN) 2 % OINTMENT    Apply topically 3 (three) times daily.    MUPIROCIN (BACTROBAN) 2 % OINTMENT    Apply topically 3 (three) times daily.    POTASSIUM CHLORIDE SA (K-DUR,KLOR-CON) 20 MEQ TABLET    Take 1 tablet (20 mEq total) by mouth once daily.    PRAVASTATIN (PRAVACHOL) 80 MG TABLET    Take 1 tablet (80 mg total) by mouth once daily.    PSYLLIUM HUSK (METAMUCIL ORAL)    Take by mouth.    SIMETHICONE  (GAS-X ORAL)    Take 1 capsule by mouth as needed (gas relief).     TEZEPELUMAB-EKKO (TEZSPIRE) 210 MG/1.91 ML (110 MG/ML) SYRG    Inject 1.91 mLs (210 mg total) into the skin every 28 days.    TIZANIDINE (ZANAFLEX) 4 MG TABLET    Take 8 mg by mouth 2 (two) times daily.    TRAMADOL (ULTRAM) 50 MG TABLET    Take 1 tablet (50 mg total) by mouth every 8 (eight) hours as needed for Pain.    VALSARTAN-HYDROCHLOROTHIAZIDE (DIOVAN-HCT) 320-25 MG PER TABLET    Take 1 tablet by mouth once daily.    VITAMIN D3-FOLIC ACID 5,000 UNIT- 1 MG TAB    Take 1 tablet by mouth once daily.    Changed and/or Refilled Medications    Modified Medication Previous Medication    DILTIAZEM (CARDIZEM CD) 120 MG CP24 diltiaZEM (CARDIZEM CD) 120 MG Cp24       Take 1 capsule (120 mg total) by mouth once daily.    Take 1 capsule (120 mg total) by mouth every evening.   Discontinued Medications    DILTIAZEM (CARDIZEM CD) 180 MG 24 HR CAPSULE    Take 1 capsule (180 mg total) by mouth once daily.

## 2024-03-25 NOTE — TELEPHONE ENCOUNTER
----- Message from Gabe Fagan MD sent at 3/25/2024  3:31 PM CDT -----  I can - but it's something that Dr. Cervantes does way more of. She would be better off seeing him.   ----- Message -----  From: Desi Doll MA  Sent: 3/25/2024   2:28 PM CDT  To: Gabe Fagan MD    Remind me if this is something you do?   ----- Message -----  From: Patricia Valerio NP  Sent: 3/25/2024   1:51 PM CDT  To: Rylan Bernal Staff    Good afternoon! Mrs. Delatorre is scheduled to see Dr. Fagan 4/22/24 for interstim FU and potential battery check. She was previously seeing Dr. Jarvis but wanted to see provider here--if this is not something he does please contact patient.

## 2024-04-04 ENCOUNTER — PATIENT MESSAGE (OUTPATIENT)
Dept: ORTHOPEDICS | Facility: CLINIC | Age: 74
End: 2024-04-04
Payer: MEDICARE

## 2024-04-05 ENCOUNTER — LAB VISIT (OUTPATIENT)
Dept: LAB | Facility: HOSPITAL | Age: 74
End: 2024-04-05
Attending: INTERNAL MEDICINE
Payer: MEDICARE

## 2024-04-05 DIAGNOSIS — D83.9 CVID (COMMON VARIABLE IMMUNODEFICIENCY): ICD-10-CM

## 2024-04-05 LAB
IGA SERPL-MCNC: 135 MG/DL (ref 40–350)
IGG SERPL-MCNC: 780 MG/DL (ref 650–1600)
IGM SERPL-MCNC: 25 MG/DL (ref 50–300)

## 2024-04-05 PROCEDURE — 82784 ASSAY IGA/IGD/IGG/IGM EACH: CPT | Mod: HCNC | Performed by: ALLERGY & IMMUNOLOGY

## 2024-04-05 PROCEDURE — 82787 IGG 1 2 3 OR 4 EACH: CPT | Mod: HCNC | Performed by: ALLERGY & IMMUNOLOGY

## 2024-04-05 PROCEDURE — 82784 ASSAY IGA/IGD/IGG/IGM EACH: CPT | Mod: 59,HCNC | Performed by: ALLERGY & IMMUNOLOGY

## 2024-04-05 PROCEDURE — 36415 COLL VENOUS BLD VENIPUNCTURE: CPT | Mod: HCNC,PO | Performed by: ALLERGY & IMMUNOLOGY

## 2024-04-08 LAB
IGG1 SER-MCNC: 394 MG/DL (ref 382–929)
IGG2 SER-MCNC: 313 MG/DL (ref 242–700)
IGG3 SER-MCNC: 20 MG/DL (ref 22–176)
IGG4 SER-MCNC: 9 MG/DL (ref 4–86)

## 2024-04-12 ENCOUNTER — CLINICAL SUPPORT (OUTPATIENT)
Dept: PULMONOLOGY | Facility: CLINIC | Age: 74
End: 2024-04-12
Payer: MEDICARE

## 2024-04-12 DIAGNOSIS — J45.50 SEVERE PERSISTENT ASTHMA WITHOUT COMPLICATION: Primary | ICD-10-CM

## 2024-04-12 DIAGNOSIS — M17.11 PRIMARY OSTEOARTHRITIS OF RIGHT KNEE: Primary | ICD-10-CM

## 2024-04-12 NOTE — PROGRESS NOTES
Patient is here for her Tezspire injection.  2 patient identifiers used (Name and ).  Patient received the injection to the left arm  subcutaneous.  No redness, bleeding, or swelling noted to the injection site.  Pt tolerated well.    NDC:90254-991-25  LOT: [id number]  EXP: 2025

## 2024-04-22 ENCOUNTER — PATIENT MESSAGE (OUTPATIENT)
Dept: FAMILY MEDICINE | Facility: CLINIC | Age: 74
End: 2024-04-22

## 2024-04-22 ENCOUNTER — OFFICE VISIT (OUTPATIENT)
Dept: FAMILY MEDICINE | Facility: CLINIC | Age: 74
End: 2024-04-22
Payer: MEDICARE

## 2024-04-22 ENCOUNTER — OFFICE VISIT (OUTPATIENT)
Dept: ALLERGY | Facility: CLINIC | Age: 74
End: 2024-04-22
Payer: MEDICARE

## 2024-04-22 VITALS — HEIGHT: 64 IN | BODY MASS INDEX: 35.71 KG/M2 | WEIGHT: 209.19 LBS

## 2024-04-22 VITALS
SYSTOLIC BLOOD PRESSURE: 132 MMHG | HEART RATE: 95 BPM | WEIGHT: 209.19 LBS | DIASTOLIC BLOOD PRESSURE: 62 MMHG | OXYGEN SATURATION: 99 % | BODY MASS INDEX: 35.71 KG/M2 | HEIGHT: 64 IN

## 2024-04-22 DIAGNOSIS — J31.0 CHRONIC RHINITIS: ICD-10-CM

## 2024-04-22 DIAGNOSIS — N30.00 ACUTE CYSTITIS WITHOUT HEMATURIA: Primary | ICD-10-CM

## 2024-04-22 DIAGNOSIS — R30.0 DYSURIA: ICD-10-CM

## 2024-04-22 DIAGNOSIS — J47.9 BRONCHIECTASIS WITHOUT COMPLICATION: ICD-10-CM

## 2024-04-22 DIAGNOSIS — I10 ESSENTIAL HYPERTENSION: ICD-10-CM

## 2024-04-22 DIAGNOSIS — E11.69 TYPE 2 DIABETES MELLITUS WITH OTHER SPECIFIED COMPLICATION, WITHOUT LONG-TERM CURRENT USE OF INSULIN: ICD-10-CM

## 2024-04-22 DIAGNOSIS — R25.2 CRAMPS OF LEFT LOWER EXTREMITY: ICD-10-CM

## 2024-04-22 DIAGNOSIS — J45.20 MILD INTERMITTENT ASTHMA WITHOUT COMPLICATION: ICD-10-CM

## 2024-04-22 DIAGNOSIS — D83.9 CVID (COMMON VARIABLE IMMUNODEFICIENCY): Primary | ICD-10-CM

## 2024-04-22 LAB
BACTERIA #/AREA URNS HPF: ABNORMAL /HPF
BILIRUB UR QL STRIP: NEGATIVE
CLARITY UR: CLEAR
COLOR UR: YELLOW
GLUCOSE UR QL STRIP: NEGATIVE
HGB UR QL STRIP: NEGATIVE
KETONES UR QL STRIP: NEGATIVE
LEUKOCYTE ESTERASE UR QL STRIP: ABNORMAL
MICROSCOPIC COMMENT: ABNORMAL
NITRITE UR QL STRIP: NEGATIVE
PH UR STRIP: 6 [PH] (ref 5–8)
PROT UR QL STRIP: NEGATIVE
SP GR UR STRIP: 1.01 (ref 1–1.03)
SQUAMOUS #/AREA URNS HPF: 2 /HPF
URN SPEC COLLECT METH UR: ABNORMAL
WBC #/AREA URNS HPF: >100 /HPF (ref 0–5)
WBC CLUMPS URNS QL MICRO: ABNORMAL

## 2024-04-22 PROCEDURE — 3044F HG A1C LEVEL LT 7.0%: CPT | Mod: HCNC,CPTII,S$GLB, | Performed by: ALLERGY & IMMUNOLOGY

## 2024-04-22 PROCEDURE — 1160F RVW MEDS BY RX/DR IN RCRD: CPT | Mod: HCNC,CPTII,S$GLB, | Performed by: PHYSICIAN ASSISTANT

## 2024-04-22 PROCEDURE — 87186 SC STD MICRODIL/AGAR DIL: CPT | Mod: HCNC | Performed by: PHYSICIAN ASSISTANT

## 2024-04-22 PROCEDURE — 1159F MED LIST DOCD IN RCRD: CPT | Mod: HCNC,CPTII,S$GLB, | Performed by: ALLERGY & IMMUNOLOGY

## 2024-04-22 PROCEDURE — 87086 URINE CULTURE/COLONY COUNT: CPT | Mod: HCNC | Performed by: PHYSICIAN ASSISTANT

## 2024-04-22 PROCEDURE — 3044F HG A1C LEVEL LT 7.0%: CPT | Mod: HCNC,CPTII,S$GLB, | Performed by: PHYSICIAN ASSISTANT

## 2024-04-22 PROCEDURE — 3061F NEG MICROALBUMINURIA REV: CPT | Mod: HCNC,CPTII,S$GLB, | Performed by: PHYSICIAN ASSISTANT

## 2024-04-22 PROCEDURE — 3008F BODY MASS INDEX DOCD: CPT | Mod: HCNC,CPTII,S$GLB, | Performed by: ALLERGY & IMMUNOLOGY

## 2024-04-22 PROCEDURE — 1125F AMNT PAIN NOTED PAIN PRSNT: CPT | Mod: HCNC,CPTII,S$GLB, | Performed by: PHYSICIAN ASSISTANT

## 2024-04-22 PROCEDURE — 99999 PR PBB SHADOW E&M-EST. PATIENT-LVL V: CPT | Mod: PBBFAC,HCNC,, | Performed by: PHYSICIAN ASSISTANT

## 2024-04-22 PROCEDURE — 3066F NEPHROPATHY DOC TX: CPT | Mod: HCNC,CPTII,S$GLB, | Performed by: ALLERGY & IMMUNOLOGY

## 2024-04-22 PROCEDURE — 3288F FALL RISK ASSESSMENT DOCD: CPT | Mod: HCNC,CPTII,S$GLB, | Performed by: PHYSICIAN ASSISTANT

## 2024-04-22 PROCEDURE — 1157F ADVNC CARE PLAN IN RCRD: CPT | Mod: HCNC,CPTII,S$GLB, | Performed by: PHYSICIAN ASSISTANT

## 2024-04-22 PROCEDURE — 87077 CULTURE AEROBIC IDENTIFY: CPT | Mod: HCNC | Performed by: PHYSICIAN ASSISTANT

## 2024-04-22 PROCEDURE — 3075F SYST BP GE 130 - 139MM HG: CPT | Mod: HCNC,CPTII,S$GLB, | Performed by: PHYSICIAN ASSISTANT

## 2024-04-22 PROCEDURE — 99214 OFFICE O/P EST MOD 30 MIN: CPT | Mod: HCNC,S$GLB,, | Performed by: PHYSICIAN ASSISTANT

## 2024-04-22 PROCEDURE — 3066F NEPHROPATHY DOC TX: CPT | Mod: HCNC,CPTII,S$GLB, | Performed by: PHYSICIAN ASSISTANT

## 2024-04-22 PROCEDURE — 3008F BODY MASS INDEX DOCD: CPT | Mod: HCNC,CPTII,S$GLB, | Performed by: PHYSICIAN ASSISTANT

## 2024-04-22 PROCEDURE — 1160F RVW MEDS BY RX/DR IN RCRD: CPT | Mod: HCNC,CPTII,S$GLB, | Performed by: ALLERGY & IMMUNOLOGY

## 2024-04-22 PROCEDURE — 1157F ADVNC CARE PLAN IN RCRD: CPT | Mod: HCNC,CPTII,S$GLB, | Performed by: ALLERGY & IMMUNOLOGY

## 2024-04-22 PROCEDURE — 1101F PT FALLS ASSESS-DOCD LE1/YR: CPT | Mod: HCNC,CPTII,S$GLB, | Performed by: PHYSICIAN ASSISTANT

## 2024-04-22 PROCEDURE — 1159F MED LIST DOCD IN RCRD: CPT | Mod: HCNC,CPTII,S$GLB, | Performed by: PHYSICIAN ASSISTANT

## 2024-04-22 PROCEDURE — 3078F DIAST BP <80 MM HG: CPT | Mod: HCNC,CPTII,S$GLB, | Performed by: PHYSICIAN ASSISTANT

## 2024-04-22 PROCEDURE — 87088 URINE BACTERIA CULTURE: CPT | Mod: HCNC | Performed by: PHYSICIAN ASSISTANT

## 2024-04-22 PROCEDURE — 81000 URINALYSIS NONAUTO W/SCOPE: CPT | Mod: HCNC,PO | Performed by: PHYSICIAN ASSISTANT

## 2024-04-22 PROCEDURE — 3061F NEG MICROALBUMINURIA REV: CPT | Mod: HCNC,CPTII,S$GLB, | Performed by: ALLERGY & IMMUNOLOGY

## 2024-04-22 PROCEDURE — 99999 PR PBB SHADOW E&M-EST. PATIENT-LVL II: CPT | Mod: PBBFAC,HCNC,, | Performed by: ALLERGY & IMMUNOLOGY

## 2024-04-22 PROCEDURE — 1126F AMNT PAIN NOTED NONE PRSNT: CPT | Mod: HCNC,CPTII,S$GLB, | Performed by: ALLERGY & IMMUNOLOGY

## 2024-04-22 PROCEDURE — 99215 OFFICE O/P EST HI 40 MIN: CPT | Mod: HCNC,S$GLB,, | Performed by: ALLERGY & IMMUNOLOGY

## 2024-04-22 RX ORDER — POTASSIUM CHLORIDE 20 MEQ/1
20 TABLET, EXTENDED RELEASE ORAL
Qty: 90 TABLET | Refills: 3 | Status: SHIPPED | OUTPATIENT
Start: 2024-04-22

## 2024-04-22 RX ORDER — NITROFURANTOIN 25; 75 MG/1; MG/1
100 CAPSULE ORAL 2 TIMES DAILY
Qty: 10 CAPSULE | Refills: 0 | Status: SHIPPED | OUTPATIENT
Start: 2024-04-22 | End: 2024-04-27

## 2024-04-22 NOTE — PATIENT INSTRUCTIONS
A few reminders from today:    Tylenol as needed for discomfort  Hydrate with plenty of water  Urinalysis today  Macrobid as prescribed    Follow up with me if needed.   Please go to ER/urgent care if after hours or symptoms persist/worsen.     Do not hesitate to get in touch with me should you have any further questions.     Thank you for trusting me with your care!  I wish you health and happiness.    -Gladys Hare PA-C

## 2024-04-22 NOTE — PROGRESS NOTES
Subjective:       Patient ID: Cornelia Delatorre is a 73 y.o. female.    Chief Complaint:  No chief complaint on file.      74 yo woman presents for continued evaluation of CVID, chronic rhinitis, bronchiectasis and asthma. She was last seen 5/1/2023,. She is on Hizentra 11 g weekly. She is doing well on this, less infections.  She had a sinus issue and had procedure year ago with Dr Johnson and doing better, no further infections. No pneumonia. Less antibiotics this year.  She also has skin test with 1+ to one weed, rest negative. She is on allegra daily, montelukast daily, azelastine 2 SEN BID and Flonase 2 SEN daily. She had labs 4/5/2024 with IgG 780, al subclasses normal, IgM 25 and IgA 135. CBC and CMP stable.    She has occ runny nose, congestion, PND and watery eyes.  No flares of asthma. No reactions to infusion. She does prefer to say at weekly as opposed to every other week.    Prior History taken 4/11/19: consult from Dr Jonathan Nicole for CVID. She was diagnosed with asthma 2 years ago. She has had up and down with lots of mucus causing cough. She has SOB can only walk short amount before KLINE. She is currently on chronic Zithromax. She does have bronchiectasis. She hash ad pneumonia 2 times in past but none in last 5 years. She does not get frequent sinus infections more chest. She was ion hospital for exacerbation in 10/2018. She has some sneeze and runny nose but not much. She saw Dr Herrera and is on allergy shots since January but off last month due to asthma. She was prescribed Hizentra but cost was high for her. She is friends with a pt here who has same insurance and gets Hizentra from [facility] and pays nothing so she would like to transfer. She has labs 3/2017 with IgG 693, IgA 191, IgM 22 and IgE <35 with humoral panel protected to 8/14 strep titers. She had pneumovax 3/2016 and Prevnar 3/2017 prior to these labs. She had repeat humoral panel 7/2017 and protected to 8/14. She had another pneumovax  4/27/18 and labs 6/28 still only protected to 8/14. Labs 11/2018 now shoe IgG low at 516 all 4 subclasses low, IgA 132, IgM low at 36. She has normal PFT but CT shows bronchiectasis. She has no eczema. No known food, insect or latex allergy. She has HTN and DM. She did have gastric bypass in past. She never smoked.        Environmental History: see history section for home environment  Review of Systems   Constitutional:  Negative for appetite change, chills, fatigue and fever.   HENT:  Positive for rhinorrhea and sneezing. Negative for congestion, ear discharge, ear pain, facial swelling, nosebleeds, postnasal drip, sinus pressure, sore throat, trouble swallowing and voice change.    Eyes:  Negative for discharge, redness, itching and visual disturbance.   Respiratory:  Positive for cough, choking, chest tightness, shortness of breath and wheezing.    Cardiovascular:  Negative for chest pain, palpitations and leg swelling.   Gastrointestinal:  Negative for abdominal distention, abdominal pain, constipation, diarrhea, nausea and vomiting.   Genitourinary:  Negative for difficulty urinating.   Musculoskeletal:  Positive for arthralgias. Negative for gait problem, joint swelling and myalgias.   Skin:  Negative for color change and rash.   Neurological:  Negative for dizziness, syncope, weakness, light-headedness and headaches.   Hematological:  Negative for adenopathy. Does not bruise/bleed easily.   Psychiatric/Behavioral:  Negative for agitation, behavioral problems, confusion and sleep disturbance. The patient is not nervous/anxious.         Objective:      Physical Exam  Vitals and nursing note reviewed.   Constitutional:       General: She is not in acute distress.     Appearance: Normal appearance. She is well-developed. She is not ill-appearing.   HENT:      Head: Normocephalic and atraumatic.      Right Ear: Hearing, ear canal and external ear normal.      Left Ear: Hearing, ear canal and external ear normal.       Nose: No septal deviation, mucosal edema or rhinorrhea.      Right Sinus: No maxillary sinus tenderness or frontal sinus tenderness.      Left Sinus: No maxillary sinus tenderness or frontal sinus tenderness.      Mouth/Throat:      Pharynx: Uvula midline. No uvula swelling.   Eyes:      General:         Right eye: No discharge.         Left eye: No discharge.      Conjunctiva/sclera: Conjunctivae normal.   Neck:      Thyroid: No thyromegaly.   Cardiovascular:      Rate and Rhythm: Normal rate and regular rhythm.      Heart sounds: Normal heart sounds.   Pulmonary:      Effort: Pulmonary effort is normal. No respiratory distress.      Breath sounds: Normal breath sounds. No wheezing.   Abdominal:      General: There is no distension.   Musculoskeletal:         General: Normal range of motion.      Cervical back: Normal range of motion.   Skin:     General: Skin is warm and dry.      Findings: No erythema or rash.   Neurological:      Mental Status: She is alert and oriented to person, place, and time.   Psychiatric:         Behavior: Behavior normal.         Thought Content: Thought content normal.         Judgment: Judgment normal.         Laboratory:   Percutaneous Skin Testing: prick skin test inhalants #60, 8/21/19: 1+ alli/dock weed, 3+ histamine control, remainder tested negative, see flow sheet  Assessment:       1. CVID (common variable immunodeficiency)    2. Chronic rhinitis    3. Bronchiectasis without complication    4. Mild intermittent asthma without complication         Plan:       1. Pt with low IgG, IgM and no response to pneumococcal vaccines so has CVID. Continue IgG replacement - Hizentra 11g weekly- check trough levels 4/2025  2. continue azelastine 2 SEN BID, montelukast 10 mg daily, allegra daily and Flonase 2 SEN daily  3. RTC annually or sooner if needed    I spent a total of 40 minutes on the day of the visit.  This includes face to face time and non-face to face time preparing to  see the patient (eg, review of tests), obtaining and/or reviewing separately obtained history, documenting clinical information in the electronic or other health record, independently interpreting results and communicating results to the patient/family/caregiver, or care coordinator.

## 2024-04-22 NOTE — TELEPHONE ENCOUNTER
No care due was identified.  [facility] Embedded Care Due Messages. Reference number: [id number].   4/22/2024 10:05:38 AM CDT

## 2024-04-22 NOTE — PROGRESS NOTES
Subjective     Patient ID: Cornelia Delatorre is a 73 y.o. female.    Chief Complaint: Urinary Tract Infection      HPI      Pt is new to me, PCP Dr. Cortez.     Pt is a 73 year old female with lumbad DDD, T2DM, HTN, HLD, venous insufficiency, overactive bladder, T2DM, hx of bariatric surgery, chronic low back pain, GERD, hypothyroidism, SOHA on CPAP. She presents today complaining of dysuria and urinary frequency x 3 days .No pelvic or abdominal pain, new flank pain. No fever, chills, N/V.      Review of Systems   All other systems reviewed and are negative.         Objective     Physical Exam  Constitutional:       General: She is not in acute distress.     Appearance: Normal appearance. She is not ill-appearing, toxic-appearing or diaphoretic.   HENT:      Head: Normocephalic and atraumatic.   Cardiovascular:      Heart sounds: Normal heart sounds.   Pulmonary:      Effort: No respiratory distress.      Breath sounds: Normal breath sounds.   Abdominal:      General: Abdomen is flat. Bowel sounds are normal. There is no distension.      Palpations: Abdomen is soft. There is no mass.      Tenderness: There is no abdominal tenderness. There is no right CVA tenderness, left CVA tenderness, guarding or rebound.      Hernia: No hernia is present.   Neurological:      General: No focal deficit present.      Mental Status: She is alert and oriented to person, place, and time.   Psychiatric:         Mood and Affect: Mood normal.         Behavior: Behavior normal.         Thought Content: Thought content normal.         Judgment: Judgment normal.       1. Acute cystitis without hematuria  -hydrate, tylenol prn  - nitrofurantoin, macrocrystal-monohydrate, (MACROBID) 100 MG capsule; Take 1 capsule (100 mg total) by mouth 2 (two) times daily. for 5 days  Dispense: 10 capsule; Refill: 0    2. Dysuria  - Urinalysis  - CULTURE, URINE    3. Essential hypertension  -controlled, continue current meds    4. Type 2 diabetes mellitus  with other specified complication, without long-term current use of insulin  -followed by pcp, meds as prescribed    RTC/ER precautions given. F/U if symptoms persist or worsen    Gladys Hare PA-C

## 2024-04-24 LAB — BACTERIA UR CULT: ABNORMAL

## 2024-05-01 ENCOUNTER — OFFICE VISIT (OUTPATIENT)
Dept: ORTHOPEDICS | Facility: CLINIC | Age: 74
End: 2024-05-01
Payer: MEDICARE

## 2024-05-01 DIAGNOSIS — M17.11 PRIMARY OSTEOARTHRITIS OF RIGHT KNEE: Primary | ICD-10-CM

## 2024-05-01 DIAGNOSIS — M17.10 ARTHRITIS OF KNEE: ICD-10-CM

## 2024-05-01 PROCEDURE — 3066F NEPHROPATHY DOC TX: CPT | Mod: HCNC,CPTII,S$GLB, | Performed by: ORTHOPAEDIC SURGERY

## 2024-05-01 PROCEDURE — 99214 OFFICE O/P EST MOD 30 MIN: CPT | Mod: 25,HCNC,S$GLB, | Performed by: ORTHOPAEDIC SURGERY

## 2024-05-01 PROCEDURE — 3044F HG A1C LEVEL LT 7.0%: CPT | Mod: HCNC,CPTII,S$GLB, | Performed by: ORTHOPAEDIC SURGERY

## 2024-05-01 PROCEDURE — 1159F MED LIST DOCD IN RCRD: CPT | Mod: HCNC,CPTII,S$GLB, | Performed by: ORTHOPAEDIC SURGERY

## 2024-05-01 PROCEDURE — 3061F NEG MICROALBUMINURIA REV: CPT | Mod: HCNC,CPTII,S$GLB, | Performed by: ORTHOPAEDIC SURGERY

## 2024-05-01 PROCEDURE — 1157F ADVNC CARE PLAN IN RCRD: CPT | Mod: HCNC,CPTII,S$GLB, | Performed by: ORTHOPAEDIC SURGERY

## 2024-05-01 PROCEDURE — 20610 DRAIN/INJ JOINT/BURSA W/O US: CPT | Mod: 50,HCNC,S$GLB, | Performed by: ORTHOPAEDIC SURGERY

## 2024-05-01 PROCEDURE — 1160F RVW MEDS BY RX/DR IN RCRD: CPT | Mod: HCNC,CPTII,S$GLB, | Performed by: ORTHOPAEDIC SURGERY

## 2024-05-01 PROCEDURE — 99999 PR PBB SHADOW E&M-EST. PATIENT-LVL I: CPT | Mod: PBBFAC,HCNC,, | Performed by: ORTHOPAEDIC SURGERY

## 2024-05-01 RX ORDER — TRIAMCINOLONE ACETONIDE 40 MG/ML
40 INJECTION, SUSPENSION INTRA-ARTICULAR; INTRAMUSCULAR
Status: DISCONTINUED | OUTPATIENT
Start: 2024-05-01 | End: 2024-05-01 | Stop reason: HOSPADM

## 2024-05-01 RX ADMIN — TRIAMCINOLONE ACETONIDE 40 MG: 40 INJECTION, SUSPENSION INTRA-ARTICULAR; INTRAMUSCULAR at 02:05

## 2024-05-01 NOTE — PROCEDURES
Large Joint Aspiration/Injection: L knee    Date/Time: 5/1/2024 2:45 PM    Performed by: Robbin Edmond MD  Authorized by: Robbin Edmond MD    Consent Done?:  Yes (Verbal)  Indications:  Pain  Site marked: the procedure site was marked    Timeout: prior to procedure the correct patient, procedure, and site was verified    Local anesthetic:  Lidocaine 1% without epinephrine and bupivacaine 0.25% without epinephrine  Anesthetic total (ml):  6      Details:  Needle Size:  20 G  Approach:  Anterolateral  Location:  Knee  Site:  L knee  Medications:  40 mg triamcinolone acetonide 40 mg/mL  Patient tolerance:  Patient tolerated the procedure well with no immediate complications

## 2024-05-01 NOTE — PROGRESS NOTES
Past Medical History:   Diagnosis Date    Allergy     Arthritis     Asthma     Cancer     skin cancer to right hand    Cataract     Chronic fatigue 01/24/2017    CVID (common variable immunodeficiency) 03/07/2019    Diabetes mellitus     type2    KLINE (dyspnea on exertion) 01/24/2017    Dyslipidemia 06/25/2019    Encounter for blood transfusion     Essential hypertension 12/29/2011    GERD (gastroesophageal reflux disease)     Headache(784.0)     Hyperlipidemia 07/15/2015    Hypertension     Hypothyroidism     Neuropathy     Obesity     Postmenopausal HRT (hormone replacement therapy)     Rash     Rosacea     Sleep apnea     on bipap    Snoring     Squamous cell carcinoma of skin     left forearm     UTI (urinary tract infection)     Wears glasses        Past Surgical History:   Procedure Laterality Date    ADENOIDECTOMY      APPENDECTOMY      BLADDER SUSPENSION      BREAST BIOPSY Bilateral 08/1992    bilateral benign excisional biopsies    BUNIONECTOMY  10/17/2014    right, still has discomfort    CATARACT EXTRACTION W/  INTRAOCULAR LENS IMPLANT Bilateral     CHOLECYSTECTOMY  08/02/2017    COLONOSCOPY      CYSTOSCOPY      EPIDURAL STEROID INJECTION INTO LUMBAR SPINE N/A 08/14/2019    Procedure: Injection-steroid-epidural-lumbar L5/S1;  Surgeon: Fredi Rojas MD;  Location: [facility] OR;  Service: Pain Management;  Laterality: N/A;    EPIDURAL STEROID INJECTION INTO LUMBAR SPINE N/A 05/03/2021    Procedure: Injection-steroid-epidural-lumbar L5/S1 to the Left;  Surgeon: Fredi Rojas MD;  Location: [facility] OR;  Service: Pain Management;  Laterality: N/A;    EPIDURAL STEROID INJECTION INTO LUMBAR SPINE N/A 09/24/2021    Procedure: Injection-steroid-epidural-lumbar L5/S1;  Surgeon: Fredi Rojas MD;  Location: [facility] OR;  Service: Pain Management;  Laterality: N/A;    EPIDURAL STEROID INJECTION INTO LUMBAR SPINE N/A 02/21/2022    Procedure: Injection-steroid-epidural-lumbar L5/S1;  Surgeon: Fredi Rojas MD;  Location:  [facility] OR;  Service: Pain Management;  Laterality: N/A;    EPIDURAL STEROID INJECTION INTO LUMBAR SPINE N/A 02/09/2024    Procedure: Injection-steroid-epidural-lumbar       L5/S1;  Surgeon: Fredi Rojas MD;  Location: [facility] OR;  Service: Pain Management;  Laterality: N/A;    ESOPHAGEAL DILATION N/A 06/11/2021    Procedure: DILATION, ESOPHAGUS;  Surgeon: Jake Sheriff MD;  Location: [facility] ENDO;  Service: Endoscopy;  Laterality: N/A;    ESOPHAGOGASTRODUODENOSCOPY      dilated esophagus    ESOPHAGOGASTRODUODENOSCOPY N/A 06/11/2021    Procedure: EGD (ESOPHAGOGASTRODUODENOSCOPY);  Surgeon: Jake Sheriff MD;  Location: [facility] ENDO;  Service: Endoscopy;  Laterality: N/A;    GASTRIC BYPASS      2011    HYSTERECTOMY  02/1980    interstim bladder  2009    10/14/14 new battery    OOPHORECTOMY  02/1980    REPLACEMENT OF SACRAL NERVE STIMULATOR  02/18/2020    Procedure: REPLACEMENT, NEUROSTIMULATOR, SACRAL;  Surgeon: Mary Jane Jarvis MD;  Location: [facility] OR [address] [place] [address];  Service: Urology;;    REVISION OF PROCEDURE INVOLVING SACRAL NEUROSTIMULATOR DEVICE Right 02/18/2020    Procedure: REVISION, NEUROSTIMULATOR, SACRAL/ battery replacement;  Surgeon: Mary Jane Jarvis MD;  Location: [facility] OR [address] [place] [address];  Service: Urology;  Laterality: Right;  1hr/ rep contacted/ (CONNIE)    SKIN CANCER EXCISION      left hand    TONSILLECTOMY      TRANSFORAMINAL EPIDURAL INJECTION OF STEROID Bilateral 03/05/2024    Procedure: Injection,steroid,epidural,transforaminal approach      L4/5;  Surgeon: Fredi Rojas MD;  Location: [facility] OR;  Service: Pain Management;  Laterality: Bilateral;    WISDOM TOOTH EXTRACTION         Current Outpatient Medications   Medication Sig Dispense Refill    ascorbic acid, vitamin C, (VITAMIN C) 1000 MG tablet Take 1,000 mg by mouth 2 (two) times daily.       azelastine (OPTIVAR) 0.05 % ophthalmic solution Place 1 drop into both eyes. As needed      B-complex with vitamin C (Z-BEC OR EQUIV) tablet Take 1 tablet by  mouth once daily.      Bifidobacterium infantis (ALIGN ORAL) Take by mouth once daily.      biotin 10,000 mcg Cap Take 1 tablet by mouth every evening.       blood sugar diagnostic [employer] preferred meter and strips. Check daily 90 each 3    blood-glucose meter kit Use as instructed. Insurance preferred meter. 1 each 0    cranberry 500 mg Cap Take 1 capsule by mouth every evening.      diltiaZEM (CARDIZEM CD) 120 MG Cp24 Take 1 capsule (120 mg total) by mouth once daily. 90 capsule 3    doxazosin (CARDURA) 2 MG tablet Take 1 tablet (2 mg total) by mouth every evening. 90 tablet 3    EPINEPHrine (EPIPEN) 0.3 mg/0.3 mL AtIn Inject 1 each into the muscle as needed.   3    erythromycin with ethanoL (THERAMYCIN) 2 % external solution Aaa bid prn 60 mL 3    esomeprazole (NEXIUM) 40 MG capsule Take 1 capsule (40 mg total) by mouth before breakfast. 90 capsule 3    estradioL (ESTRACE) 0.01 % (0.1 mg/gram) vaginal cream Place 1 g vaginally 3 (three) times a week. Place by fingertip application before bedtime three times a week (Monday, Wednesday, Friday) 45 g 3    fexofenadine (ALLEGRA) 180 MG tablet Take 180 mg by mouth once daily.       fish oil-omega-3 fatty acids 300-1,000 mg capsule Take 2 g by mouth once daily.      fluconazole (DIFLUCAN) 200 MG Tab 1 po q week 12 tablet 1    fluticasone propionate (FLONASE) 50 mcg/actuation nasal spray 2 sprays (100 mcg total) by Each Nostril route once daily. 16 g 11    fluticasone-umeclidin-vilanter (TRELEGY ELLIPTA) 200-62.5-25 mcg inhaler Inhale 1 puff into the lungs once daily. 180 each 3    gabapentin (NEURONTIN) 600 MG tablet Take 1 tablet (600 mg total) by mouth 3 (three) times daily. 540 tablet 3    guaifenesin-codeine 100-10 mg/5 ml (GUAIFENESIN AC)  mg/5 mL syrup Take 5 mLs by mouth 3 (three) times daily as needed for Cough. 473 mL 2    hydrOXYzine HCL (ATARAX) 10 MG Tab Take 1 tablet (10 mg total) by mouth 3 (three) times daily as needed. 30 tablet 3    immun  glob G,IgG,-pro-IgA 0-50 (HIZENTRA) 1 gram/5 mL (20 %) Soln Inject 55 mLs (11 g total) into the skin once a week. 220 mL 12    inhalation spacing device Use as directed for inhalation. 1 each 0    lancets (ACCU-CHEK SOFTCLIX LANCETS) Misc Use to check blood sugar daily. 100 each 11    levalbuterol (XOPENEX HFA) 45 mcg/actuation inhaler Inhale 1-2 puffs into the lungs every 4 (four) hours as needed for Wheezing or Shortness of Breath. Rescue 15 g 11    levalbuterol (XOPENEX) 1.25 mg/3 mL nebulizer solution Take 3 mLs (1.25 mg total) by nebulization every 4 (four) hours as needed for Wheezing or Shortness of Breath. Rescue 1 each 11    metFORMIN (GLUCOPHAGE-XR) 500 MG ER 24hr tablet Take 1 tablet (500 mg total) by mouth 2 (two) times daily with meals. 180 tablet 3    mometasone 0.1% (ELOCON) 0.1 % cream aaa bid x 1-2 weeks prn bug bites 45 g 1    montelukast (SINGULAIR) 10 mg tablet Take 1 tablet (10 mg total) by mouth every evening. 90 tablet 3    mucus clearing device Cecy 1 Device by Misc.(Non-Drug; Combo Route) route 2 (two) times daily. 1 Device 0    multivitamin capsule Take 1 capsule by mouth once daily.       mupirocin (BACTROBAN) 2 % ointment Apply topically 3 (three) times daily. 15 g 0    mupirocin (BACTROBAN) 2 % ointment Apply topically 3 (three) times daily. 30 g 1    potassium chloride SA (K-DUR,KLOR-CON) 20 MEQ tablet TAKE 1 TABLET ONE TIME DAILY 90 tablet 3    pravastatin (PRAVACHOL) 80 MG tablet Take 1 tablet (80 mg total) by mouth once daily. 90 tablet 4    psyllium husk (METAMUCIL ORAL) Take by mouth.      SIMETHICONE (GAS-X ORAL) Take 1 capsule by mouth as needed (gas relief).       tezepelumab-ekko (TEZSPIRE) 210 mg/1.91 mL (110 mg/mL) Syrg Inject 1.91 mLs (210 mg total) into the skin every 28 days. 1.91 mL 11    tiZANidine (ZANAFLEX) 4 MG tablet Take 8 mg by mouth 2 (two) times daily.      traMADoL (ULTRAM) 50 mg tablet Take 1 tablet (50 mg total) by mouth every 8 (eight) hours as needed for  Pain. 21 tablet 0    valsartan-hydrochlorothiazide (DIOVAN-HCT) 320-25 mg per tablet Take 1 tablet by mouth once daily. 90 tablet 2    vitamin D3-folic acid 5,000 unit- 1 mg Tab Take 1 tablet by mouth once daily.        Current Facility-Administered Medications   Medication Dose Route Frequency Provider Last Rate Last Admin    levalbuterol nebulizer solution 1.25 mg  1.25 mg Nebulization 1 time in Clinic/HOD Daysi Llanos, NP           Review of patient's allergies indicates:   Allergen Reactions    Sulfa (sulfonamide antibiotics) Hives    Naproxen Other (See Comments)     Other reaction(s): RT sided numbness         Family History   Problem Relation Name Age of Onset    Breast cancer Mother  50    Breast cancer Paternal Aunt          40's    Cervical cancer Paternal Grandmother      Ovarian cancer Paternal Grandmother      Cervical cancer Paternal Cousin      Ovarian cancer Paternal Cousin 1st     Diabetes Other      Hypertension Other      Hypothyroidism Other      Allergic rhinitis Neg Hx      Allergies Neg Hx      Angioedema Neg Hx      Asthma Neg Hx      Atopy Neg Hx      Eczema Neg Hx      Immunodeficiency Neg Hx      Rhinitis Neg Hx      Urticaria Neg Hx      Uterine cancer Neg Hx         Social History     Socioeconomic History    Marital status:    Tobacco Use    Smoking status: Never    Smokeless tobacco: Never   Substance and Sexual Activity    Alcohol use: Not Currently    Drug use: No    Sexual activity: Not Currently     Social Determinants of Health     Financial Resource Strain: Medium Risk (4/26/2024)    Overall Financial Resource Strain (CARDIA)     Difficulty of Paying Living Expenses: Somewhat hard   Food Insecurity: Food Insecurity Present (4/26/2024)    Hunger Vital Sign     Worried About Running Out of Food in the Last Year: Sometimes true     Ran Out of Food in the Last Year: Patient declined   Transportation Needs: No Transportation Needs (4/26/2024)    PRAPARE - Transportation      Lack of Transportation (Medical): No     Lack of Transportation (Non-Medical): No   Physical Activity: Inactive (4/26/2024)    Exercise Vital Sign     Days of Exercise per Week: 0 days     Minutes of Exercise per Session: 0 min   Stress: No Stress Concern Present (4/26/2024)    [place] [employer] [place] [employer] - Occupational Stress Questionnaire     Feeling of Stress : Only a little   Recent Concern: Stress - Stress Concern Present (3/7/2024)    [place] [employer] [employer] - Occupational Stress Questionnaire     Feeling of Stress : Rather much   Housing Stability: Unknown (4/26/2024)    Housing Stability Vital Sign     Unable to Pay for Housing in the Last Year: Patient declined       Chief Complaint:   No chief complaint on file.      History of present illness:  73-year-old female comes in with knee pain.   Patient has had an issue with arthritis in the past.  Has had viscosupplementation previously with good success.  Pain over the medial aspect of the right knee.  Knee is been hurting over last 4 weeks.      Physical Examination:    Vital Signs:    There were no vitals filed for this visit.        There is no height or weight on file to calculate BMI.    This a well-developed, well nourished patient in no acute distress.  They are alert and oriented and cooperative to examination.  Pt. walks without an antalgic gait.      Examination of the right knee shows no rashes or erythema. There are no masses ecchymosis or effusion. Patient has full range of motion from 0-130°. Patient is nontender to palpation over lateral joint line and moderately tender to palpation over the medial joint line.  Knee is stable to varus and valgus stress. 5 out of 5 motor strength. Palpable distal pulses. Intact light touch sensation. Negative Patellofemoral crepitus      X-rays:  X-rays of the right knee is  reviewed which show moderate to severe medial joint space narrowing.  More moderate medial narrowing of the  left knee     Assessment::  Moderate to severe right varus knee arthritis  Left knee arthritis and contusion    Plan:  I reviewed the findings with her today.  I injected her right knee with Orthovisc 1 of 3.  We also injected her left knee with cortisone today.  Follow up next week to continue the Orthovisc series.      This note was created using Wish voice recognition software that occasionally misinterpreted phrases or words.    Consult note is delivered via Epic messaging service.

## 2024-05-01 NOTE — PROCEDURES
Large Joint Aspiration/Injection: R knee joint    Date/Time: 5/1/2024 2:45 PM    Performed by: Robbin Edmond MD  Authorized by: Robbin Edmond MD    Consent Done?:  Yes (Verbal)  Indications:  Pain  Site marked: the procedure site was marked    Timeout: prior to procedure the correct patient, procedure, and site was verified      Details:  Needle Size:  20 G  Approach:  Anterolateral  Location:  Knee  Site:  R knee joint  Medications:  30 mg sodium hyaluronate (orthovisc) 30 mg/2 mL  Patient tolerance:  Patient tolerated the procedure well with no immediate complications

## 2024-05-08 ENCOUNTER — OFFICE VISIT (OUTPATIENT)
Dept: ORTHOPEDICS | Facility: CLINIC | Age: 74
End: 2024-05-08
Payer: MEDICARE

## 2024-05-08 VITALS — WEIGHT: 209.19 LBS | HEIGHT: 64 IN | BODY MASS INDEX: 35.71 KG/M2 | RESPIRATION RATE: 18 BRPM

## 2024-05-08 DIAGNOSIS — M17.11 PRIMARY OSTEOARTHRITIS OF RIGHT KNEE: Primary | ICD-10-CM

## 2024-05-08 PROCEDURE — 99499 UNLISTED E&M SERVICE: CPT | Mod: HCNC,S$GLB,, | Performed by: ORTHOPAEDIC SURGERY

## 2024-05-08 PROCEDURE — 20610 DRAIN/INJ JOINT/BURSA W/O US: CPT | Mod: HCNC,RT,S$GLB, | Performed by: ORTHOPAEDIC SURGERY

## 2024-05-08 PROCEDURE — 99999 PR PBB SHADOW E&M-EST. PATIENT-LVL IV: CPT | Mod: PBBFAC,HCNC,, | Performed by: ORTHOPAEDIC SURGERY

## 2024-05-08 NOTE — PROGRESS NOTES
Past Medical History:   Diagnosis Date    Allergy     Arthritis     Asthma     Cancer     skin cancer to right hand    Cataract     Chronic fatigue 01/24/2017    CVID (common variable immunodeficiency) 03/07/2019    Diabetes mellitus     type2    KLINE (dyspnea on exertion) 01/24/2017    Dyslipidemia 06/25/2019    Encounter for blood transfusion     Essential hypertension 12/29/2011    GERD (gastroesophageal reflux disease)     Headache(784.0)     Hyperlipidemia 07/15/2015    Hypertension     Hypothyroidism     Neuropathy     Obesity     Postmenopausal HRT (hormone replacement therapy)     Rash     Rosacea     Sleep apnea     on bipap    Snoring     Squamous cell carcinoma of skin     left forearm     UTI (urinary tract infection)     Wears glasses        Past Surgical History:   Procedure Laterality Date    ADENOIDECTOMY      APPENDECTOMY      BLADDER SUSPENSION      BREAST BIOPSY Bilateral 08/1992    bilateral benign excisional biopsies    BUNIONECTOMY  10/17/2014    right, still has discomfort    CATARACT EXTRACTION W/  INTRAOCULAR LENS IMPLANT Bilateral     CHOLECYSTECTOMY  08/02/2017    COLONOSCOPY      CYSTOSCOPY      EPIDURAL STEROID INJECTION INTO LUMBAR SPINE N/A 08/14/2019    Procedure: Injection-steroid-epidural-lumbar L5/S1;  Surgeon: Fredi Rojas MD;  Location: [facility] OR;  Service: Pain Management;  Laterality: N/A;    EPIDURAL STEROID INJECTION INTO LUMBAR SPINE N/A 05/03/2021    Procedure: Injection-steroid-epidural-lumbar L5/S1 to the Left;  Surgeon: Fredi Rojas MD;  Location: [facility] OR;  Service: Pain Management;  Laterality: N/A;    EPIDURAL STEROID INJECTION INTO LUMBAR SPINE N/A 09/24/2021    Procedure: Injection-steroid-epidural-lumbar L5/S1;  Surgeon: Fredi Rojas MD;  Location: [facility] OR;  Service: Pain Management;  Laterality: N/A;    EPIDURAL STEROID INJECTION INTO LUMBAR SPINE N/A 02/21/2022    Procedure: Injection-steroid-epidural-lumbar L5/S1;  Surgeon: Fredi Rojas MD;  Location:  [facility] OR;  Service: Pain Management;  Laterality: N/A;    EPIDURAL STEROID INJECTION INTO LUMBAR SPINE N/A 02/09/2024    Procedure: Injection-steroid-epidural-lumbar       L5/S1;  Surgeon: Fredi Rojas MD;  Location: [facility] OR;  Service: Pain Management;  Laterality: N/A;    ESOPHAGEAL DILATION N/A 06/11/2021    Procedure: DILATION, ESOPHAGUS;  Surgeon: Jake Sheriff MD;  Location: [facility] ENDO;  Service: Endoscopy;  Laterality: N/A;    ESOPHAGOGASTRODUODENOSCOPY      dilated esophagus    ESOPHAGOGASTRODUODENOSCOPY N/A 06/11/2021    Procedure: EGD (ESOPHAGOGASTRODUODENOSCOPY);  Surgeon: Jake Sheriff MD;  Location: [facility] ENDO;  Service: Endoscopy;  Laterality: N/A;    GASTRIC BYPASS      2011    HYSTERECTOMY  02/1980    interstim bladder  2009    10/14/14 new battery    OOPHORECTOMY  02/1980    REPLACEMENT OF SACRAL NERVE STIMULATOR  02/18/2020    Procedure: REPLACEMENT, NEUROSTIMULATOR, SACRAL;  Surgeon: Mary Jane Jarvis MD;  Location: [facility] OR [address] [place] [address];  Service: Urology;;    REVISION OF PROCEDURE INVOLVING SACRAL NEUROSTIMULATOR DEVICE Right 02/18/2020    Procedure: REVISION, NEUROSTIMULATOR, SACRAL/ battery replacement;  Surgeon: Mary Jane Jarvis MD;  Location: [facility] OR [address] [place] [address];  Service: Urology;  Laterality: Right;  1hr/ rep contacted/ (CONNIE)    SKIN CANCER EXCISION      left hand    TONSILLECTOMY      TRANSFORAMINAL EPIDURAL INJECTION OF STEROID Bilateral 03/05/2024    Procedure: Injection,steroid,epidural,transforaminal approach      L4/5;  Surgeon: Fredi Rojas MD;  Location: [facility] OR;  Service: Pain Management;  Laterality: Bilateral;    WISDOM TOOTH EXTRACTION         Current Outpatient Medications   Medication Sig    ascorbic acid, vitamin C, (VITAMIN C) 1000 MG tablet Take 1,000 mg by mouth 2 (two) times daily.     azelastine (OPTIVAR) 0.05 % ophthalmic solution Place 1 drop into both eyes. As needed    B-complex with vitamin C (Z-BEC OR EQUIV) tablet Take 1 tablet by mouth once daily.     Bifidobacterium infantis (ALIGN ORAL) Take by mouth once daily.    biotin 10,000 mcg Cap Take 1 tablet by mouth every evening.     blood sugar diagnostic [employer] preferred meter and strips. Check daily    cranberry 500 mg Cap Take 1 capsule by mouth every evening.    diltiaZEM (CARDIZEM CD) 120 MG Cp24 Take 1 capsule (120 mg total) by mouth once daily.    doxazosin (CARDURA) 2 MG tablet Take 1 tablet (2 mg total) by mouth every evening.    EPINEPHrine (EPIPEN) 0.3 mg/0.3 mL AtIn Inject 1 each into the muscle as needed.     erythromycin with ethanoL (THERAMYCIN) 2 % external solution Aaa bid prn    esomeprazole (NEXIUM) 40 MG capsule Take 1 capsule (40 mg total) by mouth before breakfast.    estradioL (ESTRACE) 0.01 % (0.1 mg/gram) vaginal cream Place 1 g vaginally 3 (three) times a week. Place by fingertip application before bedtime three times a week (Monday, Wednesday, Friday)    fexofenadine (ALLEGRA) 180 MG tablet Take 180 mg by mouth once daily.     fish oil-omega-3 fatty acids 300-1,000 mg capsule Take 2 g by mouth once daily.    fluconazole (DIFLUCAN) 200 MG Tab 1 po q week    fluticasone propionate (FLONASE) 50 mcg/actuation nasal spray 2 sprays (100 mcg total) by Each Nostril route once daily.    fluticasone-umeclidin-vilanter (TRELEGY ELLIPTA) 200-62.5-25 mcg inhaler Inhale 1 puff into the lungs once daily.    gabapentin (NEURONTIN) 600 MG tablet Take 1 tablet (600 mg total) by mouth 3 (three) times daily.    guaifenesin-codeine 100-10 mg/5 ml (GUAIFENESIN AC)  mg/5 mL syrup Take 5 mLs by mouth 3 (three) times daily as needed for Cough.    hydrOXYzine HCL (ATARAX) 10 MG Tab Take 1 tablet (10 mg total) by mouth 3 (three) times daily as needed.    immun glob G,IgG,-pro-IgA 0-50 (HIZENTRA) 1 gram/5 mL (20 %) Soln Inject 55 mLs (11 g total) into the skin once a week.    inhalation spacing device Use as directed for inhalation.    lancets (ACCU-CHEK SOFTCLIX LANCETS) Misc Use to check blood sugar  daily.    levalbuterol (XOPENEX HFA) 45 mcg/actuation inhaler Inhale 1-2 puffs into the lungs every 4 (four) hours as needed for Wheezing or Shortness of Breath. Rescue    levalbuterol (XOPENEX) 1.25 mg/3 mL nebulizer solution Take 3 mLs (1.25 mg total) by nebulization every 4 (four) hours as needed for Wheezing or Shortness of Breath. Rescue    metFORMIN (GLUCOPHAGE-XR) 500 MG ER 24hr tablet Take 1 tablet (500 mg total) by mouth 2 (two) times daily with meals.    mometasone 0.1% (ELOCON) 0.1 % cream aaa bid x 1-2 weeks prn bug bites    montelukast (SINGULAIR) 10 mg tablet Take 1 tablet (10 mg total) by mouth every evening.    mucus clearing device Cecy 1 Device by Misc.(Non-Drug; Combo Route) route 2 (two) times daily.    multivitamin capsule Take 1 capsule by mouth once daily.     mupirocin (BACTROBAN) 2 % ointment Apply topically 3 (three) times daily.    mupirocin (BACTROBAN) 2 % ointment Apply topically 3 (three) times daily.    potassium chloride SA (K-DUR,KLOR-CON) 20 MEQ tablet TAKE 1 TABLET ONE TIME DAILY    pravastatin (PRAVACHOL) 80 MG tablet Take 1 tablet (80 mg total) by mouth once daily.    psyllium husk (METAMUCIL ORAL) Take by mouth.    SIMETHICONE (GAS-X ORAL) Take 1 capsule by mouth as needed (gas relief).     tezepelumab-ekko (TEZSPIRE) 210 mg/1.91 mL (110 mg/mL) Syrg Inject 1.91 mLs (210 mg total) into the skin every 28 days.    tiZANidine (ZANAFLEX) 4 MG tablet Take 8 mg by mouth 2 (two) times daily.    traMADoL (ULTRAM) 50 mg tablet Take 1 tablet (50 mg total) by mouth every 8 (eight) hours as needed for Pain.    valsartan-hydrochlorothiazide (DIOVAN-HCT) 320-25 mg per tablet Take 1 tablet by mouth once daily.    vitamin D3-folic acid 5,000 unit- 1 mg Tab Take 1 tablet by mouth once daily.     blood-glucose meter kit Use as instructed. Insurance preferred meter.     Current Facility-Administered Medications   Medication    levalbuterol nebulizer solution 1.25 mg       Review of patient's  allergies indicates:   Allergen Reactions    Sulfa (sulfonamide antibiotics) Hives    Naproxen Other (See Comments)     Other reaction(s): RT sided numbness         Family History   Problem Relation Name Age of Onset    Breast cancer Mother  50    Breast cancer Paternal Aunt          40's    Cervical cancer Paternal Grandmother      Ovarian cancer Paternal Grandmother      Cervical cancer Paternal Cousin      Ovarian cancer Paternal Cousin 1st     Diabetes Other      Hypertension Other      Hypothyroidism Other      Allergic rhinitis Neg Hx      Allergies Neg Hx      Angioedema Neg Hx      Asthma Neg Hx      Atopy Neg Hx      Eczema Neg Hx      Immunodeficiency Neg Hx      Rhinitis Neg Hx      Urticaria Neg Hx      Uterine cancer Neg Hx         Social History     Socioeconomic History    Marital status:    Tobacco Use    Smoking status: Never    Smokeless tobacco: Never   Substance and Sexual Activity    Alcohol use: Not Currently    Drug use: No    Sexual activity: Not Currently     Social Determinants of Health     Financial Resource Strain: Medium Risk (4/26/2024)    Overall Financial Resource Strain (CARDIA)     Difficulty of Paying Living Expenses: Somewhat hard   Food Insecurity: Food Insecurity Present (4/26/2024)    Hunger Vital Sign     Worried About Running Out of Food in the Last Year: Sometimes true     Ran Out of Food in the Last Year: Patient declined   Transportation Needs: No Transportation Needs (4/26/2024)    PRAPARE - Transportation     Lack of Transportation (Medical): No     Lack of Transportation (Non-Medical): No   Physical Activity: Inactive (4/26/2024)    Exercise Vital Sign     Days of Exercise per Week: 0 days     Minutes of Exercise per Session: 0 min   Stress: No Stress Concern Present (4/26/2024)    [employer] - Occupational Stress Questionnaire     Feeling of Stress : Only a little   Recent Concern: Stress - Stress Concern Present (3/7/2024)     [employer] - Occupational Stress Questionnaire     Feeling of Stress : Rather much   Housing Stability: Unknown (4/26/2024)    Housing Stability Vital Sign     Unable to Pay for Housing in the Last Year: Patient declined       Chief Complaint:   Chief Complaint   Patient presents with    Follow-up     Right orthovisc 2/3       History of present illness:  73-year-old female comes in with knee pain.   Patient has had an issue with arthritis in the past.  Has had viscosupplementation previously with good success.  Pain over the medial aspect of the right knee.  Knee is been hurting over last 4 weeks.      Physical Examination:    Vital Signs:    Vitals:    05/08/24 1014   Resp: 18           Body mass index is 35.91 kg/m².    This a well-developed, well nourished patient in no acute distress.  They are alert and oriented and cooperative to examination.  Pt. walks without an antalgic gait.      Examination of the right knee shows no rashes or erythema. There are no masses ecchymosis or effusion. Patient has full range of motion from 0-130°. Patient is nontender to palpation over lateral joint line and moderately tender to palpation over the medial joint line.  Knee is stable to varus and valgus stress. 5 out of 5 motor strength. Palpable distal pulses. Intact light touch sensation. Negative Patellofemoral crepitus      X-rays:  X-rays of the right knee is  reviewed which show moderate to severe medial joint space narrowing.  More moderate medial narrowing of the left knee     Assessment::  Moderate to severe right varus knee arthritis  Left knee arthritis and contusion    Plan:  I reviewed the findings with her today.  I injected her right knee with Orthovisc 2 of 3. Follow up next week to continue the Orthovisc series.      This note was created using [employer] voice recognition software that occasionally misinterpreted phrases or words.    Consult note is delivered via Epic messaging  service.

## 2024-05-08 NOTE — PROCEDURES
Large Joint Aspiration/Injection: R knee joint    Date/Time: 5/8/2024 10:30 AM    Performed by: Robbin Edmond MD  Authorized by: Robbin Edmond MD    Consent Done?:  Yes (Verbal)  Indications:  Pain  Site marked: the procedure site was marked    Timeout: prior to procedure the correct patient, procedure, and site was verified      Details:  Needle Size:  20 G  Approach:  Anterolateral  Location:  Knee  Site:  R knee joint  Medications:  30 mg sodium hyaluronate (orthovisc) 30 mg/2 mL  Patient tolerance:  Patient tolerated the procedure well with no immediate complications

## 2024-05-15 ENCOUNTER — OFFICE VISIT (OUTPATIENT)
Dept: ORTHOPEDICS | Facility: CLINIC | Age: 74
End: 2024-05-15
Payer: MEDICARE

## 2024-05-15 DIAGNOSIS — M17.11 PRIMARY OSTEOARTHRITIS OF RIGHT KNEE: Primary | ICD-10-CM

## 2024-05-15 PROCEDURE — 99499 UNLISTED E&M SERVICE: CPT | Mod: 25,HCNC,S$GLB, | Performed by: ORTHOPAEDIC SURGERY

## 2024-05-15 PROCEDURE — 99999 PR PBB SHADOW E&M-EST. PATIENT-LVL I: CPT | Mod: PBBFAC,HCNC,, | Performed by: ORTHOPAEDIC SURGERY

## 2024-05-15 PROCEDURE — 20610 DRAIN/INJ JOINT/BURSA W/O US: CPT | Mod: HCNC,RT,S$GLB, | Performed by: ORTHOPAEDIC SURGERY

## 2024-05-15 NOTE — PROCEDURES
Large Joint Aspiration/Injection: R knee joint    Date/Time: 5/15/2024 2:00 PM    Performed by: Robbin Edmond MD  Authorized by: Robbin Edmond MD    Consent Done?:  Yes (Verbal)  Indications:  Pain  Site marked: the procedure site was marked    Timeout: prior to procedure the correct patient, procedure, and site was verified      Details:  Needle Size:  20 G  Approach:  Anterolateral  Location:  Knee  Site:  R knee joint  Medications:  30 mg sodium hyaluronate (orthovisc) 30 mg/2 mL  Patient tolerance:  Patient tolerated the procedure well with no immediate complications

## 2024-05-15 NOTE — PROGRESS NOTES
Past Medical History:   Diagnosis Date    Allergy     Arthritis     Asthma     Cancer     skin cancer to right hand    Cataract     Chronic fatigue 01/24/2017    CVID (common variable immunodeficiency) 03/07/2019    Diabetes mellitus     type2    KLINE (dyspnea on exertion) 01/24/2017    Dyslipidemia 06/25/2019    Encounter for blood transfusion     Essential hypertension 12/29/2011    GERD (gastroesophageal reflux disease)     Headache(784.0)     Hyperlipidemia 07/15/2015    Hypertension     Hypothyroidism     Neuropathy     Obesity     Postmenopausal HRT (hormone replacement therapy)     Rash     Rosacea     Sleep apnea     on bipap    Snoring     Squamous cell carcinoma of skin     left forearm     UTI (urinary tract infection)     Wears glasses        Past Surgical History:   Procedure Laterality Date    ADENOIDECTOMY      APPENDECTOMY      BLADDER SUSPENSION      BREAST BIOPSY Bilateral 08/1992    bilateral benign excisional biopsies    BUNIONECTOMY  10/17/2014    right, still has discomfort    CATARACT EXTRACTION W/  INTRAOCULAR LENS IMPLANT Bilateral     CHOLECYSTECTOMY  08/02/2017    COLONOSCOPY      CYSTOSCOPY      EPIDURAL STEROID INJECTION INTO LUMBAR SPINE N/A 08/14/2019    Procedure: Injection-steroid-epidural-lumbar L5/S1;  Surgeon: Fredi Rojas MD;  Location: [facility] OR;  Service: Pain Management;  Laterality: N/A;    EPIDURAL STEROID INJECTION INTO LUMBAR SPINE N/A 05/03/2021    Procedure: Injection-steroid-epidural-lumbar L5/S1 to the Left;  Surgeon: Fredi Rojas MD;  Location: [facility] OR;  Service: Pain Management;  Laterality: N/A;    EPIDURAL STEROID INJECTION INTO LUMBAR SPINE N/A 09/24/2021    Procedure: Injection-steroid-epidural-lumbar L5/S1;  Surgeon: Fredi Rojas MD;  Location: [facility] OR;  Service: Pain Management;  Laterality: N/A;    EPIDURAL STEROID INJECTION INTO LUMBAR SPINE N/A 02/21/2022    Procedure: Injection-steroid-epidural-lumbar L5/S1;  Surgeon: Fredi Rojas MD;  Location:  [facility] OR;  Service: Pain Management;  Laterality: N/A;    EPIDURAL STEROID INJECTION INTO LUMBAR SPINE N/A 02/09/2024    Procedure: Injection-steroid-epidural-lumbar       L5/S1;  Surgeon: Fredi Rojas MD;  Location: [facility] OR;  Service: Pain Management;  Laterality: N/A;    ESOPHAGEAL DILATION N/A 06/11/2021    Procedure: DILATION, ESOPHAGUS;  Surgeon: Jake Sheriff MD;  Location: [facility] ENDO;  Service: Endoscopy;  Laterality: N/A;    ESOPHAGOGASTRODUODENOSCOPY      dilated esophagus    ESOPHAGOGASTRODUODENOSCOPY N/A 06/11/2021    Procedure: EGD (ESOPHAGOGASTRODUODENOSCOPY);  Surgeon: Jake Sheriff MD;  Location: [facility] ENDO;  Service: Endoscopy;  Laterality: N/A;    GASTRIC BYPASS      2011    HYSTERECTOMY  02/1980    interstim bladder  2009    10/14/14 new battery    OOPHORECTOMY  02/1980    REPLACEMENT OF SACRAL NERVE STIMULATOR  02/18/2020    Procedure: REPLACEMENT, NEUROSTIMULATOR, SACRAL;  Surgeon: Mary Jane Jarvis MD;  Location: [facility] OR [address] [place] [address];  Service: Urology;;    REVISION OF PROCEDURE INVOLVING SACRAL NEUROSTIMULATOR DEVICE Right 02/18/2020    Procedure: REVISION, NEUROSTIMULATOR, SACRAL/ battery replacement;  Surgeon: Mary Jane Jarvis MD;  Location: [facility] OR [address] [place] [address];  Service: Urology;  Laterality: Right;  1hr/ rep contacted/ (CONNIE)    SKIN CANCER EXCISION      left hand    TONSILLECTOMY      TRANSFORAMINAL EPIDURAL INJECTION OF STEROID Bilateral 03/05/2024    Procedure: Injection,steroid,epidural,transforaminal approach      L4/5;  Surgeon: Fredi Rojas MD;  Location: [facility] OR;  Service: Pain Management;  Laterality: Bilateral;    WISDOM TOOTH EXTRACTION         Current Outpatient Medications   Medication Sig    ascorbic acid, vitamin C, (VITAMIN C) 1000 MG tablet Take 1,000 mg by mouth 2 (two) times daily.     azelastine (OPTIVAR) 0.05 % ophthalmic solution Place 1 drop into both eyes. As needed    B-complex with vitamin C (Z-BEC OR EQUIV) tablet Take 1 tablet by mouth once daily.     Bifidobacterium infantis (ALIGN ORAL) Take by mouth once daily.    biotin 10,000 mcg Cap Take 1 tablet by mouth every evening.     blood sugar diagnostic [employer] preferred meter and strips. Check daily    blood-glucose meter kit Use as instructed. Insurance preferred meter.    cranberry 500 mg Cap Take 1 capsule by mouth every evening.    diltiaZEM (CARDIZEM CD) 120 MG Cp24 Take 1 capsule (120 mg total) by mouth once daily.    doxazosin (CARDURA) 2 MG tablet Take 1 tablet (2 mg total) by mouth every evening.    EPINEPHrine (EPIPEN) 0.3 mg/0.3 mL AtIn Inject 1 each into the muscle as needed.     erythromycin with ethanoL (THERAMYCIN) 2 % external solution Aaa bid prn    esomeprazole (NEXIUM) 40 MG capsule Take 1 capsule (40 mg total) by mouth before breakfast.    estradioL (ESTRACE) 0.01 % (0.1 mg/gram) vaginal cream Place 1 g vaginally 3 (three) times a week. Place by fingertip application before bedtime three times a week (Monday, Wednesday, Friday)    fexofenadine (ALLEGRA) 180 MG tablet Take 180 mg by mouth once daily.     fish oil-omega-3 fatty acids 300-1,000 mg capsule Take 2 g by mouth once daily.    fluconazole (DIFLUCAN) 200 MG Tab 1 po q week    fluticasone propionate (FLONASE) 50 mcg/actuation nasal spray 2 sprays (100 mcg total) by Each Nostril route once daily.    fluticasone-umeclidin-vilanter (TRELEGY ELLIPTA) 200-62.5-25 mcg inhaler Inhale 1 puff into the lungs once daily.    gabapentin (NEURONTIN) 600 MG tablet Take 1 tablet (600 mg total) by mouth 3 (three) times daily.    guaifenesin-codeine 100-10 mg/5 ml (GUAIFENESIN AC)  mg/5 mL syrup Take 5 mLs by mouth 3 (three) times daily as needed for Cough.    hydrOXYzine HCL (ATARAX) 10 MG Tab Take 1 tablet (10 mg total) by mouth 3 (three) times daily as needed.    immun glob G,IgG,-pro-IgA 0-50 (HIZENTRA) 1 gram/5 mL (20 %) Soln Inject 55 mLs (11 g total) into the skin once a week.    inhalation spacing device Use as directed for  inhalation.    lancets (ACCU-CHEK SOFTCLIX LANCETS) Misc Use to check blood sugar daily.    levalbuterol (XOPENEX HFA) 45 mcg/actuation inhaler Inhale 1-2 puffs into the lungs every 4 (four) hours as needed for Wheezing or Shortness of Breath. Rescue    levalbuterol (XOPENEX) 1.25 mg/3 mL nebulizer solution Take 3 mLs (1.25 mg total) by nebulization every 4 (four) hours as needed for Wheezing or Shortness of Breath. Rescue    metFORMIN (GLUCOPHAGE-XR) 500 MG ER 24hr tablet Take 1 tablet (500 mg total) by mouth 2 (two) times daily with meals.    mometasone 0.1% (ELOCON) 0.1 % cream aaa bid x 1-2 weeks prn bug bites    montelukast (SINGULAIR) 10 mg tablet Take 1 tablet (10 mg total) by mouth every evening.    mucus clearing device Cecy 1 Device by Misc.(Non-Drug; Combo Route) route 2 (two) times daily.    multivitamin capsule Take 1 capsule by mouth once daily.     mupirocin (BACTROBAN) 2 % ointment Apply topically 3 (three) times daily.    mupirocin (BACTROBAN) 2 % ointment Apply topically 3 (three) times daily.    potassium chloride SA (K-DUR,KLOR-CON) 20 MEQ tablet TAKE 1 TABLET ONE TIME DAILY    pravastatin (PRAVACHOL) 80 MG tablet Take 1 tablet (80 mg total) by mouth once daily.    psyllium husk (METAMUCIL ORAL) Take by mouth.    SIMETHICONE (GAS-X ORAL) Take 1 capsule by mouth as needed (gas relief).     tezepelumab-ekko (TEZSPIRE) 210 mg/1.91 mL (110 mg/mL) Syrg Inject 1.91 mLs (210 mg total) into the skin every 28 days.    tiZANidine (ZANAFLEX) 4 MG tablet Take 8 mg by mouth 2 (two) times daily.    traMADoL (ULTRAM) 50 mg tablet Take 1 tablet (50 mg total) by mouth every 8 (eight) hours as needed for Pain.    valsartan-hydrochlorothiazide (DIOVAN-HCT) 320-25 mg per tablet Take 1 tablet by mouth once daily.    vitamin D3-folic acid 5,000 unit- 1 mg Tab Take 1 tablet by mouth once daily.      Current Facility-Administered Medications   Medication    levalbuterol nebulizer solution 1.25 mg       Review of  patient's allergies indicates:   Allergen Reactions    Sulfa (sulfonamide antibiotics) Hives    Naproxen Other (See Comments)     Other reaction(s): RT sided numbness         Family History   Problem Relation Name Age of Onset    Breast cancer Mother  50    Breast cancer Paternal Aunt          40's    Cervical cancer Paternal Grandmother      Ovarian cancer Paternal Grandmother      Cervical cancer Paternal Cousin      Ovarian cancer Paternal Cousin 1st     Diabetes Other      Hypertension Other      Hypothyroidism Other      Allergic rhinitis Neg Hx      Allergies Neg Hx      Angioedema Neg Hx      Asthma Neg Hx      Atopy Neg Hx      Eczema Neg Hx      Immunodeficiency Neg Hx      Rhinitis Neg Hx      Urticaria Neg Hx      Uterine cancer Neg Hx         Social History     Socioeconomic History    Marital status:    Tobacco Use    Smoking status: Never    Smokeless tobacco: Never   Substance and Sexual Activity    Alcohol use: Not Currently    Drug use: No    Sexual activity: Not Currently     Social Determinants of Health     Financial Resource Strain: Medium Risk (4/26/2024)    Overall Financial Resource Strain (CARDIA)     Difficulty of Paying Living Expenses: Somewhat hard   Food Insecurity: Food Insecurity Present (4/26/2024)    Hunger Vital Sign     Worried About Running Out of Food in the Last Year: Sometimes true     Ran Out of Food in the Last Year: Patient declined   Transportation Needs: No Transportation Needs (4/26/2024)    PRAPARE - Transportation     Lack of Transportation (Medical): No     Lack of Transportation (Non-Medical): No   Physical Activity: Inactive (4/26/2024)    Exercise Vital Sign     Days of Exercise per Week: 0 days     Minutes of Exercise per Session: 0 min   Stress: No Stress Concern Present (4/26/2024)    [employer] [place] [employer] - Occupational Stress Questionnaire     Feeling of Stress : Only a little   Recent Concern: Stress - Stress Concern Present  (3/7/2024)    [employer] - Occupational Stress Questionnaire     Feeling of Stress : Rather much   Housing Stability: Unknown (4/26/2024)    Housing Stability Vital Sign     Unable to Pay for Housing in the Last Year: Patient declined       Chief Complaint:   No chief complaint on file.      History of present illness:  73-year-old female comes in with knee pain.   Patient has had an issue with arthritis in the past.  Has had viscosupplementation previously with good success.  Pain over the medial aspect of the right knee.  Knee is been hurting over last 4 weeks.      Physical Examination:    Vital Signs:    There were no vitals filed for this visit.          There is no height or weight on file to calculate BMI.    This a well-developed, well nourished patient in no acute distress.  They are alert and oriented and cooperative to examination.  Pt. walks without an antalgic gait.      Examination of the right knee shows no rashes or erythema. There are no masses ecchymosis or effusion. Patient has full range of motion from 0-130°. Patient is nontender to palpation over lateral joint line and moderately tender to palpation over the medial joint line.  Knee is stable to varus and valgus stress. 5 out of 5 motor strength. Palpable distal pulses. Intact light touch sensation. Negative Patellofemoral crepitus      X-rays:  X-rays of the right knee is  reviewed which show moderate to severe medial joint space narrowing.  More moderate medial narrowing of the left knee     Assessment::  Moderate to severe right varus knee arthritis  Left knee arthritis and contusion    Plan:  I reviewed the findings with her today.  I injected her right knee with Orthovisc 3 of 3.     This note was created using [employer] voice recognition software that occasionally misinterpreted phrases or words.    Consult note is delivered via Epic messaging service.

## 2024-05-16 ENCOUNTER — OFFICE VISIT (OUTPATIENT)
Dept: UROLOGY | Facility: CLINIC | Age: 74
End: 2024-05-16
Payer: MEDICARE

## 2024-05-16 VITALS
HEART RATE: 69 BPM | DIASTOLIC BLOOD PRESSURE: 75 MMHG | BODY MASS INDEX: 35.34 KG/M2 | SYSTOLIC BLOOD PRESSURE: 135 MMHG | WEIGHT: 205.94 LBS

## 2024-05-16 DIAGNOSIS — N39.41 URGE INCONTINENCE: ICD-10-CM

## 2024-05-16 DIAGNOSIS — R39.15 URINARY URGENCY: Primary | ICD-10-CM

## 2024-05-16 PROCEDURE — 3061F NEG MICROALBUMINURIA REV: CPT | Mod: CPTII,S$GLB,, | Performed by: UROLOGY

## 2024-05-16 PROCEDURE — 1159F MED LIST DOCD IN RCRD: CPT | Mod: CPTII,S$GLB,, | Performed by: UROLOGY

## 2024-05-16 PROCEDURE — 1157F ADVNC CARE PLAN IN RCRD: CPT | Mod: CPTII,S$GLB,, | Performed by: UROLOGY

## 2024-05-16 PROCEDURE — 99214 OFFICE O/P EST MOD 30 MIN: CPT | Mod: S$GLB,,, | Performed by: UROLOGY

## 2024-05-16 PROCEDURE — 3288F FALL RISK ASSESSMENT DOCD: CPT | Mod: CPTII,S$GLB,, | Performed by: UROLOGY

## 2024-05-16 PROCEDURE — 1101F PT FALLS ASSESS-DOCD LE1/YR: CPT | Mod: CPTII,S$GLB,, | Performed by: UROLOGY

## 2024-05-16 PROCEDURE — 3078F DIAST BP <80 MM HG: CPT | Mod: CPTII,S$GLB,, | Performed by: UROLOGY

## 2024-05-16 PROCEDURE — 3066F NEPHROPATHY DOC TX: CPT | Mod: CPTII,S$GLB,, | Performed by: UROLOGY

## 2024-05-16 PROCEDURE — 1125F AMNT PAIN NOTED PAIN PRSNT: CPT | Mod: CPTII,S$GLB,, | Performed by: UROLOGY

## 2024-05-16 PROCEDURE — 3008F BODY MASS INDEX DOCD: CPT | Mod: CPTII,S$GLB,, | Performed by: UROLOGY

## 2024-05-16 PROCEDURE — 99999 PR PBB SHADOW E&M-EST. PATIENT-LVL IV: CPT | Mod: PBBFAC,,, | Performed by: UROLOGY

## 2024-05-16 PROCEDURE — 3044F HG A1C LEVEL LT 7.0%: CPT | Mod: CPTII,S$GLB,, | Performed by: UROLOGY

## 2024-05-16 PROCEDURE — 3075F SYST BP GE 130 - 139MM HG: CPT | Mod: CPTII,S$GLB,, | Performed by: UROLOGY

## 2024-05-16 NOTE — H&P (VIEW-ONLY)
CHIEF COMPLAINT:    Mrs. Delatorre is a 73 y.o. female presenting for a follow up on medication refractory OAB, managed with an interStim.       PRESENTING ILLNESS:    Cornelia Delatorre is a 73 y.o. female who presents with a history of medication refractory overactive bladder.  She has had an InsterStim for many years.  The entire system was replaced on 10/14/2014.  The IPG was replaced on 2/18/2020.  She states she feels like the IPG is losing its charge as she has more urinary incontinence.  She finds that she is leaking more with a constant urge incontinence and dribbling. She does a good job managing herself because she can change the program and she can increase/decrease the amount of stimulation.      REVIEW OF SYSTEMS:    Review of Systems   Constitutional: Negative.    HENT: Negative.     Eyes: Negative.    Respiratory: Negative.     Cardiovascular: Negative.    Gastrointestinal:         Bowel incontinence   Genitourinary:  Positive for frequency and urgency.   Musculoskeletal:  Positive for back pain and joint pain.   Skin: Negative.    Neurological: Negative.    Endo/Heme/Allergies: Negative.    Psychiatric/Behavioral: Negative.         PATIENT HISTORY:    Past Medical History:   Diagnosis Date    Allergy     Arthritis     Asthma     Cancer     skin cancer to right hand    Cataract     Chronic fatigue 01/24/2017    CVID (common variable immunodeficiency) 03/07/2019    Diabetes mellitus     type2    KLINE (dyspnea on exertion) 01/24/2017    Dyslipidemia 06/25/2019    Encounter for blood transfusion     Essential hypertension 12/29/2011    GERD (gastroesophageal reflux disease)     Headache(784.0)     Hyperlipidemia 07/15/2015    Hypertension     Hypothyroidism     Neuropathy     Obesity     Postmenopausal HRT (hormone replacement therapy)     Rash     Rosacea     Sleep apnea     on bipap    Snoring     Squamous cell carcinoma of skin     left forearm     UTI (urinary tract infection)     Wears glasses         Past Surgical History:   Procedure Laterality Date    ADENOIDECTOMY      APPENDECTOMY      BLADDER SUSPENSION      BREAST BIOPSY Bilateral 08/1992    bilateral benign excisional biopsies    BUNIONECTOMY  10/17/2014    right, still has discomfort    CATARACT EXTRACTION W/  INTRAOCULAR LENS IMPLANT Bilateral     CHOLECYSTECTOMY  08/02/2017    COLONOSCOPY      CYSTOSCOPY      EPIDURAL STEROID INJECTION INTO LUMBAR SPINE N/A 08/14/2019    Procedure: Injection-steroid-epidural-lumbar L5/S1;  Surgeon: Fredi Rojas MD;  Location: [facility] OR;  Service: Pain Management;  Laterality: N/A;    EPIDURAL STEROID INJECTION INTO LUMBAR SPINE N/A 05/03/2021    Procedure: Injection-steroid-epidural-lumbar L5/S1 to the Left;  Surgeon: Fredi Rojas MD;  Location: [facility] OR;  Service: Pain Management;  Laterality: N/A;    EPIDURAL STEROID INJECTION INTO LUMBAR SPINE N/A 09/24/2021    Procedure: Injection-steroid-epidural-lumbar L5/S1;  Surgeon: Fredi Rojas MD;  Location: [facility] OR;  Service: Pain Management;  Laterality: N/A;    EPIDURAL STEROID INJECTION INTO LUMBAR SPINE N/A 02/21/2022    Procedure: Injection-steroid-epidural-lumbar L5/S1;  Surgeon: Fredi Rojas MD;  Location: [facility] OR;  Service: Pain Management;  Laterality: N/A;    EPIDURAL STEROID INJECTION INTO LUMBAR SPINE N/A 02/09/2024    Procedure: Injection-steroid-epidural-lumbar       L5/S1;  Surgeon: Fredi Rojas MD;  Location: [facility] OR;  Service: Pain Management;  Laterality: N/A;    ESOPHAGEAL DILATION N/A 06/11/2021    Procedure: DILATION, ESOPHAGUS;  Surgeon: Jake Sheriff MD;  Location: [facility];  Service: Endoscopy;  Laterality: N/A;    ESOPHAGOGASTRODUODENOSCOPY      dilated esophagus    ESOPHAGOGASTRODUODENOSCOPY N/A 06/11/2021    Procedure: EGD (ESOPHAGOGASTRODUODENOSCOPY);  Surgeon: Jake Sheriff MD;  Location: [facility];  Service: Endoscopy;  Laterality: N/A;    GASTRIC BYPASS      2011    HYSTERECTOMY  02/1980    interstim bladder   2009    10/14/14 new battery    OOPHORECTOMY  02/1980    REPLACEMENT OF SACRAL NERVE STIMULATOR  02/18/2020    Procedure: REPLACEMENT, NEUROSTIMULATOR, SACRAL;  Surgeon: Mary Jane Jarvis MD;  Location: [facility] OR [address] [place] [address];  Service: Urology;;    REVISION OF PROCEDURE INVOLVING SACRAL NEUROSTIMULATOR DEVICE Right 02/18/2020    Procedure: REVISION, NEUROSTIMULATOR, SACRAL/ battery replacement;  Surgeon: Mary Jane Jarvis MD;  [facility];  Service: Urology;  Laterality: Right;  1hr/ rep contacted/ (CONNIE)    SKIN CANCER EXCISION      left hand    TONSILLECTOMY      TRANSFORAMINAL EPIDURAL INJECTION OF STEROID Bilateral 03/05/2024    Procedure: Injection,steroid,epidural,transforaminal approach      L4/5;  Surgeon: Fredi Rojas MD;  Location: [facility] OR;  Service: Pain Management;  Laterality: Bilateral;    WISDOM TOOTH EXTRACTION         Family History   Problem Relation Name Age of Onset    Breast cancer Mother  50    Breast cancer Paternal Aunt          40's    Cervical cancer Paternal Grandmother      Ovarian cancer Paternal Grandmother      Cervical cancer Paternal Cousin      Ovarian cancer Paternal Cousin 1st     Diabetes Other      Hypertension Other      Hypothyroidism Other       Social History     Socioeconomic History    Marital status:    Tobacco Use    Smoking status: Never    Smokeless tobacco: Never   Substance and Sexual Activity    Alcohol use: Not Currently    Drug use: No    Sexual activity: Not Currently     Social Determinants of Health     Financial Resource Strain: Medium Risk (4/26/2024)    Overall Financial Resource Strain (CARDIA)     Difficulty of Paying Living Expenses: Somewhat hard   Food Insecurity: Food Insecurity Present (4/26/2024)    Hunger Vital Sign     Worried About Running Out of Food in the Last Year: Sometimes true     Ran Out of Food in the Last Year: Patient declined   Transportation Needs: No Transportation Needs (4/26/2024)    PRAPARE - Transportation      Lack of Transportation (Medical): No     Lack of Transportation (Non-Medical): No   Physical Activity: Inactive (4/26/2024)    Exercise Vital Sign     Days of Exercise per Week: 0 days     Minutes of Exercise per Session: 0 min   Stress: No Stress Concern Present (4/26/2024)    [facility] - Occupational Stress Questionnaire     Feeling of Stress : Only a little   Recent Concern: Stress - Stress Concern Present (3/7/2024)    [facility] - Occupational Stress Questionnaire     Feeling of Stress : Rather much   Housing Stability: Unknown (4/26/2024)    Housing Stability Vital Sign     Unable to Pay for Housing in the Last Year: Patient declined       Allergies:  Sulfa (sulfonamide antibiotics), Naproxen, and Albuterol    Medications:  Outpatient Encounter Medications as of 5/16/2024   Medication Sig Dispense Refill    ascorbic acid, vitamin C, (VITAMIN C) 1000 MG tablet Take 1,000 mg by mouth 2 (two) times daily.       azelastine (OPTIVAR) 0.05 % ophthalmic solution Place 1 drop into both eyes. As needed      B-complex with vitamin C (Z-BEC OR EQUIV) tablet Take 1 tablet by mouth once daily.      Bifidobacterium infantis (ALIGN ORAL) Take by mouth once daily.      biotin 10,000 mcg Cap Take 1 tablet by mouth every evening.       blood sugar diagnostic [employer] preferred meter and strips. Check daily 90 each 3    blood-glucose meter kit Use as instructed. Insurance preferred meter. 1 each 0    cranberry 500 mg Cap Take 1 capsule by mouth every evening.      diltiaZEM (CARDIZEM CD) 120 MG Cp24 Take 1 capsule (120 mg total) by mouth once daily. 90 capsule 3    doxazosin (CARDURA) 2 MG tablet Take 1 tablet (2 mg total) by mouth every evening. 90 tablet 3    EPINEPHrine (EPIPEN) 0.3 mg/0.3 mL AtIn Inject 1 each into the muscle as needed.   3    erythromycin with ethanoL (THERAMYCIN) 2 % external solution Aaa bid prn 60 mL 3    esomeprazole (NEXIUM) 40 MG  capsule Take 1 capsule (40 mg total) by mouth before breakfast. 90 capsule 3    estradioL (ESTRACE) 0.01 % (0.1 mg/gram) vaginal cream Place 1 g vaginally 3 (three) times a week. Place by fingertip application before bedtime three times a week (Monday, Wednesday, Friday) 45 g 3    fexofenadine (ALLEGRA) 180 MG tablet Take 180 mg by mouth once daily.       fish oil-omega-3 fatty acids 300-1,000 mg capsule Take 2 g by mouth once daily.      fluconazole (DIFLUCAN) 200 MG Tab 1 po q week 12 tablet 1    fluticasone propionate (FLONASE) 50 mcg/actuation nasal spray 2 sprays (100 mcg total) by Each Nostril route once daily. 16 g 11    fluticasone-umeclidin-vilanter (TRELEGY ELLIPTA) 200-62.5-25 mcg inhaler Inhale 1 puff into the lungs once daily. 180 each 3    gabapentin (NEURONTIN) 600 MG tablet Take 1 tablet (600 mg total) by mouth 3 (three) times daily. 540 tablet 3    guaifenesin-codeine 100-10 mg/5 ml (GUAIFENESIN AC)  mg/5 mL syrup Take 5 mLs by mouth 3 (three) times daily as needed for Cough. 473 mL 2    hydrOXYzine HCL (ATARAX) 10 MG Tab Take 1 tablet (10 mg total) by mouth 3 (three) times daily as needed. 30 tablet 3    immun glob G,IgG,-pro-IgA 0-50 (HIZENTRA) 1 gram/5 mL (20 %) Soln Inject 55 mLs (11 g total) into the skin once a week. 220 mL 12    inhalation spacing device Use as directed for inhalation. 1 each 0    lancets (ACCU-CHEK SOFTCLIX LANCETS) Misc Use to check blood sugar daily. 100 each 11    levalbuterol (XOPENEX HFA) 45 mcg/actuation inhaler Inhale 1-2 puffs into the lungs every 4 (four) hours as needed for Wheezing or Shortness of Breath. Rescue 15 g 11    levalbuterol (XOPENEX) 1.25 mg/3 mL nebulizer solution Take 3 mLs (1.25 mg total) by nebulization every 4 (four) hours as needed for Wheezing or Shortness of Breath. Rescue 1 each 11    metFORMIN (GLUCOPHAGE-XR) 500 MG ER 24hr tablet Take 1 tablet (500 mg total) by mouth 2 (two) times daily with meals. 180 tablet 3    mometasone 0.1%  (ELOCON) 0.1 % cream aaa bid x 1-2 weeks prn bug bites 45 g 1    montelukast (SINGULAIR) 10 mg tablet Take 1 tablet (10 mg total) by mouth every evening. 90 tablet 3    mucus clearing device Cecy 1 Device by Misc.(Non-Drug; Combo Route) route 2 (two) times daily. 1 Device 0    multivitamin capsule Take 1 capsule by mouth once daily.       mupirocin (BACTROBAN) 2 % ointment Apply topically 3 (three) times daily. 15 g 0    mupirocin (BACTROBAN) 2 % ointment Apply topically 3 (three) times daily. 30 g 1    potassium chloride SA (K-DUR,KLOR-CON) 20 MEQ tablet TAKE 1 TABLET ONE TIME DAILY 90 tablet 3    pravastatin (PRAVACHOL) 80 MG tablet Take 1 tablet (80 mg total) by mouth once daily. 90 tablet 4    psyllium husk (METAMUCIL ORAL) Take by mouth.      SIMETHICONE (GAS-X ORAL) Take 1 capsule by mouth as needed (gas relief).       tezepelumab-ekko (TEZSPIRE) 210 mg/1.91 mL (110 mg/mL) Syrg Inject 1.91 mLs (210 mg total) into the skin every 28 days. 1.91 mL 11    tiZANidine (ZANAFLEX) 4 MG tablet Take 8 mg by mouth 2 (two) times daily.      traMADoL (ULTRAM) 50 mg tablet Take 1 tablet (50 mg total) by mouth every 8 (eight) hours as needed for Pain. 21 tablet 0    valsartan-hydrochlorothiazide (DIOVAN-HCT) 320-25 mg per tablet Take 1 tablet by mouth once daily. 90 tablet 2    vitamin D3-folic acid 5,000 unit- 1 mg Tab Take 1 tablet by mouth once daily.        Facility-Administered Encounter Medications as of 5/16/2024   Medication Dose Route Frequency Provider Last Rate Last Admin    levalbuterol nebulizer solution 1.25 mg  1.25 mg Nebulization 1 time in Clinic/Daysi Hebert NP        [DISCONTINUED] sodium hyaluronate (orthovisc) 30 mg  30 mg Intra-articular  Robbin Edmond MD   30 mg at 05/15/24 1400         PHYSICAL EXAMINATION:    The patient generally appears in good health, is appropriately interactive, and is in no apparent distress.    Skin: No lesions.    Mental: Cooperative with normal  affect.    Neuro: Grossly intact.    HEENT: Normal. No evidence of lymphadenopathy.    Chest:  normal inspiratory effort.    Abdomen: Soft, non-tender. No masses or organomegaly. Bladder is not palpable. No evidence of flank discomfort. No evidence of inguinal hernia.    Extremities: No clubbing, cyanosis, or edema        LABS:    Lab Results   Component Value Date    BUN 15 03/11/2024    CREATININE 0.7 03/11/2024       IMPRESSION:    Medication refractory OAB    PLAN:    1.  She is on program 5,    3+, 1-2-, impedances [id number].  All leads are intact.  Battery life is 0.5 to 3 months.    2.  Will plan to replace the IPG, will go on the contralateral side to see if a well placed lead can be achieved.  If it can, then will remove the lead she has and replace the entire system which will be MRI compatible.      I spent 30 minutes with the patient of which more than half was spent in direct consultation with the patient in regards to our treatment and plan.

## 2024-05-16 NOTE — PROGRESS NOTES
CHIEF COMPLAINT:    Mrs. Delatorre is a 73 y.o. female presenting for a follow up on medication refractory OAB, managed with an interStim.       PRESENTING ILLNESS:    Cornelia Delatorre is a 73 y.o. female who presents with a history of medication refractory overactive bladder.  She has had an InsterStim for many years.  The entire system was replaced on 10/14/2014.  The IPG was replaced on 2/18/2020.  She states she feels like the IPG is losing its charge as she has more urinary incontinence.  She finds that she is leaking more with a constant urge incontinence and dribbling. She does a good job managing herself because she can change the program and she can increase/decrease the amount of stimulation.      REVIEW OF SYSTEMS:    Review of Systems   Constitutional: Negative.    HENT: Negative.     Eyes: Negative.    Respiratory: Negative.     Cardiovascular: Negative.    Gastrointestinal:         Bowel incontinence   Genitourinary:  Positive for frequency and urgency.   Musculoskeletal:  Positive for back pain and joint pain.   Skin: Negative.    Neurological: Negative.    Endo/Heme/Allergies: Negative.    Psychiatric/Behavioral: Negative.         PATIENT HISTORY:    Past Medical History:   Diagnosis Date    Allergy     Arthritis     Asthma     Cancer     skin cancer to right hand    Cataract     Chronic fatigue 01/24/2017    CVID (common variable immunodeficiency) 03/07/2019    Diabetes mellitus     type2    KLINE (dyspnea on exertion) 01/24/2017    Dyslipidemia 06/25/2019    Encounter for blood transfusion     Essential hypertension 12/29/2011    GERD (gastroesophageal reflux disease)     Headache(784.0)     Hyperlipidemia 07/15/2015    Hypertension     Hypothyroidism     Neuropathy     Obesity     Postmenopausal HRT (hormone replacement therapy)     Rash     Rosacea     Sleep apnea     on bipap    Snoring     Squamous cell carcinoma of skin     left forearm     UTI (urinary tract infection)     Wears glasses         Past Surgical History:   Procedure Laterality Date    ADENOIDECTOMY      APPENDECTOMY      BLADDER SUSPENSION      BREAST BIOPSY Bilateral 08/1992    bilateral benign excisional biopsies    BUNIONECTOMY  10/17/2014    right, still has discomfort    CATARACT EXTRACTION W/  INTRAOCULAR LENS IMPLANT Bilateral     CHOLECYSTECTOMY  08/02/2017    COLONOSCOPY      CYSTOSCOPY      EPIDURAL STEROID INJECTION INTO LUMBAR SPINE N/A 08/14/2019    Procedure: Injection-steroid-epidural-lumbar L5/S1;  Surgeon: Fredi Rojas MD;  Location: [facility] OR;  Service: Pain Management;  Laterality: N/A;    EPIDURAL STEROID INJECTION INTO LUMBAR SPINE N/A 05/03/2021    Procedure: Injection-steroid-epidural-lumbar L5/S1 to the Left;  Surgeon: Fredi Rojas MD;  Location: [facility] OR;  Service: Pain Management;  Laterality: N/A;    EPIDURAL STEROID INJECTION INTO LUMBAR SPINE N/A 09/24/2021    Procedure: Injection-steroid-epidural-lumbar L5/S1;  Surgeon: Fredi Rojas MD;  Location: [facility] OR;  Service: Pain Management;  Laterality: N/A;    EPIDURAL STEROID INJECTION INTO LUMBAR SPINE N/A 02/21/2022    Procedure: Injection-steroid-epidural-lumbar L5/S1;  Surgeon: Fredi Rojas MD;  Location: [facility] OR;  Service: Pain Management;  Laterality: N/A;    EPIDURAL STEROID INJECTION INTO LUMBAR SPINE N/A 02/09/2024    Procedure: Injection-steroid-epidural-lumbar       L5/S1;  Surgeon: Fredi Rojas MD;  Location: [facility] OR;  Service: Pain Management;  Laterality: N/A;    ESOPHAGEAL DILATION N/A 06/11/2021    Procedure: DILATION, ESOPHAGUS;  Surgeon: Jake Sheriff MD;  Location: [facility];  Service: Endoscopy;  Laterality: N/A;    ESOPHAGOGASTRODUODENOSCOPY      dilated esophagus    ESOPHAGOGASTRODUODENOSCOPY N/A 06/11/2021    Procedure: EGD (ESOPHAGOGASTRODUODENOSCOPY);  Surgeon: Jake Sheriff MD;  Location: [facility];  Service: Endoscopy;  Laterality: N/A;    GASTRIC BYPASS      2011    HYSTERECTOMY  02/1980    interstim bladder   2009    10/14/14 new battery    OOPHORECTOMY  02/1980    REPLACEMENT OF SACRAL NERVE STIMULATOR  02/18/2020    Procedure: REPLACEMENT, NEUROSTIMULATOR, SACRAL;  Surgeon: Mary Jane Jarvis MD;  Location: [facility] OR [address] [place] [address];  Service: Urology;;    REVISION OF PROCEDURE INVOLVING SACRAL NEUROSTIMULATOR DEVICE Right 02/18/2020    Procedure: REVISION, NEUROSTIMULATOR, SACRAL/ battery replacement;  Surgeon: Mary Jane Jarvis MD;  [facility];  Service: Urology;  Laterality: Right;  1hr/ rep contacted/ (CONNIE)    SKIN CANCER EXCISION      left hand    TONSILLECTOMY      TRANSFORAMINAL EPIDURAL INJECTION OF STEROID Bilateral 03/05/2024    Procedure: Injection,steroid,epidural,transforaminal approach      L4/5;  Surgeon: Fredi Rojas MD;  Location: [facility] OR;  Service: Pain Management;  Laterality: Bilateral;    WISDOM TOOTH EXTRACTION         Family History   Problem Relation Name Age of Onset    Breast cancer Mother  50    Breast cancer Paternal Aunt          40's    Cervical cancer Paternal Grandmother      Ovarian cancer Paternal Grandmother      Cervical cancer Paternal Cousin      Ovarian cancer Paternal Cousin 1st     Diabetes Other      Hypertension Other      Hypothyroidism Other       Social History     Socioeconomic History    Marital status:    Tobacco Use    Smoking status: Never    Smokeless tobacco: Never   Substance and Sexual Activity    Alcohol use: Not Currently    Drug use: No    Sexual activity: Not Currently     Social Determinants of Health     Financial Resource Strain: Medium Risk (4/26/2024)    Overall Financial Resource Strain (CARDIA)     Difficulty of Paying Living Expenses: Somewhat hard   Food Insecurity: Food Insecurity Present (4/26/2024)    Hunger Vital Sign     Worried About Running Out of Food in the Last Year: Sometimes true     Ran Out of Food in the Last Year: Patient declined   Transportation Needs: No Transportation Needs (4/26/2024)    PRAPARE - Transportation      Lack of Transportation (Medical): No     Lack of Transportation (Non-Medical): No   Physical Activity: Inactive (4/26/2024)    Exercise Vital Sign     Days of Exercise per Week: 0 days     Minutes of Exercise per Session: 0 min   Stress: No Stress Concern Present (4/26/2024)    [facility] of Occupational Health - Occupational Stress Questionnaire     Feeling of Stress : Only a little   Recent Concern: Stress - Stress Concern Present (3/7/2024)    [facility] of Occupational Health - Occupational Stress Questionnaire     Feeling of Stress : Rather much   Housing Stability: Unknown (4/26/2024)    Housing Stability Vital Sign     Unable to Pay for Housing in the Last Year: Patient declined       Allergies:  Sulfa (sulfonamide antibiotics), Naproxen, and Albuterol    Medications:  Outpatient Encounter Medications as of 5/16/2024   Medication Sig Dispense Refill    ascorbic acid, vitamin C, (VITAMIN C) 1000 MG tablet Take 1,000 mg by mouth 2 (two) times daily.       azelastine (OPTIVAR) 0.05 % ophthalmic solution Place 1 drop into both eyes. As needed      B-complex with vitamin C (Z-BEC OR EQUIV) tablet Take 1 tablet by mouth once daily.      Bifidobacterium infantis (ALIGN ORAL) Take by mouth once daily.      biotin 10,000 mcg Cap Take 1 tablet by mouth every evening.       blood sugar diagnostic [employer] preferred meter and strips. Check daily 90 each 3    blood-glucose meter kit Use as instructed. Insurance preferred meter. 1 each 0    cranberry 500 mg Cap Take 1 capsule by mouth every evening.      diltiaZEM (CARDIZEM CD) 120 MG Cp24 Take 1 capsule (120 mg total) by mouth once daily. 90 capsule 3    doxazosin (CARDURA) 2 MG tablet Take 1 tablet (2 mg total) by mouth every evening. 90 tablet 3    EPINEPHrine (EPIPEN) 0.3 mg/0.3 mL AtIn Inject 1 each into the muscle as needed.   3    erythromycin with ethanoL (THERAMYCIN) 2 % external solution Aaa bid prn 60 mL 3    esomeprazole (NEXIUM) 40 MG  capsule Take 1 capsule (40 mg total) by mouth before breakfast. 90 capsule 3    estradioL (ESTRACE) 0.01 % (0.1 mg/gram) vaginal cream Place 1 g vaginally 3 (three) times a week. Place by fingertip application before bedtime three times a week (Monday, Wednesday, Friday) 45 g 3    fexofenadine (ALLEGRA) 180 MG tablet Take 180 mg by mouth once daily.       fish oil-omega-3 fatty acids 300-1,000 mg capsule Take 2 g by mouth once daily.      fluconazole (DIFLUCAN) 200 MG Tab 1 po q week 12 tablet 1    fluticasone propionate (FLONASE) 50 mcg/actuation nasal spray 2 sprays (100 mcg total) by Each Nostril route once daily. 16 g 11    fluticasone-umeclidin-vilanter (TRELEGY ELLIPTA) 200-62.5-25 mcg inhaler Inhale 1 puff into the lungs once daily. 180 each 3    gabapentin (NEURONTIN) 600 MG tablet Take 1 tablet (600 mg total) by mouth 3 (three) times daily. 540 tablet 3    guaifenesin-codeine 100-10 mg/5 ml (GUAIFENESIN AC)  mg/5 mL syrup Take 5 mLs by mouth 3 (three) times daily as needed for Cough. 473 mL 2    hydrOXYzine HCL (ATARAX) 10 MG Tab Take 1 tablet (10 mg total) by mouth 3 (three) times daily as needed. 30 tablet 3    immun glob G,IgG,-pro-IgA 0-50 (HIZENTRA) 1 gram/5 mL (20 %) Soln Inject 55 mLs (11 g total) into the skin once a week. 220 mL 12    inhalation spacing device Use as directed for inhalation. 1 each 0    lancets (ACCU-CHEK SOFTCLIX LANCETS) Misc Use to check blood sugar daily. 100 each 11    levalbuterol (XOPENEX HFA) 45 mcg/actuation inhaler Inhale 1-2 puffs into the lungs every 4 (four) hours as needed for Wheezing or Shortness of Breath. Rescue 15 g 11    levalbuterol (XOPENEX) 1.25 mg/3 mL nebulizer solution Take 3 mLs (1.25 mg total) by nebulization every 4 (four) hours as needed for Wheezing or Shortness of Breath. Rescue 1 each 11    metFORMIN (GLUCOPHAGE-XR) 500 MG ER 24hr tablet Take 1 tablet (500 mg total) by mouth 2 (two) times daily with meals. 180 tablet 3    mometasone 0.1%  (ELOCON) 0.1 % cream aaa bid x 1-2 weeks prn bug bites 45 g 1    montelukast (SINGULAIR) 10 mg tablet Take 1 tablet (10 mg total) by mouth every evening. 90 tablet 3    mucus clearing device Cecy 1 Device by Misc.(Non-Drug; Combo Route) route 2 (two) times daily. 1 Device 0    multivitamin capsule Take 1 capsule by mouth once daily.       mupirocin (BACTROBAN) 2 % ointment Apply topically 3 (three) times daily. 15 g 0    mupirocin (BACTROBAN) 2 % ointment Apply topically 3 (three) times daily. 30 g 1    potassium chloride SA (K-DUR,KLOR-CON) 20 MEQ tablet TAKE 1 TABLET ONE TIME DAILY 90 tablet 3    pravastatin (PRAVACHOL) 80 MG tablet Take 1 tablet (80 mg total) by mouth once daily. 90 tablet 4    psyllium husk (METAMUCIL ORAL) Take by mouth.      SIMETHICONE (GAS-X ORAL) Take 1 capsule by mouth as needed (gas relief).       tezepelumab-ekko (TEZSPIRE) 210 mg/1.91 mL (110 mg/mL) Syrg Inject 1.91 mLs (210 mg total) into the skin every 28 days. 1.91 mL 11    tiZANidine (ZANAFLEX) 4 MG tablet Take 8 mg by mouth 2 (two) times daily.      traMADoL (ULTRAM) 50 mg tablet Take 1 tablet (50 mg total) by mouth every 8 (eight) hours as needed for Pain. 21 tablet 0    valsartan-hydrochlorothiazide (DIOVAN-HCT) 320-25 mg per tablet Take 1 tablet by mouth once daily. 90 tablet 2    vitamin D3-folic acid 5,000 unit- 1 mg Tab Take 1 tablet by mouth once daily.        Facility-Administered Encounter Medications as of 5/16/2024   Medication Dose Route Frequency Provider Last Rate Last Admin    levalbuterol nebulizer solution 1.25 mg  1.25 mg Nebulization 1 time in Clinic/Daysi Hebert NP        [DISCONTINUED] sodium hyaluronate (orthovisc) 30 mg  30 mg Intra-articular  Robbin Edmond MD   30 mg at 05/15/24 1400         PHYSICAL EXAMINATION:    The patient generally appears in good health, is appropriately interactive, and is in no apparent distress.    Skin: No lesions.    Mental: Cooperative with normal  affect.    Neuro: Grossly intact.    HEENT: Normal. No evidence of lymphadenopathy.    Chest:  normal inspiratory effort.    Abdomen: Soft, non-tender. No masses or organomegaly. Bladder is not palpable. No evidence of flank discomfort. No evidence of inguinal hernia.    Extremities: No clubbing, cyanosis, or edema        LABS:    Lab Results   Component Value Date    BUN 15 03/11/2024    CREATININE 0.7 03/11/2024       IMPRESSION:    Medication refractory OAB    PLAN:    1.  She is on program 5,    3+, 1-2-, impedances [id number].  All leads are intact.  Battery life is 0.5 to 3 months.    2.  Will plan to replace the IPG, will go on the contralateral side to see if a well placed lead can be achieved.  If it can, then will remove the lead she has and replace the entire system which will be MRI compatible.      I spent 30 minutes with the patient of which more than half was spent in direct consultation with the patient in regards to our treatment and plan.

## 2024-05-17 ENCOUNTER — CLINICAL SUPPORT (OUTPATIENT)
Dept: PULMONOLOGY | Facility: CLINIC | Age: 74
End: 2024-05-17
Payer: MEDICARE

## 2024-05-17 DIAGNOSIS — J45.50 SEVERE PERSISTENT ASTHMA WITHOUT COMPLICATION: Primary | ICD-10-CM

## 2024-05-17 NOTE — PROGRESS NOTES
Patient is here for her monthly Tezspire  injection.  2 patient identifiers used (Name and ).  Patient received the injection to the  left arm  subcutaneous.  No redness, bleeding, or swelling noted to the injection site.  Pt tolerated well.    NDC: 27151-800-31  LOT: [id number]  EXP: 2025

## 2024-05-20 ENCOUNTER — TELEPHONE (OUTPATIENT)
Dept: UROLOGY | Facility: CLINIC | Age: 74
End: 2024-05-20
Payer: MEDICARE

## 2024-05-20 DIAGNOSIS — R35.0 URINARY FREQUENCY: Primary | ICD-10-CM

## 2024-05-22 NOTE — PRE-PROCEDURE INSTRUCTIONS
Pt reviewed by BETTY RN on 5/22/24. OK to proceed at [facility].

## 2024-06-04 ENCOUNTER — ANESTHESIA EVENT (OUTPATIENT)
Dept: SURGERY | Facility: HOSPITAL | Age: 74
End: 2024-06-04
Payer: MEDICARE

## 2024-06-04 NOTE — PRE-PROCEDURE INSTRUCTIONS
Dear Cornelia ,     You are scheduled for a procedure with Dr. Jarvis on 6/5/2024. Your scheduled arrival time is 8:20 AM.  This arrival time is roughly 2 hours before your anticipated procedure time to allow sufficient time for pre-op.  Please wear comfortable clothes.  This procedure will take place at the [facility] at the corner of [facility].  It is in the [facility] next to [facility].  The address is:     [address] [place] [address].  [place] [id number]     After entering the building, you will proceed to the second floor where you can check in with registration. You should take any medications that you routinely take for blood pressure (other than those listed below), heart medications, thyroid, cholesterol, etc.      If you wear contact lenses, please wear glasses to your procedure.     Your fasting instructions are as follow:  Nothing to eat after midnight the evening before your surgery. You may drink clear liquids up until 2 hours prior to your arrival time. You MUST have a responsible adult to bring you home.        The evening before and morning of your procedure, please hold the following medications:  -Aspirin and Aspirin-containing products (Goody's powder, Excedrin)  -NSAIDs (Advil, Ibuprofen, Aleve, Diclofenac)  -Vitamins/Supplements  -Herbal remedies/Teas  -Stimulants (Adderall, Vyvanse, Adipex)  -Diabetic medication (Please bring with you day of procedure)  -Prescription creams/gels/lotions        You can take Tylenol        The evening before and morning of your procedure, take a shower using antibacterial soap (ex: Hibiclens or Dial antibacterial soap). DO NOT apply deodorant, lotion, cologne, or anything else to the skin. Wear loose, comfortable fitting clothing. Do not wear jewelry or bring any valuables with you. If you wear dentures or contacts, please bring your case with you or leave them at home. Use and bring any inhalers that you  may have.     If you have any procedure-specific questions, please call your surgeon's office. Any other questions, don't hesitate to call at [phone].     Thanks,  KATIE Mitchell  Pre-Admit Testing  Anesthesia Dept OC

## 2024-06-05 ENCOUNTER — ANESTHESIA (OUTPATIENT)
Dept: SURGERY | Facility: HOSPITAL | Age: 74
End: 2024-06-05
Payer: MEDICARE

## 2024-06-05 ENCOUNTER — TELEPHONE (OUTPATIENT)
Dept: UROLOGY | Facility: HOSPITAL | Age: 74
End: 2024-06-05
Payer: MEDICARE

## 2024-06-05 ENCOUNTER — HOSPITAL ENCOUNTER (OUTPATIENT)
Facility: HOSPITAL | Age: 74
Discharge: HOME | End: 2024-06-05
Attending: UROLOGY | Admitting: UROLOGY
Payer: MEDICARE

## 2024-06-05 VITALS
DIASTOLIC BLOOD PRESSURE: 70 MMHG | SYSTOLIC BLOOD PRESSURE: 159 MMHG | OXYGEN SATURATION: 98 % | RESPIRATION RATE: 16 BRPM | BODY MASS INDEX: 35.48 KG/M2 | HEART RATE: 67 BPM | TEMPERATURE: 98 F | WEIGHT: 206.69 LBS

## 2024-06-05 DIAGNOSIS — N32.81 OVERACTIVE BLADDER: ICD-10-CM

## 2024-06-05 LAB
POCT GLUCOSE: 104 MG/DL (ref 70–110)
POCT GLUCOSE: 132 MG/DL (ref 70–110)

## 2024-06-05 PROCEDURE — 88300 SURGICAL PATH GROSS: CPT | Mod: 59 | Performed by: PATHOLOGY

## 2024-06-05 PROCEDURE — 36000707: Performed by: UROLOGY

## 2024-06-05 PROCEDURE — 25000003 PHARM REV CODE 250

## 2024-06-05 PROCEDURE — 71000039 HC RECOVERY, EACH ADD'L HOUR: Performed by: UROLOGY

## 2024-06-05 PROCEDURE — 37000009 HC ANESTHESIA EA ADD 15 MINS: Performed by: UROLOGY

## 2024-06-05 PROCEDURE — 64561 IMPLANT NEUROELECTRODES: CPT | Mod: HCNC,LT,, | Performed by: UROLOGY

## 2024-06-05 PROCEDURE — 63600175 PHARM REV CODE 636 W HCPCS

## 2024-06-05 PROCEDURE — 27201423 OPTIME MED/SURG SUP & DEVICES STERILE SUPPLY: Performed by: UROLOGY

## 2024-06-05 PROCEDURE — C1767 GENERATOR, NEURO NON-RECHARG: HCPCS | Performed by: UROLOGY

## 2024-06-05 PROCEDURE — 37000008 HC ANESTHESIA 1ST 15 MINUTES: Performed by: UROLOGY

## 2024-06-05 PROCEDURE — 82962 GLUCOSE BLOOD TEST: CPT | Performed by: UROLOGY

## 2024-06-05 PROCEDURE — 99900035 HC TECH TIME PER 15 MIN (STAT)

## 2024-06-05 PROCEDURE — 71000016 HC POSTOP RECOV ADDL HR: Performed by: UROLOGY

## 2024-06-05 PROCEDURE — 71000015 HC POSTOP RECOV 1ST HR: Performed by: UROLOGY

## 2024-06-05 PROCEDURE — C1787 PATIENT PROGR, NEUROSTIM: HCPCS | Performed by: UROLOGY

## 2024-06-05 PROCEDURE — C1897 LEAD, NEUROSTIM TEST KIT: HCPCS | Performed by: UROLOGY

## 2024-06-05 PROCEDURE — 25000003 PHARM REV CODE 250: Performed by: NURSE ANESTHETIST, CERTIFIED REGISTERED

## 2024-06-05 PROCEDURE — 88300 SURGICAL PATH GROSS: CPT | Mod: 26,HCNC,, | Performed by: PATHOLOGY

## 2024-06-05 PROCEDURE — 63600175 PHARM REV CODE 636 W HCPCS: Mod: JZ,JG | Performed by: NURSE ANESTHETIST, CERTIFIED REGISTERED

## 2024-06-05 PROCEDURE — 94761 N-INVAS EAR/PLS OXIMETRY MLT: CPT

## 2024-06-05 PROCEDURE — 25000003 PHARM REV CODE 250: Performed by: UROLOGY

## 2024-06-05 PROCEDURE — 71000033 HC RECOVERY, INTIAL HOUR: Performed by: UROLOGY

## 2024-06-05 PROCEDURE — C1778 LEAD, NEUROSTIMULATOR: HCPCS | Performed by: UROLOGY

## 2024-06-05 PROCEDURE — 64590 INS/RPL PRPH SAC/GSTR NPG/R: CPT | Mod: 51,HCNC,, | Performed by: UROLOGY

## 2024-06-05 PROCEDURE — 82962 GLUCOSE BLOOD TEST: CPT | Mod: 91 | Performed by: UROLOGY

## 2024-06-05 PROCEDURE — 36000706: Performed by: UROLOGY

## 2024-06-05 DEVICE — SYS INTERSTIM X RECHARGE FREE: Type: IMPLANTABLE DEVICE | Site: BACK | Status: FUNCTIONAL

## 2024-06-05 DEVICE — LEAD INTERSTIM 2 SURESCAN 28CM: Type: IMPLANTABLE DEVICE | Site: BACK | Status: FUNCTIONAL

## 2024-06-05 RX ORDER — DEXMEDETOMIDINE HYDROCHLORIDE 100 UG/ML
INJECTION, SOLUTION INTRAVENOUS
Status: DISCONTINUED | OUTPATIENT
Start: 2024-06-05 | End: 2024-06-05

## 2024-06-05 RX ORDER — OXYCODONE HYDROCHLORIDE 5 MG/1
5 TABLET ORAL EVERY 4 HOURS PRN
Qty: 5 TABLET | Refills: 0 | Status: SHIPPED | OUTPATIENT
Start: 2024-06-05

## 2024-06-05 RX ORDER — FENTANYL CITRATE 50 UG/ML
INJECTION, SOLUTION INTRAMUSCULAR; INTRAVENOUS
Status: DISCONTINUED | OUTPATIENT
Start: 2024-06-05 | End: 2024-06-05

## 2024-06-05 RX ORDER — AMOXICILLIN AND CLAVULANATE POTASSIUM 875; 125 MG/1; MG/1
1 TABLET, FILM COATED ORAL 2 TIMES DAILY
Qty: 10 TABLET | Refills: 0 | Status: SHIPPED | OUTPATIENT
Start: 2024-06-05 | End: 2024-06-10

## 2024-06-05 RX ORDER — LIDOCAINE HYDROCHLORIDE 20 MG/ML
INJECTION INTRAVENOUS
Status: DISCONTINUED | OUTPATIENT
Start: 2024-06-05 | End: 2024-06-05

## 2024-06-05 RX ORDER — SODIUM CHLORIDE 9 MG/ML
INJECTION, SOLUTION INTRAVENOUS CONTINUOUS
Status: DISCONTINUED | OUTPATIENT
Start: 2024-06-05 | End: 2024-06-05 | Stop reason: HOSPADM

## 2024-06-05 RX ORDER — LIDOCAINE HYDROCHLORIDE AND EPINEPHRINE 10; 10 MG/ML; UG/ML
INJECTION, SOLUTION INFILTRATION; PERINEURAL
Status: DISCONTINUED | OUTPATIENT
Start: 2024-06-05 | End: 2024-06-05 | Stop reason: HOSPADM

## 2024-06-05 RX ORDER — ONDANSETRON HYDROCHLORIDE 2 MG/ML
INJECTION, SOLUTION INTRAVENOUS
Status: DISCONTINUED | OUTPATIENT
Start: 2024-06-05 | End: 2024-06-05

## 2024-06-05 RX ORDER — ACETAMINOPHEN 10 MG/ML
INJECTION, SOLUTION INTRAVENOUS
Status: DISCONTINUED | OUTPATIENT
Start: 2024-06-05 | End: 2024-06-05

## 2024-06-05 RX ORDER — PROPOFOL 10 MG/ML
VIAL (ML) INTRAVENOUS CONTINUOUS PRN
Status: DISCONTINUED | OUTPATIENT
Start: 2024-06-05 | End: 2024-06-05

## 2024-06-05 RX ADMIN — LIDOCAINE HYDROCHLORIDE 20 MG: 20 INJECTION INTRAVENOUS at 01:06

## 2024-06-05 RX ADMIN — GLYCOPYRROLATE 0.1 MCG: 0.2 INJECTION, SOLUTION INTRAMUSCULAR; INTRAVENOUS at 01:06

## 2024-06-05 RX ADMIN — Medication 50 MCG/KG/MIN: at 01:06

## 2024-06-05 RX ADMIN — GENTAMICIN SULFATE 320 MG: 40 INJECTION, SOLUTION INTRAMUSCULAR; INTRAVENOUS at 09:06

## 2024-06-05 RX ADMIN — SODIUM CHLORIDE: 0.9 INJECTION, SOLUTION INTRAVENOUS at 12:06

## 2024-06-05 RX ADMIN — Medication 20 MG: at 01:06

## 2024-06-05 RX ADMIN — ONDANSETRON 4 MG: 2 INJECTION INTRAMUSCULAR; INTRAVENOUS at 01:06

## 2024-06-05 RX ADMIN — DEXMEDETOMIDINE HYDROCHLORIDE 4 MCG: 100 INJECTION, SOLUTION INTRAVENOUS at 01:06

## 2024-06-05 RX ADMIN — ACETAMINOPHEN 1000 MG: 10 INJECTION INTRAVENOUS at 01:06

## 2024-06-05 RX ADMIN — FENTANYL CITRATE 25 MCG: 50 INJECTION, SOLUTION INTRAMUSCULAR; INTRAVENOUS at 02:06

## 2024-06-05 RX ADMIN — AMPICILLIN SODIUM 1 G: 1 INJECTION, POWDER, FOR SOLUTION INTRAMUSCULAR; INTRAVENOUS at 10:06

## 2024-06-05 RX ADMIN — SODIUM CHLORIDE: 9 INJECTION, SOLUTION INTRAVENOUS at 09:06

## 2024-06-05 RX ADMIN — DEXMEDETOMIDINE HYDROCHLORIDE 8 MCG: 100 INJECTION, SOLUTION INTRAVENOUS at 01:06

## 2024-06-05 RX ADMIN — DEXMEDETOMIDINE HYDROCHLORIDE 8 MCG: 100 INJECTION, SOLUTION INTRAVENOUS at 12:06

## 2024-06-05 RX ADMIN — FENTANYL CITRATE 25 MCG: 50 INJECTION, SOLUTION INTRAMUSCULAR; INTRAVENOUS at 01:06

## 2024-06-05 NOTE — BRIEF OP NOTE
[facility]  Brief Operative Note    Surgery Date: 6/5/2024     Surgeons and Role:     * Mary Jane Jarvis MD - Primary    Assisting Surgeon: None    Pre-op Diagnosis:  Urinary frequency [R35.0]    Post-op Diagnosis:  Post-Op Diagnosis Codes:     * Urinary frequency [R35.0]    Procedure(s) (LRB):  Replacement, Battery, Neurostimulator (N/A)  INSERTION, ELECTRODE LEAD, NEUROSTIMULATOR, SACRAL, PERCUTANEOUS (N/A)    Anesthesia: Local MAC    Operative Findings: Interstim lead removed from right side.  New lead placed on left.  New battery placed.      Estimated Blood Loss: min          Specimens:   Specimen (24h ago, onward)       Start     Ordered    06/05/24 1439  Specimen to Pathology, Surgery Urology  Once        Comments: Pre-op Diagnosis: Urinary frequency [R35.0]Procedure(s):Replacement, Battery, NeurostimulatorINSERTION, ELECTRODE LEAD, NEUROSTIMULATOR, SACRAL, PERCUTANEOUS Number of specimens: 2Name of specimens: 1. Interstim lead - gross2. Interstim battery - gross     References:    Click here for ordering Quick Tip   Question Answer Comment   Procedure Type: Urology    Release to patient Immediate        06/05/24 1202                  I was present for the entire case and agree with the above note.      Discharge Note    OUTCOME: Patient tolerated treatment/procedure well without complication and is now ready for discharge.    DISPOSITION: Home or Self Care    FINAL DIAGNOSIS:  overactive bladder, Depleted SNM battery. Desires ability to have an MRI    FOLLOWUP: In clinic 1-2 weeks for a wound check.        ACTIVITY  There are no restrictions in activity. Start doing again the things you did before the procedure.  You may experience a slight burning sensation. You may notice a small amount of blood in your urine. This will clear up within a day. Call the clinic if this continues beyond 48 hours.     DIET  Continue your normal diet. You may eat the same foods you ate before  your procedure.  Drink plenty of fluids during the first 24-48 hours following your procedure.     MEDICATIONS  Resume all other previous medications from your prescribing physician.  Continue any pre-procedure antibiotics until they are all gone.     SIGNS AND SYMPTOMS TO REPORT TO THE DOCTOR  Chills or fever greater than 101° F within 24 hours of procedure.  Changes in urination, such as increased bleeding, foul smell, cloudy urine, or painful urination.  Call your doctor with any questions or concerns.     For any emergency situation, call 911 immediately or go to your nearest emergency room.     As above.

## 2024-06-05 NOTE — DISCHARGE INSTRUCTIONS
Interstim Post-Operative Care    You play an important role in your own recovery. You will be given a daily diary to document the  effectiveness of the Interstim implant.     Caring for Your Wound   After surgery you will have a dressing over the surgical site.  Do not remove or change the dressing. If your dressing becomes saturated or undone, you  may reinforce it with more tape and/or gauze but you should call the office to be evaluated.    Post Surgical Pain Management   It is normal to experience some discomfort post operatively, however the stimulation should not  be painful.   Take Tylenol 650mg 1-2 tabs every 4-6 hours as needed if you feel tenderness from surgical  incision (Do not to exceed 4000mg daily) or Motrin 200mg 1-2 tabs every 4-6 hours.  Warning: Do not take additional Tylenol while taking Percocet or Vicodin. All of these  products contain Acetaminophen, which can be toxic if taken in excess.    Stimulation   Stimulation is the pulling or tingling sensation felt in the pelvic area (near the vagina, scrotum  or anal area.) It should not be painful. If it is painful or you feel the stimulation sensation move  down the legs decrease the stimulation and call the office and speak to a nurse.   During the trial period (stage 1) you may notice that the Interstim device has improved your  continence which means it is working and on the correct setting. If you are having episodes of  incontinence you will need to increase your device setting by moving the dial up slowly.    Activity   Avoid heavy lifting or strenuous activity for 14 days after your surgery.   Frontal Showers or Sponge bath only for two weeks after each surgery. Avoid immersion in any  water until after your post-op appointment.    Symptoms to Report   Severe or worsening pain, unrelieved by pain medications.   Fever, greater than 101.5ºF, chills or sweats   Painful or problems urinating   Any problems with the device    If you  experience any of the above symptoms, call the [facility] clinic at [phone] during business hours on weekdays. If after hours or on weekends, call [phone] and ask to speak with the urology resident on call.    You may also call the [employer] Patient Help Line for specific help troubleshooting your device.  For help with the temporary implant Call [phone].  For help with the permanent implant Call [phone].

## 2024-06-05 NOTE — ANESTHESIA PREPROCEDURE EVALUATION
06/05/2024  Cornelia Delatorre is a 73 y.o., female.      Pre-op Assessment       I have reviewed the Medications.     Review of Systems  Anesthesia Hx:             Denies Family Hx of Anesthesia complications.     Cardiovascular:     Hypertension            KLINE                            Pulmonary:    Asthma  Shortness of breath  Sleep Apnea                Hepatic/GI:     GERD             Musculoskeletal:  Arthritis               Neurological:    Neuromuscular Disease,  Headaches                                 Endocrine:  Diabetes Hypothyroidism            Physical Exam  General: Well nourished    Airway:  Mallampati: II   TM Distance: Normal  Neck ROM: Normal ROM    Dental:  Intact    Chest/Lungs:  Clear to auscultation    Heart:  Rate: Normal  Rhythm: Regular Rhythm    Anesthesia Plan  Type of Anesthesia, risks & benefits discussed:    Anesthesia Type: Gen ETT  Intra-op Monitoring Plan: Standard ASA Monitors  Post Op Pain Control Plan: multimodal analgesia  Informed Consent: Informed consent signed with the Patient and all parties understand the risks and agree with anesthesia plan.  All questions answered.   ASA Score: 2    Ready For Surgery From Anesthesia Perspective.   .

## 2024-06-05 NOTE — ANESTHESIA POSTPROCEDURE EVALUATION
Anesthesia Post Evaluation    Patient: Cornelia Delatorre    Procedure(s) Performed: Procedure(s) (LRB):  Replacement, Battery, Neurostimulator (N/A)  INSERTION, ELECTRODE LEAD, NEUROSTIMULATOR, SACRAL, PERCUTANEOUS (N/A)    Final Anesthesia Type: general      Patient location during evaluation: PACU  Patient participation: Yes- Able to Participate  Level of consciousness: awake and alert  Post-procedure vital signs: reviewed and stable  Pain management: adequate  Airway patency: patent    PONV status at discharge: No PONV  Anesthetic complications: no      Cardiovascular status: blood pressure returned to baseline  Respiratory status: unassisted  Hydration status: euvolemic  Follow-up not needed.          Vitals Value Taken Time   /55 06/05/24 1600   Temp 36.6 °C (97.8 °F) 06/05/24 1510   Pulse 71 06/05/24 1600   Resp 16 06/05/24 1600   SpO2 94 % 06/05/24 1556   Vitals shown include unfiled device data.      Event Time   Out of Recovery 15:10:00         Pain/Arlet Score: Arlet Score: 10 (6/5/2024  3:10 PM)

## 2024-06-05 NOTE — OP NOTE
[facility]  Operative Note     Date: 06/05/2024     Pre-Op Diagnosis: urinary urgency, frequency, urge urinary incontinence     Post-Op Diagnosis: same     Procedure(s) Performed:   1.  Explantation of InterStim generator  2.  Removal of InterStim tined lead  3.  Incision for implantation of neurostimulator electrodes, sacral nerve (transforamenal placement)  4.  Insertion of peripheral neurostimulator pulse generator (InterStim X)  5.  Fluoro < 1 h  6.  Electronic analysis of implanted neurostimulator pulse generator system with intraoperative programming     Specimen(s): old Interstim generator and lead sent for gross pathology      Staff Surgeon: Mary Jane Jarvis MD     Assistant Surgeon: Kiya Packer MD PGY-2     Anesthesia: MAC      Indications: Cornelia Delatorre is a 73 y.o. female with urgency, frequency and urge urinary incontinence.  This has been refractory to medical management.  The patient has decided to pursue neuromodulation therapy.   She had initial Interstim placement on 10/14/2014. She presents today for replacement with an MRI compatible device.     Findings:   Lead and pulse generator removed from right in entirety  Lead placed on left side  New generator placed on right side  Good sensory and motor function c/w S3 stimulation seen     Estimated Blood Loss: min     Drains: none     Procedure in Detail:  After informed consent was obtained, the patient was brought to the operating suite and placed in the prone position.  Pillows were placed under lower abdomen to flatten sacrum and under shins to allow the toes to dangle freely.  MAC anesthesia was administered. The patient's back, buttocks and anus were prepped and draped in the usual sterile fashion.  Timeout was performed and pre-operative antibiotics were confirmed.       The generator site was located on the patient's left buttock.  Local anesthetic was used to anesthetize the skin incision overlying the generator. An incision  was then made sharply with a 10 blade.  Bovie electrocautery was then used to dissect down to the capsule. A 10 blade was used to open up the capsule which was closely adherent to the generator. The generator was delivered out of the incision.      The lead was identified and protected. Hemostasis was achieved using bovie electrocautery. The small screwdriver was then used to loosen the screw to detach the lead from the generator. The generator was removed and passed off the field.    The C-arm was draped and moved into AP position to provide fluoroscopic mapping of the sacral region.   A point 2 cm lateral and 2 cm superior to the marked S3 level on the left side was marked as the entry point for the foramen needle.  1% lidocaine with epinephrine was injected into this area where the finder needles were to be placed.  A foramen needle was introduced until the S3 foramen was identified and penetrated. The depth of the needle was confirmed and adjusted fluoroscopically.  Proper needle position was confirmed by patient identification of location of sensation and direct observation of the lifting of the perineum utilizing the external test stimulator.  The foramen needle stylet was removed and a directional guide was placed and confirmed fluoroscopically.  The needle was removed keeping the directional guide in place.  An incision was made peripherally to the directional guide through the fascial layer. The lead introducer sheath with dilator was placed over the directional guide and directed into the foramen ensuring the radiopaque marker did not extend beyond the anterior edge of the sacrum. The dilator was unlocked and removed along with the directional guide keeping the introducer sheath in place.     The lead was then placed through the introducer sheath to the first white line. Position was checked fluoroscopically.  The lead was then further introduced until 2 electrodes were visible below the sacrum. Each  electrode was tested for location of patient sensation, visualization of luis, and plantar flexion of the great toe.  Testing was compared to the patient's existing lead, which showed luis response in two leads.  The newly placed lead showed low amplitude luis responses within two leads (lead 0 and 1).  After satisfactory positioning was confirmed, the introducer sheath was retracted under continuous fluoro, deploying lead tines into the perisacral tissue.       The patient's existing lead was then pulled gently and we identified the previous midline incision. This area was infiltrated with local anesthetic.  A 10 blade was used to make a skin incision over the identified area. A right angle was then used to deliver the lead out of the midline incision. A hemostat was used to clamp the lead and the lead was brought from the generator site to the midline incision and out the skin. The cardiac stent device was used to provide backing to the lead. Gentle traction was then used to slowly remove the remainder of the lead. The entire lead was removed and inspected and found to be completely intact.  This was confirmed on fluoroscopy.        The capsule was incised with the bovie with multiple incisions circumferentially to allow for drainage.  The trocar with sheath was placed from the lead exit site subcutaneously to the incised pocket site. The trocar was removed and the lead was fed through the sheath and pulled out at the pocket site. The lead was cleansed of bodily fluids and dried. The lead was inserted into the neurostimulator and the metal bands were aligned with the blue lead tip clearly visible in the distal portion of the header. The single setscrew was tightened with the hex wrench.  The neurostimulator was delivered into the subcutaneous pocket with the etched identification side placed upwards and the excessive lead wrapped around the neurostimulator. The programming head was placed over the  implanted neurostimulator in a sterile cover to ensure adequate lead connection and that parameters were within normal limits. Impedances were confirmed to be within normal limits.       The incisions were then closed using deep dermal 3-0 vicryl interrupted sutures followed by a 4-0 monocryl subcuticular suture.  The needle entry point incision was closed in one layer with a simple interrupted 4-0 monocryl suture. A sterile dressing consisting of steri-strips, tefla, and a tegaderm was applied.         The patient tolerated the procedure well and was transferred to the recovery room in stable condition.       Disposition:  The patient will meet with the [employer] rep in the recovery room for instructions on programming the stimulator. The patient was given prescriptions for antibiotics and oxycodone.      I was present for the entire case and agree with the above note.

## 2024-06-05 NOTE — TRANSFER OF CARE
Anesthesia Transfer of Care Note    Patient: Cornelia Delatorre    Procedure(s) Performed: Procedure(s) (LRB):  Replacement, Battery, Neurostimulator (N/A)  INSERTION, ELECTRODE LEAD, NEUROSTIMULATOR, SACRAL, PERCUTANEOUS (N/A)    Patient location: PACU    Anesthesia Type: general    Transport from OR: Transported from OR on room air with adequate spontaneous ventilation    Post assessment: no apparent anesthetic complications    Post vital signs: stable    Level of consciousness: awake    Nausea/Vomiting: no nausea/vomiting    Complications: none    Transfer of care protocol was followed      Last vitals: Visit Vitals  BP (!) 147/67   Pulse 67   Temp (P) 36.6 °C (97.8 °F) (Oral)   Resp 16   Wt 93.8 kg (206 lb 10.9 oz)   SpO2 97%   Breastfeeding No   BMI 35.48 kg/m²

## 2024-06-05 NOTE — INTERVAL H&P NOTE
The patient has been examined and the H&P has been reviewed:    I concur with the findings and no changes have occurred since H&P was written.    Surgery risks, benefits and alternative options discussed and understood by patient/family.      Urine dip spg 1.01, pH 7, negative for all other components.      There are no hospital problems to display for this patient.    Patient seen in holding.  No changes in clinical condition.  Proceed with planned procedure.    Patient was marked.  We reviewed the plan. Will change the IPG, try to get a new lead on the contralateral side.  If we get a good response then will place a new lead on that side, remove the old lead.  If a optimal lead placement is unable to be done, will connect the old lead to the new IPG.  She understands that if the lead cannot be replaced, the system will not be MRI conditional.

## 2024-06-05 NOTE — PLAN OF CARE
Pt in preop bay 41, VSS, meds to be given closer to procedure time and IV inserted. Pt denies any open wounds on body or the use of any weight loss injections. Pt needs an H&P, otherwise ready to roll. Procedural consents verified with pt.

## 2024-06-06 ENCOUNTER — TELEPHONE (OUTPATIENT)
Dept: ENDOCRINOLOGY | Facility: CLINIC | Age: 74
End: 2024-06-06
Payer: MEDICARE

## 2024-06-06 NOTE — TELEPHONE ENCOUNTER
----- Message from Júnior Long sent at 6/6/2024  9:29 AM CDT -----  Regarding: appt  Type:  Sooner Apoointment Request      Name of Caller:pt    When is the first available appointment?dept booked     Symptoms:annual       Best Call Back Number:[phone]      Additional Information: pt is looking to get schedule for dec.  please call to discuss.

## 2024-06-06 NOTE — TELEPHONE ENCOUNTER
----- Message from Farrah Wilson sent at 6/6/2024  9:23 AM CDT -----  Regarding: Appointment  Contact: [phone]  Calling to schedule appointment due to 2 week follow up from surgery. Please contact patient as soon as possible.

## 2024-06-12 ENCOUNTER — TELEPHONE (OUTPATIENT)
Dept: PULMONOLOGY | Facility: CLINIC | Age: 74
End: 2024-06-12
Payer: MEDICARE

## 2024-06-12 LAB
FINAL PATHOLOGIC DIAGNOSIS: NORMAL
GROSS: NORMAL
Lab: NORMAL

## 2024-06-12 NOTE — TELEPHONE ENCOUNTER
Placed a call to the pt to reschedule her biologic injection to 6/13/24. Pt is scheduled for 6/13/24 at 2pm

## 2024-06-13 ENCOUNTER — CLINICAL SUPPORT (OUTPATIENT)
Dept: PULMONOLOGY | Facility: CLINIC | Age: 74
End: 2024-06-13
Payer: MEDICARE

## 2024-06-13 DIAGNOSIS — J45.50 SEVERE PERSISTENT ASTHMA WITHOUT COMPLICATION: Primary | ICD-10-CM

## 2024-06-13 NOTE — PROGRESS NOTES
Patient here for her monthly Tezspire injection.  2 patient identifiers used (Name and ).  Pt injection given into the right arm subq.  No redness, swelling, or bleeding noted to the injection site.  Pt tolerated well.  Next injection scheduled.    NDC:  83496-343-12  LOT:  [id number]  Exp:  2026

## 2024-06-18 ENCOUNTER — OFFICE VISIT (OUTPATIENT)
Dept: UROLOGY | Facility: CLINIC | Age: 74
End: 2024-06-18
Payer: MEDICARE

## 2024-06-18 VITALS
SYSTOLIC BLOOD PRESSURE: 126 MMHG | DIASTOLIC BLOOD PRESSURE: 76 MMHG | HEART RATE: 62 BPM | BODY MASS INDEX: 36.02 KG/M2 | HEIGHT: 64 IN | WEIGHT: 211 LBS

## 2024-06-18 DIAGNOSIS — R35.0 URINARY FREQUENCY: Primary | ICD-10-CM

## 2024-06-18 DIAGNOSIS — N39.41 URGE INCONTINENCE: ICD-10-CM

## 2024-06-18 DIAGNOSIS — R39.15 URINARY URGENCY: ICD-10-CM

## 2024-06-18 PROCEDURE — 1157F ADVNC CARE PLAN IN RCRD: CPT | Mod: CPTII,S$GLB,, | Performed by: UROLOGY

## 2024-06-18 PROCEDURE — 99999 PR PBB SHADOW E&M-EST. PATIENT-LVL II: CPT | Mod: PBBFAC,,, | Performed by: UROLOGY

## 2024-06-18 PROCEDURE — 81002 URINALYSIS NONAUTO W/O SCOPE: CPT | Mod: S$GLB,,, | Performed by: UROLOGY

## 2024-06-18 PROCEDURE — 3288F FALL RISK ASSESSMENT DOCD: CPT | Mod: CPTII,S$GLB,, | Performed by: UROLOGY

## 2024-06-18 PROCEDURE — 99024 POSTOP FOLLOW-UP VISIT: CPT | Mod: S$GLB,,, | Performed by: UROLOGY

## 2024-06-18 PROCEDURE — 3061F NEG MICROALBUMINURIA REV: CPT | Mod: CPTII,S$GLB,, | Performed by: UROLOGY

## 2024-06-18 PROCEDURE — 3078F DIAST BP <80 MM HG: CPT | Mod: CPTII,S$GLB,, | Performed by: UROLOGY

## 2024-06-18 PROCEDURE — 3074F SYST BP LT 130 MM HG: CPT | Mod: CPTII,S$GLB,, | Performed by: UROLOGY

## 2024-06-18 PROCEDURE — 1101F PT FALLS ASSESS-DOCD LE1/YR: CPT | Mod: CPTII,S$GLB,, | Performed by: UROLOGY

## 2024-06-18 PROCEDURE — 3066F NEPHROPATHY DOC TX: CPT | Mod: CPTII,S$GLB,, | Performed by: UROLOGY

## 2024-06-18 PROCEDURE — 3044F HG A1C LEVEL LT 7.0%: CPT | Mod: CPTII,S$GLB,, | Performed by: UROLOGY

## 2024-06-18 NOTE — PROGRESS NOTES
CHIEF COMPLAINT:    Mrs. Delatorre is a 73 y.o. female presenting for a post op visit, following replacement of the InterStim SNM on 6/5/2024.    PRESENTING ILLNESS:    Cornelia Delatorre is a 73 y.o. female who returns for follow up.  She has no wound concerns.  She states that the new system has better control than the old system.  She is better able to modulate it.  The settings were adjusted yesterday over the phone.  She recalls being put on program 2 and thinks she is at 2.4 mA.        Past Surgical History:   Procedure Laterality Date    ADENOIDECTOMY      APPENDECTOMY      BLADDER SUSPENSION      BREAST BIOPSY Bilateral 08/1992    bilateral benign excisional biopsies    BUNIONECTOMY  10/17/2014    right, still has discomfort    CATARACT EXTRACTION W/  INTRAOCULAR LENS IMPLANT Bilateral     CHOLECYSTECTOMY  08/02/2017    COLONOSCOPY      CYSTOSCOPY      EPIDURAL STEROID INJECTION INTO LUMBAR SPINE N/A 08/14/2019    Procedure: Injection-steroid-epidural-lumbar L5/S1;  Surgeon: Fredi Rojas MD;  Location: [facility] OR;  Service: Pain Management;  Laterality: N/A;    EPIDURAL STEROID INJECTION INTO LUMBAR SPINE N/A 05/03/2021    Procedure: Injection-steroid-epidural-lumbar L5/S1 to the Left;  Surgeon: Fredi Rojas MD;  Location: [facility] OR;  Service: Pain Management;  Laterality: N/A;    EPIDURAL STEROID INJECTION INTO LUMBAR SPINE N/A 09/24/2021    Procedure: Injection-steroid-epidural-lumbar L5/S1;  Surgeon: Fredi Rojas MD;  Location: [facility] OR;  Service: Pain Management;  Laterality: N/A;    EPIDURAL STEROID INJECTION INTO LUMBAR SPINE N/A 02/21/2022    Procedure: Injection-steroid-epidural-lumbar L5/S1;  Surgeon: Fredi Rojas MD;  Location: [facility] OR;  Service: Pain Management;  Laterality: N/A;    EPIDURAL STEROID INJECTION INTO LUMBAR SPINE N/A 02/09/2024    Procedure: Injection-steroid-epidural-lumbar       L5/S1;  Surgeon: Fredi Rojas MD;  Location: [facility] OR;  Service: Pain Management;  Laterality:  N/A;    ESOPHAGEAL DILATION N/A 06/11/2021    Procedure: DILATION, ESOPHAGUS;  Surgeon: Jake Sheriff MD;  Location: [facility] ENDO;  Service: Endoscopy;  Laterality: N/A;    ESOPHAGOGASTRODUODENOSCOPY      dilated esophagus    ESOPHAGOGASTRODUODENOSCOPY N/A 06/11/2021    Procedure: EGD (ESOPHAGOGASTRODUODENOSCOPY);  Surgeon: Jake Sheriff MD;  Location: [facility] ENDO;  Service: Endoscopy;  Laterality: N/A;    GASTRIC BYPASS      2011    HYSTERECTOMY  02/1980    interstim bladder  2009    10/14/14 new battery    OOPHORECTOMY  02/1980    PERCUTANEOUS INSERTION OF SACRAL NERVE NEUROSTIMULATOR ELECTRODE LEAD N/A 6/5/2024    Procedure: INSERTION, ELECTRODE LEAD, NEUROSTIMULATOR, SACRAL, PERCUTANEOUS;  Surgeon: Mary Jane Jarvis MD;  Location: [facility] OR;  Service: Urology;  Laterality: N/A;  1 hour 30 minutes    REPLACEMENT OF NERVE STIMULATOR BATTERY N/A 6/5/2024    Procedure: Replacement, Battery, Neurostimulator;  Surgeon: Mary Jane Jarvis MD;  Location: [facility] OR;  Service: Urology;  Laterality: N/A;  1 hour 30 minutes    REPLACEMENT OF SACRAL NERVE STIMULATOR  02/18/2020    Procedure: REPLACEMENT, NEUROSTIMULATOR, SACRAL;  Surgeon: Mary Jane Jarvis MD;  Location: [facility] OR 2ND FLR;  Service: Urology;;    REVISION OF PROCEDURE INVOLVING SACRAL NEUROSTIMULATOR DEVICE Right 02/18/2020    Procedure: REVISION, NEUROSTIMULATOR, SACRAL/ battery replacement;  Surgeon: Mary Jane Jarvis MD;  Location: [facility] OR 2ND FLR;  Service: Urology;  Laterality: Right;  1hr/ rep contacted/ (CONNIE)    SKIN CANCER EXCISION      left hand    TONSILLECTOMY      TRANSFORAMINAL EPIDURAL INJECTION OF STEROID Bilateral 03/05/2024    Procedure: Injection,steroid,epidural,transforaminal approach      L4/5;  Surgeon: Fredi Rojas MD;  Location: [facility] OR;  Service: Pain Management;  Laterality: Bilateral;    WISDOM TOOTH EXTRACTION         Allergies:  Sulfa (sulfonamide antibiotics), Naproxen, and Albuterol    Medications:  Outpatient Encounter  Medications as of 6/18/2024   Medication Sig Dispense Refill    ascorbic acid, vitamin C, (VITAMIN C) 1000 MG tablet Take 1,000 mg by mouth 2 (two) times daily.       azelastine (OPTIVAR) 0.05 % ophthalmic solution Place 1 drop into both eyes. As needed      B-complex with vitamin C (Z-BEC OR EQUIV) tablet Take 1 tablet by mouth once daily.      Bifidobacterium infantis (ALIGN ORAL) Take by mouth once daily.      biotin 10,000 mcg Cap Take 1 tablet by mouth every evening.       blood sugar diagnostic [employer] preferred meter and strips. Check daily 90 each 3    cranberry 500 mg Cap Take 1 capsule by mouth every evening.      diltiaZEM (CARDIZEM CD) 120 MG Cp24 Take 1 capsule (120 mg total) by mouth once daily. 90 capsule 3    doxazosin (CARDURA) 2 MG tablet Take 1 tablet (2 mg total) by mouth every evening. 90 tablet 3    EPINEPHrine (EPIPEN) 0.3 mg/0.3 mL AtIn Inject 1 each into the muscle as needed.   3    erythromycin with ethanoL (THERAMYCIN) 2 % external solution Aaa bid prn 60 mL 3    esomeprazole (NEXIUM) 40 MG capsule Take 1 capsule (40 mg total) by mouth before breakfast. 90 capsule 3    estradioL (ESTRACE) 0.01 % (0.1 mg/gram) vaginal cream Place 1 g vaginally 3 (three) times a week. Place by fingertip application before bedtime three times a week (Monday, Wednesday, Friday) 45 g 3    fexofenadine (ALLEGRA) 180 MG tablet Take 180 mg by mouth once daily.       fish oil-omega-3 fatty acids 300-1,000 mg capsule Take 2 g by mouth once daily.      fluconazole (DIFLUCAN) 200 MG Tab 1 po q week 12 tablet 1    fluticasone propionate (FLONASE) 50 mcg/actuation nasal spray 2 sprays (100 mcg total) by Each Nostril route once daily. 16 g 11    fluticasone-umeclidin-vilanter (TRELEGY ELLIPTA) 200-62.5-25 mcg inhaler Inhale 1 puff into the lungs once daily. 180 each 3    gabapentin (NEURONTIN) 600 MG tablet Take 1 tablet (600 mg total) by mouth 3 (three) times daily. 540 tablet 3    guaifenesin-codeine 100-10 mg/5  ml (GUAIFENESIN AC)  mg/5 mL syrup Take 5 mLs by mouth 3 (three) times daily as needed for Cough. 473 mL 2    hydrOXYzine HCL (ATARAX) 10 MG Tab Take 1 tablet (10 mg total) by mouth 3 (three) times daily as needed. 30 tablet 3    immun glob G,IgG,-pro-IgA 0-50 (HIZENTRA) 1 gram/5 mL (20 %) Soln Inject 55 mLs (11 g total) into the skin once a week. 220 mL 12    inhalation spacing device Use as directed for inhalation. 1 each 0    lancets (ACCU-CHEK SOFTCLIX LANCETS) Misc Use to check blood sugar daily. 100 each 11    levalbuterol (XOPENEX HFA) 45 mcg/actuation inhaler Inhale 1-2 puffs into the lungs every 4 (four) hours as needed for Wheezing or Shortness of Breath. Rescue 15 g 11    levalbuterol (XOPENEX) 1.25 mg/3 mL nebulizer solution Take 3 mLs (1.25 mg total) by nebulization every 4 (four) hours as needed for Wheezing or Shortness of Breath. Rescue 1 each 11    metFORMIN (GLUCOPHAGE-XR) 500 MG ER 24hr tablet Take 1 tablet (500 mg total) by mouth 2 (two) times daily with meals. 180 tablet 3    mometasone 0.1% (ELOCON) 0.1 % cream aaa bid x 1-2 weeks prn bug bites 45 g 1    montelukast (SINGULAIR) 10 mg tablet Take 1 tablet (10 mg total) by mouth every evening. 90 tablet 3    mucus clearing device Cecy 1 Device by Misc.(Non-Drug; Combo Route) route 2 (two) times daily. 1 Device 0    multivitamin capsule Take 1 capsule by mouth once daily.       mupirocin (BACTROBAN) 2 % ointment Apply topically 3 (three) times daily. 15 g 0    mupirocin (BACTROBAN) 2 % ointment Apply topically 3 (three) times daily. 30 g 1    oxyCODONE (ROXICODONE) 5 MG immediate release tablet Take 1 tablet (5 mg total) by mouth every 4 (four) hours as needed for Pain. 5 tablet 0    potassium chloride SA (K-DUR,KLOR-CON) 20 MEQ tablet TAKE 1 TABLET ONE TIME DAILY 90 tablet 3    pravastatin (PRAVACHOL) 80 MG tablet Take 1 tablet (80 mg total) by mouth once daily. 90 tablet 4    psyllium husk (METAMUCIL ORAL) Take by mouth.      SIMETHICONE  (GAS-X ORAL) Take 1 capsule by mouth as needed (gas relief).       tezepelumab-ekko (TEZSPIRE) 210 mg/1.91 mL (110 mg/mL) Syrg Inject 1.91 mLs (210 mg total) into the skin every 28 days. 1.91 mL 11    tiZANidine (ZANAFLEX) 4 MG tablet Take 8 mg by mouth 2 (two) times daily.      traMADoL (ULTRAM) 50 mg tablet Take 1 tablet (50 mg total) by mouth every 8 (eight) hours as needed for Pain. 21 tablet 0    valsartan-hydrochlorothiazide (DIOVAN-HCT) 320-25 mg per tablet Take 1 tablet by mouth once daily. 90 tablet 2    vitamin D3-folic acid 5,000 unit- 1 mg Tab Take 1 tablet by mouth once daily.       [] amoxicillin-clavulanate 875-125mg (AUGMENTIN) 875-125 mg per tablet Take 1 tablet by mouth 2 (two) times daily. for 5 days 10 tablet 0    blood-glucose meter kit Use as instructed. Insurance preferred meter. 1 each 0     Facility-Administered Encounter Medications as of 2024   Medication Dose Route Frequency Provider Last Rate Last Admin    levalbuterol nebulizer solution 1.25 mg  1.25 mg Nebulization 1 time in Clinic/HOD Daysi Llanos, NP             PHYSICAL EXAMINATION:    The patient generally appears in good health, is appropriately interactive, and is in no apparent distress.    Skin: No lesions.    Mental: Cooperative with normal affect.    Neuro: Grossly intact.    HEENT: Normal. No evidence of lymphadenopathy.    Chest:  normal inspiratory effort.    Abdomen:  Soft, non-tender. No masses or organomegaly. Bladder is not palpable. No evidence of flank discomfort. No evidence of inguinal hernia.    Extremities: No clubbing, cyanosis, or edema    Wounds are CDI, no exudate or ecchymosis    LABS:    UA 1.015, pH 5, + leuk, tr protein, otherwise, negative    IMPRESSION:    Post op state    PLAN:    1.  The smart  is not communicating with the IPG.  Tried another antennae and this did not work.  Also tried the old communicator and this also did not connect  2.  Contacted Fly Bardales who will  be at the [place] Location.  He will meet with the patient in 2 days.

## 2024-06-20 ENCOUNTER — OFFICE VISIT (OUTPATIENT)
Dept: UROLOGY | Facility: CLINIC | Age: 74
End: 2024-06-20
Payer: MEDICARE

## 2024-06-20 VITALS
WEIGHT: 209 LBS | DIASTOLIC BLOOD PRESSURE: 75 MMHG | HEIGHT: 64 IN | BODY MASS INDEX: 35.68 KG/M2 | SYSTOLIC BLOOD PRESSURE: 131 MMHG | HEART RATE: 76 BPM

## 2024-06-20 DIAGNOSIS — Z98.890 POST-OPERATIVE STATE: Primary | ICD-10-CM

## 2024-06-20 PROCEDURE — 99999 PR PBB SHADOW E&M-EST. PATIENT-LVL IV: CPT | Mod: PBBFAC,,, | Performed by: UROLOGY

## 2024-06-20 PROCEDURE — 3066F NEPHROPATHY DOC TX: CPT | Mod: CPTII,S$GLB,, | Performed by: UROLOGY

## 2024-06-20 PROCEDURE — 1101F PT FALLS ASSESS-DOCD LE1/YR: CPT | Mod: CPTII,S$GLB,, | Performed by: UROLOGY

## 2024-06-20 PROCEDURE — 3075F SYST BP GE 130 - 139MM HG: CPT | Mod: CPTII,S$GLB,, | Performed by: UROLOGY

## 2024-06-20 PROCEDURE — 1159F MED LIST DOCD IN RCRD: CPT | Mod: CPTII,S$GLB,, | Performed by: UROLOGY

## 2024-06-20 PROCEDURE — 3288F FALL RISK ASSESSMENT DOCD: CPT | Mod: CPTII,S$GLB,, | Performed by: UROLOGY

## 2024-06-20 PROCEDURE — 1157F ADVNC CARE PLAN IN RCRD: CPT | Mod: CPTII,S$GLB,, | Performed by: UROLOGY

## 2024-06-20 PROCEDURE — 3078F DIAST BP <80 MM HG: CPT | Mod: CPTII,S$GLB,, | Performed by: UROLOGY

## 2024-06-20 PROCEDURE — 3061F NEG MICROALBUMINURIA REV: CPT | Mod: CPTII,S$GLB,, | Performed by: UROLOGY

## 2024-06-20 PROCEDURE — 99024 POSTOP FOLLOW-UP VISIT: CPT | Mod: S$GLB,,, | Performed by: UROLOGY

## 2024-06-20 PROCEDURE — 3044F HG A1C LEVEL LT 7.0%: CPT | Mod: CPTII,S$GLB,, | Performed by: UROLOGY

## 2024-06-21 NOTE — PROGRESS NOTES
Mrs. Delatorre met with the [employer] Fly bello and he interrogated the unit.  Apparently, the antennae had to be re paired with her smart device.  The following was the interrogation and change that he set.     She was on Program 2  set at 2.0 mA, Rate of 14 Hz, pw 300 ms  she reported sensation in the buttocks cheek    He set her to Program 3 set at 3.9 mA, rate of 14, pw 300 ms.  Sensation was rectal with this setting.     She was left on program 3.

## 2024-06-24 ENCOUNTER — PATIENT MESSAGE (OUTPATIENT)
Dept: UROLOGY | Facility: CLINIC | Age: 74
End: 2024-06-24
Payer: MEDICARE

## 2024-07-03 ENCOUNTER — PATIENT MESSAGE (OUTPATIENT)
Dept: PULMONOLOGY | Facility: CLINIC | Age: 74
End: 2024-07-03
Payer: MEDICARE

## 2024-07-06 ENCOUNTER — OFFICE VISIT (OUTPATIENT)
Dept: URGENT CARE | Facility: CLINIC | Age: 74
End: 2024-07-06
Payer: MEDICARE

## 2024-07-06 VITALS
HEIGHT: 64 IN | WEIGHT: 208 LBS | TEMPERATURE: 98 F | RESPIRATION RATE: 16 BRPM | BODY MASS INDEX: 35.51 KG/M2 | SYSTOLIC BLOOD PRESSURE: 107 MMHG | OXYGEN SATURATION: 99 % | HEART RATE: 76 BPM | DIASTOLIC BLOOD PRESSURE: 67 MMHG

## 2024-07-06 DIAGNOSIS — R39.9 UTI SYMPTOMS: ICD-10-CM

## 2024-07-06 DIAGNOSIS — N30.00 ACUTE CYSTITIS WITHOUT HEMATURIA: Primary | ICD-10-CM

## 2024-07-06 LAB
BILIRUBIN, UA POC OHS: NEGATIVE
BLOOD, UA POC OHS: ABNORMAL
CLARITY, UA POC OHS: CLEAR
COLOR, UA POC OHS: YELLOW
GLUCOSE, UA POC OHS: NEGATIVE
KETONES, UA POC OHS: NEGATIVE
LEUKOCYTES, UA POC OHS: ABNORMAL
NITRITE, UA POC OHS: NEGATIVE
PH, UA POC OHS: 5.5
PROTEIN, UA POC OHS: NEGATIVE
SPECIFIC GRAVITY, UA POC OHS: 1.01
UROBILINOGEN, UA POC OHS: 0.2

## 2024-07-06 PROCEDURE — 99214 OFFICE O/P EST MOD 30 MIN: CPT | Mod: S$GLB,,, | Performed by: NURSE PRACTITIONER

## 2024-07-06 PROCEDURE — 81003 URINALYSIS AUTO W/O SCOPE: CPT | Mod: QW,S$GLB,, | Performed by: NURSE PRACTITIONER

## 2024-07-06 PROCEDURE — 87077 CULTURE AEROBIC IDENTIFY: CPT | Mod: HCNC | Performed by: NURSE PRACTITIONER

## 2024-07-06 PROCEDURE — 87186 SC STD MICRODIL/AGAR DIL: CPT | Mod: HCNC | Performed by: NURSE PRACTITIONER

## 2024-07-06 PROCEDURE — 87088 URINE BACTERIA CULTURE: CPT | Mod: HCNC | Performed by: NURSE PRACTITIONER

## 2024-07-06 PROCEDURE — 87086 URINE CULTURE/COLONY COUNT: CPT | Mod: HCNC | Performed by: NURSE PRACTITIONER

## 2024-07-06 RX ORDER — CIPROFLOXACIN 500 MG/1
500 TABLET ORAL 2 TIMES DAILY
Qty: 6 TABLET | Refills: 0 | Status: SHIPPED | OUTPATIENT
Start: 2024-07-06 | End: 2024-07-09

## 2024-07-06 NOTE — PATIENT INSTRUCTIONS
Call your Urologists on Monday so they know what is going on with you especially since you had the recent interstim and cath in June  Start the cipro   Increase fluids  Results for culture should be back in about 2 days

## 2024-07-06 NOTE — Clinical Note
Patient seen over the weekend rbc and wbc in [facility] recent procedure interstim and cath( June) , sent culture and started on cipro. She had + culture in April pan sensitive and was on macrobid at that time.

## 2024-07-06 NOTE — PROGRESS NOTES
"Subjective:      Patient ID: Cornelia Delatorre is a 73 y.o. female.    Vitals:  height is 5' 4" (1.626 m) and weight is 94.3 kg (208 lb). Her oral temperature is 98.1 °F (36.7 °C). Her blood pressure is 107/67 and her pulse is 76. Her respiration is 16 and oxygen saturation is 99%.     Chief Complaint: Dysuria    Pt presents today with c/o urine frequency x's 2 days. Pt has not taken anything for symptoms. Pain level 0/10.    3/2024 e coli in urine culture pan sensitive     Urinary Frequency   This is a new problem. The current episode started in the past 7 days. The problem occurs every urination. The problem has been unchanged. The pain is at a severity of 0/10. The patient is experiencing no pain. There has been no fever. Associated symptoms include frequency and urgency. Pertinent negatives include no behavior changes, chills, discharge, flank pain, hematuria, hesitancy, nausea, possible pregnancy, sweats, vomiting, weight loss, bubble bath use, constipation, rash or withholding. She has tried nothing for the symptoms. The treatment provided no relief. Her past medical history is significant for catheterization, diabetes mellitus, recurrent UTIs and a urological procedure.       Constitution: Negative for chills.   Gastrointestinal:  Negative for nausea, vomiting and constipation.   Genitourinary:  Positive for frequency and urgency. Negative for flank pain and hematuria.   Skin:  Negative for rash.      Objective:     Physical Exam   Constitutional: She is oriented to person, place, and time. She appears well-developed.   HENT:   Head: Normocephalic and atraumatic.   Ears:   Right Ear: External ear normal.   Left Ear: External ear normal.   Nose: Nose normal. No nasal deformity. No epistaxis.   Mouth/Throat: Oropharynx is clear and moist and mucous membranes are normal.   Eyes: Lids are normal.   Neck: Trachea normal and phonation normal. Neck supple.   Cardiovascular: Normal pulses.   Pulmonary/Chest: " Effort normal.   Abdominal: Normal appearance and bowel sounds are normal. She exhibits no distension. Soft. There is no abdominal tenderness.   Neurological: She is alert and oriented to person, place, and time.   Skin: Skin is warm, dry and intact.   Psychiatric: Her speech is normal and behavior is normal. Mood, judgment and thought content normal.   Nursing note and vitals reviewed.    Assessment:     1. Acute cystitis without hematuria    2. UTI symptoms        Plan:       Acute cystitis without hematuria  -     Urine culture    UTI symptoms  -     POCT Urinalysis(Instrument)  -     Urine culture      Results for orders placed or performed in visit on 07/06/24   POCT Urinalysis(Instrument)   Result Value Ref Range    Color, POC UA Yellow Yellow, Straw, Colorless    Clarity, POC UA Clear Clear    Glucose, POC UA Negative Negative    Bilirubin, POC UA Negative Negative    Ketones, POC UA Negative Negative    Spec Grav POC UA 1.010 1.005 - 1.030    Blood, POC UA Trace-intact (A) Negative    pH, POC UA 5.5 5.0 - 8.0    Protein, POC UA Negative Negative    Urobilinogen, POC UA 0.2 <=1.0    Nitrite, POC UA Negative Negative    WBC, POC UA Large (A) Negative     *Note: Due to a large number of results and/or encounters for the requested time period, some results have not been displayed. A complete set of results can be found in Results Review.     Patient Instructions   Call your Urologists on Monday so they know what is going on with you especially since you had the recent interstim and cath in June  Start the cipro   Increase fluids  Results for culture should be back in about 2 days

## 2024-07-08 ENCOUNTER — TELEPHONE (OUTPATIENT)
Dept: URGENT CARE | Facility: CLINIC | Age: 74
End: 2024-07-08
Payer: MEDICARE

## 2024-07-08 LAB — BACTERIA UR CULT: ABNORMAL

## 2024-07-12 ENCOUNTER — CLINICAL SUPPORT (OUTPATIENT)
Dept: PULMONOLOGY | Facility: CLINIC | Age: 74
End: 2024-07-12
Payer: MEDICARE

## 2024-07-12 DIAGNOSIS — J45.50 SEVERE PERSISTENT ASTHMA WITHOUT COMPLICATION: Primary | ICD-10-CM

## 2024-07-12 NOTE — PROGRESS NOTES
Patient is here for her Tezspire injection.  2 patient identifiers used (Name and ).  Patient received the injection to the left arm               subcutaneous.  No redness, bleeding, or swelling noted to the injection site.  Pt tolerated well.    NDC: 75123-660-40  LOT: [id number]  EXP: 2026

## 2024-07-22 ENCOUNTER — OFFICE VISIT (OUTPATIENT)
Dept: PULMONOLOGY | Facility: CLINIC | Age: 74
End: 2024-07-22
Payer: MEDICARE

## 2024-07-22 VITALS
DIASTOLIC BLOOD PRESSURE: 80 MMHG | HEART RATE: 62 BPM | OXYGEN SATURATION: 97 % | BODY MASS INDEX: 35.95 KG/M2 | WEIGHT: 209.44 LBS | SYSTOLIC BLOOD PRESSURE: 123 MMHG

## 2024-07-22 DIAGNOSIS — J44.89 ASTHMA WITH COPD: Primary | ICD-10-CM

## 2024-07-22 PROCEDURE — 99999 PR PBB SHADOW E&M-EST. PATIENT-LVL V: CPT | Mod: PBBFAC,HCNC,, | Performed by: NURSE PRACTITIONER

## 2024-07-22 PROCEDURE — 3061F NEG MICROALBUMINURIA REV: CPT | Mod: HCNC,CPTII,S$GLB, | Performed by: NURSE PRACTITIONER

## 2024-07-22 PROCEDURE — 1126F AMNT PAIN NOTED NONE PRSNT: CPT | Mod: HCNC,CPTII,S$GLB, | Performed by: NURSE PRACTITIONER

## 2024-07-22 PROCEDURE — 1159F MED LIST DOCD IN RCRD: CPT | Mod: HCNC,CPTII,S$GLB, | Performed by: NURSE PRACTITIONER

## 2024-07-22 PROCEDURE — 3044F HG A1C LEVEL LT 7.0%: CPT | Mod: HCNC,CPTII,S$GLB, | Performed by: NURSE PRACTITIONER

## 2024-07-22 PROCEDURE — 3288F FALL RISK ASSESSMENT DOCD: CPT | Mod: HCNC,CPTII,S$GLB, | Performed by: NURSE PRACTITIONER

## 2024-07-22 PROCEDURE — 3079F DIAST BP 80-89 MM HG: CPT | Mod: HCNC,CPTII,S$GLB, | Performed by: NURSE PRACTITIONER

## 2024-07-22 PROCEDURE — 1101F PT FALLS ASSESS-DOCD LE1/YR: CPT | Mod: HCNC,CPTII,S$GLB, | Performed by: NURSE PRACTITIONER

## 2024-07-22 PROCEDURE — 99213 OFFICE O/P EST LOW 20 MIN: CPT | Mod: HCNC,S$GLB,, | Performed by: NURSE PRACTITIONER

## 2024-07-22 PROCEDURE — 1157F ADVNC CARE PLAN IN RCRD: CPT | Mod: HCNC,CPTII,S$GLB, | Performed by: NURSE PRACTITIONER

## 2024-07-22 PROCEDURE — 3074F SYST BP LT 130 MM HG: CPT | Mod: HCNC,CPTII,S$GLB, | Performed by: NURSE PRACTITIONER

## 2024-07-22 PROCEDURE — 3066F NEPHROPATHY DOC TX: CPT | Mod: HCNC,CPTII,S$GLB, | Performed by: NURSE PRACTITIONER

## 2024-07-22 PROCEDURE — 3008F BODY MASS INDEX DOCD: CPT | Mod: HCNC,CPTII,S$GLB, | Performed by: NURSE PRACTITIONER

## 2024-07-22 RX ORDER — LEVALBUTEROL TARTRATE 45 UG/1
1-2 AEROSOL, METERED ORAL EVERY 4 HOURS PRN
Qty: 15 G | Refills: 11 | Status: SHIPPED | OUTPATIENT
Start: 2024-07-22 | End: 2025-07-22

## 2024-07-22 RX ORDER — PREDNISONE 10 MG/1
TABLET ORAL
Qty: 12 TABLET | Refills: 0 | Status: SHIPPED | OUTPATIENT
Start: 2024-07-22

## 2024-07-22 NOTE — PATIENT INSTRUCTIONS
Recommend using flonase twice daily every day in morning and again in evenings before bed.     Use netti pot to help clear out sinus from irritants.     Will continue Trelegy daily and tezspire monthly.

## 2024-07-22 NOTE — PROGRESS NOTES
7/22/2024    Cornelia Delatorre  In office visit     Chief Complaint   Patient presents with    Follow-up     6 month f/u    Asthma    COPD     HPI:  7/22/2024- complaint of recurrent sinus congestion, associated with sinus drainage, on flonase and allergy medications daily.   On Trelegy daily for COPD/Asthma medication regiment. Using albuterol 2 puffs daily before inhaling Trelegy inhaler. On Tezspire monthly with dramatic improvement. States she use to cough every day and now she coughs only occasionally.   No need for systemic steroids in past 6 months.     1/22/2024- states noticed improvement in shortness of breath while on Tezspire. Seen in ER in December for injury to back followed by pain management. No complaint of cough, wheeze, chest tightness, or shortness of breath. Using nebulizer only as needed when trigger by excessive mucous and cough with high pollen levels in environment.     Took medrol dose pack from ER for back pain, no issues with lungs.     10/17/2023- received large bill from [facility] for Tezspire therapy, states she has noticed dramatic improvement in breathing after starting Tezspire for past 6 months. Having fewer exacerbations as before. Treated for exacerbation in September that was treated with systemic steroids and antibiotics.   States her and her  have noticed her activity level has improved in last 4 months. States she is sleeping better and feels better through out the day.     On BIPAP nightly for sleep apnea, no complaints of fatigue.     09/08/2023: Hx: Asthma, Lung nodules, CVID, SOHA on Bipap  Cough: Associated with feelings of chest congestion - associated with coughing spells that lead to overall fatigue. Worse at night time - associated with difficulty falling asleep and staying asleep. Cough is productive with intermittent mucous - difficult to expectorate.   Using Trelegy once per day with benefit.   Using nebulized treatments - Levalbuterol - 1-2 treatments per  day depending on chest cough.    Using Albuterol inhaler as needed - approx use varies, approx 1-2x per day.  States used Albuterol last night and woke up this morning feeling flushed. Endorses hand shakiness and chest palpitations at times with Albuterol use.  Completed regimen of Prednisone and Doxy after last appointment with Daysi Llanso in July.   Denies the use of supplemental oxygen.  On Tezspire - next dose due next week.       7/6/2023- complaint of recurrent shortness of breath, worsened in past 3 weeks started, started prednisone 10 mg with no benefit. Using nebulizer or albuterol inhaler every 4 hours.   Associated with chest tightness and cough. Productive clear mucous. Having nocturnal coughing fits most nights.   Complaint of nausea and dizziness,   On BIPAP nightly, no issues with sleep apnea.   Severe persistent asthma with acute exacerbation  -     betamethasone acetate-betamethasone sodium phosphate injection 6 mg  -     fluticasone-umeclidin-vilanter (TRELEGY ELLIPTA) 200-62.5-25 mcg inhaler; Inhale 1 puff into the lungs once daily.  -     ondansetron (ZOFRAN-ODT) 4 MG TbDL; Take 1 tablet (4 mg total) by mouth every 8 (eight) hours as needed (nausea).  -     doxycycline (VIBRA-TABS) 100 MG tablet; Take 1 tablet (100 mg total) by mouth 2 (two) times daily. for 10 days  -     X-Ray Chest PA And Lateral; Future  -     predniSONE (DELTASONE) 10 MG tablet; 3 pills for 3 days, 2 for 3 days, them 1 for 3 days, repeat for cough  Right otitis media, unspecified otitis media type  -     doxycycline (VIBRA-TABS) 100 MG tablet; Take 1 tablet (100 mg total) by mouth 2 (two) times daily. for 10 days          5/1/2023- complaint of wheeze, onset 1 week, took 5 days of prednisone to control, states slightly improved.   Required systemic steroids in January and again in February for Asthma control  Associated with wheeze and chest tightness, SOB is worsening with time, difficult to walk in stores with out  stopping to rest.   Has new motor in BIPAP, wearing nightly with benefit.       1/9/2023- states feeling good, noticed worsening sinus drainage and productive cough when laying down at night for past 3 weeks, improves with albuterol inhaler or nebulizer. On Trelegy daily.  No recent steroid therapy. Steroid injection in knee 6 weeks Prior.  Wearing BIPAP nightly with benefit. Daytime fatigue is resolved. No complaint of morning headaches. Waiting for recalled BIPAP machine.     7/11/2022- states feeling like her self again after COVID 19 in May, Cough is dramatically improved, non productive cough, few days week.   Fatigue continues, has to take daily naps. Wearing BiPAP nightly.    On Trelegy daily with benefit but cost is high, in donut whole.       5/25/2022- Dx COVID 19 7 days prior tx with monoclonal Antibiodies two days prior. Feeling generalized weakness, diaphoresis, fatigue  Using Trelegy and nebulizer daily,  beats on her back   Cough - severe, complaint, worse at night, productive thick yellow mucous. Associated with late evening wheeze.   Lost sense of taste and smell.       4/13/2022- SOB- stable, worse in late evenings, taking prednisone 10 mg when needed, not used in past 2 months; worse with exertion, improves with rest and albuterol rescue.   On BIPAP with benefit, no daytime drowsiness.   Liked Trelegy. Still on Adviar until prescription runs out, has 2 weeks left.   Skin biopsy 5 weeks prior, left lower chin site irritated by wearing face mask. No edema or erythema,     1/12/2022- SOB worsening, on Advair daily and levalbuterol 2-3x daily for past 4 weeks, has noticed worsening of shortness of breath and sinus complaints.   Admitted to hospital for GI virus,   Noticed prednisone is needed occasionally at 10 mg notices improvement    On BiPAP nightly with benefit. No daytime fatigue, no issues with nasal pillow mask.     7/12/21- complaint of daily sinus drainage, has to clear throat  repeatedly for past 2 months. Currently on Flonase nasal spray, ipratropium nasal spray, Singulair pills, zyrtec, corcindin daily with no improvement.   SOB- stable, required steroid therapy for 3 days twice in past 3 months. Only with exertion, worse in late evenings, improves with rest,  Using nebulizer 2x daily due to feeling of heaviness in chest.   Cough- mild, non productive, associated with post nasal drip from allergies. Nocturnal arousals 2x weekly for past 2 weeks,   Wearing CPAP nighty with benefit.     4/12/21- spent last 6 months in home, was able to get COVID 19 vaccine Mederna. Allergies stable, few spells improve with nasal spray noticed improvement after getting air purifier.   Noticed spacer device helping with hoarse voice or oral thrush like before,   SOB- unchanged, daily complaint, varies with severity, worse with exertion, improves with rest and albuterol rescue. Has to sit up to breath when first laying  Down, not able to breath when laying on left side.   Occasional cough- productive, clear mucous, using nebulizer 3 x weekly.   CPAP for SOHA doing well,     10/13/2020- Allergies bother her every few days, currently on daily Singulair, zyrtec, flonase, astelin nasal spray, having sneezing fits.   Complaint of clear sinus drainage,   Hoarse voice has resolved after stopping symbicort.   Cough- improved,   SOB- doing well, occasional episodes of not being able to catch breath, did not use nebulizer   Wearing CPAP nightly for SOHA, states benefits greatly    7/13/2020- Hoarse voice, loss of voice, productive cough, lost voice July 2019 tx by ENT who treated for acute laryngitis with diflucan; Recurrent problem. Hoarse voice return 4 weeks prior, ENT doctor recommends changing Asthma medications tx her with diflucan, doxycycline and prednisone for 5 days, states has improved.   SOB- worse when wearing face mask, currently on hizentra therapy,     5/4/2020- uses symbicort daily, uses rescue 2/wk -  some anxiety, getting igg rx. Having more sinus problems with head colds- 3 in last 4 wks.  No prednisone- hizentra working well.        Nov 4, 2019- having mucous sensation, notes some sob relaxing - maybe worse night.  No nasal stuffy.  Uses cpap.  Uses symbicort bid, no rescue.  Mucous is clear.  No abx for sinus or lungs.  Getting immune infusions weekly - pt senses doing better since starting in May.  Patient Instructions   Breathing off and on short breath - need to use more albuterol to make clear where breathing problems come from  Mucous production may decrease if airways controlled- albuterol should help clearance.  Rescue inhaler may resolve any breathing problems- should use intermittently if any symptoms.  May use augmentin for sinus/lung problems- not optimal for all uti's       May 6,2019-due to get immune infusion next 2 days.  No prednisone, needs monlukast. abx stopped wks ago- mucous cleared.    Patient Instructions   Use antibiotic daily til immune therapy done a wk - then as needed like every one else  Immune therapy may keep you stable - better than antibiotics.   Use symbicort regular.  Lungs may stabilize.  Use prednisone if needed.  Lung  Nodules are stable for 2 yrs and no follow up needed.  Call if problems- hope all will be better yet.  March 7, 19-exacerbated in late Jan- cleared in feb (vague).  Now ill again yellow and gray mucous (culture last Jan was nl yvonne).  Sl intermittent wheezes since last wk.  Sees Dr Herrera in [place]- gets allergy rx but declined to get now.  Pt has cvid by  igg and igm levels low and pneumococcal antibodies to 8/14 pneumococcal titers. Uses symbicort and singulair routinely.  Took prednisone completed 4 days ago- uses prednisone monthly- was able to skip December  .  Discussed with patient above for education the following:      Gastric by pass may cause malabsorption, protein levels have been too low and may affect ig levels-- somewhat.   Need to get  follow up with dietician to assure adequate protein intake/levels.   Antibiotic may stabilize infections with common variable immune deficiency- azithromycin daily once cultures submitted.   Use augmentin if infection worsens.   Acapella/chest clapping help clear mucous, vest likewise if bronchiectasis.  Will not treat cause.   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  Use codeine to suppress mild coughing.   Need good immune system and no airway/asthma problems and good hygiene of bronchial tubes (no chronic infections) to prevent illness.     Lungs measured near normal with good response to bronchial medications 2017   Need ct to follow up and cultures to assure infection controlled.      Jan 28/2019- Feeling bad onset 2 weeks, took prednisone taper x 6 days and antibiotic Augmentin week prior, States no improvement. Cough- worse at night, no nocturnal arousals, takes cough suppressant syrup before bed. Productive yellow/brown color.   Nebulized albuterol 4x daily. States no fever.  Has started allergy injections waiting for insurance clearance for IGG therapy.   Discussed with patient above for education the following:    CT chest for February to monitor and assess for bronchiectasis, need diagnosis for insurance to cover vest therapy  Have  continue to percuss back with hand.   Recommend purchasing Acepella device to help clear lungs of excess mucous, attaches to nebulizer device  Continue Nebulizer treatments 4x daily as needed.  Chest x-ray now to evaluate for pneumonia  Blood work at hospital   Will yeison to see results of sputum culture and x-ray before repeating antibiotic  Need to return sputum to clinic with in 4 hours of collecting  Fluconazole pills for oral thrush, this is a recurrent problem related to your weak immune system and use of inhaled steroids. Continue to rinse mouth out after using symbicort but problem will  improve after starting immune replacement therapy.    oct 30,   2018-- had one day fever followed by cough/wheeze illness that lingered a wk. Pt seen by NP   [place] 2x, [place].  [place] like dying- only one day fever.  Violent cough.  Nebulizer ppt itch and palpitations- ok  On xopenex now.  Prednisone 40x3, 20 x 3 ,  augmentin cleared.  Now back to normal.      May 14, 2018- had spell /exacerbation with one round prednisone. Breathing good.  Follow-up in about 1 year (around 5/14/2019), or if symptoms worsen or fail to improve.   Discussed with patient above for education the following:     Check blood count and pneumonia vaccine response after last vaccine 4/27/2018   Use azithromycin for any lung/sinus infection- immune weakness likely   Asthma stable- use prednisone and singulair.   Use allergra and singulair and astelin/flonase   Lung nodule in lung still - Navigational bronchoscopy might find?  - will at least re check in a year.     Call if needed, re check next yr.    darlin 15, 2018--1 st illness in yr or so with cough and rattles, no flu.  Appetite ok.  Ill x wk, cough and wheeze and sob and noct worse, resp rx helps a bit.  Hasn't been using  Albuterol   Follow-up in about 6 months (around 7/15/2018).   Discussed with patient above for education the following:      tamiflu if flu.   Need to use albuterol for any lung symptoms.  Should get cough  Better controlled.      Prednisone 20 mg 3 for 3 , taper may be needed.  Give shot today, 1 daily for 3 may be enough with albuterol.   You had high eosinophils-- if asthma becomes more active - special therapy may help??     prevnar 13 would be good - should be off prednisone.  Immune system is sl weak  Protection only to 7.14 pneumonia germs.    oct 19, 2017- has scratchy throat, some sinus drip, has nightly sensation throat issues, post beryl and carpel tunnel surg.  No sinus lung infections.  Breathing and cough good.  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 Had pos tb gold.  June 21, 2017- had good [place] trip, tapered symbicort with some increase sensation of lung problems so maintained full dose. No infections.  No chest symptoms on symbicort.   April 24, feels a lot better with min cough, vague sob going left side down at night.  Going to [place] 2 weeks.  Uses symbicort and good results.  March 16, cough better, but comes and goes,  No great help with steroids.  Submitted 3 sputums.  Had pneumovax march last yr.  Breathing better. Got symbicort.   Had pneumonia vaccine last yr    3/8/2017 HPI: had onset cough [place] illness, got dizzy few days with diarrhea and cough. Cough clear sm amt mucous. Did have 101 temp one day, fatigue, no muscle aches.  Appetite decreased.  Illnesses lingered 2 weeks but cough never remitted- has improved with inhahler use last 15 days.    Had gastric 2011 bypass with with continued diarrhea intially resolved. No regurg or reflux or swallow problems.   symbicort nearly  Resolved.    Sinuses ok.  No pets.  Never smoker.    Appetite good, feels well now.    headcolds to chest since childhood, nocturnal ??not recalled.  Had cats in past but got rid in 2003 or so.     The chief compliant problem varies with instablilty at time      PFSH:  Past Medical History:   Diagnosis Date    Allergy     Arthritis     Asthma     Cancer     skin cancer to right hand    Cataract     Chronic fatigue 01/24/2017    CVID (common variable immunodeficiency) 03/07/2019    Diabetes mellitus     type2    KLINE (dyspnea on exertion) 01/24/2017    Dyslipidemia 06/25/2019    Encounter for blood transfusion     Essential hypertension 12/29/2011    GERD (gastroesophageal reflux disease)     Headache(784.0)     Hyperlipidemia 07/15/2015    Hypertension     Hypothyroidism     Neuropathy     Obesity     Postmenopausal HRT (hormone replacement therapy)     Rash     Rosacea     Sleep apnea     on bipap    Snoring     Squamous cell carcinoma  of skin     left forearm     UTI (urinary tract infection)     Wears glasses          Past Surgical History:   Procedure Laterality Date    ADENOIDECTOMY      APPENDECTOMY      BLADDER SUSPENSION      BREAST BIOPSY Bilateral 08/1992    bilateral benign excisional biopsies    BUNIONECTOMY  10/17/2014    right, still has discomfort    CATARACT EXTRACTION W/  INTRAOCULAR LENS IMPLANT Bilateral     CHOLECYSTECTOMY  08/02/2017    COLONOSCOPY      CYSTOSCOPY      EPIDURAL STEROID INJECTION INTO LUMBAR SPINE N/A 08/14/2019    Procedure: Injection-steroid-epidural-lumbar L5/S1;  Surgeon: Fredi Rojas MD;  Location: [facility] OR;  Service: Pain Management;  Laterality: N/A;    EPIDURAL STEROID INJECTION INTO LUMBAR SPINE N/A 05/03/2021    Procedure: Injection-steroid-epidural-lumbar L5/S1 to the Left;  Surgeon: Fredi Rojas MD;  Location: [facility] OR;  Service: Pain Management;  Laterality: N/A;    EPIDURAL STEROID INJECTION INTO LUMBAR SPINE N/A 09/24/2021    Procedure: Injection-steroid-epidural-lumbar L5/S1;  Surgeon: Fredi Rojas MD;  Location: [facility] OR;  Service: Pain Management;  Laterality: N/A;    EPIDURAL STEROID INJECTION INTO LUMBAR SPINE N/A 02/21/2022    Procedure: Injection-steroid-epidural-lumbar L5/S1;  Surgeon: Fredi Rojas MD;  Location: [facility] OR;  Service: Pain Management;  Laterality: N/A;    EPIDURAL STEROID INJECTION INTO LUMBAR SPINE N/A 02/09/2024    Procedure: Injection-steroid-epidural-lumbar       L5/S1;  Surgeon: Fredi Rojas MD;  Location: [facility] OR;  Service: Pain Management;  Laterality: N/A;    ESOPHAGEAL DILATION N/A 06/11/2021    Procedure: DILATION, ESOPHAGUS;  Surgeon: Jake Sheriff MD;  Location: [facility];  Service: Endoscopy;  Laterality: N/A;    ESOPHAGOGASTRODUODENOSCOPY      dilated esophagus    ESOPHAGOGASTRODUODENOSCOPY N/A 06/11/2021    Procedure: EGD (ESOPHAGOGASTRODUODENOSCOPY);  Surgeon: Jake Sheriff MD;  Location: [facility];  Service: Endoscopy;  Laterality:  N/A;    GASTRIC BYPASS      2011    HYSTERECTOMY  02/1980    interstim bladder  2009    10/14/14 new battery    OOPHORECTOMY  02/1980    PERCUTANEOUS INSERTION OF SACRAL NERVE NEUROSTIMULATOR ELECTRODE LEAD N/A 6/5/2024    Procedure: INSERTION, ELECTRODE LEAD, NEUROSTIMULATOR, SACRAL, PERCUTANEOUS;  Surgeon: Mary Jane Jarvis MD;  Location: [facility] OR;  Service: Urology;  Laterality: N/A;  1 hour 30 minutes    REPLACEMENT OF NERVE STIMULATOR BATTERY N/A 6/5/2024    Procedure: Replacement, Battery, Neurostimulator;  Surgeon: Mary Jane Jarvis MD;  Location: [facility] OR;  Service: Urology;  Laterality: N/A;  1 hour 30 minutes    REPLACEMENT OF SACRAL NERVE STIMULATOR  02/18/2020    Procedure: REPLACEMENT, NEUROSTIMULATOR, SACRAL;  Surgeon: Mary Jane Jarvis MD;  Location: [facility] OR 2ND FLR;  Service: Urology;;    REVISION OF PROCEDURE INVOLVING SACRAL NEUROSTIMULATOR DEVICE Right 02/18/2020    Procedure: REVISION, NEUROSTIMULATOR, SACRAL/ battery replacement;  Surgeon: Mary Jane Jarvis MD;  Location: [facility] OR 2ND FLR;  Service: Urology;  Laterality: Right;  1hr/ rep contacted/ (CONNIE)    SKIN CANCER EXCISION      left hand    TONSILLECTOMY      TRANSFORAMINAL EPIDURAL INJECTION OF STEROID Bilateral 03/05/2024    Procedure: Injection,steroid,epidural,transforaminal approach      L4/5;  Surgeon: Fredi Rojas MD;  Location: [facility] OR;  Service: Pain Management;  Laterality: Bilateral;    WISDOM TOOTH EXTRACTION       Social History     Tobacco Use    Smoking status: Never    Smokeless tobacco: Never   Substance Use Topics    Alcohol use: Not Currently    Drug use: No     Family History   Problem Relation Name Age of Onset    Breast cancer Mother  50    Breast cancer Paternal Aunt          40's    Cervical cancer Paternal Grandmother      Ovarian cancer Paternal Grandmother      Cervical cancer Paternal Cousin      Ovarian cancer Paternal Cousin 1st     Diabetes Other      Hypertension Other      Hypothyroidism Other       Allergic rhinitis Neg Hx      Allergies Neg Hx      Angioedema Neg Hx      Asthma Neg Hx      Atopy Neg Hx      Eczema Neg Hx      Immunodeficiency Neg Hx      Rhinitis Neg Hx      Urticaria Neg Hx      Uterine cancer Neg Hx       Review of patient's allergies indicates:   Allergen Reactions    Sulfa (sulfonamide antibiotics) Hives    Naproxen Other (See Comments)     Other reaction(s): RT sided numbness         Performance Status:The patient's activity level is functions out of house.      Review of Systems:  a review of eleven systems covering constitutional, Eye, HEENT, Psych, Respiratory, Cardiac, GI, , Musculoskeletal, Endocrine, Dermatologic was negative except for pertinent findings as listed ABOVE and below: all good,   Sinus drainage   Sinus congestion    Exam:Comprehensive exam done. /80   Pulse 62   Wt 95 kg (209 lb 7 oz)   SpO2 97%   BMI 35.95 kg/m²   Exam included Vitals as listed, and patient's appearance and affect and alertness and mood, oral exam for yeast and hygiene and pharynx lesions and Mallapatti (M) score, neck with inspection for jvd and masses and thyroid abnormalities and lymph nodes (supraclavicular and infraclavicular nodes and axillary also examined and noted if abn), chest exam included symmetry and effort and fremitus and percussion and auscultation, cardiac exam included rhythm and gallops and murmur and rubs and jvd and edema, abdominal exam for mass and hepatosplenomegaly and tenderness and hernias and bowel sounds, Musculoskeletal exam with muscle tone and posture and mobility/gait and  strength, and skin for rashes and cyanosis and pallor and turgor, extremity for clubbing.  Findings were normal except for pertinent findings listed below:  M3,  chest is symmetric, no distress, normal percussion. Breath sounds clear   On room air      Radiographs (ct chest and cxr) reviewed: view by direct vision  i do see clear bronchiectasis,nodules are noted.  Mucoid type  impaction suggested in rul ant segment.    X-Ray Chest PA And Lateral 09/11/2023 chest x-ray clear      X-Ray Chest PA And Lateral 06/09/2022 lungs clear    CT Abdomen Pelvis With Contrast 06/11/2021 lower lung bases clear    CT Chest Without Contrast  04/22/2019 stable non calcified nodules date to 2017 with no change.         Patient's labs were reviewed including CBC and CMP    Lab Results   Component Value Date    WBC 4.56 03/11/2024    RBC 4.14 03/11/2024    HGB 11.7 (L) 03/11/2024    HCT 38.2 03/11/2024    MCV 92 03/11/2024    MCH 28.3 03/11/2024    MCHC 30.6 (L) 03/11/2024    RDW 16.2 (H) 03/11/2024     03/11/2024    MPV 12.2 03/11/2024    GRAN 2.6 03/11/2024    GRAN 57.9 03/11/2024    LYMPH 1.5 03/11/2024    LYMPH 33.6 03/11/2024    MONO 0.3 03/11/2024    MONO 7.2 03/11/2024    EOS 0.0 03/11/2024    BASO 0.01 03/11/2024    EOSINOPHIL 0.4 03/11/2024    BASOPHIL 0.2 03/11/2024      Latest Reference Range & Units 01/28/19 16:17   Eos # 0.0 - 0.5 K/uL 0.2       Aerobic culture 10/27/22 CURVULARIA SPECIES      Latest Reference Range & Units 11/02/21 09:54 03/03/22 09:57 04/27/22 11:47 09/02/22 08:48   CO2 23 - 29 mmol/L 30 (H) 31 (H) 27 26   (H): Data is abnormally high    PFT  Spirometry bronchodilator, lung volume by body box, diffusion capacity measured December 18, 2023. Spirometry is normal. There was no improvement spirometry following bronchodilator. Lung volumes are within normal range. Diffusion was slightly low at 63% predicted. Clinical correlation recommended. (Physician Final: 12/18/2023 01:03PM, Elect    Spirometry with bronchodilator, lung volume by gas dilution, and diffusion capacity measured April 19, 2021. The FEV1 FVC ratio was 75% indicating no airflow obstruction measured by spirometry. The FEV1 was 99% predicted at 2.25 L. There was no   statistically significant improvement in spirometry following bronchodilator. Total lung capacity was within normal range at 90% of predicted.  Diffusion was slightly low at 77% predicted-uncorrected for anemia if present.   Spirometry is normal. Lung volumes fall within normal range. Diffusion is slightly decreased. Clinical correlation recommended. The bronchodilator response was not significant.       [facility], otherwise nl  DATE OF PROCEDURE:  03/24/2017     1.  Spirometry reveals an FVC of 3.1 L, which is 107% predicted.  FEV1 is 2.3 L,   which is 100% predicted.  The ratio, there is preserved.  There is a positive,   but not significant bronchodilator response to both the FVC and FEV1.  Loop   contours appear with adequate effort as well.  2.  Lung volumes.  The total lung capacity is 4.4 L, which is 92% predicted.    There are no signs of gas trapping.  3.  Diffusing capacity is mild-to-moderate reduced at 68%, does improve to 96%   with alveolar volumes.      Plan:  Clinical impression is resonably certain and repeated evaluation prn +/- follow up will be needed as below.    Cornelia was seen today for follow-up, asthma and copd.    Diagnoses and all orders for this visit:    Asthma with COPD  -     levalbuterol (XOPENEX HFA) 45 mcg/actuation inhaler; Inhale 1-2 puffs into the lungs every 4 (four) hours as needed for Wheezing or Shortness of Breath. Rescue  -     predniSONE (DELTASONE) 10 MG tablet; Take 1-2 pills a day for three days, repeat for shortness of breath            Follow up in about 6 months (around 1/22/2025), or if symptoms worsen or fail to improve.    Discussed with patient above for education the following:      Patient Instructions   Recommend using flonase twice daily every day in morning and again in evenings before bed.     Use netti pot to help clear out sinus from irritants.     Will continue Trelegy daily and tezspire monthly.

## 2024-07-24 ENCOUNTER — PATIENT MESSAGE (OUTPATIENT)
Dept: PULMONOLOGY | Facility: CLINIC | Age: 74
End: 2024-07-24
Payer: MEDICARE

## 2024-07-24 DIAGNOSIS — J44.89 ASTHMA WITH COPD: ICD-10-CM

## 2024-07-25 ENCOUNTER — PATIENT MESSAGE (OUTPATIENT)
Dept: PAIN MEDICINE | Facility: CLINIC | Age: 74
End: 2024-07-25
Payer: MEDICARE

## 2024-07-25 RX ORDER — FLUTICASONE FUROATE, UMECLIDINIUM BROMIDE AND VILANTEROL TRIFENATATE 200; 62.5; 25 UG/1; UG/1; UG/1
1 POWDER RESPIRATORY (INHALATION) DAILY
Qty: 180 EACH | Refills: 3 | Status: SHIPPED | OUTPATIENT
Start: 2024-07-25

## 2024-07-29 ENCOUNTER — TELEPHONE (OUTPATIENT)
Dept: UROLOGY | Facility: CLINIC | Age: 74
End: 2024-07-29
Payer: MEDICARE

## 2024-07-29 NOTE — TELEPHONE ENCOUNTER
----- Message from Catherine Kamara sent at 7/29/2024  9:53 AM CDT -----  Contact: [phone]  PATIENTCALL     Pt is calling to speak with someone in provider office regarding she thinks she is getting another UTI so, she wants to schedule an appt no appt in epic  is asking for a return call please call.

## 2024-07-31 ENCOUNTER — OFFICE VISIT (OUTPATIENT)
Dept: URGENT CARE | Facility: CLINIC | Age: 74
End: 2024-07-31
Payer: MEDICARE

## 2024-07-31 VITALS
BODY MASS INDEX: 35.68 KG/M2 | WEIGHT: 209 LBS | OXYGEN SATURATION: 96 % | HEART RATE: 66 BPM | RESPIRATION RATE: 18 BRPM | SYSTOLIC BLOOD PRESSURE: 125 MMHG | TEMPERATURE: 99 F | HEIGHT: 64 IN | DIASTOLIC BLOOD PRESSURE: 76 MMHG

## 2024-07-31 DIAGNOSIS — R39.9 UTI SYMPTOMS: ICD-10-CM

## 2024-07-31 DIAGNOSIS — N39.0 URINARY TRACT INFECTION WITH HEMATURIA, SITE UNSPECIFIED: Primary | ICD-10-CM

## 2024-07-31 DIAGNOSIS — R31.9 URINARY TRACT INFECTION WITH HEMATURIA, SITE UNSPECIFIED: Primary | ICD-10-CM

## 2024-07-31 LAB
BILIRUBIN, UA POC OHS: NEGATIVE
BLOOD, UA POC OHS: ABNORMAL
CLARITY, UA POC OHS: ABNORMAL
COLOR, UA POC OHS: YELLOW
GLUCOSE, UA POC OHS: NEGATIVE
KETONES, UA POC OHS: NEGATIVE
LEUKOCYTES, UA POC OHS: ABNORMAL
NITRITE, UA POC OHS: NEGATIVE
PH, UA POC OHS: 5.5
PROTEIN, UA POC OHS: NEGATIVE
SPECIFIC GRAVITY, UA POC OHS: 1.01
UROBILINOGEN, UA POC OHS: 0.2

## 2024-07-31 PROCEDURE — 87186 SC STD MICRODIL/AGAR DIL: CPT | Mod: HCNC | Performed by: NURSE PRACTITIONER

## 2024-07-31 PROCEDURE — 81003 URINALYSIS AUTO W/O SCOPE: CPT | Mod: QW,S$GLB,, | Performed by: NURSE PRACTITIONER

## 2024-07-31 PROCEDURE — 87088 URINE BACTERIA CULTURE: CPT | Mod: HCNC | Performed by: NURSE PRACTITIONER

## 2024-07-31 PROCEDURE — 99213 OFFICE O/P EST LOW 20 MIN: CPT | Mod: S$GLB,,, | Performed by: NURSE PRACTITIONER

## 2024-07-31 PROCEDURE — 87086 URINE CULTURE/COLONY COUNT: CPT | Mod: HCNC | Performed by: NURSE PRACTITIONER

## 2024-07-31 RX ORDER — CEPHALEXIN 500 MG/1
500 CAPSULE ORAL EVERY 12 HOURS
Qty: 20 CAPSULE | Refills: 0 | Status: SHIPPED | OUTPATIENT
Start: 2024-07-31 | End: 2024-08-10

## 2024-07-31 NOTE — PATIENT INSTRUCTIONS
Follow up with urology as scheduled.    INSTRUCTIONS:  - Rest.  - Drink plenty of fluids.  - Take Tylenol and/or Ibuprofen as directed as needed for fever/pain.  Do not take more than the recommended dose.  - follow up with your PCP within the next 1-2 weeks as needed.  - You must understand that you have received an Urgent Care treatment only and that you may be released before all of your medical problems are known or treated.   - You, the patient, will arrange for follow up care as instructed.   - If your condition worsens or fails to improve we recommend that you receive another evaluation at the ER immediately or contact your PCP to discuss your concerns.   - You can call [phone] or [phone] to help schedule an appointment with the appropriate provider.     -If you smoke cigarettes, it would be beneficial for you to stop.

## 2024-07-31 NOTE — PROGRESS NOTES
"Subjective:      Patient ID: Cornelia Delatorre is a 73 y.o. female.    Vitals:  height is 5' 4" (1.626 m) and weight is 94.8 kg (209 lb). Her oral temperature is 98.7 °F (37.1 °C). Her blood pressure is 125/76 and her pulse is 66. Her respiration is 18 and oxygen saturation is 96%.     Chief Complaint: Dysuria    Pt presents with c/o frequent urinating, vaginal pressure, occasional burning while urinating X 3 days. Pain score 0/10  Pt states had same symptoms when she was here July 6    Provider note begins below:   Patient denies any fever or chills.  Denies any dysuria, hematuria, vaginal bleeding/discharge, abdominal pain or CVA tenderness. + urine culture 7/6/2024 E coli, sensitive to cephalosporins.  She was treated with Cipro at that visit and reports symptoms did not completely go away.    Dysuria   This is a new problem. The current episode started in the past 7 days. The problem has been unchanged. The quality of the pain is described as burning. The pain is at a severity of 0/10. The patient is experiencing no pain. There has been no fever. The fever has been present for Less than 1 day. She is Not sexually active. There is No history of pyelonephritis. Associated symptoms include frequency and urgency. Pertinent negatives include no behavior changes, chills, discharge, flank pain, hematuria, hesitancy, nausea, possible pregnancy, sweats, vomiting, weight loss, bubble bath use, constipation, rash or withholding. She has tried nothing for the symptoms. The treatment provided no relief. Her past medical history is significant for diabetes mellitus, hypertension and recurrent UTIs. There is no history of catheterization, diabetes insipidus, genitourinary reflux, kidney stones, a single kidney, STD, urinary stasis or a urological procedure.       Constitution: Negative. Negative for chills, sweating and fatigue.   HENT: Negative.  Negative for ear pain, facial swelling, congestion and sore throat.    Neck: " Negative for painful lymph nodes.   Cardiovascular: Negative.  Negative for chest trauma, chest pain and sob on exertion.   Eyes: Negative.  Negative for eye itching and eye pain.   Respiratory: Negative.  Negative for chest tightness, cough and asthma.    Gastrointestinal: Negative.  Negative for nausea, vomiting, constipation and diarrhea.   Endocrine: negative. cold intolerance and excessive thirst.   Genitourinary:  Positive for frequency and urgency. Negative for dysuria, flank pain, hematuria, vaginal discharge, vaginal bleeding and pelvic pain.   Musculoskeletal:  Negative for pain, trauma and joint pain.   Skin: Negative.  Negative for rash, wound and hives.   Allergic/Immunologic: Negative.  Negative for eczema, asthma, hives and itching.   Neurological: Negative.  Negative for disorientation and altered mental status.   Hematologic/Lymphatic: Negative.  Negative for swollen lymph nodes.   Psychiatric/Behavioral: Negative.  Negative for altered mental status, disorientation and confusion.       Objective:     Physical Exam   Constitutional: She is oriented to person, place, and time. She appears well-developed.  Non-toxic appearance. She does not appear ill. No distress.   HENT:   Head: Normocephalic and atraumatic.   Ears:   Right Ear: External ear normal.   Left Ear: External ear normal.   Nose: Nose normal. No nasal deformity. No epistaxis.   Mouth/Throat: Oropharynx is clear and moist and mucous membranes are normal.   Eyes: Lids are normal.   Neck: Trachea normal and phonation normal. Neck supple.   Cardiovascular: Normal pulses.   Pulmonary/Chest: Effort normal and breath sounds normal. No stridor. No respiratory distress. She has no wheezes. She has no rhonchi. She has no rales. She exhibits no tenderness.   Abdominal: Normal appearance and bowel sounds are normal. She exhibits no distension. Soft. There is no abdominal tenderness. There is no left CVA tenderness and no right CVA tenderness.    Musculoskeletal: Normal range of motion.         General: Normal range of motion.   Neurological: no focal deficit. She is alert, oriented to person, place, and time and at baseline.   Skin: Skin is warm, dry, intact and not diaphoretic.   Psychiatric: Her speech is normal and behavior is normal. Mood, judgment and thought content normal.   Nursing note and vitals reviewed.    Results for orders placed or performed in visit on 07/31/24   POCT Urinalysis(Instrument)   Result Value Ref Range    Color, POC UA Yellow Yellow, Straw, Colorless    Clarity, POC UA Cloudy (A) Clear    Glucose, POC UA Negative Negative    Bilirubin, POC UA Negative Negative    Ketones, POC UA Negative Negative    Spec Grav POC UA 1.015 1.005 - 1.030    Blood, POC UA Trace-intact (A) Negative    pH, POC UA 5.5 5.0 - 8.0    Protein, POC UA Negative Negative    Urobilinogen, POC UA 0.2 <=1.0    Nitrite, POC UA Negative Negative    WBC, POC UA Small (A) Negative     *Note: Due to a large number of results and/or encounters for the requested time period, some results have not been displayed. A complete set of results can be found in Results Review.         Assessment:     1. Urinary tract infection with hematuria, site unspecified    2. UTI symptoms        Plan:   UA discussed with patient.  Keflex for treatment of UTI.  Urine culture sent, will call patient results.  Patient to follow-up with urology as scheduled.  Patient acknowledged all and agreed to plan of care.      FOLLOWUP  Follow up if symptoms worsen or fail to improve, for PLEASE CONTACT PCP OR CONTACT THE EMERGENCY ROOM..     PATIENT INSTRUCTIONS  Patient Instructions   Follow up with urology as scheduled.    INSTRUCTIONS:  - Rest.  - Drink plenty of fluids.  - Take Tylenol and/or Ibuprofen as directed as needed for fever/pain.  Do not take more than the recommended dose.  - follow up with your PCP within the next 1-2 weeks as needed.  - You must understand that you have received an  Urgent Care treatment only and that you may be released before all of your medical problems are known or treated.   - You, the patient, will arrange for follow up care as instructed.   - If your condition worsens or fails to improve we recommend that you receive another evaluation at the ER immediately or contact your PCP to discuss your concerns.   - You can call [phone] or [phone] to help schedule an appointment with the appropriate provider.     -If you smoke cigarettes, it would be beneficial for you to stop.         THANK YOU FOR ALLOWING ME TO PARTICIPATE IN YOUR HEALTHCARE,     Abhijeet Avendano, NP     Urinary tract infection with hematuria, site unspecified  -     cephALEXin (KEFLEX) 500 MG capsule; Take 1 capsule (500 mg total) by mouth every 12 (twelve) hours. for 10 days  Dispense: 20 capsule; Refill: 0    UTI symptoms  -     POCT Urinalysis(Instrument)  -     Culture, Urine

## 2024-08-02 LAB — BACTERIA UR CULT: ABNORMAL

## 2024-08-03 ENCOUNTER — TELEPHONE (OUTPATIENT)
Dept: URGENT CARE | Facility: CLINIC | Age: 74
End: 2024-08-03
Payer: MEDICARE

## 2024-08-03 NOTE — TELEPHONE ENCOUNTER
Patient notified of her positive urine culture for E coli.  Urine culture showed sensitivity to cephalosporins.  Patient is currently on Keflex which is appropriate. She reports improvement.

## 2024-08-05 ENCOUNTER — OFFICE VISIT (OUTPATIENT)
Dept: PAIN MEDICINE | Facility: CLINIC | Age: 74
End: 2024-08-05
Payer: MEDICARE

## 2024-08-05 VITALS
HEIGHT: 64 IN | SYSTOLIC BLOOD PRESSURE: 124 MMHG | WEIGHT: 209.31 LBS | DIASTOLIC BLOOD PRESSURE: 59 MMHG | BODY MASS INDEX: 35.73 KG/M2 | HEART RATE: 67 BPM

## 2024-08-05 DIAGNOSIS — M48.061 SPINAL STENOSIS OF LUMBAR REGION, UNSPECIFIED WHETHER NEUROGENIC CLAUDICATION PRESENT: ICD-10-CM

## 2024-08-05 DIAGNOSIS — M54.9 DORSALGIA, UNSPECIFIED: ICD-10-CM

## 2024-08-05 DIAGNOSIS — M54.16 LUMBAR RADICULOPATHY: Primary | ICD-10-CM

## 2024-08-05 DIAGNOSIS — M47.816 LUMBAR SPONDYLOSIS: ICD-10-CM

## 2024-08-05 PROCEDURE — 3066F NEPHROPATHY DOC TX: CPT | Mod: HCNC,CPTII,S$GLB,

## 2024-08-05 PROCEDURE — 3074F SYST BP LT 130 MM HG: CPT | Mod: HCNC,CPTII,S$GLB,

## 2024-08-05 PROCEDURE — 99999 PR PBB SHADOW E&M-EST. PATIENT-LVL III: CPT | Mod: PBBFAC,HCNC,,

## 2024-08-05 PROCEDURE — 3061F NEG MICROALBUMINURIA REV: CPT | Mod: HCNC,CPTII,S$GLB,

## 2024-08-05 PROCEDURE — 3288F FALL RISK ASSESSMENT DOCD: CPT | Mod: HCNC,CPTII,S$GLB,

## 2024-08-05 PROCEDURE — 3044F HG A1C LEVEL LT 7.0%: CPT | Mod: HCNC,CPTII,S$GLB,

## 2024-08-05 PROCEDURE — 3078F DIAST BP <80 MM HG: CPT | Mod: HCNC,CPTII,S$GLB,

## 2024-08-05 PROCEDURE — 1157F ADVNC CARE PLAN IN RCRD: CPT | Mod: HCNC,CPTII,S$GLB,

## 2024-08-05 PROCEDURE — 1101F PT FALLS ASSESS-DOCD LE1/YR: CPT | Mod: HCNC,CPTII,S$GLB,

## 2024-08-05 PROCEDURE — 99213 OFFICE O/P EST LOW 20 MIN: CPT | Mod: HCNC,S$GLB,,

## 2024-08-05 PROCEDURE — 3008F BODY MASS INDEX DOCD: CPT | Mod: HCNC,CPTII,S$GLB,

## 2024-08-05 PROCEDURE — 1125F AMNT PAIN NOTED PAIN PRSNT: CPT | Mod: HCNC,CPTII,S$GLB,

## 2024-08-06 ENCOUNTER — OFFICE VISIT (OUTPATIENT)
Dept: UROLOGY | Facility: CLINIC | Age: 74
End: 2024-08-06
Payer: MEDICARE

## 2024-08-06 ENCOUNTER — TELEPHONE (OUTPATIENT)
Dept: PAIN MEDICINE | Facility: CLINIC | Age: 74
End: 2024-08-06
Payer: MEDICARE

## 2024-08-06 VITALS
SYSTOLIC BLOOD PRESSURE: 133 MMHG | DIASTOLIC BLOOD PRESSURE: 78 MMHG | BODY MASS INDEX: 35.91 KG/M2 | WEIGHT: 209.19 LBS | HEART RATE: 66 BPM

## 2024-08-06 DIAGNOSIS — R39.15 URINARY URGENCY: ICD-10-CM

## 2024-08-06 DIAGNOSIS — R35.0 URINARY FREQUENCY: ICD-10-CM

## 2024-08-06 DIAGNOSIS — N39.0 RECURRENT UTI: Primary | ICD-10-CM

## 2024-08-06 PROCEDURE — 99999 PR PBB SHADOW E&M-EST. PATIENT-LVL IV: CPT | Mod: PBBFAC,,, | Performed by: UROLOGY

## 2024-08-06 PROCEDURE — 1159F MED LIST DOCD IN RCRD: CPT | Mod: CPTII,S$GLB,, | Performed by: UROLOGY

## 2024-08-06 PROCEDURE — 3075F SYST BP GE 130 - 139MM HG: CPT | Mod: CPTII,S$GLB,, | Performed by: UROLOGY

## 2024-08-06 PROCEDURE — 3288F FALL RISK ASSESSMENT DOCD: CPT | Mod: CPTII,S$GLB,, | Performed by: UROLOGY

## 2024-08-06 PROCEDURE — 3044F HG A1C LEVEL LT 7.0%: CPT | Mod: CPTII,S$GLB,, | Performed by: UROLOGY

## 2024-08-06 PROCEDURE — 3078F DIAST BP <80 MM HG: CPT | Mod: CPTII,S$GLB,, | Performed by: UROLOGY

## 2024-08-06 PROCEDURE — 3066F NEPHROPATHY DOC TX: CPT | Mod: CPTII,S$GLB,, | Performed by: UROLOGY

## 2024-08-06 PROCEDURE — 1101F PT FALLS ASSESS-DOCD LE1/YR: CPT | Mod: CPTII,S$GLB,, | Performed by: UROLOGY

## 2024-08-06 PROCEDURE — 99214 OFFICE O/P EST MOD 30 MIN: CPT | Mod: S$GLB,,, | Performed by: UROLOGY

## 2024-08-06 PROCEDURE — 81002 URINALYSIS NONAUTO W/O SCOPE: CPT | Mod: S$GLB,,, | Performed by: UROLOGY

## 2024-08-06 PROCEDURE — 3008F BODY MASS INDEX DOCD: CPT | Mod: CPTII,S$GLB,, | Performed by: UROLOGY

## 2024-08-06 PROCEDURE — 3061F NEG MICROALBUMINURIA REV: CPT | Mod: CPTII,S$GLB,, | Performed by: UROLOGY

## 2024-08-06 PROCEDURE — 1157F ADVNC CARE PLAN IN RCRD: CPT | Mod: CPTII,S$GLB,, | Performed by: UROLOGY

## 2024-08-06 PROCEDURE — 95971 ALYS SMPL SP/PN NPGT W/PRGRM: CPT | Mod: S$GLB,,, | Performed by: UROLOGY

## 2024-08-06 PROCEDURE — 1125F AMNT PAIN NOTED PAIN PRSNT: CPT | Mod: CPTII,S$GLB,, | Performed by: UROLOGY

## 2024-08-06 RX ORDER — METHENAMINE HIPPURATE 1000 MG/1
1 TABLET ORAL 2 TIMES DAILY
Qty: 60 TABLET | Refills: 5 | Status: SHIPPED | OUTPATIENT
Start: 2024-08-06

## 2024-08-07 ENCOUNTER — TELEPHONE (OUTPATIENT)
Dept: PAIN MEDICINE | Facility: CLINIC | Age: 74
End: 2024-08-07
Payer: MEDICARE

## 2024-08-12 ENCOUNTER — OFFICE VISIT (OUTPATIENT)
Dept: OTOLARYNGOLOGY | Facility: CLINIC | Age: 74
End: 2024-08-12
Payer: MEDICARE

## 2024-08-12 VITALS — BODY MASS INDEX: 35.71 KG/M2 | HEIGHT: 64 IN | WEIGHT: 209.19 LBS

## 2024-08-12 DIAGNOSIS — J04.0 LARYNGITIS: Primary | ICD-10-CM

## 2024-08-12 PROCEDURE — 99213 OFFICE O/P EST LOW 20 MIN: CPT | Mod: HCNC,S$GLB,, | Performed by: OTOLARYNGOLOGY

## 2024-08-12 PROCEDURE — 3066F NEPHROPATHY DOC TX: CPT | Mod: HCNC,CPTII,S$GLB, | Performed by: OTOLARYNGOLOGY

## 2024-08-12 PROCEDURE — 1101F PT FALLS ASSESS-DOCD LE1/YR: CPT | Mod: HCNC,CPTII,S$GLB, | Performed by: OTOLARYNGOLOGY

## 2024-08-12 PROCEDURE — 3288F FALL RISK ASSESSMENT DOCD: CPT | Mod: HCNC,CPTII,S$GLB, | Performed by: OTOLARYNGOLOGY

## 2024-08-12 PROCEDURE — 99999 PR PBB SHADOW E&M-EST. PATIENT-LVL V: CPT | Mod: PBBFAC,HCNC,, | Performed by: OTOLARYNGOLOGY

## 2024-08-12 PROCEDURE — 3008F BODY MASS INDEX DOCD: CPT | Mod: HCNC,CPTII,S$GLB, | Performed by: OTOLARYNGOLOGY

## 2024-08-12 PROCEDURE — 1126F AMNT PAIN NOTED NONE PRSNT: CPT | Mod: HCNC,CPTII,S$GLB, | Performed by: OTOLARYNGOLOGY

## 2024-08-12 PROCEDURE — 3044F HG A1C LEVEL LT 7.0%: CPT | Mod: HCNC,CPTII,S$GLB, | Performed by: OTOLARYNGOLOGY

## 2024-08-12 PROCEDURE — 1157F ADVNC CARE PLAN IN RCRD: CPT | Mod: HCNC,CPTII,S$GLB, | Performed by: OTOLARYNGOLOGY

## 2024-08-12 PROCEDURE — 3061F NEG MICROALBUMINURIA REV: CPT | Mod: HCNC,CPTII,S$GLB, | Performed by: OTOLARYNGOLOGY

## 2024-08-12 NOTE — PROGRESS NOTES
Subjective:       Patient ID: Cornelia Delatorre is a 73 y.o. female.    Chief Complaint: Hoarse (Pt states it started with a sore throat and ear ache the ear ache has went away for the most part)    Cornelia is here for follow-up.   Here today for laryngitis and URI.   Began 3-4 days ago with otalgia and sore throat and now began involving the throat with hoarseness. She has had issues with laryngitis in the past.   Has been on a few rounds of antibiotic past month for UTI - Omnicef then Keflex.    She is on Trelegy. She has been doing Flonase as well.   3/2023 when I performed RhinAer.  She has     Patient validated questionnaires (if applicable):  SNOT-22 score: : (P) 40  NOSE score:: (P) 45%  ETDQ-7 score:: (P) 4         No data to display                   No data to display                   No data to display                     Review of Systems   Constitutional: Negative for activity change and appetite change.   Respiratory: Negative for difficulty breathing and wheezing   Cardiovascular: Negative for chest pain.      Objective:        Constitutional:   Vital signs are normal. She appears well-developed and well-nourished.     Head:  Normocephalic and atraumatic.     Ears:  Hearing normal to normal and whispered voice; external ear normal without scars, lesions, or masses; ear canal, tympanic membrane, and middle ear normal..     Nose:  Nose normal including turbinates, nasal mucosa, sinuses and nasal septum.     Mouth/Throat  Oropharynx clear and moist without lesions or asymmetry.     Neck:  Neck normal without thyromegaly masses, asymmetry, normal tracheal structure, crepitus, and tenderness.         Tests / Results:  none    Assessment:       1. Laryngitis          Plan:         Supportive care discussed for now  Has had issues with laryngitis in the past so if persistent, will scope or add some meds

## 2024-08-12 NOTE — PATIENT INSTRUCTIONS
Self Care for an Upper Respiratory Infection    Colds are caused by viruses. They can't be cured with antibiotics. However, you can ease symptoms and support your body's efforts to heal itself.  No matter which symptoms you have, be sure to:  Drink plenty of fluids (water or clear soup)  Stop smoking and drinking alcohol  Get plenty of rest    Upper respiratory infections usually last between 3-10 days. The primary goal of treatment is to control symptoms to allow you to function as normally as possible. This can be difficult at times. The treatment below serves as a general guideline. You can pick and choose medications depending on need. Be aware that many over the counter cold remedies have MULTIPLE medications within them.    Medications should be taken according to bottle instructions.    Consult with your doctor regarding any questions with these medications, and be sure to read the label for possible interactions.    General remedies to help relief symptoms:  Emergen-C is a Vitamin C containing tablet to help boost your immune system. This can help stimulate your natural immune system to fight the virus quicker.  Nasal saline (irrigations or spray) can thin mucous and allow you to clear the nasal passage more easily. You cannot overdose on this, and it can be used as much as you need  A steam bath or towel can help clear the mucous and improve nasal breathing    Sore throat  1 tsp powdered moy + 1 tsp honey + 1/2 lemon mixed in 1 cup warm/hot water  Salt water gargle: 1 tsp salt in 8 oz. Of warm water - gargle for 10 seconds and spit.  Pain medication, as below  For severe sore throat (tonsillitis, bacterial infections) - a stronger medication can be prescribed by your doctor if you ask.    Headache / Muscle aches / Sore throat  NSAIDs (non-steroidal anti-inflammatory) - Ibuprofen / Mortin / Advil / Aleve  Tylenol (acetominophen)    Nasal congestion / Stuffiness / Head Pressure  These are good for  managing acute stuffiness, congestion, and pressure. These can be taken during the period of an acute cold, according to bottle instructions. Do not take these medications long term  Decongestants - Sudafed, Suphedrin, Sudafed PE  Nasal spray decongestants - Afrin (oxymetazoline) - do not take for more than 3 days at a time    Runny nose / Itchy nose  SEDATING anti-histamines - Benadryl (diphenhydramine), Chlorpheniramine   Oral NON-sedating anti-histamines - Claritin (loratidine) / Allegra (fexofenadine) / Zyrtec (cetirizine) / Xyzal (levocetrizine)     Thickened mucous (medications to thin mucous)  Mucolytics - Mucinex (guaifenisin)    Cough suppressant  Robitussin (dextromothorphan)         Miscellaneous  Put fluids back into your body. Take frequent sips of clear liquids such as water or broth. Avoid drinks that have a lot of sugar in them, such as juices and sodas. These can make diarrhea worse. Older children and adults can drink sports drinks.  As your appetite returns, you can resume your normal diet. Ask your healthcare provider if there are any foods you should avoid.    When to seek medical care  When you first notice symptoms, ask your healthcare provider if antiviral medicines are appropriate. Antibiotics should not be taken for colds or flu. Also, call your healthcare provider if you have any of the following symptoms or if you aren't feeling better after 7-10 days:  Shortness of breath  Pain or pressure in the chest or belly (abdomen)  Worsening symptoms, especially after a period of improvement  Fever of 100.4°F  (38.0°C) or higher, or fever that doesn't go down with medicine  Sudden dizziness or confusion  Severe or continued vomiting  Signs of dehydration, including extreme thirst, dark urine, infrequent urination, dry mouth  Spotted, red, or very sore throat

## 2024-08-16 ENCOUNTER — CLINICAL SUPPORT (OUTPATIENT)
Dept: PULMONOLOGY | Facility: CLINIC | Age: 74
End: 2024-08-16
Payer: MEDICARE

## 2024-08-16 DIAGNOSIS — J45.50 SEVERE PERSISTENT ASTHMA WITHOUT COMPLICATION: Primary | ICD-10-CM

## 2024-08-16 NOTE — PROGRESS NOTES
Patient is here for her Tezspire injection.  2 patient identifiers used (Name and ).  Patient received the injection to the right arm                subcutaneous.  No redness, bleeding, or swelling noted to the injection site.  Pt tolerated well.    NDC:47375-898-37  LOT: [id number]  EXP: [id number]

## 2024-08-21 ENCOUNTER — OFFICE VISIT (OUTPATIENT)
Dept: ORTHOPEDICS | Facility: CLINIC | Age: 74
End: 2024-08-21
Payer: MEDICARE

## 2024-08-21 VITALS — RESPIRATION RATE: 16 BRPM | BODY MASS INDEX: 35.71 KG/M2 | HEIGHT: 64 IN | WEIGHT: 209.19 LBS

## 2024-08-21 DIAGNOSIS — M17.11 PRIMARY OSTEOARTHRITIS OF RIGHT KNEE: Primary | ICD-10-CM

## 2024-08-21 PROCEDURE — 1125F AMNT PAIN NOTED PAIN PRSNT: CPT | Mod: HCNC,CPTII,S$GLB, | Performed by: ORTHOPAEDIC SURGERY

## 2024-08-21 PROCEDURE — 3044F HG A1C LEVEL LT 7.0%: CPT | Mod: HCNC,CPTII,S$GLB, | Performed by: ORTHOPAEDIC SURGERY

## 2024-08-21 PROCEDURE — 20610 DRAIN/INJ JOINT/BURSA W/O US: CPT | Mod: HCNC,RT,S$GLB, | Performed by: ORTHOPAEDIC SURGERY

## 2024-08-21 PROCEDURE — 3288F FALL RISK ASSESSMENT DOCD: CPT | Mod: HCNC,CPTII,S$GLB, | Performed by: ORTHOPAEDIC SURGERY

## 2024-08-21 PROCEDURE — 3061F NEG MICROALBUMINURIA REV: CPT | Mod: HCNC,CPTII,S$GLB, | Performed by: ORTHOPAEDIC SURGERY

## 2024-08-21 PROCEDURE — 3066F NEPHROPATHY DOC TX: CPT | Mod: HCNC,CPTII,S$GLB, | Performed by: ORTHOPAEDIC SURGERY

## 2024-08-21 PROCEDURE — 99213 OFFICE O/P EST LOW 20 MIN: CPT | Mod: 25,HCNC,S$GLB, | Performed by: ORTHOPAEDIC SURGERY

## 2024-08-21 PROCEDURE — 1157F ADVNC CARE PLAN IN RCRD: CPT | Mod: HCNC,CPTII,S$GLB, | Performed by: ORTHOPAEDIC SURGERY

## 2024-08-21 PROCEDURE — 1160F RVW MEDS BY RX/DR IN RCRD: CPT | Mod: HCNC,CPTII,S$GLB, | Performed by: ORTHOPAEDIC SURGERY

## 2024-08-21 PROCEDURE — 1159F MED LIST DOCD IN RCRD: CPT | Mod: HCNC,CPTII,S$GLB, | Performed by: ORTHOPAEDIC SURGERY

## 2024-08-21 PROCEDURE — 1101F PT FALLS ASSESS-DOCD LE1/YR: CPT | Mod: HCNC,CPTII,S$GLB, | Performed by: ORTHOPAEDIC SURGERY

## 2024-08-21 PROCEDURE — 99999 PR PBB SHADOW E&M-EST. PATIENT-LVL III: CPT | Mod: PBBFAC,HCNC,, | Performed by: ORTHOPAEDIC SURGERY

## 2024-08-21 PROCEDURE — 3008F BODY MASS INDEX DOCD: CPT | Mod: HCNC,CPTII,S$GLB, | Performed by: ORTHOPAEDIC SURGERY

## 2024-08-21 RX ORDER — TRIAMCINOLONE ACETONIDE 40 MG/ML
40 INJECTION, SUSPENSION INTRA-ARTICULAR; INTRAMUSCULAR
Status: DISCONTINUED | OUTPATIENT
Start: 2024-08-21 | End: 2024-08-21 | Stop reason: HOSPADM

## 2024-08-21 RX ADMIN — TRIAMCINOLONE ACETONIDE 40 MG: 40 INJECTION, SUSPENSION INTRA-ARTICULAR; INTRAMUSCULAR at 10:08

## 2024-08-21 NOTE — PROCEDURES
Large Joint Aspiration/Injection: R knee    Date/Time: 8/21/2024 10:30 AM    Performed by: Robbin Edmond MD  Authorized by: Robbin Edmond MD    Consent Done?:  Yes (Verbal)  Indications:  Pain  Site marked: the procedure site was marked    Timeout: prior to procedure the correct patient, procedure, and site was verified    Local anesthetic: Ropivicaine.  Anesthetic total (ml):  3      Details:  Needle Size:  20 G  Approach:  Anterolateral  Location:  Knee  Site:  R knee  Medications:  40 mg triamcinolone acetonide 40 mg/mL  Patient tolerance:  Patient tolerated the procedure well with no immediate complications

## 2024-08-21 NOTE — PROGRESS NOTES
Past Medical History:   Diagnosis Date    Allergy     Arthritis     Asthma     Cancer     skin cancer to right hand    Cataract     Chronic fatigue 01/24/2017    CVID (common variable immunodeficiency) 03/07/2019    Diabetes mellitus     type2    KLINE (dyspnea on exertion) 01/24/2017    Dyslipidemia 06/25/2019    Encounter for blood transfusion     Essential hypertension 12/29/2011    GERD (gastroesophageal reflux disease)     Headache(784.0)     Hyperlipidemia 07/15/2015    Hypertension     Hypothyroidism     Neuropathy     Obesity     Postmenopausal HRT (hormone replacement therapy)     Rash     Rosacea     Sleep apnea     on bipap    Snoring     Squamous cell carcinoma of skin     left forearm     UTI (urinary tract infection)     Wears glasses        Past Surgical History:   Procedure Laterality Date    ADENOIDECTOMY      APPENDECTOMY      BLADDER SUSPENSION      BREAST BIOPSY Bilateral 08/1992    bilateral benign excisional biopsies    BUNIONECTOMY  10/17/2014    right, still has discomfort    CATARACT EXTRACTION W/  INTRAOCULAR LENS IMPLANT Bilateral     CHOLECYSTECTOMY  08/02/2017    COLONOSCOPY      CYSTOSCOPY      EPIDURAL STEROID INJECTION INTO LUMBAR SPINE N/A 08/14/2019    Procedure: Injection-steroid-epidural-lumbar L5/S1;  Surgeon: Fredi Rojas MD;  Location: [facility] OR;  Service: Pain Management;  Laterality: N/A;    EPIDURAL STEROID INJECTION INTO LUMBAR SPINE N/A 05/03/2021    Procedure: Injection-steroid-epidural-lumbar L5/S1 to the Left;  Surgeon: Fredi Rojas MD;  Location: [facility] OR;  Service: Pain Management;  Laterality: N/A;    EPIDURAL STEROID INJECTION INTO LUMBAR SPINE N/A 09/24/2021    Procedure: Injection-steroid-epidural-lumbar L5/S1;  Surgeon: Fredi Rojas MD;  Location: [facility] OR;  Service: Pain Management;  Laterality: N/A;    EPIDURAL STEROID INJECTION INTO LUMBAR SPINE N/A 02/21/2022    Procedure: Injection-steroid-epidural-lumbar L5/S1;  Surgeon: Fredi Rojas MD;  Location:  [facility] OR;  Service: Pain Management;  Laterality: N/A;    EPIDURAL STEROID INJECTION INTO LUMBAR SPINE N/A 02/09/2024    Procedure: Injection-steroid-epidural-lumbar       L5/S1;  Surgeon: Fredi Rojas MD;  Location: [facility] OR;  Service: Pain Management;  Laterality: N/A;    ESOPHAGEAL DILATION N/A 06/11/2021    Procedure: DILATION, ESOPHAGUS;  Surgeon: Jake Sheriff MD;  Location: [facility] ENDO;  Service: Endoscopy;  Laterality: N/A;    ESOPHAGOGASTRODUODENOSCOPY      dilated esophagus    ESOPHAGOGASTRODUODENOSCOPY N/A 06/11/2021    Procedure: EGD (ESOPHAGOGASTRODUODENOSCOPY);  Surgeon: Jake Sheriff MD;  Location: [facility] ENDO;  Service: Endoscopy;  Laterality: N/A;    GASTRIC BYPASS      2011    HYSTERECTOMY  02/1980    interstim bladder  2009    10/14/14 new battery    OOPHORECTOMY  02/1980    PERCUTANEOUS INSERTION OF SACRAL NERVE NEUROSTIMULATOR ELECTRODE LEAD N/A 6/5/2024    Procedure: INSERTION, ELECTRODE LEAD, NEUROSTIMULATOR, SACRAL, PERCUTANEOUS;  Surgeon: Mary Jane Jarvis MD;  Location: [facility] OR;  Service: Urology;  Laterality: N/A;  1 hour 30 minutes    REPLACEMENT OF NERVE STIMULATOR BATTERY N/A 6/5/2024    Procedure: Replacement, Battery, Neurostimulator;  Surgeon: Mary Jane Jarvis MD;  Location: [facility] OR;  Service: Urology;  Laterality: N/A;  1 hour 30 minutes    REPLACEMENT OF SACRAL NERVE STIMULATOR  02/18/2020    Procedure: REPLACEMENT, NEUROSTIMULATOR, SACRAL;  Surgeon: Mary Jane Jarvis MD;  Location: [facility] OR 2ND FLR;  Service: Urology;;    REVISION OF PROCEDURE INVOLVING SACRAL NEUROSTIMULATOR DEVICE Right 02/18/2020    Procedure: REVISION, NEUROSTIMULATOR, SACRAL/ battery replacement;  Surgeon: Mary Jane Jarvis MD;  Location: [facility] OR 2ND FLR;  Service: Urology;  Laterality: Right;  1hr/ rep contacted/ (CONNIE)    SKIN CANCER EXCISION      left hand    TONSILLECTOMY      TRANSFORAMINAL EPIDURAL INJECTION OF STEROID Bilateral 03/05/2024    Procedure:  Injection,steroid,epidural,transforaminal approach      L4/5;  Surgeon: Fredi Rojas MD;  Location: [facility] OR;  Service: Pain Management;  Laterality: Bilateral;    WISDOM TOOTH EXTRACTION         Current Outpatient Medications   Medication Sig    ascorbic acid, vitamin C, (VITAMIN C) 1000 MG tablet Take 1,000 mg by mouth 2 (two) times daily.     azelastine (OPTIVAR) 0.05 % ophthalmic solution Place 1 drop into both eyes. As needed    B-complex with vitamin C (Z-BEC OR EQUIV) tablet Take 1 tablet by mouth once daily.    Bifidobacterium infantis (ALIGN ORAL) Take by mouth once daily.    biotin 10,000 mcg Cap Take 1 tablet by mouth every evening.     blood sugar diagnostic [employer] preferred meter and strips. Check daily    blood-glucose meter kit Use as instructed. Insurance preferred meter.    cranberry 500 mg Cap Take 1 capsule by mouth every evening.    diltiaZEM (CARDIZEM CD) 120 MG Cp24 Take 1 capsule (120 mg total) by mouth once daily.    doxazosin (CARDURA) 2 MG tablet Take 1 tablet (2 mg total) by mouth every evening.    EPINEPHrine (EPIPEN) 0.3 mg/0.3 mL AtIn Inject 1 each into the muscle as needed.    erythromycin with ethanoL (THERAMYCIN) 2 % external solution Aaa bid prn    esomeprazole (NEXIUM) 40 MG capsule Take 1 capsule (40 mg total) by mouth before breakfast.    estradioL (ESTRACE) 0.01 % (0.1 mg/gram) vaginal cream Place 1 g vaginally 3 (three) times a week. Place by fingertip application before bedtime three times a week (Monday, Wednesday, Friday)    fexofenadine (ALLEGRA) 180 MG tablet Take 180 mg by mouth once daily.     fish oil-omega-3 fatty acids 300-1,000 mg capsule Take 2 g by mouth once daily.    fluticasone propionate (FLONASE) 50 mcg/actuation nasal spray 2 sprays (100 mcg total) by Each Nostril route once daily.    fluticasone-umeclidin-vilanter (TRELEGY ELLIPTA) 200-62.5-25 mcg inhaler Inhale 1 puff into the lungs once daily.    gabapentin (NEURONTIN) 600 MG tablet Take 1  tablet (600 mg total) by mouth 3 (three) times daily.    guaifenesin-codeine 100-10 mg/5 ml (GUAIFENESIN AC)  mg/5 mL syrup Take 5 mLs by mouth 3 (three) times daily as needed for Cough.    hydrOXYzine HCL (ATARAX) 10 MG Tab Take 1 tablet (10 mg total) by mouth 3 (three) times daily as needed.    immun glob G,IgG,-pro-IgA 0-50 (HIZENTRA) 1 gram/5 mL (20 %) Soln Inject 55 mLs (11 g total) into the skin once a week.    inhalation spacing device Use as directed for inhalation.    lancets (ACCU-CHEK SOFTCLIX LANCETS) Misc Use to check blood sugar daily.    levalbuterol (XOPENEX HFA) 45 mcg/actuation inhaler Inhale 1-2 puffs into the lungs every 4 (four) hours as needed for Wheezing or Shortness of Breath. Rescue    levalbuterol (XOPENEX) 1.25 mg/3 mL nebulizer solution Take 3 mLs (1.25 mg total) by nebulization every 4 (four) hours as needed for Wheezing or Shortness of Breath. Rescue    metFORMIN (GLUCOPHAGE-XR) 500 MG ER 24hr tablet Take 1 tablet (500 mg total) by mouth 2 (two) times daily with meals.    methenamine (HIPREX) 1 gram Tab Take 1 tablet (1 g total) by mouth 2 (two) times daily. With Vitamin C 500 mg    mometasone 0.1% (ELOCON) 0.1 % cream aaa bid x 1-2 weeks prn bug bites    montelukast (SINGULAIR) 10 mg tablet Take 1 tablet (10 mg total) by mouth every evening.    mucus clearing device Cecy 1 Device by Misc.(Non-Drug; Combo Route) route 2 (two) times daily.    multivitamin capsule Take 1 capsule by mouth once daily.     mupirocin (BACTROBAN) 2 % ointment Apply topically 3 (three) times daily.    mupirocin (BACTROBAN) 2 % ointment Apply topically 3 (three) times daily.    oxyCODONE (ROXICODONE) 5 MG immediate release tablet Take 1 tablet (5 mg total) by mouth every 4 (four) hours as needed for Pain.    potassium chloride SA (K-DUR,KLOR-CON) 20 MEQ tablet TAKE 1 TABLET ONE TIME DAILY    pravastatin (PRAVACHOL) 80 MG tablet Take 1 tablet (80 mg total) by mouth once daily.    predniSONE (DELTASONE) 10  MG tablet Take 1-2 pills a day for three days, repeat for shortness of breath    psyllium husk (METAMUCIL ORAL) Take by mouth.    SIMETHICONE (GAS-X ORAL) Take 1 capsule by mouth as needed (gas relief).     tezepelumab-ekko (TEZSPIRE) 210 mg/1.91 mL (110 mg/mL) Syrg Inject 1.91 mLs (210 mg total) into the skin every 28 days.    traMADoL (ULTRAM) 50 mg tablet Take 1 tablet (50 mg total) by mouth every 8 (eight) hours as needed for Pain.    valsartan-hydrochlorothiazide (DIOVAN-HCT) 320-25 mg per tablet Take 1 tablet by mouth once daily.    vitamin D3-folic acid 5,000 unit- 1 mg Tab Take 1 tablet by mouth once daily.      Current Facility-Administered Medications   Medication    levalbuterol nebulizer solution 1.25 mg       Review of patient's allergies indicates:   Allergen Reactions    Sulfa (sulfonamide antibiotics) Hives    Naproxen Other (See Comments)     Other reaction(s): RT sided numbness         Family History   Problem Relation Name Age of Onset    Breast cancer Mother  50    Breast cancer Paternal Aunt          40's    Cervical cancer Paternal Grandmother      Ovarian cancer Paternal Grandmother      Cervical cancer Paternal Cousin      Ovarian cancer Paternal Cousin 1st     Diabetes Other      Hypertension Other      Hypothyroidism Other      Allergic rhinitis Neg Hx      Allergies Neg Hx      Angioedema Neg Hx      Asthma Neg Hx      Atopy Neg Hx      Eczema Neg Hx      Immunodeficiency Neg Hx      Rhinitis Neg Hx      Urticaria Neg Hx      Uterine cancer Neg Hx         Social History     Socioeconomic History    Marital status:    Tobacco Use    Smoking status: Never    Smokeless tobacco: Never   Substance and Sexual Activity    Alcohol use: Not Currently    Drug use: No    Sexual activity: Not Currently     Social Determinants of Health     Financial Resource Strain: Medium Risk (4/26/2024)    Overall Financial Resource Strain (CARDIA)     Difficulty of Paying Living Expenses: Somewhat hard    Food Insecurity: Food Insecurity Present (4/26/2024)    Hunger Vital Sign     Worried About Running Out of Food in the Last Year: Sometimes true     Ran Out of Food in the Last Year: Patient declined   Transportation Needs: No Transportation Needs (4/26/2024)    PRAPARE - Transportation     Lack of Transportation (Medical): No     Lack of Transportation (Non-Medical): No   Physical Activity: Inactive (4/26/2024)    Exercise Vital Sign     Days of Exercise per Week: 0 days     Minutes of Exercise per Session: 0 min   Stress: No Stress Concern Present (4/26/2024)    [employer] - Occupational Stress Questionnaire     Feeling of Stress : Only a little   Recent Concern: Stress - Stress Concern Present (3/7/2024)    [employer] - Occupational Stress Questionnaire     Feeling of Stress : Rather much   Housing Stability: Unknown (4/26/2024)    Housing Stability Vital Sign     Unable to Pay for Housing in the Last Year: Patient declined       Chief Complaint:   No chief complaint on file.      History of present illness:  73-year-old female comes in with knee pain.   Patient has had an issue with arthritis in the past.  Has had viscosupplementation previously with good success.  Pain over the medial aspect of the right knee.  Recent Orthovisc series did not help for very long.  She thinks a lot of it is also from her back in the way it makes her walk.  Pain is an 8/10.    Physical Examination:    Vital Signs:    There were no vitals filed for this visit.          There is no height or weight on file to calculate BMI.    This a well-developed, well nourished patient in no acute distress.  They are alert and oriented and cooperative to examination.  Pt. walks without an antalgic gait.      Examination of the right knee shows no rashes or erythema. There are no masses ecchymosis or effusion. Patient has full range of motion from 0-130°. Patient is nontender to  palpation over lateral joint line and moderately tender to palpation over the medial joint line.  Knee is stable to varus and valgus stress. 5 out of 5 motor strength. Palpable distal pulses. Intact light touch sensation. Negative Patellofemoral crepitus      X-rays:  X-rays of the right knee is  reviewed which show moderate to severe medial joint space narrowing.  More moderate medial narrowing of the left knee     Assessment::  Moderate to severe right varus knee arthritis  Left knee arthritis and contusion    Plan:  I reviewed the findings with her today.  I injected her right knee with     This note was created using [employer] voice recognition software that occasionally misinterpreted phrases or words.    Consult note is delivered via Epic messaging service.

## 2024-08-22 ENCOUNTER — OFFICE VISIT (OUTPATIENT)
Dept: PAIN MEDICINE | Facility: CLINIC | Age: 74
End: 2024-08-22
Payer: MEDICARE

## 2024-08-22 ENCOUNTER — TELEPHONE (OUTPATIENT)
Dept: PAIN MEDICINE | Facility: CLINIC | Age: 74
End: 2024-08-22

## 2024-08-22 VITALS
SYSTOLIC BLOOD PRESSURE: 167 MMHG | WEIGHT: 209.88 LBS | DIASTOLIC BLOOD PRESSURE: 80 MMHG | HEART RATE: 67 BPM | HEIGHT: 64 IN | BODY MASS INDEX: 35.83 KG/M2

## 2024-08-22 DIAGNOSIS — M54.9 DORSALGIA, UNSPECIFIED: ICD-10-CM

## 2024-08-22 DIAGNOSIS — M54.16 LUMBAR RADICULOPATHY: Primary | ICD-10-CM

## 2024-08-22 DIAGNOSIS — M47.816 LUMBAR SPONDYLOSIS: ICD-10-CM

## 2024-08-22 DIAGNOSIS — M48.061 SPINAL STENOSIS OF LUMBAR REGION, UNSPECIFIED WHETHER NEUROGENIC CLAUDICATION PRESENT: ICD-10-CM

## 2024-08-22 PROCEDURE — 1159F MED LIST DOCD IN RCRD: CPT | Mod: HCNC,CPTII,S$GLB,

## 2024-08-22 PROCEDURE — 1125F AMNT PAIN NOTED PAIN PRSNT: CPT | Mod: HCNC,CPTII,S$GLB,

## 2024-08-22 PROCEDURE — 99999 PR PBB SHADOW E&M-EST. PATIENT-LVL IV: CPT | Mod: PBBFAC,HCNC,,

## 2024-08-22 PROCEDURE — 3079F DIAST BP 80-89 MM HG: CPT | Mod: HCNC,CPTII,S$GLB,

## 2024-08-22 PROCEDURE — 99214 OFFICE O/P EST MOD 30 MIN: CPT | Mod: HCNC,S$GLB,,

## 2024-08-22 PROCEDURE — 1157F ADVNC CARE PLAN IN RCRD: CPT | Mod: HCNC,CPTII,S$GLB,

## 2024-08-22 PROCEDURE — 3077F SYST BP >= 140 MM HG: CPT | Mod: HCNC,CPTII,S$GLB,

## 2024-08-22 PROCEDURE — 3061F NEG MICROALBUMINURIA REV: CPT | Mod: HCNC,CPTII,S$GLB,

## 2024-08-22 PROCEDURE — 1101F PT FALLS ASSESS-DOCD LE1/YR: CPT | Mod: HCNC,CPTII,S$GLB,

## 2024-08-22 PROCEDURE — 3008F BODY MASS INDEX DOCD: CPT | Mod: HCNC,CPTII,S$GLB,

## 2024-08-22 PROCEDURE — 3066F NEPHROPATHY DOC TX: CPT | Mod: HCNC,CPTII,S$GLB,

## 2024-08-22 PROCEDURE — 3288F FALL RISK ASSESSMENT DOCD: CPT | Mod: HCNC,CPTII,S$GLB,

## 2024-08-22 PROCEDURE — 3044F HG A1C LEVEL LT 7.0%: CPT | Mod: HCNC,CPTII,S$GLB,

## 2024-08-22 RX ORDER — COLESEVELAM HYDROCHLORIDE 625 MG/1
625 TABLET, FILM COATED ORAL
COMMUNITY

## 2024-08-22 NOTE — TELEPHONE ENCOUNTER
Please schedule patient for the following procedure:    Physician - Dr Rojas    Type of Procedure/Injection - Lumbar Transforaminal Epidural  L4/5           Laterality - Bilateral      Anxiolysis- Local      Need to hold medication - Yes      Fish Oil for 7 days and NSAIDs for 2 days      Clearance needed - No      Follow up - 2 week

## 2024-08-22 NOTE — PROGRESS NOTES
[facility] Follow Up Evaluation    Referred by: Patricia Valerio NP  Reason for referral: back pain    CC:   Chief Complaint   Patient presents with    Follow-up          8/22/2024     2:52 PM 8/5/2024     2:52 PM 3/19/2024    10:13 AM   Last 3 PDI Scores   Pain Disability Index (PDI) 42 19 19     Interval HPI 8/22/2024: Cornelia Delatorre returns to the office for follow up.  Today she is reporting return of her lower back pain, 6/10, across the lower back with radiation into bilateral legs.  She reports associated numbness and tingling in bilateral legs.  She has been attending formal physical therapy however it has been exacerbating her pain.  She denies any weakness or any new changes to her bowel bladder function.  She is not finding relief with gabapentin or Tylenol.      Pain intervention history:  - s/p L5/S1 ROM on 8/14/19 with close to 100% relief of her back and leg pain  - s/p L5/S1 ROM on 5/3/21 with 90% relief.  - s/p L5/S1 ROM on 9/24/21 with 100% relief  - s/p L5/S1 ROM on 2/21/22 with 95% relief   - s/p L5/S1 ROM on 02/09/2024 with 50% relief.  - s/p bilateral L4/5 TFESI on 03/05/2024 with 60% relief      HPI:   Cornelia Delatorre is a 73 y.o. female who complains of back pain    Onset: about 3.5 months  Inciting Event: none  Progression: since onset, pain is rapidly improving  Typical Range: 1-2/10  Timing: intermittent  Quality: aching, burning, grabbing, sharp, shooting  Radiation: yes, down the back of both legs left > right  Associated numbness or weakness: yes numbness on the left leg, no weakness  Exacerbated by: standing, coughing/sneezing, walking  Allievated by: rest, heat, tylenol  Is Pain Level Acceptable?:   Yes    Previous Therapies:  PT/OT: yes, felt like it got worse  HEP:   Interventions:   Surgery:  Medications: tylenol  - NSAIDS: avoids 2/2 h/o gastric bypass  - MSK Relaxants:   - TCAs:   - SNRIs:   - Topicals:   - Anticonvulsants: gabapentin 600mg TID  -  Opioids:     History:    Current Outpatient Medications:     ascorbic acid, vitamin C, (VITAMIN C) 1000 MG tablet, Take 1,000 mg by mouth 2 (two) times daily. , Disp: , Rfl:     azelastine (OPTIVAR) 0.05 % ophthalmic solution, Place 1 drop into both eyes. As needed, Disp: , Rfl:     B-complex with vitamin C (Z-BEC OR EQUIV) tablet, Take 1 tablet by mouth once daily., Disp: , Rfl:     Bifidobacterium infantis (ALIGN ORAL), Take by mouth once daily., Disp: , Rfl:     biotin 10,000 mcg Cap, Take 1 tablet by mouth every evening. , Disp: , Rfl:     blood sugar diagnostic Strp, Insurance preferred meter and strips. Check daily, Disp: 90 each, Rfl: 3    blood-glucose meter kit, Use as instructed. Insurance preferred meter., Disp: 1 each, Rfl: 0    cranberry 500 mg Cap, Take 1 capsule by mouth every evening., Disp: , Rfl:     diltiaZEM (CARDIZEM CD) 120 MG Cp24, Take 1 capsule (120 mg total) by mouth once daily., Disp: 90 capsule, Rfl: 3    doxazosin (CARDURA) 2 MG tablet, Take 1 tablet (2 mg total) by mouth every evening., Disp: 90 tablet, Rfl: 3    EPINEPHrine (EPIPEN) 0.3 mg/0.3 mL AtIn, Inject 1 each into the muscle as needed., Disp: , Rfl: 3    erythromycin with ethanoL (THERAMYCIN) 2 % external solution, Aaa bid prn, Disp: 60 mL, Rfl: 3    esomeprazole (NEXIUM) 40 MG capsule, Take 1 capsule (40 mg total) by mouth before breakfast., Disp: 90 capsule, Rfl: 3    estradioL (ESTRACE) 0.01 % (0.1 mg/gram) vaginal cream, Place 1 g vaginally 3 (three) times a week. Place by fingertip application before bedtime three times a week (Monday, Wednesday, Friday), Disp: 45 g, Rfl: 3    fexofenadine (ALLEGRA) 180 MG tablet, Take 180 mg by mouth once daily. , Disp: , Rfl:     fish oil-omega-3 fatty acids 300-1,000 mg capsule, Take 2 g by mouth once daily., Disp: , Rfl:     fluticasone propionate (FLONASE) 50 mcg/actuation nasal spray, 2 sprays (100 mcg total) by Each Nostril route once daily., Disp: 16 g, Rfl: 11     fluticasone-umeclidin-vilanter (TRELEGY ELLIPTA) 200-62.5-25 mcg inhaler, Inhale 1 puff into the lungs once daily., Disp: 180 each, Rfl: 3    gabapentin (NEURONTIN) 600 MG tablet, Take 1 tablet (600 mg total) by mouth 3 (three) times daily., Disp: 540 tablet, Rfl: 3    guaifenesin-codeine 100-10 mg/5 ml (GUAIFENESIN AC)  mg/5 mL syrup, Take 5 mLs by mouth 3 (three) times daily as needed for Cough., Disp: 473 mL, Rfl: 2    hydrOXYzine HCL (ATARAX) 10 MG Tab, Take 1 tablet (10 mg total) by mouth 3 (three) times daily as needed., Disp: 30 tablet, Rfl: 3    immun glob G,IgG,-pro-IgA 0-50 (HIZENTRA) 1 gram/5 mL (20 %) Soln, Inject 55 mLs (11 g total) into the skin once a week., Disp: 220 mL, Rfl: 12    inhalation spacing device, Use as directed for inhalation., Disp: 1 each, Rfl: 0    lancets (ACCU-CHEK SOFTCLIX LANCETS) Misc, Use to check blood sugar daily., Disp: 100 each, Rfl: 11    levalbuterol (XOPENEX HFA) 45 mcg/actuation inhaler, Inhale 1-2 puffs into the lungs every 4 (four) hours as needed for Wheezing or Shortness of Breath. Rescue, Disp: 15 g, Rfl: 11    levalbuterol (XOPENEX) 1.25 mg/3 mL nebulizer solution, Take 3 mLs (1.25 mg total) by nebulization every 4 (four) hours as needed for Wheezing or Shortness of Breath. Rescue, Disp: 1 each, Rfl: 11    metFORMIN (GLUCOPHAGE-XR) 500 MG ER 24hr tablet, Take 1 tablet (500 mg total) by mouth 2 (two) times daily with meals., Disp: 180 tablet, Rfl: 3    methenamine (HIPREX) 1 gram Tab, Take 1 tablet (1 g total) by mouth 2 (two) times daily. With Vitamin C 500 mg, Disp: 60 tablet, Rfl: 5    mometasone 0.1% (ELOCON) 0.1 % cream, aaa bid x 1-2 weeks prn bug bites, Disp: 45 g, Rfl: 1    montelukast (SINGULAIR) 10 mg tablet, Take 1 tablet (10 mg total) by mouth every evening., Disp: 90 tablet, Rfl: 3    mucus clearing device Cecy, 1 Device by Misc.(Non-Drug; Combo Route) route 2 (two) times daily., Disp: 1 Device, Rfl: 0    multivitamin capsule, Take 1 capsule by  mouth once daily. , Disp: , Rfl:     mupirocin (BACTROBAN) 2 % ointment, Apply topically 3 (three) times daily., Disp: 15 g, Rfl: 0    mupirocin (BACTROBAN) 2 % ointment, Apply topically 3 (three) times daily., Disp: 30 g, Rfl: 1    potassium chloride SA (K-DUR,KLOR-CON) 20 MEQ tablet, TAKE 1 TABLET ONE TIME DAILY, Disp: 90 tablet, Rfl: 3    pravastatin (PRAVACHOL) 80 MG tablet, Take 1 tablet (80 mg total) by mouth once daily., Disp: 90 tablet, Rfl: 4    predniSONE (DELTASONE) 10 MG tablet, Take 1-2 pills a day for three days, repeat for shortness of breath, Disp: 12 tablet, Rfl: 0    psyllium husk (METAMUCIL ORAL), Take by mouth., Disp: , Rfl:     SIMETHICONE (GAS-X ORAL), Take 1 capsule by mouth as needed (gas relief). , Disp: , Rfl:     tezepelumab-ekko (TEZSPIRE) 210 mg/1.91 mL (110 mg/mL) Syrg, Inject 1.91 mLs (210 mg total) into the skin every 28 days., Disp: 1.91 mL, Rfl: 11    valsartan-hydrochlorothiazide (DIOVAN-HCT) 320-25 mg per tablet, Take 1 tablet by mouth once daily., Disp: 90 tablet, Rfl: 2    vitamin D3-folic acid 5,000 unit- 1 mg Tab, Take 1 tablet by mouth once daily. , Disp: , Rfl:     WELCHOL 625 mg tablet, Take 625 mg by mouth., Disp: , Rfl:     Current Facility-Administered Medications:     levalbuterol nebulizer solution 1.25 mg, 1.25 mg, Nebulization, 1 time in Clinic/HOD, Daysi Llanos NP    Past Medical History:   Diagnosis Date    Allergy     Arthritis     Asthma     Cancer     skin cancer to right hand    Cataract     Chronic fatigue 01/24/2017    CVID (common variable immunodeficiency) 03/07/2019    Diabetes mellitus     type2    KLINE (dyspnea on exertion) 01/24/2017    Dyslipidemia 06/25/2019    Encounter for blood transfusion     Essential hypertension 12/29/2011    GERD (gastroesophageal reflux disease)     Headache(784.0)     Hyperlipidemia 07/15/2015    Hypertension     Hypothyroidism     Neuropathy     Obesity     Postmenopausal HRT (hormone replacement therapy)     Rash      Rosacea     Sleep apnea     on bipap    Snoring     Squamous cell carcinoma of skin     left forearm     UTI (urinary tract infection)     Wears glasses        Past Surgical History:   Procedure Laterality Date    ADENOIDECTOMY      APPENDECTOMY      BLADDER SUSPENSION      BREAST BIOPSY Bilateral 08/1992    bilateral benign excisional biopsies    BUNIONECTOMY  10/17/2014    right, still has discomfort    CATARACT EXTRACTION W/  INTRAOCULAR LENS IMPLANT Bilateral     CHOLECYSTECTOMY  08/02/2017    COLONOSCOPY      CYSTOSCOPY      EPIDURAL STEROID INJECTION INTO LUMBAR SPINE N/A 08/14/2019    Procedure: Injection-steroid-epidural-lumbar L5/S1;  Surgeon: Fredi Rojas MD;  Location: [facility] OR;  Service: Pain Management;  Laterality: N/A;    EPIDURAL STEROID INJECTION INTO LUMBAR SPINE N/A 05/03/2021    Procedure: Injection-steroid-epidural-lumbar L5/S1 to the Left;  Surgeon: Fredi Rojas MD;  Location: [facility] OR;  Service: Pain Management;  Laterality: N/A;    EPIDURAL STEROID INJECTION INTO LUMBAR SPINE N/A 09/24/2021    Procedure: Injection-steroid-epidural-lumbar L5/S1;  Surgeon: Fredi Rojas MD;  Location: [facility] OR;  Service: Pain Management;  Laterality: N/A;    EPIDURAL STEROID INJECTION INTO LUMBAR SPINE N/A 02/21/2022    Procedure: Injection-steroid-epidural-lumbar L5/S1;  Surgeon: Fredi Rojas MD;  Location: [facility] OR;  Service: Pain Management;  Laterality: N/A;    EPIDURAL STEROID INJECTION INTO LUMBAR SPINE N/A 02/09/2024    Procedure: Injection-steroid-epidural-lumbar       L5/S1;  Surgeon: Fredi Rojas MD;  Location: [facility] OR;  Service: Pain Management;  Laterality: N/A;    ESOPHAGEAL DILATION N/A 06/11/2021    Procedure: DILATION, ESOPHAGUS;  Surgeon: Jake Sheriff MD;  [facility];  Service: Endoscopy;  Laterality: N/A;    ESOPHAGOGASTRODUODENOSCOPY      dilated esophagus    ESOPHAGOGASTRODUODENOSCOPY N/A 06/11/2021    Procedure: EGD (ESOPHAGOGASTRODUODENOSCOPY);  Surgeon:  Jake Sheriff MD;  Location: [facility];  Service: Endoscopy;  Laterality: N/A;    GASTRIC BYPASS      2011    HYSTERECTOMY  02/1980    interstim bladder  2009    10/14/14 new battery    OOPHORECTOMY  02/1980    PERCUTANEOUS INSERTION OF SACRAL NERVE NEUROSTIMULATOR ELECTRODE LEAD N/A 6/5/2024    Procedure: INSERTION, ELECTRODE LEAD, NEUROSTIMULATOR, SACRAL, PERCUTANEOUS;  Surgeon: Mary Jane Jarvis MD;  Location: [facility] OR;  Service: Urology;  Laterality: N/A;  1 hour 30 minutes    REPLACEMENT OF NERVE STIMULATOR BATTERY N/A 6/5/2024    Procedure: Replacement, Battery, Neurostimulator;  Surgeon: Mary Jane Jarvis MD;  Location: [facility] OR;  Service: Urology;  Laterality: N/A;  1 hour 30 minutes    REPLACEMENT OF SACRAL NERVE STIMULATOR  02/18/2020    Procedure: REPLACEMENT, NEUROSTIMULATOR, SACRAL;  Surgeon: Mary Jane Jarvis MD;  Location: [facility] OR 2ND FLR;  Service: Urology;;    REVISION OF PROCEDURE INVOLVING SACRAL NEUROSTIMULATOR DEVICE Right 02/18/2020    Procedure: REVISION, NEUROSTIMULATOR, SACRAL/ battery replacement;  Surgeon: Mary Jane Jarvis MD;  Location: [facility] OR 2ND FLR;  Service: Urology;  Laterality: Right;  1hr/ rep contacted/ (CONNIE)    SKIN CANCER EXCISION      left hand    TONSILLECTOMY      TRANSFORAMINAL EPIDURAL INJECTION OF STEROID Bilateral 03/05/2024    Procedure: Injection,steroid,epidural,transforaminal approach      L4/5;  Surgeon: Fredi Rojas MD;  Location: [facility];  Service: Pain Management;  Laterality: Bilateral;    WISDOM TOOTH EXTRACTION         Family History   Problem Relation Name Age of Onset    Breast cancer Mother  50    Breast cancer Paternal Aunt          40's    Cervical cancer Paternal Grandmother      Ovarian cancer Paternal Grandmother      Cervical cancer Paternal Cousin      Ovarian cancer Paternal Cousin 1st     Diabetes Other      Hypertension Other      Hypothyroidism Other      Allergic rhinitis Neg Hx      Allergies Neg Hx      Angioedema Neg Hx       Asthma Neg Hx      Atopy Neg Hx      Eczema Neg Hx      Immunodeficiency Neg Hx      Rhinitis Neg Hx      Urticaria Neg Hx      Uterine cancer Neg Hx         Social History     Socioeconomic History    Marital status:    Tobacco Use    Smoking status: Never    Smokeless tobacco: Never   Substance and Sexual Activity    Alcohol use: Not Currently    Drug use: No    Sexual activity: Not Currently     Social Determinants of Health     Financial Resource Strain: Medium Risk (4/26/2024)    Overall Financial Resource Strain (CARDIA)     Difficulty of Paying Living Expenses: Somewhat hard   Food Insecurity: Food Insecurity Present (4/26/2024)    Hunger Vital Sign     Worried About Running Out of Food in the Last Year: Sometimes true     Ran Out of Food in the Last Year: Patient declined   Transportation Needs: No Transportation Needs (4/26/2024)    PRAPARE - Transportation     Lack of Transportation (Medical): No     Lack of Transportation (Non-Medical): No   Physical Activity: Inactive (4/26/2024)    Exercise Vital Sign     Days of Exercise per Week: 0 days     Minutes of Exercise per Session: 0 min   Stress: No Stress Concern Present (4/26/2024)    [employer] - Occupational Stress Questionnaire     Feeling of Stress : Only a little   Recent Concern: Stress - Stress Concern Present (3/7/2024)    [employer] - Occupational Stress Questionnaire     Feeling of Stress : Rather much   Housing Stability: Unknown (4/26/2024)    Housing Stability Vital Sign     Unable to Pay for Housing in the Last Year: Patient declined       Review of patient's allergies indicates:   Allergen Reactions    Sulfa (sulfonamide antibiotics) Hives    Naproxen Other (See Comments)     Other reaction(s): RT sided numbness      Albuterol Itching and Palpitations     Nebulizer only. Can use inhaler       Review of Systems:  General ROS: negative for - fever  Psychological ROS: negative  "for - hostility  Hematological and Lymphatic ROS: positive for - bruising  negative for - bleeding problems  Endocrine ROS: negative for - unexpected weight changes  Respiratory ROS: positive for - cough and shortness of breath  Cardiovascular ROS: no chest pain or dyspnea on exertion  Gastrointestinal ROS: no abdominal pain, change in bowel habits, or black or bloody stools  Musculoskeletal ROS: negative for - muscular weakness  Neurological ROS: negative for - numbness/tingling  negative for -  New bowel and bladder control changes  Dermatological ROS: negative for rash    Physical Exam:  Vitals:    08/22/24 1457   BP: (!) 167/80   Pulse: 67   Weight: 95.2 kg (209 lb 14.1 oz)   Height: 5' 4" (1.626 m)   PainSc:   6   PainLoc: Back     Body mass index is 36.03 kg/m².     Gen: NAD  Gait:  Antalgic gait, ambulating with Rollator.  Psych:  Mood appropriate for given condition  HEENT: eyes anicteric   GI: Abd soft  CV: RRR  Lungs: breathing unlabored   ROM: limited AROM of the L spine in all planes, full ROM at ankles, knees and hips  Lumbar flexion 90 degrees, extension 50 degrees, side bending 30 degrees.    Sensation: intact to light touch in all dermatomes tested from L2-S1 bilaterally  Reflexes: 0+ b/l patella and 0/0 b/l achilles  Palpation:  Moderately tender over lumbar paraspinals and lower midline spine  Tone: normal in the b/l knees and hips   Skin: intact  Extremities: No edema in b/l ankles or hands       Right Left   L2/3 Iliacus Hip flexion  5  5   L3/4 Qudratus Femoris Knee Extension  5  5   L4/5 Tib Anterior Ankle Dorsiflexion   5  5   L5/S1 Extensor Hallicus Longus Great toe extension  5  5                 S1/S2 Gastroc/Soleus Plantar Flexion  5  5     Imaging:  Xray lumbar spine 6/19/19  FINDINGS:  There is mild exaggeration of the normal lumbar lordosis.  There is 4 mm anterolisthesis of L4 on L5.  The vertebral bodies maintain normal height.  There is no fracture.  There is multilevel endplate " osteophyte formation and multilevel facet joint arthropathy.  There is a sacral stimulator in place.    CT lumbar spine 8/6/19  FINDINGS:  Grade 1 anterolisthesis of L4 on L5.  Minimal retrolisthesis of L2 on L3.The vertebral body heights are well-maintained.No fractures are identified. Sacral stimulator partially visualized, which appears to enter the right S3-4 neural foramen.    Mild multilevel degenerative disc disease with anterior osteophytosis, vacuum disc phenomenon at T10-11 and L1-2 and disc space narrowing, most pronounced and moderate at L1-2.    T12-L1: Small right central posterior disc osteophyte complex.  Mild bilateral facet arthrosis.  No significant spinal canal or neural foraminal stenosis.  L1-2: Circumferential disc bulge.  Mild bilateral facet arthrosis.  No significant spinal canal or neural foraminal stenosis.  L2-3: Minimal retrolisthesis.  Circumferential disc bulge.  Moderate bilateral facet arthrosis.  Mild bilateral neural foraminal stenosis.  Mild spinal canal stenosis.  L3-4: Circumferential disc bulge.  Ligamentum flavum infolding.  Moderate bilateral facet arthrosis.  Mild bilateral neural foraminal stenosis.  Mild spinal canal stenosis  L4-5: Grade 1 anterolisthesis.  Circumferential disc bulge, eccentric to the right.  Ligamentum flavum infolding.  Severe bilateral facet arthrosis.  Mild left and moderate right neural foraminal stenosis.  Severe spinal canal stenosis.  L5-S1: Mild diffuse disc bulge.  Moderate left and mild right facet arthrosis.  Moderate left and mild right neural foraminal stenosis.  No significant spinal canal stenosis.    CT lumbar spine 01/22/2024  FINDINGS:  Bones: There is diffuse bony demineralization.  There is a comminuted burst type fracture deformity of L5 with mild osseous retropulsion on the order of 3 mm.  Height loss centrally of the vertebral body is on the order of 40%.  This appears new or progressed from prior radiographs 01/08/2024 noting  minimal height loss may have been present in retrospect suggestive of likely subacute fracture with mild progression of height loss.  Chronic appearing Schmorl's node along the superior endplate of L2 but new from prior CT 08/02/2019.  No additional fractures identified.     Alignment: Grade 1 anterolisthesis of L4 on L5 and trace retrolisthesis of L2 on L3.  Mild broad levocurvature.     Disc levels:     T12-L1: Shallow disc bulging and mild bilateral facet arthrosis mildly narrowing the spinal canal.  The foramina appear patent.  L1-L2: Shallow disc bulging and mild-to-moderate facet arthrosis producing mild to moderate narrowing of the osseous spinal canal.  No more than mild bilateral foraminal narrowing.  L2-L3: Shallow disc bulging and moderate bilateral facet arthrosis producing likely moderate narrowing of the osseous spinal canal with no more than mild bilateral foraminal narrowing.  L3-L4: Shallow disc bulging and moderate bilateral facet arthrosis with ligamentum flavum thickening producing likely mild to moderate narrowing of the osseous spinal canal and bilateral foramina.  L4-L5: Disc bulging without osseous retropulsion and severe bilateral facet arthrosis with ligamentum flavum thickening severely narrowing the spinal canal progressed from the prior CT 08/02/2019.  At least moderate left and moderate to severe right foraminal narrowing.  L5-S1: Disc bulging and severe bilateral facet arthrosis produces mild to moderate narrowing of the osseous spinal canal.  Moderate left and mild right foraminal narrowing.     Soft Tissues: Partially imaged right-sided sacral neural stimulator entering the S3 foramen.  Atherosclerotic changes.  Cholecystectomy clips are noted.  Partially imaged postoperative changes of the stomach.     Impression:     1. Burst type fracture deformity with mild height loss involving the superior endplate of L5 with mild osseous retropulsion which appears acute or subacute in age with  clinical correlation needed.  No additional fractures identified.  Evaluation is somewhat limited on the basis of diffuse bony demineralization.  2. Progressive severe multifactorial spinal canal and advanced bilateral foraminal narrowing at L4-L5.  3. Additional degenerative changes as discussed above.  This report was flagged in Epic as abnormal.    Labs:  BMP  Lab Results   Component Value Date     03/11/2024    K 4.7 03/11/2024     03/11/2024    CO2 26 03/11/2024    BUN 15 03/11/2024    CREATININE 0.7 03/11/2024    CALCIUM 9.7 03/11/2024    ANIONGAP 9 03/11/2024    ESTGFRAFRICA >60 04/27/2022    EGFRNONAA >60 04/27/2022     Lab Results   Component Value Date    ALT 23 03/11/2024    AST 23 03/11/2024    ALKPHOS 116 03/11/2024    BILITOT 0.3 03/11/2024       Assessment:  Problem List Items Addressed This Visit          Neuro    Lumbar radiculopathy - Primary     Other Visit Diagnoses       Spinal stenosis of lumbar region, unspecified whether neurogenic claudication present        Dorsalgia, unspecified        Lumbar spondylosis                            Treatment Plan:  73 y.o. year old female with PMH DM II, asthma, HTN, common variable immunodeficiency, GERD, h/o gastric bypass, SOHA presents to the office with lower back pain.      8/22/2024: Cornelia Delatorre returns to the office for follow up.  Today she is reporting return of her lower back pain, 6/10, across the lower back with radiation into bilateral legs.  She reports associated numbness and tingling in bilateral legs.  She has been attending formal physical therapy however it has been exacerbating her pain.  She denies any weakness or any new changes to her bowel bladder function.  She is not finding relief with gabapentin or Tylenol.    - on exam she has full strength in her lower extremitie  -  lumbar CT is consistent with  severe central canal stenosis worse at L4/5 and multilevel bilateral foraminal narrowing.  - I believe her  lumbar radicular pain has returned in her pain from her severe central canal stenosis.  This pain is limiting her mobility and interfering with her ADLs and quality of life.  She is not finding significant relief with formal physical therapy, gabapentin and Tylenol.  - to address his pain, I would like to schedule her for a repeat bilateral L4/5 TFESI.  She has done well with this in the past providing 60% relief lasting over 3 months.  - follow up 2 weeks post procedure or sooner if needed.  If she fails to get relief with this will order updated lumbar MRI.  She is now able to get this with her new bladder stimulator.  - recent right knee injection, we will schedule epidural in 4 weeks.    :  Not applicable    The above note was completed, in part, with the aid of Dragon dictation software/hardware. Translation errors may be present.

## 2024-08-22 NOTE — H&P (VIEW-ONLY)
[facility] Follow Up Evaluation    Referred by: Patricia Valerio NP  Reason for referral: back pain    CC:   Chief Complaint   Patient presents with    Follow-up          8/22/2024     2:52 PM 8/5/2024     2:52 PM 3/19/2024    10:13 AM   Last 3 PDI Scores   Pain Disability Index (PDI) 42 19 19     Interval HPI 8/22/2024: Cornelia Delatorre returns to the office for follow up.  Today she is reporting return of her lower back pain, 6/10, across the lower back with radiation into bilateral legs.  She reports associated numbness and tingling in bilateral legs.  She has been attending formal physical therapy however it has been exacerbating her pain.  She denies any weakness or any new changes to her bowel bladder function.  She is not finding relief with gabapentin or Tylenol.      Pain intervention history:  - s/p L5/S1 ROM on 8/14/19 with close to 100% relief of her back and leg pain  - s/p L5/S1 ROM on 5/3/21 with 90% relief.  - s/p L5/S1 ROM on 9/24/21 with 100% relief  - s/p L5/S1 ROM on 2/21/22 with 95% relief   - s/p L5/S1 ROM on 02/09/2024 with 50% relief.  - s/p bilateral L4/5 TFESI on 03/05/2024 with 60% relief      HPI:   Cornelia Delatorre is a 73 y.o. female who complains of back pain    Onset: about 3.5 months  Inciting Event: none  Progression: since onset, pain is rapidly improving  Typical Range: 1-2/10  Timing: intermittent  Quality: aching, burning, grabbing, sharp, shooting  Radiation: yes, down the back of both legs left > right  Associated numbness or weakness: yes numbness on the left leg, no weakness  Exacerbated by: standing, coughing/sneezing, walking  Allievated by: rest, heat, tylenol  Is Pain Level Acceptable?:   Yes    Previous Therapies:  PT/OT: yes, felt like it got worse  HEP:   Interventions:   Surgery:  Medications: tylenol  - NSAIDS: avoids 2/2 h/o gastric bypass  - MSK Relaxants:   - TCAs:   - SNRIs:   - Topicals:   - Anticonvulsants: gabapentin 600mg TID  -  Opioids:     History:    Current Outpatient Medications:     ascorbic acid, vitamin C, (VITAMIN C) 1000 MG tablet, Take 1,000 mg by mouth 2 (two) times daily. , Disp: , Rfl:     azelastine (OPTIVAR) 0.05 % ophthalmic solution, Place 1 drop into both eyes. As needed, Disp: , Rfl:     B-complex with vitamin C (Z-BEC OR EQUIV) tablet, Take 1 tablet by mouth once daily., Disp: , Rfl:     Bifidobacterium infantis (ALIGN ORAL), Take by mouth once daily., Disp: , Rfl:     biotin 10,000 mcg Cap, Take 1 tablet by mouth every evening. , Disp: , Rfl:     blood sugar diagnostic Strp, Insurance preferred meter and strips. Check daily, Disp: 90 each, Rfl: 3    blood-glucose meter kit, Use as instructed. Insurance preferred meter., Disp: 1 each, Rfl: 0    cranberry 500 mg Cap, Take 1 capsule by mouth every evening., Disp: , Rfl:     diltiaZEM (CARDIZEM CD) 120 MG Cp24, Take 1 capsule (120 mg total) by mouth once daily., Disp: 90 capsule, Rfl: 3    doxazosin (CARDURA) 2 MG tablet, Take 1 tablet (2 mg total) by mouth every evening., Disp: 90 tablet, Rfl: 3    EPINEPHrine (EPIPEN) 0.3 mg/0.3 mL AtIn, Inject 1 each into the muscle as needed., Disp: , Rfl: 3    erythromycin with ethanoL (THERAMYCIN) 2 % external solution, Aaa bid prn, Disp: 60 mL, Rfl: 3    esomeprazole (NEXIUM) 40 MG capsule, Take 1 capsule (40 mg total) by mouth before breakfast., Disp: 90 capsule, Rfl: 3    estradioL (ESTRACE) 0.01 % (0.1 mg/gram) vaginal cream, Place 1 g vaginally 3 (three) times a week. Place by fingertip application before bedtime three times a week (Monday, Wednesday, Friday), Disp: 45 g, Rfl: 3    fexofenadine (ALLEGRA) 180 MG tablet, Take 180 mg by mouth once daily. , Disp: , Rfl:     fish oil-omega-3 fatty acids 300-1,000 mg capsule, Take 2 g by mouth once daily., Disp: , Rfl:     fluticasone propionate (FLONASE) 50 mcg/actuation nasal spray, 2 sprays (100 mcg total) by Each Nostril route once daily., Disp: 16 g, Rfl: 11     fluticasone-umeclidin-vilanter (TRELEGY ELLIPTA) 200-62.5-25 mcg inhaler, Inhale 1 puff into the lungs once daily., Disp: 180 each, Rfl: 3    gabapentin (NEURONTIN) 600 MG tablet, Take 1 tablet (600 mg total) by mouth 3 (three) times daily., Disp: 540 tablet, Rfl: 3    guaifenesin-codeine 100-10 mg/5 ml (GUAIFENESIN AC)  mg/5 mL syrup, Take 5 mLs by mouth 3 (three) times daily as needed for Cough., Disp: 473 mL, Rfl: 2    hydrOXYzine HCL (ATARAX) 10 MG Tab, Take 1 tablet (10 mg total) by mouth 3 (three) times daily as needed., Disp: 30 tablet, Rfl: 3    immun glob G,IgG,-pro-IgA 0-50 (HIZENTRA) 1 gram/5 mL (20 %) Soln, Inject 55 mLs (11 g total) into the skin once a week., Disp: 220 mL, Rfl: 12    inhalation spacing device, Use as directed for inhalation., Disp: 1 each, Rfl: 0    lancets (ACCU-CHEK SOFTCLIX LANCETS) Misc, Use to check blood sugar daily., Disp: 100 each, Rfl: 11    levalbuterol (XOPENEX HFA) 45 mcg/actuation inhaler, Inhale 1-2 puffs into the lungs every 4 (four) hours as needed for Wheezing or Shortness of Breath. Rescue, Disp: 15 g, Rfl: 11    levalbuterol (XOPENEX) 1.25 mg/3 mL nebulizer solution, Take 3 mLs (1.25 mg total) by nebulization every 4 (four) hours as needed for Wheezing or Shortness of Breath. Rescue, Disp: 1 each, Rfl: 11    metFORMIN (GLUCOPHAGE-XR) 500 MG ER 24hr tablet, Take 1 tablet (500 mg total) by mouth 2 (two) times daily with meals., Disp: 180 tablet, Rfl: 3    methenamine (HIPREX) 1 gram Tab, Take 1 tablet (1 g total) by mouth 2 (two) times daily. With Vitamin C 500 mg, Disp: 60 tablet, Rfl: 5    mometasone 0.1% (ELOCON) 0.1 % cream, aaa bid x 1-2 weeks prn bug bites, Disp: 45 g, Rfl: 1    montelukast (SINGULAIR) 10 mg tablet, Take 1 tablet (10 mg total) by mouth every evening., Disp: 90 tablet, Rfl: 3    mucus clearing device Cecy, 1 Device by Misc.(Non-Drug; Combo Route) route 2 (two) times daily., Disp: 1 Device, Rfl: 0    multivitamin capsule, Take 1 capsule by  mouth once daily. , Disp: , Rfl:     mupirocin (BACTROBAN) 2 % ointment, Apply topically 3 (three) times daily., Disp: 15 g, Rfl: 0    mupirocin (BACTROBAN) 2 % ointment, Apply topically 3 (three) times daily., Disp: 30 g, Rfl: 1    potassium chloride SA (K-DUR,KLOR-CON) 20 MEQ tablet, TAKE 1 TABLET ONE TIME DAILY, Disp: 90 tablet, Rfl: 3    pravastatin (PRAVACHOL) 80 MG tablet, Take 1 tablet (80 mg total) by mouth once daily., Disp: 90 tablet, Rfl: 4    predniSONE (DELTASONE) 10 MG tablet, Take 1-2 pills a day for three days, repeat for shortness of breath, Disp: 12 tablet, Rfl: 0    psyllium husk (METAMUCIL ORAL), Take by mouth., Disp: , Rfl:     SIMETHICONE (GAS-X ORAL), Take 1 capsule by mouth as needed (gas relief). , Disp: , Rfl:     tezepelumab-ekko (TEZSPIRE) 210 mg/1.91 mL (110 mg/mL) Syrg, Inject 1.91 mLs (210 mg total) into the skin every 28 days., Disp: 1.91 mL, Rfl: 11    valsartan-hydrochlorothiazide (DIOVAN-HCT) 320-25 mg per tablet, Take 1 tablet by mouth once daily., Disp: 90 tablet, Rfl: 2    vitamin D3-folic acid 5,000 unit- 1 mg Tab, Take 1 tablet by mouth once daily. , Disp: , Rfl:     WELCHOL 625 mg tablet, Take 625 mg by mouth., Disp: , Rfl:     Current Facility-Administered Medications:     levalbuterol nebulizer solution 1.25 mg, 1.25 mg, Nebulization, 1 time in Clinic/HOD, Daysi Llanos NP    Past Medical History:   Diagnosis Date    Allergy     Arthritis     Asthma     Cancer     skin cancer to right hand    Cataract     Chronic fatigue 01/24/2017    CVID (common variable immunodeficiency) 03/07/2019    Diabetes mellitus     type2    KLINE (dyspnea on exertion) 01/24/2017    Dyslipidemia 06/25/2019    Encounter for blood transfusion     Essential hypertension 12/29/2011    GERD (gastroesophageal reflux disease)     Headache(784.0)     Hyperlipidemia 07/15/2015    Hypertension     Hypothyroidism     Neuropathy     Obesity     Postmenopausal HRT (hormone replacement therapy)     Rash      Rosacea     Sleep apnea     on bipap    Snoring     Squamous cell carcinoma of skin     left forearm     UTI (urinary tract infection)     Wears glasses        Past Surgical History:   Procedure Laterality Date    ADENOIDECTOMY      APPENDECTOMY      BLADDER SUSPENSION      BREAST BIOPSY Bilateral 08/1992    bilateral benign excisional biopsies    BUNIONECTOMY  10/17/2014    right, still has discomfort    CATARACT EXTRACTION W/  INTRAOCULAR LENS IMPLANT Bilateral     CHOLECYSTECTOMY  08/02/2017    COLONOSCOPY      CYSTOSCOPY      EPIDURAL STEROID INJECTION INTO LUMBAR SPINE N/A 08/14/2019    Procedure: Injection-steroid-epidural-lumbar L5/S1;  Surgeon: Fredi Rojas MD;  Location: [facility] OR;  Service: Pain Management;  Laterality: N/A;    EPIDURAL STEROID INJECTION INTO LUMBAR SPINE N/A 05/03/2021    Procedure: Injection-steroid-epidural-lumbar L5/S1 to the Left;  Surgeon: Fredi Rojas MD;  Location: [facility] OR;  Service: Pain Management;  Laterality: N/A;    EPIDURAL STEROID INJECTION INTO LUMBAR SPINE N/A 09/24/2021    Procedure: Injection-steroid-epidural-lumbar L5/S1;  Surgeon: Fredi Rojas MD;  Location: [facility] OR;  Service: Pain Management;  Laterality: N/A;    EPIDURAL STEROID INJECTION INTO LUMBAR SPINE N/A 02/21/2022    Procedure: Injection-steroid-epidural-lumbar L5/S1;  Surgeon: Fredi Rojas MD;  Location: [facility] OR;  Service: Pain Management;  Laterality: N/A;    EPIDURAL STEROID INJECTION INTO LUMBAR SPINE N/A 02/09/2024    Procedure: Injection-steroid-epidural-lumbar       L5/S1;  Surgeon: Fredi Rojas MD;  Location: [facility] OR;  Service: Pain Management;  Laterality: N/A;    ESOPHAGEAL DILATION N/A 06/11/2021    Procedure: DILATION, ESOPHAGUS;  Surgeon: Jake Sheriff MD;  Location: [facility];  Service: Endoscopy;  Laterality: N/A;    ESOPHAGOGASTRODUODENOSCOPY      dilated esophagus    ESOPHAGOGASTRODUODENOSCOPY N/A 06/11/2021    Procedure: EGD (ESOPHAGOGASTRODUODENOSCOPY);  Surgeon:  Jake Sheriff MD;  Location: [facility];  Service: Endoscopy;  Laterality: N/A;    GASTRIC BYPASS      2011    HYSTERECTOMY  02/1980    interstim bladder  2009    10/14/14 new battery    OOPHORECTOMY  02/1980    PERCUTANEOUS INSERTION OF SACRAL NERVE NEUROSTIMULATOR ELECTRODE LEAD N/A 6/5/2024    Procedure: INSERTION, ELECTRODE LEAD, NEUROSTIMULATOR, SACRAL, PERCUTANEOUS;  Surgeon: Mary Jane Jarvis MD;  Location: [facility] OR;  Service: Urology;  Laterality: N/A;  1 hour 30 minutes    REPLACEMENT OF NERVE STIMULATOR BATTERY N/A 6/5/2024    Procedure: Replacement, Battery, Neurostimulator;  Surgeon: Mary Jane Jarvis MD;  Location: [facility] OR;  Service: Urology;  Laterality: N/A;  1 hour 30 minutes    REPLACEMENT OF SACRAL NERVE STIMULATOR  02/18/2020    Procedure: REPLACEMENT, NEUROSTIMULATOR, SACRAL;  Surgeon: Mary Jane Jarvis MD;  Location: [facility] OR 2ND FLR;  Service: Urology;;    REVISION OF PROCEDURE INVOLVING SACRAL NEUROSTIMULATOR DEVICE Right 02/18/2020    Procedure: REVISION, NEUROSTIMULATOR, SACRAL/ battery replacement;  Surgeon: Mary Jane Jarvis MD;  Location: [facility] OR 2ND FLR;  Service: Urology;  Laterality: Right;  1hr/ rep contacted/ (CONNIE)    SKIN CANCER EXCISION      left hand    TONSILLECTOMY      TRANSFORAMINAL EPIDURAL INJECTION OF STEROID Bilateral 03/05/2024    Procedure: Injection,steroid,epidural,transforaminal approach      L4/5;  Surgeon: Fredi Rojas MD;  Location: [facility];  Service: Pain Management;  Laterality: Bilateral;    WISDOM TOOTH EXTRACTION         Family History   Problem Relation Name Age of Onset    Breast cancer Mother  50    Breast cancer Paternal Aunt          40's    Cervical cancer Paternal Grandmother      Ovarian cancer Paternal Grandmother      Cervical cancer Paternal Cousin      Ovarian cancer Paternal Cousin 1st     Diabetes Other      Hypertension Other      Hypothyroidism Other      Allergic rhinitis Neg Hx      Allergies Neg Hx      Angioedema Neg Hx       Asthma Neg Hx      Atopy Neg Hx      Eczema Neg Hx      Immunodeficiency Neg Hx      Rhinitis Neg Hx      Urticaria Neg Hx      Uterine cancer Neg Hx         Social History     Socioeconomic History    Marital status:    Tobacco Use    Smoking status: Never    Smokeless tobacco: Never   Substance and Sexual Activity    Alcohol use: Not Currently    Drug use: No    Sexual activity: Not Currently     Social Determinants of Health     Financial Resource Strain: Medium Risk (4/26/2024)    Overall Financial Resource Strain (CARDIA)     Difficulty of Paying Living Expenses: Somewhat hard   Food Insecurity: Food Insecurity Present (4/26/2024)    Hunger Vital Sign     Worried About Running Out of Food in the Last Year: Sometimes true     Ran Out of Food in the Last Year: Patient declined   Transportation Needs: No Transportation Needs (4/26/2024)    PRAPARE - Transportation     Lack of Transportation (Medical): No     Lack of Transportation (Non-Medical): No   Physical Activity: Inactive (4/26/2024)    Exercise Vital Sign     Days of Exercise per Week: 0 days     Minutes of Exercise per Session: 0 min   Stress: No Stress Concern Present (4/26/2024)    [employer] - Occupational Stress Questionnaire     Feeling of Stress : Only a little   Recent Concern: Stress - Stress Concern Present (3/7/2024)    [employer] - Occupational Stress Questionnaire     Feeling of Stress : Rather much   Housing Stability: Unknown (4/26/2024)    Housing Stability Vital Sign     Unable to Pay for Housing in the Last Year: Patient declined       Review of patient's allergies indicates:   Allergen Reactions    Sulfa (sulfonamide antibiotics) Hives    Naproxen Other (See Comments)     Other reaction(s): RT sided numbness      Albuterol Itching and Palpitations     Nebulizer only. Can use inhaler       Review of Systems:  General ROS: negative for - fever  Psychological ROS: negative  "for - hostility  Hematological and Lymphatic ROS: positive for - bruising  negative for - bleeding problems  Endocrine ROS: negative for - unexpected weight changes  Respiratory ROS: positive for - cough and shortness of breath  Cardiovascular ROS: no chest pain or dyspnea on exertion  Gastrointestinal ROS: no abdominal pain, change in bowel habits, or black or bloody stools  Musculoskeletal ROS: negative for - muscular weakness  Neurological ROS: negative for - numbness/tingling  negative for -  New bowel and bladder control changes  Dermatological ROS: negative for rash    Physical Exam:  Vitals:    08/22/24 1457   BP: (!) 167/80   Pulse: 67   Weight: 95.2 kg (209 lb 14.1 oz)   Height: 5' 4" (1.626 m)   PainSc:   6   PainLoc: Back     Body mass index is 36.03 kg/m².     Gen: NAD  Gait:  Antalgic gait, ambulating with Rollator.  Psych:  Mood appropriate for given condition  HEENT: eyes anicteric   GI: Abd soft  CV: RRR  Lungs: breathing unlabored   ROM: limited AROM of the L spine in all planes, full ROM at ankles, knees and hips  Lumbar flexion 90 degrees, extension 50 degrees, side bending 30 degrees.    Sensation: intact to light touch in all dermatomes tested from L2-S1 bilaterally  Reflexes: 0+ b/l patella and 0/0 b/l achilles  Palpation:  Moderately tender over lumbar paraspinals and lower midline spine  Tone: normal in the b/l knees and hips   Skin: intact  Extremities: No edema in b/l ankles or hands       Right Left   L2/3 Iliacus Hip flexion  5  5   L3/4 Qudratus Femoris Knee Extension  5  5   L4/5 Tib Anterior Ankle Dorsiflexion   5  5   L5/S1 Extensor Hallicus Longus Great toe extension  5  5                 S1/S2 Gastroc/Soleus Plantar Flexion  5  5     Imaging:  Xray lumbar spine 6/19/19  FINDINGS:  There is mild exaggeration of the normal lumbar lordosis.  There is 4 mm anterolisthesis of L4 on L5.  The vertebral bodies maintain normal height.  There is no fracture.  There is multilevel endplate " osteophyte formation and multilevel facet joint arthropathy.  There is a sacral stimulator in place.    CT lumbar spine 8/6/19  FINDINGS:  Grade 1 anterolisthesis of L4 on L5.  Minimal retrolisthesis of L2 on L3.The vertebral body heights are well-maintained.No fractures are identified. Sacral stimulator partially visualized, which appears to enter the right S3-4 neural foramen.    Mild multilevel degenerative disc disease with anterior osteophytosis, vacuum disc phenomenon at T10-11 and L1-2 and disc space narrowing, most pronounced and moderate at L1-2.    T12-L1: Small right central posterior disc osteophyte complex.  Mild bilateral facet arthrosis.  No significant spinal canal or neural foraminal stenosis.  L1-2: Circumferential disc bulge.  Mild bilateral facet arthrosis.  No significant spinal canal or neural foraminal stenosis.  L2-3: Minimal retrolisthesis.  Circumferential disc bulge.  Moderate bilateral facet arthrosis.  Mild bilateral neural foraminal stenosis.  Mild spinal canal stenosis.  L3-4: Circumferential disc bulge.  Ligamentum flavum infolding.  Moderate bilateral facet arthrosis.  Mild bilateral neural foraminal stenosis.  Mild spinal canal stenosis  L4-5: Grade 1 anterolisthesis.  Circumferential disc bulge, eccentric to the right.  Ligamentum flavum infolding.  Severe bilateral facet arthrosis.  Mild left and moderate right neural foraminal stenosis.  Severe spinal canal stenosis.  L5-S1: Mild diffuse disc bulge.  Moderate left and mild right facet arthrosis.  Moderate left and mild right neural foraminal stenosis.  No significant spinal canal stenosis.    CT lumbar spine 01/22/2024  FINDINGS:  Bones: There is diffuse bony demineralization.  There is a comminuted burst type fracture deformity of L5 with mild osseous retropulsion on the order of 3 mm.  Height loss centrally of the vertebral body is on the order of 40%.  This appears new or progressed from prior radiographs 01/08/2024 noting  minimal height loss may have been present in retrospect suggestive of likely subacute fracture with mild progression of height loss.  Chronic appearing Schmorl's node along the superior endplate of L2 but new from prior CT 08/02/2019.  No additional fractures identified.     Alignment: Grade 1 anterolisthesis of L4 on L5 and trace retrolisthesis of L2 on L3.  Mild broad levocurvature.     Disc levels:     T12-L1: Shallow disc bulging and mild bilateral facet arthrosis mildly narrowing the spinal canal.  The foramina appear patent.  L1-L2: Shallow disc bulging and mild-to-moderate facet arthrosis producing mild to moderate narrowing of the osseous spinal canal.  No more than mild bilateral foraminal narrowing.  L2-L3: Shallow disc bulging and moderate bilateral facet arthrosis producing likely moderate narrowing of the osseous spinal canal with no more than mild bilateral foraminal narrowing.  L3-L4: Shallow disc bulging and moderate bilateral facet arthrosis with ligamentum flavum thickening producing likely mild to moderate narrowing of the osseous spinal canal and bilateral foramina.  L4-L5: Disc bulging without osseous retropulsion and severe bilateral facet arthrosis with ligamentum flavum thickening severely narrowing the spinal canal progressed from the prior CT 08/02/2019.  At least moderate left and moderate to severe right foraminal narrowing.  L5-S1: Disc bulging and severe bilateral facet arthrosis produces mild to moderate narrowing of the osseous spinal canal.  Moderate left and mild right foraminal narrowing.     Soft Tissues: Partially imaged right-sided sacral neural stimulator entering the S3 foramen.  Atherosclerotic changes.  Cholecystectomy clips are noted.  Partially imaged postoperative changes of the stomach.     Impression:     1. Burst type fracture deformity with mild height loss involving the superior endplate of L5 with mild osseous retropulsion which appears acute or subacute in age with  clinical correlation needed.  No additional fractures identified.  Evaluation is somewhat limited on the basis of diffuse bony demineralization.  2. Progressive severe multifactorial spinal canal and advanced bilateral foraminal narrowing at L4-L5.  3. Additional degenerative changes as discussed above.  This report was flagged in Epic as abnormal.    Labs:  BMP  Lab Results   Component Value Date     03/11/2024    K 4.7 03/11/2024     03/11/2024    CO2 26 03/11/2024    BUN 15 03/11/2024    CREATININE 0.7 03/11/2024    CALCIUM 9.7 03/11/2024    ANIONGAP 9 03/11/2024    ESTGFRAFRICA >60 04/27/2022    EGFRNONAA >60 04/27/2022     Lab Results   Component Value Date    ALT 23 03/11/2024    AST 23 03/11/2024    ALKPHOS 116 03/11/2024    BILITOT 0.3 03/11/2024       Assessment:  Problem List Items Addressed This Visit          Neuro    Lumbar radiculopathy - Primary     Other Visit Diagnoses       Spinal stenosis of lumbar region, unspecified whether neurogenic claudication present        Dorsalgia, unspecified        Lumbar spondylosis                            Treatment Plan:  73 y.o. year old female with PMH DM II, asthma, HTN, common variable immunodeficiency, GERD, h/o gastric bypass, SOHA presents to the office with lower back pain.      8/22/2024: Cornelia Delatorre returns to the office for follow up.  Today she is reporting return of her lower back pain, 6/10, across the lower back with radiation into bilateral legs.  She reports associated numbness and tingling in bilateral legs.  She has been attending formal physical therapy however it has been exacerbating her pain.  She denies any weakness or any new changes to her bowel bladder function.  She is not finding relief with gabapentin or Tylenol.    - on exam she has full strength in her lower extremitie  -  lumbar CT is consistent with  severe central canal stenosis worse at L4/5 and multilevel bilateral foraminal narrowing.  - I believe her  lumbar radicular pain has returned in her pain from her severe central canal stenosis.  This pain is limiting her mobility and interfering with her ADLs and quality of life.  She is not finding significant relief with formal physical therapy, gabapentin and Tylenol.  - to address his pain, I would like to schedule her for a repeat bilateral L4/5 TFESI.  She has done well with this in the past providing 60% relief lasting over 3 months.  - follow up 2 weeks post procedure or sooner if needed.  If she fails to get relief with this will order updated lumbar MRI.  She is now able to get this with her new bladder stimulator.  - recent right knee injection, we will schedule epidural in 4 weeks.    :  Not applicable    The above note was completed, in part, with the aid of Dragon dictation software/hardware. Translation errors may be present.

## 2024-08-23 DIAGNOSIS — M54.16 LUMBAR RADICULOPATHY: Primary | ICD-10-CM

## 2024-08-23 RX ORDER — ALPRAZOLAM 1 MG/1
1 TABLET, ORALLY DISINTEGRATING ORAL ONCE AS NEEDED
OUTPATIENT
Start: 2024-08-23 | End: 2036-01-20

## 2024-08-23 NOTE — TELEPHONE ENCOUNTER
Spoke with patient and scheduled. Advised to hold fish oil x 7 and NSAIDs x 2 days prior. Pre op information given and follow up appointment scheduled.

## 2024-08-29 ENCOUNTER — PATIENT MESSAGE (OUTPATIENT)
Dept: OTOLARYNGOLOGY | Facility: CLINIC | Age: 74
End: 2024-08-29
Payer: MEDICARE

## 2024-09-16 ENCOUNTER — HOSPITAL ENCOUNTER (OUTPATIENT)
Facility: HOSPITAL | Age: 74
Discharge: HOME | End: 2024-09-16
Attending: ANESTHESIOLOGY | Admitting: ANESTHESIOLOGY
Payer: MEDICARE

## 2024-09-16 ENCOUNTER — HOSPITAL ENCOUNTER (OUTPATIENT)
Dept: RADIOLOGY | Facility: HOSPITAL | Age: 74
Discharge: HOME | End: 2024-09-16
Attending: ANESTHESIOLOGY | Admitting: ANESTHESIOLOGY
Payer: MEDICARE

## 2024-09-16 ENCOUNTER — TELEPHONE (OUTPATIENT)
Dept: NEUROSURGERY | Facility: CLINIC | Age: 74
End: 2024-09-16
Payer: MEDICARE

## 2024-09-16 ENCOUNTER — TELEPHONE (OUTPATIENT)
Dept: PAIN MEDICINE | Facility: HOSPITAL | Age: 74
End: 2024-09-16

## 2024-09-16 DIAGNOSIS — M54.16 LUMBAR RADICULOPATHY: Primary | ICD-10-CM

## 2024-09-16 DIAGNOSIS — M48.061 SPINAL STENOSIS OF LUMBAR REGION, UNSPECIFIED WHETHER NEUROGENIC CLAUDICATION PRESENT: ICD-10-CM

## 2024-09-16 DIAGNOSIS — M54.50 LOWER BACK PAIN: ICD-10-CM

## 2024-09-16 LAB — GLUCOSE SERPL-MCNC: 118 MG/DL (ref 70–110)

## 2024-09-16 PROCEDURE — 64483 NJX AA&/STRD TFRM EPI L/S 1: CPT | Mod: 50,HCNC,, | Performed by: ANESTHESIOLOGY

## 2024-09-16 PROCEDURE — 64483 NJX AA&/STRD TFRM EPI L/S 1: CPT | Mod: 50,HCNC,PO | Performed by: ANESTHESIOLOGY

## 2024-09-16 PROCEDURE — 25000003 PHARM REV CODE 250: Mod: HCNC,PO | Performed by: ANESTHESIOLOGY

## 2024-09-16 PROCEDURE — 25500020 PHARM REV CODE 255: Mod: HCNC,PO | Performed by: ANESTHESIOLOGY

## 2024-09-16 PROCEDURE — 63600175 PHARM REV CODE 636 W HCPCS: Mod: JZ,JG,HCNC,PO | Performed by: ANESTHESIOLOGY

## 2024-09-16 RX ORDER — LIDOCAINE HYDROCHLORIDE 10 MG/ML
INJECTION, SOLUTION EPIDURAL; INFILTRATION; INTRACAUDAL; PERINEURAL
Status: DISCONTINUED | OUTPATIENT
Start: 2024-09-16 | End: 2024-09-16 | Stop reason: HOSPADM

## 2024-09-16 RX ORDER — ALPRAZOLAM 0.5 MG/1
1 TABLET, ORALLY DISINTEGRATING ORAL ONCE AS NEEDED
Status: COMPLETED | OUTPATIENT
Start: 2024-09-16 | End: 2024-09-16

## 2024-09-16 RX ORDER — BUPIVACAINE HYDROCHLORIDE 2.5 MG/ML
INJECTION, SOLUTION EPIDURAL; INFILTRATION; INTRACAUDAL
Status: DISCONTINUED | OUTPATIENT
Start: 2024-09-16 | End: 2024-09-16 | Stop reason: HOSPADM

## 2024-09-16 RX ORDER — DEXAMETHASONE SODIUM PHOSPHATE 10 MG/ML
INJECTION INTRAMUSCULAR; INTRAVENOUS
Status: DISCONTINUED | OUTPATIENT
Start: 2024-09-16 | End: 2024-09-16 | Stop reason: HOSPADM

## 2024-09-16 RX ADMIN — ALPRAZOLAM 1 MG: 0.5 TABLET, ORALLY DISINTEGRATING ORAL at 10:09

## 2024-09-16 NOTE — OP NOTE
Procedure Note    Procedure Date: 9/16/2024    Procedure Performed: bilateral Transforaminal Epidural @ L4/5, with Fluoroscopic Guidance    Indications: Cornelia Delatorre presents with lumbar radiculitis/radiculopathy secondary to disc herniation, osteophyte/osteophyte complexes, and/or severe degenerative disc disease producing foraminal or central spinal stenosis.  The pain has been present for at least 4 weeks and the patient has failed to respond to noninvasive conservative care.  Pain rated by NRS at baseline prior to intervention is 6/10.  Their radiculitis/radiculopathy and/or neurogenic claudication is severe enough to greatly impact their quality of life or function.     Pre-op diagnosis: Lumbar Radiculitis/Radiculopathy    Post-op diagnosis: same    Physician: Fredi Rojas MD    IV anxiolysis medications: none    Medications injected: 0.25% Bupivacaine 2ml, dexamethasone 10mg, 3 mL sterile, preservative-free normal saline    Local anesthetic used: 1% Lidocaine 2 ml    Estimated Blood Loss: none    Complications:  none    Technique:  The patient was interviewed in the holding area and Risks/Benefits were discussed, including, but not limited to, the possibility of new or different pain, bleeding or infection.   All questions were answered.  The patient understood and accepted risks.  Consent was reviewed.  A time out was taken to identify the patient, procedure and side of the procedure. The patient was placed in a prone position, then prepped and draped in the usual sterile fashion using ChloraPrep x2 and sterile towels.  The Bilateral LL4/5 neural foramena were identified under fluoroscopic guidance in AP and oblique view.  Local anesthetic was given by raising a wheal and going down to the hub of a 25-gauge 1.5 inch needle.  In oblique view, a 5 inch 22-gauge bent-tip spinal needle was introduced into the foramen @ L4 and positioned in the posterior superior quarter of the foramen.  .5cc of Omnipaque  contrast was injected live in an AP fluoroscopic view at each level demonstrating appropriate needle position with contrast spread outlining the respective nerve root and also medially into the epidural space without intravascular or intrathecal spread.  Then, after negative aspiration, a mixture of 0.25% Bupivacaine 2ml, dexamethasone 10mg, 3 mL sterile, preservative-free normal saline was divided evenly and injected slowly and incrementally.  The needle(s) was(were) flushed with normal saline and removed.  The patient tolerated the procedure well.  The patient was monitored after the procedure.  Patient was given post procedure and discharge instructions to follow at home. The patient was discharged in a stable condition.

## 2024-09-16 NOTE — TELEPHONE ENCOUNTER
Can you please help this patient get an appointment with Neurosurgery.  The referral has been placed.

## 2024-09-16 NOTE — INTERVAL H&P NOTE
The patient has been examined and the H&P has been reviewed:    I concur with the findings and no changes have occurred since H&P was written. On exam she has full strength in his lower extremities and intact sensation to light touch bilateral L2-S1.  She is going to tingling into her legs.  She reports when she uses the restroom sometimes she is numb in her perineum and needs to check to make sure she is not so being.  I am going to have her see Neurosurgery for an evaluation.  We will proceed with bilateral L4-5 TFESI today. The risks and benefits of this intervention, and alternative therapies were discussed with the patient.  The discussion of risks included infection, bleeding, need for additional procedures or surgery, nerve damage.  Questions regarding the procedure, risks, expected outcome, and possible side effects were solicited and answered to the patient's satisfaction.  Cornelia Delatorre wishes to proceed with the injection or procedure.  Written consent was obtained.  ASA 3, MP II      There are no hospital problems to display for this patient.

## 2024-09-16 NOTE — DISCHARGE INSTRUCTIONS
PAIN MANAGEMENT    HOME CARE INSTRUCTIONS   Do not use heat (such as a heating pad) for 24 hours.  You may apply an ice pack to the injection site for 20 minutes at a time for the first 24 hours for soreness/discomfort at injection site   Keep site clean and dry for 24 hours. If bandaid is present, remove when desired.   Do not drive until tomorrow.  Take care when walking after a lumbar injection.   Resume home medication as prescribed today.  Resume Aspirin, Plavix, or Coumadin the day after the procedure unless other wise instructed.    STEROIDS   May take 10-14 days for full effects.  Avoid strenuous exercises for 2 days.      CALL PHYSICIAN FOR:  Severe increase in your usual pain or the appearance of new pain.  Prolonged or increasing weakness or numbness in the legs or arms.  Fever greater than 100 degrees F.  Drainage, redness, active bleeding, or increased swelling at the injection site.  Headache that increases when your head is upright and decreases when you lie flat.    FOR EMERGENCIES:   Go directly to the emergency department for any shortness of breath, chest pain, or problems breathing.

## 2024-09-16 NOTE — TELEPHONE ENCOUNTER
Called patient in regards to referral placed to Neurosurgery. No updated imaging in chart. No answer. LVM to call [phone] for appointment scheduling

## 2024-09-16 NOTE — DISCHARGE SUMMARY
[facility]  Discharge Note  Short Stay    Admit Date: 9/16/2024    Discharge Date: 9/16/2024    Attending Physician: Fredi Rojas     Discharge Provider: Fredi Rojas    Diagnoses:  There are no hospital problems to display for this patient.      Discharged Condition: Good    Final Diagnoses: Lumbar radiculopathy [M54.16]    Disposition: Home or Self Care    Hospital Course: No complications, uneventful    Outcome of Hospitalization, Treatment, Procedure, or Surgery:  Patient was admitted for outpatient interventional pain management procedure. The patient tolerated the procedure well with no complications.    Follow up/Patient Instructions:  Follow up as scheduled in Pain Management office in 2-3 weeks.  Patient has received instructions and follow up date and time.    Medications:  Continue previous medications    Discharge Procedure Orders   Notify your health care provider if you experience any of the following:  temperature >100.4     Notify your health care provider if you experience any of the following:  persistent nausea and vomiting or diarrhea     Notify your health care provider if you experience any of the following:  severe uncontrolled pain     Notify your health care provider if you experience any of the following:  redness, tenderness, or signs of infection (pain, swelling, redness, odor or green/yellow discharge around incision site)     Notify your health care provider if you experience any of the following:  difficulty breathing or increased cough     Notify your health care provider if you experience any of the following:  severe persistent headache     Notify your health care provider if you experience any of the following:  worsening rash     Notify your health care provider if you experience any of the following:  persistent dizziness, light-headedness, or visual disturbances     Notify your health care provider if you experience any of the following:  increased confusion or  weakness     Activity as tolerated

## 2024-09-17 ENCOUNTER — TELEPHONE (OUTPATIENT)
Dept: NEUROSURGERY | Facility: CLINIC | Age: 74
End: 2024-09-17
Payer: MEDICARE

## 2024-09-17 VITALS
HEIGHT: 64 IN | BODY MASS INDEX: 35.68 KG/M2 | HEART RATE: 70 BPM | TEMPERATURE: 97 F | SYSTOLIC BLOOD PRESSURE: 138 MMHG | WEIGHT: 209 LBS | OXYGEN SATURATION: 97 % | DIASTOLIC BLOOD PRESSURE: 65 MMHG | RESPIRATION RATE: 18 BRPM

## 2024-09-17 NOTE — TELEPHONE ENCOUNTER
Called patient in regards to referral placed to Neurosurgery. No updated imaging in chart. No answer. LVM to call [phone] for appointment scheduling

## 2024-09-18 ENCOUNTER — TELEPHONE (OUTPATIENT)
Dept: PAIN MEDICINE | Facility: CLINIC | Age: 74
End: 2024-09-18
Payer: MEDICARE

## 2024-09-18 DIAGNOSIS — M54.16 LUMBAR RADICULOPATHY: Primary | ICD-10-CM

## 2024-09-18 DIAGNOSIS — M48.061 SPINAL STENOSIS OF LUMBAR REGION, UNSPECIFIED WHETHER NEUROGENIC CLAUDICATION PRESENT: ICD-10-CM

## 2024-09-18 NOTE — TELEPHONE ENCOUNTER
Spoke with pt who will not follow up with Dr. Dumont, per provider referral. Pt stated she know people who referred her to another provider Dr.Shawn Zacarias and she has an appt with him in 2 wks.

## 2024-09-19 ENCOUNTER — CLINICAL SUPPORT (OUTPATIENT)
Dept: PULMONOLOGY | Facility: CLINIC | Age: 74
End: 2024-09-19
Payer: MEDICARE

## 2024-09-19 DIAGNOSIS — J45.50 SEVERE PERSISTENT ASTHMA WITHOUT COMPLICATION: Primary | ICD-10-CM

## 2024-09-19 NOTE — PROGRESS NOTES
Patient here for her Tezspire injection.  2 patient identifiers used (name and ).  Injection given into the left arm SQ.  Pt tolerated well.  No bleeding, redness, swelling or reaction noted to the injection site.     NDC: 13245-634-46  LOT: [id number]   Exp:  2026

## 2024-09-20 ENCOUNTER — TELEPHONE (OUTPATIENT)
Dept: NEUROSURGERY | Facility: CLINIC | Age: 74
End: 2024-09-20
Payer: MEDICARE

## 2024-09-20 NOTE — TELEPHONE ENCOUNTER
Spoke with patient in regards to referral placed to Neurosurgery. No updated imaging in chart. She states of consulting with her children before scheduling appointment. She will call at her convenience

## 2024-09-23 ENCOUNTER — OFFICE VISIT (OUTPATIENT)
Dept: CARDIOLOGY | Facility: CLINIC | Age: 74
End: 2024-09-23
Payer: MEDICARE

## 2024-09-23 VITALS
HEIGHT: 64 IN | HEART RATE: 71 BPM | SYSTOLIC BLOOD PRESSURE: 127 MMHG | DIASTOLIC BLOOD PRESSURE: 75 MMHG | BODY MASS INDEX: 35.53 KG/M2 | WEIGHT: 208.13 LBS

## 2024-09-23 DIAGNOSIS — I65.23 BILATERAL CAROTID ARTERY STENOSIS: ICD-10-CM

## 2024-09-23 DIAGNOSIS — E78.5 DYSLIPIDEMIA: ICD-10-CM

## 2024-09-23 DIAGNOSIS — I10 ESSENTIAL HYPERTENSION: Primary | ICD-10-CM

## 2024-09-23 DIAGNOSIS — R07.89 OTHER CHEST PAIN: ICD-10-CM

## 2024-09-23 DIAGNOSIS — R06.09 DOE (DYSPNEA ON EXERTION): ICD-10-CM

## 2024-09-23 PROCEDURE — 1157F ADVNC CARE PLAN IN RCRD: CPT | Mod: HCNC,CPTII,S$GLB, | Performed by: INTERNAL MEDICINE

## 2024-09-23 PROCEDURE — 1125F AMNT PAIN NOTED PAIN PRSNT: CPT | Mod: HCNC,CPTII,S$GLB, | Performed by: INTERNAL MEDICINE

## 2024-09-23 PROCEDURE — 3288F FALL RISK ASSESSMENT DOCD: CPT | Mod: HCNC,CPTII,S$GLB, | Performed by: INTERNAL MEDICINE

## 2024-09-23 PROCEDURE — 3074F SYST BP LT 130 MM HG: CPT | Mod: HCNC,CPTII,S$GLB, | Performed by: INTERNAL MEDICINE

## 2024-09-23 PROCEDURE — 3066F NEPHROPATHY DOC TX: CPT | Mod: HCNC,CPTII,S$GLB, | Performed by: INTERNAL MEDICINE

## 2024-09-23 PROCEDURE — 3061F NEG MICROALBUMINURIA REV: CPT | Mod: HCNC,CPTII,S$GLB, | Performed by: INTERNAL MEDICINE

## 2024-09-23 PROCEDURE — 99214 OFFICE O/P EST MOD 30 MIN: CPT | Mod: HCNC,S$GLB,, | Performed by: INTERNAL MEDICINE

## 2024-09-23 PROCEDURE — 3078F DIAST BP <80 MM HG: CPT | Mod: HCNC,CPTII,S$GLB, | Performed by: INTERNAL MEDICINE

## 2024-09-23 PROCEDURE — 1160F RVW MEDS BY RX/DR IN RCRD: CPT | Mod: HCNC,CPTII,S$GLB, | Performed by: INTERNAL MEDICINE

## 2024-09-23 PROCEDURE — 3008F BODY MASS INDEX DOCD: CPT | Mod: HCNC,CPTII,S$GLB, | Performed by: INTERNAL MEDICINE

## 2024-09-23 PROCEDURE — 1159F MED LIST DOCD IN RCRD: CPT | Mod: HCNC,CPTII,S$GLB, | Performed by: INTERNAL MEDICINE

## 2024-09-23 PROCEDURE — 3044F HG A1C LEVEL LT 7.0%: CPT | Mod: HCNC,CPTII,S$GLB, | Performed by: INTERNAL MEDICINE

## 2024-09-23 PROCEDURE — 1101F PT FALLS ASSESS-DOCD LE1/YR: CPT | Mod: HCNC,CPTII,S$GLB, | Performed by: INTERNAL MEDICINE

## 2024-09-23 PROCEDURE — 99999 PR PBB SHADOW E&M-EST. PATIENT-LVL III: CPT | Mod: PBBFAC,HCNC,, | Performed by: INTERNAL MEDICINE

## 2024-09-23 NOTE — PROGRESS NOTES
Subjective:    Patient ID:  Cornelia Delatorre is a 73 y.o. female who presents for follow-up of hypertension, dyslipidemia    HPI  She comes with no complaints, no chest pain, no shortness of breath  Back problems are her main complaint today.  Nevertheless, she also reports occasional chest tightness, radiated to the left neck, with and without activity.    No syncope or near-syncope   Blood pressure normal at home.    Review of Systems   Constitutional: Negative for decreased appetite, malaise/fatigue, weight gain and weight loss.   Cardiovascular:  Negative for chest pain, dyspnea on exertion, leg swelling, palpitations and syncope.   Respiratory:  Negative for cough and shortness of breath.    Gastrointestinal: Negative.    Neurological:  Negative for weakness.   All other systems reviewed and are negative.     Objective:      Physical Exam  Vitals and nursing note reviewed.   Constitutional:       Appearance: Normal appearance. She is well-developed.   HENT:      Head: Normocephalic.   Eyes:      Pupils: Pupils are equal, round, and reactive to light.   Neck:      Thyroid: No thyromegaly.      Vascular: No carotid bruit or JVD.   Cardiovascular:      Rate and Rhythm: Normal rate and regular rhythm.      Chest Wall: PMI is not displaced.      Pulses: Normal pulses and intact distal pulses.      Heart sounds: Normal heart sounds. No murmur heard.     No gallop.   Pulmonary:      Effort: Pulmonary effort is normal.      Breath sounds: Normal breath sounds.   Abdominal:      Palpations: Abdomen is soft. There is no mass.      Tenderness: There is no abdominal tenderness.   Musculoskeletal:         General: Normal range of motion.      Cervical back: Normal range of motion and neck supple.   Skin:     General: Skin is warm.   Neurological:      Mental Status: She is alert and oriented to person, place, and time.      Sensory: No sensory deficit.      Deep Tendon Reflexes: Reflexes are normal and symmetric.          Most Recent EKG Results  Results for orders placed or performed during the hospital encounter of 06/10/21   EKG 12-lead    Collection Time: 06/10/21  7:01 PM    Narrative    Test Reason : R11.10,    Vent. Rate : 068 BPM     Atrial Rate : 068 BPM     P-R Int : 150 ms          QRS Dur : 132 ms      QT Int : 446 ms       P-R-T Axes : 063 007 044 degrees     QTc Int : 474 ms    Normal sinus rhythm  Right bundle branch block  Abnormal ECG  When compared with ECG of 23-FEB-2021 16:08,  No significant change was found  Confirmed by Jules Spaulding MD [id number]) on 6/11/2021 11:16:36 AM    Referred By: [email or web address]   SELF           Confirmed By:Jules Spaulding MD       Most Recent Echocardiogram Results  Results for orders placed in visit on 06/05/23    Echo    Interpretation Summary  · The left ventricle is normal in size with normal systolic function.  · The estimated ejection fraction is 60%.  · Normal left ventricular diastolic function.  · Normal right ventricular size with normal right ventricular systolic function.  · Normal central venous pressure (3 mmHg).  · The estimated PA systolic pressure is 29 mmHg.      Most Recent Nuclear Stress Test Results  No results found for this or any previous visit.      Most Recent Cardiac PET Stress Test Results  No results found for this or any previous visit.      Most Recent Cardiovascular Angiogram results  No results found for this or any previous visit.      Other Most Recent Cardiology Results  Results for orders placed during the hospital encounter of 06/05/24    Cardiac monitoring strips      Labs reviewed    Assessment:       1. Essential hypertension    2. KLINE (dyspnea on exertion)    3. Dyslipidemia    4. Bilateral carotid artery stenosis         Plan:   Echocardiogram, nuclear stress test.  Call with results.      Continue:  ARB, Calcium blocker, Diuretic, and Statin  Regular exercise program  Weight loss  Low cholesterol diet    Follow-up in 1 year if above tests are  good

## 2024-09-24 ENCOUNTER — OFFICE VISIT (OUTPATIENT)
Facility: CLINIC | Age: 74
End: 2024-09-24
Payer: MEDICARE

## 2024-09-24 DIAGNOSIS — D48.5 NEOPLASM OF UNCERTAIN BEHAVIOR OF SKIN: ICD-10-CM

## 2024-09-24 DIAGNOSIS — Z85.828 PERSONAL HISTORY OF MALIGNANT NEOPLASM OF SKIN: ICD-10-CM

## 2024-09-24 DIAGNOSIS — M67.449 DIGITAL MUCOUS CYST: ICD-10-CM

## 2024-09-24 DIAGNOSIS — L90.5 SCAR: Primary | ICD-10-CM

## 2024-09-24 DIAGNOSIS — L57.0 ACTINIC KERATOSIS: ICD-10-CM

## 2024-09-24 PROCEDURE — 1157F ADVNC CARE PLAN IN RCRD: CPT | Mod: HCNC,CPTII,S$GLB, | Performed by: DERMATOLOGY

## 2024-09-24 PROCEDURE — 3066F NEPHROPATHY DOC TX: CPT | Mod: HCNC,CPTII,S$GLB, | Performed by: DERMATOLOGY

## 2024-09-24 PROCEDURE — 3288F FALL RISK ASSESSMENT DOCD: CPT | Mod: HCNC,CPTII,S$GLB, | Performed by: DERMATOLOGY

## 2024-09-24 PROCEDURE — 1101F PT FALLS ASSESS-DOCD LE1/YR: CPT | Mod: HCNC,CPTII,S$GLB, | Performed by: DERMATOLOGY

## 2024-09-24 PROCEDURE — 3061F NEG MICROALBUMINURIA REV: CPT | Mod: HCNC,CPTII,S$GLB, | Performed by: DERMATOLOGY

## 2024-09-24 PROCEDURE — 99999 PR PBB SHADOW E&M-EST. PATIENT-LVL IV: CPT | Mod: PBBFAC,HCNC,, | Performed by: DERMATOLOGY

## 2024-09-24 PROCEDURE — 17003 DESTRUCT PREMALG LES 2-14: CPT | Mod: HCNC,S$GLB,, | Performed by: DERMATOLOGY

## 2024-09-24 PROCEDURE — 88304 TISSUE EXAM BY PATHOLOGIST: CPT | Mod: HCNC | Performed by: DERMATOLOGY

## 2024-09-24 PROCEDURE — 3044F HG A1C LEVEL LT 7.0%: CPT | Mod: HCNC,CPTII,S$GLB, | Performed by: DERMATOLOGY

## 2024-09-24 PROCEDURE — 99213 OFFICE O/P EST LOW 20 MIN: CPT | Mod: 25,HCNC,S$GLB, | Performed by: DERMATOLOGY

## 2024-09-24 PROCEDURE — 11104 PUNCH BX SKIN SINGLE LESION: CPT | Mod: HCNC,S$GLB,, | Performed by: DERMATOLOGY

## 2024-09-24 PROCEDURE — 1159F MED LIST DOCD IN RCRD: CPT | Mod: HCNC,CPTII,S$GLB, | Performed by: DERMATOLOGY

## 2024-09-24 PROCEDURE — 1160F RVW MEDS BY RX/DR IN RCRD: CPT | Mod: HCNC,CPTII,S$GLB, | Performed by: DERMATOLOGY

## 2024-09-24 PROCEDURE — 11103 TANGNTL BX SKIN EA SEP/ADDL: CPT | Mod: HCNC,S$GLB,, | Performed by: DERMATOLOGY

## 2024-09-24 PROCEDURE — 17000 DESTRUCT PREMALG LESION: CPT | Mod: HCNC,XS,S$GLB, | Performed by: DERMATOLOGY

## 2024-09-24 PROCEDURE — 88305 TISSUE EXAM BY PATHOLOGIST: CPT | Mod: HCNC | Performed by: DERMATOLOGY

## 2024-09-24 NOTE — PROGRESS NOTES
Subjective:      Patient ID:  Cornelia Delatorre is a 73 y.o. female who presents for   Chief Complaint   Patient presents with    Skin Check     Annual skin check-TBSC     HPI  LOV: 2/7/24  Present today for annual skin check-TBSC  Several areas of concern today  Lump right finger, thinks it may be a wart, asx , notx   Movable bump left hand, asx. No tx. Month.   Sore on left shoulder, wont heal. Treat with otc ointment and bandage and never resolves.   yes Phx of NMSC  Phx skin ca R hand and nose - Dr Parra - 2014   Phx SCC L hand -mohs Dr Jones -2015   Uses CeraVe AM and PM cream daily for routine     Review of Systems   Constitutional:  Negative for fever, chills and fatigue.   HENT:  Negative for congestion, sore throat and mouth sores.    Respiratory:  Negative for cough.    Gastrointestinal:  Negative for nausea, vomiting and diarrhea.   Skin:  Positive for daily sunscreen use. Negative for itching, rash, dry skin, sensitivity to antibiotic ointment, sensitivity to bandage adhesive and wears hat.   Hematologic/Lymphatic: Bruises/bleeds easily (forearms, takes asa and prednisone for asthma).       Objective:   Physical Exam   Constitutional: She appears well-developed and well-nourished. No distress.   HENT:   Mouth/Throat: Lips normal.    Eyes: Lids are normal.  No conjunctival no injection.   Cardiovascular:  There is no local extremity swelling and no dependent edema.             Neurological: She is alert and oriented to person, place, and time. She is not disoriented.   Psychiatric: She has a normal mood and affect.   Skin:   Areas Examined (abnormalities noted in diagram):   Scalp / Hair Palpated and Inspected  Head / Face Inspection Performed  Neck Inspection Performed  Chest / Axilla Inspection Performed  Abdomen Inspection Performed  Back Inspection Performed  RUE Inspected  LUE Inspection Performed  RLE Inspected  LLE Inspection Performed  Nails and Digits Inspection Performed                      Diagram Legend     Erythematous scaling macule/papule c/w actinic keratosis       Vascular papule c/w angioma      Pigmented verrucoid papule/plaque c/w seborrheic keratosis      Yellow umbilicated papule c/w sebaceous hyperplasia      Irregularly shaped tan macule c/w lentigo     1-2 mm smooth white papules consistent with Milia      Movable subcutaneous cyst with punctum c/w epidermal inclusion cyst      Subcutaneous movable cyst c/w pilar cyst      Firm pink to brown papule c/w dermatofibroma      Pedunculated fleshy papule(s) c/w skin tag(s)      Evenly pigmented macule c/w junctional nevus     Mildly variegated pigmented, slightly irregular-bordered macule c/w mildly atypical nevus      Flesh colored to evenly pigmented papule c/w intradermal nevus       Pink pearly papule/plaque c/w basal cell carcinoma      Erythematous hyperkeratotic cursted plaque c/w SCC      Surgical scar with no sign of skin cancer recurrence      Open and closed comedones      Inflammatory papules and pustules      Verrucoid papule consistent consistent with wart     Erythematous eczematous patches and plaques     Dystrophic onycholytic nail with subungual debris c/w onychomycosis     Umbilicated papule    Erythematous-base heme-crusted tan verrucoid plaque consistent with inflamed seborrheic keratosis     Erythematous Silvery Scaling Plaque c/w Psoriasis     See annotation      Assessment / Plan:      Pathology Orders:       Normal Orders This Visit    Specimen to Pathology, Dermatology     Comments:    Number of Specimens:->2  ------------------------->-------------------------  Spec 1 Procedure:->Biopsy  Spec 1 Clinical Impression:->cyst vs other  Spec 1 Source:->left second digit  ------------------------->-------------------------  Spec 2 Procedure:->Biopsy  Spec 2 Clinical Impression:->bcc vs scc vs other, nonhealing  sore  Spec 2 Source:->left shoulder  Release to patient->Immediate    Questions:    Procedure Type:  Dermatology and skin neoplasms    Number of Specimens: 2    ------------------------: -------------------------    Spec 1 Procedure: Biopsy    Spec 1 Clinical Impression: cyst vs other    Spec 1 Source: left second digit    ------------------------: -------------------------    Spec 2 Procedure: Biopsy    Spec 2 Clinical Impression: bcc vs scc vs other, nonhealing sore    Spec 2 Source: left shoulder    Clinical Information: see photos    Release to patient: Immediate          Scar  NER NMSC    Personal history of malignant neoplasm of skin  Area(s) of previous NMSC evaluated with no signs of recurrence.    Upper body skin examination performed today including at least 6 points as noted in physical examination. Suspicious lesions noted.      Digital mucous cyst  Lesion incised with #11 blade and drained on today's date  Discussed risk recurrence, refer to hand surgery for definitive excision if desires    Actinic keratosis  Cryosurgery Procedure Note    Verbal consent from the patient is obtained and the patient is aware of the precancerous quality and need for treatment of these lesions. Liquid nitrogen cryosurgery is applied to the 3 actinic keratoses, as detailed in the physical exam, to produce a freeze injury. The patient is aware that blisters may form and is instructed on wound care with gentle cleansing and use of vaseline ointment to keep moist until healed. The patient is supplied a handout on cryosurgery and is instructed to call if lesions do not completely resolve. Discussed risk postinflammatory pigmentary changes.       Neoplasm of uncertain behavior of skin  -     Specimen to Pathology, Dermatology    Shave biopsy procedure note:    Shave biopsy performed after verbal consent including risk of infection, scar, recurrence, need for additional treatment of site. Area prepped with alcohol, anesthetized with approximately 1.0cc of 1% lidocaine with epinephrine. Lesional tissue shaved with razor blade.  Hemostasis achieved with application of aluminum chloride followed by hyfrecation. No complications. Dressing applied. Wound care explained.    Punch biopsy procedure note:  Punch biopsy performed after verbal consent obtained. Area marked and prepped with alcohol. Approximately 1cc of 1% lidocaine with epinephrine injected. 4 mm disposable punch used to remove lesion. Hemostasis obtained and biopsy site closed with 1 - 2 nylon sutures. Wound care instructions reviewed with patient and handout given.               Follow up in about 2 weeks (around 10/8/2024) for nurse visit for suture removal.

## 2024-09-25 ENCOUNTER — PATIENT MESSAGE (OUTPATIENT)
Dept: CARDIOLOGY | Facility: HOSPITAL | Age: 74
End: 2024-09-25
Payer: MEDICARE

## 2024-09-26 ENCOUNTER — HOSPITAL ENCOUNTER (OUTPATIENT)
Dept: CARDIOLOGY | Facility: HOSPITAL | Age: 74
Discharge: HOME | End: 2024-09-26
Attending: INTERNAL MEDICINE
Payer: MEDICARE

## 2024-09-26 VITALS — HEIGHT: 64 IN | BODY MASS INDEX: 35.51 KG/M2 | WEIGHT: 208 LBS

## 2024-09-26 DIAGNOSIS — R07.89 OTHER CHEST PAIN: ICD-10-CM

## 2024-09-26 DIAGNOSIS — E78.5 DYSLIPIDEMIA: ICD-10-CM

## 2024-09-26 DIAGNOSIS — I10 ESSENTIAL HYPERTENSION: ICD-10-CM

## 2024-09-26 DIAGNOSIS — I65.23 BILATERAL CAROTID ARTERY STENOSIS: ICD-10-CM

## 2024-09-26 DIAGNOSIS — R06.09 DOE (DYSPNEA ON EXERTION): ICD-10-CM

## 2024-09-26 LAB
FINAL PATHOLOGIC DIAGNOSIS: NORMAL
GROSS: NORMAL
Lab: NORMAL
MICROSCOPIC EXAM: NORMAL

## 2024-09-26 PROCEDURE — 93306 TTE W/DOPPLER COMPLETE: CPT | Mod: HCNC,PO

## 2024-09-26 PROCEDURE — 93306 TTE W/DOPPLER COMPLETE: CPT | Mod: 26,HCNC,, | Performed by: INTERNAL MEDICINE

## 2024-09-27 ENCOUNTER — HOSPITAL ENCOUNTER (OUTPATIENT)
Dept: RADIOLOGY | Facility: HOSPITAL | Age: 74
Discharge: HOME | End: 2024-09-27
Attending: INTERNAL MEDICINE
Payer: MEDICARE

## 2024-09-27 ENCOUNTER — HOSPITAL ENCOUNTER (OUTPATIENT)
Dept: CARDIOLOGY | Facility: HOSPITAL | Age: 74
Discharge: HOME | End: 2024-09-27
Attending: INTERNAL MEDICINE
Payer: MEDICARE

## 2024-09-27 VITALS — BODY MASS INDEX: 35.51 KG/M2 | WEIGHT: 208 LBS | HEIGHT: 64 IN

## 2024-09-27 DIAGNOSIS — R06.09 DOE (DYSPNEA ON EXERTION): ICD-10-CM

## 2024-09-27 DIAGNOSIS — E78.5 DYSLIPIDEMIA: ICD-10-CM

## 2024-09-27 DIAGNOSIS — R07.89 OTHER CHEST PAIN: ICD-10-CM

## 2024-09-27 DIAGNOSIS — I10 ESSENTIAL HYPERTENSION: ICD-10-CM

## 2024-09-27 DIAGNOSIS — I65.23 BILATERAL CAROTID ARTERY STENOSIS: ICD-10-CM

## 2024-09-27 LAB
CV PHARM DOSE: 0.4 MG
CV STRESS BASE HR: 67 BPM
DIASTOLIC BLOOD PRESSURE: 72 MMHG
NUC STRESS EJECTION FRACTION: 76 %
OHS CV CPX 1 MINUTE RECOVERY HEART RATE: 100 BPM
OHS CV CPX 85 PERCENT MAX PREDICTED HEART RATE MALE: 127
OHS CV CPX MAX PREDICTED HEART RATE: 149
OHS CV CPX PATIENT IS FEMALE: 1
OHS CV CPX PATIENT IS MALE: 0
OHS CV CPX PEAK DIASTOLIC BLOOD PRESSURE: 72 MMHG
OHS CV CPX PEAK HEAR RATE: 100 BPM
OHS CV CPX PEAK RATE PRESSURE PRODUCT: NORMAL
OHS CV CPX PEAK SYSTOLIC BLOOD PRESSURE: 155 MMHG
OHS CV CPX PERCENT MAX PREDICTED HEART RATE ACHIEVED: 70
OHS CV CPX RATE PRESSURE PRODUCT PRESENTING: NORMAL
OHS CV INITIAL DOSE: 11 MCG/KG/MIN
OHS CV PEAK DOSE: 32.4 MCG/KG/MIN
OHS CV PHARM TIME: 847 MIN
SYSTOLIC BLOOD PRESSURE: 155 MMHG

## 2024-09-27 PROCEDURE — 93018 CV STRESS TEST I&R ONLY: CPT | Mod: HCNC,,, | Performed by: INTERNAL MEDICINE

## 2024-09-27 PROCEDURE — 78452 HT MUSCLE IMAGE SPECT MULT: CPT | Mod: 26,HCNC,, | Performed by: INTERNAL MEDICINE

## 2024-09-27 PROCEDURE — 93016 CV STRESS TEST SUPVJ ONLY: CPT | Mod: HCNC,,, | Performed by: INTERNAL MEDICINE

## 2024-09-27 PROCEDURE — A9502 TC99M TETROFOSMIN: HCPCS | Mod: HCNC,PO | Performed by: INTERNAL MEDICINE

## 2024-09-27 PROCEDURE — 78452 HT MUSCLE IMAGE SPECT MULT: CPT | Mod: HCNC,PO

## 2024-09-27 PROCEDURE — 63600175 PHARM REV CODE 636 W HCPCS: Mod: HCNC,PO | Performed by: INTERNAL MEDICINE

## 2024-09-27 PROCEDURE — 93017 CV STRESS TEST TRACING ONLY: CPT | Mod: HCNC,PO

## 2024-09-27 RX ORDER — REGADENOSON 0.08 MG/ML
0.4 INJECTION, SOLUTION INTRAVENOUS
Status: COMPLETED | OUTPATIENT
Start: 2024-09-27 | End: 2024-09-27

## 2024-09-27 RX ADMIN — TETROFOSMIN 32.4 MILLICURIE: 1.38 INJECTION, POWDER, LYOPHILIZED, FOR SOLUTION INTRAVENOUS at 08:09

## 2024-09-27 RX ADMIN — TETROFOSMIN 11 MILLICURIE: 1.38 INJECTION, POWDER, LYOPHILIZED, FOR SOLUTION INTRAVENOUS at 08:09

## 2024-09-27 RX ADMIN — REGADENOSON 0.4 MG: 0.08 INJECTION, SOLUTION INTRAVENOUS at 08:09

## 2024-09-28 LAB
ASCENDING AORTA: 2.95 CM
AV INDEX (PROSTH): 0.81
AV MEAN GRADIENT: 4 MMHG
AV PEAK GRADIENT: 7.5 MMHG
AV VALVE AREA BY VELOCITY RATIO: 2.4 CM²
AV VALVE AREA: 2.5 CM²
AV VELOCITY RATIO: 0.78
BSA FOR ECHO PROCEDURE: 2.06 M2
CV ECHO LV RWT: 0.39 CM
DOP CALC AO PEAK VEL: 1.37 M/S
DOP CALC AO VTI: 32.4 CM
DOP CALC LVOT AREA: 3 CM2
DOP CALC LVOT DIAMETER: 1.97 CM
DOP CALC LVOT PEAK VEL: 1.07 M/S
DOP CALC LVOT STROKE VOLUME: 79.8 CM3
DOP CALCLVOT PEAK VEL VTI: 26.2 CM
E WAVE DECELERATION TIME: 231.78 MSEC
E/A RATIO: 0.86
E/E' RATIO: 8.94 M/S
ECHO LV POSTERIOR WALL: 1.01 CM (ref 0.6–1.1)
FRACTIONAL SHORTENING: 35 % (ref 28–44)
INTERVENTRICULAR SEPTUM: 1.01 CM (ref 0.6–1.1)
IVRT: 94.2 MSEC
LEFT ATRIUM AREA SYSTOLIC (APICAL 2 CHAMBER): 19.97 CM2
LEFT ATRIUM AREA SYSTOLIC (APICAL 4 CHAMBER): 16.26 CM2
LEFT ATRIUM SIZE: 3.93 CM
LEFT ATRIUM VOLUME INDEX MOD: 27.3 ML/M2
LEFT ATRIUM VOLUME MOD: 54.41 ML
LEFT INTERNAL DIMENSION IN SYSTOLE: 3.4 CM (ref 2.1–4)
LEFT VENTRICLE DIASTOLIC VOLUME INDEX: 66.03 ML/M2
LEFT VENTRICLE DIASTOLIC VOLUME: 131.39 ML
LEFT VENTRICLE END SYSTOLIC VOLUME APICAL 2 CHAMBER: 65.97 ML
LEFT VENTRICLE END SYSTOLIC VOLUME APICAL 4 CHAMBER: 43.33 ML
LEFT VENTRICLE MASS INDEX: 99.8 G/M2
LEFT VENTRICLE SYSTOLIC VOLUME INDEX: 23.8 ML/M2
LEFT VENTRICLE SYSTOLIC VOLUME: 47.36 ML
LEFT VENTRICULAR INTERNAL DIMENSION IN DIASTOLE: 5.23 CM (ref 3.5–6)
LEFT VENTRICULAR MASS: 198.6 G
LV LATERAL E/E' RATIO: 8.44 M/S
LV SEPTAL E/E' RATIO: 9.5 M/S
LVED V (TEICH): 131.39 ML
LVES V (TEICH): 47.36 ML
LVOT MG: 2.27 MMHG
LVOT MV: 0.7 CM/S
MV PEAK A VEL: 0.88 M/S
MV PEAK E VEL: 0.76 M/S
MV STENOSIS PRESSURE HALF TIME: 67.22 MS
MV VALVE AREA P 1/2 METHOD: 3.27 CM2
PISA TR MAX VEL: 1.69 M/S
PULM VEIN S/D RATIO: 1.56
PV PEAK D VEL: 0.39 M/S
PV PEAK S VEL: 0.61 M/S
RA PRESSURE ESTIMATED: 3 MMHG
RIGHT VENTRICLE DIASTOLIC LENGTH: 6.3 CM
RIGHT VENTRICLE DIASTOLIC MID DIMENSION: 3.1 CM
RIGHT VENTRICULAR END-DIASTOLIC DIMENSION: 4.29 CM
RIGHT VENTRICULAR LENGTH IN DIASTOLE (APICAL 4-CHAMBER VIEW): 6.27 CM
RV MID DIAMA: 3.13 CM
RV TB RVSP: 5 MMHG
RV TISSUE DOPPLER FREE WALL SYSTOLIC VELOCITY 1 (APICAL 4 CHAMBER VIEW): 11.17 CM/S
SINUS: 2.76 CM
STJ: 2.19 CM
TDI LATERAL: 0.09 M/S
TDI SEPTAL: 0.08 M/S
TDI: 0.09 M/S
TR MAX PG: 11 MMHG
TRICUSPID ANNULAR PLANE SYSTOLIC EXCURSION: 2.17 CM
TV REST PULMONARY ARTERY PRESSURE: 14 MMHG
Z-SCORE OF LEFT VENTRICULAR DIMENSION IN END DIASTOLE: -0.99
Z-SCORE OF LEFT VENTRICULAR DIMENSION IN END SYSTOLE: -0.33

## 2024-09-30 ENCOUNTER — LAB VISIT (OUTPATIENT)
Dept: LAB | Facility: HOSPITAL | Age: 74
End: 2024-09-30
Attending: NURSE PRACTITIONER
Payer: MEDICARE

## 2024-09-30 DIAGNOSIS — E11.69 TYPE 2 DIABETES MELLITUS WITH OTHER SPECIFIED COMPLICATION, WITHOUT LONG-TERM CURRENT USE OF INSULIN: ICD-10-CM

## 2024-09-30 DIAGNOSIS — I10 ESSENTIAL HYPERTENSION: ICD-10-CM

## 2024-09-30 LAB
ALBUMIN SERPL BCP-MCNC: 3.5 G/DL (ref 3.5–5.2)
ALP SERPL-CCNC: 124 U/L (ref 55–135)
ALT SERPL W/O P-5'-P-CCNC: 20 U/L (ref 10–44)
ANION GAP SERPL CALC-SCNC: 11 MMOL/L (ref 8–16)
AST SERPL-CCNC: 23 U/L (ref 10–40)
BASOPHILS # BLD AUTO: 0.01 K/UL (ref 0–0.2)
BASOPHILS NFR BLD: 0.2 % (ref 0–1.9)
BILIRUB SERPL-MCNC: 0.3 MG/DL (ref 0.1–1)
BUN SERPL-MCNC: 19 MG/DL (ref 8–23)
CALCIUM SERPL-MCNC: 10 MG/DL (ref 8.7–10.5)
CHLORIDE SERPL-SCNC: 109 MMOL/L (ref 95–110)
CO2 SERPL-SCNC: 24 MMOL/L (ref 23–29)
CREAT SERPL-MCNC: 1 MG/DL (ref 0.5–1.4)
DIFFERENTIAL METHOD BLD: ABNORMAL
EOSINOPHIL # BLD AUTO: 0 K/UL (ref 0–0.5)
EOSINOPHIL NFR BLD: 0.9 % (ref 0–8)
ERYTHROCYTE [DISTWIDTH] IN BLOOD BY AUTOMATED COUNT: 15.9 % (ref 11.5–14.5)
EST. GFR  (NO RACE VARIABLE): >60 ML/MIN/1.73 M^2
ESTIMATED AVG GLUCOSE: 108 MG/DL (ref 68–131)
GLUCOSE SERPL-MCNC: 117 MG/DL (ref 70–110)
HBA1C MFR BLD: 5.4 % (ref 4–5.6)
HCT VFR BLD AUTO: 34.4 % (ref 37–48.5)
HGB BLD-MCNC: 10.4 G/DL (ref 12–16)
IMM GRANULOCYTES # BLD AUTO: 0.03 K/UL (ref 0–0.04)
IMM GRANULOCYTES NFR BLD AUTO: 0.6 % (ref 0–0.5)
LYMPHOCYTES # BLD AUTO: 1.4 K/UL (ref 1–4.8)
LYMPHOCYTES NFR BLD: 29.7 % (ref 18–48)
MCH RBC QN AUTO: 27.7 PG (ref 27–31)
MCHC RBC AUTO-ENTMCNC: 30.2 G/DL (ref 32–36)
MCV RBC AUTO: 92 FL (ref 82–98)
MONOCYTES # BLD AUTO: 0.4 K/UL (ref 0.3–1)
MONOCYTES NFR BLD: 8.5 % (ref 4–15)
NEUTROPHILS # BLD AUTO: 2.8 K/UL (ref 1.8–7.7)
NEUTROPHILS NFR BLD: 60.1 % (ref 38–73)
NRBC BLD-RTO: 0 /100 WBC
PLATELET # BLD AUTO: 156 K/UL (ref 150–450)
PMV BLD AUTO: 11.7 FL (ref 9.2–12.9)
POTASSIUM SERPL-SCNC: 6.1 MMOL/L (ref 3.5–5.1)
PROT SERPL-MCNC: 7.1 G/DL (ref 6–8.4)
RBC # BLD AUTO: 3.75 M/UL (ref 4–5.4)
SODIUM SERPL-SCNC: 144 MMOL/L (ref 136–145)
TSH SERPL DL<=0.005 MIU/L-ACNC: 0.6 UIU/ML (ref 0.4–4)
WBC # BLD AUTO: 4.68 K/UL (ref 3.9–12.7)

## 2024-09-30 PROCEDURE — 80053 COMPREHEN METABOLIC PANEL: CPT | Mod: HCNC | Performed by: NURSE PRACTITIONER

## 2024-09-30 PROCEDURE — 84443 ASSAY THYROID STIM HORMONE: CPT | Mod: HCNC | Performed by: NURSE PRACTITIONER

## 2024-09-30 PROCEDURE — 83036 HEMOGLOBIN GLYCOSYLATED A1C: CPT | Mod: HCNC | Performed by: NURSE PRACTITIONER

## 2024-09-30 PROCEDURE — 85025 COMPLETE CBC W/AUTO DIFF WBC: CPT | Mod: HCNC | Performed by: NURSE PRACTITIONER

## 2024-10-01 ENCOUNTER — PATIENT MESSAGE (OUTPATIENT)
Dept: CARDIOLOGY | Facility: CLINIC | Age: 74
End: 2024-10-01
Payer: MEDICARE

## 2024-10-01 ENCOUNTER — TELEPHONE (OUTPATIENT)
Dept: FAMILY MEDICINE | Facility: CLINIC | Age: 74
End: 2024-10-01
Payer: MEDICARE

## 2024-10-01 ENCOUNTER — LAB VISIT (OUTPATIENT)
Dept: LAB | Facility: HOSPITAL | Age: 74
End: 2024-10-01
Attending: INTERNAL MEDICINE
Payer: MEDICARE

## 2024-10-01 DIAGNOSIS — R79.9 ABNORMAL BLOOD CHEMISTRY: ICD-10-CM

## 2024-10-01 DIAGNOSIS — E87.5 SERUM POTASSIUM ELEVATED: Primary | ICD-10-CM

## 2024-10-01 DIAGNOSIS — R79.9 ABNORMAL BLOOD CHEMISTRY: Primary | ICD-10-CM

## 2024-10-01 LAB — POTASSIUM SERPL-SCNC: 5.6 MMOL/L (ref 3.5–5.1)

## 2024-10-01 PROCEDURE — 84132 ASSAY OF SERUM POTASSIUM: CPT | Mod: HCNC,PO | Performed by: NURSE PRACTITIONER

## 2024-10-01 PROCEDURE — 36415 COLL VENOUS BLD VENIPUNCTURE: CPT | Mod: HCNC,PO | Performed by: NURSE PRACTITIONER

## 2024-10-01 NOTE — TELEPHONE ENCOUNTER
Please notify patient potassium is much lower than original value but still mildly elevated. Discontinue Rx potassium chloride. Please make sure she is not taking any over the counter potassium supplements. Keep follow up with PCP next week as scheduled and do lab prior--running as stat so can be 1hr prior to appt if she doesn't want to come day before.

## 2024-10-01 NOTE — TELEPHONE ENCOUNTER
Please notify patient potassium is reading high--may be lab error. Have her come in today to repeat

## 2024-10-07 ENCOUNTER — LAB VISIT (OUTPATIENT)
Dept: LAB | Facility: HOSPITAL | Age: 74
End: 2024-10-07
Attending: NURSE PRACTITIONER
Payer: MEDICARE

## 2024-10-07 ENCOUNTER — OFFICE VISIT (OUTPATIENT)
Dept: FAMILY MEDICINE | Facility: CLINIC | Age: 74
End: 2024-10-07
Payer: MEDICARE

## 2024-10-07 VITALS
WEIGHT: 208.56 LBS | TEMPERATURE: 98 F | DIASTOLIC BLOOD PRESSURE: 70 MMHG | HEART RATE: 79 BPM | OXYGEN SATURATION: 96 % | BODY MASS INDEX: 35.61 KG/M2 | HEIGHT: 64 IN | SYSTOLIC BLOOD PRESSURE: 116 MMHG

## 2024-10-07 DIAGNOSIS — J32.9 SINUSITIS, UNSPECIFIED CHRONICITY, UNSPECIFIED LOCATION: ICD-10-CM

## 2024-10-07 DIAGNOSIS — M54.16 LUMBAR RADICULOPATHY: ICD-10-CM

## 2024-10-07 DIAGNOSIS — E11.9 CONTROLLED TYPE 2 DIABETES MELLITUS WITHOUT COMPLICATION, WITHOUT LONG-TERM CURRENT USE OF INSULIN: ICD-10-CM

## 2024-10-07 DIAGNOSIS — M80.00XA AGE-RELATED OSTEOPOROSIS WITH CURRENT PATHOLOGICAL FRACTURE, INITIAL ENCOUNTER: ICD-10-CM

## 2024-10-07 DIAGNOSIS — Z23 NEED FOR INFLUENZA VACCINATION: Primary | ICD-10-CM

## 2024-10-07 DIAGNOSIS — S32.050S CLOSED COMPRESSION FRACTURE OF L5 VERTEBRA, SEQUELA: ICD-10-CM

## 2024-10-07 DIAGNOSIS — E87.5 SERUM POTASSIUM ELEVATED: ICD-10-CM

## 2024-10-07 DIAGNOSIS — J45.20 MILD INTERMITTENT ASTHMA WITHOUT COMPLICATION: ICD-10-CM

## 2024-10-07 DIAGNOSIS — D64.9 NORMOCHROMIC ANEMIA: ICD-10-CM

## 2024-10-07 DIAGNOSIS — E78.2 MIXED HYPERLIPIDEMIA: ICD-10-CM

## 2024-10-07 DIAGNOSIS — I10 ESSENTIAL HYPERTENSION: ICD-10-CM

## 2024-10-07 LAB — POTASSIUM SERPL-SCNC: 5.1 MMOL/L (ref 3.5–5.1)

## 2024-10-07 PROCEDURE — 3008F BODY MASS INDEX DOCD: CPT | Mod: HCNC,CPTII,S$GLB, | Performed by: INTERNAL MEDICINE

## 2024-10-07 PROCEDURE — 99214 OFFICE O/P EST MOD 30 MIN: CPT | Mod: 25,HCNC,S$GLB, | Performed by: INTERNAL MEDICINE

## 2024-10-07 PROCEDURE — 3288F FALL RISK ASSESSMENT DOCD: CPT | Mod: HCNC,CPTII,S$GLB, | Performed by: INTERNAL MEDICINE

## 2024-10-07 PROCEDURE — 1126F AMNT PAIN NOTED NONE PRSNT: CPT | Mod: HCNC,CPTII,S$GLB, | Performed by: INTERNAL MEDICINE

## 2024-10-07 PROCEDURE — 1157F ADVNC CARE PLAN IN RCRD: CPT | Mod: HCNC,CPTII,S$GLB, | Performed by: INTERNAL MEDICINE

## 2024-10-07 PROCEDURE — G0008 ADMIN INFLUENZA VIRUS VAC: HCPCS | Mod: HCNC,S$GLB,, | Performed by: INTERNAL MEDICINE

## 2024-10-07 PROCEDURE — 1159F MED LIST DOCD IN RCRD: CPT | Mod: HCNC,CPTII,S$GLB, | Performed by: INTERNAL MEDICINE

## 2024-10-07 PROCEDURE — 90653 IIV ADJUVANT VACCINE IM: CPT | Mod: HCNC,S$GLB,, | Performed by: INTERNAL MEDICINE

## 2024-10-07 PROCEDURE — 99397 PER PM REEVAL EST PAT 65+ YR: CPT | Mod: HCNC,S$GLB,, | Performed by: INTERNAL MEDICINE

## 2024-10-07 PROCEDURE — 3078F DIAST BP <80 MM HG: CPT | Mod: HCNC,CPTII,S$GLB, | Performed by: INTERNAL MEDICINE

## 2024-10-07 PROCEDURE — 1101F PT FALLS ASSESS-DOCD LE1/YR: CPT | Mod: HCNC,CPTII,S$GLB, | Performed by: INTERNAL MEDICINE

## 2024-10-07 PROCEDURE — 1160F RVW MEDS BY RX/DR IN RCRD: CPT | Mod: HCNC,CPTII,S$GLB, | Performed by: INTERNAL MEDICINE

## 2024-10-07 PROCEDURE — 99999 PR PBB SHADOW E&M-EST. PATIENT-LVL V: CPT | Mod: PBBFAC,HCNC,, | Performed by: INTERNAL MEDICINE

## 2024-10-07 PROCEDURE — 3074F SYST BP LT 130 MM HG: CPT | Mod: HCNC,CPTII,S$GLB, | Performed by: INTERNAL MEDICINE

## 2024-10-07 PROCEDURE — 36415 COLL VENOUS BLD VENIPUNCTURE: CPT | Mod: HCNC,PO | Performed by: NURSE PRACTITIONER

## 2024-10-07 PROCEDURE — 84132 ASSAY OF SERUM POTASSIUM: CPT | Mod: HCNC,PO | Performed by: NURSE PRACTITIONER

## 2024-10-07 PROCEDURE — 3061F NEG MICROALBUMINURIA REV: CPT | Mod: HCNC,CPTII,S$GLB, | Performed by: INTERNAL MEDICINE

## 2024-10-07 PROCEDURE — 3066F NEPHROPATHY DOC TX: CPT | Mod: HCNC,CPTII,S$GLB, | Performed by: INTERNAL MEDICINE

## 2024-10-07 PROCEDURE — 3044F HG A1C LEVEL LT 7.0%: CPT | Mod: HCNC,CPTII,S$GLB, | Performed by: INTERNAL MEDICINE

## 2024-10-07 RX ORDER — VALSARTAN AND HYDROCHLOROTHIAZIDE 320; 25 MG/1; MG/1
1 TABLET, FILM COATED ORAL DAILY
Qty: 90 TABLET | Refills: 2 | Status: SHIPPED | OUTPATIENT
Start: 2024-10-07 | End: 2025-04-05

## 2024-10-07 RX ORDER — IBANDRONATE SODIUM 150 MG/1
150 TABLET, FILM COATED ORAL
Qty: 1 TABLET | Refills: 11 | Status: SHIPPED | OUTPATIENT
Start: 2024-10-07 | End: 2025-10-07

## 2024-10-07 RX ORDER — TRAMADOL HYDROCHLORIDE 50 MG/1
50 TABLET ORAL NIGHTLY PRN
Qty: 30 TABLET | Refills: 5 | Status: SHIPPED | OUTPATIENT
Start: 2024-10-07

## 2024-10-07 RX ORDER — METFORMIN HYDROCHLORIDE 500 MG/1
500 TABLET, EXTENDED RELEASE ORAL 2 TIMES DAILY WITH MEALS
Qty: 180 TABLET | Refills: 3 | Status: SHIPPED | OUTPATIENT
Start: 2024-10-07 | End: 2025-10-07

## 2024-10-07 RX ORDER — MONTELUKAST SODIUM 10 MG/1
10 TABLET ORAL NIGHTLY
Qty: 90 TABLET | Refills: 3 | Status: SHIPPED | OUTPATIENT
Start: 2024-10-07

## 2024-10-07 NOTE — PROGRESS NOTES
Subjective:       Patient ID: Cornelia Delatorre is a 73 y.o. female.  Chief Complaint: Annual Exam     HPI    Here for routine health maintenance.          Has metal implant for bladder.       CVID -On IV Ig;     L5 burst fracture 12/2023.  Severe pain making riding in a car painful.  Pins and needle pain radiates down both legs.  Dr Rojas has given her multiple injections, which have failed.  She is now being evaluated for surgery.  Feeling worn down with pain and limitations.   DEXA 2023 + osteopenia.    C/o waves of nausea for 2 weeks.  Lasts few seconds.  Review of medicine and coincides with methenamine for frequent UTI.  Trial off; resume if tolerate or discuss with uro-gyn    High potassium resolved off KCL Rx.       Bronchiectasis/ asthma - stable.   Recalll:   Dr Nicole in [place].  Feels SOB mostly related to bronchiectasis.  Her symptoms have improved with asthma tx - Symbicort.    Pulmonary nodules - Dr Nicole evaluating.  Incomplete response to pneumovax - recommends repeat Prevnar 13.  Eosinophilia and pn 7.14 level at 50%  angiogram was normal     HTN - controlled   DM - controlled;  Sees podiatry for peripheral neuropathy in past  Diabetic peripheral neuropathy - controlled with 600 mg gabapentin tid.  Outside foot doctor.   HLD - controlled for goal 70; high triglycerides.      Occasional anxiety relieved with prn hydroxyzine.   Had EGD- dysphagia with Dr Krishnan      Normocytic anemia - no blood/melena.  Iron stable.  Cscp 2022 wnl per pt.  EGD 2021 for dysphagia did not show contributing etiology per patient.  Dr Krishnan      Dx w [facility] likely related to some meds per testing neurology; stable      Complains of frequent UTIs - 4/yr.  I am treating one now and her symptoms are improving.  Discussed daily Macrobid prophylaxis.     Complains of b/l leg weakness.  Gets SOB walking to mailbox.  Was inactive with lung issues, but this seems to be stable now.  Echo 11/2021 EF wnl, grade II diastolic  dysfunction.  Likely deconditioning.       Assessment:       1. Need for influenza vaccination    2. Controlled type 2 diabetes mellitus without complication, without long-term current use of insulin    3. Sinusitis, unspecified chronicity, unspecified location    4. Mild intermittent asthma without complication    5. Essential hypertension    6. Closed compression fracture of L5 vertebra, sequela    7. Age-related osteoporosis with current pathological fracture, initial encounter    8. Lumbar radiculopathy    9. Normochromic anemia    10. Mixed hyperlipidemia        Plan:       Need for influenza vaccination  -     influenza (adjuvanted) (Fluad) 45 mcg/0.5 mL IM vaccine (> or = 66 yo) 0.5 mL    Controlled type 2 diabetes mellitus without complication, without long-term current use of insulin  -     metFORMIN (GLUCOPHAGE-XR) 500 MG ER 24hr tablet; Take 1 tablet (500 mg total) by mouth 2 (two) times daily with meals.  Dispense: 180 tablet; Refill: 3  -     Hemoglobin A1C; Future; Expected date: 04/05/2025  -     Microalbumin/Creatinine Ratio, Urine; Future; Expected date: 04/05/2025    Sinusitis, unspecified chronicity, unspecified location  -     montelukast (SINGULAIR) 10 mg tablet; Take 1 tablet (10 mg total) by mouth every evening.  Dispense: 90 tablet; Refill: 3    Mild intermittent asthma without complication  -     montelukast (SINGULAIR) 10 mg tablet; Take 1 tablet (10 mg total) by mouth every evening.  Dispense: 90 tablet; Refill: 3    Essential hypertension  -     valsartan-hydrochlorothiazide (DIOVAN-HCT) 320-25 mg per tablet; Take 1 tablet by mouth once daily.  Dispense: 90 tablet; Refill: 2  -     Comprehensive Metabolic Panel; Future; Expected date: 04/05/2025    Closed compression fracture of L5 vertebra, sequela  -     ibandronate (BONIVA) 150 mg tablet; Take 1 tablet (150 mg total) by mouth every 30 days.  Dispense: 1 tablet; Refill: 11  -     traMADoL (ULTRAM) 50 mg tablet; Take 1 tablet (50 mg  total) by mouth nightly as needed for Pain.  Dispense: 30 tablet; Refill: 5    Age-related osteoporosis with current pathological fracture, initial encounter  -     ibandronate (BONIVA) 150 mg tablet; Take 1 tablet (150 mg total) by mouth every 30 days.  Dispense: 1 tablet; Refill: 11    Lumbar radiculopathy  -     traMADoL (ULTRAM) 50 mg tablet; Take 1 tablet (50 mg total) by mouth nightly as needed for Pain.  Dispense: 30 tablet; Refill: 5    Normochromic anemia  -     CBC Auto Differential; Future; Expected date: 04/05/2025    Mixed hyperlipidemia  -     Lipid Panel; Future; Expected date: 04/05/2025              Wellness reviewed      Unprovoked burst L 5 fracture = osteoporosis; will initiate treatment.   Trial tramadol qhs for pain  DC KCL  Discuss DC/ cont UTI prevent med.     Continue current management and monitor.  Other diagnoses were reviewed and found stable and will continue to monitor.  Counseled on regular exercise, maintenance of a healthy weight, balanced diet rich in fruits/vegetables and lean protein, and avoidance of unhealthy habits like smoking and excessive alcohol intake.   Also, counseled on importance of being compliant with medication, health appointments, diet and exercise.     Follow up in about 27 weeks (around 4/14/2025).  Patricia, pend tramadol to me if needed.      Medication List with Changes/Refills   New Medications    IBANDRONATE (BONIVA) 150 MG TABLET    Take 1 tablet (150 mg total) by mouth every 30 days.    TRAMADOL (ULTRAM) 50 MG TABLET    Take 1 tablet (50 mg total) by mouth nightly as needed for Pain.   Current Medications    ASCORBIC ACID, VITAMIN C, (VITAMIN C) 1000 MG TABLET    Take 1,000 mg by mouth 2 (two) times daily.     AZELASTINE (OPTIVAR) 0.05 % OPHTHALMIC SOLUTION    Place 1 drop into both eyes. As needed    B-COMPLEX WITH VITAMIN C (Z-BEC OR EQUIV) TABLET    Take 1 tablet by mouth once daily.    BIFIDOBACTERIUM INFANTIS (ALIGN ORAL)    Take by mouth once daily.     BIOTIN 10,000 MCG CAP    Take 1 tablet by mouth every evening.     BLOOD SUGAR DIAGNOSTIC STRP    Insurance preferred meter and strips. Check daily    BLOOD-GLUCOSE METER KIT    Use as instructed. Insurance preferred meter.    CRANBERRY 500 MG CAP    Take 1 capsule by mouth every evening.    DILTIAZEM (CARDIZEM CD) 120 MG CP24    Take 1 capsule (120 mg total) by mouth once daily.    DOXAZOSIN (CARDURA) 2 MG TABLET    Take 1 tablet (2 mg total) by mouth every evening.    EPINEPHRINE (EPIPEN) 0.3 MG/0.3 ML ATIN    Inject 1 each into the muscle as needed.    ERYTHROMYCIN WITH ETHANOL (THERAMYCIN) 2 % EXTERNAL SOLUTION    Aaa bid prn    ESOMEPRAZOLE (NEXIUM) 40 MG CAPSULE    Take 1 capsule (40 mg total) by mouth before breakfast.    ESTRADIOL (ESTRACE) 0.01 % (0.1 MG/GRAM) VAGINAL CREAM    Place 1 g vaginally 3 (three) times a week. Place by fingertip application before bedtime three times a week (Monday, Wednesday, Friday)    FEXOFENADINE (ALLEGRA) 180 MG TABLET    Take 180 mg by mouth once daily.     FISH OIL-OMEGA-3 FATTY ACIDS 300-1,000 MG CAPSULE    Take 2 g by mouth once daily.    FLUTICASONE PROPIONATE (FLONASE) 50 MCG/ACTUATION NASAL SPRAY    2 sprays (100 mcg total) by Each Nostril route once daily.    FLUTICASONE-UMECLIDIN-VILANTER (TRELEGY ELLIPTA) 200-62.5-25 MCG INHALER    Inhale 1 puff into the lungs once daily.    GABAPENTIN (NEURONTIN) 600 MG TABLET    Take 1 tablet (600 mg total) by mouth 3 (three) times daily.    GUAIFENESIN-CODEINE 100-10 MG/5 ML (GUAIFENESIN AC)  MG/5 ML SYRUP    Take 5 mLs by mouth 3 (three) times daily as needed for Cough.    HYDROXYZINE HCL (ATARAX) 10 MG TAB    Take 1 tablet (10 mg total) by mouth 3 (three) times daily as needed.    IMMUN GLOB G,IGG,-PRO-IGA 0-50 (HIZENTRA) 1 GRAM/5 ML (20 %) SOLN    Inject 55 mLs (11 g total) into the skin once a week.    INHALATION SPACING DEVICE    Use as directed for inhalation.    LANCETS (ACCU-CHEK SOFTCLIX LANCETS) MISC    Use to  check blood sugar daily.    LEVALBUTEROL (XOPENEX HFA) 45 MCG/ACTUATION INHALER    Inhale 1-2 puffs into the lungs every 4 (four) hours as needed for Wheezing or Shortness of Breath. Rescue    LEVALBUTEROL (XOPENEX) 1.25 MG/3 ML NEBULIZER SOLUTION    Take 3 mLs (1.25 mg total) by nebulization every 4 (four) hours as needed for Wheezing or Shortness of Breath. Rescue    METHENAMINE (HIPREX) 1 GRAM TAB    Take 1 tablet (1 g total) by mouth 2 (two) times daily. With Vitamin C 500 mg    MOMETASONE 0.1% (ELOCON) 0.1 % CREAM    aaa bid x 1-2 weeks prn bug bites    MUCUS CLEARING DEVICE KAREN    1 Device by Misc.(Non-Drug; Combo Route) route 2 (two) times daily.    MULTIVITAMIN CAPSULE    Take 1 capsule by mouth once daily.     MUPIROCIN (BACTROBAN) 2 % OINTMENT    Apply topically 3 (three) times daily.    MUPIROCIN (BACTROBAN) 2 % OINTMENT    Apply topically 3 (three) times daily.    PRAVASTATIN (PRAVACHOL) 80 MG TABLET    Take 1 tablet (80 mg total) by mouth once daily.    PREDNISONE (DELTASONE) 10 MG TABLET    Take 1-2 pills a day for three days, repeat for shortness of breath    PSYLLIUM HUSK (METAMUCIL ORAL)    Take by mouth.    SIMETHICONE (GAS-X ORAL)    Take 1 capsule by mouth as needed (gas relief).     TEZEPELUMAB-EKKO (TEZSPIRE) 210 MG/1.91 ML (110 MG/ML) SYRG    Inject 1.91 mLs (210 mg total) into the skin every 28 days.    VITAMIN D3-FOLIC ACID 5,000 UNIT- 1 MG TAB    Take 1 tablet by mouth once daily.     WELCHOL 625 MG TABLET    Take 625 mg by mouth.   Changed and/or Refilled Medications    Modified Medication Previous Medication    METFORMIN (GLUCOPHAGE-XR) 500 MG ER 24HR TABLET metFORMIN (GLUCOPHAGE-XR) 500 MG ER 24hr tablet       Take 1 tablet (500 mg total) by mouth 2 (two) times daily with meals.    Take 1 tablet (500 mg total) by mouth 2 (two) times daily with meals.    MONTELUKAST (SINGULAIR) 10 MG TABLET montelukast (SINGULAIR) 10 mg tablet       Take 1 tablet (10 mg total) by mouth every evening.     Take 1 tablet (10 mg total) by mouth every evening.    VALSARTAN-HYDROCHLOROTHIAZIDE (DIOVAN-HCT) 320-25 MG PER TABLET valsartan-hydrochlorothiazide (DIOVAN-HCT) 320-25 mg per tablet       Take 1 tablet by mouth once daily.    Take 1 tablet by mouth once daily.       BP Readings from Last 3 Encounters:   10/07/24 116/70   09/23/24 127/75   09/16/24 138/65     Hemoglobin A1C   Date Value Ref Range Status   09/30/2024 5.4 4.0 - 5.6 % Final     Comment:     ADA Screening Guidelines:  5.7-6.4%  Consistent with prediabetes  >or=6.5%  Consistent with diabetes    High levels of fetal hemoglobin interfere with the HbA1C  assay. Heterozygous hemoglobin variants (HbS, HgC, etc)do  not significantly interfere with this assay.   However, presence of multiple variants may affect accuracy.     03/11/2024 5.9 (H) 4.0 - 5.6 % Final     Comment:     ADA Screening Guidelines:  5.7-6.4%  Consistent with prediabetes  >or=6.5%  Consistent with diabetes    High levels of fetal hemoglobin interfere with the HbA1C  assay. Heterozygous hemoglobin variants (HbS, HgC, etc)do  not significantly interfere with this assay.   However, presence of multiple variants may affect accuracy.     09/11/2023 6.4 (H) 4.0 - 5.6 % Final     Comment:     ADA Screening Guidelines:  5.7-6.4%  Consistent with prediabetes  >or=6.5%  Consistent with diabetes    High levels of fetal hemoglobin interfere with the HbA1C  assay. Heterozygous hemoglobin variants (HbS, HgC, etc)do  not significantly interfere with this assay.   However, presence of multiple variants may affect accuracy.       Lab Results   Component Value Date    TSH 0.602 09/30/2024     Lab Results   Component Value Date    LDLCALC 56.2 (L) 03/11/2024    LDLCALC 81.6 03/03/2023    LDLCALC 70.6 09/02/2022     Lab Results   Component Value Date    TRIG 144 03/11/2024    TRIG 97 03/03/2023    TRIG 112 09/02/2022     Wt Readings from Last 3 Encounters:   10/07/24 94.6 kg (208 lb 8.9 oz)   09/27/24 94.3 kg  (208 lb)   09/26/24 94.3 kg (208 lb)     Lab Results   Component Value Date    HGB 10.4 (L) 09/30/2024    HCT 34.4 (L) 09/30/2024    WBC 4.68 09/30/2024    ALT 20 09/30/2024    AST 23 09/30/2024     09/30/2024    K 5.1 10/07/2024    CREATININE 1.0 09/30/2024           Review of Systems   Constitutional:  Negative for diaphoresis and fever.   HENT:  Negative for drooling and nosebleeds.    Eyes:  Negative for discharge and redness.   Respiratory:  Negative for apnea and choking.    Cardiovascular:  Negative for chest pain and palpitations.   Gastrointestinal:  Positive for nausea. Negative for abdominal pain.   Musculoskeletal:  Positive for back pain.   Skin:  Negative for color change.   Neurological:  Positive for numbness. Negative for seizures and syncope.   Psychiatric/Behavioral:  Negative for behavioral problems.            Objective:      Vitals:    10/07/24 1323   BP: 116/70   Pulse: 79   Temp: 97.7 °F (36.5 °C)     Physical Exam  Vitals reviewed.   Constitutional:       Appearance: Normal appearance.   Eyes:      Conjunctiva/sclera: Conjunctivae normal.   Cardiovascular:      Rate and Rhythm: Normal rate.   Pulmonary:      Effort: Pulmonary effort is normal.      Breath sounds: Normal breath sounds.   Musculoskeletal:      Cervical back: Normal range of motion.      Comments: Normal ROM bilateral    Skin:     General: Skin is warm and dry.   Neurological:      Mental Status: She is alert.      Cranial Nerves: Cranial nerve deficit: grossly intact.   Psychiatric:      Comments: Alert and orientated

## 2024-10-08 ENCOUNTER — CLINICAL SUPPORT (OUTPATIENT)
Facility: CLINIC | Age: 74
End: 2024-10-08
Payer: MEDICARE

## 2024-10-08 ENCOUNTER — TELEPHONE (OUTPATIENT)
Facility: CLINIC | Age: 74
End: 2024-10-08

## 2024-10-08 DIAGNOSIS — Z48.02 VISIT FOR SUTURE REMOVAL: Primary | ICD-10-CM

## 2024-10-08 NOTE — TELEPHONE ENCOUNTER
----- Message from Carrie sent at 10/8/2024  3:33 PM CDT -----  Contact: self  Type:  Sooner Appointment Request    Caller is requesting a sooner appointment.  Caller declined first available appointment listed below.  Caller will not accept being placed on the waitlist and is requesting a message be sent to doctor.    Name of Caller:  pt  When is the first available appointment?  N/a  Symptoms:  4 month f/up  Would the patient rather a call back or a response via [facility]? call  Best Call Back Number:  [phone]   Additional Information:  pt was seen today and was told an appt would be made and she could see it on her portal.pt is just reminding the office to please make the appt.please call

## 2024-10-17 ENCOUNTER — CLINICAL SUPPORT (OUTPATIENT)
Dept: PULMONOLOGY | Facility: CLINIC | Age: 74
End: 2024-10-17
Payer: MEDICARE

## 2024-10-17 DIAGNOSIS — J45.50 SEVERE PERSISTENT ASTHMA WITHOUT COMPLICATION: Primary | ICD-10-CM

## 2024-10-17 NOTE — PROGRESS NOTES
Patient here for her Tezspire 210 mg/1.91 mL injection.  2 patient identifiers used (name and ).  Injection given into the left arm SQ.  Pt tolerated well.  No bleeding, redness, swelling or reaction noted to the injection site.     NDC: 98816-561-37  LOT#:  [id number]  Expiration:  2026

## 2024-11-01 ENCOUNTER — TELEPHONE (OUTPATIENT)
Dept: ALLERGY | Facility: CLINIC | Age: 74
End: 2024-11-01
Payer: MEDICARE

## 2024-11-01 LAB
LEFT EYE DM RETINOPATHY: NEGATIVE
RIGHT EYE DM RETINOPATHY: NEGATIVE

## 2024-11-05 ENCOUNTER — OFFICE VISIT (OUTPATIENT)
Dept: PULMONOLOGY | Facility: CLINIC | Age: 74
End: 2024-11-05
Payer: MEDICARE

## 2024-11-05 ENCOUNTER — PATIENT MESSAGE (OUTPATIENT)
Dept: PULMONOLOGY | Facility: CLINIC | Age: 74
End: 2024-11-05

## 2024-11-05 VITALS
SYSTOLIC BLOOD PRESSURE: 124 MMHG | DIASTOLIC BLOOD PRESSURE: 66 MMHG | HEART RATE: 63 BPM | BODY MASS INDEX: 35.46 KG/M2 | OXYGEN SATURATION: 98 % | WEIGHT: 207.69 LBS | HEIGHT: 64 IN

## 2024-11-05 DIAGNOSIS — J47.0 BRONCHIECTASIS WITH ACUTE LOWER RESPIRATORY INFECTION: Primary | ICD-10-CM

## 2024-11-05 DIAGNOSIS — G47.33 OSA TREATED WITH BIPAP: ICD-10-CM

## 2024-11-05 DIAGNOSIS — J44.89 ASTHMA WITH COPD: ICD-10-CM

## 2024-11-05 PROCEDURE — 3061F NEG MICROALBUMINURIA REV: CPT | Mod: HCNC,CPTII,S$GLB, | Performed by: NURSE PRACTITIONER

## 2024-11-05 PROCEDURE — 99999 PR PBB SHADOW E&M-EST. PATIENT-LVL IV: CPT | Mod: PBBFAC,HCNC,, | Performed by: NURSE PRACTITIONER

## 2024-11-05 PROCEDURE — 1101F PT FALLS ASSESS-DOCD LE1/YR: CPT | Mod: HCNC,CPTII,S$GLB, | Performed by: NURSE PRACTITIONER

## 2024-11-05 PROCEDURE — 3288F FALL RISK ASSESSMENT DOCD: CPT | Mod: HCNC,CPTII,S$GLB, | Performed by: NURSE PRACTITIONER

## 2024-11-05 PROCEDURE — 3078F DIAST BP <80 MM HG: CPT | Mod: HCNC,CPTII,S$GLB, | Performed by: NURSE PRACTITIONER

## 2024-11-05 PROCEDURE — 1159F MED LIST DOCD IN RCRD: CPT | Mod: HCNC,CPTII,S$GLB, | Performed by: NURSE PRACTITIONER

## 2024-11-05 PROCEDURE — 99214 OFFICE O/P EST MOD 30 MIN: CPT | Mod: HCNC,S$GLB,, | Performed by: NURSE PRACTITIONER

## 2024-11-05 PROCEDURE — 3008F BODY MASS INDEX DOCD: CPT | Mod: HCNC,CPTII,S$GLB, | Performed by: NURSE PRACTITIONER

## 2024-11-05 PROCEDURE — 3074F SYST BP LT 130 MM HG: CPT | Mod: HCNC,CPTII,S$GLB, | Performed by: NURSE PRACTITIONER

## 2024-11-05 PROCEDURE — 1157F ADVNC CARE PLAN IN RCRD: CPT | Mod: HCNC,CPTII,S$GLB, | Performed by: NURSE PRACTITIONER

## 2024-11-05 PROCEDURE — 3066F NEPHROPATHY DOC TX: CPT | Mod: HCNC,CPTII,S$GLB, | Performed by: NURSE PRACTITIONER

## 2024-11-05 PROCEDURE — 3044F HG A1C LEVEL LT 7.0%: CPT | Mod: HCNC,CPTII,S$GLB, | Performed by: NURSE PRACTITIONER

## 2024-11-05 RX ORDER — BUDESONIDE 0.25 MG/2ML
0.25 INHALANT ORAL DAILY
Qty: 120 ML | Refills: 1 | Status: SHIPPED | OUTPATIENT
Start: 2024-11-05 | End: 2025-11-05

## 2024-11-05 RX ORDER — CIPROFLOXACIN 500 MG/1
500 TABLET ORAL 2 TIMES DAILY
Qty: 6 TABLET | Refills: 0 | Status: SHIPPED | OUTPATIENT
Start: 2024-11-05 | End: 2024-11-08

## 2024-11-05 NOTE — PROGRESS NOTES
11/5/2024    Cornelia Delatorre  In office visit     Chief Complaint   Patient presents with    Cough    Shortness of Breath    Chest Pain     HPI:  11/5/2024- in office with wife. complaint of worsening of chest tightness and shortness of breath in past 4 weeks. Took systemic steroids 10 mg for 6 days with minimal benefit.   Currently on Tezspire monthly.   States difficult to clear mucous from chest, improves with nebulizer therapy.   On CPAP nightly, no problems with daytime fatigue.   Has taken antibiotics 5 times in past year either for UTI or Bronchitis.    7/22/2024- complaint of recurrent sinus congestion, associated with sinus drainage, on flonase and allergy medications daily.   On Trelegy daily for COPD/Asthma medication regiment. Using albuterol 2 puffs daily before inhaling Trelegy inhaler. On Tezspire monthly with dramatic improvement. States she use to cough every day and now she coughs only occasionally.   No need for systemic steroids in past 6 months.     1/22/2024- states noticed improvement in shortness of breath while on Tezspire. Seen in ER in December for injury to back followed by pain management. No complaint of cough, wheeze, chest tightness, or shortness of breath. Using nebulizer only as needed when trigger by excessive mucous and cough with high pollen levels in environment.     Took medrol dose pack from ER for back pain, no issues with lungs.     10/17/2023- received large bill from [facility] for Tezspire therapy, states she has noticed dramatic improvement in breathing after starting Tezspire for past 6 months. Having fewer exacerbations as before. Treated for exacerbation in September that was treated with systemic steroids and antibiotics.   States her and her  have noticed her activity level has improved in last 4 months. States she is sleeping better and feels better through out the day.     On BIPAP nightly for sleep apnea, no complaints of fatigue.     09/08/2023: Hx:  Asthma, Lung nodules, CVID, SOHA on Bipap  Cough: Associated with feelings of chest congestion - associated with coughing spells that lead to overall fatigue. Worse at night time - associated with difficulty falling asleep and staying asleep. Cough is productive with intermittent mucous - difficult to expectorate.   Using Trelegy once per day with benefit.   Using nebulized treatments - Levalbuterol - 1-2 treatments per day depending on chest cough.    Using Albuterol inhaler as needed - approx use varies, approx 1-2x per day.  States used Albuterol last night and woke up this morning feeling flushed. Endorses hand shakiness and chest palpitations at times with Albuterol use.  Completed regimen of Prednisone and Doxy after last appointment with Daysi Llanos in July.   Denies the use of supplemental oxygen.  On Tezspire - next dose due next week.       7/6/2023- complaint of recurrent shortness of breath, worsened in past 3 weeks started, started prednisone 10 mg with no benefit. Using nebulizer or albuterol inhaler every 4 hours.   Associated with chest tightness and cough. Productive clear mucous. Having nocturnal coughing fits most nights.   Complaint of nausea and dizziness,   On BIPAP nightly, no issues with sleep apnea.   Severe persistent asthma with acute exacerbation  -     betamethasone acetate-betamethasone sodium phosphate injection 6 mg  -     fluticasone-umeclidin-vilanter (TRELEGY ELLIPTA) 200-62.5-25 mcg inhaler; Inhale 1 puff into the lungs once daily.  -     ondansetron (ZOFRAN-ODT) 4 MG TbDL; Take 1 tablet (4 mg total) by mouth every 8 (eight) hours as needed (nausea).  -     doxycycline (VIBRA-TABS) 100 MG tablet; Take 1 tablet (100 mg total) by mouth 2 (two) times daily. for 10 days  -     X-Ray Chest PA And Lateral; Future  -     predniSONE (DELTASONE) 10 MG tablet; 3 pills for 3 days, 2 for 3 days, them 1 for 3 days, repeat for cough  Right otitis media, unspecified otitis media type  -      doxycycline (VIBRA-TABS) 100 MG tablet; Take 1 tablet (100 mg total) by mouth 2 (two) times daily. for 10 days          5/1/2023- complaint of wheeze, onset 1 week, took 5 days of prednisone to control, states slightly improved.   Required systemic steroids in January and again in February for Asthma control  Associated with wheeze and chest tightness, SOB is worsening with time, difficult to walk in stores with out stopping to rest.   Has new motor in BIPAP, wearing nightly with benefit.       1/9/2023- states feeling good, noticed worsening sinus drainage and productive cough when laying down at night for past 3 weeks, improves with albuterol inhaler or nebulizer. On Trelegy daily.  No recent steroid therapy. Steroid injection in knee 6 weeks Prior.  Wearing BIPAP nightly with benefit. Daytime fatigue is resolved. No complaint of morning headaches. Waiting for recalled BIPAP machine.     7/11/2022- states feeling like her self again after COVID 19 in May, Cough is dramatically improved, non productive cough, few days week.   Fatigue continues, has to take daily naps. Wearing BiPAP nightly.    On Trelegy daily with benefit but cost is high, in donut whole.       5/25/2022- Dx COVID 19 7 days prior tx with monoclonal Antibiodies two days prior. Feeling generalized weakness, diaphoresis, fatigue  Using Trelegy and nebulizer daily,  beats on her back   Cough - severe, complaint, worse at night, productive thick yellow mucous. Associated with late evening wheeze.   Lost sense of taste and smell.       4/13/2022- SOB- stable, worse in late evenings, taking prednisone 10 mg when needed, not used in past 2 months; worse with exertion, improves with rest and albuterol rescue.   On BIPAP with benefit, no daytime drowsiness.   Liked Trelegy. Still on Adviar until prescription runs out, has 2 weeks left.   Skin biopsy 5 weeks prior, left lower chin site irritated by wearing face mask. No edema or erythema,      1/12/2022- SOB worsening, on Advair daily and levalbuterol 2-3x daily for past 4 weeks, has noticed worsening of shortness of breath and sinus complaints.   Admitted to hospital for GI virus,   Noticed prednisone is needed occasionally at 10 mg notices improvement    On BiPAP nightly with benefit. No daytime fatigue, no issues with nasal pillow mask.     7/12/21- complaint of daily sinus drainage, has to clear throat repeatedly for past 2 months. Currently on Flonase nasal spray, ipratropium nasal spray, Singulair pills, zyrtec, corcindin daily with no improvement.   SOB- stable, required steroid therapy for 3 days twice in past 3 months. Only with exertion, worse in late evenings, improves with rest,  Using nebulizer 2x daily due to feeling of heaviness in chest.   Cough- mild, non productive, associated with post nasal drip from allergies. Nocturnal arousals 2x weekly for past 2 weeks,   Wearing CPAP nighty with benefit.     4/12/21- spent last 6 months in home, was able to get COVID 19 vaccine Mederna. Allergies stable, few spells improve with nasal spray noticed improvement after getting air purifier.   Noticed spacer device helping with hoarse voice or oral thrush like before,   SOB- unchanged, daily complaint, varies with severity, worse with exertion, improves with rest and albuterol rescue. Has to sit up to breath when first laying  Down, not able to breath when laying on left side.   Occasional cough- productive, clear mucous, using nebulizer 3 x weekly.   CPAP for SOHA doing well,     10/13/2020- Allergies bother her every few days, currently on daily Singulair, zyrtec, flonase, astelin nasal spray, having sneezing fits.   Complaint of clear sinus drainage,   Hoarse voice has resolved after stopping symbicort.   Cough- improved,   SOB- doing well, occasional episodes of not being able to catch breath, did not use nebulizer   Wearing CPAP nightly for SOHA, states benefits greatly    7/13/2020- Hoarse  voice, loss of voice, productive cough, lost voice July 2019 tx by ENT who treated for acute laryngitis with diflucan; Recurrent problem. Hoarse voice return 4 weeks prior, ENT doctor recommends changing Asthma medications tx her with diflucan, doxycycline and prednisone for 5 days, states has improved.   SOB- worse when wearing face mask, currently on hizentra therapy,     5/4/2020- uses symbicort daily, uses rescue 2/wk - some anxiety, getting igg rx. Having more sinus problems with head colds- 3 in last 4 wks.  No prednisone- hizentra working well.        Nov 4, 2019- having mucous sensation, notes some sob relaxing - maybe worse night.  No nasal stuffy.  Uses cpap.  Uses symbicort bid, no rescue.  Mucous is clear.  No abx for sinus or lungs.  Getting immune infusions weekly - pt senses doing better since starting in May.  Patient Instructions   Breathing off and on short breath - need to use more albuterol to make clear where breathing problems come from  Mucous production may decrease if airways controlled- albuterol should help clearance.  Rescue inhaler may resolve any breathing problems- should use intermittently if any symptoms.  May use augmentin for sinus/lung problems- not optimal for all uti's       May 6,2019-due to get immune infusion next 2 days.  No prednisone, needs monlukast. abx stopped wks ago- mucous cleared.    Patient Instructions   Use antibiotic daily til immune therapy done a wk - then as needed like every one else  Immune therapy may keep you stable - better than antibiotics.   Use symbicort regular.  Lungs may stabilize.  Use prednisone if needed.  Lung  Nodules are stable for 2 yrs and no follow up needed.  Call if problems- hope all will be better yet.  March 7, 19-exacerbated in late Jan- cleared in feb (vague).  Now ill again yellow and gray mucous (culture last Jan was nl yvonne).  Sl intermittent wheezes since last wk.  Sees Dr Herrera in [place]- gets allergy rx but declined to  get now.  Pt has cvid by  igg and igm levels low and pneumococcal antibodies to 8/14 pneumococcal titers. Uses symbicort and singulair routinely.  Took prednisone completed 4 days ago- uses prednisone monthly- was able to skip December  .  Discussed with patient above for education the following:      Gastric by pass may cause malabsorption, protein levels have been too low and may affect ig levels-- somewhat.   Need to get follow up with dietician to assure adequate protein intake/levels.   Antibiotic may stabilize infections with common variable immune deficiency- azithromycin daily once cultures submitted.   Use augmentin if infection worsens.   Acapella/chest clapping help clear mucous, vest likewise if bronchiectasis.  Will not treat cause.   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  Use codeine to suppress mild coughing.   Need good immune system and no airway/asthma problems and good hygiene of bronchial tubes (no chronic infections) to prevent illness.     Lungs measured near normal with good response to bronchial medications 2017   Need ct to follow up and cultures to assure infection controlled.      Jan 28/2019- Feeling bad onset 2 weeks, took prednisone taper x 6 days and antibiotic Augmentin week prior, States no improvement. Cough- worse at night, no nocturnal arousals, takes cough suppressant syrup before bed. Productive yellow/brown color.   Nebulized albuterol 4x daily. States no fever.  Has started allergy injections waiting for insurance clearance for IGG therapy.   Discussed with patient above for education the following:    CT chest for February to monitor and assess for bronchiectasis, need diagnosis for insurance to cover vest therapy  Have  continue to percuss back with hand.   Recommend purchasing Acepella device to help clear lungs of excess mucous, attaches to nebulizer device  Continue Nebulizer treatments 4x  daily as needed.  Chest x-ray now to evaluate for pneumonia  Blood work at hospital   Will yeison to see results of sputum culture and x-ray before repeating antibiotic  Need to return sputum to clinic with in 4 hours of collecting  Fluconazole pills for oral thrush, this is a recurrent problem related to your weak immune system and use of inhaled steroids. Continue to rinse mouth out after using symbicort but problem will improve after starting immune replacement therapy.    oct 30,   2018-- had one day fever followed by cough/wheeze illness that lingered a wk. Pt seen by NP   [place] 2x, [place].  [place] like dying- only one day fever.  Violent cough.  Nebulizer ppt itch and palpitations- ok  On xopenex now.  Prednisone 40x3, 20 x 3 ,  augmentin cleared.  Now back to normal.      May 14, 2018- had spell /exacerbation with one round prednisone. Breathing good.  Follow-up in about 1 year (around 5/14/2019), or if symptoms worsen or fail to improve.   Discussed with patient above for education the following:     Check blood count and pneumonia vaccine response after last vaccine 4/27/2018   Use azithromycin for any lung/sinus infection- immune weakness likely   Asthma stable- use prednisone and singulair.   Use allergra and singulair and astelin/flonase   Lung nodule in lung still - Navigational bronchoscopy might find?  - will at least re check in a year.     Call if needed, re check next yr.    darlin 15, 2018--1 st illness in yr or so with cough and rattles, no flu.  Appetite ok.  Ill x wk, cough and wheeze and sob and noct worse, resp rx helps a bit.  Hasn't been using  Albuterol   Follow-up in about 6 months (around 7/15/2018).   Discussed with patient above for education the following:      tamiflu if flu.   Need to use albuterol for any lung symptoms.  Should get cough  Better controlled.      Prednisone 20 mg 3 for 3 , taper may be needed.  Give shot today, 1 daily for 3 may be enough with albuterol.   You  had high eosinophils-- if asthma becomes more active - special therapy may help??     prevnar 13 would be good - should be off prednisone.  Immune system is sl weak  Protection only to 7.14 pneumonia germs.    oct 19, 2017- has scratchy throat, some sinus drip, has nightly sensation throat issues, post beryl and carpel tunnel surg.  No sinus lung infections.  Breathing and cough good.  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 Had pos tb gold.  June 21, 2017- had good [place] trip, tapered symbicort with some increase sensation of lung problems so maintained full dose. No infections.  No chest symptoms on symbicort.   April 24, feels a lot better with min cough, vague sob going left side down at night.  Going to [place] 2 weeks.  Uses symbicort and good results.  March 16, cough better, but comes and goes,  No great help with steroids.  Submitted 3 sputums.  Had pneumovax march last yr.  Breathing better. Got symbicort.   Had pneumonia vaccine last yr    3/8/2017 HPI: had onset cough [place] illness, got dizzy few days with diarrhea and cough. Cough clear sm amt mucous. Did have 101 temp one day, fatigue, no muscle aches.  Appetite decreased.  Illnesses lingered 2 weeks but cough never remitted- has improved with inhahler use last 15 days.    Had gastric 2011 bypass with with continued diarrhea intially resolved. No regurg or reflux or swallow problems.   symbicort nearly  Resolved.    Sinuses ok.  No pets.  Never smoker.    Appetite good, feels well now.    headcolds to chest since childhood, nocturnal ??not recalled.  Had cats in past but got rid in 2003 or so.     The chief compliant problem varies with instablilty at time      PFSH:  Past Medical History:   Diagnosis Date    Allergy     Arthritis     Asthma     Cancer     skin cancer to right hand    Cataract     Chronic fatigue 01/24/2017    CVID (common variable immunodeficiency) 03/07/2019    Diabetes mellitus     type2    KLINE  (dyspnea on exertion) 01/24/2017    Dyslipidemia 06/25/2019    Encounter for blood transfusion     Essential hypertension 12/29/2011    GERD (gastroesophageal reflux disease)     Headache(784.0)     Hyperlipidemia 07/15/2015    Hypertension     Hypothyroidism     Neuropathy     Obesity     Postmenopausal HRT (hormone replacement therapy)     Rash     Rosacea     Sleep apnea     on bipap    Snoring     Squamous cell carcinoma of skin     left forearm     UTI (urinary tract infection)     Wears glasses          Past Surgical History:   Procedure Laterality Date    ADENOIDECTOMY      APPENDECTOMY      BLADDER SUSPENSION      BREAST BIOPSY Bilateral 08/1992    bilateral benign excisional biopsies    BUNIONECTOMY  10/17/2014    right, still has discomfort    CATARACT EXTRACTION W/  INTRAOCULAR LENS IMPLANT Bilateral     CHOLECYSTECTOMY  08/02/2017    COLONOSCOPY      CYSTOSCOPY      EPIDURAL STEROID INJECTION INTO LUMBAR SPINE N/A 08/14/2019    Procedure: Injection-steroid-epidural-lumbar L5/S1;  Surgeon: Fredi Rojas MD;  Location: [facility] OR;  Service: Pain Management;  Laterality: N/A;    EPIDURAL STEROID INJECTION INTO LUMBAR SPINE N/A 05/03/2021    Procedure: Injection-steroid-epidural-lumbar L5/S1 to the Left;  Surgeon: Fredi Rojas MD;  Location: [facility] OR;  Service: Pain Management;  Laterality: N/A;    EPIDURAL STEROID INJECTION INTO LUMBAR SPINE N/A 09/24/2021    Procedure: Injection-steroid-epidural-lumbar L5/S1;  Surgeon: Fredi Rojas MD;  Location: [facility] OR;  Service: Pain Management;  Laterality: N/A;    EPIDURAL STEROID INJECTION INTO LUMBAR SPINE N/A 02/21/2022    Procedure: Injection-steroid-epidural-lumbar L5/S1;  Surgeon: Fredi Rojas MD;  Location: [facility] OR;  Service: Pain Management;  Laterality: N/A;    EPIDURAL STEROID INJECTION INTO LUMBAR SPINE N/A 02/09/2024    Procedure: Injection-steroid-epidural-lumbar       L5/S1;  Surgeon: Fredi Rojas MD;  Location: [facility] OR;  Service: Pain  Management;  Laterality: N/A;    ESOPHAGEAL DILATION N/A 06/11/2021    Procedure: DILATION, ESOPHAGUS;  Surgeon: Jake Sheriff MD;  Location: [facility] ENDO;  Service: Endoscopy;  Laterality: N/A;    ESOPHAGOGASTRODUODENOSCOPY      dilated esophagus    ESOPHAGOGASTRODUODENOSCOPY N/A 06/11/2021    Procedure: EGD (ESOPHAGOGASTRODUODENOSCOPY);  Surgeon: Jake Sheriff MD;  Location: [facility] ENDO;  Service: Endoscopy;  Laterality: N/A;    GASTRIC BYPASS      2011    HYSTERECTOMY  02/1980    interstim bladder  2009    10/14/14 new battery    OOPHORECTOMY  02/1980    PERCUTANEOUS INSERTION OF SACRAL NERVE NEUROSTIMULATOR ELECTRODE LEAD N/A 6/5/2024    Procedure: INSERTION, ELECTRODE LEAD, NEUROSTIMULATOR, SACRAL, PERCUTANEOUS;  Surgeon: Mary Jane Jarvis MD;  Location: [facility] OR;  Service: Urology;  Laterality: N/A;  1 hour 30 minutes    REPLACEMENT OF NERVE STIMULATOR BATTERY N/A 6/5/2024    Procedure: Replacement, Battery, Neurostimulator;  Surgeon: Mary Jane Jarvis MD;  Location: [facility] OR;  Service: Urology;  Laterality: N/A;  1 hour 30 minutes    REPLACEMENT OF SACRAL NERVE STIMULATOR  02/18/2020    Procedure: REPLACEMENT, NEUROSTIMULATOR, SACRAL;  Surgeon: Mary Jane Jarvis MD;  Location: [facility] OR 2ND FLR;  Service: Urology;;    REVISION OF PROCEDURE INVOLVING SACRAL NEUROSTIMULATOR DEVICE Right 02/18/2020    Procedure: REVISION, NEUROSTIMULATOR, SACRAL/ battery replacement;  Surgeon: Mary Jane Jarvis MD;  Location: [facility] OR 2ND FLR;  Service: Urology;  Laterality: Right;  1hr/ rep contacted/ (CONNIE)    SKIN CANCER EXCISION      left hand    TONSILLECTOMY      TRANSFORAMINAL EPIDURAL INJECTION OF STEROID Bilateral 03/05/2024    Procedure: Injection,steroid,epidural,transforaminal approach      L4/5;  Surgeon: Fredi Rojas MD;  Location: [facility] OR;  Service: Pain Management;  Laterality: Bilateral;    TRANSFORAMINAL EPIDURAL INJECTION OF STEROID Bilateral 9/16/2024    Procedure:  "Injection,steroid,epidural,transforaminal approach    L4/5;  Surgeon: Fredi Rojas MD;  Location: [facility] OR;  Service: Pain Management;  Laterality: Bilateral;    WISDOM TOOTH EXTRACTION       Social History     Tobacco Use    Smoking status: Never    Smokeless tobacco: Never   Substance Use Topics    Alcohol use: Not Currently    Drug use: No     Family History   Problem Relation Name Age of Onset    Breast cancer Mother  50    Breast cancer Paternal Aunt          40's    Cervical cancer Paternal Grandmother      Ovarian cancer Paternal Grandmother      Cervical cancer Paternal Cousin      Ovarian cancer Paternal Cousin 1st     Diabetes Other      Hypertension Other      Hypothyroidism Other      Allergic rhinitis Neg Hx      Allergies Neg Hx      Angioedema Neg Hx      Asthma Neg Hx      Atopy Neg Hx      Eczema Neg Hx      Immunodeficiency Neg Hx      Rhinitis Neg Hx      Urticaria Neg Hx      Uterine cancer Neg Hx       Review of patient's allergies indicates:   Allergen Reactions    Sulfa (sulfonamide antibiotics) Hives    Naproxen Other (See Comments)     Other reaction(s): RT sided numbness         Performance Status:The patient's activity level is functions out of house.      Review of Systems:  a review of eleven systems covering constitutional, Eye, HEENT, Psych, Respiratory, Cardiac, GI, , Musculoskeletal, Endocrine, Dermatologic was negative except for pertinent findings as listed ABOVE and below: all good,   Sinus drainage   Sinus congestion    Exam:Comprehensive exam done. /66 (BP Location: Right arm, Patient Position: Sitting)   Pulse 63   Ht 5' 4" (1.626 m)   Wt 94.2 kg (207 lb 10.8 oz)   SpO2 98% Comment: on room air at rest  BMI 35.65 kg/m²   Exam included Vitals as listed, and patient's appearance and affect and alertness and mood, oral exam for yeast and hygiene and pharynx lesions and Mallapatti (M) score, neck with inspection for jvd and masses and thyroid abnormalities and lymph " nodes (supraclavicular and infraclavicular nodes and axillary also examined and noted if abn), chest exam included symmetry and effort and fremitus and percussion and auscultation, cardiac exam included rhythm and gallops and murmur and rubs and jvd and edema, abdominal exam for mass and hepatosplenomegaly and tenderness and hernias and bowel sounds, Musculoskeletal exam with muscle tone and posture and mobility/gait and  strength, and skin for rashes and cyanosis and pallor and turgor, extremity for clubbing.  Findings were normal except for pertinent findings listed below:  M3,  chest is symmetric, no distress, normal percussion. Breath sounds clear   On room air      Radiographs (ct chest and cxr) reviewed: view by direct vision  i do see clear bronchiectasis,nodules are noted.  Mucoid type impaction suggested in rul ant segment.    X-Ray Chest PA And Lateral 09/11/2023 chest x-ray clear      X-Ray Chest PA And Lateral 06/09/2022 lungs clear    CT Abdomen Pelvis With Contrast 06/11/2021 lower lung bases clear    CT Chest Without Contrast  04/22/2019 stable non calcified nodules date to 2017 with no change.         Patient's labs were reviewed including CBC and CMP    Lab Results   Component Value Date    WBC 4.68 09/30/2024    RBC 3.75 (L) 09/30/2024    HGB 10.4 (L) 09/30/2024    HCT 34.4 (L) 09/30/2024    MCV 92 09/30/2024    MCH 27.7 09/30/2024    MCHC 30.2 (L) 09/30/2024    RDW 15.9 (H) 09/30/2024     09/30/2024    MPV 11.7 09/30/2024    GRAN 2.8 09/30/2024    GRAN 60.1 09/30/2024    LYMPH 1.4 09/30/2024    LYMPH 29.7 09/30/2024    MONO 0.4 09/30/2024    MONO 8.5 09/30/2024    EOS 0.0 09/30/2024    BASO 0.01 09/30/2024    EOSINOPHIL 0.9 09/30/2024    BASOPHIL 0.2 09/30/2024      Latest Reference Range & Units 01/28/19 16:17   Eos # 0.0 - 0.5 K/uL 0.2       Aerobic culture 10/27/22 CURVULARIA SPECIES      Latest Reference Range & Units 11/02/21 09:54 03/03/22 09:57 04/27/22 11:47 09/02/22 08:48   CO2  23 - 29 mmol/L 30 (H) 31 (H) 27 26   (H): Data is abnormally high    PFT  Spirometry bronchodilator, lung volume by body box, diffusion capacity measured December 18, 2023. Spirometry is normal. There was no improvement spirometry following bronchodilator. Lung volumes are within normal range. Diffusion was slightly low at 63% predicted. Clinical correlation recommended. (Physician Final: 12/18/2023 01:03PM, Elect    Spirometry with bronchodilator, lung volume by gas dilution, and diffusion capacity measured April 19, 2021. The FEV1 FVC ratio was 75% indicating no airflow obstruction measured by spirometry. The FEV1 was 99% predicted at 2.25 L. There was no   statistically significant improvement in spirometry following bronchodilator. Total lung capacity was within normal range at 90% of predicted. Diffusion was slightly low at 77% predicted-uncorrected for anemia if present.   Spirometry is normal. Lung volumes fall within normal range. Diffusion is slightly decreased. Clinical correlation recommended. The bronchodilator response was not significant.       Done [facility] with 10% response, otherwise nl  DATE OF PROCEDURE:  03/24/2017     1.  Spirometry reveals an FVC of 3.1 L, which is 107% predicted.  FEV1 is 2.3 L,   which is 100% predicted.  The ratio, there is preserved.  There is a positive,   but not significant bronchodilator response to both the FVC and FEV1.  Loop   contours appear with adequate effort as well.  2.  Lung volumes.  The total lung capacity is 4.4 L, which is 92% predicted.    There are no signs of gas trapping.  3.  Diffusing capacity is mild-to-moderate reduced at 68%, does improve to 96%   with alveolar volumes.      Plan:  Clinical impression is resonably certain and repeated evaluation prn +/- follow up will be needed as below.    Cornelia was seen today for cough, shortness of breath and chest pain.    Diagnoses and all orders for this visit:    Bronchiectasis with acute lower  respiratory infection  -     budesonide (PULMICORT) 0.25 mg/2 mL nebulizer solution; Take 2 mLs (0.25 mg total) by nebulization once daily. Controller  -     ciprofloxacin HCl (CIPRO) 500 MG tablet; Take 1 tablet (500 mg total) by mouth 2 (two) times daily. for 3 days    Asthma with COPD  Comments:  - continue current prescription medication regiment    SOHA treated with BiPAP  Comments:  - continue CPAP nightly          Follow up in about 3 months (around 2/5/2025), or if symptoms worsen or fail to improve.    Discussed with patient above for education the following:      There are no Patient Instructions on file for this visit.

## 2024-11-11 ENCOUNTER — PATIENT MESSAGE (OUTPATIENT)
Dept: UROLOGY | Facility: CLINIC | Age: 74
End: 2024-11-11

## 2024-11-11 ENCOUNTER — OFFICE VISIT (OUTPATIENT)
Dept: UROLOGY | Facility: CLINIC | Age: 74
End: 2024-11-11
Payer: MEDICARE

## 2024-11-11 VITALS
BODY MASS INDEX: 35.56 KG/M2 | DIASTOLIC BLOOD PRESSURE: 75 MMHG | HEART RATE: 60 BPM | HEIGHT: 64 IN | WEIGHT: 208.31 LBS | SYSTOLIC BLOOD PRESSURE: 138 MMHG

## 2024-11-11 DIAGNOSIS — N95.2 VAGINAL ATROPHY: ICD-10-CM

## 2024-11-11 DIAGNOSIS — N39.0 RECURRENT UTI: ICD-10-CM

## 2024-11-11 DIAGNOSIS — R35.0 URINARY FREQUENCY: ICD-10-CM

## 2024-11-11 DIAGNOSIS — N95.8 GENITOURINARY SYNDROME OF MENOPAUSE: Primary | ICD-10-CM

## 2024-11-11 DIAGNOSIS — R39.15 URINARY URGENCY: Primary | ICD-10-CM

## 2024-11-11 PROCEDURE — 99214 OFFICE O/P EST MOD 30 MIN: CPT | Mod: S$GLB,,, | Performed by: UROLOGY

## 2024-11-11 PROCEDURE — 1159F MED LIST DOCD IN RCRD: CPT | Mod: CPTII,S$GLB,, | Performed by: UROLOGY

## 2024-11-11 PROCEDURE — 3044F HG A1C LEVEL LT 7.0%: CPT | Mod: CPTII,S$GLB,, | Performed by: UROLOGY

## 2024-11-11 PROCEDURE — 3061F NEG MICROALBUMINURIA REV: CPT | Mod: CPTII,S$GLB,, | Performed by: UROLOGY

## 2024-11-11 PROCEDURE — 3066F NEPHROPATHY DOC TX: CPT | Mod: CPTII,S$GLB,, | Performed by: UROLOGY

## 2024-11-11 PROCEDURE — 3008F BODY MASS INDEX DOCD: CPT | Mod: CPTII,S$GLB,, | Performed by: UROLOGY

## 2024-11-11 PROCEDURE — 1157F ADVNC CARE PLAN IN RCRD: CPT | Mod: CPTII,S$GLB,, | Performed by: UROLOGY

## 2024-11-11 PROCEDURE — 3288F FALL RISK ASSESSMENT DOCD: CPT | Mod: CPTII,S$GLB,, | Performed by: UROLOGY

## 2024-11-11 PROCEDURE — 99999 PR PBB SHADOW E&M-EST. PATIENT-LVL IV: CPT | Mod: PBBFAC,,, | Performed by: UROLOGY

## 2024-11-11 PROCEDURE — 95971 ALYS SMPL SP/PN NPGT W/PRGRM: CPT | Mod: S$GLB,,, | Performed by: UROLOGY

## 2024-11-11 PROCEDURE — 3075F SYST BP GE 130 - 139MM HG: CPT | Mod: CPTII,S$GLB,, | Performed by: UROLOGY

## 2024-11-11 PROCEDURE — 3078F DIAST BP <80 MM HG: CPT | Mod: CPTII,S$GLB,, | Performed by: UROLOGY

## 2024-11-11 PROCEDURE — 1126F AMNT PAIN NOTED NONE PRSNT: CPT | Mod: CPTII,S$GLB,, | Performed by: UROLOGY

## 2024-11-11 PROCEDURE — 81002 URINALYSIS NONAUTO W/O SCOPE: CPT | Mod: S$GLB,,, | Performed by: UROLOGY

## 2024-11-11 PROCEDURE — 1101F PT FALLS ASSESS-DOCD LE1/YR: CPT | Mod: CPTII,S$GLB,, | Performed by: UROLOGY

## 2024-11-11 RX ORDER — ESTRADIOL 0.1 MG/G
1 CREAM VAGINAL
Qty: 45 G | Refills: 3 | Status: SHIPPED | OUTPATIENT
Start: 2024-11-11

## 2024-11-11 NOTE — PROGRESS NOTES
CHIEF COMPLAINT:    Mrs. Delatorre is a 74 y.o. female presenting for a follow up on urinary urgency, frequency.    PRESENTING ILLNESS:    Cornelia Delatorre is a 74 y.o. female who presents with a history of urgency, frequency status post placement of SNM.  She states she is stable. Recently, she has had to increase the stimulation.  She feels like program 7 works better than program 5.  Does not feel like she is infected    REVIEW OF SYSTEMS:    Review of Systems   Constitutional: Negative.    HENT: Negative.     Eyes: Negative.    Respiratory: Negative.     Cardiovascular: Negative.    Gastrointestinal: Negative.    Genitourinary:  Positive for frequency and urgency.   Musculoskeletal:  Positive for joint pain.   Skin: Negative.    Neurological: Negative.    Endo/Heme/Allergies: Negative.    Psychiatric/Behavioral: Negative.         PATIENT HISTORY:    Past Medical History:   Diagnosis Date    Allergy     Arthritis     Asthma     Cancer     skin cancer to right hand    Cataract     Chronic fatigue 01/24/2017    CVID (common variable immunodeficiency) 03/07/2019    Diabetes mellitus     type2    KLINE (dyspnea on exertion) 01/24/2017    Dyslipidemia 06/25/2019    Encounter for blood transfusion     Essential hypertension 12/29/2011    GERD (gastroesophageal reflux disease)     Headache(784.0)     Hyperlipidemia 07/15/2015    Hypertension     Hypothyroidism     Neuropathy     Obesity     Postmenopausal HRT (hormone replacement therapy)     Rash     Rosacea     Sleep apnea     on bipap    Snoring     Squamous cell carcinoma of skin     left forearm     UTI (urinary tract infection)     Wears glasses        Past Surgical History:   Procedure Laterality Date    ADENOIDECTOMY      APPENDECTOMY      BLADDER SUSPENSION      BREAST BIOPSY Bilateral 08/1992    bilateral benign excisional biopsies    BUNIONECTOMY  10/17/2014    right, still has discomfort    CATARACT EXTRACTION W/  INTRAOCULAR LENS IMPLANT Bilateral      CHOLECYSTECTOMY  08/02/2017    COLONOSCOPY      CYSTOSCOPY      EPIDURAL STEROID INJECTION INTO LUMBAR SPINE N/A 08/14/2019    Procedure: Injection-steroid-epidural-lumbar L5/S1;  Surgeon: Fredi Rojas MD;  Location: [facility] OR;  Service: Pain Management;  Laterality: N/A;    EPIDURAL STEROID INJECTION INTO LUMBAR SPINE N/A 05/03/2021    Procedure: Injection-steroid-epidural-lumbar L5/S1 to the Left;  Surgeon: Fredi Rojas MD;  Location: [facility] OR;  Service: Pain Management;  Laterality: N/A;    EPIDURAL STEROID INJECTION INTO LUMBAR SPINE N/A 09/24/2021    Procedure: Injection-steroid-epidural-lumbar L5/S1;  Surgeon: Fredi Rojas MD;  Location: [facility] OR;  Service: Pain Management;  Laterality: N/A;    EPIDURAL STEROID INJECTION INTO LUMBAR SPINE N/A 02/21/2022    Procedure: Injection-steroid-epidural-lumbar L5/S1;  Surgeon: Fredi Rojas MD;  Location: [facility] OR;  Service: Pain Management;  Laterality: N/A;    EPIDURAL STEROID INJECTION INTO LUMBAR SPINE N/A 02/09/2024    Procedure: Injection-steroid-epidural-lumbar       L5/S1;  Surgeon: Fredi Rojas MD;  Location: [facility] OR;  Service: Pain Management;  Laterality: N/A;    ESOPHAGEAL DILATION N/A 06/11/2021    Procedure: DILATION, ESOPHAGUS;  Surgeon: Jake Sheriff MD;  Location: [facility];  Service: Endoscopy;  Laterality: N/A;    ESOPHAGOGASTRODUODENOSCOPY      dilated esophagus    ESOPHAGOGASTRODUODENOSCOPY N/A 06/11/2021    Procedure: EGD (ESOPHAGOGASTRODUODENOSCOPY);  Surgeon: Jake Sheriff MD;  Location: [facility];  Service: Endoscopy;  Laterality: N/A;    GASTRIC BYPASS      2011    HYSTERECTOMY  02/1980    interstim bladder  2009    10/14/14 new battery    OOPHORECTOMY  02/1980    PERCUTANEOUS INSERTION OF SACRAL NERVE NEUROSTIMULATOR ELECTRODE LEAD N/A 6/5/2024    Procedure: INSERTION, ELECTRODE LEAD, NEUROSTIMULATOR, SACRAL, PERCUTANEOUS;  Surgeon: Mary Jane Jarvis MD;  Location: [facility];  Service: Urology;  Laterality: N/A;  1 hour  30 minutes    REPLACEMENT OF NERVE STIMULATOR BATTERY N/A 6/5/2024    Procedure: Replacement, Battery, Neurostimulator;  Surgeon: Mary Jane Jarvis MD;  Location: [facility] OR;  Service: Urology;  Laterality: N/A;  1 hour 30 minutes    REPLACEMENT OF SACRAL NERVE STIMULATOR  02/18/2020    Procedure: REPLACEMENT, NEUROSTIMULATOR, SACRAL;  Surgeon: Mary Jane Jarvis MD;  Location: [facility] OR 2ND FLR;  Service: Urology;;    REVISION OF PROCEDURE INVOLVING SACRAL NEUROSTIMULATOR DEVICE Right 02/18/2020    Procedure: REVISION, NEUROSTIMULATOR, SACRAL/ battery replacement;  Surgeon: Mary Jane Jarvis MD;  Location: [facility] OR 2ND FLR;  Service: Urology;  Laterality: Right;  1hr/ rep contacted/ (CONNIE)    SKIN CANCER EXCISION      left hand    TONSILLECTOMY      TRANSFORAMINAL EPIDURAL INJECTION OF STEROID Bilateral 03/05/2024    Procedure: Injection,steroid,epidural,transforaminal approach      L4/5;  Surgeon: Fredi Rojas MD;  Location: [facility] OR;  Service: Pain Management;  Laterality: Bilateral;    TRANSFORAMINAL EPIDURAL INJECTION OF STEROID Bilateral 9/16/2024    Procedure: Injection,steroid,epidural,transforaminal approach    L4/5;  Surgeon: Fredi Rojas MD;  Location: [facility] OR;  Service: Pain Management;  Laterality: Bilateral;    WISDOM TOOTH EXTRACTION         Family History   Problem Relation Name Age of Onset    Breast cancer Mother  50    Breast cancer Paternal Aunt          40's    Cervical cancer Paternal Grandmother      Ovarian cancer Paternal Grandmother      Cervical cancer Paternal Cousin      Ovarian cancer Paternal Cousin 1st     Diabetes Other      Hypertension Other      Hypothyroidism Other       Social History     Socioeconomic History    Marital status:    Tobacco Use    Smoking status: Never    Smokeless tobacco: Never   Substance and Sexual Activity    Alcohol use: Not Currently    Drug use: No    Sexual activity: Not Currently     Social Drivers of Health     Financial Resource Strain: Medium  Risk (9/16/2024)    Overall Financial Resource Strain (CARDIA)     Difficulty of Paying Living Expenses: Somewhat hard   Food Insecurity: Food Insecurity Present (9/16/2024)    Hunger Vital Sign     Worried About Running Out of Food in the Last Year: Sometimes true     Ran Out of Food in the Last Year: Sometimes true   Transportation Needs: No Transportation Needs (4/26/2024)    PRAPARE - Transportation     Lack of Transportation (Medical): No     Lack of Transportation (Non-Medical): No   Physical Activity: Inactive (9/16/2024)    Exercise Vital Sign     Days of Exercise per Week: 0 days     Minutes of Exercise per Session: 0 min   Stress: No Stress Concern Present (9/16/2024)    [employer] [employer] - Occupational Stress Questionnaire     Feeling of Stress : Only a little   Housing Stability: Unknown (9/16/2024)    Housing Stability Vital Sign     Unable to Pay for Housing in the Last Year: Patient declined       Allergies:  Sulfa (sulfonamide antibiotics), Naproxen, and Albuterol    Medications:  Outpatient Encounter Medications as of 11/11/2024   Medication Sig Dispense Refill    ascorbic acid, vitamin C, (VITAMIN C) 1000 MG tablet Take 1,000 mg by mouth 2 (two) times daily.       azelastine (OPTIVAR) 0.05 % ophthalmic solution Place 1 drop into both eyes. As needed      B-complex with vitamin C (Z-BEC OR EQUIV) tablet Take 1 tablet by mouth once daily.      Bifidobacterium infantis (ALIGN ORAL) Take by mouth once daily.      biotin 10,000 mcg Cap Take 1 tablet by mouth every evening.       blood sugar diagnostic [employer] preferred meter and strips. Check daily 90 each 3    budesonide (PULMICORT) 0.25 mg/2 mL nebulizer solution Take 2 mLs (0.25 mg total) by nebulization once daily. Controller 120 mL 1    cranberry 500 mg Cap Take 1 capsule by mouth every evening.      diltiaZEM (CARDIZEM CD) 120 MG Cp24 Take 1 capsule (120 mg total) by mouth once daily. 90 capsule 3    doxazosin  (CARDURA) 2 MG tablet Take 1 tablet (2 mg total) by mouth every evening. 90 tablet 3    EPINEPHrine (EPIPEN) 0.3 mg/0.3 mL AtIn Inject 1 each into the muscle as needed.  3    erythromycin with ethanoL (THERAMYCIN) 2 % external solution Aaa bid prn 60 mL 3    esomeprazole (NEXIUM) 40 MG capsule Take 1 capsule (40 mg total) by mouth before breakfast. 90 capsule 3    estradioL (ESTRACE) 0.01 % (0.1 mg/gram) vaginal cream Place 1 g vaginally 3 (three) times a week. Place by fingertip application before bedtime three times a week (Monday, Wednesday, Friday) 45 g 3    fexofenadine (ALLEGRA) 180 MG tablet Take 180 mg by mouth once daily.       fish oil-omega-3 fatty acids 300-1,000 mg capsule Take 2 g by mouth once daily.      fluticasone propionate (FLONASE) 50 mcg/actuation nasal spray 2 sprays (100 mcg total) by Each Nostril route once daily. 16 g 11    fluticasone-umeclidin-vilanter (TRELEGY ELLIPTA) 200-62.5-25 mcg inhaler Inhale 1 puff into the lungs once daily. 180 each 3    gabapentin (NEURONTIN) 600 MG tablet Take 1 tablet (600 mg total) by mouth 3 (three) times daily. 540 tablet 3    guaifenesin-codeine 100-10 mg/5 ml (GUAIFENESIN AC)  mg/5 mL syrup Take 5 mLs by mouth 3 (three) times daily as needed for Cough. 473 mL 2    hydrOXYzine HCL (ATARAX) 10 MG Tab Take 1 tablet (10 mg total) by mouth 3 (three) times daily as needed. 30 tablet 3    ibandronate (BONIVA) 150 mg tablet Take 1 tablet (150 mg total) by mouth every 30 days. 1 tablet 11    immun glob G,IgG,-pro-IgA 0-50 (HIZENTRA) 1 gram/5 mL (20 %) Soln Inject 55 mLs (11 g total) into the skin once a week. 220 mL 12    inhalation spacing device Use as directed for inhalation. 1 each 0    lancets (ACCU-CHEK SOFTCLIX LANCETS) Misc Use to check blood sugar daily. 100 each 11    levalbuterol (XOPENEX HFA) 45 mcg/actuation inhaler Inhale 1-2 puffs into the lungs every 4 (four) hours as needed for Wheezing or Shortness of Breath. Rescue 15 g 11    levalbuterol  (XOPENEX) 1.25 mg/3 mL nebulizer solution Take 3 mLs (1.25 mg total) by nebulization every 4 (four) hours as needed for Wheezing or Shortness of Breath. Rescue 1 each 11    metFORMIN (GLUCOPHAGE-XR) 500 MG ER 24hr tablet Take 1 tablet (500 mg total) by mouth 2 (two) times daily with meals. 180 tablet 3    methenamine (HIPREX) 1 gram Tab Take 1 tablet (1 g total) by mouth 2 (two) times daily. With Vitamin C 500 mg 60 tablet 5    mometasone 0.1% (ELOCON) 0.1 % cream aaa bid x 1-2 weeks prn bug bites 45 g 1    montelukast (SINGULAIR) 10 mg tablet Take 1 tablet (10 mg total) by mouth every evening. 90 tablet 3    mucus clearing device Cecy 1 Device by Misc.(Non-Drug; Combo Route) route 2 (two) times daily. 1 Device 0    multivitamin capsule Take 1 capsule by mouth once daily.       mupirocin (BACTROBAN) 2 % ointment Apply topically 3 (three) times daily. 15 g 0    mupirocin (BACTROBAN) 2 % ointment Apply topically 3 (three) times daily. 30 g 1    pravastatin (PRAVACHOL) 80 MG tablet Take 1 tablet (80 mg total) by mouth once daily. 90 tablet 4    predniSONE (DELTASONE) 10 MG tablet Take 1-2 pills a day for three days, repeat for shortness of breath 12 tablet 0    psyllium husk (METAMUCIL ORAL) Take by mouth.      SIMETHICONE (GAS-X ORAL) Take 1 capsule by mouth as needed (gas relief).       tezepelumab-ekko (TEZSPIRE) 210 mg/1.91 mL (110 mg/mL) Syrg Inject 1.91 mLs (210 mg total) into the skin every 28 days. 1.91 mL 11    traMADoL (ULTRAM) 50 mg tablet Take 1 tablet (50 mg total) by mouth nightly as needed for Pain. 30 tablet 5    valsartan-hydrochlorothiazide (DIOVAN-HCT) 320-25 mg per tablet Take 1 tablet by mouth once daily. 90 tablet 2    vitamin D3-folic acid 5,000 unit- 1 mg Tab Take 1 tablet by mouth once daily.       WELCHOL 625 mg tablet Take 625 mg by mouth.      blood-glucose meter kit Use as instructed. Insurance preferred meter. 1 each 0    [] ciprofloxacin HCl (CIPRO) 500 MG tablet Take 1 tablet (500  mg total) by mouth 2 (two) times daily. for 3 days 6 tablet 0     Facility-Administered Encounter Medications as of 11/11/2024   Medication Dose Route Frequency Provider Last Rate Last Admin    levalbuterol nebulizer solution 1.25 mg  1.25 mg Nebulization 1 time in Clinic/HOD Daysi Llanos, NP             PHYSICAL EXAMINATION:    The patient generally appears in good health, is appropriately interactive, and is in no apparent distress.    Skin: No lesions.    Mental: Cooperative with normal affect.    Neuro: Grossly intact.    HEENT: Normal. No evidence of lymphadenopathy.    Chest:  normal inspiratory effort.    Extremities: No clubbing, cyanosis, or edema      LABS:    Lab Results   Component Value Date    BUN 19 09/30/2024    CREATININE 1.0 09/30/2024       UA 1.020, pH 5, otherwise, negative.     IMPRESSION:        PLAN:    Battery is OK   Was on program 7   Impedance ranged from 469 ohms to 1232ohms  On program 7, 0+, 2-3-, 3.8-5.8 mA, rate 14 Hz, pw 210,  Program 1 was 3+, 0-, rate 14, pw 210, 3.3 mA     Left on program 1    6.  Follow up in 6 months.   7.  She is having back surgery with Dr. Valencia at \[facility]\"" in [place].  She has numbness on her leg and bottom, along with lower back pain.  Discussed that when a person has surgery, there may be worsening of lower urinary symptoms until the spine has healed.     I spent a total of 30 minutes on the day of the visit.  This includes face to face time and non-face to face time preparing to see the patient (eg, review of tests), obtaining and/or reviewing separately obtained history, documenting clinical information in the electronic or other health record, independently interpreting results and communicating results to the patient/family/caregiver, or care coordinator.

## 2024-11-11 NOTE — TELEPHONE ENCOUNTER
Patient requested a refill of her Estrace cream to be sent to [facility]  This was done

## 2024-11-14 ENCOUNTER — CLINICAL SUPPORT (OUTPATIENT)
Dept: PULMONOLOGY | Facility: CLINIC | Age: 74
End: 2024-11-14
Payer: MEDICARE

## 2024-11-14 DIAGNOSIS — J45.50 SEVERE PERSISTENT ASTHMA WITHOUT COMPLICATION: Primary | ICD-10-CM

## 2024-11-14 NOTE — PROGRESS NOTES
Patient is here for her monthly Tezspire (TAP) injection 210 mg/1.91 mL, 110 mg/mL. 2 patient identifiers used (Name and ). Injection given into the right upper arm. No redness, swelling, or reaction noted to injection site. Pt tolerated well. Next appt scheduled in a month.    NDC: 86601-776-35  LOT#: [id number]  Expiration: 2026

## 2024-11-18 ENCOUNTER — OFFICE VISIT (OUTPATIENT)
Dept: ORTHOPEDICS | Facility: CLINIC | Age: 74
End: 2024-11-18
Payer: MEDICARE

## 2024-11-18 DIAGNOSIS — M67.40 MUCOID CYST OF JOINT: Primary | ICD-10-CM

## 2024-11-18 PROCEDURE — 1159F MED LIST DOCD IN RCRD: CPT | Mod: HCNC,CPTII,S$GLB, | Performed by: ORTHOPAEDIC SURGERY

## 2024-11-18 PROCEDURE — 3066F NEPHROPATHY DOC TX: CPT | Mod: HCNC,CPTII,S$GLB, | Performed by: ORTHOPAEDIC SURGERY

## 2024-11-18 PROCEDURE — 1125F AMNT PAIN NOTED PAIN PRSNT: CPT | Mod: HCNC,CPTII,S$GLB, | Performed by: ORTHOPAEDIC SURGERY

## 2024-11-18 PROCEDURE — 3288F FALL RISK ASSESSMENT DOCD: CPT | Mod: HCNC,CPTII,S$GLB, | Performed by: ORTHOPAEDIC SURGERY

## 2024-11-18 PROCEDURE — 1157F ADVNC CARE PLAN IN RCRD: CPT | Mod: HCNC,CPTII,S$GLB, | Performed by: ORTHOPAEDIC SURGERY

## 2024-11-18 PROCEDURE — 1101F PT FALLS ASSESS-DOCD LE1/YR: CPT | Mod: HCNC,CPTII,S$GLB, | Performed by: ORTHOPAEDIC SURGERY

## 2024-11-18 PROCEDURE — 3061F NEG MICROALBUMINURIA REV: CPT | Mod: HCNC,CPTII,S$GLB, | Performed by: ORTHOPAEDIC SURGERY

## 2024-11-18 PROCEDURE — 3044F HG A1C LEVEL LT 7.0%: CPT | Mod: HCNC,CPTII,S$GLB, | Performed by: ORTHOPAEDIC SURGERY

## 2024-11-18 PROCEDURE — 99203 OFFICE O/P NEW LOW 30 MIN: CPT | Mod: HCNC,S$GLB,, | Performed by: ORTHOPAEDIC SURGERY

## 2024-11-18 PROCEDURE — 99999 PR PBB SHADOW E&M-EST. PATIENT-LVL III: CPT | Mod: PBBFAC,HCNC,, | Performed by: ORTHOPAEDIC SURGERY

## 2024-11-18 NOTE — PROGRESS NOTES
11/18/2024    Chief Complaint:  Chief Complaint   Patient presents with    Right Hand - Pain     Ring finger mucoid cyst       HPI:  Cornelia Delatorre is a 74 y.o. female, who presents to clinic today a history of a mass or cyst on her right ring finger.  It has been there for quite some time.  She was had a decompressed by a dermatologist once and it has reoccurred.  It was causing her some pain and dysfunction.  She was here today to discuss further treatment.    PMHX:  Past Medical History:   Diagnosis Date    Allergy     Arthritis     Asthma     Cancer     skin cancer to right hand    Cataract     Chronic fatigue 01/24/2017    CVID (common variable immunodeficiency) 03/07/2019    Diabetes mellitus     type2    KLINE (dyspnea on exertion) 01/24/2017    Dyslipidemia 06/25/2019    Encounter for blood transfusion     Essential hypertension 12/29/2011    GERD (gastroesophageal reflux disease)     Headache(784.0)     Hyperlipidemia 07/15/2015    Hypertension     Hypothyroidism     Neuropathy     Obesity     Postmenopausal HRT (hormone replacement therapy)     Rash     Rosacea     Sleep apnea     on bipap    Snoring     Squamous cell carcinoma of skin     left forearm     UTI (urinary tract infection)     Wears glasses        PSHX:  Past Surgical History:   Procedure Laterality Date    ADENOIDECTOMY      APPENDECTOMY      BLADDER SUSPENSION      BREAST BIOPSY Bilateral 08/1992    bilateral benign excisional biopsies    BUNIONECTOMY  10/17/2014    right, still has discomfort    CATARACT EXTRACTION W/  INTRAOCULAR LENS IMPLANT Bilateral     CHOLECYSTECTOMY  08/02/2017    COLONOSCOPY      CYSTOSCOPY      EPIDURAL STEROID INJECTION INTO LUMBAR SPINE N/A 08/14/2019    Procedure: Injection-steroid-epidural-lumbar L5/S1;  Surgeon: Fredi Rojas MD;  Location: [facility];  Service: Pain Management;  Laterality: N/A;    EPIDURAL STEROID INJECTION INTO LUMBAR SPINE N/A 05/03/2021    Procedure: Injection-steroid-epidural-lumbar  L5/S1 to the Left;  Surgeon: Fredi Rojas MD;  Location: [facility] OR;  Service: Pain Management;  Laterality: N/A;    EPIDURAL STEROID INJECTION INTO LUMBAR SPINE N/A 09/24/2021    Procedure: Injection-steroid-epidural-lumbar L5/S1;  Surgeon: Fredi Rojas MD;  Location: [facility] OR;  Service: Pain Management;  Laterality: N/A;    EPIDURAL STEROID INJECTION INTO LUMBAR SPINE N/A 02/21/2022    Procedure: Injection-steroid-epidural-lumbar L5/S1;  Surgeon: Fredi Rojas MD;  Location: [facility] OR;  Service: Pain Management;  Laterality: N/A;    EPIDURAL STEROID INJECTION INTO LUMBAR SPINE N/A 02/09/2024    Procedure: Injection-steroid-epidural-lumbar       L5/S1;  Surgeon: Fredi Rojas MD;  Location: [facility] OR;  Service: Pain Management;  Laterality: N/A;    ESOPHAGEAL DILATION N/A 06/11/2021    Procedure: DILATION, ESOPHAGUS;  Surgeon: Jake Sheriff MD;  Location: [facility];  Service: Endoscopy;  Laterality: N/A;    ESOPHAGOGASTRODUODENOSCOPY      dilated esophagus    ESOPHAGOGASTRODUODENOSCOPY N/A 06/11/2021    Procedure: EGD (ESOPHAGOGASTRODUODENOSCOPY);  Surgeon: Jake Sheriff MD;  Location: [facility];  Service: Endoscopy;  Laterality: N/A;    GASTRIC BYPASS      2011    HYSTERECTOMY  02/1980    interstim bladder  2009    10/14/14 new battery    OOPHORECTOMY  02/1980    PERCUTANEOUS INSERTION OF SACRAL NERVE NEUROSTIMULATOR ELECTRODE LEAD N/A 6/5/2024    Procedure: INSERTION, ELECTRODE LEAD, NEUROSTIMULATOR, SACRAL, PERCUTANEOUS;  Surgeon: Mary Jane Jarvis MD;  Location: [facility];  Service: Urology;  Laterality: N/A;  1 hour 30 minutes    REPLACEMENT OF NERVE STIMULATOR BATTERY N/A 6/5/2024    Procedure: Replacement, Battery, Neurostimulator;  Surgeon: Mary Jane Jarvis MD;  Location: [facility];  Service: Urology;  Laterality: N/A;  1 hour 30 minutes    REPLACEMENT OF SACRAL NERVE STIMULATOR  02/18/2020    Procedure: REPLACEMENT, NEUROSTIMULATOR, SACRAL;  Surgeon: Mary Jane Jarvis MD;  Location: [facility]  2ND FLR;  Service: Urology;;    REVISION OF PROCEDURE INVOLVING SACRAL NEUROSTIMULATOR DEVICE Right 02/18/2020    Procedure: REVISION, NEUROSTIMULATOR, SACRAL/ battery replacement;  Surgeon: Mary Jane Jarvis MD;  Location: [facility] OR 2ND FLR;  Service: Urology;  Laterality: Right;  1hr/ rep contacted/ (CONNIE)    SKIN CANCER EXCISION      left hand    TONSILLECTOMY      TRANSFORAMINAL EPIDURAL INJECTION OF STEROID Bilateral 03/05/2024    Procedure: Injection,steroid,epidural,transforaminal approach      L4/5;  Surgeon: Fredi Rojas MD;  Location: [facility] OR;  Service: Pain Management;  Laterality: Bilateral;    TRANSFORAMINAL EPIDURAL INJECTION OF STEROID Bilateral 9/16/2024    Procedure: Injection,steroid,epidural,transforaminal approach    L4/5;  Surgeon: Fredi Rojas MD;  Location: [facility] OR;  Service: Pain Management;  Laterality: Bilateral;    WISDOM TOOTH EXTRACTION         FMHX:  Family History   Problem Relation Name Age of Onset    Breast cancer Mother  50    Breast cancer Paternal Aunt          40's    Cervical cancer Paternal Grandmother      Ovarian cancer Paternal Grandmother      Cervical cancer Paternal Cousin      Ovarian cancer Paternal Cousin 1st     Diabetes Other      Hypertension Other      Hypothyroidism Other      Allergic rhinitis Neg Hx      Allergies Neg Hx      Angioedema Neg Hx      Asthma Neg Hx      Atopy Neg Hx      Eczema Neg Hx      Immunodeficiency Neg Hx      Rhinitis Neg Hx      Urticaria Neg Hx      Uterine cancer Neg Hx         SOCHX:  Social History     Tobacco Use    Smoking status: Never    Smokeless tobacco: Never   Substance Use Topics    Alcohol use: Not Currently       ALLERGIES:  Sulfa (sulfonamide antibiotics), Naproxen, and Albuterol    CURRENT MEDICATIONS:  Current Outpatient Medications on File Prior to Visit   Medication Sig Dispense Refill    ascorbic acid, vitamin C, (VITAMIN C) 1000 MG tablet Take 1,000 mg by mouth 2 (two) times daily.       azelastine (OPTIVAR)  0.05 % ophthalmic solution Place 1 drop into both eyes. As needed      B-complex with vitamin C (Z-BEC OR EQUIV) tablet Take 1 tablet by mouth once daily.      Bifidobacterium infantis (ALIGN ORAL) Take by mouth once daily.      biotin 10,000 mcg Cap Take 1 tablet by mouth every evening.       blood sugar diagnostic [employer] preferred meter and strips. Check daily 90 each 3    blood-glucose meter kit Use as instructed. Insurance preferred meter. 1 each 0    budesonide (PULMICORT) 0.25 mg/2 mL nebulizer solution Take 2 mLs (0.25 mg total) by nebulization once daily. Controller 120 mL 1    cranberry 500 mg Cap Take 1 capsule by mouth every evening.      diltiaZEM (CARDIZEM CD) 120 MG Cp24 Take 1 capsule (120 mg total) by mouth once daily. 90 capsule 3    doxazosin (CARDURA) 2 MG tablet Take 1 tablet (2 mg total) by mouth every evening. 90 tablet 3    EPINEPHrine (EPIPEN) 0.3 mg/0.3 mL AtIn Inject 1 each into the muscle as needed.  3    erythromycin with ethanoL (THERAMYCIN) 2 % external solution Aaa bid prn 60 mL 3    esomeprazole (NEXIUM) 40 MG capsule Take 1 capsule (40 mg total) by mouth before breakfast. 90 capsule 3    estradioL (ESTRACE) 0.01 % (0.1 mg/gram) vaginal cream Place 1 g vaginally 3 (three) times a week. Place by fingertip application before bedtime three times a week (Monday, Wednesday, Friday) 45 g 3    fexofenadine (ALLEGRA) 180 MG tablet Take 180 mg by mouth once daily.       fish oil-omega-3 fatty acids 300-1,000 mg capsule Take 2 g by mouth once daily.      fluticasone propionate (FLONASE) 50 mcg/actuation nasal spray 2 sprays (100 mcg total) by Each Nostril route once daily. 16 g 11    fluticasone-umeclidin-vilanter (TRELEGY ELLIPTA) 200-62.5-25 mcg inhaler Inhale 1 puff into the lungs once daily. 180 each 3    gabapentin (NEURONTIN) 600 MG tablet Take 1 tablet (600 mg total) by mouth 3 (three) times daily. 540 tablet 3    guaifenesin-codeine 100-10 mg/5 ml (GUAIFENESIN AC)  mg/5 mL  syrup Take 5 mLs by mouth 3 (three) times daily as needed for Cough. 473 mL 2    hydrOXYzine HCL (ATARAX) 10 MG Tab Take 1 tablet (10 mg total) by mouth 3 (three) times daily as needed. 30 tablet 3    ibandronate (BONIVA) 150 mg tablet Take 1 tablet (150 mg total) by mouth every 30 days. 1 tablet 11    immun glob G,IgG,-pro-IgA 0-50 (HIZENTRA) 1 gram/5 mL (20 %) Soln Inject 55 mLs (11 g total) into the skin once a week. 220 mL 12    inhalation spacing device Use as directed for inhalation. 1 each 0    lancets (ACCU-CHEK SOFTCLIX LANCETS) Misc Use to check blood sugar daily. 100 each 11    levalbuterol (XOPENEX HFA) 45 mcg/actuation inhaler Inhale 1-2 puffs into the lungs every 4 (four) hours as needed for Wheezing or Shortness of Breath. Rescue 15 g 11    levalbuterol (XOPENEX) 1.25 mg/3 mL nebulizer solution Take 3 mLs (1.25 mg total) by nebulization every 4 (four) hours as needed for Wheezing or Shortness of Breath. Rescue 1 each 11    metFORMIN (GLUCOPHAGE-XR) 500 MG ER 24hr tablet Take 1 tablet (500 mg total) by mouth 2 (two) times daily with meals. 180 tablet 3    methenamine (HIPREX) 1 gram Tab Take 1 tablet (1 g total) by mouth 2 (two) times daily. With Vitamin C 500 mg 60 tablet 5    mometasone 0.1% (ELOCON) 0.1 % cream aaa bid x 1-2 weeks prn bug bites 45 g 1    montelukast (SINGULAIR) 10 mg tablet Take 1 tablet (10 mg total) by mouth every evening. 90 tablet 3    mucus clearing device Cecy 1 Device by Misc.(Non-Drug; Combo Route) route 2 (two) times daily. 1 Device 0    multivitamin capsule Take 1 capsule by mouth once daily.       mupirocin (BACTROBAN) 2 % ointment Apply topically 3 (three) times daily. 15 g 0    mupirocin (BACTROBAN) 2 % ointment Apply topically 3 (three) times daily. 30 g 1    pravastatin (PRAVACHOL) 80 MG tablet Take 1 tablet (80 mg total) by mouth once daily. 90 tablet 4    predniSONE (DELTASONE) 10 MG tablet Take 1-2 pills a day for three days, repeat for shortness of breath 12  tablet 0    psyllium husk (METAMUCIL ORAL) Take by mouth.      SIMETHICONE (GAS-X ORAL) Take 1 capsule by mouth as needed (gas relief).       tezepelumab-ekko (TEZSPIRE) 210 mg/1.91 mL (110 mg/mL) Syrg Inject 1.91 mLs (210 mg total) into the skin every 28 days. 1.91 mL 11    traMADoL (ULTRAM) 50 mg tablet Take 1 tablet (50 mg total) by mouth nightly as needed for Pain. 30 tablet 5    valsartan-hydrochlorothiazide (DIOVAN-HCT) 320-25 mg per tablet Take 1 tablet by mouth once daily. 90 tablet 2    vitamin D3-folic acid 5,000 unit- 1 mg Tab Take 1 tablet by mouth once daily.       WELCHOL 625 mg tablet Take 625 mg by mouth.       Current Facility-Administered Medications on File Prior to Visit   Medication Dose Route Frequency Provider Last Rate Last Admin    levalbuterol nebulizer solution 1.25 mg  1.25 mg Nebulization 1 time in Clinic/HOD Daysi Llanos NP           REVIEW OF SYSTEMS:  Review of Systems   Constitutional: Negative.    HENT: Negative.     Eyes: Negative.    Respiratory: Negative.     Cardiovascular: Negative.    Gastrointestinal: Negative.    Genitourinary: Negative.    Musculoskeletal:  Positive for joint pain.   Skin: Negative.    Neurological: Negative.    Endo/Heme/Allergies: Negative.    Psychiatric/Behavioral: Negative.       GENERAL PHYSICAL EXAM:   There were no vitals taken for this visit.   GEN: well developed, well nourished, no acute distress   HENT: Normocephalic, atraumatic   EYES: No discharge, conjunctiva normal   NECK: Supple, non-tender   PULM: No wheezing, no respiratory distress   CV: RRR   ABD: Soft, non-tender    ORTHO EXAM:   Examination of the right ring finger reveals that there is a mass overlying the dorsum of the D IP joint.  This has consistent with a mucoid cyst.  There was no surrounding edema or erythema.  Palpation over that site produces mild tenderness.  He was neurovascularly intact distally.    RADIOLOGY:   None    ASSESSMENT:   Right ring finger mucoid  cyst    PLAN:  1. I have discussed treatment options.  We have discussed observation versus aspiration versus excision.  The patient is going to undergo a spine surgery within the next couple of weeks and therefore we will hold off on any treatment until after she was cleared from her spine surgery.  She has expressed an interest in having it excised.      2. She will follow up with me when she was recovered from her spine surgery if she would like to consider having surgical treatment for the mucoid cyst

## 2024-11-22 ENCOUNTER — PATIENT MESSAGE (OUTPATIENT)
Dept: PODIATRY | Facility: CLINIC | Age: 74
End: 2024-11-22
Payer: MEDICARE

## 2024-11-25 ENCOUNTER — PATIENT MESSAGE (OUTPATIENT)
Dept: OTOLARYNGOLOGY | Facility: CLINIC | Age: 74
End: 2024-11-25
Payer: MEDICARE

## 2024-11-27 ENCOUNTER — PATIENT OUTREACH (OUTPATIENT)
Dept: ADMINISTRATIVE | Facility: HOSPITAL | Age: 74
End: 2024-11-27
Payer: MEDICARE

## 2024-11-27 NOTE — PROGRESS NOTES
Population Health Chart Review & Patient Outreach Details      Additional [facility] Health Notes:               Updates Requested / Reviewed:      Updated Care Coordination Note, Care Team Updated, and Immunizations Reconciliation Completed or Queried: [place]         Health Maintenance Topics Overdue:      [facility] Score: 0     Patient is not due for any topics at this time.                       Health Maintenance Topic(s) Outreach Outcomes & Actions Taken:    Eye Exam - Outreach Outcomes & Actions Taken  : Diabetic Eye External Records Uploaded, Care Team & History Updated if Applicable

## 2024-12-01 DIAGNOSIS — I10 ESSENTIAL HYPERTENSION: ICD-10-CM

## 2024-12-02 ENCOUNTER — OFFICE VISIT (OUTPATIENT)
Dept: OTOLARYNGOLOGY | Facility: CLINIC | Age: 74
End: 2024-12-02
Payer: MEDICARE

## 2024-12-02 ENCOUNTER — HOSPITAL ENCOUNTER (OUTPATIENT)
Dept: RADIOLOGY | Facility: HOSPITAL | Age: 74
Discharge: HOME | End: 2024-12-02
Attending: INTERNAL MEDICINE
Payer: MEDICARE

## 2024-12-02 ENCOUNTER — PATIENT MESSAGE (OUTPATIENT)
Dept: PULMONOLOGY | Facility: CLINIC | Age: 74
End: 2024-12-02
Payer: MEDICARE

## 2024-12-02 VITALS — BODY MASS INDEX: 35.76 KG/M2 | WEIGHT: 208.31 LBS

## 2024-12-02 DIAGNOSIS — E04.1 THYROID NODULE: ICD-10-CM

## 2024-12-02 DIAGNOSIS — J04.0 LARYNGITIS: Primary | ICD-10-CM

## 2024-12-02 DIAGNOSIS — E04.1 THYROID NODULE: Primary | ICD-10-CM

## 2024-12-02 PROCEDURE — 3066F NEPHROPATHY DOC TX: CPT | Mod: HCNC,CPTII,S$GLB, | Performed by: OTOLARYNGOLOGY

## 2024-12-02 PROCEDURE — 1101F PT FALLS ASSESS-DOCD LE1/YR: CPT | Mod: HCNC,CPTII,S$GLB, | Performed by: OTOLARYNGOLOGY

## 2024-12-02 PROCEDURE — 99214 OFFICE O/P EST MOD 30 MIN: CPT | Mod: HCNC,S$GLB,, | Performed by: OTOLARYNGOLOGY

## 2024-12-02 PROCEDURE — 1159F MED LIST DOCD IN RCRD: CPT | Mod: HCNC,CPTII,S$GLB, | Performed by: OTOLARYNGOLOGY

## 2024-12-02 PROCEDURE — 3288F FALL RISK ASSESSMENT DOCD: CPT | Mod: HCNC,CPTII,S$GLB, | Performed by: OTOLARYNGOLOGY

## 2024-12-02 PROCEDURE — 3044F HG A1C LEVEL LT 7.0%: CPT | Mod: HCNC,CPTII,S$GLB, | Performed by: OTOLARYNGOLOGY

## 2024-12-02 PROCEDURE — 76536 US EXAM OF HEAD AND NECK: CPT | Mod: TC,HCNC,PO

## 2024-12-02 PROCEDURE — 76536 US EXAM OF HEAD AND NECK: CPT | Mod: 26,HCNC,, | Performed by: RADIOLOGY

## 2024-12-02 PROCEDURE — 1160F RVW MEDS BY RX/DR IN RCRD: CPT | Mod: HCNC,CPTII,S$GLB, | Performed by: OTOLARYNGOLOGY

## 2024-12-02 PROCEDURE — 3061F NEG MICROALBUMINURIA REV: CPT | Mod: HCNC,CPTII,S$GLB, | Performed by: OTOLARYNGOLOGY

## 2024-12-02 PROCEDURE — 3008F BODY MASS INDEX DOCD: CPT | Mod: HCNC,CPTII,S$GLB, | Performed by: OTOLARYNGOLOGY

## 2024-12-02 PROCEDURE — 99999 PR PBB SHADOW E&M-EST. PATIENT-LVL IV: CPT | Mod: PBBFAC,HCNC,, | Performed by: OTOLARYNGOLOGY

## 2024-12-02 PROCEDURE — 1126F AMNT PAIN NOTED NONE PRSNT: CPT | Mod: HCNC,CPTII,S$GLB, | Performed by: OTOLARYNGOLOGY

## 2024-12-02 PROCEDURE — 1157F ADVNC CARE PLAN IN RCRD: CPT | Mod: HCNC,CPTII,S$GLB, | Performed by: OTOLARYNGOLOGY

## 2024-12-02 RX ORDER — DOXAZOSIN 2 MG/1
2 TABLET ORAL NIGHTLY
Qty: 90 TABLET | Refills: 3 | Status: SHIPPED | OUTPATIENT
Start: 2024-12-02

## 2024-12-02 RX ORDER — FLUCONAZOLE 100 MG/1
100 TABLET ORAL DAILY
Qty: 10 TABLET | Refills: 0 | Status: SHIPPED | OUTPATIENT
Start: 2024-12-02 | End: 2024-12-12

## 2024-12-02 NOTE — TELEPHONE ENCOUNTER
Care Due:                  Date            Visit Type   Department     Provider  --------------------------------------------------------------------------------                                EP -                              [facility]  Last Visit: 10-      CARE ([facility])   JULITA BRUMFIELD                               -                              [facility]  Next Visit: 10-      CARE ([facility])   JULITA BRUMFIELD                                                            Last  Test          Frequency    Reason                     Performed    Due Date  --------------------------------------------------------------------------------    Mg Level....  12 months..  ibandronate..............  10-   10-    Phosphate...  12 months..  ibandronate..............  Not Found    Overdue    [employer] Embedded Care Due Messages. Reference number: [id number].   12/01/2024 8:07:20 PM CST

## 2024-12-02 NOTE — PROGRESS NOTES
Subjective:       Patient ID: Cornelia Delatorre is a 74 y.o. female.    Chief Complaint: Hoarse    Cornelia is here for follow-up.   Here today for voice concerns. Present for 1-2 weeks  Last time treated for laryngitis was 2021. She does get periodic issues with voice and has used a variety of meds including anti-fungals / antibiotics.     Has a back surgery next week.     Patient validated questionnaires (if applicable):      %            No data to display                   No data to display                   No data to display                     Review of Systems   Constitutional: Negative for activity change and appetite change.   Respiratory: Negative for difficulty breathing and wheezing   Cardiovascular: Negative for chest pain.      Objective:        Constitutional:   Vital signs are normal. She appears well-developed and well-nourished.     Head:  Normocephalic and atraumatic.   Moderate raspy quality  Medial cord erythema on mirror. No exudates     Ears:  Hearing normal to normal and whispered voice; external ear normal without scars, lesions, or masses; ear canal, tympanic membrane, and middle ear normal..     Nose:  Nose normal including turbinates, nasal mucosa, sinuses and nasal septum.     Mouth/Throat  Oropharynx clear and moist without lesions or asymmetry.     Neck:  Neck normal without thyromegaly masses, asymmetry, normal tracheal structure, crepitus, and tenderness.         Tests / Results:  None    Assessment:       1. Laryngitis          Plan:         Diflucan   OK to proceed with surgery  Notify if persistent

## 2024-12-02 NOTE — TELEPHONE ENCOUNTER
Refill Decision Note   Cornelia Delatorre  is requesting a refill authorization.  Brief Assessment and Rationale for Refill:  Approve     Medication Therapy Plan:         Comments:     Note composed:12:49 PM 12/02/2024

## 2024-12-02 NOTE — TELEPHONE ENCOUNTER
Please see patient's message.  You are booked.  Do you want to just call her over the phone?  What do you recommend?

## 2024-12-03 ENCOUNTER — TELEPHONE (OUTPATIENT)
Dept: CARDIOLOGY | Facility: CLINIC | Age: 74
End: 2024-12-03
Payer: MEDICARE

## 2024-12-03 NOTE — TELEPHONE ENCOUNTER
Cardiac clearance for laminectomy L4 L5. General anesthesia. Pt not taking blood thinners.     [facility]  Fax: [phone]

## 2024-12-05 ENCOUNTER — OFFICE VISIT (OUTPATIENT)
Dept: PULMONOLOGY | Facility: CLINIC | Age: 74
End: 2024-12-05
Payer: MEDICARE

## 2024-12-05 VITALS
WEIGHT: 209.13 LBS | HEIGHT: 64 IN | HEART RATE: 71 BPM | DIASTOLIC BLOOD PRESSURE: 73 MMHG | SYSTOLIC BLOOD PRESSURE: 125 MMHG | BODY MASS INDEX: 35.7 KG/M2 | OXYGEN SATURATION: 97 %

## 2024-12-05 DIAGNOSIS — J39.8 TRACHEOBRONCHOMALACIA: ICD-10-CM

## 2024-12-05 DIAGNOSIS — J47.9 BRONCHIECTASIS WITHOUT COMPLICATION: Primary | ICD-10-CM

## 2024-12-05 DIAGNOSIS — G47.33 OSA TREATED WITH BIPAP: ICD-10-CM

## 2024-12-05 DIAGNOSIS — J44.89 ASTHMA WITH COPD: ICD-10-CM

## 2024-12-05 PROCEDURE — 99213 OFFICE O/P EST LOW 20 MIN: CPT | Mod: HCNC,S$GLB,, | Performed by: NURSE PRACTITIONER

## 2024-12-05 PROCEDURE — 3288F FALL RISK ASSESSMENT DOCD: CPT | Mod: HCNC,CPTII,S$GLB, | Performed by: NURSE PRACTITIONER

## 2024-12-05 PROCEDURE — 3061F NEG MICROALBUMINURIA REV: CPT | Mod: HCNC,CPTII,S$GLB, | Performed by: NURSE PRACTITIONER

## 2024-12-05 PROCEDURE — 1101F PT FALLS ASSESS-DOCD LE1/YR: CPT | Mod: HCNC,CPTII,S$GLB, | Performed by: NURSE PRACTITIONER

## 2024-12-05 PROCEDURE — 1157F ADVNC CARE PLAN IN RCRD: CPT | Mod: HCNC,CPTII,S$GLB, | Performed by: NURSE PRACTITIONER

## 2024-12-05 PROCEDURE — 3078F DIAST BP <80 MM HG: CPT | Mod: HCNC,CPTII,S$GLB, | Performed by: NURSE PRACTITIONER

## 2024-12-05 PROCEDURE — 3066F NEPHROPATHY DOC TX: CPT | Mod: HCNC,CPTII,S$GLB, | Performed by: NURSE PRACTITIONER

## 2024-12-05 PROCEDURE — 3044F HG A1C LEVEL LT 7.0%: CPT | Mod: HCNC,CPTII,S$GLB, | Performed by: NURSE PRACTITIONER

## 2024-12-05 PROCEDURE — 99999 PR PBB SHADOW E&M-EST. PATIENT-LVL V: CPT | Mod: PBBFAC,HCNC,, | Performed by: NURSE PRACTITIONER

## 2024-12-05 PROCEDURE — 3008F BODY MASS INDEX DOCD: CPT | Mod: HCNC,CPTII,S$GLB, | Performed by: NURSE PRACTITIONER

## 2024-12-05 PROCEDURE — 1159F MED LIST DOCD IN RCRD: CPT | Mod: HCNC,CPTII,S$GLB, | Performed by: NURSE PRACTITIONER

## 2024-12-05 PROCEDURE — 3074F SYST BP LT 130 MM HG: CPT | Mod: HCNC,CPTII,S$GLB, | Performed by: NURSE PRACTITIONER

## 2024-12-05 NOTE — PROGRESS NOTES
12/5/2024    Cornelia Delatorre  In office visit     Chief Complaint   Patient presents with    Follow-up    sx clearance     Chest Congestion     HPI:    12/5/2024 - In office today with .Complaining of congestion since beginning of November.Back surgery Tuesday 12/10/2024. Chronic SOB. Better since Tezspire injections. Has cough at times with clear mucus. On Trelegy, Taking Budesidone and and both nebulizer and inhaler Leva albuterol. CPAP nightly.   Chest x-ray 2 days prior. No abnormal findings.   Program from Trelegy.   Currently on Tezspire monthly, triggered with weather change. ENT started on 12/2/2024 Treated with Diflucan for Laryngitis.     11/5/2024- in office with wife. complaint of worsening of chest tightness and shortness of breath in past 4 weeks. Took systemic steroids 10 mg for 6 days with minimal benefit.   Currently on Tezspire monthly.   States difficult to clear mucous from chest, improves with nebulizer therapy.   On CPAP nightly, no problems with daytime fatigue.   Has taken antibiotics 5 times in past year either for UTI or Bronchitis.    7/22/2024- complaint of recurrent sinus congestion, associated with sinus drainage, on flonase and allergy medications daily.   On Trelegy daily for COPD/Asthma medication regiment. Using albuterol 2 puffs daily before inhaling Trelegy inhaler. On Tezspire monthly with dramatic improvement. States she use to cough every day and now she coughs only occasionally.   No need for systemic steroids in past 6 months.     1/22/2024- states noticed improvement in shortness of breath while on Tezspire. Seen in ER in December for injury to back followed by pain management. No complaint of cough, wheeze, chest tightness, or shortness of breath. Using nebulizer only as needed when trigger by excessive mucous and cough with high pollen levels in environment.     Took medrol dose pack from ER for back pain, no issues with lungs.     10/17/2023- received large  christen from [facility] for Tezspire therapy, states she has noticed dramatic improvement in breathing after starting Tezspire for past 6 months. Having fewer exacerbations as before. Treated for exacerbation in September that was treated with systemic steroids and antibiotics.   States her and her  have noticed her activity level has improved in last 4 months. States she is sleeping better and feels better through out the day.     On BIPAP nightly for sleep apnea, no complaints of fatigue.     09/08/2023: Hx: Asthma, Lung nodules, CVID, SOHA on Bipap  Cough: Associated with feelings of chest congestion - associated with coughing spells that lead to overall fatigue. Worse at night time - associated with difficulty falling asleep and staying asleep. Cough is productive with intermittent mucous - difficult to expectorate.   Using Trelegy once per day with benefit.   Using nebulized treatments - Levalbuterol - 1-2 treatments per day depending on chest cough.    Using Albuterol inhaler as needed - approx use varies, approx 1-2x per day.  States used Albuterol last night and woke up this morning feeling flushed. Endorses hand shakiness and chest palpitations at times with Albuterol use.  Completed regimen of Prednisone and Doxy after last appointment with Daysi Llanos in July.   Denies the use of supplemental oxygen.  On Tezspire - next dose due next week.       7/6/2023- complaint of recurrent shortness of breath, worsened in past 3 weeks started, started prednisone 10 mg with no benefit. Using nebulizer or albuterol inhaler every 4 hours.   Associated with chest tightness and cough. Productive clear mucous. Having nocturnal coughing fits most nights.   Complaint of nausea and dizziness,   On BIPAP nightly, no issues with sleep apnea.   Severe persistent asthma with acute exacerbation  -     betamethasone acetate-betamethasone sodium phosphate injection 6 mg  -     fluticasone-umeclidin-vilanter (TRELEGY ELLIPTA)  200-62.5-25 mcg inhaler; Inhale 1 puff into the lungs once daily.  -     ondansetron (ZOFRAN-ODT) 4 MG TbDL; Take 1 tablet (4 mg total) by mouth every 8 (eight) hours as needed (nausea).  -     doxycycline (VIBRA-TABS) 100 MG tablet; Take 1 tablet (100 mg total) by mouth 2 (two) times daily. for 10 days  -     X-Ray Chest PA And Lateral; Future  -     predniSONE (DELTASONE) 10 MG tablet; 3 pills for 3 days, 2 for 3 days, them 1 for 3 days, repeat for cough  Right otitis media, unspecified otitis media type  -     doxycycline (VIBRA-TABS) 100 MG tablet; Take 1 tablet (100 mg total) by mouth 2 (two) times daily. for 10 days          5/1/2023- complaint of wheeze, onset 1 week, took 5 days of prednisone to control, states slightly improved.   Required systemic steroids in January and again in February for Asthma control  Associated with wheeze and chest tightness, SOB is worsening with time, difficult to walk in stores with out stopping to rest.   Has new motor in BIPAP, wearing nightly with benefit.       1/9/2023- states feeling good, noticed worsening sinus drainage and productive cough when laying down at night for past 3 weeks, improves with albuterol inhaler or nebulizer. On Trelegy daily.  No recent steroid therapy. Steroid injection in knee 6 weeks Prior.  Wearing BIPAP nightly with benefit. Daytime fatigue is resolved. No complaint of morning headaches. Waiting for recalled BIPAP machine.     7/11/2022- states feeling like her self again after COVID 19 in May, Cough is dramatically improved, non productive cough, few days week.   Fatigue continues, has to take daily naps. Wearing BiPAP nightly.    On Trelegy daily with benefit but cost is high, in donut whole.       5/25/2022- Dx COVID 19 7 days prior tx with monoclonal Antibiodies two days prior. Feeling generalized weakness, diaphoresis, fatigue  Using Trelegy and nebulizer daily,  beats on her back   Cough - severe, complaint, worse at night,  productive thick yellow mucous. Associated with late evening wheeze.   Lost sense of taste and smell.       4/13/2022- SOB- stable, worse in late evenings, taking prednisone 10 mg when needed, not used in past 2 months; worse with exertion, improves with rest and albuterol rescue.   On BIPAP with benefit, no daytime drowsiness.   Liked Trelegy. Still on Adviar until prescription runs out, has 2 weeks left.   Skin biopsy 5 weeks prior, left lower chin site irritated by wearing face mask. No edema or erythema,     1/12/2022- SOB worsening, on Advair daily and levalbuterol 2-3x daily for past 4 weeks, has noticed worsening of shortness of breath and sinus complaints.   Admitted to hospital for GI virus,   Noticed prednisone is needed occasionally at 10 mg notices improvement    On BiPAP nightly with benefit. No daytime fatigue, no issues with nasal pillow mask.     7/12/21- complaint of daily sinus drainage, has to clear throat repeatedly for past 2 months. Currently on Flonase nasal spray, ipratropium nasal spray, Singulair pills, zyrtec, corcindin daily with no improvement.   SOB- stable, required steroid therapy for 3 days twice in past 3 months. Only with exertion, worse in late evenings, improves with rest,  Using nebulizer 2x daily due to feeling of heaviness in chest.   Cough- mild, non productive, associated with post nasal drip from allergies. Nocturnal arousals 2x weekly for past 2 weeks,   Wearing CPAP nighty with benefit.     4/12/21- spent last 6 months in home, was able to get COVID 19 vaccine Mederna. Allergies stable, few spells improve with nasal spray noticed improvement after getting air purifier.   Noticed spacer device helping with hoarse voice or oral thrush like before,   SOB- unchanged, daily complaint, varies with severity, worse with exertion, improves with rest and albuterol rescue. Has to sit up to breath when first laying  Down, not able to breath when laying on left side.   Occasional  cough- productive, clear mucous, using nebulizer 3 x weekly.   CPAP for SOHA doing well,     10/13/2020- Allergies bother her every few days, currently on daily Singulair, zyrtec, flonase, astelin nasal spray, having sneezing fits.   Complaint of clear sinus drainage,   Hoarse voice has resolved after stopping symbicort.   Cough- improved,   SOB- doing well, occasional episodes of not being able to catch breath, did not use nebulizer   Wearing CPAP nightly for SOHA, states benefits greatly    7/13/2020- Hoarse voice, loss of voice, productive cough, lost voice July 2019 tx by ENT who treated for acute laryngitis with diflucan; Recurrent problem. Hoarse voice return 4 weeks prior, ENT doctor recommends changing Asthma medications tx her with diflucan, doxycycline and prednisone for 5 days, states has improved.   SOB- worse when wearing face mask, currently on hizentra therapy,     5/4/2020- uses symbicort daily, uses rescue 2/wk - some anxiety, getting igg rx. Having more sinus problems with head colds- 3 in last 4 wks.  No prednisone- hizentra working well.        Nov 4, 2019- having mucous sensation, notes some sob relaxing - maybe worse night.  No nasal stuffy.  Uses cpap.  Uses symbicort bid, no rescue.  Mucous is clear.  No abx for sinus or lungs.  Getting immune infusions weekly - pt senses doing better since starting in May.  Patient Instructions   Breathing off and on short breath - need to use more albuterol to make clear where breathing problems come from  Mucous production may decrease if airways controlled- albuterol should help clearance.  Rescue inhaler may resolve any breathing problems- should use intermittently if any symptoms.  May use augmentin for sinus/lung problems- not optimal for all uti's       May 6,2019-due to get immune infusion next 2 days.  No prednisone, needs monlukast. abx stopped wks ago- mucous cleared.    Patient Instructions   Use antibiotic daily til immune therapy done a wk - then  as needed like every one else  Immune therapy may keep you stable - better than antibiotics.   Use symbicort regular.  Lungs may stabilize.  Use prednisone if needed.  Lung  Nodules are stable for 2 yrs and no follow up needed.  Call if problems- hope all will be better yet.  March 7, 19-exacerbated in late Jan- cleared in feb (vague).  Now ill again yellow and gray mucous (culture last Jan was nl yvonne).  Sl intermittent wheezes since last wk.  Sees Dr Herrera in [place]- gets allergy rx but declined to get now.  Pt has cvid by  igg and igm levels low and pneumococcal antibodies to 8/14 pneumococcal titers. Uses symbicort and singulair routinely.  Took prednisone completed 4 days ago- uses prednisone monthly- was able to skip December  .  Discussed with patient above for education the following:      Gastric by pass may cause malabsorption, protein levels have been too low and may affect ig levels-- somewhat.   Need to get follow up with dietician to assure adequate protein intake/levels.   Antibiotic may stabilize infections with common variable immune deficiency- azithromycin daily once cultures submitted.   Use augmentin if infection worsens.   Acapella/chest clapping help clear mucous, vest likewise if bronchiectasis.  Will not treat cause.   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  Use codeine to suppress mild coughing.   Need good immune system and no airway/asthma problems and good hygiene of bronchial tubes (no chronic infections) to prevent illness.     Lungs measured near normal with good response to bronchial medications 2017   Need ct to follow up and cultures to assure infection controlled.      Jan 28/2019- Feeling bad onset 2 weeks, took prednisone taper x 6 days and antibiotic Augmentin week prior, States no improvement. Cough- worse at night, no nocturnal arousals, takes cough suppressant syrup before bed. Productive  yellow/brown color.   Nebulized albuterol 4x daily. States no fever.  Has started allergy injections waiting for insurance clearance for IGG therapy.   Discussed with patient above for education the following:    CT chest for February to monitor and assess for bronchiectasis, need diagnosis for insurance to cover vest therapy  Have  continue to percuss back with hand.   Recommend purchasing Acepella device to help clear lungs of excess mucous, attaches to nebulizer device  Continue Nebulizer treatments 4x daily as needed.  Chest x-ray now to evaluate for pneumonia  Blood work at hospital   Will yeison to see results of sputum culture and x-ray before repeating antibiotic  Need to return sputum to clinic with in 4 hours of collecting  Fluconazole pills for oral thrush, this is a recurrent problem related to your weak immune system and use of inhaled steroids. Continue to rinse mouth out after using symbicort but problem will improve after starting immune replacement therapy.    oct 30,   2018-- had one day fever followed by cough/wheeze illness that lingered a wk. Pt seen by NP   [place] 2x, [place]  [place] like dying- only one day fever.  Violent cough.  Nebulizer ppt itch and palpitations- ok  On xopenex now.  Prednisone 40x3, 20 x 3 ,  augmentin cleared.  Now back to normal.      May 14, 2018- had spell /exacerbation with one round prednisone. Breathing good.  Follow-up in about 1 year (around 5/14/2019), or if symptoms worsen or fail to improve.   Discussed with patient above for education the following:     Check blood count and pneumonia vaccine response after last vaccine 4/27/2018   Use azithromycin for any lung/sinus infection- immune weakness likely   Asthma stable- use prednisone and singulair.   Use allergra and singulair and astelin/flonase   Lung nodule in lung still - Navigational bronchoscopy might find?  - will at least re check in a year.     Call if needed, re check next yr.    darlin 15,  2018--1 st illness in yr or so with cough and rattles, no flu.  Appetite ok.  Ill x wk, cough and wheeze and sob and noct worse, resp rx helps a bit.  Hasn't been using  Albuterol   Follow-up in about 6 months (around 7/15/2018).   Discussed with patient above for education the following:      tamiflu if flu.   Need to use albuterol for any lung symptoms.  Should get cough  Better controlled.      Prednisone 20 mg 3 for 3 , taper may be needed.  Give shot today, 1 daily for 3 may be enough with albuterol.   You had high eosinophils-- if asthma becomes more active - special therapy may help??     prevnar 13 would be good - should be off prednisone.  Immune system is sl weak  Protection only to 7.14 pneumonia germs.    oct 19, 2017- has scratchy throat, some sinus drip, has nightly sensation throat issues, post beryl and carpel tunnel surg.  No sinus lung infections.  Breathing and cough good.  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 Had pos tb gold.  June 21, 2017- had good [place] trip, tapered symbicort with some increase sensation of lung problems so maintained full dose. No infections.  No chest symptoms on symbicort.   April 24, feels a lot better with min cough, vague sob going left side down at night.  Going to [place] 2 weeks.  Uses symbicort and good results.  March 16, cough better, but comes and goes,  No great help with steroids.  Submitted 3 sputums.  Had pneumovax march last yr.  Breathing better. Got symbicort.   Had pneumonia vaccine last yr    3/8/2017 HPI: had onset cough Hernan illness, got dizzy few days with diarrhea and cough. Cough clear sm amt mucous. Did have 101 temp one day, fatigue, no muscle aches.  Appetite decreased.  Illnesses lingered 2 weeks but cough never remitted- has improved with inhahler use last 15 days.    Had gastric 2011 bypass with with continued diarrhea intially resolved. No regurg or reflux or swallow problems.   symbicort nearly   Resolved.    Sinuses ok.  No pets.  Never smoker.    Appetite good, feels well now.    headcolds to chest since childhood, nocturnal ??not recalled.  Had cats in past but got rid in 2003 or so.     The chief compliant problem varies with instablilty at time      PFSH:  Past Medical History:   Diagnosis Date    Allergy     Arthritis     Asthma     Cancer     skin cancer to right hand    Cataract     Chronic fatigue 01/24/2017    CVID (common variable immunodeficiency) 03/07/2019    Diabetes mellitus     type2    KLINE (dyspnea on exertion) 01/24/2017    Dyslipidemia 06/25/2019    Encounter for blood transfusion     Essential hypertension 12/29/2011    GERD (gastroesophageal reflux disease)     Headache(784.0)     Hyperlipidemia 07/15/2015    Hypertension     Hypothyroidism     Neuropathy     Obesity     Postmenopausal HRT (hormone replacement therapy)     Rash     Rosacea     Sleep apnea     on bipap    Snoring     Squamous cell carcinoma of skin     left forearm     UTI (urinary tract infection)     Wears glasses          Past Surgical History:   Procedure Laterality Date    ADENOIDECTOMY      APPENDECTOMY      BLADDER SUSPENSION      BREAST BIOPSY Bilateral 08/1992    bilateral benign excisional biopsies    BUNIONECTOMY  10/17/2014    right, still has discomfort    CATARACT EXTRACTION W/  INTRAOCULAR LENS IMPLANT Bilateral     CHOLECYSTECTOMY  08/02/2017    COLONOSCOPY      CYSTOSCOPY      EPIDURAL STEROID INJECTION INTO LUMBAR SPINE N/A 08/14/2019    Procedure: Injection-steroid-epidural-lumbar L5/S1;  Surgeon: Fredi Rojas MD;  Location: [facility] OR;  Service: Pain Management;  Laterality: N/A;    EPIDURAL STEROID INJECTION INTO LUMBAR SPINE N/A 05/03/2021    Procedure: Injection-steroid-epidural-lumbar L5/S1 to the Left;  Surgeon: Fredi Rojas MD;  Location: [facility] OR;  Service: Pain Management;  Laterality: N/A;    EPIDURAL STEROID INJECTION INTO LUMBAR SPINE N/A 09/24/2021    Procedure:  Injection-steroid-epidural-lumbar L5/S1;  Surgeon: Fredi Rojas MD;  Location: [facility] OR;  Service: Pain Management;  Laterality: N/A;    EPIDURAL STEROID INJECTION INTO LUMBAR SPINE N/A 02/21/2022    Procedure: Injection-steroid-epidural-lumbar L5/S1;  Surgeon: Fredi Rojas MD;  Location: [facility] OR;  Service: Pain Management;  Laterality: N/A;    EPIDURAL STEROID INJECTION INTO LUMBAR SPINE N/A 02/09/2024    Procedure: Injection-steroid-epidural-lumbar       L5/S1;  Surgeon: Fredi Rojas MD;  Location: [facility] OR;  Service: Pain Management;  Laterality: N/A;    ESOPHAGEAL DILATION N/A 06/11/2021    Procedure: DILATION, ESOPHAGUS;  Surgeon: Jake Sheriff MD;  Location: [facility];  Service: Endoscopy;  Laterality: N/A;    ESOPHAGOGASTRODUODENOSCOPY      dilated esophagus    ESOPHAGOGASTRODUODENOSCOPY N/A 06/11/2021    Procedure: EGD (ESOPHAGOGASTRODUODENOSCOPY);  Surgeon: Jake Sheriff MD;  Location: [facility];  Service: Endoscopy;  Laterality: N/A;    GASTRIC BYPASS      2011    HYSTERECTOMY  02/1980    interstim bladder  2009    10/14/14 new battery    OOPHORECTOMY  02/1980    PERCUTANEOUS INSERTION OF SACRAL NERVE NEUROSTIMULATOR ELECTRODE LEAD N/A 6/5/2024    Procedure: INSERTION, ELECTRODE LEAD, NEUROSTIMULATOR, SACRAL, PERCUTANEOUS;  Surgeon: Mary Jane Jarvis MD;  Location: [facility] OR;  Service: Urology;  Laterality: N/A;  1 hour 30 minutes    REPLACEMENT OF NERVE STIMULATOR BATTERY N/A 6/5/2024    Procedure: Replacement, Battery, Neurostimulator;  Surgeon: Mary Jane Jarvis MD;  Location: [facility] OR;  Service: Urology;  Laterality: N/A;  1 hour 30 minutes    REPLACEMENT OF SACRAL NERVE STIMULATOR  02/18/2020    Procedure: REPLACEMENT, NEUROSTIMULATOR, SACRAL;  Surgeon: Mary Jane Jarvis MD;  Location: [facility] OR [address] [place] [address];  Service: Urology;;    REVISION OF PROCEDURE INVOLVING SACRAL NEUROSTIMULATOR DEVICE Right 02/18/2020    Procedure: REVISION, NEUROSTIMULATOR, SACRAL/ battery replacement;  Surgeon:  Mary Jane Jarvis MD;  Location: [facility] OR 2ND FLR;  Service: Urology;  Laterality: Right;  1hr/ rep contacted/ (CONNIE)    SKIN CANCER EXCISION      left hand    TONSILLECTOMY      TRANSFORAMINAL EPIDURAL INJECTION OF STEROID Bilateral 03/05/2024    Procedure: Injection,steroid,epidural,transforaminal approach      L4/5;  Surgeon: Fredi Rojas MD;  Location: [facility] OR;  Service: Pain Management;  Laterality: Bilateral;    TRANSFORAMINAL EPIDURAL INJECTION OF STEROID Bilateral 9/16/2024    Procedure: Injection,steroid,epidural,transforaminal approach    L4/5;  Surgeon: Fredi Rojas MD;  Location: [facility] OR;  Service: Pain Management;  Laterality: Bilateral;    WISDOM TOOTH EXTRACTION       Social History     Tobacco Use    Smoking status: Never    Smokeless tobacco: Never   Substance Use Topics    Alcohol use: Not Currently    Drug use: No     Family History   Problem Relation Name Age of Onset    Breast cancer Mother  50    Breast cancer Paternal Aunt          40's    Cervical cancer Paternal Grandmother      Ovarian cancer Paternal Grandmother      Cervical cancer Paternal Cousin      Ovarian cancer Paternal Cousin 1st     Diabetes Other      Hypertension Other      Hypothyroidism Other      Allergic rhinitis Neg Hx      Allergies Neg Hx      Angioedema Neg Hx      Asthma Neg Hx      Atopy Neg Hx      Eczema Neg Hx      Immunodeficiency Neg Hx      Rhinitis Neg Hx      Urticaria Neg Hx      Uterine cancer Neg Hx       Review of patient's allergies indicates:   Allergen Reactions    Sulfa (sulfonamide antibiotics) Hives    Naproxen Other (See Comments)     Other reaction(s): RT sided numbness         Performance Status:The patient's activity level is functions out of house.      Review of Systems:  a review of eleven systems covering constitutional, Eye, HEENT, Psych, Respiratory, Cardiac, GI, , Musculoskeletal, Endocrine, Dermatologic was negative except for pertinent findings as listed ABOVE and below: all  "good,   Sinus drainage   Sinus congestion    Exam:Comprehensive exam done. /73 (BP Location: Right arm, Patient Position: Sitting)   Pulse 71   Ht 5' 4" (1.626 m)   Wt 94.8 kg (209 lb 1.7 oz)   SpO2 97% Comment: on room air at rest  BMI 35.89 kg/m²   Exam included Vitals as listed, and patient's appearance and affect and alertness and mood, oral exam for yeast and hygiene and pharynx lesions and Mallapatti (M) score, neck with inspection for jvd and masses and thyroid abnormalities and lymph nodes (supraclavicular and infraclavicular nodes and axillary also examined and noted if abn), chest exam included symmetry and effort and fremitus and percussion and auscultation, cardiac exam included rhythm and gallops and murmur and rubs and jvd and edema, abdominal exam for mass and hepatosplenomegaly and tenderness and hernias and bowel sounds, Musculoskeletal exam with muscle tone and posture and mobility/gait and  strength, and skin for rashes and cyanosis and pallor and turgor, extremity for clubbing.  Findings were normal except for pertinent findings listed below:  M3,  chest is symmetric, no distress, normal percussion. Breath sounds clear   On room air      Radiographs (ct chest and cxr) reviewed: view by direct vision  i do see clear bronchiectasis,nodules are noted.  Mucoid type impaction suggested in rul ant segment.    X-Ray Chest PA And Lateral 09/11/2023 chest x-ray clear    X-Ray Chest PA And Lateral 06/09/2022 lungs clear    CT Abdomen Pelvis With Contrast 06/11/2021 lower lung bases clear    CT Chest Without Contrast  04/22/2019 stable non calcified nodules date to 2017 with no change.         Patient's labs were reviewed including CBC and CMP    Lab Results   Component Value Date    WBC 4.68 09/30/2024    RBC 3.75 (L) 09/30/2024    HGB 10.4 (L) 09/30/2024    HCT 34.4 (L) 09/30/2024    MCV 92 09/30/2024    MCH 27.7 09/30/2024    MCHC 30.2 (L) 09/30/2024    RDW 15.9 (H) 09/30/2024     " 09/30/2024    MPV 11.7 09/30/2024    GRAN 2.8 09/30/2024    GRAN 60.1 09/30/2024    LYMPH 1.4 09/30/2024    LYMPH 29.7 09/30/2024    MONO 0.4 09/30/2024    MONO 8.5 09/30/2024    EOS 0.0 09/30/2024    BASO 0.01 09/30/2024    EOSINOPHIL 0.9 09/30/2024    BASOPHIL 0.2 09/30/2024      Latest Reference Range & Units 01/28/19 16:17   Eos # 0.0 - 0.5 K/uL 0.2       Aerobic culture 10/27/22 CURVULARIA SPECIES      Latest Reference Range & Units 11/02/21 09:54 03/03/22 09:57 04/27/22 11:47 09/02/22 08:48   CO2 23 - 29 mmol/L 30 (H) 31 (H) 27 26   (H): Data is abnormally high    PFT  Spirometry bronchodilator, lung volume by body box, diffusion capacity measured December 18, 2023. Spirometry is normal. There was no improvement spirometry following bronchodilator. Lung volumes are within normal range. Diffusion was slightly low at 63% predicted. Clinical correlation recommended. (Physician Final: 12/18/2023 01:03PM, Elect    Spirometry with bronchodilator, lung volume by gas dilution, and diffusion capacity measured April 19, 2021. The FEV1 FVC ratio was 75% indicating no airflow obstruction measured by spirometry. The FEV1 was 99% predicted at 2.25 L. There was no   statistically significant improvement in spirometry following bronchodilator. Total lung capacity was within normal range at 90% of predicted. Diffusion was slightly low at 77% predicted-uncorrected for anemia if present.   Spirometry is normal. Lung volumes fall within normal range. Diffusion is slightly decreased. Clinical correlation recommended. The bronchodilator response was not significant.       Done [facility] with 10% response, otherwise nl  DATE OF PROCEDURE:  03/24/2017     1.  Spirometry reveals an FVC of 3.1 L, which is 107% predicted.  FEV1 is 2.3 L,   which is 100% predicted.  The ratio, there is preserved.  There is a positive,   but not significant bronchodilator response to both the FVC and FEV1.  Loop   contours appear with adequate effort as  well.  2.  Lung volumes.  The total lung capacity is 4.4 L, which is 92% predicted.    There are no signs of gas trapping.  3.  Diffusing capacity is mild-to-moderate reduced at 68%, does improve to 96%   with alveolar volumes.      Plan:  Clinical impression is resonably certain and repeated evaluation prn +/- follow up will be needed as below.    Cornelia was seen today for follow-up, sx clearance  and chest congestion.    Diagnoses and all orders for this visit:    Bronchiectasis without complication  Comments:  - continue current prescription medication regiment    SOHA treated with BiPAP  Comments:  continue BIPAP nightly    Tracheobronchomalacia  Comments:  - continue current prescription medication regiment    Asthma with COPD  Comments:  - continue current prescription medication regiment            Follow up in about 3 months (around 3/5/2025), or if symptoms worsen or fail to improve.    Discussed with patient above for education the following:      There are no Patient Instructions on file for this visit.

## 2024-12-06 ENCOUNTER — TELEPHONE (OUTPATIENT)
Dept: PULMONOLOGY | Facility: CLINIC | Age: 74
End: 2024-12-06
Payer: MEDICARE

## 2024-12-09 ENCOUNTER — OFFICE VISIT (OUTPATIENT)
Dept: ENDOCRINOLOGY | Facility: CLINIC | Age: 74
End: 2024-12-09
Payer: MEDICARE

## 2024-12-09 VITALS
WEIGHT: 209.13 LBS | DIASTOLIC BLOOD PRESSURE: 80 MMHG | HEART RATE: 71 BPM | SYSTOLIC BLOOD PRESSURE: 138 MMHG | BODY MASS INDEX: 35.7 KG/M2 | HEIGHT: 64 IN | OXYGEN SATURATION: 99 %

## 2024-12-09 DIAGNOSIS — E04.1 THYROID NODULE: Primary | ICD-10-CM

## 2024-12-09 DIAGNOSIS — R79.89 ABNORMAL THYROID BLOOD TEST: ICD-10-CM

## 2024-12-09 PROCEDURE — 1101F PT FALLS ASSESS-DOCD LE1/YR: CPT | Mod: HCNC,CPTII,S$GLB, | Performed by: INTERNAL MEDICINE

## 2024-12-09 PROCEDURE — 1160F RVW MEDS BY RX/DR IN RCRD: CPT | Mod: HCNC,CPTII,S$GLB, | Performed by: INTERNAL MEDICINE

## 2024-12-09 PROCEDURE — 1126F AMNT PAIN NOTED NONE PRSNT: CPT | Mod: HCNC,CPTII,S$GLB, | Performed by: INTERNAL MEDICINE

## 2024-12-09 PROCEDURE — 3079F DIAST BP 80-89 MM HG: CPT | Mod: HCNC,CPTII,S$GLB, | Performed by: INTERNAL MEDICINE

## 2024-12-09 PROCEDURE — 3075F SYST BP GE 130 - 139MM HG: CPT | Mod: HCNC,CPTII,S$GLB, | Performed by: INTERNAL MEDICINE

## 2024-12-09 PROCEDURE — 3061F NEG MICROALBUMINURIA REV: CPT | Mod: HCNC,CPTII,S$GLB, | Performed by: INTERNAL MEDICINE

## 2024-12-09 PROCEDURE — 3008F BODY MASS INDEX DOCD: CPT | Mod: HCNC,CPTII,S$GLB, | Performed by: INTERNAL MEDICINE

## 2024-12-09 PROCEDURE — 3044F HG A1C LEVEL LT 7.0%: CPT | Mod: HCNC,CPTII,S$GLB, | Performed by: INTERNAL MEDICINE

## 2024-12-09 PROCEDURE — 3288F FALL RISK ASSESSMENT DOCD: CPT | Mod: HCNC,CPTII,S$GLB, | Performed by: INTERNAL MEDICINE

## 2024-12-09 PROCEDURE — 99213 OFFICE O/P EST LOW 20 MIN: CPT | Mod: HCNC,S$GLB,, | Performed by: INTERNAL MEDICINE

## 2024-12-09 PROCEDURE — 99999 PR PBB SHADOW E&M-EST. PATIENT-LVL V: CPT | Mod: PBBFAC,HCNC,, | Performed by: INTERNAL MEDICINE

## 2024-12-09 PROCEDURE — 2023F DILAT RTA XM W/O RTNOPTHY: CPT | Mod: HCNC,CPTII,S$GLB, | Performed by: INTERNAL MEDICINE

## 2024-12-09 PROCEDURE — 1157F ADVNC CARE PLAN IN RCRD: CPT | Mod: HCNC,CPTII,S$GLB, | Performed by: INTERNAL MEDICINE

## 2024-12-09 PROCEDURE — 3066F NEPHROPATHY DOC TX: CPT | Mod: HCNC,CPTII,S$GLB, | Performed by: INTERNAL MEDICINE

## 2024-12-09 PROCEDURE — 1159F MED LIST DOCD IN RCRD: CPT | Mod: HCNC,CPTII,S$GLB, | Performed by: INTERNAL MEDICINE

## 2024-12-09 NOTE — PROGRESS NOTES
CHIEF COMPLAINT: + TPO/MNG  74 y.o.  being seen as a f/u. Had been on synthroid in the past. Off since March 2019. No palpitations. No tremors. No difficulty swallowing. Has had EGD stretching in the past.  No XRT to head/neck.      PAST MEDICAL HISTORY/PAST SURGICAL HISTORY:  Reviewed in [facility]    SOCIAL HISTORY: No T/A    FAMILY HISTORY:  + Hypothyroidism. + Dm 2.     MEDICATIONS/ALLERGIES: The patient's MedCard has been updated and reviewed.        PE:    GENERAL: Well developed, well nourished.  NECK: Supple, trachea midline, No palpable thyroid nodules.   CHEST: Resp even and unlabored, CTA bilateral.  CARDIAC: RRR, S1, S2 heard, no murmurs, rubs, S3, or S4     Latest Reference Range & Units 12/02/24 10:01   TSH 0.400 - 4.000 uIU/mL 1.043       US THYROID     CLINICAL HISTORY:  Nontoxic single thyroid nodule     TECHNIQUE:  Ultrasound of the thyroid and cervical lymph nodes was performed.     COMPARISON:  Thyroid ultrasound-12/01/2022     FINDINGS:  The right thyroid lobe measures 4.8 x 1.6 x 1.8 cm.  The left thyroid lobe measures 5.0 x 1.9 x 2.3 cm.  The total thyroid weight is 18.8 g.     There is a 9 x 8 x 11 mm smooth, circumscribed, mixed solid and cystic, wider than tall primarily sonolucent nodule observed in the left thyroid midpole anteriorly.  There is a 4 mm cystic nodule in the lower pole of the left thyroid lobe.  No new thyroid nodules which meet the criteria for FNA/core biopsy.  No pathologically enlarged or morphologically abnormal lymph nodes in the left or right neck adjacent to the thyroid fossa.     Impression:     No interval detrimental change when compared to the prior study.  Multinodular thyroid gland.  No new thyroid nodules which meet the criteria for FNA/core biopsy.      ASSESSMENT/PLAN:  1. Suppressed TSH- TSH now WNL. TPO +. Asymptomatic. Should get TSH with annual physical.     2. MNG- Do not meet criteria for FNA. No obstructive symptoms.  Has been followed since 2019.   Independently reviewed ultrasound images has some mixed cystic nodules.  No concerning findings.  No microcalcifications.  At this point does not need repeat imaging.  Would recommend follow up PCP.  Can get thyroid palpated with annual physical.    FOLLOWUP  AVS- can f/u with PCP- please re-consult if needed

## 2024-12-23 ENCOUNTER — OFFICE VISIT (OUTPATIENT)
Dept: FAMILY MEDICINE | Facility: CLINIC | Age: 74
End: 2024-12-23
Payer: MEDICARE

## 2024-12-23 VITALS
HEIGHT: 64 IN | SYSTOLIC BLOOD PRESSURE: 134 MMHG | OXYGEN SATURATION: 98 % | DIASTOLIC BLOOD PRESSURE: 78 MMHG | WEIGHT: 208.31 LBS | TEMPERATURE: 98 F | HEART RATE: 71 BPM | BODY MASS INDEX: 35.56 KG/M2

## 2024-12-23 DIAGNOSIS — J44.89 ASTHMA WITH COPD: ICD-10-CM

## 2024-12-23 DIAGNOSIS — R05.9 COUGH, UNSPECIFIED TYPE: Primary | ICD-10-CM

## 2024-12-23 DIAGNOSIS — M54.9 BACK PAIN, UNSPECIFIED BACK LOCATION, UNSPECIFIED BACK PAIN LATERALITY, UNSPECIFIED CHRONICITY: ICD-10-CM

## 2024-12-23 LAB
BILIRUBIN, UA POC OHS: NEGATIVE
BLOOD, UA POC OHS: NEGATIVE
CLARITY, UA POC OHS: CLEAR
COLOR, UA POC OHS: YELLOW
GLUCOSE, UA POC OHS: NEGATIVE
KETONES, UA POC OHS: NEGATIVE
LEUKOCYTES, UA POC OHS: NEGATIVE
NITRITE, UA POC OHS: NEGATIVE
PH, UA POC OHS: 5.5
PROTEIN, UA POC OHS: NEGATIVE
SPECIFIC GRAVITY, UA POC OHS: 1.01
UROBILINOGEN, UA POC OHS: 0.2

## 2024-12-23 PROCEDURE — 99213 OFFICE O/P EST LOW 20 MIN: CPT | Mod: HCNC,S$GLB,, | Performed by: NURSE PRACTITIONER

## 2024-12-23 PROCEDURE — 81003 URINALYSIS AUTO W/O SCOPE: CPT | Mod: QW,HCNC,S$GLB, | Performed by: NURSE PRACTITIONER

## 2024-12-23 PROCEDURE — 1125F AMNT PAIN NOTED PAIN PRSNT: CPT | Mod: HCNC,CPTII,S$GLB, | Performed by: NURSE PRACTITIONER

## 2024-12-23 PROCEDURE — 2023F DILAT RTA XM W/O RTNOPTHY: CPT | Mod: HCNC,CPTII,S$GLB, | Performed by: NURSE PRACTITIONER

## 2024-12-23 PROCEDURE — 3008F BODY MASS INDEX DOCD: CPT | Mod: HCNC,CPTII,S$GLB, | Performed by: NURSE PRACTITIONER

## 2024-12-23 PROCEDURE — 3075F SYST BP GE 130 - 139MM HG: CPT | Mod: HCNC,CPTII,S$GLB, | Performed by: NURSE PRACTITIONER

## 2024-12-23 PROCEDURE — 3078F DIAST BP <80 MM HG: CPT | Mod: HCNC,CPTII,S$GLB, | Performed by: NURSE PRACTITIONER

## 2024-12-23 PROCEDURE — 1159F MED LIST DOCD IN RCRD: CPT | Mod: HCNC,CPTII,S$GLB, | Performed by: NURSE PRACTITIONER

## 2024-12-23 PROCEDURE — 99999 PR PBB SHADOW E&M-EST. PATIENT-LVL V: CPT | Mod: PBBFAC,HCNC,, | Performed by: NURSE PRACTITIONER

## 2024-12-23 PROCEDURE — 1157F ADVNC CARE PLAN IN RCRD: CPT | Mod: HCNC,CPTII,S$GLB, | Performed by: NURSE PRACTITIONER

## 2024-12-23 PROCEDURE — 1101F PT FALLS ASSESS-DOCD LE1/YR: CPT | Mod: HCNC,CPTII,S$GLB, | Performed by: NURSE PRACTITIONER

## 2024-12-23 PROCEDURE — 3044F HG A1C LEVEL LT 7.0%: CPT | Mod: HCNC,CPTII,S$GLB, | Performed by: NURSE PRACTITIONER

## 2024-12-23 PROCEDURE — 3288F FALL RISK ASSESSMENT DOCD: CPT | Mod: HCNC,CPTII,S$GLB, | Performed by: NURSE PRACTITIONER

## 2024-12-23 PROCEDURE — 3061F NEG MICROALBUMINURIA REV: CPT | Mod: HCNC,CPTII,S$GLB, | Performed by: NURSE PRACTITIONER

## 2024-12-23 PROCEDURE — 3066F NEPHROPATHY DOC TX: CPT | Mod: HCNC,CPTII,S$GLB, | Performed by: NURSE PRACTITIONER

## 2024-12-23 RX ORDER — CYCLOBENZAPRINE HCL 10 MG
10 TABLET ORAL 3 TIMES DAILY
COMMUNITY
Start: 2024-12-11

## 2024-12-23 RX ORDER — PREDNISONE 10 MG/1
10 TABLET ORAL DAILY
Qty: 5 TABLET | Refills: 0 | Status: SHIPPED | OUTPATIENT
Start: 2024-12-23 | End: 2024-12-28

## 2024-12-23 RX ORDER — BENZONATATE 200 MG/1
200 CAPSULE ORAL 3 TIMES DAILY PRN
Qty: 30 CAPSULE | Refills: 0 | Status: SHIPPED | OUTPATIENT
Start: 2024-12-23

## 2024-12-23 NOTE — PROGRESS NOTES
"Subjective     Patient ID: Cornelia Delatorre is a 74 y.o. female.    Chief Complaint: Shortness of Breath, Cough (Pt states she has been having upper respiratory symptoms for about 3 weeks. ), Fatigue, and Back Pain      HPI      Patient is new to me. PCP is Dr. Cortez.     75 y/o F with Diabetes, neuropathy, Asthma with COPD, HTN, HLD, CVID, Hypothyroidism, GERD, SOHA on Bipap, presents to clinic for c/o cough, SOB, chest congestion for about 3 weeks now. Had back surgery 2 weeks ago, symptoms started about 3 weeks ago. Denies fever, just persistent cough. Has been taking mucinex dm, coricidin and tesslon perles. Usually get prednisone prn from pulmonary but was unable to get over there due to back pain. She is wearing a back brace and feels pain on both side of her back. Denies urinary symptoms, abd pain, n/v/d.    Review of Systems   All other systems reviewed and are negative.         Objective   Vitals:    12/23/24 1433   BP: 134/78   Pulse: 71   Temp: 98.1 °F (36.7 °C)   TempSrc: Oral   SpO2: 98%   Weight: 94.5 kg (208 lb 5.4 oz)   Height: 5' 4" (1.626 m)      Physical Exam  Vitals and nursing note reviewed.   Constitutional:       General: She is not in acute distress.     Appearance: Normal appearance. She is obese. She is not ill-appearing, toxic-appearing or diaphoretic.   HENT:      Head: Normocephalic and atraumatic.      Right Ear: Tympanic membrane, ear canal and external ear normal. There is no impacted cerumen.      Left Ear: Tympanic membrane, ear canal and external ear normal. There is no impacted cerumen.      Nose: No congestion or rhinorrhea.      Mouth/Throat:      Pharynx: No oropharyngeal exudate or posterior oropharyngeal erythema.   Eyes:      General: No scleral icterus.        Right eye: No discharge.         Left eye: No discharge.      Conjunctiva/sclera: Conjunctivae normal.      Pupils: Pupils are equal, round, and reactive to light.   Cardiovascular:      Rate and Rhythm: Normal " rate and regular rhythm.      Pulses: Normal pulses.      Heart sounds: Normal heart sounds. No murmur heard.     No friction rub. No gallop.   Pulmonary:      Effort: Pulmonary effort is normal. No respiratory distress.      Breath sounds: Normal breath sounds. No stridor. No wheezing, rhonchi or rales.   Abdominal:      General: Abdomen is flat. Bowel sounds are normal.      Palpations: Abdomen is soft.   Musculoskeletal:         General: Tenderness (right and left back along the ribs. tender to palpation.) present. No deformity or signs of injury.      Cervical back: Normal range of motion and neck supple.      Right lower leg: No edema.      Left lower leg: No edema.   Lymphadenopathy:      Cervical: No cervical adenopathy.   Skin:     General: Skin is warm.      Coloration: Skin is not jaundiced or pale.      Findings: No lesion or rash.   Neurological:      General: No focal deficit present.      Mental Status: She is alert and oriented to person, place, and time. Mental status is at baseline.   Psychiatric:         Mood and Affect: Mood normal.         Behavior: Behavior normal.         Thought Content: Thought content normal.         Judgment: Judgment normal.         1. Asthma with COPD  - predniSONE (DELTASONE) 10 MG tablet; Take 1 tablet (10 mg total) by mouth once daily. Take 1-2 pills a day for three days, repeat for shortness of breath for 5 days  Dispense: 5 tablet; Refill: 0    2. Cough, unspecified type  - benzonatate (TESSALON) 200 MG capsule; Take 1 capsule (200 mg total) by mouth 3 (three) times daily as needed for Cough.  Dispense: 30 capsule; Refill: 0    3. Back pain, unspecified back location, unspecified back pain laterality, unspecified chronicity       RTC/ER precautions given. F/U if symptoms worsen or do not improve.    Counseled on regular exercise, maintenance of a healthy weight, balanced diet rich in fruits/vegetables and lean protein, and avoidance of unhealthy habits like smoking  and excessive alcohol intake.    Charu Caceres, LIDIAP-C

## 2025-01-02 ENCOUNTER — PATIENT MESSAGE (OUTPATIENT)
Dept: OTOLARYNGOLOGY | Facility: CLINIC | Age: 75
End: 2025-01-02
Payer: MEDICARE

## 2025-01-07 ENCOUNTER — CLINICAL SUPPORT (OUTPATIENT)
Dept: PULMONOLOGY | Facility: CLINIC | Age: 75
End: 2025-01-07
Payer: MEDICARE

## 2025-01-07 DIAGNOSIS — J45.50 SEVERE PERSISTENT ASTHMA WITHOUT COMPLICATION: ICD-10-CM

## 2025-01-07 DIAGNOSIS — J45.50 SEVERE PERSISTENT ASTHMA WITHOUT COMPLICATION: Primary | ICD-10-CM

## 2025-01-07 NOTE — TELEPHONE ENCOUNTER
Pt is requesting medication be sent to house. Sending in RX refill for [facility] to see if she can be approved.

## 2025-01-07 NOTE — PROGRESS NOTES
Patient is here for her monthly Tezspire TAP injection 210 mg/1.91 mL, 110 mg/mL. 2 patient identifiers used (Name and ). SQ injection given into the left upper arm.  No redness, swelling, bleeding, or reaction noted to injection site.  Pt tolerated well. Next appt scheduled in a month. Pt requesting medication at home.    NDC: 63558-995-86  LOT#: [id number]  Expiration: 2026

## 2025-01-08 RX ORDER — TEZEPELUMAB-EKKO 210 MG/1.9ML
210 INJECTION, SOLUTION SUBCUTANEOUS
Qty: 1.91 ML | Refills: 11 | Status: SHIPPED | OUTPATIENT
Start: 2025-01-08

## 2025-01-10 DIAGNOSIS — I10 ESSENTIAL HYPERTENSION: ICD-10-CM

## 2025-01-10 DIAGNOSIS — E78.5 DYSLIPIDEMIA: ICD-10-CM

## 2025-01-10 RX ORDER — DILTIAZEM HYDROCHLORIDE 120 MG/1
120 CAPSULE, COATED, EXTENDED RELEASE ORAL DAILY
Qty: 90 CAPSULE | Refills: 3 | Status: SHIPPED | OUTPATIENT
Start: 2025-01-10 | End: 2026-01-10

## 2025-01-10 RX ORDER — PRAVASTATIN SODIUM 80 MG/1
80 TABLET ORAL DAILY
Qty: 90 TABLET | Refills: 4 | Status: SHIPPED | OUTPATIENT
Start: 2025-01-10

## 2025-01-10 NOTE — TELEPHONE ENCOUNTER
----- Message from Kalpana sent at 1/10/2025 11:16 AM CST -----  Regarding: Refill request  Contact: pharm  Type:  RX Refill Request    Who Called: pharm    Refill or New Rx:refill    RX Name and Strength:  Pravostatin 80 mg tab  Diltiazem ER  120mg capsule    How is the patient currently taking it? (ex. 1XDay):as directed    Is this a 30 day or 90 day RX:30    Preferred Pharmacy with phone number:  [facility] - [place], [place] - [address]  [address] [address]  Phone: [phone] Fax: [phone]    Local or Mail Order:mail order    Ordering Provider:go    Would the patient rather a call back or a response via [facility]? Call back    Best Call Back Number: [phone]  for pharm

## 2025-01-10 NOTE — TELEPHONE ENCOUNTER
Care Due:                  Date            Visit Type   Department     Provider  --------------------------------------------------------------------------------                                EP -                              [facility]  Last Visit: 10-      CARE ([facility])   JULITA BRUMFIELD                               -                              [facility]  Next Visit: 10-      CARE ([facility])   JULITA BRUMFIELD                                                            Last  Test          Frequency    Reason                     Performed    Due Date  --------------------------------------------------------------------------------    HBA1C.......  6 months...  metFORMIN................  09- 03-    [facility] Embedded Care Due Messages. Reference number: [id number].   1/10/2025 11:35:36 AM CST

## 2025-01-14 PROBLEM — J45.50 SEVERE PERSISTENT ASTHMA WITHOUT COMPLICATION: Status: ACTIVE | Noted: 2025-01-14

## 2025-01-16 DIAGNOSIS — J45.50 SEVERE PERSISTENT ASTHMA WITHOUT COMPLICATION: ICD-10-CM

## 2025-01-16 NOTE — TELEPHONE ENCOUNTER
----- Message from Evelyn Cantu sent at 1/14/2025  5:46 PM CST -----  Regarding: Tezspire  Good evening Daysi and Staff,    [facility] received the Tezspire prescription for the syringe formulation. The patient wants to see what the coverage/cost is for home administration. Please send [facility] a prescription for the Tezspire autoinjector as this is the only formulation that is approved for home administration. Once the prescription is received I will work on the PA.    Alondra Okeefe, PharmD  Clinical Pharmacist  [facility]  (P): [phone]  (F): [phone]

## 2025-01-21 DIAGNOSIS — J45.50 SEVERE PERSISTENT ASTHMA WITHOUT COMPLICATION: Primary | ICD-10-CM

## 2025-01-21 RX ORDER — TEZEPELUMAB-EKKO 210 MG/1.9ML
210 INJECTION, SOLUTION SUBCUTANEOUS
Qty: 1.91 ML | Refills: 11 | Status: ACTIVE | OUTPATIENT
Start: 2025-01-21

## 2025-01-24 ENCOUNTER — PATIENT MESSAGE (OUTPATIENT)
Dept: PULMONOLOGY | Facility: CLINIC | Age: 75
End: 2025-01-24
Payer: MEDICARE

## 2025-01-29 DIAGNOSIS — I10 ESSENTIAL HYPERTENSION: ICD-10-CM

## 2025-01-29 DIAGNOSIS — E78.5 DYSLIPIDEMIA: ICD-10-CM

## 2025-01-29 RX ORDER — PRAVASTATIN SODIUM 80 MG/1
TABLET ORAL
Qty: 90 TABLET | Refills: 0 | OUTPATIENT
Start: 2025-01-29

## 2025-01-29 RX ORDER — DILTIAZEM HYDROCHLORIDE 120 MG/1
CAPSULE, COATED, EXTENDED RELEASE ORAL
Qty: 90 CAPSULE | Refills: 0 | OUTPATIENT
Start: 2025-01-29

## 2025-01-29 NOTE — TELEPHONE ENCOUNTER
Refill Decision Note   Cornelia Delatorre  is requesting a refill authorization.  Brief Assessment and Rationale for Refill:        Medication Therapy Plan:  Pharmacy is requesting new scripts for the following medications without required information, (sig/ frequency/qty/etc)           Comments: Pharmacies have been requesting medications for patients without required information, (sig, frequency, qty, etc.). In addition, requests are sent for medication(s) pt. are currently not taking, and medications patients have never taken.    We have spoken to the pharmacies about these request types and advised their teams previously that we are unable to assess these New Script requests and require all details for these requests. This is a known issue and has been reported.      No Care Gaps recommended.     Note composed:12:37 PM 01/29/2025

## 2025-01-29 NOTE — TELEPHONE ENCOUNTER
Care Due:                  Date            Visit Type   Department     Provider  --------------------------------------------------------------------------------                                EP -                              [facility]  Last Visit: 10-      CARE ([facility])   JULITA BRUMFIELD                               -                              [facility]  Next Visit: 10-      CARE ([facility])   JULITA BRUMFIELD                                                            Last  Test          Frequency    Reason                     Performed    Due Date  --------------------------------------------------------------------------------    Lipid Panel.  12 months..  pravastatin..............  03- 03-    [facility] Embedded Care Due Messages. Reference number: [id number].   1/29/2025 9:36:13 AM CST

## 2025-01-30 DIAGNOSIS — E78.5 DYSLIPIDEMIA: ICD-10-CM

## 2025-01-30 RX ORDER — PRAVASTATIN SODIUM 80 MG/1
TABLET ORAL
Refills: 0 | OUTPATIENT
Start: 2025-01-30

## 2025-01-30 NOTE — TELEPHONE ENCOUNTER
Refill Decision Note   Cornelia Delatorre  is requesting a refill authorization.  Brief Assessment and Rationale for Refill:  Quick Discontinue     Medication Therapy Plan:  no sig Pharmacy is requesting new scripts for the following medications without required information, (sig/ frequency/qty/etc)      Medication Reconciliation Completed: No     Comments: Pharmacies have been requesting medications for patients without required information, (sig, frequency, qty, etc.). In addition, requests are sent for medication(s) pt. are currently not taking, and medications patients have never taken.    We have spoken to the pharmacies about these request types and advised their teams previously that we are unable to assess these New Script requests and require all details for these requests. This is a known issue and has been reported.     Note composed:3:29 PM 01/30/2025

## 2025-01-30 NOTE — TELEPHONE ENCOUNTER
No care due was identified.  [facility] Embedded Care Due Messages. Reference number: [id number].   1/30/2025 3:21:33 PM CST

## 2025-02-04 ENCOUNTER — CLINICAL SUPPORT (OUTPATIENT)
Dept: PULMONOLOGY | Facility: CLINIC | Age: 75
End: 2025-02-04
Payer: MEDICARE

## 2025-02-04 DIAGNOSIS — J45.50 SEVERE PERSISTENT ASTHMA WITHOUT COMPLICATION: Primary | ICD-10-CM

## 2025-02-04 NOTE — PROGRESS NOTES
Patient here for her monthly Tezspire injection.  2 patient identifiers used (name and ).  Injection given into the right arm SQ.  Pt tolerated well.  No bleeding, redness, swelling or reaction noted to the injection site.     NDC:  53179-645-60  LOT#:  [id number]  Expiration:  2026

## 2025-02-12 ENCOUNTER — TELEPHONE (OUTPATIENT)
Dept: FAMILY MEDICINE | Facility: CLINIC | Age: 75
End: 2025-02-12
Payer: MEDICARE

## 2025-02-12 NOTE — TELEPHONE ENCOUNTER
----- Message from Sabiha sent at 2/12/2025  9:14 AM CST -----  Contact: PT  Type:  Sooner Apoointment Request    Caller is requesting a sooner appointment.  Caller declined first available appointment listed below.  Caller will not accept being placed on the waitlist and is requesting a message be sent to doctor.  Name of Caller:PT   When is the first available appointment?N/A  Symptoms:HUMANA WELLNESS EXAM   Would the patient rather a call back or a response via [facility]? CALL   Best Call Back Number: [phone]  Additional Information: THANK YOU

## 2025-02-18 ENCOUNTER — TELEPHONE (OUTPATIENT)
Dept: ALLERGY | Facility: CLINIC | Age: 75
End: 2025-02-18
Payer: MEDICARE

## 2025-02-18 DIAGNOSIS — D83.9 CVID (COMMON VARIABLE IMMUNODEFICIENCY): Primary | ICD-10-CM

## 2025-02-18 NOTE — TELEPHONE ENCOUNTER
----- Message from Carol sent at 2/18/2025  1:17 PM CST -----  Regarding: Orders/ Advice  Contact: [phone]  Who call ? Cornelia Delatorre  What is the request Details : Pt calling to speak with someone in provider office regards requesting orders for appt on 4/28 and discuss infusions. Please call pt back.  Can clinic  use patient portal  : No What number to call back : [phone]

## 2025-02-18 NOTE — TELEPHONE ENCOUNTER
Pt scheduled for annual on 04/28/2025.  Will get infusion on 04/20/2025, scheduled lab appt for 04/19/2025. Can you place lab order.    Thanks

## 2025-03-06 ENCOUNTER — PATIENT MESSAGE (OUTPATIENT)
Dept: PULMONOLOGY | Facility: CLINIC | Age: 75
End: 2025-03-06
Payer: MEDICARE

## 2025-03-06 ENCOUNTER — PATIENT MESSAGE (OUTPATIENT)
Dept: FAMILY MEDICINE | Facility: CLINIC | Age: 75
End: 2025-03-06
Payer: MEDICARE

## 2025-03-06 ENCOUNTER — PATIENT MESSAGE (OUTPATIENT)
Facility: CLINIC | Age: 75
End: 2025-03-06
Payer: MEDICARE

## 2025-03-06 ENCOUNTER — PATIENT MESSAGE (OUTPATIENT)
Dept: ALLERGY | Facility: CLINIC | Age: 75
End: 2025-03-06
Payer: MEDICARE

## 2025-03-07 ENCOUNTER — LAB VISIT (OUTPATIENT)
Dept: LAB | Facility: HOSPITAL | Age: 75
End: 2025-03-07
Attending: ALLERGY & IMMUNOLOGY
Payer: MEDICARE

## 2025-03-07 DIAGNOSIS — D83.9 CVID (COMMON VARIABLE IMMUNODEFICIENCY): ICD-10-CM

## 2025-03-07 LAB
ALBUMIN SERPL BCP-MCNC: 3.4 G/DL (ref 3.5–5.2)
ALP SERPL-CCNC: 123 U/L (ref 40–150)
ALT SERPL W/O P-5'-P-CCNC: 14 U/L (ref 10–44)
ANION GAP SERPL CALC-SCNC: 9 MMOL/L (ref 8–16)
AST SERPL-CCNC: 36 U/L (ref 10–40)
BASOPHILS # BLD AUTO: 0.02 K/UL (ref 0–0.2)
BASOPHILS NFR BLD: 0.4 % (ref 0–1.9)
BILIRUB SERPL-MCNC: 0.4 MG/DL (ref 0.1–1)
BUN SERPL-MCNC: 13 MG/DL (ref 8–23)
CALCIUM SERPL-MCNC: 8.8 MG/DL (ref 8.7–10.5)
CHLORIDE SERPL-SCNC: 98 MMOL/L (ref 95–110)
CO2 SERPL-SCNC: 25 MMOL/L (ref 23–29)
CREAT SERPL-MCNC: 0.7 MG/DL (ref 0.5–1.4)
DIFFERENTIAL METHOD BLD: ABNORMAL
EOSINOPHIL # BLD AUTO: 0 K/UL (ref 0–0.5)
EOSINOPHIL NFR BLD: 0.6 % (ref 0–8)
ERYTHROCYTE [DISTWIDTH] IN BLOOD BY AUTOMATED COUNT: 15.9 % (ref 11.5–14.5)
EST. GFR  (NO RACE VARIABLE): >60 ML/MIN/1.73 M^2
GLUCOSE SERPL-MCNC: 107 MG/DL (ref 70–110)
HCT VFR BLD AUTO: 34 % (ref 37–48.5)
HGB BLD-MCNC: 10.7 G/DL (ref 12–16)
IGA SERPL-MCNC: 153 MG/DL (ref 40–350)
IGG SERPL-MCNC: 899 MG/DL (ref 650–1600)
IGM SERPL-MCNC: 38 MG/DL (ref 50–300)
IMM GRANULOCYTES # BLD AUTO: 0.03 K/UL (ref 0–0.04)
IMM GRANULOCYTES NFR BLD AUTO: 0.6 % (ref 0–0.5)
LYMPHOCYTES # BLD AUTO: 1.4 K/UL (ref 1–4.8)
LYMPHOCYTES NFR BLD: 26.8 % (ref 18–48)
MCH RBC QN AUTO: 27.3 PG (ref 27–31)
MCHC RBC AUTO-ENTMCNC: 31.5 G/DL (ref 32–36)
MCV RBC AUTO: 87 FL (ref 82–98)
MONOCYTES # BLD AUTO: 0.4 K/UL (ref 0.3–1)
MONOCYTES NFR BLD: 6.7 % (ref 4–15)
NEUTROPHILS # BLD AUTO: 3.4 K/UL (ref 1.8–7.7)
NEUTROPHILS NFR BLD: 64.9 % (ref 38–73)
NRBC BLD-RTO: 0 /100 WBC
PLATELET # BLD AUTO: 161 K/UL (ref 150–450)
PMV BLD AUTO: 11.3 FL (ref 9.2–12.9)
POTASSIUM SERPL-SCNC: 4.2 MMOL/L (ref 3.5–5.1)
PROT SERPL-MCNC: 7 G/DL (ref 6–8.4)
RBC # BLD AUTO: 3.92 M/UL (ref 4–5.4)
SODIUM SERPL-SCNC: 132 MMOL/L (ref 136–145)
WBC # BLD AUTO: 5.19 K/UL (ref 3.9–12.7)

## 2025-03-07 PROCEDURE — 82784 ASSAY IGA/IGD/IGG/IGM EACH: CPT | Mod: 59,HCNC | Performed by: ALLERGY & IMMUNOLOGY

## 2025-03-07 PROCEDURE — 80053 COMPREHEN METABOLIC PANEL: CPT | Mod: HCNC | Performed by: ALLERGY & IMMUNOLOGY

## 2025-03-07 PROCEDURE — 82787 IGG 1 2 3 OR 4 EACH: CPT | Mod: 59,HCNC | Performed by: ALLERGY & IMMUNOLOGY

## 2025-03-07 PROCEDURE — 82784 ASSAY IGA/IGD/IGG/IGM EACH: CPT | Mod: HCNC | Performed by: ALLERGY & IMMUNOLOGY

## 2025-03-07 PROCEDURE — 36415 COLL VENOUS BLD VENIPUNCTURE: CPT | Mod: HCNC,PO | Performed by: ALLERGY & IMMUNOLOGY

## 2025-03-07 PROCEDURE — 85025 COMPLETE CBC W/AUTO DIFF WBC: CPT | Mod: HCNC | Performed by: ALLERGY & IMMUNOLOGY

## 2025-03-10 LAB
IGG1 SER-MCNC: 440 MG/DL (ref 382–929)
IGG2 SER-MCNC: 334 MG/DL (ref 242–700)
IGG3 SER-MCNC: 23 MG/DL (ref 22–176)
IGG4 SER-MCNC: 7 MG/DL (ref 4–86)

## 2025-03-12 ENCOUNTER — PATIENT MESSAGE (OUTPATIENT)
Dept: CARDIOLOGY | Facility: CLINIC | Age: 75
End: 2025-03-12
Payer: MEDICARE

## 2025-03-12 ENCOUNTER — PATIENT MESSAGE (OUTPATIENT)
Dept: PULMONOLOGY | Facility: CLINIC | Age: 75
End: 2025-03-12
Payer: MEDICARE

## 2025-03-19 ENCOUNTER — OFFICE VISIT (OUTPATIENT)
Dept: ALLERGY | Facility: CLINIC | Age: 75
End: 2025-03-19
Payer: MEDICARE

## 2025-03-19 VITALS — OXYGEN SATURATION: 97 % | HEIGHT: 64 IN | HEART RATE: 82 BPM | WEIGHT: 205.69 LBS | BODY MASS INDEX: 35.12 KG/M2

## 2025-03-19 DIAGNOSIS — D83.9 CVID (COMMON VARIABLE IMMUNODEFICIENCY): Primary | ICD-10-CM

## 2025-03-19 DIAGNOSIS — J31.0 CHRONIC RHINITIS: ICD-10-CM

## 2025-03-19 DIAGNOSIS — J47.9 BRONCHIECTASIS WITHOUT COMPLICATION: ICD-10-CM

## 2025-03-19 DIAGNOSIS — J45.30 MILD PERSISTENT ASTHMA WITHOUT COMPLICATION: ICD-10-CM

## 2025-03-19 PROCEDURE — 1125F AMNT PAIN NOTED PAIN PRSNT: CPT | Mod: HCNC,CPTII,S$GLB, | Performed by: ALLERGY & IMMUNOLOGY

## 2025-03-19 PROCEDURE — 3008F BODY MASS INDEX DOCD: CPT | Mod: HCNC,CPTII,S$GLB, | Performed by: ALLERGY & IMMUNOLOGY

## 2025-03-19 PROCEDURE — 99215 OFFICE O/P EST HI 40 MIN: CPT | Mod: HCNC,S$GLB,, | Performed by: ALLERGY & IMMUNOLOGY

## 2025-03-19 PROCEDURE — 1157F ADVNC CARE PLAN IN RCRD: CPT | Mod: HCNC,CPTII,S$GLB, | Performed by: ALLERGY & IMMUNOLOGY

## 2025-03-19 PROCEDURE — 1160F RVW MEDS BY RX/DR IN RCRD: CPT | Mod: HCNC,CPTII,S$GLB, | Performed by: ALLERGY & IMMUNOLOGY

## 2025-03-19 PROCEDURE — 1159F MED LIST DOCD IN RCRD: CPT | Mod: HCNC,CPTII,S$GLB, | Performed by: ALLERGY & IMMUNOLOGY

## 2025-03-19 PROCEDURE — 99999 PR PBB SHADOW E&M-EST. PATIENT-LVL V: CPT | Mod: PBBFAC,HCNC,, | Performed by: ALLERGY & IMMUNOLOGY

## 2025-03-19 RX ORDER — AZELASTINE HYDROCHLORIDE 0.5 MG/ML
1 SOLUTION/ DROPS OPHTHALMIC 2 TIMES DAILY
Qty: 6 ML | Refills: 12 | Status: SHIPPED | OUTPATIENT
Start: 2025-03-19

## 2025-03-19 RX ORDER — AZELASTINE 1 MG/ML
2 SPRAY, METERED NASAL 2 TIMES DAILY
Qty: 30 ML | Refills: 12 | Status: SHIPPED | OUTPATIENT
Start: 2025-03-19

## 2025-03-19 NOTE — PROGRESS NOTES
Subjective:       Patient ID: Cornelia Delatorre is a 74 y.o. female.    Chief Complaint:  Allergies and Annual Exam      73 yo woman presents for continued evaluation of CVID, chronic rhinitis, bronchiectasis and asthma. She was last seen 4/22/2024,. She is on Hizentra 11 g weekly. She is doing well on this, less infections.  She had a sinus issue and had procedure over a year ago with Dr Johnson and doing better, no further infections. No pneumonia. Less antibiotics this year.  She also has skin test with 1+ to one weed, rest negative. She is on allegra daily, montelukast daily, azelastine 2 SEN nightly and Flonase 2 SEN daily. She had labs 3/7/2025 with IgG 899, all subclasses normal, IgM 38 and IgA 153. CBC and CMP stable.    She is having a lot of runny nose, watery itchy eyes and sneeze right now with high pollen. No flares of asthma. No reactions to infusion. She does prefer to say at weekly as opposed to every other week.    Prior History taken 4/11/19: consult from Dr Jonathan Nicole for CVID. She was diagnosed with asthma 2 years ago. She has had up and down with lots of mucus causing cough. She has SOB can only walk short amount before KLINE. She is currently on chronic Zithromax. She does have bronchiectasis. She hash ad pneumonia 2 times in past but none in last 5 years. She does not get frequent sinus infections more chest. She was ion hospital for exacerbation in 10/2018. She has some sneeze and runny nose but not much. She saw Dr Herrera and is on allergy shots since January but off last month due to asthma. She was prescribed Hizentra but cost was high for her. She is friends with a pt here who has same insurance and gets Hizentra from [facility] and pays nothing so she would like to transfer. She has labs 3/2017 with IgG 693, IgA 191, IgM 22 and IgE <35 with humoral panel protected to 8/14 strep titers. She had pneumovax 3/2016 and Prevnar 3/2017 prior to these labs. She had repeat humoral panel 7/2017 and  protected to 8/14. She had another pneumovax 4/27/18 and labs 6/28 still only protected to 8/14. Labs 11/2018 now shoe IgG low at 516 all 4 subclasses low, IgA 132, IgM low at 36. She has normal PFT but CT shows bronchiectasis. She has no eczema. No known food, insect or latex allergy. She has HTN and DM. She did have gastric bypass in past. She never smoked.        Environmental History: see history section for home environment  Review of Systems   Constitutional:  Negative for appetite change, chills, fatigue and fever.   HENT:  Positive for rhinorrhea and sneezing. Negative for congestion, ear discharge, ear pain, facial swelling, nosebleeds, postnasal drip, sinus pressure, sore throat, trouble swallowing and voice change.    Eyes:  Negative for discharge, redness, itching and visual disturbance.   Respiratory:  Positive for cough, choking, chest tightness, shortness of breath and wheezing.    Cardiovascular:  Negative for chest pain, palpitations and leg swelling.   Gastrointestinal:  Negative for abdominal distention, abdominal pain, constipation, diarrhea, nausea and vomiting.   Genitourinary:  Negative for difficulty urinating.   Musculoskeletal:  Positive for arthralgias. Negative for gait problem, joint swelling and myalgias.   Skin:  Negative for color change and rash.   Neurological:  Negative for dizziness, syncope, weakness, light-headedness and headaches.   Hematological:  Negative for adenopathy. Does not bruise/bleed easily.   Psychiatric/Behavioral:  Negative for agitation, behavioral problems, confusion and sleep disturbance. The patient is not nervous/anxious.         Objective:      Physical Exam  Vitals and nursing note reviewed.   Constitutional:       General: She is not in acute distress.     Appearance: Normal appearance. She is well-developed. She is not ill-appearing.   HENT:      Head: Normocephalic and atraumatic.      Right Ear: Hearing, ear canal and external ear normal.      Left Ear:  Hearing, ear canal and external ear normal.      Nose: No septal deviation, mucosal edema or rhinorrhea.      Right Sinus: No maxillary sinus tenderness or frontal sinus tenderness.      Left Sinus: No maxillary sinus tenderness or frontal sinus tenderness.      Mouth/Throat:      Pharynx: Uvula midline. No uvula swelling.   Eyes:      General:         Right eye: No discharge.         Left eye: No discharge.      Conjunctiva/sclera: Conjunctivae normal.   Neck:      Thyroid: No thyromegaly.   Cardiovascular:      Rate and Rhythm: Normal rate and regular rhythm.      Heart sounds: Normal heart sounds.   Pulmonary:      Effort: Pulmonary effort is normal. No respiratory distress.      Breath sounds: Normal breath sounds. No wheezing.   Abdominal:      General: There is no distension.   Musculoskeletal:         General: Normal range of motion.      Cervical back: Normal range of motion.   Skin:     General: Skin is warm and dry.      Findings: No erythema or rash.   Neurological:      Mental Status: She is alert and oriented to person, place, and time.   Psychiatric:         Behavior: Behavior normal.         Thought Content: Thought content normal.         Judgment: Judgment normal.       Laboratory:   Percutaneous Skin Testing: prick skin test inhalants #60, 8/21/19: 1+ alli/dock weed, 3+ histamine control, remainder tested negative, see flow sheet  Assessment:       1. CVID (common variable immunodeficiency)    2. Chronic rhinitis    3. Bronchiectasis without complication    4. Mild persistent asthma without complication         Plan:       1. Pt with low IgG, IgM and no response to pneumococcal vaccines so has CVID. Continue IgG replacement - Hizentra 11g weekly  2. continue azelastine but increase to 2 SEN BID, montelukast 10 mg daily, allegra daily and Flonase 2 SEN daily and add azelastine eye drop 1 drop each eye BID as needed  3. RTC annually or sooner if needed    I spent a total of 40 minutes on the day  of the visit.  This includes face to face time and non-face to face time preparing to see the patient (eg, review of tests), obtaining and/or reviewing separately obtained history, documenting clinical information in the electronic or other health record, independently interpreting results and communicating results to the patient/family/caregiver, or care coordinator.

## 2025-03-20 ENCOUNTER — OFFICE VISIT (OUTPATIENT)
Dept: FAMILY MEDICINE | Facility: CLINIC | Age: 75
End: 2025-03-20
Payer: MEDICARE

## 2025-03-20 VITALS
WEIGHT: 205.5 LBS | HEIGHT: 64 IN | RESPIRATION RATE: 16 BRPM | DIASTOLIC BLOOD PRESSURE: 78 MMHG | OXYGEN SATURATION: 97 % | SYSTOLIC BLOOD PRESSURE: 136 MMHG | HEART RATE: 87 BPM | BODY MASS INDEX: 35.08 KG/M2

## 2025-03-20 DIAGNOSIS — D84.9 IMMUNE DEFICIENCY DISORDER: ICD-10-CM

## 2025-03-20 DIAGNOSIS — E11.69 TYPE 2 DIABETES MELLITUS WITH OTHER SPECIFIED COMPLICATION, WITHOUT LONG-TERM CURRENT USE OF INSULIN: ICD-10-CM

## 2025-03-20 DIAGNOSIS — D83.9 CVID (COMMON VARIABLE IMMUNODEFICIENCY): ICD-10-CM

## 2025-03-20 DIAGNOSIS — I65.23 BILATERAL CAROTID ARTERY STENOSIS: ICD-10-CM

## 2025-03-20 DIAGNOSIS — E11.42 DIABETIC POLYNEUROPATHY ASSOCIATED WITH TYPE 2 DIABETES MELLITUS: ICD-10-CM

## 2025-03-20 DIAGNOSIS — J44.89 ASTHMA WITH COPD: ICD-10-CM

## 2025-03-20 DIAGNOSIS — E11.42 DM TYPE 2 WITH DIABETIC PERIPHERAL NEUROPATHY: ICD-10-CM

## 2025-03-20 DIAGNOSIS — Z00.00 ENCOUNTER FOR MEDICARE ANNUAL WELLNESS EXAM: Primary | ICD-10-CM

## 2025-03-20 DIAGNOSIS — D69.2 NONTHROMBOCYTOPENIC PURPURA: ICD-10-CM

## 2025-03-20 DIAGNOSIS — E11.69 ONYCHOMYCOSIS OF MULTIPLE TOENAILS WITH TYPE 2 DIABETES MELLITUS: ICD-10-CM

## 2025-03-20 DIAGNOSIS — J47.9 BRONCHIECTASIS WITHOUT COMPLICATION: ICD-10-CM

## 2025-03-20 DIAGNOSIS — E03.9 ACQUIRED HYPOTHYROIDISM: ICD-10-CM

## 2025-03-20 DIAGNOSIS — J45.50 SEVERE PERSISTENT ASTHMA WITHOUT COMPLICATION: ICD-10-CM

## 2025-03-20 DIAGNOSIS — I77.1 TORTUOUS AORTA: ICD-10-CM

## 2025-03-20 DIAGNOSIS — E66.01 SEVERE OBESITY (BMI 35.0-39.9) WITH COMORBIDITY: ICD-10-CM

## 2025-03-20 DIAGNOSIS — E78.5 DYSLIPIDEMIA: ICD-10-CM

## 2025-03-20 DIAGNOSIS — I10 ESSENTIAL HYPERTENSION: ICD-10-CM

## 2025-03-20 DIAGNOSIS — I70.0 ATHEROSCLEROSIS OF ABDOMINAL AORTA: ICD-10-CM

## 2025-03-20 DIAGNOSIS — Z12.31 ENCOUNTER FOR SCREENING MAMMOGRAM FOR MALIGNANT NEOPLASM OF BREAST: ICD-10-CM

## 2025-03-20 DIAGNOSIS — B35.1 ONYCHOMYCOSIS OF MULTIPLE TOENAILS WITH TYPE 2 DIABETES MELLITUS: ICD-10-CM

## 2025-03-20 PROBLEM — R10.9 ABDOMINAL PAIN, VOMITING, AND DIARRHEA: Status: RESOLVED | Noted: 2021-06-11 | Resolved: 2025-03-20

## 2025-03-20 PROBLEM — R11.10 ABDOMINAL PAIN, VOMITING, AND DIARRHEA: Status: RESOLVED | Noted: 2021-06-11 | Resolved: 2025-03-20

## 2025-03-20 PROBLEM — U07.1 COVID: Status: RESOLVED | Noted: 2022-05-20 | Resolved: 2025-03-20

## 2025-03-20 PROBLEM — R19.7 ABDOMINAL PAIN, VOMITING, AND DIARRHEA: Status: RESOLVED | Noted: 2021-06-11 | Resolved: 2025-03-20

## 2025-03-20 PROCEDURE — 99999 PR PBB SHADOW E&M-EST. PATIENT-LVL V: CPT | Mod: PBBFAC,HCNC,, | Performed by: NURSE PRACTITIONER

## 2025-03-20 RX ORDER — EPINEPHRINE 0.3 MG/.3ML
1 INJECTION SUBCUTANEOUS
Qty: 1 EACH | Refills: 3 | Status: SHIPPED | OUTPATIENT
Start: 2025-03-20 | End: 2025-03-20

## 2025-03-20 RX ORDER — EPINEPHRINE 0.3 MG/.3ML
1 INJECTION SUBCUTANEOUS
Qty: 1 EACH | Refills: 3 | Status: SHIPPED | OUTPATIENT
Start: 2025-03-20

## 2025-03-20 RX ORDER — GABAPENTIN 600 MG/1
600 TABLET ORAL 3 TIMES DAILY
Qty: 540 TABLET | Refills: 3 | Status: SHIPPED | OUTPATIENT
Start: 2025-03-20

## 2025-03-20 RX ORDER — GABAPENTIN 600 MG/1
600 TABLET ORAL 3 TIMES DAILY
Qty: 540 TABLET | Refills: 3 | Status: SHIPPED | OUTPATIENT
Start: 2025-03-20 | End: 2025-03-20

## 2025-03-20 RX ORDER — PRAVASTATIN SODIUM 80 MG/1
80 TABLET ORAL DAILY
Qty: 90 TABLET | Refills: 4 | Status: SHIPPED | OUTPATIENT
Start: 2025-03-20 | End: 2025-03-20

## 2025-03-20 RX ORDER — DILTIAZEM HYDROCHLORIDE 120 MG/1
120 CAPSULE, COATED, EXTENDED RELEASE ORAL DAILY
Qty: 90 CAPSULE | Refills: 3 | Status: SHIPPED | OUTPATIENT
Start: 2025-03-20 | End: 2025-03-20

## 2025-03-20 RX ORDER — PRAVASTATIN SODIUM 80 MG/1
80 TABLET ORAL DAILY
Qty: 90 TABLET | Refills: 4 | Status: SHIPPED | OUTPATIENT
Start: 2025-03-20

## 2025-03-20 RX ORDER — DILTIAZEM HYDROCHLORIDE 120 MG/1
120 CAPSULE, COATED, EXTENDED RELEASE ORAL DAILY
Qty: 90 CAPSULE | Refills: 3 | Status: SHIPPED | OUTPATIENT
Start: 2025-03-20 | End: 2026-03-20

## 2025-03-20 NOTE — PATIENT INSTRUCTIONS
Counseling and Referral of Other Preventative  (Italic type indicates deductible and co-insurance are waived)    Patient Name: Cornelia Delatorre  Today's Date: 3/20/2025    Health Maintenance       Date Due Completion Date    Aspirin/Antiplatelet Therapy Never done ---    COVID-19 Vaccine (7 - 2024-25 season) 09/19/2024 7/25/2024    Foot Exam 03/07/2025 3/7/2024    Override on 1/25/2021: Done ()    Override on 5/20/2019: Done (Dr. Eng)    Override on 11/2/2018: Done (Diego)    Override on 4/27/2018: Done (Diego)    Override on 9/1/2016: Done (date approximately, seeing Dr. Willams)    Override on 1/4/2016: Done (with Dr. Eng)    Override on 10/19/2015: Done (Dr. Eng)    Override on 10/22/2014: Done (Dr Eng)    Diabetes Urine Screening 03/11/2025 3/11/2024    Lipid Panel 03/11/2025 3/11/2024    Hemoglobin A1c 03/30/2025 9/30/2024    Mammogram 04/01/2025 4/1/2024    Colorectal Cancer Screening 10/05/2025 10/5/2020    Diabetic Eye Exam 11/01/2025 11/1/2024    Override on 8/31/2017: Done (Dr. Genaro Sanderson)    Override on 8/29/2016: Done (Genaro Sanderson)    Override on 8/20/2015: Done    Override on 8/19/2014: Done (Dr Sanderson)    High Dose Statin 03/19/2026 3/19/2025    DEXA Scan 03/20/2027 3/20/2024    TETANUS VACCINE 03/20/2034 3/20/2024        No orders of the defined types were placed in this encounter.    The following information is provided to all patients.  This information is to help you find resources for any of the problems found today that may be affecting your health:                  Living healthy guide: www.[facility].[place].gov      Understanding Diabetes: www.diabetes.org      Eating healthy: www.cdc.gov/healthyweight      CDC home safety checklist: www.cdc.gov/steadi/patient.html      Agency on Aging: www.goea.[place].[facility]      Alcoholics anonymous (AA): www.aa.org      Physical Activity: www.richard.nih.gov/tm4pirk      Tobacco use: www.quitwithusla.org

## 2025-03-20 NOTE — PROGRESS NOTES
"  Cornelia Delatorre presented for an initial Medicare AWV today. The following components were reviewed and updated:    Medical history  Family History  Social history  Allergies and Current Medications  Health Risk Assessment  Health Maintenance  Care Team    **See Completed Assessments for Annual Wellness visit with in the encounter summary    The following assessments were completed:  Depression Screening  Cognitive function Screening    Timed Get Up Test  Whisper Test    Opioid documentation:  Patient does not have a current opioid prescription.        Vitals:    03/20/25 0704   BP: 136/78   BP Location: Left forearm   Patient Position: Sitting   Pulse: 87   Resp: 16   SpO2: 97%   Weight: 93.2 kg (205 lb 7.5 oz)   Height: 5' 4" (1.626 m)     Body mass index is 35.27 kg/m².     Physical Exam  Vitals reviewed.   Constitutional:       General: She is not in acute distress.     Appearance: She is not ill-appearing.   Cardiovascular:      Pulses:           Dorsalis pedis pulses are 1+ on the right side and 1+ on the left side.        Posterior tibial pulses are 1+ on the right side and 1+ on the left side.   Pulmonary:      Effort: Pulmonary effort is normal. No respiratory distress.   Musculoskeletal:      Right foot: Normal range of motion. No bunion, Charcot foot, foot drop or prominent metatarsal heads.      Left foot: Normal range of motion. No bunion, Charcot foot, foot drop or prominent metatarsal heads.   Feet:      Right foot:      Protective Sensation: 6 sites tested.  6 sites sensed.      Skin integrity: Skin integrity normal.      Toenail Condition: Right toenails are abnormally thick.      Left foot:      Protective Sensation: 6 sites tested.  6 sites sensed.      Skin integrity: Skin integrity normal.      Toenail Condition: Left toenails are abnormally thick.   Neurological:      Mental Status: She is alert and oriented to person, place, and time.   Psychiatric:         Mood and Affect: Mood normal.   "       Behavior: Behavior normal.         Thought Content: Thought content normal.         Judgment: Judgment normal.       Diagnoses and health risks identified today and associated recommendations/orders:  1. Encounter for Medicare annual wellness exam  Reviewed and discussed health maintenance.    - Mammo Digital Screening Bilat w/ Florencio (XPD); Future    2. Diabetic polyneuropathy associated with type 2 diabetes mellitus  3. Onychomycosis of multiple toenails with type 2 diabetes mellitus  4. DM type 2 with diabetic peripheral neuropathy  Stable- continue current treatment and follow up routinely with PCP   Foot exam done - podiatry prn ()  - gabapentin (NEURONTIN) 600 MG tablet; Take 1 tablet (600 mg total) by mouth 3 (three) times daily.  Dispense: 540 tablet; Refill: 3  Metformin 500mg bid    5. Type 2 diabetes mellitus with other specified complication, without long-term current use of insulin  Stable- continue current treatment and follow up routinely with PCP   Metformin 500mg bid  Lab Results   Component Value Date    HGBA1C 5.4 09/30/2024      6. Asthma with COPD  Stable- continue current treatment and follow up routinely with PCP and pulmonary ()  Pulmicort nebs prn, trelegy daily and xopenex prn  Singulair daily    7. Bronchiectasis without complication  Stable- continue current treatment and follow up routinely with PCP and pulmonary ()  Pulmicort nebs prn, trelegy daily and xopenex prn  Singulair daily    8. Severe persistent asthma without complication  Stable- continue current treatment and follow up routinely with PCP and pulmonary ()  - EPINEPHrine (EPIPEN) 0.3 mg/0.3 mL AtIn; Inject 0.3 mLs (0.3 mg total) into the muscle as needed (anaphylaxis).  Dispense: 1 each; Refill: 3    9. CVID (common variable immunodeficiency)  10. Immune deficiency disorder  Stable- continue current treatment and follow up routinely with PCP and allergist ()  - EPINEPHrine (EPIPEN)  0.3 mg/0.3 mL AtIn; Inject 0.3 mLs (0.3 mg total) into the muscle as needed (anaphylaxis).  Dispense: 1 each; Refill: 3  1.Continue IgG replacement - Hizentra 11g weekly  2. continue azelastine but increase to 2 SEN BID, montelukast 10 mg daily, allegra daily and Flonase 2 SEN daily and add azelastine eye drop 1 drop each eye BID as needed    11. Nonthrombocytopenic purpura  Unchanged and Stable- continue current treatment and follow up routinely with PCP     12. Severe obesity (BMI 35.0-39.9) with comorbidity  Encouraged healthy eating, weight loss and routine exercise      13. Acquired hypothyroidism  Stable- continue current treatment and follow up routinely with PCP   Was on synthroid in the past --  TSH now WNL. TPO +. Asymptomatic. Should get TSH with annual physical  Lab Results   Component Value Date    TSH 1.043 12/02/2024      14. Atherosclerosis of abdominal aorta  Stable- continue current treatment and follow up routinely with PCP   Htn and hld controlled  On a statin    15. Bilateral carotid artery stenosis  Stable- continue current treatment and follow up routinely with PCP   Htn and hld controlled  On a statin    16. Tortuous aorta  Stable- continue current treatment and follow up routinely with PCP   Htn and hld controlled  On a statin    17. Dyslipidemia  Stable- continue current treatment and follow up routinely with PCP   - pravastatin (PRAVACHOL) 80 MG tablet; Take 1 tablet (80 mg total) by mouth once daily.  Dispense: 90 tablet; Refill: 4  Lab Results   Component Value Date    CHOL 138 03/11/2024    CHOL 162 03/03/2023    CHOL 147 09/02/2022     Lab Results   Component Value Date    HDL 53 03/11/2024    HDL 61 03/03/2023    HDL 54 09/02/2022     Lab Results   Component Value Date    LDLCALC 56.2 (L) 03/11/2024    LDLCALC 81.6 03/03/2023    LDLCALC 70.6 09/02/2022     Lab Results   Component Value Date    TRIG 144 03/11/2024    TRIG 97 03/03/2023    TRIG 112 09/02/2022       Lab Results    Component Value Date    CHOLHDL 38.4 03/11/2024    CHOLHDL 37.7 03/03/2023    CHOLHDL 36.7 09/02/2022      18. Essential hypertension  Stable- continue current treatment and follow up routinely with PCP   Cardizem 120mg daily, cardura 2mg every evening, diovan-hct 320/25 daily    19. Encounter for screening mammogram for malignant neoplasm of breast  - Mammo Digital Screening Bilat w/ Florencio (XPD); Future    Provided Cornelia with a 5-10 year written screening schedule and personal prevention plan. Recommendations were developed using the USPSTF age appropriate recommendations. Education, counseling, and referrals were provided as needed.  After Visit Summary printed and given to patient which includes a list of additional screenings\tests needed.    No follow-ups on file.      Sera Monroy NP    I offered to discuss advanced care planning, including how to pick a person who would make decisions for you if you were unable to make them for yourself, called a health care power of , and what kind of decisions you might make such as use of life sustaining treatments such as ventilators and tube feeding when faced with a life limiting illness recorded on a living will that they will need to know. (How you want to be cared for as you near the end of your natural life)   X  Patient has advanced directives on file, which we reviewed, and they do not wish to make changes.

## 2025-03-26 ENCOUNTER — OFFICE VISIT (OUTPATIENT)
Dept: ORTHOPEDICS | Facility: CLINIC | Age: 75
End: 2025-03-26
Payer: MEDICARE

## 2025-03-26 VITALS — RESPIRATION RATE: 16 BRPM

## 2025-03-26 DIAGNOSIS — M17.11 PRIMARY OSTEOARTHRITIS OF RIGHT KNEE: Primary | ICD-10-CM

## 2025-03-26 PROCEDURE — 1157F ADVNC CARE PLAN IN RCRD: CPT | Mod: HCNC,CPTII,S$GLB, | Performed by: ORTHOPAEDIC SURGERY

## 2025-03-26 PROCEDURE — 99999 PR PBB SHADOW E&M-EST. PATIENT-LVL II: CPT | Mod: PBBFAC,HCNC,, | Performed by: ORTHOPAEDIC SURGERY

## 2025-03-26 PROCEDURE — 20610 DRAIN/INJ JOINT/BURSA W/O US: CPT | Mod: HCNC,RT,S$GLB, | Performed by: ORTHOPAEDIC SURGERY

## 2025-03-26 PROCEDURE — 1160F RVW MEDS BY RX/DR IN RCRD: CPT | Mod: HCNC,CPTII,S$GLB, | Performed by: ORTHOPAEDIC SURGERY

## 2025-03-26 PROCEDURE — 1125F AMNT PAIN NOTED PAIN PRSNT: CPT | Mod: HCNC,CPTII,S$GLB, | Performed by: ORTHOPAEDIC SURGERY

## 2025-03-26 PROCEDURE — 99214 OFFICE O/P EST MOD 30 MIN: CPT | Mod: 25,HCNC,S$GLB, | Performed by: ORTHOPAEDIC SURGERY

## 2025-03-26 PROCEDURE — 1159F MED LIST DOCD IN RCRD: CPT | Mod: HCNC,CPTII,S$GLB, | Performed by: ORTHOPAEDIC SURGERY

## 2025-03-26 RX ORDER — TRIAMCINOLONE ACETONIDE 40 MG/ML
40 INJECTION, SUSPENSION INTRA-ARTICULAR; INTRAMUSCULAR
Status: DISCONTINUED | OUTPATIENT
Start: 2025-03-26 | End: 2025-03-26 | Stop reason: HOSPADM

## 2025-03-26 RX ADMIN — TRIAMCINOLONE ACETONIDE 40 MG: 40 INJECTION, SUSPENSION INTRA-ARTICULAR; INTRAMUSCULAR at 01:03

## 2025-03-26 NOTE — PROGRESS NOTES
Past Medical History:   Diagnosis Date    Allergy     Arthritis     Asthma     Cancer     skin cancer to right hand    Cataract     Chronic fatigue 01/24/2017    CVID (common variable immunodeficiency) 03/07/2019    Diabetes mellitus     type2    KLINE (dyspnea on exertion) 01/24/2017    Dyslipidemia 06/25/2019    Encounter for blood transfusion     Essential hypertension 12/29/2011    GERD (gastroesophageal reflux disease)     Headache(784.0)     Hyperlipidemia 07/15/2015    Hypertension     Hypothyroidism     Neuropathy     Obesity     Postmenopausal HRT (hormone replacement therapy)     Rash     Rosacea     Sleep apnea     on bipap    Snoring     Squamous cell carcinoma of skin     left forearm     UTI (urinary tract infection)     Wears glasses        Past Surgical History:   Procedure Laterality Date    ADENOIDECTOMY      APPENDECTOMY      BLADDER SUSPENSION      BREAST BIOPSY Bilateral 08/1992    bilateral benign excisional biopsies    BUNIONECTOMY  10/17/2014    right, still has discomfort    CATARACT EXTRACTION W/  INTRAOCULAR LENS IMPLANT Bilateral     CHOLECYSTECTOMY  08/02/2017    COLONOSCOPY      CYSTOSCOPY      EPIDURAL STEROID INJECTION INTO LUMBAR SPINE N/A 08/14/2019    Procedure: Injection-steroid-epidural-lumbar L5/S1;  Surgeon: Fredi Rojas MD;  Location: [facility] OR;  Service: Pain Management;  Laterality: N/A;    EPIDURAL STEROID INJECTION INTO LUMBAR SPINE N/A 05/03/2021    Procedure: Injection-steroid-epidural-lumbar L5/S1 to the Left;  Surgeon: Fredi Rojas MD;  Location: [facility] OR;  Service: Pain Management;  Laterality: N/A;    EPIDURAL STEROID INJECTION INTO LUMBAR SPINE N/A 09/24/2021    Procedure: Injection-steroid-epidural-lumbar L5/S1;  Surgeon: Fredi Rojas MD;  Location: [facility] OR;  Service: Pain Management;  Laterality: N/A;    EPIDURAL STEROID INJECTION INTO LUMBAR SPINE N/A 02/21/2022    Procedure: Injection-steroid-epidural-lumbar L5/S1;  Surgeon: Fredi Rojas MD;  Location:  [facility] OR;  Service: Pain Management;  Laterality: N/A;    EPIDURAL STEROID INJECTION INTO LUMBAR SPINE N/A 02/09/2024    Procedure: Injection-steroid-epidural-lumbar       L5/S1;  Surgeon: Fredi Rojas MD;  Location: [facility] OR;  Service: Pain Management;  Laterality: N/A;    ESOPHAGEAL DILATION N/A 06/11/2021    Procedure: DILATION, ESOPHAGUS;  Surgeon: Jake Sheriff MD;  Location: [facility] ENDO;  Service: Endoscopy;  Laterality: N/A;    ESOPHAGOGASTRODUODENOSCOPY      dilated esophagus    ESOPHAGOGASTRODUODENOSCOPY N/A 06/11/2021    Procedure: EGD (ESOPHAGOGASTRODUODENOSCOPY);  Surgeon: Jake Sheriff MD;  Location: [facility] ENDO;  Service: Endoscopy;  Laterality: N/A;    GASTRIC BYPASS      2011    HYSTERECTOMY  02/1980    interstim bladder  2009    10/14/14 new battery    OOPHORECTOMY  02/1980    PERCUTANEOUS INSERTION OF SACRAL NERVE NEUROSTIMULATOR ELECTRODE LEAD N/A 6/5/2024    Procedure: INSERTION, ELECTRODE LEAD, NEUROSTIMULATOR, SACRAL, PERCUTANEOUS;  Surgeon: Mary Jane Jarvis MD;  [facility];  Service: Urology;  Laterality: N/A;  1 hour 30 minutes    REPLACEMENT OF NERVE STIMULATOR BATTERY N/A 6/5/2024    Procedure: Replacement, Battery, Neurostimulator;  Surgeon: Mary Jane Jarvis MD;  [facility];  Service: Urology;  Laterality: N/A;  1 hour 30 minutes    REPLACEMENT OF SACRAL NERVE STIMULATOR  02/18/2020    Procedure: REPLACEMENT, NEUROSTIMULATOR, SACRAL;  Surgeon: Mary Jane Jarvis MD;  Location: [facility] OR 2ND FLR;  Service: Urology;;    REVISION OF PROCEDURE INVOLVING SACRAL NEUROSTIMULATOR DEVICE Right 02/18/2020    Procedure: REVISION, NEUROSTIMULATOR, SACRAL/ battery replacement;  Surgeon: Mary Jane Jarvis MD;  Location: [facility] OR 2ND FLR;  Service: Urology;  Laterality: Right;  1hr/ rep contacted/ (CONNIE)    SKIN CANCER EXCISION      left hand    TONSILLECTOMY      TRANSFORAMINAL EPIDURAL INJECTION OF STEROID Bilateral 03/05/2024    Procedure:  Injection,steroid,epidural,transforaminal approach      L4/5;  Surgeon: Fredi Rojas MD;  Location: [facility] OR;  Service: Pain Management;  Laterality: Bilateral;    TRANSFORAMINAL EPIDURAL INJECTION OF STEROID Bilateral 9/16/2024    Procedure: Injection,steroid,epidural,transforaminal approach    L4/5;  Surgeon: Fredi Rojas MD;  Location: [facility] OR;  Service: Pain Management;  Laterality: Bilateral;    WISDOM TOOTH EXTRACTION         Current Outpatient Medications   Medication Sig    ascorbic acid, vitamin C, (VITAMIN C) 1000 MG tablet Take 1,000 mg by mouth 2 (two) times daily.     azelastine (ASTELIN) 137 mcg (0.1 %) nasal spray 2 sprays (274 mcg total) by Nasal route 2 (two) times daily.    azelastine (OPTIVAR) 0.05 % ophthalmic solution Place 1 drop into both eyes 2 (two) times daily. As needed    B-complex with vitamin C (Z-BEC OR EQUIV) tablet Take 1 tablet by mouth once daily.    benzonatate (TESSALON) 200 MG capsule Take 1 capsule (200 mg total) by mouth 3 (three) times daily as needed for Cough.    Bifidobacterium infantis (ALIGN ORAL) Take by mouth once daily.    biotin 10,000 mcg Cap Take 1 tablet by mouth every evening.     blood sugar diagnostic [employer] preferred meter and strips. Check daily    blood-glucose meter kit Use as instructed. Insurance preferred meter.    budesonide (PULMICORT) 0.25 mg/2 mL nebulizer solution Take 2 mLs (0.25 mg total) by nebulization once daily. Controller    cranberry 500 mg Cap Take 1 capsule by mouth every evening.    cyclobenzaprine (FLEXERIL) 10 MG tablet Take 10 mg by mouth 3 (three) times daily. (Patient taking differently: Take 10 mg by mouth 3 (three) times daily as needed for Muscle spasms.)    diltiaZEM (CARDIZEM CD) 120 MG Cp24 Take 1 capsule (120 mg total) by mouth once daily.    doxazosin (CARDURA) 2 MG tablet TAKE 1 TABLET EVERY EVENING    EPINEPHrine (EPIPEN) 0.3 mg/0.3 mL AtIn Inject 0.3 mLs (0.3 mg total) into the muscle as needed  (anaphylaxis).    erythromycin with ethanoL (THERAMYCIN) 2 % external solution Aaa bid prn    esomeprazole (NEXIUM) 40 MG capsule Take 1 capsule (40 mg total) by mouth before breakfast.    estradioL (ESTRACE) 0.01 % (0.1 mg/gram) vaginal cream Place 1 g vaginally 3 (three) times a week. Place by fingertip application before bedtime three times a week (Monday, Wednesday, Friday)    fexofenadine (ALLEGRA) 180 MG tablet Take 180 mg by mouth once daily.     fish oil-omega-3 fatty acids 300-1,000 mg capsule Take 2 g by mouth once daily.    fluticasone propionate (FLONASE) 50 mcg/actuation nasal spray 2 sprays (100 mcg total) by Each Nostril route once daily.    fluticasone-umeclidin-vilanter (TRELEGY ELLIPTA) 200-62.5-25 mcg inhaler Inhale 1 puff into the lungs once daily.    gabapentin (NEURONTIN) 600 MG tablet Take 1 tablet (600 mg total) by mouth 3 (three) times daily.    hydrOXYzine HCL (ATARAX) 10 MG Tab Take 1 tablet (10 mg total) by mouth 3 (three) times daily as needed.    ibandronate (BONIVA) 150 mg tablet Take 1 tablet (150 mg total) by mouth every 30 days.    immun glob G,IgG,-pro-IgA 0-50 (HIZENTRA) 1 gram/5 mL (20 %) Soln Inject 55 mLs (11 g total) into the skin once a week.    inhalation spacing device Use as directed for inhalation.    lancets (ACCU-CHEK SOFTCLIX LANCETS) Misc Use to check blood sugar daily.    levalbuterol (XOPENEX HFA) 45 mcg/actuation inhaler Inhale 1-2 puffs into the lungs every 4 (four) hours as needed for Wheezing or Shortness of Breath. Rescue    metFORMIN (GLUCOPHAGE-XR) 500 MG ER 24hr tablet Take 1 tablet (500 mg total) by mouth 2 (two) times daily with meals.    montelukast (SINGULAIR) 10 mg tablet Take 1 tablet (10 mg total) by mouth every evening.    mucus clearing device Cecy 1 Device by Misc.(Non-Drug; Combo Route) route 2 (two) times daily.    multivitamin capsule Take 1 capsule by mouth once daily.     pravastatin (PRAVACHOL) 80 MG tablet Take 1 tablet (80 mg total) by  mouth once daily.    psyllium husk (METAMUCIL ORAL) Take by mouth.    SIMETHICONE (GAS-X ORAL) Take 1 capsule by mouth as needed (gas relief).     tezepelumab-ekko (TEZSPIRE) 210 mg/1.91 mL (110 mg/mL) PnIj Inject 1.91 mLs (210.1 mg total) into the skin every 28 days.    valsartan-hydrochlorothiazide (DIOVAN-HCT) 320-25 mg per tablet Take 1 tablet by mouth once daily.    vitamin D3-folic acid 5,000 unit- 1 mg Tab Take 1 tablet by mouth once daily.     vitamin E, dl,tocopheryl acet, (VITAMIN E, DL, ACETATE, ORAL) Take 1 tablet by mouth once daily.    WELCHOL 625 mg tablet Take 625 mg by mouth.     No current facility-administered medications for this visit.       Review of patient's allergies indicates:   Allergen Reactions    Sulfa (sulfonamide antibiotics) Hives    Naproxen Other (See Comments)     Other reaction(s): RT sided numbness         Family History   Problem Relation Name Age of Onset    Breast cancer Mother  50    Breast cancer Paternal Aunt          40's    Cervical cancer Paternal Grandmother      Ovarian cancer Paternal Grandmother      Cervical cancer Paternal Cousin      Ovarian cancer Paternal Cousin 1st     Diabetes Other      Hypertension Other      Hypothyroidism Other      Allergic rhinitis Neg Hx      Allergies Neg Hx      Angioedema Neg Hx      Asthma Neg Hx      Atopy Neg Hx      Eczema Neg Hx      Immunodeficiency Neg Hx      Rhinitis Neg Hx      Urticaria Neg Hx      Uterine cancer Neg Hx         Social History     Socioeconomic History    Marital status:    Tobacco Use    Smoking status: Never    Smokeless tobacco: Never   Substance and Sexual Activity    Alcohol use: Not Currently    Drug use: No    Sexual activity: Not Currently     Social Drivers of Health     Financial Resource Strain: Medium Risk (3/20/2025)    Overall Financial Resource Strain (CARDIA)     Difficulty of Paying Living Expenses: Somewhat hard   Food Insecurity: Food Insecurity Present (3/20/2025)    Hunger Vital  Sign     Worried About Running Out of Food in the Last Year: Sometimes true     Ran Out of Food in the Last Year: Sometimes true   Transportation Needs: No Transportation Needs (3/20/2025)    PRAPARE - Transportation     Lack of Transportation (Medical): No     Lack of Transportation (Non-Medical): No   Physical Activity: Insufficiently Active (3/20/2025)    Exercise Vital Sign     Days of Exercise per Week: 3 days     Minutes of Exercise per Session: 20 min   Stress: No Stress Concern Present (3/20/2025)    [employer] [place] [employer] - Occupational Stress Questionnaire     Feeling of Stress : Only a little   Housing Stability: Unknown (3/20/2025)    Housing Stability Vital Sign     Unable to Pay for Housing in the Last Year: Patient declined       Chief Complaint:   No chief complaint on file.      History of present illness:  74-year-old female comes in with knee pain.   Patient has had an issue with arthritis in the past.  Has had viscosupplementation previously with good success.  Pain over the medial aspect of the right knee.  Recent Orthovisc series did not help for very long.  Pain is an 8/10.    Physical Examination:    Vital Signs:    There were no vitals filed for this visit.          There is no height or weight on file to calculate BMI.    This a well-developed, well nourished patient in no acute distress.  They are alert and oriented and cooperative to examination.  Pt. walks without an antalgic gait.      Examination of the right knee shows no rashes or erythema. There are no masses ecchymosis or effusion. Patient has full range of motion from 0-130°. Patient is nontender to palpation over lateral joint line and moderately tender to palpation over the medial joint line.  Knee is stable to varus and valgus stress. 5 out of 5 motor strength. Palpable distal pulses. Intact light touch sensation. Negative Patellofemoral crepitus      X-rays:  X-rays of the right knee is  reviewed which show  moderate to severe medial joint space narrowing.  More moderate medial narrowing of the left knee     Assessment::  Moderate to severe right varus knee arthritis  Left knee arthritis     Plan:  I reviewed the findings with her today.  I injected her right knee with cortisone again today.  Follow up as needed    This note was created using [employer] voice recognition software that occasionally misinterpreted phrases or words.    Consult note is delivered via Epic messaging service.

## 2025-03-26 NOTE — PROCEDURES
Large Joint Aspiration/Injection: R knee    Date/Time: 3/26/2025 1:00 PM    Performed by: Robbin Edmond MD  Authorized by: Robbin Edmond MD    Consent Done?:  Yes (Verbal)  Indications:  Pain  Site marked: the procedure site was marked    Timeout: prior to procedure the correct patient, procedure, and site was verified    Local anesthetic: Ropivicaine.  Anesthetic total (ml):  3      Details:  Needle Size:  20 G  Approach:  Anterolateral  Location:  Knee  Site:  R knee  Medications:  40 mg triamcinolone acetonide 40 mg/mL  Patient tolerance:  Patient tolerated the procedure well with no immediate complications

## 2025-03-30 NOTE — PROCEDURES
Small Joint Aspiration/Injection: R index MCP  Date/Time: 1/8/2020 9:20 AM  Performed by: Xavi Mckeon MD  Authorized by: Xavi Mckeon MD     Consent Done?:  Yes (Verbal)  Indications:  Pain  Site marked: The procedure site was marked    Timeout: Prior to procedure the correct patient, procedure, and site was verified      Location:  Index finger  Site:  R index MCP  Prep: Patient was prepped and draped in usual sterile fashion    Ultrasonic Guidance for needle placement: No  Needle size:  25 G  Approach:  Dorsal  Medications:  40 mg triamcinolone acetonide 40 mg/mL (20mg)  Patient tolerance:  Patient tolerated the procedure well with no immediate complications      
Yes

## 2025-04-08 ENCOUNTER — TELEPHONE (OUTPATIENT)
Dept: ORTHOPEDICS | Facility: CLINIC | Age: 75
End: 2025-04-08
Payer: MEDICARE

## 2025-04-08 ENCOUNTER — RESULTS FOLLOW-UP (OUTPATIENT)
Dept: FAMILY MEDICINE | Facility: CLINIC | Age: 75
End: 2025-04-08

## 2025-04-08 NOTE — TELEPHONE ENCOUNTER
----- Message from Robbin Edmond MD sent at 4/8/2025  3:12 PM CDT -----  Ok to scedheule as outpt  ----- Message -----  From: Mer Fonseca LPN  Sent: 4/8/2025   2:14 PM CDT  To: Robbin Edmond MD    Established pt seen for right knee in March Went to [facility] today due to fall Ok to schedule?  ----- Message -----  From: Genaro Pabon  Sent: 4/8/2025   2:06 PM CDT  To: Mayito Nettles Staff    Son / Oscar would like a callbackPatient is [facility] now for a fractured patellaPatient has been seeing Dr. Edmond for her knees (Last seen on 03/26/25) but Dr. Copeland is on call for [facility] today.Son would like to know if Dr. Edmond could see her for a follow [phone]

## 2025-04-09 ENCOUNTER — OFFICE VISIT (OUTPATIENT)
Dept: ORTHOPEDICS | Facility: CLINIC | Age: 75
End: 2025-04-09
Payer: MEDICARE

## 2025-04-09 VITALS — RESPIRATION RATE: 16 BRPM

## 2025-04-09 DIAGNOSIS — S82.091A OTHER CLOSED FRACTURE OF RIGHT PATELLA, INITIAL ENCOUNTER: ICD-10-CM

## 2025-04-09 DIAGNOSIS — S82.091A OTHER CLOSED FRACTURE OF RIGHT PATELLA, INITIAL ENCOUNTER: Primary | ICD-10-CM

## 2025-04-09 PROCEDURE — 99999 PR PBB SHADOW E&M-EST. PATIENT-LVL III: CPT | Mod: PBBFAC,HCNC,, | Performed by: ORTHOPAEDIC SURGERY

## 2025-04-09 RX ORDER — ONDANSETRON HYDROCHLORIDE 8 MG/1
8 TABLET, FILM COATED ORAL EVERY 8 HOURS PRN
Qty: 30 TABLET | Refills: 0 | Status: SHIPPED | OUTPATIENT
Start: 2025-04-09

## 2025-04-09 RX ORDER — CYCLOBENZAPRINE HCL 10 MG
10 TABLET ORAL 3 TIMES DAILY PRN
Qty: 30 TABLET | Refills: 0 | Status: SHIPPED | OUTPATIENT
Start: 2025-04-09 | End: 2025-04-19

## 2025-04-09 NOTE — PROGRESS NOTES
Past Medical History:   Diagnosis Date    Allergy     Arthritis     Asthma     Cancer     skin cancer to right hand    Cataract     Chronic fatigue 01/24/2017    CVID (common variable immunodeficiency) 03/07/2019    Diabetes mellitus     type2    KLINE (dyspnea on exertion) 01/24/2017    Dyslipidemia 06/25/2019    Encounter for blood transfusion     Essential hypertension 12/29/2011    GERD (gastroesophageal reflux disease)     Headache(784.0)     Hyperlipidemia 07/15/2015    Hypertension     Hypothyroidism     Neuropathy     Obesity     Postmenopausal HRT (hormone replacement therapy)     Rash     Rosacea     Sleep apnea     on bipap    Snoring     Squamous cell carcinoma of skin     left forearm     UTI (urinary tract infection)     Wears glasses        Past Surgical History:   Procedure Laterality Date    ADENOIDECTOMY      APPENDECTOMY      BLADDER SUSPENSION      BREAST BIOPSY Bilateral 08/1992    bilateral benign excisional biopsies    BUNIONECTOMY  10/17/2014    right, still has discomfort    CATARACT EXTRACTION W/  INTRAOCULAR LENS IMPLANT Bilateral     CHOLECYSTECTOMY  08/02/2017    COLONOSCOPY      CYSTOSCOPY      EPIDURAL STEROID INJECTION INTO LUMBAR SPINE N/A 08/14/2019    Procedure: Injection-steroid-epidural-lumbar L5/S1;  Surgeon: Fredi Rojas MD;  Location: [facility] OR;  Service: Pain Management;  Laterality: N/A;    EPIDURAL STEROID INJECTION INTO LUMBAR SPINE N/A 05/03/2021    Procedure: Injection-steroid-epidural-lumbar L5/S1 to the Left;  Surgeon: Fredi Rojas MD;  Location: [facility] OR;  Service: Pain Management;  Laterality: N/A;    EPIDURAL STEROID INJECTION INTO LUMBAR SPINE N/A 09/24/2021    Procedure: Injection-steroid-epidural-lumbar L5/S1;  Surgeon: Fredi Rojas MD;  Location: [facility] OR;  Service: Pain Management;  Laterality: N/A;    EPIDURAL STEROID INJECTION INTO LUMBAR SPINE N/A 02/21/2022    Procedure: Injection-steroid-epidural-lumbar L5/S1;  Surgeon: Fredi Rojas MD;  Location:  [facility] OR;  Service: Pain Management;  Laterality: N/A;    EPIDURAL STEROID INJECTION INTO LUMBAR SPINE N/A 02/09/2024    Procedure: Injection-steroid-epidural-lumbar       L5/S1;  Surgeon: Fredi Rojas MD;  Location: [facility] OR;  Service: Pain Management;  Laterality: N/A;    ESOPHAGEAL DILATION N/A 06/11/2021    Procedure: DILATION, ESOPHAGUS;  Surgeon: Jake Sheriff MD;  Location: [facility] ENDO;  Service: Endoscopy;  Laterality: N/A;    ESOPHAGOGASTRODUODENOSCOPY      dilated esophagus    ESOPHAGOGASTRODUODENOSCOPY N/A 06/11/2021    Procedure: EGD (ESOPHAGOGASTRODUODENOSCOPY);  Surgeon: Jake Sheriff MD;  Location: [facility] ENDO;  Service: Endoscopy;  Laterality: N/A;    GASTRIC BYPASS      2011    HYSTERECTOMY  02/1980    interstim bladder  2009    10/14/14 new battery    OOPHORECTOMY  02/1980    PERCUTANEOUS INSERTION OF SACRAL NERVE NEUROSTIMULATOR ELECTRODE LEAD N/A 6/5/2024    Procedure: INSERTION, ELECTRODE LEAD, NEUROSTIMULATOR, SACRAL, PERCUTANEOUS;  Surgeon: Mary Jane Jarvis MD;  Location: [facility] OR;  Service: Urology;  Laterality: N/A;  1 hour 30 minutes    REPLACEMENT OF NERVE STIMULATOR BATTERY N/A 6/5/2024    Procedure: Replacement, Battery, Neurostimulator;  Surgeon: Mary Jane Jarvis MD;  Location: [facility] OR;  Service: Urology;  Laterality: N/A;  1 hour 30 minutes    REPLACEMENT OF SACRAL NERVE STIMULATOR  02/18/2020    Procedure: REPLACEMENT, NEUROSTIMULATOR, SACRAL;  Surgeon: Mary Jane Jarvis MD;  Location: [facility] OR 2ND FLR;  Service: Urology;;    REVISION OF PROCEDURE INVOLVING SACRAL NEUROSTIMULATOR DEVICE Right 02/18/2020    Procedure: REVISION, NEUROSTIMULATOR, SACRAL/ battery replacement;  Surgeon: Mary Jane Jarvis MD;  Location: [facility] OR 2ND FLR;  Service: Urology;  Laterality: Right;  1hr/ rep contacted/ (CONNIE)    SKIN CANCER EXCISION      left hand    TONSILLECTOMY      TRANSFORAMINAL EPIDURAL INJECTION OF STEROID Bilateral 03/05/2024    Procedure:  Injection,steroid,epidural,transforaminal approach      L4/5;  Surgeon: Fredi Rojas MD;  Location: [facility] OR;  Service: Pain Management;  Laterality: Bilateral;    TRANSFORAMINAL EPIDURAL INJECTION OF STEROID Bilateral 9/16/2024    Procedure: Injection,steroid,epidural,transforaminal approach    L4/5;  Surgeon: Fredi Rjoas MD;  Location: [facility] OR;  Service: Pain Management;  Laterality: Bilateral;    WISDOM TOOTH EXTRACTION         Current Outpatient Medications   Medication Sig    ascorbic acid, vitamin C, (VITAMIN C) 1000 MG tablet Take 1,000 mg by mouth 2 (two) times daily.     azelastine (ASTELIN) 137 mcg (0.1 %) nasal spray 2 sprays (274 mcg total) by Nasal route 2 (two) times daily.    azelastine (OPTIVAR) 0.05 % ophthalmic solution Place 1 drop into both eyes 2 (two) times daily. As needed    B-complex with vitamin C (Z-BEC OR EQUIV) tablet Take 1 tablet by mouth once daily.    benzonatate (TESSALON) 200 MG capsule Take 1 capsule (200 mg total) by mouth 3 (three) times daily as needed for Cough.    Bifidobacterium infantis (ALIGN ORAL) Take by mouth once daily.    biotin 10,000 mcg Cap Take 1 tablet by mouth every evening.     blood sugar diagnostic [employer] preferred meter and strips. Check daily    blood-glucose meter kit Use as instructed. Insurance preferred meter.    budesonide (PULMICORT) 0.25 mg/2 mL nebulizer solution Take 2 mLs (0.25 mg total) by nebulization once daily. Controller    cephALEXin (KEFLEX) 500 MG capsule Take 1 capsule (500 mg total) by mouth every 12 (twelve) hours. for 5 days    cranberry 500 mg Cap Take 1 capsule by mouth every evening.    cyclobenzaprine (FLEXERIL) 10 MG tablet Take 10 mg by mouth 3 (three) times daily. (Patient taking differently: Take 10 mg by mouth 3 (three) times daily as needed for Muscle spasms.)    diltiaZEM (CARDIZEM CD) 120 MG Cp24 Take 1 capsule (120 mg total) by mouth once daily.    doxazosin (CARDURA) 2 MG tablet TAKE 1 TABLET EVERY EVENING     EPINEPHrine (EPIPEN) 0.3 mg/0.3 mL AtIn Inject 0.3 mLs (0.3 mg total) into the muscle as needed (anaphylaxis).    erythromycin with ethanoL (THERAMYCIN) 2 % external solution Aaa bid prn    esomeprazole (NEXIUM) 40 MG capsule Take 1 capsule (40 mg total) by mouth before breakfast.    estradioL (ESTRACE) 0.01 % (0.1 mg/gram) vaginal cream Place 1 g vaginally 3 (three) times a week. Place by fingertip application before bedtime three times a week (Monday, Wednesday, Friday)    fexofenadine (ALLEGRA) 180 MG tablet Take 180 mg by mouth once daily.     fish oil-omega-3 fatty acids 300-1,000 mg capsule Take 2 g by mouth once daily.    fluticasone propionate (FLONASE) 50 mcg/actuation nasal spray 2 sprays (100 mcg total) by Each Nostril route once daily.    fluticasone-umeclidin-vilanter (TRELEGY ELLIPTA) 200-62.5-25 mcg inhaler Inhale 1 puff into the lungs once daily.    gabapentin (NEURONTIN) 600 MG tablet Take 1 tablet (600 mg total) by mouth 3 (three) times daily.    hydrOXYzine HCL (ATARAX) 10 MG Tab Take 1 tablet (10 mg total) by mouth 3 (three) times daily as needed.    ibandronate (BONIVA) 150 mg tablet Take 1 tablet (150 mg total) by mouth every 30 days.    immun glob G,IgG,-pro-IgA 0-50 (HIZENTRA) 1 gram/5 mL (20 %) Soln Inject 55 mLs (11 g total) into the skin once a week.    inhalation spacing device Use as directed for inhalation.    lancets (ACCU-CHEK SOFTCLIX LANCETS) Misc Use to check blood sugar daily.    levalbuterol (XOPENEX HFA) 45 mcg/actuation inhaler Inhale 1-2 puffs into the lungs every 4 (four) hours as needed for Wheezing or Shortness of Breath. Rescue    metFORMIN (GLUCOPHAGE-XR) 500 MG ER 24hr tablet Take 1 tablet (500 mg total) by mouth 2 (two) times daily with meals.    montelukast (SINGULAIR) 10 mg tablet Take 1 tablet (10 mg total) by mouth every evening.    mucus clearing device Cecy 1 Device by Misc.(Non-Drug; Combo Route) route 2 (two) times daily.    multivitamin capsule Take 1  capsule by mouth once daily.     mupirocin (BACTROBAN) 2 % ointment Apply topically 2 (two) times daily. for 7 days    pravastatin (PRAVACHOL) 80 MG tablet Take 1 tablet (80 mg total) by mouth once daily.    psyllium husk (METAMUCIL ORAL) Take by mouth.    SIMETHICONE (GAS-X ORAL) Take 1 capsule by mouth as needed (gas relief).     tezepelumab-ekko (TEZSPIRE) 210 mg/1.91 mL (110 mg/mL) PnIj Inject 1.91 mLs (210.1 mg total) into the skin every 28 days.    valsartan-hydrochlorothiazide (DIOVAN-HCT) 320-25 mg per tablet Take 1 tablet by mouth once daily.    vitamin D3-folic acid 5,000 unit- 1 mg Tab Take 1 tablet by mouth once daily.     vitamin E, dl,tocopheryl acet, (VITAMIN E, DL, ACETATE, ORAL) Take 1 tablet by mouth once daily.    WELCHOL 625 mg tablet Take 625 mg by mouth.     No current facility-administered medications for this visit.       Review of patient's allergies indicates:   Allergen Reactions    Sulfa (sulfonamide antibiotics) Hives    Naproxen Other (See Comments)     Other reaction(s): RT sided numbness      Albuterol Itching and Palpitations     Nebulizer only. Can use inhaler       Family History   Problem Relation Name Age of Onset    Breast cancer Mother  50    Breast cancer Paternal Aunt          40's    Cervical cancer Paternal Grandmother      Ovarian cancer Paternal Grandmother      Cervical cancer Paternal Cousin      Ovarian cancer Paternal Cousin 1st     Diabetes Other      Hypertension Other      Hypothyroidism Other      Allergic rhinitis Neg Hx      Allergies Neg Hx      Angioedema Neg Hx      Asthma Neg Hx      Atopy Neg Hx      Eczema Neg Hx      Immunodeficiency Neg Hx      Rhinitis Neg Hx      Urticaria Neg Hx      Uterine cancer Neg Hx         Social History[1]    Chief Complaint: No chief complaint on file.      History of present illness:  74-year-old female seen for an injury on April 8, 2025.  Patient tripped and fell onto the concrete.  Patient landed on bilateral outstretched  wrists.  X-rays taken in the emergency room were negative for fracture.  Patient hit her right knee into the concrete.  Minimally displaced transverse patella fracture.  Patient has a abrasions on both hands and on the right knee.  Pain is an 8/10.    Prior treatment:  Immobilization        Review of Systems:    Constitution: Negative for chills, fever, and sweats.  Negative for unexplained weight loss.    HENT:  Negative for headaches and blurry vision.    Cardiovascular:Negative for chest pain or irregular heart beat. Negative for hypertension.    Respiratory:  Negative for cough and shortness of breath.    Gastrointestinal: Negative for abdominal pain, heartburn, melena, nausea, and vomitting.    Genitourinary:  Negative bladder incontinence and dysuria.    Musculoskeletal:  See HPI    Neurological: Negative for numbness.    Psychiatric/Behavioral: Negative for depression.  The patient is not nervous/anxious.      Endocrine: Negative for polyuria    Hematologic/Lymphatic: Negative for bleeding problem.  Does not bruise/bleed easily.    Skin: Negative for poor would healing and rash      Physical Examination:    Vital Signs:  There were no vitals filed for this visit.    There is no height or weight on file to calculate BMI.    This a well-developed, well nourished patient in no acute distress.  They are alert and oriented and cooperative to examination.  Pt. w comes in in the wheelchair    Examination of the right knee shows abrasions of the front of the knee.  Mild swelling.  Tenderness over the patella.  Patient is able to maintain some knee extension with minimal resistance.    X-rays:  X-rays of the right knee is available for review which show a minimally displaced transverse patella fracture         Assessment:  Right transverse patella fracture    Plan:  I reviewed the findings with her today.  We discussed treatment options.  We discussed non operative treatment in a knee immobilizer or locked brace  versus surgical fixation with open reduction internal fixation.  Recommended a trial of non operative care for least a week.  Repeat x-ray of the right knee at that time.  We would like to let the abrasions heal up a little more before considering surgery.  Refilled her Zofran and Flexeril.  Ordered her a wheelchair for home.            All previous pertinent notes including ER visits, physical therapy visits, other orthopedic visits as well as other care for the same musculoskeletal problem were reviewed.  All pertinent lab values and previous imaging was reviewed pertinent to the current visit.    This note was created using [employer] voice recognition software that occasionally misinterpreted phrases or words.    Consult note is delivered via Epic messaging service.           [1]   Social History  Socioeconomic History    Marital status:    Tobacco Use    Smoking status: Never    Smokeless tobacco: Never   Substance and Sexual Activity    Alcohol use: Not Currently    Drug use: No    Sexual activity: Not Currently     Social Drivers of Health     Financial Resource Strain: Medium Risk (3/20/2025)    Overall Financial Resource Strain (CARDIA)     Difficulty of Paying Living Expenses: Somewhat hard   Food Insecurity: Food Insecurity Present (3/20/2025)    Hunger Vital Sign     Worried About Running Out of Food in the Last Year: Sometimes true     Ran Out of Food in the Last Year: Sometimes true   Transportation Needs: No Transportation Needs (3/20/2025)    PRAPARE - Transportation     Lack of Transportation (Medical): No     Lack of Transportation (Non-Medical): No   Physical Activity: Insufficiently Active (3/20/2025)    Exercise Vital Sign     Days of Exercise per Week: 3 days     Minutes of Exercise per Session: 20 min   Stress: No Stress Concern Present (3/20/2025)    [employer] - Occupational Stress Questionnaire     Feeling of Stress : Only a little   Housing Stability:  Unknown (3/20/2025)    Housing Stability Vital Sign     Unable to Pay for Housing in the Last Year: Patient declined

## 2025-04-11 DIAGNOSIS — S82.091A OTHER CLOSED FRACTURE OF RIGHT PATELLA, INITIAL ENCOUNTER: Primary | ICD-10-CM

## 2025-04-15 ENCOUNTER — OFFICE VISIT (OUTPATIENT)
Dept: FAMILY MEDICINE | Facility: CLINIC | Age: 75
End: 2025-04-15
Payer: MEDICARE

## 2025-04-15 VITALS
HEART RATE: 68 BPM | OXYGEN SATURATION: 97 % | HEIGHT: 64 IN | SYSTOLIC BLOOD PRESSURE: 120 MMHG | WEIGHT: 202.81 LBS | DIASTOLIC BLOOD PRESSURE: 78 MMHG | BODY MASS INDEX: 34.62 KG/M2

## 2025-04-15 DIAGNOSIS — R11.2 NAUSEA AND VOMITING, UNSPECIFIED VOMITING TYPE: ICD-10-CM

## 2025-04-15 DIAGNOSIS — E11.9 CONTROLLED TYPE 2 DIABETES MELLITUS WITHOUT COMPLICATION, WITHOUT LONG-TERM CURRENT USE OF INSULIN: ICD-10-CM

## 2025-04-15 DIAGNOSIS — S65.311D LACERATION OF DEEP PALMAR ARCH OF RIGHT HAND, SUBSEQUENT ENCOUNTER: ICD-10-CM

## 2025-04-15 DIAGNOSIS — R35.0 URINE FREQUENCY: ICD-10-CM

## 2025-04-15 DIAGNOSIS — R52 UNCONTROLLED PAIN: ICD-10-CM

## 2025-04-15 DIAGNOSIS — E11.9 CONTROLLED TYPE 2 DIABETES MELLITUS WITHOUT COMPLICATION, WITHOUT LONG-TERM CURRENT USE OF INSULIN: Primary | ICD-10-CM

## 2025-04-15 DIAGNOSIS — I10 ESSENTIAL HYPERTENSION: ICD-10-CM

## 2025-04-15 DIAGNOSIS — S65.312D LACERATION OF DEEP PALMAR ARCH OF LEFT HAND, SUBSEQUENT ENCOUNTER: ICD-10-CM

## 2025-04-15 DIAGNOSIS — S82.001D CLOSED NONDISPLACED FRACTURE OF RIGHT PATELLA WITH ROUTINE HEALING, UNSPECIFIED FRACTURE MORPHOLOGY, SUBSEQUENT ENCOUNTER: ICD-10-CM

## 2025-04-15 DIAGNOSIS — J44.89 ASTHMA WITH COPD: ICD-10-CM

## 2025-04-15 DIAGNOSIS — F41.8 SITUATIONAL ANXIETY: ICD-10-CM
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
## 2025-04-15 LAB
BILIRUB UR QL STRIP.AUTO: NEGATIVE
CLARITY UR: CLEAR
COLOR UR AUTO: YELLOW
GLUCOSE UR QL STRIP: NEGATIVE
HGB UR QL STRIP: NEGATIVE
HOLD SPECIMEN: NORMAL
KETONES UR QL STRIP: NEGATIVE
LEUKOCYTE ESTERASE UR QL STRIP: NEGATIVE
NITRITE UR QL STRIP: NEGATIVE
PH UR STRIP: 6 [PH]
PROT UR QL STRIP: NEGATIVE
SP GR UR STRIP: 1.02

## 2025-04-15 PROCEDURE — 3008F BODY MASS INDEX DOCD: CPT | Mod: HCNC,CPTII,S$GLB, | Performed by: INTERNAL MEDICINE

## 2025-04-15 PROCEDURE — 1160F RVW MEDS BY RX/DR IN RCRD: CPT | Mod: HCNC,CPTII,S$GLB, | Performed by: INTERNAL MEDICINE

## 2025-04-15 PROCEDURE — 3288F FALL RISK ASSESSMENT DOCD: CPT | Mod: HCNC,CPTII,S$GLB, | Performed by: INTERNAL MEDICINE

## 2025-04-15 PROCEDURE — 99999 PR PBB SHADOW E&M-EST. PATIENT-LVL V: CPT | Mod: PBBFAC,HCNC,, | Performed by: INTERNAL MEDICINE

## 2025-04-15 PROCEDURE — 1159F MED LIST DOCD IN RCRD: CPT | Mod: HCNC,CPTII,S$GLB, | Performed by: INTERNAL MEDICINE

## 2025-04-15 PROCEDURE — 15853 REMOVAL SUTR/STAPL XREQ ANES: CPT | Mod: HCNC,59,S$GLB, | Performed by: INTERNAL MEDICINE

## 2025-04-15 PROCEDURE — 1101F PT FALLS ASSESS-DOCD LE1/YR: CPT | Mod: HCNC,CPTII,S$GLB, | Performed by: INTERNAL MEDICINE

## 2025-04-15 PROCEDURE — 96372 THER/PROPH/DIAG INJ SC/IM: CPT | Mod: HCNC,S$GLB,, | Performed by: INTERNAL MEDICINE

## 2025-04-15 PROCEDURE — 3074F SYST BP LT 130 MM HG: CPT | Mod: HCNC,CPTII,S$GLB, | Performed by: INTERNAL MEDICINE

## 2025-04-15 PROCEDURE — 3078F DIAST BP <80 MM HG: CPT | Mod: HCNC,CPTII,S$GLB, | Performed by: INTERNAL MEDICINE

## 2025-04-15 PROCEDURE — 1125F AMNT PAIN NOTED PAIN PRSNT: CPT | Mod: HCNC,CPTII,S$GLB, | Performed by: INTERNAL MEDICINE

## 2025-04-15 PROCEDURE — 81003 URINALYSIS AUTO W/O SCOPE: CPT | Mod: HCNC,PO | Performed by: INTERNAL MEDICINE

## 2025-04-15 PROCEDURE — 1157F ADVNC CARE PLAN IN RCRD: CPT | Mod: HCNC,CPTII,S$GLB, | Performed by: INTERNAL MEDICINE

## 2025-04-15 PROCEDURE — 99215 OFFICE O/P EST HI 40 MIN: CPT | Mod: HCNC,25,S$GLB, | Performed by: INTERNAL MEDICINE

## 2025-04-15 PROCEDURE — S0119 ONDANSETRON 4 MG: HCPCS | Mod: HCNC,JZ,S$GLB, | Performed by: INTERNAL MEDICINE

## 2025-04-15 RX ORDER — ONDANSETRON 4 MG/1
4 TABLET, ORALLY DISINTEGRATING ORAL
Status: COMPLETED | OUTPATIENT
Start: 2025-04-15 | End: 2025-04-15

## 2025-04-15 RX ORDER — FLUTICASONE FUROATE, UMECLIDINIUM BROMIDE AND VILANTEROL TRIFENATATE 200; 62.5; 25 UG/1; UG/1; UG/1
1 POWDER RESPIRATORY (INHALATION) DAILY
Qty: 180 EACH | Refills: 3 | Status: SHIPPED | OUTPATIENT
Start: 2025-04-15

## 2025-04-15 RX ORDER — HYDROCODONE BITARTRATE AND ACETAMINOPHEN 5; 325 MG/1; MG/1
1 TABLET ORAL EVERY 6 HOURS PRN
Qty: 20 TABLET | Refills: 0 | Status: SHIPPED | OUTPATIENT
Start: 2025-04-15

## 2025-04-15 RX ORDER — VALSARTAN AND HYDROCHLOROTHIAZIDE 320; 25 MG/1; MG/1
1 TABLET, FILM COATED ORAL DAILY
Qty: 90 TABLET | Refills: 2 | Status: SHIPPED | OUTPATIENT
Start: 2025-04-15 | End: 2025-04-15

## 2025-04-15 RX ORDER — PROMETHAZINE HYDROCHLORIDE 25 MG/ML
12.5 INJECTION, SOLUTION INTRAMUSCULAR; INTRAVENOUS
Status: COMPLETED | OUTPATIENT
Start: 2025-04-15 | End: 2025-04-15

## 2025-04-15 RX ORDER — HYDROXYZINE HYDROCHLORIDE 10 MG/1
10 TABLET, FILM COATED ORAL 3 TIMES DAILY PRN
Qty: 30 TABLET | Refills: 3 | Status: SHIPPED | OUTPATIENT
Start: 2025-04-15 | End: 2025-04-16 | Stop reason: SDUPTHER

## 2025-04-15 RX ORDER — VALSARTAN AND HYDROCHLOROTHIAZIDE 320; 25 MG/1; MG/1
1 TABLET, FILM COATED ORAL DAILY
Qty: 90 TABLET | Refills: 2 | Status: SHIPPED | OUTPATIENT
Start: 2025-04-15 | End: 2025-10-12

## 2025-04-15 RX ORDER — ONDANSETRON 4 MG/1
4 TABLET, ORALLY DISINTEGRATING ORAL ONCE
Qty: 1 TABLET | Refills: 0 | Status: SHIPPED | OUTPATIENT
Start: 2025-04-15 | End: 2025-04-15

## 2025-04-15 RX ADMIN — PROMETHAZINE HYDROCHLORIDE 12.5 MG: 25 INJECTION, SOLUTION INTRAMUSCULAR; INTRAVENOUS at 10:04

## 2025-04-15 RX ADMIN — ONDANSETRON 4 MG: 4 TABLET, ORALLY DISINTEGRATING ORAL at 10:04

## 2025-04-15 NOTE — PROGRESS NOTES
Subjective:       Patient ID: Cornelia Delatorre is a 74 y.o. female.  Chief Complaint: Diabetes     HPI        Patient fell on 4/8 suffering a fracture of her right patella.  She was not DC from the ER with or given any medication on subsequent visit for pain relief!!!!!  She has been in severe pain.  She has not been able to sleep and has developed frequent nausea and vomiting 2nd to uncontrolled pain.  She developed lightheadedness and then nausea and vomiting today during our visit likely from positioning her leg for suture removal.        She has lacerations from the fall with sutures placed in her b/l hands, lower lip and upper lip.      She complains of urinary frequency similar to previous UTIs.                  Recall previous visit:       Has metal implant for bladder.       CVID -On IV Ig;      L5 burst fracture 12/2023.  Severe pain making riding in a car painful.  Pins and needle pain radiates down both legs.  Dr Rojas has given her multiple injections, which have failed.  She is now being evaluated for surgery.  Feeling worn down with pain and limitations.   DEXA 2023 + osteopenia.     C/o waves of nausea for 2 weeks.  Lasts few seconds.  Review of medicine and coincides with methenamine for frequent UTI.  Trial off; resume if tolerate or discuss with uro-gyn     High potassium resolved off KCL Rx.       Bronchiectasis/ asthma - stable.   Recalll:   Dr Nicole in [place].  Feels SOB mostly related to bronchiectasis.  Her symptoms have improved with asthma tx - Symbicort.    Pulmonary nodules - Dr Nicole evaluating.  Incomplete response to pneumovax - recommends repeat Prevnar 13.  Eosinophilia and pn 7.14 level at 50%  angiogram was normal     HTN - controlled   DM - controlled;  Sees podiatry for peripheral neuropathy in past  Diabetic peripheral neuropathy - controlled with 600 mg gabapentin tid.  Outside foot doctor.   HLD - controlled for goal 70; high triglycerides.      Occasional anxiety relieved  with prn hydroxyzine.   Had EGD- dysphagia with Dr Krishnan      Normocytic anemia - no blood/melena.  Iron stable.  Cscp 2022 wnl per pt.  EGD 2021 for dysphagia did not show contributing etiology per patient.  Dr Krishnan      Dx w [facility] likely related to some meds per testing neurology; stable      Complains of frequent UTIs - 4/yr.  I am treating one now and her symptoms are improving.  Discussed daily Macrobid prophylaxis.     Complains of b/l leg weakness.  Gets SOB walking to mailbox.  Was inactive with lung issues, but this seems to be stable now.  Echo 11/2021 EF wnl, grade II diastolic dysfunction.  Likely deconditioning.           Assessment:       1. Fall, subsequent encounter    2. Laceration of deep palmar arch of left hand, subsequent encounter    3. Laceration of deep palmar arch of right hand, subsequent encounter    4. Nausea and vomiting, unspecified vomiting type    5. Closed nondisplaced fracture of right patella with routine healing, unspecified fracture morphology, subsequent encounter    6. Uncontrolled pain    7. Urine frequency    8. Situational anxiety    9. Essential hypertension    10. Asthma with COPD    11. Controlled type 2 diabetes mellitus without complication, without long-term current use of insulin        Plan:       Fall, subsequent encounter    Laceration of deep palmar arch of left hand, subsequent encounter    Laceration of deep palmar arch of right hand, subsequent encounter    Nausea and vomiting, unspecified vomiting type  -     ondansetron (ZOFRAN-ODT) 4 MG TbDL; Take 1 tablet (4 mg total) by mouth once. for 1 dose  Dispense: 1 tablet; Refill: 0  -     ondansetron disintegrating tablet 4 mg  -     promethazine injection 12.5 mg    Closed nondisplaced fracture of right patella with routine healing, unspecified fracture morphology, subsequent encounter  -     HYDROcodone-acetaminophen (NORCO) 5-325 mg per tablet; Take 1 tablet by mouth every 6 (six) hours as needed for Pain.   Dispense: 20 tablet; Refill: 0    Uncontrolled pain    Urine frequency  -     Urinalysis, Reflex to Urine Culture; Future; Expected date: 04/15/2025  -     [place] URINE HOLD    Situational anxiety  -     hydrOXYzine HCL (ATARAX) 10 MG Tab; Take 1 tablet (10 mg total) by mouth 3 (three) times daily as needed.  Dispense: 30 tablet; Refill: 3    Essential hypertension  -     Discontinue: valsartan-hydrochlorothiazide (DIOVAN-HCT) 320-25 mg per tablet; Take 1 tablet by mouth once daily.  Dispense: 90 tablet; Refill: 2  -     valsartan-hydrochlorothiazide (DIOVAN-HCT) 320-25 mg per tablet; Take 1 tablet by mouth once daily.  Dispense: 90 tablet; Refill: 2    Asthma with COPD  -     fluticasone-umeclidin-vilanter (TRELEGY ELLIPTA) 200-62.5-25 mcg inhaler; Inhale 1 puff into the lungs once daily.  Dispense: 180 each; Refill: 3    Controlled type 2 diabetes mellitus without complication, without long-term current use of insulin  -     Hemoglobin A1C; Future; Expected date: 04/15/2025            Sutures removed from b/l hands without issues.    Given 4 mg Zofran odt and 12.5 phenergan IM in office.  Eventually gained some relief from nausea.      Rtc tomorrow after soaking lip sutures for easier removal and feeling better.      UA wnl today      Visit today included increased complexity associated with the care of the episodic problem HTN addressed and managing the longitudinal care of the patient due to the serious and/or complex managed problem(s) HTN  .  Continue current management and monitor.  Other diagnoses were reviewed and found stable and will continue to monitor.  Counseled on regular exercise, maintenance of a healthy weight, balanced diet rich in fruits/vegetables and lean protein, and avoidance of unhealthy habits like smoking and excessive alcohol intake.   Also, counseled on importance of being compliant with medication, health appointments, diet and exercise.     Tomorrow    Total time spent on patient's  needs today in preparing for the visit, the actual visit including counseling, managing the patient's needs and electronic record documentation was more than 60 minutes        Medication List with Changes/Refills   New Medications    HYDROCODONE-ACETAMINOPHEN (NORCO) 5-325 MG PER TABLET    Take 1 tablet by mouth every 6 (six) hours as needed for Pain.    ONDANSETRON (ZOFRAN-ODT) 4 MG TBDL    Take 1 tablet (4 mg total) by mouth once. for 1 dose   Current Medications    ASCORBIC ACID, VITAMIN C, (VITAMIN C) 1000 MG TABLET    Take 1,000 mg by mouth 2 (two) times daily.     AZELASTINE (ASTELIN) 137 MCG (0.1 %) NASAL SPRAY    2 sprays (274 mcg total) by Nasal route 2 (two) times daily.    AZELASTINE (OPTIVAR) 0.05 % OPHTHALMIC SOLUTION    Place 1 drop into both eyes 2 (two) times daily. As needed    B-COMPLEX WITH VITAMIN C (Z-BEC OR EQUIV) TABLET    Take 1 tablet by mouth once daily.    BENZONATATE (TESSALON) 200 MG CAPSULE    Take 1 capsule (200 mg total) by mouth 3 (three) times daily as needed for Cough.    BIFIDOBACTERIUM INFANTIS (ALIGN ORAL)    Take by mouth once daily.    BIOTIN 10,000 MCG CAP    Take 1 tablet by mouth every evening.     BLOOD SUGAR DIAGNOSTIC STRP    Insurance preferred meter and strips. Check daily    BLOOD-GLUCOSE METER KIT    Use as instructed. Insurance preferred meter.    BUDESONIDE (PULMICORT) 0.25 MG/2 ML NEBULIZER SOLUTION    Take 2 mLs (0.25 mg total) by nebulization once daily. Controller    CRANBERRY 500 MG CAP    Take 1 capsule by mouth every evening.    CYCLOBENZAPRINE (FLEXERIL) 10 MG TABLET    Take 10 mg by mouth 3 (three) times daily.    CYCLOBENZAPRINE (FLEXERIL) 10 MG TABLET    Take 1 tablet (10 mg total) by mouth 3 (three) times daily as needed for Muscle spasms.    DILTIAZEM (CARDIZEM CD) 120 MG CP24    Take 1 capsule (120 mg total) by mouth once daily.    DOXAZOSIN (CARDURA) 2 MG TABLET    TAKE 1 TABLET EVERY EVENING    EPINEPHRINE (EPIPEN) 0.3 MG/0.3 ML ATIN    Inject 0.3  mLs (0.3 mg total) into the muscle as needed (anaphylaxis).    ERYTHROMYCIN WITH ETHANOL (THERAMYCIN) 2 % EXTERNAL SOLUTION    Aaa bid prn    ESOMEPRAZOLE (NEXIUM) 40 MG CAPSULE    Take 1 capsule (40 mg total) by mouth before breakfast.    ESTRADIOL (ESTRACE) 0.01 % (0.1 MG/GRAM) VAGINAL CREAM    Place 1 g vaginally 3 (three) times a week. Place by fingertip application before bedtime three times a week (Monday, Wednesday, Friday)    FEXOFENADINE (ALLEGRA) 180 MG TABLET    Take 180 mg by mouth once daily.     FISH OIL-OMEGA-3 FATTY ACIDS 300-1,000 MG CAPSULE    Take 2 g by mouth once daily.    FLUTICASONE PROPIONATE (FLONASE) 50 MCG/ACTUATION NASAL SPRAY    2 sprays (100 mcg total) by Each Nostril route once daily.    GABAPENTIN (NEURONTIN) 600 MG TABLET    Take 1 tablet (600 mg total) by mouth 3 (three) times daily.    IBANDRONATE (BONIVA) 150 MG TABLET    Take 1 tablet (150 mg total) by mouth every 30 days.    IMMUN GLOB G,IGG,-PRO-IGA 0-50 (HIZENTRA) 1 GRAM/5 ML (20 %) SOLN    Inject 55 mLs (11 g total) into the skin once a week.    INHALATION SPACING DEVICE    Use as directed for inhalation.    LANCETS (ACCU-CHEK SOFTCLIX LANCETS) MISC    Use to check blood sugar daily.    LEVALBUTEROL (XOPENEX HFA) 45 MCG/ACTUATION INHALER    Inhale 1-2 puffs into the lungs every 4 (four) hours as needed for Wheezing or Shortness of Breath. Rescue    METFORMIN (GLUCOPHAGE-XR) 500 MG ER 24HR TABLET    Take 1 tablet (500 mg total) by mouth 2 (two) times daily with meals.    MONTELUKAST (SINGULAIR) 10 MG TABLET    Take 1 tablet (10 mg total) by mouth every evening.    MUCUS CLEARING DEVICE KAREN    1 Device by Misc.(Non-Drug; Combo Route) route 2 (two) times daily.    MULTIVITAMIN CAPSULE    Take 1 capsule by mouth once daily.     MUPIROCIN (BACTROBAN) 2 % OINTMENT    Apply topically 2 (two) times daily. for 7 days    ONDANSETRON (ZOFRAN) 8 MG TABLET    Take 1 tablet (8 mg total) by mouth every 8 (eight) hours as needed for Nausea.     PRAVASTATIN (PRAVACHOL) 80 MG TABLET    Take 1 tablet (80 mg total) by mouth once daily.    PSYLLIUM HUSK (METAMUCIL ORAL)    Take by mouth.    SIMETHICONE (GAS-X ORAL)    Take 1 capsule by mouth as needed (gas relief).     TEZEPELUMAB-EKKO (TEZSPIRE) 210 MG/1.91 ML (110 MG/ML) PNIJ    Inject 1.91 mLs (210.1 mg total) into the skin every 28 days.    VITAMIN D3-FOLIC ACID 5,000 UNIT- 1 MG TAB    Take 1 tablet by mouth once daily.     VITAMIN E, DL,TOCOPHERYL ACET, (VITAMIN E, DL, ACETATE, ORAL)    Take 1 tablet by mouth once daily.    WELCHOL 625 MG TABLET    Take 625 mg by mouth.   Changed and/or Refilled Medications    Modified Medication Previous Medication    FLUTICASONE-UMECLIDIN-VILANTER (TRELEGY ELLIPTA) 200-62.5-25 MCG INHALER fluticasone-umeclidin-vilanter (TRELEGY ELLIPTA) 200-62.5-25 mcg inhaler       Inhale 1 puff into the lungs once daily.    Inhale 1 puff into the lungs once daily.    HYDROXYZINE HCL (ATARAX) 10 MG TAB hydrOXYzine HCL (ATARAX) 10 MG Tab       Take 1 tablet (10 mg total) by mouth 3 (three) times daily as needed.    Take 1 tablet (10 mg total) by mouth 3 (three) times daily as needed.    VALSARTAN-HYDROCHLOROTHIAZIDE (DIOVAN-HCT) 320-25 MG PER TABLET valsartan-hydrochlorothiazide (DIOVAN-HCT) 320-25 mg per tablet       Take 1 tablet by mouth once daily.    Take 1 tablet by mouth once daily.       BP Readings from Last 3 Encounters:   04/15/25 120/78   04/08/25 (!) 108/49   03/20/25 136/78     Hemoglobin A1C   Date Value Ref Range Status   09/30/2024 5.4 4.0 - 5.6 % Final     Comment:     ADA Screening Guidelines:  5.7-6.4%  Consistent with prediabetes  >or=6.5%  Consistent with diabetes    High levels of fetal hemoglobin interfere with the HbA1C  assay. Heterozygous hemoglobin variants (HbS, HgC, etc)do  not significantly interfere with this assay.   However, presence of multiple variants may affect accuracy.     03/11/2024 5.9 (H) 4.0 - 5.6 % Final     Comment:     ADA Screening  Guidelines:  5.7-6.4%  Consistent with prediabetes  >or=6.5%  Consistent with diabetes    High levels of fetal hemoglobin interfere with the HbA1C  assay. Heterozygous hemoglobin variants (HbS, HgC, etc)do  not significantly interfere with this assay.   However, presence of multiple variants may affect accuracy.     09/11/2023 6.4 (H) 4.0 - 5.6 % Final     Comment:     ADA Screening Guidelines:  5.7-6.4%  Consistent with prediabetes  >or=6.5%  Consistent with diabetes    High levels of fetal hemoglobin interfere with the HbA1C  assay. Heterozygous hemoglobin variants (HbS, HgC, etc)do  not significantly interfere with this assay.   However, presence of multiple variants may affect accuracy.       Lab Results   Component Value Date    TSH 1.043 12/02/2024     Lab Results   Component Value Date    LDLCALC 56.2 (L) 03/11/2024    LDLCALC 81.6 03/03/2023    LDLCALC 70.6 09/02/2022     Lab Results   Component Value Date    TRIG 144 03/11/2024    TRIG 97 03/03/2023    TRIG 112 09/02/2022     Wt Readings from Last 3 Encounters:   04/15/25 92 kg (202 lb 13.2 oz)   03/20/25 93.2 kg (205 lb 7.5 oz)   03/19/25 93.3 kg (205 lb 11 oz)     Lab Results   Component Value Date    HGB 10.7 (L) 03/07/2025    HCT 34.0 (L) 03/07/2025    WBC 5.19 03/07/2025    ALT 14 03/07/2025    AST 36 03/07/2025     (L) 03/07/2025    K 4.2 03/07/2025    CREATININE 0.7 03/07/2025           Review of Systems        Objective:      Vitals:    04/15/25 0908   BP: 120/78   Pulse: 68     Physical Exam            Suture Removal both hands    Date/Time: 4/15/2025 9:15 AM  Location procedure was performed: [facility]    Performed by: rAmond Cortez MD  Authorized by: Armond Cortez MD  Sutures Removed: 4  Staples Removed: 0  Complications: No

## 2025-04-15 NOTE — TELEPHONE ENCOUNTER
Care Due:                  Date            Visit Type   Department     Provider  --------------------------------------------------------------------------------                                EP -                              [facility]  Last Visit: 04-      CARE ([facility])   JULITA Cortez                               -                              [facility]  Next Visit: 04-      CARE ([facility])   JULITA Cortez                                                            Last  Test          Frequency    Reason                     Performed    Due Date  --------------------------------------------------------------------------------    HBA1C.......  6 months...  metFORMIN................  09- 03-    Mg Level....  12 months..  ibandronate..............  10-   10-    Phosphate...  12 months..  ibandronate..............  Not Found    Overdue    [employer] Embedded Care Due Messages. Reference number: [id number].   4/15/2025 3:31:44 PM CDT

## 2025-04-16 ENCOUNTER — OFFICE VISIT (OUTPATIENT)
Dept: ORTHOPEDICS | Facility: CLINIC | Age: 75
End: 2025-04-16
Payer: MEDICARE

## 2025-04-16 ENCOUNTER — HOSPITAL ENCOUNTER (OUTPATIENT)
Dept: RADIOLOGY | Facility: HOSPITAL | Age: 75
Discharge: HOME | End: 2025-04-16
Attending: ORTHOPAEDIC SURGERY
Payer: MEDICARE

## 2025-04-16 ENCOUNTER — OFFICE VISIT (OUTPATIENT)
Dept: FAMILY MEDICINE | Facility: CLINIC | Age: 75
End: 2025-04-16
Payer: MEDICARE

## 2025-04-16 VITALS
SYSTOLIC BLOOD PRESSURE: 134 MMHG | HEIGHT: 64 IN | HEART RATE: 81 BPM | OXYGEN SATURATION: 97 % | WEIGHT: 202.81 LBS | DIASTOLIC BLOOD PRESSURE: 80 MMHG | BODY MASS INDEX: 34.62 KG/M2

## 2025-04-16 VITALS — HEIGHT: 64 IN | BODY MASS INDEX: 34.62 KG/M2 | WEIGHT: 202.81 LBS | RESPIRATION RATE: 18 BRPM

## 2025-04-16 DIAGNOSIS — R11.2 NAUSEA AND VOMITING, UNSPECIFIED VOMITING TYPE: ICD-10-CM

## 2025-04-16 DIAGNOSIS — S01.511D: Primary | ICD-10-CM

## 2025-04-16 DIAGNOSIS — S82.001D CLOSED NONDISPLACED FRACTURE OF RIGHT PATELLA WITH ROUTINE HEALING, UNSPECIFIED FRACTURE MORPHOLOGY, SUBSEQUENT ENCOUNTER: ICD-10-CM

## 2025-04-16 DIAGNOSIS — R42 LIGHT HEADEDNESS: ICD-10-CM

## 2025-04-16 DIAGNOSIS — E11.9 CONTROLLED TYPE 2 DIABETES MELLITUS WITHOUT COMPLICATION, WITHOUT LONG-TERM CURRENT USE OF INSULIN: ICD-10-CM

## 2025-04-16 DIAGNOSIS — S01.511D LACERATION OF LOWER LIP, SUBSEQUENT ENCOUNTER: ICD-10-CM

## 2025-04-16 DIAGNOSIS — S82.091A OTHER CLOSED FRACTURE OF RIGHT PATELLA, INITIAL ENCOUNTER: ICD-10-CM

## 2025-04-16 DIAGNOSIS — S82.031D CLOSED DISPLACED TRANSVERSE FRACTURE OF RIGHT PATELLA WITH ROUTINE HEALING, SUBSEQUENT ENCOUNTER: Primary | ICD-10-CM

## 2025-04-16 DIAGNOSIS — F41.8 SITUATIONAL ANXIETY: ICD-10-CM

## 2025-04-16 LAB
OHS QRS DURATION: 126 MS
OHS QTC CALCULATION: 455 MS

## 2025-04-16 PROCEDURE — 1159F MED LIST DOCD IN RCRD: CPT | Mod: HCNC,CPTII,S$GLB, | Performed by: ORTHOPAEDIC SURGERY

## 2025-04-16 PROCEDURE — 73560 X-RAY EXAM OF KNEE 1 OR 2: CPT | Mod: TC,HCNC,PO,RT

## 2025-04-16 PROCEDURE — 1101F PT FALLS ASSESS-DOCD LE1/YR: CPT | Mod: HCNC,CPTII,S$GLB, | Performed by: ORTHOPAEDIC SURGERY

## 2025-04-16 PROCEDURE — 99999 PR PBB SHADOW E&M-EST. PATIENT-LVL IV: CPT | Mod: PBBFAC,HCNC,, | Performed by: ORTHOPAEDIC SURGERY

## 2025-04-16 PROCEDURE — 1157F ADVNC CARE PLAN IN RCRD: CPT | Mod: HCNC,CPTII,S$GLB, | Performed by: ORTHOPAEDIC SURGERY

## 2025-04-16 PROCEDURE — 3288F FALL RISK ASSESSMENT DOCD: CPT | Mod: HCNC,CPTII,S$GLB, | Performed by: ORTHOPAEDIC SURGERY

## 2025-04-16 PROCEDURE — 93010 ELECTROCARDIOGRAM REPORT: CPT | Mod: HCNC,S$GLB,, | Performed by: INTERNAL MEDICINE

## 2025-04-16 PROCEDURE — 73560 X-RAY EXAM OF KNEE 1 OR 2: CPT | Mod: 26,HCNC,RT, | Performed by: STUDENT IN AN ORGANIZED HEALTH CARE EDUCATION/TRAINING PROGRAM

## 2025-04-16 PROCEDURE — 99999 PR PBB SHADOW E&M-EST. PATIENT-LVL V: CPT | Mod: PBBFAC,HCNC,, | Performed by: INTERNAL MEDICINE

## 2025-04-16 PROCEDURE — 99214 OFFICE O/P EST MOD 30 MIN: CPT | Mod: HCNC,S$GLB,, | Performed by: ORTHOPAEDIC SURGERY

## 2025-04-16 PROCEDURE — 3008F BODY MASS INDEX DOCD: CPT | Mod: HCNC,CPTII,S$GLB, | Performed by: ORTHOPAEDIC SURGERY

## 2025-04-16 PROCEDURE — 1125F AMNT PAIN NOTED PAIN PRSNT: CPT | Mod: HCNC,CPTII,S$GLB, | Performed by: ORTHOPAEDIC SURGERY

## 2025-04-16 RX ORDER — ONDANSETRON 4 MG/1
4 TABLET, ORALLY DISINTEGRATING ORAL EVERY 6 HOURS PRN
Qty: 30 TABLET | Refills: 3 | Status: SHIPPED | OUTPATIENT
Start: 2025-04-16

## 2025-04-16 RX ORDER — HYDROXYZINE HYDROCHLORIDE 10 MG/1
10 TABLET, FILM COATED ORAL 3 TIMES DAILY PRN
Qty: 30 TABLET | Refills: 3 | Status: SHIPPED | OUTPATIENT
Start: 2025-04-16

## 2025-04-16 NOTE — PROGRESS NOTES
Subjective:       Patient ID: Cornelia Delatorre is a 74 y.o. female.  Chief Complaint: Suture / Staple Removal     HPI          Patella fracture - pain controlled with 5 mg hydrocodone and now able to sleep.    Patient fell on 4/8 suffering a fracture of her right patella.  She was not DC from the ER with or given any medication on subsequent visit for pain relief!!!!!  She has been in severe pain.  She has not been able to sleep.      Lip lacerations from fall.  Here for suture removal lower lip and upper lip.      Yesterday in our office was the most severe episode with both nausea and lightheadedness, but she was in severe pain from her knee during suture removal.  Almost passed out yesterday in office.  Usually light headeness not as severe.   She has been having episodes described as a wave of nausea frequently associated with lightheadedness.  It can occur at anytime during the day, but is usually associated with movement.  Has occurred getting into bed, brushing her teeth, getting out of bed in the morning and other daily activities.   Associated with vomiting - usually dry heaves.  Zofran ODT relieves it, but not the pills bc vomits up.    Going on for 6-7 months.  Becoming more frequent - 3 per day now, but can have a month with nothing and just 1 or 2 episodes.   No new meds over past 2 months.    Occasionally with have palpitations, but this occurs at separate times and sometimes with these episodes.    Not related to food.    NM stress test 9/2024 wnl.    DC of below medicine did not help.    10/2024 visit:  C/o waves of nausea for 2 weeks.  Lasts few seconds.  Review of medicine and coincides with methenamine for frequent UTI.  Trial off; resume if tolerate or discuss with uro-gyn  Cardiology Dr Josiah MOURA Had EGD in past - dysphagia with Dr Krishnan                             Recall previous visit:         Has metal implant for bladder.       CVID -On IV Ig;      L5 burst fracture 12/2023.  Severe  pain making riding in a car painful.  Pins and needle pain radiates down both legs.  Dr Rojas has given her multiple injections, which have failed.  She is now being evaluated for surgery.  Feeling worn down with pain and limitations.   DEXA 2023 + osteopenia.       Bronchiectasis/ asthma - stable.   Recalll:   Dr Nicole in [place].  Feels SOB mostly related to bronchiectasis.  Her symptoms have improved with asthma tx - Symbicort.    Pulmonary nodules - Dr Nicole evaluating.  Incomplete response to pneumovax - recommends repeat Prevnar 13.  Eosinophilia and pn 7.14 level at 50%  angiogram was normal     HTN - controlled   DM - controlled;  Sees podiatry for peripheral neuropathy in past  Diabetic peripheral neuropathy - controlled with 600 mg gabapentin tid.  Outside foot doctor.   HLD - controlled for goal 70; high triglycerides.      Occasional anxiety - uncontrolled lately;  relieved with prn hydroxyzine.      Normocytic anemia - no blood/melena.  Iron stable.  [facility] 2022 wnl per pt.  EGD 2021 for dysphagia did not show contributing etiology per patient.  Dr Krishnan      Dx w [facility] likely related to some meds per testing neurology; stable      Complains of frequent UTIs - 4/yr.  I am treating one now and her symptoms are improving.  Discussed daily Macrobid prophylaxis.           Assessment:       1. Laceration of frenum of upper lip, subsequent encounter    2. Laceration of lower lip, subsequent encounter    3. Light headedness    4. Nausea and vomiting, unspecified vomiting type    5. Closed nondisplaced fracture of right patella with routine healing, unspecified fracture morphology, subsequent encounter    6. Controlled type 2 diabetes mellitus without complication, without long-term current use of insulin        Plan:       Laceration of frenum of upper lip, subsequent encounter    Laceration of lower lip, subsequent encounter    Light headedness    Nausea and vomiting, unspecified vomiting type    Closed  nondisplaced fracture of right patella with routine healing, unspecified fracture morphology, subsequent encounter    Controlled type 2 diabetes mellitus without complication, without long-term current use of insulin            EKG today RBBB similar to previous.  Read by me        Trial 1 week off metformin to see if resolves.    If not, see cardiology to r/o atypical angina vs arrhythmia.  Scheduled with cardiology   Go see her GI        Visit today included increased complexity associated with the care of the episodic problem DM addressed and managing the longitudinal care of the patient due to the serious and/or complex managed problem(s) DM.  Continue current management and monitor.  Other diagnoses were reviewed and found stable and will continue to monitor.  Counseled on regular exercise, maintenance of a healthy weight, balanced diet rich in fruits/vegetables and lean protein, and avoidance of unhealthy habits like smoking and excessive alcohol intake.   Also, counseled on importance of being compliant with medication, health appointments, diet and exercise.     Has apt with Patricia on 6/12; can see me sooner than September if need.      Total time spent on patient's needs today in preparing for the visit, the actual visit including counseling, managing the patient's needs and electronic record documentation was more than 67 minutes        Medication List with Changes/Refills   Current Medications    ASCORBIC ACID, VITAMIN C, (VITAMIN C) 1000 MG TABLET    Take 1,000 mg by mouth 2 (two) times daily.     AZELASTINE (ASTELIN) 137 MCG (0.1 %) NASAL SPRAY    2 sprays (274 mcg total) by Nasal route 2 (two) times daily.    AZELASTINE (OPTIVAR) 0.05 % OPHTHALMIC SOLUTION    Place 1 drop into both eyes 2 (two) times daily. As needed    B-COMPLEX WITH VITAMIN C (Z-BEC OR EQUIV) TABLET    Take 1 tablet by mouth once daily.    BENZONATATE (TESSALON) 200 MG CAPSULE    Take 1 capsule (200 mg total) by mouth 3 (three)  times daily as needed for Cough.    BIFIDOBACTERIUM INFANTIS (ALIGN ORAL)    Take by mouth once daily.    BIOTIN 10,000 MCG CAP    Take 1 tablet by mouth every evening.     BLOOD SUGAR DIAGNOSTIC STRP    Insurance preferred meter and strips. Check daily    BLOOD-GLUCOSE METER KIT    Use as instructed. Insurance preferred meter.    BUDESONIDE (PULMICORT) 0.25 MG/2 ML NEBULIZER SOLUTION    Take 2 mLs (0.25 mg total) by nebulization once daily. Controller    CRANBERRY 500 MG CAP    Take 1 capsule by mouth every evening.    CYCLOBENZAPRINE (FLEXERIL) 10 MG TABLET    Take 10 mg by mouth 3 (three) times daily.    CYCLOBENZAPRINE (FLEXERIL) 10 MG TABLET    Take 1 tablet (10 mg total) by mouth 3 (three) times daily as needed for Muscle spasms.    DILTIAZEM (CARDIZEM CD) 120 MG CP24    Take 1 capsule (120 mg total) by mouth once daily.    DOXAZOSIN (CARDURA) 2 MG TABLET    TAKE 1 TABLET EVERY EVENING    EPINEPHRINE (EPIPEN) 0.3 MG/0.3 ML ATIN    Inject 0.3 mLs (0.3 mg total) into the muscle as needed (anaphylaxis).    ERYTHROMYCIN WITH ETHANOL (THERAMYCIN) 2 % EXTERNAL SOLUTION    Aaa bid prn    ESOMEPRAZOLE (NEXIUM) 40 MG CAPSULE    Take 1 capsule (40 mg total) by mouth before breakfast.    ESTRADIOL (ESTRACE) 0.01 % (0.1 MG/GRAM) VAGINAL CREAM    Place 1 g vaginally 3 (three) times a week. Place by fingertip application before bedtime three times a week (Monday, Wednesday, Friday)    FEXOFENADINE (ALLEGRA) 180 MG TABLET    Take 180 mg by mouth once daily.     FISH OIL-OMEGA-3 FATTY ACIDS 300-1,000 MG CAPSULE    Take 2 g by mouth once daily.    FLUTICASONE PROPIONATE (FLONASE) 50 MCG/ACTUATION NASAL SPRAY    2 sprays (100 mcg total) by Each Nostril route once daily.    FLUTICASONE-UMECLIDIN-VILANTER (TRELEGY ELLIPTA) 200-62.5-25 MCG INHALER    Inhale 1 puff into the lungs once daily.    GABAPENTIN (NEURONTIN) 600 MG TABLET    Take 1 tablet (600 mg total) by mouth 3 (three) times daily.    HYDROCODONE-ACETAMINOPHEN (NORCO)  5-325 MG PER TABLET    Take 1 tablet by mouth every 6 (six) hours as needed for Pain.    HYDROXYZINE HCL (ATARAX) 10 MG TAB    Take 1 tablet (10 mg total) by mouth 3 (three) times daily as needed.    IBANDRONATE (BONIVA) 150 MG TABLET    Take 1 tablet (150 mg total) by mouth every 30 days.    IMMUN GLOB G,IGG,-PRO-IGA 0-50 (HIZENTRA) 1 GRAM/5 ML (20 %) SOLN    Inject 55 mLs (11 g total) into the skin once a week.    INHALATION SPACING DEVICE    Use as directed for inhalation.    LANCETS (ACCU-CHEK SOFTCLIX LANCETS) MISC    Use to check blood sugar daily.    LEVALBUTEROL (XOPENEX HFA) 45 MCG/ACTUATION INHALER    Inhale 1-2 puffs into the lungs every 4 (four) hours as needed for Wheezing or Shortness of Breath. Rescue    METFORMIN (GLUCOPHAGE-XR) 500 MG ER 24HR TABLET    Take 1 tablet (500 mg total) by mouth 2 (two) times daily with meals.    MONTELUKAST (SINGULAIR) 10 MG TABLET    Take 1 tablet (10 mg total) by mouth every evening.    MUCUS CLEARING DEVICE KAREN    1 Device by Misc.(Non-Drug; Combo Route) route 2 (two) times daily.    MULTIVITAMIN CAPSULE    Take 1 capsule by mouth once daily.     ONDANSETRON (ZOFRAN) 8 MG TABLET    Take 1 tablet (8 mg total) by mouth every 8 (eight) hours as needed for Nausea.    PRAVASTATIN (PRAVACHOL) 80 MG TABLET    Take 1 tablet (80 mg total) by mouth once daily.    PSYLLIUM HUSK (METAMUCIL ORAL)    Take by mouth.    SIMETHICONE (GAS-X ORAL)    Take 1 capsule by mouth as needed (gas relief).     TEZEPELUMAB-EKKO (TEZSPIRE) 210 MG/1.91 ML (110 MG/ML) PNIJ    Inject 1.91 mLs (210.1 mg total) into the skin every 28 days.    VALSARTAN-HYDROCHLOROTHIAZIDE (DIOVAN-HCT) 320-25 MG PER TABLET    Take 1 tablet by mouth once daily.    VITAMIN D3-FOLIC ACID 5,000 UNIT- 1 MG TAB    Take 1 tablet by mouth once daily.     VITAMIN E, DL,TOCOPHERYL ACET, (VITAMIN E, DL, ACETATE, ORAL)    Take 1 tablet by mouth once daily.    WELCHOL 625 MG TABLET    Take 625 mg by mouth.       BP Readings from  Last 3 Encounters:   04/16/25 134/80   04/15/25 120/78   04/08/25 (!) 108/49     Hemoglobin A1C   Date Value Ref Range Status   09/30/2024 5.4 4.0 - 5.6 % Final     Comment:     ADA Screening Guidelines:  5.7-6.4%  Consistent with prediabetes  >or=6.5%  Consistent with diabetes    High levels of fetal hemoglobin interfere with the HbA1C  assay. Heterozygous hemoglobin variants (HbS, HgC, etc)do  not significantly interfere with this assay.   However, presence of multiple variants may affect accuracy.     03/11/2024 5.9 (H) 4.0 - 5.6 % Final     Comment:     ADA Screening Guidelines:  5.7-6.4%  Consistent with prediabetes  >or=6.5%  Consistent with diabetes    High levels of fetal hemoglobin interfere with the HbA1C  assay. Heterozygous hemoglobin variants (HbS, HgC, etc)do  not significantly interfere with this assay.   However, presence of multiple variants may affect accuracy.     09/11/2023 6.4 (H) 4.0 - 5.6 % Final     Comment:     ADA Screening Guidelines:  5.7-6.4%  Consistent with prediabetes  >or=6.5%  Consistent with diabetes    High levels of fetal hemoglobin interfere with the HbA1C  assay. Heterozygous hemoglobin variants (HbS, HgC, etc)do  not significantly interfere with this assay.   However, presence of multiple variants may affect accuracy.       Lab Results   Component Value Date    TSH 1.043 12/02/2024     Lab Results   Component Value Date    LDLCALC 56.2 (L) 03/11/2024    LDLCALC 81.6 03/03/2023    LDLCALC 70.6 09/02/2022     Lab Results   Component Value Date    TRIG 144 03/11/2024    TRIG 97 03/03/2023    TRIG 112 09/02/2022     Wt Readings from Last 3 Encounters:   04/16/25 92 kg (202 lb 13.2 oz)   04/15/25 92 kg (202 lb 13.2 oz)   03/20/25 93.2 kg (205 lb 7.5 oz)     Lab Results   Component Value Date    HGB 10.7 (L) 03/07/2025    HCT 34.0 (L) 03/07/2025    WBC 5.19 03/07/2025    ALT 14 03/07/2025    AST 36 03/07/2025     (L) 03/07/2025    K 4.2 03/07/2025    CREATININE 0.7 03/07/2025            Review of Systems        Objective:      Vitals:    04/16/25 1031   BP: 134/80   Pulse: 81     Physical Exam  Cardiovascular:      Rate and Rhythm: Normal rate.      Heart sounds: Normal heart sounds.   Pulmonary:      Effort: Pulmonary effort is normal.      Breath sounds: Normal breath sounds.             Suture Removal upper and lower lip    Date/Time: 4/16/2025 10:30 AM  Location procedure was performed: [facility]    Performed by: Armond Cortez MD  Authorized by: Armond Cortez MD  Sutures Removed: 5  Staples Removed: 0

## 2025-04-16 NOTE — PROGRESS NOTES
Past Medical History:   Diagnosis Date    Allergy     Arthritis     Asthma     Cancer     skin cancer to right hand    Cataract     Chronic fatigue 01/24/2017    CVID (common variable immunodeficiency) 03/07/2019    Diabetes mellitus     type2    KLINE (dyspnea on exertion) 01/24/2017    Dyslipidemia 06/25/2019    Encounter for blood transfusion     Essential hypertension 12/29/2011    GERD (gastroesophageal reflux disease)     Headache(784.0)     Hyperlipidemia 07/15/2015    Hypertension     Hypothyroidism     Neuropathy     Obesity     Postmenopausal HRT (hormone replacement therapy)     Rash     Rosacea     Sleep apnea     on bipap    Snoring     Squamous cell carcinoma of skin     left forearm     UTI (urinary tract infection)     Wears glasses        Past Surgical History:   Procedure Laterality Date    ADENOIDECTOMY      APPENDECTOMY      BLADDER SUSPENSION      BREAST BIOPSY Bilateral 08/1992    bilateral benign excisional biopsies    BUNIONECTOMY  10/17/2014    right, still has discomfort    CATARACT EXTRACTION W/  INTRAOCULAR LENS IMPLANT Bilateral     CHOLECYSTECTOMY  08/02/2017    COLONOSCOPY      CYSTOSCOPY      EPIDURAL STEROID INJECTION INTO LUMBAR SPINE N/A 08/14/2019    Procedure: Injection-steroid-epidural-lumbar L5/S1;  Surgeon: Fredi Rojas MD;  Location: [facility] OR;  Service: Pain Management;  Laterality: N/A;    EPIDURAL STEROID INJECTION INTO LUMBAR SPINE N/A 05/03/2021    Procedure: Injection-steroid-epidural-lumbar L5/S1 to the Left;  Surgeon: Fredi Rojas MD;  Location: [facility] OR;  Service: Pain Management;  Laterality: N/A;    EPIDURAL STEROID INJECTION INTO LUMBAR SPINE N/A 09/24/2021    Procedure: Injection-steroid-epidural-lumbar L5/S1;  Surgeon: Fredi Rojas MD;  Location: [facility] OR;  Service: Pain Management;  Laterality: N/A;    EPIDURAL STEROID INJECTION INTO LUMBAR SPINE N/A 02/21/2022    Procedure: Injection-steroid-epidural-lumbar L5/S1;  Surgeon: Fredi Rojas MD;  Location:  [facility] OR;  Service: Pain Management;  Laterality: N/A;    EPIDURAL STEROID INJECTION INTO LUMBAR SPINE N/A 02/09/2024    Procedure: Injection-steroid-epidural-lumbar       L5/S1;  Surgeon: Fredi Rojas MD;  Location: [facility] OR;  Service: Pain Management;  Laterality: N/A;    ESOPHAGEAL DILATION N/A 06/11/2021    Procedure: DILATION, ESOPHAGUS;  Surgeon: Jake Sheriff MD;  Location: [facility] ENDO;  Service: Endoscopy;  Laterality: N/A;    ESOPHAGOGASTRODUODENOSCOPY      dilated esophagus    ESOPHAGOGASTRODUODENOSCOPY N/A 06/11/2021    Procedure: EGD (ESOPHAGOGASTRODUODENOSCOPY);  Surgeon: Jake Sheriff MD;  Location: [facility] ENDO;  Service: Endoscopy;  Laterality: N/A;    GASTRIC BYPASS      2011    HYSTERECTOMY  02/1980    interstim bladder  2009    10/14/14 new battery    OOPHORECTOMY  02/1980    PERCUTANEOUS INSERTION OF SACRAL NERVE NEUROSTIMULATOR ELECTRODE LEAD N/A 6/5/2024    Procedure: INSERTION, ELECTRODE LEAD, NEUROSTIMULATOR, SACRAL, PERCUTANEOUS;  Surgeon: Mary Jane Jarvis MD;  Location: [facility] OR;  Service: Urology;  Laterality: N/A;  1 hour 30 minutes    REPLACEMENT OF NERVE STIMULATOR BATTERY N/A 6/5/2024    Procedure: Replacement, Battery, Neurostimulator;  Surgeon: Mary Jane Jarvis MD;  Location: [facility] OR;  Service: Urology;  Laterality: N/A;  1 hour 30 minutes    REPLACEMENT OF SACRAL NERVE STIMULATOR  02/18/2020    Procedure: REPLACEMENT, NEUROSTIMULATOR, SACRAL;  Surgeon: Mary Jane Jarvis MD;  Location: [facility] OR 2ND FLR;  Service: Urology;;    REVISION OF PROCEDURE INVOLVING SACRAL NEUROSTIMULATOR DEVICE Right 02/18/2020    Procedure: REVISION, NEUROSTIMULATOR, SACRAL/ battery replacement;  Surgeon: Mary Jane Jarvis MD;  Location: [facility] OR 2ND FLR;  Service: Urology;  Laterality: Right;  1hr/ rep contacted/ (CONNIE)    SKIN CANCER EXCISION      left hand    TONSILLECTOMY      TRANSFORAMINAL EPIDURAL INJECTION OF STEROID Bilateral 03/05/2024    Procedure:  Injection,steroid,epidural,transforaminal approach      L4/5;  Surgeon: Fredi Rojas MD;  Location: [facility] OR;  Service: Pain Management;  Laterality: Bilateral;    TRANSFORAMINAL EPIDURAL INJECTION OF STEROID Bilateral 9/16/2024    Procedure: Injection,steroid,epidural,transforaminal approach    L4/5;  Surgeon: Fredi Rojas MD;  Location: [facility] OR;  Service: Pain Management;  Laterality: Bilateral;    WISDOM TOOTH EXTRACTION         Current Outpatient Medications   Medication Sig    ascorbic acid, vitamin C, (VITAMIN C) 1000 MG tablet Take 1,000 mg by mouth 2 (two) times daily.     azelastine (ASTELIN) 137 mcg (0.1 %) nasal spray 2 sprays (274 mcg total) by Nasal route 2 (two) times daily.    azelastine (OPTIVAR) 0.05 % ophthalmic solution Place 1 drop into both eyes 2 (two) times daily. As needed    B-complex with vitamin C (Z-BEC OR EQUIV) tablet Take 1 tablet by mouth once daily.    benzonatate (TESSALON) 200 MG capsule Take 1 capsule (200 mg total) by mouth 3 (three) times daily as needed for Cough.    Bifidobacterium infantis (ALIGN ORAL) Take by mouth once daily.    biotin 10,000 mcg Cap Take 1 tablet by mouth every evening.     blood sugar diagnostic [employer] preferred meter and strips. Check daily    blood-glucose meter kit Use as instructed. Insurance preferred meter.    budesonide (PULMICORT) 0.25 mg/2 mL nebulizer solution Take 2 mLs (0.25 mg total) by nebulization once daily. Controller    cranberry 500 mg Cap Take 1 capsule by mouth every evening.    cyclobenzaprine (FLEXERIL) 10 MG tablet Take 10 mg by mouth 3 (three) times daily.    cyclobenzaprine (FLEXERIL) 10 MG tablet Take 1 tablet (10 mg total) by mouth 3 (three) times daily as needed for Muscle spasms.    diltiaZEM (CARDIZEM CD) 120 MG Cp24 Take 1 capsule (120 mg total) by mouth once daily.    doxazosin (CARDURA) 2 MG tablet TAKE 1 TABLET EVERY EVENING    EPINEPHrine (EPIPEN) 0.3 mg/0.3 mL AtIn Inject 0.3 mLs (0.3 mg total) into the  muscle as needed (anaphylaxis).    erythromycin with ethanoL (THERAMYCIN) 2 % external solution Aaa bid prn    esomeprazole (NEXIUM) 40 MG capsule Take 1 capsule (40 mg total) by mouth before breakfast.    estradioL (ESTRACE) 0.01 % (0.1 mg/gram) vaginal cream Place 1 g vaginally 3 (three) times a week. Place by fingertip application before bedtime three times a week (Monday, Wednesday, Friday)    fexofenadine (ALLEGRA) 180 MG tablet Take 180 mg by mouth once daily.     fish oil-omega-3 fatty acids 300-1,000 mg capsule Take 2 g by mouth once daily.    fluticasone propionate (FLONASE) 50 mcg/actuation nasal spray 2 sprays (100 mcg total) by Each Nostril route once daily.    fluticasone-umeclidin-vilanter (TRELEGY ELLIPTA) 200-62.5-25 mcg inhaler Inhale 1 puff into the lungs once daily.    gabapentin (NEURONTIN) 600 MG tablet Take 1 tablet (600 mg total) by mouth 3 (three) times daily.    HYDROcodone-acetaminophen (NORCO) 5-325 mg per tablet Take 1 tablet by mouth every 6 (six) hours as needed for Pain.    hydrOXYzine HCL (ATARAX) 10 MG Tab Take 1 tablet (10 mg total) by mouth 3 (three) times daily as needed.    ibandronate (BONIVA) 150 mg tablet Take 1 tablet (150 mg total) by mouth every 30 days.    immun glob G,IgG,-pro-IgA 0-50 (HIZENTRA) 1 gram/5 mL (20 %) Soln Inject 55 mLs (11 g total) into the skin once a week.    inhalation spacing device Use as directed for inhalation.    lancets (ACCU-CHEK SOFTCLIX LANCETS) Misc Use to check blood sugar daily.    levalbuterol (XOPENEX HFA) 45 mcg/actuation inhaler Inhale 1-2 puffs into the lungs every 4 (four) hours as needed for Wheezing or Shortness of Breath. Rescue    metFORMIN (GLUCOPHAGE-XR) 500 MG ER 24hr tablet Take 1 tablet (500 mg total) by mouth 2 (two) times daily with meals.    montelukast (SINGULAIR) 10 mg tablet Take 1 tablet (10 mg total) by mouth every evening.    mucus clearing device Cecy 1 Device by Misc.(Non-Drug; Combo Route) route 2 (two) times daily.     multivitamin capsule Take 1 capsule by mouth once daily.     ondansetron (ZOFRAN) 8 MG tablet Take 1 tablet (8 mg total) by mouth every 8 (eight) hours as needed for Nausea.    ondansetron (ZOFRAN-ODT) 4 MG TbDL Take 1 tablet (4 mg total) by mouth every 6 (six) hours as needed (nausea).    pravastatin (PRAVACHOL) 80 MG tablet Take 1 tablet (80 mg total) by mouth once daily.    psyllium husk (METAMUCIL ORAL) Take by mouth.    SIMETHICONE (GAS-X ORAL) Take 1 capsule by mouth as needed (gas relief).     tezepelumab-ekko (TEZSPIRE) 210 mg/1.91 mL (110 mg/mL) PnIj Inject 1.91 mLs (210.1 mg total) into the skin every 28 days.    valsartan-hydrochlorothiazide (DIOVAN-HCT) 320-25 mg per tablet Take 1 tablet by mouth once daily.    vitamin D3-folic acid 5,000 unit- 1 mg Tab Take 1 tablet by mouth once daily.     vitamin E, dl,tocopheryl acet, (VITAMIN E, DL, ACETATE, ORAL) Take 1 tablet by mouth once daily.    WELCHOL 625 mg tablet Take 625 mg by mouth.     No current facility-administered medications for this visit.       Review of patient's allergies indicates:   Allergen Reactions    Sulfa (sulfonamide antibiotics) Hives    Naproxen Other (See Comments)     Other reaction(s): RT sided numbness      Albuterol Itching and Palpitations     Nebulizer only. Can use inhaler       Family History   Problem Relation Name Age of Onset    Breast cancer Mother  50    Breast cancer Paternal Aunt          40's    Cervical cancer Paternal Grandmother      Ovarian cancer Paternal Grandmother      Cervical cancer Paternal Cousin      Ovarian cancer Paternal Cousin 1st     Diabetes Other      Hypertension Other      Hypothyroidism Other      Allergic rhinitis Neg Hx      Allergies Neg Hx      Angioedema Neg Hx      Asthma Neg Hx      Atopy Neg Hx      Eczema Neg Hx      Immunodeficiency Neg Hx      Rhinitis Neg Hx      Urticaria Neg Hx      Uterine cancer Neg Hx         Social History[1]    Chief Complaint: No chief complaint on  file.      History of present illness:  74-year-old female seen for an injury on April 8, 2025.  Patient tripped and fell onto the concrete.  Patient landed on bilateral outstretched wrists.  X-rays taken in the emergency room were negative for fracture.  Patient hit her right knee into the concrete.  Minimally displaced transverse patella fracture.  Patient has a abrasions on both hands and on the right knee.  Pain is 5/10.    Prior treatment:  Immobilization        Review of Systems:  Musculoskeletal:  See HPI        Physical Examination:    Vital Signs:  There were no vitals filed for this visit.    There is no height or weight on file to calculate BMI.    This a well-developed, well nourished patient in no acute distress.  They are alert and oriented and cooperative to examination.  Pt. walks using all rolling walker    Examination of the right knee shows abrasions of the front of the knee.  Mild swelling.  Tenderness over the patella.  Patient is able to maintain some knee extension with minimal resistance.  Significant ecchymosis throughout the knee and leg.    X-rays:  X-rays of the right knee is ordered and review which show a minimally displaced transverse patella fracture.  No change in alignment.         Assessment:  Right transverse patella fracture    Plan:  I reviewed the findings with her today.  We discussed treatment options.  Continue non operative care.  Follow up in 3 weeks.  Repeat x-ray of the right knee at that time.        All previous pertinent notes including ER visits, physical therapy visits, other orthopedic visits as well as other care for the same musculoskeletal problem were reviewed.  All pertinent lab values and previous imaging was reviewed pertinent to the current visit.    This note was created using [employer] voice recognition software that occasionally misinterpreted phrases or words.    Consult note is delivered via Epic messaging service.             [1]   Social  History  Socioeconomic History    Marital status:    Tobacco Use    Smoking status: Never    Smokeless tobacco: Never   Substance and Sexual Activity    Alcohol use: Not Currently    Drug use: No    Sexual activity: Not Currently     Social Drivers of Health     Financial Resource Strain: Medium Risk (3/20/2025)    Overall Financial Resource Strain (CARDIA)     Difficulty of Paying Living Expenses: Somewhat hard   Food Insecurity: Food Insecurity Present (3/20/2025)    Hunger Vital Sign     Worried About Running Out of Food in the Last Year: Sometimes true     Ran Out of Food in the Last Year: Sometimes true   Transportation Needs: No Transportation Needs (3/20/2025)    PRAPARE - Transportation     Lack of Transportation (Medical): No     Lack of Transportation (Non-Medical): No   Physical Activity: Insufficiently Active (3/20/2025)    Exercise Vital Sign     Days of Exercise per Week: 3 days     Minutes of Exercise per Session: 20 min   Stress: No Stress Concern Present (3/20/2025)    [employer] [place] [employer] - Occupational Stress Questionnaire     Feeling of Stress : Only a little   Housing Stability: Unknown (3/20/2025)    Housing Stability Vital Sign     Unable to Pay for Housing in the Last Year: Patient declined

## 2025-04-17 ENCOUNTER — RESULTS FOLLOW-UP (OUTPATIENT)
Dept: FAMILY MEDICINE | Facility: CLINIC | Age: 75
End: 2025-04-17

## 2025-04-21 ENCOUNTER — PATIENT OUTREACH (OUTPATIENT)
Dept: ADMINISTRATIVE | Facility: HOSPITAL | Age: 75
End: 2025-04-21
Payer: MEDICARE

## 2025-04-23 DIAGNOSIS — S82.031D CLOSED DISPLACED TRANSVERSE FRACTURE OF RIGHT PATELLA WITH ROUTINE HEALING, SUBSEQUENT ENCOUNTER: Primary | ICD-10-CM

## 2025-04-24 ENCOUNTER — PATIENT MESSAGE (OUTPATIENT)
Dept: FAMILY MEDICINE | Facility: CLINIC | Age: 75
End: 2025-04-24
Payer: MEDICARE

## 2025-04-24 DIAGNOSIS — S82.001D CLOSED NONDISPLACED FRACTURE OF RIGHT PATELLA WITH ROUTINE HEALING, UNSPECIFIED FRACTURE MORPHOLOGY, SUBSEQUENT ENCOUNTER: Primary | ICD-10-CM

## 2025-04-24 RX ORDER — HYDROCODONE BITARTRATE AND ACETAMINOPHEN 10; 325 MG/1; MG/1
1 TABLET ORAL EVERY 6 HOURS PRN
Qty: 30 TABLET | Refills: 0 | Status: SHIPPED | OUTPATIENT
Start: 2025-04-24

## 2025-04-29 DIAGNOSIS — S82.031D CLOSED DISPLACED TRANSVERSE FRACTURE OF RIGHT PATELLA WITH ROUTINE HEALING, SUBSEQUENT ENCOUNTER: Primary | ICD-10-CM

## 2025-05-05 ENCOUNTER — TELEPHONE (OUTPATIENT)
Dept: OTOLARYNGOLOGY | Facility: CLINIC | Age: 75
End: 2025-05-05
Payer: MEDICARE

## 2025-05-05 NOTE — TELEPHONE ENCOUNTER
----- Message from Robyn sent at 5/5/2025  2:12 PM CDT -----  Regarding: Sooner Appointment Request  Type:  Sooner Appointment RequestCaller is requesting a sooner appointment.  Caller declined first available appointment listed below.  Caller will not accept being placed on the waitlist and is requesting a message be sent to doctor.Name of Caller:  Patient at [phone] is the first available appointment?  July 7Symptoms:  bad fall, ear popping Additional Information:  Please call and advise. Thank you.

## 2025-05-07 ENCOUNTER — HOSPITAL ENCOUNTER (OUTPATIENT)
Dept: RADIOLOGY | Facility: HOSPITAL | Age: 75
Discharge: HOME | End: 2025-05-07
Attending: ORTHOPAEDIC SURGERY
Payer: MEDICARE

## 2025-05-07 ENCOUNTER — OFFICE VISIT (OUTPATIENT)
Dept: ORTHOPEDICS | Facility: CLINIC | Age: 75
End: 2025-05-07
Payer: MEDICARE

## 2025-05-07 VITALS — BODY MASS INDEX: 34.62 KG/M2 | RESPIRATION RATE: 18 BRPM | WEIGHT: 202.81 LBS | HEIGHT: 64 IN

## 2025-05-07 DIAGNOSIS — S82.031D CLOSED DISPLACED TRANSVERSE FRACTURE OF RIGHT PATELLA WITH ROUTINE HEALING, SUBSEQUENT ENCOUNTER: ICD-10-CM

## 2025-05-07 DIAGNOSIS — S82.031D CLOSED DISPLACED TRANSVERSE FRACTURE OF RIGHT PATELLA WITH ROUTINE HEALING, SUBSEQUENT ENCOUNTER: Primary | ICD-10-CM

## 2025-05-07 PROCEDURE — 1101F PT FALLS ASSESS-DOCD LE1/YR: CPT | Mod: CPTII,HCNC,S$GLB, | Performed by: ORTHOPAEDIC SURGERY

## 2025-05-07 PROCEDURE — 1160F RVW MEDS BY RX/DR IN RCRD: CPT | Mod: CPTII,HCNC,S$GLB, | Performed by: ORTHOPAEDIC SURGERY

## 2025-05-07 PROCEDURE — 99214 OFFICE O/P EST MOD 30 MIN: CPT | Mod: HCNC,S$GLB,, | Performed by: ORTHOPAEDIC SURGERY

## 2025-05-07 PROCEDURE — 99999 PR PBB SHADOW E&M-EST. PATIENT-LVL IV: CPT | Mod: PBBFAC,HCNC,, | Performed by: ORTHOPAEDIC SURGERY

## 2025-05-07 PROCEDURE — 73560 X-RAY EXAM OF KNEE 1 OR 2: CPT | Mod: TC,HCNC,PO,RT

## 2025-05-07 PROCEDURE — 3044F HG A1C LEVEL LT 7.0%: CPT | Mod: CPTII,HCNC,S$GLB, | Performed by: ORTHOPAEDIC SURGERY

## 2025-05-07 PROCEDURE — 1125F AMNT PAIN NOTED PAIN PRSNT: CPT | Mod: CPTII,HCNC,S$GLB, | Performed by: ORTHOPAEDIC SURGERY

## 2025-05-07 PROCEDURE — 3008F BODY MASS INDEX DOCD: CPT | Mod: CPTII,HCNC,S$GLB, | Performed by: ORTHOPAEDIC SURGERY

## 2025-05-07 PROCEDURE — 1159F MED LIST DOCD IN RCRD: CPT | Mod: CPTII,HCNC,S$GLB, | Performed by: ORTHOPAEDIC SURGERY

## 2025-05-07 PROCEDURE — 1157F ADVNC CARE PLAN IN RCRD: CPT | Mod: CPTII,HCNC,S$GLB, | Performed by: ORTHOPAEDIC SURGERY

## 2025-05-07 PROCEDURE — 3288F FALL RISK ASSESSMENT DOCD: CPT | Mod: CPTII,HCNC,S$GLB, | Performed by: ORTHOPAEDIC SURGERY

## 2025-05-07 PROCEDURE — 73560 X-RAY EXAM OF KNEE 1 OR 2: CPT | Mod: 26,HCNC,RT, | Performed by: RADIOLOGY

## 2025-05-07 NOTE — PROGRESS NOTES
Past Medical History:   Diagnosis Date    Allergy     Arthritis     Asthma     Cancer     skin cancer to right hand    Cataract     Chronic fatigue 01/24/2017    CVID (common variable immunodeficiency) 03/07/2019    Diabetes mellitus     type2    KLINE (dyspnea on exertion) 01/24/2017    Dyslipidemia 06/25/2019    Encounter for blood transfusion     Essential hypertension 12/29/2011    GERD (gastroesophageal reflux disease)     Headache(784.0)     Hyperlipidemia 07/15/2015    Hypertension     Hypothyroidism     Neuropathy     Obesity     Postmenopausal HRT (hormone replacement therapy)     Rash     Rosacea     Sleep apnea     on bipap    Snoring     Squamous cell carcinoma of skin     left forearm     UTI (urinary tract infection)     Wears glasses        Past Surgical History:   Procedure Laterality Date    ADENOIDECTOMY      APPENDECTOMY      BLADDER SUSPENSION      BREAST BIOPSY Bilateral 08/1992    bilateral benign excisional biopsies    BUNIONECTOMY  10/17/2014    right, still has discomfort    CATARACT EXTRACTION W/  INTRAOCULAR LENS IMPLANT Bilateral     CHOLECYSTECTOMY  08/02/2017    COLONOSCOPY      CYSTOSCOPY      EPIDURAL STEROID INJECTION INTO LUMBAR SPINE N/A 08/14/2019    Procedure: Injection-steroid-epidural-lumbar L5/S1;  Surgeon: Fredi Rojas MD;  Location: [facility] OR;  Service: Pain Management;  Laterality: N/A;    EPIDURAL STEROID INJECTION INTO LUMBAR SPINE N/A 05/03/2021    Procedure: Injection-steroid-epidural-lumbar L5/S1 to the Left;  Surgeon: Fredi Rojas MD;  Location: [facility] OR;  Service: Pain Management;  Laterality: N/A;    EPIDURAL STEROID INJECTION INTO LUMBAR SPINE N/A 09/24/2021    Procedure: Injection-steroid-epidural-lumbar L5/S1;  Surgeon: Fredi Rojas MD;  Location: [facility] OR;  Service: Pain Management;  Laterality: N/A;    EPIDURAL STEROID INJECTION INTO LUMBAR SPINE N/A 02/21/2022    Procedure: Injection-steroid-epidural-lumbar L5/S1;  Surgeon: Fredi Rojas MD;  Location:  [facility] OR;  Service: Pain Management;  Laterality: N/A;    EPIDURAL STEROID INJECTION INTO LUMBAR SPINE N/A 02/09/2024    Procedure: Injection-steroid-epidural-lumbar       L5/S1;  Surgeon: Fredi Rojas MD;  Location: [facility] OR;  Service: Pain Management;  Laterality: N/A;    ESOPHAGEAL DILATION N/A 06/11/2021    Procedure: DILATION, ESOPHAGUS;  Surgeon: Jake Sheriff MD;  Location: [facility] ENDO;  Service: Endoscopy;  Laterality: N/A;    ESOPHAGOGASTRODUODENOSCOPY      dilated esophagus    ESOPHAGOGASTRODUODENOSCOPY N/A 06/11/2021    Procedure: EGD (ESOPHAGOGASTRODUODENOSCOPY);  Surgeon: Jake Sheriff MD;  Location: [facility] ENDO;  Service: Endoscopy;  Laterality: N/A;    GASTRIC BYPASS      2011    HYSTERECTOMY  02/1980    interstim bladder  2009    10/14/14 new battery    OOPHORECTOMY  02/1980    PERCUTANEOUS INSERTION OF SACRAL NERVE NEUROSTIMULATOR ELECTRODE LEAD N/A 6/5/2024    Procedure: INSERTION, ELECTRODE LEAD, NEUROSTIMULATOR, SACRAL, PERCUTANEOUS;  Surgeon: Mary Jane Jarvis MD;  Location: [facility] OR;  Service: Urology;  Laterality: N/A;  1 hour 30 minutes    REPLACEMENT OF NERVE STIMULATOR BATTERY N/A 6/5/2024    Procedure: Replacement, Battery, Neurostimulator;  Surgeon: Mary Jane Jarvis MD;  Location: [facility] OR;  Service: Urology;  Laterality: N/A;  1 hour 30 minutes    REPLACEMENT OF SACRAL NERVE STIMULATOR  02/18/2020    Procedure: REPLACEMENT, NEUROSTIMULATOR, SACRAL;  Surgeon: Mary Jane Jarvis MD;  Location: [facility] OR 2ND FLR;  Service: Urology;;    REVISION OF PROCEDURE INVOLVING SACRAL NEUROSTIMULATOR DEVICE Right 02/18/2020    Procedure: REVISION, NEUROSTIMULATOR, SACRAL/ battery replacement;  Surgeon: Mary Jane Jarvis MD;  Location: [facility] OR 2ND FLR;  Service: Urology;  Laterality: Right;  1hr/ rep contacted/ (CONNIE)    SKIN CANCER EXCISION      left hand    TONSILLECTOMY      TRANSFORAMINAL EPIDURAL INJECTION OF STEROID Bilateral 03/05/2024    Procedure:  Injection,steroid,epidural,transforaminal approach      L4/5;  Surgeon: Fredi Rojas MD;  Location: [facility] OR;  Service: Pain Management;  Laterality: Bilateral;    TRANSFORAMINAL EPIDURAL INJECTION OF STEROID Bilateral 9/16/2024    Procedure: Injection,steroid,epidural,transforaminal approach    L4/5;  Surgeon: Fredi Rojsa MD;  Location: [facility] OR;  Service: Pain Management;  Laterality: Bilateral;    WISDOM TOOTH EXTRACTION         Current Outpatient Medications   Medication Sig    ascorbic acid, vitamin C, (VITAMIN C) 1000 MG tablet Take 1,000 mg by mouth 2 (two) times daily.     azelastine (ASTELIN) 137 mcg (0.1 %) nasal spray 2 sprays (274 mcg total) by Nasal route 2 (two) times daily.    azelastine (OPTIVAR) 0.05 % ophthalmic solution Place 1 drop into both eyes 2 (two) times daily. As needed    B-complex with vitamin C (Z-BEC OR EQUIV) tablet Take 1 tablet by mouth once daily.    benzonatate (TESSALON) 200 MG capsule Take 1 capsule (200 mg total) by mouth 3 (three) times daily as needed for Cough.    Bifidobacterium infantis (ALIGN ORAL) Take by mouth once daily.    biotin 10,000 mcg Cap Take 1 tablet by mouth every evening.     blood sugar diagnostic [employer] preferred meter and strips. Check daily    budesonide (PULMICORT) 0.25 mg/2 mL nebulizer solution Take 2 mLs (0.25 mg total) by nebulization once daily. Controller    cranberry 500 mg Cap Take 1 capsule by mouth every evening.    cyclobenzaprine (FLEXERIL) 10 MG tablet Take 10 mg by mouth 3 (three) times daily.    diltiaZEM (CARDIZEM CD) 120 MG Cp24 Take 1 capsule (120 mg total) by mouth once daily.    doxazosin (CARDURA) 2 MG tablet TAKE 1 TABLET EVERY EVENING    EPINEPHrine (EPIPEN) 0.3 mg/0.3 mL AtIn Inject 0.3 mLs (0.3 mg total) into the muscle as needed (anaphylaxis).    erythromycin with ethanoL (THERAMYCIN) 2 % external solution Aaa bid prn    esomeprazole (NEXIUM) 40 MG capsule Take 1 capsule (40 mg total) by mouth before breakfast.     estradioL (ESTRACE) 0.01 % (0.1 mg/gram) vaginal cream Place 1 g vaginally 3 (three) times a week. Place by fingertip application before bedtime three times a week (Monday, Wednesday, Friday)    fexofenadine (ALLEGRA) 180 MG tablet Take 180 mg by mouth once daily.     fish oil-omega-3 fatty acids 300-1,000 mg capsule Take 2 g by mouth once daily.    fluticasone propionate (FLONASE) 50 mcg/actuation nasal spray 2 sprays (100 mcg total) by Each Nostril route once daily.    fluticasone-umeclidin-vilanter (TRELEGY ELLIPTA) 200-62.5-25 mcg inhaler Inhale 1 puff into the lungs once daily.    gabapentin (NEURONTIN) 600 MG tablet Take 1 tablet (600 mg total) by mouth 3 (three) times daily.    HYDROcodone-acetaminophen (NORCO)  mg per tablet Take 1 tablet by mouth every 6 (six) hours as needed for Pain.    HYDROcodone-acetaminophen (NORCO) 5-325 mg per tablet Take 1 tablet by mouth every 6 (six) hours as needed for Pain.    hydrOXYzine HCL (ATARAX) 10 MG Tab Take 1 tablet (10 mg total) by mouth 3 (three) times daily as needed.    ibandronate (BONIVA) 150 mg tablet Take 1 tablet (150 mg total) by mouth every 30 days.    immun glob G,IgG,-pro-IgA 0-50 (HIZENTRA) 1 gram/5 mL (20 %) Soln Inject 55 mLs (11 g total) into the skin once a week.    inhalation spacing device Use as directed for inhalation.    lancets (ACCU-CHEK SOFTCLIX LANCETS) Misc Use to check blood sugar daily.    levalbuterol (XOPENEX HFA) 45 mcg/actuation inhaler Inhale 1-2 puffs into the lungs every 4 (four) hours as needed for Wheezing or Shortness of Breath. Rescue    metFORMIN (GLUCOPHAGE-XR) 500 MG ER 24hr tablet Take 1 tablet (500 mg total) by mouth 2 (two) times daily with meals.    montelukast (SINGULAIR) 10 mg tablet Take 1 tablet (10 mg total) by mouth every evening.    mucus clearing device Cecy 1 Device by Misc.(Non-Drug; Combo Route) route 2 (two) times daily.    multivitamin capsule Take 1 capsule by mouth once daily.     ondansetron  (ZOFRAN) 8 MG tablet Take 1 tablet (8 mg total) by mouth every 8 (eight) hours as needed for Nausea.    ondansetron (ZOFRAN-ODT) 4 MG TbDL Take 1 tablet (4 mg total) by mouth every 6 (six) hours as needed (nausea).    pravastatin (PRAVACHOL) 80 MG tablet Take 1 tablet (80 mg total) by mouth once daily.    psyllium husk (METAMUCIL ORAL) Take by mouth.    SIMETHICONE (GAS-X ORAL) Take 1 capsule by mouth as needed (gas relief).     tezepelumab-ekko (TEZSPIRE) 210 mg/1.91 mL (110 mg/mL) PnIj Inject 1.91 mLs (210.1 mg total) into the skin every 28 days.    valsartan-hydrochlorothiazide (DIOVAN-HCT) 320-25 mg per tablet Take 1 tablet by mouth once daily.    vitamin D3-folic acid 5,000 unit- 1 mg Tab Take 1 tablet by mouth once daily.     vitamin E, dl,tocopheryl acet, (VITAMIN E, DL, ACETATE, ORAL) Take 1 tablet by mouth once daily.    WELCHOL 625 mg tablet Take 625 mg by mouth.    blood-glucose meter kit Use as instructed. Insurance preferred meter.     No current facility-administered medications for this visit.       Review of patient's allergies indicates:   Allergen Reactions    Sulfa (sulfonamide antibiotics) Hives    Naproxen Other (See Comments)     Other reaction(s): RT sided numbness      Albuterol Itching and Palpitations     Nebulizer only. Can use inhaler       Family History   Problem Relation Name Age of Onset    Breast cancer Mother  50    Breast cancer Paternal Aunt          40's    Cervical cancer Paternal Grandmother      Ovarian cancer Paternal Grandmother      Cervical cancer Paternal Cousin      Ovarian cancer Paternal Cousin 1st     Diabetes Other      Hypertension Other      Hypothyroidism Other      Allergic rhinitis Neg Hx      Allergies Neg Hx      Angioedema Neg Hx      Asthma Neg Hx      Atopy Neg Hx      Eczema Neg Hx      Immunodeficiency Neg Hx      Rhinitis Neg Hx      Urticaria Neg Hx      Uterine cancer Neg Hx         Social History[1]    Chief Complaint:   Chief Complaint   Patient  presents with    Follow-up     Right knee patella fx. Doi 4/8/25       History of present illness:  74-year-old female seen for an injury on April 8, 2025.  Patient tripped and fell onto the concrete.  Patient landed on bilateral outstretched wrists.  X-rays taken in the emergency room were negative for fracture.  Patient hit her right knee into the concrete.  Minimally displaced transverse patella fracture.  Patient has a abrasions on both hands and on the right knee.  Pain is 5/10.    Prior treatment:  Immobilization        Review of Systems:  Musculoskeletal:  See HPI        Physical Examination:    Vital Signs:    Vitals:    05/07/25 1309   Resp: 18       Body mass index is 34.81 kg/m².    This a well-developed, well nourished patient in no acute distress.  They are alert and oriented and cooperative to examination.  Pt. walks using all rolling walker    Examination of the right knee shows abrasions of the front of the knee.  Mild swelling.  Tenderness over the patella.  Patient is able to maintain some knee extension with minimal resistance.  Significant ecchymosis throughout the knee and leg.    X-rays:  X-rays of the right knee is ordered and review which show a minimally displaced transverse patella fracture.  No change in alignment.         Assessment:  Right transverse patella fracture    Plan:  I reviewed the findings with her today.  We discussed treatment options.  We will let her start slowly moving the knee.  Start some physical therapy with a slow range of motion protocol.  Follow up in a month with another x-ray of the right knee.  Placed her in a hinged knee brace were we can set the flexion and extension.    All previous pertinent notes including ER visits, physical therapy visits, other orthopedic visits as well as other care for the same musculoskeletal problem were reviewed.  All pertinent lab values and previous imaging was reviewed pertinent to the current visit.    This note was created  using M [employer] voice recognition software that occasionally misinterpreted phrases or words.    Consult note is delivered via Epic messaging service.               [1]   Social History  Socioeconomic History    Marital status:    Tobacco Use    Smoking status: Never    Smokeless tobacco: Never   Substance and Sexual Activity    Alcohol use: Not Currently    Drug use: No    Sexual activity: Not Currently     Social Drivers of Health     Financial Resource Strain: Medium Risk (3/20/2025)    Overall Financial Resource Strain (CARDIA)     Difficulty of Paying Living Expenses: Somewhat hard   Food Insecurity: Food Insecurity Present (3/20/2025)    Hunger Vital Sign     Worried About Running Out of Food in the Last Year: Sometimes true     Ran Out of Food in the Last Year: Sometimes true   Transportation Needs: No Transportation Needs (3/20/2025)    PRAPARE - Transportation     Lack of Transportation (Medical): No     Lack of Transportation (Non-Medical): No   Physical Activity: Insufficiently Active (3/20/2025)    Exercise Vital Sign     Days of Exercise per Week: 3 days     Minutes of Exercise per Session: 20 min   Stress: No Stress Concern Present (3/20/2025)    [employer] - Occupational Stress Questionnaire     Feeling of Stress : Only a little   Housing Stability: Unknown (3/20/2025)    Housing Stability Vital Sign     Unable to Pay for Housing in the Last Year: Patient declined

## 2025-05-08 ENCOUNTER — OFFICE VISIT (OUTPATIENT)
Dept: OTOLARYNGOLOGY | Facility: CLINIC | Age: 75
End: 2025-05-08
Payer: MEDICARE

## 2025-05-08 VITALS — WEIGHT: 203.5 LBS | DIASTOLIC BLOOD PRESSURE: 75 MMHG | BODY MASS INDEX: 34.93 KG/M2 | SYSTOLIC BLOOD PRESSURE: 166 MMHG

## 2025-05-08 DIAGNOSIS — R11.2 NAUSEA AND VOMITING, UNSPECIFIED VOMITING TYPE: Primary | ICD-10-CM

## 2025-05-08 PROCEDURE — 3077F SYST BP >= 140 MM HG: CPT | Mod: CPTII,HCNC,S$GLB, | Performed by: NURSE PRACTITIONER

## 2025-05-08 PROCEDURE — 99999 PR PBB SHADOW E&M-EST. PATIENT-LVL IV: CPT | Mod: PBBFAC,HCNC,, | Performed by: NURSE PRACTITIONER

## 2025-05-08 PROCEDURE — 3078F DIAST BP <80 MM HG: CPT | Mod: CPTII,HCNC,S$GLB, | Performed by: NURSE PRACTITIONER

## 2025-05-08 PROCEDURE — 3044F HG A1C LEVEL LT 7.0%: CPT | Mod: CPTII,HCNC,S$GLB, | Performed by: NURSE PRACTITIONER

## 2025-05-08 PROCEDURE — 99214 OFFICE O/P EST MOD 30 MIN: CPT | Mod: HCNC,S$GLB,, | Performed by: NURSE PRACTITIONER

## 2025-05-08 PROCEDURE — 1159F MED LIST DOCD IN RCRD: CPT | Mod: CPTII,HCNC,S$GLB, | Performed by: NURSE PRACTITIONER

## 2025-05-08 PROCEDURE — 3008F BODY MASS INDEX DOCD: CPT | Mod: CPTII,HCNC,S$GLB, | Performed by: NURSE PRACTITIONER

## 2025-05-08 PROCEDURE — 1126F AMNT PAIN NOTED NONE PRSNT: CPT | Mod: CPTII,HCNC,S$GLB, | Performed by: NURSE PRACTITIONER

## 2025-05-08 PROCEDURE — 1100F PTFALLS ASSESS-DOCD GE2>/YR: CPT | Mod: CPTII,HCNC,S$GLB, | Performed by: NURSE PRACTITIONER

## 2025-05-08 PROCEDURE — 3288F FALL RISK ASSESSMENT DOCD: CPT | Mod: CPTII,HCNC,S$GLB, | Performed by: NURSE PRACTITIONER

## 2025-05-08 PROCEDURE — 1157F ADVNC CARE PLAN IN RCRD: CPT | Mod: CPTII,HCNC,S$GLB, | Performed by: NURSE PRACTITIONER

## 2025-05-08 NOTE — PROGRESS NOTES
"Subjective     Patient ID: Cornelia Delatorre is a 74 y.o. female.    Chief Complaint: Dizziness    HPI  Patient has seen Dr. Johnson multiple times since 2019 for laryngitis, most recently 5 months ago. This is her first visit to ENT for dizziness.     PMH: CVID, bronchiectasis, asthma, type 2 DM, diabetic polyneuropathy, obesity, SOHA, hypothyroidism, bilateral carotid artery stenosis, arthrosclerosis, dyslipidemia, HTN, osteoarthritis, lumbar radiculopathy, chronic low back pain, bilateral sciatica, gait abnormality, venous insufficiency.     Patient was seen in ED one month ago for fall during which she injured her face and right knee, suffering a fracture of her right patella. No syncope. Patient reports she was walking out of the  and tripped in the gravel and landed face 1st. She sustained multiple abrasions to her face, bilateral palms.     Patient saw Dr. Armond Cortez 3 weeks ago: "severe episode with both nausea and lightheadedness, but she was in severe pain from her knee during suture removal.  Almost passed out yesterday in office.  Usually light headeness not as severe.   She has been having episodes described as a wave of nausea frequently associated with lightheadedness.  It can occur at anytime during the day, but is usually associated with movement.  Has occurred getting into bed, brushing her teeth, getting out of bed in the morning and other daily activities.   Associated with vomiting - usually dry heaves.  Zofran ODT relieves it, but not the pills bc vomits up.  Going on for 6-7 months.  Becoming more frequent - 3 per day now, but can have a month with nothing and just 1 or 2 episodes.   No new meds over past 2 months.  Occasionally with have palpitations, but this occurs at separate times and sometimes with these episodes.  Not related to food.  NM stress test 9/2024 wnl.  DC of [facility] did not help."    Patient states crenbn-di-bkmdua that she is not dizzy. Patient states her " only chief concern at this time is constant nausea. Patient states she has had vertigo on 2 distinct occasions many years ago so she is familiar with vertigo, but states adamantly that she is not experiencing vertigo. Patient endorses a mild headache, occasional mild lightheadedness, nausea, vomiting and dry heaves. Patient states meclizine helps with nausea. Patient is able to lay down, get up, turn over in bed, look up and down without dysequilibrium.     Review of Systems   Constitutional: Negative.    HENT: Negative.     Eyes: Negative.    Respiratory: Negative.     Cardiovascular: Negative.    Gastrointestinal:  Positive for nausea and vomiting.   Musculoskeletal: Negative.    Integumentary:  Negative.   Neurological: Negative.    Hematological: Negative.    Psychiatric/Behavioral: Negative.            Objective     Physical Exam  Vitals and nursing note reviewed.   Constitutional:       General: She is not in acute distress.     Appearance: She is well-developed. She is not ill-appearing.   HENT:      Head: Normocephalic and atraumatic.      Right Ear: Hearing, tympanic membrane, ear canal and external ear normal. No middle ear effusion. Tympanic membrane is not erythematous.      Left Ear: Hearing, tympanic membrane, ear canal and external ear normal.  No middle ear effusion. Tympanic membrane is not erythematous.      Nose: Nose normal.   Eyes:      General: Lids are normal. No scleral icterus.        Right eye: No discharge.         Left eye: No discharge.   Neck:      Trachea: Trachea normal. No tracheal deviation.   Cardiovascular:      Rate and Rhythm: Normal rate.   Pulmonary:      Effort: Pulmonary effort is normal. No respiratory distress.      Breath sounds: No stridor. No wheezing.   Musculoskeletal:         General: Normal range of motion.      Cervical back: Normal range of motion and neck supple.   Skin:     General: Skin is warm and dry.   Neurological:      Mental Status: She is alert and  oriented to person, place, and time.      Coordination: Coordination normal.      Gait: Gait normal.   Psychiatric:         Attention and Perception: Attention normal.         Mood and Affect: Mood normal.         Speech: Speech normal.         Behavior: Behavior normal. Behavior is cooperative.     Negative Lenny-Hallpike AU (patient reported diplopia during HHL)     Assessment and Plan     1. Nausea and vomiting, unspecified vomiting type      Discussion regarding dysequilibrium versus true vertigo: VNG is an inner ear test as part of comprehensive vestibular system diagnostic testing done in either [facility] or [facility], and indicated for those patients experiencing true vertigo but it is of little to no yield unless vertiginous. No evidence of any BPPV at this time. Whereas dysequilibrium is typically multifactorial: anemia, anxiety, dehydration, hypo-/hyper-glycemia, hypo-/hyper-tension, thyroid, arrhthymia, side effects of medication, gait/mobility issues, age, headaches and other neurological issues, etc. Discuss with your PCP to determine stages of workup. Patient agrees to call me for any vertigo or if patient desires to proceed with VNG. I have low to no suspicious of peripheral vestibulopathy at this time.          No follow-ups on file.

## 2025-05-19 ENCOUNTER — PATIENT MESSAGE (OUTPATIENT)
Dept: FAMILY MEDICINE | Facility: CLINIC | Age: 75
End: 2025-05-19
Payer: MEDICARE

## 2025-05-19 ENCOUNTER — PATIENT MESSAGE (OUTPATIENT)
Dept: ADMINISTRATIVE | Facility: OTHER | Age: 75
End: 2025-05-19
Payer: MEDICARE

## 2025-05-20 ENCOUNTER — LAB VISIT (OUTPATIENT)
Dept: LAB | Facility: HOSPITAL | Age: 75
End: 2025-05-20
Attending: INTERNAL MEDICINE
Payer: MEDICARE

## 2025-05-20 ENCOUNTER — OFFICE VISIT (OUTPATIENT)
Dept: FAMILY MEDICINE | Facility: CLINIC | Age: 75
End: 2025-05-20
Payer: MEDICARE

## 2025-05-20 ENCOUNTER — RESULTS FOLLOW-UP (OUTPATIENT)
Dept: FAMILY MEDICINE | Facility: CLINIC | Age: 75
End: 2025-05-20

## 2025-05-20 VITALS
WEIGHT: 203.5 LBS | OXYGEN SATURATION: 99 % | BODY MASS INDEX: 34.93 KG/M2 | HEART RATE: 84 BPM | DIASTOLIC BLOOD PRESSURE: 72 MMHG | SYSTOLIC BLOOD PRESSURE: 138 MMHG

## 2025-05-20 DIAGNOSIS — N30.00 ACUTE CYSTITIS WITHOUT HEMATURIA: ICD-10-CM

## 2025-05-20 DIAGNOSIS — N30.00 ACUTE CYSTITIS WITHOUT HEMATURIA: Primary | ICD-10-CM

## 2025-05-20 DIAGNOSIS — F41.9 ANXIETY: ICD-10-CM

## 2025-05-20 LAB
BACTERIA #/AREA URNS HPF: ABNORMAL /HPF
BILIRUB UR QL STRIP.AUTO: NEGATIVE
CLARITY UR: ABNORMAL
COLOR UR AUTO: YELLOW
GLUCOSE UR QL STRIP: NEGATIVE
HGB UR QL STRIP: NEGATIVE
HOLD SPECIMEN: NORMAL
KETONES UR QL STRIP: NEGATIVE
LEUKOCYTE ESTERASE UR QL STRIP: ABNORMAL
MICROSCOPIC COMMENT: ABNORMAL
NITRITE UR QL STRIP: NEGATIVE
PH UR STRIP: 6 [PH]
PROT UR QL STRIP: NEGATIVE
RBC #/AREA URNS HPF: 0 /HPF (ref 0–4)
SP GR UR STRIP: 1.01
SQUAMOUS #/AREA URNS HPF: 2 /HPF
WBC #/AREA URNS HPF: 45 /HPF (ref 0–5)
WBC CLUMPS URNS QL MICRO: ABNORMAL

## 2025-05-20 PROCEDURE — 3078F DIAST BP <80 MM HG: CPT | Mod: CPTII,HCNC,S$GLB, | Performed by: NURSE PRACTITIONER

## 2025-05-20 PROCEDURE — 87086 URINE CULTURE/COLONY COUNT: CPT | Mod: HCNC

## 2025-05-20 PROCEDURE — 3044F HG A1C LEVEL LT 7.0%: CPT | Mod: CPTII,HCNC,S$GLB, | Performed by: NURSE PRACTITIONER

## 2025-05-20 PROCEDURE — 3075F SYST BP GE 130 - 139MM HG: CPT | Mod: CPTII,HCNC,S$GLB, | Performed by: NURSE PRACTITIONER

## 2025-05-20 PROCEDURE — 99999 PR PBB SHADOW E&M-EST. PATIENT-LVL IV: CPT | Mod: PBBFAC,HCNC,, | Performed by: NURSE PRACTITIONER

## 2025-05-20 PROCEDURE — 3008F BODY MASS INDEX DOCD: CPT | Mod: CPTII,HCNC,S$GLB, | Performed by: NURSE PRACTITIONER

## 2025-05-20 PROCEDURE — 81003 URINALYSIS AUTO W/O SCOPE: CPT | Mod: HCNC,PO

## 2025-05-20 PROCEDURE — 1157F ADVNC CARE PLAN IN RCRD: CPT | Mod: CPTII,HCNC,S$GLB, | Performed by: NURSE PRACTITIONER

## 2025-05-20 PROCEDURE — 99214 OFFICE O/P EST MOD 30 MIN: CPT | Mod: HCNC,S$GLB,, | Performed by: NURSE PRACTITIONER

## 2025-05-20 PROCEDURE — 1100F PTFALLS ASSESS-DOCD GE2>/YR: CPT | Mod: CPTII,HCNC,S$GLB, | Performed by: NURSE PRACTITIONER

## 2025-05-20 PROCEDURE — 3288F FALL RISK ASSESSMENT DOCD: CPT | Mod: CPTII,HCNC,S$GLB, | Performed by: NURSE PRACTITIONER

## 2025-05-20 PROCEDURE — 1159F MED LIST DOCD IN RCRD: CPT | Mod: CPTII,HCNC,S$GLB, | Performed by: NURSE PRACTITIONER

## 2025-05-20 RX ORDER — SERTRALINE HYDROCHLORIDE 25 MG/1
25 TABLET, FILM COATED ORAL DAILY
Qty: 30 TABLET | Refills: 11 | Status: SHIPPED | OUTPATIENT
Start: 2025-05-20 | End: 2026-05-20

## 2025-05-20 RX ORDER — NITROFURANTOIN 25; 75 MG/1; MG/1
100 CAPSULE ORAL 2 TIMES DAILY
Qty: 14 CAPSULE | Refills: 0 | Status: SHIPPED | OUTPATIENT
Start: 2025-05-20 | End: 2025-05-27

## 2025-05-20 NOTE — PROGRESS NOTES
Subjective:       Patient ID: Cornelia Delatorre is a 74 y.o. female.    Chief Complaint: Urinary Tract Infection    Urinary Tract Infection   This is a new problem. Episode onset: 3 days ago. The problem occurs every urination. The problem has been gradually worsening. The quality of the pain is described as burning. Associated symptoms include frequency and urgency.     Anxiety: uncontrolled, related to recent numerous health issues. Needing to take hydroxyzine three times a day due to severity of anxiety    Vitals:    05/20/25 1022   BP: 138/72   Pulse: 84     Review of Systems   Genitourinary:  Positive for dysuria, frequency and urgency.   Psychiatric/Behavioral:  Positive for dysphoric mood. The patient is nervous/anxious.        Past Medical History:   Diagnosis Date    Allergy     Arthritis     Asthma     Cancer     skin cancer to right hand    Cataract     Chronic fatigue 01/24/2017    CVID (common variable immunodeficiency) 03/07/2019    Diabetes mellitus     type2    KLINE (dyspnea on exertion) 01/24/2017    Dyslipidemia 06/25/2019    Encounter for blood transfusion     Essential hypertension 12/29/2011    GERD (gastroesophageal reflux disease)     Headache(784.0)     Hyperlipidemia 07/15/2015    Hypertension     Hypothyroidism     Neuropathy     Obesity     Postmenopausal HRT (hormone replacement therapy)     Rash     Rosacea     Sleep apnea     on bipap    Snoring     Squamous cell carcinoma of skin     left forearm     UTI (urinary tract infection)     Wears glasses      Objective:      Physical Exam  Constitutional:       General: She is not in acute distress.     Appearance: She is well-developed. She is not ill-appearing, toxic-appearing or diaphoretic.   HENT:      Right Ear: Hearing normal.      Left Ear: Hearing normal.   Pulmonary:      Effort: No tachypnea or respiratory distress.   Skin:     Coloration: Skin is not pale.   Neurological:      Mental Status: She is alert and oriented to person,  place, and time.   Psychiatric:         Speech: Speech normal.         Behavior: Behavior normal.         Thought Content: Thought content normal.         Judgment: Judgment normal.         Assessment:       1. Acute cystitis without hematuria    2. Anxiety        Plan:       Acute cystitis without hematuria  -     Cancel: Urinalysis, Reflex to Urine Culture Urine, Clean Catch  -     nitrofurantoin, macrocrystal-monohydrate, (MACROBID) 100 MG capsule; Take 1 capsule (100 mg total) by mouth 2 (two) times daily. for 7 days  Dispense: 14 capsule; Refill: 0  -     Urinalysis, Reflex to Urine Culture Urine, Clean Catch; Future; Expected date: 05/20/2025    Anxiety  -     sertraline (ZOLOFT) 25 MG tablet; Take 1 tablet (25 mg total) by mouth once daily.  Dispense: 30 tablet; Refill: 11          FU 3 weeks with me as scheduled     education provided on supportive care, symptom monitoring and return precautions         Medication List with Changes/Refills   New Medications    NITROFURANTOIN, MACROCRYSTAL-MONOHYDRATE, (MACROBID) 100 MG CAPSULE    Take 1 capsule (100 mg total) by mouth 2 (two) times daily. for 7 days    SERTRALINE (ZOLOFT) 25 MG TABLET    Take 1 tablet (25 mg total) by mouth once daily.   Current Medications    ASCORBIC ACID, VITAMIN C, (VITAMIN C) 1000 MG TABLET    Take 1,000 mg by mouth 2 (two) times daily.     AZELASTINE (ASTELIN) 137 MCG (0.1 %) NASAL SPRAY    2 sprays (274 mcg total) by Nasal route 2 (two) times daily.    AZELASTINE (OPTIVAR) 0.05 % OPHTHALMIC SOLUTION    Place 1 drop into both eyes 2 (two) times daily. As needed    B-COMPLEX WITH VITAMIN C (Z-BEC OR EQUIV) TABLET    Take 1 tablet by mouth once daily.    BENZONATATE (TESSALON) 200 MG CAPSULE    Take 1 capsule (200 mg total) by mouth 3 (three) times daily as needed for Cough.    BIFIDOBACTERIUM INFANTIS (ALIGN ORAL)    Take by mouth once daily.    BIOTIN 10,000 MCG CAP    Take 1 tablet by mouth every evening.     BLOOD SUGAR DIAGNOSTIC  STRP    Insurance preferred meter and strips. Check daily    BLOOD-GLUCOSE METER KIT    Use as instructed. Insurance preferred meter.    BUDESONIDE (PULMICORT) 0.25 MG/2 ML NEBULIZER SOLUTION    Take 2 mLs (0.25 mg total) by nebulization once daily. Controller    CRANBERRY 500 MG CAP    Take 1 capsule by mouth every evening.    CYCLOBENZAPRINE (FLEXERIL) 10 MG TABLET    Take 10 mg by mouth 3 (three) times daily.    DILTIAZEM (CARDIZEM CD) 120 MG CP24    Take 1 capsule (120 mg total) by mouth once daily.    DOXAZOSIN (CARDURA) 2 MG TABLET    TAKE 1 TABLET EVERY EVENING    EPINEPHRINE (EPIPEN) 0.3 MG/0.3 ML ATIN    Inject 0.3 mLs (0.3 mg total) into the muscle as needed (anaphylaxis).    ERYTHROMYCIN WITH ETHANOL (THERAMYCIN) 2 % EXTERNAL SOLUTION    Aaa bid prn    ESOMEPRAZOLE (NEXIUM) 40 MG CAPSULE    Take 1 capsule (40 mg total) by mouth before breakfast.    ESTRADIOL (ESTRACE) 0.01 % (0.1 MG/GRAM) VAGINAL CREAM    Place 1 g vaginally 3 (three) times a week. Place by fingertip application before bedtime three times a week (Monday, Wednesday, Friday)    FEXOFENADINE (ALLEGRA) 180 MG TABLET    Take 180 mg by mouth once daily.     FISH OIL-OMEGA-3 FATTY ACIDS 300-1,000 MG CAPSULE    Take 2 g by mouth once daily.    FLUTICASONE PROPIONATE (FLONASE) 50 MCG/ACTUATION NASAL SPRAY    2 sprays (100 mcg total) by Each Nostril route once daily.    FLUTICASONE-UMECLIDIN-VILANTER (TRELEGY ELLIPTA) 200-62.5-25 MCG INHALER    Inhale 1 puff into the lungs once daily.    GABAPENTIN (NEURONTIN) 600 MG TABLET    Take 1 tablet (600 mg total) by mouth 3 (three) times daily.    HYDROCODONE-ACETAMINOPHEN (NORCO)  MG PER TABLET    Take 1 tablet by mouth every 6 (six) hours as needed for Pain.    HYDROCODONE-ACETAMINOPHEN (NORCO) 5-325 MG PER TABLET    Take 1 tablet by mouth every 6 (six) hours as needed for Pain.    HYDROXYZINE HCL (ATARAX) 10 MG TAB    Take 1 tablet (10 mg total) by mouth 3 (three) times daily as needed.     IBANDRONATE (BONIVA) 150 MG TABLET    Take 1 tablet (150 mg total) by mouth every 30 days.    IMMUN GLOB G,IGG,-PRO-IGA 0-50 (HIZENTRA) 1 GRAM/5 ML (20 %) SOLN    Inject 55 mLs (11 g total) into the skin once a week.    INHALATION SPACING DEVICE    Use as directed for inhalation.    LANCETS (ACCU-CHEK SOFTCLIX LANCETS) MISC    Use to check blood sugar daily.    LEVALBUTEROL (XOPENEX HFA) 45 MCG/ACTUATION INHALER    Inhale 1-2 puffs into the lungs every 4 (four) hours as needed for Wheezing or Shortness of Breath. Rescue    METFORMIN (GLUCOPHAGE-XR) 500 MG ER 24HR TABLET    Take 1 tablet (500 mg total) by mouth 2 (two) times daily with meals.    MONTELUKAST (SINGULAIR) 10 MG TABLET    Take 1 tablet (10 mg total) by mouth every evening.    MUCUS CLEARING DEVICE KAREN    1 Device by Misc.(Non-Drug; Combo Route) route 2 (two) times daily.    MULTIVITAMIN CAPSULE    Take 1 capsule by mouth once daily.     ONDANSETRON (ZOFRAN) 8 MG TABLET    Take 1 tablet (8 mg total) by mouth every 8 (eight) hours as needed for Nausea.    ONDANSETRON (ZOFRAN-ODT) 4 MG TBDL    Take 1 tablet (4 mg total) by mouth every 6 (six) hours as needed (nausea).    PRAVASTATIN (PRAVACHOL) 80 MG TABLET    Take 1 tablet (80 mg total) by mouth once daily.    PSYLLIUM HUSK (METAMUCIL ORAL)    Take by mouth.    SIMETHICONE (GAS-X ORAL)    Take 1 capsule by mouth as needed (gas relief).     TEZEPELUMAB-EKKO (TEZSPIRE) 210 MG/1.91 ML (110 MG/ML) PNIJ    Inject 1.91 mLs (210.1 mg total) into the skin every 28 days.    VALSARTAN-HYDROCHLOROTHIAZIDE (DIOVAN-HCT) 320-25 MG PER TABLET    Take 1 tablet by mouth once daily.    VITAMIN D3-FOLIC ACID 5,000 UNIT- 1 MG TAB    Take 1 tablet by mouth once daily.     VITAMIN E, DL,TOCOPHERYL ACET, (VITAMIN E, DL, ACETATE, ORAL)    Take 1 tablet by mouth once daily.    WELCHOL 625 MG TABLET    Take 625 mg by mouth.

## 2025-05-24 LAB — BACTERIA UR CULT: ABNORMAL

## 2025-05-26 RX ORDER — CIPROFLOXACIN 500 MG/1
500 TABLET, FILM COATED ORAL 2 TIMES DAILY
Qty: 14 TABLET | Refills: 0 | Status: SHIPPED | OUTPATIENT
Start: 2025-05-26 | End: 2025-06-02

## 2025-05-26 RX ORDER — CIPROFLOXACIN 500 MG/1
500 TABLET, FILM COATED ORAL 2 TIMES DAILY
Qty: 14 TABLET | Refills: 0 | Status: SHIPPED | OUTPATIENT
Start: 2025-05-26 | End: 2025-05-26

## 2025-05-28 ENCOUNTER — PATIENT MESSAGE (OUTPATIENT)
Dept: FAMILY MEDICINE | Facility: CLINIC | Age: 75
End: 2025-05-28
Payer: MEDICARE

## 2025-05-28 DIAGNOSIS — S82.031D CLOSED DISPLACED TRANSVERSE FRACTURE OF RIGHT PATELLA WITH ROUTINE HEALING, SUBSEQUENT ENCOUNTER: Primary | ICD-10-CM

## 2025-06-02 ENCOUNTER — LAB VISIT (OUTPATIENT)
Dept: LAB | Facility: HOSPITAL | Age: 75
End: 2025-06-02
Payer: MEDICARE

## 2025-06-02 DIAGNOSIS — N30.00 ACUTE CYSTITIS WITHOUT HEMATURIA: ICD-10-CM

## 2025-06-02 PROCEDURE — 87086 URINE CULTURE/COLONY COUNT: CPT | Mod: HCNC

## 2025-06-04 ENCOUNTER — CLINICAL SUPPORT (OUTPATIENT)
Dept: REHABILITATION | Facility: HOSPITAL | Age: 75
End: 2025-06-04
Attending: ORTHOPAEDIC SURGERY
Payer: MEDICARE

## 2025-06-04 ENCOUNTER — OFFICE VISIT (OUTPATIENT)
Dept: ORTHOPEDICS | Facility: CLINIC | Age: 75
End: 2025-06-04
Payer: MEDICARE

## 2025-06-04 ENCOUNTER — RESULTS FOLLOW-UP (OUTPATIENT)
Dept: FAMILY MEDICINE | Facility: CLINIC | Age: 75
End: 2025-06-04

## 2025-06-04 ENCOUNTER — HOSPITAL ENCOUNTER (OUTPATIENT)
Dept: RADIOLOGY | Facility: HOSPITAL | Age: 75
Discharge: HOME | End: 2025-06-04
Attending: ORTHOPAEDIC SURGERY
Payer: MEDICARE

## 2025-06-04 VITALS — WEIGHT: 203.5 LBS | HEIGHT: 64 IN | BODY MASS INDEX: 34.74 KG/M2 | RESPIRATION RATE: 18 BRPM

## 2025-06-04 DIAGNOSIS — M25.661 DECREASED RANGE OF MOTION (ROM) OF RIGHT KNEE: Primary | ICD-10-CM

## 2025-06-04 DIAGNOSIS — S82.031D CLOSED DISPLACED TRANSVERSE FRACTURE OF RIGHT PATELLA WITH ROUTINE HEALING, SUBSEQUENT ENCOUNTER: ICD-10-CM

## 2025-06-04 DIAGNOSIS — S82.031D CLOSED DISPLACED TRANSVERSE FRACTURE OF RIGHT PATELLA WITH ROUTINE HEALING, SUBSEQUENT ENCOUNTER: Primary | ICD-10-CM

## 2025-06-04 LAB — BACTERIA UR CULT: NORMAL

## 2025-06-04 PROCEDURE — 3044F HG A1C LEVEL LT 7.0%: CPT | Mod: CPTII,HCNC,S$GLB, | Performed by: ORTHOPAEDIC SURGERY

## 2025-06-04 PROCEDURE — 73560 X-RAY EXAM OF KNEE 1 OR 2: CPT | Mod: TC,HCNC,PO,RT

## 2025-06-04 PROCEDURE — 1157F ADVNC CARE PLAN IN RCRD: CPT | Mod: CPTII,HCNC,S$GLB, | Performed by: ORTHOPAEDIC SURGERY

## 2025-06-04 PROCEDURE — 73560 X-RAY EXAM OF KNEE 1 OR 2: CPT | Mod: 26,HCNC,RT, | Performed by: RADIOLOGY

## 2025-06-04 PROCEDURE — 3008F BODY MASS INDEX DOCD: CPT | Mod: CPTII,HCNC,S$GLB, | Performed by: ORTHOPAEDIC SURGERY

## 2025-06-04 PROCEDURE — 1159F MED LIST DOCD IN RCRD: CPT | Mod: CPTII,HCNC,S$GLB, | Performed by: ORTHOPAEDIC SURGERY

## 2025-06-04 PROCEDURE — 99999 PR PBB SHADOW E&M-EST. PATIENT-LVL IV: CPT | Mod: PBBFAC,HCNC,, | Performed by: ORTHOPAEDIC SURGERY

## 2025-06-04 PROCEDURE — 97161 PT EVAL LOW COMPLEX 20 MIN: CPT | Mod: HCNC,PO

## 2025-06-04 PROCEDURE — 1101F PT FALLS ASSESS-DOCD LE1/YR: CPT | Mod: CPTII,HCNC,S$GLB, | Performed by: ORTHOPAEDIC SURGERY

## 2025-06-04 PROCEDURE — 1125F AMNT PAIN NOTED PAIN PRSNT: CPT | Mod: CPTII,HCNC,S$GLB, | Performed by: ORTHOPAEDIC SURGERY

## 2025-06-04 PROCEDURE — 3288F FALL RISK ASSESSMENT DOCD: CPT | Mod: CPTII,HCNC,S$GLB, | Performed by: ORTHOPAEDIC SURGERY

## 2025-06-04 PROCEDURE — 99214 OFFICE O/P EST MOD 30 MIN: CPT | Mod: HCNC,S$GLB,, | Performed by: ORTHOPAEDIC SURGERY

## 2025-06-04 PROCEDURE — 97530 THERAPEUTIC ACTIVITIES: CPT | Mod: HCNC,PO

## 2025-06-06 ENCOUNTER — LAB VISIT (OUTPATIENT)
Dept: LAB | Facility: HOSPITAL | Age: 75
End: 2025-06-06
Attending: INTERNAL MEDICINE
Payer: MEDICARE

## 2025-06-06 DIAGNOSIS — E78.2 MIXED HYPERLIPIDEMIA: ICD-10-CM

## 2025-06-06 DIAGNOSIS — D64.9 NORMOCHROMIC ANEMIA: ICD-10-CM

## 2025-06-06 DIAGNOSIS — E11.9 CONTROLLED TYPE 2 DIABETES MELLITUS WITHOUT COMPLICATION, WITHOUT LONG-TERM CURRENT USE OF INSULIN: ICD-10-CM

## 2025-06-06 DIAGNOSIS — I10 ESSENTIAL HYPERTENSION: ICD-10-CM

## 2025-06-06 LAB
ABSOLUTE EOSINOPHIL (OHS): 0.02 K/UL
ABSOLUTE MONOCYTE (OHS): 0.36 K/UL (ref 0.3–1)
ABSOLUTE NEUTROPHIL COUNT (OHS): 3.15 K/UL (ref 1.8–7.7)
ALBUMIN SERPL BCP-MCNC: 3.6 G/DL (ref 3.5–5.2)
ALBUMIN/CREAT UR: NORMAL
ALP SERPL-CCNC: 129 UNIT/L (ref 40–150)
ALT SERPL W/O P-5'-P-CCNC: 16 UNIT/L (ref 10–44)
ANION GAP (OHS): 11 MMOL/L (ref 8–16)
AST SERPL-CCNC: 25 UNIT/L (ref 11–45)
BASOPHILS # BLD AUTO: 0.02 K/UL
BASOPHILS NFR BLD AUTO: 0.4 %
BILIRUB SERPL-MCNC: 0.4 MG/DL (ref 0.1–1)
BUN SERPL-MCNC: 8 MG/DL (ref 8–23)
CALCIUM SERPL-MCNC: 8.7 MG/DL (ref 8.7–10.5)
CHLORIDE SERPL-SCNC: 94 MMOL/L (ref 95–110)
CHOLEST SERPL-MCNC: 160 MG/DL (ref 120–199)
CHOLEST/HDLC SERPL: 2.5 {RATIO} (ref 2–5)
CO2 SERPL-SCNC: 29 MMOL/L (ref 23–29)
CREAT SERPL-MCNC: 0.7 MG/DL (ref 0.5–1.4)
CREAT UR-MCNC: 23 MG/DL (ref 15–325)
EAG (OHS): 123 MG/DL (ref 68–131)
ERYTHROCYTE [DISTWIDTH] IN BLOOD BY AUTOMATED COUNT: 14.6 % (ref 11.5–14.5)
GFR SERPLBLD CREATININE-BSD FMLA CKD-EPI: >60 ML/MIN/1.73/M2
GLUCOSE SERPL-MCNC: 110 MG/DL (ref 70–110)
HBA1C MFR BLD: 5.9 % (ref 4–5.6)
HCT VFR BLD AUTO: 34.6 % (ref 37–48.5)
HDLC SERPL-MCNC: 64 MG/DL (ref 40–75)
HDLC SERPL: 40 % (ref 20–50)
HGB BLD-MCNC: 11.1 GM/DL (ref 12–16)
IMM GRANULOCYTES # BLD AUTO: 0.03 K/UL (ref 0–0.04)
IMM GRANULOCYTES NFR BLD AUTO: 0.6 % (ref 0–0.5)
LDLC SERPL CALC-MCNC: 77.8 MG/DL (ref 63–159)
LYMPHOCYTES # BLD AUTO: 1.29 K/UL (ref 1–4.8)
MCH RBC QN AUTO: 26.9 PG (ref 27–31)
MCHC RBC AUTO-ENTMCNC: 32.1 G/DL (ref 32–36)
MCV RBC AUTO: 84 FL (ref 82–98)
MICROALBUMIN UR-MCNC: <5 UG/ML (ref ?–5000)
NONHDLC SERPL-MCNC: 96 MG/DL
NUCLEATED RBC (/100WBC) (OHS): 0 /100 WBC
PLATELET # BLD AUTO: 180 K/UL (ref 150–450)
PMV BLD AUTO: 10.9 FL (ref 9.2–12.9)
POTASSIUM SERPL-SCNC: 3.5 MMOL/L (ref 3.5–5.1)
PROT SERPL-MCNC: 7.3 GM/DL (ref 6–8.4)
RBC # BLD AUTO: 4.12 M/UL (ref 4–5.4)
RELATIVE EOSINOPHIL (OHS): 0.4 %
RELATIVE LYMPHOCYTE (OHS): 26.5 % (ref 18–48)
RELATIVE MONOCYTE (OHS): 7.4 % (ref 4–15)
RELATIVE NEUTROPHIL (OHS): 64.7 % (ref 38–73)
SODIUM SERPL-SCNC: 134 MMOL/L (ref 136–145)
TRIGL SERPL-MCNC: 91 MG/DL (ref 30–150)
WBC # BLD AUTO: 4.87 K/UL (ref 3.9–12.7)

## 2025-06-06 PROCEDURE — 82043 UR ALBUMIN QUANTITATIVE: CPT | Mod: HCNC

## 2025-06-06 PROCEDURE — 85025 COMPLETE CBC W/AUTO DIFF WBC: CPT | Mod: HCNC

## 2025-06-06 PROCEDURE — 82040 ASSAY OF SERUM ALBUMIN: CPT | Mod: HCNC

## 2025-06-06 PROCEDURE — 83036 HEMOGLOBIN GLYCOSYLATED A1C: CPT | Mod: HCNC

## 2025-06-06 PROCEDURE — 80061 LIPID PANEL: CPT | Mod: HCNC

## 2025-06-06 PROCEDURE — 36415 COLL VENOUS BLD VENIPUNCTURE: CPT | Mod: HCNC,PO

## 2025-06-10 ENCOUNTER — CLINICAL SUPPORT (OUTPATIENT)
Dept: REHABILITATION | Facility: HOSPITAL | Age: 75
End: 2025-06-10
Payer: MEDICARE

## 2025-06-10 DIAGNOSIS — M25.661 DECREASED RANGE OF MOTION (ROM) OF RIGHT KNEE: Primary | ICD-10-CM

## 2025-06-10 PROCEDURE — 97140 MANUAL THERAPY 1/> REGIONS: CPT | Mod: HCNC,PO,CQ

## 2025-06-10 PROCEDURE — 97112 NEUROMUSCULAR REEDUCATION: CPT | Mod: HCNC,PO,CQ

## 2025-06-10 PROCEDURE — 97110 THERAPEUTIC EXERCISES: CPT | Mod: HCNC,PO,CQ

## 2025-06-10 NOTE — PROGRESS NOTES
Outpatient Rehab    Physical Therapy Visit    Patient Name: Cornelia Delaotrre  MRN: [id number]  YOB: 1950  Encounter Date: 6/10/2025    Therapy Diagnosis:   Encounter Diagnosis   Name Primary?    Decreased range of motion (ROM) of right knee Yes     Physician: Robbin Edmond,*    Physician Orders: Eval and Treat  Medical Diagnosis: Closed displaced transverse fracture of right patella with routine healing, subsequent encounter  Surgical Diagnosis: Not applicable for this Episode   Surgical Date: Not applicable for this Episode    Visit # / Visits Authorized:  1 / 10  Insurance Authorization Period: 1/1/2025 to 7/10/2025  Date of Evaluation: 6/4/2025  Plan of Care Certification: 6/4/2025 to 7/30/2025      PT/PTA: PTA   Number of PTA visits since last PT visit:1    Time In: 1056   Time Out: 1150  Total Time (in minutes): 54   Total Billable Time (in minutes): 54    FOTO:  Intake Score: 29%  Survey Score 2:  %  Survey Score 3:  %    Precautions:       Subjective   Cornelia states she was sick this weekend so she wasn't able to move around or exercise much. Patient states she did do her HEP yesterday..  Pain reported as 4/10. (R) knee    Objective            Treatment:  Therapeutic Exercise  TE 1: Long sitting gastroc stretch with strap x 1 minute x 3  TE 2: Supine heel slides with board and strap x 3 minutes, 5 sec hold  TE 3: Recumbent bike x 7 minutes  Manual Therapy  MT 1: Patellar mobilizations all directions  MT 2: Global STM  Balance/Neuromuscular Re-Education  NMR 1: Quad sets 2x10, 3 sec hold  NMR 2: SAQ from full foam bolster 2x10  NMR 3: LAQ 2x10, 3 sec hold  NMR 4: Seated hamstring curl (Green TB) 2x10  NMR 5: Standing heel raises 2x10  NMR 6: Standing hip abduction 2x10 (standing on L), x 10 (standing on R)    Time Entry(in minutes):  Manual Therapy Time Entry: 10  Neuromuscular Re-Education Time Entry: 30  Therapeutic Exercise Time Entry: 15    Assessment & Plan   Assessment:  Cornelia presented to clinic with rollator but she was able to amulate clinic distances without AD. Tactile and visual cues for proper quad set. No adverse effects to open chain quad exercises. Improvements in knee flexion acheived with heel slides and she was able to make full revolutions on recumbent bike without difficulty.  Evaluation/Treatment Tolerance: Patient tolerated treatment well    The patient will continue to benefit from skilled outpatient physical therapy in order to address the deficits listed in the problem list on the initial evaluation, provide patient and family education, and maximize the patients level of independence in the home and community environments.     The patient's spiritual, cultural, and educational needs were considered, and the patient is agreeable to the plan of care and goals.       Plan: Continue current POC with emphasis on restoring LE strength, function and control    Goals:   Active       Physical Therapy       Physical Therapy Goal (Progressing)       Start:  06/04/25    Expected End:  07/30/25       Goals to be met in 6-8 weeks:  1. Pt to report worst pain less than 2/10  2. Pt to improve R knee range of motion to 0-120  3. Pt to improve BLE strength by 1 grade  4. Pt to improve FOTO by 25% or greater             Paula Kuo, PTA

## 2025-06-12 ENCOUNTER — HOSPITAL ENCOUNTER (OUTPATIENT)
Dept: RADIOLOGY | Facility: HOSPITAL | Age: 75
Discharge: HOME | End: 2025-06-12
Attending: NURSE PRACTITIONER
Payer: MEDICARE

## 2025-06-12 ENCOUNTER — OFFICE VISIT (OUTPATIENT)
Dept: FAMILY MEDICINE | Facility: CLINIC | Age: 75
End: 2025-06-12
Payer: MEDICARE

## 2025-06-12 VITALS
TEMPERATURE: 98 F | SYSTOLIC BLOOD PRESSURE: 126 MMHG | OXYGEN SATURATION: 95 % | DIASTOLIC BLOOD PRESSURE: 64 MMHG | HEART RATE: 80 BPM | WEIGHT: 203.69 LBS | BODY MASS INDEX: 34.77 KG/M2 | HEIGHT: 64 IN

## 2025-06-12 DIAGNOSIS — M62.830 BACK SPASM: ICD-10-CM

## 2025-06-12 DIAGNOSIS — M54.50 ACUTE BILATERAL LOW BACK PAIN WITHOUT SCIATICA: ICD-10-CM
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
## 2025-06-12 DIAGNOSIS — F41.9 ANXIETY: ICD-10-CM

## 2025-06-12 DIAGNOSIS — M25.562 ACUTE PAIN OF BOTH KNEES: ICD-10-CM

## 2025-06-12 DIAGNOSIS — I10 ESSENTIAL HYPERTENSION: ICD-10-CM

## 2025-06-12 DIAGNOSIS — M25.561 ACUTE PAIN OF BOTH KNEES: ICD-10-CM

## 2025-06-12 DIAGNOSIS — E78.2 MIXED HYPERLIPIDEMIA: ICD-10-CM
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
## 2025-06-12 DIAGNOSIS — E11.42 DM TYPE 2 WITH DIABETIC PERIPHERAL NEUROPATHY: ICD-10-CM

## 2025-06-12 DIAGNOSIS — E03.9 ACQUIRED HYPOTHYROIDISM: ICD-10-CM

## 2025-06-12 PROCEDURE — 72110 X-RAY EXAM L-2 SPINE 4/>VWS: CPT | Mod: TC,HCNC,FY,PO

## 2025-06-12 PROCEDURE — 73560 X-RAY EXAM OF KNEE 1 OR 2: CPT | Mod: TC,50,HCNC,FY,PO

## 2025-06-12 PROCEDURE — 99999 PR PBB SHADOW E&M-EST. PATIENT-LVL V: CPT | Mod: PBBFAC,HCNC,, | Performed by: NURSE PRACTITIONER

## 2025-06-12 PROCEDURE — 72110 X-RAY EXAM L-2 SPINE 4/>VWS: CPT | Mod: 26,HCNC,, | Performed by: RADIOLOGY

## 2025-06-12 RX ORDER — CYCLOBENZAPRINE HCL 10 MG
10 TABLET ORAL 3 TIMES DAILY
Qty: 30 TABLET | Refills: 1 | Status: SHIPPED | OUTPATIENT
Start: 2025-06-12

## 2025-06-12 RX ORDER — SERTRALINE HYDROCHLORIDE 50 MG/1
50 TABLET, FILM COATED ORAL DAILY
Qty: 30 TABLET | Refills: 11 | Status: SHIPPED | OUTPATIENT
Start: 2025-06-12 | End: 2026-06-12

## 2025-06-12 NOTE — PROGRESS NOTES
Subjective:       Patient ID: Cornelia Delatorre is a 74 y.o. female.    Chief Complaint: Diabetes    HPI  Patient presents for routine follow up of chronic conditions     Fell early this morning trying to get to bathroom, lost footing in slippers, fell onto mattress and knees. Severe bilateral low back pain. Donald knee pain     Has metal implant for bladder.       CVID -On IV Ig     L5 burst fracture 12/2023.  Severe pain making riding in a car painful.  Pins and needle pain radiates down both legs.  Dr Rojas has given her multiple injections, which have failed.  She is now being evaluated for surgery.  Feeling worn down with pain and limitations.   DEXA 2023 + osteopenia.     Bronchiectasis/ asthma / COPD- stable. on Trelegy. Following with pulmonology     HTN - controlled   DM - controlled; Diabetic peripheral neuropathy - controlled with 600 mg gabapentin tid.  Outside foot doctor.   HLD - controlled for goal 70     Anxiety: started Zoloft last month-- improvement but not at goal  Has PRN hydroxyzine       Normocytic anemia - mild, chronic, stable. no blood/melena. [facility] 2022 wnl per pt.  EGD 2021 for dysphagia did not show contributing etiology per patient.  Dr Krishnan   GERD: recall dry heaving for a few months. Saw GI who got rid of all her vitamins/supplements, wants to DC metformin     Vitals:    06/12/25 1332   BP: 126/64   Pulse: 80   Temp: 98 °F (36.7 °C)     Review of Systems   Musculoskeletal:  Positive for arthralgias and back pain.   Psychiatric/Behavioral:  The patient is nervous/anxious.        Past Medical History:   Diagnosis Date    Allergy     Arthritis     Asthma     Cancer     skin cancer to right hand    Cataract     Chronic fatigue 01/24/2017    CVID (common variable immunodeficiency) 03/07/2019    Diabetes mellitus     type2    KLINE (dyspnea on exertion) 01/24/2017    Dyslipidemia 06/25/2019    Encounter for blood transfusion     Essential hypertension 12/29/2011    GERD (gastroesophageal  reflux disease)     Headache(784.0)     Hyperlipidemia 07/15/2015    Hypertension     Hypothyroidism     Neuropathy     Obesity     Postmenopausal HRT (hormone replacement therapy)     Rash     Rosacea     Sleep apnea     on bipap    Snoring     Squamous cell carcinoma of skin     left forearm     UTI (urinary tract infection)     Wears glasses      Objective:      Physical Exam  Constitutional:       General: She is not in acute distress.     Appearance: She is well-developed. She is not ill-appearing, toxic-appearing or diaphoretic.   HENT:      Right Ear: Hearing normal.      Left Ear: Hearing normal.   Cardiovascular:      Rate and Rhythm: Normal rate and regular rhythm.   Pulmonary:      Effort: No tachypnea or respiratory distress.   Skin:     Coloration: Skin is not pale.   Neurological:      Mental Status: She is alert and oriented to person, place, and time.   Psychiatric:         Speech: Speech normal.         Behavior: Behavior normal.         Thought Content: Thought content normal.         Judgment: Judgment normal.         Assessment:       1. Fall, initial encounter    2. Acute bilateral low back pain without sciatica    3. Back spasm    4. Acute pain of both knees    5. DM type 2 with diabetic peripheral neuropathy    6. Essential hypertension    7. Acquired hypothyroidism    8. Mixed hyperlipidemia    9. Anxiety        Plan:       Fall, initial encounter  -     X-Ray Lumbar Spine 5 View; Future; Expected date: 06/12/2025  -     Cancel: X-Ray Knee 1 or 2 View Left; Future; Expected date: 06/12/2025  -     X-Ray Knee 1 or 2 View Bilateral; Future; Expected date: 06/12/2025    Acute bilateral low back pain without sciatica  -     X-Ray Lumbar Spine 5 View; Future; Expected date: 06/12/2025    Back spasm  -     X-Ray Lumbar Spine 5 View; Future; Expected date: 06/12/2025  -     cyclobenzaprine (FLEXERIL) 10 MG tablet; Take 1 tablet (10 mg total) by mouth 3 (three) times daily.  Dispense: 30 tablet;  Refill: 1    Acute pain of both knees  -     Cancel: X-Ray Knee 1 or 2 View Left; Future; Expected date: 06/12/2025  -     X-Ray Knee 1 or 2 View Bilateral; Future; Expected date: 06/12/2025    DM type 2 with diabetic peripheral neuropathy  -     Hemoglobin A1C; Future; Expected date: 12/12/2025  -     Comprehensive Metabolic Panel; Future; Expected date: 12/12/2025    Essential hypertension  -     CBC Auto Differential; Future; Expected date: 12/12/2025  -     Comprehensive Metabolic Panel; Future; Expected date: 12/12/2025    Acquired hypothyroidism  -     TSH; Future; Expected date: 12/12/2025    Mixed hyperlipidemia  -     Lipid Panel; Future; Expected date: 12/12/2025    Anxiety  -     sertraline (ZOLOFT) 50 MG tablet; Take 1 tablet (50 mg total) by mouth once daily.  Dispense: 30 tablet; Refill: 11          Taking tylenol and flexeril. Does not want further meds at this time  Xrays today  Increase Zoloft  A1C well controlled, DC metformin   FU 4 months PCP         Medication List with Changes/Refills   Current Medications    ASCORBIC ACID, VITAMIN C, (VITAMIN C) 1000 MG TABLET    Take 1,000 mg by mouth 2 (two) times daily.     AZELASTINE (ASTELIN) 137 MCG (0.1 %) NASAL SPRAY    2 sprays (274 mcg total) by Nasal route 2 (two) times daily.    AZELASTINE (OPTIVAR) 0.05 % OPHTHALMIC SOLUTION    Place 1 drop into both eyes 2 (two) times daily. As needed    B-COMPLEX WITH VITAMIN C (Z-BEC OR EQUIV) TABLET    Take 1 tablet by mouth once daily.    BENZONATATE (TESSALON) 200 MG CAPSULE    Take 1 capsule (200 mg total) by mouth 3 (three) times daily as needed for Cough.    BIFIDOBACTERIUM INFANTIS (ALIGN ORAL)    Take by mouth once daily.    BIOTIN 10,000 MCG CAP    Take 1 tablet by mouth every evening.     BLOOD SUGAR DIAGNOSTIC STRP    Insurance preferred meter and strips. Check daily    BLOOD-GLUCOSE METER KIT    Use as instructed. Insurance preferred meter.    BUDESONIDE (PULMICORT) 0.25 MG/2 ML NEBULIZER SOLUTION     Take 2 mLs (0.25 mg total) by nebulization once daily. Controller    CRANBERRY 500 MG CAP    Take 1 capsule by mouth every evening.    DILTIAZEM (CARDIZEM CD) 120 MG CP24    Take 1 capsule (120 mg total) by mouth once daily.    DOXAZOSIN (CARDURA) 2 MG TABLET    TAKE 1 TABLET EVERY EVENING    EPINEPHRINE (EPIPEN) 0.3 MG/0.3 ML ATIN    Inject 0.3 mLs (0.3 mg total) into the muscle as needed (anaphylaxis).    ESOMEPRAZOLE (NEXIUM) 40 MG CAPSULE    Take 1 capsule (40 mg total) by mouth before breakfast.    ESTRADIOL (ESTRACE) 0.01 % (0.1 MG/GRAM) VAGINAL CREAM    Place 1 g vaginally 3 (three) times a week. Place by fingertip application before bedtime three times a week (Monday, Wednesday, Friday)    FEXOFENADINE (ALLEGRA) 180 MG TABLET    Take 180 mg by mouth once daily.     FISH OIL-OMEGA-3 FATTY ACIDS 300-1,000 MG CAPSULE    Take 2 g by mouth once daily.    FLUTICASONE PROPIONATE (FLONASE) 50 MCG/ACTUATION NASAL SPRAY    2 sprays (100 mcg total) by Each Nostril route once daily.    FLUTICASONE-UMECLIDIN-VILANTER (TRELEGY ELLIPTA) 200-62.5-25 MCG INHALER    Inhale 1 puff into the lungs once daily.    GABAPENTIN (NEURONTIN) 600 MG TABLET    Take 1 tablet (600 mg total) by mouth 3 (three) times daily.    HYDROXYZINE HCL (ATARAX) 10 MG TAB    Take 1 tablet (10 mg total) by mouth 3 (three) times daily as needed.    IBANDRONATE (BONIVA) 150 MG TABLET    Take 1 tablet (150 mg total) by mouth every 30 days.    IMMUN GLOB G,IGG,-PRO-IGA 0-50 (HIZENTRA) 1 GRAM/5 ML (20 %) SOLN    Inject 55 mLs (11 g total) into the skin once a week.    INHALATION SPACING DEVICE    Use as directed for inhalation.    LANCETS (ACCU-CHEK SOFTCLIX LANCETS) MISC    Use to check blood sugar daily.    LEVALBUTEROL (XOPENEX HFA) 45 MCG/ACTUATION INHALER    Inhale 1-2 puffs into the lungs every 4 (four) hours as needed for Wheezing or Shortness of Breath. Rescue    MONTELUKAST (SINGULAIR) 10 MG TABLET    Take 1 tablet (10 mg total) by mouth every  evening.    MUCUS CLEARING DEVICE KAREN    1 Device by Misc.(Non-Drug; Combo Route) route 2 (two) times daily.    MULTIVITAMIN CAPSULE    Take 1 capsule by mouth once daily.     ONDANSETRON (ZOFRAN) 8 MG TABLET    Take 1 tablet (8 mg total) by mouth every 8 (eight) hours as needed for Nausea.    ONDANSETRON (ZOFRAN-ODT) 4 MG TBDL    Take 1 tablet (4 mg total) by mouth every 6 (six) hours as needed (nausea).    PRAVASTATIN (PRAVACHOL) 80 MG TABLET    Take 1 tablet (80 mg total) by mouth once daily.    PSYLLIUM HUSK (METAMUCIL ORAL)    Take by mouth.    SIMETHICONE (GAS-X ORAL)    Take 1 capsule by mouth as needed (gas relief).     TEZEPELUMAB-EKKO (TEZSPIRE) 210 MG/1.91 ML (110 MG/ML) PNIJ    Inject 1.91 mLs (210.1 mg total) into the skin every 28 days.    VALSARTAN-HYDROCHLOROTHIAZIDE (DIOVAN-HCT) 320-25 MG PER TABLET    Take 1 tablet by mouth once daily.    VITAMIN D3-FOLIC ACID 5,000 UNIT- 1 MG TAB    Take 1 tablet by mouth once daily.     VITAMIN E, DL,TOCOPHERYL ACET, (VITAMIN E, DL, ACETATE, ORAL)    Take 1 tablet by mouth once daily.    WELCHOL 625 MG TABLET    Take 625 mg by mouth.   Changed and/or Refilled Medications    Modified Medication Previous Medication    CYCLOBENZAPRINE (FLEXERIL) 10 MG TABLET cyclobenzaprine (FLEXERIL) 10 MG tablet       Take 1 tablet (10 mg total) by mouth 3 (three) times daily.    Take 10 mg by mouth 3 (three) times daily.    SERTRALINE (ZOLOFT) 50 MG TABLET sertraline (ZOLOFT) 25 MG tablet       Take 1 tablet (50 mg total) by mouth once daily.    Take 1 tablet (25 mg total) by mouth once daily.   Discontinued Medications    CIPROFLOXACIN HCL (CIPRO) 500 MG TABLET    Take 1 tablet (500 mg total) by mouth 2 (two) times daily. for 7 days    ERYTHROMYCIN WITH ETHANOL (THERAMYCIN) 2 % EXTERNAL SOLUTION    Aaa bid prn    HYDROCODONE-ACETAMINOPHEN (NORCO)  MG PER TABLET    Take 1 tablet by mouth every 6 (six) hours as needed for Pain.    HYDROCODONE-ACETAMINOPHEN (NORCO) 5-325 MG  PER TABLET    Take 1 tablet by mouth every 6 (six) hours as needed for Pain.    METFORMIN (GLUCOPHAGE-XR) 500 MG ER 24HR TABLET    Take 1 tablet (500 mg total) by mouth 2 (two) times daily with meals.

## 2025-06-17 ENCOUNTER — CLINICAL SUPPORT (OUTPATIENT)
Dept: REHABILITATION | Facility: HOSPITAL | Age: 75
End: 2025-06-17
Payer: MEDICARE

## 2025-06-17 DIAGNOSIS — M25.661 DECREASED RANGE OF MOTION (ROM) OF RIGHT KNEE: Primary | ICD-10-CM

## 2025-06-17 PROCEDURE — 97110 THERAPEUTIC EXERCISES: CPT | Mod: HCNC,PO,CQ

## 2025-06-17 PROCEDURE — 97112 NEUROMUSCULAR REEDUCATION: CPT | Mod: HCNC,PO,CQ

## 2025-06-17 PROCEDURE — 97140 MANUAL THERAPY 1/> REGIONS: CPT | Mod: HCNC,PO,CQ

## 2025-06-17 NOTE — PROGRESS NOTES
Outpatient Rehab    Physical Therapy Visit    Patient Name: Cornelia Delatorre  MRN: [id number]  YOB: 1950  Encounter Date: 6/17/2025    Therapy Diagnosis:   Encounter Diagnosis   Name Primary?    Decreased range of motion (ROM) of right knee Yes     Physician: Robbin Edmond,*    Physician Orders: Eval and Treat  Medical Diagnosis: Closed displaced transverse fracture of right patella with routine healing, subsequent encounter  Surgical Diagnosis: Not applicable for this Episode   Surgical Date: Not applicable for this Episode    Visit # / Visits Authorized:  2 / 10  Insurance Authorization Period: 1/1/2025 to 7/10/2025  Date of Evaluation: 6/4/2025  Plan of Care Certification: 6/4/2025 to 7/30/2025      PT/PTA: PTA   Number of PTA visits since last PT visit:1    Time In: 1051   Time Out: 1145  Total Time (in minutes): 54   Total Billable Time (in minutes): 54    FOTO:  Intake Score: 29%  Survey Score 2:  %  Survey Score 3:  %    Precautions:       Subjective   Cornelia states she is hurting from a fall that she had last Thursday. Patient states she saw PCP and x rays revealed a potential (L) patella fracture..  Pain reported as 7/10. (B) knees    Objective            Treatment:  Therapeutic Exercise  TE 1: Long sitting gastroc stretch with strap x 1 minute x 3  TE 2: Supine heel slides with board and strap x 3 minutes, 5 sec hold  TE 3: Recumbent bike x 7 minutes  Manual Therapy  MT 1: Patellar mobilizations all directions  MT 2: Global STM  Balance/Neuromuscular Re-Education  NMR 1: Quad sets 2x10, 3 sec hold  NMR 2: SAQ from full foam bolster 2x10  NMR 3: LAQ 2x10, 3 sec hold  NMR 4: Seated hamstring curl (Green TB) 2x10  NMR 5: Standing heel raises 2x10  NMR 6: Standing hip abduction 2x10 (standing on L), x 10 (standing on R) (NP today)  NMR 7: Narrow base on ground x 1 minute x 2    Time Entry(in minutes):       Assessment & Plan   Assessment: Bruising over (L) patella noted today.  WBing progression limited due to (L) knee pain. (R) knee progressing well and she is demonstrating improvements in quad activation and knee flexion ROM. Encouraging ambulation with RW to decrease fall risk.   Evaluation/Treatment Tolerance: Patient tolerated treatment well    The patient will continue to benefit from skilled outpatient physical therapy in order to address the deficits listed in the problem list on the initial evaluation, provide patient and family education, and maximize the patients level of independence in the home and community environments.     The patient's spiritual, cultural, and educational needs were considered, and the patient is agreeable to the plan of care and goals.       Plan: Continue current POC with emphasis on restoring LE strength, function and control    Goals:   Active       Physical Therapy       Physical Therapy Goal (Progressing)       Start:  06/04/25    Expected End:  07/30/25       Goals to be met in 6-8 weeks:  1. Pt to report worst pain less than 2/10  2. Pt to improve R knee range of motion to 0-120  3. Pt to improve BLE strength by 1 grade  4. Pt to improve FOTO by 25% or greater             Paula Kuo, PTA

## 2025-06-19 ENCOUNTER — CLINICAL SUPPORT (OUTPATIENT)
Dept: REHABILITATION | Facility: HOSPITAL | Age: 75
End: 2025-06-19
Payer: MEDICARE

## 2025-06-19 DIAGNOSIS — M25.661 DECREASED RANGE OF MOTION (ROM) OF RIGHT KNEE: Primary | ICD-10-CM

## 2025-06-19 PROCEDURE — 97110 THERAPEUTIC EXERCISES: CPT | Mod: HCNC,PO,CQ

## 2025-06-19 PROCEDURE — 97140 MANUAL THERAPY 1/> REGIONS: CPT | Mod: HCNC,PO,CQ

## 2025-06-19 PROCEDURE — 97112 NEUROMUSCULAR REEDUCATION: CPT | Mod: HCNC,PO,CQ

## 2025-06-19 NOTE — PROGRESS NOTES
Outpatient Rehab    Physical Therapy Visit    Patient Name: Cornelia Delatorre  MRN: [id number]  YOB: 1950  Encounter Date: 6/19/2025    Therapy Diagnosis:   Encounter Diagnosis   Name Primary?    Decreased range of motion (ROM) of right knee Yes     Physician: Robbin Edmond,*    Physician Orders: Eval and Treat  Medical Diagnosis: Closed displaced transverse fracture of right patella with routine healing, subsequent encounter  Surgical Diagnosis: Not applicable for this Episode   Surgical Date: Not applicable for this Episode    Visit # / Visits Authorized:  3 / 10  Insurance Authorization Period: 1/1/2025 to 7/10/2025  Date of Evaluation: 6/4/2025  Plan of Care Certification: 6/4/2025 to 7/30/2025      PT/PTA: PTA   Number of PTA visits since last PT visit:3    Time In: 1052   Time Out: 1150  Total Time (in minutes): 58   Total Billable Time (in minutes): 38    FOTO:  Intake Score: 29%  Survey Score 2:  %  Survey Score 3:  %    Precautions:       Subjective   Cornelia states her legs are hurting from her hips to her feet. Patient states both knees are sore and she hasn't heard anything about the potential (L) patella fracture yet..  Pain reported as 7/10. (B) knees    Objective            Treatment:  Therapeutic Exercise  TE 1: Long sitting gastroc stretch with strap x 1 minute x 3  TE 2: Supine heel slides with board and strap x 3 minutes, 5 sec hold  TE 3: Nu step x 7 minutes  TE 4: Long sitting heel prop x 5 minutes, 5 sec hold  Manual Therapy  MT 1: Patellar mobilizations all directions  MT 2: Global STM  Balance/Neuromuscular Re-Education  NMR 1: Quad sets 2x10, 3 sec hold  NMR 2: SAQ from full foam bolster 2x10  NMR 3: LAQ 2x10, 3 sec hold  NMR 4: Seated hamstring curl (Green TB) 2x10  NMR 5: Standing heel raises 2x10  NMR 6: Standing hip abduction 2x10 (standing on L), x 10 (standing on R) (NP today)  NMR 7: Narrow base on ground x 1 minute x 2    Time Entry(in minutes):  Manual  Therapy Time Entry: 10  Neuromuscular Re-Education Time Entry: 25  Therapeutic Exercise Time Entry: 20    Assessment & Plan   Assessment: Initiated heel prop to aide in achieving passive/active TKE. Good tolerance with proper posterior training effect achieved. Patient requires SBA with narrow base stance due to postural sway.   Evaluation/Treatment Tolerance: Patient tolerated treatment well    The patient will continue to benefit from skilled outpatient physical therapy in order to address the deficits listed in the problem list on the initial evaluation, provide patient and family education, and maximize the patients level of independence in the home and community environments.     The patient's spiritual, cultural, and educational needs were considered, and the patient is agreeable to the plan of care and goals.       Plan: Continue current POC with emphasis on restoring LE strength, function and control    Goals:   Active       Physical Therapy       Physical Therapy Goal (Progressing)       Start:  06/04/25    Expected End:  07/30/25       Goals to be met in 6-8 weeks:  1. Pt to report worst pain less than 2/10  2. Pt to improve R knee range of motion to 0-120  3. Pt to improve BLE strength by 1 grade  4. Pt to improve FOTO by 25% or greater               Paula Kuo, PTA

## 2025-06-24 ENCOUNTER — CLINICAL SUPPORT (OUTPATIENT)
Dept: REHABILITATION | Facility: HOSPITAL | Age: 75
End: 2025-06-24
Payer: MEDICARE

## 2025-06-24 DIAGNOSIS — M25.661 DECREASED RANGE OF MOTION (ROM) OF RIGHT KNEE: Primary | ICD-10-CM

## 2025-06-24 PROCEDURE — 97112 NEUROMUSCULAR REEDUCATION: CPT | Mod: HCNC,PO,CQ

## 2025-06-24 PROCEDURE — 97110 THERAPEUTIC EXERCISES: CPT | Mod: HCNC,PO,CQ

## 2025-06-24 NOTE — PROGRESS NOTES
Outpatient Rehab    Physical Therapy Visit    Patient Name: Cornelia Delatorre  MRN: [id number]  YOB: 1950  Encounter Date: 6/24/2025    Therapy Diagnosis:   Encounter Diagnosis   Name Primary?    Decreased range of motion (ROM) of right knee Yes       Physician: Robbin Edmond,*    Physician Orders: Eval and Treat  Medical Diagnosis: Closed displaced transverse fracture of right patella with routine healing, subsequent encounter  Surgical Diagnosis: Not applicable for this Episode   Surgical Date: Not applicable for this Episode    Visit # / Visits Authorized:  4 / 10  Insurance Authorization Period: 1/1/2025 to 7/10/2025  Date of Evaluation: 6/4/2025  Plan of Care Certification: 6/4/2025 to 7/30/2025      PT/PTA: PTA   Number of PTA visits since last PT visit:4    Time In: 1100   Time Out: 1155  Total Time (in minutes): 55   Total Billable Time (in minutes): 55    FOTO:  Intake Score:  %  Survey Score 2:  %  Survey Score 3:  %    Precautions:       Subjective   Pt reports her L knee is bothering her more than her R knee..  Pain reported as 7/10. L knee 7/10, R knee 3/10    Objective            Treatment:  Therapeutic Exercise  TE 1: Long sitting gastroc stretch with strap x 1 minute x 3  TE 2: Supine heel slides with board and strap x 3 minutes, 5 sec hold  TE 3: Nu step x 7 minutes  Balance/Neuromuscular Re-Education  NMR 1: Quad sets B LE 2x10, 3 sec hold  NMR 2: SAQ B LE from full foam bolster 2x10 1#  NMR 3: LAQ B LE 2x10, 3 sec hold 1#  NMR 4: Seated hamstring curl (Green TB) 2x10  NMR 5: Standing heel raises 2x10 1#  NMR 6: Standing hip abduction 2x10 1# (standing on L), x 10 (standing on R)    Time Entry(in minutes):  Neuromuscular Re-Education Time Entry: 40  Therapeutic Exercise Time Entry: 15    Assessment & Plan   Assessment: Pt's L knee hurting more than R knee due to recent fall. She was able to progress load B LE w/o adverse affect. Good quad activation with TKE therex. Cont  to progress per pt's tolerance.   Evaluation/Treatment Tolerance: Patient tolerated treatment well    The patient will continue to benefit from skilled outpatient physical therapy in order to address the deficits listed in the problem list on the initial evaluation, provide patient and family education, and maximize the patients level of independence in the home and community environments.     The patient's spiritual, cultural, and educational needs were considered, and the patient is agreeable to the plan of care and goals.   Education  Education was done with Patient. The patient's learning style includes Demonstration and Listening. The patient Demonstrates understanding and Verbalizes understanding.               Plan: Continue current POC with emphasis on restoring LE strength, function and control    Goals:   Active       Physical Therapy       Physical Therapy Goal (Progressing)       Start:  06/04/25    Expected End:  07/30/25       Goals to be met in 6-8 weeks:  1. Pt to report worst pain less than 2/10  2. Pt to improve R knee range of motion to 0-120  3. Pt to improve BLE strength by 1 grade  4. Pt to improve FOTO by 25% or greater                 Carrie Camarillo, PTA

## 2025-06-26 ENCOUNTER — CLINICAL SUPPORT (OUTPATIENT)
Dept: REHABILITATION | Facility: HOSPITAL | Age: 75
End: 2025-06-26
Payer: MEDICARE

## 2025-06-26 DIAGNOSIS — M25.661 DECREASED RANGE OF MOTION (ROM) OF RIGHT KNEE: Primary | ICD-10-CM

## 2025-06-26 PROCEDURE — 97110 THERAPEUTIC EXERCISES: CPT | Mod: HCNC,PO

## 2025-06-26 PROCEDURE — 97112 NEUROMUSCULAR REEDUCATION: CPT | Mod: HCNC,PO

## 2025-06-26 NOTE — PROGRESS NOTES
Outpatient Rehab    Physical Therapy Visit    Patient Name: Cornelia Delatorre  MRN: [id number]  YOB: 1950  Encounter Date: 6/26/2025    Therapy Diagnosis:   Encounter Diagnosis   Name Primary?    Decreased range of motion (ROM) of right knee Yes     Physician: Robbin Edmond,*    Physician Orders: Eval and Treat  Medical Diagnosis: Closed displaced transverse fracture of right patella with routine healing, subsequent encounter  Surgical Diagnosis: Not applicable for this Episode   Surgical Date: Not applicable for this Episode  Days Since Last Surgery: Not applicable for this Episode    Visit # / Visits Authorized:  5 / 10  Insurance Authorization Period: 1/1/2025 to 7/10/2025  Date of Evaluation: 6/4/2025  Plan of Care Certification: 6/4/2025 to 7/30/2025      PT/PTA:     Number of PTA visits since last PT visit:   Time In: 1100   Time Out: 1155  Total Time (in minutes): 55   Total Billable Time (in minutes): 45    Precautions:       Subjective   Pt reports her L knee is bothering her more than her R knee..  Pain reported as 5/10.      Objective            Treatment:  Therapeutic Exercise  TE 1: Long sitting gastroc stretch with strap x 1 minute x 3  TE 2: Supine heel slides with board and strap x 3 minutes, 5 sec hold  TE 3: Nu step x 10 minutes  Balance/Neuromuscular Re-Education  NMR 1: Quad sets B LE 2x10, 3 sec hold  NMR 2: SAQ B LE from full foam bolster 3x10 2#  NMR 3: LAQ B LE 2x10, 3 sec hold 2#  NMR 5: Standing marches 2x10 2#  NMR 6: Standing hip abduction 2x10 1# (standing on L), x 10 (standing on R)    Time Entry(in minutes):  Neuromuscular Re-Education Time Entry: 30  Therapeutic Exercise Time Entry: 15    Assessment & Plan   Assessment: Pt with notable increase in tolerance to loading this date with good ability to progress ankle weight resistance with no adverse reactions. Will continue to progress to tolerance going forward to maximize return of PLOF.  Evaluation/Treatment  Tolerance: Patient tolerated treatment well    The patient will continue to benefit from skilled outpatient physical therapy in order to address the deficits listed in the problem list on the initial evaluation, provide patient and family education, and maximize the patients level of independence in the home and community environments.     The patient's spiritual, cultural, and educational needs were considered, and the patient is agreeable to the plan of care and goals.           Plan: Continue current POC with emphasis on restoring LE strength, function and control    Goals:   Active       Physical Therapy       Physical Therapy Goal (Progressing)       Start:  06/04/25    Expected End:  07/30/25       Goals to be met in 6-8 weeks:  1. Pt to report worst pain less than 2/10  2. Pt to improve R knee range of motion to 0-120  3. Pt to improve BLE strength by 1 grade  4. Pt to improve FOTO by 25% or greater             Jacob Crain, PT

## 2025-07-01 ENCOUNTER — CLINICAL SUPPORT (OUTPATIENT)
Dept: REHABILITATION | Facility: HOSPITAL | Age: 75
End: 2025-07-01
Payer: MEDICARE

## 2025-07-01 DIAGNOSIS — M25.661 DECREASED RANGE OF MOTION (ROM) OF RIGHT KNEE: Primary | ICD-10-CM

## 2025-07-01 PROCEDURE — 97112 NEUROMUSCULAR REEDUCATION: CPT | Mod: HCNC,PO

## 2025-07-01 NOTE — PROGRESS NOTES
Outpatient Rehab    Physical Therapy Visit    Patient Name: Cornelia Delatorre  MRN: [id number]  YOB: 1950  Encounter Date: 7/1/2025    Therapy Diagnosis:   Encounter Diagnosis   Name Primary?    Decreased range of motion (ROM) of right knee Yes     Physician: Robbin Edmond,*    Physician Orders: Eval and Treat  Medical Diagnosis: Closed displaced transverse fracture of right patella with routine healing, subsequent encounter  Surgical Diagnosis: Not applicable for this Episode   Surgical Date: Not applicable for this Episode  Days Since Last Surgery: Not applicable for this Episode    Visit # / Visits Authorized:  6 / 10  Insurance Authorization Period: 1/1/2025 to 7/10/2025  Date of Evaluation: 6/4/2025  Plan of Care Certification: 6/4/2025 to 7/30/2025      PT/PTA:     Number of PTA visits since last PT visit:   Time In: 1445   Time Out: 1530  Total Time (in minutes): 45   Total Billable Time (in minutes): 45    Precautions:       Subjective   Pt states her knees are starting to feel better.  Pain reported as 3/10.      Objective            Treatment:  Balance/Neuromuscular Re-Education  NMR 1: Quad sets B LE 2x10, 3 sec hold  NMR 2: SAQ B LE from full foam bolster 3x10 2#  NMR 3: LAQ B LE 2x10, 3 sec hold 2#  NMR 4: SLR 2x10 BLE  NMR 5: Standing marches 2x10 2#  NMR 6: Standing hip abduction 2x10 2# (standing on L), x 10 (standing on R)    Time Entry(in minutes):  Neuromuscular Re-Education Time Entry: 45    Assessment & Plan   Assessment: Pt continues to respond well to global BLE strengthening at this time. Will continue to progress to tolerance going forward.   Evaluation/Treatment Tolerance: Patient tolerated treatment well    The patient will continue to benefit from skilled outpatient physical therapy in order to address the deficits listed in the problem list on the initial evaluation, provide patient and family education, and maximize the patients level of independence in the  home and community environments.     The patient's spiritual, cultural, and educational needs were considered, and the patient is agreeable to the plan of care and goals.           Plan: Continue current POC with emphasis on restoring LE strength, function and control    Goals:   Active       Physical Therapy       Physical Therapy Goal (Progressing)       Start:  06/04/25    Expected End:  07/30/25       Goals to be met in 6-8 weeks:  1. Pt to report worst pain less than 2/10  2. Pt to improve R knee range of motion to 0-120  3. Pt to improve BLE strength by 1 grade  4. Pt to improve FOTO by 25% or greater             Jacob Crain PT

## 2025-07-03 ENCOUNTER — CLINICAL SUPPORT (OUTPATIENT)
Dept: REHABILITATION | Facility: HOSPITAL | Age: 75
End: 2025-07-03
Payer: MEDICARE

## 2025-07-03 DIAGNOSIS — M25.661 DECREASED RANGE OF MOTION (ROM) OF RIGHT KNEE: Primary | ICD-10-CM

## 2025-07-03 PROCEDURE — 97112 NEUROMUSCULAR REEDUCATION: CPT | Mod: HCNC,PO

## 2025-07-03 PROCEDURE — 97110 THERAPEUTIC EXERCISES: CPT | Mod: HCNC,PO

## 2025-07-03 NOTE — PROGRESS NOTES
Outpatient Rehab    Physical Therapy Progress Note    Patient Name: Cornelia Delatorre  MRN: [id number]  YOB: 1950  Encounter Date: 7/3/2025    Therapy Diagnosis:   Encounter Diagnosis   Name Primary?    Decreased range of motion (ROM) of right knee Yes     Physician: Robbin Edmond,*    Physician Orders: Eval and Treat  Medical Diagnosis: Closed displaced transverse fracture of right patella with routine healing, subsequent encounter  Surgical Diagnosis: Not applicable for this Episode   Surgical Date: Not applicable for this Episode  Days Since Last Surgery: Not applicable for this Episode    Visit # / Visits Authorized:  7 / 20  Insurance Authorization Period: 6/10/2025 to 12/31/2025  Date of Evaluation: 6/4/2025  Plan of Care Certification: 6/4/2025 to 7/30/2025      PT/PTA:     Number of PTA visits since last PT visit:   Time In: 0945   Time Out: 1045  Total Time (in minutes): 60   Total Billable Time (in minutes): 60      Precautions:       Subjective   Pt states her B knees have been sore since last session.  Pain reported as 4/10.      Objective           Knee Range of Motion   Right Knee    Active (deg) Passive (deg) Pain   Flexion 107   Yes   Extension -2                Hip Strength - Planes of Motion    Right Strength Right Pain Left Strength Left  Pain   Flexion (L2) 4+   5     Extension 4+   4+     ABduction 4+   4+     ADduction 4+   5     Internal Rotation 4+   4+     External Rotation 4+   4+           Knee Strength    Right Strength Right Pain Left Strength Left  Pain   Flexion (S2) 5   5     Prone Flexion           Extension (L3) 4+ Yes 5           Treatment:  Therapeutic Exercise  TE 1: Long sitting gastroc stretch with strap x 1 minute x 3  TE 3: Nu step x 10 minutes  TE 4: Long sitting heel prop x 5 minutes, 5 sec hold  Balance/Neuromuscular Re-Education  NMR 1: Quad sets B LE 2x10, 3 sec hold  NMR 2: SAQ B LE from full foam bolster 3x10 2#  NMR 3: LAQ B LE 2x10, 3 sec  hold 2#  NMR 4: SLR 2x10 BLE  NMR 5: Standing marches 2x10 2#  NMR 6: Standing hip abduction 2x10 2# (standing on L), x 10 (standing on R)    Time Entry(in minutes):  Neuromuscular Re-Education Time Entry: 45  Therapeutic Exercise Time Entry: 15    Assessment & Plan   Assessment: Pt continues to respond well to therapy at this time. Overall discomfort reduced with treatment this date, suggesting good overall response to controlled loading at this time. Will continue to progress.  Evaluation/Treatment Tolerance: Patient tolerated treatment well    The patient will continue to benefit from skilled outpatient physical therapy in order to address the deficits listed in the problem list on the initial evaluation, provide patient and family education, and maximize the patients level of independence in the home and community environments.     The patient's spiritual, cultural, and educational needs were considered, and the patient is agreeable to the plan of care and goals.           Plan: Continue current POC with emphasis on restoring LE strength, function and control    Goals:   Active       Physical Therapy       Physical Therapy Goal (Progressing)       Start:  06/04/25    Expected End:  07/30/25       Goals to be met in 6-8 weeks:  1. Pt to report worst pain less than 2/10  2. Pt to improve R knee range of motion to 0-120  3. Pt to improve BLE strength by 1 grade  4. Pt to improve FOTO by 25% or greater             Jacob Crain PT

## 2025-07-07 DIAGNOSIS — F41.8 SITUATIONAL ANXIETY: ICD-10-CM

## 2025-07-07 DIAGNOSIS — R11.2 NAUSEA AND VOMITING, UNSPECIFIED VOMITING TYPE: ICD-10-CM

## 2025-07-07 DIAGNOSIS — M62.830 BACK SPASM: ICD-10-CM

## 2025-07-07 RX ORDER — CYCLOBENZAPRINE HCL 10 MG
TABLET ORAL
Qty: 30 TABLET | Refills: 11 | Status: SHIPPED | OUTPATIENT
Start: 2025-07-07

## 2025-07-07 NOTE — TELEPHONE ENCOUNTER
No care due was identified.  [facility] Embedded Care Due Messages. Reference number: [id number].   7/07/2025 12:39:19 PM CDT

## 2025-07-07 NOTE — TELEPHONE ENCOUNTER
Care Due:                  Date            Visit Type   Department     Provider  --------------------------------------------------------------------------------                                EP -                              PRIMARY      [facility] FAMILY  Last Visit: 04-      CARE ([facility])   JULITA Cortez                               -                              [facility]  Next Visit: 10-      CARE ([facility])   JULITA Cortez                                                            Last  Test          Frequency    Reason                     Performed    Due Date  --------------------------------------------------------------------------------    Mg Level....  12 months..  ibandronate..............  10-   10-    Phosphate...  12 months..  ibandronate..............  Not Found    Overdue    [employer] Embedded Care Due Messages. Reference number: [id number].   7/07/2025 12:12:23 PM CDT

## 2025-07-07 NOTE — TELEPHONE ENCOUNTER
Refill Routing Note   Medication(s) are not appropriate for processing by [facility] for the following reason(s):        Outside of protocol    [facility] action(s):  Route             Appointments  past 12m or future 3m with PCP    Date Provider   Last Visit   4/16/2025 Armond Cortez MD   Next Visit   10/8/2025 Armond Cortez MD   ED visits in past 90 days: 1        Note composed:12:40 PM 07/07/2025

## 2025-07-08 ENCOUNTER — CLINICAL SUPPORT (OUTPATIENT)
Dept: REHABILITATION | Facility: HOSPITAL | Age: 75
End: 2025-07-08
Payer: MEDICARE

## 2025-07-08 DIAGNOSIS — M25.661 DECREASED RANGE OF MOTION (ROM) OF RIGHT KNEE: Primary | ICD-10-CM

## 2025-07-08 PROCEDURE — 97110 THERAPEUTIC EXERCISES: CPT | Mod: HCNC,PO,CQ

## 2025-07-08 PROCEDURE — 97112 NEUROMUSCULAR REEDUCATION: CPT | Mod: HCNC,PO,CQ

## 2025-07-08 RX ORDER — HYDROXYZINE HYDROCHLORIDE 10 MG/1
10 TABLET, FILM COATED ORAL
Qty: 30 TABLET | Refills: 3 | Status: SHIPPED | OUTPATIENT
Start: 2025-07-08

## 2025-07-08 RX ORDER — ONDANSETRON 4 MG/1
TABLET, ORALLY DISINTEGRATING ORAL
Qty: 30 TABLET | Refills: 3 | Status: SHIPPED | OUTPATIENT
Start: 2025-07-08

## 2025-07-08 NOTE — PROGRESS NOTES
Outpatient Rehab  Physical Therapy Visit    Patient Name: Cornelia Delatorre  MRN: [id number]  YOB: 1950  Encounter Date: 7/8/2025    Therapy Diagnosis:   Encounter Diagnosis   Name Primary?    Decreased range of motion (ROM) of right knee Yes     Physician: Robbin Edmond,*    Physician Orders: Eval and Treat  Medical Diagnosis: Closed displaced transverse fracture of right patella with routine healing, subsequent encounter  Surgical Diagnosis: Not applicable for this Episode   Surgical Date: Not applicable for this Episode  Days Since Last Surgery: Not applicable for this Episode    Visit # / Visits Authorized:  8 / 20  Insurance Authorization Period: 6/10/2025 to 12/31/2025  Date of Evaluation: 6/4/2025  Plan of Care Certification: 6/4/2025 to 7/30/2025      PT/PTA: PTA   Number of PTA visits since last PT visit:1    Time In: 1058   Time Out: 1155  Total Time (in minutes): 57   Total Billable Time (in minutes): 55    FOTO:  Intake Score: 29%  Survey Score 2:  %  Survey Score 3:  %    Precautions:       Subjective   Cornelia states her knees have been bothering her. Patient states she is seeing Dr. Edmond next week..  Pain reported as 4/10. (B) knees    Objective            Treatment:  Therapeutic Exercise  TE 1: Long sitting gastroc stretch with strap x 1 minute x 3  TE 3: Nu step x 10 minutes  TE 4: Long sitting heel prop x 5 minutes, 5 sec hold  Balance/Neuromuscular Re-Education  NMR 1: Quad sets B LE 2x10, 3 sec hold  NMR 2: SAQ B LE from full foam bolster 3x10 2#  NMR 3: LAQ B LE 2x10, 3 sec hold 2#  NMR 4: SLR 2x10 BLE  NMR 5: Standing marches 2x10 2#  NMR 6: Standing hip abduction 2x10 2# (standing on L), x 10 (standing on R)  NMR 7: Sit to stand 2x10    Time Entry(in minutes):  Neuromuscular Re-Education Time Entry: 30  Therapeutic Exercise Time Entry: 25    Assessment & Plan   Assessment: Cornelia able to perform standing hip abduction (B) without complaints of knee pain.  Patient is responding well to progressive loading. Repetitive sit to stands challenging due to LE weakness but she is able to perform without complaints of pain.   Evaluation/Treatment Tolerance: Patient tolerated treatment well    The patient will continue to benefit from skilled outpatient physical therapy in order to address the deficits listed in the problem list on the initial evaluation, provide patient and family education, and maximize the patients level of independence in the home and community environments.     The patient's spiritual, cultural, and educational needs were considered, and the patient is agreeable to the plan of care and goals.           Plan: Continue current POC with emphasis on restoring LE strength, function and control    Goals:   Active       Physical Therapy       Physical Therapy Goal (Progressing)       Start:  06/04/25    Expected End:  07/30/25       Goals to be met in 6-8 weeks:  1. Pt to report worst pain less than 2/10  2. Pt to improve R knee range of motion to 0-120  3. Pt to improve BLE strength by 1 grade  4. Pt to improve FOTO by 25% or greater             Paula Kuo, PTA

## 2025-07-08 NOTE — TELEPHONE ENCOUNTER
Refill Routing Note   Medication(s) are not appropriate for processing by [facility] for the following reason(s):        Outside of protocol    [facility] action(s):  Route             Appointments  past 12m or future 3m with PCP    Date Provider   Last Visit   4/16/2025 Armnod Cortez MD   Next Visit   7/7/2025 Armond Cortez MD   ED visits in past 90 days: 0        Note composed:7:46 AM 07/08/2025

## 2025-07-09 DIAGNOSIS — S82.031D CLOSED DISPLACED TRANSVERSE FRACTURE OF RIGHT PATELLA WITH ROUTINE HEALING, SUBSEQUENT ENCOUNTER: Primary | ICD-10-CM

## 2025-07-11 ENCOUNTER — CLINICAL SUPPORT (OUTPATIENT)
Dept: REHABILITATION | Facility: HOSPITAL | Age: 75
End: 2025-07-11
Payer: MEDICARE

## 2025-07-11 DIAGNOSIS — M25.661 DECREASED RANGE OF MOTION (ROM) OF RIGHT KNEE: Primary | ICD-10-CM

## 2025-07-11 PROCEDURE — 97112 NEUROMUSCULAR REEDUCATION: CPT | Mod: HCNC,PO

## 2025-07-11 PROCEDURE — 97110 THERAPEUTIC EXERCISES: CPT | Mod: HCNC,PO

## 2025-07-11 NOTE — PROGRESS NOTES
Outpatient Rehab    Physical Therapy Visit    Patient Name: Cornelia Delatorre  MRN: [id number]  YOB: 1950  Encounter Date: 7/11/2025    Therapy Diagnosis:   Encounter Diagnosis   Name Primary?    Decreased range of motion (ROM) of right knee Yes     Physician: Robbin Edmond,*    Physician Orders: Eval and Treat  Medical Diagnosis: Closed displaced transverse fracture of right patella with routine healing, subsequent encounter  Surgical Diagnosis: Not applicable for this Episode   Surgical Date: Not applicable for this Episode  Days Since Last Surgery: Not applicable for this Episode    Visit # / Visits Authorized:  9 / 20  Insurance Authorization Period: 6/10/2025 to 12/31/2025  Date of Evaluation: 6/4/2025  Plan of Care Certification: 6/4/2025 to 7/30/2025      PT/PTA:     Number of PTA visits since last PT visit:   Time In: 1330   Time Out: 1424  Total Time (in minutes): 54   Total Billable Time (in minutes): 54    Precautions:       Subjective   Pt states she feels therapy is helping at this time and is without new complaints.  Pain reported as 3/10.      Objective            Treatment:  Therapeutic Exercise  TE 1: Long sitting gastroc & hamstring stretch with strap x 1 minute x 3 ea  TE 3: Nu step x 10 minutes  Balance/Neuromuscular Re-Education  NMR 1: Quad sets B LE 2x10, 3 sec hold  NMR 2: SAQ B LE from full foam bolster 3x10 2#  NMR 3: LAQ B LE 2x10, 3 sec hold 2#  NMR 4: SLR 2x10 BLE  NMR 5: Standing marches 2x10 2#  NMR 6: Standing hip abduction 2x10 2#  NMR 7: Sit to stand 5x5 c 6# MB    Time Entry(in minutes):  Neuromuscular Re-Education Time Entry: 39  Therapeutic Exercise Time Entry: 15    Assessment & Plan   Assessment: Pt continues to tolerate therapy well at this time but still presents with early onset fatigue limiting further progression. Will continue to focus on endurance based loading going forward to maximize return of PLOF.   Evaluation/Treatment Tolerance: Patient  tolerated treatment well    The patient will continue to benefit from skilled outpatient physical therapy in order to address the deficits listed in the problem list on the initial evaluation, provide patient and family education, and maximize the patients level of independence in the home and community environments.     The patient's spiritual, cultural, and educational needs were considered, and the patient is agreeable to the plan of care and goals.           Plan: Continue current POC with emphasis on restoring LE strength, function and control    Goals:   Active       Physical Therapy       Physical Therapy Goal (Progressing)       Start:  06/04/25    Expected End:  07/30/25       Goals to be met in 6-8 weeks:  1. Pt to report worst pain less than 2/10  2. Pt to improve R knee range of motion to 0-120  3. Pt to improve BLE strength by 1 grade  4. Pt to improve FOTO by 25% or greater             Jacob Crain PT

## 2025-07-14 ENCOUNTER — PATIENT MESSAGE (OUTPATIENT)
Dept: ADMINISTRATIVE | Facility: OTHER | Age: 75
End: 2025-07-14
Payer: MEDICARE

## 2025-07-15 ENCOUNTER — OFFICE VISIT (OUTPATIENT)
Facility: CLINIC | Age: 75
End: 2025-07-15
Payer: MEDICARE

## 2025-07-15 DIAGNOSIS — Z85.828 PERSONAL HISTORY OF MALIGNANT NEOPLASM OF SKIN: ICD-10-CM

## 2025-07-15 DIAGNOSIS — Z86.19 HISTORY OF STAPH INFECTION: ICD-10-CM

## 2025-07-15 DIAGNOSIS — L57.0 ACTINIC KERATOSIS: ICD-10-CM

## 2025-07-15 DIAGNOSIS — L90.5 SCAR: ICD-10-CM

## 2025-07-15 DIAGNOSIS — B37.2 CANDIDA ONYCHOMYCOSIS: Primary | ICD-10-CM

## 2025-07-15 PROCEDURE — 3061F NEG MICROALBUMINURIA REV: CPT | Mod: CPTII,HCNC,S$GLB, | Performed by: DERMATOLOGY

## 2025-07-15 PROCEDURE — 17003 DESTRUCT PREMALG LES 2-14: CPT | Mod: HCNC,S$GLB,, | Performed by: DERMATOLOGY

## 2025-07-15 PROCEDURE — 17000 DESTRUCT PREMALG LESION: CPT | Mod: HCNC,S$GLB,, | Performed by: DERMATOLOGY

## 2025-07-15 PROCEDURE — 99214 OFFICE O/P EST MOD 30 MIN: CPT | Mod: 25,HCNC,S$GLB, | Performed by: DERMATOLOGY

## 2025-07-15 PROCEDURE — 99999 PR PBB SHADOW E&M-EST. PATIENT-LVL III: CPT | Mod: PBBFAC,HCNC,, | Performed by: DERMATOLOGY

## 2025-07-15 PROCEDURE — 1157F ADVNC CARE PLAN IN RCRD: CPT | Mod: CPTII,HCNC,S$GLB, | Performed by: DERMATOLOGY

## 2025-07-15 PROCEDURE — 1100F PTFALLS ASSESS-DOCD GE2>/YR: CPT | Mod: CPTII,HCNC,S$GLB, | Performed by: DERMATOLOGY

## 2025-07-15 PROCEDURE — 3066F NEPHROPATHY DOC TX: CPT | Mod: CPTII,HCNC,S$GLB, | Performed by: DERMATOLOGY

## 2025-07-15 PROCEDURE — 3288F FALL RISK ASSESSMENT DOCD: CPT | Mod: CPTII,HCNC,S$GLB, | Performed by: DERMATOLOGY

## 2025-07-15 PROCEDURE — 1159F MED LIST DOCD IN RCRD: CPT | Mod: CPTII,HCNC,S$GLB, | Performed by: DERMATOLOGY

## 2025-07-15 PROCEDURE — 3044F HG A1C LEVEL LT 7.0%: CPT | Mod: CPTII,HCNC,S$GLB, | Performed by: DERMATOLOGY

## 2025-07-15 RX ORDER — FLUCONAZOLE 200 MG/1
TABLET ORAL
Qty: 12 TABLET | Refills: 3 | Status: SHIPPED | OUTPATIENT
Start: 2025-07-15

## 2025-07-15 RX ORDER — MUPIROCIN 20 MG/G
OINTMENT TOPICAL 3 TIMES DAILY
Qty: 30 G | Refills: 2 | Status: SHIPPED | OUTPATIENT
Start: 2025-07-15

## 2025-07-15 NOTE — PROGRESS NOTES
Subjective:      Patient ID:  Cornelia Delatorre is a 74 y.o. female who presents for   Chief Complaint   Patient presents with    Skin Check     HPI  LOV: 9/24 with LT  Present today for-TBSC/ medication refill - uses mupirocin from time to time on wounds. Desires refill  Several areas of concern on hands and arms  C/o nail changes fingernails and toes. Akua gave rx vanc/flucon compounded topical. Doesn't feel this has been helpful           yes Phx of NMSC  Phx skin ca R hand and nose - Dr Parra - 2014   Phx SCC L hand -mohs Dr Jones -2015   Uses CeraVe AM and PM cream daily for routine   CVID -On IV Ig     L5 burst fracture 12/2023.  Severe pain making riding in a car painful.  Pins and needle pain radiates down both legs.  Dr Rojas has given her multiple injections, which have failed.  She is now being evaluated for surgery.  Feeling worn down with pain and limitations.   DEXA 2023 + osteopenia.     Bronchiectasis/ asthma / COPD- stable. on Trelegy. Following with pulmonology     HTN - controlled   DM - controlled; Diabetic peripheral neuropathy - controlled with 600 mg gabapentin tid.  Outside foot doctor.   HLD - controlled for goal 70  Review of Systems   Constitutional:  Negative for fever, chills and fatigue.   HENT:  Negative for congestion, sore throat and mouth sores.    Respiratory:  Negative for cough.    Gastrointestinal:  Negative for nausea, vomiting and diarrhea.   Skin:  Positive for daily sunscreen use. Negative for itching, rash, dry skin, sensitivity to antibiotic ointment, sensitivity to bandage adhesive and wears hat.   Hematologic/Lymphatic: Bruises/bleeds easily (forearms, takes asa and prednisone for asthma).       Objective:   Physical Exam   Constitutional: She appears well-developed and well-nourished. No distress.   HENT:   Mouth/Throat: Lips normal.    Eyes: Lids are normal.  No conjunctival no injection.   Cardiovascular:  There is no local extremity swelling and no  dependent edema.             Neurological: She is alert and oriented to person, place, and time. She is not disoriented.   Psychiatric: She has a normal mood and affect.   Skin:   Areas Examined (abnormalities noted in diagram):   Scalp / Hair Palpated and Inspected  Head / Face Inspection Performed  Neck Inspection Performed  Chest / Axilla Inspection Performed  Abdomen Inspection Performed  Back Inspection Performed  RUE Inspected  LUE Inspection Performed  RLE Inspected  LLE Inspection Performed  Nails and Digits Inspection Performed                     Diagram Legend     Erythematous scaling macule/papule c/w actinic keratosis       Vascular papule c/w angioma      Pigmented verrucoid papule/plaque c/w seborrheic keratosis      Yellow umbilicated papule c/w sebaceous hyperplasia      Irregularly shaped tan macule c/w lentigo     1-2 mm smooth white papules consistent with Milia      Movable subcutaneous cyst with punctum c/w epidermal inclusion cyst      Subcutaneous movable cyst c/w pilar cyst      Firm pink to brown papule c/w dermatofibroma      Pedunculated fleshy papule(s) c/w skin tag(s)      Evenly pigmented macule c/w junctional nevus     Mildly variegated pigmented, slightly irregular-bordered macule c/w mildly atypical nevus      Flesh colored to evenly pigmented papule c/w intradermal nevus       Pink pearly papule/plaque c/w basal cell carcinoma      Erythematous hyperkeratotic cursted plaque c/w SCC      Surgical scar with no sign of skin cancer recurrence      Open and closed comedones      Inflammatory papules and pustules      Verrucoid papule consistent consistent with wart     Erythematous eczematous patches and plaques     Dystrophic onycholytic nail with subungual debris c/w onychomycosis     Umbilicated papule    Erythematous-base heme-crusted tan verrucoid plaque consistent with inflamed seborrheic keratosis     Erythematous Silvery Scaling Plaque c/w Psoriasis     See  annotation      Assessment / Plan:        Candida onychomycosis  -     fluconazole (DIFLUCAN) 200 MG Tab; Take 1 pill po q week  Dispense: 12 tablet; Refill: 3    History of staph infection  -     mupirocin (BACTROBAN) 2 % ointment; Apply topically 3 (three) times daily.  Dispense: 30 g; Refill: 2    Scar  Area(s) of previous NMSC evaluated with no signs of recurrence.    Upper body skin examination performed today including at least 6 points as noted in physical examination. No lesions suspicious for malignancy noted.      Personal history of malignant neoplasm of skin  Area(s) of previous NMSC evaluated with no signs of recurrence.    Upper body skin examination performed today including at least 6 points as noted in physical examination. No lesions suspicious for malignancy noted.      Actinic keratosis  Cryosurgery Procedure Note    Verbal consent from the patient is obtained and the patient is aware of the precancerous quality and need for treatment of these lesions. Liquid nitrogen cryosurgery is applied to the 4 actinic keratoses, as detailed in the physical exam, to produce a freeze injury. The patient is aware that blisters may form and is instructed on wound care with gentle cleansing and use of vaseline ointment to keep moist until healed. The patient is supplied a handout on cryosurgery and is instructed to call if lesions do not completely resolve. Discussed risk postinflammatory pigmentary changes.              No follow-ups on file.

## 2025-07-16 ENCOUNTER — OFFICE VISIT (OUTPATIENT)
Dept: ORTHOPEDICS | Facility: CLINIC | Age: 75
End: 2025-07-16
Payer: MEDICARE

## 2025-07-16 ENCOUNTER — HOSPITAL ENCOUNTER (OUTPATIENT)
Dept: RADIOLOGY | Facility: HOSPITAL | Age: 75
Discharge: HOME | End: 2025-07-16
Attending: ORTHOPAEDIC SURGERY
Payer: MEDICARE

## 2025-07-16 VITALS — BODY MASS INDEX: 34.77 KG/M2 | WEIGHT: 203.69 LBS | HEIGHT: 64 IN | RESPIRATION RATE: 18 BRPM

## 2025-07-16 DIAGNOSIS — S82.035A CLOSED NONDISPLACED TRANSVERSE FRACTURE OF LEFT PATELLA, INITIAL ENCOUNTER: ICD-10-CM

## 2025-07-16 DIAGNOSIS — S82.031D CLOSED DISPLACED TRANSVERSE FRACTURE OF RIGHT PATELLA WITH ROUTINE HEALING, SUBSEQUENT ENCOUNTER: ICD-10-CM

## 2025-07-16 DIAGNOSIS — S82.031D CLOSED DISPLACED TRANSVERSE FRACTURE OF RIGHT PATELLA WITH ROUTINE HEALING, SUBSEQUENT ENCOUNTER: Primary | ICD-10-CM

## 2025-07-16 PROCEDURE — 73564 X-RAY EXAM KNEE 4 OR MORE: CPT | Mod: 26,50,HCNC, | Performed by: RADIOLOGY

## 2025-07-16 PROCEDURE — 99999 PR PBB SHADOW E&M-EST. PATIENT-LVL IV: CPT | Mod: PBBFAC,HCNC,, | Performed by: ORTHOPAEDIC SURGERY

## 2025-07-16 PROCEDURE — 3044F HG A1C LEVEL LT 7.0%: CPT | Mod: CPTII,HCNC,S$GLB, | Performed by: ORTHOPAEDIC SURGERY

## 2025-07-16 PROCEDURE — 1125F AMNT PAIN NOTED PAIN PRSNT: CPT | Mod: CPTII,HCNC,S$GLB, | Performed by: ORTHOPAEDIC SURGERY

## 2025-07-16 PROCEDURE — 73564 X-RAY EXAM KNEE 4 OR MORE: CPT | Mod: TC,50,HCNC,PO

## 2025-07-16 PROCEDURE — 1157F ADVNC CARE PLAN IN RCRD: CPT | Mod: CPTII,HCNC,S$GLB, | Performed by: ORTHOPAEDIC SURGERY

## 2025-07-16 PROCEDURE — 1159F MED LIST DOCD IN RCRD: CPT | Mod: CPTII,HCNC,S$GLB, | Performed by: ORTHOPAEDIC SURGERY

## 2025-07-16 PROCEDURE — 3066F NEPHROPATHY DOC TX: CPT | Mod: CPTII,HCNC,S$GLB, | Performed by: ORTHOPAEDIC SURGERY

## 2025-07-16 PROCEDURE — 99214 OFFICE O/P EST MOD 30 MIN: CPT | Mod: HCNC,S$GLB,, | Performed by: ORTHOPAEDIC SURGERY

## 2025-07-16 PROCEDURE — 3061F NEG MICROALBUMINURIA REV: CPT | Mod: CPTII,HCNC,S$GLB, | Performed by: ORTHOPAEDIC SURGERY

## 2025-07-16 PROCEDURE — 3008F BODY MASS INDEX DOCD: CPT | Mod: CPTII,HCNC,S$GLB, | Performed by: ORTHOPAEDIC SURGERY

## 2025-07-16 PROCEDURE — 1101F PT FALLS ASSESS-DOCD LE1/YR: CPT | Mod: CPTII,HCNC,S$GLB, | Performed by: ORTHOPAEDIC SURGERY

## 2025-07-16 PROCEDURE — 3288F FALL RISK ASSESSMENT DOCD: CPT | Mod: CPTII,HCNC,S$GLB, | Performed by: ORTHOPAEDIC SURGERY

## 2025-07-16 RX ORDER — METHYLPREDNISOLONE 4 MG/1
TABLET ORAL
Qty: 21 EACH | Refills: 0 | Status: SHIPPED | OUTPATIENT
Start: 2025-07-16 | End: 2025-08-06

## 2025-07-16 NOTE — PROGRESS NOTES
Past Medical History:   Diagnosis Date    Allergy     Arthritis     Asthma     Cancer     skin cancer to right hand    Cataract     Chronic fatigue 01/24/2017    CVID (common variable immunodeficiency) 03/07/2019    Diabetes mellitus     type2    KLINE (dyspnea on exertion) 01/24/2017    Dyslipidemia 06/25/2019    Encounter for blood transfusion     Essential hypertension 12/29/2011    GERD (gastroesophageal reflux disease)     Headache(784.0)     Hyperlipidemia 07/15/2015    Hypertension     Hypothyroidism     Neuropathy     Obesity     Postmenopausal HRT (hormone replacement therapy)     Rash     Rosacea     Sleep apnea     on bipap    Snoring     Squamous cell carcinoma of skin     left forearm     UTI (urinary tract infection)     Wears glasses        Past Surgical History:   Procedure Laterality Date    ADENOIDECTOMY      APPENDECTOMY      BLADDER SUSPENSION      BREAST BIOPSY Bilateral 08/1992    bilateral benign excisional biopsies    BUNIONECTOMY  10/17/2014    right, still has discomfort    CATARACT EXTRACTION W/  INTRAOCULAR LENS IMPLANT Bilateral     CHOLECYSTECTOMY  08/02/2017    COLONOSCOPY      CYSTOSCOPY      EPIDURAL STEROID INJECTION INTO LUMBAR SPINE N/A 08/14/2019    Procedure: Injection-steroid-epidural-lumbar L5/S1;  Surgeon: Fredi Rojas MD;  Location: [facility] OR;  Service: Pain Management;  Laterality: N/A;    EPIDURAL STEROID INJECTION INTO LUMBAR SPINE N/A 05/03/2021    Procedure: Injection-steroid-epidural-lumbar L5/S1 to the Left;  Surgeon: Fredi Rojas MD;  Location: [facility] OR;  Service: Pain Management;  Laterality: N/A;    EPIDURAL STEROID INJECTION INTO LUMBAR SPINE N/A 09/24/2021    Procedure: Injection-steroid-epidural-lumbar L5/S1;  Surgeon: Fredi Rojas MD;  Location: [facility] OR;  Service: Pain Management;  Laterality: N/A;    EPIDURAL STEROID INJECTION INTO LUMBAR SPINE N/A 02/21/2022    Procedure: Injection-steroid-epidural-lumbar L5/S1;  Surgeon: Fredi Rojas MD;  Location:  [facility] OR;  Service: Pain Management;  Laterality: N/A;    EPIDURAL STEROID INJECTION INTO LUMBAR SPINE N/A 02/09/2024    Procedure: Injection-steroid-epidural-lumbar       L5/S1;  Surgeon: Fredi Rojas MD;  Location: [facility] OR;  Service: Pain Management;  Laterality: N/A;    ESOPHAGEAL DILATION N/A 06/11/2021    Procedure: DILATION, ESOPHAGUS;  Surgeon: Jake Sheriff MD;  Location: [facility] ENDO;  Service: Endoscopy;  Laterality: N/A;    ESOPHAGOGASTRODUODENOSCOPY      dilated esophagus    ESOPHAGOGASTRODUODENOSCOPY N/A 06/11/2021    Procedure: EGD (ESOPHAGOGASTRODUODENOSCOPY);  Surgeon: Jake Sheriff MD;  Location: [facility] ENDO;  Service: Endoscopy;  Laterality: N/A;    GASTRIC BYPASS      2011    HYSTERECTOMY  02/1980    interstim bladder  2009    10/14/14 new battery    OOPHORECTOMY  02/1980    PERCUTANEOUS INSERTION OF SACRAL NERVE NEUROSTIMULATOR ELECTRODE LEAD N/A 6/5/2024    Procedure: INSERTION, ELECTRODE LEAD, NEUROSTIMULATOR, SACRAL, PERCUTANEOUS;  Surgeon: Mary Jane Jarvis MD;  Location: [facility] OR;  Service: Urology;  Laterality: N/A;  1 hour 30 minutes    REPLACEMENT OF NERVE STIMULATOR BATTERY N/A 6/5/2024    Procedure: Replacement, Battery, Neurostimulator;  Surgeon: Mary Jane Jarvis MD;  Location: [facility] OR;  Service: Urology;  Laterality: N/A;  1 hour 30 minutes    REPLACEMENT OF SACRAL NERVE STIMULATOR  02/18/2020    Procedure: REPLACEMENT, NEUROSTIMULATOR, SACRAL;  Surgeon: Mary Jane Jarvis MD;  Location: [facility] OR 2ND FLR;  Service: Urology;;    REVISION OF PROCEDURE INVOLVING SACRAL NEUROSTIMULATOR DEVICE Right 02/18/2020    Procedure: REVISION, NEUROSTIMULATOR, SACRAL/ battery replacement;  Surgeon: Mary Jane Jarvis MD;  Location: [facility] OR 2ND FLR;  Service: Urology;  Laterality: Right;  1hr/ rep contacted/ (CONNIE)    SKIN CANCER EXCISION      left hand    TONSILLECTOMY      TRANSFORAMINAL EPIDURAL INJECTION OF STEROID Bilateral 03/05/2024    Procedure:  Injection,steroid,epidural,transforaminal approach      L4/5;  Surgeon: Fredi Rojas MD;  Location: [facility] OR;  Service: Pain Management;  Laterality: Bilateral;    TRANSFORAMINAL EPIDURAL INJECTION OF STEROID Bilateral 9/16/2024    Procedure: Injection,steroid,epidural,transforaminal approach    L4/5;  Surgeon: Fredi Rojas MD;  Location: [facility] OR;  Service: Pain Management;  Laterality: Bilateral;    WISDOM TOOTH EXTRACTION         Current Outpatient Medications   Medication Sig    benzonatate (TESSALON) 200 MG capsule Take 1 capsule (200 mg total) by mouth 3 (three) times daily as needed for Cough.    blood sugar diagnostic [employer] preferred meter and strips. Check daily    blood-glucose meter kit Use as instructed. Insurance preferred meter.    budesonide (PULMICORT) 0.25 mg/2 mL nebulizer solution Take 2 mLs (0.25 mg total) by nebulization once daily. Controller    colesevelam (WELCHOL) 625 mg tablet Take 1,875 mg by mouth 2 (two) times daily as needed (diarrhea).    cyanocobalamin, vitamin B-12, (VITAMIN B-12 ORAL) Take 1 tablet by mouth Daily.    cyclobenzaprine (FLEXERIL) 10 MG tablet TAKE 1 TABLET BY MOUTH 3 TIMES A DAY FOR MUSCLE SPASMS    dextromethorphan-guaiFENesin (CORICIDIN HBP CHEST GASTON-COUGH)  mg Cap Take 1 capsule by mouth daily as needed (cough/cold).    diltiaZEM (CARDIZEM CD) 120 MG Cp24 Take 1 capsule (120 mg total) by mouth once daily.    doxazosin (CARDURA) 2 MG tablet TAKE 1 TABLET EVERY EVENING    EPINEPHrine (EPIPEN) 0.3 mg/0.3 mL AtIn Inject 0.3 mLs (0.3 mg total) into the muscle as needed (anaphylaxis).    erythromycin with ethanoL (EMGEL) 2 % gel Apply 1 Application topically daily as needed (rash).    esomeprazole (NEXIUM) 40 MG capsule Take 1 capsule (40 mg total) by mouth before breakfast.    estradioL (ESTRACE) 0.01 % (0.1 mg/gram) vaginal cream Place 1 g vaginally 3 (three) times a week. Place by fingertip application before bedtime three times a week (Monday,  Wednesday, Friday)    fexofenadine (ALLEGRA) 180 MG tablet Take 180 mg by mouth once daily.     fluconazole (DIFLUCAN) 200 MG Tab Take 1 pill po q week    fluticasone-umeclidin-vilanter (TRELEGY ELLIPTA) 200-62.5-25 mcg inhaler Inhale 1 puff into the lungs once daily.    gabapentin (NEURONTIN) 600 MG tablet Take 1 tablet (600 mg total) by mouth 3 (three) times daily.    hydrOXYzine HCL (ATARAX) 10 MG Tab TAKE 1 TABLET BY MOUTH 3 TIMES A DAY AS NEEDED    ibandronate (BONIVA) 150 mg tablet Take 1 tablet (150 mg total) by mouth every 30 days.    immun glob G,IgG,-pro-IgA 0-50 (HIZENTRA) 1 gram/5 mL (20 %) Soln Inject 55 mLs (11 g total) into the skin once a week.    inhalation spacing device Use as directed for inhalation.    lancets (ACCU-CHEK SOFTCLIX LANCETS) Misc Use to check blood sugar daily.    levalbuterol (XOPENEX HFA) 45 mcg/actuation inhaler Inhale 1-2 puffs into the lungs every 4 (four) hours as needed for Wheezing or Shortness of Breath. Rescue    loperamide (IMODIUM A-D) 2 mg Tab Take 2 mg by mouth 4 (four) times daily as needed (diarrhea).    montelukast (SINGULAIR) 10 mg tablet Take 1 tablet (10 mg total) by mouth every evening.    mucus clearing device Cecy 1 Device by Misc.(Non-Drug; Combo Route) route 2 (two) times daily.    multivitamin capsule Take 1 capsule by mouth once daily.     mupirocin (BACTROBAN) 2 % ointment Apply topically 3 (three) times daily.    ondansetron (ZOFRAN) 8 MG tablet Take 1 tablet (8 mg total) by mouth every 8 (eight) hours as needed for Nausea.    ondansetron (ZOFRAN-ODT) 4 MG TbDL DISSOLVE 1 TABLET BY MOUTH EVERY 6 HOURS AS NEEDED FOR NAUSEA    pravastatin (PRAVACHOL) 80 MG tablet Take 1 tablet (80 mg total) by mouth once daily.    psyllium husk (METAMUCIL ORAL) Take by mouth.    sertraline (ZOLOFT) 50 MG tablet Take 1 tablet (50 mg total) by mouth once daily.    SIMETHICONE (GAS-X ORAL) Take 1 capsule by mouth as needed (gas relief).     tezepelumab-jourdan (TEZSPIRE) 210  mg/1.91 mL (110 mg/mL) PnAva Inject 1.91 mLs (210.1 mg total) into the skin every 28 days.    valsartan-hydrochlorothiazide (DIOVAN-HCT) 320-25 mg per tablet Take 1 tablet by mouth once daily.    vitamin D3-folic acid 5,000 unit- 1 mg Tab Take 1 tablet by mouth once daily.      No current facility-administered medications for this visit.       Review of patient's allergies indicates:   Allergen Reactions    Sulfa (sulfonamide antibiotics) Hives    Naproxen Other (See Comments)     Other reaction(s): RT sided numbness      Albuterol Itching and Palpitations     Nebulizer only. Can use inhaler       Family History   Problem Relation Name Age of Onset    Breast cancer Mother  50    Breast cancer Paternal Aunt          40's    Cervical cancer Paternal Grandmother      Ovarian cancer Paternal Grandmother      Cervical cancer Paternal Cousin      Ovarian cancer Paternal Cousin 1st     Diabetes Other      Hypertension Other      Hypothyroidism Other      Allergic rhinitis Neg Hx      Allergies Neg Hx      Angioedema Neg Hx      Asthma Neg Hx      Atopy Neg Hx      Eczema Neg Hx      Immunodeficiency Neg Hx      Rhinitis Neg Hx      Urticaria Neg Hx      Uterine cancer Neg Hx         Social History[1]    Chief Complaint:   No chief complaint on file.      History of present illness:  74-year-old female seen for an injury on April 8, 2025.  Patient tripped and fell onto the concrete.  Patient landed on bilateral outstretched wrists.  X-rays taken in the emergency room were negative for fracture.  Patient hit her right knee into the concrete.  Minimally displaced transverse patella fracture.  Patient has a abrasions on both hands and on the right knee.  She is doing physical therapy doing well.  Patient unfortunately had a fall and injured her left knee a few weeks ago.  Then had increased pain in both knees Friday after a therapy session.  Pain is an 8/10.    Prior treatment:  Immobilization        Review of  Systems:  Musculoskeletal:  See HPI        Physical Examination:    Vital Signs:    There were no vitals filed for this visit.      There is no height or weight on file to calculate BMI.    This a well-developed, well nourished patient in no acute distress.  They are alert and oriented and cooperative to examination.  Pt. walks using all rolling walker    Examination of the right knee shows healing abrasions of the front of the knee.  No more swelling.  Minimal Tenderness over the patella.  Active range of motion of about 0-130 degrees already.    X-rays:  X-rays of both knees is ordered and review which show a minimally displaced transverse patella fracture.  No change in alignment.  Nondisplaced left patella fracture         Assessment:  Bilateral transverse patella fracture    Plan:  I reviewed the findings with her today.  Recommended refraining from physical therapy for a couple of weeks to let her knees calm down.  I will give her a Medrol Dosepak to help with the soreness in both of her knees as well as her back.  Follow up in 6 weeks with x-rays of both knees again    All previous pertinent notes including ER visits, physical therapy visits, other orthopedic visits as well as other care for the same musculoskeletal problem were reviewed.  All pertinent lab values and previous imaging was reviewed pertinent to the current visit.    This note was created using AGELON ? voice recognition software that occasionally misinterpreted phrases or words.    Consult note is delivered via Epic messaging service.                   [1]   Social History  Socioeconomic History    Marital status:    Tobacco Use    Smoking status: Never    Smokeless tobacco: Never   Substance and Sexual Activity    Alcohol use: Not Currently    Drug use: No    Sexual activity: Not Currently     Social Drivers of Health     Financial Resource Strain: Medium Risk (3/20/2025)    Overall Financial Resource Strain (CARDIA)     Difficulty of  Paying Living Expenses: Somewhat hard   Food Insecurity: Food Insecurity Present (3/20/2025)    Hunger Vital Sign     Worried About Running Out of Food in the Last Year: Sometimes true     Ran Out of Food in the Last Year: Sometimes true   Transportation Needs: No Transportation Needs (3/20/2025)    PRAPARE - Transportation     Lack of Transportation (Medical): No     Lack of Transportation (Non-Medical): No   Physical Activity: Insufficiently Active (3/20/2025)    Exercise Vital Sign     Days of Exercise per Week: 3 days     Minutes of Exercise per Session: 20 min   Stress: No Stress Concern Present (3/20/2025)    [employer] - Occupational Stress Questionnaire     Feeling of Stress : Only a little   Housing Stability: Unknown (3/20/2025)    Housing Stability Vital Sign     Unable to Pay for Housing in the Last Year: Patient declined

## 2025-07-18 ENCOUNTER — OFFICE VISIT (OUTPATIENT)
Dept: OTOLARYNGOLOGY | Facility: CLINIC | Age: 75
End: 2025-07-18
Payer: MEDICARE

## 2025-07-18 VITALS — BODY MASS INDEX: 34.97 KG/M2 | WEIGHT: 203.69 LBS

## 2025-07-18 DIAGNOSIS — J04.0 LARYNGITIS: Primary | ICD-10-CM

## 2025-07-18 DIAGNOSIS — J30.0 VASOMOTOR RHINITIS: ICD-10-CM

## 2025-07-18 PROCEDURE — 99999 PR PBB SHADOW E&M-EST. PATIENT-LVL IV: CPT | Mod: PBBFAC,HCNC,, | Performed by: OTOLARYNGOLOGY

## 2025-07-18 RX ORDER — IPRATROPIUM BROMIDE 42 UG/1
2 SPRAY, METERED NASAL 3 TIMES DAILY PRN
Qty: 15 ML | Refills: 11 | Status: SHIPPED | OUTPATIENT
Start: 2025-07-18

## 2025-07-18 RX ORDER — FLUCONAZOLE 100 MG/1
100 TABLET ORAL DAILY
Qty: 10 TABLET | Refills: 0 | Status: SHIPPED | OUTPATIENT
Start: 2025-07-18 | End: 2025-07-28

## 2025-07-18 NOTE — PROGRESS NOTES
Subjective:       Patient ID: Cornelia Delatorre is a 74 y.o. female.    Chief Complaint: Hoarse    Cornelia is here for voice issues.  She has had voice changes for about a week.  She sometimes has trouble swallowing for the same amount of time. Feels like food gets choked up.      Here today for voice concerns. Present for 1-2 weeks  Last time treated for laryngitis was 2021. She does get periodic issues with voice and has used a variety of meds including anti-fungals / antibiotics.    Still getting Hyzentra    Also has treated in past for VmR.  She had on Atrovent for years and 2-1/2 years ago I performed RhinAer with good success until more recently.  She has not resumed her Atrovent because she was having some gagging issues in the GI doctor made her stop a bunch of medications including this 1.  Unsure if it was related  Given Diflucan 200 mg per week- hasn't started yet.     Patient validated questionnaires (if applicable):      %            No data to display                   No data to display                   No data to display                     Review of Systems   Constitutional: Negative for activity change and appetite change.   Respiratory: Negative for difficulty breathing and wheezing   Cardiovascular: Negative for chest pain.      Objective:        Constitutional:   Vital signs are normal. She appears well-developed and well-nourished.     Head:  Normocephalic and atraumatic.     Ears:  Hearing normal to normal and whispered voice; external ear normal without scars, lesions, or masses; ear canal, tympanic membrane, and middle ear normal..     Nose:  Rhinorrhea (Clear left) and septal deviation present.     Mouth/Throat  Oropharynx clear and moist without lesions or asymmetry.   Medial edge erythema of both vocal folds on mirror exam.  Spotty white exudates along the posterior tongue      Neck:  Neck normal without thyromegaly masses, asymmetry, normal tracheal structure, crepitus, and  tenderness.         Tests / Results:  None    Assessment:       1. Laryngitis    2. Vasomotor rhinitis            Plan:         Diflucan for 10 days.  Following this, she can begin her weekly Diflucan prophylaxis as prescribed by dermatology.  This may help prevent recurrences in the throat    Have been doing well with rhinitis until recently.  Restart Atrovent and assess how she is doing

## 2025-07-28 ENCOUNTER — PATIENT MESSAGE (OUTPATIENT)
Dept: FAMILY MEDICINE | Facility: CLINIC | Age: 75
End: 2025-07-28
Payer: MEDICARE

## 2025-07-28 ENCOUNTER — OFFICE VISIT (OUTPATIENT)
Dept: URGENT CARE | Facility: CLINIC | Age: 75
End: 2025-07-28
Payer: MEDICARE

## 2025-07-28 VITALS
OXYGEN SATURATION: 96 % | HEIGHT: 64 IN | HEART RATE: 78 BPM | BODY MASS INDEX: 34.66 KG/M2 | TEMPERATURE: 98 F | WEIGHT: 203 LBS | DIASTOLIC BLOOD PRESSURE: 70 MMHG | SYSTOLIC BLOOD PRESSURE: 143 MMHG | RESPIRATION RATE: 16 BRPM

## 2025-07-28 DIAGNOSIS — N39.0 URINARY TRACT INFECTION WITHOUT HEMATURIA, SITE UNSPECIFIED: Primary | ICD-10-CM

## 2025-07-28 DIAGNOSIS — R82.90 FOUL SMELLING URINE: ICD-10-CM

## 2025-07-28 DIAGNOSIS — Z87.440 HISTORY OF RECURRENT UTIS: ICD-10-CM

## 2025-07-28 LAB
BILIRUBIN, UA POC OHS: NEGATIVE
BLOOD, UA POC OHS: NEGATIVE
CLARITY, UA POC OHS: CLEAR
COLOR, UA POC OHS: YELLOW
GLUCOSE, UA POC OHS: NEGATIVE
KETONES, UA POC OHS: NEGATIVE
LEUKOCYTES, UA POC OHS: ABNORMAL
NITRITE, UA POC OHS: POSITIVE
PH, UA POC OHS: 8
PROTEIN, UA POC OHS: NEGATIVE
SPECIFIC GRAVITY, UA POC OHS: 1.01
UROBILINOGEN, UA POC OHS: 0.2

## 2025-07-28 PROCEDURE — 81003 URINALYSIS AUTO W/O SCOPE: CPT | Mod: QW,S$GLB,, | Performed by: PHYSICIAN ASSISTANT

## 2025-07-28 PROCEDURE — 99214 OFFICE O/P EST MOD 30 MIN: CPT | Mod: S$GLB,,, | Performed by: PHYSICIAN ASSISTANT

## 2025-07-28 PROCEDURE — 87077 CULTURE AEROBIC IDENTIFY: CPT | Mod: HCNC | Performed by: PHYSICIAN ASSISTANT

## 2025-07-28 RX ORDER — CEFDINIR 300 MG/1
300 CAPSULE ORAL 2 TIMES DAILY
Qty: 14 CAPSULE | Refills: 0 | Status: SHIPPED | OUTPATIENT
Start: 2025-07-28 | End: 2025-08-04

## 2025-07-28 RX ORDER — CEPHALEXIN 500 MG/1
500 CAPSULE ORAL EVERY 6 HOURS
Qty: 28 CAPSULE | Refills: 0 | Status: CANCELLED | OUTPATIENT
Start: 2025-07-28 | End: 2025-08-04

## 2025-07-28 NOTE — PROGRESS NOTES
"Subjective:      Patient ID: Cornelia Delatorre is a 74 y.o. female.    Vitals:  height is 5' 4" (1.626 m) and weight is 92.1 kg (203 lb). Her oral temperature is 97.7 °F (36.5 °C). Her blood pressure is 143/70 (abnormal) and her pulse is 78. Her respiration is 16 and oxygen saturation is 96%.     Chief Complaint: Dysuria    Patient reports to the  for Foul odor , Denies Dysuria, urgency/frequency and Hematuria for 3 days. Patient stated she has a history of Recurrent UTI'S. Constantly smells odor, especially after urination. Denies itching, burning or any discharge. No OTC meds were taken, Pt is currently taking Fluconazole for Laryngitis that was prescribed 07/18/2025 . Pain 0/10    Female  Problem  She complains of a genital odor. She reports no genital itching, genital lesions, genital rash, missed menses, pelvic pain, vaginal bleeding or vaginal discharge. This is a recurrent problem. The current episode started yesterday. The problem occurs constantly. The problem is unchanged. The patient is experiencing no pain. Pertinent negatives include no abdominal pain, anorexia, back pain, chills, constipation, diarrhea, discolored urine, dysuria, fever, flank pain, frequency, headaches, hematuria, joint pain, joint swelling, nausea, painful intercourse, rash, sore throat, urgency or vomiting. Nothing aggravates the symptoms. Past treatments include nothing. She uses nothing for contraception. Her past medical history is significant for a UTI.       Constitution: Negative for chills, sweating, fatigue and fever.   HENT:  Negative for ear pain, drooling, congestion, sore throat, trouble swallowing and voice change.    Neck: Negative for neck pain, neck stiffness, painful lymph nodes and neck swelling.   Cardiovascular:  Negative for chest pain, leg swelling, palpitations, sob on exertion and passing out.   Eyes:  Negative for eye pain, eye redness, photophobia, double vision, blurred vision and eyelid swelling. "   Respiratory:  Negative for chest tightness, cough, sputum production, bloody sputum, shortness of breath, stridor and wheezing.    Gastrointestinal:  Negative for abdominal pain, abdominal bloating, nausea, vomiting, constipation, diarrhea and heartburn.   Genitourinary:  Negative for dysuria, frequency, urgency, flank pain, hematuria, missed menses, vaginal discharge, vaginal bleeding and pelvic pain.        Urine odor   Musculoskeletal:  Negative for joint pain, joint swelling, abnormal ROM of joint, back pain, muscle cramps and muscle ache.   Skin:  Negative for rash and hives.   Allergic/Immunologic: Negative for seasonal allergies, food allergies, hives, itching and sneezing.   Neurological:  Negative for dizziness, light-headedness, passing out, loss of balance, headaches, altered mental status, loss of consciousness and seizures.   Hematologic/Lymphatic: Negative for swollen lymph nodes.   Psychiatric/Behavioral:  Negative for altered mental status and nervous/anxious. The patient is not nervous/anxious.       Objective:     Physical Exam   Constitutional: She is oriented to person, place, and time. She appears well-developed. She is cooperative.  Non-toxic appearance. She does not appear ill. No distress.   HENT:   Head: Normocephalic and atraumatic.   Ears:   Right Ear: External ear normal.   Left Ear: External ear normal.   Nose: Nose normal. No epistaxis.   Eyes: Conjunctivae and lids are normal. No scleral icterus.   Neck: Trachea normal and phonation normal. Neck supple. No edema present. No erythema present. No neck rigidity present.   Cardiovascular: Normal rate, regular rhythm, normal heart sounds and normal pulses.   Pulmonary/Chest: Effort normal and breath sounds normal. No accessory muscle usage or stridor. No respiratory distress.   Abdominal: Normal appearance and bowel sounds are normal. Soft. There is no abdominal tenderness. There is no rebound, no guarding, no left CVA tenderness and no  right CVA tenderness.   Musculoskeletal: Normal range of motion.         General: No deformity. Normal range of motion.   Neurological: She is alert and oriented to person, place, and time. She exhibits normal muscle tone. Gait normal. Coordination normal.   Skin: Skin is warm, dry, intact, not diaphoretic, not pale and no rash. Capillary refill takes less than 2 seconds.   Psychiatric: Her speech is normal and behavior is normal. Judgment and thought content normal.   Nursing note and vitals reviewed.      Results for orders placed or performed in visit on 07/28/25   POCT Urinalysis(Instrument)    Collection Time: 07/28/25 11:31 AM   Result Value Ref Range    Color, POC UA Yellow Yellow, Straw, Colorless    Clarity, POC UA Clear Clear    Glucose, POC UA Negative Negative    Bilirubin, POC UA Negative Negative    Ketones, POC UA Negative Negative    Spec Grav POC UA 1.015 1.005 - 1.030    Blood, POC UA Negative Negative    pH, POC UA 8.0 5.0 - 8.0    Protein, POC UA Negative Negative    Urobilinogen, POC UA 0.2 <=1.0    Nitrite, POC UA Positive (A) Negative    WBC, POC UA Small (A) Negative     *Note: Due to a large number of results and/or encounters for the requested time period, some results have not been displayed. A complete set of results can be found in Results Review.     Assessment:     1. Urinary tract infection without hematuria, site unspecified    2. Foul smelling urine    3. History of recurrent UTIs      Plan:   Discussed UA results with patient. Discussed DDX and treatment options with patient - will go ahead and treat empirically with antibiotics due to patient's symptoms at this time. Will contact patient with Urine Culture results (sending Urine Culture because patient with PMHx and patient is > 66 yo). Advised close follow-up with PCP and/or OBGYN and/or Urology for further evaluation as needed. ER precautions given to patient as well. Patient aware, verbalized understanding and agreed with plan  of care.    Urinary tract infection without hematuria, site unspecified  -     Urine Culture High Risk ($$)    Foul smelling urine  -     POCT Urinalysis(Instrument)  -     Urine Culture High Risk ($$)    History of recurrent UTIs  -     POCT Urinalysis(Instrument)  -     Urine Culture High Risk ($$)    Other orders  -     cefdinir (OMNICEF) 300 MG capsule; Take 1 capsule (300 mg total) by mouth 2 (two) times daily. for 7 days  Dispense: 14 capsule; Refill: 0

## 2025-07-31 ENCOUNTER — CLINICAL SUPPORT (OUTPATIENT)
Dept: REHABILITATION | Facility: HOSPITAL | Age: 75
End: 2025-07-31
Payer: MEDICARE

## 2025-07-31 DIAGNOSIS — M25.661 DECREASED RANGE OF MOTION (ROM) OF RIGHT KNEE: Primary | ICD-10-CM

## 2025-07-31 LAB — BACTERIA UR CULT: ABNORMAL

## 2025-07-31 PROCEDURE — 97110 THERAPEUTIC EXERCISES: CPT | Mod: HCNC,PO

## 2025-07-31 PROCEDURE — 97112 NEUROMUSCULAR REEDUCATION: CPT | Mod: HCNC,PO

## 2025-07-31 NOTE — PROGRESS NOTES
Outpatient Rehab    Physical Therapy Progress Note : Updated Plan of Care    Patient Name: Cornelia Delatorre  MRN: [id number]  YOB: 1950  Encounter Date: 7/31/2025    Therapy Diagnosis:   Encounter Diagnosis   Name Primary?    Decreased range of motion (ROM) of right knee Yes     Physician: Robbin Edmond,*    Physician Orders: Eval and Treat  Medical Diagnosis: Closed displaced transverse fracture of right patella with routine healing, subsequent encounter  Surgical Diagnosis: Not applicable for this Episode   Surgical Date: Not applicable for this Episode  Days Since Last Surgery: Not applicable for this Episode    Visit # / Visits Authorized: 10 / 20  Insurance Authorization Period: 6/10/2025 to 12/31/2025  Date of Evaluation: 6/4/2025   Plan of Care Certification: 7/31/2025 to 9/3/2025     PT/PTA:     Number of PTA visits since last PT visit:   Time In: 1045   Time Out: 1145  Total Time (in minutes): 60   Total Billable Time (in minutes): 30    FOTO:  Intake Score (%): 29  Survey Score 2 (%): 42  Survey Score 3 (%): Not applicable for this Episode    Precautions:       Subjective   Pt reports that her MD requested that she hold therapy for 2-3 weeks due to a recent fall..  Pain reported as 5/10.      Objective           Knee Range of Motion   Right Knee    Active (deg) Passive (deg) Pain   Flexion 110   Yes   Extension -1   Yes             Hip Strength - Planes of Motion    Right Strength Right Pain Left Strength Left  Pain   Flexion (L2) 4-   5     Extension 4+   4+     ABduction 4+   4+     ADduction 4+   5     Internal Rotation 4+  Yes 4+     External Rotation 4+   4+           Knee Strength    Right Strength Right Pain Left Strength Left  Pain   Flexion (S2) 5   5     Prone Flexion           Extension (L3) 4- Yes 5        Treatment:  Therapeutic Exercise  TE 3: Nu step x 10 minutes  Balance/Neuromuscular Re-Education  NMR 1: Quad sets B LE 2x10, 3 sec hold  NMR 2: SAQ B LE from full  foam bolster 3x10 2#  NMR 4: SLR 2x10 BLE 2#  Therapeutic Activity  TA 1: Re-assessment    Time Entry(in minutes):  Neuromuscular Re-Education Time Entry: 15  Therapeutic Exercise Time Entry: 15    Assessment & Plan   Assessment  Re-assessment performed today. Pt presents with increased strength and range of motion in the R LE but with deficits still remaining in each. Pt also presents with increased pain due to a recent fall. Quadricepts strengthening performed today to increase ability to perform daily activities with early onset of fatigue and pain present. Pt will benefit from an extension of current POC for 5 weeks to decrease pain, increase strength and range of motion, and improve tolerance to functional activities.   Evaluation/Treatment Tolerance: Patient tolerated treatment well    The patient will continue to benefit from skilled outpatient physical therapy in order to address the deficits listed in the problem list on the initial evaluation, provide patient and family education, and maximize the patients level of independence in the home and community environments.     The patient's spiritual, cultural, and educational needs were considered, and the patient is agreeable to the plan of care and goals.           Goals:   Active       Physical Therapy       Physical Therapy Goal (Progressing)       Start:  06/04/25    Expected End:  07/30/25       Goals to be met in 6-8 weeks:  1. Pt to report worst pain less than 2/10  2. Pt to improve R knee range of motion to 0-120  3. Pt to improve BLE strength by 1 grade  4. Pt to improve FOTO by 25% or greater             Jacob Crain PT

## 2025-08-06 ENCOUNTER — CLINICAL SUPPORT (OUTPATIENT)
Dept: REHABILITATION | Facility: HOSPITAL | Age: 75
End: 2025-08-06
Payer: MEDICARE

## 2025-08-06 DIAGNOSIS — M25.661 DECREASED RANGE OF MOTION (ROM) OF RIGHT KNEE: Primary | ICD-10-CM

## 2025-08-06 PROCEDURE — 97112 NEUROMUSCULAR REEDUCATION: CPT | Mod: HCNC,PO

## 2025-08-06 NOTE — PROGRESS NOTES
Outpatient Rehab    Physical Therapy Visit    Patient Name: Cornelia Delatorre  MRN: [id number]  YOB: 1950  Encounter Date: 8/6/2025    Therapy Diagnosis:   Encounter Diagnosis   Name Primary?    Decreased range of motion (ROM) of right knee Yes     Physician: Robbin Edmond,*    Physician Orders: Eval and Treat  Medical Diagnosis: Closed displaced transverse fracture of right patella with routine healing, subsequent encounter  Surgical Diagnosis: Not applicable for this Episode   Surgical Date: Not applicable for this Episode  Days Since Last Surgery: Not applicable for this Episode    Visit # / Visits Authorized:  11 / 20  Insurance Authorization Period: 6/10/2025 to 12/31/2025  Date of Evaluation: 6/4/2025  Plan of Care Certification: 6/4/2025 to 9/3/2025      PT/PTA:     Number of PTA visits since last PT visit:   Time In: 1050   Time Out: 1150  Total Time (in minutes): 60   Total Billable Time (in minutes): 30    FOTO:  Intake Score (%): 29  Survey Score 2 (%): 42  Survey Score 3 (%): Not applicable for this Episode    Precautions:         Subjective   Pt states that her knee is really sore today..  Pain reported as 7/10.      Objective            Treatment:  Therapeutic Exercise  TE 1: Long sitting gastroc & hamstring stretch with strap x 1 minute x 3 ea  TE 3: Nu step x 10 minutes  Balance/Neuromuscular Re-Education  NMR 2: SAQ B LE from full foam bolster 3x10 2#  NMR 3: LAQ B LE 3x10, 3 sec hold 2#  NMR 4: SLR 3x10 BLE 2#  NMR 5: Standing marches 2x10 2#  NMR 6: Standing hip abduction 2x10 2#  NMR 7: Sit to stand 5x5    Time Entry(in minutes):  Neuromuscular Re-Education Time Entry: 30    Assessment & Plan   Assessment: Treatment today incorporated stance based loading to increase tolerance to daily activities but was limited by pain in the R knee. Will continue to load in stance in future sessions to increase strength in the thigh and glutes. Will continue to progress to increase  tolerance to functional activities.   Evaluation/Treatment Tolerance: Patient limited by pain    The patient will continue to benefit from skilled outpatient physical therapy in order to address the deficits listed in the problem list on the initial evaluation, provide patient and family education, and maximize the patients level of independence in the home and community environments.     The patient's spiritual, cultural, and educational needs were considered, and the patient is agreeable to the plan of care and goals.           Plan: Continue POC    Goals:   Active       Physical Therapy       Physical Therapy Goal (Progressing)       Start:  06/04/25    Expected End:  07/30/25       Goals to be met in 6-8 weeks:  1. Pt to report worst pain less than 2/10  2. Pt to improve R knee range of motion to 0-120  3. Pt to improve BLE strength by 1 grade  4. Pt to improve FOTO by 25% or greater             Jacob Crain PT

## 2025-08-08 ENCOUNTER — CLINICAL SUPPORT (OUTPATIENT)
Dept: REHABILITATION | Facility: HOSPITAL | Age: 75
End: 2025-08-08
Payer: MEDICARE

## 2025-08-08 DIAGNOSIS — M25.661 DECREASED RANGE OF MOTION (ROM) OF RIGHT KNEE: Primary | ICD-10-CM

## 2025-08-08 PROCEDURE — 97110 THERAPEUTIC EXERCISES: CPT | Mod: HCNC,PO,CQ

## 2025-08-08 PROCEDURE — 97112 NEUROMUSCULAR REEDUCATION: CPT | Mod: HCNC,PO,CQ

## 2025-08-08 NOTE — PROGRESS NOTES
Outpatient Rehab    Physical Therapy Visit    Patient Name: Cornelia Delatorre  MRN: [id number]  YOB: 1950  Encounter Date: 8/8/2025    Therapy Diagnosis:   Encounter Diagnosis   Name Primary?    Decreased range of motion (ROM) of right knee Yes       Physician: Robbin Edmond,*    Physician Orders: Eval and Treat  Medical Diagnosis: Closed displaced transverse fracture of right patella with routine healing, subsequent encounter  Surgical Diagnosis: Not applicable for this Episode   Surgical Date: Not applicable for this Episode  Days Since Last Surgery: Not applicable for this Episode    Visit # / Visits Authorized:  12 / 20  Insurance Authorization Period: 6/10/2025 to 12/31/2025  Date of Evaluation: 6/4/2025  Plan of Care Certification: 6/4/2025 to 9/3/2025      PT/PTA: PTA   Number of PTA visits since last PT visit:1  Time In: 1100   Time Out: 1155  Total Time (in minutes): 55   Total Billable Time (in minutes): 55    FOTO:  Intake Score (%): 29  Survey Score 2 (%): 42  Survey Score 3 (%): Not applicable for this Episode    Precautions:         Subjective   Pt reports both knees are bothering her today.  Pain reported as 6/10. (B) knees    Objective            Treatment:  Therapeutic Exercise  TE 1: Long sitting gastroc & hamstring stretch with strap x 1 minute x 3 ea  TE 3: Nu step x 10 minutes  Balance/Neuromuscular Re-Education  NMR 2: SAQ B LE from full foam bolster 3x10 3#  NMR 3: LAQ B LE 3x10, 3 sec hold 2#  NMR 4: SLR 3x10 BLE 2#  NMR 7: Sit to stand 2x10  Therapeutic Activity  TA 1: Standing marches 2x10 2#  TA 2: Standing hip abduction 2x10 2#  TA 3: Sit to stand 2x10    Time Entry(in minutes):  Neuromuscular Re-Education Time Entry: 40  Therapeutic Exercise Time Entry: 15    Assessment & Plan   Assessment: Pt progressed with load without adverse affect. She was able to perform STS with more reps in a set with mimimal c/o L knee pain. Will continue to progress to increase  tolerance to functional activities.   Evaluation/Treatment Tolerance: Patient tolerated treatment well    The patient will continue to benefit from skilled outpatient physical therapy in order to address the deficits listed in the problem list on the initial evaluation, provide patient and family education, and maximize the patients level of independence in the home and community environments.     The patient's spiritual, cultural, and educational needs were considered, and the patient is agreeable to the plan of care and goals.     Education  Education was done with Patient. The patient's learning style includes Demonstration and Listening. The patient Demonstrates understanding and Verbalizes understanding.                 Plan: Continue POC    Goals:   Active       Physical Therapy       Physical Therapy Goal (Progressing)       Start:  06/04/25    Expected End:  07/30/25       Goals to be met in 6-8 weeks:  1. Pt to report worst pain less than 2/10  2. Pt to improve R knee range of motion to 0-120  3. Pt to improve BLE strength by 1 grade  4. Pt to improve FOTO by 25% or greater               Carrie Camarillo, NELI

## 2025-08-13 ENCOUNTER — CLINICAL SUPPORT (OUTPATIENT)
Dept: REHABILITATION | Facility: HOSPITAL | Age: 75
End: 2025-08-13
Payer: MEDICARE

## 2025-08-13 DIAGNOSIS — M25.661 DECREASED RANGE OF MOTION (ROM) OF RIGHT KNEE: Primary | ICD-10-CM

## 2025-08-13 PROCEDURE — 97112 NEUROMUSCULAR REEDUCATION: CPT | Mod: HCNC,PO

## 2025-08-15 ENCOUNTER — CLINICAL SUPPORT (OUTPATIENT)
Dept: REHABILITATION | Facility: HOSPITAL | Age: 75
End: 2025-08-15
Payer: MEDICARE

## 2025-08-15 DIAGNOSIS — M25.661 DECREASED RANGE OF MOTION (ROM) OF RIGHT KNEE: Primary | ICD-10-CM

## 2025-08-15 PROCEDURE — 97110 THERAPEUTIC EXERCISES: CPT | Mod: HCNC,PO,CQ

## 2025-08-15 PROCEDURE — 97112 NEUROMUSCULAR REEDUCATION: CPT | Mod: HCNC,PO,CQ

## 2025-08-20 RX ORDER — AZELASTINE 1 MG/ML
SPRAY, METERED NASAL
Qty: 30 ML | Refills: 7 | Status: SHIPPED | OUTPATIENT
Start: 2025-08-20

## 2025-08-20 RX ORDER — AZELASTINE HYDROCHLORIDE 0.5 MG/ML
SOLUTION/ DROPS OPHTHALMIC
Qty: 6 ML | Refills: 7 | Status: SHIPPED | OUTPATIENT
Start: 2025-08-20

## 2025-08-21 ENCOUNTER — TELEPHONE (OUTPATIENT)
Dept: ALLERGY | Facility: CLINIC | Age: 75
End: 2025-08-21
Payer: MEDICARE

## 2025-08-22 DIAGNOSIS — M25.569 KNEE PAIN, UNSPECIFIED CHRONICITY, UNSPECIFIED LATERALITY: Primary | ICD-10-CM

## 2025-08-27 ENCOUNTER — HOSPITAL ENCOUNTER (OUTPATIENT)
Dept: RADIOLOGY | Facility: HOSPITAL | Age: 75
Discharge: HOME | End: 2025-08-27
Attending: ORTHOPAEDIC SURGERY
Payer: MEDICARE

## 2025-08-27 ENCOUNTER — OFFICE VISIT (OUTPATIENT)
Dept: ORTHOPEDICS | Facility: CLINIC | Age: 75
End: 2025-08-27
Payer: MEDICARE

## 2025-08-27 DIAGNOSIS — S82.031D CLOSED DISPLACED TRANSVERSE FRACTURE OF RIGHT PATELLA WITH ROUTINE HEALING, SUBSEQUENT ENCOUNTER: Primary | ICD-10-CM

## 2025-08-27 DIAGNOSIS — S82.035A CLOSED NONDISPLACED TRANSVERSE FRACTURE OF LEFT PATELLA, INITIAL ENCOUNTER: ICD-10-CM

## 2025-08-27 DIAGNOSIS — M25.569 KNEE PAIN, UNSPECIFIED CHRONICITY, UNSPECIFIED LATERALITY: ICD-10-CM

## 2025-08-27 DIAGNOSIS — M17.10 ARTHRITIS OF KNEE: ICD-10-CM

## 2025-08-27 PROCEDURE — 73564 X-RAY EXAM KNEE 4 OR MORE: CPT | Mod: TC,50,HCNC,PO

## 2025-08-27 PROCEDURE — 99999 PR PBB SHADOW E&M-EST. PATIENT-LVL I: CPT | Mod: PBBFAC,HCNC,, | Performed by: ORTHOPAEDIC SURGERY

## 2025-08-27 PROCEDURE — 73564 X-RAY EXAM KNEE 4 OR MORE: CPT | Mod: 26,50,HCNC, | Performed by: RADIOLOGY

## 2025-08-27 RX ORDER — TRIAMCINOLONE ACETONIDE 40 MG/ML
40 INJECTION, SUSPENSION INTRA-ARTICULAR; INTRAMUSCULAR
Status: DISCONTINUED | OUTPATIENT
Start: 2025-08-27 | End: 2025-08-27 | Stop reason: HOSPADM

## 2025-08-27 RX ADMIN — TRIAMCINOLONE ACETONIDE 40 MG: 40 INJECTION, SUSPENSION INTRA-ARTICULAR; INTRAMUSCULAR at 02:08

## (undated) DEVICE — SEE MEDLINE ITEM 152622

## (undated) DEVICE — SEE MEDLINE ITEM 157148

## (undated) DEVICE — DRESSING TRANS 4X4 TEGADERM

## (undated) DEVICE — SOL 9P NACL IRR PIC IL

## (undated) DEVICE — DRAPE INCISE IOBAN 2 13X13IN

## (undated) DEVICE — SUT ETHILON 4-0 PS2 18 BLK

## (undated) DEVICE — TOWEL OR DISP STRL BLUE 4/PK

## (undated) DEVICE — STRAP OR TABLE 5IN X 72IN

## (undated) DEVICE — APPLICATOR CHLORAPREP CLR 10.5

## (undated) DEVICE — SYR EAR ULCER SGL USE 3 OZ

## (undated) DEVICE — GLOVE BIOGEL SKINSENSE PI 6.0

## (undated) DEVICE — GLOVE 7.5 PROTEXIS PI MICRO

## (undated) DEVICE — APPLICATOR CHLORAPREP ORN 26ML

## (undated) DEVICE — NDL HYPO SAFETY 25GX1.5IN

## (undated) DEVICE — COVER PROBE NL STRL 3.6X96IN

## (undated) DEVICE — DRAPE STERI INSTRUMENT 1018

## (undated) DEVICE — HANDLE SURG LIGHT NONRIGID

## (undated) DEVICE — PADDING CAST 4IN

## (undated) DEVICE — GAUZE SPONGE 8X4 12 PLY

## (undated) DEVICE — DRESSING N ADH OIL EMUL 3X3

## (undated) DEVICE — SEE MEDLINE ITEM 157131

## (undated) DEVICE — SEE MEDLINE ITEM 157117

## (undated) DEVICE — TIP YANKAUERS BULB NO VENT

## (undated) DEVICE — BLADE SURG #15 CARBON STEEL

## (undated) DEVICE — BANDAGE ESMARK LATEX FREE 4INX

## (undated) DEVICE — KIT PROBE COVER WITH GEL

## (undated) DEVICE — NDL INTERSTIM FORAMN 18.5G 5IN

## (undated) DEVICE — CORD BIPOLAR 12 FOOT

## (undated) DEVICE — ELECTRODE REM PLYHSV RETURN 9

## (undated) DEVICE — COVER SURG LIGHT HANDLE

## (undated) DEVICE — COVER LIGHT HANDLE 80/CA

## (undated) DEVICE — SUT VICRYL 3-0 27 SH

## (undated) DEVICE — TRAY NERVE BLOCK

## (undated) DEVICE — TRAY MINOR GEN SURG OMC

## (undated) DEVICE — SYR 10CC LUER LOCK

## (undated) DEVICE — DRESSING TELFA N ADH 3X8

## (undated) DEVICE — ELECTRODE NEEDLE 2.8IN

## (undated) DEVICE — SUT MCRYL PLUS 4-0 PS2 27IN

## (undated) DEVICE — DEVICE LEAD EXTRACTION 1 65CM

## (undated) DEVICE — BATTERY SIZE 9V

## (undated) DEVICE — DRAPE C-ARMOR EQUIPMENT COVER

## (undated) DEVICE — DRESSING TELFA STRL 4X3 LF

## (undated) DEVICE — ALCOHOL 70% ISOP RUBBING 4OZ

## (undated) DEVICE — SOL SOD CHLORIDE 0.9% 10ML

## (undated) DEVICE — TOURNIQUET SB QC DP 18X4IN

## (undated) DEVICE — SEE MEDLINE ITEM 146270

## (undated) DEVICE — NDL COLORADO STR SLV 45ANG 3IN

## (undated) DEVICE — NDL HYPO REG 25G X 1 1/2

## (undated) DEVICE — GLOVE SURGICAL LATEX SZ 8

## (undated) DEVICE — SEE MEDLINE ITEM 157128

## (undated) DEVICE — NDL SPINAL SPINOCAN 22GX3.5

## (undated) DEVICE — SYS LABEL CORRECT MED

## (undated) DEVICE — NDL BOX COUNTER

## (undated) DEVICE — STRIP STERI REIN CLSR 1/2X2IN

## (undated) DEVICE — GAUZE SPONGE BULKEE 6X6.75IN

## (undated) DEVICE — SLEEVE SCD EXPRESS CALF MEDIUM

## (undated) DEVICE — SEE MEDLINE ITEM 152487

## (undated) DEVICE — ELECTRODE REM POLYHESIVE II

## (undated) DEVICE — SPONGE GAUZE 16PLY 4X4

## (undated) DEVICE — GOWN B1 X-LG X-LONG

## (undated) DEVICE — SEE MEDLINE ITEM 146308

## (undated) DEVICE — PROGRAMMER INTERSTIM HANDSET

## (undated) DEVICE — DRAPE LAP T SHT W/ INSTR PAD

## (undated) DEVICE — HANDSET INTERSTIM X COMM

## (undated) DEVICE — DURAPREP SURG SCRUB 26ML

## (undated) DEVICE — CONTAINER SPECIMEN STRL 4OZ

## (undated) DEVICE — STRIP MEDI WND CLSR 1/2X4IN

## (undated) DEVICE — PENCIL ROCKER SWITCH 10FT CORD

## (undated) DEVICE — TRAY FOLEY 16FR INFECTION CONT

## (undated) DEVICE — DRAPE STERI-DRAPE 1000 17X11IN

## (undated) DEVICE — LINER SUCTION 3000CC

## (undated) DEVICE — TRAY MINOR GEN SURG

## (undated) DEVICE — SEE MEDLINE ITEM 146313

## (undated) DEVICE — INSTRUMENT FRAZIER 10FR W/VENT

## (undated) DEVICE — PADDING CAST 4IN SPECIALIST

## (undated) DEVICE — SEE MEDLINE ITEM 146417

## (undated) DEVICE — BLADE PEAK PLASMA

## (undated) DEVICE — PAD CURAD NONADH 3X4IN

## (undated) DEVICE — KIT INTERSTIM REVISION ACC

## (undated) DEVICE — PACK ARTHROSCOPY W/ISO BAC

## (undated) DEVICE — DRAPE C ARM 42 X 120 10/BX

## (undated) DEVICE — UNDERGLOVES BIOGEL PI SIZE 7.5

## (undated) DEVICE — COVERS PROBE NR-48 STERILE